# Patient Record
Sex: FEMALE | Race: BLACK OR AFRICAN AMERICAN | NOT HISPANIC OR LATINO | Employment: OTHER | URBAN - METROPOLITAN AREA
[De-identification: names, ages, dates, MRNs, and addresses within clinical notes are randomized per-mention and may not be internally consistent; named-entity substitution may affect disease eponyms.]

---

## 2017-01-03 RX ORDER — HYDROCODONE BITARTRATE AND ACETAMINOPHEN 5; 325 MG/1; MG/1
1 TABLET ORAL EVERY 6 HOURS PRN
Qty: 60 TABLET | Refills: 0 | Status: SHIPPED | OUTPATIENT
Start: 2017-01-03 | End: 2017-02-01 | Stop reason: SDUPTHER

## 2017-01-10 ENCOUNTER — OUTPATIENT CASE MANAGEMENT (OUTPATIENT)
Dept: ADMINISTRATIVE | Facility: OTHER | Age: 82
End: 2017-01-10

## 2017-01-10 NOTE — PROGRESS NOTES
Called patient for OPCM update and to review future appts.    Patient states that she is doing ok at this time, confirms she contunies with Trulicity and happy with her blood sugar control. Confirmed appt with  tomorrow @ 1100 to establish care. Reconfirmed urology appt with Dr. Madera as requested per patient scheduled  @ 1340.    Also advised patient will close current case , but informed patient to call back if develop future needs that require assistance from . Patient verbalized her understanding of all discussed and verbalized her appreciation for assistance.     0Nampal Martínez  1935 Medical Record  # [id number]   Visit Location _x_ Home __ Clinic __ Telephonic __Other  Encounter Date:  16   Contact Type __ New Case   _x_ Follow Up  __ Monitoring  __ Case Closure Next Follow-up Date In 1 month       Summary:  Have made several phone attempts to contact patient without success. Per last contact patient had respiratory infection with lots of coughing not up to phone conversation. Discussed diabetes management with her this morning and patient expressed more interest and agrees to attending education to help her to make better meal choices. She also more open about her current lack of knowledge agrees that she must better able to manage her care and also learn to self check blood sugar and administer insulin, as she relies on various family to provide this care. She is dependent on whomever is available at time, this morning is granddaughter Brooklyn. At this hour patient had already eaten her breakfast, not yet taken her insulin or checked her blood sugar, still unclear of times for check and insulin. She confirms that she usually receives insulin and blood sugar checks after insulin administered. I spoke with Brooklyn and advised that she give insulin now and confirmed dose. I advised both she and Ms Martínez to check blood sugar before breakfast and before her dinner. Advised give  insulin just before eat. I asked that they log blood glucose checks 2 times each day and asked that they bring log to empowerment appt on Monday. Patient previously informed me that she was unable to perform blood glucose checks because of poor vision. She is scheduled for annual exam later this month;howevever I suggested to them both that they obtain inexpensive pair of readers so that Ms Martínez could participate in the empowerment class.She advised of her concerns with vertigo and wearing incorrect glasses. I advised that she remove glasses immediately if this occur and remove them before standing up, only for reading and is important to be able to read during class. She previously reported problems paying for glasses and have provided her with therese information for assistance with glasses. Reminded her to ask eye doctor for prescription to take with her to shop for inexpensive glasses. Advised will follow up with her after diabetes class to reinforce her knowledge. She agreed to that plan.            Plan:    Problem 1. Coordination of care  Continue to educate about Community resources and importance of follow -up . Reeducated about therese for dental and vision assistance.-done  Review understanding of info provided and use . - ongoing-done  Encourage patient use of resource information.  Ongoing-done  Home health request- to MD  Arrange Optometry appt with next encounter- done        2. Problem Diabetes  Continue to educate about Diabetes . Reviewed times for blood sugar monitoring / insulin dose/ time of administration-done  Discussed importance of blood glucose log. Educated on keeping blood glucose log-  Advised to bring in to appt.-again reinforced - Diabetes empowerment student- home health for reinforcement  Review signs and symptoms of Hyper/hypoglycemia . Ongoing-done  Encourage patient to follow medication and treatment sade doctors. Empowerment class scheduled 06/13/16-  attended 1 class so far, next scheduled 07/29-done  Discussed healthy food choices- ongoing- discussed replacing starches with vegtables-done    3. Constipation  Review of meds .done- reinforced  Diet-done- reinforced  Bowel program/when to seek additional assistance.-done- reinforced           Problem 1 Coordination  of Care     Short Term Goal Pt/Family verbalize resource information received with next call.    __ Partially Met  __x_ Met  ___ Not Met  ___  Deferred    Patient to make contact with at least one referral source.  __Partially met  __x_ Met  ___ Not met  ___ Deferred  Patient will follow through with diabetes education, daughters of   kiarra.    Pt/Caregiver ill have action plan on how to contact their provider in a 24 hour cycle (PCP, Specialties, [facility])  ___1___weeks   (__Partially met _x__Met __Not met ___ Deferred)    Patient will follow up with .  _  Partially met  _x__ Met  ___ Not met  ___ Deferred  Urology follow up scheduled.  _Partially met  __x_ Met  ___ Not met  ___ Deferred  To establish care with new PCP    _Partially met  __x_ Met  ___ Not met  ___ Deferred   Long Term Goals      Goal Met Pt /family will work with referral/assistance source that are available for patient needs before discharge from [facility].      ___ Partially Met  _x_ Met  ___ Not Met  ___ Defferred           Interventions Date Addressed    [x]Provide [facility] Contact information and contact information for Outpatient Care Management staff 5/6/16 5/19/16   []Validate/facilitate Home Health Services as ordered by physician    []Validate/facilitate DME delivery as ordered by physician    [x]Collaborate with PCP as appropriate to meet needs 5/6/16   []Facilitate routine  medical appointments as appropriate    [x]Referral to LCSW, Nursing, Clinical Pharmacist, Dietician, Certified Diabetic Educator, Health  other 5/6/16   [x] Referral to community resources: 5/16   [] Facilitate referral to  "appropriate Outpatient Therapies (ie Hospice,  Pulmonary/ Cardiac Rehab)    []     []     []    []     []    []            Problem 2 Diabetes   Short Term Goal Pt/Family verbalize 3 signs of Hypoglycemia with next call.    __ Partially met  _x__ Met  ___ Not met  ___ Deferred    Patient will take insulin dose as prescribed 2 times per day.  __ Partially met _x__ Met  ___ Not met  ___ Deferred   Patient will check blood sugar before breakfast and dinner daily.  ___ Partially met  _x__ Met  ___ Not Met  ___ Deferred  Pt will log glucose values twice daily.  _x_ Partially met  __ Met  ___ Not Met  ___ Deferred  Patient will attend next diabetes empowerment class.  __ Partially met  _x_ met  __ Not met  __ Deferred   Long Term Goals    Goal Met Pt will get Hgb A1c tested every 6 months and if results 8 or greater will make appt with PCP within 2 weeks.    __ Partially Met  __x_ Met  ___ Not met  ___ Deferred   Goal Met        Interventions Date Addressed    __ Review with and provide education materials to pt. Types of Diabetes: "Pre Diabetes" ([employer])    __ Review with and provide education materials to pt. Types of Diabetes: "What is Type 1 Diabetes?" ([employer])    __ Review with and provide education materials to pt. Types of Diabetes: "What is Type 2 Diabetes?" ([employer])    __ Review with and provide education materials to pt. Resources for People with Diabetes (ANN[employer]MARIE)    Glucose Monitoring    _x_ Discuss glucometer monitoring  5/6/16  6/10/16  7/13/16   _x_ Educate regarding physicians recommended BS range 7/13/16   __ Review with and provide education materials to pt. Review with and provide education materials to pt. Blood Sugar Management: Hypoglycemia (Low Blood Sugar) ([employer])    __ Review with and provide education materials to pt. Review with and provide education materials to pt. Blood Sugar Management: Hyperglycemia (High Blood Sugar) (ANN[employer]IE)    _x_ Review with and provide education materials to pt. Blood " "Sugar Management: Using a Blood Sugar Log  ([facility]) 6/10/16  7/13/16   __  Review with and provide education materials to pt. Blood Sugar Management: Managing Diabetes: The A1C Test ([facility])    Diet and Meal Planning     __ Review with and provide education materials to pt. Diet and Meal Planning - "Healthy Meals for Diabetes" ([facility])    __ Review with and provide education materials to pt. Diet and Meal Planning - "Diabetes: Understanding Carbohydrates"  and Understanding Carbohydrates, Fats and Protein, ([facility])    __ Review with and provide education materials to pt. Diet and Meal Planning - "Eating Out When You Have Diabetes" ([facility])    _x_ Review with and provide education materials to pt. Diet and Meal Planning - "Diabetes: Shopping for and Preparing Meals"  ([employer]) 5/6 7/13/16   __Review with and provide education materials to pt. Review with and provide education materials to pt. Diet and Meal Planning - "Eating a High Fiber Diet"  ([facility])    __ Review with and provide education materials to pt. Diet and Meal Planning - "Diabetes: Meal Planning"  ([facility])    __ Review with and provide education materials to pt. Complications & Co morbidities - "Diabetes and Alcohol Consumption" ([facility])    Exercise and Weight    __ Review with and provide education materials to pt. Exercise: "Exercise to Manage Your Blood Sugar" ([facility])    __ Review with and provide education materials to pt. Exercise: "Diabetes: The Benefits of Exercise" ([facility])    __ Review with and provide education materials to pt. Exercise: "Before You Start With Diabetes Exercise Plan" ([facility])    __ eview with and provide education materials to pt. Exercise: "Diabetes: Getting Started with Exercise" ([facility])    __ Review with and provide education materials to pt. Exercise: "Diabetes: Activity Tips" ([facility])    __ Review with and provide education materials to pt. Exercise: "Diabetes: Tracking Your Fitness Progress" ([employer])    __ Review with " "and provide education materials to pt. Exercise: "Type 1 Diabetes: Getting Active"  ([employer])     __ Review with and provide education materials to pt. Weight Management: "Finding Your Ideal Weight" ([employer])    Medications    __ Review with and provide education materials to pt. Medications: "Using Injected Insulin" ([employer])    __ Review with and provide education materials to pt. Medications: "Oral Therapy for Type 2 Diabetes" ([employer])    __ Review with and provide education materials to pt. Medications: "Types of Insulin" ([employer])    __ Review with and provide education materials to pt. Medications: "Diabetes: Ways to Take Medication"  ([employer])    __ Review with and provide education materials to pt. Medications: "Taking Medication for Diabetes"  ([employer])    Complications and Co morbidities    __ Review with and provide education materials to pt. Complications & Co morbidities - "After Leg Amputation: Keeping Your Other Leg Healthy" ([employer])    __ Review with and provide education materials to pt. Complications & Co morbidities - "Diabetic Retinopathy: Controlling Your Risk Factors" ([employer])    __ Review with and provide education materials to pt. Complications & Co morbidities - "Using the Amsler Grid" ([employer])    __ Review with and provide education materials to pt. Complications & Co morbidities - "Diabetic Retinopathy: Evaluating Your Eyes"([employer])    __ Review with and provide education materials to pt. Types of Diabetes: "Diabetic Retinopathy: Controlling Your Risk Factors" ([employer])    __ Review with and provide education materials to pt. Complications & Co morbidities - "Diabetic Retinopathy: Having Laser Treatment" ([employer])    __ Review with and provide education materials to pt. Complications & Co morbidities - Diabetic Retinopathy: Having Vitrectomy ([facility])     Self  Care    __  Review with and provide education materials to pt. Self Care - "Diabetes: Keeping Your Feet Healthy" ([employer])    __ Review with " "and provide education materials to pt. Self Care - "Your Diabetes Foot Care Program"  ([employer])    __ Review with and provide education materials to pt. Self Care - "Diabetes: Caring For Your Body" ([employer])    __ Review with and provide education materials to pt. Self Care - "Diabetes: Sick-Day Plan" ([employer])    __ Review with and provide education materials to pt. Self Care - "Diabetes: Driving Issues" ([employer])    __ Review with and provide education materials to pt. Self Care - "Diabetes: Living Your Life" ([employer])    __ Review with and provide education materials to pt. Self Care - "Getting Support When You Have Diabetes" ([employer])    __ Review with and provide education materials to pt. Self Care - "Managing Stress When You Have Diabetes" ([employer])    __ Review with and provide education materials to pt. Self Care - "Planning for Travel When You Have Diabetes" ([employer])    __ Review with and provide education materials to pt. Types of Diabetes: "Your Diabetes Toolkit" ([employer])    Gestational    __ Review with and provide education materials to pt. What is Gestational Diabetes? ([employer])    __ Review with and provide education materials to pt. Exercise: "Gestational Diabetes: Getting Exercise"  ([employer])    __ Review with and provide education materials to pt. Gestational Diabetes:  After Pregnancy"  ([employer]IE)    Pediatrics (Parents)    __  Review with and provide education materials to parent:   Diabetes and Your Child: Understanding Prediabetes  ([employer])    __ Review with and provide education materials to parent:   Diabetes and Your Child: Understanding Type I Diabetes  ([employer])    __ Review with and provide education materials to parent:   Diabetes and Your Child: Understanding Type 2 Diabetes  ([employer])    __ Review with and provide education materials to parent:   Diabetes and Your Child: Testing and Vaccinations  ([employer])    __ Review with and provide education materials to parent:   Diabetes and Your " "Child: Sick -Day Plan  ([employer])    __ Review with and provide education materials to parent:   Diabetes and Your Child: Checking Blood Sugar  ([employer])    __ Review with and provide education materials to parent:   Diabetes and Your Child: Preventing Diabetic Ketoacidosis (DKA)  ([employer])    __ Review with and provide education materials to parent:   Diabetes and Your Child: Testing and Vaccinations  ([employer])    __ Review with and provide education materials to parent:  Diabetes and Your Child: Considering an Insulin Pump.  ([employer])    Pediatrics (Kids)    __ Review with and provide education materials to child.  For Kids Ages 9 to 11: Dealing with Diabetes.  ([employer])    __ Review with and provide education materials to child.  For Kids Ages 12 to 17: Dealing with Diabetes.  ([employer])    __ Review with and provide education materials to child.  For Kids:  Food Facts When You Have Type I Diabetes.  (ANN-MARIE)      Referrals/Management    _x_ Facilitate referral to diabetic class 6/10/16  7/13/16  8/12/16   __ Facilitate obtaining DM supplies    __ Facilitate referral to podiatrist    _x_ Monitor DM management (HgA1c, dental exam 2xs/yr, annual flu shot, annual eye exam, annual comprehensive foot exam) 6/10/16  7/13/16   _x_ Encourage regular medical appointments 6/10/16  7/13/16  8/12/16              Problem 3 Gastroenterology - Constipation      Short Term Goal Patient will verbalize her understanding of current meds to treat constipation.  ___Partially met  ___ Not met  __x_ Met  ___ deferred     Long Term Goals Regulation of Bowel Movement     Goal Met   Reason:      Interventions Date Addressed    _x_ Bowel regime established   11/2 11/17   __ Pt/family educated in bowel regime   11/17   _x_ Review with and provide education materials to pt "Treating Constipation" ([employer])   11/2 11/17   _x_ Determine that pt. has proper foods and liquids available.    11/2   __               "

## 2017-01-11 ENCOUNTER — IMMUNIZATION (OUTPATIENT)
Dept: INTERNAL MEDICINE | Facility: CLINIC | Age: 82
End: 2017-01-11
Payer: MEDICARE

## 2017-01-11 ENCOUNTER — OFFICE VISIT (OUTPATIENT)
Dept: INTERNAL MEDICINE | Facility: CLINIC | Age: 82
End: 2017-01-11
Payer: MEDICARE

## 2017-01-11 VITALS
TEMPERATURE: 98 F | OXYGEN SATURATION: 96 % | WEIGHT: 159.38 LBS | HEIGHT: 61 IN | BODY MASS INDEX: 30.09 KG/M2 | HEART RATE: 67 BPM | SYSTOLIC BLOOD PRESSURE: 118 MMHG | DIASTOLIC BLOOD PRESSURE: 62 MMHG

## 2017-01-11 DIAGNOSIS — M79.89 LEG SWELLING: ICD-10-CM

## 2017-01-11 DIAGNOSIS — E78.2 MIXED HYPERLIPIDEMIA: ICD-10-CM

## 2017-01-11 DIAGNOSIS — N18.30 TYPE 2 DIABETES MELLITUS WITH STAGE 3 CHRONIC KIDNEY DISEASE, WITHOUT LONG-TERM CURRENT USE OF INSULIN: Chronic | ICD-10-CM

## 2017-01-11 DIAGNOSIS — E11.3293 MILD NONPROLIFERATIVE DIABETIC RETINOPATHY OF BOTH EYES WITHOUT MACULAR EDEMA ASSOCIATED WITH TYPE 2 DIABETES MELLITUS: Chronic | ICD-10-CM

## 2017-01-11 DIAGNOSIS — E11.22 TYPE 2 DIABETES MELLITUS WITH STAGE 3 CHRONIC KIDNEY DISEASE, WITHOUT LONG-TERM CURRENT USE OF INSULIN: Chronic | ICD-10-CM

## 2017-01-11 DIAGNOSIS — J32.9 CHRONIC SINUSITIS, UNSPECIFIED LOCATION: Primary | ICD-10-CM

## 2017-01-11 DIAGNOSIS — M15.9 PRIMARY OSTEOARTHRITIS INVOLVING MULTIPLE JOINTS: Chronic | ICD-10-CM

## 2017-01-11 DIAGNOSIS — Z78.0 POSTMENOPAUSAL: ICD-10-CM

## 2017-01-11 DIAGNOSIS — I70.0 ATHEROSCLEROSIS OF AORTA: Chronic | ICD-10-CM

## 2017-01-11 DIAGNOSIS — I10 ESSENTIAL HYPERTENSION: Chronic | ICD-10-CM

## 2017-01-11 PROCEDURE — 99499 UNLISTED E&M SERVICE: CPT | Mod: S$GLB,,, | Performed by: INTERNAL MEDICINE

## 2017-01-11 PROCEDURE — 90662 IIV NO PRSV INCREASED AG IM: CPT | Mod: S$GLB,,, | Performed by: INTERNAL MEDICINE

## 2017-01-11 PROCEDURE — 99999 PR PBB SHADOW E&M-EST. PATIENT-LVL III: CPT | Mod: PBBFAC,,, | Performed by: INTERNAL MEDICINE

## 2017-01-11 PROCEDURE — 1157F ADVNC CARE PLAN IN RCRD: CPT | Mod: S$GLB,,, | Performed by: INTERNAL MEDICINE

## 2017-01-11 PROCEDURE — 99214 OFFICE O/P EST MOD 30 MIN: CPT | Mod: S$GLB,,, | Performed by: INTERNAL MEDICINE

## 2017-01-11 PROCEDURE — 3074F SYST BP LT 130 MM HG: CPT | Mod: S$GLB,,, | Performed by: INTERNAL MEDICINE

## 2017-01-11 PROCEDURE — G0008 ADMIN INFLUENZA VIRUS VAC: HCPCS | Mod: S$GLB,,, | Performed by: INTERNAL MEDICINE

## 2017-01-11 PROCEDURE — 1159F MED LIST DOCD IN RCRD: CPT | Mod: S$GLB,,, | Performed by: INTERNAL MEDICINE

## 2017-01-11 PROCEDURE — 3078F DIAST BP <80 MM HG: CPT | Mod: S$GLB,,, | Performed by: INTERNAL MEDICINE

## 2017-01-11 PROCEDURE — 1160F RVW MEDS BY RX/DR IN RCRD: CPT | Mod: S$GLB,,, | Performed by: INTERNAL MEDICINE

## 2017-01-11 PROCEDURE — 1126F AMNT PAIN NOTED NONE PRSNT: CPT | Mod: S$GLB,,, | Performed by: INTERNAL MEDICINE

## 2017-01-11 RX ORDER — LISINOPRIL 40 MG/1
40 TABLET ORAL DAILY
Qty: 90 TABLET | Refills: 3 | Status: SHIPPED | OUTPATIENT
Start: 2017-01-11 | End: 2017-06-15

## 2017-01-11 RX ORDER — LORATADINE 10 MG/1
10 TABLET ORAL DAILY PRN
Qty: 90 TABLET | Refills: 1 | Status: SHIPPED | OUTPATIENT
Start: 2017-01-11 | End: 2017-06-15

## 2017-01-11 RX ORDER — AMLODIPINE BESYLATE 10 MG/1
10 TABLET ORAL DAILY
Qty: 90 TABLET | Refills: 3 | Status: SHIPPED | OUTPATIENT
Start: 2017-01-11 | End: 2017-06-15

## 2017-01-11 RX ORDER — FUROSEMIDE 20 MG/1
20 TABLET ORAL DAILY PRN
Qty: 14 TABLET | Refills: 0 | Status: SHIPPED | OUTPATIENT
Start: 2017-01-11 | End: 2017-10-27 | Stop reason: SDUPTHER

## 2017-01-11 RX ORDER — CHLORTHALIDONE 25 MG/1
25 TABLET ORAL DAILY
Qty: 90 TABLET | Refills: 3 | Status: SHIPPED | OUTPATIENT
Start: 2017-01-11 | End: 2017-11-21

## 2017-01-11 RX ORDER — ATENOLOL 100 MG/1
100 TABLET ORAL DAILY
Qty: 90 TABLET | Refills: 3 | Status: ON HOLD | OUTPATIENT
Start: 2017-01-11 | End: 2017-06-06

## 2017-01-11 NOTE — MR AVS SNAPSHOT
[facility]  [address]  [place] [address]  Phone: [phone]  Fax: [phone]                  Dacia Martínez   2017 11:00 AM   Office Visit    Description:  Female : 1935   Provider:  Gómez Jimenez MD   Department:  [facility]           Reason for Visit     Establish Care     Diabetes     Hyperlipidemia     Sinus Problem           Diagnoses this Visit        Comments    Chronic sinusitis, unspecified location    -  Primary     Mild nonproliferative diabetic retinopathy of both eyes without macular edema associated with type 2 diabetes mellitus         Type 2 diabetes mellitus with stage 3 chronic kidney disease, without long-term current use of insulin         Essential hypertension         Atherosclerosis of aorta         Mixed hyperlipidemia         Leg swelling         Postmenopausal         Idiopathic chronic gout of multiple sites without tophus         Primary osteoarthritis involving multiple joints                To Do List           Future Appointments        Provider Department Dept Phone    2017 1:40 PM Osman Madera MD [facility] 4th Floor [phone]      Goals (5 Years of Data)     None      Follow-Up and Disposition     Return in about 2 months (around 3/11/2017) for diabetes.       These Medications        Disp Refills Start End    amlodipine (NORVASC) 10 MG tablet 90 tablet 3 2017     Take 1 tablet (10 mg total) by mouth once daily. - Oral    Pharmacy: [address] Ph #: [phone]       lisinopril (PRINIVIL,ZESTRIL) 40 MG tablet 90 tablet 3 2017     Take 1 tablet (40 mg total) by mouth once daily. - Oral    Pharmacy: [address] Ph #: [phone]       loratadine (CLARITIN) 10 mg tablet 90 tablet 1 2017    Take 1 tablet (10 mg total) by mouth daily as  needed for Allergies. For sinus congestion - Oral    Pharmacy: [address] Ph #: [phone]       furosemide (LASIX) 20 MG tablet 14 tablet 0 1/11/2017 1/25/2017    Take 1 tablet (20 mg total) by mouth daily as needed. - Oral    Pharmacy: [address] [place]. Ph #: [phone]       atenolol (TENORMIN) 100 MG tablet 90 tablet 3 1/11/2017 1/11/2018    Take 1 tablet (100 mg total) by mouth once daily. - Oral    Pharmacy: [address] [place]. Ph #: [phone]       chlorthalidone (HYGROTEN) 25 MG Tab 90 tablet 3 1/11/2017 1/11/2018    Take 1 tablet (25 mg total) by mouth once daily. - Oral    Pharmacy: [address] Ph #: [phone]         [facility] On Call     [facility] On Call Nurse [facility] Line - 24/7 Assistance  Registered nurses in the [facility] provide clinical advisement, health education, appointment booking, and other advisory services.  Call for this free service at [phone].             Medications           Message regarding Medications     Verify the changes and/or additions to your medication regime listed below are the same as discussed with your clinician today.  If any of these changes or additions are incorrect, please notify your healthcare provider.        START taking these NEW medications        Refills    atenolol (TENORMIN) 100 MG tablet 3    Sig: Take 1 tablet (100 mg total) by mouth once daily.    Class: Normal    Route: Oral    chlorthalidone (HYGROTEN) 25 MG Tab 3    Sig: Take 1 tablet (25 mg total) by mouth once daily.    Class: Normal    Route: Oral      CHANGE how you are taking these medications     Start Taking Instead of    amlodipine (NORVASC) 10 MG tablet amlodipine (NORVASC) 10 MG tablet    Dosage:  Take 1 tablet (10 mg total) by mouth  once daily. Dosage:  take 1 tablet by mouth once daily    Reason for Change:  Reorder     lisinopril (PRINIVIL,ZESTRIL) 40 MG tablet lisinopril (PRINIVIL,ZESTRIL) 40 MG tablet    Dosage:  Take 1 tablet (40 mg total) by mouth once daily. Dosage:  take 1 tablet by mouth once daily    Reason for Change:  Reorder     loratadine (CLARITIN) 10 mg tablet loratadine (CLARITIN) 10 mg tablet    Dosage:  Take 1 tablet (10 mg total) by mouth daily as needed for Allergies. For sinus congestion Dosage:  Take 1 tablet (10 mg total) by mouth once daily.    Reason for Change:  Reorder       STOP taking these medications     atenolol-chlorthalidone (TENORETIC) 100-25 mg per tablet take 1 tablet by mouth once daily           Verify that the below list of medications is an accurate representation of the medications you are currently taking.  If none reported, the list may be blank. If incorrect, please contact your healthcare provider. Carry this list with you in case of emergency.           Current Medications     ACCU-CHEK FASTCLIX Misc 1 lancet by Misc.(Non-Drug; Combo Route) route 3 (three) times daily. Pt to test blood glucose up to 3 times daily.    amlodipine (NORVASC) 10 MG tablet Take 1 tablet (10 mg total) by mouth once daily.    atorvastatin (LIPITOR) 80 MG tablet Take 1 tablet (80 mg total) by mouth once daily.    azelastine (ASTELIN) 137 mcg (0.1 %) nasal spray 1 spray (137 mcg total) by Nasal route 2 (two) times daily.    blood sugar diagnostic (ACCU-CHEK TOMASA) Strp 1 strip by Misc.(Non-Drug; Combo Route) route once daily.    docusate sodium (COLACE) 50 MG capsule Take 1 capsule (50 mg total) by mouth 2 (two) times daily.    dulaglutide (TRULICITY) 0.75 mg/0.5 mL PnIj Inject 0.75 mg into the skin every 7 days.    fluticasone (FLONASE) 50 mcg/actuation nasal spray 2 sprays by Each Nare route 2 (two) times daily.    furosemide (LASIX) 20 MG tablet Take 1 tablet (20 mg total) by mouth daily as needed.     "hydrocodone-acetaminophen 5-325mg (NORCO) 5-325 mg per tablet Take 1 tablet by mouth every 6 (six) hours as needed for Pain.    lisinopril (PRINIVIL,ZESTRIL) 40 MG tablet Take 1 tablet (40 mg total) by mouth once daily.    meclizine (ANTIVERT) 25 mg tablet Take 1 tablet (25 mg total) by mouth 3 (three) times daily as needed.    olopatadine (PATANOL) 0.1 % ophthalmic solution Place 1 drop into both eyes 2 (two) times daily.    polyethylene glycol (GLYCOLAX) 17 gram PwPk Take 17 g by mouth daily as needed. Dissolve 1 heaping tablespoon and 4-8 ounces of water.  Take once a day as needed for constipation.    predniSONE (DELTASONE) 10 MG tablet Take 1 tablet (10 mg total) by mouth daily as needed (gout).    tamsulosin (FLOMAX) 0.4 mg Cp24 take 1 capsule by mouth once daily    triamcinolone acetonide 0.1% (KENALOG) 0.1 % ointment APPLY TO AFFECTED AREA DAILY    allopurinol (ZYLOPRIM) 300 MG tablet Take 1.5 tablets (450 mg total) by mouth once daily.    atenolol (TENORMIN) 100 MG tablet Take 1 tablet (100 mg total) by mouth once daily.    chlorthalidone (HYGROTEN) 25 MG Tab Take 1 tablet (25 mg total) by mouth once daily.    loratadine (CLARITIN) 10 mg tablet Take 1 tablet (10 mg total) by mouth daily as needed for Allergies. For sinus congestion    omeprazole (PRILOSEC) 20 MG capsule Take 1 capsule (20 mg total) by mouth once daily.           Clinical Reference Information           Vital Signs - Last Recorded  Most recent update: 1/11/2017 10:58 AM by Jane Alexis MA    BP Pulse Temp Ht Wt SpO2    118/62 (BP Location: Right arm, Patient Position: Sitting) 67 98 °F (36.7 °C) (Oral) 5' 1" (1.549 m) 72.3 kg (159 lb 6.3 oz) 96%    BMI                30.12 kg/m2          Blood Pressure          Most Recent Value    BP  118/62      Allergies as of 1/11/2017     No Known Allergies      Immunizations Administered on Date of Encounter - 1/11/2017     Name Date Dose VIS Date Route    Influenza - High Dose 1/11/2017 0.5 mL " 8/7/2015 Intramuscular      Orders Placed During Today's Visit     Future Labs/Procedures Expected by Expires    DXA Bone Density Spine And Hip_Axial Skeleton  1/11/2017 4/11/2017    Comprehensive metabolic panel  2/22/2017 (Approximate) 4/11/2017    Hemoglobin A1c  2/22/2017 (Approximate) 4/11/2017    Lipid panel  2/22/2017 (Approximate) 4/11/2017      [facility] Sign-Up     Activating your [facility] account is as easy as 1-2-3!     1) Visit [email or web address], select Sign Up Now, enter this activation code and your date of birth, then select Next.  [email or web address]  Expires: 2/25/2017 12:02 PM      2) Create a username and password to use when you visit [facility] in the future and select a security question in case you lose your password and select Next.    3) Enter your e-mail address and click Sign Up!    Additional Information  If you have questions, please e-mail [email or web address] or call [phone] to talk to our [facility] staff. Remember, [facility] is NOT to be used for urgent needs. For medical emergencies, dial 911.

## 2017-01-11 NOTE — PROGRESS NOTES
"Subjective:       Patient ID: Dacia Martínez is a 81 y.o. female.    Chief Complaint: Establish Care; Diabetes; Hyperlipidemia; and Sinus Problem    HPI  80 y/o woman with h/o DM2, HTN, HLD, atherosclerosis, gout, chronic rhinitis/sinusitis, h/o breast cancer (s/p treatment ~2000, no recurrence) here to establish care with new provider. Previously followed with Dr Vickers.  Primary concern today is sinus problems. Here with her granddaughter.    [facility]; see that note for summary.    Sinus problems - nose running, congested, headaches, mild sinus pain, eyes itchy/watery. No ear pain. No fevers. Today says this has been a problem for "many years" with no recent change. Has seen ENT (Dr Ribeiro) for this in the past, last in 10/2016. Using flonase twice daily per granddaughter. Requests refill on allergy eyedrop. Takes tylenol for headaches, says "that's the strongest medication I can take because of the dizziness" but also says she has tried to take other pain meds for her headache which don't work.   Not taking antihistamine. Not using saline nasal spray.   Says that she usually feels better after being on antibiotics and requests this, but also says her symptoms haven't had any recent change other than some rhinorrhea.    DM2 - diagnosed in 2000. Has been on insulin in the past, switched from this to trulicity last year.  Taking trulicity regularly once/week, tolerating well.   Today says now checking sugars regularly - sometimes in morning and sometimes in PM, not consistently checking fasting. Reports  at bedtime yesterday, but otherwise usually in 100s. Granddaughter says today is the first time her BG has been >200 recently. She had gumbo with rice yesterday, and says "I was eating what I wasn't supposed to over the holidays."    HTN - taking BP medications regularly; requests refills on all of these.  Norvasc 10mg, atenolol 100mg, chlorthalidone 25mg, " lisinopril 40mg.   Not checking BP at home.     Atherosclerosis - taking statin, not taking ASA currently.     Review of Systems   Constitutional: Negative for activity change, fatigue and fever.   HENT: Positive for congestion, rhinorrhea and sinus pressure. Negative for ear pain.    Eyes: Negative for visual disturbance.   Respiratory: Negative for shortness of breath.    Cardiovascular: Negative for chest pain, palpitations and leg swelling.   Gastrointestinal: Negative for abdominal distention, constipation and diarrhea.   Genitourinary: Negative.  Negative for dysuria.   Musculoskeletal: Negative for back pain, gait problem and joint swelling.   Skin: Negative for rash.   Neurological: Positive for headaches. Negative for weakness.   Psychiatric/Behavioral: Negative for dysphoric mood. The patient is not nervous/anxious.          Past Medical History   Diagnosis Date    Arthritis     Bilateral nonexudative age-related macular degeneration 6/3/2014    Breast cancer     Cataract     CKD stage 3 due to type 2 diabetes mellitus     Hypertension     Migraine headache     Mild nonproliferative diabetic retinopathy of both eyes 6/3/2014    Pneumonia     Type 2 diabetes mellitus with hyperglycemia     Vertigo      Past Surgical History   Procedure Laterality Date    Carpal tunnel release       left    Cataract extraction       vance     section      Eye surgery       cataracts bilaterally    Breast surgery       left lumpectomy    Hysterectomy       partial     Family History   Problem Relation Age of Onset    Cancer Mother      stomach    Heart disease Mother     Diabetes Father     Hypertension Father     Blindness Brother     Diabetes Brother     Hypertension Brother     Cataracts Sister     Diabetes Sister     Diabetes Brother     Diabetes Brother     Diabetes Brother     No Known Problems Daughter     No Known Problems Son     No Known Problems Daughter     Amblyopia Neg Hx   "   Glaucoma Neg Hx     Macular degeneration Neg Hx     Strabismus Neg Hx     Retinal detachment Neg Hx        Social History   Substance Use Topics    Smoking status: Never Smoker    Smokeless tobacco: None    Alcohol use 1.2 oz/week     2 Cans of beer per week      Comment: socially       Medications and allergies reviewed.     Objective:          Vitals:    01/11/17 1055   BP: 118/62   BP Location: Right arm   Patient Position: Sitting   Pulse: 67   Temp: 98 °F (36.7 °C)   TempSrc: Oral   SpO2: 96%   Weight: 72.3 kg (159 lb 6.3 oz)   Height: 5' 1" (1.549 m)     Body mass index is 30.12 kg/(m^2).  Physical Exam   Constitutional: She is oriented to person, place, and time. She appears well-developed and well-nourished. No distress.   HENT:   Head: Normocephalic and atraumatic.   Nose: Mucosal edema (and erythema of nasal turbinates) present.   Mouth/Throat: Posterior oropharyngeal erythema (mild erythema) present. No oropharyngeal exudate.   Eyes: Conjunctivae are normal. No scleral icterus.   Neck: Normal range of motion.   Cardiovascular: Normal rate, regular rhythm and intact distal pulses.    Murmur heard.  Pulmonary/Chest: Effort normal and breath sounds normal. No respiratory distress.   Abdominal: Soft. Bowel sounds are normal. She exhibits no distension.   Musculoskeletal: Normal range of motion. She exhibits no edema or tenderness.   Lymphadenopathy:     She has no cervical adenopathy.   Neurological: She is alert and oriented to person, place, and time.   Skin: Skin is warm and dry. She is not diaphoretic.   Psychiatric: She has a normal mood and affect.   Nursing note and vitals reviewed.      Lab Results   Component Value Date    WBC 7.15 09/28/2016    HGB 10.4 (L) 09/28/2016    HCT 33.7 (L) 09/28/2016     09/28/2016    CHOL 211 (H) 11/02/2016    TRIG 80 11/02/2016    HDL 55 11/02/2016    ALT 10 11/02/2016    AST 16 11/02/2016     11/02/2016    K 4.8 11/02/2016     (H) " 11/02/2016    CREATININE 1.2 11/02/2016    BUN 22 11/02/2016    CO2 25 11/02/2016    TSH 3.507 04/08/2016    INR 0.9 08/10/2009    HGBA1C 7.1 (H) 11/02/2016       Assessment:       1. Chronic sinusitis, unspecified location    2. Mild nonproliferative diabetic retinopathy of both eyes without macular edema associated with type 2 diabetes mellitus    3. Type 2 diabetes mellitus with stage 3 chronic kidney disease, without long-term current use of insulin    4. Essential hypertension    5. Atherosclerosis of aorta    6. Mixed hyperlipidemia    7. Leg swelling    8. Postmenopausal    9. Primary osteoarthritis involving multiple joints        Plan:   Dacia was seen today for establish care, diabetes, hyperlipidemia and sinus problem.    Diagnoses and all orders for this visit:    Chronic sinusitis, unspecified location - needs new appt with ENT  -     loratadine (CLARITIN) 10 mg tablet; Take 1 tablet (10 mg total) by mouth daily as needed for Allergies. For sinus congestion    Mild nonproliferative diabetic retinopathy of both eyes without macular edema associated with type 2 diabetes mellitus  Type 2 diabetes mellitus with stage 3 chronic kidney disease, without long-term current use of insulin  - patient completed DM Empowerment visit, due for recheck of A1c before next visit. Last A1c at goal for age. Given difficulty with medication/management adherence in past, will need close attention  -     Hemoglobin A1c; Future    Essential hypertension - new rx given for meds, continue these. At goal today.  -     amlodipine (NORVASC) 10 MG tablet; Take 1 tablet (10 mg total) by mouth once daily.  -     lisinopril (PRINIVIL,ZESTRIL) 40 MG tablet; Take 1 tablet (40 mg total) by mouth once daily.  -     atenolol (TENORMIN) 100 MG tablet; Take 1 tablet (100 mg total) by mouth once daily.  -     chlorthalidone (HYGROTEN) 25 MG Tab; Take 1 tablet (25 mg total) by mouth once daily.  -     Comprehensive metabolic panel;  Future    Atherosclerosis of aorta - continue statin  -     Comprehensive metabolic panel; Future  -     Lipid panel; Future    Mixed hyperlipidemia - continue statin  -     Comprehensive metabolic panel; Future  -     Lipid panel; Future    Leg swelling  -     furosemide (LASIX) 20 MG tablet; Take 1 tablet (20 mg total) by mouth daily as needed.    Postmenopausal  -     DXA Bone Density Spine And Hip_Axial Skeleton; Future    Primary osteoarthritis involving multiple joints - ok to take tylenol    Health maintenance reviewed with patient. Flu shot today, DEXA ordered.     Return in about 2 months (around 3/11/2017) for diabetes.    Gómez Jimenez MD  Internal Medicine  [facility]  1/11/2017

## 2017-01-12 RX ORDER — TRIAMCINOLONE ACETONIDE 1 MG/G
OINTMENT TOPICAL
Qty: 80 G | Refills: 0 | Status: SHIPPED | OUTPATIENT
Start: 2017-01-12 | End: 2017-03-04 | Stop reason: SDUPTHER

## 2017-01-12 RX ORDER — PREDNISONE 10 MG/1
TABLET ORAL
Qty: 30 TABLET | Refills: 0 | Status: SHIPPED | OUTPATIENT
Start: 2017-01-12 | End: 2017-03-02 | Stop reason: SDUPTHER

## 2017-01-23 NOTE — TELEPHONE ENCOUNTER
----- Message from Angeles Mulligan sent at 1/23/2017 12:50 PM CST -----  Contact: self  Refill on hydrocodone-acetaminophen 5-325mg (NORCO) 5-325 mg per tablet, please confirm when done. Pt would like to also take to you.

## 2017-01-24 ENCOUNTER — OFFICE VISIT (OUTPATIENT)
Dept: UROLOGY | Facility: CLINIC | Age: 82
End: 2017-01-24
Payer: MEDICARE

## 2017-01-24 DIAGNOSIS — B37.31 YEAST VAGINITIS: ICD-10-CM

## 2017-01-24 DIAGNOSIS — R35.1 NOCTURIA: ICD-10-CM

## 2017-01-24 DIAGNOSIS — N39.0 RECURRENT UTI: Primary | ICD-10-CM

## 2017-01-24 LAB
BILIRUB SERPL-MCNC: NORMAL MG/DL
BLOOD URINE, POC: NORMAL
COLOR, POC UA: YELLOW
GLUCOSE UR QL STRIP: NORMAL
KETONES UR QL STRIP: NORMAL
LEUKOCYTE ESTERASE URINE, POC: NORMAL
NITRITE, POC UA: NORMAL
PH, POC UA: 6
PROTEIN, POC: NORMAL
SPECIFIC GRAVITY, POC UA: 1.01
UROBILINOGEN, POC UA: NORMAL

## 2017-01-24 PROCEDURE — 1159F MED LIST DOCD IN RCRD: CPT | Mod: S$GLB,,, | Performed by: UROLOGY

## 2017-01-24 PROCEDURE — 99203 OFFICE O/P NEW LOW 30 MIN: CPT | Mod: 25,S$GLB,, | Performed by: UROLOGY

## 2017-01-24 PROCEDURE — 1160F RVW MEDS BY RX/DR IN RCRD: CPT | Mod: S$GLB,,, | Performed by: UROLOGY

## 2017-01-24 PROCEDURE — 87086 URINE CULTURE/COLONY COUNT: CPT

## 2017-01-24 PROCEDURE — 81002 URINALYSIS NONAUTO W/O SCOPE: CPT | Mod: S$GLB,,, | Performed by: UROLOGY

## 2017-01-24 PROCEDURE — 99999 PR PBB SHADOW E&M-EST. PATIENT-LVL II: CPT | Mod: PBBFAC,,, | Performed by: UROLOGY

## 2017-01-24 PROCEDURE — 1126F AMNT PAIN NOTED NONE PRSNT: CPT | Mod: S$GLB,,, | Performed by: UROLOGY

## 2017-01-24 PROCEDURE — 1157F ADVNC CARE PLAN IN RCRD: CPT | Mod: S$GLB,,, | Performed by: UROLOGY

## 2017-01-24 RX ORDER — HYDROCODONE BITARTRATE AND ACETAMINOPHEN 5; 325 MG/1; MG/1
1 TABLET ORAL EVERY 6 HOURS PRN
Refills: 0 | OUTPATIENT
Start: 2017-01-24

## 2017-01-24 RX ORDER — FLUCONAZOLE 100 MG/1
100 TABLET ORAL DAILY
Qty: 7 TABLET | Refills: 0 | Status: SHIPPED | OUTPATIENT
Start: 2017-01-24 | End: 2017-01-31

## 2017-01-24 NOTE — PROGRESS NOTES
CC: itching in the vaginal area    Dacia Martínez is a 81 y.o. woman who is here for the evaluation of recurrent uti's (states she is diabetic and gets frequent infections)  c/o vaginal itching.  Hx of recurrent UTI in the past.  Denies dysuria or increased frequency or urgency.  Nocturia 2 to 4 x.    Saw her back in 2013 for similar episode.  Had breast cancer 16 years ago and has been free of recurrence.  Urination symptoms: Negative for frequency, urgency and incontinence.  Denies flank pain, dysuira, hematuria     Past Medical History   Diagnosis Date    Arthritis     Bilateral nonexudative age-related macular degeneration 6/3/2014    Breast cancer     Cataract     CKD stage 3 due to type 2 diabetes mellitus     Hypertension     Migraine headache     Mild nonproliferative diabetic retinopathy of both eyes 6/3/2014    Pneumonia     Type 2 diabetes mellitus with hyperglycemia     Vertigo      Past Surgical History   Procedure Laterality Date    Carpal tunnel release       left    Cataract extraction       vance     section      Eye surgery       cataracts bilaterally    Breast surgery       left lumpectomy    Hysterectomy       partial     Social History   Substance Use Topics    Smoking status: Never Smoker    Smokeless tobacco: None    Alcohol use 1.2 oz/week     2 Cans of beer per week      Comment: socially     Family History   Problem Relation Age of Onset    Cancer Mother      stomach    Heart disease Mother     Diabetes Father     Hypertension Father     Blindness Brother     Diabetes Brother     Hypertension Brother     Cataracts Sister     Diabetes Sister     Diabetes Brother     Diabetes Brother     Diabetes Brother     No Known Problems Daughter     No Known Problems Son     No Known Problems Daughter     Amblyopia Neg Hx     Glaucoma Neg Hx     Macular degeneration Neg Hx     Strabismus Neg Hx     Retinal detachment Neg Hx      Allergy:  Review of patient's  allergies indicates:  No Known Allergies  Outpatient Encounter Prescriptions as of 1/24/2017   Medication Sig Dispense Refill    ACCU-CHEK FASTCLIX Misc 1 lancet by Misc.(Non-Drug; Combo Route) route 3 (three) times daily. Pt to test blood glucose up to 3 times daily. 100 each 11    amlodipine (NORVASC) 10 MG tablet Take 1 tablet (10 mg total) by mouth once daily. 90 tablet 3    atenolol (TENORMIN) 100 MG tablet Take 1 tablet (100 mg total) by mouth once daily. 90 tablet 3    atorvastatin (LIPITOR) 80 MG tablet Take 1 tablet (80 mg total) by mouth once daily. 90 tablet 3    azelastine (ASTELIN) 137 mcg (0.1 %) nasal spray 1 spray (137 mcg total) by Nasal route 2 (two) times daily. 30 mL 11    chlorthalidone (HYGROTEN) 25 MG Tab Take 1 tablet (25 mg total) by mouth once daily. 90 tablet 3    docusate sodium (COLACE) 50 MG capsule Take 1 capsule (50 mg total) by mouth 2 (two) times daily. 60 capsule 6    fluticasone (FLONASE) 50 mcg/actuation nasal spray 2 sprays by Each Nare route 2 (two) times daily. 16 g 6    furosemide (LASIX) 20 MG tablet Take 1 tablet (20 mg total) by mouth daily as needed. 14 tablet 0    hydrocodone-acetaminophen 5-325mg (NORCO) 5-325 mg per tablet Take 1 tablet by mouth every 6 (six) hours as needed for Pain. 60 tablet 0    lisinopril (PRINIVIL,ZESTRIL) 40 MG tablet Take 1 tablet (40 mg total) by mouth once daily. 90 tablet 3    loratadine (CLARITIN) 10 mg tablet Take 1 tablet (10 mg total) by mouth daily as needed for Allergies. For sinus congestion 90 tablet 1    olopatadine (PATANOL) 0.1 % ophthalmic solution Place 1 drop into both eyes 2 (two) times daily. 5 mL 1    polyethylene glycol (GLYCOLAX) 17 gram PwPk Take 17 g by mouth daily as needed. Dissolve 1 heaping tablespoon and 4-8 ounces of water.  Take once a day as needed for constipation. 100 packet 6    predniSONE (DELTASONE) 10 MG tablet take 1 tablet by mouth daily if needed for gout 30 tablet 0    triamcinolone  acetonide 0.1% (KENALOG) 0.1 % ointment apply to affected area once daily 80 g 0    allopurinol (ZYLOPRIM) 300 MG tablet Take 1.5 tablets (450 mg total) by mouth once daily. 45 tablet 6    blood sugar diagnostic (ACCU-CHEK TOMASA) Strp 1 strip by Misc.(Non-Drug; Combo Route) route once daily. 100 strip 11    dulaglutide (TRULICITY) 0.75 mg/0.5 mL PnIj Inject 0.75 mg into the skin every 7 days. 4 Syringe 11    fluconazole (DIFLUCAN) 100 MG tablet Take 1 tablet (100 mg total) by mouth once daily. 7 tablet 0    meclizine (ANTIVERT) 25 mg tablet Take 1 tablet (25 mg total) by mouth 3 (three) times daily as needed. 20 tablet 0    omeprazole (PRILOSEC) 20 MG capsule Take 1 capsule (20 mg total) by mouth once daily. 30 capsule 11    [DISCONTINUED] tamsulosin (FLOMAX) 0.4 mg Cp24 take 1 capsule by mouth once daily 30 capsule 11     No facility-administered encounter medications on file as of 1/24/2017.      Review of Systems   General ROS: GENERAL:  No weight gain or loss  SKIN:  No rashes or lacerations  HEAD:  No headaches  EYES:  No exophthalmos, jaundice or blindness  EARS:  No dizziness, tinnitus or hearing loss  NOSE:  No changes in smell  MOUTH & THROAT:  No dyskinetic movements or obvious goiter  CHEST:  No shortness of breath, hyperventilation or cough  CARDIOVASCULAR:  No tachycardia or chest pain  ABDOMEN:  No nausea, vomiting, pain, constipation or diarrhea  URINARY:  No frequency, dysuria or sexual dysfunction  ENDOCRINE:  No polydipsia, polyuria  MUSCULOSKELETAL:  No pain or stiffness of the joints  NEUROLOGIC:  No weakness, sensory changes, seizures, confusion, memory loss, tremor or other abnormal movements  Physical Exam   There were no vitals filed for this visit.  Physical Examination:   General appearance - alert, well appearing, and in no distress  Mental status - alert, oriented to person, place, and time  Eyes - pupils equal and reactive, extraocular eye movements intact  Ears - bilateral TM's  and external ear canals normal  Nose - normal and patent, no erythema, discharge or polyps  Mouth - mucous membranes moist, pharynx normal without lesions  Neck - supple, no significant adenopathy  Chest - clear to auscultation, no wheezes, rales or rhonchi, symmetric air entry  Breast- no mass or lumps or tenderness  Heart - normal rate, regular rhythm, normal S1, S2, no murmurs, rubs, clicks or gallops  Abdomen - soft, nontender, nondistended, no masses or organomegaly of liver and spleen, no hernia noted.  Pelvic - normal external genitalia   Urethra normal meatus with no lesion or prolapse. No tenderness or discharge  Bladder- no tenderness  atrophic mucosa  Rectal - normal rectal, no masses  Back exam - full range of motion, no tenderness, palpable spasm or pain on motion  LE - No edema noted.  Neurological - alert, oriented, normal speech, no focal findings or movement disorder noted  Musculoskeletal - no joint tenderness, deformity or swelling    LABS:  Lab Results   Component Value Date    CREATININE 1.2 11/02/2016    CREATININE 1.3 09/28/2016    CREATININE 1.1 09/27/2016     Urine Culture, Routine   Date Value Ref Range Status   05/18/2015 No growth  Final     UA today clear    Assessment and Plan:  Dacia was seen today for recurrent uti's.    Diagnoses and all orders for this visit:    Recurrent UTI  -     POCT urine dipstick without microscope  -     Urine culture    Yeast vaginitis  -     fluconazole (DIFLUCAN) 100 MG tablet; Take 1 tablet (100 mg total) by mouth once daily.    Nocturia    use diflucan 1 dose as needed for itching.  Unable to take dairy products, thus I recommended to use Probiotics whenever she gets abx treatment for possible UTI.  Drink plenty of water.  Explained the nature of nocturia.  No intervention is needed at this time.    Follow-up:  Return if symptoms worsen or fail to improve.

## 2017-01-24 NOTE — MR AVS SNAPSHOT
[facility] - Urology 4th Floor  [address] [facility] [address]  Phone: [phone]                  Dacia Martínez   2017 1:40 PM   Office Visit    Description:  Female : 1935   Provider:  Osman Madera MD   Department:  [facility] - Urology 4th Floor           Reason for Visit     recurrent uti's           Diagnoses this Visit        Comments    Yeast vaginitis                To Do List           Future Appointments        Provider Department Dept Phone    2017 9:00 AM LAB, SAME DAY [facility] [phone]    3/14/2017 1:00 PM Gómez Jimenez MD [facility] [phone]      Goals (5 Years of Data)     None       These Medications        Disp Refills Start End    fluconazole (DIFLUCAN) 100 MG tablet 7 tablet 0 2017    Take 1 tablet (100 mg total) by mouth once daily. - Oral    [facility]acy: [facility] [address]. Ph #: [phone]         [facility]     [facility] Care Line -  Assistance  Registered nurses in the [facility] provide clinical advisement, health education, appointment booking, and other advisory services.  Call for this free service at [phone].             Medications           Message regarding Medications     Verify the changes and/or additions to your medication regime listed below are the same as discussed with your clinician today.  If any of these changes or additions are incorrect, please notify your healthcare provider.        START taking these NEW medications        Refills    fluconazole (DIFLUCAN) 100 MG tablet 0    Sig: Take 1 tablet (100 mg total) by mouth once daily.    Class: Normal    Route: Oral      STOP taking these medications     tamsulosin (FLOMAX) 0.4 mg Cp24 take 1 capsule by mouth once daily           Verify that the below list of medications is an accurate representation of the  medications you are currently taking.  If none reported, the list may be blank. If incorrect, please contact your healthcare provider. Carry this list with you in case of emergency.           Current Medications     ACCU-CHEK FASTCLIX Misc 1 lancet by Misc.(Non-Drug; Combo Route) route 3 (three) times daily. Pt to test blood glucose up to 3 times daily.    amlodipine (NORVASC) 10 MG tablet Take 1 tablet (10 mg total) by mouth once daily.    atenolol (TENORMIN) 100 MG tablet Take 1 tablet (100 mg total) by mouth once daily.    atorvastatin (LIPITOR) 80 MG tablet Take 1 tablet (80 mg total) by mouth once daily.    azelastine (ASTELIN) 137 mcg (0.1 %) nasal spray 1 spray (137 mcg total) by Nasal route 2 (two) times daily.    chlorthalidone (HYGROTEN) 25 MG Tab Take 1 tablet (25 mg total) by mouth once daily.    docusate sodium (COLACE) 50 MG capsule Take 1 capsule (50 mg total) by mouth 2 (two) times daily.    fluticasone (FLONASE) 50 mcg/actuation nasal spray 2 sprays by Each Nare route 2 (two) times daily.    furosemide (LASIX) 20 MG tablet Take 1 tablet (20 mg total) by mouth daily as needed.    hydrocodone-acetaminophen 5-325mg (NORCO) 5-325 mg per tablet Take 1 tablet by mouth every 6 (six) hours as needed for Pain.    lisinopril (PRINIVIL,ZESTRIL) 40 MG tablet Take 1 tablet (40 mg total) by mouth once daily.    loratadine (CLARITIN) 10 mg tablet Take 1 tablet (10 mg total) by mouth daily as needed for Allergies. For sinus congestion    olopatadine (PATANOL) 0.1 % ophthalmic solution Place 1 drop into both eyes 2 (two) times daily.    polyethylene glycol (GLYCOLAX) 17 gram PwPk Take 17 g by mouth daily as needed. Dissolve 1 heaping tablespoon and 4-8 ounces of water.  Take once a day as needed for constipation.    predniSONE (DELTASONE) 10 MG tablet take 1 tablet by mouth daily if needed for gout    triamcinolone acetonide 0.1% (KENALOG) 0.1 % ointment apply to affected area once daily    allopurinol (ZYLOPRIM)  300 MG tablet Take 1.5 tablets (450 mg total) by mouth once daily.    blood sugar diagnostic (ACCU-CHEK TOMASA) Strp 1 strip by Misc.(Non-Drug; Combo Route) route once daily.    dulaglutide (TRULICITY) 0.75 mg/0.5 mL PnIj Inject 0.75 mg into the skin every 7 days.    fluconazole (DIFLUCAN) 100 MG tablet Take 1 tablet (100 mg total) by mouth once daily.    meclizine (ANTIVERT) 25 mg tablet Take 1 tablet (25 mg total) by mouth 3 (three) times daily as needed.    omeprazole (PRILOSEC) 20 MG capsule Take 1 capsule (20 mg total) by mouth once daily.           Clinical Reference Information           Allergies as of 1/24/2017     No Known Allergies      Immunizations Administered on Date of Encounter - 1/24/2017     None      [facility] Sign-Up     Activating your [facility] account is as easy as 1-2-3!     1) Visit [email or web address], select Sign Up Now, enter this activation code and your date of birth, then select Next.  [email or web address]  Expires: 2/25/2017 12:02 PM      2) Create a username and password to use when you visit [facility] in the future and select a security question in case you lose your password and select Next.    3) Enter your e-mail address and click Sign Up!    Additional Information  If you have questions, please e-mail [email or web address] or call [phone] to talk to our [facility] staff. Remember, [facility] is NOT to be used for urgent needs. For medical emergencies, dial 911.         Instructions    Probiotics

## 2017-01-25 LAB — BACTERIA UR CULT: NO GROWTH

## 2017-01-29 PROBLEM — Z78.0 POSTMENOPAUSAL: Status: ACTIVE | Noted: 2017-01-29

## 2017-01-29 PROBLEM — J32.9 CHRONIC SINUSITIS: Status: ACTIVE | Noted: 2017-01-29

## 2017-02-01 RX ORDER — ALLOPURINOL 300 MG/1
450 TABLET ORAL DAILY
Qty: 45 TABLET | Refills: 6 | Status: SHIPPED | OUTPATIENT
Start: 2017-02-01 | End: 2017-05-01 | Stop reason: SDUPTHER

## 2017-02-01 RX ORDER — HYDROCODONE BITARTRATE AND ACETAMINOPHEN 5; 325 MG/1; MG/1
1 TABLET ORAL EVERY 6 HOURS PRN
Qty: 60 TABLET | Refills: 0 | Status: SHIPPED | OUTPATIENT
Start: 2017-02-01 | End: 2017-03-02 | Stop reason: SDUPTHER

## 2017-02-22 DIAGNOSIS — H10.10 ALLERGIC CONJUNCTIVITIS, UNSPECIFIED LATERALITY: Primary | ICD-10-CM

## 2017-02-22 DIAGNOSIS — H57.13 EYE DISCOMFORT, BILATERAL: ICD-10-CM

## 2017-02-22 DIAGNOSIS — H10.10 ALLERGIC CONJUNCTIVITIS, UNSPECIFIED LATERALITY: ICD-10-CM

## 2017-02-22 RX ORDER — OLOPATADINE HYDROCHLORIDE 1 MG/ML
1 SOLUTION/ DROPS OPHTHALMIC 2 TIMES DAILY
Qty: 5 ML | Refills: 1 | Status: ON HOLD | OUTPATIENT
Start: 2017-02-22 | End: 2017-06-06

## 2017-02-22 RX ORDER — CROMOLYN SODIUM 40 MG/ML
SOLUTION/ DROPS OPHTHALMIC
Qty: 10 ML | Refills: 1 | Status: SHIPPED | OUTPATIENT
Start: 2017-02-22 | End: 2017-05-10 | Stop reason: SDUPTHER

## 2017-03-02 RX ORDER — PREDNISONE 10 MG/1
TABLET ORAL
Qty: 30 TABLET | Refills: 0 | Status: SHIPPED | OUTPATIENT
Start: 2017-03-02 | End: 2017-05-01 | Stop reason: SDUPTHER

## 2017-03-02 RX ORDER — HYDROCODONE BITARTRATE AND ACETAMINOPHEN 5; 325 MG/1; MG/1
1 TABLET ORAL EVERY 6 HOURS PRN
Qty: 60 TABLET | Refills: 0 | Status: SHIPPED | OUTPATIENT
Start: 2017-03-02 | End: 2017-03-29 | Stop reason: SDUPTHER

## 2017-03-06 RX ORDER — TRIAMCINOLONE ACETONIDE 1 MG/G
OINTMENT TOPICAL
Qty: 80 G | Refills: 0 | Status: SHIPPED | OUTPATIENT
Start: 2017-03-06 | End: 2017-05-10 | Stop reason: SDUPTHER

## 2017-03-10 ENCOUNTER — TELEPHONE (OUTPATIENT)
Dept: INTERNAL MEDICINE | Facility: CLINIC | Age: 82
End: 2017-03-10

## 2017-03-10 NOTE — TELEPHONE ENCOUNTER
----- Message from Micheline Allen sent at 3/10/2017  9:10 AM CST -----  Contact: Irene with [facility]  The physicians order was received for the diabetic shoes, but Irene still needs the most recent chart note.  Please fax to [phone].    Thanks!

## 2017-03-14 ENCOUNTER — OFFICE VISIT (OUTPATIENT)
Dept: INTERNAL MEDICINE | Facility: CLINIC | Age: 82
End: 2017-03-14
Payer: MEDICARE

## 2017-03-14 ENCOUNTER — LAB VISIT (OUTPATIENT)
Dept: LAB | Facility: HOSPITAL | Age: 82
End: 2017-03-14
Attending: INTERNAL MEDICINE
Payer: MEDICARE

## 2017-03-14 VITALS
SYSTOLIC BLOOD PRESSURE: 140 MMHG | DIASTOLIC BLOOD PRESSURE: 70 MMHG | TEMPERATURE: 98 F | HEART RATE: 63 BPM | HEIGHT: 61 IN | BODY MASS INDEX: 30.76 KG/M2 | WEIGHT: 162.94 LBS | OXYGEN SATURATION: 99 %

## 2017-03-14 DIAGNOSIS — J32.9 CHRONIC SINUSITIS, UNSPECIFIED LOCATION: ICD-10-CM

## 2017-03-14 DIAGNOSIS — N18.30 TYPE 2 DIABETES MELLITUS WITH STAGE 3 CHRONIC KIDNEY DISEASE, WITHOUT LONG-TERM CURRENT USE OF INSULIN: Chronic | ICD-10-CM

## 2017-03-14 DIAGNOSIS — E78.2 MIXED HYPERLIPIDEMIA: ICD-10-CM

## 2017-03-14 DIAGNOSIS — I10 ESSENTIAL HYPERTENSION: Chronic | ICD-10-CM

## 2017-03-14 DIAGNOSIS — I10 ESSENTIAL HYPERTENSION: Primary | Chronic | ICD-10-CM

## 2017-03-14 DIAGNOSIS — E11.22 TYPE 2 DIABETES MELLITUS WITH STAGE 3 CHRONIC KIDNEY DISEASE, WITHOUT LONG-TERM CURRENT USE OF INSULIN: Chronic | ICD-10-CM

## 2017-03-14 DIAGNOSIS — M1A.09X0 IDIOPATHIC CHRONIC GOUT OF MULTIPLE SITES WITHOUT TOPHUS: Chronic | ICD-10-CM

## 2017-03-14 DIAGNOSIS — I70.0 ATHEROSCLEROSIS OF AORTA: Chronic | ICD-10-CM

## 2017-03-14 DIAGNOSIS — H91.90 HEARING LOSS, UNSPECIFIED HEARING LOSS TYPE, UNSPECIFIED LATERALITY: ICD-10-CM

## 2017-03-14 DIAGNOSIS — Z85.3 HISTORY OF BREAST CANCER: ICD-10-CM

## 2017-03-14 PROBLEM — B37.31 YEAST VAGINITIS: Status: RESOLVED | Noted: 2017-01-24 | Resolved: 2017-03-14

## 2017-03-14 LAB
ALBUMIN SERPL BCP-MCNC: 3.4 G/DL
ALP SERPL-CCNC: 60 U/L
ALT SERPL W/O P-5'-P-CCNC: 11 U/L
ANION GAP SERPL CALC-SCNC: 8 MMOL/L
AST SERPL-CCNC: 14 U/L
BILIRUB SERPL-MCNC: 0.4 MG/DL
BUN SERPL-MCNC: 26 MG/DL
CALCIUM SERPL-MCNC: 9.1 MG/DL
CHLORIDE SERPL-SCNC: 109 MMOL/L
CHOLEST/HDLC SERPL: 3.1 {RATIO}
CO2 SERPL-SCNC: 26 MMOL/L
CREAT SERPL-MCNC: 1.2 MG/DL
EST. GFR  (AFRICAN AMERICAN): 49.3 ML/MIN/1.73 M^2
EST. GFR  (NON AFRICAN AMERICAN): 42.8 ML/MIN/1.73 M^2
GLUCOSE SERPL-MCNC: 145 MG/DL
HDL/CHOLESTEROL RATIO: 32.6 %
HDLC SERPL-MCNC: 190 MG/DL
HDLC SERPL-MCNC: 62 MG/DL
LDLC SERPL CALC-MCNC: 106.2 MG/DL
NONHDLC SERPL-MCNC: 128 MG/DL
POTASSIUM SERPL-SCNC: 3.8 MMOL/L
PROT SERPL-MCNC: 7.6 G/DL
SODIUM SERPL-SCNC: 143 MMOL/L
TRIGL SERPL-MCNC: 109 MG/DL

## 2017-03-14 PROCEDURE — 3078F DIAST BP <80 MM HG: CPT | Mod: S$GLB,,, | Performed by: INTERNAL MEDICINE

## 2017-03-14 PROCEDURE — 1125F AMNT PAIN NOTED PAIN PRSNT: CPT | Mod: S$GLB,,, | Performed by: INTERNAL MEDICINE

## 2017-03-14 PROCEDURE — 99499 UNLISTED E&M SERVICE: CPT | Mod: S$GLB,,, | Performed by: INTERNAL MEDICINE

## 2017-03-14 PROCEDURE — 1160F RVW MEDS BY RX/DR IN RCRD: CPT | Mod: S$GLB,,, | Performed by: INTERNAL MEDICINE

## 2017-03-14 PROCEDURE — 1159F MED LIST DOCD IN RCRD: CPT | Mod: S$GLB,,, | Performed by: INTERNAL MEDICINE

## 2017-03-14 PROCEDURE — 99214 OFFICE O/P EST MOD 30 MIN: CPT | Mod: S$GLB,,, | Performed by: INTERNAL MEDICINE

## 2017-03-14 PROCEDURE — 1157F ADVNC CARE PLAN IN RCRD: CPT | Mod: S$GLB,,, | Performed by: INTERNAL MEDICINE

## 2017-03-14 PROCEDURE — 99999 PR PBB SHADOW E&M-EST. PATIENT-LVL III: CPT | Mod: PBBFAC,,, | Performed by: INTERNAL MEDICINE

## 2017-03-14 PROCEDURE — 3077F SYST BP >= 140 MM HG: CPT | Mod: S$GLB,,, | Performed by: INTERNAL MEDICINE

## 2017-03-14 RX ORDER — MUPIROCIN 20 MG/G
OINTMENT TOPICAL 2 TIMES DAILY
Qty: 30 G | Refills: 0 | Status: SHIPPED | OUTPATIENT
Start: 2017-03-14 | End: 2017-11-21

## 2017-03-14 RX ORDER — CHLORHEXIDINE GLUCONATE ORAL RINSE 1.2 MG/ML
SOLUTION DENTAL
Refills: 0 | COMMUNITY
Start: 2017-02-14 | End: 2017-11-21

## 2017-03-14 NOTE — PROGRESS NOTES
"Subjective:       Patient ID: Dacia Martínez is a 81 y.o. female.    Chief Complaint: Follow-up and Medication Problem    HPI  82 y/o woman with h/o DM2, HTN, HLD, atherosclerosis, gout, chronic rhinitis/sinusitis, h/o breast cancer (s/p treatment ~2000, no recurrence) here for follow-up.   Last seen 1/11 for visit to establish care + sinus problem.  Her granddaughter helps with her medications and medical care -- some issues with health literacy; does not know all of her medications.    Medication problem / question - today says that she was having some trouble previously with the trulicity but that "my body is getting used to it". Reports losing weight previously with this but this has now stabilized.     DM2 - diagnosed in 2000. Has been on insulin in the past, switched from this to trulicity last year. A1c in 8-9s before starting on trulicity. Now taking trulicity regularly once/week, tolerating well.   Checking BG "sometimes" - granddaughter helps her. "109 the other night." Not keeping log.  Last A1c 7.1 in 11/2016. Had labs done earlier today; no results available at time of visit.    HTN - didn't take her medications today. She is not sure what medications she is taking, but her granddaughter usually sets these out for her.   Medications: norvasc 10mg, atenolol 100mg, chlorthalidone 25mg, lisinopril 40mg.   Not checking BP at home.     Atherosclerosis - taking statin, not taking ASA currently.     Chronic sinusitis - recommended at last visit to follow up with ENT, has appt later this month. Says congestion with occasional rhinorrhea / clear drainage. Today also says she has a "sore" in her nose, +some bleeding "if you fool with it." Says this hasn't changed much since her last visit here, then says that she "picked up a cold" about 8 days ago. She requests antibiotics. No fever, cough, facial pain, ear pain, or dyspnea.  She is not currently using any nasal spray, c/o pain with using this.   On discussion, " "she eventually recalls being told to use bacitracin inside her nose. She hasn't been using this.   Using allergy eye drop.   On med review, there is record of an amoxicillin rx from 2/14/17 - she says this was given for a dental infection. She has not yet had this tooth pulled, but is planning to do this soon.     She does say she has had some decrease in hearing "since I got this cold" and that otherwise her hearing is fine. She also says she has been told at some point in the past that she needed a hearing aid, but she tried it and "I heard too much" so she didn't want these.    Gout - follows with Dr Macdonald. Says she takes allopurinol most days, not every day. "It flares up sometimes," not more specific than this. Not currently having a flare.    H/o breast cancer in 2000 s/p lumpectomy, chemotherapy, and radiation. Has not had mammogram in some time, does not want to get this. Has not noted any new lumps or masses.     Denies any memory problems but says "my brain's not as sharp as it was when I was a young woman."     Review of Systems   Constitutional: Negative for activity change, fatigue, fever and unexpected weight change.   HENT: Positive for congestion, rhinorrhea and sinus pressure. Negative for ear pain.    Eyes: Negative for visual disturbance.   Respiratory: Negative for shortness of breath.    Cardiovascular: Negative for chest pain, palpitations and leg swelling.   Gastrointestinal: Negative for abdominal distention, constipation and diarrhea.   Genitourinary: Negative.  Negative for dysuria.   Musculoskeletal: Positive for arthralgias and gait problem (with arthritis). Negative for back pain and joint swelling.   Skin: Negative for rash.   Allergic/Immunologic: Positive for environmental allergies.   Neurological: Positive for headaches. Negative for weakness.   Psychiatric/Behavioral: Negative for dysphoric mood. The patient is not nervous/anxious.          Past medical history, surgical history, " "and family medical history reviewed and updated as appropriate.    Medications and allergies reviewed.     Objective:          Vitals:    03/14/17 1310 03/14/17 1350   BP: (!) 150/77 (!) 140/70   BP Location: Right arm    Patient Position: Sitting    BP Method:  Manual   Pulse: 63    Temp: 97.5 °F (36.4 °C)    TempSrc: Oral    SpO2: 99%    Weight: 73.9 kg (162 lb 14.7 oz)    Height: 5' 1" (1.549 m)      Body mass index is 30.78 kg/(m^2).  Physical Exam   Constitutional: She is oriented to person, place, and time. She appears well-developed and well-nourished. No distress.   HENT:   Head: Normocephalic and atraumatic.   Right Ear: Tympanic membrane is not injected and not erythematous.   Left Ear: Tympanic membrane is not injected and not erythematous.   Nose: Mucosal edema (and erythema of nasal turbinates) present.   Mouth/Throat: Posterior oropharyngeal erythema (mild erythema) present. No oropharyngeal exudate.   Cerumen L ear, TM not well visualized. TM hazy on right.   Eyes: Conjunctivae and EOM are normal. Pupils are equal, round, and reactive to light. No scleral icterus.   Neck: Normal range of motion. Neck supple.   Cardiovascular: Normal rate, regular rhythm and intact distal pulses.    Murmur heard.   Systolic murmur is present with a grade of 2/6   Pulmonary/Chest: Effort normal and breath sounds normal. No respiratory distress.   Abdominal: Soft. Bowel sounds are normal. She exhibits no distension.   Musculoskeletal: Normal range of motion. She exhibits no edema or tenderness.   Lymphadenopathy:     She has no cervical adenopathy.   Neurological: She is alert and oriented to person, place, and time.   Skin: Skin is warm and dry. She is not diaphoretic.   Psychiatric: She has a normal mood and affect.   Nursing note and vitals reviewed.      Lab Results   Component Value Date    WBC 7.15 09/28/2016    HGB 10.4 (L) 09/28/2016    HCT 33.7 (L) 09/28/2016     09/28/2016    CHOL 211 (H) 11/02/2016    " TRIG 80 11/02/2016    HDL 55 11/02/2016    ALT 10 11/02/2016    AST 16 11/02/2016     11/02/2016    K 4.8 11/02/2016     (H) 11/02/2016    CREATININE 1.2 11/02/2016    BUN 22 11/02/2016    CO2 25 11/02/2016    TSH 3.507 04/08/2016    INR 0.9 08/10/2009    HGBA1C 7.1 (H) 11/02/2016       Assessment:       1. Essential hypertension    2. Type 2 diabetes mellitus with stage 3 chronic kidney disease, without long-term current use of insulin    3. Microalbuminuria    4. Idiopathic chronic gout of multiple sites without tophus    5. Chronic sinusitis, unspecified location    6. Hearing loss, unspecified hearing loss type, unspecified laterality    7. History of breast cancer        Plan:   Dacia was seen today for follow-up and medication problem.    Diagnoses and all orders for this visit:    Essential hypertension - above goal but didn't take meds today. Again reminded her to take meds every day, including on day of appointment.   On ASA, statin for atherosclerosis.    Type 2 diabetes mellitus with stage 3 chronic kidney disease, without long-term current use of insulin - requested keep log of BG, bring to all visits, but overall appears to be doing well with this. Continue trulicity.    Idiopathic chronic gout of multiple sites without tophus - stable, continue allopurinol, continue to follow with Dr Macdonald.    Chronic sinusitis, unspecified location - previously referred to ENT. Recommended topical mupirocin, saline nasal spray, and trying an antihistamine to help with her allergies.   -     mupirocin (BACTROBAN) 2 % ointment; by Nasal route 2 (two) times daily. Apply with swab to inside of nose    Hearing loss, unspecified hearing loss type, unspecified laterality - patient denies having this; exam difficult today due to patient cooperation, but as she required frequent loud repetition during visit and has been told in the past that she could use hearing aids, recommended that she check in with Dr Ribeiro  about getting her hearing checked again.    History of breast cancer - reviewed; patient firmly declined mammogram or further exam, stating that she monitors her own self-exam.    Health maintenance reviewed with patient. DEXA ordered previously, will help to schedule this.    Recommend family member come with her to visits if at all possible -- will try to schedule her for AM appointments so this is easier. Also requested she bring all medications to next visit for review.    Briefly discussed possible problems with memory; will plan to do MOCA testing next visit.    Return in about 3 months (around 6/14/2017) for hypertension, MOCA test / memory check.    Gómez Jimenez MD  Internal Medicine  [facility]  3/14/2017

## 2017-03-14 NOTE — Clinical Note
Dr Ribeiro - I'm seeing Ms Martínez today for follow up. She mentions a decrease in hearing, though it's unclear whether this is new or old. I am not able to find any audiology testing in [facility] -- can you tell me if she has had this here? If none recently, could she get this done the same day as her upcoming visit with you on 3/16?  Thank you! Gómez Jimenez MD

## 2017-03-14 NOTE — PATIENT INSTRUCTIONS
Please keep a log of your blood sugar checks on the sheets we have given you. Bring these in to all of your primary care visits.    Make sure to take your medications every day, including on days that you come in to the doctor!    Bring in all of your medications to your next visit so that we can review these.

## 2017-03-14 NOTE — MR AVS SNAPSHOT
[facility]  [address]  [facility] [address]  Phone: [phone]  Fax: [phone]                  Dacia Martínez   3/14/2017 1:00 PM   Office Visit    Description:  Female : 1935   Provider:  Gómez Jimenez MD   Department:  [facility]           Reason for Visit     Follow-up     Medication Problem           Diagnoses this Visit        Comments    Postmenopausal    -  Primary     Essential hypertension         Type 2 diabetes mellitus with stage 3 chronic kidney disease, without long-term current use of insulin         Microalbuminuria         Idiopathic chronic gout of multiple sites without tophus         History of breast cancer         Chronic sinusitis, unspecified location                To Do List           Future Appointments        Provider Department Dept Phone    3/16/2017 8:00 AM [facility], DEXA1 [facility] [phone]    3/16/2017 9:00 AM Kian Ribeiro III, MD [facility] Otorhinolaryngology [phone]    2017 9:20 AM Richardson Macdonald MD [facility] Rheumatology [phone]    2017 1:40 PM Gómez Jimenez MD [facility] [phone]      Goals (5 Years of Data)     None      Follow-Up and Disposition     Return in about 3 months (around 2017) for hypertension, MOCA test / memory check.       These Medications        Disp Refills Start End    mupirocin (BACTROBAN) 2 % ointment 30 g 0 3/14/2017     by Nasal route 2 (two) times daily. Apply with swab to inside of nose - Nasal    Pharmacy: [facility] [place], [place] - [address] [facility]. Ph #: [phone]         [facility]     [facility] Nurse Care Line -  Assistance  Registered nurses in the [facility] provide clinical advisement, health education, appointment booking, and other advisory services.  Call for this free service at [phone].             Medications            Message regarding Medications     Verify the changes and/or additions to your medication regime listed below are the same as discussed with your clinician today.  If any of these changes or additions are incorrect, please notify your healthcare provider.        START taking these NEW medications        Refills    mupirocin (BACTROBAN) 2 % ointment 0    Sig: by Nasal route 2 (two) times daily. Apply with swab to inside of nose    Class: Normal    Route: Nasal           Verify that the below list of medications is an accurate representation of the medications you are currently taking.  If none reported, the list may be blank. If incorrect, please contact your healthcare provider. Carry this list with you in case of emergency.           Current Medications     ACCU-CHEK FASTCLIX Misc 1 lancet by Misc.(Non-Drug; Combo Route) route 3 (three) times daily. Pt to test blood glucose up to 3 times daily.    allopurinol (ZYLOPRIM) 300 MG tablet Take 1.5 tablets (450 mg total) by mouth once daily.    amlodipine (NORVASC) 10 MG tablet Take 1 tablet (10 mg total) by mouth once daily.    atorvastatin (LIPITOR) 80 MG tablet Take 1 tablet (80 mg total) by mouth once daily.    azelastine (ASTELIN) 137 mcg (0.1 %) nasal spray 1 spray (137 mcg total) by Nasal route 2 (two) times daily.    blood sugar diagnostic (ACCU-CHEK TOMASA) Strp 1 strip by Misc.(Non-Drug; Combo Route) route once daily.    chlorthalidone (HYGROTEN) 25 MG Tab Take 1 tablet (25 mg total) by mouth once daily.    cromolyn (OPTICROM) 4 % ophthalmic solution instill 1 drop into both eyes four times a day    docusate sodium (COLACE) 50 MG capsule Take 1 capsule (50 mg total) by mouth 2 (two) times daily.    dulaglutide (TRULICITY) 0.75 mg/0.5 mL PnIj Inject 0.75 mg into the skin every 7 days.    fluticasone (FLONASE) 50 mcg/actuation nasal spray 2 sprays by Each Nare route 2 (two) times daily.    furosemide (LASIX) 20 MG tablet Take 1 tablet (20 mg total) by mouth daily  "as needed.    hydrocodone-acetaminophen 5-325mg (NORCO) 5-325 mg per tablet Take 1 tablet by mouth every 6 (six) hours as needed for Pain.    lisinopril (PRINIVIL,ZESTRIL) 40 MG tablet Take 1 tablet (40 mg total) by mouth once daily.    loratadine (CLARITIN) 10 mg tablet Take 1 tablet (10 mg total) by mouth daily as needed for Allergies. For sinus congestion    meclizine (ANTIVERT) 25 mg tablet Take 1 tablet (25 mg total) by mouth 3 (three) times daily as needed.    olopatadine (PATANOL) 0.1 % ophthalmic solution Place 1 drop into both eyes 2 (two) times daily.    omeprazole (PRILOSEC) 20 MG capsule Take 1 capsule (20 mg total) by mouth once daily.    polyethylene glycol (GLYCOLAX) 17 gram PwPk Take 17 g by mouth daily as needed. Dissolve 1 heaping tablespoon and 4-8 ounces of water.  Take once a day as needed for constipation.    predniSONE (DELTASONE) 10 MG tablet take 1 tablet by mouth daily if needed for gout    triamcinolone acetonide 0.1% (KENALOG) 0.1 % ointment apply to affected area once daily    atenolol (TENORMIN) 100 MG tablet Take 1 tablet (100 mg total) by mouth once daily.    chlorhexidine (PERIDEX) 0.12 % solution FILL CAP TO THE FILL LINE, SWISH IN MOUTH UNDILUTED for 30 second...  (REFER TO PRESCRIPTION NOTES).    mupirocin (BACTROBAN) 2 % ointment by Nasal route 2 (two) times daily. Apply with swab to inside of nose           Clinical Reference Information           Your Vitals Were     BP Pulse Temp Height Weight SpO2    140/70 (BP Method: Manual) 63 97.5 °F (36.4 °C) (Oral) 5' 1" (1.549 m) 73.9 kg (162 lb 14.7 oz) 99%    BMI                30.78 kg/m2          Blood Pressure          Most Recent Value    BP  (!)  140/70      Allergies as of 3/14/2017     No Known Allergies      Immunizations Administered on Date of Encounter - 3/14/2017     None      [facility] Sign-Up     Activating your [facility] account is as easy as 1-2-3!     1) Visit [email or web address], select Sign Up Now, enter this activation " code and your date of birth, then select Next.  [email or web address]  Expires: 4/28/2017  2:00 PM      2) Create a username and password to use when you visit [facility] in the future and select a security question in case you lose your password and select Next.    3) Enter your e-mail address and click Sign Up!    Additional Information  If you have questions, please e-mail [email or web address] or call [phone] to talk to our [facility] staff. Remember, [facility] is NOT to be used for urgent needs. For medical emergencies, dial 911.         Instructions    Please keep a log of your blood sugar checks on the sheets we have given you. Bring these in to all of your primary care visits.    Make sure to take your medications every day, including on days that you come in to the doctor!    Bring in all of your medications to your next visit so that we can review these.          Language Assistance Services     ATTENTION: Language assistance services are available, free of charge. Please call [phone].      ATENCIÓN: Si geraldine lindquist, tiene a conrad disposición servicios gratuitos de asistencia lingüística. Llame al [phone].     OUMOU Ý: N?u b?n nói Ti?ng Vi?t, có các d?ch v? h? tr? ngôn ng? mi?n phí dành cho b?n. G?i s? [phone].         [facility] complies with applicable Federal civil rights laws and does not discriminate on the basis of race, color, national origin, age, disability, or sex.

## 2017-03-15 LAB
ESTIMATED AVG GLUCOSE: 146 MG/DL
HBA1C MFR BLD HPLC: 6.7 %

## 2017-03-16 RX ORDER — FLUTICASONE PROPIONATE 50 MCG
SPRAY, SUSPENSION (ML) NASAL
Qty: 1 BOTTLE | Refills: 6 | Status: SHIPPED | OUTPATIENT
Start: 2017-03-16 | End: 2017-11-21

## 2017-03-22 ENCOUNTER — TELEPHONE (OUTPATIENT)
Dept: INTERNAL MEDICINE | Facility: CLINIC | Age: 82
End: 2017-03-22

## 2017-03-22 NOTE — TELEPHONE ENCOUNTER
----- Message from Denise Guzman MA sent at 3/17/2017 12:47 PM CDT -----  Contact: Kiersten sue/[facility]-[phone]  Kiersten stated that they received the Physician's Statement but the Clinical Notes for the Past 6 months was not received. It can be Faxed to her @ [phone]. Please advise and call. Thanks!

## 2017-03-29 RX ORDER — HYDROCODONE BITARTRATE AND ACETAMINOPHEN 5; 325 MG/1; MG/1
1 TABLET ORAL EVERY 6 HOURS PRN
Qty: 60 TABLET | Refills: 0 | Status: SHIPPED | OUTPATIENT
Start: 2017-03-29 | End: 2017-04-28 | Stop reason: SDUPTHER

## 2017-03-29 NOTE — TELEPHONE ENCOUNTER
----- Message from Kierra Orozco sent at 3/29/2017  9:53 AM CDT -----  Contact: self@home  Patient needs a script for hydrocodone.

## 2017-03-30 ENCOUNTER — OFFICE VISIT (OUTPATIENT)
Dept: OTOLARYNGOLOGY | Facility: CLINIC | Age: 82
End: 2017-03-30
Payer: MEDICARE

## 2017-03-30 ENCOUNTER — HOSPITAL ENCOUNTER (OUTPATIENT)
Dept: RADIOLOGY | Facility: CLINIC | Age: 82
Discharge: HOME | End: 2017-03-30
Attending: INTERNAL MEDICINE
Payer: MEDICARE

## 2017-03-30 ENCOUNTER — HOSPITAL ENCOUNTER (OUTPATIENT)
Dept: RADIOLOGY | Facility: HOSPITAL | Age: 82
Discharge: HOME | End: 2017-03-30
Attending: OTOLARYNGOLOGY
Payer: MEDICARE

## 2017-03-30 VITALS — SYSTOLIC BLOOD PRESSURE: 128 MMHG | DIASTOLIC BLOOD PRESSURE: 75 MMHG | HEART RATE: 68 BPM | TEMPERATURE: 97 F

## 2017-03-30 DIAGNOSIS — R06.7 SNEEZING: ICD-10-CM

## 2017-03-30 DIAGNOSIS — Z78.0 POSTMENOPAUSAL: ICD-10-CM

## 2017-03-30 DIAGNOSIS — R51.9 FRONTAL HEADACHE: Primary | ICD-10-CM

## 2017-03-30 DIAGNOSIS — R51.9 FRONTAL HEADACHE: ICD-10-CM

## 2017-03-30 DIAGNOSIS — J34.89 RHINORRHEA: ICD-10-CM

## 2017-03-30 DIAGNOSIS — K02.9 DENTAL CARIES: ICD-10-CM

## 2017-03-30 PROCEDURE — 3078F DIAST BP <80 MM HG: CPT | Mod: S$GLB,,, | Performed by: OTOLARYNGOLOGY

## 2017-03-30 PROCEDURE — 1157F ADVNC CARE PLAN IN RCRD: CPT | Mod: S$GLB,,, | Performed by: OTOLARYNGOLOGY

## 2017-03-30 PROCEDURE — 1159F MED LIST DOCD IN RCRD: CPT | Mod: S$GLB,,, | Performed by: OTOLARYNGOLOGY

## 2017-03-30 PROCEDURE — 70220 X-RAY EXAM OF SINUSES: CPT | Mod: TC

## 2017-03-30 PROCEDURE — 1160F RVW MEDS BY RX/DR IN RCRD: CPT | Mod: S$GLB,,, | Performed by: OTOLARYNGOLOGY

## 2017-03-30 PROCEDURE — 77080 DXA BONE DENSITY AXIAL: CPT | Mod: TC

## 2017-03-30 PROCEDURE — 70220 X-RAY EXAM OF SINUSES: CPT | Mod: 26,,, | Performed by: RADIOLOGY

## 2017-03-30 PROCEDURE — 99999 PR PBB SHADOW E&M-EST. PATIENT-LVL III: CPT | Mod: PBBFAC,,, | Performed by: OTOLARYNGOLOGY

## 2017-03-30 PROCEDURE — 99213 OFFICE O/P EST LOW 20 MIN: CPT | Mod: S$GLB,,, | Performed by: OTOLARYNGOLOGY

## 2017-03-30 PROCEDURE — 1126F AMNT PAIN NOTED NONE PRSNT: CPT | Mod: S$GLB,,, | Performed by: OTOLARYNGOLOGY

## 2017-03-30 PROCEDURE — 77080 DXA BONE DENSITY AXIAL: CPT | Mod: 26,,, | Performed by: INTERNAL MEDICINE

## 2017-03-30 PROCEDURE — 3074F SYST BP LT 130 MM HG: CPT | Mod: S$GLB,,, | Performed by: OTOLARYNGOLOGY

## 2017-03-30 RX ORDER — IBUPROFEN 800 MG/1
800 TABLET ORAL EVERY 8 HOURS
Refills: 0 | Status: ON HOLD | COMMUNITY
Start: 2017-02-14 | End: 2017-07-08 | Stop reason: HOSPADM

## 2017-03-30 RX ORDER — FLUCONAZOLE 150 MG/1
TABLET ORAL
COMMUNITY
Start: 2017-03-27 | End: 2017-06-05

## 2017-03-30 RX ORDER — ATORVASTATIN CALCIUM 40 MG/1
TABLET, FILM COATED ORAL
COMMUNITY
Start: 2017-03-29 | End: 2017-11-21

## 2017-03-30 RX ORDER — AMOXICILLIN 500 MG/1
CAPSULE ORAL
Refills: 0 | COMMUNITY
Start: 2017-02-14 | End: 2017-06-05

## 2017-03-30 RX ORDER — DOCUSATE SODIUM 100 MG/1
CAPSULE, LIQUID FILLED ORAL
Refills: 0 | COMMUNITY
Start: 2017-02-16 | End: 2017-11-21

## 2017-03-30 RX ORDER — AMOXICILLIN 875 MG/1
875 TABLET, FILM COATED ORAL 2 TIMES DAILY
Qty: 20 TABLET | Refills: 0 | Status: SHIPPED | OUTPATIENT
Start: 2017-03-30 | End: 2017-04-09

## 2017-03-30 NOTE — PATIENT INSTRUCTIONS
I encourage use of Nasacort or Flonase NS; 1-2 sprays @ nostril once a day x 6 weeks  Rx for Amoxil 875 # 20; take BID x 10 days  Sinus x rays ordered; call for results  May lubricate nostrils nightly with previously prescribed 2% mupirocin ointment x 2 weeks   for nasal soreness

## 2017-03-30 NOTE — PROGRESS NOTES
Subjective:       Patient ID: Dacia Martínez is a 81 y.o. female.    Chief Complaint: No chief complaint on file.    HPI: Ms. Martínez is an 81-year-old -American female who indicates that she just does not feel well in general.  Her daughter waits outside in the lobby for her.  She indicates a grade 4 out of 10 frontal headache which radiates to the back of her head.  She equates this to a possible sinus condition or infection.  She indicates runny nose symptoms and sneezing recently.  She indicates clear nasal discharge which is occasionally white.  She is concerned about her ear condition in the wake of a head cold.  I recently called into the pharmacy  antihistamine eyedrops for ALLERGIC conjunctivitis symptoms for her.   She was examined by Dr. Jimenez 3/14/17 for establishment of care and sinus problems and symptoms.  She has a history of type 2 diabetes, hypertension, hyperlipidemia, atherosclerosis, gout and breast cancer.  She indicated a sore in her nose and some bleeding when manipulating it.  She indicates picking up a head cold 8 days prior to the visit.  She indicates decrease hearing perception since she contracted the head cold.  She was diagnosed with cerumen in her left ear by physical exam.  She was prescribed 2% mupirocin ointment for application in her nose for  nasal soreness and referral here for specialty evaluation.    Sinus x-rays completed in March 2015 indicated no significant sinus pathology then.  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 Frontal headache    2. Rhinorrhea    3. Sneezing    4. Dental caries        Plan:     I encourage use of Nasacort or Flonase NS; 1-2 sprays @ nostril once a day x 6 weeks  Rx for Amoxil 875 # 20; take BID x 10 days  Sinus x rays ordered; call for results  May lubricate nostrils nightly with previously prescribed 2% mupirocin ointment x 2 weeks   for nasal soreness

## 2017-04-13 DIAGNOSIS — E11.9 DIABETES MELLITUS WITHOUT COMPLICATION: ICD-10-CM

## 2017-04-28 RX ORDER — HYDROCODONE BITARTRATE AND ACETAMINOPHEN 5; 325 MG/1; MG/1
1 TABLET ORAL EVERY 6 HOURS PRN
Qty: 60 TABLET | Refills: 0 | Status: SHIPPED | OUTPATIENT
Start: 2017-04-28 | End: 2017-05-29 | Stop reason: SDUPTHER

## 2017-05-01 ENCOUNTER — OFFICE VISIT (OUTPATIENT)
Dept: RHEUMATOLOGY | Facility: CLINIC | Age: 82
End: 2017-05-01
Payer: MEDICARE

## 2017-05-01 VITALS
WEIGHT: 163.5 LBS | HEIGHT: 61 IN | DIASTOLIC BLOOD PRESSURE: 66 MMHG | HEART RATE: 69 BPM | SYSTOLIC BLOOD PRESSURE: 133 MMHG | BODY MASS INDEX: 30.87 KG/M2

## 2017-05-01 DIAGNOSIS — M1A.09X0 IDIOPATHIC CHRONIC GOUT OF MULTIPLE SITES WITHOUT TOPHUS: Primary | Chronic | ICD-10-CM

## 2017-05-01 DIAGNOSIS — Z79.891 LONG TERM CURRENT USE OF OPIATE ANALGESIC: ICD-10-CM

## 2017-05-01 DIAGNOSIS — M15.9 PRIMARY OSTEOARTHRITIS INVOLVING MULTIPLE JOINTS: Chronic | ICD-10-CM

## 2017-05-01 PROCEDURE — 1160F RVW MEDS BY RX/DR IN RCRD: CPT | Mod: S$GLB,,, | Performed by: INTERNAL MEDICINE

## 2017-05-01 PROCEDURE — 99999 PR PBB SHADOW E&M-EST. PATIENT-LVL III: CPT | Mod: PBBFAC,,, | Performed by: INTERNAL MEDICINE

## 2017-05-01 PROCEDURE — 99213 OFFICE O/P EST LOW 20 MIN: CPT | Mod: S$GLB,,, | Performed by: INTERNAL MEDICINE

## 2017-05-01 PROCEDURE — 1159F MED LIST DOCD IN RCRD: CPT | Mod: S$GLB,,, | Performed by: INTERNAL MEDICINE

## 2017-05-01 PROCEDURE — 3075F SYST BP GE 130 - 139MM HG: CPT | Mod: S$GLB,,, | Performed by: INTERNAL MEDICINE

## 2017-05-01 PROCEDURE — 1126F AMNT PAIN NOTED NONE PRSNT: CPT | Mod: S$GLB,,, | Performed by: INTERNAL MEDICINE

## 2017-05-01 PROCEDURE — 3078F DIAST BP <80 MM HG: CPT | Mod: S$GLB,,, | Performed by: INTERNAL MEDICINE

## 2017-05-01 RX ORDER — PREDNISONE 10 MG/1
TABLET ORAL
Qty: 30 TABLET | Refills: 0 | Status: SHIPPED | OUTPATIENT
Start: 2017-05-01 | End: 2017-08-07 | Stop reason: SDUPTHER

## 2017-05-01 RX ORDER — ALLOPURINOL 300 MG/1
450 TABLET ORAL DAILY
Qty: 45 TABLET | Refills: 6 | Status: SHIPPED | OUTPATIENT
Start: 2017-05-01 | End: 2017-08-24 | Stop reason: SDUPTHER

## 2017-05-04 NOTE — PROGRESS NOTES
History of present illness: 81-year-old female who has chronic pain primarily due to osteoarthritis.  She remains on hydrocodone twice daily.  She also has a history of gout.  She is on allopurinol 450 mg daily.  She states she is taking it on a regular basis.  She has had no recent joint swelling.  She continues to take prednisone intermittently when she feels bad.  She takes it no more than one or 2 days at a time.  She has had no recent joint swelling.  She has had no numbness or tingling.  She remains under treatment for hypertension.  She has had some sinus problems recently.    Physical examination:  Musculoskeletal: She has bony hypertrophy of the PIPs and DIPs.  She has no tophi.  Hands have good range of motion.  Wrists, elbows, shoulders are unremarkable.  She has bony hypertrophy of the left knee.  She has no effusion.  Ankles and feet are unremarkable.    Assessment: Osteoarthritis and intercritical gout    Plans: Continue medications as before.  Return to see me in 6 months.

## 2017-05-10 DIAGNOSIS — H10.10 ALLERGIC CONJUNCTIVITIS, UNSPECIFIED LATERALITY: ICD-10-CM

## 2017-05-10 RX ORDER — TRIAMCINOLONE ACETONIDE 1 MG/G
OINTMENT TOPICAL
Qty: 80 G | Refills: 0 | Status: SHIPPED | OUTPATIENT
Start: 2017-05-10 | End: 2017-07-05 | Stop reason: SDUPTHER

## 2017-05-10 RX ORDER — CROMOLYN SODIUM 40 MG/ML
SOLUTION/ DROPS OPHTHALMIC
Qty: 10 ML | Refills: 1 | Status: ON HOLD | OUTPATIENT
Start: 2017-05-10 | End: 2017-06-06

## 2017-05-23 ENCOUNTER — TELEPHONE (OUTPATIENT)
Dept: INTERNAL MEDICINE | Facility: CLINIC | Age: 82
End: 2017-05-23

## 2017-05-23 NOTE — TELEPHONE ENCOUNTER
----- Message from Shalini Javed sent at 5/19/2017 12:14 PM CDT -----  Contact: Self/ [phone]   Type: Sooner appointment than  is able to schedule    When is the first available appointment? 06/13/2017     What is the nature of the appointment? 3 month follow up     What appointment type: ep     Comments: pt want to have a appt on 05/29. Please call and advise       Thank you

## 2017-05-29 ENCOUNTER — OFFICE VISIT (OUTPATIENT)
Dept: OTOLARYNGOLOGY | Facility: CLINIC | Age: 82
End: 2017-05-29
Payer: MEDICARE

## 2017-05-29 VITALS — SYSTOLIC BLOOD PRESSURE: 120 MMHG | TEMPERATURE: 98 F | DIASTOLIC BLOOD PRESSURE: 79 MMHG | HEART RATE: 124 BPM

## 2017-05-29 DIAGNOSIS — J34.89 NASAL PAIN: ICD-10-CM

## 2017-05-29 DIAGNOSIS — R51.9 FRONTAL HEADACHE: ICD-10-CM

## 2017-05-29 DIAGNOSIS — R09.81 NASAL CONGESTION WITH RHINORRHEA: ICD-10-CM

## 2017-05-29 DIAGNOSIS — J06.9 URI, ACUTE: ICD-10-CM

## 2017-05-29 DIAGNOSIS — R09.81 NASAL CONGESTION: Primary | ICD-10-CM

## 2017-05-29 DIAGNOSIS — J34.89 NASAL CONGESTION WITH RHINORRHEA: ICD-10-CM

## 2017-05-29 PROCEDURE — 1159F MED LIST DOCD IN RCRD: CPT | Mod: S$GLB,,, | Performed by: OTOLARYNGOLOGY

## 2017-05-29 PROCEDURE — 1125F AMNT PAIN NOTED PAIN PRSNT: CPT | Mod: S$GLB,,, | Performed by: OTOLARYNGOLOGY

## 2017-05-29 PROCEDURE — 99213 OFFICE O/P EST LOW 20 MIN: CPT | Mod: S$GLB,,, | Performed by: OTOLARYNGOLOGY

## 2017-05-29 PROCEDURE — 99999 PR PBB SHADOW E&M-EST. PATIENT-LVL III: CPT | Mod: PBBFAC,,, | Performed by: OTOLARYNGOLOGY

## 2017-05-29 RX ORDER — AMOXICILLIN 875 MG/1
875 TABLET, FILM COATED ORAL 2 TIMES DAILY
Qty: 20 TABLET | Refills: 0 | Status: ON HOLD | OUTPATIENT
Start: 2017-05-29 | End: 2017-06-06 | Stop reason: HOSPADM

## 2017-05-29 RX ORDER — HYDROCODONE BITARTRATE AND ACETAMINOPHEN 5; 325 MG/1; MG/1
1 TABLET ORAL EVERY 6 HOURS PRN
Qty: 60 TABLET | Refills: 0 | Status: SHIPPED | OUTPATIENT
Start: 2017-05-29 | End: 2017-06-27 | Stop reason: SDUPTHER

## 2017-05-29 NOTE — PROGRESS NOTES
"CC:Frontal headaches +  HPI:Ms. Martínez has been " feeling bad" for several days recently. She feeld slightly better today.    Her chief complaint is frontal and medial periorbital pain pressure and low-grade headache.  She equates these symptoms  to a sinus condition.  She was  last examined by me in late March this year with similar complaints ( frontal headaches) . She responded to a course of Amoxil then.  She babysits her 4 year old grandchild on a daily basis. The child is often ill and occasionally coughs in hr mouth.  Ms. Martínez indicates her history of nasal congestion at night and nasal pain.  She ndicates nasal stuffiness    She completed a sinus xray series in March this year and a sinus CT several years ago. Neither revealed any significant frontal sinus disease.  She in not taking headache medication presently ( I had suggested Excedrine Migraine over the phone last week).    No known ALLERGIES  PMH: Hypertension, chronic gout, primary osteoarthritis, obesity, diabetic retinopathy, type 2 diabetes was stage III kidney disease, atherosclerosis of aorta, hyperlipidemia, history of breast cancer, seasonal ALLERGIC rhinitis, anemia, nocturia, hearing loss  PE:/79 P 124 T 97.7  Both ears were examined by otoscopy.  Right eardrum is intact and clear as visualized in the right middle ear space is aerated.    Left eardrum appears retracted but do believe the left middle ear space is aerated.  Nasal exam reveals evidence of pale swollen edematous inferior turbinates.  Alex-Synephrine sprayed in each nasal passage.  There is no benny evidence of purulent discharge there.  There is no evidence of polypoid disease per anterior rhinoscopy.  I make note of the wet appearance of the nasal vestibules bilaterally( evidence of clear rhinorrhea)  Oropharyngeal exam reveals evidence for 3+ tonsils without exudate.  The posterior pharynx is noninflamed and the uvula is not sore edematous.  Neck examination is within normal " limits.    DIAGNOSIS:     ICD-10-CM ICD-9-CM    1. Nasal congestion R09.81 478.19 amoxicillin (AMOXIL) 875 MG tablet   2. Frontal headache R51 784.0 amoxicillin (AMOXIL) 875 MG tablet   3. URI, acute J06.9 465.9 amoxicillin (AMOXIL) 875 MG tablet   4. Nasal pain J34.89 478.19    5. Nasal congestion with rhinorrhea J34.89 478.19     occasionally light green   6.      Pt. Cares for  4 year old grandchild daily  PLAN: Continue use of NSS ( fluticasone) 2 sprays @ lateral nostril once a day x 6 weeks  Rx for Amoxil 875 # 20; take BID  Headache med of choice ( otc)   Claritin ( loratadine) 10 mg daily may help with allergies  Hand washing encouraged prn  Consider sinus CT pending course  RTC prn

## 2017-05-29 NOTE — PATIENT INSTRUCTIONS
Continue use of NSS ( fluticasone) 2 sprays @ lateral nostril once a day x 6 weeks  Rx for Amoxil 875 # 20; take BID  Headache med of choice ( otc)   Claritin ( loratadine) 10 mg daily may help with allergies  Hand washing encouraged prn  Consider sinus CT pending course  RTC prn

## 2017-06-01 ENCOUNTER — HOSPITAL ENCOUNTER (EMERGENCY)
Facility: HOSPITAL | Age: 82
Discharge: HOME | End: 2017-06-01
Attending: EMERGENCY MEDICINE
Payer: MEDICARE

## 2017-06-01 VITALS
SYSTOLIC BLOOD PRESSURE: 124 MMHG | WEIGHT: 160 LBS | DIASTOLIC BLOOD PRESSURE: 77 MMHG | BODY MASS INDEX: 30.21 KG/M2 | HEART RATE: 78 BPM | HEIGHT: 61 IN | RESPIRATION RATE: 16 BRPM | TEMPERATURE: 99 F | OXYGEN SATURATION: 98 %

## 2017-06-01 DIAGNOSIS — J34.89 SINUS PRESSURE: Primary | ICD-10-CM

## 2017-06-01 PROCEDURE — 99284 EMERGENCY DEPT VISIT MOD MDM: CPT

## 2017-06-01 PROCEDURE — 99285 EMERGENCY DEPT VISIT HI MDM: CPT | Mod: ,,, | Performed by: PHYSICIAN ASSISTANT

## 2017-06-01 PROCEDURE — 25000003 PHARM REV CODE 250: Performed by: PHYSICIAN ASSISTANT

## 2017-06-01 RX ORDER — ACETAMINOPHEN 325 MG/1
650 TABLET ORAL
Status: COMPLETED | OUTPATIENT
Start: 2017-06-01 | End: 2017-06-01

## 2017-06-01 RX ADMIN — ACETAMINOPHEN 650 MG: 325 TABLET ORAL at 01:06

## 2017-06-01 RX ADMIN — ACETAMINOPHEN 650 MG: 325 TABLET ORAL at 04:06

## 2017-06-01 NOTE — DISCHARGE INSTRUCTIONS
Continue oral antibiotics that as prescribed by your ENT doctor as directed.  Take OTC medication for pain relief.  Use Flonase as needed.  Follow-up with your ENT as scheduled or earlier if symptoms do not improve or return to the emergency department.    Future Appointments  Date Time Provider Department Center   6/13/2017 1:40 PM Gómez Jimenez MD [facility]

## 2017-06-01 NOTE — ED PROVIDER NOTES
"Encounter Date: 6/1/2017    SCRIBE #1 NOTE: I, Robbin Clark, am scribing for, and in the presence of,  Dr. Fisher. I have scribed the following portions of the note - the APC attestation.       History     Chief Complaint   Patient presents with    Headache     "there was a pain traveling through my body and it stopped in my head yesterday" complains of headache x 2 days     Review of patient's allergies indicates:  No Known Allergies  Patient is a 81-year-old female with a past medical history of HTN, vertigo, cataracts, migraine headaches, DM 2, CKD3 who presents the ED with sinus pressure, headaches, and dizziness.  Patient states that she has been having these symptoms for a few weeks.  She recently saw ENT who prescribed her amoxicillin for sinusitis.  She had 2 days worth of antibiotics, but did not take her dose today.  She states that she came in to the ED because of worsening frontal headache.  She states that she "never had sharp pain here in my head like this before".  Her dizziness is chronic and intermittent and she will occasionally feel like "I am spinning".  Currently her headache is 6-7/10 and is worse when she coughs.  She endorses rhinorrhea, sore nose, and ear pain.  She denies any fever, chills, diaphoresis.  She also endorses myalgias and arthralgias that are migratory and chronic as well.  She takes Tylenol with relief but without resolution.  Patient is concerned because her headaches are worse and different.          Past Medical History:   Diagnosis Date    Arthritis     Bilateral nonexudative age-related macular degeneration 6/3/2014    Breast cancer     Cataract     CKD stage 3 due to type 2 diabetes mellitus     Hypertension     Migraine headache     Mild nonproliferative diabetic retinopathy of both eyes 6/3/2014    Pneumonia     Type 2 diabetes mellitus with hyperglycemia     Vertigo      Past Surgical History:   Procedure Laterality Date    BREAST SURGERY      left " lumpectomy    CARPAL TUNNEL RELEASE      left    CATARACT EXTRACTION      vance     SECTION      EYE SURGERY      cataracts bilaterally    HYSTERECTOMY      partial     Family History   Problem Relation Age of Onset    Cancer Mother      stomach    Heart disease Mother     Diabetes Father     Hypertension Father     Blindness Brother     Diabetes Brother     Hypertension Brother     Cataracts Sister     Diabetes Sister     Diabetes Brother     Diabetes Brother     Diabetes Brother     No Known Problems Daughter     No Known Problems Son     No Known Problems Daughter     Amblyopia Neg Hx     Glaucoma Neg Hx     Macular degeneration Neg Hx     Strabismus Neg Hx     Retinal detachment Neg Hx      Social History   Substance Use Topics    Smoking status: Never Smoker    Smokeless tobacco: Not on file    Alcohol use 1.2 oz/week     2 Cans of beer per week      Comment: socially     Review of Systems   Constitutional: Negative for chills, diaphoresis and fever.   HENT: Positive for ear pain and rhinorrhea.    Eyes: Positive for visual disturbance (chronic). Negative for photophobia.   Respiratory: Positive for cough. Negative for shortness of breath.    Cardiovascular: Negative for chest pain.   Gastrointestinal: Negative for abdominal pain and nausea.   Endocrine: Negative for polyuria.   Genitourinary: Negative for dysuria and urgency.   Musculoskeletal: Positive for arthralgias.   Skin: Negative for pallor and wound.   Neurological: Positive for dizziness and headaches. Negative for syncope, facial asymmetry and weakness.       Physical Exam     Initial Vitals [17 1310]   BP Pulse Resp Temp SpO2   124/77 78 16 98.9 °F (37.2 °C) 98 %     Physical Exam    Vitals reviewed.  Constitutional: She appears well-developed and well-nourished. She is not diaphoretic. No distress.   HENT:   Head: Normocephalic and atraumatic.   Right Ear: Tympanic membrane, external ear and ear canal normal.    Left Ear: Tympanic membrane, external ear and ear canal normal.   Nose: No mucosal edema or rhinorrhea. Right sinus exhibits frontal sinus tenderness. Right sinus exhibits no maxillary sinus tenderness. Left sinus exhibits frontal sinus tenderness. Left sinus exhibits no maxillary sinus tenderness.   Mouth/Throat: No oropharyngeal exudate, posterior oropharyngeal edema, posterior oropharyngeal erythema or tonsillar abscesses.   Eyes: Conjunctivae and EOM are normal.   Neck: Normal range of motion.   Cardiovascular: Normal rate, regular rhythm and normal heart sounds. Exam reveals no friction rub.    No murmur heard.  Pulmonary/Chest: Breath sounds normal. No respiratory distress. She has no wheezes. She has no rales.   Abdominal: Soft. Bowel sounds are normal. She exhibits no distension. There is no tenderness. There is no rebound.   Musculoskeletal: Normal range of motion.   Neurological: She is alert and oriented to person, place, and time. She has normal strength. No cranial nerve deficit or sensory deficit. Coordination and gait normal.   Normal finger to nose and rapid alternating hand movements. Answering questions appropriately.  FROM and 5/5 strength of all extremities.   Skin: Skin is warm and dry. No erythema. No pallor.   Psychiatric: She has a normal mood and affect. Her behavior is normal. Judgment and thought content normal.         ED Course   Procedures  Labs Reviewed - No data to display          Medical Decision Making:   History:   Old Medical Records: I decided to obtain old medical records.  Clinical Tests:   Radiological Study: Reviewed and Ordered    Imaging Results          CT Head Without Contrast (Final result)  Result time 06/01/17 15:53:32    Final result by Je Molina MD (06/01/17 15:53:32)                 Impression:        No acute abnormality.    Generalized cerebral volume loss and sequela of chronic microvascular ischemic changes.      ______________________________________      Electronically signed by resident: SHANNON COTTRELL MD  Date:     06/01/17  Time:    15:13            As the supervising and teaching physician, I personally reviewed the images and resident's interpretation and I agree with the findings.          Electronically signed by: BEKAH MOLINA  Date:     06/01/17  Time:    15:53              Narrative:    CLINICAL INDICATION: 81 year old F with headache.    TECHNIQUE: CT head without contrast and CT sinuses without contrast. Axial CT images obtained throughout the head without intravenous contrast. Thin slice axial CT images obtained throughout the paranasal sinuses without intravenous contrast. Sagittal and coronal reconstructions were performed.    COMPARISON: CT head 05/06/14. CT sinuses 11/19/13.    FINDINGS:    Intracranial compartment:    Ventricles and sulci are normal in size for age without evidence of hydrocephalus. No extra-axial blood or fluid collections.    There is generalized cerebral volume loss and compensatory enlargement of the ventricular system and sulci.  Patchy hypodensities in the periventricular white matter likely represent sequela of chronic microvascular changes.  There is a well demarcated hypodense area in the left basal ganglia, likely representing prominent prominent Virchow-Gómez space.      No parenchymal mass, hemorrhage, edema or acute major vascular distribution infarct.      Paranasal sinuses: There is mild mucosal membrane thickening of the bilateral ethmoid air cells. No chronic osteitis.    Nasal cavity: Normal.      Skull/extracranial contents (limited evaluation): Remote fracture of the inferior left orbital wall.  Otherwise, No acute fracture.                             CT Sinuses without Contrast (Final result)  Result time 06/01/17 15:53:55    Final result by Bekah Molina MD (06/01/17 15:53:55)                 Impression:        No acute abnormality.    Generalized cerebral volume loss and sequela of chronic microvascular  ischemic changes.      ______________________________________     Electronically signed by resident: SHANNON COTTRELL MD  Date:     06/01/17  Time:    15:13            As the supervising and teaching physician, I personally reviewed the images and resident's interpretation and I agree with the findings.          Electronically signed by: BEKAH SNOWDEN  Date:     06/01/17  Time:    15:53              Narrative:    CLINICAL INDICATION: 81 year old F with headache.    TECHNIQUE: CT head without contrast and CT sinuses without contrast. Axial CT images obtained throughout the head without intravenous contrast. Thin slice axial CT images obtained throughout the paranasal sinuses without intravenous contrast. Sagittal and coronal reconstructions were performed.    COMPARISON: CT head 05/06/14. CT sinuses 11/19/13.    FINDINGS:    Intracranial compartment:    Ventricles and sulci are normal in size for age without evidence of hydrocephalus. No extra-axial blood or fluid collections.    There is generalized cerebral volume loss and compensatory enlargement of the ventricular system and sulci.  Patchy hypodensities in the periventricular white matter likely represent sequela of chronic microvascular changes.  There is a well demarcated hypodense area in the left basal ganglia, likely representing prominent prominent Virchow-Gómez space.      No parenchymal mass, hemorrhage, edema or acute major vascular distribution infarct.      Paranasal sinuses: There is mild mucosal membrane thickening of the bilateral ethmoid air cells. No chronic osteitis.    Nasal cavity: Normal.      Skull/extracranial contents (limited evaluation): Remote fracture of the inferior left orbital wall.  Otherwise, No acute fracture.                                 APC / Resident Notes:   DDX includes but is not limited to intracerebral hemorrhage or lesion, sinus disease, viral URI syndrome. ENT during last office visit considered CT sinus imaging if  symptoms did not improve; will include with CT head. Will give acetaminophen.    Patient reports improvement with acetaminophen.  CT pending.    CT head and sinus without contrast show no acute abnormality.    Patient updated with results.  Her symptoms today most likely due to viral/bacteria URI syndrome.  She is to closely follow-up with in ENT.  Continue abx, Flonase, Claritin, and acetaminophen for symptomatic relief.  Add on Mucinex for decongestion.  Patient and daughter voices understanding.  All questions answered.  They are comfortable with plan and patient is stable for discharge.  I reviewed patient's chart and imaging and discussed this case with my supervising YEE French Attestation:   Scribe #1: I performed the above scribed service and the documentation accurately describes the services I performed. I attest to the accuracy of the note.    Attending Attestation:     Physician Attestation Statement for NP/PA:   I discussed this assessment and plan of this patient with the NP/PA, but I did not personally examine the patient. The face to face encounter was performed by the NP/PA.        Physician Attestation for Scribe:  Physician Attestation Statement for Scribe #1: I, Dr. Fisher, reviewed documentation, as scribed by Robbin Clark in my presence, and it is both accurate and complete.                 ED Course     Clinical Impression:   The encounter diagnosis was Sinus pressure.    Disposition:   Disposition: Discharged  Condition: Stable       Cora Rosario PA-C  06/03/17 0833

## 2017-06-01 NOTE — ED TRIAGE NOTES
"Pt c/o headache, earache, nasal pain that began 2 days ago.  Pt took Tylenol which relieves pain for a little while.  Pt also states "I feel jittery & nervous.  Pt saw her doctor 2 days ago & was given abx for sinus infection.  Pt states she is not feeling any better.  "

## 2017-06-05 ENCOUNTER — OFFICE VISIT (OUTPATIENT)
Dept: INTERNAL MEDICINE | Facility: CLINIC | Age: 82
End: 2017-06-05
Payer: MEDICARE

## 2017-06-05 ENCOUNTER — HOSPITAL ENCOUNTER (OUTPATIENT)
Facility: HOSPITAL | Age: 82
Discharge: HOME | End: 2017-06-06
Attending: EMERGENCY MEDICINE | Admitting: HOSPITALIST
Payer: MEDICARE

## 2017-06-05 ENCOUNTER — HOSPITAL ENCOUNTER (OUTPATIENT)
Dept: CARDIOLOGY | Facility: CLINIC | Age: 82
Discharge: HOME | End: 2017-06-05
Payer: MEDICARE

## 2017-06-05 ENCOUNTER — TELEPHONE (OUTPATIENT)
Dept: INTERNAL MEDICINE | Facility: CLINIC | Age: 82
End: 2017-06-05

## 2017-06-05 VITALS
WEIGHT: 158.06 LBS | BODY MASS INDEX: 29.84 KG/M2 | DIASTOLIC BLOOD PRESSURE: 64 MMHG | SYSTOLIC BLOOD PRESSURE: 132 MMHG | HEIGHT: 61 IN | HEART RATE: 70 BPM

## 2017-06-05 DIAGNOSIS — I48.91 ATRIAL FIBRILLATION, UNSPECIFIED TYPE: ICD-10-CM

## 2017-06-05 DIAGNOSIS — R94.31 EKG, ABNORMAL: ICD-10-CM

## 2017-06-05 DIAGNOSIS — J02.9 SORE THROAT: ICD-10-CM

## 2017-06-05 DIAGNOSIS — I48.0 PAROXYSMAL ATRIAL FIBRILLATION: ICD-10-CM

## 2017-06-05 DIAGNOSIS — H57.13 EYE DISCOMFORT, BILATERAL: ICD-10-CM

## 2017-06-05 DIAGNOSIS — Z85.3 HISTORY OF BREAST CANCER: ICD-10-CM

## 2017-06-05 DIAGNOSIS — I48.91 ATRIAL FIBRILLATION STATUS POST CARDIOVERSION: ICD-10-CM

## 2017-06-05 DIAGNOSIS — I10 ESSENTIAL HYPERTENSION: ICD-10-CM

## 2017-06-05 DIAGNOSIS — N18.30 TYPE 2 DIABETES MELLITUS WITH STAGE 3 CHRONIC KIDNEY DISEASE, WITHOUT LONG-TERM CURRENT USE OF INSULIN: ICD-10-CM

## 2017-06-05 DIAGNOSIS — I48.91 ATRIAL FIBRILLATION: Primary | ICD-10-CM

## 2017-06-05 DIAGNOSIS — H10.10 ALLERGIC CONJUNCTIVITIS, UNSPECIFIED LATERALITY: ICD-10-CM

## 2017-06-05 DIAGNOSIS — R53.1 WEAKNESS: Primary | ICD-10-CM

## 2017-06-05 DIAGNOSIS — R53.1 WEAKNESS: ICD-10-CM

## 2017-06-05 DIAGNOSIS — E11.22 TYPE 2 DIABETES MELLITUS WITH STAGE 3 CHRONIC KIDNEY DISEASE, WITHOUT LONG-TERM CURRENT USE OF INSULIN: ICD-10-CM

## 2017-06-05 PROBLEM — G89.4 CHRONIC PAIN SYNDROME: Status: ACTIVE | Noted: 2017-06-05

## 2017-06-05 PROBLEM — J01.01 ACUTE RECURRENT MAXILLARY SINUSITIS: Status: ACTIVE | Noted: 2017-06-05

## 2017-06-05 LAB
ALBUMIN SERPL BCP-MCNC: 3.1 G/DL
ALP SERPL-CCNC: 56 U/L
ALT SERPL W/O P-5'-P-CCNC: 11 U/L
ANION GAP SERPL CALC-SCNC: 11 MMOL/L
AST SERPL-CCNC: 13 U/L
BASOPHILS # BLD AUTO: 0.04 K/UL
BASOPHILS NFR BLD: 0.6 %
BILIRUB SERPL-MCNC: 0.6 MG/DL
BNP SERPL-MCNC: 669 PG/ML
BUN SERPL-MCNC: 20 MG/DL
CALCIUM SERPL-MCNC: 9.1 MG/DL
CHLORIDE SERPL-SCNC: 109 MMOL/L
CO2 SERPL-SCNC: 22 MMOL/L
CREAT SERPL-MCNC: 1.3 MG/DL
D DIMER PPP IA.FEU-MCNC: 1.51 MG/L FEU
DEPRECATED S PYO AG THROAT QL EIA: NEGATIVE
DIFFERENTIAL METHOD: ABNORMAL
EOSINOPHIL # BLD AUTO: 0.1 K/UL
EOSINOPHIL NFR BLD: 1.4 %
ERYTHROCYTE [DISTWIDTH] IN BLOOD BY AUTOMATED COUNT: 15.5 %
EST. GFR  (AFRICAN AMERICAN): 44.8 ML/MIN/1.73 M^2
EST. GFR  (NON AFRICAN AMERICAN): 38.8 ML/MIN/1.73 M^2
GLUCOSE SERPL-MCNC: 76 MG/DL
HCT VFR BLD AUTO: 35 %
HGB BLD-MCNC: 11 G/DL
LYMPHOCYTES # BLD AUTO: 1.9 K/UL
LYMPHOCYTES NFR BLD: 28.3 %
MAGNESIUM SERPL-MCNC: 1.6 MG/DL
MCH RBC QN AUTO: 27 PG
MCHC RBC AUTO-ENTMCNC: 31.4 %
MCV RBC AUTO: 86 FL
MONOCYTES # BLD AUTO: 0.6 K/UL
MONOCYTES NFR BLD: 8.3 %
NEUTROPHILS # BLD AUTO: 4.1 K/UL
NEUTROPHILS NFR BLD: 61.2 %
PLATELET # BLD AUTO: 225 K/UL
PMV BLD AUTO: 11.1 FL
POCT GLUCOSE: 105 MG/DL (ref 70–110)
POCT GLUCOSE: 140 MG/DL (ref 70–110)
POTASSIUM SERPL-SCNC: 4.3 MMOL/L
PROT SERPL-MCNC: 7.3 G/DL
RBC # BLD AUTO: 4.08 M/UL
SODIUM SERPL-SCNC: 142 MMOL/L
T4 SERPL-MCNC: 5.9 UG/DL
TROPONIN I SERPL DL<=0.01 NG/ML-MCNC: 0.02 NG/ML
TSH SERPL DL<=0.005 MIU/L-ACNC: 3.53 UIU/ML
WBC # BLD AUTO: 6.64 K/UL

## 2017-06-05 PROCEDURE — 99220 PR INITIAL OBSERVATION CARE,LEVL III: CPT | Mod: ,,, | Performed by: HOSPITALIST

## 2017-06-05 PROCEDURE — 93010 ELECTROCARDIOGRAM REPORT: CPT | Mod: S$GLB,,, | Performed by: INTERNAL MEDICINE

## 2017-06-05 PROCEDURE — 25000003 PHARM REV CODE 250: Performed by: EMERGENCY MEDICINE

## 2017-06-05 PROCEDURE — 1125F AMNT PAIN NOTED PAIN PRSNT: CPT | Mod: S$GLB,,, | Performed by: INTERNAL MEDICINE

## 2017-06-05 PROCEDURE — 93000 ELECTROCARDIOGRAM COMPLETE: CPT | Mod: S$GLB,,, | Performed by: INTERNAL MEDICINE

## 2017-06-05 PROCEDURE — 85025 COMPLETE CBC W/AUTO DIFF WBC: CPT

## 2017-06-05 PROCEDURE — 83036 HEMOGLOBIN GLYCOSYLATED A1C: CPT

## 2017-06-05 PROCEDURE — 93005 ELECTROCARDIOGRAM TRACING: CPT

## 2017-06-05 PROCEDURE — 96376 TX/PRO/DX INJ SAME DRUG ADON: CPT

## 2017-06-05 PROCEDURE — 99285 EMERGENCY DEPT VISIT HI MDM: CPT | Mod: 25

## 2017-06-05 PROCEDURE — 99999 PR PBB SHADOW E&M-EST. PATIENT-LVL III: CPT | Mod: PBBFAC,,, | Performed by: INTERNAL MEDICINE

## 2017-06-05 PROCEDURE — G0378 HOSPITAL OBSERVATION PER HR: HCPCS

## 2017-06-05 PROCEDURE — 80053 COMPREHEN METABOLIC PANEL: CPT

## 2017-06-05 PROCEDURE — 82962 GLUCOSE BLOOD TEST: CPT

## 2017-06-05 PROCEDURE — 99214 OFFICE O/P EST MOD 30 MIN: CPT | Mod: S$GLB,,, | Performed by: INTERNAL MEDICINE

## 2017-06-05 PROCEDURE — 84484 ASSAY OF TROPONIN QUANT: CPT

## 2017-06-05 PROCEDURE — 84436 ASSAY OF TOTAL THYROXINE: CPT

## 2017-06-05 PROCEDURE — 99291 CRITICAL CARE FIRST HOUR: CPT | Mod: ,,, | Performed by: EMERGENCY MEDICINE

## 2017-06-05 PROCEDURE — 84443 ASSAY THYROID STIM HORMONE: CPT

## 2017-06-05 PROCEDURE — 96374 THER/PROPH/DIAG INJ IV PUSH: CPT

## 2017-06-05 PROCEDURE — 99499 UNLISTED E&M SERVICE: CPT | Mod: S$GLB,,, | Performed by: INTERNAL MEDICINE

## 2017-06-05 PROCEDURE — 83880 ASSAY OF NATRIURETIC PEPTIDE: CPT

## 2017-06-05 PROCEDURE — 83735 ASSAY OF MAGNESIUM: CPT

## 2017-06-05 PROCEDURE — 1159F MED LIST DOCD IN RCRD: CPT | Mod: S$GLB,,, | Performed by: INTERNAL MEDICINE

## 2017-06-05 PROCEDURE — 25000003 PHARM REV CODE 250: Performed by: INTERNAL MEDICINE

## 2017-06-05 PROCEDURE — 25000003 PHARM REV CODE 250: Performed by: HOSPITALIST

## 2017-06-05 PROCEDURE — 85379 FIBRIN DEGRADATION QUANT: CPT

## 2017-06-05 RX ORDER — LISINOPRIL 20 MG/1
40 TABLET ORAL DAILY
Status: DISCONTINUED | OUTPATIENT
Start: 2017-06-06 | End: 2017-06-06 | Stop reason: HOSPADM

## 2017-06-05 RX ORDER — INSULIN ASPART 100 [IU]/ML
0-5 INJECTION, SOLUTION INTRAVENOUS; SUBCUTANEOUS
Status: DISCONTINUED | OUTPATIENT
Start: 2017-06-05 | End: 2017-06-06 | Stop reason: HOSPADM

## 2017-06-05 RX ORDER — SODIUM CHLORIDE 0.9 % (FLUSH) 0.9 %
3 SYRINGE (ML) INJECTION EVERY 8 HOURS
Status: DISCONTINUED | OUTPATIENT
Start: 2017-06-05 | End: 2017-06-06 | Stop reason: HOSPADM

## 2017-06-05 RX ORDER — CARVEDILOL 12.5 MG/1
12.5 TABLET ORAL 2 TIMES DAILY WITH MEALS
Status: DISCONTINUED | OUTPATIENT
Start: 2017-06-05 | End: 2017-06-06

## 2017-06-05 RX ORDER — HYDROCODONE BITARTRATE AND ACETAMINOPHEN 5; 325 MG/1; MG/1
1 TABLET ORAL
Status: COMPLETED | OUTPATIENT
Start: 2017-06-05 | End: 2017-06-05

## 2017-06-05 RX ORDER — METOPROLOL TARTRATE 1 MG/ML
5 INJECTION, SOLUTION INTRAVENOUS
Status: COMPLETED | OUTPATIENT
Start: 2017-06-05 | End: 2017-06-05

## 2017-06-05 RX ORDER — AZELASTINE 1 MG/ML
1 SPRAY, METERED NASAL 2 TIMES DAILY
Status: DISCONTINUED | OUTPATIENT
Start: 2017-06-05 | End: 2017-06-06 | Stop reason: HOSPADM

## 2017-06-05 RX ORDER — AMOXICILLIN 875 MG/1
875 TABLET, FILM COATED ORAL 2 TIMES DAILY
Status: DISCONTINUED | OUTPATIENT
Start: 2017-06-05 | End: 2017-06-05

## 2017-06-05 RX ORDER — FLUTICASONE PROPIONATE 50 MCG
2 SPRAY, SUSPENSION (ML) NASAL DAILY
Status: DISCONTINUED | OUTPATIENT
Start: 2017-06-06 | End: 2017-06-06 | Stop reason: HOSPADM

## 2017-06-05 RX ORDER — METOPROLOL TARTRATE 1 MG/ML
5 INJECTION, SOLUTION INTRAVENOUS EVERY 6 HOURS PRN
Status: DISCONTINUED | OUTPATIENT
Start: 2017-06-05 | End: 2017-06-06 | Stop reason: HOSPADM

## 2017-06-05 RX ORDER — IBUPROFEN 200 MG
24 TABLET ORAL
Status: DISCONTINUED | OUTPATIENT
Start: 2017-06-05 | End: 2017-06-06 | Stop reason: HOSPADM

## 2017-06-05 RX ORDER — METOPROLOL TARTRATE 50 MG/1
50 TABLET ORAL
Status: COMPLETED | OUTPATIENT
Start: 2017-06-05 | End: 2017-06-05

## 2017-06-05 RX ORDER — IBUPROFEN 200 MG
16 TABLET ORAL
Status: DISCONTINUED | OUTPATIENT
Start: 2017-06-05 | End: 2017-06-06 | Stop reason: HOSPADM

## 2017-06-05 RX ORDER — HYDROCODONE BITARTRATE AND ACETAMINOPHEN 5; 325 MG/1; MG/1
1 TABLET ORAL EVERY 6 HOURS PRN
Status: DISCONTINUED | OUTPATIENT
Start: 2017-06-05 | End: 2017-06-06 | Stop reason: HOSPADM

## 2017-06-05 RX ORDER — CARVEDILOL 12.5 MG/1
12.5 TABLET ORAL 2 TIMES DAILY WITH MEALS
Status: DISCONTINUED | OUTPATIENT
Start: 2017-06-05 | End: 2017-06-05

## 2017-06-05 RX ORDER — GLUCAGON 1 MG
1 KIT INJECTION
Status: DISCONTINUED | OUTPATIENT
Start: 2017-06-05 | End: 2017-06-06 | Stop reason: HOSPADM

## 2017-06-05 RX ORDER — ATORVASTATIN CALCIUM 20 MG/1
40 TABLET, FILM COATED ORAL DAILY
Status: DISCONTINUED | OUTPATIENT
Start: 2017-06-06 | End: 2017-06-06 | Stop reason: HOSPADM

## 2017-06-05 RX ADMIN — SODIUM CHLORIDE, PRESERVATIVE FREE 3 ML: 5 INJECTION INTRAVENOUS at 08:06

## 2017-06-05 RX ADMIN — RIVAROXABAN 15 MG: 15 TABLET, FILM COATED ORAL at 08:06

## 2017-06-05 RX ADMIN — HYDROCODONE BITARTRATE AND ACETAMINOPHEN 1 TABLET: 5; 325 TABLET ORAL at 01:06

## 2017-06-05 RX ADMIN — METOPROLOL TARTRATE 5 MG: 5 INJECTION INTRAVENOUS at 03:06

## 2017-06-05 RX ADMIN — HYDROCODONE BITARTRATE AND ACETAMINOPHEN 1 TABLET: 5; 325 TABLET ORAL at 09:06

## 2017-06-05 RX ADMIN — CARVEDILOL 12.5 MG: 12.5 TABLET, FILM COATED ORAL at 08:06

## 2017-06-05 RX ADMIN — AZELASTINE HYDROCHLORIDE 137 MCG: 137 SPRAY, METERED NASAL at 10:06

## 2017-06-05 RX ADMIN — METOPROLOL TARTRATE 50 MG: 50 TABLET, FILM COATED ORAL at 03:06

## 2017-06-05 RX ADMIN — METOPROLOL TARTRATE 5 MG: 5 INJECTION INTRAVENOUS at 01:06

## 2017-06-05 RX ADMIN — DOCUSATE SODIUM 50 MG: 50 CAPSULE, LIQUID FILLED ORAL at 08:06

## 2017-06-05 NOTE — ED PROVIDER NOTES
"Encounter Date: 2017    SCRIBE #1 NOTE: I, Francesca Zhu, am scribing for, and in the presence of,  Dr. Mon. I have scribed the entire note.       History     Chief Complaint   Patient presents with    Abnormal ECG     sent for abnormal EKG; denies chest pain or shortness of breath     Review of patient's allergies indicates:  No Known Allergies  Time patient was seen by the provider: 12:38 PM      The patient is a 81 y.o. female with hx of: DM, breast cancer, HTN, and CKD that presents to the ED from [facility] clinic by Dr. Bush for EKG showing A-fib with a complaint of persistent headache for the last 5 days. Patient does not have a prior history of A-fib. Patient's headache began in the posterior head and occasionally spreads to the ears or face. Currently she complains of right sided facial pain and frontal headache as well as photophobia, vision changes described as seeing "flashes of light and colors," and some sinus congestion. She denies any focal weakness or neck pain. Patient has been taking Tylenol and Vicodin at home without relief. She was seen here 4 days ago for headache at which time she had a negative head CT.           Past Medical History:   Diagnosis Date    Arthritis     Bilateral nonexudative age-related macular degeneration 6/3/2014    Breast cancer     Cataract     CKD stage 3 due to type 2 diabetes mellitus     Hypertension     Migraine headache     Mild nonproliferative diabetic retinopathy of both eyes 6/3/2014    Pneumonia     Type 2 diabetes mellitus with hyperglycemia     Vertigo      Past Surgical History:   Procedure Laterality Date    BREAST SURGERY      left lumpectomy    CARPAL TUNNEL RELEASE      left    CATARACT EXTRACTION      vance     SECTION      EYE SURGERY      cataracts bilaterally    HYSTERECTOMY      partial     Family History   Problem Relation Age of Onset    Cancer Mother      stomach    Heart disease Mother     Diabetes " Father     Hypertension Father     Blindness Brother     Diabetes Brother     Hypertension Brother     Cataracts Sister     Diabetes Sister     Diabetes Brother     Diabetes Brother     Diabetes Brother     No Known Problems Daughter     No Known Problems Son     No Known Problems Daughter     Amblyopia Neg Hx     Glaucoma Neg Hx     Macular degeneration Neg Hx     Strabismus Neg Hx     Retinal detachment Neg Hx      Social History   Substance Use Topics    Smoking status: Never Smoker    Smokeless tobacco: Not on file    Alcohol use 1.2 oz/week     2 Cans of beer per week      Comment: socially     Review of Systems   Constitutional: Negative for chills.   HENT: Positive for congestion.    Eyes: Positive for photophobia and visual disturbance.   Respiratory: Negative for cough and shortness of breath.    Cardiovascular: Negative for chest pain.   Gastrointestinal: Negative for abdominal pain and vomiting.   Genitourinary: Negative for difficulty urinating and dysuria.   Musculoskeletal: Negative for back pain and neck pain.   Neurological: Positive for headaches. Negative for weakness.   Psychiatric/Behavioral: Negative for confusion.       Physical Exam     Initial Vitals [06/05/17 1140]   BP Pulse Resp Temp SpO2   120/75 106 16 98.4 °F (36.9 °C) 100 %     Physical Exam    Nursing note and vitals reviewed.  Constitutional: She appears well-developed and well-nourished. She is not diaphoretic. No distress.   HENT:   Head: Normocephalic and atraumatic.   Mouth/Throat: Oropharynx is clear and moist.   Eyes: Conjunctivae and EOM are normal. Pupils are equal, round, and reactive to light.   Neck: Normal range of motion. Neck supple. No JVD present.   Cardiovascular: Normal rate and normal heart sounds. An irregularly irregular rhythm present.   No murmur heard.  Pulmonary/Chest: Breath sounds normal. No respiratory distress. She has no wheezes. She has no rhonchi. She has no rales.   Abdominal:  Soft. Bowel sounds are normal. She exhibits no distension and no mass. There is no tenderness. There is no rebound and no guarding.   Musculoskeletal: Normal range of motion. She exhibits no edema or tenderness.   Neurological: She is alert and oriented to person, place, and time. She has normal strength. No cranial nerve deficit or sensory deficit.   Skin: Skin is warm and dry. No rash noted.   Psychiatric: She has a normal mood and affect.         ED Course   Procedures  Labs Reviewed   CBC W/ AUTO DIFFERENTIAL - Abnormal; Notable for the following:        Result Value    Hemoglobin 11.0 (*)     Hematocrit 35.0 (*)     MCHC 31.4 (*)     RDW 15.5 (*)     All other components within normal limits   B-TYPE NATRIURETIC PEPTIDE - Abnormal; Notable for the following:      (*)     All other components within normal limits   COMPREHENSIVE METABOLIC PANEL - Abnormal; Notable for the following:     CO2 22 (*)     Albumin 3.1 (*)     eGFR if  44.8 (*)     eGFR if non  38.8 (*)     All other components within normal limits   POCT GLUCOSE - Abnormal; Notable for the following:     POCT Glucose 140 (*)     All other components within normal limits   TROPONIN I   MAGNESIUM   TSH     EKG Readings: (Independently Interpreted)   Rhythm: Atrial Fibrillation. Heart Rate: 122.   No ischemic changes        X-Rays:   Independently Interpreted Readings:   Chest X-Ray: No acute findings     Medical Decision Making:   History:   Old Medical Records: I decided to obtain old medical records.  Initial Assessment:   New onset A-fib. Will do evaluation.     Persistent headaches with normal neurologic exam and supple neck. Recent CT was normal. I doubt acute stroke or subarachnoid hemorrhage. She has been taking Vicodin at home. Will give 1 dose.   Independently Interpreted Test(s):   I have ordered and independently interpreted X-rays - see prior notes.  I have ordered and independently interpreted EKG  Reading(s) - see prior notes  Clinical Tests:   Lab Tests: Ordered and Reviewed  Radiological Study: Ordered and Reviewed  Medical Tests: Ordered and Reviewed            Scribe Attestation:   Scribe #1: I performed the above scribed service and the documentation accurately describes the services I performed. I attest to the accuracy of the note.    Attending Attestation:         Attending Critical Care:   Critical Care Times:   Direct Patient Care (initial evaluation, reassessments, and time considering the case)................................................................24 minutes.   Additional History from reviewing old medical records or taking additional history from the family, EMS, PCP, etc.......................4 minutes.   Ordering, Reviewing, and Interpreting Diagnostic Studies...............................................................................................................4 minutes.   Documentation..................................................................................................................................................................................4 minutes.   Consultation with other Physicians. .................................................................................................................................................4 minutes.   ==============================================================  · Total Critical Care Time - exclusive of procedural time: 40 minutes.  ==============================================================    Physician Attestation for Scribe:  Physician Attestation Statement for Scribe #1: I, Dr. Mon, reviewed documentation, as scribed by Francesca Zhu in my presence, and it is both accurate and complete.         Attending ED Notes:   Reviewed labs. Normal white count. Elevated BNP.           ED Course     Clinical Impression:   The primary encounter diagnosis was Atrial fibrillation. Diagnoses of EKG, abnormal,  Atrial fibrillation, unspecified type, Essential hypertension, Atrial fibrillation status post cardioversion, Paroxysmal atrial fibrillation, History of breast cancer, Type 2 diabetes mellitus with stage 3 chronic kidney disease, without long-term current use of insulin, Allergic conjunctivitis, unspecified laterality, and Eye discomfort, bilateral were also pertinent to this visit.          David Mon MD  06/10/17 [id number]

## 2017-06-05 NOTE — H&P
"[facility] Hospital Medicine  History & Physical    Patient Name: Dacia Martínez  MRN: [id number]  Admission Date: 6/5/2017  Attending Physician: April Peraza MD  Primary Care Provider: Gómez Jimenez MD    [facility] Medicine Team: [facility] B April Peraza MD     Patient information was obtained from patient, relative(s) and ER records.     Subjective:     Principal Problem:Atrial fibrillation    Chief Complaint:   Chief Complaint   Patient presents with    Abnormal ECG     sent for abnormal EKG; denies chest pain or shortness of breath        HPI: The patient is a 81 y.o. female with hx of: DM, breast cancer, HTN, and CKD that presents to the ED from Primary Care clinic by Dr. Bush for EKG showing A-fib with a complaint of persistent headache for the last 5 days. Patient does not have a prior history of A-fib. Patient's headache began in the posterior head and occasionally spreads to the ears or face. Currently she complains of right sided facial pain and frontal headache as well as photophobia, vision changes described as seeing "flashes of light and colors," and some sinus congestion. She denies any focal weakness or neck pain. Patient has been taking Tylenol and Vicodin at home without relief. She was seen here 4 days ago for headache at which time she had a negative head CT.        She reports palpitations started about 5 days ago. Denies chest pain, SOB, cough, back pain.  HA located in right temproal area , is sharp and resolved w metoprolol in the ED. She has had reent sinus congestion but denies taking simus meds.  PCP reently changed diabetic eds.     Past Medical History:   Diagnosis Date    Arthritis     Bilateral nonexudative age-related macular degeneration 6/3/2014    Breast cancer     Cataract     CKD stage 3 due to type 2 diabetes mellitus     Hypertension     Migraine headache     Mild nonproliferative diabetic retinopathy of both eyes 6/3/2014    Pneumonia  "    Type 2 diabetes mellitus with hyperglycemia     Vertigo        Past Surgical History:   Procedure Laterality Date    BREAST SURGERY      left lumpectomy    CARPAL TUNNEL RELEASE      left    CATARACT EXTRACTION      vance     SECTION      EYE SURGERY      cataracts bilaterally    HYSTERECTOMY      partial       Review of patient's allergies indicates:  No Known Allergies    No current facility-administered medications on file prior to encounter.      Current Outpatient Prescriptions on File Prior to Encounter   Medication Sig    ACCU-CHEK FASTCLIX Misc 1 lancet by Misc.(Non-Drug; Combo Route) route 3 (three) times daily. Pt to test blood glucose up to 3 times daily.    allopurinol (ZYLOPRIM) 300 MG tablet Take 1.5 tablets (450 mg total) by mouth once daily.    amlodipine (NORVASC) 10 MG tablet Take 1 tablet (10 mg total) by mouth once daily.    amoxicillin (AMOXIL) 500 MG capsule take 1 capsule by mouth three times a day until finished    amoxicillin (AMOXIL) 875 MG tablet Take 1 tablet (875 mg total) by mouth 2 (two) times daily.    atenolol (TENORMIN) 100 MG tablet Take 1 tablet (100 mg total) by mouth once daily.    atorvastatin (LIPITOR) 40 MG tablet     atorvastatin (LIPITOR) 80 MG tablet Take 1 tablet (80 mg total) by mouth once daily.    azelastine (ASTELIN) 137 mcg (0.1 %) nasal spray 1 spray (137 mcg total) by Nasal route 2 (two) times daily.    blood sugar diagnostic (ACCU-CHEK TOMASA) Strp 1 strip by Misc.(Non-Drug; Combo Route) route once daily.    chlorhexidine (PERIDEX) 0.12 % solution FILL CAP TO THE FILL LINE, SWISH IN MOUTH UNDILUTED for 30 second...  (REFER TO PRESCRIPTION NOTES).    chlorthalidone (HYGROTEN) 25 MG Tab Take 1 tablet (25 mg total) by mouth once daily.    COL-RITE 100 mg capsule     cromolyn (OPTICROM) 4 % ophthalmic solution instill 1 drop into both eyes four times a day    docusate sodium (COLACE) 50 MG capsule Take 1 capsule (50 mg total) by mouth 2  (two) times daily.    dulaglutide (TRULICITY) 0.75 mg/0.5 mL PnIj Inject 0.75 mg into the skin every 7 days.    fluconazole (DIFLUCAN) 150 MG Tab     fluticasone (FLONASE) 50 mcg/actuation nasal spray instill 2 sprays into each nostril once daily    furosemide (LASIX) 20 MG tablet Take 1 tablet (20 mg total) by mouth daily as needed.    hydrocodone-acetaminophen 5-325mg (NORCO) 5-325 mg per tablet Take 1 tablet by mouth every 6 (six) hours as needed for Pain.    ibuprofen (ADVIL,MOTRIN) 800 MG tablet Take 800 mg by mouth every 8 (eight) hours.    lisinopril (PRINIVIL,ZESTRIL) 40 MG tablet Take 1 tablet (40 mg total) by mouth once daily.    loratadine (CLARITIN) 10 mg tablet Take 1 tablet (10 mg total) by mouth daily as needed for Allergies. For sinus congestion    meclizine (ANTIVERT) 25 mg tablet Take 1 tablet (25 mg total) by mouth 3 (three) times daily as needed.    mupirocin (BACTROBAN) 2 % ointment by Nasal route 2 (two) times daily. Apply with swab to inside of nose    olopatadine (PATANOL) 0.1 % ophthalmic solution Place 1 drop into both eyes 2 (two) times daily.    omeprazole (PRILOSEC) 20 MG capsule Take 1 capsule (20 mg total) by mouth once daily.    polyethylene glycol (GLYCOLAX) 17 gram PwPk Take 17 g by mouth daily as needed. Dissolve 1 heaping tablespoon and 4-8 ounces of water.  Take once a day as needed for constipation.    predniSONE (DELTASONE) 10 MG tablet take 1 tablet by mouth daily if needed for gout    triamcinolone acetonide 0.1% (KENALOG) 0.1 % ointment APPLY TO AFFECTED AREA DAILY     Family History     Problem Relation (Age of Onset)    Blindness Brother    Cancer Mother    Cataracts Sister    Diabetes Father, Brother, Sister, Brother, Brother, Brother    Heart disease Mother    Hypertension Father, Brother    No Known Problems Daughter, Son, Daughter        Social History Main Topics    Smoking status: Never Smoker    Smokeless tobacco: Not on file    Alcohol use 1.2  oz/week     2 Cans of beer per week      Comment: socially    Drug use: No    Sexual activity: Not Currently     Review of Systems   Constitutional: Negative for activity change, appetite change, chills, diaphoresis, fatigue and fever.   HENT: Positive for congestion. Negative for nosebleeds, rhinorrhea, sneezing and trouble swallowing.    Eyes: Negative for visual disturbance.        Has floaters and sees lightening bolts   Respiratory: Negative for cough, chest tightness, shortness of breath and wheezing.    Cardiovascular: Negative for chest pain, palpitations and leg swelling.   Gastrointestinal: Negative for abdominal distention, abdominal pain, blood in stool, constipation, diarrhea, nausea and vomiting.   Genitourinary: Negative for difficulty urinating, dysuria, flank pain, frequency, hematuria and pelvic pain.   Musculoskeletal: Positive for arthralgias, myalgias and neck pain. Negative for back pain, gait problem, joint swelling and neck stiffness.   Skin: Negative for pallor, rash and wound.   Allergic/Immunologic: Positive for environmental allergies.   Neurological: Positive for headaches. Negative for dizziness, syncope, facial asymmetry, speech difficulty, weakness, light-headedness and numbness.        No recent falls   Psychiatric/Behavioral: Negative for agitation, behavioral problems, decreased concentration and sleep disturbance. The patient is not nervous/anxious.      Objective:     Vital Signs (Most Recent):  Temp: 98.4 °F (36.9 °C) (06/05/17 1140)  Pulse: (!) 112 (06/05/17 1549)  Resp: 16 (06/05/17 1549)  BP: 128/63 (06/05/17 1549)  SpO2: 100 % (06/05/17 1140) Vital Signs (24h Range):  Temp:  [98.4 °F (36.9 °C)] 98.4 °F (36.9 °C)  Pulse:  [] 112  Resp:  [16] 16  SpO2:  [100 %] 100 %  BP: (120-132)/(63-75) 128/63     Weight: 69.4 kg (153 lb)  Body mass index is 28.91 kg/m².    Physical Exam   Constitutional: She is oriented to person, place, and time. She appears well-developed and  well-nourished.   Younger than expeted age   HENT:   Head: Normocephalic and atraumatic.   Eyes: Conjunctivae and EOM are normal. Pupils are equal, round, and reactive to light. No scleral icterus.   Neck: Normal range of motion. Neck supple. No JVD present. No tracheal deviation present. No thyromegaly present.   Cardiovascular: Normal heart sounds and intact distal pulses.  Exam reveals no gallop and no friction rub.    No murmur heard.  Irregular rate, irregulat rhythm   Pulmonary/Chest: Effort normal and breath sounds normal. No respiratory distress. She has no wheezes. She has no rales. She exhibits no tenderness.   Abdominal: Soft. Bowel sounds are normal. She exhibits no distension and no mass. There is no tenderness. There is no rebound and no guarding.   Musculoskeletal: Normal range of motion. She exhibits no edema or tenderness.   Lymphadenopathy:     She has no cervical adenopathy.   Neurological: She is alert and oriented to person, place, and time. Coordination normal.   Skin: Skin is warm and dry. No rash noted. No erythema. No pallor.   Psychiatric: She has a normal mood and affect. Her behavior is normal. Judgment and thought content normal.        Significant Labs:   CBC:   Recent Labs  Lab 06/05/17  1318   WBC 6.64   HGB 11.0*   HCT 35.0*        CMP:   Recent Labs  Lab 06/05/17  1526      K 4.3      CO2 22*   GLU 76   BUN 20   CREATININE 1.3   CALCIUM 9.1   PROT 7.3   ALBUMIN 3.1*   BILITOT 0.6   ALKPHOS 56   AST 13   ALT 11   ANIONGAP 11   EGFRNONAA 38.8*     Cardiac Markers:   Recent Labs  Lab 06/05/17  1318   *       Significant Imaging: CT head - nl, cxr - nl,  EKG - afib rate 120s    Assessment/Plan:     * Atrial fibrillation    New onset a fib  Was given IV and po metoprolol in the ED    Coreg bid for rate control  Discussed risk benefits of anticoagulation: consider REJI and caardioversion. Start rivaroxaban renal dose.  Consult cardiology  Echo  tsh - nl  D-dimer-  pending  Pt oxygenating on %          Essential hypertension    will dc norvasc, chlorthalidone for now  Continue lisinipril,.  She is not on lasix.  Metoprolol given in ED, can use prn if HR >140.   Titrate coreg to HR            Type 2 diabetes mellitus with stage 3 chronic kidney disease, without long-term current use of insulin    Hold once weekly trulicity while in hospital. , not on insulin or oral hypoglycemics at home.  Insulin SS while in hospital  No results for input(s): POCTGLUCOSE in the last 72 hours.    BS 76 upon admit        Chronic pain syndrome    follwed by Dr. Torres izaguirre and gout   on ibuprofen, hydrocodone 5 bid, and prednisone prn - may resume it if needed          Acute recurrent maxillary sinusitis    pcp started antibiotics 3 days prior to day of admission. amoxillcillin  CT scan in ED 6/3 neg for sinus disease  Pain / HA improved w iv metoprolol          EKG, abnormal              Constipation due to outlet dysfunction    chronic          History of breast cancer    resolved            VTE Risk Mitigation         Ordered     rivaroxaban tablet 15 mg  With dinner     Route:  Oral        06/05/17 1743     Place BRITANY hose  Until discontinued      06/05/17 1743     Place sequential compression device  Until discontinued      06/05/17 1743     Medium Risk of VTE  Once      06/05/17 1743        April Peraza MD  Department of Hospital Medicine   [facility]

## 2017-06-05 NOTE — ASSESSMENT & PLAN NOTE
New onset a fib  Was given IV and po metoprolol in the ED    Coreg bid for rate control  Discussed risk benefits of anticoagulation: consider REJI and caardioversion. Start rivaroxaban renal dose.  Consult cardiology  Echo  tsh - nl  D-dimer- pending  Pt oxygenating on %

## 2017-06-05 NOTE — ED NOTES
"Sent to ED from the 3rd floor for abnormal EKg---c/o headache and palpitations--state's it wasn't that bad" . Heart rate 122 beats /min on arrival to ED. AAO. Denies sob. Atrial fib noted on cardiac monitor  "

## 2017-06-05 NOTE — HPI
"The patient is a 81 y.o. female with hx of: DM, breast cancer, HTN, and CKD that presents to the ED from Primary Care clinic by Dr. Bush for EKG showing A-fib with a complaint of persistent headache for the last 5 days. Patient does not have a prior history of A-fib. Patient's headache began in the posterior head and occasionally spreads to the ears or face. Currently she complains of right sided facial pain and frontal headache as well as photophobia, vision changes described as seeing "flashes of light and colors," and some sinus congestion. She denies any focal weakness or neck pain. Patient has been taking Tylenol and Vicodin at home without relief. She was seen here 4 days ago for headache at which time she had a negative head CT.      She reports palpitations started about 5 days ago. Denies chest pain, SOB, cough, back pain.  HA located in right temproal area , is sharp and resolved w metoprolol in the ED. She has had recent sinus congestion but denies taking sinus meds.  PCP recently changed diabetic meds.   "

## 2017-06-05 NOTE — PROGRESS NOTES
"Subjective:       Patient ID: Dacia Martínez is a 81 y.o. female.    Chief Complaint: Otalgia; Sore Throat; and Fatigue   this is an 81-year-old who presents today with nasal congestion sore throat.  Patient is reporting that she has been having a lot of nasal congestion.  She's had intermittent mucus clear and nature that comes and goes she has had occasional cough.  She has had episodes of dizziness and feels weak at times.  Patient is been to ENT for her nasal congestion and taking antibiotics she has had problems with her right ear.  She has no fevers.  She's had some episodes of weakness and felt worse this week. She went to the ER for evaluation when she was having more congestion and headache and a CAT scan was done at the time.  She complains of headache right ear pain mostly but has had intermittant episodes of weakness.     HPI  Review of Systems   Constitutional: Positive for fatigue.        Weakness   Clear mucus    HENT: Positive for congestion, ear pain, postnasal drip and sore throat.         Right ear and neck pain    Cardiovascular: Negative for chest pain.   Neurological: Positive for dizziness.       Objective:     Blood pressure 132/64, pulse 70, height 5' 1" (1.549 m), weight 71.7 kg (158 lb 1.1 oz).    Physical Exam   Constitutional: No distress.   HENT:   Head: Normocephalic.   Mouth/Throat: Oropharynx is clear and moist.   Cerumen righ ear removed  Oropharynx mild erythema   No exudate    Eyes: No scleral icterus.   Neck: Neck supple.   Cardiovascular: Exam reveals no gallop and no friction rub.    Murmur heard.  Irregular   Murmur    Pulmonary/Chest: Effort normal and breath sounds normal. No respiratory distress.   Abdominal: Soft. Bowel sounds are normal. She exhibits no mass. There is no tenderness.   Musculoskeletal: She exhibits no edema.   Neurological: She is alert.   Skin: No erythema.   Vitals reviewed.      Assessment:       1. Weakness    2. Sore throat    3. Atrial fibrillation, " unspecified type        Plan:       Dacia was seen today for otalgia, sore throat and fatigue.    Diagnoses and all orders for this visit:    Weakness  -     EKG 12-lead; Future  -     IN OFFICE EKG 12-LEAD (to Muse)    Sore throat  rhiniits congestion she has recently seen ent and completed course of medication   For her sinus congestion   -     Throat Screen, Rapid    Pt completed ekg and likely contributing to her symptoms atrial fibrillation with intermittant rapid rate   New onset   Was referred to er for evaluation   Atrial fibrillation, unspecified type    She will schedule pcp follow up at time of discharge

## 2017-06-05 NOTE — ASSESSMENT & PLAN NOTE
follwed by Dr. Macdonald  arthrtis and gout   on ibuprofen, hydrocodone 5 bid, and prednisone prn - may resume it if needed

## 2017-06-05 NOTE — ASSESSMENT & PLAN NOTE
Hold once weekly trulicity while in hospital. , not on insulin or oral hypoglycemics at home.  Insulin SS while in hospital  No results for input(s): POCTGLUCOSE in the last 72 hours.    BS 76 upon admit

## 2017-06-05 NOTE — ASSESSMENT & PLAN NOTE
will dc norvasc, chlorthalidone for now  Continue lisinipril,.  She is not on lasix.  Metoprolol given in ED, can use prn if HR >140.   Titrate coreg to HR

## 2017-06-05 NOTE — ASSESSMENT & PLAN NOTE
pcp started antibiotics 3 days prior to day of admission. amoxillcillin  CT scan in ED 6/3 neg for sinus disease  Pain / HA improved w iv metoprolol

## 2017-06-05 NOTE — SUBJECTIVE & OBJECTIVE
Past Medical History:   Diagnosis Date    Arthritis     Bilateral nonexudative age-related macular degeneration 6/3/2014    Breast cancer     Cataract     CKD stage 3 due to type 2 diabetes mellitus     Hypertension     Migraine headache     Mild nonproliferative diabetic retinopathy of both eyes 6/3/2014    Pneumonia     Type 2 diabetes mellitus with hyperglycemia     Vertigo        Past Surgical History:   Procedure Laterality Date    BREAST SURGERY      left lumpectomy    CARPAL TUNNEL RELEASE      left    CATARACT EXTRACTION      vance     SECTION      EYE SURGERY      cataracts bilaterally    HYSTERECTOMY      partial       Review of patient's allergies indicates:  No Known Allergies    No current facility-administered medications on file prior to encounter.      Current Outpatient Prescriptions on File Prior to Encounter   Medication Sig    ACCU-CHEK FASTCLIX Misc 1 lancet by Misc.(Non-Drug; Combo Route) route 3 (three) times daily. Pt to test blood glucose up to 3 times daily.    allopurinol (ZYLOPRIM) 300 MG tablet Take 1.5 tablets (450 mg total) by mouth once daily.    amlodipine (NORVASC) 10 MG tablet Take 1 tablet (10 mg total) by mouth once daily.    amoxicillin (AMOXIL) 500 MG capsule take 1 capsule by mouth three times a day until finished    amoxicillin (AMOXIL) 875 MG tablet Take 1 tablet (875 mg total) by mouth 2 (two) times daily.    atenolol (TENORMIN) 100 MG tablet Take 1 tablet (100 mg total) by mouth once daily.    atorvastatin (LIPITOR) 40 MG tablet     atorvastatin (LIPITOR) 80 MG tablet Take 1 tablet (80 mg total) by mouth once daily.    azelastine (ASTELIN) 137 mcg (0.1 %) nasal spray 1 spray (137 mcg total) by Nasal route 2 (two) times daily.    blood sugar diagnostic (ACCU-CHEK TOMASA) Strp 1 strip by Misc.(Non-Drug; Combo Route) route once daily.    chlorhexidine (PERIDEX) 0.12 % solution FILL CAP TO THE FILL LINE, SWISH IN MOUTH UNDILUTED for 30  second...  (REFER TO PRESCRIPTION NOTES).    chlorthalidone (HYGROTEN) 25 MG Tab Take 1 tablet (25 mg total) by mouth once daily.    COL-RITE 100 mg capsule     cromolyn (OPTICROM) 4 % ophthalmic solution instill 1 drop into both eyes four times a day    docusate sodium (COLACE) 50 MG capsule Take 1 capsule (50 mg total) by mouth 2 (two) times daily.    dulaglutide (TRULICITY) 0.75 mg/0.5 mL PnIj Inject 0.75 mg into the skin every 7 days.    fluconazole (DIFLUCAN) 150 MG Tab     fluticasone (FLONASE) 50 mcg/actuation nasal spray instill 2 sprays into each nostril once daily    furosemide (LASIX) 20 MG tablet Take 1 tablet (20 mg total) by mouth daily as needed.    hydrocodone-acetaminophen 5-325mg (NORCO) 5-325 mg per tablet Take 1 tablet by mouth every 6 (six) hours as needed for Pain.    ibuprofen (ADVIL,MOTRIN) 800 MG tablet Take 800 mg by mouth every 8 (eight) hours.    lisinopril (PRINIVIL,ZESTRIL) 40 MG tablet Take 1 tablet (40 mg total) by mouth once daily.    loratadine (CLARITIN) 10 mg tablet Take 1 tablet (10 mg total) by mouth daily as needed for Allergies. For sinus congestion    meclizine (ANTIVERT) 25 mg tablet Take 1 tablet (25 mg total) by mouth 3 (three) times daily as needed.    mupirocin (BACTROBAN) 2 % ointment by Nasal route 2 (two) times daily. Apply with swab to inside of nose    olopatadine (PATANOL) 0.1 % ophthalmic solution Place 1 drop into both eyes 2 (two) times daily.    omeprazole (PRILOSEC) 20 MG capsule Take 1 capsule (20 mg total) by mouth once daily.    polyethylene glycol (GLYCOLAX) 17 gram PwPk Take 17 g by mouth daily as needed. Dissolve 1 heaping tablespoon and 4-8 ounces of water.  Take once a day as needed for constipation.    predniSONE (DELTASONE) 10 MG tablet take 1 tablet by mouth daily if needed for gout    triamcinolone acetonide 0.1% (KENALOG) 0.1 % ointment APPLY TO AFFECTED AREA DAILY     Family History     Problem Relation (Age of Onset)     Blindness Brother    Cancer Mother    Cataracts Sister    Diabetes Father, Brother, Sister, Brother, Brother, Brother    Heart disease Mother    Hypertension Father, Brother    No Known Problems Daughter, Son, Daughter        Social History Main Topics    Smoking status: Never Smoker    Smokeless tobacco: Not on file    Alcohol use 1.2 oz/week     2 Cans of beer per week      Comment: socially    Drug use: No    Sexual activity: Not Currently     Review of Systems   Constitutional: Negative for activity change, appetite change, chills, diaphoresis, fatigue and fever.   HENT: Positive for congestion. Negative for nosebleeds, rhinorrhea, sneezing and trouble swallowing.    Eyes: Negative for visual disturbance.        Has floaters and sees lightening bolts   Respiratory: Negative for cough, chest tightness, shortness of breath and wheezing.    Cardiovascular: Negative for chest pain, palpitations and leg swelling.   Gastrointestinal: Negative for abdominal distention, abdominal pain, blood in stool, constipation, diarrhea, nausea and vomiting.   Genitourinary: Negative for difficulty urinating, dysuria, flank pain, frequency, hematuria and pelvic pain.   Musculoskeletal: Positive for arthralgias, myalgias and neck pain. Negative for back pain, gait problem, joint swelling and neck stiffness.   Skin: Negative for pallor, rash and wound.   Allergic/Immunologic: Positive for environmental allergies.   Neurological: Positive for headaches. Negative for dizziness, syncope, facial asymmetry, speech difficulty, weakness, light-headedness and numbness.        No recent falls   Psychiatric/Behavioral: Negative for agitation, behavioral problems, decreased concentration and sleep disturbance. The patient is not nervous/anxious.      Objective:     Vital Signs (Most Recent):  Temp: 98.4 °F (36.9 °C) (06/05/17 1140)  Pulse: (!) 112 (06/05/17 1549)  Resp: 16 (06/05/17 1549)  BP: 128/63 (06/05/17 1549)  SpO2: 100 % (06/05/17  1140) Vital Signs (24h Range):  Temp:  [98.4 °F (36.9 °C)] 98.4 °F (36.9 °C)  Pulse:  [] 112  Resp:  [16] 16  SpO2:  [100 %] 100 %  BP: (120-132)/(63-75) 128/63     Weight: 69.4 kg (153 lb)  Body mass index is 28.91 kg/m².    Physical Exam   Constitutional: She is oriented to person, place, and time. She appears well-developed and well-nourished.   Younger than expeted age   HENT:   Head: Normocephalic and atraumatic.   Eyes: Conjunctivae and EOM are normal. Pupils are equal, round, and reactive to light. No scleral icterus.   Neck: Normal range of motion. Neck supple. No JVD present. No tracheal deviation present. No thyromegaly present.   Cardiovascular: Normal heart sounds and intact distal pulses.  Exam reveals no gallop and no friction rub.    No murmur heard.  Irregular rate, irregulat rhythm   Pulmonary/Chest: Effort normal and breath sounds normal. No respiratory distress. She has no wheezes. She has no rales. She exhibits no tenderness.   Abdominal: Soft. Bowel sounds are normal. She exhibits no distension and no mass. There is no tenderness. There is no rebound and no guarding.   Musculoskeletal: Normal range of motion. She exhibits no edema or tenderness.   Lymphadenopathy:     She has no cervical adenopathy.   Neurological: She is alert and oriented to person, place, and time. Coordination normal.   Skin: Skin is warm and dry. No rash noted. No erythema. No pallor.   Psychiatric: She has a normal mood and affect. Her behavior is normal. Judgment and thought content normal.        Significant Labs:   CBC:   Recent Labs  Lab 06/05/17  1318   WBC 6.64   HGB 11.0*   HCT 35.0*        CMP:   Recent Labs  Lab 06/05/17  1526      K 4.3      CO2 22*   GLU 76   BUN 20   CREATININE 1.3   CALCIUM 9.1   PROT 7.3   ALBUMIN 3.1*   BILITOT 0.6   ALKPHOS 56   AST 13   ALT 11   ANIONGAP 11   EGFRNONAA 38.8*     Cardiac Markers:   Recent Labs  Lab 06/05/17  1318   *       Significant  Imaging: CT head - nl, cxr - nl,  EKG - afib rate 120s

## 2017-06-06 ENCOUNTER — ANESTHESIA (OUTPATIENT)
Dept: MEDSURG UNIT | Facility: HOSPITAL | Age: 82
End: 2017-06-06
Payer: MEDICARE

## 2017-06-06 ENCOUNTER — ANESTHESIA EVENT (OUTPATIENT)
Dept: MEDSURG UNIT | Facility: HOSPITAL | Age: 82
End: 2017-06-06
Payer: MEDICARE

## 2017-06-06 VITALS
HEART RATE: 76 BPM | DIASTOLIC BLOOD PRESSURE: 96 MMHG | WEIGHT: 153 LBS | TEMPERATURE: 98 F | RESPIRATION RATE: 20 BRPM | BODY MASS INDEX: 28.89 KG/M2 | HEIGHT: 61 IN | SYSTOLIC BLOOD PRESSURE: 121 MMHG | OXYGEN SATURATION: 100 %

## 2017-06-06 PROBLEM — I48.91 ATRIAL FIBRILLATION: Status: RESOLVED | Noted: 2017-06-05 | Resolved: 2017-06-06

## 2017-06-06 PROBLEM — J01.01 ACUTE RECURRENT MAXILLARY SINUSITIS: Status: RESOLVED | Noted: 2017-06-05 | Resolved: 2017-06-06

## 2017-06-06 LAB
AORTIC VALVE STENOSIS: ABNORMAL
AORTIC VALVE STENOSIS: ABNORMAL
ESTIMATED AVG GLUCOSE: 148 MG/DL
ESTIMATED PA SYSTOLIC PRESSURE: 43.7
HBA1C MFR BLD HPLC: 6.8 %
MITRAL VALVE MOBILITY: ABNORMAL
MITRAL VALVE MOBILITY: ABNORMAL
MITRAL VALVE REGURGITATION: ABNORMAL
POCT GLUCOSE: 110 MG/DL (ref 70–110)
POCT GLUCOSE: 110 MG/DL (ref 70–110)
RETIRED EF AND QEF - SEE NOTES: 60 (ref 55–65)
TRICUSPID VALVE REGURGITATION: ABNORMAL

## 2017-06-06 PROCEDURE — D9220A PRA ANESTHESIA: Mod: ANES,,, | Performed by: ANESTHESIOLOGY

## 2017-06-06 PROCEDURE — 82962 GLUCOSE BLOOD TEST: CPT | Performed by: INTERNAL MEDICINE

## 2017-06-06 PROCEDURE — 93306 TTE W/DOPPLER COMPLETE: CPT

## 2017-06-06 PROCEDURE — 93005 ELECTROCARDIOGRAM TRACING: CPT | Mod: 59

## 2017-06-06 PROCEDURE — 93320 DOPPLER ECHO COMPLETE: CPT | Mod: 26,,, | Performed by: INTERNAL MEDICINE

## 2017-06-06 PROCEDURE — 25000003 PHARM REV CODE 250

## 2017-06-06 PROCEDURE — 25000003 PHARM REV CODE 250: Performed by: HOSPITALIST

## 2017-06-06 PROCEDURE — 99217 PR OBSERVATION CARE DISCHARGE: CPT | Mod: ,,, | Performed by: PHYSICIAN ASSISTANT

## 2017-06-06 PROCEDURE — 93320 DOPPLER ECHO COMPLETE: CPT

## 2017-06-06 PROCEDURE — 25000003 PHARM REV CODE 250: Performed by: INTERNAL MEDICINE

## 2017-06-06 PROCEDURE — 63600175 PHARM REV CODE 636 W HCPCS: Performed by: NURSE ANESTHETIST, CERTIFIED REGISTERED

## 2017-06-06 PROCEDURE — 93325 DOPPLER ECHO COLOR FLOW MAPG: CPT

## 2017-06-06 PROCEDURE — 93005 ELECTROCARDIOGRAM TRACING: CPT

## 2017-06-06 PROCEDURE — 27100006 CARDIOVERSION (DCCV)

## 2017-06-06 PROCEDURE — 99219 PR INITIAL OBSERVATION CARE,LEVL II: CPT | Mod: GC,,, | Performed by: INTERNAL MEDICINE

## 2017-06-06 PROCEDURE — D9220A PRA ANESTHESIA: Mod: CRNA,,, | Performed by: NURSE ANESTHETIST, CERTIFIED REGISTERED

## 2017-06-06 PROCEDURE — 93312 ECHO TRANSESOPHAGEAL: CPT | Mod: 26,,, | Performed by: INTERNAL MEDICINE

## 2017-06-06 PROCEDURE — 93306 TTE W/DOPPLER COMPLETE: CPT | Mod: 26,,, | Performed by: INTERNAL MEDICINE

## 2017-06-06 PROCEDURE — 92960 CARDIOVERSION ELECTRIC EXT: CPT | Mod: ,,, | Performed by: INTERNAL MEDICINE

## 2017-06-06 PROCEDURE — 93010 ELECTROCARDIOGRAM REPORT: CPT | Mod: 76,S$GLB,, | Performed by: INTERNAL MEDICINE

## 2017-06-06 PROCEDURE — 25000003 PHARM REV CODE 250: Performed by: NURSE ANESTHETIST, CERTIFIED REGISTERED

## 2017-06-06 PROCEDURE — 37000008 HC ANESTHESIA 1ST 15 MINUTES: Performed by: INTERNAL MEDICINE

## 2017-06-06 PROCEDURE — 37000009 HC ANESTHESIA EA ADD 15 MINS: Performed by: INTERNAL MEDICINE

## 2017-06-06 PROCEDURE — G0378 HOSPITAL OBSERVATION PER HR: HCPCS

## 2017-06-06 PROCEDURE — 25000003 PHARM REV CODE 250: Performed by: PHYSICIAN ASSISTANT

## 2017-06-06 PROCEDURE — 93010 ELECTROCARDIOGRAM REPORT: CPT | Mod: S$GLB,,, | Performed by: INTERNAL MEDICINE

## 2017-06-06 RX ORDER — ATENOLOL 50 MG/1
100 TABLET ORAL
Qty: 90 TABLET | Refills: 3 | Status: ON HOLD | OUTPATIENT
Start: 2017-06-06 | End: 2017-07-08 | Stop reason: HOSPADM

## 2017-06-06 RX ORDER — PHENYLEPHRINE HYDROCHLORIDE 10 MG/ML
INJECTION INTRAVENOUS
Status: DISCONTINUED | OUTPATIENT
Start: 2017-06-06 | End: 2017-06-06

## 2017-06-06 RX ORDER — PROPOFOL 10 MG/ML
VIAL (ML) INTRAVENOUS
Status: DISCONTINUED | OUTPATIENT
Start: 2017-06-06 | End: 2017-06-06

## 2017-06-06 RX ORDER — FENTANYL CITRATE 50 UG/ML
INJECTION, SOLUTION INTRAMUSCULAR; INTRAVENOUS
Status: DISCONTINUED | OUTPATIENT
Start: 2017-06-06 | End: 2017-06-06

## 2017-06-06 RX ORDER — METOPROLOL TARTRATE 25 MG/1
25 TABLET, FILM COATED ORAL 2 TIMES DAILY
Status: DISCONTINUED | OUTPATIENT
Start: 2017-06-06 | End: 2017-06-06 | Stop reason: HOSPADM

## 2017-06-06 RX ORDER — ATENOLOL 50 MG/1
50 TABLET ORAL NIGHTLY
Qty: 90 TABLET | Refills: 3 | Status: ON HOLD | OUTPATIENT
Start: 2017-06-06 | End: 2017-07-08 | Stop reason: HOSPADM

## 2017-06-06 RX ORDER — OLOPATADINE HYDROCHLORIDE 1 MG/ML
1 SOLUTION/ DROPS OPHTHALMIC 2 TIMES DAILY
Qty: 5 ML | Refills: 1 | Status: SHIPPED | OUTPATIENT
Start: 2017-06-06 | End: 2017-06-12

## 2017-06-06 RX ORDER — LIDOCAINE HCL/PF 100 MG/5ML
SYRINGE (ML) INTRAVENOUS
Status: DISCONTINUED | OUTPATIENT
Start: 2017-06-06 | End: 2017-06-06

## 2017-06-06 RX ORDER — CROMOLYN SODIUM 40 MG/ML
SOLUTION/ DROPS OPHTHALMIC
Qty: 10 ML | Refills: 1 | Status: SHIPPED | OUTPATIENT
Start: 2017-06-06 | End: 2017-06-12

## 2017-06-06 RX ORDER — PROPOFOL 10 MG/ML
VIAL (ML) INTRAVENOUS CONTINUOUS PRN
Status: DISCONTINUED | OUTPATIENT
Start: 2017-06-06 | End: 2017-06-06

## 2017-06-06 RX ADMIN — LIDOCAINE HYDROCHLORIDE 100 MG: 20 INJECTION, SOLUTION INTRAVENOUS at 10:06

## 2017-06-06 RX ADMIN — PROPOFOL 100 MCG/KG/MIN: 10 INJECTION, EMULSION INTRAVENOUS at 10:06

## 2017-06-06 RX ADMIN — HYDROCODONE BITARTRATE AND ACETAMINOPHEN 1 TABLET: 5; 325 TABLET ORAL at 05:06

## 2017-06-06 RX ADMIN — PHENYLEPHRINE HYDROCHLORIDE 200 MCG: 10 INJECTION INTRAVENOUS at 10:06

## 2017-06-06 RX ADMIN — ATORVASTATIN CALCIUM 40 MG: 20 TABLET, FILM COATED ORAL at 09:06

## 2017-06-06 RX ADMIN — SODIUM CHLORIDE, SODIUM GLUCONATE, SODIUM ACETATE, POTASSIUM CHLORIDE, MAGNESIUM CHLORIDE, SODIUM PHOSPHATE, DIBASIC, AND POTASSIUM PHOSPHATE: .53; .5; .37; .037; .03; .012; .00082 INJECTION, SOLUTION INTRAVENOUS at 09:06

## 2017-06-06 RX ADMIN — AZELASTINE HYDROCHLORIDE 137 MCG: 137 SPRAY, METERED NASAL at 09:06

## 2017-06-06 RX ADMIN — SODIUM CHLORIDE, PRESERVATIVE FREE 3 ML: 5 INJECTION INTRAVENOUS at 05:06

## 2017-06-06 RX ADMIN — PROPOFOL 20 MG: 10 INJECTION, EMULSION INTRAVENOUS at 10:06

## 2017-06-06 RX ADMIN — METOPROLOL TARTRATE 25 MG: 25 TABLET ORAL at 09:06

## 2017-06-06 RX ADMIN — PROPOFOL 50 MG: 10 INJECTION, EMULSION INTRAVENOUS at 10:06

## 2017-06-06 RX ADMIN — DOCUSATE SODIUM 50 MG: 50 CAPSULE, LIQUID FILLED ORAL at 09:06

## 2017-06-06 RX ADMIN — FENTANYL CITRATE 25 MCG: 50 INJECTION, SOLUTION INTRAMUSCULAR; INTRAVENOUS at 09:06

## 2017-06-06 NOTE — PROGRESS NOTES
"Progress Note  Cardiology Consult    Admit Date: 6/5/2017    LOS: 0    Subjective     Follow-up For:  Atrial fibrillation    Brief HPI:   Ms. Martínez is a 81 y.o. female w/ pmh significant for DM2, HTN, CKD, breast cancer presenting for A fib.  Received metoprolol in ED and placed on Coreg, rivaroxaban overnight.  DCCV and REJI this AM.    At baseline, pt reports she has been on atenolol 125 mg Qdaily.  States she has been taking this since 1970s for a high heart rate.    Overnight: NAEO.  Continued to be in atrial fibrillation overnight and this AM.  Denies any chest pain, SOB.      Review of Systems:  Constitutional: no fever or chills  Respiratory: no cough or shortness of breath  Cardiovascular: no chest pain or palpitations, reports a "quivering" feeling in chest  Gastrointestinal: no nausea or vomiting, no abdominal pain or change in bowel habits    Objective     Vitals  Temp:  [96.3 °F (35.7 °C)-98.4 °F (36.9 °C)]   Pulse:  []   Resp:  [16-20]   BP: (105-132)/(63-75)   SpO2:  [98 %-100 %]      I & O (Last 24H):No intake or output data in the 24 hours ending 06/06/17 0743      Physical Exam:  General: well developed, well nourished, no distress  Neck: no JVD  Lungs:  clear to auscultation bilaterally and normal respiratory effort  Cardiovascular: Heart: irregularly irregular rhythm.  2/6 systolic murmur best heard at R upper sternal border.  Extremities: no cyanosis or edema, or clubbing. Pulses: 2+ radial b/l.  Abdomen: soft, non-tender non-distented; bowel sounds normal; no masses,  no organomegaly.    Diagnostic Results:  CBC  Lab Results   Component Value Date    WBC 6.64 06/05/2017    HGB 11.0 (L) 06/05/2017    HCT 35.0 (L) 06/05/2017    MCV 86 06/05/2017     06/05/2017       BMP  Lab Results   Component Value Date     06/05/2017    K 4.3 06/05/2017     06/05/2017    CO2 22 (L) 06/05/2017    BUN 20 06/05/2017    CREATININE 1.3 06/05/2017    CALCIUM 9.1 06/05/2017    ANIONGAP 11 " "06/05/2017    ESTGFRAFRICA 44.8 (A) 06/05/2017    EGFRNONAA 38.8 (A) 06/05/2017     D-dimer 1.51    TSH 3.531  Trop 0.021    Micro  Microbiology Results (last 7 days)     ** No results found for the last 168 hours. **          Imaging  6/5 CXR: Per intern interpretation,  L lower lobe opacification    6/6/17, 6/5/17 EKG: Per intern interpretation,  A fib w/ RVR, -122.  PVCs on 1/3 EKG.    Assessment     Ms. Martínez is a 81 y.o. female w/ pmh significant for DM2, HTN, CKD, breast cancer presenting for A fib.  S/p successful cardioversion this AM, 6/6/17.  CHADSVASC 5.      Plan     #Atrial Fibrillation   - S/p successful cardioversion 6/6 AM.  - INCREASE/restart home atenolol to 100 mg w/ breakfast, and 50 mg with dinner.  - Consider anticoagulation with eliquis given pt's age, but xarelto ok if there is a financial constraint in obtaining eliquis.  - F/u in clinic in 4 weeks with Dr. Jacques Burt.  - OK to discharge from cardiology standpoint    Attending addendum to follow.  Please call for any questions.    Mitul Ogden MD   I have personally taken the history and examined the patient and agree with the resident's note as stated above.  Apixiban would be better than Xarelto, especially from GI bleeding risk in her age group; also no need to change her home BB ( especially since atenolol is :"stronger" than BB doses switched to) but can probably increase from 125 to 150/d  ( 100 in am and 50 in pm)        "

## 2017-06-06 NOTE — TRANSFER OF CARE
"Anesthesia Transfer of Care Note    Patient: Dacia Martínez    Procedure(s) Performed: Procedure(s) (LRB):  TRANSESOPHAGEAL ECHOCARDIOGRAM (REJI) (N/A)    Patient location: Cath Lab    Anesthesia Type: general    Transport from OR: Transported from OR on room air with adequate spontaneous ventilation    Post pain: adequate analgesia    Post assessment: no apparent anesthetic complications    Post vital signs: stable    Level of consciousness: awake    Nausea/Vomiting: no nausea/vomiting    Complications: none    Transfer of care protocol was followed      Last vitals:   Visit Vitals  BP (!) 108/54 (BP Location: Left leg, Patient Position: Lying, BP Method: Automatic)   Pulse 80   Temp 36.9 °C (98.4 °F) (Oral)   Resp 16   Ht 5' 1" (1.549 m)   Wt 69.4 kg (153 lb)   SpO2 99%   Breastfeeding? No   BMI 28.91 kg/m²     "

## 2017-06-06 NOTE — PROGRESS NOTES
Discharge instructions given to patient. No distress noted at this time. Peripheral IV removed catheter intact. Cardiac monitor removed. Pt and family verbalizes understanding of medications and follow up appts. Family picking up medications from pharmacy. Family wheeled pt put via wheelchair.

## 2017-06-06 NOTE — CONSULTS
TRANSESOPHAGEAL ECHOCARDIOGRAPHY   PRE-PROCEDURE NOTE    06/06/2017    HPI:     Dacia Martínez is a 81 y.o. woman with PMHx of HTN, DM, CKD, breast Cancer s/p AC and Taxol in 2001 sent from clinic for newly diagnosed Afib with RVR. No prio echo or hx of Afib. Pt notes MCCOLLUM and occasional palpitations. Started on xarelto    Dysphagia or odynophagia:  No  Liver Disease, esophageal disease, or known varices:  No  Upper GI Bleeding: No  Snoring:  No  Sleep Apnea:  No  Prior neck surgery or radiation:  No  History of anesthetic difficulties:  No  Family history of anesthetic difficulties:  No  Last oral intake:  12 hours ago  Able to move neck in all directions:  Yes      Meds:     Scheduled Meds:   atorvastatin  40 mg Oral Daily    azelastine  1 spray Nasal BID    carvedilol  12.5 mg Oral BID WM    docusate sodium  50 mg Oral BID    fluticasone  2 spray Each Nare Daily    lisinopril  40 mg Oral Daily    rivaroxaban  15 mg Oral Daily with dinner    sodium chloride 0.9%  3 mL Intravenous Q8H     PRN Meds:dextrose 50%, dextrose 50%, glucagon (human recombinant), glucose, glucose, hydrocodone-acetaminophen 5-325mg, insulin aspart, metoprolol  Continuous Infusions:     Physical Exam:     Vitals:  Temp:  [96.3 °F (35.7 °C)-98.4 °F (36.9 °C)]   Pulse:  []   Resp:  [16-20]   BP: (105-132)/(63-75)   SpO2:  [98 %-100 %]        Constitutional: NAD, conversant  HEENT:   Sclera anicteric, Uvula midline, EOMI, OP clear  Neck:               No JVD, moves to all direction without any limitations  CV:               irregular, no murmurs / rubs / gallops, normal S1/S2  Pulm:               CTAB, no wheezes, rales, or ronchi  GI:               Abdomen soft, NTND, +BS  Extremities:              No LE edema, warm with palpable pulses  Neuro:   AAOX3, no focal motor deficits      Labs:     Recent Results (from the past 336 hour(s))   CBC auto differential    Collection Time: 06/05/17  1:18 PM   Result Value Ref Range    WBC 6.64  3.90 - 12.70 K/uL    Hemoglobin 11.0 (L) 12.0 - 16.0 g/dL    Hematocrit 35.0 (L) 37.0 - 48.5 %    Platelets 225 150 - 350 K/uL       No results found for this or any previous visit (from the past 336 hour(s)).    Estimated Creatinine Clearance: 30.7 mL/min (based on Cr of 1.3).    INR: NA    Imaging:      [id number]  Afib          Assessment & Plan:     PLAN:  1. REJI for evaluation of OTTONIEL prior to DCCV.    -The risks, benefits & alternatives of the procedure were explained to the patient.    -The risks of transesophageal echo include but are not limited to:  Dental trauma, esophageal trauma/perforation, bleeding, laryngospasm/brochospasm, aspiration, sore throat/hoarseness, & dislodgement of the endotracheal tube/nasogastric tube (where applicable).    -The risks of moderate sedation include hypotension, respiratory depression, arrhythmias, bronchospasm, & death.    -Informed consent was obtained & the patient is agreeable to proceed with the procedure.    I will discuss with the attending physician. Attending addendum is to follow.

## 2017-06-06 NOTE — PROGRESS NOTES
Pt arrived back to OBS 3 via stretcher. No distress noted at this time. Pt on cardiac monitor in normal sinus rhythm 60-70's. Will continue to monitor closely.

## 2017-06-06 NOTE — PLAN OF CARE
06/06/17 1324   Discharge Assessment   Assessment Type Discharge Planning Assessment   Confirmed/corrected address and phone number on facesheet? Yes   Assessment information obtained from? Caregiver   Expected Length of Stay (days) 2   Communicated expected length of stay with patient/caregiver yes   Prior to hospitilization cognitive status: Alert/Oriented   Prior to hospitalization functional status: Independent   Current cognitive status: Alert/Oriented   Current Functional Status: Independent   Arrived From home or self-care   Lives With grandchild(jaswant)   Able to Return to Prior Arrangements yes   Is patient able to care for self after discharge? Yes   Patient's perception of discharge disposition home or selfcare   Readmission Within The Last 30 Days no previous admission in last 30 days   Patient currently being followed by outpatient case management? No   Patient currently receives home health services? No   Does the patient currently use HME? No   Equipment Currently Used at Home none   Do you have any problems affording any of your prescribed medications? No   Is the patient taking medications as prescribed? yes   Discharge Plan A Home with family   Discharge Plan B Home with family   Patient/Family In Agreement With Plan yes

## 2017-06-06 NOTE — ANESTHESIA RELEASE NOTE
"Anesthesia Release from PACU Note    Patient: Dacia Martínez    Procedure(s) Performed: Procedure(s) (LRB):  TRANSESOPHAGEAL ECHOCARDIOGRAM (REJI) (N/A)    Anesthesia type: general    Post pain: Adequate analgesia    Post assessment: no apparent anesthetic complications and tolerated procedure well    Last Vitals:   Visit Vitals  BP (!) 108/54 (BP Location: Left leg, Patient Position: Lying, BP Method: Automatic)   Pulse 80   Temp 36.9 °C (98.4 °F) (Oral)   Resp 16   Ht 5' 1" (1.549 m)   Wt 69.4 kg (153 lb)   SpO2 99%   Breastfeeding? No   BMI 28.91 kg/m²       Post vital signs: stable    Level of consciousness: awake and alert     Nausea/Vomiting: no nausea/no vomiting    Complications: none and recall present    Airway Patency: patent    Respiratory: unassisted, spontaneous ventilation    Cardiovascular: stable and blood pressure at baseline    Hydration: euvolemic  "

## 2017-06-06 NOTE — PLAN OF CARE
Problem: Patient Care Overview  Goal: Plan of Care Review  Outcome: Ongoing (interventions implemented as appropriate)  Rounding every 2 hours and call light within reach to reduce risk of falls. Accuchecks and administer insulin per MD order to control blood sugar levels.

## 2017-06-06 NOTE — CONSULTS
Cardiology Consult Note   Physician Requesting Consultation: April Peraza MD  Reason for Consultation: New-onset atrial fibrillation    HPI:   81 y.o female with a PMHx of T2DM, HTN, CKD and breast ca presented to the ED today from the primary care clinic due to A-fib on EKG.    Patient has been having persistent band like HAs for the last 5 days with photophobia, sinus congestion and rhinorrhea. She was seen by ENT last week who prescribed some PO abx with minimal improvement in her sx. She presented today to her PCP due to persistent of sx along with generalized weakness and was noted to have an irregular rhythm so EKG was obtained which showed A-fib.    Cardiology consulted for management of new onset atrial fibrillation.    She denies chest pain, palpitations, lightheadedness, syncope, MCCOLLUM, SOB or LE edema.   ROS:    Constitution: Negative for fever or chills. Negative for weight loss or gain.   HENT: Negative for sore throat. Reports congestion, rhinorrhea and headaches.  Eyes: Negative for blurred or double vision.   Cardiovascular: See above  Pulmonary: Negative for cough.   Gastrointestinal: Negative for abdominal pain. Negative for nausea/ vomiting. Negative for diarrhea.   : Negative for dysuria.   Neurological: Negative for focal weakness or sensory changes.  PMH:     Past Medical History:   Diagnosis Date    Arthritis     Bilateral nonexudative age-related macular degeneration 6/3/2014    Breast cancer     Cataract     CKD stage 3 due to type 2 diabetes mellitus     Hypertension     Migraine headache     Mild nonproliferative diabetic retinopathy of both eyes 6/3/2014    Pneumonia     Type 2 diabetes mellitus with hyperglycemia     Vertigo      Past Surgical History:   Procedure Laterality Date    BREAST SURGERY      left lumpectomy    CARPAL TUNNEL RELEASE      left    CATARACT EXTRACTION      vance     SECTION      EYE SURGERY      cataracts bilaterally     HYSTERECTOMY      partial     Allergies:   Review of patient's allergies indicates:  No Known Allergies    Medications:     PTA Medications   Medication Sig    ACCU-CHEK FASTCLIX Misc 1 lancet by Misc.(Non-Drug; Combo Route) route 3 (three) times daily. Pt to test blood glucose up to 3 times daily.    allopurinol (ZYLOPRIM) 300 MG tablet Take 1.5 tablets (450 mg total) by mouth once daily.    amlodipine (NORVASC) 10 MG tablet Take 1 tablet (10 mg total) by mouth once daily.    amoxicillin (AMOXIL) 875 MG tablet Take 1 tablet (875 mg total) by mouth 2 (two) times daily.    atenolol (TENORMIN) 100 MG tablet Take 1 tablet (100 mg total) by mouth once daily.    atorvastatin (LIPITOR) 40 MG tablet     atorvastatin (LIPITOR) 80 MG tablet Take 1 tablet (80 mg total) by mouth once daily.    azelastine (ASTELIN) 137 mcg (0.1 %) nasal spray 1 spray (137 mcg total) by Nasal route 2 (two) times daily.    blood sugar diagnostic (ACCU-CHEK TOMASA) Strp 1 strip by Misc.(Non-Drug; Combo Route) route once daily.    chlorhexidine (PERIDEX) 0.12 % solution FILL CAP TO THE FILL LINE, SWISH IN MOUTH UNDILUTED for 30 second...  (REFER TO PRESCRIPTION NOTES).    chlorthalidone (HYGROTEN) 25 MG Tab Take 1 tablet (25 mg total) by mouth once daily.    COL-RITE 100 mg capsule     cromolyn (OPTICROM) 4 % ophthalmic solution instill 1 drop into both eyes four times a day    docusate sodium (COLACE) 50 MG capsule Take 1 capsule (50 mg total) by mouth 2 (two) times daily.    dulaglutide (TRULICITY) 0.75 mg/0.5 mL PnIj Inject 0.75 mg into the skin every 7 days.    fluticasone (FLONASE) 50 mcg/actuation nasal spray instill 2 sprays into each nostril once daily    furosemide (LASIX) 20 MG tablet Take 1 tablet (20 mg total) by mouth daily as needed.    hydrocodone-acetaminophen 5-325mg (NORCO) 5-325 mg per tablet Take 1 tablet by mouth every 6 (six) hours as needed for Pain.    ibuprofen (ADVIL,MOTRIN) 800 MG tablet Take 800 mg by  mouth every 8 (eight) hours.    lisinopril (PRINIVIL,ZESTRIL) 40 MG tablet Take 1 tablet (40 mg total) by mouth once daily.    loratadine (CLARITIN) 10 mg tablet Take 1 tablet (10 mg total) by mouth daily as needed for Allergies. For sinus congestion    meclizine (ANTIVERT) 25 mg tablet Take 1 tablet (25 mg total) by mouth 3 (three) times daily as needed.    mupirocin (BACTROBAN) 2 % ointment by Nasal route 2 (two) times daily. Apply with swab to inside of nose    olopatadine (PATANOL) 0.1 % ophthalmic solution Place 1 drop into both eyes 2 (two) times daily.    omeprazole (PRILOSEC) 20 MG capsule Take 1 capsule (20 mg total) by mouth once daily.    polyethylene glycol (GLYCOLAX) 17 gram PwPk Take 17 g by mouth daily as needed. Dissolve 1 heaping tablespoon and 4-8 ounces of water.  Take once a day as needed for constipation.    predniSONE (DELTASONE) 10 MG tablet take 1 tablet by mouth daily if needed for gout    triamcinolone acetonide 0.1% (KENALOG) 0.1 % ointment APPLY TO AFFECTED AREA DAILY         Inpatient Medications   Continuous Infusions:   Scheduled Meds:   [START ON 6/6/2017] atorvastatin  40 mg Oral Daily    azelastine  1 spray Nasal BID    carvedilol  12.5 mg Oral BID WM    docusate sodium  50 mg Oral BID    [START ON 6/6/2017] fluticasone  2 spray Each Nare Daily    [START ON 6/6/2017] lisinopril  40 mg Oral Daily    rivaroxaban  15 mg Oral Daily with dinner    sodium chloride 0.9%  3 mL Intravenous Q8H     PRN Meds:dextrose 50%, dextrose 50%, glucagon (human recombinant), glucose, glucose, insulin aspart, metoprolol     Social History:     Social History   Substance Use Topics    Smoking status: Never Smoker    Smokeless tobacco: Not on file    Alcohol use 1.2 oz/week     2 Cans of beer per week      Comment: socially     Family History:     Family History   Problem Relation Age of Onset    Cancer Mother      stomach    Heart disease Mother     Diabetes Father     Hypertension  Father     Blindness Brother     Diabetes Brother     Hypertension Brother     Cataracts Sister     Diabetes Sister     Diabetes Brother     Diabetes Brother     Diabetes Brother     No Known Problems Daughter     No Known Problems Son     No Known Problems Daughter     Amblyopia Neg Hx     Glaucoma Neg Hx     Macular degeneration Neg Hx     Strabismus Neg Hx     Retinal detachment Neg Hx      Physical Exam:     Vitals:  Temp:  [98.4 °F (36.9 °C)]   Pulse:  []   Resp:  [16-20]   BP: (113-132)/(63-75)   SpO2:  [100 %]  I/O's:  No intake or output data in the 24 hours ending 06/05/17 2021     Constitutional: NAD  HEENT: Sclera anicteric, PERRLA, EOMI  Neck: No JVD, no carotid bruits  CV: Irregularly irregular, 2/6 DOUGLAS in RUSB, normal S1/S2  Pulm: CTAB, no wheezes, rales, or ronchi  GI: Abdomen soft, NTND, +BS  Extremities: No LE edema  Skin: No ecchymosis, erythema, or ulcers  Psych: AOx3, appropriate affect  Neuro: CNII-XII intact, no focal deficits    Labs:       Recent Labs  Lab 06/05/17  1526      K 4.3      CO2 22*   BUN 20   CREATININE 1.3   ANIONGAP 11       Recent Labs  Lab 06/05/17  1526   AST 13   ALT 11   ALKPHOS 56   BILITOT 0.6   ALBUMIN 3.1*       Recent Labs  Lab 06/05/17  1526   CALCIUM 9.1   MG 1.6       Recent Labs  Lab 06/05/17  1318 06/05/17  1526   TROPONINI  --  0.021   *  --      No results for input(s): NITRITE, LEUKOCYTESUR, WBCUA, BACTERIA in the last 168 hours.    Invalid input(s): EPITHELIALCE, COGRCA    Estimated Creatinine Clearance: 30.7 mL/min (based on Cr of 1.3).   Recent Labs  Lab 06/05/17  1318   WBC 6.64   HGB 11.0*   HCT 35.0*      GRAN 61.2  4.1     No results for input(s): PTT, INR in the last 168 hours.  No results for input(s): LACTATE in the last 168 hours.  Lab Results   Component Value Date    CHOL 190 03/14/2017    HDL 62 03/14/2017    LDLCALC 106.2 03/14/2017    TRIG 109 03/14/2017     Lab Results   Component Value Date     HGBA1C 6.7 (H) 03/14/2017     Lab Results   Component Value Date    TSH 3.531 06/05/2017    [id number] 5.9 06/05/2017          Micro: No culture data  Blood Cultures  Lab Results   Component Value Date    LABBLOO No growth after 5 days. 05/18/2015    LABBLOO No growth after 5 days. 05/18/2015     Urine Cultures  Lab Results   Component Value Date    LABURIN No growth 01/24/2017    LABURIN No growth 05/18/2015    LABURIN  12/11/2012     MULTIPLE ORGANISMS ISOLATED. NONE IN PREDOMINANCE. REPEAT IF CLINICALLY NECESSARY.    LABURIN  06/04/2012     MULTIPLE ORGANISMS ISOLATED. NONE IN PREDOMINANCE. REPEAT IF CLINICALLY NECESSARY.    LABURIN  11/14/2011     MULTIPLE ORGANISMS ISOLATED. NONE IN PREDOMINANCE.  REPEAT IF CLINICALLY NECESSARY.     Sputum Cultures  No components found for: LOWERRESPIRA  Imaging:   CXR: Cardiac silhouette is mildly enlarged, unchanged.  There is unchanged hazy airspace opacification in left lung base.  No definite pleural effusion.  No pneumothorax.    No results found for: EF    EKG: Atrial fibrillation with RVR and PVCs    Telemetry: Patient is not on telemetry at time of examination    Assessment and Plan:   81 y.o female with a PMHx of T2DM, HTN, CKD and breast ca presented to the ED today from the primary care clinic due to A-fib on EKG. Patient with CHADS-VASc of 5. Denies any sx from atrial fibrillation. Unknown when her atrial fibrillation started.     Plan  - Agree with starting anti-coagulation for stroke PPx  - Place patient on cardiac telemetry  - Rate control strategy for now, recommend using metoprolol rather than coreg for rate control, goal HR < 110. Start at 25 mg BID of PO Metoprolol and titrate up in frequency and dosing to achieve target HR  - 2D echo w/ CFD and repeat EKG in AM  - REJI / DCCV in AM to restore NSR    Discussed with Dr. Gould. Consult team to follow in AM.    Signed:  Angel Trejo MD, MPH  Cardiovascular Disease Fellow, PGY-4  Pager: [phone]  6/5/2017 8:21  PM

## 2017-06-06 NOTE — ANESTHESIA PREPROCEDURE EVALUATION
06/06/2017  Dacia Martínez is a 81 y.o., female with afib here for REJI to evaluate.    Past Medical History:   Diagnosis Date    Arthritis     Bilateral nonexudative age-related macular degeneration 6/3/2014    Breast cancer     Cataract     CKD stage 3 due to type 2 diabetes mellitus     Hypertension     Migraine headache     Mild nonproliferative diabetic retinopathy of both eyes 6/3/2014    Pneumonia     Type 2 diabetes mellitus with hyperglycemia     Vertigo      Lab Results   Component Value Date    WBC 6.64 06/05/2017    HGB 11.0 (L) 06/05/2017    HCT 35.0 (L) 06/05/2017    MCV 86 06/05/2017     06/05/2017       Chemistry        Component Value Date/Time     06/05/2017 1526    K 4.3 06/05/2017 1526     06/05/2017 1526    CO2 22 (L) 06/05/2017 1526    BUN 20 06/05/2017 1526    CREATININE 1.3 06/05/2017 1526    GLU 76 06/05/2017 1526        Component Value Date/Time    CALCIUM 9.1 06/05/2017 1526    ALKPHOS 56 06/05/2017 1526    AST 13 06/05/2017 1526    ALT 11 06/05/2017 1526    BILITOT 0.6 06/05/2017 1526            Anesthesia Evaluation    I have reviewed the Patient Summary Reports.     I have reviewed the Medications.     Review of Systems  Anesthesia Hx:  No problems with previous Anesthesia    Cardiovascular:   Exercise tolerance: good Hypertension    Pulmonary:   Shortness of breath Recent URI Occasional SOB   Musculoskeletal:   Arthritis     Neurological:   Headaches    Endocrine:   Diabetes        Physical Exam  General:  Well nourished    Airway/Jaw/Neck:  Airway Findings: Mouth Opening: Normal Tongue: Normal  General Airway Assessment: Adult  Mallampati: II  Jaw/Neck Findings:  Neck ROM: Normal ROM      Dental:  Dental Findings: In tact   Chest/Lungs:  Chest/Lungs Findings: Clear to auscultation, Normal Respiratory Rate     Heart/Vascular:  Heart Findings: Rate:  Normal  Rhythm: Regular Rhythm  Sounds: Normal        Mental Status:  Mental Status Findings:  Cooperative, Alert and Oriented         Anesthesia Plan  Type of Anesthesia, risks & benefits discussed:  Anesthesia Type:  general  Patient's Preference:   Intra-op Monitoring Plan:   Intra-op Monitoring Plan Comments:   Post Op Pain Control Plan:   Post Op Pain Control Plan Comments:   Induction:   IV  Beta Blocker:  Patient is on a Beta-Blocker and has received one dose within the past 24 hours (No further documentation required).       Informed Consent: Patient understands risks and agrees with Anesthesia plan.  Questions answered. Anesthesia consent signed with patient.  ASA Score: 2     Day of Surgery Review of History & Physical:    H&P update referred to the provider.         Ready For Surgery From Anesthesia Perspective.

## 2017-06-06 NOTE — PROGRESS NOTES
Patient admitted to recovery see [facility] for complete assessment pacu bcg's maintained safety measures verified patient instructed on pain scale called out to waiting room for family no one there at this time and called ekg tech for ekg will monitor.

## 2017-06-06 NOTE — NURSING TRANSFER
Nursing Transfer Note      6/6/2017     Transfer To: observation 3    Transfer via stretcher    Transfer with cardiac monitoring    Transported by [facility]res    Medicines sent: none    Chart send with patient: Yes    Notified: nurse    Patient reassessed at: see epic (date, time)

## 2017-06-06 NOTE — PROGRESS NOTES
Pt arrived to unit via WC with transporter. NAD. Family at bedside. VSS. C/O HA pain 6/10 across forehead and into right ear. Pt states she did not take any of her meds this morning prior to coming to doctor appointment. Instructed pt her first priority every morning was to take her medications whether she was staying home or going somewhere out of the home. Pt made excuses why she did not take meds. Pt states she did not know there was anything wrong with her heart until she went to ED today. Explained her blood pressure medications were for her heart and how her blood pressure affected her heart function greatly. Pt needs lots of teaching regarding her medications and health status. Will continue to follow.

## 2017-06-06 NOTE — ANESTHESIA POSTPROCEDURE EVALUATION
"Anesthesia Post Evaluation    Patient: Dacia Martínez    Procedure(s) Performed: Procedure(s) (LRB):  TRANSESOPHAGEAL ECHOCARDIOGRAM (REJI) (N/A)    Final Anesthesia Type: general  Patient location during evaluation: PACU  Patient participation: Yes- Able to Participate  Level of consciousness: awake and alert and oriented  Post-procedure vital signs: reviewed and stable  Pain management: adequate  Airway patency: patent  PONV status at discharge: No PONV  Anesthetic complications: no      Cardiovascular status: blood pressure returned to baseline and hemodynamically stable  Respiratory status: unassisted and spontaneous ventilation  Hydration status: euvolemic  Follow-up not needed.        Visit Vitals  BP (!) 108/54 (BP Location: Left leg, Patient Position: Lying, BP Method: Automatic)   Pulse 80   Temp 36.9 °C (98.4 °F) (Oral)   Resp 16   Ht 5' 1" (1.549 m)   Wt 69.4 kg (153 lb)   SpO2 99%   Breastfeeding? No   BMI 28.91 kg/m²       Pain/Ree Score: Pain Assessment Performed: Yes (6/6/2017  6:00 AM)  Presence of Pain: complains of pain/discomfort (6/6/2017  6:00 AM)  Pain Rating Prior to Med Admin: 7 (6/6/2017  5:50 AM)  Pain Rating Post Med Admin: 4 (6/5/2017 10:00 PM)      "

## 2017-06-07 LAB — BACTERIA THROAT CULT: NORMAL

## 2017-06-09 NOTE — HOSPITAL COURSE
Patient admitted for new onset afib. S/P successful cardioversion on 06/06. Anticoagulation with eliquis, risks vs benefits discussed with family. Patient to follow up in clinic in 4 weeks with Dr. JOSUÉ Burt. Patient discharged in stable condition and was cleared by cardiology.

## 2017-06-09 NOTE — ASSESSMENT & PLAN NOTE
pcp started antibiotics 3 days prior to day of admission. Amoxillcillin. Stop abx  CT scan in ED 6/3 neg for sinus disease  Pain / HA improved w iv metoprolol

## 2017-06-09 NOTE — DISCHARGE SUMMARY
"[facility] Hospital Medicine  Discharge Summary      Patient Name: Dacia Martínez  MRN: [id number]  Admission Date: 6/5/2017  Hospital Length of Stay: 0 days  Discharge Date and Time:  06/09/2017 3:58 PM  Attending Physician: No att. providers found   Discharging Provider: Derrick Ashraf PA-C  Primary Care Provider: Gómez Jimenez MD  [facility] Medicine Team: [facility] MED F Derrick Ashraf PA-C    HPI:   The patient is a 81 y.o. female with hx of: DM, breast cancer, HTN, and CKD that presents to the ED from Primary Care clinic by Dr. Bush for EKG showing A-fib with a complaint of persistent headache for the last 5 days. Patient does not have a prior history of A-fib. Patient's headache began in the posterior head and occasionally spreads to the ears or face. Currently she complains of right sided facial pain and frontal headache as well as photophobia, vision changes described as seeing "flashes of light and colors," and some sinus congestion. She denies any focal weakness or neck pain. Patient has been taking Tylenol and Vicodin at home without relief. She was seen here 4 days ago for headache at which time she had a negative head CT.      She reports palpitations started about 5 days ago. Denies chest pain, SOB, cough, back pain.  HA located in right temproal area , is sharp and resolved w metoprolol in the ED. She has had recent sinus congestion but denies taking sinus meds.  PCP recently changed diabetic meds.     Procedure(s) (LRB):  TRANSESOPHAGEAL ECHOCARDIOGRAM (REJI) (N/A)      Indwelling Lines/Drains at time of discharge:   Lines/Drains/Airways          No matching active lines, drains, or airways        Hospital Course:   Patient admitted for new onset afib. S/P successful cardioversion on 06/06. Anticoagulation with eliquis, risks vs benefits discussed with family. Patient to follow up in clinic in 4 weeks with Dr. JOSUÉ Burt. Patient discharged in stable condition and was cleared by " cardiology.     Consults:   Consults         Status Ordering Provider     Inpatient consult to Cardiology  Once     Provider:  (Not yet assigned)    Completed HARITHA DILL     Inpatient consult to Cardiology  Once     Provider:  (Not yet assigned)    Completed SHAYAN JEROME          Significant Diagnostic Studies: Labs:   CMP No results for input(s): NA, K, CL, CO2, GLU, BUN, CREATININE, CALCIUM, PROT, ALBUMIN, BILITOT, ALKPHOS, AST, ALT, ANIONGAP, ESTGFRAFRICA, EGFRNONAA in the last 48 hours., CBC No results for input(s): WBC, HGB, HCT, PLT in the last 48 hours., Troponin   Recent Labs  Lab 06/05/17  1526   TROPONINI 0.021       Cardiac Graphics: Echocardiogram:   2D echo with color flow doppler:   Results for orders placed or performed during the hospital encounter of 06/05/17   2D echo with color flow doppler   Result Value Ref Range    EF 60 55 - 65    Aortic Valve Stenosis MILD TO MODERATE (A)     Est. PA Systolic Pressure 43.7 (A)     Mitral Valve Mobility MODERATELY RESTRICTED     Tricuspid Valve Regurgitation MILD        Pending Diagnostic Studies:     None        Final Active Diagnoses:    Diagnosis Date Noted POA    Chronic pain syndrome [G89.4] 06/05/2017 Yes    Type 2 diabetes mellitus with stage 3 chronic kidney disease, without long-term current use of insulin [E11.22, N18.3] 11/02/2016 Yes    Constipation due to outlet dysfunction [K59.02] 09/15/2016 Yes    History of breast cancer [Z85.3] 05/22/2013 Not Applicable    Essential hypertension [I10] 01/10/2013 Yes      Problems Resolved During this Admission:    Diagnosis Date Noted Date Resolved POA    PRINCIPAL PROBLEM:  Atrial fibrillation [I48.91] 06/05/2017 06/06/2017 Yes    Acute recurrent maxillary sinusitis [J01.01] 06/05/2017 06/06/2017 Yes      * Atrial fibrillation-resolved as of 6/6/2017    New onset a fib  Was given IV and po metoprolol in the ED  Cardiology consulted: s/p REJI / DCCV, patient in NSR on 06/06  INCREASE/restart  home atenolol to 100 mg w/ breakfast, and 50 mg with dinner  Discussed risk benefits of anticoagulation: dc'd on eliquis  Echo: aortic stenosis, EF 60%, PAP elevated at 43.   tsh - nl  Pt oxygenating on %        Type 2 diabetes mellitus with stage 3 chronic kidney disease, without long-term current use of insulin    Hold once weekly trulicity while in hospital. , not on insulin or oral hypoglycemics at home.  Insulin SS while in hospital  No results for input(s): POCTGLUCOSE in the last 72 hours.    BS 76 upon admit        Constipation due to outlet dysfunction    Chronic, bowel regimen        History of breast cancer    resolved        Essential hypertension    will dc norvasc, chlorthalidone for now, monitor BP at home and add back on as necessary  Continue lisinipril,.  She is not on lasix.  Metoprolol given in ED, can use prn if HR >140.   INCREASE/restart home atenolol to 100 mg w/ breakfast, and 50 mg with dinner        Acute recurrent maxillary sinusitis-resolved as of 6/6/2017    pcp started antibiotics 3 days prior to day of admission. Amoxillcillin. Stop abx  CT scan in ED 6/3 neg for sinus disease  Pain / HA improved w iv metoprolol            Discharged Condition: fair    Disposition: Home or Self Care    Follow Up:  Follow-up Information     Jacques Arriola MD In 4 weeks.    Specialties:  Electrophysiology, Cardiovascular Disease  Why:  please call to schedule follow up  Contact information:  [address] [place] [facility] [address]  [phone]             Gómez Jimenez MD On 6/12/2017.    Specialty:  Internal Medicine  Why:  appointment 1:40pm  Contact information:  [address]  [place] [address]  [phone]                 Patient Instructions:     Ambulatory consult to Electrophysiology   Referral Priority: Routine Referral Type: Consultation   Referral Reason: Specialty Services Required    Referred to Provider: JACQUES ARRIOLA Requested Specialty: Electrophysiology    Number of Visits Requested: 1      Diet Diabetic 1800 Calories     Diet Cardiac     Activity as tolerated     Call MD for:  temperature >100.4     Call MD for:  severe uncontrolled pain     Call MD for:  difficulty breathing or increased cough     Call MD for:  severe persistent headache     Call MD for:  persistent dizziness, light-headedness, or visual disturbances     Call MD for:  increased confusion or weakness       Medications:  Reconciled Home Medications:   Discharge Medication List as of 6/6/2017  6:06 PM      START taking these medications    Details   apixaban 5 mg Tab Take 1 tablet (5 mg total) by mouth 2 (two) times daily., Starting Tue 6/6/2017, Normal         CONTINUE these medications which have CHANGED    Details   !! atenolol (TENORMIN) 50 MG tablet Take 2 tablets (100 mg total) by mouth daily with breakfast., Starting Tue 6/6/2017, Until Wed 6/6/2018, Normal      !! atenolol (TENORMIN) 50 MG tablet Take 1 tablet (50 mg total) by mouth every evening., Starting Tue 6/6/2017, Until Wed 6/6/2018, Normal      cromolyn (OPTICROM) 4 % ophthalmic solution instill 1 drop into both eyes four times a day, Normal      olopatadine (PATANOL) 0.1 % ophthalmic solution Place 1 drop into both eyes 2 (two) times daily., Starting Tue 6/6/2017, Until Wed 6/6/2018, Normal       !! - Potential duplicate medications found. Please discuss with provider.      CONTINUE these medications which have NOT CHANGED    Details   ACCU-CHEK FASTCLIX Misc 1 lancet by Misc.(Non-Drug; Combo Route) route 3 (three) times daily. Pt to test blood glucose up to 3 times daily., Starting 6/13/2016, Until Discontinued, Normal      allopurinol (ZYLOPRIM) 300 MG tablet Take 1.5 tablets (450 mg total) by mouth once daily., Starting 5/1/2017, Until Wed 5/31/17, Print      amlodipine (NORVASC) 10 MG tablet Take 1 tablet (10 mg total) by mouth once daily., Starting 1/11/2017, Until Discontinued, Normal      atorvastatin (LIPITOR) 40 MG tablet  Starting 3/29/2017, Until Discontinued, Historical Med      azelastine (ASTELIN) 137 mcg (0.1 %) nasal spray 1 spray (137 mcg total) by Nasal route 2 (two) times daily., Starting 4/20/2016, Until Discontinued, Normal      blood sugar diagnostic (ACCU-CHEK TOMASA) Strp 1 strip by Misc.(Non-Drug; Combo Route) route once daily., Starting 11/4/2016, Until Discontinued, Normal      chlorhexidine (PERIDEX) 0.12 % solution FILL CAP TO THE FILL LINE, SWISH IN MOUTH UNDILUTED for 30 second...  (REFER TO PRESCRIPTION NOTES)., Historical Med      chlorthalidone (HYGROTEN) 25 MG Tab Take 1 tablet (25 mg total) by mouth once daily., Starting 1/11/2017, Until Thu 1/11/18, Normal      !! COL-RITE 100 mg capsule Starting 2/16/2017, Until Discontinued, Historical Med      !! docusate sodium (COLACE) 50 MG capsule Take 1 capsule (50 mg total) by mouth 2 (two) times daily., Starting 10/18/2016, Until Discontinued, Normal      dulaglutide (TRULICITY) 0.75 mg/0.5 mL PnIj Inject 0.75 mg into the skin every 7 days., Starting 11/4/2016, Until Discontinued, Normal      fluticasone (FLONASE) 50 mcg/actuation nasal spray instill 2 sprays into each nostril once daily, Normal      furosemide (LASIX) 20 MG tablet Take 1 tablet (20 mg total) by mouth daily as needed., Starting 1/11/2017, Until Tue 3/14/17, Normal      hydrocodone-acetaminophen 5-325mg (NORCO) 5-325 mg per tablet Take 1 tablet by mouth every 6 (six) hours as needed for Pain., Starting Mon 5/29/2017, Until Wed 6/28/2017, Print      ibuprofen (ADVIL,MOTRIN) 800 MG tablet Take 800 mg by mouth every 8 (eight) hours., Starting 2/14/2017, Until Discontinued, Historical Med      lisinopril (PRINIVIL,ZESTRIL) 40 MG tablet Take 1 tablet (40 mg total) by mouth once daily., Starting 1/11/2017, Until Discontinued, Normal      loratadine (CLARITIN) 10 mg tablet Take 1 tablet (10 mg total) by mouth daily as needed for Allergies. For sinus congestion, Starting 1/11/2017, Until Thu 1/11/18, Normal       meclizine (ANTIVERT) 25 mg tablet Take 1 tablet (25 mg total) by mouth 3 (three) times daily as needed., Starting 2/21/2016, Until Discontinued, Print      mupirocin (BACTROBAN) 2 % ointment by Nasal route 2 (two) times daily. Apply with swab to inside of nose, Starting 3/14/2017, Until Discontinued, Normal      omeprazole (PRILOSEC) 20 MG capsule Take 1 capsule (20 mg total) by mouth once daily., Starting 6/26/2015, Until Tue 3/14/17, Normal      polyethylene glycol (GLYCOLAX) 17 gram PwPk Take 17 g by mouth daily as needed. Dissolve 1 heaping tablespoon and 4-8 ounces of water.  Take once a day as needed for constipation., Starting 10/18/2016, Until Discontinued, Normal      predniSONE (DELTASONE) 10 MG tablet take 1 tablet by mouth daily if needed for gout, Print      triamcinolone acetonide 0.1% (KENALOG) 0.1 % ointment APPLY TO AFFECTED AREA DAILY, Normal       !! - Potential duplicate medications found. Please discuss with provider.      STOP taking these medications       amoxicillin (AMOXIL) 500 MG capsule Comments:   Reason for Stopping:         amoxicillin (AMOXIL) 875 MG tablet Comments:   Reason for Stopping:         fluconazole (DIFLUCAN) 150 MG Tab Comments:   Reason for Stopping:             Time spent on the discharge of patient: 35 minutes    Derrick Ashraf PA-C  Department of Hospital Medicine  [facility]

## 2017-06-09 NOTE — ASSESSMENT & PLAN NOTE
will dc norvasc, chlorthalidone for now, monitor BP at home and add back on as necessary  Continue lisinipril,.  She is not on lasix.  Metoprolol given in ED, can use prn if HR >140.   INCREASE/restart home atenolol to 100 mg w/ breakfast, and 50 mg with dinner

## 2017-06-09 NOTE — ASSESSMENT & PLAN NOTE
New onset a fib  Was given IV and po metoprolol in the ED  Cardiology consulted: s/p REJI / DCCV, patient in NSR on 06/06  INCREASE/restart home atenolol to 100 mg w/ breakfast, and 50 mg with dinner  Discussed risk benefits of anticoagulation: dc'd on eliquis  Echo: aortic stenosis, EF 60%, PAP elevated at 43.   tsh - nl  Pt oxygenating on %

## 2017-06-12 ENCOUNTER — INITIAL CONSULT (OUTPATIENT)
Dept: OPTOMETRY | Facility: CLINIC | Age: 82
End: 2017-06-12
Payer: MEDICARE

## 2017-06-12 DIAGNOSIS — H57.89 EYE IRRITATION: Primary | ICD-10-CM

## 2017-06-12 PROCEDURE — 99499 UNLISTED E&M SERVICE: CPT | Mod: S$GLB,,, | Performed by: OPTOMETRIST

## 2017-06-12 PROCEDURE — 99999 PR PBB SHADOW E&M-EST. PATIENT-LVL II: CPT | Mod: PBBFAC,,, | Performed by: OPTOMETRIST

## 2017-06-12 PROCEDURE — 92012 INTRM OPH EXAM EST PATIENT: CPT | Mod: S$GLB,,, | Performed by: OPTOMETRIST

## 2017-06-12 NOTE — LETTER
June 12, 2017      Mariela Saunders, NP  [address]  [facility] [address]           [facility]  [address]  [facility] [address]  Phone: [phone]  Fax: [phone]          Patient: Dacia Martínez   MR Number: [id number]   YOB: 1935   Date of Visit: 6/12/2017       Dear Mariela Saunders:    Thank you for referring Dacia Martínez to me for evaluation. Attached you will find relevant portions of my assessment and plan of care.    If you have questions, please do not hesitate to call me. I look forward to following Dacia Martínez along with you.    Sincerely,    Micheline Hermosillo, OD    Enclosure  CC:  No Recipients    If you would like to receive this communication electronically, please contact [email or web address] or [phone] to request more information on [employer] Link access.    For providers and/or their staff who would like to refer a patient to [facility], please contact us through our one-stop-shop provider referral line, [facility] , at [phone].    If you feel you have received this communication in error or would no longer like to receive these types of communications, please e-mail [email or web address]

## 2017-06-12 NOTE — PROGRESS NOTES
HPI     Diabetic Eye Exam    Additional comments: concerned about dry eyes           Comments   Last eye exam was 8/5/16 with Dr. Sommers.  Patient states OU feel gritty all the time. Dr. Bower gave patient's drops   but it didn't seem to be helpful. Not using any eye drops. Was given drops   when went to the ED on 6/6/17 (Patanol) but they irritated her eyes so she   didn't use them.  Patient denies diplopia, headaches, flashes/floaters, and pain.    Hemoglobin A1C       Date                     Value               Ref Range             Status                06/05/2017               6.8 (H)             4.5 - 6.2 %           Final                     Last edited by Leann Wilkinson on 6/12/2017  8:52 AM. (History)        ROS     Positive for: Eyes    Negative for: Constitutional, Gastrointestinal, Neurological, Skin,   Genitourinary, Musculoskeletal, HENT, Endocrine, Cardiovascular,   Respiratory, Psychiatric, Allergic/Imm, Heme/Lymph    Last edited by Micheline Hermosillo, OD on 6/12/2017  9:12 AM. (History)        Assessment /Plan     For exam results, see Encounter Report.    Eye irritation          1. Reeducated pt allergy/dry eye are chronic problems.  Recommend lid scrubs daily, Pazeo QD OU, and Systance Balance at least 2x/day OU.    Annual exam scheduled with Dr. Sommers.

## 2017-06-13 ENCOUNTER — OFFICE VISIT (OUTPATIENT)
Dept: INTERNAL MEDICINE | Facility: CLINIC | Age: 82
End: 2017-06-13
Payer: MEDICARE

## 2017-06-13 VITALS
SYSTOLIC BLOOD PRESSURE: 112 MMHG | WEIGHT: 154.75 LBS | TEMPERATURE: 98 F | HEART RATE: 70 BPM | HEIGHT: 61 IN | DIASTOLIC BLOOD PRESSURE: 60 MMHG | OXYGEN SATURATION: 99 % | BODY MASS INDEX: 29.22 KG/M2

## 2017-06-13 DIAGNOSIS — E11.22 TYPE 2 DIABETES MELLITUS WITH STAGE 3 CHRONIC KIDNEY DISEASE, WITHOUT LONG-TERM CURRENT USE OF INSULIN: ICD-10-CM

## 2017-06-13 DIAGNOSIS — R09.81 SINUS CONGESTION: ICD-10-CM

## 2017-06-13 DIAGNOSIS — N18.30 TYPE 2 DIABETES MELLITUS WITH STAGE 3 CHRONIC KIDNEY DISEASE, WITHOUT LONG-TERM CURRENT USE OF INSULIN: ICD-10-CM

## 2017-06-13 DIAGNOSIS — I48.91 ATRIAL FIBRILLATION, NEW ONSET: Primary | ICD-10-CM

## 2017-06-13 DIAGNOSIS — M19.90 OSTEOARTHRITIS, UNSPECIFIED OSTEOARTHRITIS TYPE, UNSPECIFIED SITE: ICD-10-CM

## 2017-06-13 DIAGNOSIS — M10.9 INTERVAL GOUT: ICD-10-CM

## 2017-06-13 PROCEDURE — 99999 PR PBB SHADOW E&M-EST. PATIENT-LVL IV: CPT | Mod: PBBFAC,,, | Performed by: INTERNAL MEDICINE

## 2017-06-13 PROCEDURE — 99499 UNLISTED E&M SERVICE: CPT | Mod: S$GLB,,, | Performed by: INTERNAL MEDICINE

## 2017-06-13 PROCEDURE — 1125F AMNT PAIN NOTED PAIN PRSNT: CPT | Mod: S$GLB,,, | Performed by: INTERNAL MEDICINE

## 2017-06-13 PROCEDURE — 99214 OFFICE O/P EST MOD 30 MIN: CPT | Mod: S$GLB,,, | Performed by: INTERNAL MEDICINE

## 2017-06-13 PROCEDURE — 1159F MED LIST DOCD IN RCRD: CPT | Mod: S$GLB,,, | Performed by: INTERNAL MEDICINE

## 2017-06-13 NOTE — PATIENT INSTRUCTIONS
Continue taking atenolol 100mg in the morning and 50mg in the evening.    DO NOT TAKE the norvasc (amlodipine) or the chlorthalidone. These blood pressure medications were stopped in the hospital and do not need to be restarted right now. Check in at your cardiology appointment about these.     For your symptoms (sinus congestion):  1. Saline nasal spray at least 2-3 times/day  2. Flonase nasal spray - twice a day for 1 week, then once a day thereafter  3. Claritin (loratadine) or Zyrtec (cetirizine) once a day

## 2017-06-13 NOTE — PROGRESS NOTES
"Subjective:       Patient ID: Dacia Martínez is a 81 y.o. female.    Chief Complaint: Hypertension; Memory Loss; and Irregular Heart Beat    HPI  80 y/o woman with h/o DM2, HTN, HLD, atherosclerosis, gout, chronic rhinitis/sinusitis, h/o breast cancer (s/p treatment ~2000, no recurrence) here for hospital follow-up. Here with granddaughter.    Recently admitted for new-onset symptomatic atrial fibrillation after this was noted at  visit 6/5/17; admitted 6/5-6/9. Underwent successful cardioversion 6/6, started on anticoagulation with eliquis and instructed to follow up in cardiology clinic in 4 weeks.  Medication changes:  Started on eliquis - she is taking this as prescribed.  Treated with metoprolol as inpatient; recommended to use this prn for HR >140. Atenolol increased to 100mg qAM, 50mg qPM. Norvasc and chlorthalidone were discontinued.  Denies chest pain, dyspnea, or palpitations. Not feeling lightheaded.    Today she says she is still having headaches "like a little sticking on my scalp". Points to crown and posterior area of head, but says "it moves around" and does say she was having some sinus congestion, frontal and sinus pressure, as well as coughing up clear/whitish sputum in the mornings.     DM2 - She is taking the trulicity weekly. Last A1c 6.8 on 6/5/17.  Requests new rx for lancet / fingerstick device.    Was planned for full follow-up and review of memory problems this week; visit focused on new issues and hospital follow up today and she declines further discussion or testing for memory today.    Review of Systems   Constitutional: Negative for activity change, fatigue, fever and unexpected weight change.   HENT: Positive for congestion, postnasal drip, rhinorrhea and sinus pressure. Negative for ear pain, sore throat and trouble swallowing.    Eyes: Negative for visual disturbance.   Respiratory: Negative for cough and shortness of breath.    Cardiovascular: Negative for chest pain, palpitations " "and leg swelling.   Gastrointestinal: Negative for abdominal distention, abdominal pain, constipation, diarrhea and nausea.   Genitourinary: Negative.  Negative for dysuria.   Musculoskeletal: Positive for arthralgias and gait problem (with arthritis).   Skin: Negative for rash.   Allergic/Immunologic: Positive for environmental allergies.   Neurological: Positive for headaches. Negative for weakness.   Psychiatric/Behavioral: Negative for dysphoric mood. The patient is not nervous/anxious.          Past medical history, surgical history, and family medical history reviewed and updated as appropriate.    Medications and allergies reviewed.     Objective:          Vitals:    06/13/17 1347 06/13/17 1500   BP: 138/80 112/60   BP Location: Right arm Right arm   Patient Position: Sitting    BP Method:  Manual   Pulse: 70    Temp: 98.1 °F (36.7 °C)    TempSrc: Oral    SpO2: 99%    Weight: 70.2 kg (154 lb 12.2 oz)    Height: 5' 1" (1.549 m)      Body mass index is 29.24 kg/m².  Physical Exam   Constitutional: She appears well-developed and well-nourished. No distress.   HENT:   Head: Normocephalic and atraumatic.   Nose: Mucosal edema (and erythema of nasal turbinates) present.   Mouth/Throat: Posterior oropharyngeal erythema (mild erythema) present. No oropharyngeal exudate.   Eyes: Conjunctivae and EOM are normal. Pupils are equal, round, and reactive to light.   Neck: Neck supple.   Cardiovascular: Normal rate, regular rhythm, normal heart sounds and intact distal pulses.    No murmur heard.  Pulmonary/Chest: Effort normal and breath sounds normal. No respiratory distress.   Abdominal: Soft. Bowel sounds are normal. There is no tenderness.   Musculoskeletal: She exhibits no edema or tenderness.   Lymphadenopathy:     She has no cervical adenopathy.   Neurological: She is alert. She has normal strength. No cranial nerve deficit.   Skin: Skin is warm and dry.   Psychiatric: She has a normal mood and affect.   Vitals " reviewed.      Lab Results   Component Value Date    WBC 6.64 06/05/2017    HGB 11.0 (L) 06/05/2017    HCT 35.0 (L) 06/05/2017     06/05/2017    CHOL 190 03/14/2017    TRIG 109 03/14/2017    HDL 62 03/14/2017    ALT 11 06/05/2017    AST 13 06/05/2017     06/05/2017    K 4.3 06/05/2017     06/05/2017    CREATININE 1.3 06/05/2017    BUN 20 06/05/2017    CO2 22 (L) 06/05/2017    TSH 3.531 06/05/2017    INR 0.9 08/10/2009    HGBA1C 6.8 (H) 06/05/2017       Assessment:       1. Atrial fibrillation, new onset    2. Type 2 diabetes mellitus with stage 3 chronic kidney disease, without long-term current use of insulin    3. Osteoarthritis, unspecified osteoarthritis type, unspecified site    4. Interval gout    5. Sinus congestion        Plan:   Dacia was seen today for hypertension, memory loss and irregular heart beat.    Diagnoses and all orders for this visit:    Atrial fibrillation, new onset  Comments:  s/p inpatient stay with cardioversion 6/6. On atenolol; BP controlled on this so do not restart norvasc/chlorthalidone now. Follow up as sched with cardiology    Type 2 diabetes mellitus with stage 3 chronic kidney disease, without long-term current use of insulin  Comments:  at goal, continue trulicity  Orders:  -     ACCU-CHEK FASTCLIX Misc; 1 lancet by Misc.(Non-Drug; Combo Route) route 3 (three) times daily. Pt to test blood glucose up to 3 times daily.    Osteoarthritis, unspecified osteoarthritis type, unspecified site  Comments:  chart reviewed - follows with Dr Macdonald for this, continue    Interval gout  Comments:  no recent flare    Sinus congestion  Comments:  chronic sinus congestion. Recommended steroid nasal spray BID x 1 week then daily, antihistamine. Can also use saline nasal spray.     Memory problems - declines MOCA test or further discussion today, defers to next visit.    Health maintenance reviewed with patient.     Return in about 2 months (around 8/13/2017) for atrial fib,  overall follow up.    Gómez Jimenez MD  Internal Medicine  [facility]  6/13/2017

## 2017-06-15 ENCOUNTER — HOSPITAL ENCOUNTER (EMERGENCY)
Facility: HOSPITAL | Age: 82
Discharge: HOME | End: 2017-06-15
Attending: EMERGENCY MEDICINE
Payer: MEDICARE

## 2017-06-15 VITALS
SYSTOLIC BLOOD PRESSURE: 184 MMHG | BODY MASS INDEX: 30.23 KG/M2 | HEIGHT: 60 IN | DIASTOLIC BLOOD PRESSURE: 79 MMHG | RESPIRATION RATE: 18 BRPM | WEIGHT: 154 LBS | TEMPERATURE: 98 F | HEART RATE: 77 BPM | OXYGEN SATURATION: 99 %

## 2017-06-15 DIAGNOSIS — R42 DIZZINESS: ICD-10-CM

## 2017-06-15 DIAGNOSIS — H81.10 BPPV (BENIGN PAROXYSMAL POSITIONAL VERTIGO), UNSPECIFIED LATERALITY: Primary | ICD-10-CM

## 2017-06-15 LAB
ALBUMIN SERPL BCP-MCNC: 3.7 G/DL
ALP SERPL-CCNC: 63 U/L
ALT SERPL W/O P-5'-P-CCNC: 12 U/L
ANION GAP SERPL CALC-SCNC: 9 MMOL/L
AST SERPL-CCNC: 18 U/L
BASOPHILS # BLD AUTO: 0.07 K/UL
BASOPHILS NFR BLD: 1.1 %
BILIRUB SERPL-MCNC: 0.4 MG/DL
BUN SERPL-MCNC: 23 MG/DL
CALCIUM SERPL-MCNC: 9.9 MG/DL
CHLORIDE SERPL-SCNC: 106 MMOL/L
CO2 SERPL-SCNC: 23 MMOL/L
CREAT SERPL-MCNC: 1.4 MG/DL
DIFFERENTIAL METHOD: ABNORMAL
EOSINOPHIL # BLD AUTO: 0.3 K/UL
EOSINOPHIL NFR BLD: 4.4 %
ERYTHROCYTE [DISTWIDTH] IN BLOOD BY AUTOMATED COUNT: 14.8 %
EST. GFR  (AFRICAN AMERICAN): 40.9 ML/MIN/1.73 M^2
EST. GFR  (NON AFRICAN AMERICAN): 35.5 ML/MIN/1.73 M^2
GLUCOSE SERPL-MCNC: 92 MG/DL
HCT VFR BLD AUTO: 40.3 %
HGB BLD-MCNC: 12.3 G/DL
LYMPHOCYTES # BLD AUTO: 3.1 K/UL
LYMPHOCYTES NFR BLD: 46.8 %
MCH RBC QN AUTO: 26.2 PG
MCHC RBC AUTO-ENTMCNC: 30.5 %
MCV RBC AUTO: 86 FL
MONOCYTES # BLD AUTO: 0.5 K/UL
MONOCYTES NFR BLD: 7.2 %
NEUTROPHILS # BLD AUTO: 2.6 K/UL
NEUTROPHILS NFR BLD: 40.3 %
PLATELET # BLD AUTO: 319 K/UL
PMV BLD AUTO: 10.4 FL
POTASSIUM SERPL-SCNC: 4.2 MMOL/L
PROT SERPL-MCNC: 8.7 G/DL
RBC # BLD AUTO: 4.7 M/UL
SODIUM SERPL-SCNC: 138 MMOL/L
WBC # BLD AUTO: 6.52 K/UL

## 2017-06-15 PROCEDURE — 93010 ELECTROCARDIOGRAM REPORT: CPT | Mod: S$GLB,,, | Performed by: INTERNAL MEDICINE

## 2017-06-15 PROCEDURE — 96360 HYDRATION IV INFUSION INIT: CPT

## 2017-06-15 PROCEDURE — 93005 ELECTROCARDIOGRAM TRACING: CPT

## 2017-06-15 PROCEDURE — 99284 EMERGENCY DEPT VISIT MOD MDM: CPT | Mod: 25

## 2017-06-15 PROCEDURE — 25000003 PHARM REV CODE 250: Performed by: PHYSICIAN ASSISTANT

## 2017-06-15 PROCEDURE — 80053 COMPREHEN METABOLIC PANEL: CPT

## 2017-06-15 PROCEDURE — 85025 COMPLETE CBC W/AUTO DIFF WBC: CPT

## 2017-06-15 PROCEDURE — 99284 EMERGENCY DEPT VISIT MOD MDM: CPT | Mod: ,,, | Performed by: EMERGENCY MEDICINE

## 2017-06-15 RX ORDER — MECLIZINE HCL 12.5 MG 12.5 MG/1
12.5 TABLET ORAL 3 TIMES DAILY PRN
Qty: 20 TABLET | Refills: 0 | Status: SHIPPED | OUTPATIENT
Start: 2017-06-15 | End: 2017-06-19 | Stop reason: SDUPTHER

## 2017-06-15 RX ORDER — MECLIZINE HCL 12.5 MG 12.5 MG/1
12.5 TABLET ORAL
Status: COMPLETED | OUTPATIENT
Start: 2017-06-15 | End: 2017-06-15

## 2017-06-15 RX ADMIN — SODIUM CHLORIDE 1000 ML: 0.9 INJECTION, SOLUTION INTRAVENOUS at 09:06

## 2017-06-15 RX ADMIN — MECLIZINE 12.5 MG: 12.5 TABLET ORAL at 10:06

## 2017-06-15 NOTE — PROVIDER PROGRESS NOTES - EMERGENCY DEPT.
Encounter Date: 6/15/2017    ED Physician Progress Notes       SCRIBE NOTE: I, Miguelangel Maier, am scribing for, and in the presence of,  David Mon MD.  Physician Statement: I, David Mon MD, personally performed the services described in this documentation as scribed by Miguelangel Maier in my presence, and it is both accurate and complete.      EKG - STEMI Decision  Initial Reading: No STEMI present.

## 2017-06-16 NOTE — ED PROVIDER NOTES
Encounter Date: 6/15/2017    SCRIBE #1 NOTE: I, Robbin Clark, am scribing for, and in the presence of,  Dr. Fisher. I have scribed the following portions of the note - the APC attestation.       History     Chief Complaint   Patient presents with    Dizziness     states onset of dizziness when started taking Zyrtec     Review of patient's allergies indicates:  No Known Allergies  81-year-old female presents to the ER for evaluation of dizziness.  Patient was seen yesterday by her primary care provider for sinus congestion.  She was started on intranasal steroids and antihistamine, Zyrtec.  She reports since taking these medications this morning she has experienced dizziness worsening throughout the day.  Dizziness described as feeling unsteady as if she would fall and feeling a heaviness in her head while resting.  She has a history of vertigo and states this is similar but slightly different and that she feels dizziness while sitting.  She denies any nausea or vomiting.  She denies any changes in vision.  She has had a mild headache since prior to the dizziness when she saw her primary care provider.    Past medical history as per chart.  Of note patient recently admitted for new onset A. fib underwent successful cardioversion and was started on anticoagulation medication.          Past Medical History:   Diagnosis Date    A-fib     Arthritis     Asteroid hyalosis of left eye     Bilateral nonexudative age-related macular degeneration 6/3/2014    Breast cancer     Cataract     CKD stage 3 due to type 2 diabetes mellitus     Coronary artery disease     Hypertension     Migraine headache     Mild nonproliferative diabetic retinopathy of both eyes 6/3/2014    Pneumonia     Type 2 diabetes mellitus with hyperglycemia     Vertigo      Past Surgical History:   Procedure Laterality Date    BREAST SURGERY      left lumpectomy    CARPAL TUNNEL RELEASE      left    CATARACT EXTRACTION      vance      SECTION      EYE SURGERY      cataracts bilaterally    HYSTERECTOMY      partial     Family History   Problem Relation Age of Onset    Cancer Mother      stomach    Heart disease Mother     Diabetes Father     Hypertension Father     Blindness Brother     Diabetes Brother     Hypertension Brother     Cataracts Sister     Diabetes Sister     Diabetes Brother     Diabetes Brother     Diabetes Brother     No Known Problems Daughter     No Known Problems Son     No Known Problems Daughter     Amblyopia Neg Hx     Glaucoma Neg Hx     Macular degeneration Neg Hx     Strabismus Neg Hx     Retinal detachment Neg Hx      Social History   Substance Use Topics    Smoking status: Never Smoker    Smokeless tobacco: Not on file    Alcohol use No      Comment: socially     Review of Systems   Constitutional: Negative for fever.   HENT: Positive for sinus pressure. Negative for sore throat.    Respiratory: Negative for shortness of breath.    Cardiovascular: Negative for chest pain.   Gastrointestinal: Negative for nausea.   Genitourinary: Negative for dysuria.   Musculoskeletal: Negative for back pain.   Skin: Negative for rash.   Neurological: Positive for dizziness and headaches. Negative for weakness.   Hematological: Does not bruise/bleed easily.       Physical Exam     Initial Vitals [06/15/17 1721]   BP Pulse Resp Temp SpO2   125/63 75 16 98 °F (36.7 °C) 99 %     Physical Exam    Constitutional: Vital signs are normal. She appears well-developed and well-nourished. She is not diaphoretic. No distress.   HENT:   Head: Normocephalic and atraumatic.   Right Ear: External ear normal.   Left Ear: External ear normal.   Dizziness not reproduced on physical exam   Eyes: Conjunctivae are normal.   Cardiovascular: Normal rate, regular rhythm and normal heart sounds. Exam reveals no gallop and no friction rub.    No murmur heard.  Pulmonary/Chest: No respiratory distress. She has no wheezes. She has no  rhonchi. She has no rales. She exhibits no tenderness.   Abdominal: Soft. Normal appearance, normal aorta and bowel sounds are normal. She exhibits no distension and no mass. There is no tenderness. There is no rebound and no guarding.   Musculoskeletal: Normal range of motion.   Neurological: She is alert and oriented to person, place, and time.   Non focal neurological  EOMI  PERRLA  Negative pronator  Gait is a little unsteady     Skin: Skin is warm and intact.   Psychiatric: She has a normal mood and affect. Her speech is normal and behavior is normal. Cognition and memory are normal.         ED Course   Procedures  Labs Reviewed   CBC W/ AUTO DIFFERENTIAL - Abnormal; Notable for the following:        Result Value    MCH 26.2 (*)     MCHC 30.5 (*)     RDW 14.8 (*)     All other components within normal limits   COMPREHENSIVE METABOLIC PANEL - Abnormal; Notable for the following:     Total Protein 8.7 (*)     eGFR if  40.9 (*)     eGFR if non  35.5 (*)     All other components within normal limits             Medical Decision Making:   History:   Old Medical Records: I decided to obtain old medical records.  Clinical Tests:   Lab Tests: Reviewed and Ordered  Medical Tests: Reviewed and Ordered  ED Management:  81-year-old female to the ER with dizziness.  History and physical consistent with BPPV, which is also included in the patient's history.  She has not been taking her prescribed meclizine.  Basic labs in the ER did not reveal any acute findings.  Her symptoms improved with fluids and meclizine.  Patient will be discharged home with meclizine 12.5 3 times a day when necessary recommend follow-up with PCP and ENT.              Scribe Attestation:   Scribe #1: I performed the above scribed service and the documentation accurately describes the services I performed. I attest to the accuracy of the note.    Attending Attestation:     Physician Attestation Statement for NP/PA:   I  have conducted a face to face encounter with this patient in addition to the NP/PA, due to Medical Complexity    Other NP/PA Attestation Additions:    History of Present Illness: 81 y.o. female presents with dizziness that comes on when she turns her head. She has a benign neuro exam. She states she has a history of vertigo. We will do labs and observe her int he ED. anticipate discharge.         Physician Attestation for Scribe:  Physician Attestation Statement for Scribe #1: I, Dr. Fisher, reviewed documentation, as scribed by Robbin Clark in my presence, and it is both accurate and complete.                 ED Course     Clinical Impression:   The primary encounter diagnosis was BPPV (benign paroxysmal positional vertigo), unspecified laterality. A diagnosis of Dizziness was also pertinent to this visit.    Disposition:   Disposition: Discharged  Condition: Stable     -     Gigi Jordan PA-C  06/15/17 0266

## 2017-06-16 NOTE — ED NOTES
Patient identifiers verified and correct for Ms Martínez  C/C: Dizziness  APPEARANCE: awake and alert in NAD.  SKIN: warm, dry and intact. No breakdown or bruising.  MUSCULOSKELETAL: Patient moving all extremities spontaneously, no obvious swelling or deformities noted. Ambulates independently.  RESPIRATORY: no shortness of breath.Respirations unlabored.Denies SOB  CARDIAC: 2+ distal pulses; no peripheral edema Denies CP or weakness  ABDOMEN: S/ND/NT, Denies nausea, normoactive bowel sounds present in all four quadrants. Freq constipation  : voids spontaneously without difficulty.  Neurologic: AAO x 4; follows commands equal strength in all extremities; denies numbness/tingling.

## 2017-06-16 NOTE — ED TRIAGE NOTES
Patient states she had a headache, went to primary physician gave her Zyrtec that caused her to be dizzy

## 2017-06-19 ENCOUNTER — HOSPITAL ENCOUNTER (EMERGENCY)
Facility: HOSPITAL | Age: 82
Discharge: HOME | End: 2017-06-19
Attending: EMERGENCY MEDICINE | Admitting: EMERGENCY MEDICINE
Payer: MEDICARE

## 2017-06-19 VITALS
SYSTOLIC BLOOD PRESSURE: 155 MMHG | BODY MASS INDEX: 29.07 KG/M2 | TEMPERATURE: 98 F | HEART RATE: 76 BPM | RESPIRATION RATE: 18 BRPM | WEIGHT: 154 LBS | DIASTOLIC BLOOD PRESSURE: 78 MMHG | HEIGHT: 61 IN | OXYGEN SATURATION: 99 %

## 2017-06-19 DIAGNOSIS — R26.81 GAIT INSTABILITY: ICD-10-CM

## 2017-06-19 DIAGNOSIS — R42 VERTIGO: Primary | ICD-10-CM

## 2017-06-19 DIAGNOSIS — E83.42 HYPOMAGNESEMIA: ICD-10-CM

## 2017-06-19 DIAGNOSIS — R42 DIZZINESS: ICD-10-CM

## 2017-06-19 DIAGNOSIS — E78.5 HYPERLIPIDEMIA: ICD-10-CM

## 2017-06-19 DIAGNOSIS — K59.02 CONSTIPATION DUE TO OUTLET DYSFUNCTION: ICD-10-CM

## 2017-06-19 LAB
ALBUMIN SERPL BCP-MCNC: 3.1 G/DL
ALP SERPL-CCNC: 55 U/L
ALT SERPL W/O P-5'-P-CCNC: 12 U/L
ANION GAP SERPL CALC-SCNC: 10 MMOL/L
AST SERPL-CCNC: 18 U/L
BACTERIA #/AREA URNS AUTO: NORMAL /HPF
BASOPHILS # BLD AUTO: 0.07 K/UL
BASOPHILS NFR BLD: 1 %
BILIRUB SERPL-MCNC: 0.4 MG/DL
BILIRUB UR QL STRIP: NEGATIVE
BNP SERPL-MCNC: 137 PG/ML
BUN SERPL-MCNC: 15 MG/DL
CALCIUM SERPL-MCNC: 9.2 MG/DL
CHLORIDE SERPL-SCNC: 109 MMOL/L
CLARITY UR REFRACT.AUTO: CLEAR
CO2 SERPL-SCNC: 23 MMOL/L
COLOR UR AUTO: ABNORMAL
CREAT SERPL-MCNC: 0.9 MG/DL
DIFFERENTIAL METHOD: ABNORMAL
EOSINOPHIL # BLD AUTO: 0.2 K/UL
EOSINOPHIL NFR BLD: 3.6 %
ERYTHROCYTE [DISTWIDTH] IN BLOOD BY AUTOMATED COUNT: 14.8 %
EST. GFR  (AFRICAN AMERICAN): >60 ML/MIN/1.73 M^2
EST. GFR  (NON AFRICAN AMERICAN): >60 ML/MIN/1.73 M^2
GLUCOSE SERPL-MCNC: 70 MG/DL
GLUCOSE UR QL STRIP: NEGATIVE
HCT VFR BLD AUTO: 36.6 %
HGB BLD-MCNC: 11.5 G/DL
HGB UR QL STRIP: ABNORMAL
KETONES UR QL STRIP: NEGATIVE
LEUKOCYTE ESTERASE UR QL STRIP: NEGATIVE
LYMPHOCYTES # BLD AUTO: 2.5 K/UL
LYMPHOCYTES NFR BLD: 36.6 %
MAGNESIUM SERPL-MCNC: 1.2 MG/DL
MCH RBC QN AUTO: 26.7 PG
MCHC RBC AUTO-ENTMCNC: 31.4 %
MCV RBC AUTO: 85 FL
MICROSCOPIC COMMENT: NORMAL
MONOCYTES # BLD AUTO: 0.6 K/UL
MONOCYTES NFR BLD: 9 %
NEUTROPHILS # BLD AUTO: 3.3 K/UL
NEUTROPHILS NFR BLD: 49.7 %
NITRITE UR QL STRIP: NEGATIVE
PH UR STRIP: 5 [PH] (ref 5–8)
PHOSPHATE SERPL-MCNC: 3.6 MG/DL
PLATELET # BLD AUTO: 336 K/UL
PMV BLD AUTO: 11.4 FL
POTASSIUM SERPL-SCNC: 4 MMOL/L
PROT SERPL-MCNC: 7.1 G/DL
PROT UR QL STRIP: NEGATIVE
RBC # BLD AUTO: 4.31 M/UL
RBC #/AREA URNS AUTO: 2 /HPF (ref 0–4)
SODIUM SERPL-SCNC: 142 MMOL/L
SP GR UR STRIP: 1 (ref 1–1.03)
SQUAMOUS #/AREA URNS AUTO: 6 /HPF
TROPONIN I SERPL DL<=0.01 NG/ML-MCNC: 0.03 NG/ML
URN SPEC COLLECT METH UR: ABNORMAL
UROBILINOGEN UR STRIP-ACNC: NEGATIVE EU/DL
WBC # BLD AUTO: 6.69 K/UL
WBC #/AREA URNS AUTO: 2 /HPF (ref 0–5)

## 2017-06-19 PROCEDURE — 99285 EMERGENCY DEPT VISIT HI MDM: CPT | Mod: ,,, | Performed by: EMERGENCY MEDICINE

## 2017-06-19 PROCEDURE — 81003 URINALYSIS AUTO W/O SCOPE: CPT

## 2017-06-19 PROCEDURE — 85025 COMPLETE CBC W/AUTO DIFF WBC: CPT

## 2017-06-19 PROCEDURE — 83735 ASSAY OF MAGNESIUM: CPT

## 2017-06-19 PROCEDURE — 96361 HYDRATE IV INFUSION ADD-ON: CPT

## 2017-06-19 PROCEDURE — 81001 URINALYSIS AUTO W/SCOPE: CPT

## 2017-06-19 PROCEDURE — 93005 ELECTROCARDIOGRAM TRACING: CPT

## 2017-06-19 PROCEDURE — 96360 HYDRATION IV INFUSION INIT: CPT

## 2017-06-19 PROCEDURE — 93010 ELECTROCARDIOGRAM REPORT: CPT | Mod: S$GLB,,, | Performed by: INTERNAL MEDICINE

## 2017-06-19 PROCEDURE — 84484 ASSAY OF TROPONIN QUANT: CPT

## 2017-06-19 PROCEDURE — 80053 COMPREHEN METABOLIC PANEL: CPT

## 2017-06-19 PROCEDURE — 25000003 PHARM REV CODE 250: Performed by: EMERGENCY MEDICINE

## 2017-06-19 PROCEDURE — 99284 EMERGENCY DEPT VISIT MOD MDM: CPT | Mod: 25

## 2017-06-19 PROCEDURE — 83880 ASSAY OF NATRIURETIC PEPTIDE: CPT

## 2017-06-19 PROCEDURE — 84100 ASSAY OF PHOSPHORUS: CPT

## 2017-06-19 RX ORDER — LANOLIN ALCOHOL/MO/W.PET/CERES
400 CREAM (GRAM) TOPICAL 2 TIMES DAILY
Qty: 14 TABLET | Refills: 0 | Status: SHIPPED | OUTPATIENT
Start: 2017-06-19 | End: 2017-06-26

## 2017-06-19 RX ORDER — MECLIZINE HCL 12.5 MG 12.5 MG/1
25 TABLET ORAL
Status: COMPLETED | OUTPATIENT
Start: 2017-06-19 | End: 2017-06-19

## 2017-06-19 RX ORDER — LANOLIN ALCOHOL/MO/W.PET/CERES
400 CREAM (GRAM) TOPICAL 2 TIMES DAILY
Status: DISCONTINUED | OUTPATIENT
Start: 2017-06-19 | End: 2017-06-19 | Stop reason: HOSPADM

## 2017-06-19 RX ORDER — MECLIZINE HYDROCHLORIDE 25 MG/1
25 TABLET ORAL 3 TIMES DAILY PRN
Qty: 21 TABLET | Refills: 0 | Status: SHIPPED | OUTPATIENT
Start: 2017-06-19 | End: 2017-11-21

## 2017-06-19 RX ADMIN — Medication 400 MG: at 08:06

## 2017-06-19 RX ADMIN — SODIUM CHLORIDE, SODIUM LACTATE, POTASSIUM CHLORIDE, AND CALCIUM CHLORIDE 1000 ML: .6; .31; .03; .02 INJECTION, SOLUTION INTRAVENOUS at 02:06

## 2017-06-19 RX ADMIN — MECLIZINE 25 MG: 12.5 TABLET ORAL at 05:06

## 2017-06-19 NOTE — ED PROVIDER NOTES
Encounter Date: 2017    SCRIBE #1 NOTE: I, Jacques Stubbs, am scribing for, and in the presence of,  Dr. Walsh. I have scribed the following portions of the note - the EKG reading.       History     Chief Complaint   Patient presents with    Dizziness     room is still and pt is spinning      Review of patient's allergies indicates:  No Known Allergies  82-year-old woman with history of non-insulin dependent diabetes, hypertension, chronic kidney disease, atrial fibrillation status post ablation, as well as vertigo presents to the emergency department for evaluation of worsening dizziness exacerbated by movement which she has noted for the past 2-3 weeks.  Notably, the patient was evaluated for similar symptoms in this emergency department approximately 4 days ago and was discharged with instructions to follow-up with primary M.D.  She denies any associated vomiting, chest pain, headache, fever, shortness of breath; but does endorse vague nausea when her symptoms are severe.  At the time of my exam, the patient describes her symptoms as moderate.  She denies any recent head trauma or loss of consciousness.          Past Medical History:   Diagnosis Date    A-fib     Arthritis     Asteroid hyalosis of left eye     Bilateral nonexudative age-related macular degeneration 6/3/2014    Breast cancer     Cataract     CKD stage 3 due to type 2 diabetes mellitus     Coronary artery disease     Hypertension     Migraine headache     Mild nonproliferative diabetic retinopathy of both eyes 6/3/2014    Pneumonia     Type 2 diabetes mellitus with hyperglycemia     Vertigo      Past Surgical History:   Procedure Laterality Date    BREAST SURGERY      left lumpectomy    CARPAL TUNNEL RELEASE      left    CATARACT EXTRACTION      vance     SECTION      EYE SURGERY      cataracts bilaterally    HYSTERECTOMY      partial     Family History   Problem Relation Age of Onset    Cancer Mother      stomach     Heart disease Mother     Diabetes Father     Hypertension Father     Blindness Brother     Diabetes Brother     Hypertension Brother     Cataracts Sister     Diabetes Sister     Diabetes Brother     Diabetes Brother     Diabetes Brother     No Known Problems Daughter     No Known Problems Son     No Known Problems Daughter     Amblyopia Neg Hx     Glaucoma Neg Hx     Macular degeneration Neg Hx     Strabismus Neg Hx     Retinal detachment Neg Hx      Social History   Substance Use Topics    Smoking status: Never Smoker    Smokeless tobacco: Not on file    Alcohol use No      Comment: socially     Review of Systems   Constitutional: Negative for fatigue and fever.   HENT: Negative for drooling, nosebleeds and sore throat.    Respiratory: Negative for chest tightness and shortness of breath.    Cardiovascular: Negative for chest pain.   Gastrointestinal: Positive for nausea. Negative for abdominal pain and vomiting.   Genitourinary: Negative for dysuria and hematuria.   Musculoskeletal: Positive for gait problem. Negative for back pain.   Skin: Negative for rash and wound.   Neurological: Positive for dizziness and light-headedness. Negative for seizures, syncope, facial asymmetry and headaches.   Hematological: Bruises/bleeds easily.   Psychiatric/Behavioral: Negative for confusion and hallucinations.       Physical Exam     Initial Vitals [06/19/17 1242]   BP Pulse Resp Temp SpO2   (!) 149/74 71 16 98.5 °F (36.9 °C) 100 %     Physical Exam    Vitals reviewed.  Constitutional:   82-year-old Afro-American woman in no acute distress noted   HENT:   Head: Normocephalic and atraumatic.   Mouth/Throat: Oropharynx is clear and moist.   Eyes: EOM are normal. Pupils are equal, round, and reactive to light.   Neck: No tracheal deviation present.   Cardiovascular: Intact distal pulses.   Intermittently irregular rhythm with a regular rate   Pulmonary/Chest: Breath sounds normal. No stridor. No respiratory  distress.   Abdominal: Soft. She exhibits no distension.   Musculoskeletal: Normal range of motion. She exhibits no edema.   Neurological: She is alert and oriented to person, place, and time.   Flexion/extension strength intact bilaterally with mild to moderate gait disturbance   Skin: Skin is warm and dry.   Psychiatric: She has a normal mood and affect. Thought content normal.   Poor historian, unable to recall several medications         ED Course   Procedures  Labs Reviewed   CBC W/ AUTO DIFFERENTIAL - Abnormal; Notable for the following:        Result Value    Hemoglobin 11.5 (*)     Hematocrit 36.6 (*)     MCH 26.7 (*)     MCHC 31.4 (*)     RDW 14.8 (*)     All other components within normal limits   URINALYSIS, REFLEX TO URINE CULTURE - Abnormal; Notable for the following:     Occult Blood UA 1+ (*)     All other components within normal limits    Narrative:     Preferred Collection Type->Urine, Clean Catch   TROPONIN I - Abnormal; Notable for the following:     Troponin I 0.028 (*)     All other components within normal limits   B-TYPE NATRIURETIC PEPTIDE - Abnormal; Notable for the following:      (*)     All other components within normal limits   COMPREHENSIVE METABOLIC PANEL - Abnormal; Notable for the following:     Albumin 3.1 (*)     All other components within normal limits   MAGNESIUM - Abnormal; Notable for the following:     Magnesium 1.2 (*)     All other components within normal limits   URINALYSIS MICROSCOPIC    Narrative:     Preferred Collection Type->Urine, Clean Catch   PHOSPHORUS     EKG Readings: (Independently Interpreted)   Sinus rhythm with normal axis and incomplete RBBB. Frequent PACs. 73 bpm.      Imaging Results          MRI Brain Without Contrast (Final result)  Result time 06/19/17 17:13:01    Final result by Rory Cantu MD (06/19/17 17:13:01)                 Impression:         1. Contrast enhanced MRI demonstrates no significant intracranial abnormality.    2.  Chronic microvascular ischemic changes.  ______________________________________     Electronically signed by resident: KAVON OTERO MD  Date:     06/19/17  Time:    16:55            As the supervising and teaching physician, I personally reviewed the images and resident's interpretation and I agree with the findings.          Electronically signed by: ANTONY WASSERMAN MD  Date:     06/19/17  Time:    17:13              Narrative:    ACCESSION #: [id number]    CLINICAL INDICATION: Dizziness and giddiness    TECHNIQUE: Multiplanar, multisequence MR images of the brain were obtained without the administration of intravenous contrast.    COMPARISON: CT head without contrast 6/1/2017    FINDINGS:     The brain parenchyma demonstrates normal contour and morphology. There is a mild degree of T2 and flair hyperintensity throughout the periventricular white matter consistent with chronic microvascular ischemic change. There is no hemorrhage, recent or remote large territory infarction, mass lesion, or edema.     The ventricles are normal in size for age, without evidence of hydrocephalus.    No extra-axial hemorrhage or fluid collections are identified.     The major T2 skull base flow voids are present. The visualized bone marrow signal intensity is unremarkable.                                 Medical Decision Making:   History:   Old Medical Records: I decided to obtain old medical records.  Differential Diagnosis:   Electrolyte derangement, acute kidney injury, lethal arrhythmia, brain mass, cerebellar infarction  Independently Interpreted Test(s):   I have ordered and independently interpreted EKG Reading(s) - see prior notes  Clinical Tests:   Lab Tests: Ordered and Reviewed  Radiological Study: Ordered and Reviewed  Medical Tests: Ordered and Reviewed            Scribe Attestation:   Scribe #1: I performed the above scribed service and the documentation accurately describes the services I performed. I attest to the  accuracy of the note.    Attending Attestation:           Physician Attestation for Scribe:  Physician Attestation Statement for Scribe #1: I, Dr. Walsh, reviewed documentation, as scribed by Jacques Stubbs in my presence, and it is both accurate and complete.         Attending ED Notes:   ED evaluation does not reveal evidence of acute injury from baseline or persistent evidence of cardiac arrhythmia. Notably, MRI of brain in this patient with recurrent vertigo does not reveal evidence of acute injury/ stroke. The serum magnesium is moderately diminished without hypokalemia. Magnesium has been repleted orally and will be continued as outpatient BID x's 7.  Patient describes improvement of her symptoms with ED therapy, and I will increase her 3 times a day dosage to meclizine 25 mg.  Additionally, I have provided a walker for home use due to gait instability, and have referred the patient to physical medicine and rehabilitation for further management of her intermittent dizziness and associated gait instability.          ED Course     Clinical Impression:   The primary encounter diagnosis was Vertigo. Diagnoses of Dizziness, Hypomagnesemia, and Gait instability were also pertinent to this visit.    Disposition:   Disposition: Discharged  Condition: Stable       Collin Walsh MD  06/19/17 0439

## 2017-06-19 NOTE — ED NOTES
Patient on stretcher. Bed placed in low/locked position, side rails up x 2, call light is within reach of patient and family. Patient in NAD and offers no complaints at this time. Waiting for transport to MRI. Will continue to monitor.

## 2017-06-19 NOTE — ED TRIAGE NOTES
Pt reports dizziness when she moves. Pt reports recently being prescribed Antivert. Pt reports taking it 3 times a day and it works for a short period of time.

## 2017-06-20 ENCOUNTER — TELEPHONE (OUTPATIENT)
Dept: OPHTHALMOLOGY | Facility: CLINIC | Age: 82
End: 2017-06-20

## 2017-06-20 NOTE — ED NOTES
Called lab to check status of blood. Per lab, blood was not received in lab until 724pm. Phlebotomy devin blood at 1809.

## 2017-06-20 NOTE — TELEPHONE ENCOUNTER
----- Message from Sundeep Ledesma sent at 6/20/2017  9:34 AM CDT -----  Contact: Dacia  Ms. Martínez states that her eyes are hurting and she would like a sooner appointment with Dr. Sommers. She can be reached at [phone]

## 2017-06-27 RX ORDER — HYDROCODONE BITARTRATE AND ACETAMINOPHEN 5; 325 MG/1; MG/1
1 TABLET ORAL EVERY 6 HOURS PRN
Qty: 60 TABLET | Refills: 0 | Status: SHIPPED | OUTPATIENT
Start: 2017-06-27 | End: 2017-07-25 | Stop reason: SDUPTHER

## 2017-06-27 NOTE — TELEPHONE ENCOUNTER
----- Message from Tristan Rodriguez sent at 6/27/2017  1:00 PM CDT -----  Contact: self@home, [phone]  Pt called to request a refill on hydrocodone-acetaminophen 5-325mg (NORCO) 5-325 mg per tablet. Please call when Rx is ready    Thank you

## 2017-07-03 ENCOUNTER — TELEPHONE (OUTPATIENT)
Dept: ELECTROPHYSIOLOGY | Facility: CLINIC | Age: 82
End: 2017-07-03

## 2017-07-03 ENCOUNTER — OFFICE VISIT (OUTPATIENT)
Dept: OPTOMETRY | Facility: CLINIC | Age: 82
End: 2017-07-03
Payer: MEDICARE

## 2017-07-03 DIAGNOSIS — H04.123 BILATERAL DRY EYES: Primary | ICD-10-CM

## 2017-07-03 DIAGNOSIS — H10.13 ALLERGIC CONJUNCTIVITIS, BILATERAL: ICD-10-CM

## 2017-07-03 PROCEDURE — 99999 PR PBB SHADOW E&M-EST. PATIENT-LVL II: CPT | Mod: PBBFAC,,, | Performed by: OPTOMETRIST

## 2017-07-03 PROCEDURE — 99499 UNLISTED E&M SERVICE: CPT | Mod: S$GLB,,, | Performed by: OPTOMETRIST

## 2017-07-03 PROCEDURE — 92012 INTRM OPH EXAM EST PATIENT: CPT | Mod: S$GLB,,, | Performed by: OPTOMETRIST

## 2017-07-03 NOTE — PROGRESS NOTES
HPI     Ms. Dacia Martínez is here for an urgent visit (eye pain OD)    Patient complains of scratchy feeling, foreign body sensation, tearing,   and itchiness of eyelids OU. She also reports occasional pain OD.    She saw Dr. Hermosillo on 06/12/17 for eye irritation and it was recommended she   use lid scrubs daily, Pazeo QD OU, and Systance Balance at least 2x/day   OU. Pt has not been using lid scrubs or Systane Balance.     (+)drops: Pazeo qDay OU  (no)flashes  +)floaters OS, longstanding and stable x 30 years.   (no)diplopia    Diabetic yes  Hemoglobin A1C       Date                     Value               Ref Range             Status                06/05/2017               6.8 (H)             4.5 - 6.2 %           Final                  03/14/2017               6.7 (H)             4.5 - 6.2 %           Final                  11/02/2016               7.1 (H)             4.5 - 6.2 %           Final             ----------    OCULAR HISTORY  Last Eye Exam 6/12/2017  Cataract surgery OU  Dry eyes  Allergic conjunctivitis  Dry AMD OU  Asteroid hyalosis OS  Mild non-proliferative diabetic retinopathy OU    FAMILY HISTORY  (no)Glaucoma         Last edited by Aleshia Bridges, OD on 7/3/2017  8:51 AM. (History)            Assessment /Plan     For exam results, see Encounter Report.    Bilateral dry eyes  Allergic conjunctivitis, bilateral   Minimal improvement vs 3 weeks ago with Pazeo qDay OU. Pt is not using lid scrubs or Systane Balance as directed.   Re-educated pt on chronic nature of allergies and dry eyes; she will have to use multiple drops per day indefinitely.   Continue Pazeo OU. Also start Systane Balance QID OU. In the future, it pt is compliant with artificial tears and is still symptomatic, then consider adding lid scrubs.       RTC 08/08/17 for annual exam with Dr. Sommers, or sooner prn

## 2017-07-03 NOTE — PATIENT INSTRUCTIONS
"Please continue using the Pazeo eyedrops: 1 drop in both eyes, 2 times per day: 6:00am and 9:00pm.    Also start using the Systane Balance drops: 1 drop in both eyes, 4 times per day: 8:00am, 12:00pm, 4:00pm, 8:00pm.    ==============================================    DRY EYES     Dry eyes is a common condition. It can be caused by an insufficient volume of tears, or by tears that do not spread evenly over the cornea. An uneven tear film can also cause vision to blur intermittently.   Dry eyes are often associated with burning, stinging, and/or red eyes.As we age, the eyes usually produce fewer tears and result in decreased normal eye lubrication. Paradoxically, dry eye can make eyes water. When they water, that watery production actually makes the dry eye situation worse because the watery tears do not lubricate the eye well at all. Watery tears really serve to flush foreign material out of the eye, not to lubricate. Unfortunately, when the eyes get really dry they become irritated and stimulate the formation of watery tears.   Lubricants, in the form of drops or ointments, are the principal treatment for dry eyes. The following are recommendations for managing your dry eye condition:    1) Use over-the-counter artificial tears or lubricants (such as Systane Balance, Soothe XP, Refresh Optive, Blink, or Genteal), 1 drop in each eye 3 to 4 times a day. Please avoid drops that advertise redness relief since these can be unhealthy for your eyes and can cause permanent redness if used long-term.     It is best to take artificial tears on a schedule, like you would for a medication. Taking them routinely at breakfast, lunch, and dinner for example will provide better relief and better use of the tear drop than taking them whenever your eyes "feel" dry.    2) Optional use of over-the-counter lubricating gels (such as Genteal Gel, Systane Gel, or Refresh PM) applied to the inside of the lower lid of both eyes at bedtime. " This gel is very thick and may cause blurred vision, so we recommend you use it before bedtime. In the morning you can gently wipe your eyes with a damp washcloth to remove any residual gel.    3) Warm compresses applied to the closed eyelids twice per day, followed with lid massage.    4) Always drink an adequate amount of water daily (4-6 cups a day).    5) 1000 mg of good quality fish oil (labeled DHA and/or EPA). Flaxseed oil can also be beneficial. Do not take fish oil if you are currently taking a medication called warfarin or coumadin.    6) Use a humidifier in your bedroom.    7) If the dryness continues there may be additional options of punctal plugs, or prescription dry eye drops such as Restasis or Xiidra. Punctal plugs are medical devices inserted into the puncta or the drain of the eye to block some of your natural tears from draining off the eye. Restasis and Xiidra are prescription eye drops that, over time, may help your body make more tears naturally.    It is important to maintain this treatment plan until your symptoms have improved. Once improved, you can reduce the frequency of lubricants and warm compresses. If the symptoms recur, then apply as needed for comfort.

## 2017-07-05 RX ORDER — TRIAMCINOLONE ACETONIDE 1 MG/G
OINTMENT TOPICAL 2 TIMES DAILY
Qty: 80 G | Refills: 1 | Status: SHIPPED | OUTPATIENT
Start: 2017-07-05 | End: 2017-09-27 | Stop reason: SDUPTHER

## 2017-07-05 NOTE — TELEPHONE ENCOUNTER
----- Message from Geri Dang sent at 7/5/2017  8:30 AM CDT -----  Contact: Self/[phone] home   RX request - refill or new RX.  Is this a refill or new RX:  Refill   RX name and strength: triamcinolone acetonide 0.1% (KENALOG) 0.1 % ointment  Directions: APPLY TO AFFECTED AREA DAILY  Is this a 30 day or 90 day RX:    Pharmacy name and phone #: [facility]                      [phone]   Comments:  Please call and advise        Thanks!!!

## 2017-07-07 ENCOUNTER — HOSPITAL ENCOUNTER (OUTPATIENT)
Facility: HOSPITAL | Age: 82
Discharge: HOME | End: 2017-07-08
Attending: EMERGENCY MEDICINE | Admitting: HOSPITALIST
Payer: MEDICARE

## 2017-07-07 DIAGNOSIS — I10 ESSENTIAL HYPERTENSION: ICD-10-CM

## 2017-07-07 DIAGNOSIS — I48.91 ATRIAL FIBRILLATION WITH RAPID VENTRICULAR RESPONSE: Primary | ICD-10-CM

## 2017-07-07 DIAGNOSIS — R00.2 PALPITATIONS: ICD-10-CM

## 2017-07-07 DIAGNOSIS — N18.30 TYPE 2 DIABETES MELLITUS WITH STAGE 3 CHRONIC KIDNEY DISEASE, WITHOUT LONG-TERM CURRENT USE OF INSULIN: ICD-10-CM

## 2017-07-07 DIAGNOSIS — E11.22 TYPE 2 DIABETES MELLITUS WITH STAGE 3 CHRONIC KIDNEY DISEASE, WITHOUT LONG-TERM CURRENT USE OF INSULIN: ICD-10-CM

## 2017-07-07 LAB
ALBUMIN SERPL BCP-MCNC: 3.5 G/DL
ALP SERPL-CCNC: 62 U/L
ALT SERPL W/O P-5'-P-CCNC: 16 U/L
ANION GAP SERPL CALC-SCNC: 13 MMOL/L
AST SERPL-CCNC: 30 U/L
BASOPHILS # BLD AUTO: 0.04 K/UL
BASOPHILS NFR BLD: 0.6 %
BILIRUB SERPL-MCNC: 0.3 MG/DL
BNP SERPL-MCNC: 496 PG/ML
BUN SERPL-MCNC: 19 MG/DL
CALCIUM SERPL-MCNC: 8.9 MG/DL
CHLORIDE SERPL-SCNC: 110 MMOL/L
CO2 SERPL-SCNC: 20 MMOL/L
CREAT SERPL-MCNC: 1.1 MG/DL
DIFFERENTIAL METHOD: ABNORMAL
EOSINOPHIL # BLD AUTO: 0 K/UL
EOSINOPHIL NFR BLD: 0.4 %
ERYTHROCYTE [DISTWIDTH] IN BLOOD BY AUTOMATED COUNT: 14.9 %
EST. GFR  (AFRICAN AMERICAN): 54.4 ML/MIN/1.73 M^2
EST. GFR  (NON AFRICAN AMERICAN): 47.2 ML/MIN/1.73 M^2
GLUCOSE SERPL-MCNC: 231 MG/DL
HCT VFR BLD AUTO: 35.2 %
HGB BLD-MCNC: 11.2 G/DL
INR PPP: 1.3
LYMPHOCYTES # BLD AUTO: 1.9 K/UL
LYMPHOCYTES NFR BLD: 27.9 %
MCH RBC QN AUTO: 27.2 PG
MCHC RBC AUTO-ENTMCNC: 31.8 %
MCV RBC AUTO: 85 FL
MONOCYTES # BLD AUTO: 0.4 K/UL
MONOCYTES NFR BLD: 5.5 %
NEUTROPHILS # BLD AUTO: 4.5 K/UL
NEUTROPHILS NFR BLD: 65.5 %
PLATELET # BLD AUTO: 250 K/UL
PMV BLD AUTO: 11.8 FL
POTASSIUM SERPL-SCNC: 4 MMOL/L
PROT SERPL-MCNC: 8.1 G/DL
PROTHROMBIN TIME: 13.5 SEC
RBC # BLD AUTO: 4.12 M/UL
SODIUM SERPL-SCNC: 143 MMOL/L
TROPONIN I SERPL DL<=0.01 NG/ML-MCNC: 0.03 NG/ML
WBC # BLD AUTO: 6.87 K/UL

## 2017-07-07 PROCEDURE — 96374 THER/PROPH/DIAG INJ IV PUSH: CPT

## 2017-07-07 PROCEDURE — 96376 TX/PRO/DX INJ SAME DRUG ADON: CPT

## 2017-07-07 PROCEDURE — 93005 ELECTROCARDIOGRAM TRACING: CPT

## 2017-07-07 PROCEDURE — 85610 PROTHROMBIN TIME: CPT

## 2017-07-07 PROCEDURE — 99285 EMERGENCY DEPT VISIT HI MDM: CPT

## 2017-07-07 PROCEDURE — 85025 COMPLETE CBC W/AUTO DIFF WBC: CPT

## 2017-07-07 PROCEDURE — 25000003 PHARM REV CODE 250: Performed by: EMERGENCY MEDICINE

## 2017-07-07 PROCEDURE — 93010 ELECTROCARDIOGRAM REPORT: CPT | Mod: ,,, | Performed by: INTERNAL MEDICINE

## 2017-07-07 PROCEDURE — 80053 COMPREHEN METABOLIC PANEL: CPT

## 2017-07-07 PROCEDURE — 83880 ASSAY OF NATRIURETIC PEPTIDE: CPT

## 2017-07-07 PROCEDURE — 96375 TX/PRO/DX INJ NEW DRUG ADDON: CPT

## 2017-07-07 PROCEDURE — 99285 EMERGENCY DEPT VISIT HI MDM: CPT | Mod: ,,, | Performed by: EMERGENCY MEDICINE

## 2017-07-07 PROCEDURE — 84484 ASSAY OF TROPONIN QUANT: CPT

## 2017-07-07 RX ORDER — METOPROLOL TARTRATE 1 MG/ML
5 INJECTION, SOLUTION INTRAVENOUS
Status: COMPLETED | OUTPATIENT
Start: 2017-07-07 | End: 2017-07-07

## 2017-07-07 RX ORDER — DILTIAZEM HCL 1 MG/ML
5 INJECTION, SOLUTION INTRAVENOUS CONTINUOUS
Status: DISCONTINUED | OUTPATIENT
Start: 2017-07-07 | End: 2017-07-08

## 2017-07-07 RX ORDER — DILTIAZEM HYDROCHLORIDE 5 MG/ML
10 INJECTION INTRAVENOUS
Status: COMPLETED | OUTPATIENT
Start: 2017-07-07 | End: 2017-07-07

## 2017-07-07 RX ADMIN — METOPROLOL TARTRATE 5 MG: 5 INJECTION INTRAVENOUS at 10:07

## 2017-07-07 RX ADMIN — METOPROLOL TARTRATE 5 MG: 5 INJECTION INTRAVENOUS at 09:07

## 2017-07-07 RX ADMIN — DILTIAZEM HYDROCHLORIDE 10 MG: 5 INJECTION INTRAVENOUS at 11:07

## 2017-07-08 VITALS
DIASTOLIC BLOOD PRESSURE: 57 MMHG | BODY MASS INDEX: 28.97 KG/M2 | TEMPERATURE: 97 F | HEIGHT: 61 IN | RESPIRATION RATE: 18 BRPM | HEART RATE: 83 BPM | WEIGHT: 153.44 LBS | SYSTOLIC BLOOD PRESSURE: 115 MMHG | OXYGEN SATURATION: 100 %

## 2017-07-08 PROBLEM — I48.91 ATRIAL FIBRILLATION WITH RAPID VENTRICULAR RESPONSE: Status: ACTIVE | Noted: 2017-07-08

## 2017-07-08 PROBLEM — I48.91 ATRIAL FIBRILLATION WITH RAPID VENTRICULAR RESPONSE: Status: RESOLVED | Noted: 2017-07-08 | Resolved: 2017-07-08

## 2017-07-08 LAB — POCT GLUCOSE: 85 MG/DL (ref 70–110)

## 2017-07-08 PROCEDURE — 99220 PR INITIAL OBSERVATION CARE,LEVL III: CPT | Mod: ,,, | Performed by: PHYSICIAN ASSISTANT

## 2017-07-08 PROCEDURE — 25000003 PHARM REV CODE 250: Performed by: PHYSICIAN ASSISTANT

## 2017-07-08 PROCEDURE — 25000003 PHARM REV CODE 250: Performed by: EMERGENCY MEDICINE

## 2017-07-08 PROCEDURE — 99217 PR OBSERVATION CARE DISCHARGE: CPT | Mod: ,,, | Performed by: PHYSICIAN ASSISTANT

## 2017-07-08 PROCEDURE — 93005 ELECTROCARDIOGRAM TRACING: CPT

## 2017-07-08 PROCEDURE — 99219 PR INITIAL OBSERVATION CARE,LEVL II: CPT | Mod: ,,, | Performed by: INTERNAL MEDICINE

## 2017-07-08 PROCEDURE — G0378 HOSPITAL OBSERVATION PER HR: HCPCS

## 2017-07-08 RX ORDER — METOPROLOL TARTRATE 50 MG/1
50 TABLET ORAL 2 TIMES DAILY
Status: DISCONTINUED | OUTPATIENT
Start: 2017-07-08 | End: 2017-07-08 | Stop reason: HOSPADM

## 2017-07-08 RX ORDER — IBUPROFEN 200 MG
24 TABLET ORAL
Status: DISCONTINUED | OUTPATIENT
Start: 2017-07-08 | End: 2017-07-08 | Stop reason: HOSPADM

## 2017-07-08 RX ORDER — PANTOPRAZOLE SODIUM 40 MG/1
40 TABLET, DELAYED RELEASE ORAL DAILY
Status: DISCONTINUED | OUTPATIENT
Start: 2017-07-08 | End: 2017-07-08 | Stop reason: HOSPADM

## 2017-07-08 RX ORDER — IBUPROFEN 200 MG
16 TABLET ORAL
Status: DISCONTINUED | OUTPATIENT
Start: 2017-07-08 | End: 2017-07-08 | Stop reason: HOSPADM

## 2017-07-08 RX ORDER — KETOROLAC TROMETHAMINE 30 MG/ML
15 INJECTION, SOLUTION INTRAMUSCULAR; INTRAVENOUS EVERY 6 HOURS PRN
Status: DISCONTINUED | OUTPATIENT
Start: 2017-07-08 | End: 2017-07-08 | Stop reason: HOSPADM

## 2017-07-08 RX ORDER — METOPROLOL TARTRATE 25 MG/1
25 TABLET, FILM COATED ORAL
Status: COMPLETED | OUTPATIENT
Start: 2017-07-08 | End: 2017-07-08

## 2017-07-08 RX ORDER — METOPROLOL TARTRATE 50 MG/1
50 TABLET ORAL 2 TIMES DAILY
Qty: 60 TABLET | Refills: 0 | Status: SHIPPED | OUTPATIENT
Start: 2017-07-08 | End: 2017-07-08

## 2017-07-08 RX ORDER — HYDRALAZINE HYDROCHLORIDE 25 MG/1
25 TABLET, FILM COATED ORAL EVERY 8 HOURS PRN
Status: DISCONTINUED | OUTPATIENT
Start: 2017-07-08 | End: 2017-07-08 | Stop reason: HOSPADM

## 2017-07-08 RX ORDER — CHLORTHALIDONE 25 MG/1
25 TABLET ORAL DAILY
Status: DISCONTINUED | OUTPATIENT
Start: 2017-07-08 | End: 2017-07-08 | Stop reason: HOSPADM

## 2017-07-08 RX ORDER — METOPROLOL TARTRATE 25 MG/1
25 TABLET, FILM COATED ORAL 2 TIMES DAILY
Status: DISCONTINUED | OUTPATIENT
Start: 2017-07-08 | End: 2017-07-08

## 2017-07-08 RX ORDER — METOPROLOL TARTRATE 50 MG/1
50 TABLET ORAL 2 TIMES DAILY
Qty: 60 TABLET | Refills: 0 | Status: SHIPPED | OUTPATIENT
Start: 2017-07-08 | End: 2017-07-18 | Stop reason: SDUPTHER

## 2017-07-08 RX ORDER — FLUTICASONE PROPIONATE 50 MCG
2 SPRAY, SUSPENSION (ML) NASAL DAILY
Status: DISCONTINUED | OUTPATIENT
Start: 2017-07-08 | End: 2017-07-08 | Stop reason: HOSPADM

## 2017-07-08 RX ORDER — ONDANSETRON 8 MG/1
8 TABLET, ORALLY DISINTEGRATING ORAL EVERY 8 HOURS PRN
Status: DISCONTINUED | OUTPATIENT
Start: 2017-07-08 | End: 2017-07-08 | Stop reason: HOSPADM

## 2017-07-08 RX ORDER — ACETAMINOPHEN 325 MG/1
650 TABLET ORAL EVERY 4 HOURS PRN
Status: DISCONTINUED | OUTPATIENT
Start: 2017-07-08 | End: 2017-07-08 | Stop reason: HOSPADM

## 2017-07-08 RX ORDER — ATORVASTATIN CALCIUM 20 MG/1
40 TABLET, FILM COATED ORAL DAILY
Status: DISCONTINUED | OUTPATIENT
Start: 2017-07-08 | End: 2017-07-08 | Stop reason: HOSPADM

## 2017-07-08 RX ORDER — GLUCAGON 1 MG
1 KIT INJECTION
Status: DISCONTINUED | OUTPATIENT
Start: 2017-07-08 | End: 2017-07-08 | Stop reason: HOSPADM

## 2017-07-08 RX ORDER — INSULIN ASPART 100 [IU]/ML
0-5 INJECTION, SOLUTION INTRAVENOUS; SUBCUTANEOUS
Status: DISCONTINUED | OUTPATIENT
Start: 2017-07-08 | End: 2017-07-08 | Stop reason: HOSPADM

## 2017-07-08 RX ADMIN — APIXABAN 5 MG: 5 TABLET, FILM COATED ORAL at 09:07

## 2017-07-08 RX ADMIN — ACETAMINOPHEN 650 MG: 325 TABLET ORAL at 03:07

## 2017-07-08 RX ADMIN — APIXABAN 2.5 MG: 2.5 TABLET, FILM COATED ORAL at 02:07

## 2017-07-08 RX ADMIN — METOPROLOL TARTRATE 25 MG: 25 TABLET ORAL at 01:07

## 2017-07-08 RX ADMIN — METOPROLOL TARTRATE 25 MG: 25 TABLET ORAL at 09:07

## 2017-07-08 RX ADMIN — CHLORTHALIDONE 25 MG: 25 TABLET ORAL at 09:07

## 2017-07-08 RX ADMIN — PANTOPRAZOLE SODIUM 40 MG: 40 TABLET, DELAYED RELEASE ORAL at 09:07

## 2017-07-08 RX ADMIN — ATORVASTATIN CALCIUM 40 MG: 20 TABLET, FILM COATED ORAL at 09:07

## 2017-07-08 RX ADMIN — FLUTICASONE PROPIONATE 2 SPRAY: 50 SPRAY, METERED NASAL at 09:07

## 2017-07-08 NOTE — ASSESSMENT & PLAN NOTE
HLD  - continue chlorthalidone daily, she uses lasix PRN edema  - continue statin  - BP elevated in ED, will consider titration of medications while in house  - was on norvasc and lisinopril, d/c'd at last admit as atenolol was increased for afib  - hydralazine PRN

## 2017-07-08 NOTE — HPI
82F with PMHx of atrial fib s/p DCCV, HTN, CAD, breast cancer, migraines, IDDM, CKD 3 presents for evaluation of sudden onset episode of palpitations/tachycardia. She denies any CP, SOB, fever/chills, abdominal complaints, urinary symptoms, or neurologic changes or confusion. She endorses a sensation of dizziness that comes and goes and she endorses chronic headaches that move around her head, she also states she has changes to her color perception that is also chronic. She denies taking any decongestants for her headache, she has taken tylenol without success.     Intake labs grossly unremarkable/stable. BNP near baseline, troponin at baseline. EKG in afib with RVR, given lopressor 5 mg x 2 and diltiazem and patient's rate controlled, still in afib.

## 2017-07-08 NOTE — PROGRESS NOTES
Pt arrived to unit, escorted by transport via stretcher. Cardiac monitoring intact, VSS, no signs of distress noted. Family at bedside.

## 2017-07-08 NOTE — CONSULTS
[facility]  Cardiology  Consult Note    Patient Name: Dacia Martínez  MRN: [id number]  Admission Date: 2017  Hospital Length of Stay: 0 days  Code Status: Full Code   Attending Provider: Tom Fowler MD   Consulting Provider: Kenneth Franco MD  Primary Care Physician: Cali Vickers MD  Principal Problem:Atrial fibrillation with rapid ventricular response    Patient information was obtained from patient and past medical records.     Inpatient consult to Cardiology  Consult performed by: KENNETH SMITH  Consult ordered by: ALEXIS SIMS       Afib  Subjective:     Chief Complaint:  palpitations    HPI: 81 y.o female with a PMHx of Afib, T2DM, HTN, CKD and breast ca presented to the ED today with complaints of palpitations.  She denies chest pain, palpitations, lightheadedness, syncope, MCCOLLUM, SOB or LE edema. She was recently admitted to the hospital a month ago in  for similar complaints and was newly diagnosed with Afib. She had a REJI and DCCV was done subsequently and put on apixaban due to elevated [id number] score. Post DCCV she remained in sinus rhythm and discharged home on beta blockers.    Past Medical History:   Diagnosis Date    A-fib     Arthritis     Asteroid hyalosis of left eye     Bilateral nonexudative age-related macular degeneration 6/3/2014    Breast cancer     Cataract     CKD stage 3 due to type 2 diabetes mellitus     Coronary artery disease     Hypertension     Migraine headache     Mild nonproliferative diabetic retinopathy of both eyes 6/3/2014    Pneumonia     Type 2 diabetes mellitus with hyperglycemia     Vertigo        Past Surgical History:   Procedure Laterality Date    BREAST SURGERY      left lumpectomy    CARPAL TUNNEL RELEASE      left    CATARACT EXTRACTION      vance     SECTION      EYE SURGERY      cataracts bilaterally    HYSTERECTOMY      partial       Review of patient's allergies indicates:  No  Known Allergies    No current facility-administered medications on file prior to encounter.      Current Outpatient Prescriptions on File Prior to Encounter   Medication Sig    ACCU-CHEK FASTCLIX Misc 1 lancet by Misc.(Non-Drug; Combo Route) route 3 (three) times daily. Pt to test blood glucose up to 3 times daily.    allopurinol (ZYLOPRIM) 300 MG tablet Take 1.5 tablets (450 mg total) by mouth once daily.    apixaban 5 mg Tab Take 1 tablet (5 mg total) by mouth 2 (two) times daily.    atenolol (TENORMIN) 50 MG tablet Take 2 tablets (100 mg total) by mouth daily with breakfast.    atenolol (TENORMIN) 50 MG tablet Take 1 tablet (50 mg total) by mouth every evening.    atorvastatin (LIPITOR) 40 MG tablet     azelastine (ASTELIN) 137 mcg (0.1 %) nasal spray 1 spray (137 mcg total) by Nasal route 2 (two) times daily.    blood sugar diagnostic (ACCU-CHEK TOMASA) Strp 1 strip by Misc.(Non-Drug; Combo Route) route once daily.    chlorhexidine (PERIDEX) 0.12 % solution FILL CAP TO THE FILL LINE, SWISH IN MOUTH UNDILUTED for 30 second...  (REFER TO PRESCRIPTION NOTES).    chlorthalidone (HYGROTEN) 25 MG Tab Take 1 tablet (25 mg total) by mouth once daily.    COL-RITE 100 mg capsule     docusate sodium (COLACE) 50 MG capsule Take 1 capsule (50 mg total) by mouth 2 (two) times daily.    dulaglutide (TRULICITY) 0.75 mg/0.5 mL PnIj Inject 0.75 mg into the skin every 7 days.    fluticasone (FLONASE) 50 mcg/actuation nasal spray instill 2 sprays into each nostril once daily    furosemide (LASIX) 20 MG tablet Take 1 tablet (20 mg total) by mouth daily as needed.    hydrocodone-acetaminophen 5-325mg (NORCO) 5-325 mg per tablet Take 1 tablet by mouth every 6 (six) hours as needed for Pain.    ibuprofen (ADVIL,MOTRIN) 800 MG tablet Take 800 mg by mouth every 8 (eight) hours.    meclizine (ANTIVERT) 25 mg tablet Take 1 tablet (25 mg total) by mouth 3 (three) times daily as needed.    meclizine (ANTIVERT) 25 mg tablet  Take 1 tablet (25 mg total) by mouth 3 (three) times daily as needed for Dizziness.    mupirocin (BACTROBAN) 2 % ointment by Nasal route 2 (two) times daily. Apply with swab to inside of nose    omeprazole (PRILOSEC) 20 MG capsule Take 1 capsule (20 mg total) by mouth once daily.    polyethylene glycol (GLYCOLAX) 17 gram PwPk Take 17 g by mouth daily as needed. Dissolve 1 heaping tablespoon and 4-8 ounces of water.  Take once a day as needed for constipation.    predniSONE (DELTASONE) 10 MG tablet take 1 tablet by mouth daily if needed for gout    RIVAROXABAN (XARELTO ORAL) Take by mouth.    triamcinolone acetonide 0.1% (KENALOG) 0.1 % ointment Apply topically 2 (two) times daily.     Family History     Problem Relation (Age of Onset)    Blindness Brother    Cancer Mother    Cataracts Sister    Diabetes Father, Brother, Sister, Brother, Brother, Brother    Heart disease Mother    Hypertension Father, Brother    No Known Problems Daughter, Son, Daughter        Social History Main Topics    Smoking status: Never Smoker    Smokeless tobacco: Never Used    Alcohol use No      Comment: socially    Drug use: No    Sexual activity: Not Currently     ROS   Constitution: Negative for fever or chills. Negative for weight loss or gain.   HENT: Negative for sore throat. Reports congestion, rhinorrhea and headaches.  Eyes: Negative for blurred or double vision.   Cardiovascular: See above  Pulmonary: Negative for cough.   Gastrointestinal: Negative for abdominal pain. Negative for nausea/ vomiting. Negative for diarrhea.   : Negative for dysuria.   Neurological: Negative for focal weakness or sensory changes  Objective:     Vital Signs (Most Recent):  Temp: 97.8 °F (36.6 °C) (07/08/17 0200)  Pulse: (!) 113 (07/08/17 0300)  Resp: 18 (07/08/17 0200)  BP: (!) 149/88 (07/08/17 0200)  SpO2: 99 % (07/08/17 0200) Vital Signs (24h Range):  Temp:  [97.8 °F (36.6 °C)-99.9 °F (37.7 °C)] 97.8 °F (36.6 °C)  Pulse:  []  113  Resp:  [18-25] 18  SpO2:  [99 %-100 %] 99 %  BP: (137-199)/(11-89) 149/88     Weight: 69.6 kg (153 lb 7 oz)  Body mass index is 28.99 kg/m².    SpO2: 99 %  O2 Device (Oxygen Therapy): room air      Intake/Output Summary (Last 24 hours) at 07/08/17 0449  Last data filed at 07/08/17 0349   Gross per 24 hour   Intake                0 ml   Output              300 ml   Net             -300 ml       Lines/Drains/Airways     Peripheral Intravenous Line                 Peripheral IV - Single Lumen 07/08/17 0250 Right Forearm less than 1 day                Physical Exam  Constitutional: NAD  HEENT: Sclera anicteric, PERRLA, EOMI  Neck: No JVD, no carotid bruits  CV: Irregularly irregular, 2/6 DOUGLAS in RUSB, normal S1/S2  Pulm: CTAB, no wheezes, rales, or ronchi  GI: Abdomen soft, NTND, +BS  Extremities: No LE edema  Skin: No ecchymosis, erythema, or ulcers  Psych: AOx3, appropriate affect  Neuro: CNII-XII intact, no focal deficits       Significant Labs:   CMP   Recent Labs  Lab 07/07/17  2101      K 4.0      CO2 20*   *   BUN 19   CREATININE 1.1   CALCIUM 8.9   PROT 8.1   ALBUMIN 3.5   BILITOT 0.3   ALKPHOS 62   AST 30   ALT 16   ANIONGAP 13   ESTGFRAFRICA 54.4*   EGFRNONAA 47.2*    and CBC   Recent Labs  Lab 07/07/17  2101   WBC 6.87   HGB 11.2*   HCT 35.2*          Significant Imaging: Echocardiogram:    TTE 6/6/17    CONCLUSIONS     1 - Normal left ventricular systolic function (EF 60-65%).     2 - No wall motion abnormalities.     3 - Concentric remodeling.     4 - Biatrial enlargement.     5 - The mitral valve is moderately sclerotic with moderately restricted leaflet mobility.     6 - Mild to moderate aortic stenosis, ROBBIE = 1.38 cm2, peak velocity = 2.26 m/s, mean gradient = 11 mmHg.     7 - Right ventricular enlargement with normal systolic function.     8 - Pulmonary hypertension. The estimated PA systolic pressure is 44 mmHg.     REJI 6/6/17  CONCLUSIONS   Study was aborted as the  "patient became hypoxic.     1 - Left atrium is enlarged with no visualized thrombus with spontaneous echo contrast with dense spontaneous echo contrast (SEC) with no visualized thrombus. There is no thrombus in the OTTONIEL.     2 - The mitral valve is moderately sclerotic with moderately restricted leaflet mobility.  Trivial mitral regurgitation.     3 - Biatrial enlargement.     4 -The aortic valve is mildly sclerotic with moderately restricted leaflet mobility. Visually the valve appears moderately stenotic.     Results for orders placed or performed during the hospital encounter of 06/05/17   2D echo with color flow doppler   Result Value Ref Range    EF 60 55 - 65    Aortic Valve Stenosis MILD TO MODERATE (A)     Est. PA Systolic Pressure 43.7 (A)     Mitral Valve Mobility MODERATELY RESTRICTED     Tricuspid Valve Regurgitation MILD       Assessment and Plan:     Active Diagnoses:    Diagnosis Date Noted POA    PRINCIPAL PROBLEM:  Atrial fibrillation with rapid ventricular response [I48.91] 07/08/2017 Yes    Type 2 diabetes mellitus with stage 3 chronic kidney disease, without long-term current use of insulin [E11.22, N18.3] 11/02/2016 Yes    Essential hypertension [I10] 01/10/2013 Yes      Problems Resolved During this Admission:    Diagnosis Date Noted Date Resolved POA     81 y.o female with a PMHx of T2DM, HTN, CKD and breast ca with recently diagnosed Afib on 6/5/17 returns back to Ed with palpitations. She is back in Afib. On anticoagulation. She has forgotten to take her Elliquis "couple of times" in the past month. She does not remember the onset of palpitations. She was unaware of being on it the previous time when referred from PCP office.   Her HR in ED was fluctuating from high 90s to 130s. BP has been stable.    Recommend continued rate control with beta blockers.   - Place patient on cardiac telemetry  - Rate control strategy for now, recommend using metoprolol rather than coreg for rate control, " goal HR < 100. Good blood pressure room to go up on beta blockers.  Start at 100 mg BID of PO Metoprolol and titrate up in frequency and dosing to achieve target HR  She will need a repeat REJI with DCCV and consider anti arrythmic Rx post repeat DCCV. This can be arranged as outpatient with follow up with EP next week and also a REJI prior. Advised strict Apixaban compliance.    Thank you for your consult.  Consult Team will follow up in AM.    Kenneth Franco MD  Cardiology   [facility]

## 2017-07-08 NOTE — NURSING
Patient discharged to home as ordered.  Left via wheelchair, accompanied by family/caregivers, with all personal belongings and discharge handouts.  Prior to discharge, reviewed discharge instructions and medication changes.  Patient to  metoprolol prescription at outpatient [facility] pharmacy

## 2017-07-08 NOTE — SUBJECTIVE & OBJECTIVE
Past Medical History:   Diagnosis Date    A-fib     Arthritis     Asteroid hyalosis of left eye     Bilateral nonexudative age-related macular degeneration 6/3/2014    Breast cancer     Cataract     CKD stage 3 due to type 2 diabetes mellitus     Coronary artery disease     Hypertension     Migraine headache     Mild nonproliferative diabetic retinopathy of both eyes 6/3/2014    Pneumonia     Type 2 diabetes mellitus with hyperglycemia     Vertigo        Past Surgical History:   Procedure Laterality Date    BREAST SURGERY      left lumpectomy    CARPAL TUNNEL RELEASE      left    CATARACT EXTRACTION      vance     SECTION      EYE SURGERY      cataracts bilaterally    HYSTERECTOMY      partial       Review of patient's allergies indicates:  No Known Allergies    No current facility-administered medications on file prior to encounter.      Current Outpatient Prescriptions on File Prior to Encounter   Medication Sig    ACCU-CHEK FASTCLIX Misc 1 lancet by Misc.(Non-Drug; Combo Route) route 3 (three) times daily. Pt to test blood glucose up to 3 times daily.    allopurinol (ZYLOPRIM) 300 MG tablet Take 1.5 tablets (450 mg total) by mouth once daily.    apixaban 5 mg Tab Take 1 tablet (5 mg total) by mouth 2 (two) times daily.    atenolol (TENORMIN) 50 MG tablet Take 2 tablets (100 mg total) by mouth daily with breakfast.    atenolol (TENORMIN) 50 MG tablet Take 1 tablet (50 mg total) by mouth every evening.    atorvastatin (LIPITOR) 40 MG tablet     azelastine (ASTELIN) 137 mcg (0.1 %) nasal spray 1 spray (137 mcg total) by Nasal route 2 (two) times daily.    blood sugar diagnostic (ACCU-CHEK TOMASA) Strp 1 strip by Misc.(Non-Drug; Combo Route) route once daily.    chlorhexidine (PERIDEX) 0.12 % solution FILL CAP TO THE FILL LINE, SWISH IN MOUTH UNDILUTED for 30 second...  (REFER TO PRESCRIPTION NOTES).    chlorthalidone (HYGROTEN) 25 MG Tab Take 1 tablet (25 mg total) by mouth once  daily.    COL-RITE 100 mg capsule     docusate sodium (COLACE) 50 MG capsule Take 1 capsule (50 mg total) by mouth 2 (two) times daily.    dulaglutide (TRULICITY) 0.75 mg/0.5 mL PnIj Inject 0.75 mg into the skin every 7 days.    fluticasone (FLONASE) 50 mcg/actuation nasal spray instill 2 sprays into each nostril once daily    furosemide (LASIX) 20 MG tablet Take 1 tablet (20 mg total) by mouth daily as needed.    hydrocodone-acetaminophen 5-325mg (NORCO) 5-325 mg per tablet Take 1 tablet by mouth every 6 (six) hours as needed for Pain.    ibuprofen (ADVIL,MOTRIN) 800 MG tablet Take 800 mg by mouth every 8 (eight) hours.    meclizine (ANTIVERT) 25 mg tablet Take 1 tablet (25 mg total) by mouth 3 (three) times daily as needed.    meclizine (ANTIVERT) 25 mg tablet Take 1 tablet (25 mg total) by mouth 3 (three) times daily as needed for Dizziness.    mupirocin (BACTROBAN) 2 % ointment by Nasal route 2 (two) times daily. Apply with swab to inside of nose    omeprazole (PRILOSEC) 20 MG capsule Take 1 capsule (20 mg total) by mouth once daily.    polyethylene glycol (GLYCOLAX) 17 gram PwPk Take 17 g by mouth daily as needed. Dissolve 1 heaping tablespoon and 4-8 ounces of water.  Take once a day as needed for constipation.    predniSONE (DELTASONE) 10 MG tablet take 1 tablet by mouth daily if needed for gout    RIVAROXABAN (XARELTO ORAL) Take by mouth.    triamcinolone acetonide 0.1% (KENALOG) 0.1 % ointment Apply topically 2 (two) times daily.     Family History     Problem Relation (Age of Onset)    Blindness Brother    Cancer Mother    Cataracts Sister    Diabetes Father, Brother, Sister, Brother, Brother, Brother    Heart disease Mother    Hypertension Father, Brother    No Known Problems Daughter, Son, Daughter        Social History Main Topics    Smoking status: Never Smoker    Smokeless tobacco: Never Used    Alcohol use No      Comment: socially    Drug use: No    Sexual activity: Not Currently      Review of Systems   Constitutional: Negative for chills, fatigue and fever.   HENT: Positive for trouble swallowing (time to time).    Eyes: Positive for visual disturbance (see spots). Negative for photophobia.   Respiratory: Negative for cough, shortness of breath and wheezing.    Cardiovascular: Positive for palpitations. Negative for chest pain and leg swelling.        Tachycardia   Gastrointestinal: Positive for constipation. Negative for abdominal pain, diarrhea and nausea.   Genitourinary: Negative for difficulty urinating and dysuria.   Musculoskeletal: Positive for arthralgias and back pain. Negative for gait problem.   Skin: Negative for rash and wound.   Neurological: Positive for light-headedness and headaches. Negative for facial asymmetry, speech difficulty and weakness.   Psychiatric/Behavioral: Negative for confusion. The patient is not nervous/anxious.      Objective:     Vital Signs (Most Recent):  Temp: 99.9 °F (37.7 °C) (07/07/17 2018)  Pulse: 82 (07/08/17 0102)  Resp: (!) 21 (07/08/17 0102)  BP: (!) 160/76 (07/08/17 0102)  SpO2: 100 % (07/08/17 0102) Vital Signs (24h Range):  Temp:  [99.9 °F (37.7 °C)] 99.9 °F (37.7 °C)  Pulse:  [] 82  Resp:  [18-25] 21  SpO2:  [99 %-100 %] 100 %  BP: (137-199)/(11-89) 160/76     Weight: 63.5 kg (140 lb)  Body mass index is 26.45 kg/m².    Physical Exam   Constitutional: She is oriented to person, place, and time. She appears well-developed and well-nourished. She is cooperative. No distress.   pleasant   HENT:   Head: Normocephalic and atraumatic.   Eyes: EOM are normal. Pupils are equal, round, and reactive to light.   Cardiovascular: An irregularly irregular rhythm present.   Murmur heard.  Pulmonary/Chest: Effort normal and breath sounds normal. No respiratory distress.   Abdominal: Soft. Bowel sounds are normal. She exhibits no distension. There is no tenderness.   Musculoskeletal: Normal range of motion. She exhibits edema (trace).    Neurological: She is alert and oriented to person, place, and time. No cranial nerve deficit.   No focal deficits, patient able to sit up with minimal assistance.    Skin: She is not diaphoretic.   Psychiatric: She has a normal mood and affect. Her behavior is normal. Judgment and thought content normal.   Nursing note and vitals reviewed.       Significant Labs:   CBC:   Recent Labs  Lab 07/07/17 2101   WBC 6.87   HGB 11.2*   HCT 35.2*        CMP:   Recent Labs  Lab 07/07/17 2101      K 4.0      CO2 20*   *   BUN 19   CREATININE 1.1   CALCIUM 8.9   PROT 8.1   ALBUMIN 3.5   BILITOT 0.3   ALKPHOS 62   AST 30   ALT 16   ANIONGAP 13   EGFRNONAA 47.2*     Cardiac Markers:   Recent Labs  Lab 07/07/17 2101   *       Significant Imaging: CXR: I have reviewed all pertinent results/findings within the past 24 hours and my personal findings are:  no acute findings  EKG: I have reviewed all pertinent results/findings within the past 24 hours and my personal findings are: afib with RVR > afib rate controlled

## 2017-07-08 NOTE — H&P
[facility] Medicine  History & Physical    Patient Name: Dacia Martínez  MRN: [id number]  Admission Date: 7/7/2017  Attending Physician: Tom Fowler MD   Primary Care Provider: Cali Vickers MD    [facility] Team: Networked reference to record PCT  Derrick Ashraf PA-C     Patient information was obtained from patient, past medical records and ER records.     Subjective:     Principal Problem:Atrial fibrillation with rapid ventricular response    Chief Complaint:   Chief Complaint   Patient presents with    Palpitations     Pt reports feeling like her heart is beating fast. Heart rate in 120's in triage.        HPI: 82F with PMHx of atrial fib s/p DCCV, HTN, CAD, breast cancer, migraines, IDDM, CKD 3 presents for evaluation of sudden onset episode of palpitations/tachycardia. She denies any CP, SOB, fever/chills, abdominal complaints, urinary symptoms, or neurologic changes or confusion. She endorses a sensation of dizziness that comes and goes and she endorses chronic headaches that move around her head, she also states she has changes to her color perception that is also chronic. She denies taking any decongestants for her headache, she has taken tylenol without success.     Intake labs grossly unremarkable/stable. BNP near baseline, troponin at baseline. EKG in afib with RVR, given lopressor 5 mg x 2 and diltiazem and patient's rate controlled, still in afib.     Past Medical History:   Diagnosis Date    A-fib     Arthritis     Asteroid hyalosis of left eye     Bilateral nonexudative age-related macular degeneration 6/3/2014    Breast cancer     Cataract     CKD stage 3 due to type 2 diabetes mellitus     Coronary artery disease     Hypertension     Migraine headache     Mild nonproliferative diabetic retinopathy of both eyes 6/3/2014    Pneumonia     Type 2 diabetes mellitus with hyperglycemia     Vertigo        Past Surgical History:   Procedure Laterality Date     BREAST SURGERY      left lumpectomy    CARPAL TUNNEL RELEASE      left    CATARACT EXTRACTION      vance     SECTION      EYE SURGERY      cataracts bilaterally    HYSTERECTOMY      partial       Review of patient's allergies indicates:  No Known Allergies    No current facility-administered medications on file prior to encounter.      Current Outpatient Prescriptions on File Prior to Encounter   Medication Sig    ACCU-CHEK FASTCLIX Misc 1 lancet by Misc.(Non-Drug; Combo Route) route 3 (three) times daily. Pt to test blood glucose up to 3 times daily.    allopurinol (ZYLOPRIM) 300 MG tablet Take 1.5 tablets (450 mg total) by mouth once daily.    apixaban 5 mg Tab Take 1 tablet (5 mg total) by mouth 2 (two) times daily.    atenolol (TENORMIN) 50 MG tablet Take 2 tablets (100 mg total) by mouth daily with breakfast.    atenolol (TENORMIN) 50 MG tablet Take 1 tablet (50 mg total) by mouth every evening.    atorvastatin (LIPITOR) 40 MG tablet     azelastine (ASTELIN) 137 mcg (0.1 %) nasal spray 1 spray (137 mcg total) by Nasal route 2 (two) times daily.    blood sugar diagnostic (ACCU-CHEK TOMASA) Strp 1 strip by Misc.(Non-Drug; Combo Route) route once daily.    chlorhexidine (PERIDEX) 0.12 % solution FILL CAP TO THE FILL LINE, SWISH IN MOUTH UNDILUTED for 30 second...  (REFER TO PRESCRIPTION NOTES).    chlorthalidone (HYGROTEN) 25 MG Tab Take 1 tablet (25 mg total) by mouth once daily.    COL-RITE 100 mg capsule     docusate sodium (COLACE) 50 MG capsule Take 1 capsule (50 mg total) by mouth 2 (two) times daily.    dulaglutide (TRULICITY) 0.75 mg/0.5 mL PnIj Inject 0.75 mg into the skin every 7 days.    fluticasone (FLONASE) 50 mcg/actuation nasal spray instill 2 sprays into each nostril once daily    furosemide (LASIX) 20 MG tablet Take 1 tablet (20 mg total) by mouth daily as needed.    hydrocodone-acetaminophen 5-325mg (NORCO) 5-325 mg per tablet Take 1 tablet by mouth every 6 (six)  hours as needed for Pain.    ibuprofen (ADVIL,MOTRIN) 800 MG tablet Take 800 mg by mouth every 8 (eight) hours.    meclizine (ANTIVERT) 25 mg tablet Take 1 tablet (25 mg total) by mouth 3 (three) times daily as needed.    meclizine (ANTIVERT) 25 mg tablet Take 1 tablet (25 mg total) by mouth 3 (three) times daily as needed for Dizziness.    mupirocin (BACTROBAN) 2 % ointment by Nasal route 2 (two) times daily. Apply with swab to inside of nose    omeprazole (PRILOSEC) 20 MG capsule Take 1 capsule (20 mg total) by mouth once daily.    polyethylene glycol (GLYCOLAX) 17 gram PwPk Take 17 g by mouth daily as needed. Dissolve 1 heaping tablespoon and 4-8 ounces of water.  Take once a day as needed for constipation.    predniSONE (DELTASONE) 10 MG tablet take 1 tablet by mouth daily if needed for gout    RIVAROXABAN (XARELTO ORAL) Take by mouth.    triamcinolone acetonide 0.1% (KENALOG) 0.1 % ointment Apply topically 2 (two) times daily.     Family History     Problem Relation (Age of Onset)    Blindness Brother    Cancer Mother    Cataracts Sister    Diabetes Father, Brother, Sister, Brother, Brother, Brother    Heart disease Mother    Hypertension Father, Brother    No Known Problems Daughter, Son, Daughter        Social History Main Topics    Smoking status: Never Smoker    Smokeless tobacco: Never Used    Alcohol use No      Comment: socially    Drug use: No    Sexual activity: Not Currently     Review of Systems   Constitutional: Negative for chills, fatigue and fever.   HENT: Positive for trouble swallowing (time to time).    Eyes: Positive for visual disturbance (see spots). Negative for photophobia.   Respiratory: Negative for cough, shortness of breath and wheezing.    Cardiovascular: Positive for palpitations. Negative for chest pain and leg swelling.        Tachycardia   Gastrointestinal: Positive for constipation. Negative for abdominal pain, diarrhea and nausea.   Genitourinary: Negative for  difficulty urinating and dysuria.   Musculoskeletal: Positive for arthralgias and back pain. Negative for gait problem.   Skin: Negative for rash and wound.   Neurological: Positive for light-headedness and headaches. Negative for facial asymmetry, speech difficulty and weakness.   Psychiatric/Behavioral: Negative for confusion. The patient is not nervous/anxious.      Objective:     Vital Signs (Most Recent):  Temp: 99.9 °F (37.7 °C) (07/07/17 2018)  Pulse: 82 (07/08/17 0102)  Resp: (!) 21 (07/08/17 0102)  BP: (!) 160/76 (07/08/17 0102)  SpO2: 100 % (07/08/17 0102) Vital Signs (24h Range):  Temp:  [99.9 °F (37.7 °C)] 99.9 °F (37.7 °C)  Pulse:  [] 82  Resp:  [18-25] 21  SpO2:  [99 %-100 %] 100 %  BP: (137-199)/(11-89) 160/76     Weight: 63.5 kg (140 lb)  Body mass index is 26.45 kg/m².    Physical Exam   Constitutional: She is oriented to person, place, and time. She appears well-developed and well-nourished. She is cooperative. No distress.   pleasant   HENT:   Head: Normocephalic and atraumatic.   Eyes: EOM are normal. Pupils are equal, round, and reactive to light.   Cardiovascular: An irregularly irregular rhythm present.   Murmur heard.  Pulmonary/Chest: Effort normal and breath sounds normal. No respiratory distress.   Abdominal: Soft. Bowel sounds are normal. She exhibits no distension. There is no tenderness.   Musculoskeletal: Normal range of motion. She exhibits edema (trace).   Neurological: She is alert and oriented to person, place, and time. No cranial nerve deficit.   No focal deficits, patient able to sit up with minimal assistance.    Skin: She is not diaphoretic.   Psychiatric: She has a normal mood and affect. Her behavior is normal. Judgment and thought content normal.   Nursing note and vitals reviewed.       Significant Labs:   CBC:   Recent Labs  Lab 07/07/17 2101   WBC 6.87   HGB 11.2*   HCT 35.2*        CMP:   Recent Labs  Lab 07/07/17  2101      K 4.0      CO2 20*    *   BUN 19   CREATININE 1.1   CALCIUM 8.9   PROT 8.1   ALBUMIN 3.5   BILITOT 0.3   ALKPHOS 62   AST 30   ALT 16   ANIONGAP 13   EGFRNONAA 47.2*     Cardiac Markers:   Recent Labs  Lab 07/07/17  2101   *       Significant Imaging: CXR: I have reviewed all pertinent results/findings within the past 24 hours and my personal findings are:  no acute findings  EKG: I have reviewed all pertinent results/findings within the past 24 hours and my personal findings are: afib with RVR > afib rate controlled    Assessment/Plan:     * Atrial fibrillation with rapid ventricular response    - successful DCCV 06/2017, atenolol was increased at that time and patient started on eliquis. Echo at that time EF 60%, PAP elevated, TSH wnl, no electrolyte derangements   - rate controlled after IV lopressor 5 mg x2, and diltiazem 10 mg x1  - given lopressor PO 25 mg, will continue BID dosing for now  - cardiology consulted in ED, per ED considering DCCV        Essential hypertension    HLD  - continue chlorthalidone daily, she uses lasix PRN edema  - continue statin  - BP elevated in ED, will consider titration of medications while in house  - was on norvasc and lisinopril, d/c'd at last admit as atenolol was increased for afib  - hydralazine PRN        Type 2 diabetes mellitus with stage 3 chronic kidney disease, without long-term current use of insulin    - hold trulicity while in hospital, low dose SSI          VTE Risk Mitigation         Ordered     apixaban tablet 5 mg  2 times daily     Route:  Oral        07/08/17 0218     Reason for No Pharmacological VTE Prophylaxis  Once      07/08/17 0218     High Risk of VTE  Once      07/08/17 0218        Derrick Ashraf PA-C  Department of Hospital Medicine   [facility]

## 2017-07-08 NOTE — ED NOTES
Pt converted to afib. Repeat ekg performed. Dr. Zamora notified and ekg shown. Orders given to hold cardizem drip at present

## 2017-07-08 NOTE — PLAN OF CARE
Problem: Patient Care Overview  Goal: Plan of Care Review  Pt AAOx4, bed low locked, call light within reach, wearing nonskid socks. Pt instructed to use call light for assistance, pt verbalizes understanding.   Pt c/o headache, Tylenol given, no further complaints. Tele Afib, 70's - 110's. Pt remains free from injury/harm at this time. Afebrile. See flowsheet for full assessment/VS.

## 2017-07-08 NOTE — ASSESSMENT & PLAN NOTE
- successful DCCV 06/2017, atenolol was increased at that time and patient started on eliquis. Echo at that time EF 60%, PAP elevated, TSH wnl, no electrolyte derangements   - rate controlled after IV lopressor 5 mg x2, and diltiazem 10 mg x1  - given lopressor PO 25 mg, will continue BID dosing for now  - cardiology consulted in ED, per ED considering DCCV

## 2017-07-08 NOTE — PROVIDER PROGRESS NOTES - EMERGENCY DEPT.
Encounter Date: 7/7/2017    ED Physician Progress Notes       SCRIBE NOTE: I, Mike Tony, am scribing for, and in the presence of,  Dr. Quesada.  Physician Statement: I, Dr. Quesada, personally performed the services described in this documentation as scribed by Mike Tony in my presence, and it is both accurate and complete.     Physician Note:   EKG shows rate of 120 with atrial fibrillation and RVR.     EKG - STEMI Decision  Initial Reading: No STEMI present.

## 2017-07-08 NOTE — ED PROVIDER NOTES
Encounter Date: 7/7/2017    SCRIBE #1 NOTE: I, Mike Tony, am scribing for, and in the presence of, Dr. Quesada.       History     Chief Complaint   Patient presents with    Palpitations     Pt reports feeling like her heart is beating fast. Heart rate in 120's in triage.     Time seen by provider: 9:08 PM    This is a 82 y.o. female with pertinent PMHx of HTN, breast cancer, migraines, DM2, CKD stage 3, atrial fibrillation, and CAD, who presents to the ED with a chief complaint of a sudden onset episode of moderate palpitations with associated tachycardia and elevated blood pressure today around 4 pm. Pt states she does not take any medications for her heart but does endorse taking atenolol and reports compliance with all prescribed medications. Pt was at rest when the episode onset. Pt denies any chest pain, shortness of breath, fever, eye discharge or redness, nosebleed, gum bleed, urinary symptoms, nausea, vomiting, pedal edema, or joint pain. Pt does endorse a secondary complaint of a moving headache with chills and visual disturbance that has been ongoing and pt has seen doctors for this is the past but it has persisted. There are no other associated symptoms or mitigating/aggravating factors reported at this time.           Review of patient's allergies indicates:  No Known Allergies  Past Medical History:   Diagnosis Date    A-fib     Arthritis     Asteroid hyalosis of left eye     Bilateral nonexudative age-related macular degeneration 6/3/2014    Breast cancer     Cataract     CKD stage 3 due to type 2 diabetes mellitus     Coronary artery disease     Hypertension     Migraine headache     Mild nonproliferative diabetic retinopathy of both eyes 6/3/2014    Pneumonia     Type 2 diabetes mellitus with hyperglycemia     Vertigo      Past Surgical History:   Procedure Laterality Date    BREAST SURGERY      left lumpectomy    CARPAL TUNNEL RELEASE      left    CATARACT EXTRACTION       vance     SECTION      EYE SURGERY      cataracts bilaterally    HYSTERECTOMY      partial     Family History   Problem Relation Age of Onset    Cancer Mother      stomach    Heart disease Mother     Diabetes Father     Hypertension Father     Blindness Brother     Diabetes Brother     Hypertension Brother     Cataracts Sister     Diabetes Sister     Diabetes Brother     Diabetes Brother     Diabetes Brother     No Known Problems Daughter     No Known Problems Son     No Known Problems Daughter     Amblyopia Neg Hx     Glaucoma Neg Hx     Macular degeneration Neg Hx     Strabismus Neg Hx     Retinal detachment Neg Hx      Social History   Substance Use Topics    Smoking status: Never Smoker    Smokeless tobacco: Never Used    Alcohol use No      Comment: socially     Review of Systems   Constitutional: Positive for chills. Negative for fever.   HENT: Negative for congestion, dental problem, nosebleeds, sore throat and trouble swallowing.    Eyes: Positive for visual disturbance. Negative for discharge and redness.   Respiratory: Negative for cough and shortness of breath.    Cardiovascular: Positive for palpitations. Negative for chest pain and leg swelling.   Gastrointestinal: Negative for abdominal pain, nausea and vomiting.   Genitourinary: Negative for difficulty urinating and dysuria.   Musculoskeletal: Negative for back pain, joint swelling and neck pain.   Skin: Negative for rash.   Neurological: Positive for headaches. Negative for weakness.       Physical Exam     Initial Vitals [17]   BP Pulse Resp Temp SpO2   (!) 199/11 (!) 133 20 99.9 °F (37.7 °C) 100 %      MAP       73.67         Physical Exam    Nursing note and vitals reviewed.  Constitutional: She appears well-developed and well-nourished. No distress.   HENT:   Head: Normocephalic and atraumatic.   Nose: Nose normal.   Mouth/Throat: Oropharynx is clear and moist.   Eyes: Conjunctivae and EOM are normal.  Right eye exhibits no discharge. Left eye exhibits no discharge.   Neck: Normal range of motion. Neck supple. No JVD present.   Cardiovascular: Normal rate and normal heart sounds. An irregularly irregular rhythm present.   No murmur heard.  Pulmonary/Chest: Breath sounds normal. No tachypnea. No respiratory distress. She has no wheezes. She has no rhonchi. She has no rales.   Abdominal: She exhibits no distension and no mass. There is no tenderness. There is no rebound and no guarding.   Musculoskeletal: Normal range of motion. She exhibits no edema or tenderness (no calf or leg tenderness).   Neurological: She is alert and oriented to person, place, and time. She has normal strength. No cranial nerve deficit or sensory deficit.   Skin: Skin is warm and dry. No rash noted. No erythema. No pallor.   Psychiatric: She has a normal mood and affect. Her behavior is normal.         ED Course   Procedures  Labs Reviewed   CBC W/ AUTO DIFFERENTIAL - Abnormal; Notable for the following:        Result Value    Hemoglobin 11.2 (*)     Hematocrit 35.2 (*)     MCHC 31.8 (*)     RDW 14.9 (*)     All other components within normal limits   COMPREHENSIVE METABOLIC PANEL - Abnormal; Notable for the following:     CO2 20 (*)     Glucose 231 (*)     eGFR if  54.4 (*)     eGFR if non  47.2 (*)     All other components within normal limits   PROTIME-INR - Abnormal; Notable for the following:     Prothrombin Time 13.5 (*)     INR 1.3 (*)     All other components within normal limits   TROPONIN I - Abnormal; Notable for the following:     Troponin I 0.029 (*)     All other components within normal limits   B-TYPE NATRIURETIC PEPTIDE - Abnormal; Notable for the following:      (*)     All other components within normal limits    Narrative:     bnp add on per Hugh Soler MD [id number]   07/07/2017  22:41    B-TYPE NATRIURETIC PEPTIDE     EKG Readings: (Independently Interpreted)   EKG shows  rate of 120 with atrial fibrillation and RVR and no STEMI       X-Rays:   Independently Interpreted Readings:   Chest X-Ray: No acute abnormalities.     Medical Decision Making:   History:   Old Medical Records: I decided to obtain old medical records.  Initial Assessment:   This is a 82 y.o. female with a history of atrial fibrillation in the past who presents with rapid an irregular heart beat and lightheadedness. Pt also comes to the ED with concern for headache.   Differential Diagnosis:   My initial differential diagnoses include but are not limited to atrial fibrillation with RVR.   Independently Interpreted Test(s):   I have ordered and independently interpreted EKG Reading(s) - see prior notes  Clinical Tests:   Lab Tests: Ordered and Reviewed  Medical Tests: Ordered and Reviewed  ED Management:  Will check labs including cardiac markers and EKG. Will give pt Lopressor IV keeping in mind that she is on Atenolol to slow her heart rate. Pt may require a cardiac consult.             Scribe Attestation:   Scribe #1: I performed the above scribed service and the documentation accurately describes the services I performed. I attest to the accuracy of the note.    Attending Attestation:           Physician Attestation for Scribe:  Physician Attestation Statement for Scribe #1: I, Dr. Quesada, reviewed documentation, as scribed by Mike Tony in my presence, and it is both accurate and complete.         Attending ED Notes:   Patient presenting with the complaint of headache and also found to be in atrial fibrillation and given lopressor with improvement. The patient was seen by Cardiology and will be placed in the hospital for for further care and evaluation          ED Course     Clinical Impression:   The primary encounter diagnosis was Atrial fibrillation with rapid ventricular response. Diagnoses of Palpitations, Essential hypertension, and Type 2 diabetes mellitus with stage 3 chronic kidney disease,  without long-term current use of insulin were also pertinent to this visit.    Disposition:   Disposition: Placed in Observation  Condition: Serious                        Hugh Quesada MD  07/27/17 0705

## 2017-07-08 NOTE — ED NOTES
"Pt presents to ed c/o " skipping heart beats" . She reports she was at home sitting at a table and felt her heart starting racing and skipping beats.  She denies cp and sob, n/v/d.  She reports similar incidences in the past.   "

## 2017-07-10 ENCOUNTER — OFFICE VISIT (OUTPATIENT)
Dept: INTERNAL MEDICINE | Facility: CLINIC | Age: 82
End: 2017-07-10
Payer: MEDICARE

## 2017-07-10 VITALS
HEIGHT: 61 IN | DIASTOLIC BLOOD PRESSURE: 75 MMHG | HEART RATE: 60 BPM | WEIGHT: 156.75 LBS | SYSTOLIC BLOOD PRESSURE: 120 MMHG | BODY MASS INDEX: 29.59 KG/M2

## 2017-07-10 DIAGNOSIS — M15.9 PRIMARY OSTEOARTHRITIS INVOLVING MULTIPLE JOINTS: Chronic | ICD-10-CM

## 2017-07-10 DIAGNOSIS — K63.5 POLYP OF COLON, UNSPECIFIED PART OF COLON, UNSPECIFIED TYPE: ICD-10-CM

## 2017-07-10 DIAGNOSIS — D64.9 ANEMIA, UNSPECIFIED TYPE: ICD-10-CM

## 2017-07-10 DIAGNOSIS — M1A.09X0 IDIOPATHIC CHRONIC GOUT OF MULTIPLE SITES WITHOUT TOPHUS: Chronic | ICD-10-CM

## 2017-07-10 DIAGNOSIS — I10 ESSENTIAL HYPERTENSION: Primary | ICD-10-CM

## 2017-07-10 DIAGNOSIS — N18.30 TYPE 2 DIABETES MELLITUS WITH STAGE 3 CHRONIC KIDNEY DISEASE, WITHOUT LONG-TERM CURRENT USE OF INSULIN: ICD-10-CM

## 2017-07-10 DIAGNOSIS — E78.5 HYPERLIPIDEMIA, UNSPECIFIED HYPERLIPIDEMIA TYPE: ICD-10-CM

## 2017-07-10 DIAGNOSIS — I48.0 PAROXYSMAL ATRIAL FIBRILLATION: ICD-10-CM

## 2017-07-10 DIAGNOSIS — H43.22 ASTEROID HYALOSIS OF LEFT EYE: ICD-10-CM

## 2017-07-10 DIAGNOSIS — J32.9 CHRONIC SINUSITIS, UNSPECIFIED LOCATION: ICD-10-CM

## 2017-07-10 DIAGNOSIS — I70.0 ATHEROSCLEROSIS OF AORTA: Chronic | ICD-10-CM

## 2017-07-10 DIAGNOSIS — E11.22 TYPE 2 DIABETES MELLITUS WITH STAGE 3 CHRONIC KIDNEY DISEASE, WITHOUT LONG-TERM CURRENT USE OF INSULIN: ICD-10-CM

## 2017-07-10 LAB
CREAT UR-MCNC: 167 MG/DL
MICROALBUMIN UR DL<=1MG/L-MCNC: 184 UG/ML
MICROALBUMIN/CREATININE RATIO: 110.2 UG/MG

## 2017-07-10 PROCEDURE — 99214 OFFICE O/P EST MOD 30 MIN: CPT | Mod: S$GLB,,, | Performed by: FAMILY MEDICINE

## 2017-07-10 PROCEDURE — 99999 PR PBB SHADOW E&M-EST. PATIENT-LVL V: CPT | Mod: PBBFAC,,, | Performed by: FAMILY MEDICINE

## 2017-07-10 PROCEDURE — 99499 UNLISTED E&M SERVICE: CPT | Mod: S$GLB,,, | Performed by: FAMILY MEDICINE

## 2017-07-10 PROCEDURE — 82570 ASSAY OF URINE CREATININE: CPT

## 2017-07-10 NOTE — DISCHARGE SUMMARY
[facility] Hospital Medicine  Discharge Summary      Patient Name: Dacia Martínez  MRN: [id number]  Admission Date: 7/7/2017  Hospital Length of Stay: 1 day  Discharge Date: 7/8/2017  Attending Physician: Dr. Fowler  Discharging Provider: Padmini Alfaro PA-C  Primary Care Provider: Cali Vickers MD  [facility] Medicine Team: [facility] CARDIOLOGY TEAM C - FELLOWS/CONSULTS Padmini Alfaro PA-C    HPI:   82F with PMHx of atrial fib s/p DCCV, HTN, CAD, breast cancer, migraines, IDDM, CKD 3 presents for evaluation of sudden onset episode of palpitations/tachycardia. She denies any CP, SOB, fever/chills, abdominal complaints, urinary symptoms, or neurologic changes or confusion. She endorses a sensation of dizziness that comes and goes and she endorses chronic headaches that move around her head, she also states she has changes to her color perception that is also chronic. She denies taking any decongestants for her headache, she has taken tylenol without success.     Intake labs grossly unremarkable/stable. BNP near baseline, troponin at baseline. EKG in afib with RVR, given lopressor 5 mg x 2 and diltiazem and patient's rate controlled, still in afib.     * No surgery found *      Indwelling Lines/Drains at time of discharge:   Lines/Drains/Airways          No matching active lines, drains, or airways        Hospital Course:   Placed in observation overnight for AFib with RVR (HR 90-130s). Rate improved with beta blockers and cardiology consulted. Recommend to transition to metoprolol (from atenolol) and titrate PRN for HR < 100. Patient to follow up with EP next week to discuss repeat REJI and DCCV. Counseled on importance of compliance with eliquis to both patient and family. Discharged home in stable condition with close outpatient follow up.      Consults:   Consults         Status Ordering Provider     Inpatient consult to Cardiology  Once     Provider:  (Not yet assigned)    Completed BELINDA  MANDIE          Significant Diagnostic Studies: Labs: All labs within the past 24 hours have been reviewed    Pending Diagnostic Studies:     None        Final Active Diagnoses:    Diagnosis Date Noted POA    Atrial fibrillation [I48.91] 06/05/2017 Yes    Type 2 diabetes mellitus with stage 3 chronic kidney disease, without long-term current use of insulin [E11.22, N18.3] 11/02/2016 Yes    Essential hypertension [I10] 01/10/2013 Yes      Problems Resolved During this Admission:    Diagnosis Date Noted Date Resolved POA    PRINCIPAL PROBLEM:  Atrial fibrillation with rapid ventricular response [I48.91] 07/08/2017 07/08/2017 Yes      Atrial fibrillation    - see above          Type 2 diabetes mellitus with stage 3 chronic kidney disease, without long-term current use of insulin    - hold trulicity while in hospital, low dose SSI        Essential hypertension    HLD  - continue chlorthalidone daily, she uses lasix PRN edema  - continue statin  - was on norvasc and lisinopril, d/c'd at last admit as atenolol was increased for afib  - hydralazine PRN  - Follow up PCP and Cardiology        * Atrial fibrillation with rapid ventricular response-resolved as of 7/8/2017    - successful DCCV 06/2017, atenolol was increased at that time and patient started on eliquis. Echo at that time EF 60%, PAP elevated, TSH wnl, no electrolyte derangements   - rate controlled after IV lopressor 5 mg x2, and diltiazem 10 mg x1 in ED  - Change to lopressor 50 mg BID to goal HR < 100 (and d/c atenolol per cardiology recs)  - Rate controlled at time of discharge   - Cardiology recs appreciated and plan for follow up with EP next week to arrange REJI and DCCV as outpatient  - strict compliance with eliquis reiterated to patient and family             Discharged Condition: good    Disposition: Home or Self Care    Follow Up:  Follow-up Information     [facility] as [facility]     Specialty:  Cardiology  Why:  APPOINTMENT IN 1 WEEK  Contact information:  [address]  [facility] [address] [phone]           Cali Vickers MD.    Specialty:  Family Medicine  Why:  Follow up as scheduled  Contact information:  [address]  [facility] [address]  [phone]                 Patient Instructions:     Ambulatory consult to Electrophysiology   Referral Priority: Routine Referral Type: Consultation   Referral Reason: Specialty Services Required    Requested Specialty: Electrophysiology    Number of Visits Requested: 1      Diet general   Order Specific Question Answer Comments   Additional restrictions: Diabetic 1800    Additional restrictions: Low Sodium,2gm      Activity as tolerated     Call MD for:  temperature >100.4     Call MD for:  persistent nausea and vomiting or diarrhea     Call MD for:  severe uncontrolled pain     Call MD for:  severe persistent headache     Call MD for:  increased confusion or weakness     Call MD for:  persistent dizziness, light-headedness, or visual disturbances       Medications:  Reconciled Home Medications:   Discharge Medication List as of 7/8/2017 12:06 PM      START taking these medications    Details   metoprolol tartrate (LOPRESSOR) 50 MG tablet Take 1 tablet (50 mg total) by mouth 2 (two) times daily., Starting Sat 7/8/2017, Until Sun 7/8/2018, Normal         CONTINUE these medications which have NOT CHANGED    Details   ACCU-CHEK FASTCLIX Misc 1 lancet by Misc.(Non-Drug; Combo Route) route 3 (three) times daily. Pt to test blood glucose up to 3 times daily., Starting Tue 6/13/2017, Normal      allopurinol (ZYLOPRIM) 300 MG tablet Take 1.5 tablets (450 mg total) by mouth once daily., Starting Mon 5/1/2017, Until Thu 6/15/2017, Print      apixaban 5 mg Tab Take 1 tablet (5 mg total) by mouth 2 (two) times daily., Starting Tue 6/6/2017, Normal      atorvastatin (LIPITOR) 40 MG tablet Starting 3/29/2017, Until Discontinued,  Historical Med      azelastine (ASTELIN) 137 mcg (0.1 %) nasal spray 1 spray (137 mcg total) by Nasal route 2 (two) times daily., Starting 4/20/2016, Until Discontinued, Normal      blood sugar diagnostic (ACCU-CHEK TOMASA) Strp 1 strip by Misc.(Non-Drug; Combo Route) route once daily., Starting 11/4/2016, Until Discontinued, Normal      chlorhexidine (PERIDEX) 0.12 % solution FILL CAP TO THE FILL LINE, SWISH IN MOUTH UNDILUTED for 30 second...  (REFER TO PRESCRIPTION NOTES)., Historical Med      chlorthalidone (HYGROTEN) 25 MG Tab Take 1 tablet (25 mg total) by mouth once daily., Starting 1/11/2017, Until Thu 1/11/18, Normal      !! COL-RITE 100 mg capsule Starting 2/16/2017, Until Discontinued, Historical Med      !! docusate sodium (COLACE) 50 MG capsule Take 1 capsule (50 mg total) by mouth 2 (two) times daily., Starting 10/18/2016, Until Discontinued, Normal      dulaglutide (TRULICITY) 0.75 mg/0.5 mL PnIj Inject 0.75 mg into the skin every 7 days., Starting 11/4/2016, Until Discontinued, Normal      fluticasone (FLONASE) 50 mcg/actuation nasal spray instill 2 sprays into each nostril once daily, Normal      furosemide (LASIX) 20 MG tablet Take 1 tablet (20 mg total) by mouth daily as needed., Starting 1/11/2017, Until Tue 3/14/17, Normal      hydrocodone-acetaminophen 5-325mg (NORCO) 5-325 mg per tablet Take 1 tablet by mouth every 6 (six) hours as needed for Pain., Starting Tue 6/27/2017, Until Thu 7/27/2017, Print      !! meclizine (ANTIVERT) 25 mg tablet Take 1 tablet (25 mg total) by mouth 3 (three) times daily as needed., Starting 2/21/2016, Until Discontinued, Print      !! meclizine (ANTIVERT) 25 mg tablet Take 1 tablet (25 mg total) by mouth 3 (three) times daily as needed for Dizziness., Starting Mon 6/19/2017, Print      mupirocin (BACTROBAN) 2 % ointment by Nasal route 2 (two) times daily. Apply with swab to inside of nose, Starting 3/14/2017, Until Discontinued, Normal      omeprazole (PRILOSEC) 20  MG capsule Take 1 capsule (20 mg total) by mouth once daily., Starting 6/26/2015, Until Tue 3/14/17, Normal      polyethylene glycol (GLYCOLAX) 17 gram PwPk Take 17 g by mouth daily as needed. Dissolve 1 heaping tablespoon and 4-8 ounces of water.  Take once a day as needed for constipation., Starting 10/18/2016, Until Discontinued, Normal      predniSONE (DELTASONE) 10 MG tablet take 1 tablet by mouth daily if needed for gout, Print      triamcinolone acetonide 0.1% (KENALOG) 0.1 % ointment Apply topically 2 (two) times daily., Starting Wed 7/5/2017, Normal       !! - Potential duplicate medications found. Please discuss with provider.      STOP taking these medications       atenolol (TENORMIN) 50 MG tablet Comments:   Reason for Stopping:         atenolol (TENORMIN) 50 MG tablet Comments:   Reason for Stopping:         ibuprofen (ADVIL,MOTRIN) 800 MG tablet Comments:   Reason for Stopping:         RIVAROXABAN (XARELTO ORAL) Comments:   Reason for Stopping:             Time spent on the discharge of patient: 38 minutes    Padmini Alfaro PA-C  Department of Hospital Medicine  [facility]

## 2017-07-10 NOTE — HOSPITAL COURSE
Placed in observation overnight for AFib with RVR (HR 90-130s). Rate improved with beta blockers and cardiology consulted. Recommend to transition to metoprolol (from atenolol) and titrate PRN for HR < 100. Patient to follow up with EP next week to discuss repeat REJI and DCCV. Counseled on importance of compliance with eliquis to both patient and family. Discharged home in stable condition with close outpatient follow up.

## 2017-07-10 NOTE — PROGRESS NOTES
"Subjective:       Patient ID: Dacia Martínez is a 82 y.o. female.    Chief Complaint: No chief complaint on file.  Dacia Martínez 82 y.o. female is here for office visit to review care and physical exam, here for f/u appt s/p D/C when admitted for SOB and palpitations.  Note had previous admission same issue, had a REJI and DCCV, subsequently and put on apixaban due to elevated [id number] score. Post DCCV she remained in sinus rhythm and discharged home on beta blockers doing well until next admission.  Admit consutt noted "forgotten to take her Elliquis "couple of times" in the past month. She did not remember the onset of palpitations. She was unaware of being on it the previous time when referred from PCP office. "   Cards recommended using metoprolol rather than coreg.     Transitional Care Note    Family and/or Caretaker present at visit?  yes  Diagnostic tests reviewed/disposition: yes.  Disease/illness education: yes  Home health/community services discussion/referrals: . no  Establishment or re-establishment of referral orders for community resources: no.   Discussion with other health care providers: no.         .  HPI  Review of Systems   Constitutional: Negative for activity change, fatigue, fever and unexpected weight change.   HENT: Negative for congestion, hearing loss, postnasal drip and rhinorrhea.    Eyes: Negative for redness and visual disturbance.   Respiratory: Negative for chest tightness, shortness of breath and wheezing.    Cardiovascular: Negative for chest pain, palpitations and leg swelling.   Gastrointestinal: Negative for abdominal distention.   Genitourinary: Negative for decreased urine volume, dysuria, flank pain, hematuria, pelvic pain and urgency.   Musculoskeletal: Negative for back pain, gait problem, joint swelling and neck stiffness.   Skin: Negative for color change, rash and wound.   Neurological: Negative for dizziness, syncope, weakness and headaches.   Psychiatric/Behavioral: " Negative for behavioral problems, confusion and sleep disturbance. The patient is not nervous/anxious.        Objective:      Physical Exam   Constitutional: She is oriented to person, place, and time. She appears well-developed and well-nourished. No distress.   HENT:   Head: Normocephalic.   Mouth/Throat: No oropharyngeal exudate.   Eyes: EOM are normal. Pupils are equal, round, and reactive to light. No scleral icterus.   Neck: Neck supple. No JVD present. No thyromegaly present.   Cardiovascular: Normal rate, regular rhythm and normal heart sounds.  Exam reveals no gallop and no friction rub.    No murmur heard.  Irr/Irr   Pulmonary/Chest: Effort normal and breath sounds normal. She has no wheezes. She has no rales.   Abdominal: Soft. Bowel sounds are normal. She exhibits no distension and no mass. There is no tenderness. There is no guarding.   Musculoskeletal: Normal range of motion. She exhibits no edema.   Lymphadenopathy:     She has no cervical adenopathy.   Neurological: She is alert and oriented to person, place, and time. She has normal reflexes. She displays normal reflexes. No cranial nerve deficit. She exhibits normal muscle tone.   Skin: Skin is warm. No rash noted. No erythema.   Psychiatric: She has a normal mood and affect. Thought content normal.       Assessment:       No diagnosis found.    Plan:       Dacia was seen today for hospital follow up.    Diagnoses and all orders for this visit:    Essential hypertension  - controled  Type 2 diabetes mellitus with stage 3 chronic kidney disease, without long-term current use of insulin  -     MICROALBUMIN / CREATININE RATIO URINE    Paroxysmal atrial fibrillation  - Has f/u  Hyperlipidemia, unspecified hyperlipidemia type  - Follow  Idiopathic chronic gout of multiple sites without tophus  - No new concern  Primary osteoarthritis involving multiple joints  - No new concern  Polyp of colon, unspecified part of colon, unspecified type  -      Ambulatory Referral to Gastroenterology    Chronic sinusitis, unspecified location  - Discussed, Flonase used  Anemia, unspecified type  -     Ambulatory Referral to Gastroenterology    Asteroid hyalosis of left eye  - Sees Maddi, has f/u  Atherosclerosis of aorta    - Discussed, no NSAISDs used

## 2017-07-10 NOTE — ASSESSMENT & PLAN NOTE
HLD  - continue chlorthalidone daily, she uses lasix PRN edema  - continue statin  - was on norvasc and lisinopril, d/c'd at last admit as atenolol was increased for afib  - hydralazine PRN  - Follow up PCP and Cardiology

## 2017-07-10 NOTE — ASSESSMENT & PLAN NOTE
- successful DCCV 06/2017, atenolol was increased at that time and patient started on eliquis. Echo at that time EF 60%, PAP elevated, TSH wnl, no electrolyte derangements   - rate controlled after IV lopressor 5 mg x2, and diltiazem 10 mg x1 in ED  - Change to lopressor 50 mg BID to goal HR < 100 (and d/c atenolol per cardiology recs)  - Rate controlled at time of discharge   - Cardiology recs appreciated and plan for follow up with EP next week to arrange REJI and DCCV as outpatient  - strict compliance with eliquis reiterated to patient and family

## 2017-07-18 ENCOUNTER — OFFICE VISIT (OUTPATIENT)
Dept: ELECTROPHYSIOLOGY | Facility: CLINIC | Age: 82
End: 2017-07-18
Payer: MEDICARE

## 2017-07-18 ENCOUNTER — HOSPITAL ENCOUNTER (OUTPATIENT)
Dept: CARDIOLOGY | Facility: CLINIC | Age: 82
Discharge: HOME | End: 2017-07-18
Payer: MEDICARE

## 2017-07-18 VITALS
BODY MASS INDEX: 29.89 KG/M2 | HEART RATE: 114 BPM | SYSTOLIC BLOOD PRESSURE: 102 MMHG | WEIGHT: 158.31 LBS | HEIGHT: 61 IN | DIASTOLIC BLOOD PRESSURE: 78 MMHG

## 2017-07-18 DIAGNOSIS — I48.0 PAROXYSMAL ATRIAL FIBRILLATION: Primary | ICD-10-CM

## 2017-07-18 DIAGNOSIS — I48.91 ATRIAL FIBRILLATION, UNSPECIFIED TYPE: ICD-10-CM

## 2017-07-18 DIAGNOSIS — Z92.89 HISTORY OF CARDIOVERSION: ICD-10-CM

## 2017-07-18 PROCEDURE — 99999 PR PBB SHADOW E&M-EST. PATIENT-LVL III: CPT | Mod: PBBFAC,,, | Performed by: NURSE PRACTITIONER

## 2017-07-18 PROCEDURE — 1126F AMNT PAIN NOTED NONE PRSNT: CPT | Mod: S$GLB,,, | Performed by: NURSE PRACTITIONER

## 2017-07-18 PROCEDURE — 99499 UNLISTED E&M SERVICE: CPT | Mod: S$GLB,,, | Performed by: NURSE PRACTITIONER

## 2017-07-18 PROCEDURE — 99214 OFFICE O/P EST MOD 30 MIN: CPT | Mod: S$GLB,,, | Performed by: NURSE PRACTITIONER

## 2017-07-18 PROCEDURE — 93000 ELECTROCARDIOGRAM COMPLETE: CPT | Mod: S$GLB,,, | Performed by: INTERNAL MEDICINE

## 2017-07-18 PROCEDURE — 1159F MED LIST DOCD IN RCRD: CPT | Mod: S$GLB,,, | Performed by: NURSE PRACTITIONER

## 2017-07-18 RX ORDER — METOPROLOL TARTRATE 100 MG/1
100 TABLET ORAL 2 TIMES DAILY
Qty: 60 TABLET | Refills: 0 | Status: SHIPPED | OUTPATIENT
Start: 2017-07-18 | End: 2017-08-28 | Stop reason: SDUPTHER

## 2017-07-18 NOTE — PROGRESS NOTES
"Subjective:    Patient ID:  Dacia Martínez is a 82 y.o. female who presents for follow-up of post DCCV    Dacia Martínez is a patient of Dr. Burt.     HPI     Ms. Martínez is an 82 year old female with pAF s/p DCCV (06/06/17) HTN, CAD, breast cancer, migraines, IDDM, and CKD III, who presented to [facility] ED (07/07/17) for evaluation of a sudden-onset episode of palpitations/tachycardia. She was found to be in AF w/RVR at 120 bpm; an Echo at the time revealed an EF of 60%. Ms. Martínez was rate-controlled and discharged home.     Since discharge, Ms. Martínez notes only occasional fast rates; "not anything like before." She reports experiencing occasional dizziness; baseline. She denies chest pain, SOB/MCCOLLUM, palpitations, or syncope.     I reviewed today's ECG which demonstrated AF w/RVR at 114 bpm; QRS 82, and QTc 476.    Review of Systems   Constitution: Negative for diaphoresis and malaise/fatigue.   HENT: Negative for headaches and nosebleeds.    Eyes: Negative for double vision.   Cardiovascular: Positive for irregular heartbeat (occasional elevated HR). Negative for chest pain, dyspnea on exertion, near-syncope, palpitations and syncope.   Respiratory: Negative for shortness of breath.    Skin: Negative.    Musculoskeletal: Negative.    Gastrointestinal: Negative for abdominal pain, hematemesis and hematochezia.   Genitourinary: Negative for hematuria.   Neurological: Positive for dizziness and light-headedness.   Psychiatric/Behavioral: Negative for altered mental status.        Objective:    Physical Exam   Constitutional: She is oriented to person, place, and time. She appears well-developed and well-nourished.   HENT:   Head: Normocephalic and atraumatic.   Eyes: Pupils are equal, round, and reactive to light.   Cardiovascular: Intact distal pulses.  An irregular rhythm present. Tachycardia present.    Pulmonary/Chest: Effort normal and breath sounds normal.   Neurological: She is alert and oriented to person, place, and " time.   Skin: She is not diaphoretic.   Vitals reviewed.        Assessment:       1. Paroxysmal atrial fibrillation    2. History of cardioversion         Plan:       Ms. Martínez presents to EP clinic today in asymptomatic AF w/RVR. She has been rate-controlled on Lopressor 50 mg PO BID and anticoagulated on Eliquis.     Increase Lopressor to 100 mg PO BID for better rate-control.   Follow up in EP clinic in 3 months, sooner as needed.     Tracie Dexter, MN, APRN, FNP-C

## 2017-07-19 ENCOUNTER — TELEPHONE (OUTPATIENT)
Dept: ELECTROPHYSIOLOGY | Facility: CLINIC | Age: 82
End: 2017-07-19

## 2017-07-19 DIAGNOSIS — I48.91 ATRIAL FIBRILLATION, UNSPECIFIED TYPE: Primary | ICD-10-CM

## 2017-07-25 RX ORDER — HYDROCODONE BITARTRATE AND ACETAMINOPHEN 5; 325 MG/1; MG/1
1 TABLET ORAL EVERY 6 HOURS PRN
Qty: 60 TABLET | Refills: 0 | Status: SHIPPED | OUTPATIENT
Start: 2017-07-25 | End: 2017-08-21 | Stop reason: SDUPTHER

## 2017-07-31 NOTE — LETTER
July 31, 2017    Dacia Martínez  [address] [address]             [facility] - Otorhinolaryngology  [address]  [place] [address]  Phone: [phone]  Fax: [phone] Dear Mrs. Dacia Martínez:    We are sorry that you missed your appointment with GUILLERMO Morgan MD on 7/31/2017. Your health and follow-up medical care are important to us. Please call our office as soon as possible so that we may reschedule your appointment. If you have already rescheduled your appointment, please disregard this letter.    Sincerely,        Debbie Garcia MA

## 2017-07-31 NOTE — LETTER
August 4, 2017    Dacia Martínez  [address] [address]             [facility] - Otorhinolaryngology  [address]  [place] [address]  Phone: [phone]  Fax: [phone] Dear Dacia Martínez:    We are sorry that you missed your appointment with Dr. Ribeiro on 7/31/2017. Your health and follow-up medical care are important to us. Please call our office as soon as possible so that we may reschedule your appointment. If you have already rescheduled your appointment, please disregard this letter.    Sincerely,        Arti Singh MA

## 2017-08-04 NOTE — ADDENDUM NOTE
Encounter addended by: Arti Singh MA on: 8/4/2017  2:35 PM<BR>    Actions taken: Letter status changed

## 2017-08-07 RX ORDER — PREDNISONE 10 MG/1
TABLET ORAL
Qty: 30 TABLET | Refills: 0 | Status: SHIPPED | OUTPATIENT
Start: 2017-08-07 | End: 2017-08-24 | Stop reason: SDUPTHER

## 2017-08-08 ENCOUNTER — OFFICE VISIT (OUTPATIENT)
Dept: OPHTHALMOLOGY | Facility: CLINIC | Age: 82
End: 2017-08-08
Payer: MEDICARE

## 2017-08-08 DIAGNOSIS — E11.3291 NONPROLIFERATIVE DIABETIC RETINOPATHY OF RIGHT EYE: ICD-10-CM

## 2017-08-08 DIAGNOSIS — H43.22 ASTEROID HYALOSIS OF LEFT EYE: ICD-10-CM

## 2017-08-08 DIAGNOSIS — H35.3110 NONEXUDATIVE AGE-RELATED MACULAR DEGENERATION OF RIGHT EYE: ICD-10-CM

## 2017-08-08 DIAGNOSIS — E11.3293 CONTROLLED TYPE 2 DIABETES MELLITUS WITH BOTH EYES AFFECTED BY MILD NONPROLIFERATIVE RETINOPATHY WITHOUT MACULAR EDEMA, WITHOUT LONG-TERM CURRENT USE OF INSULIN: ICD-10-CM

## 2017-08-08 DIAGNOSIS — H35.3132 NONEXUDATIVE AGE-RELATED MACULAR DEGENERATION, BILATERAL, INTERMEDIATE DRY STAGE: Primary | ICD-10-CM

## 2017-08-08 PROCEDURE — 92014 COMPRE OPH EXAM EST PT 1/>: CPT | Mod: S$GLB,,, | Performed by: OPHTHALMOLOGY

## 2017-08-08 PROCEDURE — 99499 UNLISTED E&M SERVICE: CPT | Mod: S$GLB,,, | Performed by: OPHTHALMOLOGY

## 2017-08-08 PROCEDURE — 99999 PR PBB SHADOW E&M-EST. PATIENT-LVL III: CPT | Mod: PBBFAC,,, | Performed by: OPHTHALMOLOGY

## 2017-08-08 PROCEDURE — 92134 CPTRZ OPH DX IMG PST SGM RTA: CPT | Mod: S$GLB,,, | Performed by: OPHTHALMOLOGY

## 2017-08-08 PROCEDURE — 92226 PR SPECIAL EYE EXAM, SUBSEQUENT: CPT | Mod: 50,S$GLB,, | Performed by: OPHTHALMOLOGY

## 2017-08-08 NOTE — PROGRESS NOTES
HPI     Eye Problem    Additional comments: yearly check           Comments   DLS 8/5/17 Vision seems to be getting worse. She also c/o seeing different colors in her vision at times. She thinks her BS is doing ok but she was recently changed to a different med that she uses once a week.      Previously done OCT  OD: Fibrotic PED, Vit-mac adhesion  OS: lamellar hole , ERM    A/P    1. Dry AMD OD  -Pt asymptomatic for central vision changes  -AREDS/Amsler monitoring  -Monitor annually    2. Mild NPDR OD  No DME, T2  -Continue BS/BP/chol control    3. Asteroid hyalosis OS  -Dense - but ASx    4. PCIOL OU    5. Pt. Complained of a brown table surface appearing black for a few minutes - denies amaurosis sx's  Unclear etiology - two episodes in 3 years  monitor          1 yr.

## 2017-08-14 ENCOUNTER — OFFICE VISIT (OUTPATIENT)
Dept: INTERNAL MEDICINE | Facility: CLINIC | Age: 82
End: 2017-08-14
Payer: MEDICARE

## 2017-08-14 ENCOUNTER — TELEPHONE (OUTPATIENT)
Dept: OTOLARYNGOLOGY | Facility: CLINIC | Age: 82
End: 2017-08-14

## 2017-08-14 VITALS
SYSTOLIC BLOOD PRESSURE: 133 MMHG | DIASTOLIC BLOOD PRESSURE: 88 MMHG | BODY MASS INDEX: 27.38 KG/M2 | WEIGHT: 145 LBS | HEIGHT: 61 IN | HEART RATE: 84 BPM

## 2017-08-14 DIAGNOSIS — I48.0 PAROXYSMAL ATRIAL FIBRILLATION: ICD-10-CM

## 2017-08-14 DIAGNOSIS — N18.30 TYPE 2 DIABETES MELLITUS WITH STAGE 3 CHRONIC KIDNEY DISEASE, WITHOUT LONG-TERM CURRENT USE OF INSULIN: Primary | ICD-10-CM

## 2017-08-14 DIAGNOSIS — E66.9 OBESITY (BMI 30-39.9): Chronic | ICD-10-CM

## 2017-08-14 DIAGNOSIS — E11.22 TYPE 2 DIABETES MELLITUS WITH STAGE 3 CHRONIC KIDNEY DISEASE, WITHOUT LONG-TERM CURRENT USE OF INSULIN: Primary | ICD-10-CM

## 2017-08-14 DIAGNOSIS — D50.0 IRON DEFICIENCY ANEMIA DUE TO CHRONIC BLOOD LOSS: ICD-10-CM

## 2017-08-14 DIAGNOSIS — J32.0 CHRONIC MAXILLARY SINUSITIS: ICD-10-CM

## 2017-08-14 PROCEDURE — 99499 UNLISTED E&M SERVICE: CPT | Mod: S$GLB,,, | Performed by: FAMILY MEDICINE

## 2017-08-14 PROCEDURE — 99215 OFFICE O/P EST HI 40 MIN: CPT | Mod: S$GLB,,, | Performed by: FAMILY MEDICINE

## 2017-08-14 PROCEDURE — 1126F AMNT PAIN NOTED NONE PRSNT: CPT | Mod: S$GLB,,, | Performed by: FAMILY MEDICINE

## 2017-08-14 PROCEDURE — 3075F SYST BP GE 130 - 139MM HG: CPT | Mod: S$GLB,,, | Performed by: FAMILY MEDICINE

## 2017-08-14 PROCEDURE — 3079F DIAST BP 80-89 MM HG: CPT | Mod: S$GLB,,, | Performed by: FAMILY MEDICINE

## 2017-08-14 PROCEDURE — 99999 PR PBB SHADOW E&M-EST. PATIENT-LVL III: CPT | Mod: PBBFAC,,, | Performed by: FAMILY MEDICINE

## 2017-08-14 PROCEDURE — 1159F MED LIST DOCD IN RCRD: CPT | Mod: S$GLB,,, | Performed by: FAMILY MEDICINE

## 2017-08-14 PROCEDURE — 3008F BODY MASS INDEX DOCD: CPT | Mod: S$GLB,,, | Performed by: FAMILY MEDICINE

## 2017-08-14 RX ORDER — FLUTICASONE PROPIONATE 50 MCG
2 SPRAY, SUSPENSION (ML) NASAL DAILY
Qty: 16 G | Refills: 12 | Status: SHIPPED | OUTPATIENT
Start: 2017-08-14 | End: 2017-09-13

## 2017-08-14 NOTE — PROGRESS NOTES
Subjective:       Patient ID: Dacia Martínez is a 82 y.o. female.    Chief Complaint: Sinus Problem  Dacia Martínez 82 y.o. female is here for office visit to review care and physical exam,  HPI  Review of Systems   Constitutional: Negative for activity change, fatigue, fever and unexpected weight change.   HENT: Positive for rhinorrhea and sinus pressure. Negative for congestion, hearing loss and postnasal drip.    Eyes: Negative for redness and visual disturbance.   Respiratory: Negative for chest tightness, shortness of breath and wheezing.    Cardiovascular: Negative for chest pain, palpitations and leg swelling.   Gastrointestinal: Negative for abdominal distention.   Genitourinary: Negative for decreased urine volume, dysuria, flank pain, hematuria, pelvic pain and urgency.   Musculoskeletal: Negative for back pain, gait problem, joint swelling and neck stiffness.   Skin: Negative for color change, rash and wound.   Neurological: Positive for headaches. Negative for dizziness, syncope and weakness.   Psychiatric/Behavioral: Negative for behavioral problems, confusion and sleep disturbance. The patient is not nervous/anxious.        Objective:      Physical Exam   Constitutional: She is oriented to person, place, and time. She appears well-developed and well-nourished. No distress.   HENT:   Head: Normocephalic.   Mouth/Throat: No oropharyngeal exudate.   Blue and boggy turbinates   Eyes: EOM are normal. Pupils are equal, round, and reactive to light. No scleral icterus.   Neck: Neck supple. No JVD present. No thyromegaly present.   Cardiovascular: Normal rate, regular rhythm and normal heart sounds.  Exam reveals no gallop and no friction rub.    No murmur heard.  Pulmonary/Chest: Effort normal and breath sounds normal. She has no wheezes. She has no rales.   Abdominal: Soft. Bowel sounds are normal. She exhibits no distension and no mass. There is no tenderness. There is no guarding.   Musculoskeletal: Normal  range of motion. She exhibits no edema.   Lymphadenopathy:     She has no cervical adenopathy.   Neurological: She is alert and oriented to person, place, and time. She has normal reflexes. She displays normal reflexes. No cranial nerve deficit. She exhibits normal muscle tone.   Skin: Skin is warm. No rash noted. No erythema.   Psychiatric: She has a normal mood and affect. Thought content normal.       Assessment:       No diagnosis found.    Plan:       Dacia was seen today for sinus problem.    Diagnoses and all orders for this visit:    Type 2 diabetes mellitus with stage 3 chronic kidney disease, without long-term current use of insulin  - Controled  Paroxysmal atrial fibrillation  - Advised againsdt using otc decongestants  Chronic maxillary sinusitis  -     fluticasone (FLONASE) 50 mcg/actuation nasal spray; 2 sprays by Each Nare route once daily.   - steam with Vics advised as well  Iron deficiency anemia due to chronic blood loss  - Follow  Obesity (BMI 30-39.9)  - Loasing weight, DM controled

## 2017-08-17 ENCOUNTER — TELEPHONE (OUTPATIENT)
Dept: INTERNAL MEDICINE | Facility: CLINIC | Age: 82
End: 2017-08-17

## 2017-08-17 RX ORDER — FLUCONAZOLE 150 MG/1
150 TABLET ORAL EVERY OTHER DAY
Qty: 3 TABLET | Refills: 0 | Status: SHIPPED | OUTPATIENT
Start: 2017-08-17 | End: 2017-08-18

## 2017-08-17 NOTE — TELEPHONE ENCOUNTER
----- Message from Paulie Dixon sent at 8/16/2017  8:29 AM CDT -----  Contact: Self [phone]  The above pt is requesting a Rx for yeast Infection, requesting that you send it to [facility], please call when done, advice    Thanks

## 2017-08-21 RX ORDER — HYDROCODONE BITARTRATE AND ACETAMINOPHEN 5; 325 MG/1; MG/1
1 TABLET ORAL EVERY 6 HOURS PRN
Qty: 60 TABLET | Refills: 0 | Status: SHIPPED | OUTPATIENT
Start: 2017-08-21 | End: 2017-08-22 | Stop reason: SDUPTHER

## 2017-08-22 ENCOUNTER — OFFICE VISIT (OUTPATIENT)
Dept: OTOLARYNGOLOGY | Facility: CLINIC | Age: 82
End: 2017-08-22
Payer: MEDICARE

## 2017-08-22 ENCOUNTER — TELEPHONE (OUTPATIENT)
Dept: RHEUMATOLOGY | Facility: CLINIC | Age: 82
End: 2017-08-22

## 2017-08-22 VITALS — SYSTOLIC BLOOD PRESSURE: 144 MMHG | TEMPERATURE: 98 F | DIASTOLIC BLOOD PRESSURE: 108 MMHG | HEART RATE: 93 BPM

## 2017-08-22 DIAGNOSIS — J01.20 ACUTE ETHMOIDAL SINUSITIS, RECURRENCE NOT SPECIFIED: Primary | ICD-10-CM

## 2017-08-22 DIAGNOSIS — K11.8 SUBMANDIBULAR GLAND TENDERNESS: ICD-10-CM

## 2017-08-22 DIAGNOSIS — R51.9 PRESSURE IN HEAD: ICD-10-CM

## 2017-08-22 DIAGNOSIS — J35.1 TONSILLAR HYPERTROPHY: ICD-10-CM

## 2017-08-22 DIAGNOSIS — R51.9 FRONTAL HEADACHE: ICD-10-CM

## 2017-08-22 PROCEDURE — 3077F SYST BP >= 140 MM HG: CPT | Mod: S$GLB,,, | Performed by: OTOLARYNGOLOGY

## 2017-08-22 PROCEDURE — 3008F BODY MASS INDEX DOCD: CPT | Mod: S$GLB,,, | Performed by: OTOLARYNGOLOGY

## 2017-08-22 PROCEDURE — 99999 PR PBB SHADOW E&M-EST. PATIENT-LVL III: CPT | Mod: PBBFAC,,, | Performed by: OTOLARYNGOLOGY

## 2017-08-22 PROCEDURE — 1126F AMNT PAIN NOTED NONE PRSNT: CPT | Mod: S$GLB,,, | Performed by: OTOLARYNGOLOGY

## 2017-08-22 PROCEDURE — 1159F MED LIST DOCD IN RCRD: CPT | Mod: S$GLB,,, | Performed by: OTOLARYNGOLOGY

## 2017-08-22 PROCEDURE — 99213 OFFICE O/P EST LOW 20 MIN: CPT | Mod: S$GLB,,, | Performed by: OTOLARYNGOLOGY

## 2017-08-22 PROCEDURE — 3080F DIAST BP >= 90 MM HG: CPT | Mod: S$GLB,,, | Performed by: OTOLARYNGOLOGY

## 2017-08-22 RX ORDER — AMOXICILLIN 500 MG/1
CAPSULE ORAL
Qty: 20 CAPSULE | Refills: 0 | Status: SHIPPED | OUTPATIENT
Start: 2017-08-22 | End: 2017-10-11

## 2017-08-22 RX ORDER — HYDROCODONE BITARTRATE AND ACETAMINOPHEN 5; 325 MG/1; MG/1
1 TABLET ORAL EVERY 6 HOURS PRN
Qty: 60 TABLET | Refills: 0 | Status: SHIPPED | OUTPATIENT
Start: 2017-08-22 | End: 2017-09-15 | Stop reason: SDUPTHER

## 2017-08-22 NOTE — PATIENT INSTRUCTIONS
Previous sinus CT reviewed with patient  ( Previously prescribed) Rx for Esgic re-prescribed    pt. may  at [facility] pharmacy downstairs ( errantly sent to [facility]) ; 1 pill q 6 hours prn headache # 15; no refill  Rx for Amoxil 500 mg #20; take BID x 10 days  Monitor temperature  Continue use of Flonase as previously prescribed; 1-2 sprays @ lateral nostril once a day x 3-6 weeks;      may use with Astelin ( one spray @ nosttril BID)   Call for any significant change in status

## 2017-08-22 NOTE — PROGRESS NOTES
"Subjective:       Patient ID: Dacia Martínez is a 82 y.o. female.    Chief Complaint: No chief complaint on file.    HPI: Ms. Martínez is an 82 year old AAf who is accompanied by her significant other today.  She did not get the prescription of Esgic I electronically prescribed for her headaches last week, sent to a [facility] which no longer exists.  She feels "miserable" today.  She describes frontal headaches and perinasal pain and pressure symptoms as well as eyes symptoms and pain beneath her mandible's.  She indicates clear nasal discharge presently.  Her sinus condition usually responds to ampicillin antibiotics.  She equates her symptoms to a sinus infection.    She is status post a sinus CT scan performed 6/1/17 which indicated mucosal thickening of the bilateral ethmoid air cells left greater than right.  There is remote fracture the inferior left orbital wall noted.    This study is reviewed with the patient at the computer screen today.    She completed an MRI scan of the brain 6/19/17 which indicated chronic microvascular ischemic changes but no significant intracranial abnormality.    She was recently examined by Dr. Cali Vickers her PCP 8/14/17 for chief complaint of "sinus problem.  She was diagnosed with type 2 diabetes mellitus was stage III chronic kidney disease, paroxysmal atrial fibrillation, chronic sinusitis treated with Flonase, iron deficiency anemia and obesity.    Review of Systems     Other:  Negative for rash.         Her medical problem list includes essential hypertension, aortic atherosclerosis, obesity, idiopathic chronic gout, hyperlipidemia, history of breast cancer, seasonal ALLERGIC rhinitis, cardiac murmur, anemia, type 2 diabetes was stage III CKD, chronic sinusitis, long-term use of opiate analgesics, atrial fibrillation, chronic pain syndrome, history of cardioversion, macular degeneration  Objective:     Blood pressure 144/108 pulse 93 temperature 98.0  Gen.: Alert and " oriented lady in some distress with her nasal  and headache symptoms.  Physical Exam   Constitutional: She is oriented to person, place, and time. She appears well-developed and well-nourished.   HENT:   Head: Normocephalic.   Right Ear: Tympanic membrane and external ear normal. No drainage. No foreign bodies. No mastoid tenderness. Tympanic membrane is not perforated. No decreased hearing is noted.   Left Ear: Tympanic membrane and external ear normal. No drainage. No foreign bodies. No mastoid tenderness. Tympanic membrane is not perforated. No decreased hearing is noted.   Ears:    Nose: Nose normal. No nasal deformity, septal deviation or nasal septal hematoma. No epistaxis. Right sinus exhibits no maxillary sinus tenderness and no frontal sinus tenderness. Left sinus exhibits no maxillary sinus tenderness and no frontal sinus tenderness.       Mouth/Throat: Uvula is midline, oropharynx is clear and moist and mucous membranes are normal. No oral lesions. No trismus in the jaw. No uvula swelling. No oropharyngeal exudate or tonsillar abscesses.       Neck: Neck supple. No tracheal deviation present. No thyromegaly present.   Pulmonary/Chest: Effort normal. No stridor.   Lymphadenopathy:     She has no cervical adenopathy.   Neurological: She is alert and oriented to person, place, and time.   Skin: No rash noted.       Assessment:       1. Acute ethmoidal sinusitis, recurrence not specified    2. Frontal headache    3. Pressure in head    4. Submandibular gland tenderness    5. Tonsillar hypertrophy        Plan:     Previous sinus CT reviewed with patient  ( Previously prescribed) Rx for Esgic straightened out;     pt. may  at pharmacy downstairs ( errantly sent to [facility] ; 1 pill q 6 hours prn headache # 15; no refill  Rx for Amoxil 500 mg #20; take BID x 10 days  Monitor temperature  Elevated blood pressure noted  Continue use of Flonase as previously prescribed; 1-2 sprays @ lateral nostril once a  day x 3-6 weeks;      may use with Astelin ( one spray @ nosttril BID)   Call for any significant change in status

## 2017-08-24 RX ORDER — PREDNISONE 10 MG/1
TABLET ORAL
Qty: 30 TABLET | Refills: 0 | Status: SHIPPED | OUTPATIENT
Start: 2017-08-24 | End: 2017-10-03 | Stop reason: SDUPTHER

## 2017-08-24 RX ORDER — ALLOPURINOL 300 MG/1
450 TABLET ORAL DAILY
Qty: 45 TABLET | Refills: 0 | Status: SHIPPED | OUTPATIENT
Start: 2017-08-24 | End: 2017-09-15 | Stop reason: SDUPTHER

## 2017-08-27 ENCOUNTER — HOSPITAL ENCOUNTER (EMERGENCY)
Facility: HOSPITAL | Age: 82
Discharge: HOME | End: 2017-08-27
Attending: EMERGENCY MEDICINE
Payer: MEDICARE

## 2017-08-27 VITALS
TEMPERATURE: 99 F | DIASTOLIC BLOOD PRESSURE: 92 MMHG | SYSTOLIC BLOOD PRESSURE: 164 MMHG | HEART RATE: 110 BPM | OXYGEN SATURATION: 100 % | HEIGHT: 61 IN | BODY MASS INDEX: 27.56 KG/M2 | WEIGHT: 146 LBS | RESPIRATION RATE: 16 BRPM

## 2017-08-27 DIAGNOSIS — M10.011 ACUTE IDIOPATHIC GOUT OF RIGHT SHOULDER: Primary | ICD-10-CM

## 2017-08-27 PROCEDURE — 99283 EMERGENCY DEPT VISIT LOW MDM: CPT | Mod: ,,, | Performed by: EMERGENCY MEDICINE

## 2017-08-27 PROCEDURE — 63600175 PHARM REV CODE 636 W HCPCS: Performed by: PAIN MEDICINE

## 2017-08-27 PROCEDURE — 99283 EMERGENCY DEPT VISIT LOW MDM: CPT

## 2017-08-27 PROCEDURE — 25000003 PHARM REV CODE 250: Performed by: PAIN MEDICINE

## 2017-08-27 RX ORDER — PREDNISONE 10 MG/1
10 TABLET ORAL DAILY
Qty: 20 TABLET | Refills: 0 | Status: SHIPPED | OUTPATIENT
Start: 2017-08-27 | End: 2017-09-16

## 2017-08-27 RX ORDER — HYDROCODONE BITARTRATE AND ACETAMINOPHEN 5; 325 MG/1; MG/1
1 TABLET ORAL
Status: COMPLETED | OUTPATIENT
Start: 2017-08-27 | End: 2017-08-27

## 2017-08-27 RX ORDER — PREDNISONE 20 MG/1
40 TABLET ORAL
Status: COMPLETED | OUTPATIENT
Start: 2017-08-27 | End: 2017-08-27

## 2017-08-27 RX ADMIN — HYDROCODONE BITARTRATE AND ACETAMINOPHEN 1 TABLET: 5; 325 TABLET ORAL at 07:08

## 2017-08-27 RX ADMIN — PREDNISONE 40 MG: 20 TABLET ORAL at 07:08

## 2017-08-27 NOTE — ED TRIAGE NOTES
Patient reports gout type arthritis pain to her right shoulder for 3 days that is keeping her up at night.    GENERAL: The patient is a frail elderly female in no apparent distress. Alert and oriented x4.                                                HEENT: Head is normocephalic and atraumatic. Extraocular muscles are intact. Pupils are equal, round, and reactive to light and accommodation. Nares appeared normal. Mouth is well hydrated and without lesions. Mucous membranes are moist. Posterior pharynx clear of any exudate or lesions.    NECK: Supple. No carotid bruits. No lymphadenopathy or thyromegaly.    LUNGS: Clear to auscultation.    HEART: Regular rate and rhythm without murmur.     ABDOMEN: Soft, nontender, and nondistended. Positive bowel sounds. No hepatosplenomegaly was noted.     EXTREMITIES: Without any cyanosis, clubbing, rash, lesions or edema.     NEUROLOGIC: Cranial nerves II through XII are grossly intact.     PSYCHIATRIC: Flat affect, but denies suicidal or homicidal ideations.    SKIN: No ulceration or induration present.

## 2017-08-27 NOTE — ED PROVIDER NOTES
Encounter Date: 2017    SCRIBE #1 NOTE: I, Kathryn Frias, am scribing for, and in the presence of,  Dr. Abrams. I have scribed the following portions of the note - the Resident attestation.       History     Chief Complaint   Patient presents with    Shoulder Pain     states she has gout in her rt shoulder     HPI   The pt is a 81 yo F who presents with complaints of R shoulder pain. PMhx significant for HTN, arthritis, afib, DM II, CKD III, migraine headaches, gout, CAD. Pt presents with 3 days of right shoulder pain with no identifiable stimulus. She describes the pain being similar to previous gouty arthritic attacks. She describes her pain as a dull ache, 9/10, worse with movement. No radiating nature. She has tried acetaminophen 3 days ago and started taking her home vicoden yesterday with minimal relief. She is normally on prednisone 10 mg daily but has not taken in 3 days. With previous arthritic episodes her symptoms have improved prednisone. ROS otherwise negative.       Review of patient's allergies indicates:  No Known Allergies  Past Medical History:   Diagnosis Date    A-fib     Arthritis     Asteroid hyalosis of left eye     Bilateral nonexudative age-related macular degeneration 6/3/2014    Breast cancer     Cataract     CKD stage 3 due to type 2 diabetes mellitus     Coronary artery disease     Hypertension     Migraine headache     Mild nonproliferative diabetic retinopathy of both eyes 6/3/2014    Pneumonia     Type 2 diabetes mellitus with hyperglycemia     Vertigo      Past Surgical History:   Procedure Laterality Date    BREAST SURGERY      left lumpectomy    CARPAL TUNNEL RELEASE      left    CATARACT EXTRACTION      vance     SECTION      EYE SURGERY      cataracts bilaterally    HYSTERECTOMY      partial     Family History   Problem Relation Age of Onset    Cancer Mother      stomach    Heart disease Mother     Diabetes Father     Hypertension Father      Blindness Brother     Diabetes Brother     Hypertension Brother     Cataracts Sister     Diabetes Sister     Diabetes Brother     Diabetes Brother     Diabetes Brother     No Known Problems Daughter     No Known Problems Son     No Known Problems Daughter     Amblyopia Neg Hx     Glaucoma Neg Hx     Macular degeneration Neg Hx     Strabismus Neg Hx     Retinal detachment Neg Hx      Social History   Substance Use Topics    Smoking status: Never Smoker    Smokeless tobacco: Never Used    Alcohol use No      Comment: socially     Review of Systems   Constitutional: Negative.    HENT: Negative.    Eyes: Negative.    Respiratory: Negative.    Cardiovascular: Negative.    Gastrointestinal: Negative.    Endocrine: Negative.    Genitourinary: Negative.    Musculoskeletal: Positive for arthralgias and joint swelling.   Skin: Negative.    Allergic/Immunologic: Negative.    Neurological: Negative.    Hematological: Negative.    Psychiatric/Behavioral: Negative.        Physical Exam     Initial Vitals [08/27/17 0659]   BP Pulse Resp Temp SpO2   (!) 164/92 110 16 98.7 °F (37.1 °C) 100 %      MAP       116         Physical Exam    Constitutional: She appears well-developed and well-nourished. She is not diaphoretic. No distress.   HENT:   Head: Normocephalic and atraumatic.   Mouth/Throat: Oropharynx is clear and moist. No oropharyngeal exudate.   Eyes: EOM are normal. Pupils are equal, round, and reactive to light. No scleral icterus.   Neck: Normal range of motion. No thyromegaly present. No tracheal deviation present. No JVD present.   Cardiovascular: Normal heart sounds and intact distal pulses. Exam reveals no gallop and no friction rub.    No murmur heard.  Rate irregularly irregular    Pulmonary/Chest: Breath sounds normal. No stridor. No respiratory distress. She has no wheezes. She has no rhonchi. She has no rales. She exhibits no tenderness.   Abdominal: Soft. Bowel sounds are normal. She exhibits no  distension. There is no tenderness. There is no rebound and no guarding.   Musculoskeletal: She exhibits tenderness.   R shoulder swelling, TTP, decreased range of motion.    Neurological: She is alert and oriented to person, place, and time. She has normal strength. She displays normal reflexes. No cranial nerve deficit or sensory deficit.   Skin: Skin is warm.   Psychiatric: She has a normal mood and affect.         ED Course   Procedures  Labs Reviewed - No data to display          Medical Decision Making:   History:   Old Medical Records: I decided to obtain old medical records.  Initial Assessment:   The pt is a 81 yo F with known PMHx of gout who presents with R shoulder pain similar in presentation to previous gouty arthritic episodes.   Differential Diagnosis:   Gouty arthritis, arthritis, tendonitis, muscle spasm        APC / Resident Notes:   7:39 AM  Case discussed with Dr. Abrams. Plan to discharge to home. Will administer 40 mg prednisone here in ED along with additional 5 mg hydrocodone. Will discharge with rx for prednisone 20 mg daily for the next 2 days and following will recommend continuing 10 mg daily as previously prescribed. Recommend f/u with PCP within 1 week. Return instructions given and all pt questions answered.   Chris Nava MD        Scribe Attestation:   Scribe #1: I performed the above scribed service and the documentation accurately describes the services I performed. I attest to the accuracy of the note.    Attending Attestation:   Physician Attestation Statement for Resident:  As the supervising MD   Physician Attestation Statement: I have personally seen and examined this patient.   I agree with the above history. -: Pt was seen by me. Agree with history. Pt stopped taking her prednisone 3 days ago and pain got worse yesterday and then increased last night. Pt did take a Vicodin tablet at 3:30 am without relief.    As the supervising MD I agree with the above PE.   -: Right  shoulder has mild to moderate swelling. Moderate tenderness. Minimal warmth. Decreased ROM secondary to pain. Neurovascular intact. Agree with rest of exam.    As the supervising MD I agree with the above treatment, course, plan, and disposition.   -: Will treat with 40 mg of prednisone today and 20 mg a day for 2 days and then put back on normal maintenance 10 mg dose per day.           Physician Attestation for Scribe:  Physician Attestation Statement for Scribe #1: I, Dr. Abrams, reviewed documentation, as scribed by Kathryn Frias in my presence, and it is both accurate and complete.                 ED Course     Clinical Impression:   The encounter diagnosis was Acute idiopathic gout of right shoulder.    Disposition:   Disposition: Discharged  Condition: Stable    Addendum note 8/28 - attempted to contact the patient by phone to see how she was doing.  Left a voicemail message.                      Ezekiel Abrams MD  08/30/17 [id number]

## 2017-08-28 ENCOUNTER — TELEPHONE (OUTPATIENT)
Dept: ENDOSCOPY | Facility: HOSPITAL | Age: 82
End: 2017-08-28

## 2017-08-28 ENCOUNTER — OFFICE VISIT (OUTPATIENT)
Dept: GASTROENTEROLOGY | Facility: CLINIC | Age: 82
End: 2017-08-28
Payer: MEDICARE

## 2017-08-28 VITALS
SYSTOLIC BLOOD PRESSURE: 164 MMHG | HEART RATE: 100 BPM | WEIGHT: 151.44 LBS | DIASTOLIC BLOOD PRESSURE: 111 MMHG | HEIGHT: 61 IN | BODY MASS INDEX: 28.59 KG/M2

## 2017-08-28 DIAGNOSIS — Z86.010 HISTORY OF COLON POLYPS: Primary | ICD-10-CM

## 2017-08-28 DIAGNOSIS — Z79.01 ANTICOAGULANT LONG-TERM USE: ICD-10-CM

## 2017-08-28 DIAGNOSIS — I48.0 PAROXYSMAL ATRIAL FIBRILLATION: ICD-10-CM

## 2017-08-28 DIAGNOSIS — I10 ESSENTIAL HYPERTENSION: ICD-10-CM

## 2017-08-28 DIAGNOSIS — D63.8 ANEMIA IN OTHER CHRONIC DISEASES CLASSIFIED ELSEWHERE: ICD-10-CM

## 2017-08-28 DIAGNOSIS — K44.9 HIATAL HERNIA: ICD-10-CM

## 2017-08-28 PROCEDURE — 3077F SYST BP >= 140 MM HG: CPT | Mod: S$GLB,,, | Performed by: INTERNAL MEDICINE

## 2017-08-28 PROCEDURE — 3008F BODY MASS INDEX DOCD: CPT | Mod: S$GLB,,, | Performed by: INTERNAL MEDICINE

## 2017-08-28 PROCEDURE — 99214 OFFICE O/P EST MOD 30 MIN: CPT | Mod: S$GLB,,, | Performed by: INTERNAL MEDICINE

## 2017-08-28 PROCEDURE — 3080F DIAST BP >= 90 MM HG: CPT | Mod: S$GLB,,, | Performed by: INTERNAL MEDICINE

## 2017-08-28 PROCEDURE — 1126F AMNT PAIN NOTED NONE PRSNT: CPT | Mod: S$GLB,,, | Performed by: INTERNAL MEDICINE

## 2017-08-28 PROCEDURE — 99499 UNLISTED E&M SERVICE: CPT | Mod: S$GLB,,, | Performed by: INTERNAL MEDICINE

## 2017-08-28 PROCEDURE — 1159F MED LIST DOCD IN RCRD: CPT | Mod: S$GLB,,, | Performed by: INTERNAL MEDICINE

## 2017-08-28 PROCEDURE — 99999 PR PBB SHADOW E&M-EST. PATIENT-LVL III: CPT | Mod: PBBFAC,,, | Performed by: INTERNAL MEDICINE

## 2017-08-28 RX ORDER — METOPROLOL TARTRATE 100 MG/1
100 TABLET ORAL 2 TIMES DAILY
Qty: 60 TABLET | Refills: 2 | Status: SHIPPED | OUTPATIENT
Start: 2017-08-28 | End: 2017-11-21

## 2017-08-28 NOTE — LETTER
August 28, 2017      Cali Vickers MD  [address]  [facility] [address]           [facility] Gastroenterology  [address]  [facility] [address]  Phone: [phone]  Fax: [phone]          Patient: Dacia Martínez   MR Number: [id number]   YOB: 1935   Date of Visit: 8/28/2017       Dear Dr. Cali Vickers:    Thank you for referring Dacia Martínez to me for evaluation. Attached you will find relevant portions of my assessment and plan of care.    If you have questions, please do not hesitate to call me. I look forward to following Dacia Martínez along with you.    Sincerely,    Feroz Magana MD    Enclosure  CC:  No Recipients    If you would like to receive this communication electronically, please contact [email or web address] or [phone] to request more information on [facility] [employer] access.    For providers and/or their staff who would like to refer a patient to [facility], please contact us through our one-stop-shop provider referral line, [facility], at [phone].    If you feel you have received this communication in error or would no longer like to receive these types of communications, please e-mail [email or web address]

## 2017-08-28 NOTE — TELEPHONE ENCOUNTER
Patient is wanting to schedule a colonoscopy. Is it okay to hold apixaban 2 days prior to procedure? Please advise.

## 2017-08-28 NOTE — PROGRESS NOTES
[facility] Established Patient Visit    Reason for Visit:    Chief Complaint   Patient presents with    GI Problem     his colon polyps    Anemia       PCP: Cali Vickers    HPI:  Dacia Martínez is a 82 y.o. female here for evaluation of the above problems.  She was seen in the hospital by my partner, Dr. Rory Ogden, in September of last year during an admit for lower abdominal pain.  At the time, an irregular area was noted in the duodenum with possible pneumatosis.  EGD was done and was normal except for a small hiatal hernia.  The abdominal pain was thought secondary to constipation.  She reports feeling well now.  Denies abdominal pain or problems with bowel movements.  She has occasional constipation which is treated with stool softener.  She denies blood in stool or melena.  She is not on iron supplements.  She takes Eliquis every day for A fib.  She denies heartburn, nausea, or problems eating.  Omeprazole is listed in her medication list, but she reports not taking it.      Her other reason for visit today is for history of colon polyps.  She had a colonoscopy done in 2015 by Dr. Hobson with removal of 9 tubular adenomas - no high-grade dysplasia or malignancy seen.  The exam was completed to the cecum, but the prep was fair and he recommended a 2-yr repeat exam.        ROS:  Constitutional: No fevers, chills, No weight loss, normal appetite  GI: see HPI        PMHX:  has a past medical history of A-fib; Arthritis; Asteroid hyalosis of left eye; Bilateral nonexudative age-related macular degeneration (6/3/2014); Breast cancer; Cataract; CKD stage 3 due to type 2 diabetes mellitus; Coronary artery disease; Hypertension; Migraine headache; Mild nonproliferative diabetic retinopathy of both eyes (6/3/2014); Pneumonia; Type 2 diabetes mellitus with hyperglycemia; and Vertigo.    PSHX:  has a past surgical history that includes Carpal tunnel release; Cataract extraction;   section; Eye surgery; Breast surgery; and Hysterectomy.    The patient's social and family histories were reviewed by me and updated in the appropriate section of the electronic medical record.    Review of patient's allergies indicates:  No Known Allergies    Prior to Admission medications    Medication Sig Start Date End Date Taking? Authorizing Provider   ACCU-CHEK FASTCLIX Misc 1 lancet by Misc.(Non-Drug; Combo Route) route 3 (three) times daily. Pt to test blood glucose up to 3 times daily. 6/13/17  Yes Gómez Jimenez MD   allopurinol (ZYLOPRIM) 300 MG tablet Take 1.5 tablets (450 mg total) by mouth once daily. 8/24/17 9/23/17 Yes Danette Ricks MD   apixaban 5 mg Tab Take 1 tablet (5 mg total) by mouth 2 (two) times daily. 6/6/17  Yes Derrick Ashraf PA-C   atorvastatin (LIPITOR) 40 MG tablet  3/29/17  Yes Historical Provider, MD   azelastine (ASTELIN) 137 mcg (0.1 %) nasal spray 1 spray (137 mcg total) by Nasal route 2 (two) times daily. 4/20/16  Yes Cali Vickers MD   blood sugar diagnostic (ACCU-CHEK TOMASA) Strp 1 strip by Misc.(Non-Drug; Combo Route) route once daily. 11/4/16  Yes Mariela Saunders NP   docusate sodium (COLACE) 50 MG capsule Take 1 capsule (50 mg total) by mouth 2 (two) times daily. 10/18/16  Yes Cali Vickers MD   dulaglutide (TRULICITY) 0.75 mg/0.5 mL PnIj Inject 0.75 mg into the skin every 7 days. 11/4/16  Yes Mariela Saunders NP   fluticasone (FLONASE) 50 mcg/actuation nasal spray instill 2 sprays into each nostril once daily 3/16/17  Yes Cali Vickers MD   furosemide (LASIX) 20 MG tablet Take 1 tablet (20 mg total) by mouth daily as needed. 1/11/17 8/28/17 Yes Gómez Jimenez MD   hydrocodone-acetaminophen 5-325mg (NORCO) 5-325 mg per tablet Take 1 tablet by mouth every 6 (six) hours as needed for Pain. 8/22/17 9/21/17 Yes Danette Ricks MD   meclizine (ANTIVERT) 25 mg tablet Take 1 tablet (25 mg total) by mouth 3 (three) times daily as needed for  "Dizziness. 6/19/17  Yes Collin Walsh MD   metoprolol tartrate (LOPRESSOR) 100 MG tablet Take 1 tablet (100 mg total) by mouth 2 (two) times daily. 7/18/17 7/18/18 Yes ROSSY Head   mupirocin (BACTROBAN) 2 % ointment by Nasal route 2 (two) times daily. Apply with swab to inside of nose 3/14/17  Yes Gómez Jimenez MD   omeprazole (PRILOSEC) 20 MG capsule Take 1 capsule (20 mg total) by mouth once daily.  Patient taking differently: Take 20 mg by mouth as needed.  6/26/15 8/28/17 Yes Cali Vickers MD   polyethylene glycol (GLYCOLAX) 17 gram PwPk Take 17 g by mouth daily as needed. Dissolve 1 heaping tablespoon and 4-8 ounces of water.  Take once a day as needed for constipation. 10/18/16  Yes Cali Vickers MD   predniSONE (DELTASONE) 10 MG tablet take 1 tablet by mouth daily if needed for gout 8/24/17  Yes Danette Ricks MD   triamcinolone acetonide 0.1% (KENALOG) 0.1 % ointment Apply topically 2 (two) times daily. 7/5/17  Yes Cali Vickers MD   amoxicillin (AMOXIL) 500 MG capsule 1 pill BID x 10 days 8/22/17   Kian Ribeiro III, MD   chlorhexidine (PERIDEX) 0.12 % solution FILL CAP TO THE FILL LINE, SWISH IN MOUTH UNDILUTED for 30 second...  (REFER TO PRESCRIPTION NOTES). 2/14/17   Historical Provider, MD   chlorthalidone (HYGROTEN) 25 MG Tab Take 1 tablet (25 mg total) by mouth once daily. 1/11/17 1/11/18  Gómez Jimenez MD   COL-RITE 100 mg capsule  2/16/17   Historical Provider, MD   fluticasone (FLONASE) 50 mcg/actuation nasal spray 2 sprays by Each Nare route once daily. 8/14/17 9/13/17  Cali Vickers MD   predniSONE (DELTASONE) 10 MG tablet Take 1 tablet (10 mg total) by mouth once daily. Take 20 mg (2 tablets) for two days and continue 10 mg daily 8/27/17 9/16/17  Chris Nava MD         Objective Findings:  Vital Signs:  BP (!) 164/111 Comment: pt did not take bp med. today  Pulse 100   Ht 5' 1" (1.549 m)   Wt 68.7 kg (151 lb 7.3 oz)   BMI " 28.62 kg/m²  Body mass index is 28.62 kg/m².    Physical Exam:  General Appearance:  Well appearing in no acute distress, appears stated age  Head:  Normocephalic, atraumatic  Eyes:  No scleral icterus or pallor, EOMI  ENT:  Neck supple, moist mucosa; OP clear  Lungs:  CTA bilaterally in anterior and posterior fields, no wheezes, no crackles; no dullness  Heart:  Irregularly irregular rate and rhythm, S1, S2 normal, no murmurs heard  Abdomen:  Soft, non tender, non distended with positive bowel sounds in all four quadrants. No hepatosplenomegaly, ascites, or mass  Extremities:  No clubbing, cyanosis, or edema        Labs:  Lab Results   Component Value Date    WBC 6.87 07/07/2017    HGB 11.2 (L) 07/07/2017    HCT 35.2 (L) 07/07/2017    MCV 85 07/07/2017    RDW 14.9 (H) 07/07/2017     07/07/2017    GRAN 4.5 07/07/2017    GRAN 65.5 07/07/2017    LYMPH 1.9 07/07/2017    LYMPH 27.9 07/07/2017    MONO 0.4 07/07/2017    MONO 5.5 07/07/2017    EOS 0.0 07/07/2017    BASO 0.04 07/07/2017     Lab Results   Component Value Date     07/07/2017    K 4.0 07/07/2017     07/07/2017    CO2 20 (L) 07/07/2017     (H) 07/07/2017    BUN 19 07/07/2017    CREATININE 1.1 07/07/2017    CALCIUM 8.9 07/07/2017    PROT 8.1 07/07/2017    ALBUMIN 3.5 07/07/2017    BILITOT 0.3 07/07/2017    ALKPHOS 62 07/07/2017    AST 30 07/07/2017    ALT 16 07/07/2017      Ref. Range 9/6/2011 08:22 7/27/2015 09:35   Iron Latest Ref Range: 30 - 160 ug/dL 85 60   TIBC Latest Ref Range: 250 - 450 ug/dL 317 329   Saturated Iron Latest Ref Range: 20 - 50 % 27 18 (L)   UIBC Latest Ref Range: 110 - 370 ug/dl 232    Transferrin Latest Ref Range: 200 - 375 mg/dL  222   Ferritin Latest Ref Range: 20.0 - 300.0 ng/mL 206 192             Imaging:  CT scan done 9/25/16 reviewed.            Assessment:  Dacia Martínez is a 82 y.o. female here with:  1. History of multiple colon polyps - last colonoscopy as 3/2015 with recommended follow-up of 2 years.   The prep was also only fair at the time.    2. Anemia which is normocytic and has been present for more than 5 years without much difference now.  Her ferritin and TIBC were normal in the past.  There is a mild degree of renal insufficiency.  I think her anemia is likely due to chronic disease, age and possibly a component of her renal disease.  She does not have signs of iron deficiency.    3. Small hiatal hernia - not clinically significant.   4. Long-term use of anticoagulant medication for A fib  5. Hypertension - she reports running out of one of her medications.      Recommendations:  1. I will arrange for colonoscopy to assess history of colon polyps.  2. I see no need for omeprazole if she is truly not taking it and having no upper GI problems.   3. Follow-up with cardiologist or PCP regarding blood pressure and medications    Follow-up as needed in clinic.            Feroz Magana MD

## 2017-08-28 NOTE — TELEPHONE ENCOUNTER
Per protocol, pt without history of CVA/TIA. Therefore okay to hold Eliquis 2 days before colonoscopy.

## 2017-08-30 ENCOUNTER — TELEPHONE (OUTPATIENT)
Dept: ENDOSCOPY | Facility: HOSPITAL | Age: 82
End: 2017-08-30

## 2017-08-30 NOTE — TELEPHONE ENCOUNTER
Called patient to schedule colonoscopy. Patient stated that she was not ready to schedule at this time. Patient stated that she would call back when she was ready to schedule. Number given to patient to call back.

## 2017-09-14 ENCOUNTER — TELEPHONE (OUTPATIENT)
Dept: RHEUMATOLOGY | Facility: CLINIC | Age: 82
End: 2017-09-14

## 2017-09-14 NOTE — TELEPHONE ENCOUNTER
Pt. Reports she thinks she is having a gout attack in her right arm. Denies swelling to arm but says its very painful, and would like to come in to see Dr. Macdonald tomorrow

## 2017-09-15 ENCOUNTER — OFFICE VISIT (OUTPATIENT)
Dept: RHEUMATOLOGY | Facility: CLINIC | Age: 82
End: 2017-09-15
Payer: MEDICARE

## 2017-09-15 VITALS
DIASTOLIC BLOOD PRESSURE: 103 MMHG | SYSTOLIC BLOOD PRESSURE: 150 MMHG | HEIGHT: 61 IN | BODY MASS INDEX: 28.35 KG/M2 | HEART RATE: 65 BPM | WEIGHT: 150.19 LBS

## 2017-09-15 DIAGNOSIS — M15.9 PRIMARY OSTEOARTHRITIS INVOLVING MULTIPLE JOINTS: Chronic | ICD-10-CM

## 2017-09-15 DIAGNOSIS — M1A.09X0 IDIOPATHIC CHRONIC GOUT OF MULTIPLE SITES WITHOUT TOPHUS: Chronic | ICD-10-CM

## 2017-09-15 DIAGNOSIS — M75.51 BURSITIS OF RIGHT SHOULDER: Primary | ICD-10-CM

## 2017-09-15 PROCEDURE — 99213 OFFICE O/P EST LOW 20 MIN: CPT | Mod: 25,S$GLB,, | Performed by: INTERNAL MEDICINE

## 2017-09-15 PROCEDURE — 99999 PR PBB SHADOW E&M-EST. PATIENT-LVL III: CPT | Mod: PBBFAC,,, | Performed by: INTERNAL MEDICINE

## 2017-09-15 PROCEDURE — 1125F AMNT PAIN NOTED PAIN PRSNT: CPT | Mod: S$GLB,,, | Performed by: INTERNAL MEDICINE

## 2017-09-15 PROCEDURE — 20610 DRAIN/INJ JOINT/BURSA W/O US: CPT | Mod: RT,S$GLB,, | Performed by: INTERNAL MEDICINE

## 2017-09-15 PROCEDURE — 1159F MED LIST DOCD IN RCRD: CPT | Mod: S$GLB,,, | Performed by: INTERNAL MEDICINE

## 2017-09-15 PROCEDURE — 3077F SYST BP >= 140 MM HG: CPT | Mod: S$GLB,,, | Performed by: INTERNAL MEDICINE

## 2017-09-15 PROCEDURE — 99499 UNLISTED E&M SERVICE: CPT | Mod: S$GLB,,, | Performed by: INTERNAL MEDICINE

## 2017-09-15 PROCEDURE — 3080F DIAST BP >= 90 MM HG: CPT | Mod: S$GLB,,, | Performed by: INTERNAL MEDICINE

## 2017-09-15 PROCEDURE — 3008F BODY MASS INDEX DOCD: CPT | Mod: S$GLB,,, | Performed by: INTERNAL MEDICINE

## 2017-09-15 RX ORDER — ALLOPURINOL 300 MG/1
450 TABLET ORAL DAILY
Qty: 45 TABLET | Refills: 6 | Status: ON HOLD | OUTPATIENT
Start: 2017-09-15 | End: 2017-12-23 | Stop reason: HOSPADM

## 2017-09-15 RX ORDER — HYDROCODONE BITARTRATE AND ACETAMINOPHEN 5; 325 MG/1; MG/1
1 TABLET ORAL EVERY 6 HOURS PRN
Qty: 60 TABLET | Refills: 0 | Status: SHIPPED | OUTPATIENT
Start: 2017-09-15 | End: 2017-10-10 | Stop reason: SDUPTHER

## 2017-09-15 RX ORDER — TRIAMCINOLONE ACETONIDE 40 MG/ML
40 INJECTION, SUSPENSION INTRA-ARTICULAR; INTRAMUSCULAR
Status: DISCONTINUED | OUTPATIENT
Start: 2017-09-15 | End: 2017-09-15 | Stop reason: HOSPADM

## 2017-09-15 RX ADMIN — TRIAMCINOLONE ACETONIDE 40 MG: 40 INJECTION, SUSPENSION INTRA-ARTICULAR; INTRAMUSCULAR at 08:09

## 2017-09-15 ASSESSMENT — ROUTINE ASSESSMENT OF PATIENT INDEX DATA (RAPID3)
TOTAL RAPID3 SCORE: 4.67
AM STIFFNESS SCORE: 0, NO
FATIGUE SCORE: 6.5
PSYCHOLOGICAL DISTRESS SCORE: 2.2
MDHAQ FUNCTION SCORE: .3
PATIENT GLOBAL ASSESSMENT SCORE: 6.5
PAIN SCORE: 6.5

## 2017-09-15 NOTE — PROGRESS NOTES
History of present illness: 82-year-old female who has chronic pain primarily due to osteoarthritis.  She remains on hydrocodone twice daily.  She also has a history of gout.  She was on allopurinol 450 mg daily.  Recently she has not been taking it on a regular basis.   She has had no recent joint swelling.  She continues to take prednisone intermittently when she feels bad.  She takes it no more than one or 2 days at a time.  Since her last visit she was found to have atrial fibrillation.  She is now on Eliquis.    She comes in at this time because of pain in her right shoulder.  The pain began 3 weeks ago.  The pain was of sudden onset.  She had no history of antecedent trauma or increased activity.  The pain does not radiate into the neck.  It occasionally radiates down the arm.  She has had slight pain on the left side but not as bad as on the right.  The pain is constant.  It is worse with activity.  It does wake her up at night.  She has some morning pain.  She denies any swelling in the shoulder or arm.    She was seen in the emergency room and they placed her back on prednisone on a regular basis.  She is also been taking hydrocodone.  She has not tried heat, ice, or topical medications.  She denies any similar problem in the shoulder in the past.    Physical examination:  Musculoskeletal: Cervical spine has decreased range of motion but no pain on range of motion.  She has decreased range of motion the shoulders bilaterally, actually worse on the left than on the right.  She has pain on range of motion of the right shoulder but not the left.  She is tender over the subacromial bursa.  She has no swelling of the shoulder.  Elbows, wrists, small joints of the hands are unremarkable.    Assessment:  1.  Underlying osteoarthritis  2.  Subacromial bursitis    Plans:  1.  I will see how she responds to the injection  2.  I refilled her prescription for hydrocodone  3.  She is to resume allopurinol 450 mg  daily  4.  Use heat or ice to the shoulder  5.  Return to see me in 6 months

## 2017-09-15 NOTE — PROCEDURES
Large Joint Aspiration/Injection  Date/Time: 9/15/2017 8:07 AM  Performed by: ADALGISA TURNER  Authorized by: ADALGISA TURNER     Consent Done?:  Yes (Verbal)  Indications:  Pain  Procedure site marked: Yes      Location:  Shoulder  Site:  R subacromial bursa  Prep: Patient was prepped and draped in usual sterile fashion    Needle size:  25 G  Approach:  Lateral  Medications:  40 mg triamcinolone acetonide 40 mg/mL  Patient tolerance:  Patient tolerated the procedure well with no immediate complications

## 2017-09-18 ENCOUNTER — OFFICE VISIT (OUTPATIENT)
Dept: INTERNAL MEDICINE | Facility: CLINIC | Age: 82
End: 2017-09-18
Payer: MEDICARE

## 2017-09-18 VITALS
BODY MASS INDEX: 28.78 KG/M2 | WEIGHT: 152.31 LBS | SYSTOLIC BLOOD PRESSURE: 128 MMHG | HEART RATE: 80 BPM | DIASTOLIC BLOOD PRESSURE: 78 MMHG

## 2017-09-18 DIAGNOSIS — R35.1 NOCTURIA: ICD-10-CM

## 2017-09-18 DIAGNOSIS — Z00.00 PREVENTATIVE HEALTH CARE: ICD-10-CM

## 2017-09-18 DIAGNOSIS — I48.0 PAROXYSMAL ATRIAL FIBRILLATION: ICD-10-CM

## 2017-09-18 DIAGNOSIS — J30.2 CHRONIC SEASONAL ALLERGIC RHINITIS, UNSPECIFIED TRIGGER: ICD-10-CM

## 2017-09-18 DIAGNOSIS — E11.22 TYPE 2 DIABETES MELLITUS WITH STAGE 3 CHRONIC KIDNEY DISEASE, WITHOUT LONG-TERM CURRENT USE OF INSULIN: ICD-10-CM

## 2017-09-18 DIAGNOSIS — I10 ESSENTIAL HYPERTENSION: Primary | ICD-10-CM

## 2017-09-18 DIAGNOSIS — M1A.09X0 IDIOPATHIC CHRONIC GOUT OF MULTIPLE SITES WITHOUT TOPHUS: Chronic | ICD-10-CM

## 2017-09-18 DIAGNOSIS — N18.30 TYPE 2 DIABETES MELLITUS WITH STAGE 3 CHRONIC KIDNEY DISEASE, WITHOUT LONG-TERM CURRENT USE OF INSULIN: ICD-10-CM

## 2017-09-18 DIAGNOSIS — K21.9 CHRONIC GERD: ICD-10-CM

## 2017-09-18 PROCEDURE — 1159F MED LIST DOCD IN RCRD: CPT | Mod: S$GLB,,, | Performed by: FAMILY MEDICINE

## 2017-09-18 PROCEDURE — 99999 PR PBB SHADOW E&M-EST. PATIENT-LVL III: CPT | Mod: PBBFAC,,, | Performed by: FAMILY MEDICINE

## 2017-09-18 PROCEDURE — 3078F DIAST BP <80 MM HG: CPT | Mod: S$GLB,,, | Performed by: FAMILY MEDICINE

## 2017-09-18 PROCEDURE — 99499 UNLISTED E&M SERVICE: CPT | Mod: S$GLB,,, | Performed by: FAMILY MEDICINE

## 2017-09-18 PROCEDURE — 1126F AMNT PAIN NOTED NONE PRSNT: CPT | Mod: S$GLB,,, | Performed by: FAMILY MEDICINE

## 2017-09-18 PROCEDURE — 99214 OFFICE O/P EST MOD 30 MIN: CPT | Mod: S$GLB,,, | Performed by: FAMILY MEDICINE

## 2017-09-18 PROCEDURE — 3008F BODY MASS INDEX DOCD: CPT | Mod: S$GLB,,, | Performed by: FAMILY MEDICINE

## 2017-09-18 PROCEDURE — 3074F SYST BP LT 130 MM HG: CPT | Mod: S$GLB,,, | Performed by: FAMILY MEDICINE

## 2017-09-18 RX ORDER — FLUCONAZOLE 150 MG/1
150 TABLET ORAL DAILY
Qty: 2 TABLET | Refills: 0 | Status: SHIPPED | OUTPATIENT
Start: 2017-09-18 | End: 2017-09-19

## 2017-09-18 RX ORDER — FLUCONAZOLE 150 MG/1
150 TABLET ORAL DAILY
Qty: 2 TABLET | Refills: 0 | Status: SHIPPED | OUTPATIENT
Start: 2017-09-18 | End: 2017-09-18 | Stop reason: SDUPTHER

## 2017-09-18 NOTE — PROGRESS NOTES
"Subjective:       Patient ID: Dacia Martínez is a 82 y.o. female.    Chief Complaint: No chief complaint on file.  Dacia Martínez 82 y.o. female is here for office visit to review care and physical exam, has again the c/o "cold."  She seems to have this complaint many, many times in the past.  Seems not likely 2nd to the symptoms being what they re and the chronicity.  Dr Ribeiro's recent plan was:  Previous sinus CT reviewed with patient  ( Previously prescribed) Rx for Esgic straightened out;     pt. may  at pharmacy downstairs ( errantly sent to [facility]) ; 1 pill q 6 hours prn headache # 15; no refill  Rx for Amoxil 500 mg #20; take BID x 10 days  Monitor temperature  Elevated blood pressure noted  Continue use of Flonase as previously prescribed; 1-2 sprays @ lateral nostril once a day x 3-6 weeks;      may use with Astelin       HPI  Review of Systems   Constitutional: Negative for activity change, fatigue, fever and unexpected weight change.   HENT: Negative for congestion, hearing loss, postnasal drip and rhinorrhea.    Eyes: Negative for redness and visual disturbance.   Respiratory: Negative for chest tightness, shortness of breath and wheezing.    Cardiovascular: Negative for chest pain, palpitations and leg swelling.   Gastrointestinal: Negative for abdominal distention.   Genitourinary: Negative for decreased urine volume, dysuria, flank pain, hematuria, pelvic pain and urgency.   Musculoskeletal: Negative for back pain, gait problem, joint swelling and neck stiffness.   Skin: Negative for color change, rash and wound.   Neurological: Negative for dizziness, syncope, weakness and headaches.   Psychiatric/Behavioral: Negative for behavioral problems, confusion and sleep disturbance. The patient is not nervous/anxious.        Objective:      Physical Exam   Constitutional: She is oriented to person, place, and time. She appears well-developed and well-nourished. No distress.   HENT:   Head: " Normocephalic.   Mouth/Throat: No oropharyngeal exudate.   Eyes: EOM are normal. Pupils are equal, round, and reactive to light. No scleral icterus.   Neck: Neck supple. No JVD present. No thyromegaly present.   Cardiovascular: Normal rate, regular rhythm and normal heart sounds.  Exam reveals no gallop and no friction rub.    No murmur heard.  Pulmonary/Chest: Effort normal and breath sounds normal. She has no wheezes. She has no rales.   Abdominal: Soft. Bowel sounds are normal. She exhibits no distension and no mass. There is no tenderness. There is no guarding.   Musculoskeletal: Normal range of motion. She exhibits no edema.   Lymphadenopathy:     She has no cervical adenopathy.   Neurological: She is alert and oriented to person, place, and time. She has normal reflexes. She displays normal reflexes. No cranial nerve deficit. She exhibits normal muscle tone.   Skin: Skin is warm. No rash noted. No erythema.   Psychiatric: She has a normal mood and affect. Thought content normal.       Assessment:       No diagnosis found.    Plan:       Dacia was seen today for uri.    Diagnoses and all orders for this visit:    Essential hypertension  - controled  Idiopathic chronic gout of multiple sites without tophus  - no new concerns, avoid NSAIDs advised  Type 2 diabetes mellitus with stage 3 chronic kidney disease, without long-term current use of insulin  - Controled  Paroxysmal atrial fibrillation  - Has CArd appt  Chronic seasonal allergic rhinitis, unspecified trigger  - Discussed, controled  Chronic GERD  - Sees GI, has PPI per GI  Nocturia  -     fluconazole (DIFLUCAN) 150 MG Tab; Take 1 tablet (150 mg total) by mouth once daily.  -     Ambulatory Referral to Urology    Preventative health care  -     Mammo Digital Screening Bilateral With CAD; Future

## 2017-09-27 RX ORDER — TRIAMCINOLONE ACETONIDE 1 MG/G
OINTMENT TOPICAL
Qty: 80 G | Refills: 1 | Status: SHIPPED | OUTPATIENT
Start: 2017-09-27 | End: 2017-11-21

## 2017-10-03 RX ORDER — PREDNISONE 10 MG/1
TABLET ORAL
Qty: 30 TABLET | Refills: 0 | Status: SHIPPED | OUTPATIENT
Start: 2017-10-03 | End: 2018-01-26 | Stop reason: SDUPTHER

## 2017-10-03 RX ORDER — HYDROCODONE BITARTRATE AND ACETAMINOPHEN 5; 325 MG/1; MG/1
1 TABLET ORAL EVERY 6 HOURS PRN
Qty: 60 TABLET | Refills: 0 | OUTPATIENT
Start: 2017-10-03 | End: 2017-11-02

## 2017-10-10 RX ORDER — HYDROCODONE BITARTRATE AND ACETAMINOPHEN 5; 325 MG/1; MG/1
1 TABLET ORAL EVERY 6 HOURS PRN
Qty: 60 TABLET | Refills: 0 | Status: SHIPPED | OUTPATIENT
Start: 2017-10-10 | End: 2017-11-06 | Stop reason: SDUPTHER

## 2017-10-10 NOTE — TELEPHONE ENCOUNTER
----- Message from Tristan Rodriguez sent at 10/10/2017  9:57 AM CDT -----  Contact: [email or web address] number   Pt called in stating that she will be here tomorrow for an appt and asked if she can  the Rx for her hydrocodone-acetaminophen 5-325mg (NORCO) 5-325 mg per tablet. Please call and advise    Thank you

## 2017-10-11 ENCOUNTER — OFFICE VISIT (OUTPATIENT)
Dept: UROLOGY | Facility: CLINIC | Age: 82
End: 2017-10-11
Payer: MEDICARE

## 2017-10-11 VITALS
SYSTOLIC BLOOD PRESSURE: 156 MMHG | HEART RATE: 92 BPM | WEIGHT: 152.13 LBS | DIASTOLIC BLOOD PRESSURE: 115 MMHG | BODY MASS INDEX: 28.74 KG/M2

## 2017-10-11 DIAGNOSIS — R30.0 DYSURIA: ICD-10-CM

## 2017-10-11 DIAGNOSIS — N36.2 URETHRAL CARUNCLE: ICD-10-CM

## 2017-10-11 DIAGNOSIS — R35.1 NOCTURIA: Primary | ICD-10-CM

## 2017-10-11 PROCEDURE — 51701 INSERT BLADDER CATHETER: CPT | Mod: S$GLB,,, | Performed by: PHYSICIAN ASSISTANT

## 2017-10-11 PROCEDURE — 87086 URINE CULTURE/COLONY COUNT: CPT

## 2017-10-11 PROCEDURE — 99214 OFFICE O/P EST MOD 30 MIN: CPT | Mod: 25,S$GLB,, | Performed by: PHYSICIAN ASSISTANT

## 2017-10-11 PROCEDURE — 99999 PR PBB SHADOW E&M-EST. PATIENT-LVL V: CPT | Mod: PBBFAC,,, | Performed by: PHYSICIAN ASSISTANT

## 2017-10-11 RX ORDER — TAMSULOSIN HYDROCHLORIDE 0.4 MG/1
0.4 CAPSULE ORAL NIGHTLY
Qty: 30 CAPSULE | Refills: 11 | Status: SHIPPED | OUTPATIENT
Start: 2017-10-11 | End: 2017-11-21

## 2017-10-11 RX ORDER — ESTRADIOL 0.1 MG/G
1 CREAM VAGINAL
Qty: 30 G | Refills: 11 | Status: SHIPPED | OUTPATIENT
Start: 2017-10-11 | End: 2017-11-21

## 2017-10-11 NOTE — LETTER
October 12, 2017      Cali Vickers MD  [address]  [facility] [address]           [facility]  [address]  [facility] [address]  Phone: [phone]          Patient: Dacia Martínez   MR Number: [id number]   YOB: 1935   Date of Visit: 10/11/2017       Dear Dr. Cali Vickers:    Thank you for referring Dacia Martínez to me for evaluation. Attached you will find relevant portions of my assessment and plan of care.    If you have questions, please do not hesitate to call me. I look forward to following Dacia Martínez along with you.    Sincerely,    RISHI Randhawaosure  CC:  No Recipients    If you would like to receive this communication electronically, please contact [email or web address] or [phone] to request more information on [employer] Link access.    For providers and/or their staff who would like to refer a patient to [facility], please contact us through our one-stop-shop provider referral line, [facility], at [phone].    If you feel you have received this communication in error or would no longer like to receive these types of communications, please e-mail [email or web address]

## 2017-10-11 NOTE — PROGRESS NOTES
"CHIEF COMPLAINT:    Mrs. Martínez is a 82 y.o. female presenting for nocturia.  PRESENTING ILLNESS:    Dacia Martínez is a 82 y.o. female who presents for nocturia.  She presents with her daughter.   It is intermittent.  Sometimes it last for a week and then goes away.  She denies daytime frequency, urgency, incontinence.  Sometimes she has dysuria.  "It burns where the pee comes out."  She only drinks water.  She doesn't drink anything with caffeine.    She stops drinking around 8:30 and goes to bed at 9:30p.  She takes her nightime medication at 7:30.  She is new to me.  She was last seen in clinic by Dr. Madera in January.  She was experiencing nocturia at that time as well.      Had breast cancer 16 years ago and has been free of recurrence.  In 2013 she was using estrace cream and taking flomax.  She is no longer using either one.  She reports that she did not experience burning with using the cream and that the flomax helped her bladder.      REVIEW OF SYSTEMS:  Constitutional: Negative for fever and chills.   HENT: Negative for hearing loss.   Eyes: Negative for visual disturbance.   Respiratory: Negative for shortness of breath.   Cardiovascular: Negative for chest pain.   Gastrointestinal: Positive for constipation (takes ducolax) Negative for vomiting.   Genitourinary: See HPI  Neurological: Positive for dizziness. (takes antivert)   Hematological: Does not bruise/bleed easily.   Psychiatric/Behavioral: Negative for confusion.     PATIENT HISTORY:    Past Medical History:   Diagnosis Date    A-fib     Arthritis     Asteroid hyalosis of left eye     Bilateral nonexudative age-related macular degeneration 6/3/2014    Breast cancer     Cataract     Chronic GERD 9/18/2017    CKD stage 3 due to type 2 diabetes mellitus     Coronary artery disease     Hypertension     Migraine headache     Mild nonproliferative diabetic retinopathy of both eyes 6/3/2014    Pneumonia     Type 2 diabetes mellitus with " hyperglycemia     Vertigo        Past Surgical History:   Procedure Laterality Date    BREAST SURGERY      left lumpectomy    CARPAL TUNNEL RELEASE      left    CATARACT EXTRACTION      vance     SECTION      EYE SURGERY      cataracts bilaterally    HYSTERECTOMY      partial       Family History   Problem Relation Age of Onset    Cancer Mother      stomach    Heart disease Mother     Diabetes Father     Hypertension Father     Blindness Brother     Diabetes Brother     Hypertension Brother     Cataracts Sister     Diabetes Sister     Diabetes Brother     Diabetes Brother     Diabetes Brother     No Known Problems Daughter     No Known Problems Son     No Known Problems Daughter     Amblyopia Neg Hx     Glaucoma Neg Hx     Macular degeneration Neg Hx     Strabismus Neg Hx     Retinal detachment Neg Hx        Social History     Social History    Marital status: Single     Spouse name: N/A    Number of children: 4    Years of education: N/A     Occupational History    Not on file.     Social History Main Topics    Smoking status: Never Smoker    Smokeless tobacco: Never Used    Alcohol use No      Comment: socially    Drug use: No    Sexual activity: Not Currently     Other Topics Concern    Not on file     Social History Narrative    Family supportive.     Granddaughter helps set out her medications in pillbox, helps check her blood sugar.           Allergies:  Review of patient's allergies indicates no known allergies.    Medications:    Current Outpatient Prescriptions:     ACCU-CHEK FASTCLIX Misc, 1 lancet by Misc.(Non-Drug; Combo Route) route 3 (three) times daily. Pt to test blood glucose up to 3 times daily., Disp: 100 each, Rfl: 11    allopurinol (ZYLOPRIM) 300 MG tablet, Take 1.5 tablets (450 mg total) by mouth once daily., Disp: 45 tablet, Rfl: 6    apixaban 5 mg Tab, Take 1 tablet (5 mg total) by mouth 2 (two) times daily., Disp: 180 tablet, Rfl: 1     atorvastatin (LIPITOR) 40 MG tablet, , Disp: , Rfl:     azelastine (ASTELIN) 137 mcg (0.1 %) nasal spray, 1 spray (137 mcg total) by Nasal route 2 (two) times daily., Disp: 30 mL, Rfl: 11    blood sugar diagnostic (ACCU-CHEK TOMASA) Strp, 1 strip by Misc.(Non-Drug; Combo Route) route once daily., Disp: 100 strip, Rfl: 11    chlorhexidine (PERIDEX) 0.12 % solution, FILL CAP TO THE FILL LINE, SWISH IN MOUTH UNDILUTED for 30 second...  (REFER TO PRESCRIPTION NOTES)., Disp: , Rfl: 0    chlorthalidone (HYGROTEN) 25 MG Tab, Take 1 tablet (25 mg total) by mouth once daily., Disp: 90 tablet, Rfl: 3    COL-RITE 100 mg capsule, , Disp: , Rfl: 0    docusate sodium (COLACE) 50 MG capsule, Take 1 capsule (50 mg total) by mouth 2 (two) times daily., Disp: 60 capsule, Rfl: 6    dulaglutide (TRULICITY) 0.75 mg/0.5 mL PnIj, Inject 0.75 mg into the skin every 7 days., Disp: 4 Syringe, Rfl: 11    estradiol (ESTRACE) 0.01 % (0.1 mg/gram) vaginal cream, Place 1 g vaginally as needed. Apply pea size to finger.  Apply to urethra three time a week., Disp: 30 g, Rfl: 11    fluticasone (FLONASE) 50 mcg/actuation nasal spray, instill 2 sprays into each nostril once daily, Disp: 1 Bottle, Rfl: 6    furosemide (LASIX) 20 MG tablet, Take 1 tablet (20 mg total) by mouth daily as needed., Disp: 14 tablet, Rfl: 0    hydrocodone-acetaminophen 5-325mg (NORCO) 5-325 mg per tablet, Take 1 tablet by mouth every 6 (six) hours as needed for Pain., Disp: 60 tablet, Rfl: 0    meclizine (ANTIVERT) 25 mg tablet, Take 1 tablet (25 mg total) by mouth 3 (three) times daily as needed for Dizziness., Disp: 21 tablet, Rfl: 0    metoprolol tartrate (LOPRESSOR) 100 MG tablet, Take 1 tablet (100 mg total) by mouth 2 (two) times daily., Disp: 60 tablet, Rfl: 2    mupirocin (BACTROBAN) 2 % ointment, by Nasal route 2 (two) times daily. Apply with swab to inside of nose, Disp: 30 g, Rfl: 0    omeprazole (PRILOSEC) 20 MG capsule, Take 1 capsule (20 mg total)  by mouth once daily. (Patient taking differently: Take 20 mg by mouth as needed. ), Disp: 30 capsule, Rfl: 11    polyethylene glycol (GLYCOLAX) 17 gram PwPk, Take 17 g by mouth daily as needed. Dissolve 1 heaping tablespoon and 4-8 ounces of water.  Take once a day as needed for constipation., Disp: 100 packet, Rfl: 6    predniSONE (DELTASONE) 10 MG tablet, take 1 tablet by mouth daily if needed for gout, Disp: 30 tablet, Rfl: 0    tamsulosin (FLOMAX) 0.4 mg Cp24, Take 1 capsule (0.4 mg total) by mouth every evening., Disp: 30 capsule, Rfl: 11    triamcinolone acetonide 0.1% (KENALOG) 0.1 % ointment, APPLY TOPICALLY TWO TIMES A DAY, Disp: 80 g, Rfl: 1    PHYSICAL EXAMINATION:    Constitutional: She appears well-developed and well-nourished.  She is in no apparent distress.    Eyes: No scleral icterus noted bilaterally.  No discharge noted bilaterally.      Cardiovascular: Normal rate.  No pitting edema noted in lower extremities bilaterally    Pulmonary/Chest: Effort normal. No respiratory distress.     Abdominal:  She exhibits no distension.  There is no CVA tenderness.     Lymphadenopathy:          Right: No supraclavicular adenopathy present.        Left: No supraclavicular adenopathy present.     Neurological: She is alert and oriented to person, place, and time.     Skin: Skin is warm and dry.     Psych: Cooperative with normal affect.    Genitourinary:    Urethral caruncle noted  Urethra and bladder are nontender to bimanual exam  Well supported anteriorly and posteriorly   No adnexal masses  Consent verbally obtained.  Betadine prep was applied to the urethral meatus. An in and out cath was performed after voiding.  The PVR was<60ml ml.    Physical Exam      LABS:    U/a:1.010, pH 7, + protein otherwise negative.      IMPRESSION:    Encounter Diagnoses   Name Primary?    Nocturia Yes    Urethral caruncle     Dysuria        PLAN:  Urine culture  Recommended behavior modifications for nocturia.        -Limit fluid intake 3 hours before going to bed.  May also benefit from taking evening medications a hour earlier.      -Empty bladder before going to bed.    Patient requests to restart the medications that she was on in the past instead.  She states that she did not have her urinary symptoms when she was on them.    Refilled flomax and estrace.  Apply a pea size amount cream to finger and insert into vagina sweeping finger up to urethra.  Apply cream on Monday, Wednesday, and Friday nights.      Follow up in 8-12 weeks.      Nelly Alicia PA-C

## 2017-10-12 LAB — BACTERIA UR CULT: NO GROWTH

## 2017-10-19 ENCOUNTER — TELEPHONE (OUTPATIENT)
Dept: ELECTROPHYSIOLOGY | Facility: CLINIC | Age: 82
End: 2017-10-19

## 2017-10-19 NOTE — TELEPHONE ENCOUNTER
----- Message from Stanley Kate MA sent at 10/19/2017  9:42 AM CDT -----  Contact: pt       ----- Message -----  From: Quiana Gordon  Sent: 10/19/2017   9:38 AM  To: Javed Hanna Staff    Pt would like to be called back regarding blood pressure being high due to new medication.       Pt can be reached at [phone].

## 2017-10-19 NOTE — TELEPHONE ENCOUNTER
Left message on phone number in message ([phone]) for return call. Other number in pt profile disconnects when dialed.

## 2017-10-27 ENCOUNTER — OFFICE VISIT (OUTPATIENT)
Dept: INTERNAL MEDICINE | Facility: CLINIC | Age: 82
End: 2017-10-27
Payer: MEDICARE

## 2017-10-27 VITALS
HEIGHT: 64 IN | OXYGEN SATURATION: 99 % | WEIGHT: 155.44 LBS | DIASTOLIC BLOOD PRESSURE: 84 MMHG | TEMPERATURE: 98 F | SYSTOLIC BLOOD PRESSURE: 122 MMHG | BODY MASS INDEX: 26.54 KG/M2 | HEART RATE: 78 BPM

## 2017-10-27 DIAGNOSIS — E11.22 TYPE 2 DIABETES MELLITUS WITH STAGE 3 CHRONIC KIDNEY DISEASE, WITHOUT LONG-TERM CURRENT USE OF INSULIN: ICD-10-CM

## 2017-10-27 DIAGNOSIS — K21.9 CHRONIC GERD: ICD-10-CM

## 2017-10-27 DIAGNOSIS — M79.89 LEG SWELLING: ICD-10-CM

## 2017-10-27 DIAGNOSIS — K21.00 GASTROESOPHAGEAL REFLUX DISEASE WITH ESOPHAGITIS: ICD-10-CM

## 2017-10-27 DIAGNOSIS — N18.30 TYPE 2 DIABETES MELLITUS WITH STAGE 3 CHRONIC KIDNEY DISEASE, WITHOUT LONG-TERM CURRENT USE OF INSULIN: ICD-10-CM

## 2017-10-27 DIAGNOSIS — D63.8 ANEMIA IN OTHER CHRONIC DISEASES CLASSIFIED ELSEWHERE: ICD-10-CM

## 2017-10-27 DIAGNOSIS — I48.0 PAROXYSMAL ATRIAL FIBRILLATION: ICD-10-CM

## 2017-10-27 DIAGNOSIS — I10 ESSENTIAL HYPERTENSION: Primary | ICD-10-CM

## 2017-10-27 PROCEDURE — 99999 PR PBB SHADOW E&M-EST. PATIENT-LVL V: CPT | Mod: PBBFAC,,, | Performed by: FAMILY MEDICINE

## 2017-10-27 PROCEDURE — 99215 OFFICE O/P EST HI 40 MIN: CPT | Mod: S$GLB,,, | Performed by: FAMILY MEDICINE

## 2017-10-27 PROCEDURE — 99499 UNLISTED E&M SERVICE: CPT | Mod: S$GLB,,, | Performed by: FAMILY MEDICINE

## 2017-10-27 RX ORDER — FUROSEMIDE 20 MG/1
20 TABLET ORAL DAILY PRN
Qty: 14 TABLET | Refills: 0 | Status: SHIPPED | OUTPATIENT
Start: 2017-10-27 | End: 2017-11-21 | Stop reason: SDUPTHER

## 2017-10-27 RX ORDER — OMEPRAZOLE 20 MG/1
20 CAPSULE, DELAYED RELEASE ORAL DAILY
Qty: 30 CAPSULE | Refills: 11 | Status: SHIPPED | OUTPATIENT
Start: 2017-10-27 | End: 2017-11-21

## 2017-10-27 NOTE — PROGRESS NOTES
Subjective:       Patient ID: Dacia Martínez is a 82 y.o. female.    Chief Complaint: Follow-up  Dacia Martínez 82 y.o. female is here for office visit to review care and physical exam, here for clearance have tooth pulled.  Has form, told Coumadin needs discounting?  ROS unremarkable, feels well ot at least at baseline.      HPI  Review of Systems   Constitutional: Negative for activity change, fatigue, fever and unexpected weight change.   HENT: Negative for congestion, hearing loss, postnasal drip and rhinorrhea.    Eyes: Negative for redness and visual disturbance.   Respiratory: Negative for chest tightness, shortness of breath and wheezing.    Cardiovascular: Negative for chest pain, palpitations and leg swelling.   Gastrointestinal: Negative for abdominal distention.   Genitourinary: Negative for decreased urine volume, dysuria, flank pain, hematuria, pelvic pain and urgency.   Musculoskeletal: Negative for back pain, gait problem, joint swelling and neck stiffness.   Skin: Negative for color change, rash and wound.   Neurological: Negative for dizziness, syncope, weakness and headaches.   Psychiatric/Behavioral: Negative for behavioral problems, confusion and sleep disturbance. The patient is not nervous/anxious.        Objective:      Physical Exam   Constitutional: She is oriented to person, place, and time. She appears well-developed and well-nourished. No distress.   HENT:   Head: Normocephalic.   Mouth/Throat: No oropharyngeal exudate.   Eyes: EOM are normal. Pupils are equal, round, and reactive to light. No scleral icterus.   Neck: Neck supple. No JVD present. No thyromegaly present.   Cardiovascular: Normal rate, regular rhythm and normal heart sounds.  Exam reveals no gallop and no friction rub.    No murmur heard.  Pulmonary/Chest: Effort normal and breath sounds normal. She has no wheezes. She has no rales.   Abdominal: Soft. Bowel sounds are normal. She exhibits no distension and no mass. There is no  tenderness. There is no guarding.   Musculoskeletal: Normal range of motion. She exhibits no edema.   Lymphadenopathy:     She has no cervical adenopathy.   Neurological: She is alert and oriented to person, place, and time. She has normal reflexes. She displays normal reflexes. No cranial nerve deficit. She exhibits normal muscle tone.   Skin: Skin is warm. No rash noted. No erythema.   Psychiatric: She has a normal mood and affect. Thought content normal.       Assessment:       No diagnosis found.    Plan:       Dacia was seen today for follow-up.    Diagnoses and all orders for this visit:    Essential hypertension  - discussed control  Type 2 diabetes mellitus with stage 3 chronic kidney disease, without long-term current use of insulin  - Reviewed  Anemia in other chronic diseases classified elsewhere  - Labs reviewed, no new concern  Paroxysmal atrial fibrillation  - Aske dto call Cardiology, phone says that dept left message to call  Chronic GERD  - chart reviewed

## 2017-10-31 ENCOUNTER — TELEPHONE (OUTPATIENT)
Dept: ELECTROPHYSIOLOGY | Facility: CLINIC | Age: 82
End: 2017-10-31

## 2017-10-31 NOTE — TELEPHONE ENCOUNTER
Spoke with pt about medication concerns, and blood pressures. Pt reports she has noted swelling in her feet since lasix was stopped some time ago with a dosage change of her Metoprolol. Pt voicing concern her blood pressure might be being affected by not taking lasix any more. Advised pt that recently documented B/P's look good, except for only on one date. Pt saw Dr. Vickers last week and was given prn lasix. Pt reports she has not taken med because she wants to be sure it is safe with her other blood pressure medications. Advised pt that Lasix is Rx'd as PRN, and is a low enough dose that it shouldn't cause too bad of an effect on her other medications. Instructed her to only take it when she is noticing swelling in her feet and ankles. Understanding verbalized.  Pt also reports she has occasional feeling of heart racing, but it dose not last long, and she does not have any chest pain or SOB with it. Instructed pt to call back if heart racing does not stop, she develops chest pain, or SOB. Understanding verbalized.

## 2017-10-31 NOTE — TELEPHONE ENCOUNTER
----- Message from Trudi Rodriguez sent at 10/27/2017 12:02 PM CDT -----  Contact: pt called  Pt called, states she is returning your call and will like to be reached at Ph [phone] and [phone]. Thank you

## 2017-11-06 RX ORDER — HYDROCODONE BITARTRATE AND ACETAMINOPHEN 5; 325 MG/1; MG/1
1 TABLET ORAL EVERY 6 HOURS PRN
Qty: 60 TABLET | Refills: 0 | Status: SHIPPED | OUTPATIENT
Start: 2017-11-06 | End: 2017-11-21

## 2017-11-21 ENCOUNTER — HOSPITAL ENCOUNTER (OUTPATIENT)
Dept: CARDIOLOGY | Facility: CLINIC | Age: 82
Discharge: HOME | End: 2017-11-21
Payer: MEDICARE

## 2017-11-21 ENCOUNTER — OFFICE VISIT (OUTPATIENT)
Dept: ELECTROPHYSIOLOGY | Facility: CLINIC | Age: 82
End: 2017-11-21
Payer: MEDICARE

## 2017-11-21 VITALS
HEIGHT: 61 IN | HEART RATE: 98 BPM | DIASTOLIC BLOOD PRESSURE: 104 MMHG | WEIGHT: 150.81 LBS | BODY MASS INDEX: 28.47 KG/M2 | SYSTOLIC BLOOD PRESSURE: 148 MMHG

## 2017-11-21 DIAGNOSIS — E11.22 TYPE 2 DIABETES MELLITUS WITH STAGE 3 CHRONIC KIDNEY DISEASE, WITHOUT LONG-TERM CURRENT USE OF INSULIN: ICD-10-CM

## 2017-11-21 DIAGNOSIS — I10 ESSENTIAL HYPERTENSION: Primary | ICD-10-CM

## 2017-11-21 DIAGNOSIS — M79.89 LEG SWELLING: ICD-10-CM

## 2017-11-21 DIAGNOSIS — I48.19 PERSISTENT ATRIAL FIBRILLATION: ICD-10-CM

## 2017-11-21 DIAGNOSIS — N18.30 TYPE 2 DIABETES MELLITUS WITH STAGE 3 CHRONIC KIDNEY DISEASE, WITHOUT LONG-TERM CURRENT USE OF INSULIN: ICD-10-CM

## 2017-11-21 DIAGNOSIS — I48.91 ATRIAL FIBRILLATION, UNSPECIFIED TYPE: ICD-10-CM

## 2017-11-21 DIAGNOSIS — I70.0 ATHEROSCLEROSIS OF AORTA: Chronic | ICD-10-CM

## 2017-11-21 PROCEDURE — 93000 ELECTROCARDIOGRAM COMPLETE: CPT | Mod: S$GLB,,, | Performed by: INTERNAL MEDICINE

## 2017-11-21 PROCEDURE — 99499 UNLISTED E&M SERVICE: CPT | Mod: S$GLB,,, | Performed by: INTERNAL MEDICINE

## 2017-11-21 PROCEDURE — 99214 OFFICE O/P EST MOD 30 MIN: CPT | Mod: S$GLB,,, | Performed by: INTERNAL MEDICINE

## 2017-11-21 PROCEDURE — 99999 PR PBB SHADOW E&M-EST. PATIENT-LVL III: CPT | Mod: PBBFAC,,, | Performed by: INTERNAL MEDICINE

## 2017-11-21 RX ORDER — FUROSEMIDE 20 MG/1
20 TABLET ORAL DAILY PRN
Qty: 30 TABLET | Refills: 3 | Status: ON HOLD | OUTPATIENT
Start: 2017-11-21 | End: 2017-12-23 | Stop reason: HOSPADM

## 2017-11-21 RX ORDER — METOPROLOL SUCCINATE 100 MG/1
100 TABLET, EXTENDED RELEASE ORAL DAILY
Qty: 90 TABLET | Refills: 3 | Status: SHIPPED | OUTPATIENT
Start: 2017-11-21 | End: 2017-12-03 | Stop reason: DRUGHIGH

## 2017-11-21 NOTE — LETTER
November 21, 2017      Cali Vickers MD  [address]  [facility] [address]           [facility]  [address]  [facility] [address]  Phone: [phone]  Fax: [phone]          Patient: Dacia Martínez   MR Number: [id number]   YOB: 1935   Date of Visit: 11/21/2017       Dear Dr. Cali Vickers:    Thank you for referring Dacia Martínez to me for evaluation. Attached you will find relevant portions of my assessment and plan of care.    If you have questions, please do not hesitate to call me. I look forward to following Dacia Martínez along with you.    Sincerely,    Jacques Burt MD    Enclosure  CC:  No Recipients    If you would like to receive this communication electronically, please contact [email or web address] or [phone] to request more information on [employer] access.    For providers and/or their staff who would like to refer a patient to [facility], please contact us through our one-stop-shop provider referral line, [facility], at [phone].    If you feel you have received this communication in error or would no longer like to receive these types of communications, please e-mail [email or web address]

## 2017-11-21 NOTE — PROGRESS NOTES
"Subjective:    Patient ID:  Dacia Martínez is a 82 y.o. female who presents for follow-up of Atrial Fibrillation      82 year old female with pAF s/p DCCV (17) HTN, CAD, breast cancer, migraines, IDDM, and CKD III, who presented to [facility] ED (17) for evaluation of a sudden-onset episode of palpitations/tachycardia. She was found to be in AF w/RVR at 120 bpm; an Echo at the time revealed an EF of 60%. Ms. Martínez was rate-controlled and discharged home.     Since discharge, Ms. Martínez notes only occasional fast rates; "not anything like before." She reports experiencing occasional dizziness; baseline. She denies chest pain, SOB/MCCOLLUM, palpitations, or syncope.     Interval history: Minimal symptoms due to AF. Remains on metoprolol 100 mg bid as well as apixaban.     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  6/3/2014: Bilateral nonexudative age-related macular deg*  No date: Breast cancer  No date: Cataract  2017: Chronic GERD  No date: CKD stage 3 due to type 2 diabetes mellitus  No date: Coronary artery disease  No date: Hypertension  No date: Migraine headache  6/3/2014: Mild nonproliferative diabetic retinopathy of *  No date: Pneumonia  No date: Type 2 diabetes mellitus with hyperglycemia  No date: Vertigo    Past Surgical History:  No date: BREAST SURGERY      Comment: left lumpectomy  No date: CARPAL TUNNEL RELEASE      Comment: left  No date: CATARACT EXTRACTION      Comment: vance  No date:  SECTION  No date: EYE SURGERY      Comment: cataracts bilaterally  No date: HYSTERECTOMY      Comment: partial    Social History    Marital status: Single              Spouse name:                       Years of education:                 Number of children: 4             Occupational History    None on file    Social History Main Topics    Smoking status: Never Smoker                                                                Smokeless tobacco: Never " Used                        Alcohol use: No                 Comment: socially    Drug use: No              Sexual activity: Not Currently        Other Topics            Concern    None on file    Social History Narrative    Family supportive.     Granddaughter helps set out her medications in pillbox, helps check her blood sugar.        Review of patient's family history indicates:    Cancer                         Mother                      Comment: stomach    Heart disease                  Mother                    Diabetes                       Father                    Hypertension                   Father                    Blindness                      Brother                   Diabetes                       Brother                   Hypertension                   Brother                   Cataracts                      Sister                    Diabetes                       Sister                    Diabetes                       Brother                   Diabetes                       Brother                   Diabetes                       Brother                   No Known Problems              Daughter                  No Known Problems              Son                       No Known Problems              Daughter                  Amblyopia                      Neg Hx                    Glaucoma                       Neg Hx                    Macular degeneration           Neg Hx                    Strabismus                     Neg Hx                    Retinal detachment             Neg Hx                          Review of Systems   Constitution: Negative for diaphoresis and malaise/fatigue.   HENT: Negative for nosebleeds.    Eyes: Negative for double vision.   Cardiovascular: Positive for irregular heartbeat (occasional elevated HR). Negative for chest pain, dyspnea on exertion, near-syncope, palpitations and syncope.   Respiratory: Negative for shortness of breath.    Skin: Negative.     Musculoskeletal: Negative.    Gastrointestinal: Negative for abdominal pain, hematemesis and hematochezia.   Genitourinary: Negative for hematuria.   Neurological: Positive for dizziness and light-headedness. Negative for headaches.   Psychiatric/Behavioral: Negative for altered mental status.        Objective:    Physical Exam   Constitutional: She is oriented to person, place, and time. She appears well-developed and well-nourished. No distress.   HENT:   Head: Normocephalic and atraumatic.   Eyes: EOM are normal. Pupils are equal, round, and reactive to light.   Neck: Normal range of motion. No JVD present. No thyromegaly present.   Cardiovascular: Normal rate, S1 normal, S2 normal, normal heart sounds and intact distal pulses.  An irregular rhythm present. PMI is not displaced.  Exam reveals no gallop and no friction rub.    No murmur heard.  Pulmonary/Chest: Effort normal and breath sounds normal. No respiratory distress. She has no wheezes. She has no rales.   Abdominal: Soft. Bowel sounds are normal. She exhibits no distension. There is no tenderness. There is no rebound and no guarding.   Musculoskeletal: Normal range of motion. She exhibits no edema or tenderness.   Neurological: She is alert and oriented to person, place, and time. No cranial nerve deficit.   Skin: Skin is warm and dry. No rash noted. She is not diaphoretic. No erythema.   Psychiatric: She has a normal mood and affect. Her behavior is normal. Judgment and thought content normal.   Vitals reviewed.    ECG: AF with controlled V rate, nl QRS        Assessment:       1. Essential hypertension    2. Persistent atrial fibrillation    3. Type 2 diabetes mellitus with stage 3 chronic kidney disease, without long-term current use of insulin    4. Leg swelling         Plan:         Persistent atrial fibrillation   [facility] (06/06/17)   Started on apixaban and metoprolol  Recurrence of AF 7/7/17, asymptomatic    Type 2 diabetes mellitus with stage 3  chronic kidney disease, without long-term current use of insulin  Managed by referring providers      Essential hypertension  Managed by referring providers      Atherosclerosis of aorta  On Abdominal CT 9/16  Managed by referring providers      82 yoF persistent AF, DM, HTN here for routine AF management. She has stable, asymptomatic, rate-controlled AF. Will continue current therapy. RTC 1y or as needed

## 2017-11-30 RX ORDER — HYDROCODONE BITARTRATE AND ACETAMINOPHEN 5; 325 MG/1; MG/1
1 TABLET ORAL 3 TIMES DAILY PRN
Qty: 90 TABLET | Refills: 0 | Status: SHIPPED | OUTPATIENT
Start: 2017-12-31 | End: 2017-12-04 | Stop reason: SDUPTHER

## 2017-12-03 ENCOUNTER — HOSPITAL ENCOUNTER (EMERGENCY)
Facility: HOSPITAL | Age: 82
Discharge: HOME | End: 2017-12-03
Attending: EMERGENCY MEDICINE
Payer: MEDICARE

## 2017-12-03 VITALS
RESPIRATION RATE: 16 BRPM | DIASTOLIC BLOOD PRESSURE: 88 MMHG | BODY MASS INDEX: 27.38 KG/M2 | HEIGHT: 61 IN | TEMPERATURE: 98 F | WEIGHT: 145 LBS | SYSTOLIC BLOOD PRESSURE: 160 MMHG | OXYGEN SATURATION: 98 % | HEART RATE: 100 BPM

## 2017-12-03 DIAGNOSIS — R00.2 PALPITATIONS: Primary | ICD-10-CM

## 2017-12-03 DIAGNOSIS — R00.0 TACHYCARDIA: ICD-10-CM

## 2017-12-03 DIAGNOSIS — I48.19 PERSISTENT ATRIAL FIBRILLATION: ICD-10-CM

## 2017-12-03 PROCEDURE — 99285 EMERGENCY DEPT VISIT HI MDM: CPT | Mod: ,,, | Performed by: EMERGENCY MEDICINE

## 2017-12-03 PROCEDURE — 93005 ELECTROCARDIOGRAM TRACING: CPT

## 2017-12-03 PROCEDURE — 93010 ELECTROCARDIOGRAM REPORT: CPT | Mod: 76,,, | Performed by: INTERNAL MEDICINE

## 2017-12-03 PROCEDURE — 25000003 PHARM REV CODE 250: Performed by: EMERGENCY MEDICINE

## 2017-12-03 PROCEDURE — 99284 EMERGENCY DEPT VISIT MOD MDM: CPT | Mod: 25

## 2017-12-03 PROCEDURE — 93010 ELECTROCARDIOGRAM REPORT: CPT | Mod: ,,, | Performed by: INTERNAL MEDICINE

## 2017-12-03 RX ORDER — METOPROLOL TARTRATE 100 MG/1
100 TABLET ORAL 2 TIMES DAILY
Qty: 60 TABLET | Refills: 0 | Status: ON HOLD | OUTPATIENT
Start: 2017-12-03 | End: 2017-12-23

## 2017-12-03 RX ORDER — METOPROLOL TARTRATE 50 MG/1
100 TABLET ORAL ONCE
Status: COMPLETED | OUTPATIENT
Start: 2017-12-03 | End: 2017-12-03

## 2017-12-03 RX ADMIN — METOPROLOL TARTRATE 100 MG: 50 TABLET ORAL at 08:12

## 2017-12-03 NOTE — ED PROVIDER NOTES
"Encounter Date: 12/3/2017    SCRIBE #1 NOTE: I, Jazmin Mari, am scribing for, and in the presence of,  Dr. Acuña. I have scribed the following portions of the note - the EKG reading and the Resident attestation.       History     Chief Complaint   Patient presents with    Feels Like Heart is Racing     "My heart was beating fast last night. I started a new medicine. I think it's because of the medicine" denies chest pain. Medicine  is Metoprolol     82F with history of Afib presenting with palpitations over past 10 days.  Patient first diagnosed with Afib in 6/2017, was successfully cardioverted and started on eliquis.  She was re-admitted in 7/2017 for recurrent Afib with RVR, responded well to rate control and since that time has been taking metoprolol tartrate 100 BID with good symptom control.  She saw cardiology in routine f/u visit on 11/21 and was prescribed metoprolol succinate 100 daily.  Since that time, she has had frequent breakthrough palpitations associated with anxiety.  Denies chest pain, shortness of breath, lightheadedness or dizziness.  No recent illness or change in po intake.  Last night, felt palpitations throughout the night with anxiety and therefore presented to care this morning.            Review of patient's allergies indicates:  No Known Allergies  Past Medical History:   Diagnosis Date    A-fib     Arthritis     Asteroid hyalosis of left eye     Bilateral nonexudative age-related macular degeneration 6/3/2014    Breast cancer     Cataract     Chronic GERD 9/18/2017    CKD stage 3 due to type 2 diabetes mellitus     Coronary artery disease     Hypertension     Migraine headache     Mild nonproliferative diabetic retinopathy of both eyes 6/3/2014    Pneumonia     Type 2 diabetes mellitus with hyperglycemia     Vertigo      Past Surgical History:   Procedure Laterality Date    BREAST SURGERY      left lumpectomy    CARPAL TUNNEL RELEASE      left    CATARACT " EXTRACTION      vance     SECTION      EYE SURGERY      cataracts bilaterally    HYSTERECTOMY      partial     Family History   Problem Relation Age of Onset    Cancer Mother      stomach    Heart disease Mother     Diabetes Father     Hypertension Father     Blindness Brother     Diabetes Brother     Hypertension Brother     Cataracts Sister     Diabetes Sister     Diabetes Brother     Diabetes Brother     Diabetes Brother     No Known Problems Daughter     No Known Problems Son     No Known Problems Daughter     Amblyopia Neg Hx     Glaucoma Neg Hx     Macular degeneration Neg Hx     Strabismus Neg Hx     Retinal detachment Neg Hx      Social History   Substance Use Topics    Smoking status: Never Smoker    Smokeless tobacco: Never Used    Alcohol use No      Comment: socially     Review of Systems   Constitutional: Negative for appetite change, chills, fatigue and fever.   HENT: Negative for trouble swallowing.    Eyes: Negative for visual disturbance.   Respiratory: Negative for cough and shortness of breath.    Cardiovascular: Positive for palpitations. Negative for chest pain.   Gastrointestinal: Negative for abdominal pain, diarrhea, nausea and vomiting.   Genitourinary: Negative for dysuria.   Musculoskeletal: Negative for joint swelling.   Skin: Negative for wound.   Allergic/Immunologic: Negative for immunocompromised state.   Neurological: Negative for seizures, syncope, weakness, light-headedness and headaches.       Physical Exam     Initial Vitals [17 0656]   BP Pulse Resp Temp SpO2   (!) 135/96 108 18 97.8 °F (36.6 °C) 98 %      MAP       109         Physical Exam    Nursing note and vitals reviewed.  Constitutional: She appears well-developed and well-nourished. She is not diaphoretic. No distress.   Comfortable appearing   HENT:   Head: Normocephalic and atraumatic.   Eyes: Conjunctivae are normal. No scleral icterus.   Neck: Normal range of motion. Neck supple.    Cardiovascular: Normal rate, normal heart sounds and intact distal pulses.   No murmur heard.  Irregular pulse/heart sounds   Pulmonary/Chest: Breath sounds normal. No respiratory distress. She has no wheezes. She has no rales.   Abdominal: Soft. She exhibits no distension. There is no tenderness.   Musculoskeletal: She exhibits no edema.   Neurological: She is alert and oriented to person, place, and time.   Skin: Skin is warm and dry.         ED Course   Procedures  Labs Reviewed - No data to display  EKG Readings: (Independently Interpreted)   Rhythm: Atrial Fibrillation. Heart Rate: 107.   No ST elevation. Poor R wave progression.   Poor R wave progression is changed when compared to November of this year.           Medical Decision Making:   History:   Old Medical Records: I decided to obtain old medical records.  Independently Interpreted Test(s):   I have ordered and independently interpreted EKG Reading(s) - see prior notes  Clinical Tests:   Medical Tests: Ordered and Reviewed       APC / Resident Notes:   HO3 MDM:  Patient placed on monitor and serial EKGs obtained.  Initial EKG with Afib with rate 107, repeat 98. On monitor, patient remaining in 90s-100s.  No ST segment or T wave changes to suggest ischemia, though EKGs notable for poor R wave progression which was not noted previously.  Patient did not have any symptoms to suggest ischemia or other acute event.  On review of cardiology notes, I do not see specific rational for change from Lopressor to Toprol.  Discussed with cardiology, who felt was ok to return to Lopressor at previous dose.  Patient additionally had been on atenolol for BP control prior to Afib several months ago, and states BP has been running high since then.  Will not start anti-hypertensive today given changing other medication, but urged her to see her PCP as soon as possible, within 1-2 weeks to consider adding BP med.  Provided with 1 month script for Lopressor 100 BID and  given first dose here.  Patient given return precautions and f/u with cardiology and PCP.     E Teddy Joy MD  PGY-3, [employer] EM  12/3/2017 8:41 AM           Scribe Attestation:   Scribe #1: I performed the above scribed service and the documentation accurately describes the services I performed. I attest to the accuracy of the note.    Attending Attestation:   Physician Attestation Statement for Resident:  As the supervising MD   Physician Attestation Statement: I have personally seen and examined this patient.   I agree with the above history. -: 82 y.o. woman with hx of AFIB c/o intermittent palpitations for the past week, that have been worse over the past 2 days. Denies chest discomfort. Reports that last night she had some shortness of breath with the palpitations. Currently, her symptoms are improved. States that her symptoms began after she was changed from metoprolol 100mg BID to the sustained released 100mg once daily. Also reports that her blood pressure has been poorly controlled since she was taken off atenolol and put on metoprolol.    As the supervising MD I agree with the above PE.   -: On physical exam, patient is slightly anxious. Her heart rate runs from the mid 90s to the 100-teens. Heart is irregularly irregular, tachycardic. Lungs normal. No JVD.    As the supervising MD I agree with the above treatment, course, plan, and disposition.   -: Although her EKG shows poor R wave progression, this could be due to lead placement. I do not think this is an ischemic event, since the patient has no complaints of chest discomfort. Patient feels that all her symptoms are due to medication change. We will change her back to the metoprolol 100mg BID. She will also need to f/u with Internal Medicine for better control of her blood pressure. She may need a second medication added for her blood pressure control. I advised her to return to the ED for persistent symptoms of palpitations but currently she does  not require aggressive rate control. Patient expressed understanding.                     ED Course      Clinical Impression:   The primary encounter diagnosis was Palpitations. Diagnoses of Tachycardia and Persistent atrial fibrillation were also pertinent to this visit.    Disposition:   Disposition: Discharged  Condition: Stable                        Francesca Joy MD  Resident  12/03/17 2611

## 2017-12-03 NOTE — ED TRIAGE NOTES
Pt presents for concern that her heart is racing since changing medications. Pt states she became increasingly anxious over night and became concerned.

## 2017-12-04 ENCOUNTER — TELEPHONE (OUTPATIENT)
Dept: RHEUMATOLOGY | Facility: CLINIC | Age: 82
End: 2017-12-04

## 2017-12-04 RX ORDER — HYDROCODONE BITARTRATE AND ACETAMINOPHEN 5; 325 MG/1; MG/1
1 TABLET ORAL EVERY 6 HOURS PRN
Qty: 60 TABLET | Refills: 0 | Status: SHIPPED | OUTPATIENT
Start: 2017-12-04 | End: 2018-01-02 | Stop reason: SDUPTHER

## 2017-12-04 NOTE — TELEPHONE ENCOUNTER
----- Message from Toshia Flores sent at 12/4/2017  8:10 AM CST -----  Contact: self   Pt called stating that her pharmacy will not fill her prescription and would like to know if she should bring the prescription in to the so the dr can see what is wrong with the prescription. She stated she has been out of her medication all weekend and needs this rx filled. please call patient today to discuss this. [phone]

## 2017-12-14 ENCOUNTER — HOSPITAL ENCOUNTER (INPATIENT)
Facility: HOSPITAL | Age: 82
LOS: 8 days | Discharge: HOME | DRG: 291 | End: 2017-12-23
Attending: EMERGENCY MEDICINE | Admitting: INTERNAL MEDICINE
Payer: MEDICARE

## 2017-12-14 ENCOUNTER — TELEPHONE (OUTPATIENT)
Dept: INTERNAL MEDICINE | Facility: CLINIC | Age: 82
End: 2017-12-14

## 2017-12-14 DIAGNOSIS — I48.91 ATRIAL FIBRILLATION: ICD-10-CM

## 2017-12-14 DIAGNOSIS — E11.22 TYPE 2 DIABETES MELLITUS WITH STAGE 3 CHRONIC KIDNEY DISEASE, WITHOUT LONG-TERM CURRENT USE OF INSULIN: ICD-10-CM

## 2017-12-14 DIAGNOSIS — R06.02 SOB (SHORTNESS OF BREATH): ICD-10-CM

## 2017-12-14 DIAGNOSIS — I50.21 ACUTE SYSTOLIC HEART FAILURE: ICD-10-CM

## 2017-12-14 DIAGNOSIS — M1A.09X0 IDIOPATHIC CHRONIC GOUT OF MULTIPLE SITES WITHOUT TOPHUS: Chronic | ICD-10-CM

## 2017-12-14 DIAGNOSIS — R06.02 SHORTNESS OF BREATH: ICD-10-CM

## 2017-12-14 DIAGNOSIS — I50.23 ACUTE ON CHRONIC SYSTOLIC HEART FAILURE: Primary | ICD-10-CM

## 2017-12-14 DIAGNOSIS — N18.30 TYPE 2 DIABETES MELLITUS WITH STAGE 3 CHRONIC KIDNEY DISEASE, WITHOUT LONG-TERM CURRENT USE OF INSULIN: ICD-10-CM

## 2017-12-14 DIAGNOSIS — I50.9 CHF (CONGESTIVE HEART FAILURE): ICD-10-CM

## 2017-12-14 DIAGNOSIS — I48.91 ATRIAL FIBRILLATION WITH RVR: ICD-10-CM

## 2017-12-14 DIAGNOSIS — Z01.89 ENCOUNTER FOR CARDIOVERSION PROCEDURE: ICD-10-CM

## 2017-12-14 DIAGNOSIS — M15.9 PRIMARY OSTEOARTHRITIS INVOLVING MULTIPLE JOINTS: Chronic | ICD-10-CM

## 2017-12-14 DIAGNOSIS — I10 ESSENTIAL HYPERTENSION: ICD-10-CM

## 2017-12-14 DIAGNOSIS — R00.0 TACHYCARDIA: ICD-10-CM

## 2017-12-14 LAB
ALBUMIN SERPL BCP-MCNC: 2.9 G/DL
ALP SERPL-CCNC: 83 U/L
ALT SERPL W/O P-5'-P-CCNC: 19 U/L
ANION GAP SERPL CALC-SCNC: 12 MMOL/L
AST SERPL-CCNC: 35 U/L
BASOPHILS # BLD AUTO: 0.08 K/UL
BASOPHILS NFR BLD: 1.1 %
BILIRUB SERPL-MCNC: 1.4 MG/DL
BNP SERPL-MCNC: 2336 PG/ML
BUN SERPL-MCNC: 20 MG/DL
CALCIUM SERPL-MCNC: 8.4 MG/DL
CHLORIDE SERPL-SCNC: 106 MMOL/L
CO2 SERPL-SCNC: 21 MMOL/L
CREAT SERPL-MCNC: 1.2 MG/DL
DIFFERENTIAL METHOD: ABNORMAL
EOSINOPHIL # BLD AUTO: 0 K/UL
EOSINOPHIL NFR BLD: 0.5 %
ERYTHROCYTE [DISTWIDTH] IN BLOOD BY AUTOMATED COUNT: 18.3 %
EST. GFR  (AFRICAN AMERICAN): 49 ML/MIN/1.73 M^2
EST. GFR  (NON AFRICAN AMERICAN): 42.5 ML/MIN/1.73 M^2
GLUCOSE SERPL-MCNC: 109 MG/DL
HCT VFR BLD AUTO: 42.9 %
HGB BLD-MCNC: 13.5 G/DL
IMM GRANULOCYTES # BLD AUTO: 0.03 K/UL
IMM GRANULOCYTES NFR BLD AUTO: 0.4 %
INR PPP: 2
LACTATE SERPL-SCNC: 3.2 MMOL/L
LYMPHOCYTES # BLD AUTO: 2.4 K/UL
LYMPHOCYTES NFR BLD: 32.3 %
MCH RBC QN AUTO: 26.1 PG
MCHC RBC AUTO-ENTMCNC: 31.5 G/DL
MCV RBC AUTO: 83 FL
MONOCYTES # BLD AUTO: 0.7 K/UL
MONOCYTES NFR BLD: 8.9 %
NEUTROPHILS # BLD AUTO: 4.2 K/UL
NEUTROPHILS NFR BLD: 56.8 %
NRBC BLD-RTO: 1 /100 WBC
PLATELET # BLD AUTO: 277 K/UL
PMV BLD AUTO: 11.5 FL
POTASSIUM SERPL-SCNC: 4.2 MMOL/L
PROT SERPL-MCNC: 7.4 G/DL
PROTHROMBIN TIME: 20.1 SEC
RBC # BLD AUTO: 5.18 M/UL
SODIUM SERPL-SCNC: 139 MMOL/L
TROPONIN I SERPL DL<=0.01 NG/ML-MCNC: 0.04 NG/ML
WBC # BLD AUTO: 7.39 K/UL

## 2017-12-14 PROCEDURE — 25000003 PHARM REV CODE 250: Performed by: STUDENT IN AN ORGANIZED HEALTH CARE EDUCATION/TRAINING PROGRAM

## 2017-12-14 PROCEDURE — 96374 THER/PROPH/DIAG INJ IV PUSH: CPT

## 2017-12-14 PROCEDURE — 99284 EMERGENCY DEPT VISIT MOD MDM: CPT | Mod: ,,, | Performed by: EMERGENCY MEDICINE

## 2017-12-14 PROCEDURE — 85610 PROTHROMBIN TIME: CPT

## 2017-12-14 PROCEDURE — 87040 BLOOD CULTURE FOR BACTERIA: CPT

## 2017-12-14 PROCEDURE — 96376 TX/PRO/DX INJ SAME DRUG ADON: CPT

## 2017-12-14 PROCEDURE — 81001 URINALYSIS AUTO W/SCOPE: CPT

## 2017-12-14 PROCEDURE — 80053 COMPREHEN METABOLIC PANEL: CPT

## 2017-12-14 PROCEDURE — 93005 ELECTROCARDIOGRAM TRACING: CPT

## 2017-12-14 PROCEDURE — 63600175 PHARM REV CODE 636 W HCPCS: Performed by: STUDENT IN AN ORGANIZED HEALTH CARE EDUCATION/TRAINING PROGRAM

## 2017-12-14 PROCEDURE — 83880 ASSAY OF NATRIURETIC PEPTIDE: CPT

## 2017-12-14 PROCEDURE — 93010 ELECTROCARDIOGRAM REPORT: CPT | Mod: ,,, | Performed by: INTERNAL MEDICINE

## 2017-12-14 PROCEDURE — 96375 TX/PRO/DX INJ NEW DRUG ADDON: CPT

## 2017-12-14 PROCEDURE — 99285 EMERGENCY DEPT VISIT HI MDM: CPT | Mod: 25

## 2017-12-14 PROCEDURE — 85025 COMPLETE CBC W/AUTO DIFF WBC: CPT

## 2017-12-14 PROCEDURE — 83605 ASSAY OF LACTIC ACID: CPT

## 2017-12-14 PROCEDURE — 84484 ASSAY OF TROPONIN QUANT: CPT

## 2017-12-14 PROCEDURE — 20600001 HC STEP DOWN PRIVATE ROOM

## 2017-12-14 RX ORDER — DILTIAZEM HYDROCHLORIDE 5 MG/ML
10 INJECTION INTRAVENOUS
Status: COMPLETED | OUTPATIENT
Start: 2017-12-14 | End: 2017-12-14

## 2017-12-14 RX ORDER — FUROSEMIDE 10 MG/ML
40 INJECTION INTRAMUSCULAR; INTRAVENOUS
Status: COMPLETED | OUTPATIENT
Start: 2017-12-14 | End: 2017-12-14

## 2017-12-14 RX ADMIN — DILTIAZEM HYDROCHLORIDE 10 MG: 5 INJECTION INTRAVENOUS at 10:12

## 2017-12-14 RX ADMIN — FUROSEMIDE 40 MG: 10 INJECTION, SOLUTION INTRAMUSCULAR; INTRAVENOUS at 11:12

## 2017-12-14 NOTE — TELEPHONE ENCOUNTER
----- Message from Mable Wiley sent at 12/14/2017 10:02 AM CST -----  Contact: patient [phone]  Pt c/o having a dry cough and wants to know if you can order cough meds. Please call to let you know whether you will order or not.    [facility] [phone]

## 2017-12-15 PROBLEM — R06.02 SHORTNESS OF BREATH: Status: ACTIVE | Noted: 2017-12-15

## 2017-12-15 PROBLEM — J06.9 UPPER RESPIRATORY INFECTION: Status: ACTIVE | Noted: 2017-12-15

## 2017-12-15 PROBLEM — R00.0 TACHYCARDIA: Status: ACTIVE | Noted: 2017-12-15

## 2017-12-15 PROBLEM — I50.21 ACUTE SYSTOLIC HEART FAILURE: Status: ACTIVE | Noted: 2017-12-15

## 2017-12-15 LAB
ANION GAP SERPL CALC-SCNC: 13 MMOL/L
AORTIC VALVE STENOSIS: ABNORMAL
BACTERIA #/AREA URNS AUTO: ABNORMAL /HPF
BASOPHILS # BLD AUTO: 0.08 K/UL
BASOPHILS NFR BLD: 1.2 %
BILIRUB UR QL STRIP: NEGATIVE
BUN SERPL-MCNC: 19 MG/DL
CALCIUM SERPL-MCNC: 7.9 MG/DL
CHLORIDE SERPL-SCNC: 106 MMOL/L
CLARITY UR REFRACT.AUTO: CLEAR
CO2 SERPL-SCNC: 22 MMOL/L
COLOR UR AUTO: YELLOW
CREAT SERPL-MCNC: 1.3 MG/DL
DIFFERENTIAL METHOD: ABNORMAL
EOSINOPHIL # BLD AUTO: 0 K/UL
EOSINOPHIL NFR BLD: 0.6 %
ERYTHROCYTE [DISTWIDTH] IN BLOOD BY AUTOMATED COUNT: 18.3 %
EST. GFR  (AFRICAN AMERICAN): 44.5 ML/MIN/1.73 M^2
EST. GFR  (NON AFRICAN AMERICAN): 38.6 ML/MIN/1.73 M^2
ESTIMATED AVG GLUCOSE: 137 MG/DL
ESTIMATED PA SYSTOLIC PRESSURE: 68.88
GLUCOSE SERPL-MCNC: 115 MG/DL
GLUCOSE UR QL STRIP: NEGATIVE
HBA1C MFR BLD HPLC: 6.4 %
HCT VFR BLD AUTO: 41.8 %
HGB BLD-MCNC: 13 G/DL
HGB UR QL STRIP: ABNORMAL
HYALINE CASTS UR QL AUTO: 0 /LPF
IMM GRANULOCYTES # BLD AUTO: 0.02 K/UL
IMM GRANULOCYTES NFR BLD AUTO: 0.3 %
KETONES UR QL STRIP: NEGATIVE
LEUKOCYTE ESTERASE UR QL STRIP: ABNORMAL
LYMPHOCYTES # BLD AUTO: 2.1 K/UL
LYMPHOCYTES NFR BLD: 31.6 %
MCH RBC QN AUTO: 26.2 PG
MCHC RBC AUTO-ENTMCNC: 31.1 G/DL
MCV RBC AUTO: 84 FL
MICROSCOPIC COMMENT: ABNORMAL
MITRAL VALVE MOBILITY: NORMAL
MONOCYTES # BLD AUTO: 0.6 K/UL
MONOCYTES NFR BLD: 9.1 %
NEUTROPHILS # BLD AUTO: 3.8 K/UL
NEUTROPHILS NFR BLD: 57.2 %
NITRITE UR QL STRIP: NEGATIVE
NRBC BLD-RTO: 1 /100 WBC
PH UR STRIP: 5 [PH] (ref 5–8)
PLATELET # BLD AUTO: 276 K/UL
PMV BLD AUTO: 11.1 FL
POCT GLUCOSE: 79 MG/DL (ref 70–110)
POCT GLUCOSE: 91 MG/DL (ref 70–110)
POTASSIUM SERPL-SCNC: 3 MMOL/L
PROT UR QL STRIP: ABNORMAL
RBC # BLD AUTO: 4.97 M/UL
RBC #/AREA URNS AUTO: 3 /HPF (ref 0–4)
RETIRED EF AND QEF - SEE NOTES: 10 (ref 55–65)
SODIUM SERPL-SCNC: 141 MMOL/L
SP GR UR STRIP: 1 (ref 1–1.03)
TRICUSPID VALVE REGURGITATION: ABNORMAL
URN SPEC COLLECT METH UR: ABNORMAL
UROBILINOGEN UR STRIP-ACNC: NEGATIVE EU/DL
WBC # BLD AUTO: 6.68 K/UL
WBC #/AREA URNS AUTO: 7 /HPF (ref 0–5)

## 2017-12-15 PROCEDURE — 82962 GLUCOSE BLOOD TEST: CPT

## 2017-12-15 PROCEDURE — 93306 TTE W/DOPPLER COMPLETE: CPT

## 2017-12-15 PROCEDURE — 20600001 HC STEP DOWN PRIVATE ROOM

## 2017-12-15 PROCEDURE — 25000003 PHARM REV CODE 250: Performed by: INTERNAL MEDICINE

## 2017-12-15 PROCEDURE — 99223 1ST HOSP IP/OBS HIGH 75: CPT | Mod: AI,GC,, | Performed by: INTERNAL MEDICINE

## 2017-12-15 PROCEDURE — 85025 COMPLETE CBC W/AUTO DIFF WBC: CPT

## 2017-12-15 PROCEDURE — 99233 SBSQ HOSP IP/OBS HIGH 50: CPT | Mod: ,,, | Performed by: INTERNAL MEDICINE

## 2017-12-15 PROCEDURE — 63600175 PHARM REV CODE 636 W HCPCS: Performed by: INTERNAL MEDICINE

## 2017-12-15 PROCEDURE — 80048 BASIC METABOLIC PNL TOTAL CA: CPT

## 2017-12-15 PROCEDURE — 93306 TTE W/DOPPLER COMPLETE: CPT | Mod: 26,,, | Performed by: INTERNAL MEDICINE

## 2017-12-15 PROCEDURE — 83036 HEMOGLOBIN GLYCOSYLATED A1C: CPT

## 2017-12-15 RX ORDER — GUAIFENESIN 100 MG/5ML
200 SOLUTION ORAL EVERY 4 HOURS PRN
Status: DISCONTINUED | OUTPATIENT
Start: 2017-12-15 | End: 2017-12-23 | Stop reason: HOSPADM

## 2017-12-15 RX ORDER — IBUPROFEN 200 MG
24 TABLET ORAL
Status: DISCONTINUED | OUTPATIENT
Start: 2017-12-15 | End: 2017-12-23 | Stop reason: HOSPADM

## 2017-12-15 RX ORDER — FUROSEMIDE 10 MG/ML
40 INJECTION INTRAMUSCULAR; INTRAVENOUS 2 TIMES DAILY
Status: DISCONTINUED | OUTPATIENT
Start: 2017-12-15 | End: 2017-12-16

## 2017-12-15 RX ORDER — POTASSIUM CHLORIDE 750 MG/1
30 CAPSULE, EXTENDED RELEASE ORAL
Status: DISCONTINUED | OUTPATIENT
Start: 2017-12-15 | End: 2017-12-17

## 2017-12-15 RX ORDER — METOPROLOL TARTRATE 100 MG/1
100 TABLET ORAL 2 TIMES DAILY
Status: DISCONTINUED | OUTPATIENT
Start: 2017-12-15 | End: 2017-12-16

## 2017-12-15 RX ORDER — GLUCAGON 1 MG
1 KIT INJECTION
Status: DISCONTINUED | OUTPATIENT
Start: 2017-12-15 | End: 2017-12-23 | Stop reason: HOSPADM

## 2017-12-15 RX ORDER — IBUPROFEN 200 MG
16 TABLET ORAL
Status: DISCONTINUED | OUTPATIENT
Start: 2017-12-15 | End: 2017-12-23 | Stop reason: HOSPADM

## 2017-12-15 RX ORDER — BENZONATATE 100 MG/1
100 CAPSULE ORAL EVERY 6 HOURS PRN
Status: DISCONTINUED | OUTPATIENT
Start: 2017-12-15 | End: 2017-12-23 | Stop reason: HOSPADM

## 2017-12-15 RX ORDER — INSULIN ASPART 100 [IU]/ML
0-5 INJECTION, SOLUTION INTRAVENOUS; SUBCUTANEOUS
Status: DISCONTINUED | OUTPATIENT
Start: 2017-12-15 | End: 2017-12-23 | Stop reason: HOSPADM

## 2017-12-15 RX ORDER — METOPROLOL TARTRATE 50 MG/1
100 TABLET ORAL 2 TIMES DAILY
Status: DISCONTINUED | OUTPATIENT
Start: 2017-12-15 | End: 2017-12-15

## 2017-12-15 RX ORDER — HYDROCODONE BITARTRATE AND ACETAMINOPHEN 5; 325 MG/1; MG/1
1 TABLET ORAL EVERY 6 HOURS PRN
Status: DISCONTINUED | OUTPATIENT
Start: 2017-12-15 | End: 2017-12-23 | Stop reason: HOSPADM

## 2017-12-15 RX ORDER — SODIUM CHLORIDE 0.9 % (FLUSH) 0.9 %
5 SYRINGE (ML) INJECTION
Status: DISCONTINUED | OUTPATIENT
Start: 2017-12-15 | End: 2017-12-23 | Stop reason: HOSPADM

## 2017-12-15 RX ADMIN — BENZONATATE 100 MG: 100 CAPSULE ORAL at 10:12

## 2017-12-15 RX ADMIN — FUROSEMIDE 40 MG: 10 INJECTION, SOLUTION INTRAMUSCULAR; INTRAVENOUS at 09:12

## 2017-12-15 RX ADMIN — METOPROLOL TARTRATE 100 MG: 100 TABLET ORAL at 12:12

## 2017-12-15 RX ADMIN — HYDROCODONE BITARTRATE AND ACETAMINOPHEN 1 TABLET: 5; 325 TABLET ORAL at 01:12

## 2017-12-15 RX ADMIN — BENZONATATE 100 MG: 100 CAPSULE ORAL at 03:12

## 2017-12-15 RX ADMIN — METOPROLOL TARTRATE 100 MG: 100 TABLET ORAL at 08:12

## 2017-12-15 RX ADMIN — METOPROLOL TARTRATE 100 MG: 100 TABLET ORAL at 09:12

## 2017-12-15 RX ADMIN — HYDROCODONE BITARTRATE AND ACETAMINOPHEN 1 TABLET: 5; 325 TABLET ORAL at 03:12

## 2017-12-15 RX ADMIN — HYDROCODONE BITARTRATE AND ACETAMINOPHEN 1 TABLET: 5; 325 TABLET ORAL at 10:12

## 2017-12-15 RX ADMIN — APIXABAN 5 MG: 5 TABLET, FILM COATED ORAL at 09:12

## 2017-12-15 RX ADMIN — APIXABAN 5 MG: 5 TABLET, FILM COATED ORAL at 08:12

## 2017-12-15 RX ADMIN — BENZONATATE 100 MG: 100 CAPSULE ORAL at 07:12

## 2017-12-15 RX ADMIN — POTASSIUM CHLORIDE 30 MEQ: 750 CAPSULE, EXTENDED RELEASE ORAL at 06:12

## 2017-12-15 RX ADMIN — FUROSEMIDE 40 MG: 10 INJECTION, SOLUTION INTRAMUSCULAR; INTRAVENOUS at 06:12

## 2017-12-15 NOTE — ED TRIAGE NOTES
"Dacia Martínez, a 82 y.o. female presents to the ED c/o bilateral leg swelling and generalized weakness x 2 days. Pt also c/o "having a cold" - reporting "cold in my throat" Pt states she took 20 mg Lasix today for swelling but is unsure the last time she has taken it before today. Pt denies CP but reports palpitations. Pt states she does have SOB with exertion.      Chief Complaint   Patient presents with    Fatigue     Pts family reports genearlized weakness. Denies chest pain.    Leg Swelling     Pt reports leg swelling x 2 days. Hx of CKD.     Review of patient's allergies indicates:  No Known Allergies  Past Medical History:   Diagnosis Date    A-fib     Arthritis     Asteroid hyalosis of left eye     Bilateral nonexudative age-related macular degeneration 6/3/2014    Breast cancer     Cataract     Chronic GERD 9/18/2017    CKD stage 3 due to type 2 diabetes mellitus     Coronary artery disease     Hypertension     Migraine headache     Mild nonproliferative diabetic retinopathy of both eyes 6/3/2014    Pneumonia     Type 2 diabetes mellitus with hyperglycemia     Vertigo       Adult Physical Assessment  LOC: Dacia Martínez, 82 y.o. female verified via two identifiers.  The patient is awake, alert, oriented and speaking appropriately at this time.  APPEARANCE: Patient resting comfortably and appears to be in no acute distress at this time. Patient is clean and well groomed, patient's clothing is properly fastened.  SKIN:The skin is warm and dry, color consistent with ethnicity, patient has normal skin turgor and moist mucus membranes, skin intact, no breakdown or brusing noted.  MUSCULOSKELETAL: Patient moving all extremities well, no obvious swelling or deformities noted.  RESPIRATORY: Airway is open and patent, respirations are spontaneous, patient has a normal effort and rate, no accessory muscle use noted.  CARDIAC: Patient has a normal rate and rhythm, 3+ periphreal edema noted in bilateral " lower extremities, capillary refill < 3 seconds in all extremities  ABDOMEN: Soft and non tender to palpation, no abdominal distention noted. Bowel sounds present in all four quadrants.  NEUROLOGIC: Eyes open spontaneously, behavior appropriate to situation, follows commands, facial expression symmetrical, bilateral hand grasp equal and even, purposeful motor response noted, normal sensation in all extremities when touched with a finger.

## 2017-12-15 NOTE — PROVIDER PROGRESS NOTES - EMERGENCY DEPT.
Encounter Date: 12/14/2017    ED Physician Progress Notes         EKG - STEMI Decision  Initial Reading: No STEMI present.

## 2017-12-15 NOTE — ED PROVIDER NOTES
"Encounter Date: 2017       History     Chief Complaint   Patient presents with    Fatigue     Pts family reports genearlized weakness. Denies chest pain.    Leg Swelling     Pt reports leg swelling x 2 days. Hx of CKD.     Ms Martínez is an 81 yo female w PMH significant for AFib treated with Lopressor and Eliquis, breast cancer s/p lumpectomy x2, GERD, CKD III, CAD, HTN and DMT2 who presents to [facility] [place] for evaluation of flu-like symptoms of four days duration. Pt states that she has been coughing for the past four days with some associated fever.     Review of systems was positive for new-onset bilateral leg swelling, dyspnea on exertion, multiple pillow orthopnea and exercise intolerance. Pt states that her cough is productive only of clear sputum and that she has been bothered more so by the "cold" than by her leg swelling and exercise intolerance. Pt has been prescribed PRN lasix, but is unsure of when exactly to utilize this medication and has thus not taken any of this medication today.           Review of patient's allergies indicates:  No Known Allergies  Past Medical History:   Diagnosis Date    A-fib     Arthritis     Asteroid hyalosis of left eye     Bilateral nonexudative age-related macular degeneration 6/3/2014    Breast cancer     Cataract     Chronic GERD 2017    CKD stage 3 due to type 2 diabetes mellitus     Coronary artery disease     Hypertension     Migraine headache     Mild nonproliferative diabetic retinopathy of both eyes 6/3/2014    Pneumonia     Type 2 diabetes mellitus with hyperglycemia     Vertigo      Past Surgical History:   Procedure Laterality Date    BREAST SURGERY      left lumpectomy    CARPAL TUNNEL RELEASE      left    CATARACT EXTRACTION      vance     SECTION      EYE SURGERY      cataracts bilaterally    HYSTERECTOMY      partial     Family History   Problem Relation Age of Onset    Cancer Mother      stomach    Heart disease Mother     " Diabetes Father     Hypertension Father     Blindness Brother     Diabetes Brother     Hypertension Brother     Cataracts Sister     Diabetes Sister     Diabetes Brother     Diabetes Brother     Diabetes Brother     No Known Problems Daughter     No Known Problems Son     No Known Problems Daughter     Amblyopia Neg Hx     Glaucoma Neg Hx     Macular degeneration Neg Hx     Strabismus Neg Hx     Retinal detachment Neg Hx      Social History   Substance Use Topics    Smoking status: Never Smoker    Smokeless tobacco: Never Used    Alcohol use No      Comment: socially     Review of Systems   Constitutional: Positive for fever. Negative for unexpected weight change.   HENT: Negative for ear pain, sinus pressure, sneezing and sore throat.    Eyes: Negative for pain and visual disturbance.   Respiratory: Positive for cough and shortness of breath. Negative for chest tightness and wheezing.    Cardiovascular: Positive for leg swelling. Negative for chest pain and palpitations.   Gastrointestinal: Negative for abdominal pain, constipation, diarrhea, nausea and vomiting.   Endocrine: Negative for polydipsia and polyuria.   Genitourinary: Negative for decreased urine volume, difficulty urinating, dysuria and urgency.   Musculoskeletal: Negative for arthralgias and myalgias.   Skin: Negative for color change and rash.   Allergic/Immunologic: Negative for environmental allergies and food allergies.   Neurological: Negative for dizziness, syncope, weakness, light-headedness and headaches.   Psychiatric/Behavioral: Negative for confusion and dysphoric mood. The patient is not nervous/anxious.        Physical Exam     Initial Vitals [12/14/17 2030]   BP Pulse Resp Temp SpO2   (!) 126/91 (!) 126 17 97.5 °F (36.4 °C) 99 %      MAP       102.67         Physical Exam    Constitutional: She appears well-developed and well-nourished.   HENT:   Head: Normocephalic and atraumatic.   Nose: Nose normal.   Eyes: EOM are  normal. Pupils are equal, round, and reactive to light.   Neck: No tracheal deviation present.   Cardiovascular: Normal rate, regular rhythm and intact distal pulses. Exam reveals gallop and S3.    No murmur heard.  Pulmonary/Chest: No stridor. No respiratory distress. She has no rhonchi. She has rales in the right lower field and the left lower field. She exhibits no tenderness.   Abdominal: Soft. Bowel sounds are normal. There is no tenderness.   Musculoskeletal: Normal range of motion. She exhibits no tenderness.        Right lower leg: She exhibits edema (Pitting).        Left lower leg: She exhibits edema (Pitting).   Lymphadenopathy:     She has no cervical adenopathy.   Neurological: She is alert and oriented to person, place, and time.   Skin: Skin is warm and dry.   Psychiatric: She has a normal mood and affect.         ED Course   Procedures  Labs Reviewed - No data to display  EKG Readings: (Independently Interpreted)   Rhythm: Atrial Fibrillation. Heart Rate: 125. Clinical Impression: Atrial Fibrillation with RVR       X-Rays:   Independently Interpreted Readings:   Chest X-Ray: Normal heart size.  No infiltrates.  No acute abnormalities.     Medical Decision Making:   History:   Old Medical Records: I decided to obtain old medical records.  Initial Assessment:   Pt is an 81 yo F with multiple cardiac co-morbidities who presents to [facility] [place] for evaluation of cough, shortness of breath, orthopnea and leg swelling of two days duration with exercise intolerance.   Differential Diagnosis:   Acute CHF exacerbation  PNA  Bronchitis  Deep vein thrombosis  Independently Interpreted Test(s):   I have ordered and independently interpreted X-rays - see prior notes.  I have ordered and independently interpreted EKG Reading(s) - see prior notes  Clinical Tests:   Lab Tests: Ordered  Radiological Study: Ordered  Medical Tests: Ordered  ED Management:  Pt with multiple cardiac co-morbidities and symptoms consistent with  CHF as well as fever and cough was immediately evaluated for both CHF and PNA. Pt's CBC, CMP and CXR revealed no acute pathological processes, however pt's BNP was very elevated, especially when compared to previous BNP assays. Presumptive diagnosis of congestive heart failure was made and pt was initiated on IV lasix. Pt was informed that she would likely have to remain at [facility] for inpatient evaluation of her new onset CHF with TTE and management with diuresis. While in the ED, code status conversation was initiated. Pt and her granddaughter agreed to consider pt's wishes in the event of code-worthy event.               Attending Attestation:   Physician Attestation Statement for Resident:  As the supervising MD   Physician Attestation Statement: I have personally seen and examined this patient.   I agree with the above history. -:   As the supervising MD I agree with the above PE.    As the supervising MD I agree with the above treatment, course, plan, and disposition.   -: Indpednently reviewed her CXR which showed inc'd vasc markings throughout c/w chf.  Pt presented in afib with RVR.  Rate controlled with one dose of IV diltizaem.Will be admitted to IM.                    ED Course      Clinical Impression:   Diagnoses of Tachycardia, Shortness of breath, and SOB (shortness of breath) were pertinent to this visit.    Disposition:   Disposition: Admitted  Condition: Ioana Valladares MD  12/18/17 1211

## 2017-12-15 NOTE — SUBJECTIVE & OBJECTIVE
Past Medical History:   Diagnosis Date    A-fib     Arthritis     Asteroid hyalosis of left eye     Bilateral nonexudative age-related macular degeneration 6/3/2014    Breast cancer     Cataract     Chronic GERD 2017    CKD stage 3 due to type 2 diabetes mellitus     Coronary artery disease     Hypertension     Migraine headache     Mild nonproliferative diabetic retinopathy of both eyes 6/3/2014    Pneumonia     Type 2 diabetes mellitus with hyperglycemia     Vertigo        Past Surgical History:   Procedure Laterality Date    BREAST SURGERY      left lumpectomy    CARPAL TUNNEL RELEASE      left    CATARACT EXTRACTION      vance     SECTION      EYE SURGERY      cataracts bilaterally    HYSTERECTOMY      partial       Review of patient's allergies indicates:  No Known Allergies    No current facility-administered medications on file prior to encounter.      Current Outpatient Prescriptions on File Prior to Encounter   Medication Sig    apixaban 5 mg Tab Take 1 tablet (5 mg total) by mouth 2 (two) times daily.    furosemide (LASIX) 20 MG tablet Take 1 tablet (20 mg total) by mouth daily as needed.    metoprolol tartrate (LOPRESSOR) 100 MG tablet Take 1 tablet (100 mg total) by mouth 2 (two) times daily.    predniSONE (DELTASONE) 10 MG tablet take 1 tablet by mouth daily if needed for gout    ACCU-CHEK FASTCLIX Misc 1 lancet by Misc.(Non-Drug; Combo Route) route 3 (three) times daily. Pt to test blood glucose up to 3 times daily.    allopurinol (ZYLOPRIM) 300 MG tablet Take 1.5 tablets (450 mg total) by mouth once daily.    blood sugar diagnostic (ACCU-CHEK TOMASA) Strp 1 strip by Misc.(Non-Drug; Combo Route) route once daily.    hydrocodone-acetaminophen 5-325mg (NORCO) 5-325 mg per tablet Take 1 tablet by mouth every 6 (six) hours as needed for Pain.     Family History     Problem Relation (Age of Onset)    Blindness Brother    Cancer Mother    Cataracts Sister    Diabetes  Father, Brother, Sister, Brother, Brother, Brother    Heart disease Mother    Hypertension Father, Brother    No Known Problems Daughter, Son, Daughter        Social History Main Topics    Smoking status: Never Smoker    Smokeless tobacco: Never Used    Alcohol use No      Comment: socially    Drug use: No    Sexual activity: Not Currently     Review of Systems   Constitutional: Negative for appetite change, chills and fever.   HENT: Positive for congestion and rhinorrhea. Negative for hearing loss and trouble swallowing.    Respiratory: Positive for shortness of breath. Negative for chest tightness.    Cardiovascular: Positive for palpitations and leg swelling. Negative for chest pain.   Gastrointestinal: Negative for abdominal distention, abdominal pain, constipation and diarrhea.   Genitourinary: Negative for difficulty urinating and dysuria.   Musculoskeletal: Negative for arthralgias and myalgias.   Skin: Negative for rash.   Neurological: Negative for dizziness and headaches.   Psychiatric/Behavioral: Negative for agitation and behavioral problems.     Objective:     Vital Signs (Most Recent):  Temp: 97.5 °F (36.4 °C) (12/14/17 2030)  Pulse: 85 (12/14/17 2307)  Resp: 17 (12/14/17 2030)  BP: 125/75 (12/14/17 2307)  SpO2: 99 % (12/14/17 2307) Vital Signs (24h Range):  Temp:  [97.5 °F (36.4 °C)] 97.5 °F (36.4 °C)  Pulse:  [] 85  Resp:  [17] 17  SpO2:  [97 %-100 %] 99 %  BP: (122-134)/(75-91) 125/75     Weight: 68.9 kg (152 lb)  Body mass index is 28.72 kg/m².    Physical Exam        Significant Labs:   BMP:   Recent Labs  Lab 12/14/17 2221         K 4.2      CO2 21*   BUN 20   CREATININE 1.2   CALCIUM 8.4*     CMP:   Recent Labs  Lab 12/14/17 2221      K 4.2      CO2 21*      BUN 20   CREATININE 1.2   CALCIUM 8.4*   PROT 7.4   ALBUMIN 2.9*   BILITOT 1.4*   ALKPHOS 83   AST 35   ALT 19   ANIONGAP 12   EGFRNONAA 42.5*     Cardiac Markers:   Recent Labs  Lab  12/14/17  2221   BNP 2,336*     Coagulation:   Recent Labs  Lab 12/14/17 2221   INR 2.0*       Significant Imaging: I have reviewed all pertinent imaging results/findings within the past 24 hours.

## 2017-12-15 NOTE — ED NOTES
Report received care assumed pt and daughter informed of fluid restriction due to chf as the have 2 pitchers of water at bedside

## 2017-12-15 NOTE — HPI
""82F h/o a fib, CKD, HTN, CAD presenting initially with URI symptoms and dyspnea. Pt reports has had recent worsening of LE swelling, mild dyspnea, 1-2 pillow orthopnea, no PND over last few weeks however developed URI symptoms in last few days which is what brought her to ED with congestion, mild cough. While in ED pulse noted to be >150 and noted patient in Afib with RVR, patient reports some palpitations with this episode. Pt denies F/C/N/V."  "

## 2017-12-15 NOTE — ED NOTES
Pt resting in bed. Family member sitting on bed with patient. No acute distress noted. Respirations even and unlabored. Remains on cardiac monitor with continuous pulse ox and BP rounding. No new complaints voiced. Family at bedside. Side rails up x2. Call light within reach. Will continue to monitor.

## 2017-12-15 NOTE — ED NOTES
Pt resting in bed. No acute distress noted. Respirations even and unlabored. Remains on cardiac monitor with continuous pulse ox and BP rounding. No new complaints voiced. Updated on plan of care. Family at bedside. Side rails up x2. Call light within reach. Will continue to monitor.

## 2017-12-15 NOTE — ASSESSMENT & PLAN NOTE
Patient presenting with mild dyspnea, orthopnea, LE edema, no PND, concern for new dx heart failure, s/p episode A fib with RVR in ED. Patient with reported CAD history. EKG without acute changes.   -ECHO in AM  -Diuresis with IV lasix 40 BID  -Strict I/O

## 2017-12-15 NOTE — H&P
[facility] Hospital Medicine  History & Physical    Patient Name: Dacia Martínez  MRN: [id number]  Admission Date: 2017  Attending Physician: Shanice Valladares MD   Primary Care Provider: Cali Vickers MD    [facility] Medicine Team: [facility]OSP MED D Reagan Hernandez MD     Patient information was obtained from patient, relative(s) and ER records.     Subjective:     Principal Problem:<principal problem not specified>    Chief Complaint:   Chief Complaint   Patient presents with    Fatigue     Pts family reports genearlized weakness. Denies chest pain.    Leg Swelling     Pt reports leg swelling x 2 days. Hx of CKD.        HPI: Ms. Martínez is a 82F h/o a fib, CKD, HTN, CAD presenting initially with URI symptoms and dyspnea. Pt reports has had recent worsening of LE swelling, mild dyspnea, 1-2 pillow orthopnea, no PND over last few weeks however developed URI symptoms in last few days which is what brought her to Ed with congestion, mild cough. While in ED pulse noted to be >150 and noted patient in a fib with RVR, patient reports some palpitations with this episode. Pt denies F/C/N/V.     Past Medical History:   Diagnosis Date    A-fib     Arthritis     Asteroid hyalosis of left eye     Bilateral nonexudative age-related macular degeneration 6/3/2014    Breast cancer     Cataract     Chronic GERD 2017    CKD stage 3 due to type 2 diabetes mellitus     Coronary artery disease     Hypertension     Migraine headache     Mild nonproliferative diabetic retinopathy of both eyes 6/3/2014    Pneumonia     Type 2 diabetes mellitus with hyperglycemia     Vertigo        Past Surgical History:   Procedure Laterality Date    BREAST SURGERY      left lumpectomy    CARPAL TUNNEL RELEASE      left    CATARACT EXTRACTION      vance     SECTION      EYE SURGERY      cataracts bilaterally    HYSTERECTOMY      partial       Review of patient's allergies indicates:  No Known  Allergies    No current facility-administered medications on file prior to encounter.      Current Outpatient Prescriptions on File Prior to Encounter   Medication Sig    apixaban 5 mg Tab Take 1 tablet (5 mg total) by mouth 2 (two) times daily.    furosemide (LASIX) 20 MG tablet Take 1 tablet (20 mg total) by mouth daily as needed.    metoprolol tartrate (LOPRESSOR) 100 MG tablet Take 1 tablet (100 mg total) by mouth 2 (two) times daily.    predniSONE (DELTASONE) 10 MG tablet take 1 tablet by mouth daily if needed for gout    ACCU-CHEK FASTCLIX Misc 1 lancet by Misc.(Non-Drug; Combo Route) route 3 (three) times daily. Pt to test blood glucose up to 3 times daily.    allopurinol (ZYLOPRIM) 300 MG tablet Take 1.5 tablets (450 mg total) by mouth once daily.    blood sugar diagnostic (ACCU-CHEK TOMASA) Strp 1 strip by Misc.(Non-Drug; Combo Route) route once daily.    hydrocodone-acetaminophen 5-325mg (NORCO) 5-325 mg per tablet Take 1 tablet by mouth every 6 (six) hours as needed for Pain.     Family History     Problem Relation (Age of Onset)    Blindness Brother    Cancer Mother    Cataracts Sister    Diabetes Father, Brother, Sister, Brother, Brother, Brother    Heart disease Mother    Hypertension Father, Brother    No Known Problems Daughter, Son, Daughter        Social History Main Topics    Smoking status: Never Smoker    Smokeless tobacco: Never Used    Alcohol use No      Comment: socially    Drug use: No    Sexual activity: Not Currently     Review of Systems   Constitutional: Negative for appetite change, chills and fever.   HENT: Positive for congestion and rhinorrhea. Negative for hearing loss and trouble swallowing.    Respiratory: Positive for shortness of breath. Negative for chest tightness.    Cardiovascular: Positive for palpitations and leg swelling. Negative for chest pain.   Gastrointestinal: Negative for abdominal distention, abdominal pain, constipation and diarrhea.   Genitourinary:  Negative for difficulty urinating and dysuria.   Musculoskeletal: Negative for arthralgias and myalgias.   Skin: Negative for rash.   Neurological: Negative for dizziness and headaches.   Psychiatric/Behavioral: Negative for agitation and behavioral problems.     Objective:     Vital Signs (Most Recent):  Temp: 97.5 °F (36.4 °C) (12/14/17 2030)  Pulse: 85 (12/14/17 2307)  Resp: 17 (12/14/17 2030)  BP: 125/75 (12/14/17 2307)  SpO2: 99 % (12/14/17 2307) Vital Signs (24h Range):  Temp:  [97.5 °F (36.4 °C)] 97.5 °F (36.4 °C)  Pulse:  [] 85  Resp:  [17] 17  SpO2:  [97 %-100 %] 99 %  BP: (122-134)/(75-91) 125/75     Weight: 68.9 kg (152 lb)  Body mass index is 28.72 kg/m².    Physical Exam        Significant Labs:   BMP:   Recent Labs  Lab 12/14/17 2221         K 4.2      CO2 21*   BUN 20   CREATININE 1.2   CALCIUM 8.4*     CMP:   Recent Labs  Lab 12/14/17 2221      K 4.2      CO2 21*      BUN 20   CREATININE 1.2   CALCIUM 8.4*   PROT 7.4   ALBUMIN 2.9*   BILITOT 1.4*   ALKPHOS 83   AST 35   ALT 19   ANIONGAP 12   EGFRNONAA 42.5*     Cardiac Markers:   Recent Labs  Lab 12/14/17 2221   BNP 2,336*     Coagulation:   Recent Labs  Lab 12/14/17 2221   INR 2.0*       Significant Imaging: I have reviewed all pertinent imaging results/findings within the past 24 hours.    Assessment/Plan:     Shortness of breath    Patient presenting with mild dyspnea, orthopnea, LE edema, no PND, concern for new dx heart failure, s/p episode A fib with RVR in ED. Patient with reported CAD history. EKG without acute changes.   -ECHO in AM  -Diuresis with IV lasix 40 BID  -Strict I/O            Atrial fibrillation with RVR    Patient with perm a fib, on eliquis, presented in A fib with RVR, s/p dilt injx with improvement in HR  -Telemetry  -Cont eliquis  -Metoprolol 100 BID            Type 2 diabetes mellitus with stage 3 chronic kidney disease, without long-term current use of insulin    Check A1C,  accuchecks AC/HS, SSI        Essential hypertension    Stable, continue to monitor            VTE Risk Mitigation         Ordered     apixaban tablet 5 mg  2 times daily     Route:  Oral        12/15/17 0030     Medium Risk of VTE  Once      12/15/17 0030             Reagan Hernandez MD  Department of Hospital Medicine   [facility]

## 2017-12-15 NOTE — ASSESSMENT & PLAN NOTE
Patient with perm a fib, on eliquis, presented in A fib with RVR, s/p dilt injx with improvement in HR  -Telemetry  -Cont eliquis  -Metoprolol 100 BID

## 2017-12-15 NOTE — ED NOTES
The patient is awake, alert and cooperative with a calm affect, patient is aware of environment. Airway is open and patent, respirations are spontaneous, normal respiratory effort and rate noted, skin warm and dry, moves all extremities well, appearance: no apparent distress noted. Patient/family member aware that they are being admitted to hospital. Awaiting bed assignment at this time. Informed patient/family that nurse will keep updating patient status. Patient requesting medication for her cough, IM D paged.Will continue to monitor at this time. Side rails x 2. Call bell within reach.

## 2017-12-16 LAB
ALBUMIN SERPL BCP-MCNC: 3.1 G/DL
ANION GAP SERPL CALC-SCNC: 18 MMOL/L
BASOPHILS # BLD AUTO: 0.04 K/UL
BASOPHILS NFR BLD: 0.4 %
BUN SERPL-MCNC: 25 MG/DL
CALCIUM SERPL-MCNC: 8.4 MG/DL
CHLORIDE SERPL-SCNC: 104 MMOL/L
CO2 SERPL-SCNC: 18 MMOL/L
CREAT SERPL-MCNC: 1.5 MG/DL
DIFFERENTIAL METHOD: ABNORMAL
EOSINOPHIL # BLD AUTO: 0 K/UL
EOSINOPHIL NFR BLD: 0.1 %
ERYTHROCYTE [DISTWIDTH] IN BLOOD BY AUTOMATED COUNT: 18.9 %
EST. GFR  (AFRICAN AMERICAN): 37.4 ML/MIN/1.73 M^2
EST. GFR  (NON AFRICAN AMERICAN): 32.4 ML/MIN/1.73 M^2
GLUCOSE SERPL-MCNC: 81 MG/DL
HCT VFR BLD AUTO: 44.3 %
HGB BLD-MCNC: 13.8 G/DL
IMM GRANULOCYTES # BLD AUTO: 0.07 K/UL
IMM GRANULOCYTES NFR BLD AUTO: 0.8 %
LYMPHOCYTES # BLD AUTO: 2.1 K/UL
LYMPHOCYTES NFR BLD: 23 %
MAGNESIUM SERPL-MCNC: 1.1 MG/DL
MCH RBC QN AUTO: 25.8 PG
MCHC RBC AUTO-ENTMCNC: 31.2 G/DL
MCV RBC AUTO: 83 FL
MONOCYTES # BLD AUTO: 0.8 K/UL
MONOCYTES NFR BLD: 8.3 %
NEUTROPHILS # BLD AUTO: 6.3 K/UL
NEUTROPHILS NFR BLD: 67.4 %
NRBC BLD-RTO: 1 /100 WBC
PHOSPHATE SERPL-MCNC: 4 MG/DL
PLATELET # BLD AUTO: 278 K/UL
PMV BLD AUTO: 11 FL
POCT GLUCOSE: 119 MG/DL (ref 70–110)
POCT GLUCOSE: 135 MG/DL (ref 70–110)
POCT GLUCOSE: 179 MG/DL (ref 70–110)
POTASSIUM SERPL-SCNC: 4.2 MMOL/L
RBC # BLD AUTO: 5.35 M/UL
SODIUM SERPL-SCNC: 140 MMOL/L
TROPONIN I SERPL DL<=0.01 NG/ML-MCNC: 0.06 NG/ML
WBC # BLD AUTO: 9.31 K/UL

## 2017-12-16 PROCEDURE — 25000003 PHARM REV CODE 250: Performed by: INTERNAL MEDICINE

## 2017-12-16 PROCEDURE — 63600175 PHARM REV CODE 636 W HCPCS: Performed by: INTERNAL MEDICINE

## 2017-12-16 PROCEDURE — 80069 RENAL FUNCTION PANEL: CPT

## 2017-12-16 PROCEDURE — 85025 COMPLETE CBC W/AUTO DIFF WBC: CPT

## 2017-12-16 PROCEDURE — 97530 THERAPEUTIC ACTIVITIES: CPT

## 2017-12-16 PROCEDURE — 84484 ASSAY OF TROPONIN QUANT: CPT

## 2017-12-16 PROCEDURE — 97165 OT EVAL LOW COMPLEX 30 MIN: CPT

## 2017-12-16 PROCEDURE — 99223 1ST HOSP IP/OBS HIGH 75: CPT | Mod: GC,,, | Performed by: INTERNAL MEDICINE

## 2017-12-16 PROCEDURE — 83735 ASSAY OF MAGNESIUM: CPT

## 2017-12-16 PROCEDURE — 99232 SBSQ HOSP IP/OBS MODERATE 35: CPT | Mod: ,,, | Performed by: INTERNAL MEDICINE

## 2017-12-16 PROCEDURE — 20600001 HC STEP DOWN PRIVATE ROOM

## 2017-12-16 PROCEDURE — 36415 COLL VENOUS BLD VENIPUNCTURE: CPT

## 2017-12-16 RX ORDER — DIGOXIN 125 MCG
0.12 TABLET ORAL DAILY
Status: DISCONTINUED | OUTPATIENT
Start: 2017-12-16 | End: 2017-12-16

## 2017-12-16 RX ORDER — DIGOXIN 0.25 MG/ML
500 INJECTION INTRAMUSCULAR; INTRAVENOUS ONCE
Status: COMPLETED | OUTPATIENT
Start: 2017-12-16 | End: 2017-12-16

## 2017-12-16 RX ORDER — MAGNESIUM SULFATE HEPTAHYDRATE 40 MG/ML
2 INJECTION, SOLUTION INTRAVENOUS
Status: COMPLETED | OUTPATIENT
Start: 2017-12-16 | End: 2017-12-16

## 2017-12-16 RX ORDER — DIGOXIN 125 MCG
0.12 TABLET ORAL DAILY
Status: DISCONTINUED | OUTPATIENT
Start: 2017-12-17 | End: 2017-12-17

## 2017-12-16 RX ORDER — DIGOXIN 0.25 MG/ML
250 INJECTION INTRAMUSCULAR; INTRAVENOUS
Status: COMPLETED | OUTPATIENT
Start: 2017-12-16 | End: 2017-12-17

## 2017-12-16 RX ORDER — METOPROLOL TARTRATE 25 MG/1
50 TABLET, FILM COATED ORAL 2 TIMES DAILY
Status: DISCONTINUED | OUTPATIENT
Start: 2017-12-16 | End: 2017-12-23 | Stop reason: HOSPADM

## 2017-12-16 RX ORDER — FUROSEMIDE 10 MG/ML
60 INJECTION INTRAMUSCULAR; INTRAVENOUS ONCE
Status: COMPLETED | OUTPATIENT
Start: 2017-12-16 | End: 2017-12-16

## 2017-12-16 RX ADMIN — APIXABAN 5 MG: 5 TABLET, FILM COATED ORAL at 08:12

## 2017-12-16 RX ADMIN — FUROSEMIDE 60 MG: 10 INJECTION, SOLUTION INTRAVENOUS at 01:12

## 2017-12-16 RX ADMIN — DIGOXIN 250 MCG: 250 INJECTION, SOLUTION INTRAMUSCULAR; INTRAVENOUS at 08:12

## 2017-12-16 RX ADMIN — FUROSEMIDE 10 MG/HR: 10 INJECTION, SOLUTION INTRAMUSCULAR; INTRAVENOUS at 02:12

## 2017-12-16 RX ADMIN — APIXABAN 5 MG: 5 TABLET, FILM COATED ORAL at 09:12

## 2017-12-16 RX ADMIN — MAGNESIUM SULFATE IN WATER 2 G: 40 INJECTION, SOLUTION INTRAVENOUS at 04:12

## 2017-12-16 RX ADMIN — BENZONATATE 100 MG: 100 CAPSULE ORAL at 03:12

## 2017-12-16 RX ADMIN — DIGOXIN 500 MCG: 0.25 INJECTION INTRAMUSCULAR; INTRAVENOUS at 01:12

## 2017-12-16 RX ADMIN — POTASSIUM CHLORIDE 30 MEQ: 750 CAPSULE, EXTENDED RELEASE ORAL at 11:12

## 2017-12-16 RX ADMIN — MAGNESIUM SULFATE IN WATER 2 G: 40 INJECTION, SOLUTION INTRAVENOUS at 11:12

## 2017-12-16 RX ADMIN — POTASSIUM CHLORIDE 30 MEQ: 750 CAPSULE, EXTENDED RELEASE ORAL at 09:12

## 2017-12-16 RX ADMIN — POTASSIUM CHLORIDE 30 MEQ: 750 CAPSULE, EXTENDED RELEASE ORAL at 04:12

## 2017-12-16 RX ADMIN — METOPROLOL TARTRATE 50 MG: 25 TABLET ORAL at 08:12

## 2017-12-16 RX ADMIN — MAGNESIUM SULFATE IN WATER 2 G: 40 INJECTION, SOLUTION INTRAVENOUS at 01:12

## 2017-12-16 RX ADMIN — HYDROCODONE BITARTRATE AND ACETAMINOPHEN 1 TABLET: 5; 325 TABLET ORAL at 05:12

## 2017-12-16 RX ADMIN — FUROSEMIDE 40 MG: 10 INJECTION, SOLUTION INTRAMUSCULAR; INTRAVENOUS at 09:12

## 2017-12-16 NOTE — PROGRESS NOTES
Progress Note  Hospital Medicine      Admit Date: 12/14/2017    SUBJECTIVE:     Follow-up For:  Acute systolic heart failure    HPI/Interval history: No new complaints. Daughter concerned about her heart failure/atrail fibrillation was brought about the changing of her metoprolol succinate from metoprolol tartrate. They went to ER 12/3 for palpitations where it was switched back to tartrate.     Review of Systems: Gen: no fever, no chills, Heart: no chest pain, palpitations; Resp: no SOB, no cough    OBJECTIVE:     Vital Signs Range (Last 24H):  Temp:  [96.3 °F (35.7 °C)-97.4 °F (36.3 °C)]   Pulse:  []   Resp:  [18-19]   BP: (115-148)/(71-90)   SpO2:  [94 %-98 %]     Physical Exam:  General appearance: NAD, conversant  Neck: FROM, supple   Lungs: Clear to auscultation, no accessory muscle use  CV: RRR, no heave  Abdomen: Soft, non-tender; no masses or HSM  Extremities: No peripheral edema or digital cyanosis  Skin: no rash, lesions or ulcers  Psych: Alert and oriented to person, place and time      Recent Labs  Lab 12/14/17  2221 12/15/17  0409 12/16/17  0537    141 140   K 4.2 3.0* 4.2    106 104   CO2 21* 22* 18*   BUN 20 19 25*   CREATININE 1.2 1.3 1.5*    115* 81   CALCIUM 8.4* 7.9* 8.4*   MG  --   --  1.1*   PHOS  --   --  4.0       Recent Labs  Lab 12/14/17 2221 12/16/17  0537   ALKPHOS 83  --    ALT 19  --    AST 35  --    ALBUMIN 2.9* 3.1*   PROT 7.4  --    BILITOT 1.4*  --    INR 2.0*  --          Recent Labs  Lab 12/14/17  2221 12/15/17  0409 12/16/17  0537   WBC 7.39 6.68 9.31   HGB 13.5 13.0 13.8   HCT 42.9 41.8 44.3    276 278   GRAN 56.8  4.2 57.2  3.8 67.4  6.3   LYMPH 32.3  2.4 31.6  2.1 23.0  2.1   MONO 8.9  0.7 9.1  0.6 8.3  0.8         Recent Labs  Lab 12/15/17  0726 12/15/17  1140 12/15/17  1852   POCTGLUCOSE 79 91 135*       ASSESSMENT/PLAN:     Acute on chronic systolic heart failure  Furosemide 40 mg IV BID --> furosemide 10 gtt/hr  Daily  weights  stricts Is and Os  EF 60%-->10% - cardiology consulted.       Atrial fibrillation with RVR  Continue eliquis  Change metoprolol 50 mg BID  Load digoxin as per cardiology, then start digoxin 0.125 mg daily    DM II HTN and CKD III  HgbA1c 6.4%  Not on active treatment including diet or medication    Essential HTN - metoprolol

## 2017-12-16 NOTE — CONSULTS
[facility]  Cardiology  Consult Note    Patient Name: Dacia Martínez  MRN: [id number]  Admission Date: 12/14/2017  Hospital Length of Stay: 1 days  Code Status: Full Code   Attending Provider: Eveline Barger MD   Consulting Provider: Macario Moon MD  Primary Care Physician: Cali Vickers MD  Principal Problem:Acute systolic heart failure    Patient information was obtained from patient, past medical records and ER records.     Consults  Subjective:     Chief Complaint:  SOB     HPI:   82 year old female with h/o Atrial fibrillation s/p DCCV 06/2017 with return back to atrial fibrillation and being rate controlled since, HTN, no h/o MII or stents who presented with URI symptoms, SOB,LE swelling, orthopnea.No PND.She denies any chest pain.She does state that she has not been limiting the salt or following fluid restricted diet.On admission she was found to have HR of 150 in atrial fibrillation with RVR and rate was controlled with IV push cardizem.She was admitted with diagnosis of congestive heart failure and placed on lasix 40 mg IV BID. She has been continued on metoprolol 100 mg po BID. She states her symptoms are better. Denies having a stress test or angiogram in the past.repeat echo showed drop in EF to 10 % from 60 % on previous echo.    Past Medical History:   Diagnosis Date    A-fib     Arthritis     Asteroid hyalosis of left eye     Bilateral nonexudative age-related macular degeneration 6/3/2014    Breast cancer     Cataract     Chronic GERD 9/18/2017    CKD stage 3 due to type 2 diabetes mellitus     Coronary artery disease     Hypertension     Migraine headache     Mild nonproliferative diabetic retinopathy of both eyes 6/3/2014    Pneumonia     Type 2 diabetes mellitus with hyperglycemia     Vertigo        Past Surgical History:   Procedure Laterality Date    BREAST SURGERY      left lumpectomy    CARPAL TUNNEL RELEASE      left    CATARACT EXTRACTION      vance      SECTION      EYE SURGERY      cataracts bilaterally    HYSTERECTOMY      partial       Review of patient's allergies indicates:  No Known Allergies    No current facility-administered medications on file prior to encounter.      Current Outpatient Prescriptions on File Prior to Encounter   Medication Sig    apixaban 5 mg Tab Take 1 tablet (5 mg total) by mouth 2 (two) times daily.    furosemide (LASIX) 20 MG tablet Take 1 tablet (20 mg total) by mouth daily as needed.    hydrocodone-acetaminophen 5-325mg (NORCO) 5-325 mg per tablet Take 1 tablet by mouth every 6 (six) hours as needed for Pain.    metoprolol tartrate (LOPRESSOR) 100 MG tablet Take 1 tablet (100 mg total) by mouth 2 (two) times daily.    predniSONE (DELTASONE) 10 MG tablet take 1 tablet by mouth daily if needed for gout    ACCU-CHEK FASTCLIX Misc 1 lancet by Misc.(Non-Drug; Combo Route) route 3 (three) times daily. Pt to test blood glucose up to 3 times daily.    allopurinol (ZYLOPRIM) 300 MG tablet Take 1.5 tablets (450 mg total) by mouth once daily.    blood sugar diagnostic (ACCU-CHEK TOMASA) Strp 1 strip by Misc.(Non-Drug; Combo Route) route once daily.     Family History     Problem Relation (Age of Onset)    Blindness Brother    Cancer Mother    Cataracts Sister    Diabetes Father, Brother, Sister, Brother, Brother, Brother    Heart disease Mother    Hypertension Father, Brother    No Known Problems Daughter, Son, Daughter        Social History Main Topics    Smoking status: Never Smoker    Smokeless tobacco: Never Used    Alcohol use No      Comment: socially    Drug use: No    Sexual activity: Not Currently     Review of Systems   All other systems reviewed and are negative.    Objective:     Vital Signs (Most Recent):  Temp: 97.4 °F (36.3 °C) (17)  Pulse: (!) 115 (17 0658)  Resp: 18 (17)  BP: 115/86 (17)  SpO2: 96 % (17) Vital Signs (24h Range):  Temp:  [96.7 °F (35.9  °C)-97.4 °F (36.3 °C)] 97.4 °F (36.3 °C)  Pulse:  [] 115  Resp:  [16-25] 18  SpO2:  [94 %-100 %] 96 %  BP: (115-155)/(71-94) 115/86     Weight: 68.6 kg (151 lb 4.8 oz)  Body mass index is 28.59 kg/m².    SpO2: 96 %  O2 Device (Oxygen Therapy): room air      Intake/Output Summary (Last 24 hours) at 12/16/17 0825  Last data filed at 12/16/17 0500   Gross per 24 hour   Intake              570 ml   Output              775 ml   Net             -205 ml       Lines/Drains/Airways     Peripheral Intravenous Line                 Peripheral IV - Single Lumen 12/14/17 2220 Right Antecubital 1 day                Physical Exam  General: alert, awake and oriented x 3  Eyes:PERRL.   Neck:+ JVD   Lungs:  clear to auscultation bilaterally and normal respiratory effort  Cardiovascular: Heart: regular rate and rhythm, S1, S2 normal, no murmur, click, rub or gallop.   Chest Wall: no tenderness.   Extremities: no cyanosis 2+ edema.   Pulses: 2+ and symmetric.  Abdomen/Rectal: Abdomen: soft, non-tender non-distented; bowel sounds normal  Skin: No rashes or lesions  Neurologic: Normal strength and tone. No focal numbness or weakness  Psych/Behavioral:  Normal.    Significant Labs:   CMP   Recent Labs  Lab 12/14/17  2221 12/15/17  0409 12/16/17  0537    141 140   K 4.2 3.0* 4.2    106 104   CO2 21* 22* 18*    115* 81   BUN 20 19 25*   CREATININE 1.2 1.3 1.5*   CALCIUM 8.4* 7.9* 8.4*   PROT 7.4  --   --    ALBUMIN 2.9*  --  3.1*   BILITOT 1.4*  --   --    ALKPHOS 83  --   --    AST 35  --   --    ALT 19  --   --    ANIONGAP 12 13 18*   ESTGFRAFRICA 49.0* 44.5* 37.4*   EGFRNONAA 42.5* 38.6* 32.4*    and CBC   Recent Labs  Lab 12/14/17  2221 12/15/17  0409 12/16/17  0537   WBC 7.39 6.68 9.31   HGB 13.5 13.0 13.8   HCT 42.9 41.8 44.3    276 278       Significant Imaging: Echocardiogram:   2D echo with color flow doppler:   Results for orders placed or performed during the hospital encounter of 12/14/17   2D echo  with color flow doppler   Result Value Ref Range    EF 10 (A) 55 - 65    Aortic Valve Stenosis MODERATE (A)     Est. PA Systolic Pressure 68.88 (A)     Mitral Valve Mobility NORMAL     Tricuspid Valve Regurgitation MODERATE (A)      Assessment and Plan:     * Acute systolic heart failure      Acute  combined    -NYHA class IV  -lasix  60 Mg IV followed by lasix 10 mg gtt  -echocardiogram with color flow doppler reviewed-once euvolemic will reassess on need for ischemic workup  -decrease BB  -hold ace-inhibitor-acute renal insufficiency  -strict ins and outs  -daily weights  -fluid restriction  -CHF education  -Reason for decompensation-non adherence to diet/fluid intake/medications        Atrial fibrillation with RVR    Atrial Fibrillation  Persistent    -[id number]  -Anticoagulation with eliquis  -rate control with digoxin 500mcg followed in 6 hrs by 250 mcg followed in 6 hrs by 250 mcg, 125 mcg po QD from tomorrow  -will decrease lopressor to 50 mg PO BID              VTE Risk Mitigation         Ordered     apixaban tablet 5 mg  2 times daily     Route:  Oral        12/15/17 0030     Medium Risk of VTE  Once      12/15/17 0030          Thank you for your consult. I will follow-up with patient. Please contact us if you have any additional questions.    Macario Moon MD  Cardiology   [facility]

## 2017-12-16 NOTE — PROGRESS NOTES
Pt educated on the importance of accurate I&O after pt disposed of urine inside hat into the toilet. Pt states she understands and will continue to monitor.

## 2017-12-16 NOTE — NURSING
Received to unit via stretcher accompanied by bridgette, tele a-fib, hr 110. Vss. Oriented to environment, verbalize understanding. Bel low and locked, call bell in reach. Will continue to monitor,

## 2017-12-16 NOTE — PLAN OF CARE
Problem: Occupational Therapy Goal  Goal: Occupational Therapy Goal  Outcome: Outcome(s) achieved Date Met: 12/16/17  OT eval and Dc. No need for skilled OT services at this time. Will defer to PT to address mobility and stairs

## 2017-12-16 NOTE — PROGRESS NOTES
Pt reeducated on the importance of accurate I&Os due to the pt being on Lasix. Pt continues to take hat out of toilet when voidiing. Will continue to reinforce teaching.

## 2017-12-16 NOTE — SUBJECTIVE & OBJECTIVE
Past Medical History:   Diagnosis Date    A-fib     Arthritis     Asteroid hyalosis of left eye     Bilateral nonexudative age-related macular degeneration 6/3/2014    Breast cancer     Cataract     Chronic GERD 2017    CKD stage 3 due to type 2 diabetes mellitus     Coronary artery disease     Hypertension     Migraine headache     Mild nonproliferative diabetic retinopathy of both eyes 6/3/2014    Pneumonia     Type 2 diabetes mellitus with hyperglycemia     Vertigo        Past Surgical History:   Procedure Laterality Date    BREAST SURGERY      left lumpectomy    CARPAL TUNNEL RELEASE      left    CATARACT EXTRACTION      vance     SECTION      EYE SURGERY      cataracts bilaterally    HYSTERECTOMY      partial       Review of patient's allergies indicates:  No Known Allergies    No current facility-administered medications on file prior to encounter.      Current Outpatient Prescriptions on File Prior to Encounter   Medication Sig    apixaban 5 mg Tab Take 1 tablet (5 mg total) by mouth 2 (two) times daily.    furosemide (LASIX) 20 MG tablet Take 1 tablet (20 mg total) by mouth daily as needed.    hydrocodone-acetaminophen 5-325mg (NORCO) 5-325 mg per tablet Take 1 tablet by mouth every 6 (six) hours as needed for Pain.    metoprolol tartrate (LOPRESSOR) 100 MG tablet Take 1 tablet (100 mg total) by mouth 2 (two) times daily.    predniSONE (DELTASONE) 10 MG tablet take 1 tablet by mouth daily if needed for gout    ACCU-CHEK FASTCLIX Misc 1 lancet by Misc.(Non-Drug; Combo Route) route 3 (three) times daily. Pt to test blood glucose up to 3 times daily.    allopurinol (ZYLOPRIM) 300 MG tablet Take 1.5 tablets (450 mg total) by mouth once daily.    blood sugar diagnostic (ACCU-CHEK TOMASA) Strp 1 strip by Misc.(Non-Drug; Combo Route) route once daily.     Family History     Problem Relation (Age of Onset)    Blindness Brother    Cancer Mother    Cataracts Sister    Diabetes  Father, Brother, Sister, Brother, Brother, Brother    Heart disease Mother    Hypertension Father, Brother    No Known Problems Daughter, Son, Daughter        Social History Main Topics    Smoking status: Never Smoker    Smokeless tobacco: Never Used    Alcohol use No      Comment: socially    Drug use: No    Sexual activity: Not Currently     Review of Systems   All other systems reviewed and are negative.    Objective:     Vital Signs (Most Recent):  Temp: 97.4 °F (36.3 °C) (12/16/17 0331)  Pulse: (!) 115 (12/16/17 0658)  Resp: 18 (12/16/17 0331)  BP: 115/86 (12/16/17 0334)  SpO2: 96 % (12/16/17 0331) Vital Signs (24h Range):  Temp:  [96.7 °F (35.9 °C)-97.4 °F (36.3 °C)] 97.4 °F (36.3 °C)  Pulse:  [] 115  Resp:  [16-25] 18  SpO2:  [94 %-100 %] 96 %  BP: (115-155)/(71-94) 115/86     Weight: 68.6 kg (151 lb 4.8 oz)  Body mass index is 28.59 kg/m².    SpO2: 96 %  O2 Device (Oxygen Therapy): room air      Intake/Output Summary (Last 24 hours) at 12/16/17 0825  Last data filed at 12/16/17 0500   Gross per 24 hour   Intake              570 ml   Output              775 ml   Net             -205 ml       Lines/Drains/Airways     Peripheral Intravenous Line                 Peripheral IV - Single Lumen 12/14/17 2220 Right Antecubital 1 day                Physical Exam  General: alert, awake and oriented x 3  Eyes:PERRL.   Neck:+ JVD   Lungs:  clear to auscultation bilaterally and normal respiratory effort  Cardiovascular: Heart: regular rate and rhythm, S1, S2 normal, no murmur, click, rub or gallop.   Chest Wall: no tenderness.   Extremities: no cyanosis 2+ edema.   Pulses: 2+ and symmetric.  Abdomen/Rectal: Abdomen: soft, non-tender non-distented; bowel sounds normal  Skin: No rashes or lesions  Neurologic: Normal strength and tone. No focal numbness or weakness  Psych/Behavioral:  Normal.    Significant Labs:   CMP   Recent Labs  Lab 12/14/17  2221 12/15/17  0409 12/16/17  0537    141 140   K 4.2 3.0*  4.2    106 104   CO2 21* 22* 18*    115* 81   BUN 20 19 25*   CREATININE 1.2 1.3 1.5*   CALCIUM 8.4* 7.9* 8.4*   PROT 7.4  --   --    ALBUMIN 2.9*  --  3.1*   BILITOT 1.4*  --   --    ALKPHOS 83  --   --    AST 35  --   --    ALT 19  --   --    ANIONGAP 12 13 18*   ESTGFRAFRICA 49.0* 44.5* 37.4*   EGFRNONAA 42.5* 38.6* 32.4*    and CBC   Recent Labs  Lab 12/14/17  2221 12/15/17  0409 12/16/17  0537   WBC 7.39 6.68 9.31   HGB 13.5 13.0 13.8   HCT 42.9 41.8 44.3    276 278       Significant Imaging: Echocardiogram:   2D echo with color flow doppler:   Results for orders placed or performed during the hospital encounter of 12/14/17   2D echo with color flow doppler   Result Value Ref Range    EF 10 (A) 55 - 65    Aortic Valve Stenosis MODERATE (A)     Est. PA Systolic Pressure 68.88 (A)     Mitral Valve Mobility NORMAL     Tricuspid Valve Regurgitation MODERATE (A)

## 2017-12-16 NOTE — PLAN OF CARE
Problem: Patient Care Overview  Goal: Plan of Care Review  Outcome: Ongoing (interventions implemented as appropriate)  Pt. Remains free from falls/ injury/trauma. No complaints of CP, SOB, or discomfort. PRN pain medication given. Pt. Remain in Afib during shift. Reeducated the patient on the importance of accurate I&Os. Plan of Care reviewed with patient. VSS and NADN. Will continue to monitor.

## 2017-12-16 NOTE — PT/OT/SLP EVAL
Occupational Therapy   Evaluation and Discharge Note    Name: Dacia Martínez  MRN: [id number]  Admitting Diagnosis:  Acute systolic heart failure      Recommendations:     Discharge Recommendations: home with home health, home health PT  Discharge Equipment Recommendations:  shower chair  Barriers to discharge:  Inaccessible home environment (7 DIA)    History:     Occupational Profile:  Living Environment: Pt lives with her family in a SSH with 7 steps to enter with R ascending handrails . Tub/ shower combo at home. Pt always has family assist at home   Previous level of function: Pt indep with ADL/Self care, indep with functional mobility   Equipment Owned:  none  Assistance upon Discharge: Recommend home with HH and family assist as needed     Past Medical History:   Diagnosis Date    A-fib     Arthritis     Asteroid hyalosis of left eye     Bilateral nonexudative age-related macular degeneration 6/3/2014    Breast cancer     Cataract     Chronic GERD 2017    CKD stage 3 due to type 2 diabetes mellitus     Coronary artery disease     Hypertension     Migraine headache     Mild nonproliferative diabetic retinopathy of both eyes 6/3/2014    Pneumonia     Type 2 diabetes mellitus with hyperglycemia     Vertigo        Past Surgical History:   Procedure Laterality Date    BREAST SURGERY      left lumpectomy    CARPAL TUNNEL RELEASE      left    CATARACT EXTRACTION      vance     SECTION      EYE SURGERY      cataracts bilaterally    HYSTERECTOMY      partial       Subjective     Chief Complaint: none verbalized at this time   Patient/Family stated goals: To go home   Communicated with: RN prior to session.  Pain/Comfort:  · Pain Rating 1: 0/10    Objective:     Patient found reclined in bed with: peripheral IV, telemetry. Daughter and spouse at bedside     General Precautions: Standard, fall   Occupational Performance:    Bed Mobility:    · Patient completed Supine to Sit with  "independence    Functional Mobility/Transfers:  · Patient completed Sit <> Stand Transfer with independence  with  no assistive device   · Patient completed Bed <> Chair Transfer using Stand Pivot technique with independence with no assistive device    Activities of Daily Living:  · Grooming: independence washing hands at the sink   · LB Dressing: independence donning socks sitting EOB     Cognitive/Visual Perceptual:  Cognitive/Psychosocial Skills:     -       Oriented to: Person, Place, Time and Situation   -       Follows Commands/attention:Follows two-step commands  -       Communication: clear/fluent  -       Memory: No Deficits noted  -       Safety awareness/insight to disability: intact   -       Mood/Affect/Coping skills/emotional control: Appropriate to situation    Physical Exam:  Balance:    -       static/ dynamic standing balance: Supervision   Dominant hand:    -       right   Upper Extremity Range of Motion:     -       Right Upper Extremity: WFL  -       Left Upper Extremity: WFL  Upper Extremity Strength:    -       Right Upper Extremity: WFL  -       Left Upper Extremity: WFL    Patient left reclined in bed with HOB elevated  with all lines intact, call button in reach, RN notified and family  present    [facility] 6 Click:  [facility] Total Score: 24    Treatment & Education:  Pt walked 50 feet in hallway with supervision.   Pt educated on role of OT, plan of care, safety awareness, DME, importance of out of bed activity, energy conservation techniques     Education:    Assessment:     Dacia Martínez is a 82 y.o. female with a medical diagnosis of Acute systolic heart failure. At this time, patient is functioning at their prior level of function and does not require further acute OT services. Pt has 7 steps to enter. Will defer to PT to address mobility and stairs.    Clinical Decision Makin.  OT Mod:  "Pt evaluation falls under moderate complexity for evaluation coding due to identification of 3-5 " "performance deficits noted as stated above. Eval required Min/Mod assistance to complete on this date and detailed assessment(s) were utilized. Moreover, an expanded review of history and occupational profile obtained with additional review of cognitive, physical and psychosocial hx."     Plan:     During this hospitalization, patient does not require further acute OT services.  Please re-consult if situation changes.    · Plan of Care Reviewed with: patient, daughter    This Plan of care has been discussed with the patient who was involved in its development and understands and is in agreement with the identified goals and treatment plan    GOALS:    Occupational Therapy Goals     Not on file          Multidisciplinary Problems (Resolved)        Problem: Occupational Therapy Goal    Goal Priority Disciplines Outcome Interventions   Occupational Therapy Goal   (Resolved)     OT, PT/OT Outcome(s) achieved                    Time Tracking:     OT Date of Treatment: 12/16/17  OT Start Time: 0914  OT Stop Time: 0934  OT Total Time (min): 20 min    Billable Minutes:Evaluation 10  Therapeutic Activity 10    Bg Peoples OT  12/16/2017    "

## 2017-12-16 NOTE — ASSESSMENT & PLAN NOTE
Atrial Fibrillation  Persistent    -[id number]  -Anticoagulation with eliquis  -rate control with digoxin 500mcg followed in 6 hrs by 250 mcg followed in 6 hrs by 250 mcg, 125 mcg po QD from tomorrow  -will decrease lopressor to 50 mg PO BID

## 2017-12-16 NOTE — ASSESSMENT & PLAN NOTE
Acute  combined    -NYHA class IV  -lasix  60 Mg IV followed by lasix 10 mg gtt  -echocardiogram with color flow doppler reviewed-once euvolemic will reassess on need for ischemic workup  -decrease BB  -hold ace-inhibitor-acute renal insufficiency  -strict ins and outs  -daily weights  -fluid restriction  -CHF education  -Reason for decompensation-non adherence to diet/fluid intake/medications

## 2017-12-16 NOTE — HPI
82 year old female with h/o Atrial fibrillation s/p DCCV 06/2017 with return back to atrial fibrillation and being rate controlled since, HTN, no h/o MII or stents who presented with URI symptoms, SOB,LE swelling, orthopnea.No PND.She denies any chest pain.She does state that she has not been limiting the salt or following fluid restricted diet.On admission she was found to have HR of 150 in atrial fibrillation with RVR and rate was controlled with IV push cardizem.She was admitted with diagnosis of congestive heart failure and placed on lasix 40 mg IV BID. She has been continued on metoprolol 100 mg po BID. She states her symptoms are better. Denies having a stress test or angiogram in the past.repeat echo showed drop in EF to 10 % from 60 % on previous echo.

## 2017-12-16 NOTE — PROGRESS NOTES
Progress Note  Hospital Medicine      Admit Date: 12/14/2017    SUBJECTIVE:     Follow-up For:  Acute systolic heart failure    HPI/Interval history: No new complaints.    Review of Systems: Gen: no fever, no chills, Heart: no chest pain, palpitations; Resp: no SOB, no cough    OBJECTIVE:     Vital Signs Range (Last 24H):  Temp:  [96.7 °F (35.9 °C)-97.1 °F (36.2 °C)]   Pulse:  []   Resp:  [16-25]   BP: (122-155)/(79-97)   SpO2:  [96 %-100 %]     Physical Exam:  General appearance: NAD, conversant  Neck: FROM, supple   Lungs: Clear to auscultation, no accessory muscle use  CV: RRR, no heave  Abdomen: Soft, non-tender; no masses or HSM  Extremities: No peripheral edema or digital cyanosis  Skin: no rash, lesions or ulcers  Psych: Alert and oriented to person, place and time      Recent Labs  Lab 12/14/17  2221 12/15/17  0409    141   K 4.2 3.0*    106   CO2 21* 22*   BUN 20 19   CREATININE 1.2 1.3    115*   CALCIUM 8.4* 7.9*       Recent Labs  Lab 12/14/17  2221   ALKPHOS 83   ALT 19   AST 35   ALBUMIN 2.9*   PROT 7.4   BILITOT 1.4*   INR 2.0*         Recent Labs  Lab 12/14/17  2221 12/15/17  0409   WBC 7.39 6.68   HGB 13.5 13.0   HCT 42.9 41.8    276   GRAN 56.8  4.2 57.2  3.8   LYMPH 32.3  2.4 31.6  2.1   MONO 8.9  0.7 9.1  0.6         Recent Labs  Lab 12/15/17  0726 12/15/17  1140   POCTGLUCOSE 79 91       ASSESSMENT/PLAN:     Acute on chronic systolic heart failure  Furosemide 40 mg IV BID  Daily weights  stricts Is and Os  EF 60%-->10%    Atrial fibrillation with RVR  Continue eliquis and metoprolol 100 mg BID    DM II HTN and CKD III  HgbA1c 6.4%  Not on active treatment including diet or medication    Essential HTN - metoprolol

## 2017-12-17 LAB
ALBUMIN SERPL BCP-MCNC: 2.8 G/DL
ANION GAP SERPL CALC-SCNC: 12 MMOL/L
BASOPHILS # BLD AUTO: 0.05 K/UL
BASOPHILS NFR BLD: 0.7 %
BUN SERPL-MCNC: 23 MG/DL
CALCIUM SERPL-MCNC: 8.6 MG/DL
CHLORIDE SERPL-SCNC: 103 MMOL/L
CO2 SERPL-SCNC: 26 MMOL/L
CREAT SERPL-MCNC: 1.4 MG/DL
DIFFERENTIAL METHOD: ABNORMAL
DIGOXIN SERPL-MCNC: 2.8 NG/ML
EOSINOPHIL # BLD AUTO: 0.1 K/UL
EOSINOPHIL NFR BLD: 0.8 %
ERYTHROCYTE [DISTWIDTH] IN BLOOD BY AUTOMATED COUNT: 18.8 %
EST. GFR  (AFRICAN AMERICAN): 40.6 ML/MIN/1.73 M^2
EST. GFR  (NON AFRICAN AMERICAN): 35.3 ML/MIN/1.73 M^2
GLUCOSE SERPL-MCNC: 82 MG/DL
HCT VFR BLD AUTO: 44.1 %
HGB BLD-MCNC: 14 G/DL
IMM GRANULOCYTES # BLD AUTO: 0.04 K/UL
IMM GRANULOCYTES NFR BLD AUTO: 0.5 %
LYMPHOCYTES # BLD AUTO: 1.5 K/UL
LYMPHOCYTES NFR BLD: 20.5 %
MAGNESIUM SERPL-MCNC: 2.1 MG/DL
MCH RBC QN AUTO: 25.5 PG
MCHC RBC AUTO-ENTMCNC: 31.7 G/DL
MCV RBC AUTO: 80 FL
MONOCYTES # BLD AUTO: 0.8 K/UL
MONOCYTES NFR BLD: 10.7 %
NEUTROPHILS # BLD AUTO: 4.9 K/UL
NEUTROPHILS NFR BLD: 66.8 %
NRBC BLD-RTO: 1 /100 WBC
PHOSPHATE SERPL-MCNC: 2.4 MG/DL
PLATELET # BLD AUTO: 274 K/UL
PMV BLD AUTO: 10.7 FL
POCT GLUCOSE: 114 MG/DL (ref 70–110)
POCT GLUCOSE: 169 MG/DL (ref 70–110)
POTASSIUM SERPL-SCNC: 4 MMOL/L
RBC # BLD AUTO: 5.49 M/UL
SODIUM SERPL-SCNC: 141 MMOL/L
WBC # BLD AUTO: 7.31 K/UL

## 2017-12-17 PROCEDURE — 20600001 HC STEP DOWN PRIVATE ROOM

## 2017-12-17 PROCEDURE — 63600175 PHARM REV CODE 636 W HCPCS: Performed by: INTERNAL MEDICINE

## 2017-12-17 PROCEDURE — 85025 COMPLETE CBC W/AUTO DIFF WBC: CPT

## 2017-12-17 PROCEDURE — 80162 ASSAY OF DIGOXIN TOTAL: CPT

## 2017-12-17 PROCEDURE — 99232 SBSQ HOSP IP/OBS MODERATE 35: CPT | Mod: GC,,, | Performed by: INTERNAL MEDICINE

## 2017-12-17 PROCEDURE — 25000003 PHARM REV CODE 250: Performed by: INTERNAL MEDICINE

## 2017-12-17 PROCEDURE — 36415 COLL VENOUS BLD VENIPUNCTURE: CPT

## 2017-12-17 PROCEDURE — 83735 ASSAY OF MAGNESIUM: CPT

## 2017-12-17 PROCEDURE — 80069 RENAL FUNCTION PANEL: CPT

## 2017-12-17 PROCEDURE — 99232 SBSQ HOSP IP/OBS MODERATE 35: CPT | Mod: ,,, | Performed by: INTERNAL MEDICINE

## 2017-12-17 RX ORDER — HYDROXYZINE HYDROCHLORIDE 25 MG/1
25 TABLET, FILM COATED ORAL 4 TIMES DAILY PRN
Status: DISCONTINUED | OUTPATIENT
Start: 2017-12-17 | End: 2017-12-23 | Stop reason: HOSPADM

## 2017-12-17 RX ORDER — ISOSORBIDE DINITRATE AND HYDRALAZINE HYDROCHLORIDE 37.5; 2 MG/1; MG/1
1 TABLET ORAL 3 TIMES DAILY
Status: DISCONTINUED | OUTPATIENT
Start: 2017-12-17 | End: 2017-12-23 | Stop reason: HOSPADM

## 2017-12-17 RX ORDER — SODIUM,POTASSIUM PHOSPHATES 280-250MG
2 POWDER IN PACKET (EA) ORAL
Status: DISCONTINUED | OUTPATIENT
Start: 2017-12-17 | End: 2017-12-17

## 2017-12-17 RX ADMIN — HYDROXYZINE HYDROCHLORIDE 25 MG: 25 TABLET, FILM COATED ORAL at 09:12

## 2017-12-17 RX ADMIN — DIGOXIN 250 MCG: 250 INJECTION, SOLUTION INTRAMUSCULAR; INTRAVENOUS at 02:12

## 2017-12-17 RX ADMIN — METOPROLOL TARTRATE 50 MG: 25 TABLET ORAL at 09:12

## 2017-12-17 RX ADMIN — HYDROCODONE BITARTRATE AND ACETAMINOPHEN 1 TABLET: 5; 325 TABLET ORAL at 01:12

## 2017-12-17 RX ADMIN — APIXABAN 5 MG: 5 TABLET, FILM COATED ORAL at 09:12

## 2017-12-17 RX ADMIN — FUROSEMIDE 10 MG/HR: 10 INJECTION, SOLUTION INTRAMUSCULAR; INTRAVENOUS at 11:12

## 2017-12-17 RX ADMIN — POTASSIUM CHLORIDE 30 MEQ: 750 CAPSULE, EXTENDED RELEASE ORAL at 09:12

## 2017-12-17 RX ADMIN — HYDROCODONE BITARTRATE AND ACETAMINOPHEN 1 TABLET: 5; 325 TABLET ORAL at 08:12

## 2017-12-17 RX ADMIN — METOPROLOL TARTRATE 50 MG: 25 TABLET ORAL at 08:12

## 2017-12-17 RX ADMIN — HYDROXYZINE HYDROCHLORIDE 25 MG: 25 TABLET, FILM COATED ORAL at 05:12

## 2017-12-17 RX ADMIN — HYDRALAZINE HYDROCHLORIDE AND ISOSORBIDE DINITRATE 1 TABLET: 37.5; 2 TABLET, FILM COATED ORAL at 10:12

## 2017-12-17 RX ADMIN — APIXABAN 5 MG: 5 TABLET, FILM COATED ORAL at 08:12

## 2017-12-17 RX ADMIN — BENZONATATE 100 MG: 100 CAPSULE ORAL at 11:12

## 2017-12-17 RX ADMIN — POTASSIUM & SODIUM PHOSPHATES POWDER PACK 280-160-250 MG 2 PACKET: 280-160-250 PACK at 12:12

## 2017-12-17 RX ADMIN — HYDROXYZINE HYDROCHLORIDE 25 MG: 25 TABLET, FILM COATED ORAL at 11:12

## 2017-12-17 RX ADMIN — DIBASIC SODIUM PHOSPHATE, MONOBASIC POTASSIUM PHOSPHATE AND MONOBASIC SODIUM PHOSPHATE 2 TABLET: 852; 155; 130 TABLET ORAL at 05:12

## 2017-12-17 NOTE — PROGRESS NOTES
Progress Note  Hospital Medicine      Admit Date: 12/14/2017    SUBJECTIVE:     Follow-up For:  Acute systolic heart failure    HPI/Interval history: Itchy rash on back  Review of Systems: Gen: no fever, no chills, Heart: no chest pain, palpitations; Resp: no SOB, no cough    OBJECTIVE:     Vital Signs Range (Last 24H):  Temp:  [96.7 °F (35.9 °C)-98.6 °F (37 °C)]   Pulse:  []   Resp:  [18]   BP: (122-161)/(64-90)   SpO2:  [96 %-99 %]     Physical Exam:  General appearance: NAD, conversant  Neck: FROM, supple   Lungs: Clear to auscultation, no accessory muscle use  CV: RRR, no heave  Abdomen: Soft, non-tender; no masses or HSM  Extremities: No peripheral edema or digital cyanosis  Skin: urticarial rash on left upper back, lesions or ulcers  Psych: Alert and oriented to person, place and time      Recent Labs  Lab 12/15/17  0409 12/16/17  0537 12/17/17 0418    140 141   K 3.0* 4.2 4.0    104 103   CO2 22* 18* 26   BUN 19 25* 23   CREATININE 1.3 1.5* 1.4   * 81 82   CALCIUM 7.9* 8.4* 8.6*   MG  --  1.1* 2.1   PHOS  --  4.0 2.4*       Recent Labs  Lab 12/14/17  2221 12/16/17  0537 12/17/17 0418   ALKPHOS 83  --   --    ALT 19  --   --    AST 35  --   --    ALBUMIN 2.9* 3.1* 2.8*   PROT 7.4  --   --    BILITOT 1.4*  --   --    INR 2.0*  --   --          Recent Labs  Lab 12/15/17  0409 12/16/17  0537 12/17/17 0418   WBC 6.68 9.31 7.31   HGB 13.0 13.8 14.0   HCT 41.8 44.3 44.1    278 274   GRAN 57.2  3.8 67.4  6.3 66.8  4.9   LYMPH 31.6  2.1 23.0  2.1 20.5  1.5   MONO 9.1  0.6 8.3  0.8 10.7  0.8         Recent Labs  Lab 12/15/17  0726 12/15/17  1140 12/15/17  1852 12/16/17  1239 12/16/17  1720 12/17/17  1242   POCTGLUCOSE 79 91 135* 119* 179* 114*       ASSESSMENT/PLAN:     Acute on chronic systolic heart failure  Furosemide 40 mg IV BID --> furosemide 10 gtt/hr  Daily weights -ordered today  stricts Is and Os  EF 60%-->10% - cardiology consult appreciated    Atrial fibrillation  with RVR  Continue eliquis  Change metoprolol 50 mg BID  Load digoxin as per cardiology, then start digoxin 0.125 mg daily. Held due to elevated level.    DM II HTN and CKD III  HgbA1c 6.4%  Not on active treatment including diet or medication    Essential HTN - metoprolol

## 2017-12-17 NOTE — ASSESSMENT & PLAN NOTE
- Persistent  - [id number]  - anticoagulation with Eliquis  - hold digoxin with supratherapeutic level this morning  - recheck digoxin level in the AM  - c/w BB

## 2017-12-17 NOTE — ASSESSMENT & PLAN NOTE
Acute  combined  - NYHA class IV in the setting of medical nonadherence and dietary indiscretion  - c/w Lasix 10 mg gtt  - once euvolemic, will reassess on need for ischemic workup  - c/w BB  - initiate Bidil  - strict ins and outs  - daily weights  - fluid restriction  - CHF education

## 2017-12-17 NOTE — PLAN OF CARE
Problem: Patient Care Overview  Goal: Plan of Care Review  Outcome: Ongoing (interventions implemented as appropriate)  Pt. Remains free from falls/ injury/trauma. No complaints of CP, SOB, or discomfort. PRN pain medication given. Lasix gtt infusing. Plan of Care reviewed with patient. VSS and NADN. Will continue to monitor.

## 2017-12-17 NOTE — SUBJECTIVE & OBJECTIVE
Interval History: TELE with AF with HR in the 110s. MCCOLLUM walking to the restroom.     Review of Systems   Constitution: Negative for chills and fever.   HENT: Negative for ear discharge.    Eyes: Negative for pain and visual disturbance.   Cardiovascular: Negative for chest pain, dyspnea on exertion, irregular heartbeat, leg swelling, orthopnea, palpitations, paroxysmal nocturnal dyspnea and syncope.   Respiratory: Negative for hemoptysis, shortness of breath and wheezing.    Skin: Negative for rash and suspicious lesions.   Musculoskeletal: Negative for joint pain and muscle weakness.   Gastrointestinal: Negative for abdominal pain, diarrhea, hematemesis, hematochezia, melena, nausea and vomiting.   Genitourinary: Negative for dysuria and frequency.   Neurological: Negative for focal weakness, headaches and tremors.   Psychiatric/Behavioral: Negative for altered mental status, suicidal ideas and thoughts of violence.     Objective:     Vital Signs (Most Recent):  Temp: 97.2 °F (36.2 °C) (12/17/17 0454)  Pulse: 86 (12/17/17 0700)  Resp: 18 (12/17/17 0454)  BP: (!) 152/86 (12/17/17 0454)  SpO2: 98 % (12/17/17 0454) Vital Signs (24h Range):  Temp:  [96.3 °F (35.7 °C)-98.6 °F (37 °C)] 97.2 °F (36.2 °C)  Pulse:  [] 86  Resp:  [18] 18  SpO2:  [96 %-99 %] 98 %  BP: (125-161)/(79-90) 152/86     Weight: 68.6 kg (151 lb 4.8 oz)  Body mass index is 28.59 kg/m².     SpO2: 98 %  O2 Device (Oxygen Therapy): room air      Intake/Output Summary (Last 24 hours) at 12/17/17 0854  Last data filed at 12/17/17 0852   Gross per 24 hour   Intake              660 ml   Output             7200 ml   Net            -6540 ml       Lines/Drains/Airways     Peripheral Intravenous Line                 Peripheral IV - Single Lumen 12/14/17 2220 Right Antecubital 2 days         Peripheral IV - Single Lumen 12/16/17 1425 Right Forearm less than 1 day                Physical Exam   Constitutional: She is oriented to person, place, and time. She  appears well-developed and well-nourished.   HENT:   Head: Normocephalic and atraumatic.   Eyes: Right eye exhibits no discharge. Left eye exhibits no discharge.   Cardiovascular: An irregularly irregular rhythm present. Tachycardia present.  Exam reveals no gallop and no friction rub.    Pulmonary/Chest: Effort normal and breath sounds normal.   Abdominal: Soft. Bowel sounds are normal.   Musculoskeletal: She exhibits edema.   Neurological: She is alert and oriented to person, place, and time.   Skin: Skin is warm and dry.   Psychiatric: Her behavior is normal.        Significant Labs:   CMP   Recent Labs  Lab 17  0537 178    141   K 4.2 4.0    103   CO2 18* 26   GLU 81 82   BUN 25* 23   CREATININE 1.5* 1.4   CALCIUM 8.4* 8.6*   ALBUMIN 3.1* 2.8*   ANIONGAP 18* 12   ESTGFRAFRICA 37.4* 40.6*   EGFRNONAA 32.4* 35.3*   , CBC   Recent Labs  Lab 17  0537 178   WBC 9.31 7.31   HGB 13.8 14.0   HCT 44.3 44.1    274   , INR No results for input(s): INR, PROTIME in the last 48 hours., Lipid Panel No results for input(s): CHOL, HDL, LDLCALC, TRIG, CHOLHDL in the last 48 hours. and Troponin   Recent Labs  Lab 17  0151   TROPONINI 0.056*       Significant Imaging: EK/14: AF with RVR

## 2017-12-18 LAB
ALBUMIN SERPL BCP-MCNC: 2.7 G/DL
ANION GAP SERPL CALC-SCNC: 10 MMOL/L
BASOPHILS # BLD AUTO: 0.04 K/UL
BASOPHILS NFR BLD: 0.5 %
BUN SERPL-MCNC: 22 MG/DL
CALCIUM SERPL-MCNC: 8.4 MG/DL
CHLORIDE SERPL-SCNC: 99 MMOL/L
CO2 SERPL-SCNC: 29 MMOL/L
CREAT SERPL-MCNC: 1.4 MG/DL
DIFFERENTIAL METHOD: ABNORMAL
DIGOXIN SERPL-MCNC: 1.1 NG/ML
DIGOXIN SERPL-MCNC: 1.1 NG/ML
EOSINOPHIL # BLD AUTO: 0.1 K/UL
EOSINOPHIL NFR BLD: 0.7 %
ERYTHROCYTE [DISTWIDTH] IN BLOOD BY AUTOMATED COUNT: 19.1 %
EST. GFR  (AFRICAN AMERICAN): 40.6 ML/MIN/1.73 M^2
EST. GFR  (NON AFRICAN AMERICAN): 35.3 ML/MIN/1.73 M^2
GLUCOSE SERPL-MCNC: 96 MG/DL
HCT VFR BLD AUTO: 44.9 %
HGB BLD-MCNC: 14.1 G/DL
IMM GRANULOCYTES # BLD AUTO: 0.03 K/UL
IMM GRANULOCYTES NFR BLD AUTO: 0.4 %
LYMPHOCYTES # BLD AUTO: 1.8 K/UL
LYMPHOCYTES NFR BLD: 21.4 %
MAGNESIUM SERPL-MCNC: 1.3 MG/DL
MCH RBC QN AUTO: 26.1 PG
MCHC RBC AUTO-ENTMCNC: 31.4 G/DL
MCV RBC AUTO: 83 FL
MONOCYTES # BLD AUTO: 0.9 K/UL
MONOCYTES NFR BLD: 10.5 %
NEUTROPHILS # BLD AUTO: 5.7 K/UL
NEUTROPHILS NFR BLD: 66.5 %
NRBC BLD-RTO: 0 /100 WBC
PHOSPHATE SERPL-MCNC: 3.1 MG/DL
PLATELET # BLD AUTO: 272 K/UL
PMV BLD AUTO: 10.8 FL
POCT GLUCOSE: 134 MG/DL (ref 70–110)
POCT GLUCOSE: 197 MG/DL (ref 70–110)
POCT GLUCOSE: 316 MG/DL (ref 70–110)
POTASSIUM SERPL-SCNC: 3.8 MMOL/L
RBC # BLD AUTO: 5.41 M/UL
SODIUM SERPL-SCNC: 138 MMOL/L
WBC # BLD AUTO: 8.54 K/UL

## 2017-12-18 PROCEDURE — 97161 PT EVAL LOW COMPLEX 20 MIN: CPT

## 2017-12-18 PROCEDURE — 36415 COLL VENOUS BLD VENIPUNCTURE: CPT

## 2017-12-18 PROCEDURE — 63600175 PHARM REV CODE 636 W HCPCS: Performed by: INTERNAL MEDICINE

## 2017-12-18 PROCEDURE — 80069 RENAL FUNCTION PANEL: CPT

## 2017-12-18 PROCEDURE — 99232 SBSQ HOSP IP/OBS MODERATE 35: CPT | Mod: ,,, | Performed by: INTERNAL MEDICINE

## 2017-12-18 PROCEDURE — 25000003 PHARM REV CODE 250: Performed by: INTERNAL MEDICINE

## 2017-12-18 PROCEDURE — 99232 SBSQ HOSP IP/OBS MODERATE 35: CPT | Mod: GC,,, | Performed by: INTERNAL MEDICINE

## 2017-12-18 PROCEDURE — 85025 COMPLETE CBC W/AUTO DIFF WBC: CPT

## 2017-12-18 PROCEDURE — 80162 ASSAY OF DIGOXIN TOTAL: CPT

## 2017-12-18 PROCEDURE — 20600001 HC STEP DOWN PRIVATE ROOM

## 2017-12-18 PROCEDURE — 83735 ASSAY OF MAGNESIUM: CPT

## 2017-12-18 RX ORDER — DIGOXIN 125 MCG
0.12 TABLET ORAL DAILY
Status: DISCONTINUED | OUTPATIENT
Start: 2017-12-18 | End: 2017-12-20

## 2017-12-18 RX ORDER — MAGNESIUM SULFATE HEPTAHYDRATE 40 MG/ML
2 INJECTION, SOLUTION INTRAVENOUS
Status: COMPLETED | OUTPATIENT
Start: 2017-12-18 | End: 2017-12-18

## 2017-12-18 RX ADMIN — MAGNESIUM SULFATE IN WATER 2 G: 40 INJECTION, SOLUTION INTRAVENOUS at 02:12

## 2017-12-18 RX ADMIN — APIXABAN 5 MG: 5 TABLET, FILM COATED ORAL at 09:12

## 2017-12-18 RX ADMIN — HYDROCODONE BITARTRATE AND ACETAMINOPHEN 1 TABLET: 5; 325 TABLET ORAL at 10:12

## 2017-12-18 RX ADMIN — DIBASIC SODIUM PHOSPHATE, MONOBASIC POTASSIUM PHOSPHATE AND MONOBASIC SODIUM PHOSPHATE 1 TABLET: 852; 155; 130 TABLET ORAL at 05:12

## 2017-12-18 RX ADMIN — HYDRALAZINE HYDROCHLORIDE AND ISOSORBIDE DINITRATE 1 TABLET: 37.5; 2 TABLET, FILM COATED ORAL at 02:12

## 2017-12-18 RX ADMIN — DIBASIC SODIUM PHOSPHATE, MONOBASIC POTASSIUM PHOSPHATE AND MONOBASIC SODIUM PHOSPHATE 2 TABLET: 852; 155; 130 TABLET ORAL at 08:12

## 2017-12-18 RX ADMIN — INSULIN ASPART 4 UNITS: 100 INJECTION, SOLUTION INTRAVENOUS; SUBCUTANEOUS at 12:12

## 2017-12-18 RX ADMIN — BENZONATATE 100 MG: 100 CAPSULE ORAL at 08:12

## 2017-12-18 RX ADMIN — BENZONATATE 100 MG: 100 CAPSULE ORAL at 10:12

## 2017-12-18 RX ADMIN — MAGNESIUM SULFATE IN WATER 2 G: 40 INJECTION, SOLUTION INTRAVENOUS at 05:12

## 2017-12-18 RX ADMIN — METOPROLOL TARTRATE 50 MG: 25 TABLET ORAL at 08:12

## 2017-12-18 RX ADMIN — HYDRALAZINE HYDROCHLORIDE AND ISOSORBIDE DINITRATE 1 TABLET: 37.5; 2 TABLET, FILM COATED ORAL at 09:12

## 2017-12-18 RX ADMIN — FUROSEMIDE 10 MG/HR: 10 INJECTION, SOLUTION INTRAMUSCULAR; INTRAVENOUS at 01:12

## 2017-12-18 RX ADMIN — DIGOXIN 0.12 MG: 0.12 TABLET ORAL at 12:12

## 2017-12-18 RX ADMIN — MAGNESIUM SULFATE IN WATER 2 G: 40 INJECTION, SOLUTION INTRAVENOUS at 04:12

## 2017-12-18 RX ADMIN — GUAIFENESIN 200 MG: 200 SOLUTION ORAL at 01:12

## 2017-12-18 RX ADMIN — APIXABAN 5 MG: 5 TABLET, FILM COATED ORAL at 08:12

## 2017-12-18 RX ADMIN — HYDRALAZINE HYDROCHLORIDE AND ISOSORBIDE DINITRATE 1 TABLET: 37.5; 2 TABLET, FILM COATED ORAL at 05:12

## 2017-12-18 RX ADMIN — DIBASIC SODIUM PHOSPHATE, MONOBASIC POTASSIUM PHOSPHATE AND MONOBASIC SODIUM PHOSPHATE 2 TABLET: 852; 155; 130 TABLET ORAL at 12:12

## 2017-12-18 RX ADMIN — METOPROLOL TARTRATE 50 MG: 25 TABLET ORAL at 10:12

## 2017-12-18 NOTE — PLAN OF CARE
Problem: Physical Therapy Goal  Goal: Physical Therapy Goal  Goals to be met by: 17    Patient will increase functional independence with mobility by performin. Supine to sit with Gooding  2. Sit to supine with Gooding  3. Sit to stand transfer with Supervision  4. Gait  x 200 feet with Supervision with or without least restrictive AD.   5. Ascend/descend 7 stairs with right Handrails Stand-by Assistance.   6. Lower extremity exercise program x20 reps per handout, with supervision    Outcome: Ongoing (interventions implemented as appropriate)  Pt chart reviewed and PT evaluation completed- see note for details. POC initiated.     Sofia Macedo, PT, DPT   2017  Pager: [phone]

## 2017-12-18 NOTE — CONSULTS
[facility]  Cardiac Electrophysiology  Consult Note    Admission Date: 12/14/2017  Code Status: Full Code   Attending Provider: Eveline Barger MD  Consulting Provider: Milka Gordon MD  Principal Problem:Acute systolic heart failure    Inpatient consult to Electrophysiology  Consult performed by: MILKA GORDON  Consult ordered by: YI ZEPEDA  Reason for consult: Afib with RVR. DCCV and REJI        Subjective:     Chief Complaint:  Af with RVR    HPI:   81 yo female with PAF s/p DCCV (06/06/17), HTN, CAD, IDDM, CKD stage III [place] [facility] [place] AF with RVR and ADHF. Her chief complaints before she presented were exertional dyspnea, 1-2 pillow orthopnea, and leg swelling in addition to NYHA class III/IV sx in the setting of medical nonadherence and dietary indiscretion. General cardiology consults was consulted who recommended diuresis, rate control with digoxin (load followed by maintainence) and decreasing her lopressor to 50 mg BID. On telemetry she is in afib with rates in the 's. Patient has ST segment depressions on telemetry. EKG from the 14th shows Afib. No recent EKG's.     She reports that she has not missed any doses of eliquis. She has been on eliquis here 5 mg BID.     Of note, her latest echo done here shows biventricular failure with an EF of 10-15%. She had a normal EF in 06/17. No past hx of MI or stents. General cardiology had recommended an ischemic evaluation once patient was euvolemic. She is net negative 6.7 L.     EP was consulted in light of the newly depressed EF and anti-rhythmic therapy and potential REJI and DCCV.     Past Medical History:   Diagnosis Date    A-fib     Arthritis     Asteroid hyalosis of left eye     Bilateral nonexudative age-related macular degeneration 6/3/2014    Breast cancer     Cataract     Chronic GERD 9/18/2017    CKD stage 3 due to type 2 diabetes mellitus     Coronary artery disease     Hypertension      Migraine headache     Mild nonproliferative diabetic retinopathy of both eyes 6/3/2014    Pneumonia     Type 2 diabetes mellitus with hyperglycemia     Vertigo        Past Surgical History:   Procedure Laterality Date    BREAST SURGERY      left lumpectomy    CARPAL TUNNEL RELEASE      left    CATARACT EXTRACTION      vance     SECTION      EYE SURGERY      cataracts bilaterally    HYSTERECTOMY      partial       Review of patient's allergies indicates:  No Known Allergies    No current facility-administered medications on file prior to encounter.      Current Outpatient Prescriptions on File Prior to Encounter   Medication Sig    apixaban 5 mg Tab Take 1 tablet (5 mg total) by mouth 2 (two) times daily.    furosemide (LASIX) 20 MG tablet Take 1 tablet (20 mg total) by mouth daily as needed.    hydrocodone-acetaminophen 5-325mg (NORCO) 5-325 mg per tablet Take 1 tablet by mouth every 6 (six) hours as needed for Pain.    metoprolol tartrate (LOPRESSOR) 100 MG tablet Take 1 tablet (100 mg total) by mouth 2 (two) times daily.    predniSONE (DELTASONE) 10 MG tablet take 1 tablet by mouth daily if needed for gout    ACCU-CHEK FASTCLIX Misc 1 lancet by Misc.(Non-Drug; Combo Route) route 3 (three) times daily. Pt to test blood glucose up to 3 times daily.    allopurinol (ZYLOPRIM) 300 MG tablet Take 1.5 tablets (450 mg total) by mouth once daily.    blood sugar diagnostic (ACCU-CHEK TOMASA) Strp 1 strip by Misc.(Non-Drug; Combo Route) route once daily.     Family History     Problem Relation (Age of Onset)    Blindness Brother    Cancer Mother    Cataracts Sister    Diabetes Father, Brother, Sister, Brother, Brother, Brother    Heart disease Mother    Hypertension Father, Brother    No Known Problems Daughter, Son, Daughter        Social History Main Topics    Smoking status: Never Smoker    Smokeless tobacco: Never Used    Alcohol use No      Comment: socially    Drug use: No    Sexual  activity: Not Currently     Review of Systems   Constitution: Positive for malaise/fatigue. Negative for chills and fever.   HENT: Negative for ear discharge.    Eyes: Negative for pain and visual disturbance.   Cardiovascular: Positive for dyspnea on exertion, irregular heartbeat and palpitations. Negative for chest pain, leg swelling, orthopnea, paroxysmal nocturnal dyspnea and syncope.   Respiratory: Negative for hemoptysis, shortness of breath and wheezing.    Skin: Negative for rash and suspicious lesions.   Musculoskeletal: Negative for joint pain and muscle weakness.   Gastrointestinal: Negative for abdominal pain, diarrhea, hematemesis, hematochezia, melena, nausea and vomiting.   Genitourinary: Negative for dysuria and frequency.   Neurological: Negative for focal weakness, headaches and tremors.   Psychiatric/Behavioral: Negative for altered mental status, suicidal ideas and thoughts of violence.     Objective:     Vital Signs (Most Recent):  Temp: 97.9 °F (36.6 °C) (12/18/17 1217)  Pulse: 105 (12/18/17 1217)  Resp: 18 (12/18/17 1217)  BP: (!) 137/98 (12/18/17 1217)  SpO2: 98 % (12/18/17 1217) Vital Signs (24h Range):  Temp:  [96.8 °F (36 °C)-98.7 °F (37.1 °C)] 97.9 °F (36.6 °C)  Pulse:  [] 105  Resp:  [18-19] 18  SpO2:  [94 %-98 %] 98 %  BP: (111-166)/(66-98) 137/98     Weight: 61.8 kg (136 lb 3.9 oz)  Body mass index is 25.74 kg/m².    SpO2: 98 %  O2 Device (Oxygen Therapy): room air    Physical Exam   Constitutional: She is oriented to person, place, and time. She appears well-developed and well-nourished.   HENT:   Head: Normocephalic and atraumatic.   Eyes: Right eye exhibits no discharge. Left eye exhibits no discharge.   Neck: Normal range of motion. Neck supple. JVD present.   Cardiovascular: An irregularly irregular rhythm present. Tachycardia present.  Exam reveals no gallop and no friction rub.    Tachycardic and irregular   Pulmonary/Chest: Effort normal and breath sounds normal.    Abdominal: Soft. Bowel sounds are normal.   Musculoskeletal: She exhibits edema.   Neurological: She is alert and oriented to person, place, and time.   Skin: Skin is warm and dry.   Psychiatric: Her behavior is normal.       Significant Labs:   EP:   Recent Labs  Lab 12/17/17  0418 12/18/17  0508    138   K 4.0 3.8    99   CO2 26 29   GLU 82 96   BUN 23 22   CREATININE 1.4 1.4   CALCIUM 8.6* 8.4*   ALBUMIN 2.8* 2.7*   ANIONGAP 12 10   ESTGFRAFRICA 40.6* 40.6*   EGFRNONAA 35.3* 35.3*   WBC 7.31 8.54   HGB 14.0 14.1   HCT 44.1 44.9    272       Significant Imaging: Echocardiogram:   2D echo with color flow doppler:   Results for orders placed or performed during the hospital encounter of 12/14/17   2D echo with color flow doppler   Result Value Ref Range    EF 10 (A) 55 - 65    Aortic Valve Stenosis MODERATE (A)     Est. PA Systolic Pressure 68.88 (A)     Mitral Valve Mobility NORMAL     Tricuspid Valve Regurgitation MODERATE (A)      Assessment and Plan:     Atrial fibrillation with RVR    81 yo female with PAF s/p DCCV (06/06/17), HTN, CAD, IDDM, CKD stage III presented to the [facility] [place] on 12/15/17 with AF with RVR and ADHF. Currently in Af with rates in the . Still hypervolemic.      Plan:  - Recommend diuresis and attaining euvolemia  - Would consider REJI/DCCV and initiation of Amiodarone aftewards  - Would continue Dig and lopressor at current doses to keep hr<120.   - Would continue anticoagulation with Eliquis   - Would agree with ischemic evaluation once euvolemic but there are also could be a component of tachycardia induced CM here.    Will discuss with Dr. Burt,            Thank you for your consult. I will sign off,    Milka Gordon MD  Cardiac Electrophysiology  [facility]

## 2017-12-18 NOTE — PROGRESS NOTES
[facility]  Cardiology  Progress Note    Patient Name: Dacia Martínez  MRN: [id number]  Admission Date: 12/14/2017  Hospital Length of Stay: 3 days  Code Status: Full Code   Attending Physician: Eveline Barger MD   Primary Care Physician: Cali Vickers MD  Expected Discharge Date: 12/20/2017  Principal Problem:Acute systolic heart failure    Subjective:     Hospital Course:   No notes on file    Interval History: Continues to have MCCOLLUM, Cough.Denies any pain.Occasionally palpitations.Rate has been variable in 70s last night o 140s this am on tele.    ROS  Objective:     Vital Signs (Most Recent):  Temp: 97.9 °F (36.6 °C) (12/18/17 1217)  Pulse: 105 (12/18/17 1217)  Resp: 18 (12/18/17 1217)  BP: (!) 137/98 (12/18/17 1217)  SpO2: 98 % (12/18/17 1217) Vital Signs (24h Range):  Temp:  [96.8 °F (36 °C)-98.7 °F (37.1 °C)] 97.9 °F (36.6 °C)  Pulse:  [] 105  Resp:  [18-19] 18  SpO2:  [94 %-98 %] 98 %  BP: (111-166)/(66-98) 137/98     Weight: 61.8 kg (136 lb 3.9 oz)  Body mass index is 25.74 kg/m².     SpO2: 98 %  O2 Device (Oxygen Therapy): room air      Intake/Output Summary (Last 24 hours) at 12/18/17 1426  Last data filed at 12/18/17 0819   Gross per 24 hour   Intake              510 ml   Output             1000 ml   Net             -490 ml       Lines/Drains/Airways     Peripheral Intravenous Line                 Peripheral IV - Single Lumen 12/14/17 2220 Right Antecubital 3 days         Peripheral IV - Single Lumen 12/16/17 1425 Right Forearm 2 days                Physical Exam   General: alert, awake and oriented x 3  Eyes:PERRL.   Neck:+ JVD   Lungs:  B/l Crackles at bases  Cardiovascular: Heart: Irregular rate and rhythm, S1, S2 normal, no murmur, click, rub or gallop.   Chest Wall: no tenderness.   Extremities: no cyanosis 1+ edema.   Pulses: 2+ and symmetric.        Significant Labs:   CMP   Recent Labs  Lab 12/17/17  0418 12/18/17  0508    138   K 4.0 3.8    99   CO2 26 29   GLU 82  96   BUN 23 22   CREATININE 1.4 1.4   CALCIUM 8.6* 8.4*   ALBUMIN 2.8* 2.7*   ANIONGAP 12 10   ESTGFRAFRICA 40.6* 40.6*   EGFRNONAA 35.3* 35.3*   , CBC   Recent Labs  Lab 12/17/17  0418 12/18/17  0508   WBC 7.31 8.54   HGB 14.0 14.1   HCT 44.1 44.9    272   , INR No results for input(s): INR, PROTIME in the last 48 hours. and Troponin No results for input(s): TROPONINI in the last 48 hours.    Significant Imaging: Echocardiogram:   2D echo with color flow doppler:   Results for orders placed or performed during the hospital encounter of 12/14/17   2D echo with color flow doppler   Result Value Ref Range    EF 10 (A) 55 - 65    Aortic Valve Stenosis MODERATE (A)     Est. PA Systolic Pressure 68.88 (A)     Mitral Valve Mobility NORMAL     Tricuspid Valve Regurgitation MODERATE (A)      Assessment and Plan:     Brief HPI:     * Acute systolic heart failure      Acute  combined  - NYHA class IV   -New depressed EF could tachycardia related cardiomopathy  - continue Lasix 10 mg gtt  - Continue BB  - Continue Bidil  -Start low dose ACE-I prior to discharge  - strict ins and outs  - daily weights Admit 152lbs-> 136 lbs  - fluid restriction  - CHF education          Atrial fibrillation with RVR    - Persistent  - [id number]  - anticoagulation with Eliquis  -Continue digoxin and BB  -EP consult for Decision on antiarrhythmics and REJI/DCCV              VTE Risk Mitigation         Ordered     apixaban tablet 5 mg  2 times daily     Route:  Oral        12/15/17 0030     Medium Risk of VTE  Once      12/15/17 0030          Macario Moon MD  Cardiology  [facility]

## 2017-12-18 NOTE — NURSING
Lasix gtt infusing. Patient complained of itching and cough. PRN medication administered. Plan of care discussed with patient, no questions at this time. Fall pre-cautions in place. No complaints of pain. Will continue to monitor.

## 2017-12-18 NOTE — SUBJECTIVE & OBJECTIVE
Past Medical History:   Diagnosis Date    A-fib     Arthritis     Asteroid hyalosis of left eye     Bilateral nonexudative age-related macular degeneration 6/3/2014    Breast cancer     Cataract     Chronic GERD 2017    CKD stage 3 due to type 2 diabetes mellitus     Coronary artery disease     Hypertension     Migraine headache     Mild nonproliferative diabetic retinopathy of both eyes 6/3/2014    Pneumonia     Type 2 diabetes mellitus with hyperglycemia     Vertigo        Past Surgical History:   Procedure Laterality Date    BREAST SURGERY      left lumpectomy    CARPAL TUNNEL RELEASE      left    CATARACT EXTRACTION      vance     SECTION      EYE SURGERY      cataracts bilaterally    HYSTERECTOMY      partial       Review of patient's allergies indicates:  No Known Allergies    No current facility-administered medications on file prior to encounter.      Current Outpatient Prescriptions on File Prior to Encounter   Medication Sig    apixaban 5 mg Tab Take 1 tablet (5 mg total) by mouth 2 (two) times daily.    furosemide (LASIX) 20 MG tablet Take 1 tablet (20 mg total) by mouth daily as needed.    hydrocodone-acetaminophen 5-325mg (NORCO) 5-325 mg per tablet Take 1 tablet by mouth every 6 (six) hours as needed for Pain.    metoprolol tartrate (LOPRESSOR) 100 MG tablet Take 1 tablet (100 mg total) by mouth 2 (two) times daily.    predniSONE (DELTASONE) 10 MG tablet take 1 tablet by mouth daily if needed for gout    ACCU-CHEK FASTCLIX Misc 1 lancet by Misc.(Non-Drug; Combo Route) route 3 (three) times daily. Pt to test blood glucose up to 3 times daily.    allopurinol (ZYLOPRIM) 300 MG tablet Take 1.5 tablets (450 mg total) by mouth once daily.    blood sugar diagnostic (ACCU-CHEK TOMASA) Strp 1 strip by Misc.(Non-Drug; Combo Route) route once daily.     Family History     Problem Relation (Age of Onset)    Blindness Brother    Cancer Mother    Cataracts Sister    Diabetes  Father, Brother, Sister, Brother, Brother, Brother    Heart disease Mother    Hypertension Father, Brother    No Known Problems Daughter, Son, Daughter        Social History Main Topics    Smoking status: Never Smoker    Smokeless tobacco: Never Used    Alcohol use No      Comment: socially    Drug use: No    Sexual activity: Not Currently     Review of Systems   Constitution: Positive for malaise/fatigue. Negative for chills and fever.   HENT: Negative for ear discharge.    Eyes: Negative for pain and visual disturbance.   Cardiovascular: Positive for dyspnea on exertion, irregular heartbeat and palpitations. Negative for chest pain, leg swelling, orthopnea, paroxysmal nocturnal dyspnea and syncope.   Respiratory: Negative for hemoptysis, shortness of breath and wheezing.    Skin: Negative for rash and suspicious lesions.   Musculoskeletal: Negative for joint pain and muscle weakness.   Gastrointestinal: Negative for abdominal pain, diarrhea, hematemesis, hematochezia, melena, nausea and vomiting.   Genitourinary: Negative for dysuria and frequency.   Neurological: Negative for focal weakness, headaches and tremors.   Psychiatric/Behavioral: Negative for altered mental status, suicidal ideas and thoughts of violence.     Objective:     Vital Signs (Most Recent):  Temp: 97.9 °F (36.6 °C) (12/18/17 1217)  Pulse: 105 (12/18/17 1217)  Resp: 18 (12/18/17 1217)  BP: (!) 137/98 (12/18/17 1217)  SpO2: 98 % (12/18/17 1217) Vital Signs (24h Range):  Temp:  [96.8 °F (36 °C)-98.7 °F (37.1 °C)] 97.9 °F (36.6 °C)  Pulse:  [] 105  Resp:  [18-19] 18  SpO2:  [94 %-98 %] 98 %  BP: (111-166)/(66-98) 137/98     Weight: 61.8 kg (136 lb 3.9 oz)  Body mass index is 25.74 kg/m².    SpO2: 98 %  O2 Device (Oxygen Therapy): room air    Physical Exam   Constitutional: She is oriented to person, place, and time. She appears well-developed and well-nourished.   HENT:   Head: Normocephalic and atraumatic.   Eyes: Right eye exhibits no  discharge. Left eye exhibits no discharge.   Neck: Normal range of motion. Neck supple. JVD present.   Cardiovascular: An irregularly irregular rhythm present. Tachycardia present.  Exam reveals no gallop and no friction rub.    Tachycardic and irregular   Pulmonary/Chest: Effort normal and breath sounds normal.   Abdominal: Soft. Bowel sounds are normal.   Musculoskeletal: She exhibits edema.   Neurological: She is alert and oriented to person, place, and time.   Skin: Skin is warm and dry.   Psychiatric: Her behavior is normal.       Significant Labs:   EP:   Recent Labs  Lab 12/17/17  0418 12/18/17  0508    138   K 4.0 3.8    99   CO2 26 29   GLU 82 96   BUN 23 22   CREATININE 1.4 1.4   CALCIUM 8.6* 8.4*   ALBUMIN 2.8* 2.7*   ANIONGAP 12 10   ESTGFRAFRICA 40.6* 40.6*   EGFRNONAA 35.3* 35.3*   WBC 7.31 8.54   HGB 14.0 14.1   HCT 44.1 44.9    272       Significant Imaging: Echocardiogram:   2D echo with color flow doppler:   Results for orders placed or performed during the hospital encounter of 12/14/17   2D echo with color flow doppler   Result Value Ref Range    EF 10 (A) 55 - 65    Aortic Valve Stenosis MODERATE (A)     Est. PA Systolic Pressure 68.88 (A)     Mitral Valve Mobility NORMAL     Tricuspid Valve Regurgitation MODERATE (A)

## 2017-12-18 NOTE — ASSESSMENT & PLAN NOTE
83 yo female with PAF s/p DCCV (06/06/17), HTN, CAD, IDDM, CKD stage III presented to the [facility] [place] on 12/15/17 with AF with RVR and ADHF. Currently in Af with rates in the . Still hypervolemic.      Plan:  - Recommend diuresis and attaining euvolemia  - Would consider REJI/DCCV and initiation of Amiodarone aftewards  - Would continue Dig and lopressor at current doses to keep hr<120.   - Would continue anticoagulation with Eliquis   - Would agree with ischemic evaluation once euvolemic but there are also could be a component of tachycardia induced CM here.    Will discuss with Dr. Burt,

## 2017-12-18 NOTE — PT/OT/SLP EVAL
"Physical Therapy Evaluation    Patient Name:  Dacia Martínez   MRN:  [id number]    Recommendations:     Discharge Recommendations:  home health PT   Discharge Equipment Recommendations: shower chair, cane, straight   Barriers to discharge: Inaccessible home (7 DIA home)    Assessment:     Dacia Martínez is a 82 y.o. female admitted with a medical diagnosis of Acute systolic heart failure.  She presents with the following impairments/functional limitations:  weakness, impaired endurance, gait instability, impaired functional mobilty, impaired balance, decreased safety awareness, impaired cardiopulmonary response to activity.     Rehab Prognosis: good; patient would benefit from acute skilled PT services to address these deficits and reach maximum level of function.      Recent Surgery: * No surgery found *      Plan:     During this hospitalization, patient to be seen 3 x/week to address the above listed problems via gait training, therapeutic activities, therapeutic exercises, neuromuscular re-education  · Plan of Care Expires:  01/17/18   Plan of Care Reviewed with: patient, family    Subjective     Communicated with RN prior to session.  Patient found supine upon PT entry to room, agreeable to evaluation.  Pt's family at bedside.     Chief Complaint: decreased endurance, weakness   Patient comments/goals: "I have been feeling weaker, but I'm doing alright."   Pain/Comfort:  · Pain Rating 1: 0/10  · Pain Rating Post-Intervention 1: 0/10    Patients cultural, spiritual, Adventism conflicts given the current situation: no conflicts    Living Environment:  Pt lives with family in [facility] with 7 DIA, R HR.   Prior to admission, patients level of function was independent with mobility and ADLs. Patient has the following equipment: none.  Upon discharge, patient will have assistance from family.     Objective:     Patient found with: peripheral IV, telemetry     General Precautions: Standard, fall   Orthopedic Precautions:N/A "   Braces: N/A     Exams:  · Postural Exam:  Patient presented with the following abnormalities:    · -       Rounded shoulders  · -       Forward head  · Skin Integrity/Edema:      · -       Skin integrity: Visible skin intact  · -       Edema: None noted BLEs  · RLE ROM: WFL  · RLE Strength: WFL except hip flexion 3+/5   · LLE ROM: WFL  · LLE Strength: WFL except hip flexion 3+/5     Functional Mobility:       Bed Mobility    · Supine to sit: SBA with HOB elevated        Transfers   · Sit <> Stand: CGA from EOB        Gait  · Distance: 80 ft.   · Assistance level: CGA with no AD; pt reaching to hallway railing and wall for UE support during gait    · Gait Deviations: increased postural sway, decreased step length, decreased terrance, narrow base of support.            AM-PAC 6 CLICK MOBILITY  Total Score:17       Therapeutic Activities and Exercises:  Pt and family educated on role of PT and POC/goals for therapy as well as safety with mobility. Pt verbalized understanding. Pt expressed no further concerns/questions.       Patient left sitting EOB with all lines intact, call button in reach, RN notified and family present.    GOALS:    Physical Therapy Goals        Problem: Physical Therapy Goal    Goal Priority Disciplines Outcome Goal Variances Interventions   Physical Therapy Goal     PT/OT, PT Ongoing (interventions implemented as appropriate)     Description:  Goals to be met by: 17    Patient will increase functional independence with mobility by performin. Supine to sit with Willacy  2. Sit to supine with Willacy  3. Sit to stand transfer with Supervision  4. Gait  x 200 feet with Supervision with or without least restrictive AD.   5. Ascend/descend 7 stairs with right Handrails Stand-by Assistance.   6. Lower extremity exercise program x20 reps per handout, with supervision                      History:     Past Medical History:   Diagnosis Date    A-fib     Arthritis     Asteroid  hyalosis of left eye     Bilateral nonexudative age-related macular degeneration 6/3/2014    Breast cancer     Cataract     Chronic GERD 2017    CKD stage 3 due to type 2 diabetes mellitus     Coronary artery disease     Hypertension     Migraine headache     Mild nonproliferative diabetic retinopathy of both eyes 6/3/2014    Pneumonia     Type 2 diabetes mellitus with hyperglycemia     Vertigo        Past Surgical History:   Procedure Laterality Date    BREAST SURGERY      left lumpectomy    CARPAL TUNNEL RELEASE      left    CATARACT EXTRACTION      vance     SECTION      EYE SURGERY      cataracts bilaterally    HYSTERECTOMY      partial       Clinical Decision Making:     History  Co-morbidities and personal factors that may impact the plan of care Examination  Body Structures and Functions, activity limitations and participation restrictions that may impact the plan of care Clinical Presentation   Decision Making/ Complexity Score   Co-morbidities:   [] Time since onset of injury / illness / exacerbation  [] Status of current condition  []Patient's cognitive status and safety concerns    [] Multiple Medical Problems (see med hx)  Personal Factors:   [x] Patient's age  [] Prior Level of function   [] Patient's home situation (environment and family support)  [] Patient's level of motivation  [] Expected progression of patient      HISTORY:(criteria)    [] 89723 - no personal factors/history    [x] 56947 - has 1-2 personal factor/comorbidity     [] 24727 - has >3 personal factor/comorbidity     Body Regions:  [x] Objective examination findings  [] Head     []  Neck  [] Trunk   [] Upper Extremity  [x] Lower Extremity    Body Systems:  [] For communication ability, affect, cognition, language, and learning style: the assessment of the ability to make needs known, consciousness, orientation (person, place, and time), expected emotional /behavioral responses, and learning preferences (eg,  learning barriers, education  needs)  [x] For the neuromuscular system: a general assessment of gross coordinated movement (eg, balance, gait, locomotion, transfers, and transitions) and motor function  (motor control and motor learning)  [x] For the musculoskeletal system: the assessment of gross symmetry, gross range of motion, gross strength, height, and weight  [] For the integumentary system: the assessment of pliability(texture), presence of scar formation, skin color, and skin integrity  [x] For cardiovascular/pulmonary system: the assessment of heart rate, respiratory rate, blood pressure, and edema     Activity limitations:    [x] Patient's cognitive status and saf ety concerns          [] Status of current condition      [] Weight bearing restriction  [x] Cardiopulmunary Restriction    Participation Restrictions:   [x] Goals and goal agreement with the patient     [] Rehab potential (prognosis) and probable outcome      Examination of Body System: (criteria)    [x] 89936 - addressing 1-2 elements    [] 13258 - addressing a total of 3 or more elements     [] 49695 -  Addressing a total of 4 or more elements         Clinical Presentation: (criteria)  Stable - 37099     On examination of body system using standardized tests and measures patient presents with 1-2 elements from any of the following: body structures and functions, activity limitations, and/or participation restrictions.  Leading to a clinical presentation that is considered stable and/or uncomplicated                              Clinical Decision Making  (Eval Complexity):  Low- 45778     Time Tracking:     PT Received On: 12/18/17  PT Start Time: 1358     PT Stop Time: 1407  PT Total Time (min): 9 min     Billable Minutes: Evaluation 9 min    Sofia Macedo PT, DPT   12/18/2017  Pager: [phone]

## 2017-12-18 NOTE — ASSESSMENT & PLAN NOTE
- Persistent  - [id number]  - anticoagulation with Eliquis  -Continue digoxin and BB  -EP consult for Decision on antiarrhythmics and REJI/DCCV

## 2017-12-18 NOTE — PROGRESS NOTES
Progress Note  Hospital Medicine      Admit Date: 12/14/2017    SUBJECTIVE:     Follow-up For:  Acute systolic heart failure    HPI/Interval history: Complaining of intermittent cough    Review of Systems: Gen: no fever, no chills, Heart: no chest pain, palpitations; Resp: no SOB, no cough    OBJECTIVE:     Vital Signs Range (Last 24H):  Temp:  [96.8 °F (36 °C)-98.7 °F (37.1 °C)]   Pulse:  []   Resp:  [18-19]   BP: (111-166)/(66-98)   SpO2:  [94 %-98 %]     Physical Exam:  General appearance: NAD, conversant  Neck: FROM, supple   Lungs: Clear to auscultation, no accessory muscle use  CV: irregularly irregular, no heave  Abdomen: Soft, non-tender; no masses or HSM  Extremities: 2+ pitting edema on legs, no edema on arms, no digital cyanosis  Skin: urticarial rash on left upper back, lesions or ulcers  Psych: Alert and oriented to person, place and time      Recent Labs  Lab 12/16/17 0537 12/17/17 0418 12/18/17  0508    141 138   K 4.2 4.0 3.8    103 99   CO2 18* 26 29   BUN 25* 23 22   CREATININE 1.5* 1.4 1.4   GLU 81 82 96   CALCIUM 8.4* 8.6* 8.4*   MG 1.1* 2.1 1.3*   PHOS 4.0 2.4* 3.1       Recent Labs  Lab 12/14/17  2221 12/16/17  0537 12/17/17 0418 12/18/17  0508   ALKPHOS 83  --   --   --    ALT 19  --   --   --    AST 35  --   --   --    ALBUMIN 2.9* 3.1* 2.8* 2.7*   PROT 7.4  --   --   --    BILITOT 1.4*  --   --   --    INR 2.0*  --   --   --          Recent Labs  Lab 12/16/17  0537 12/17/17 0418 12/18/17  0508   WBC 9.31 7.31 8.54   HGB 13.8 14.0 14.1   HCT 44.3 44.1 44.9    274 272   GRAN 67.4  6.3 66.8  4.9 66.5  5.7   LYMPH 23.0  2.1 20.5  1.5 21.4  1.8   MONO 8.3  0.8 10.7  0.8 10.5  0.9         Recent Labs  Lab 12/16/17  1239 12/16/17  1720 12/17/17  1242 12/17/17  1744 12/18/17  0817 12/18/17  1214   POCTGLUCOSE 119* 179* 114* 169* 197* 316*       ASSESSMENT/PLAN:     Acute on chronic systolic heart failure  Furosemide 40 mg IV BID --> furosemide 10 gtt/hr  Daily  weights - less 6.8 kg since admission  stricts Is and Os  EF 60%-->10% - cardiology consult appreciated    Atrial fibrillation with RVR  Continue eliquis  Change metoprolol 50 mg BID  Load digoxin as per cardiology, then start digoxin 0.125 mg daily. Held due to elevated level. Will start digoxin 0.125 mg daily today with recheck level in three days  HR still spiking up to 140. Cardiology with discuss with EP about benefit of cardioverting    DM II HTN and CKD III  HgbA1c 6.4%  Not on active treatment including diet or medication at home  Will continue diabetic diet here and accuchecks    Essential HTN - metoprolol    Cough - heart failure related likely. No leukocytosis or fever. Tessalon perles prn and guaifenesin/dextromethorphan prn    Hypokalemia - replaced orally    hypophosphorous - replaced orally    hypomagnesium -replaced by IV

## 2017-12-18 NOTE — ASSESSMENT & PLAN NOTE
Acute  combined  - NYHA class IV   -New depressed EF could tachycardia related cardiomopathy  - continue Lasix 10 mg gtt  - Continue BB  - Continue Bidil  -Start low dose ACE-I prior to discharge  - strict ins and outs  - daily weights  - fluid restriction  - CHF education

## 2017-12-18 NOTE — SUBJECTIVE & OBJECTIVE
Interval History: Continues to have MCCOLLUM, Cough.Denies any pain.Occasionally palpitations.Rate has been variable in 70s last night o 140s this am on tele.    ROS  Objective:     Vital Signs (Most Recent):  Temp: 97.9 °F (36.6 °C) (12/18/17 1217)  Pulse: 105 (12/18/17 1217)  Resp: 18 (12/18/17 1217)  BP: (!) 137/98 (12/18/17 1217)  SpO2: 98 % (12/18/17 1217) Vital Signs (24h Range):  Temp:  [96.8 °F (36 °C)-98.7 °F (37.1 °C)] 97.9 °F (36.6 °C)  Pulse:  [] 105  Resp:  [18-19] 18  SpO2:  [94 %-98 %] 98 %  BP: (111-166)/(66-98) 137/98     Weight: 61.8 kg (136 lb 3.9 oz)  Body mass index is 25.74 kg/m².     SpO2: 98 %  O2 Device (Oxygen Therapy): room air      Intake/Output Summary (Last 24 hours) at 12/18/17 1426  Last data filed at 12/18/17 0819   Gross per 24 hour   Intake              510 ml   Output             1000 ml   Net             -490 ml       Lines/Drains/Airways     Peripheral Intravenous Line                 Peripheral IV - Single Lumen 12/14/17 2220 Right Antecubital 3 days         Peripheral IV - Single Lumen 12/16/17 1425 Right Forearm 2 days                Physical Exam   General: alert, awake and oriented x 3  Eyes:PERRL.   Neck:+ JVD   Lungs:  B/l Crackles at bases  Cardiovascular: Heart: Irregular rate and rhythm, S1, S2 normal, no murmur, click, rub or gallop.   Chest Wall: no tenderness.   Extremities: no cyanosis 1+ edema.   Pulses: 2+ and symmetric.        Significant Labs:   CMP   Recent Labs  Lab 12/17/17  0418 12/18/17  0508    138   K 4.0 3.8    99   CO2 26 29   GLU 82 96   BUN 23 22   CREATININE 1.4 1.4   CALCIUM 8.6* 8.4*   ALBUMIN 2.8* 2.7*   ANIONGAP 12 10   ESTGFRAFRICA 40.6* 40.6*   EGFRNONAA 35.3* 35.3*   , CBC   Recent Labs  Lab 12/17/17  0418 12/18/17  0508   WBC 7.31 8.54   HGB 14.0 14.1   HCT 44.1 44.9    272   , INR No results for input(s): INR, PROTIME in the last 48 hours. and Troponin No results for input(s): TROPONINI in the last 48  hours.    Significant Imaging: Echocardiogram:   2D echo with color flow doppler:   Results for orders placed or performed during the hospital encounter of 12/14/17   2D echo with color flow doppler   Result Value Ref Range    EF 10 (A) 55 - 65    Aortic Valve Stenosis MODERATE (A)     Est. PA Systolic Pressure 68.88 (A)     Mitral Valve Mobility NORMAL     Tricuspid Valve Regurgitation MODERATE (A)

## 2017-12-18 NOTE — PROGRESS NOTES
Digital Medicine Heart Failure Program consult complete. Explained program details including home monitoring expectations, scale + router setup, weekly PharmD calls, and follow up appointment with labs. Pt denied questions or concerns at this time. Reviewed blue educational folder (Welcome letter, PharmD contact number, Heart Failure Zones, and program booklet). Pt declined enrollment in 30-day monitoring program.     Pt declined program enrollment due to Pt does not have home scale for daily weights, equipment not available at this time.  Is amenable to receiving education and will review dietary and HF zones prior to pt d/c.     Removed from Heart Failure list.

## 2017-12-18 NOTE — HPI
83 yo female with PAF s/p DCCV (06/06/17), HTN, CAD, IDDM, CKD stage III presented to the [facility] [place] [facility] on 12/15/17 with AF with RVR and ADHF. Her chief complaints before she presented were exertional dyspnea, 1-2 pillow orthopnea, and leg swelling in addition to NYHA class III/IV sx in the setting of medical nonadherence and dietary indiscretion. General cardiology consults was consulted who recommended diuresis, rate control with digoxin (load followed by maintainence) and decreasing her lopressor to 50 mg BID. On telemetry she is in afib with rates in the 's. Patient has ST segment depressions on telemetry. EKG from the 14th shows Afib. No recent EKG's.     She reports that she has not missed any doses of eliquis. She has been on eliquis here 5 mg BID.     Of note, her latest echo done here shows biventricular failure with an EF of 10-15%. She had a normal EF in 06/17. No past hx of MI or stents. General cardiology had recommended an ischemic evaluation once patient was euvolemic. She is net negative 6.7 L.     EP was consulted in light of the newly depressed EF and anti-rhythmic therapy and potential REJI and DCCV.

## 2017-12-18 NOTE — PLAN OF CARE
Problem: Patient Care Overview  Goal: Plan of Care Review  Outcome: Ongoing (interventions implemented as appropriate)  NO FAL RELATED INJURY, LASI X MAINTAINED AT 10MG/H VIA PUMP. DIURESING WELL ATEL AFIB, -1150 AFTER RECEIVING AM METOPROLOL. EP CONSULTED. REJI/DCCV + AMIODARONE WHEN EUVOLEMIC. DIG AND BB ADMIN AS ORDERED. TOLERATING WELL. POC DISCUSSED WITH PT AND DAUGHTER JAY THOMPSON VERBALIZE UNDERSTANDING.

## 2017-12-18 NOTE — PROGRESS NOTES
[facility]  Cardiology  Progress Note    Patient Name: Dacia Martínez  MRN: [id number]  Admission Date: 12/14/2017  Hospital Length of Stay: 2 days  Code Status: Full Code   Attending Physician: Eveline Barger MD   Primary Care Physician: Cali Vickers MD  Expected Discharge Date: 12/18/2017  Principal Problem:Acute systolic heart failure    Subjective:       Interval History: TELE with AF with HR in the 110s. MCCOLLUM walking to the restroom.     Review of Systems   Constitution: Negative for chills and fever.   HENT: Negative for ear discharge.    Eyes: Negative for pain and visual disturbance.   Cardiovascular: Negative for chest pain, dyspnea on exertion, irregular heartbeat, leg swelling, orthopnea, palpitations, paroxysmal nocturnal dyspnea and syncope.   Respiratory: Negative for hemoptysis, shortness of breath and wheezing.    Skin: Negative for rash and suspicious lesions.   Musculoskeletal: Negative for joint pain and muscle weakness.   Gastrointestinal: Negative for abdominal pain, diarrhea, hematemesis, hematochezia, melena, nausea and vomiting.   Genitourinary: Negative for dysuria and frequency.   Neurological: Negative for focal weakness, headaches and tremors.   Psychiatric/Behavioral: Negative for altered mental status, suicidal ideas and thoughts of violence.     Objective:     Vital Signs (Most Recent):  Temp: 97.2 °F (36.2 °C) (12/17/17 0454)  Pulse: 86 (12/17/17 0700)  Resp: 18 (12/17/17 0454)  BP: (!) 152/86 (12/17/17 0454)  SpO2: 98 % (12/17/17 0454) Vital Signs (24h Range):  Temp:  [96.3 °F (35.7 °C)-98.6 °F (37 °C)] 97.2 °F (36.2 °C)  Pulse:  [] 86  Resp:  [18] 18  SpO2:  [96 %-99 %] 98 %  BP: (125-161)/(79-90) 152/86     Weight: 68.6 kg (151 lb 4.8 oz)  Body mass index is 28.59 kg/m².     SpO2: 98 %  O2 Device (Oxygen Therapy): room air      Intake/Output Summary (Last 24 hours) at 12/17/17 0802  Last data filed at 12/17/17 0852   Gross per 24 hour   Intake              660  ml   Output             7200 ml   Net            -6540 ml       Lines/Drains/Airways     Peripheral Intravenous Line                 Peripheral IV - Single Lumen 17 2220 Right Antecubital 2 days         Peripheral IV - Single Lumen 17 1425 Right Forearm less than 1 day                Physical Exam   Constitutional: She is oriented to person, place, and time. She appears well-developed and well-nourished.   HENT:   Head: Normocephalic and atraumatic.   Eyes: Right eye exhibits no discharge. Left eye exhibits no discharge.   Cardiovascular: An irregularly irregular rhythm present. Tachycardia present.  Exam reveals no gallop and no friction rub.    Pulmonary/Chest: Effort normal and breath sounds normal.   Abdominal: Soft. Bowel sounds are normal.   Musculoskeletal: She exhibits edema.   Neurological: She is alert and oriented to person, place, and time.   Skin: Skin is warm and dry.   Psychiatric: Her behavior is normal.        Significant Labs:   CMP   Recent Labs  Lab 17  0537 17  0418    141   K 4.2 4.0    103   CO2 18* 26   GLU 81 82   BUN 25* 23   CREATININE 1.5* 1.4   CALCIUM 8.4* 8.6*   ALBUMIN 3.1* 2.8*   ANIONGAP 18* 12   ESTGFRAFRICA 37.4* 40.6*   EGFRNONAA 32.4* 35.3*   , CBC   Recent Labs  Lab 17  0537 17  0418   WBC 9.31 7.31   HGB 13.8 14.0   HCT 44.3 44.1    274   , INR No results for input(s): INR, PROTIME in the last 48 hours., Lipid Panel No results for input(s): CHOL, HDL, LDLCALC, TRIG, CHOLHDL in the last 48 hours. and Troponin   Recent Labs  Lab 17  0151   TROPONINI 0.056*       Significant Imaging: EK/14: AF with RVR    Assessment and Plan:     * Acute systolic heart failure      Acute  combined  - NYHA class IV in the setting of medical nonadherence and dietary indiscretion  - c/w Lasix 10 mg gtt  - once euvolemic, will reassess on need for ischemic workup  - c/w BB  - initiate Bidil  - strict ins and outs  - daily weights  -  fluid restriction  - CHF education          Atrial fibrillation with RVR    - Persistent  - [id number]  - anticoagulation with Eliquis  - hold digoxin with supratherapeutic level this morning  - recheck digoxin level in the AM  - c/w BB              VTE Risk Mitigation         Ordered     apixaban tablet 5 mg  2 times daily     Route:  Oral        12/15/17 0030     Medium Risk of VTE  Once      12/15/17 0030          Dar Rincon MD  Cardiology  [facility]

## 2017-12-19 PROBLEM — I50.23 ACUTE ON CHRONIC SYSTOLIC HEART FAILURE: Status: ACTIVE | Noted: 2017-12-15

## 2017-12-19 LAB
ALBUMIN SERPL BCP-MCNC: 3 G/DL
ANION GAP SERPL CALC-SCNC: 14 MMOL/L
ANISOCYTOSIS BLD QL SMEAR: SLIGHT
BASOPHILS # BLD AUTO: 0.06 K/UL
BASOPHILS NFR BLD: 0.8 %
BUN SERPL-MCNC: 24 MG/DL
CALCIUM SERPL-MCNC: 8.6 MG/DL
CHLORIDE SERPL-SCNC: 95 MMOL/L
CO2 SERPL-SCNC: 29 MMOL/L
CREAT SERPL-MCNC: 1.4 MG/DL
DIFFERENTIAL METHOD: ABNORMAL
EOSINOPHIL # BLD AUTO: 0.1 K/UL
EOSINOPHIL NFR BLD: 1.3 %
ERYTHROCYTE [DISTWIDTH] IN BLOOD BY AUTOMATED COUNT: 20 %
EST. GFR  (AFRICAN AMERICAN): 40.6 ML/MIN/1.73 M^2
EST. GFR  (NON AFRICAN AMERICAN): 35.3 ML/MIN/1.73 M^2
GLUCOSE SERPL-MCNC: 94 MG/DL
HCT VFR BLD AUTO: 48.7 %
HGB BLD-MCNC: 15.3 G/DL
HYPOCHROMIA BLD QL SMEAR: ABNORMAL
IMM GRANULOCYTES # BLD AUTO: 0.03 K/UL
IMM GRANULOCYTES NFR BLD AUTO: 0.4 %
LYMPHOCYTES # BLD AUTO: 2.4 K/UL
LYMPHOCYTES NFR BLD: 30.8 %
MAGNESIUM SERPL-MCNC: 2.7 MG/DL
MCH RBC QN AUTO: 26.3 PG
MCHC RBC AUTO-ENTMCNC: 31.4 G/DL
MCV RBC AUTO: 84 FL
MONOCYTES # BLD AUTO: 0.9 K/UL
MONOCYTES NFR BLD: 11.5 %
NEUTROPHILS # BLD AUTO: 4.3 K/UL
NEUTROPHILS NFR BLD: 55.2 %
NRBC BLD-RTO: 0 /100 WBC
OVALOCYTES BLD QL SMEAR: ABNORMAL
PHOSPHATE SERPL-MCNC: 5.8 MG/DL
PLATELET # BLD AUTO: 150 K/UL
PLATELET BLD QL SMEAR: ABNORMAL
PMV BLD AUTO: ABNORMAL FL
POCT GLUCOSE: 118 MG/DL (ref 70–110)
POCT GLUCOSE: 121 MG/DL (ref 70–110)
POCT GLUCOSE: 140 MG/DL (ref 70–110)
POIKILOCYTOSIS BLD QL SMEAR: SLIGHT
POLYCHROMASIA BLD QL SMEAR: ABNORMAL
POTASSIUM SERPL-SCNC: 4.9 MMOL/L
RBC # BLD AUTO: 5.81 M/UL
SODIUM SERPL-SCNC: 138 MMOL/L
WBC # BLD AUTO: 7.83 K/UL

## 2017-12-19 PROCEDURE — 25000003 PHARM REV CODE 250: Performed by: INTERNAL MEDICINE

## 2017-12-19 PROCEDURE — 85025 COMPLETE CBC W/AUTO DIFF WBC: CPT

## 2017-12-19 PROCEDURE — 80069 RENAL FUNCTION PANEL: CPT

## 2017-12-19 PROCEDURE — 99232 SBSQ HOSP IP/OBS MODERATE 35: CPT | Mod: ,,, | Performed by: INTERNAL MEDICINE

## 2017-12-19 PROCEDURE — 63600175 PHARM REV CODE 636 W HCPCS: Performed by: INTERNAL MEDICINE

## 2017-12-19 PROCEDURE — 83735 ASSAY OF MAGNESIUM: CPT

## 2017-12-19 PROCEDURE — 20600001 HC STEP DOWN PRIVATE ROOM

## 2017-12-19 PROCEDURE — 36415 COLL VENOUS BLD VENIPUNCTURE: CPT

## 2017-12-19 PROCEDURE — 99232 SBSQ HOSP IP/OBS MODERATE 35: CPT | Mod: GC,,, | Performed by: INTERNAL MEDICINE

## 2017-12-19 RX ORDER — ACETAMINOPHEN 500 MG
500 TABLET ORAL EVERY 6 HOURS PRN
Status: DISCONTINUED | OUTPATIENT
Start: 2017-12-19 | End: 2017-12-23 | Stop reason: HOSPADM

## 2017-12-19 RX ORDER — FUROSEMIDE 10 MG/ML
80 INJECTION INTRAMUSCULAR; INTRAVENOUS 2 TIMES DAILY
Status: DISCONTINUED | OUTPATIENT
Start: 2017-12-19 | End: 2017-12-21

## 2017-12-19 RX ORDER — TRIAMCINOLONE ACETONIDE 1 MG/G
OINTMENT TOPICAL
Qty: 80 G | Refills: 0 | Status: SHIPPED | OUTPATIENT
Start: 2017-12-19 | End: 2017-12-27 | Stop reason: SDUPTHER

## 2017-12-19 RX ADMIN — FUROSEMIDE 80 MG: 10 INJECTION, SOLUTION INTRAMUSCULAR; INTRAVENOUS at 05:12

## 2017-12-19 RX ADMIN — HYDRALAZINE HYDROCHLORIDE AND ISOSORBIDE DINITRATE 1 TABLET: 37.5; 2 TABLET, FILM COATED ORAL at 05:12

## 2017-12-19 RX ADMIN — METOPROLOL TARTRATE 50 MG: 25 TABLET ORAL at 09:12

## 2017-12-19 RX ADMIN — HYDRALAZINE HYDROCHLORIDE AND ISOSORBIDE DINITRATE 1 TABLET: 37.5; 2 TABLET, FILM COATED ORAL at 03:12

## 2017-12-19 RX ADMIN — BENZONATATE 100 MG: 100 CAPSULE ORAL at 09:12

## 2017-12-19 RX ADMIN — HYDRALAZINE HYDROCHLORIDE AND ISOSORBIDE DINITRATE 1 TABLET: 37.5; 2 TABLET, FILM COATED ORAL at 09:12

## 2017-12-19 RX ADMIN — HYDROCODONE BITARTRATE AND ACETAMINOPHEN 1 TABLET: 5; 325 TABLET ORAL at 09:12

## 2017-12-19 RX ADMIN — APIXABAN 5 MG: 5 TABLET, FILM COATED ORAL at 09:12

## 2017-12-19 RX ADMIN — DIGOXIN 0.12 MG: 0.12 TABLET ORAL at 09:12

## 2017-12-19 RX ADMIN — ACETAMINOPHEN 500 MG: 500 TABLET ORAL at 12:12

## 2017-12-19 NOTE — PLAN OF CARE
Problem: Patient Care Overview  Goal: Plan of Care Review  Outcome: Ongoing (interventions implemented as appropriate)  Pt. Remains free from falls/ injury/trauma. No complaints of CP, SOB, or discomfort. PRN pain medication given. Lasix gtt infusing. Pt diurese well throughout the night. Plan of Care reviewed with patient. VSS and NADN. Will continue to monitor.

## 2017-12-19 NOTE — NURSING
Dr Dotson paged. bp 93/53, asymtomatic, will dold am Lopressor and decrease Lasix drip to 5mg/h.      Phos level 5.8. Hold phos supplement this am.

## 2017-12-19 NOTE — SUBJECTIVE & OBJECTIVE
Interval History: Denies any SOB, Still has cough, No chest pain.    Review of Systems   All other systems reviewed and are negative.    Objective:     Vital Signs (Most Recent):  Temp: 97.4 °F (36.3 °C) (12/19/17 0742)  Pulse: (!) 114 (12/19/17 1100)  Resp: 18 (12/19/17 0742)  BP: (!) 93/53 (12/19/17 0742)  SpO2: 96 % (12/19/17 0742) Vital Signs (24h Range):  Temp:  [97.4 °F (36.3 °C)-98.7 °F (37.1 °C)] 97.4 °F (36.3 °C)  Pulse:  [] 114  Resp:  [16-20] 18  SpO2:  [94 %-99 %] 96 %  BP: ()/(53-79) 93/53     Weight: 60.4 kg (133 lb 2.5 oz)  Body mass index is 25.16 kg/m².     SpO2: 96 %  O2 Device (Oxygen Therapy): room air      Intake/Output Summary (Last 24 hours) at 12/19/17 1223  Last data filed at 12/19/17 0442   Gross per 24 hour   Intake              540 ml   Output             1075 ml   Net             -535 ml       Lines/Drains/Airways     Peripheral Intravenous Line                 Peripheral IV - Single Lumen 12/16/17 1425 Right Forearm 2 days         Peripheral IV - Single Lumen 12/18/17 1542 Right Antecubital less than 1 day                Physical Exam  General: alert, awake and oriented x 3  Eyes:PERRL.   Neck:+HJR  Lungs:  clear to auscultation bilaterally and normal respiratory effort  Cardiovascular: Heart: Irregular rate and rhythm, S1, S2 normal, no murmur, click, rub or gallop.   Chest Wall: no tenderness.   Extremities: no cyanosis 1+ edema.   Pulses: 2+ and symmetric.      Significant Labs:   CMP   Recent Labs  Lab 12/18/17  0508 12/19/17  0430    138   K 3.8 4.9   CL 99 95   CO2 29 29   GLU 96 94   BUN 22 24*   CREATININE 1.4 1.4   CALCIUM 8.4* 8.6*   ALBUMIN 2.7* 3.0*   ANIONGAP 10 14   ESTGFRAFRICA 40.6* 40.6*   EGFRNONAA 35.3* 35.3*   , CBC   Recent Labs  Lab 12/18/17  0508 12/19/17  0430   WBC 8.54 7.83   HGB 14.1 15.3   HCT 44.9 48.7*    150    and INR No results for input(s): INR, PROTIME in the last 48 hours.    Significant Imaging: Echocardiogram:   2D echo with  color flow doppler:   Results for orders placed or performed during the hospital encounter of 12/14/17   2D echo with color flow doppler   Result Value Ref Range    EF 10 (A) 55 - 65    Aortic Valve Stenosis MODERATE (A)     Est. PA Systolic Pressure 68.88 (A)     Mitral Valve Mobility NORMAL     Tricuspid Valve Regurgitation MODERATE (A)

## 2017-12-19 NOTE — PLAN OF CARE
Pt remains on Lasix gtt, plans for pissible cardioversion when stable.  No needs at time, therapy recommends home health at time of d/c.

## 2017-12-19 NOTE — PLAN OF CARE
Problem: Patient Care Overview  Goal: Plan of Care Review  Outcome: Ongoing (interventions implemented as appropriate)  Lasix infusion d/c'ed. Will receive ivp bid. Fall precautions maintained. NPO after midnight for oksana.dccv. POC discussed with pt and saleem. Both verbalize understanding

## 2017-12-19 NOTE — PROGRESS NOTES
[facility]  Cardiology  Progress Note    Patient Name: Dacia Martínez  MRN: [id number]  Admission Date: 12/14/2017  Hospital Length of Stay: 4 days  Code Status: Full Code   Attending Physician: Rubi Dotson MD   Primary Care Physician: Cali Vickers MD  Expected Discharge Date: 12/21/2017  Principal Problem:Acute systolic heart failure    Subjective:     Hospital Course:   No notes on file    Interval History: Denies any SOB, Still has cough, No chest pain.    Review of Systems   All other systems reviewed and are negative.    Objective:     Vital Signs (Most Recent):  Temp: 97.4 °F (36.3 °C) (12/19/17 0742)  Pulse: (!) 114 (12/19/17 1100)  Resp: 18 (12/19/17 0742)  BP: (!) 93/53 (12/19/17 0742)  SpO2: 96 % (12/19/17 0742) Vital Signs (24h Range):  Temp:  [97.4 °F (36.3 °C)-98.7 °F (37.1 °C)] 97.4 °F (36.3 °C)  Pulse:  [] 114  Resp:  [16-20] 18  SpO2:  [94 %-99 %] 96 %  BP: ()/(53-79) 93/53     Weight: 60.4 kg (133 lb 2.5 oz)  Body mass index is 25.16 kg/m².     SpO2: 96 %  O2 Device (Oxygen Therapy): room air      Intake/Output Summary (Last 24 hours) at 12/19/17 1223  Last data filed at 12/19/17 0442   Gross per 24 hour   Intake              540 ml   Output             1075 ml   Net             -535 ml       Lines/Drains/Airways     Peripheral Intravenous Line                 Peripheral IV - Single Lumen 12/16/17 1425 Right Forearm 2 days         Peripheral IV - Single Lumen 12/18/17 1542 Right Antecubital less than 1 day                Physical Exam  General: alert, awake and oriented x 3  Eyes:PERRL.   Neck:+HJR  Lungs:  clear to auscultation bilaterally and normal respiratory effort  Cardiovascular: Heart: Irregular rate and rhythm, S1, S2 normal, no murmur, click, rub or gallop.   Chest Wall: no tenderness.   Extremities: no cyanosis 1+ edema.   Pulses: 2+ and symmetric.      Significant Labs:   CMP   Recent Labs  Lab 12/18/17  0508 12/19/17  0430    138   K 3.8 4.9   CL  99 95   CO2 29 29   GLU 96 94   BUN 22 24*   CREATININE 1.4 1.4   CALCIUM 8.4* 8.6*   ALBUMIN 2.7* 3.0*   ANIONGAP 10 14   ESTGFRAFRICA 40.6* 40.6*   EGFRNONAA 35.3* 35.3*   , CBC   Recent Labs  Lab 12/18/17  0508 12/19/17  0430   WBC 8.54 7.83   HGB 14.1 15.3   HCT 44.9 48.7*    150    and INR No results for input(s): INR, PROTIME in the last 48 hours.    Significant Imaging: Echocardiogram:   2D echo with color flow doppler:   Results for orders placed or performed during the hospital encounter of 12/14/17   2D echo with color flow doppler   Result Value Ref Range    EF 10 (A) 55 - 65    Aortic Valve Stenosis MODERATE (A)     Est. PA Systolic Pressure 68.88 (A)     Mitral Valve Mobility NORMAL     Tricuspid Valve Regurgitation MODERATE (A)      Assessment and Plan:         * Acute systolic heart failure      Acute  combined  - NYHA class IV   -New depressed EF could tachycardia related cardiomopathy  -Change lasix to 80 mg IV BID  - Continue BB  - Continue Bidil  -Start low dose ACE-I prior to discharge  - strict ins and outs  - daily weights  - fluid restriction  - CHF education          Atrial fibrillation with RVR    - Persistent  - [id number]  - anticoagulation with Eliquis  -Continue digoxin and BB  -REJI/CV in am tomorrow-Amiodarone afterwards            VTE Risk Mitigation         Ordered     apixaban tablet 5 mg  2 times daily     Route:  Oral        12/15/17 0030     Medium Risk of VTE  Once      12/15/17 0030          Macario Moon MD  Cardiology  [facility]

## 2017-12-19 NOTE — ASSESSMENT & PLAN NOTE
- Persistent  - [id number]  - anticoagulation with Eliquis  -Continue digoxin and BB  -REJI/CV in am tomorrow-Amiodarone afterwards

## 2017-12-20 ENCOUNTER — SURGERY (OUTPATIENT)
Age: 82
End: 2017-12-20

## 2017-12-20 ENCOUNTER — ANESTHESIA (OUTPATIENT)
Dept: MEDSURG UNIT | Facility: HOSPITAL | Age: 82
DRG: 291 | End: 2017-12-20
Payer: MEDICARE

## 2017-12-20 ENCOUNTER — ANESTHESIA EVENT (OUTPATIENT)
Dept: MEDSURG UNIT | Facility: HOSPITAL | Age: 82
DRG: 291 | End: 2017-12-20
Payer: MEDICARE

## 2017-12-20 LAB
ALBUMIN SERPL BCP-MCNC: 2.9 G/DL
ANION GAP SERPL CALC-SCNC: 13 MMOL/L
AORTIC ATHEROMA: YES
AORTIC VALVE REGURGITATION: ABNORMAL
BACTERIA BLD CULT: NORMAL
BACTERIA BLD CULT: NORMAL
BASOPHILS # BLD AUTO: 0.07 K/UL
BASOPHILS NFR BLD: 0.8 %
BUN SERPL-MCNC: 24 MG/DL
CALCIUM SERPL-MCNC: 8.9 MG/DL
CHLORIDE SERPL-SCNC: 94 MMOL/L
CO2 SERPL-SCNC: 29 MMOL/L
CREAT SERPL-MCNC: 1.4 MG/DL
DIFFERENTIAL METHOD: ABNORMAL
DIGOXIN SERPL-MCNC: 1.6 NG/ML
EOSINOPHIL # BLD AUTO: 0.2 K/UL
EOSINOPHIL NFR BLD: 2 %
ERYTHROCYTE [DISTWIDTH] IN BLOOD BY AUTOMATED COUNT: 19.4 %
EST. GFR  (AFRICAN AMERICAN): 40.6 ML/MIN/1.73 M^2
EST. GFR  (NON AFRICAN AMERICAN): 35.3 ML/MIN/1.73 M^2
GLUCOSE SERPL-MCNC: 102 MG/DL
HCT VFR BLD AUTO: 45.8 %
HGB BLD-MCNC: 14.9 G/DL
IMM GRANULOCYTES # BLD AUTO: 0.1 K/UL
IMM GRANULOCYTES NFR BLD AUTO: 1.1 %
LYMPHOCYTES # BLD AUTO: 2 K/UL
LYMPHOCYTES NFR BLD: 22.2 %
MAGNESIUM SERPL-MCNC: 2.1 MG/DL
MCH RBC QN AUTO: 26.1 PG
MCHC RBC AUTO-ENTMCNC: 32.5 G/DL
MCV RBC AUTO: 80 FL
MITRAL VALVE MOBILITY: NORMAL
MITRAL VALVE REGURGITATION: ABNORMAL
MONOCYTES # BLD AUTO: 1.2 K/UL
MONOCYTES NFR BLD: 13.1 %
NEUTROPHILS # BLD AUTO: 5.5 K/UL
NEUTROPHILS NFR BLD: 60.8 %
NRBC BLD-RTO: 0 /100 WBC
PHOSPHATE SERPL-MCNC: 4.1 MG/DL
PLATELET # BLD AUTO: 231 K/UL
PMV BLD AUTO: 11.8 FL
POCT GLUCOSE: 121 MG/DL (ref 70–110)
POCT GLUCOSE: 129 MG/DL (ref 70–110)
POTASSIUM SERPL-SCNC: 4.2 MMOL/L
RBC # BLD AUTO: 5.71 M/UL
SODIUM SERPL-SCNC: 136 MMOL/L
WBC # BLD AUTO: 9 K/UL

## 2017-12-20 PROCEDURE — 27100006 CARDIOVERSION (DCCV)

## 2017-12-20 PROCEDURE — 85025 COMPLETE CBC W/AUTO DIFF WBC: CPT

## 2017-12-20 PROCEDURE — 37000009 HC ANESTHESIA EA ADD 15 MINS: Performed by: INTERNAL MEDICINE

## 2017-12-20 PROCEDURE — 93325 DOPPLER ECHO COLOR FLOW MAPG: CPT

## 2017-12-20 PROCEDURE — 82962 GLUCOSE BLOOD TEST: CPT | Performed by: INTERNAL MEDICINE

## 2017-12-20 PROCEDURE — 93320 DOPPLER ECHO COMPLETE: CPT | Mod: 26,,, | Performed by: INTERNAL MEDICINE

## 2017-12-20 PROCEDURE — 93312 ECHO TRANSESOPHAGEAL: CPT | Mod: 26,,, | Performed by: INTERNAL MEDICINE

## 2017-12-20 PROCEDURE — D9220A PRA ANESTHESIA: Mod: CRNA,,, | Performed by: NURSE ANESTHETIST, CERTIFIED REGISTERED

## 2017-12-20 PROCEDURE — 93325 DOPPLER ECHO COLOR FLOW MAPG: CPT | Mod: 26,,, | Performed by: INTERNAL MEDICINE

## 2017-12-20 PROCEDURE — 25000003 PHARM REV CODE 250

## 2017-12-20 PROCEDURE — 93005 ELECTROCARDIOGRAM TRACING: CPT

## 2017-12-20 PROCEDURE — 63600175 PHARM REV CODE 636 W HCPCS: Performed by: NURSE ANESTHETIST, CERTIFIED REGISTERED

## 2017-12-20 PROCEDURE — A4216 STERILE WATER/SALINE, 10 ML: HCPCS | Performed by: NURSE ANESTHETIST, CERTIFIED REGISTERED

## 2017-12-20 PROCEDURE — 25000003 PHARM REV CODE 250: Performed by: INTERNAL MEDICINE

## 2017-12-20 PROCEDURE — 37000008 HC ANESTHESIA 1ST 15 MINUTES: Performed by: INTERNAL MEDICINE

## 2017-12-20 PROCEDURE — 92960 CARDIOVERSION ELECTRIC EXT: CPT | Mod: ,,, | Performed by: INTERNAL MEDICINE

## 2017-12-20 PROCEDURE — 99232 SBSQ HOSP IP/OBS MODERATE 35: CPT | Mod: ,,, | Performed by: INTERNAL MEDICINE

## 2017-12-20 PROCEDURE — 25000003 PHARM REV CODE 250: Performed by: NURSE ANESTHETIST, CERTIFIED REGISTERED

## 2017-12-20 PROCEDURE — 36415 COLL VENOUS BLD VENIPUNCTURE: CPT

## 2017-12-20 PROCEDURE — 93010 ELECTROCARDIOGRAM REPORT: CPT | Mod: ,,, | Performed by: INTERNAL MEDICINE

## 2017-12-20 PROCEDURE — C8925 2D TEE W OR W/O FOL W/CON,IN: HCPCS

## 2017-12-20 PROCEDURE — D9220A PRA ANESTHESIA: Mod: ANES,,, | Performed by: ANESTHESIOLOGY

## 2017-12-20 PROCEDURE — 20600001 HC STEP DOWN PRIVATE ROOM

## 2017-12-20 PROCEDURE — 99232 SBSQ HOSP IP/OBS MODERATE 35: CPT | Mod: GC,,, | Performed by: INTERNAL MEDICINE

## 2017-12-20 PROCEDURE — 83735 ASSAY OF MAGNESIUM: CPT

## 2017-12-20 PROCEDURE — 80069 RENAL FUNCTION PANEL: CPT

## 2017-12-20 PROCEDURE — 80162 ASSAY OF DIGOXIN TOTAL: CPT

## 2017-12-20 PROCEDURE — 63600175 PHARM REV CODE 636 W HCPCS: Performed by: INTERNAL MEDICINE

## 2017-12-20 PROCEDURE — 5A2204Z RESTORATION OF CARDIAC RHYTHM, SINGLE: ICD-10-PCS | Performed by: INTERNAL MEDICINE

## 2017-12-20 RX ORDER — DIPHENHYDRAMINE HYDROCHLORIDE 50 MG/ML
25 INJECTION INTRAMUSCULAR; INTRAVENOUS EVERY 6 HOURS PRN
Status: CANCELLED | OUTPATIENT
Start: 2017-12-20

## 2017-12-20 RX ORDER — AMIODARONE HYDROCHLORIDE 100 MG/1
100 TABLET ORAL DAILY
Status: DISCONTINUED | OUTPATIENT
Start: 2017-12-20 | End: 2017-12-21

## 2017-12-20 RX ORDER — ETOMIDATE 2 MG/ML
INJECTION INTRAVENOUS
Status: DISCONTINUED | OUTPATIENT
Start: 2017-12-20 | End: 2017-12-20

## 2017-12-20 RX ORDER — FENTANYL CITRATE 50 UG/ML
25 INJECTION, SOLUTION INTRAMUSCULAR; INTRAVENOUS EVERY 5 MIN PRN
Status: CANCELLED | OUTPATIENT
Start: 2017-12-20

## 2017-12-20 RX ORDER — FENTANYL CITRATE 50 UG/ML
INJECTION, SOLUTION INTRAMUSCULAR; INTRAVENOUS
Status: DISCONTINUED | OUTPATIENT
Start: 2017-12-20 | End: 2017-12-20

## 2017-12-20 RX ORDER — LIDOCAINE HCL/PF 100 MG/5ML
SYRINGE (ML) INTRAVENOUS
Status: DISCONTINUED | OUTPATIENT
Start: 2017-12-20 | End: 2017-12-20

## 2017-12-20 RX ADMIN — BENZONATATE 100 MG: 100 CAPSULE ORAL at 08:12

## 2017-12-20 RX ADMIN — HYDROCODONE BITARTRATE AND ACETAMINOPHEN 1 TABLET: 5; 325 TABLET ORAL at 08:12

## 2017-12-20 RX ADMIN — ETOMIDATE 2 MG: 2 INJECTION, SOLUTION INTRAVENOUS at 11:12

## 2017-12-20 RX ADMIN — FUROSEMIDE 80 MG: 10 INJECTION, SOLUTION INTRAMUSCULAR; INTRAVENOUS at 06:12

## 2017-12-20 RX ADMIN — AMIODARONE HYDROCHLORIDE 100 MG: 100 TABLET ORAL at 06:12

## 2017-12-20 RX ADMIN — ACETAMINOPHEN 500 MG: 500 TABLET ORAL at 01:12

## 2017-12-20 RX ADMIN — HYDROCODONE BITARTRATE AND ACETAMINOPHEN 1 TABLET: 5; 325 TABLET ORAL at 05:12

## 2017-12-20 RX ADMIN — FUROSEMIDE 80 MG: 10 INJECTION, SOLUTION INTRAMUSCULAR; INTRAVENOUS at 08:12

## 2017-12-20 RX ADMIN — APIXABAN 5 MG: 5 TABLET, FILM COATED ORAL at 08:12

## 2017-12-20 RX ADMIN — GUAIFENESIN 200 MG: 200 SOLUTION ORAL at 08:12

## 2017-12-20 RX ADMIN — DEXMEDETOMIDINE HYDROCHLORIDE 0.5 MCG/KG/HR: 100 INJECTION, SOLUTION, CONCENTRATE INTRAVENOUS at 11:12

## 2017-12-20 RX ADMIN — FENTANYL CITRATE 25 MCG: 50 INJECTION, SOLUTION INTRAMUSCULAR; INTRAVENOUS at 11:12

## 2017-12-20 RX ADMIN — METOPROLOL TARTRATE 50 MG: 25 TABLET ORAL at 08:12

## 2017-12-20 RX ADMIN — HYDRALAZINE HYDROCHLORIDE AND ISOSORBIDE DINITRATE 1 TABLET: 37.5; 2 TABLET, FILM COATED ORAL at 01:12

## 2017-12-20 RX ADMIN — DIGOXIN 0.12 MG: 0.12 TABLET ORAL at 08:12

## 2017-12-20 RX ADMIN — HYDRALAZINE HYDROCHLORIDE AND ISOSORBIDE DINITRATE 1 TABLET: 37.5; 2 TABLET, FILM COATED ORAL at 08:12

## 2017-12-20 RX ADMIN — ETOMIDATE 4 MG: 2 INJECTION, SOLUTION INTRAVENOUS at 11:12

## 2017-12-20 RX ADMIN — HYDRALAZINE HYDROCHLORIDE AND ISOSORBIDE DINITRATE 1 TABLET: 37.5; 2 TABLET, FILM COATED ORAL at 05:12

## 2017-12-20 RX ADMIN — SODIUM CHLORIDE, SODIUM GLUCONATE, SODIUM ACETATE, POTASSIUM CHLORIDE, MAGNESIUM CHLORIDE, SODIUM PHOSPHATE, DIBASIC, AND POTASSIUM PHOSPHATE: .53; .5; .37; .037; .03; .012; .00082 INJECTION, SOLUTION INTRAVENOUS at 11:12

## 2017-12-20 RX ADMIN — LIDOCAINE HYDROCHLORIDE 80 MG: 20 INJECTION, SOLUTION INTRAVENOUS at 11:12

## 2017-12-20 NOTE — PROGRESS NOTES
Attempted to see patient for wound care consult. Patient off floor for procedure. Will attempt to see patient at another time.

## 2017-12-20 NOTE — PROGRESS NOTES
Notified MD Dotson that pt was back from REJI/DCCV. MD ordered it was ok to resume previous diet. Will continue to monitor pt.

## 2017-12-20 NOTE — SUBJECTIVE & OBJECTIVE
Interval History: Patient with no events overnight, no complaints with exception of persistent chronic pruritis.  Data Review: Results recorded below were reviewed 12/19/2017.    Review of Systems   Constitutional: Negative for fever.   Respiratory: Negative for shortness of breath.    Cardiovascular: Negative for chest pain.     Objective:     Vital Signs (Most Recent):  Temp: 98 °F (36.7 °C) (12/19/17 1605)  Pulse: 78 (12/19/17 1702)  Resp: 18 (12/19/17 1605)  BP: 139/73 (12/19/17 1702)  SpO2: 98 % (12/19/17 1605) Vital Signs (24h Range):  Temp:  [97.4 °F (36.3 °C)-98.1 °F (36.7 °C)] 98 °F (36.7 °C)  Pulse:  [] 78  Resp:  [16-20] 18  SpO2:  [94 %-99 %] 98 %  BP: ()/(53-83) 139/73     Weight: 60.4 kg (133 lb 2.5 oz)  Body mass index is 25.16 kg/m².    Intake/Output Summary (Last 24 hours) at 12/19/17 1907  Last data filed at 12/19/17 1300   Gross per 24 hour   Intake              600 ml   Output             1025 ml   Net             -425 ml      Physical Exam   Constitutional: She appears well-developed.   HENT:   Head: Normocephalic.   Mouth/Throat: Mucous membranes are not cyanotic.   Eyes: Conjunctivae and lids are normal.   Neck: Neck supple.   Cardiovascular: Normal rate, S1 normal and S2 normal.  An irregularly irregular rhythm present.   Pulmonary/Chest: Effort normal and breath sounds normal.   Abdominal: Soft. Bowel sounds are normal. There is no tenderness.   Musculoskeletal: She exhibits edema.   Neurological: She is alert. She is not disoriented.   Skin: She is not diaphoretic. There is erythema (posterior left shoulder). No cyanosis. No pallor. Nails show no clubbing.   Psychiatric: She has a normal mood and affect.       Significant Labs:   A1C:   Recent Labs  Lab 12/15/17  0409   HGBA1C 6.4*     CBC:   Recent Labs  Lab 12/18/17  0508 12/19/17  0430   WBC 8.54 7.83   HGB 14.1 15.3   HCT 44.9 48.7*    150     CMP:   Recent Labs  Lab 12/18/17  0508 12/19/17  0430    138   K 3.8  4.9   CL 99 95   CO2 29 29   GLU 96 94   BUN 22 24*   CREATININE 1.4 1.4   CALCIUM 8.4* 8.6*   ALBUMIN 2.7* 3.0*   ANIONGAP 10 14   EGFRNONAA 35.3* 35.3*     Magnesium:   Recent Labs  Lab 12/18/17  0508 12/19/17  0430   MG 1.3* 2.7*     POCT Glucose:   Recent Labs  Lab 12/19/17  0852 12/19/17  1257 12/19/17  1823   POCTGLUCOSE 121* 140* 118*

## 2017-12-20 NOTE — ASSESSMENT & PLAN NOTE
Patient with persistent A-fib, on Eliquis, presented in A-fib with RVR, s/p diltiazem with improvement in HR  -Cont Eliquis  -Metoprolol 100 BID  -Plan for DCCV 12/20/17.

## 2017-12-20 NOTE — PLAN OF CARE
Problem: Patient Care Overview  Goal: Individualization & Mutuality  Plan of care discussed with patient. Patient is free of fall/trauma/injury. Denies CP, SOB, or pain/discomfort. VSS. Pt NPO since midnight forTEE and DCCV. All questions addressed. Will continue to monitor.

## 2017-12-20 NOTE — ASSESSMENT & PLAN NOTE
- Persistent  - [id number]  - anticoagulation with Eliquis  - Eval heart rate post cardioversion - consider stopping digoxin in the setting of amiodarone load.  - Consider ischemic evaluation after sinus rhythm maintained, can be done as an outpatient.

## 2017-12-20 NOTE — ANESTHESIA POSTPROCEDURE EVALUATION
"Anesthesia Post Evaluation    Patient: Dacia Martínez    Procedure(s) Performed: Procedure(s) (LRB):  TRANSESOPHAGEAL ECHOCARDIOGRAM (REJI) (N/A)    Final Anesthesia Type: general  Patient location during evaluation: PACU  Patient participation: Yes- Able to Participate  Level of consciousness: awake and alert  Post-procedure vital signs: reviewed and stable  Pain management: adequate  Airway patency: patent  PONV status at discharge: No PONV  Anesthetic complications: no      Cardiovascular status: hemodynamically stable  Respiratory status: unassisted  Hydration status: euvolemic  Follow-up not needed.        Visit Vitals  BP (!) 142/80   Pulse 99   Temp 36.4 °C (97.5 °F) (Oral)   Resp 20   Ht 5' 1" (1.549 m)   Wt 59.7 kg (131 lb 9.8 oz)   LMP  (LMP Unknown)   SpO2 100%   Breastfeeding? No   BMI 24.87 kg/m²       Pain/Ree Score: Pain Assessment Performed: Yes (12/20/2017  1:00 PM)  Presence of Pain: denies (12/20/2017  1:00 PM)  Pain Rating Prior to Med Admin: 0 (12/20/2017  1:00 PM)  Pain Rating Post Med Admin: 6 (12/19/2017 10:06 PM)  Ree Score: 10 (12/20/2017  1:00 PM)      "

## 2017-12-20 NOTE — SUBJECTIVE & OBJECTIVE
Interval History: Transitioned to IV push diuretics yesterday, I/Os matched over the past 24 hours, negative 7 L since admission, no weight check this morning.  Feels well - planning on REJI/DCCV today with amio load after.    Review of Systems   All other systems reviewed and are negative.    Objective:     Vital Signs (Most Recent):  Temp: 97.9 °F (36.6 °C) (12/20/17 1350)  Pulse: 92 (12/20/17 1400)  Resp: 16 (12/20/17 1350)  BP: 113/64 (12/20/17 1350)  SpO2: 98 % (12/20/17 1350) Vital Signs (24h Range):  Temp:  [97.5 °F (36.4 °C)-98.4 °F (36.9 °C)] 97.9 °F (36.6 °C)  Pulse:  [] 92  Resp:  [16-25] 16  SpO2:  [95 %-100 %] 98 %  BP: ()/(48-95) 113/64     Weight: 59.7 kg (131 lb 9.8 oz)  Body mass index is 24.87 kg/m².     SpO2: 98 %  O2 Device (Oxygen Therapy): room air      Intake/Output Summary (Last 24 hours) at 12/20/17 1446  Last data filed at 12/20/17 1212   Gross per 24 hour   Intake              290 ml   Output                0 ml   Net              290 ml     Lines/Drains/Airways     Peripheral Intravenous Line                 Peripheral IV - Single Lumen 12/16/17 1425 Right Forearm 4 days         Peripheral IV - Single Lumen 12/18/17 1542 Right Antecubital 1 day                Physical Exam    General: alert, awake and oriented x 3  Eyes:PERRL.   Neck:No appreciable JVD  Lungs:  clear to auscultation bilaterally and normal respiratory effort  Cardiovascular: Heart: Irregular rate and rhythm, S1, S2 normal, no murmur, click, rub or gallop.   Chest Wall: no tenderness.   Extremities: no cyanosis 1+ edema.   Pulses: 2+ and symmetric.      Significant Labs:   CMP     Recent Labs  Lab 12/19/17  0430 12/20/17  0512    136   K 4.9 4.2   CL 95 94*   CO2 29 29   GLU 94 102   BUN 24* 24*   CREATININE 1.4 1.4   CALCIUM 8.6* 8.9   ALBUMIN 3.0* 2.9*   ANIONGAP 14 13   ESTGFRAFRICA 40.6* 40.6*   EGFRNONAA 35.3* 35.3*   , CBC     Recent Labs  Lab 12/19/17  0430 12/20/17  0512   WBC 7.83 9.00   HGB 15.3  14.9   HCT 48.7* 45.8    231    and INR No results for input(s): INR, PROTIME in the last 48 hours.    Significant Imaging: Echocardiogram:   2D echo with color flow doppler:   Results for orders placed or performed during the hospital encounter of 12/14/17   2D echo with color flow doppler   Result Value Ref Range    EF 10 (A) 55 - 65    Aortic Valve Stenosis MODERATE (A)     Est. PA Systolic Pressure 68.88 (A)     Mitral Valve Mobility NORMAL     Tricuspid Valve Regurgitation MODERATE (A)

## 2017-12-20 NOTE — ANESTHESIA PREPROCEDURE EVALUATION
12/20/2017  Dacia Martínez is a 82 y.o., female.  Patient Active Problem List   Diagnosis    Essential hypertension    Hyperlipidemia    History of breast cancer    Incomplete bladder emptying    Seasonal allergic rhinitis    Idiopathic chronic gout of multiple sites without tophus    Primary osteoarthritis involving multiple joints    Murmur, cardiac    Obesity (BMI 30-39.9)    Microalbuminuria    Colon polyps    Constipation due to outlet dysfunction    Anemia    Type 2 diabetes mellitus with stage 3 chronic kidney disease, without long-term current use of insulin    Atherosclerosis of aorta    Nocturia    Postmenopausal    Chronic sinusitis    Hearing loss    Long term current use of opiate analgesic    EKG, abnormal    Persistent atrial fibrillation    Chronic pain syndrome    Atrial fibrillation with RVR    History of cardioversion    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, without long-term current use of insulin    Nonexudative age-related macular degeneration, bilateral, intermediate dry stage    Chronic GERD    Tachycardia    Shortness of breath    Upper respiratory infection    Acute on chronic systolic heart failure         Anesthesia Evaluation         Review of Systems      Physical Exam  General:  Well nourished    Airway/Jaw/Neck:  Airway Findings: Mouth Opening: Normal Tongue: Normal  General Airway Assessment: Adult  Mallampati: II  Improves to II with phonation.  TM Distance: Normal, at least 6 cm      Dental:  Dental Findings: In tact   Chest/Lungs:  Chest/Lungs Findings: Clear to auscultation     Heart/Vascular:  Heart Findings: Rate: Normal  Rhythm: Regular Rhythm  Sounds: Normal        Mental Status:  Mental Status Findings:  Cooperative, Alert and Oriented         Anesthesia  Plan  Type of Anesthesia, risks & benefits discussed:  Anesthesia Type:  general  Patient's Preference: General  Intra-op Monitoring Plan: standard ASA monitors  Intra-op Monitoring Plan Comments: Standard ASA monitors.   Post Op Pain Control Plan: per primary service following discharge from PACU  Post Op Pain Control Plan Comments: Per primary service.     Induction:   IV  Beta Blocker:  Patient is not currently on a Beta-Blocker (No further documentation required).       Informed Consent: Patient understands risks and agrees with Anesthesia plan.  Questions answered. Anesthesia consent signed with patient.  ASA Score: 4     Day of Surgery Review of History & Physical:    H&P update referred to the surgeon.     Anesthesia Plan Notes: Chart reviewed, patient interviewed and examined.  The plan for general anesthesia was explained.  Questions were answered and the consent was signed.  Dane FRAIRE         Ready For Surgery From Anesthesia Perspective.

## 2017-12-20 NOTE — NURSING TRANSFER
Nursing Transfer Note      12/20/2017     Transfer 328    Transfer via stretcher    Transfer with cardiac monitoring - verified pt on centralized telemetry with Brittany, tele tech    Transported by RN    Medicines sent: silver sulfadiazine    Chart send with patient: Yes    Notified: daughter    Patient reassessed at: 12/20/17 at 1300    Report given to EROS Astudillo. Pt awake and alert. VSS. No distress noted. Pt denies pain. Post procedure EKG completed and placed in chart.

## 2017-12-20 NOTE — PROGRESS NOTES
Pt. Remains free from falls/ injury/trauma. No complaints of CP, SOB, or discomfort. NPO since midnight for REJI/ Cardioversion. PRN pain medication given.  Plan of Care reviewed with patient. VSS and NADN. Will continue to monitor.

## 2017-12-20 NOTE — PROGRESS NOTES
[facility] Hospital Medicine  Progress Note    Patient Name: Dacia Martínez  MRN: [id number]  Patient Class: IP- Inpatient   Admission Date: 12/14/2017  Length of Stay: 4 days  Attending Physician: Rubi Dotson MD  Primary Care Provider: Cali Vickers MD    [facility] Medicine Team: [facility] HOSP MED D Rubi Dotson MD    Subjective:     Principal Problem:Acute systolic heart failure    HPI:  Ms. Martínez is a 82F h/o a fib, CKD, HTN, CAD presenting initially with URI symptoms and dyspnea. Pt reports has had recent worsening of LE swelling, mild dyspnea, 1-2 pillow orthopnea, no PND over last few weeks however developed URI symptoms in last few days which is what brought her to Ed with congestion, mild cough. While in ED pulse noted to be >150 and noted patient in a fib with RVR, patient reports some palpitations with this episode. Pt denies F/C/N/V.     Hospital Course:  No notes on file    Interval History: Patient with no events overnight, no complaints with exception of persistent chronic pruritis.  Data Review: Results recorded below were reviewed 12/19/2017.    Review of Systems   Constitutional: Negative for fever.   Respiratory: Negative for shortness of breath.    Cardiovascular: Negative for chest pain.     Objective:     Vital Signs (Most Recent):  Temp: 98 °F (36.7 °C) (12/19/17 1605)  Pulse: 78 (12/19/17 1702)  Resp: 18 (12/19/17 1605)  BP: 139/73 (12/19/17 1702)  SpO2: 98 % (12/19/17 1605) Vital Signs (24h Range):  Temp:  [97.4 °F (36.3 °C)-98.1 °F (36.7 °C)] 98 °F (36.7 °C)  Pulse:  [] 78  Resp:  [16-20] 18  SpO2:  [94 %-99 %] 98 %  BP: ()/(53-83) 139/73     Weight: 60.4 kg (133 lb 2.5 oz)  Body mass index is 25.16 kg/m².    Intake/Output Summary (Last 24 hours) at 12/19/17 1907  Last data filed at 12/19/17 1300   Gross per 24 hour   Intake              600 ml   Output             1025 ml   Net             -425 ml      Physical Exam   Constitutional: She appears  well-developed.   HENT:   Head: Normocephalic.   Mouth/Throat: Mucous membranes are not cyanotic.   Eyes: Conjunctivae and lids are normal.   Neck: Neck supple.   Cardiovascular: Normal rate, S1 normal and S2 normal.  An irregularly irregular rhythm present.   Pulmonary/Chest: Effort normal and breath sounds normal.   Abdominal: Soft. Bowel sounds are normal. There is no tenderness.   Musculoskeletal: She exhibits edema.   Neurological: She is alert. She is not disoriented.   Skin: She is not diaphoretic. There is erythema (posterior left shoulder). No cyanosis. No pallor. Nails show no clubbing.   Psychiatric: She has a normal mood and affect.       Significant Labs:   A1C:   Recent Labs  Lab 12/15/17  0409   HGBA1C 6.4*     CBC:   Recent Labs  Lab 12/18/17  0508 12/19/17  0430   WBC 8.54 7.83   HGB 14.1 15.3   HCT 44.9 48.7*    150     CMP:   Recent Labs  Lab 12/18/17  0508 12/19/17  0430    138   K 3.8 4.9   CL 99 95   CO2 29 29   GLU 96 94   BUN 22 24*   CREATININE 1.4 1.4   CALCIUM 8.4* 8.6*   ALBUMIN 2.7* 3.0*   ANIONGAP 10 14   EGFRNONAA 35.3* 35.3*     Magnesium:   Recent Labs  Lab 12/18/17  0508 12/19/17  0430   MG 1.3* 2.7*     POCT Glucose:   Recent Labs  Lab 12/19/17  0852 12/19/17  1257 12/19/17  1823   POCTGLUCOSE 121* 140* 118*     Assessment/Plan:      Current Hospital Problem List:    Active Hospital Problems    Diagnosis  POA    *Acute systolic heart failure [I50.21]  Yes     Priority: 1 - High    Atrial fibrillation with RVR [I48.91]  Yes     Priority: 2     Essential hypertension [I10]  Yes     Priority: 3     Type 2 diabetes mellitus with stage 3 chronic kidney disease, without long-term current use of insulin [E11.22, N18.3]  Yes     Priority: 4     Shortness of breath [R06.02]  Yes    Upper respiratory infection [J06.9]  Yes     Patient with sx of URI, congestion, mild cough, no fever or leukocytosis on CBC  -PRN guifenasin        Resolved Hospital Problems    Diagnosis Date  Resolved POA   No resolved problems to display.       Ordered Medications for management of current problems:    apixaban  5 mg Oral BID    digoxin  0.125 mg Oral Daily    furosemide  80 mg Intravenous BID    isosorbide-hydrALAZINE 20-37.5 mg  1 tablet Oral TID    metoprolol tartrate  50 mg Oral BID       Risk  Patient is currently on drug therapy requiring intensive monitoring for toxicity: Digoxin    Anticipated Disposition: Home-Health Care Svc    Assessment and Plan by Problem:    * Acute systolic heart failure    Cardiology consulted.  Furosemide 40 mg IV BID changed to furosemide 10 gtt/hr until 12/19/2017  EF 60%-->10% likely tachycardia related.  May need ischemic work up.        Atrial fibrillation with RVR    Patient with persistent A-fib, on Eliquis, presented in A-fib with RVR, s/p diltiazem with initial improvement in HR.  -Metoprolol 100 BID  Changed metoprolol 50 mg BID  Loaded digoxin as per Cardiology recommendations, then started digoxin 0.125 mg daily. Held due to elevated level then restarted digoxin 0.125 mg daily with recheck level in three days.  -Cont Eliquis  -Plan for DCCV 12/20/17.        Essential hypertension    Stable, continue to monitor        Type 2 diabetes mellitus with stage 3 chronic kidney disease, without long-term current use of insulin    HgbA1c 6.4%  Not on active treatment including diet or medication at home  Will continue diabetic diet for now.        Shortness of breath    Patient presenting with mild dyspnea, orthopnea, LE edema, no PND, concern for heart failure, s/p episode A-fib with RVR in ED. Patient with reported CAD history. EKG without acute changes.   -Diuresis with IV Lasix   -Strict I/O  -SOB likely heart failure related. No leukocytosis or fever.   - Trial of Tessalon perles prn and guaifenesin/dextromethorphan prn             VTE Risk Mitigation         Ordered     apixaban tablet 5 mg  2 times daily     Route:  Oral        12/15/17 0030     Medium  Risk of VTE  Once      12/15/17 0030              Rubi Dotson MD  Department of Hospital Medicine   [facility]

## 2017-12-20 NOTE — ASSESSMENT & PLAN NOTE
Acute  combined  - NYHA class IV   - New depressed EF could tachycardia related cardiomopathy  - Transition to PO diuretics  - Continue BB  - Continue Bidil  - Start low dose ACE-I prior to discharge  - strict ins and outs  - daily weights  - fluid restriction  - CHF education

## 2017-12-20 NOTE — PT/OT/SLP PROGRESS
Physical Therapy      Patient Name:  Dacia Martínez   MRN:  [id number]    Patient not seen today secondary to pt off the floor for REJI on 1st attempt, and drowsy this PM following procedure on 2nd attempt. Pt and pt's family requesting that pt rest this PM. Will follow up at next scheduled visit.     Sofia Macedo, PT, DPT   12/20/2017  Pager: [phone]

## 2017-12-20 NOTE — TRANSFER OF CARE
"Anesthesia Transfer of Care Note    Patient: Dacia Martínez    Procedure(s) Performed: Procedure(s) (LRB):  TRANSESOPHAGEAL ECHOCARDIOGRAM (REJI) (N/A)    Patient location: PACU    Anesthesia Type: general    Transport from OR: Transported from OR on 2-3 L/min O2 by NC with adequate spontaneous ventilation    Post pain: adequate analgesia    Post assessment: no apparent anesthetic complications and tolerated procedure well    Post vital signs: stable    Level of consciousness: awake, oriented and alert    Nausea/Vomiting: no nausea/vomiting    Complications: none    Transfer of care protocol was followed      Last vitals:   Visit Vitals  /69 (BP Location: Right arm, Patient Position: Lying)   Pulse 75   Temp 36.9 °C (98.4 °F) (Oral)   Resp 16   Ht 5' 1" (1.549 m)   Wt 59.7 kg (131 lb 9.8 oz)   LMP  (LMP Unknown)   SpO2 99%   Breastfeeding? No   BMI 24.87 kg/m²     "

## 2017-12-20 NOTE — CONSULTS
TRANSESOPHAGEAL ECHOCARDIOGRAPHY   PRE-PROCEDURE NOTE    12/20/2017    HPI:     Dacia Martínez is a 82 y.o. who is referred for REJI/DCCV. She has a history of acute systolic heart failure and was admitted 12/14, found to be in afib RVR. 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 She is on eliquis for AC.    Dysphagia or odynophagia:  NO  Liver Disease, esophageal disease, or known varices:  NO  Upper GI Bleeding: NO  Snoring:  YES  +1  Sleep Apnea:  NO  Prior neck surgery or radiation:  NO  Able to move neck in all directions:  YES  +1  History of anesthetic difficulties:  NO  Family history of anesthetic difficulties:  NO  Last oral intake:  >12hr    Mallampati Class:  3  ASA Score:  3      Meds:     Scheduled Meds:   apixaban  5 mg Oral BID    digoxin  0.125 mg Oral Daily    furosemide  80 mg Intravenous BID    isosorbide-hydrALAZINE 20-37.5 mg  1 tablet Oral TID    metoprolol tartrate  50 mg Oral BID     PRN Meds:acetaminophen, benzonatate, dextromethorphan-guaifenesin  mg, dextrose 50%, dextrose 50%, diphenhydrAMINE-zinc acetate 1-0.1%, glucagon (human recombinant), glucose, glucose, guaifenesin 100 mg/5 ml, hydrocodone-acetaminophen 5-325mg, hydrOXYzine HCl, insulin aspart, sodium chloride 0.9%  Continuous Infusions:    Physical Exam:     Vitals:  Temp:  [97.6 °F (36.4 °C)-98.4 °F (36.9 °C)]   Pulse:  []   Resp:  [18]   BP: ()/(59-95)   SpO2:  [95 %-98 %]        Constitutional:  NAD, conversant  HEENT:   Sclera anicteric, Uvula midline, EOMI, OP clear  Neck:   No JVD, moves to all direction without any limitations  CV:   tachy, no murmurs / rubs / gallops, normal S1/S2  Pulm:   CTAB, no wheezes, rales, or ronchi  GI:   Abdomen soft, NTND, +BS  Extremities:  No LE edema, warm with palpable pulses  Neuro:   AAOX3, no focal motor deficits      Labs:     Recent Results (from the past 336 hour(s))   CBC with Automated Differential    Collection Time:  12/19/17  4:30 AM   Result Value Ref Range    WBC 7.83 3.90 - 12.70 K/uL    Hemoglobin 15.3 12.0 - 16.0 g/dL    Hematocrit 48.7 (H) 37.0 - 48.5 %    Platelets 150 150 - 350 K/uL   CBC with Automated Differential    Collection Time: 12/18/17  5:08 AM   Result Value Ref Range    WBC 8.54 3.90 - 12.70 K/uL    Hemoglobin 14.1 12.0 - 16.0 g/dL    Hematocrit 44.9 37.0 - 48.5 %    Platelets 272 150 - 350 K/uL   CBC with Automated Differential    Collection Time: 12/17/17  4:18 AM   Result Value Ref Range    WBC 7.31 3.90 - 12.70 K/uL    Hemoglobin 14.0 12.0 - 16.0 g/dL    Hematocrit 44.1 37.0 - 48.5 %    Platelets 274 150 - 350 K/uL       Recent Results (from the past 336 hour(s))   Basic Metabolic Panel (BMP)    Collection Time: 12/15/17  4:09 AM   Result Value Ref Range    Sodium 141 136 - 145 mmol/L    Potassium 3.0 (L) 3.5 - 5.1 mmol/L    Chloride 106 95 - 110 mmol/L    CO2 22 (L) 23 - 29 mmol/L    BUN, Bld 19 8 - 23 mg/dL    Creatinine 1.3 0.5 - 1.4 mg/dL    Calcium 7.9 (L) 8.7 - 10.5 mg/dL    Anion Gap 13 8 - 16 mmol/L       Estimated Creatinine Clearance: 26.3 mL/min (based on SCr of 1.4 mg/dL).    Imaging:  TTE 12/15/17  CONCLUSIONS     1 - Concentric hypertrophy.     2 - Severely depressed left ventricular systolic function (EF 10-15%).     3 - Right ventricular enlargement with moderately depressed systolic function.     4 - Biatrial enlargement.     5 - Moderate tricuspid regurgitation.     6 - Moderate aortic stenosis, ROBBIE = 0.92 cm2, peak velocity = 1.5 m/s, mean gradient = 5 mmHg (this may be pseudostenosis with low cardiac output).     EKG:   Date: 12/14/17 Afib RVR    Assessment & Plan:     PLAN:  1. REJI for evaluation of thrombus prior to DCCV    -The risks, benefits & alternatives of the procedure were explained to the patient.    -The risks of transesophageal echo include but are not limited to:  Dental trauma, esophageal trauma/perforation, bleeding, laryngospasm/brochospasm, aspiration, sore  throat/hoarseness, & dislodgement of the endotracheal tube/nasogastric tube (where applicable).    -The risks of moderate sedation include hypotension, respiratory depression, arrhythmias, bronchospasm, & death.    -Informed consent was obtained & the patient is agreeable to proceed with the procedure.    I will discuss with the attending physician. Attending addendum is to follow.    Signed:  Robert Lopez DO  Cardiology Fellow    12/20/2017 7:43 AM    Attending Addendum:

## 2017-12-20 NOTE — PROGRESS NOTES
[facility]  Cardiology  Progress Note    Patient Name: Dacia Martínez  MRN: [id number]  Admission Date: 12/14/2017  Hospital Length of Stay: 5 days  Code Status: Full Code   Attending Physician: Rubi Dotson MD   Primary Care Physician: Cali Vickers MD  Expected Discharge Date: 12/22/2017  Principal Problem:Acute on chronic systolic heart failure    Subjective:     Hospital Course:   No notes on file    Interval History: Transitioned to IV push diuretics yesterday, I/Os matched over the past 24 hours, negative 7 L since admission, no weight check this morning.  Feels well - planning on REJI/DCCV today with amio load after.    Review of Systems   All other systems reviewed and are negative.    Objective:     Vital Signs (Most Recent):  Temp: 97.9 °F (36.6 °C) (12/20/17 1350)  Pulse: 92 (12/20/17 1400)  Resp: 16 (12/20/17 1350)  BP: 113/64 (12/20/17 1350)  SpO2: 98 % (12/20/17 1350) Vital Signs (24h Range):  Temp:  [97.5 °F (36.4 °C)-98.4 °F (36.9 °C)] 97.9 °F (36.6 °C)  Pulse:  [] 92  Resp:  [16-25] 16  SpO2:  [95 %-100 %] 98 %  BP: ()/(48-95) 113/64     Weight: 59.7 kg (131 lb 9.8 oz)  Body mass index is 24.87 kg/m².     SpO2: 98 %  O2 Device (Oxygen Therapy): room air      Intake/Output Summary (Last 24 hours) at 12/20/17 1446  Last data filed at 12/20/17 1212   Gross per 24 hour   Intake              290 ml   Output                0 ml   Net              290 ml     Lines/Drains/Airways     Peripheral Intravenous Line                 Peripheral IV - Single Lumen 12/16/17 1425 Right Forearm 4 days         Peripheral IV - Single Lumen 12/18/17 1542 Right Antecubital 1 day                Physical Exam    General: alert, awake and oriented x 3  Eyes:PERRL.   Neck:No appreciable JVD  Lungs:  clear to auscultation bilaterally and normal respiratory effort  Cardiovascular: Heart: Irregular rate and rhythm, S1, S2 normal, no murmur, click, rub or gallop.   Chest Wall: no tenderness.    Extremities: no cyanosis 1+ edema.   Pulses: 2+ and symmetric.      Significant Labs:   CMP     Recent Labs  Lab 12/19/17  0430 12/20/17  0512    136   K 4.9 4.2   CL 95 94*   CO2 29 29   GLU 94 102   BUN 24* 24*   CREATININE 1.4 1.4   CALCIUM 8.6* 8.9   ALBUMIN 3.0* 2.9*   ANIONGAP 14 13   ESTGFRAFRICA 40.6* 40.6*   EGFRNONAA 35.3* 35.3*   , CBC     Recent Labs  Lab 12/19/17  0430 12/20/17  0512   WBC 7.83 9.00   HGB 15.3 14.9   HCT 48.7* 45.8    231    and INR No results for input(s): INR, PROTIME in the last 48 hours.    Significant Imaging: Echocardiogram:   2D echo with color flow doppler:   Results for orders placed or performed during the hospital encounter of 12/14/17   2D echo with color flow doppler   Result Value Ref Range    EF 10 (A) 55 - 65    Aortic Valve Stenosis MODERATE (A)     Est. PA Systolic Pressure 68.88 (A)     Mitral Valve Mobility NORMAL     Tricuspid Valve Regurgitation MODERATE (A)      Assessment and Plan:     Brief HPI: 82 y.o. Lady with AF presents with new onset cardiomyopathy and AF.    * Acute on chronic systolic heart failure      Acute  combined  - NYHA class IV   - New depressed EF could tachycardia related cardiomopathy  - Transition to PO diuretics  - Continue BB  - Continue Bidil  - Start low dose ACE-I prior to discharge  - strict ins and outs  - daily weights  - fluid restriction  - CHF education          Atrial fibrillation with RVR    - Persistent  - [id number]  - anticoagulation with Eliquis  - Eval heart rate post cardioversion - consider stopping digoxin in the setting of amiodarone load.  - Consider ischemic evaluation after sinus rhythm maintained, can be done as an outpatient.            VTE Risk Mitigation         Ordered     apixaban tablet 5 mg  2 times daily     Route:  Oral        12/15/17 0030     Medium Risk of VTE  Once      12/15/17 0030          Migue Arenas MD  Cardiology  [facility]

## 2017-12-21 LAB
ALBUMIN SERPL BCP-MCNC: 2.7 G/DL
ANION GAP SERPL CALC-SCNC: 11 MMOL/L
ANION GAP SERPL CALC-SCNC: 15 MMOL/L
BASOPHILS # BLD AUTO: 0.06 K/UL
BASOPHILS NFR BLD: 0.7 %
BNP SERPL-MCNC: 820 PG/ML
BUN SERPL-MCNC: 24 MG/DL
BUN SERPL-MCNC: 28 MG/DL
CALCIUM SERPL-MCNC: 9.1 MG/DL
CALCIUM SERPL-MCNC: 9.3 MG/DL
CHLORIDE SERPL-SCNC: 91 MMOL/L
CHLORIDE SERPL-SCNC: 92 MMOL/L
CO2 SERPL-SCNC: 25 MMOL/L
CO2 SERPL-SCNC: 29 MMOL/L
CREAT SERPL-MCNC: 1.5 MG/DL
CREAT SERPL-MCNC: 1.8 MG/DL
DIFFERENTIAL METHOD: ABNORMAL
EOSINOPHIL # BLD AUTO: 0.1 K/UL
EOSINOPHIL NFR BLD: 1 %
ERYTHROCYTE [DISTWIDTH] IN BLOOD BY AUTOMATED COUNT: 19.3 %
EST. GFR  (AFRICAN AMERICAN): 30 ML/MIN/1.73 M^2
EST. GFR  (AFRICAN AMERICAN): 37.4 ML/MIN/1.73 M^2
EST. GFR  (NON AFRICAN AMERICAN): 26 ML/MIN/1.73 M^2
EST. GFR  (NON AFRICAN AMERICAN): 32.4 ML/MIN/1.73 M^2
GLUCOSE SERPL-MCNC: 107 MG/DL
GLUCOSE SERPL-MCNC: 136 MG/DL
HCT VFR BLD AUTO: 47.8 %
HGB BLD-MCNC: 15 G/DL
IMM GRANULOCYTES # BLD AUTO: 0.03 K/UL
IMM GRANULOCYTES NFR BLD AUTO: 0.3 %
LYMPHOCYTES # BLD AUTO: 1.7 K/UL
LYMPHOCYTES NFR BLD: 18.7 %
MAGNESIUM SERPL-MCNC: 1.7 MG/DL
MAGNESIUM SERPL-MCNC: 1.7 MG/DL
MCH RBC QN AUTO: 25.9 PG
MCHC RBC AUTO-ENTMCNC: 31.4 G/DL
MCV RBC AUTO: 82 FL
MONOCYTES # BLD AUTO: 1 K/UL
MONOCYTES NFR BLD: 11.2 %
NEUTROPHILS # BLD AUTO: 6.2 K/UL
NEUTROPHILS NFR BLD: 68.1 %
NRBC BLD-RTO: 0 /100 WBC
PHOSPHATE SERPL-MCNC: 3.7 MG/DL
PLATELET # BLD AUTO: 228 K/UL
PMV BLD AUTO: 11.5 FL
POCT GLUCOSE: 110 MG/DL (ref 70–110)
POCT GLUCOSE: 128 MG/DL (ref 70–110)
POTASSIUM SERPL-SCNC: 4 MMOL/L
POTASSIUM SERPL-SCNC: 4.3 MMOL/L
POTASSIUM SERPL-SCNC: 4.4 MMOL/L
RBC # BLD AUTO: 5.8 M/UL
SODIUM SERPL-SCNC: 131 MMOL/L
SODIUM SERPL-SCNC: 132 MMOL/L
WBC # BLD AUTO: 9.05 K/UL

## 2017-12-21 PROCEDURE — 25000003 PHARM REV CODE 250: Performed by: INTERNAL MEDICINE

## 2017-12-21 PROCEDURE — 83735 ASSAY OF MAGNESIUM: CPT

## 2017-12-21 PROCEDURE — 99231 SBSQ HOSP IP/OBS SF/LOW 25: CPT | Mod: ,,, | Performed by: INTERNAL MEDICINE

## 2017-12-21 PROCEDURE — 97530 THERAPEUTIC ACTIVITIES: CPT

## 2017-12-21 PROCEDURE — 80069 RENAL FUNCTION PANEL: CPT

## 2017-12-21 PROCEDURE — 25000003 PHARM REV CODE 250: Performed by: PHYSICIAN ASSISTANT

## 2017-12-21 PROCEDURE — 63600175 PHARM REV CODE 636 W HCPCS: Performed by: INTERNAL MEDICINE

## 2017-12-21 PROCEDURE — 20600001 HC STEP DOWN PRIVATE ROOM

## 2017-12-21 PROCEDURE — 99232 SBSQ HOSP IP/OBS MODERATE 35: CPT | Mod: GC,,, | Performed by: INTERNAL MEDICINE

## 2017-12-21 PROCEDURE — 83735 ASSAY OF MAGNESIUM: CPT | Mod: 91

## 2017-12-21 PROCEDURE — 83880 ASSAY OF NATRIURETIC PEPTIDE: CPT

## 2017-12-21 PROCEDURE — 80048 BASIC METABOLIC PNL TOTAL CA: CPT

## 2017-12-21 PROCEDURE — 36415 COLL VENOUS BLD VENIPUNCTURE: CPT

## 2017-12-21 PROCEDURE — 85025 COMPLETE CBC W/AUTO DIFF WBC: CPT

## 2017-12-21 PROCEDURE — 84132 ASSAY OF SERUM POTASSIUM: CPT

## 2017-12-21 RX ORDER — FUROSEMIDE 10 MG/ML
80 INJECTION INTRAMUSCULAR; INTRAVENOUS 3 TIMES DAILY
Status: DISCONTINUED | OUTPATIENT
Start: 2017-12-21 | End: 2017-12-21

## 2017-12-21 RX ORDER — LANOLIN ALCOHOL/MO/W.PET/CERES
400 CREAM (GRAM) TOPICAL ONCE
Status: COMPLETED | OUTPATIENT
Start: 2017-12-21 | End: 2017-12-21

## 2017-12-21 RX ORDER — AMIODARONE HYDROCHLORIDE 200 MG/1
200 TABLET ORAL 2 TIMES DAILY
Status: DISCONTINUED | OUTPATIENT
Start: 2017-12-21 | End: 2017-12-23 | Stop reason: HOSPADM

## 2017-12-21 RX ORDER — MAGNESIUM SULFATE HEPTAHYDRATE 40 MG/ML
2 INJECTION, SOLUTION INTRAVENOUS ONCE
Status: COMPLETED | OUTPATIENT
Start: 2017-12-21 | End: 2017-12-21

## 2017-12-21 RX ADMIN — MAGNESIUM SULFATE IN WATER 2 G: 40 INJECTION, SOLUTION INTRAVENOUS at 09:12

## 2017-12-21 RX ADMIN — FUROSEMIDE 80 MG: 10 INJECTION, SOLUTION INTRAMUSCULAR; INTRAVENOUS at 06:12

## 2017-12-21 RX ADMIN — MAGNESIUM OXIDE TAB 400 MG (241.3 MG ELEMENTAL MG) 400 MG: 400 (241.3 MG) TAB at 02:12

## 2017-12-21 RX ADMIN — METOPROLOL TARTRATE 50 MG: 25 TABLET ORAL at 08:12

## 2017-12-21 RX ADMIN — FUROSEMIDE 80 MG: 10 INJECTION, SOLUTION INTRAMUSCULAR; INTRAVENOUS at 09:12

## 2017-12-21 RX ADMIN — METOPROLOL TARTRATE 50 MG: 25 TABLET ORAL at 09:12

## 2017-12-21 RX ADMIN — HYDRALAZINE HYDROCHLORIDE AND ISOSORBIDE DINITRATE 1 TABLET: 37.5; 2 TABLET, FILM COATED ORAL at 03:12

## 2017-12-21 RX ADMIN — HYDROCODONE BITARTRATE AND ACETAMINOPHEN 1 TABLET: 5; 325 TABLET ORAL at 08:12

## 2017-12-21 RX ADMIN — AMIODARONE HYDROCHLORIDE 200 MG: 200 TABLET ORAL at 03:12

## 2017-12-21 RX ADMIN — APIXABAN 5 MG: 5 TABLET, FILM COATED ORAL at 09:12

## 2017-12-21 RX ADMIN — BENZONATATE 100 MG: 100 CAPSULE ORAL at 03:12

## 2017-12-21 RX ADMIN — AMIODARONE HYDROCHLORIDE 100 MG: 100 TABLET ORAL at 09:12

## 2017-12-21 RX ADMIN — HYDRALAZINE HYDROCHLORIDE AND ISOSORBIDE DINITRATE 1 TABLET: 37.5; 2 TABLET, FILM COATED ORAL at 08:12

## 2017-12-21 RX ADMIN — APIXABAN 5 MG: 5 TABLET, FILM COATED ORAL at 08:12

## 2017-12-21 RX ADMIN — HYDRALAZINE HYDROCHLORIDE AND ISOSORBIDE DINITRATE 1 TABLET: 37.5; 2 TABLET, FILM COATED ORAL at 05:12

## 2017-12-21 RX ADMIN — AMIODARONE HYDROCHLORIDE 200 MG: 200 TABLET ORAL at 08:12

## 2017-12-21 RX ADMIN — BENZONATATE 100 MG: 100 CAPSULE ORAL at 08:12

## 2017-12-21 RX ADMIN — ACETAMINOPHEN 500 MG: 500 TABLET ORAL at 09:12

## 2017-12-21 NOTE — CONSULTS
Wound care consult received for right groin irritation.  Pt presents with no skin breakdown and no rash to right or left groin at this time.  Pt daughter reports there was skin breakdown yesterday which was relieved with [employer] applications. Recommend continuing with [employer] for a few more days to prevent skin breakdown.  Nurse Wilda notified.

## 2017-12-21 NOTE — SUBJECTIVE & OBJECTIVE
Interval History: Patient with no events overnight, no complaints. Had REJI/CV.  Data Review: Results recorded below were reviewed 12/20/2017.    Review of Systems   Constitutional: Negative for fever.   Respiratory: Negative for shortness of breath.    Cardiovascular: Negative for chest pain.     Objective:     Vital Signs (Most Recent):  Temp: 98.4 °F (36.9 °C) (12/20/17 1630)  Pulse: 109 (12/20/17 1800)  Resp: 20 (12/20/17 1630)  BP: 121/62 (12/20/17 1630)  SpO2: 96 % (12/20/17 1630) Vital Signs (24h Range):  Temp:  [97.5 °F (36.4 °C)-98.4 °F (36.9 °C)] 98.4 °F (36.9 °C)  Pulse:  [] 109  Resp:  [16-25] 20  SpO2:  [95 %-100 %] 96 %  BP: ()/(48-95) 121/62     Weight: 59.7 kg (131 lb 9.8 oz)  Body mass index is 24.87 kg/m².    Intake/Output Summary (Last 24 hours) at 12/20/17 1902  Last data filed at 12/20/17 1400   Gross per 24 hour   Intake              290 ml   Output              350 ml   Net              -60 ml      Physical Exam   Constitutional: She appears well-developed.   HENT:   Head: Normocephalic.   Mouth/Throat: Mucous membranes are not cyanotic.   Eyes: Conjunctivae and lids are normal.   Neck: Neck supple.   Cardiovascular: Normal rate, S1 normal and S2 normal.  An irregularly irregular rhythm present.   Pulmonary/Chest: Effort normal and breath sounds normal.   Abdominal: Soft. Bowel sounds are normal. There is no tenderness.   Musculoskeletal: She exhibits no edema.   Neurological: She is alert. She is not disoriented.   Skin: She is not diaphoretic. No cyanosis. No pallor. Nails show no clubbing.   Psychiatric: She has a normal mood and affect.       Significant Labs:   A1C:     Recent Labs  Lab 12/15/17  0409   HGBA1C 6.4*     CBC:     Recent Labs  Lab 12/19/17  0430 12/20/17  0512   WBC 7.83 9.00   HGB 15.3 14.9   HCT 48.7* 45.8    231     CMP:     Recent Labs  Lab 12/19/17  0430 12/20/17  0512    136   K 4.9 4.2   CL 95 94*   CO2 29 29   GLU 94 102   BUN 24* 24*    CREATININE 1.4 1.4   CALCIUM 8.6* 8.9   ALBUMIN 3.0* 2.9*   ANIONGAP 14 13   EGFRNONAA 35.3* 35.3*     Magnesium:     Recent Labs  Lab 12/19/17  0430 12/20/17  0512   MG 2.7* 2.1     POCT Glucose:     Recent Labs  Lab 12/19/17  1257 12/19/17  1823 12/20/17  1210   POCTGLUCOSE 140* 118* 129*

## 2017-12-21 NOTE — PROGRESS NOTES
[facility]  Cardiology  Progress Note    Patient Name: Dacia Martínez  MRN: [id number]  Admission Date: 12/14/2017  Hospital Length of Stay: 6 days  Code Status: Full Code   Attending Physician: Rubi Dotson MD   Primary Care Physician: Cali Vickers MD  Expected Discharge Date: 12/23/2017  Principal Problem:Acute on chronic systolic heart failure    Subjective:     Hospital Course:   No notes on file    Interval History: complains of feeling bad, Denies any SOB, cough has improved, denies any palpitations.    ROS  Objective:     Vital Signs (Most Recent):  Temp: 98.5 °F (36.9 °C) (12/21/17 0909)  Pulse: 80 (12/21/17 0909)  Resp: 16 (12/21/17 0909)  BP: 131/63 (12/21/17 0909)  SpO2: 98 % (12/21/17 0909) Vital Signs (24h Range):  Temp:  [97.4 °F (36.3 °C)-98.7 °F (37.1 °C)] 98.5 °F (36.9 °C)  Pulse:  [] 80  Resp:  [16-25] 16  SpO2:  [91 %-100 %] 98 %  BP: ()/(48-86) 131/63     Weight: 58.6 kg (129 lb 3 oz)  Body mass index is 24.41 kg/m².     SpO2: 98 %  O2 Device (Oxygen Therapy): room air      Intake/Output Summary (Last 24 hours) at 12/21/17 0913  Last data filed at 12/21/17 0400   Gross per 24 hour   Intake              380 ml   Output              725 ml   Net             -345 ml       Lines/Drains/Airways     Peripheral Intravenous Line                 Peripheral IV - Single Lumen 12/16/17 1425 Right Forearm 4 days                Physical Exam   General: alert, awake and oriented x 3  Eyes:PERRL.   Neck:+HJR  Lungs:  clear to auscultation bilaterally and normal respiratory effort  Cardiovascular: Heart: Irregular rate and rhythm, S1, S2 normal, no murmur, click, rub or gallop.   Chest Wall: no tenderness.   Extremities: no cyanosis 1+ edema.   Pulses: 2+ and symmetric.       Significant Labs:   CMP   Recent Labs  Lab 12/20/17  0512 12/21/17  0043 12/21/17  0432     --  132*   K 4.2 4.0 4.3   CL 94*  --  92*   CO2 29  --  25     --  107   BUN 24*  --  24*   CREATININE  1.4  --  1.5*   CALCIUM 8.9  --  9.3   ALBUMIN 2.9*  --  2.7*   ANIONGAP 13  --  15   ESTGFRAFRICA 40.6*  --  37.4*   EGFRNONAA 35.3*  --  32.4*    and CBC   Recent Labs  Lab 12/20/17  0512 12/21/17  0432   WBC 9.00 9.05   HGB 14.9 15.0   HCT 45.8 47.8    228       Significant Imaging: Echocardiogram:   2D echo with color flow doppler:   Results for orders placed or performed during the hospital encounter of 12/14/17   2D echo with color flow doppler   Result Value Ref Range    EF 10 (A) 55 - 65    Aortic Valve Stenosis MODERATE (A)     Est. PA Systolic Pressure 68.88 (A)     Mitral Valve Mobility NORMAL     Tricuspid Valve Regurgitation MODERATE (A)      Assessment and Plan:         * Acute on chronic systolic heart failure      Acute  combined  - NYHA class IV   - New depressed EF could tachycardia related cardiomopathy  - Check BNP to assess fluid status better  - Continue BB  - Continue Bidil  - Start low dose ACE-I prior to discharge-recommend lisinopril 2.5 mg po QD  - strict ins and outs  - daily weights  - fluid restriction  - CHF education          Atrial fibrillation with RVR    - Persistent  - [id number]  - anticoagulation with Eliquis  - S/p REJI and CV -went back into atrial fibrillation last night  -Will load with amiodarone and follow up with EP as outpatient   - Consider ischemic evaluation after sinus rhythm maintained, can be done as an outpatient.            VTE Risk Mitigation         Ordered     apixaban tablet 5 mg  2 times daily     Route:  Oral        12/15/17 0030     Medium Risk of VTE  Once      12/15/17 0030          Macario Moon MD  Cardiology  [facility]

## 2017-12-21 NOTE — ASSESSMENT & PLAN NOTE
- Persistent  - [id number]  - anticoagulation with Eliquis  - S/p REJI and CV -went back into atrial fibrillation last night  -Will load with amiodarone and follow up with EP as outpatient   - Consider ischemic evaluation after sinus rhythm maintained, can be done as an outpatient.

## 2017-12-21 NOTE — SUBJECTIVE & OBJECTIVE
Interval History: Patient with no events overnight, no complaints.  Data Review: Results recorded below were reviewed 12/21/2017.    Review of Systems   Constitutional: Negative for fever.   Respiratory: Negative for shortness of breath.    Cardiovascular: Negative for chest pain.     Objective:     Vital Signs (Most Recent):  Temp: 98 °F (36.7 °C) (12/21/17 1500)  Pulse: 93 (12/21/17 1533)  Resp: 16 (12/21/17 1500)  BP: 134/61 (12/21/17 1500)  SpO2: 95 % (12/21/17 1500) Vital Signs (24h Range):  Temp:  [97.2 °F (36.2 °C)-98.7 °F (37.1 °C)] 98 °F (36.7 °C)  Pulse:  [] 93  Resp:  [16-18] 16  SpO2:  [91 %-98 %] 95 %  BP: ()/(51-78) 134/61     Weight: 58.6 kg (129 lb 3 oz)  Body mass index is 24.41 kg/m².    Intake/Output Summary (Last 24 hours) at 12/21/17 1749  Last data filed at 12/21/17 1400   Gross per 24 hour   Intake              570 ml   Output              375 ml   Net              195 ml      Physical Exam   Constitutional: She appears well-developed.   HENT:   Head: Normocephalic.   Mouth/Throat: Mucous membranes are not cyanotic.   Eyes: Conjunctivae and lids are normal.   Neck: Neck supple.   Cardiovascular: Normal rate, S1 normal and S2 normal.  An irregularly irregular rhythm present.   Pulmonary/Chest: Effort normal and breath sounds normal.   Abdominal: Soft. Bowel sounds are normal. There is no tenderness.   Musculoskeletal: She exhibits edema.   Neurological: She is alert. She is not disoriented.   Skin: She is not diaphoretic. No cyanosis. No pallor. Nails show no clubbing.   Psychiatric: She has a normal mood and affect.       Significant Labs:   A1C:     Recent Labs  Lab 12/15/17  0409   HGBA1C 6.4*     CBC:     Recent Labs  Lab 12/20/17  0512 12/21/17  0432   WBC 9.00 9.05   HGB 14.9 15.0   HCT 45.8 47.8    228     CMP:     Recent Labs  Lab 12/20/17  0512 12/21/17  0043 12/21/17  0432 12/21/17  1536     --  132* 131*   K 4.2 4.0 4.3 4.4   CL 94*  --  92* 91*   CO2 29  --   25 29     --  107 136*   BUN 24*  --  24* 28*   CREATININE 1.4  --  1.5* 1.8*   CALCIUM 8.9  --  9.3 9.1   ALBUMIN 2.9*  --  2.7*  --    ANIONGAP 13  --  15 11   EGFRNONAA 35.3*  --  32.4* 26.0*     Magnesium:     Recent Labs  Lab 12/20/17  0512 12/21/17  0043 12/21/17  0432   MG 2.1 1.7 1.7     POCT Glucose:     Recent Labs  Lab 12/20/17  1210 12/20/17  2341 12/21/17  1248   POCTGLUCOSE 129* 121* 110

## 2017-12-21 NOTE — ASSESSMENT & PLAN NOTE
Acute  combined  - NYHA class IV   - New depressed EF could tachycardia related cardiomopathy  - Check BNP to assess fluid status better  - Continue BB  - Continue Bidil  - Start low dose ACE-I prior to discharge-recommend lisinopril 2.5 mg po QD  - strict ins and outs  - daily weights  - fluid restriction  - CHF education

## 2017-12-21 NOTE — SUBJECTIVE & OBJECTIVE
Interval History: complains of feeling bad, Denies any SOB, cough has improved, denies any palpitations.    ROS  Objective:     Vital Signs (Most Recent):  Temp: 98.5 °F (36.9 °C) (12/21/17 0909)  Pulse: 80 (12/21/17 0909)  Resp: 16 (12/21/17 0909)  BP: 131/63 (12/21/17 0909)  SpO2: 98 % (12/21/17 0909) Vital Signs (24h Range):  Temp:  [97.4 °F (36.3 °C)-98.7 °F (37.1 °C)] 98.5 °F (36.9 °C)  Pulse:  [] 80  Resp:  [16-25] 16  SpO2:  [91 %-100 %] 98 %  BP: ()/(48-86) 131/63     Weight: 58.6 kg (129 lb 3 oz)  Body mass index is 24.41 kg/m².     SpO2: 98 %  O2 Device (Oxygen Therapy): room air      Intake/Output Summary (Last 24 hours) at 12/21/17 0913  Last data filed at 12/21/17 0400   Gross per 24 hour   Intake              380 ml   Output              725 ml   Net             -345 ml       Lines/Drains/Airways     Peripheral Intravenous Line                 Peripheral IV - Single Lumen 12/16/17 1425 Right Forearm 4 days                Physical Exam   General: alert, awake and oriented x 3  Eyes:PERRL.   Neck:+HJR  Lungs:  clear to auscultation bilaterally and normal respiratory effort  Cardiovascular: Heart: Irregular rate and rhythm, S1, S2 normal, no murmur, click, rub or gallop.   Chest Wall: no tenderness.   Extremities: no cyanosis 1+ edema.   Pulses: 2+ and symmetric.       Significant Labs:   CMP   Recent Labs  Lab 12/20/17  0512 12/21/17  0043 12/21/17  0432     --  132*   K 4.2 4.0 4.3   CL 94*  --  92*   CO2 29  --  25     --  107   BUN 24*  --  24*   CREATININE 1.4  --  1.5*   CALCIUM 8.9  --  9.3   ALBUMIN 2.9*  --  2.7*   ANIONGAP 13  --  15   ESTGFRAFRICA 40.6*  --  37.4*   EGFRNONAA 35.3*  --  32.4*    and CBC   Recent Labs  Lab 12/20/17  0512 12/21/17  0432   WBC 9.00 9.05   HGB 14.9 15.0   HCT 45.8 47.8    228       Significant Imaging: Echocardiogram:   2D echo with color flow doppler:   Results for orders placed or performed during the hospital encounter of 12/14/17    2D echo with color flow doppler   Result Value Ref Range    EF 10 (A) 55 - 65    Aortic Valve Stenosis MODERATE (A)     Est. PA Systolic Pressure 68.88 (A)     Mitral Valve Mobility NORMAL     Tricuspid Valve Regurgitation MODERATE (A)

## 2017-12-21 NOTE — PLAN OF CARE
Problem: Patient Care Overview  Goal: Plan of Care Review  Outcome: Revised  Plan of care discussed with patient. Pt in Afib, HR 80s- 100s. Patient receiving IVP lasix BID. Patient is free of fall/trauma/injury. Denies CP, SOB. PRN oxycodone given for hand pain. PRN tessalon pearls given for cough. All questions addressed. Will continue to monitor.

## 2017-12-21 NOTE — PROGRESS NOTES
Notified Dr Dotson that EKG that was ordered showed pt is back in A fib. HR 90's-low 100's. No new orders at this time. Will continue to monitor pt.

## 2017-12-21 NOTE — PROGRESS NOTES
[facility] Hospital Medicine  Progress Note    Patient Name: Dacia Martínez  MRN: [id number]  Patient Class: IP- Inpatient   Admission Date: 12/14/2017  Length of Stay: 5 days  Attending Physician: Rubi Dotson MD  Primary Care Provider: Cali Vickers MD    [facility] Medicine Team: [facility] [place] [facility]OSP MED D Rubi Dotson MD    Subjective:     Principal Problem:Acute on chronic systolic heart failure    HPI:  Ms. Martínez is a 82F h/o a fib, CKD, HTN, CAD presenting initially with URI symptoms and dyspnea. Pt reports has had recent worsening of LE swelling, mild dyspnea, 1-2 pillow orthopnea, no PND over last few weeks however developed URI symptoms in last few days which is what brought her to Ed with congestion, mild cough. While in ED pulse noted to be >150 and noted patient in a fib with RVR, patient reports some palpitations with this episode. Pt denies F/C/N/V.     Hospital Course:  No notes on file    Interval History: Patient with no events overnight, no complaints. Had REJI/CV.  Data Review: Results recorded below were reviewed 12/20/2017.    Review of Systems   Constitutional: Negative for fever.   Respiratory: Negative for shortness of breath.    Cardiovascular: Negative for chest pain.     Objective:     Vital Signs (Most Recent):  Temp: 98.4 °F (36.9 °C) (12/20/17 1630)  Pulse: 109 (12/20/17 1800)  Resp: 20 (12/20/17 1630)  BP: 121/62 (12/20/17 1630)  SpO2: 96 % (12/20/17 1630) Vital Signs (24h Range):  Temp:  [97.5 °F (36.4 °C)-98.4 °F (36.9 °C)] 98.4 °F (36.9 °C)  Pulse:  [] 109  Resp:  [16-25] 20  SpO2:  [95 %-100 %] 96 %  BP: ()/(48-95) 121/62     Weight: 59.7 kg (131 lb 9.8 oz)  Body mass index is 24.87 kg/m².    Intake/Output Summary (Last 24 hours) at 12/20/17 1902  Last data filed at 12/20/17 1400   Gross per 24 hour   Intake              290 ml   Output              350 ml   Net              -60 ml      Physical Exam   Constitutional: She appears well-developed.   HENT:    Head: Normocephalic.   Mouth/Throat: Mucous membranes are not cyanotic.   Eyes: Conjunctivae and lids are normal.   Neck: Neck supple.   Cardiovascular: Normal rate, S1 normal and S2 normal.  An irregularly irregular rhythm present.   Pulmonary/Chest: Effort normal and breath sounds normal.   Abdominal: Soft. Bowel sounds are normal. There is no tenderness.   Musculoskeletal: She exhibits no edema.   Neurological: She is alert. She is not disoriented.   Skin: She is not diaphoretic. No cyanosis. No pallor. Nails show no clubbing.   Psychiatric: She has a normal mood and affect.       Significant Labs:   A1C:     Recent Labs  Lab 12/15/17  0409   HGBA1C 6.4*     CBC:     Recent Labs  Lab 12/19/17  0430 12/20/17  0512   WBC 7.83 9.00   HGB 15.3 14.9   HCT 48.7* 45.8    231     CMP:     Recent Labs  Lab 12/19/17  0430 12/20/17  0512    136   K 4.9 4.2   CL 95 94*   CO2 29 29   GLU 94 102   BUN 24* 24*   CREATININE 1.4 1.4   CALCIUM 8.6* 8.9   ALBUMIN 3.0* 2.9*   ANIONGAP 14 13   EGFRNONAA 35.3* 35.3*     Magnesium:     Recent Labs  Lab 12/19/17  0430 12/20/17  0512   MG 2.7* 2.1     POCT Glucose:     Recent Labs  Lab 12/19/17  1257 12/19/17  1823 12/20/17  1210   POCTGLUCOSE 140* 118* 129*     Assessment/Plan:      Current Hospital Problem List:    Active Hospital Problems    Diagnosis  POA    *Acute on chronic systolic heart failure [I50.23]  Yes     Priority: 1 - High    Atrial fibrillation with RVR [I48.91]  Yes     Priority: 2     Essential hypertension [I10]  Yes     Priority: 3     Type 2 diabetes mellitus with stage 3 chronic kidney disease, without long-term current use of insulin [E11.22, N18.3]  Yes     Priority: 4     Shortness of breath [R06.02]  Yes    Upper respiratory infection [J06.9]  Yes     Patient with sx of URI, congestion, mild cough, no fever or leukocytosis on CBC  -PRN guifenasin      Tachycardia [R00.0]  Yes      Resolved Hospital Problems    Diagnosis Date Resolved POA    No resolved problems to display.       Ordered Medications for management of current problems:    amiodarone  100 mg Oral Daily    apixaban  5 mg Oral BID    furosemide  80 mg Intravenous BID    isosorbide-hydrALAZINE 20-37.5 mg  1 tablet Oral TID    metoprolol tartrate  50 mg Oral BID       Risk  Patient is currently on drug therapy requiring intensive monitoring for toxicity: Digoxin    Anticipated Disposition: Home-Health Care [facility]    Assessment and Plan by Problem:    * Acute systolic heart failure     Cardiology consulted.  Furosemide 40 mg IV BID changed to furosemide 10 gtt/hr until 12/19/2017  EF of 60% decreased to 10%, likely tachycardia related.  May need ischemic work up.       Atrial fibrillation with RVR     Patient with persistent A-fib, on Eliquis, presented in A-fib with RVR, s/p diltiazem with initial improvement in HR.  -Metoprolol 100 BID  Changed metoprolol 50 mg BID  Loaded digoxin as per Cardiology recommendations, then started digoxin 0.125 mg daily. Held due to elevated level then restarted digoxin 0.125 mg daily with recheck level in three days pending.  -Continue Eliquis  -REJI/DCCV 12/20/17 but patient did not remain in NSR. Digoxin discontinued and maintenance amiodarone started. Additional plans per Cardiology.       Essential hypertension     Stable, continue to monitor       Type 2 diabetes mellitus with stage 3 chronic kidney disease, without long-term current use of insulin     HgbA1c 6.4%  Not on active treatment including diet or medication at home  Will continue diabetic diet for now.       Shortness of breath     Patient presenting with mild dyspnea, orthopnea, LE edema, no PND, concern for heart failure, s/p episode A-fib with RVR in ED. Patient with reported CAD history. EKG without acute changes.   -Diuresis with IV Lasix   -Strict I/O  -SOB likely heart failure related. No leukocytosis or fever.   - Trial of Tessalon perles prn and guaifenesin/dextromethorphan prn            VTE Risk Mitigation         Ordered     apixaban tablet 5 mg  2 times daily     Route:  Oral        12/15/17 0030     Medium Risk of VTE  Once      12/15/17 0030              Rubi Dotson MD  Department of Hospital Medicine   [facility]

## 2017-12-21 NOTE — PROGRESS NOTES
"[facility] Hospital Medicine  Progress Note    Patient Name: Dacia Martínez  MRN: [id number]  Patient Class: IP- Inpatient   Admission Date: 12/14/2017  Length of Stay: 6 days  Attending Physician: Rubi Dotson MD  Primary Care Provider: Cali Vickers MD    [facility] Medicine Team: [facility] [facility]OSP MED D Rubi Dotson MD    Subjective:     Principal Problem:Acute on chronic systolic heart failure    HPI:  "82F h/o a fib, CKD, HTN, CAD presenting initially with URI symptoms and dyspnea. Pt reports has had recent worsening of LE swelling, mild dyspnea, 1-2 pillow orthopnea, no PND over last few weeks however developed URI symptoms in last few days which is what brought her to ED with congestion, mild cough. While in ED pulse noted to be >150 and noted patient in Afib with RVR, patient reports some palpitations with this episode. Pt denies F/C/N/V."    Hospital Course:  No notes on file    Interval History: Patient with no events overnight, no complaints.  Data Review: Results recorded below were reviewed 12/21/2017.    Review of Systems   Constitutional: Negative for fever.   Respiratory: Negative for shortness of breath.    Cardiovascular: Negative for chest pain.     Objective:     Vital Signs (Most Recent):  Temp: 98 °F (36.7 °C) (12/21/17 1500)  Pulse: 93 (12/21/17 1533)  Resp: 16 (12/21/17 1500)  BP: 134/61 (12/21/17 1500)  SpO2: 95 % (12/21/17 1500) Vital Signs (24h Range):  Temp:  [97.2 °F (36.2 °C)-98.7 °F (37.1 °C)] 98 °F (36.7 °C)  Pulse:  [] 93  Resp:  [16-18] 16  SpO2:  [91 %-98 %] 95 %  BP: ()/(51-78) 134/61     Weight: 58.6 kg (129 lb 3 oz)  Body mass index is 24.41 kg/m².    Intake/Output Summary (Last 24 hours) at 12/21/17 2669  Last data filed at 12/21/17 1400   Gross per 24 hour   Intake              570 ml   Output              375 ml   Net              195 ml      Physical Exam   Constitutional: She appears well-developed.   HENT:   Head: Normocephalic.   Mouth/Throat: " Mucous membranes are not cyanotic.   Eyes: Conjunctivae and lids are normal.   Neck: Neck supple.   Cardiovascular: Normal rate, S1 normal and S2 normal.  An irregularly irregular rhythm present.   Pulmonary/Chest: Effort normal and breath sounds normal.   Abdominal: Soft. Bowel sounds are normal. There is no tenderness.   Musculoskeletal: She exhibits edema.   Neurological: She is alert. She is not disoriented.   Skin: She is not diaphoretic. No cyanosis. No pallor. Nails show no clubbing.   Psychiatric: She has a normal mood and affect.       Significant Labs:   A1C:     Recent Labs  Lab 12/15/17  0409   HGBA1C 6.4*     CBC:     Recent Labs  Lab 12/20/17  0512 12/21/17  0432   WBC 9.00 9.05   HGB 14.9 15.0   HCT 45.8 47.8    228     CMP:     Recent Labs  Lab 12/20/17  0512 12/21/17  0043 12/21/17  0432 12/21/17  1536     --  132* 131*   K 4.2 4.0 4.3 4.4   CL 94*  --  92* 91*   CO2 29  --  25 29     --  107 136*   BUN 24*  --  24* 28*   CREATININE 1.4  --  1.5* 1.8*   CALCIUM 8.9  --  9.3 9.1   ALBUMIN 2.9*  --  2.7*  --    ANIONGAP 13  --  15 11   EGFRNONAA 35.3*  --  32.4* 26.0*     Magnesium:     Recent Labs  Lab 12/20/17  0512 12/21/17  0043 12/21/17  0432   MG 2.1 1.7 1.7     POCT Glucose:     Recent Labs  Lab 12/20/17  1210 12/20/17  2341 12/21/17  1248   POCTGLUCOSE 129* 121* 110     Assessment/Plan:      Current Hospital Problem List:    Active Hospital Problems    Diagnosis  POA    *Acute on chronic systolic heart failure [I50.23]  Yes     Priority: 1 - High    Atrial fibrillation with RVR [I48.91]  Yes     Priority: 2     Essential hypertension [I10]  Yes     Priority: 3     Type 2 diabetes mellitus with stage 3 chronic kidney disease, without long-term current use of insulin [E11.22, N18.3]  Yes     Priority: 4     Shortness of breath [R06.02]  Yes    Upper respiratory infection [J06.9]  Yes     Patient with sx of URI, congestion, mild cough, no fever or leukocytosis on  CBC  -PRN guifenasin      Tachycardia [R00.0]  Yes      Resolved Hospital Problems    Diagnosis Date Resolved POA   No resolved problems to display.       Ordered Medications for management of current problems:    amiodarone  200 mg Oral BID    apixaban  5 mg Oral BID    furosemide  80 mg Intravenous TID    isosorbide-hydrALAZINE 20-37.5 mg  1 tablet Oral TID    metoprolol tartrate  50 mg Oral BID       Anticipated Disposition: Home-Health Care [facility]    Assessment and Plan by Problem:    Acute systolic heart failure     Cardiology consulted.  Furosemide 40 mg IV BID changed to furosemide 10 gtt/hr until 12/19/2017  EF of 60% decreased to 10%, likely tachycardia related.  Increasing diuresis per Cardiology's recommendations due to worsening renal function and hyponatremia.       Atrial fibrillation with RVR     Patient with persistent A-fib, on Eliquis, presented in A-fib with RVR, s/p diltiazem with initial improvement in HR.  -Metoprolol 100 BID  Changed metoprolol 50 mg BID  Loaded digoxin as per Cardiology recommendations, then started digoxin 0.125 mg daily. Held due to elevated level then restarted digoxin 0.125 mg daily with recheck level in three days pending.  -Continue Eliquis  -REJI/DCCV 12/20/17 but patient did not remain in NSR. Digoxin discontinued and maintenance amiodarone started.   Amiodarone load per Cardiology.  Follow up with EP as outpatient. Consider ischemic evaluation after sinus rhythm maintained.       Essential hypertension     Stable, continue to monitor       Type 2 diabetes mellitus with stage 3 chronic kidney disease, without long-term current use of insulin     HgbA1c 6.4%  Not on active treatment including diet or medication at home  Will continue diabetic diet for now.       Shortness of breath     Patient presenting with mild dyspnea, orthopnea, LE edema, no PND, concern for heart failure, s/p episode A-fib with RVR in ED. Patient with reported CAD history. EKG without acute  changes.   -Diuresis with IV Lasix   -Strict I/O  -SOB likely heart failure related. No leukocytosis or fever.   - Trial of Tessalon perles prn and guaifenesin/dextromethorphan prn             VTE Risk Mitigation         Ordered     apixaban tablet 5 mg  2 times daily     Route:  Oral        12/15/17 0030     Medium Risk of VTE  Once      12/15/17 0030              Rubi Dotson MD  Department of Hospital Medicine   [facility]

## 2017-12-21 NOTE — PROGRESS NOTES
Pt had 5 bt and 8 bt run of VT. Mikael VENCES notified. Labs drawn. Mag 1.7. K 4. Mag replaced per order. Will continue to monitor.

## 2017-12-21 NOTE — PROGRESS NOTES
Notified MD Nila of pt's BP 82/56 and that pt is sleeping currently. MD ordered to hold Bidil. MD also ordered to hold Lasix scheduled for 1800, unless ok'd otherwise by MD. Will continue to monitor pt.

## 2017-12-21 NOTE — ASSESSMENT & PLAN NOTE
Patient with persistent A-fib, on Eliquis, presented in A-fib with RVR, s/p diltiazem with improvement in HR  -Cont Eliquis  -Metoprolol 100 BID  -REJI/DCCV 12/20/17 but patient did not remain in NSR.

## 2017-12-21 NOTE — SUBJECTIVE & OBJECTIVE
Interval History: Patient with no events overnight, no complaints.  Data Review: Results recorded below were reviewed 12/22/2017.    Review of Systems   Constitutional: Negative for fever.   Respiratory: Negative for shortness of breath.    Cardiovascular: Negative for chest pain.     Objective:     Vital Signs (Most Recent):  Temp: 98.6 °F (37 °C) (12/22/17 2014)  Pulse: (!) 112 (12/22/17 2014)  Resp: 16 (12/22/17 2014)  BP: 108/67 (12/22/17 2014)  SpO2: (!) 94 % (12/22/17 2014) Vital Signs (24h Range):  Temp:  [97.4 °F (36.3 °C)-98.6 °F (37 °C)] 98.6 °F (37 °C)  Pulse:  [] 112  Resp:  [16-18] 16  SpO2:  [94 %-97 %] 94 %  BP: ()/(51-74) 108/67     Weight: 59.4 kg (130 lb 15.3 oz)  Body mass index is 24.74 kg/m².    Intake/Output Summary (Last 24 hours) at 12/22/17 2232  Last data filed at 12/22/17 1400   Gross per 24 hour   Intake              425 ml   Output              300 ml   Net              125 ml      Physical Exam   Constitutional: She appears well-developed.   HENT:   Head: Normocephalic.   Mouth/Throat: Mucous membranes are not cyanotic.   Eyes: Conjunctivae and lids are normal.   Neck: Neck supple.   Cardiovascular: Normal rate, S1 normal and S2 normal.  An irregularly irregular rhythm present.   Pulmonary/Chest: Effort normal and breath sounds normal.   Abdominal: Soft. Bowel sounds are normal. There is no tenderness.   Musculoskeletal: She exhibits edema.   Neurological: She is alert. She is not disoriented.   Skin: She is not diaphoretic. No cyanosis. No pallor. Nails show no clubbing.   Psychiatric: She has a normal mood and affect.       Significant Labs:   A1C:     Recent Labs  Lab 12/15/17  0409   HGBA1C 6.4*     CBC:     Recent Labs  Lab 12/21/17  0432 12/22/17  0601   WBC 9.05 9.01   HGB 15.0 14.9   HCT 47.8 46.2    242     CMP:     Recent Labs  Lab 12/21/17  0432 12/21/17  1536 12/22/17  0601 12/22/17  1310   * 131* 130* 129*   K 4.3 4.4 4.0 3.8   CL 92* 91* 90* 90*    CO2 25 29 27 26    136* 84 101   BUN 24* 28* 31* 32*   CREATININE 1.5* 1.8* 1.7* 1.7*   CALCIUM 9.3 9.1 8.9 8.9   PROT  --   --   --  7.4   ALBUMIN 2.7*  --  2.6* 2.5*   BILITOT  --   --   --  2.1*   ALKPHOS  --   --   --  66   AST  --   --   --  23   ALT  --   --   --  13   ANIONGAP 15 11 13 13   EGFRNONAA 32.4* 26.0* 27.9* 27.9*     Magnesium:     Recent Labs  Lab 12/21/17  0043 12/21/17  0432 12/22/17  0601   MG 1.7 1.7 2.0     POCT Glucose:     Recent Labs  Lab 12/22/17  1151 12/22/17  1644 12/22/17  2122   POCTGLUCOSE 102 110 114*   Interval History: Patient with no events overnight, no complaints.  Data Review: Results recorded below were reviewed 12/21/2017.    Review of Systems   Constitutional: Negative for fever.   Respiratory: Negative for shortness of breath.    Cardiovascular: Negative for chest pain.     Objective:     Vital Signs (Most Recent):  Temp: 98 °F (36.7 °C) (12/21/17 1500)  Pulse: 93 (12/21/17 1533)  Resp: 16 (12/21/17 1500)  BP: 134/61 (12/21/17 1500)  SpO2: 95 % (12/21/17 1500) Vital Signs (24h Range):  Temp:  [97.2 °F (36.2 °C)-98.7 °F (37.1 °C)] 98 °F (36.7 °C)  Pulse:  [] 93  Resp:  [16-18] 16  SpO2:  [91 %-98 %] 95 %  BP: ()/(51-78) 134/61     Weight: 58.6 kg (129 lb 3 oz)  Body mass index is 24.41 kg/m².    Intake/Output Summary (Last 24 hours) at 12/21/17 1759  Last data filed at 12/21/17 1400   Gross per 24 hour   Intake              570 ml   Output              375 ml   Net              195 ml      Physical Exam   Constitutional: She appears well-developed.   HENT:   Head: Normocephalic.   Mouth/Throat: Mucous membranes are not cyanotic.   Eyes: Conjunctivae and lids are normal.   Neck: Neck supple.   Cardiovascular: Normal rate, S1 normal and S2 normal.  An irregularly irregular rhythm present.   Murmur heard.  Pulmonary/Chest: Effort normal and breath sounds normal.   Abdominal: Soft. Bowel sounds are normal. There is no tenderness.   Musculoskeletal: She  exhibits edema.   Neurological: She is alert. She is not disoriented.   Skin: She is not diaphoretic. No cyanosis. No pallor. Nails show no clubbing.   Psychiatric: She has a normal mood and affect.       Significant Labs:   A1C:     Recent Labs  Lab 12/15/17  0409   HGBA1C 6.4*     CBC:     Recent Labs  Lab 12/20/17  0512 12/21/17  0432   WBC 9.00 9.05   HGB 14.9 15.0   HCT 45.8 47.8    228     CMP:     Recent Labs  Lab 12/20/17  0512 12/21/17  0043 12/21/17  0432 12/21/17  1536     --  132* 131*   K 4.2 4.0 4.3 4.4   CL 94*  --  92* 91*   CO2 29  --  25 29     --  107 136*   BUN 24*  --  24* 28*   CREATININE 1.4  --  1.5* 1.8*   CALCIUM 8.9  --  9.3 9.1   ALBUMIN 2.9*  --  2.7*  --    ANIONGAP 13  --  15 11   EGFRNONAA 35.3*  --  32.4* 26.0*     Magnesium:     Recent Labs  Lab 12/20/17  0512 12/21/17  0043 12/21/17  0432   MG 2.1 1.7 1.7     POCT Glucose:     Recent Labs  Lab 12/20/17  1210 12/20/17  2341 12/21/17  1248   POCTGLUCOSE 129* 121* 110

## 2017-12-22 ENCOUNTER — TELEPHONE (OUTPATIENT)
Dept: ELECTROPHYSIOLOGY | Facility: CLINIC | Age: 82
End: 2017-12-22

## 2017-12-22 LAB
ALBUMIN SERPL BCP-MCNC: 2.5 G/DL
ALBUMIN SERPL BCP-MCNC: 2.6 G/DL
ALP SERPL-CCNC: 66 U/L
ALT SERPL W/O P-5'-P-CCNC: 13 U/L
ANION GAP SERPL CALC-SCNC: 13 MMOL/L
ANION GAP SERPL CALC-SCNC: 13 MMOL/L
AST SERPL-CCNC: 23 U/L
BASOPHILS # BLD AUTO: 0.06 K/UL
BASOPHILS NFR BLD: 0.7 %
BILIRUB SERPL-MCNC: 2.1 MG/DL
BUN SERPL-MCNC: 31 MG/DL
BUN SERPL-MCNC: 32 MG/DL
CALCIUM SERPL-MCNC: 8.9 MG/DL
CALCIUM SERPL-MCNC: 8.9 MG/DL
CHLORIDE SERPL-SCNC: 90 MMOL/L
CHLORIDE SERPL-SCNC: 90 MMOL/L
CO2 SERPL-SCNC: 26 MMOL/L
CO2 SERPL-SCNC: 27 MMOL/L
CREAT SERPL-MCNC: 1.7 MG/DL
CREAT SERPL-MCNC: 1.7 MG/DL
DIFFERENTIAL METHOD: ABNORMAL
EOSINOPHIL # BLD AUTO: 0.2 K/UL
EOSINOPHIL NFR BLD: 2.4 %
ERYTHROCYTE [DISTWIDTH] IN BLOOD BY AUTOMATED COUNT: 18.6 %
EST. GFR  (AFRICAN AMERICAN): 32.1 ML/MIN/1.73 M^2
EST. GFR  (AFRICAN AMERICAN): 32.1 ML/MIN/1.73 M^2
EST. GFR  (NON AFRICAN AMERICAN): 27.9 ML/MIN/1.73 M^2
EST. GFR  (NON AFRICAN AMERICAN): 27.9 ML/MIN/1.73 M^2
ESTIMATED PA SYSTOLIC PRESSURE: 26.81
GLUCOSE SERPL-MCNC: 101 MG/DL
GLUCOSE SERPL-MCNC: 84 MG/DL
HCT VFR BLD AUTO: 46.2 %
HGB BLD-MCNC: 14.9 G/DL
IMM GRANULOCYTES # BLD AUTO: 0.04 K/UL
IMM GRANULOCYTES NFR BLD AUTO: 0.4 %
LYMPHOCYTES # BLD AUTO: 2.1 K/UL
LYMPHOCYTES NFR BLD: 23.2 %
MAGNESIUM SERPL-MCNC: 2 MG/DL
MCH RBC QN AUTO: 26.4 PG
MCHC RBC AUTO-ENTMCNC: 32.3 G/DL
MCV RBC AUTO: 82 FL
MITRAL VALVE MOBILITY: NORMAL
MONOCYTES # BLD AUTO: 1.1 K/UL
MONOCYTES NFR BLD: 12.1 %
NEUTROPHILS # BLD AUTO: 5.5 K/UL
NEUTROPHILS NFR BLD: 61.2 %
NRBC BLD-RTO: 0 /100 WBC
PHOSPHATE SERPL-MCNC: 3.4 MG/DL
PLATELET # BLD AUTO: 242 K/UL
PMV BLD AUTO: 11 FL
POCT GLUCOSE: 102 MG/DL (ref 70–110)
POCT GLUCOSE: 110 MG/DL (ref 70–110)
POCT GLUCOSE: 114 MG/DL (ref 70–110)
POCT GLUCOSE: 140 MG/DL (ref 70–110)
POCT GLUCOSE: 91 MG/DL (ref 70–110)
POTASSIUM SERPL-SCNC: 3.8 MMOL/L
POTASSIUM SERPL-SCNC: 4 MMOL/L
PROT SERPL-MCNC: 7.4 G/DL
RBC # BLD AUTO: 5.64 M/UL
RETIRED EF AND QEF - SEE NOTES: 60 (ref 55–65)
SODIUM SERPL-SCNC: 129 MMOL/L
SODIUM SERPL-SCNC: 130 MMOL/L
WBC # BLD AUTO: 9.01 K/UL

## 2017-12-22 PROCEDURE — 99232 SBSQ HOSP IP/OBS MODERATE 35: CPT | Mod: GC,,, | Performed by: INTERNAL MEDICINE

## 2017-12-22 PROCEDURE — 25000003 PHARM REV CODE 250: Performed by: INTERNAL MEDICINE

## 2017-12-22 PROCEDURE — 80069 RENAL FUNCTION PANEL: CPT

## 2017-12-22 PROCEDURE — 99231 SBSQ HOSP IP/OBS SF/LOW 25: CPT | Mod: ,,, | Performed by: INTERNAL MEDICINE

## 2017-12-22 PROCEDURE — 20600001 HC STEP DOWN PRIVATE ROOM

## 2017-12-22 PROCEDURE — 83735 ASSAY OF MAGNESIUM: CPT

## 2017-12-22 PROCEDURE — 80053 COMPREHEN METABOLIC PANEL: CPT

## 2017-12-22 PROCEDURE — 85025 COMPLETE CBC W/AUTO DIFF WBC: CPT

## 2017-12-22 PROCEDURE — 93306 TTE W/DOPPLER COMPLETE: CPT | Mod: 26,,, | Performed by: INTERNAL MEDICINE

## 2017-12-22 PROCEDURE — 36415 COLL VENOUS BLD VENIPUNCTURE: CPT

## 2017-12-22 PROCEDURE — 93306 TTE W/DOPPLER COMPLETE: CPT

## 2017-12-22 RX ORDER — FUROSEMIDE 40 MG/1
40 TABLET ORAL DAILY
Status: DISCONTINUED | OUTPATIENT
Start: 2017-12-23 | End: 2017-12-23

## 2017-12-22 RX ADMIN — AMIODARONE HYDROCHLORIDE 200 MG: 200 TABLET ORAL at 09:12

## 2017-12-22 RX ADMIN — HYDRALAZINE HYDROCHLORIDE AND ISOSORBIDE DINITRATE 1 TABLET: 37.5; 2 TABLET, FILM COATED ORAL at 05:12

## 2017-12-22 RX ADMIN — HYDRALAZINE HYDROCHLORIDE AND ISOSORBIDE DINITRATE 1 TABLET: 37.5; 2 TABLET, FILM COATED ORAL at 09:12

## 2017-12-22 RX ADMIN — HYDRALAZINE HYDROCHLORIDE AND ISOSORBIDE DINITRATE 1 TABLET: 37.5; 2 TABLET, FILM COATED ORAL at 01:12

## 2017-12-22 RX ADMIN — AMIODARONE HYDROCHLORIDE 200 MG: 200 TABLET ORAL at 08:12

## 2017-12-22 RX ADMIN — APIXABAN 5 MG: 5 TABLET, FILM COATED ORAL at 08:12

## 2017-12-22 RX ADMIN — Medication: at 05:12

## 2017-12-22 RX ADMIN — APIXABAN 5 MG: 5 TABLET, FILM COATED ORAL at 09:12

## 2017-12-22 RX ADMIN — METOPROLOL TARTRATE 50 MG: 25 TABLET ORAL at 09:12

## 2017-12-22 RX ADMIN — ACETAMINOPHEN 500 MG: 500 TABLET ORAL at 10:12

## 2017-12-22 RX ADMIN — HYDROCODONE BITARTRATE AND ACETAMINOPHEN 1 TABLET: 5; 325 TABLET ORAL at 09:12

## 2017-12-22 NOTE — TELEPHONE ENCOUNTER
Pt is currently admitted in the hospital. I scheduled her 4wks post procedure appt and will mail out reminder letter in mail.

## 2017-12-22 NOTE — PLAN OF CARE
CM attempted Cardiology f/u with EP Dr Burt, no appt available until 2/18.  Clinic will contact pt with appt, all contact info updated.      1300  Cinic appt scheduled per Cardiology clinic.    Future Appointments  Date Time Provider Department Center   12/27/2017 9:15 AM EKG, APPT [facility] EKG [facility]   1/23/2018 11:00 AM EKG, APPT [facility] EKG [facility]   1/23/2018 11:40 AM Jacques Burt MD [facility] ARRHYTH [facility]   1/29/2018 7:40 AM Cali Vickers MD [facility] IM [facility] PCW

## 2017-12-22 NOTE — PLAN OF CARE
Problem: Patient Care Overview  Goal: Plan of Care Review  Plan of care discussed with patient. Patient is free of fall/trauma/injury. Denies CP, SOB, or pain/discomfort. All questions addressed. Will continue to monitor.

## 2017-12-22 NOTE — PLAN OF CARE
Problem: Physical Therapy Goal  Goal: Physical Therapy Goal  Goals to be met by: 17    Patient will increase functional independence with mobility by performin. Supine to sit with Hardy  2. Sit to supine with Hardy  3. Sit to stand transfer with Supervision  4. Gait  x 200 feet with Supervision with or without least restrictive AD.   5. Ascend/descend 7 stairs with right Handrails Stand-by Assistance.   6. Lower extremity exercise program x20 reps per handout, with supervision     Outcome: Ongoing (interventions implemented as appropriate)  Goals remain appropriate

## 2017-12-22 NOTE — SUBJECTIVE & OBJECTIVE
Interval History: feels well today, denies any SOB-ambulating, had some dizziness on getting up.Denies any chest pain or palpitations.    ROS  Objective:     Vital Signs (Most Recent):  Temp: 97.9 °F (36.6 °C) (12/22/17 1114)  Pulse: 86 (12/22/17 1400)  Resp: 17 (12/22/17 1114)  BP: 115/74 (12/22/17 1355)  SpO2: 97 % (12/22/17 1114) Vital Signs (24h Range):  Temp:  [97.4 °F (36.3 °C)-98.3 °F (36.8 °C)] 97.9 °F (36.6 °C)  Pulse:  [] 86  Resp:  [16-18] 17  SpO2:  [94 %-98 %] 97 %  BP: ()/(51-74) 115/74     Weight: 59.4 kg (130 lb 15.3 oz)  Body mass index is 24.74 kg/m².     SpO2: 97 %  O2 Device (Oxygen Therapy): room air      Intake/Output Summary (Last 24 hours) at 12/22/17 1438  Last data filed at 12/22/17 1400   Gross per 24 hour   Intake              425 ml   Output              850 ml   Net             -425 ml       Lines/Drains/Airways     Peripheral Intravenous Line                 Peripheral IV - Single Lumen 12/16/17 1425 Right Forearm 6 days                Physical Exam   General: alert, awake and oriented x 3  Eyes:PERRL.   Neck:-JVD  Lungs:  clear to auscultation bilaterally and normal respiratory effort  Cardiovascular: Heart: Irregular rate and rhythm, S1, S2 normal, no murmur, click, rub or gallop.   Chest Wall: no tenderness.   Extremities: no cyanosis No  edema. Warm extremities  Pulses: 2+ and symmetric.    Significant Labs:   CMP   Recent Labs  Lab 12/21/17  0432 12/21/17  1536 12/22/17  0601 12/22/17  1310   * 131* 130* 129*   K 4.3 4.4 4.0 3.8   CL 92* 91* 90* 90*   CO2 25 29 27 26    136* 84 101   BUN 24* 28* 31* 32*   CREATININE 1.5* 1.8* 1.7* 1.7*   CALCIUM 9.3 9.1 8.9 8.9   PROT  --   --   --  7.4   ALBUMIN 2.7*  --  2.6* 2.5*   BILITOT  --   --   --  2.1*   ALKPHOS  --   --   --  66   AST  --   --   --  23   ALT  --   --   --  13   ANIONGAP 15 11 13 13   ESTGFRAFRICA 37.4* 30.0* 32.1* 32.1*   EGFRNONAA 32.4* 26.0* 27.9* 27.9*    and CBC   Recent Labs  Lab  12/21/17  0432 12/22/17  0601   WBC 9.05 9.01   HGB 15.0 14.9   HCT 47.8 46.2    242       Significant Imaging: Echocardiogram:   2D echo with color flow doppler:   Results for orders placed or performed during the hospital encounter of 12/14/17   2D echo with color flow doppler   Result Value Ref Range    EF 10 (A) 55 - 65    Aortic Valve Stenosis MODERATE (A)     Est. PA Systolic Pressure 68.88 (A)     Mitral Valve Mobility NORMAL     Tricuspid Valve Regurgitation MODERATE (A)

## 2017-12-22 NOTE — PLAN OF CARE
Problem: Patient Care Overview  Goal: Individualization & Mutuality  Plan of care discussed with patient. Patient is free of fall/trauma/injury. Denies CP, SOB, or pain/discomfort. Pt stable. Mg replaced. AAOx4. Lasix increased to 80mg TID. All questions addressed. Will continue to monitor.

## 2017-12-22 NOTE — PROGRESS NOTES
Spoke with Mikael VENCES regarding Bidil administration as patients BPs have been fluctuating. BP now 122/61. PA stated ok to give this morning.

## 2017-12-22 NOTE — PROGRESS NOTES
To echo via stretcher with telemetry monitoring. 1635-Returned from echo. Family remains at bedside. Assessment unchanged. No skin breakdown noted

## 2017-12-22 NOTE — PROGRESS NOTES
12/21/17 2339 12/22/17 0030   Vital Signs   BP (!) 80/51 119/63   MAP (mmHg) 60 83   BP Location Right arm Right arm   BP Method --  Automatic   Patient Position Lying Lying   BP low and then increased when rechecked. This also occurred earlier in the day. Pt asymptomatic. Mikael VENCES notified of fluctuating BPs. No new orders. Will continue to monitor.

## 2017-12-22 NOTE — PROGRESS NOTES
[facility]  Cardiology  Progress Note    Patient Name: Dacia Martínez  MRN: [id number]  Admission Date: 12/14/2017  Hospital Length of Stay: 7 days  Code Status: Full Code   Attending Physician: Rubi Dotson MD   Primary Care Physician: Cali Vickers MD  Expected Discharge Date: 12/23/2017  Principal Problem:Acute on chronic systolic heart failure    Subjective:     Hospital Course:   No notes on file    Interval History: feels well today, denies any SOB-ambulating, had some dizziness on getting up.Denies any chest pain or palpitations.    ROS  Objective:     Vital Signs (Most Recent):  Temp: 97.9 °F (36.6 °C) (12/22/17 1114)  Pulse: 86 (12/22/17 1400)  Resp: 17 (12/22/17 1114)  BP: 115/74 (12/22/17 1355)  SpO2: 97 % (12/22/17 1114) Vital Signs (24h Range):  Temp:  [97.4 °F (36.3 °C)-98.3 °F (36.8 °C)] 97.9 °F (36.6 °C)  Pulse:  [] 86  Resp:  [16-18] 17  SpO2:  [94 %-98 %] 97 %  BP: ()/(51-74) 115/74     Weight: 59.4 kg (130 lb 15.3 oz)  Body mass index is 24.74 kg/m².     SpO2: 97 %  O2 Device (Oxygen Therapy): room air      Intake/Output Summary (Last 24 hours) at 12/22/17 1438  Last data filed at 12/22/17 1400   Gross per 24 hour   Intake              425 ml   Output              850 ml   Net             -425 ml       Lines/Drains/Airways     Peripheral Intravenous Line                 Peripheral IV - Single Lumen 12/16/17 1425 Right Forearm 6 days                Physical Exam   General: alert, awake and oriented x 3  Eyes:PERRL.   Neck:-JVD  Lungs:  clear to auscultation bilaterally and normal respiratory effort  Cardiovascular: Heart: Irregular rate and rhythm, S1, S2 normal  Extremities: no cyanosis No  edema. Warm extremities  Pulses: 2+ and symmetric.    Significant Labs:   CMP   Recent Labs  Lab 12/21/17  0432 12/21/17  1536 12/22/17  0601 12/22/17  1310   * 131* 130* 129*   K 4.3 4.4 4.0 3.8   CL 92* 91* 90* 90*   CO2 25 29 27 26    136* 84 101   BUN 24* 28* 31*  32*   CREATININE 1.5* 1.8* 1.7* 1.7*   CALCIUM 9.3 9.1 8.9 8.9   PROT  --   --   --  7.4   ALBUMIN 2.7*  --  2.6* 2.5*   BILITOT  --   --   --  2.1*   ALKPHOS  --   --   --  66   AST  --   --   --  23   ALT  --   --   --  13   ANIONGAP 15 11 13 13   ESTGFRAFRICA 37.4* 30.0* 32.1* 32.1*   EGFRNONAA 32.4* 26.0* 27.9* 27.9*    and CBC   Recent Labs  Lab 12/21/17  0432 12/22/17  0601   WBC 9.05 9.01   HGB 15.0 14.9   HCT 47.8 46.2    242       Significant Imaging: Echocardiogram:   2D echo with color flow doppler:   Results for orders placed or performed during the hospital encounter of 12/14/17   2D echo with color flow doppler   Result Value Ref Range    EF 10 (A) 55 - 65    Aortic Valve Stenosis MODERATE (A)     Est. PA Systolic Pressure 68.88 (A)     Mitral Valve Mobility NORMAL     Tricuspid Valve Regurgitation MODERATE (A)      Assessment and Plan:         * Acute on chronic systolic heart failure      Acute  combined  - NYHA class IV   - New depressed EF could tachycardia related cardiomopathy  -on exam patient appears euvolemix, Renal function has worsened but has hyponatremia also-will get CMP and also repeat echo to assess Aortic valve(REJI suggestive of more severe AS) as well as IVC.  - Change BB to toprol  mg po QD  - Continue Bidil  - Start low dose ACE-I prior to discharge-recommend lisinopril 2.5 mg po QD  - strict ins and outs  - daily weights  - fluid restriction  - CHF education          Atrial fibrillation with RVR    - Persistent  - [id number]  - anticoagulation with Eliquis  - S/p REJI and CV (2nd attempt-first 6/2017)-went back into atrial fibrillation   -continue amiodarone and follow up with EP as outpatient   - Consider ischemic evaluation after sinus rhythm maintained, can be done as an outpatient.            VTE Risk Mitigation         Ordered     apixaban tablet 5 mg  2 times daily     Route:  Oral        12/15/17 0030     Medium Risk of VTE  Once      12/15/17 0030           Macario Moon MD  Cardiology  [facility]

## 2017-12-22 NOTE — ASSESSMENT & PLAN NOTE
Acute  combined  - NYHA class IV   - New depressed EF could tachycardia related cardiomopathy  -on exam patient appears euvolemix, Renal function has worsened but has hyponatremia also-will get CMP and also repeat echo to assess Aortic valve(REJI suggestive of more severe AS) as well as IVC.  - Change BB to toprol  mg po QD  - Continue Bidil  - Start low dose ACE-I prior to discharge-recommend lisinopril 2.5 mg po QD  - strict ins and outs  - daily weights  - fluid restriction  - CHF education

## 2017-12-22 NOTE — ASSESSMENT & PLAN NOTE
- Persistent  - [id number]  - anticoagulation with Eliquis  - S/p REJI and CV (2nd attempt-first 6/2017)-went back into atrial fibrillation   -continue amiodarone and follow up with EP as outpatient   - Consider ischemic evaluation after sinus rhythm maintained, can be done as an outpatient.

## 2017-12-22 NOTE — PT/OT/SLP PROGRESS
"Physical Therapy Treatment    Patient Name:  Dacia Martínez   MRN:  [id number]    Recommendations:     Discharge Recommendations:  home health PT   Discharge Equipment Recommendations: cane, straight, shower chair   Barriers to discharge: Inaccessible home (7 DIA)    Assessment:     Dacia Martínez is a 82 y.o. female admitted with a medical diagnosis of Acute on chronic systolic heart failure.  She presents with the following impairments/functional limitations:  weakness, impaired endurance, impaired functional mobilty, gait instability, impaired balance, decreased coordination, decreased safety awareness, impaired cardiopulmonary response to activity.    Rehab Prognosis:  good; patient would benefit from acute skilled PT services to address these deficits and reach maximum level of function.      Recent Surgery: Procedure(s) (LRB):  TRANSESOPHAGEAL ECHOCARDIOGRAM (REJI) (N/A) 1 Day Post-Op    Plan:     During this hospitalization, patient to be seen 3 x/week to address the above listed problems via gait training, therapeutic activities, therapeutic exercises, neuromuscular re-education  · Plan of Care Expires:  01/17/18   Plan of Care Reviewed with: patient    Subjective     Communicated with RN prior to session.  Patient found supine in bed upon PT entry to room, agreeable to treatment.      Chief Complaint: dizziness  Patient comments/goals: "I have been moving"  Pain/Comfort:  · Pain Rating 1: 0/10    Patients cultural, spiritual, Hindu conflicts given the current situation: none noted    Objective:     Patient found with: peripheral IV, telemetry     General Precautions: Standard, fall   Orthopedic Precautions:N/A   Braces: N/A     Functional Mobility:  · Bed Mobility:     · Supine to Sit: minimum assistance  · Sit to Supine: contact guard assistance  · Transfers:     · Sit to Stand:  contact guard assistance with no AD  · Gait: ~40 feet with HHA with CGA-min A with short step length and increased postural " sway      AM-PAC 6 CLICK MOBILITY  Turning over in bed (including adjusting bedclothes, sheets and blankets)?: 3  Sitting down on and standing up from a chair with arms (e.g., wheelchair, bedside commode, etc.): 3  Moving from lying on back to sitting on the side of the bed?: 3  Moving to and from a bed to a chair (including a wheelchair)?: 3  Need to walk in hospital room?: 3  Climbing 3-5 steps with a railing?: 2  Total Score: 17       Therapeutic Activities and Exercises:   Pt is educated on performing seated LAQ, marching, and ankle pumps able to demo 1x10, verbalized understanding to complete throughout the day. Pt is able to complete ambulation as described above however reports dizziness with ambulation and feeling weak. Pt limiting ambulation d/t dizziness.    Patient left supine with all lines intact, call button in reach, RN notified and family present..    GOALS:    Physical Therapy Goals        Problem: Physical Therapy Goal    Goal Priority Disciplines Outcome Goal Variances Interventions   Physical Therapy Goal     PT/OT, PT Ongoing (interventions implemented as appropriate)     Description:  Goals to be met by: 17    Patient will increase functional independence with mobility by performin. Supine to sit with [place]  2. Sit to supine with [place]  3. Sit to stand transfer with Supervision  4. Gait  x 200 feet with Supervision with or without least restrictive AD.   5. Ascend/descend 7 stairs with right Handrails Stand-by Assistance.   6. Lower extremity exercise program x20 reps per handout, with supervision                      Time Tracking:     PT Received On: 17  PT Start Time:      PT Stop Time: 1706  PT Total Time (min): 12 min     Billable Minutes: Therapeutic Activity 12 mins    Treatment Type: Treatment  PT/PTA: PT     PTA Visit Number: 0     Jazmin Fournier, PT  2017

## 2017-12-22 NOTE — PLAN OF CARE
Problem: Patient Care Overview  Goal: Plan of Care Review  Outcome: Ongoing (interventions implemented as appropriate)  Plan of care discussed with patient. Patient is free of fall/trauma/injury. Denies CP, SOB, or pain/discomfort. Tylenol po given for H/A. Blood glucose level WNL. Metoprolol po held this am for low b/p. Family updated and at bedside at all times. All questions addressed. Will continue to monitor b/p closely.

## 2017-12-23 VITALS
RESPIRATION RATE: 18 BRPM | HEIGHT: 61 IN | BODY MASS INDEX: 25.23 KG/M2 | OXYGEN SATURATION: 99 % | SYSTOLIC BLOOD PRESSURE: 108 MMHG | HEART RATE: 71 BPM | WEIGHT: 133.63 LBS | DIASTOLIC BLOOD PRESSURE: 59 MMHG | TEMPERATURE: 98 F

## 2017-12-23 LAB
ALBUMIN SERPL BCP-MCNC: 2.5 G/DL
ANION GAP SERPL CALC-SCNC: 13 MMOL/L
BASOPHILS # BLD AUTO: 0.06 K/UL
BASOPHILS NFR BLD: 0.8 %
BNP SERPL-MCNC: 539 PG/ML
BUN SERPL-MCNC: 30 MG/DL
CALCIUM SERPL-MCNC: 8.8 MG/DL
CHLORIDE SERPL-SCNC: 89 MMOL/L
CO2 SERPL-SCNC: 24 MMOL/L
CREAT SERPL-MCNC: 1.6 MG/DL
DIFFERENTIAL METHOD: ABNORMAL
EOSINOPHIL # BLD AUTO: 0.2 K/UL
EOSINOPHIL NFR BLD: 3.2 %
ERYTHROCYTE [DISTWIDTH] IN BLOOD BY AUTOMATED COUNT: 18.4 %
EST. GFR  (AFRICAN AMERICAN): 34.6 ML/MIN/1.73 M^2
EST. GFR  (NON AFRICAN AMERICAN): 30 ML/MIN/1.73 M^2
GLUCOSE SERPL-MCNC: 71 MG/DL
HCT VFR BLD AUTO: 44.3 %
HGB BLD-MCNC: 14.1 G/DL
IMM GRANULOCYTES # BLD AUTO: 0.03 K/UL
IMM GRANULOCYTES NFR BLD AUTO: 0.4 %
LYMPHOCYTES # BLD AUTO: 2 K/UL
LYMPHOCYTES NFR BLD: 26.6 %
MAGNESIUM SERPL-MCNC: 2.4 MG/DL
MCH RBC QN AUTO: 26.4 PG
MCHC RBC AUTO-ENTMCNC: 31.8 G/DL
MCV RBC AUTO: 83 FL
MONOCYTES # BLD AUTO: 0.9 K/UL
MONOCYTES NFR BLD: 11.6 %
NEUTROPHILS # BLD AUTO: 4.2 K/UL
NEUTROPHILS NFR BLD: 57.4 %
NRBC BLD-RTO: 0 /100 WBC
PHOSPHATE SERPL-MCNC: 3.4 MG/DL
PLATELET # BLD AUTO: 223 K/UL
PMV BLD AUTO: 10.8 FL
POCT GLUCOSE: 109 MG/DL (ref 70–110)
POTASSIUM SERPL-SCNC: 5.3 MMOL/L
RBC # BLD AUTO: 5.35 M/UL
SODIUM SERPL-SCNC: 126 MMOL/L
WBC # BLD AUTO: 7.4 K/UL

## 2017-12-23 PROCEDURE — 99239 HOSP IP/OBS DSCHRG MGMT >30: CPT | Mod: ,,, | Performed by: INTERNAL MEDICINE

## 2017-12-23 PROCEDURE — 25000003 PHARM REV CODE 250: Performed by: INTERNAL MEDICINE

## 2017-12-23 PROCEDURE — 83880 ASSAY OF NATRIURETIC PEPTIDE: CPT

## 2017-12-23 PROCEDURE — 80069 RENAL FUNCTION PANEL: CPT

## 2017-12-23 PROCEDURE — 83735 ASSAY OF MAGNESIUM: CPT

## 2017-12-23 PROCEDURE — 36415 COLL VENOUS BLD VENIPUNCTURE: CPT

## 2017-12-23 PROCEDURE — 85025 COMPLETE CBC W/AUTO DIFF WBC: CPT

## 2017-12-23 RX ORDER — SODIUM CHLORIDE 9 MG/ML
INJECTION, SOLUTION INTRAVENOUS CONTINUOUS
Status: ACTIVE | OUTPATIENT
Start: 2017-12-23 | End: 2017-12-23

## 2017-12-23 RX ORDER — ISOSORBIDE DINITRATE AND HYDRALAZINE HYDROCHLORIDE 37.5; 2 MG/1; MG/1
1 TABLET ORAL 3 TIMES DAILY
Qty: 90 TABLET | Refills: 11 | Status: ON HOLD | OUTPATIENT
Start: 2017-12-23 | End: 2018-08-02 | Stop reason: HOSPADM

## 2017-12-23 RX ORDER — FUROSEMIDE 20 MG/1
40 TABLET ORAL DAILY
Qty: 60 TABLET | Refills: 11 | Status: ON HOLD | OUTPATIENT
Start: 2017-12-23 | End: 2018-11-07 | Stop reason: SDUPTHER

## 2017-12-23 RX ORDER — METOPROLOL TARTRATE 50 MG/1
50 TABLET ORAL 2 TIMES DAILY
Qty: 60 TABLET | Refills: 11 | Status: SHIPPED | OUTPATIENT
Start: 2017-12-23 | End: 2018-07-19 | Stop reason: SDUPTHER

## 2017-12-23 RX ORDER — AMIODARONE HYDROCHLORIDE 200 MG/1
200 TABLET ORAL 2 TIMES DAILY
Qty: 42 TABLET | Refills: 11 | Status: ON HOLD | OUTPATIENT
Start: 2017-12-23 | End: 2018-06-25 | Stop reason: HOSPADM

## 2017-12-23 RX ORDER — METOPROLOL TARTRATE 50 MG/1
50 TABLET ORAL 2 TIMES DAILY
Qty: 60 TABLET | Refills: 0 | Status: SHIPPED | OUTPATIENT
Start: 2017-12-23 | End: 2017-12-23

## 2017-12-23 RX ADMIN — APIXABAN 5 MG: 5 TABLET, FILM COATED ORAL at 08:12

## 2017-12-23 RX ADMIN — METOPROLOL TARTRATE 50 MG: 25 TABLET ORAL at 08:12

## 2017-12-23 RX ADMIN — SODIUM CHLORIDE: 0.9 INJECTION, SOLUTION INTRAVENOUS at 08:12

## 2017-12-23 RX ADMIN — HYDRALAZINE HYDROCHLORIDE AND ISOSORBIDE DINITRATE 1 TABLET: 37.5; 2 TABLET, FILM COATED ORAL at 01:12

## 2017-12-23 RX ADMIN — HYDRALAZINE HYDROCHLORIDE AND ISOSORBIDE DINITRATE 1 TABLET: 37.5; 2 TABLET, FILM COATED ORAL at 05:12

## 2017-12-23 RX ADMIN — BENZONATATE 100 MG: 100 CAPSULE ORAL at 12:12

## 2017-12-23 RX ADMIN — AMIODARONE HYDROCHLORIDE 200 MG: 200 TABLET ORAL at 08:12

## 2017-12-23 NOTE — PROGRESS NOTES
"[facility] Hospital Medicine  Progress Note    Patient Name: Dacia Martínez  MRN: [id number]  Patient Class: IP- Inpatient   Admission Date: 12/14/2017  Length of Stay: 7 days  Attending Physician: Rubi Dotson MD  Primary Care Provider: Cali Vickers MD    [facility] Medicine Team: [facility]OSP MED D Rubi Dotson MD    Subjective:     Principal Problem:Acute on chronic systolic heart failure    HPI:  "82F h/o a fib, CKD, HTN, CAD presenting initially with URI symptoms and dyspnea. Pt reports has had recent worsening of LE swelling, mild dyspnea, 1-2 pillow orthopnea, no PND over last few weeks however developed URI symptoms in last few days which is what brought her to ED with congestion, mild cough. While in ED pulse noted to be >150 and noted patient in Afib with RVR, patient reports some palpitations with this episode. Pt denies F/C/N/V."    Hospital Course:  No notes on file    Interval History: Patient with no events overnight, no complaints.  Data Review: Results recorded below were reviewed 12/22/2017.    Review of Systems   Constitutional: Negative for fever.   Respiratory: Negative for shortness of breath.    Cardiovascular: Negative for chest pain.     Objective:     Vital Signs (Most Recent):  Temp: 98.6 °F (37 °C) (12/22/17 2014)  Pulse: (!) 112 (12/22/17 2014)  Resp: 16 (12/22/17 2014)  BP: 108/67 (12/22/17 2014)  SpO2: (!) 94 % (12/22/17 2014) Vital Signs (24h Range):  Temp:  [97.4 °F (36.3 °C)-98.6 °F (37 °C)] 98.6 °F (37 °C)  Pulse:  [] 112  Resp:  [16-18] 16  SpO2:  [94 %-97 %] 94 %  BP: ()/(51-74) 108/67     Weight: 59.4 kg (130 lb 15.3 oz)  Body mass index is 24.74 kg/m².    Intake/Output Summary (Last 24 hours) at 12/22/17 7652  Last data filed at 12/22/17 1400   Gross per 24 hour   Intake              425 ml   Output              300 ml   Net              125 ml      Physical Exam   Constitutional: She appears well-developed.   HENT:   Head: Normocephalic. "   Mouth/Throat: Mucous membranes are not cyanotic.   Eyes: Conjunctivae and lids are normal.   Neck: Neck supple.   Cardiovascular: Normal rate, S1 normal and S2 normal.  An irregularly irregular rhythm present.   Pulmonary/Chest: Effort normal and breath sounds normal.   Abdominal: Soft. Bowel sounds are normal. There is no tenderness.   Musculoskeletal: She exhibits edema.   Neurological: She is alert. She is not disoriented.   Skin: She is not diaphoretic. No cyanosis. No pallor. Nails show no clubbing.   Psychiatric: She has a normal mood and affect.       Significant Labs:   A1C:     Recent Labs  Lab 12/15/17  0409   HGBA1C 6.4*     CBC:     Recent Labs  Lab 12/21/17  0432 12/22/17  0601   WBC 9.05 9.01   HGB 15.0 14.9   HCT 47.8 46.2    242     CMP:     Recent Labs  Lab 12/21/17  0432 12/21/17  1536 12/22/17  0601 12/22/17  1310   * 131* 130* 129*   K 4.3 4.4 4.0 3.8   CL 92* 91* 90* 90*   CO2 25 29 27 26    136* 84 101   BUN 24* 28* 31* 32*   CREATININE 1.5* 1.8* 1.7* 1.7*   CALCIUM 9.3 9.1 8.9 8.9   PROT  --   --   --  7.4   ALBUMIN 2.7*  --  2.6* 2.5*   BILITOT  --   --   --  2.1*   ALKPHOS  --   --   --  66   AST  --   --   --  23   ALT  --   --   --  13   ANIONGAP 15 11 13 13   EGFRNONAA 32.4* 26.0* 27.9* 27.9*     Magnesium:     Recent Labs  Lab 12/21/17  0043 12/21/17  0432 12/22/17  0601   MG 1.7 1.7 2.0     POCT Glucose:     Recent Labs  Lab 12/22/17  1151 12/22/17  1644 12/22/17  2122   POCTGLUCOSE 102 110 114*     Assessment/Plan:      Acute systolic heart failure     Cardiology consulted.  Furosemide 40 mg IV BID changed to furosemide 10 gtt/hr until 12/19/2017  EF of 60% decreased to 10%, likely tachycardia related.  Increased diuresis per Cardiology's recommendations due to worsening renal function and hyponatremia.  Now appears euvolemic to hypovolemic by Echo; decreasing Lasix to 40 mg po daily.       Atrial fibrillation with RVR     Patient with persistent A-fib, on  Eliquis, presented in A-fib with RVR, s/p diltiazem with initial improvement in HR.  -Metoprolol 100 BID  Changed metoprolol 50 mg BID  Loaded digoxin as per Cardiology recommendations, then started digoxin 0.125 mg daily. Held due to elevated level then restarted digoxin 0.125 mg daily with recheck level in three days pending.  -Continue Eliquis  -REJI/DCCV 12/20/17 but patient did not remain in NSR.   Digoxin discontinued and maintenance amiodarone started.   Amiodarone load per Cardiology.  Follow up with EP as outpatient. Consider ischemic evaluation after sinus rhythm maintained.       Essential hypertension     Stable, continue to monitor       Type 2 diabetes mellitus with stage 3 chronic kidney disease, without long-term current use of insulin     HgbA1c 6.4%  Not on active treatment including diet or medication at home  Will continue diabetic diet for now.       Shortness of breath     Patient presenting with mild dyspnea, orthopnea, LE edema, no PND, concern for heart failure, s/p episode A-fib with RVR in ED. Patient with reported CAD history. EKG without acute changes.   -Diuresis with IV Lasix   -Strict I/O  -SOB likely heart failure related. No leukocytosis or fever.   - Trial of Tessalon perles prn and guaifenesin/dextromethorphan prn       VTE Risk Mitigation         Ordered     apixaban tablet 5 mg  2 times daily     Route:  Oral        12/15/17 0030     Medium Risk of VTE  Once      12/15/17 0030              Rubi Dotson MD  Department of Hospital Medicine   [facility]

## 2017-12-23 NOTE — DISCHARGE SUMMARY
"[facility] Hospital Medicine  Discharge Summary      Patient Name: Dacia Martínez  MRN: [id number]  Admission Date: 12/14/2017  Hospital Length of Stay: 8 days  Discharge Date and Time: 12/23/2017  3:01 PM  Attending Physician: Rubi Dotson MD   Discharging Provider: Rubi Dotson MD  Primary Care Provider: Cali Vickers MD  [facility] Medicine Team: [facility] HOSP MED D Rubi Dotson MD    HPI:   "82F h/o a fib, CKD, HTN, CAD presenting initially with URI symptoms and dyspnea. Pt reports has had recent worsening of LE swelling, mild dyspnea, 1-2 pillow orthopnea, no PND over last few weeks however developed URI symptoms in last few days which is what brought her to ED with congestion, mild cough. While in ED pulse noted to be >150 and noted patient in Afib with RVR, patient reports some palpitations with this episode. Pt denies F/C/N/V."    Procedure(s) (LRB):  TRANSESOPHAGEAL ECHOCARDIOGRAM (REJI) (N/A)      Hospital Course:      Acute systolic heart failure     Cardiology consulted.  Furosemide 40 mg IV BID changed to furosemide 10 gtt/hr until 12/19/2017  EF of 60% decreased to 10% on admission, likely tachycardia related.  Increased diuresis per Cardiology's recommendations due to worsening renal function and hyponatremia.  Now appears euvolemic to hypovolemic by Echo; decreasing Lasix to 40 mg po daily.       Atrial fibrillation with RVR     Patient with persistent A-fib, on Eliquis, presented in A-fib with RVR, s/p diltiazem with initial improvement in HR.  -Metoprolol 100 BID  Changed metoprolol 50 mg BID  Loaded digoxin as per Cardiology recommendations, then started digoxin 0.125 mg daily. Held due to elevated level then restarted digoxin 0.125 mg daily with plan to recheck level.  -Continued Eliquis  -REJI/CV 12/20/17 but patient did not remain in NSR.   Digoxin discontinued and maintenance amiodarone started.   Amiodarone load per Cardiology: 200 mg po BID x total of 14 days followed " by 200 mg po QD   Follow up with EP as outpatient. Consider ischemic evaluation after sinus rhythm maintained.       Essential hypertension     Stable, continue to monitor       Type 2 diabetes mellitus with stage 3 chronic kidney disease, without long-term current use of insulin     HgbA1c 6.4%  Not on active treatment including diet or medication at home  Will continue diabetic diet for now.       Shortness of breath     Patient presenting with mild dyspnea, orthopnea, LE edema, no PND, concern for heart failure, s/p episode A-fib with RVR in ED. Patient with reported CAD history. EKG without acute changes.   -Diuresis with IV Lasix   -Strict I/O  -SOB likely heart failure related. No leukocytosis or fever.   - Trial of Tessalon perles prn and guaifenesin/dextromethorphan prn        Consults:   Consults         Status Ordering Provider     Inpatient consult to Cardiology  Once     Provider:  (Not yet assigned)    Completed PAUL SHIRLEY     Inpatient consult to Electrophysiology  Once     Provider:  (Not yet assigned)    Completed YI ZEPEDA          Final Active Diagnoses:    Diagnosis Date Noted POA    PRINCIPAL PROBLEM:  Acute on chronic systolic heart failure [I50.23] 12/15/2017 Yes    Atrial fibrillation with RVR [I48.91] 07/08/2017 Yes    Essential hypertension [I10] 01/10/2013 Yes    Type 2 diabetes mellitus with stage 3 chronic kidney disease, without long-term current use of insulin [E11.22, N18.3] 11/02/2016 Yes    Shortness of breath [R06.02] 12/15/2017 Yes    Upper respiratory infection [J06.9] 12/15/2017 Yes    Tachycardia [R00.0] 12/15/2017 Yes      Problems Resolved During this Admission:    Diagnosis Date Noted Date Resolved POA       Discharged Condition: stable    Disposition: Home or Self Care    Follow Up:  Follow-up Information     Jacques Burt MD On 1/23/2018.    Specialties:  Electrophysiology, Cardiovascular Disease  Why:  Dr Burt's office will be contacting you to  "schedule a follow up appoint within 2 weeks. Please call the clinic if you have not received a call or appointment by 12/27  Contact information:  [address]  [facility] [address]  [phone]             Cali Vickers MD. Schedule an appointment as soon as possible for a visit in 3 weeks.    Specialty:  Family Medicine  Why:  For discharge from hospital follow up  Contact information:  [address]  [place] [address]  [phone]                 Patient Instructions:     BATH/SHOWER CHAIR FOR HOME USE   Order Specific Question Answer Comments   Height: 5' 1" (1.549 m)    Weight: 59.4 kg (130 lb 15.3 oz)    Does patient have medical equipment at home? none    Length of need (1-99 months): 99    Type: With back      CANE FOR HOME USE   Order Specific Question Answer Comments   Type of Cane: Straight    Height: 5' 1" (1.549 m)    Weight: 59.4 kg (130 lb 15.3 oz)    Does patient have medical equipment at home? none    Length of need (1-99 months): 99    Please check all that apply: Patient's condition impairs ambulation.    Vendor: Other (use comments) Pt received walker 06/19/2017   Expected Date of Delivery: 12/22/2017      Diet Adult Regular   Order Specific Question Answer Comments   Fluid restriction: Fluid - 1500mL    Additional restrictions: Cardiac (Low Na/Chol)    Additional restrictions: Diabetic 2000      Activity as tolerated     Notify your health care provider if you experience any of the following:  temperature >100.4     Notify your health care provider if you experience any of the following:  persistent nausea and vomiting or diarrhea     Notify your health care provider if you experience any of the following:  difficulty breathing or increased cough     Notify your health care provider if you experience any of the following:  persistent dizziness, light-headedness, or visual disturbances     Notify your health care provider if you experience any of the following:  increased " confusion or weakness       Significant Diagnostic Studies:  EF   Date Value Ref Range Status   12/22/2017 60 55 - 65    12/15/2017 10 (A) 55 - 65      Echocardiogram: 2D echo with color flow doppler:   Results for orders placed or performed during the hospital encounter of 12/14/17   2D echo with color flow doppler   Result Value Ref Range    EF 60 55 - 65    Est. PA Systolic Pressure 26.81     Mitral Valve Mobility NORMAL      Hemoglobin A1C   Date Value Ref Range Status   12/15/2017 6.4 (H) 4.0 - 5.6 % Final   06/05/2017 6.8 (H) 4.5 - 6.2 % Final     CBC:   Recent Labs  Lab 12/23/17  0512   WBC 7.40   RBC 5.35   HGB 14.1   HCT 44.3      MCV 83   MCH 26.4*   MCHC 31.8*     CMP:   Recent Labs  Lab 12/22/17  1310 12/23/17  0512    71   CALCIUM 8.9 8.8   ALBUMIN 2.5* 2.5*   PROT 7.4  --    * 126*   K 3.8 5.3*   CO2 26 24   CL 90* 89*   BUN 32* 30*   CREATININE 1.7* 1.6*   ALKPHOS 66  --    ALT 13  --    AST 23  --    BILITOT 2.1*  --      Microbiology Results (last 7 days)     Procedure Component Value Units Date/Time    Blood culture #2 [[id number]] Collected:  12/14/17 2305    Order Status:  Completed Specimen:  Blood from Peripheral, Forearm, Right Updated:  12/20/17 0612     Blood Culture, Routine No growth after 5 days.    Blood culture #1 [[id number]] Collected:  12/14/17 2255    Order Status:  Completed Specimen:  Blood from Peripheral, Antecubital, Right Updated:  12/20/17 0612     Blood Culture, Routine No growth after 5 days.        Imaging Results          X-Ray Chest PA And Lateral (Final result)  Result time 12/14/17 22:40:30    Final result by Bob Madrigal MD (12/14/17 22:40:30)                 Impression:     Stable cardiomegaly and central vascular congestion. No detrimental change when compared with 07/07/2017.      Electronically signed by: Bob Madrigal  Date:     12/14/17  Time:    22:40              Narrative:    EXAM:  2 VIEW CHEST RADIOGRAPH.     CLINICAL INDICATION:   Congestive heart failure.    COMPARISON: 07/07/2017.    TECHNIQUE:  Two views of the chest were obtained.     FINDINGS: Cardiac silhouette is enlarged but appears stable. There is central vascular congestion.  No focal consolidation.  Suspect small bilateral pleural effusions.                            Pending Diagnostic Studies:     None         Medications:  Reconciled Home Medications:   Current Discharge Medication List      START taking these medications    Details   amiodarone (PACERONE) 200 MG Tab Take 1 tablet (200 mg total) by mouth 2 (two) times daily. Start taking Daily on 1/4/18.  Qty: 42 tablet, Refills: 11    Comments: Refills should be for 1 month (#30)      diphenhydrAMINE-zinc acetate 1-0.1% (BENADRYL) cream Apply topically 2 (two) times daily as needed for Itching.  Refills: 0      isosorbide-hydrALAZINE 20-37.5 mg (BIDIL) 20-37.5 mg Tab Take 1 tablet by mouth 3 (three) times daily.  Qty: 90 tablet, Refills: 11         CONTINUE these medications which have CHANGED    Details   furosemide (LASIX) 20 MG tablet Take 2 tablets (40 mg total) by mouth once daily.  Qty: 60 tablet, Refills: 11      metoprolol tartrate (LOPRESSOR) 50 MG tablet Take 1 tablet (50 mg total) by mouth 2 (two) times daily.  Qty: 60 tablet, Refills: 11         CONTINUE these medications which have NOT CHANGED    Details   apixaban 5 mg Tab Take 1 tablet (5 mg total) by mouth 2 (two) times daily.  Qty: 180 tablet, Refills: 1    Associated Diagnoses: Persistent atrial fibrillation      hydrocodone-acetaminophen 5-325mg (NORCO) 5-325 mg per tablet Take 1 tablet by mouth every 6 (six) hours as needed for Pain.  Qty: 60 tablet, Refills: 0      predniSONE (DELTASONE) 10 MG tablet take 1 tablet by mouth daily if needed for gout  Qty: 30 tablet, Refills: 0      ACCU-CHEK FASTCLIX Misc 1 lancet by Misc.(Non-Drug; Combo Route) route 3 (three) times daily. Pt to test blood glucose up to 3 times daily.  Qty: 100 each, Refills: 11     "Comments: OK to change to lancets + fingerstick device that is covered by patient's insurance if Accuchek device is not covered  Associated Diagnoses: Type 2 diabetes mellitus with stage 3 chronic kidney disease, without long-term current use of insulin      blood sugar diagnostic (ACCU-CHEK TOMASA) Strp 1 strip by Misc.(Non-Drug; Combo Route) route once daily.  Qty: 100 strip, Refills: 11    Associated Diagnoses: Mild nonproliferative diabetic retinopathy of both eyes without macular edema associated with type 2 diabetes mellitus      triamcinolone acetonide 0.1% (KENALOG) 0.1 % ointment APPLY TOPICALLY TWO TIMES A DAY  Qty: 80 g, Refills: 0         STOP taking these medications       allopurinol (ZYLOPRIM) 300 MG tablet Comments:   Reason for Stopping:               Indwelling Lines/Drains at time of discharge:   Lines/Drains/Airways          No matching active lines, drains, or airways          Time spent on the discharge of patient: 50 minutes.  Included reviewing hospital course with patient/family, reviewing discharge medications, and arranging follow-up care.  Patient was seen and examined on the date of discharge and determined to be suitable for discharge.  Face to face services were provided on  12/23/2017   Physical Exam on 12/23/2017:  height is 5' 1" (1.549 m) and weight is 60.6 kg (133 lb 9.6 oz). Her oral temperature is 98.4 °F (36.9 °C). Her blood pressure is 108/59 (abnormal) and her pulse is 71. Her respiration is 18 and oxygen saturation is 99%.   Cardiovascular: Normal rate, S1 normal and S2 normal.  An irregularly irregular rhythm present.   Pulmonary/Chest: Effort normal and breath sounds normal.   Abdominal: Soft. Bowel sounds are normal. There is no tenderness.        Rubi Dotson MD  Department of Hospital Medicine  [facility]  "

## 2017-12-23 NOTE — ASSESSMENT & PLAN NOTE
Cardiology consulted.  Increased diuresis per Cardiology recommendations.  Now appears euvolemic to hypovolemic by Echo; decreasing Lasix to 40 mg po daily.

## 2017-12-23 NOTE — PLAN OF CARE
Problem: Patient Care Overview  Goal: Plan of Care Review  Outcome: Ongoing (interventions implemented as appropriate)  Plan of care discussed with patient, no new questions at this time. VSS, denies SOB, pain well managed. Pt still being monitored for A-fib. HR controlled and anticoagulation therapy continued. Possible d/c and follow up as an outpatient w/ EP. I's and O's being measured. Pt being diuresed w/ lasix. Safety precautions taken, no falls/trauma during the shift. Will continue to monitor.

## 2017-12-23 NOTE — DISCHARGE INSTRUCTIONS
Continue amiodarone 200 mg orally 2 times daily for total of 14 days (started on 12/21/2017) followed by 200 mg orally daily. Follow up with Dr Burt.

## 2017-12-23 NOTE — PROGRESS NOTES
Patient had echo with EF 60-65 %, IVC collapsing, RAP 3 mmHG-patient is adequately diuresed -Ok to discharge home-Will continue amiodarone 200 mg po BID x total of 14 days followed by 200 mg po QD and follow with Dr Burt.Will discharge on lasix 40 mg po Qd.Cardiology will sign off.

## 2017-12-26 ENCOUNTER — TELEPHONE (OUTPATIENT)
Dept: INTERNAL MEDICINE | Facility: CLINIC | Age: 82
End: 2017-12-26

## 2017-12-26 ENCOUNTER — PATIENT OUTREACH (OUTPATIENT)
Dept: ADMINISTRATIVE | Facility: CLINIC | Age: 82
End: 2017-12-26

## 2017-12-26 RX ORDER — BENZONATATE 100 MG/1
100 CAPSULE ORAL 3 TIMES DAILY PRN
Qty: 30 CAPSULE | Refills: 1 | Status: SHIPPED | OUTPATIENT
Start: 2017-12-26 | End: 2018-01-05

## 2017-12-26 NOTE — PATIENT INSTRUCTIONS

## 2017-12-26 NOTE — TELEPHONE ENCOUNTER
----- Message from Malcolm Nuñez sent at 12/26/2017  8:28 AM CST -----  Contact: Theodoria/Daughter/[phone] cell  Type: Rx    Name of medication(s): Tablets for cough    Is this a refill? New rx? New     Who prescribed medication?Unknown    Pharmacy Name, Phone, & Location[facility]     Comments:Pt's daughter states that while admitted to the hospital pt was given medication for a cough,she does not remember the name of the medication. She would like to speak with someone in the office if possible. Please call and advise.    Thank You

## 2017-12-27 ENCOUNTER — HOSPITAL ENCOUNTER (OUTPATIENT)
Dept: CARDIOLOGY | Facility: CLINIC | Age: 82
Discharge: HOME | End: 2017-12-27
Payer: MEDICARE

## 2017-12-27 ENCOUNTER — TELEPHONE (OUTPATIENT)
Dept: INTERNAL MEDICINE | Facility: CLINIC | Age: 82
End: 2017-12-27

## 2017-12-27 DIAGNOSIS — I48.91 ATRIAL FIBRILLATION, UNSPECIFIED TYPE: ICD-10-CM

## 2017-12-27 PROCEDURE — 93000 ELECTROCARDIOGRAM COMPLETE: CPT | Mod: S$GLB,,, | Performed by: INTERNAL MEDICINE

## 2017-12-27 RX ORDER — TRIAMCINOLONE ACETONIDE 1 MG/G
OINTMENT TOPICAL
Qty: 80 G | Refills: 0 | Status: SHIPPED | OUTPATIENT
Start: 2017-12-27 | End: 2018-03-19 | Stop reason: SDUPTHER

## 2017-12-28 ENCOUNTER — HOSPITAL ENCOUNTER (EMERGENCY)
Facility: HOSPITAL | Age: 82
Discharge: HOME | End: 2017-12-28
Attending: EMERGENCY MEDICINE
Payer: MEDICARE

## 2017-12-28 VITALS
RESPIRATION RATE: 18 BRPM | HEART RATE: 107 BPM | SYSTOLIC BLOOD PRESSURE: 137 MMHG | HEIGHT: 61 IN | WEIGHT: 145 LBS | DIASTOLIC BLOOD PRESSURE: 81 MMHG | BODY MASS INDEX: 27.38 KG/M2 | TEMPERATURE: 98 F | OXYGEN SATURATION: 100 %
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
## 2017-12-28 DIAGNOSIS — R21 RASH: ICD-10-CM

## 2017-12-28 DIAGNOSIS — B34.9 VIRAL SYNDROME: ICD-10-CM

## 2017-12-28 DIAGNOSIS — R05.9 COUGH: ICD-10-CM

## 2017-12-28 DIAGNOSIS — L29.9 ITCHING: Primary | ICD-10-CM

## 2017-12-28 PROCEDURE — 93010 ELECTROCARDIOGRAM REPORT: CPT | Mod: ,,, | Performed by: INTERNAL MEDICINE

## 2017-12-28 PROCEDURE — 93005 ELECTROCARDIOGRAM TRACING: CPT

## 2017-12-28 PROCEDURE — 99283 EMERGENCY DEPT VISIT LOW MDM: CPT | Mod: 25

## 2017-12-28 PROCEDURE — 99283 EMERGENCY DEPT VISIT LOW MDM: CPT | Mod: 27

## 2017-12-28 PROCEDURE — 99283 EMERGENCY DEPT VISIT LOW MDM: CPT | Mod: ,,, | Performed by: EMERGENCY MEDICINE

## 2017-12-28 PROCEDURE — 99284 EMERGENCY DEPT VISIT MOD MDM: CPT | Mod: ,,, | Performed by: EMERGENCY MEDICINE

## 2017-12-28 PROCEDURE — 25000003 PHARM REV CODE 250: Performed by: EMERGENCY MEDICINE

## 2017-12-28 RX ORDER — DIPHENHYDRAMINE HCL 25 MG
25 CAPSULE ORAL EVERY 6 HOURS PRN
Qty: 20 CAPSULE | Refills: 0 | Status: SHIPPED | OUTPATIENT
Start: 2017-12-28 | End: 2017-12-28

## 2017-12-28 RX ORDER — DIPHENHYDRAMINE HCL 25 MG
25 CAPSULE ORAL
Status: COMPLETED | OUTPATIENT
Start: 2017-12-28 | End: 2017-12-28

## 2017-12-28 RX ORDER — DIPHENHYDRAMINE HCL 25 MG
25 CAPSULE ORAL EVERY 6 HOURS PRN
Qty: 20 CAPSULE | Refills: 0 | Status: SHIPPED | OUTPATIENT
Start: 2017-12-28 | End: 2017-12-29 | Stop reason: ALTCHOICE

## 2017-12-28 RX ADMIN — DIPHENHYDRAMINE HYDROCHLORIDE 25 MG: 25 CAPSULE ORAL at 02:12

## 2017-12-28 NOTE — ED PROVIDER NOTES
Encounter Date: 2017       History     Chief Complaint   Patient presents with    Rash     rash present x5 days, pt states that rash is present everywhere, small red bumps noted to chest, pt reports itching      HPI     82-year-old -American female presents to the emergency department due to itching.  She states that for the last 2 weeks she's been having issues with itching.  She was being treated in the hospital while she was inpatient for a heart failure exacerbation.  She was given Benadryl cream and that seemed to be helping however she was recently prescribed steroid cream and that has not helped her itching.  She has not had any other new medications, or other new deodorants, sents, clothing.  She denies having any chest pain, nausea, vomiting, shortness of breath or abdominal pain.  She has not been taking anything but the cream for her itching.  And it got too bad for the hand today so she decided to come to the emergency department.    Review of patient's allergies indicates:  No Known Allergies  Past Medical History:   Diagnosis Date    A-fib     Arthritis     Asteroid hyalosis of left eye     Bilateral nonexudative age-related macular degeneration 6/3/2014    Breast cancer     Cataract     Chronic GERD 2017    CKD stage 3 due to type 2 diabetes mellitus     Coronary artery disease     Hypertension     Migraine headache     Mild nonproliferative diabetic retinopathy of both eyes 6/3/2014    Pneumonia     Type 2 diabetes mellitus with hyperglycemia     Vertigo      Past Surgical History:   Procedure Laterality Date    BREAST SURGERY      left lumpectomy    CARPAL TUNNEL RELEASE      left    CATARACT EXTRACTION      vance     SECTION      EYE SURGERY      cataracts bilaterally    HYSTERECTOMY      partial     Family History   Problem Relation Age of Onset    Cancer Mother      stomach    Heart disease Mother     Diabetes Father     Hypertension Father      Blindness Brother     Diabetes Brother     Hypertension Brother     Cataracts Sister     Diabetes Sister     Diabetes Brother     Diabetes Brother     Diabetes Brother     No Known Problems Daughter     No Known Problems Son     No Known Problems Daughter     Amblyopia Neg Hx     Glaucoma Neg Hx     Macular degeneration Neg Hx     Strabismus Neg Hx     Retinal detachment Neg Hx      Social History   Substance Use Topics    Smoking status: Never Smoker    Smokeless tobacco: Never Used    Alcohol use No      Comment: socially     Review of Systems   Constitutional: Negative for activity change and appetite change.   HENT: Negative for congestion and ear pain.    Eyes: Negative for pain and redness.   Respiratory: Negative for cough and shortness of breath.    Cardiovascular: Negative for chest pain and leg swelling.   Gastrointestinal: Negative for abdominal distention and abdominal pain.   Genitourinary: Negative for difficulty urinating and dysuria.   Musculoskeletal: Negative for arthralgias and back pain.   Skin: Positive for rash. Negative for color change and pallor.   Neurological: Negative for light-headedness and headaches.   All other systems reviewed and are negative.      Physical Exam     Initial Vitals [12/28/17 0107]   BP Pulse Resp Temp SpO2   137/81 107 18 97.9 °F (36.6 °C) 100 %      MAP       99.67         Physical Exam    Nursing note and vitals reviewed.  Constitutional: She appears well-developed and well-nourished. She is not diaphoretic. No distress.   HENT:   Head: Normocephalic and atraumatic.   Right Ear: External ear normal.   Left Ear: External ear normal.   Eyes: EOM are normal. Pupils are equal, round, and reactive to light.   Neck: Normal range of motion. Neck supple. No thyromegaly present.   Cardiovascular: Normal rate, regular rhythm, normal heart sounds and intact distal pulses. Exam reveals no friction rub.    No murmur heard.  Pulmonary/Chest: Breath sounds  normal. No respiratory distress. She has no wheezes.   Abdominal: Soft. Bowel sounds are normal. She exhibits no distension. There is no tenderness.   Musculoskeletal: Normal range of motion. She exhibits no edema or tenderness.   Neurological: She is alert and oriented to person, place, and time. She has normal strength. No cranial nerve deficit.   Skin: Skin is warm and dry. No erythema. No pallor.   Excoriations noted to the neck and upper back area.  No rash noted.  No erythema noted.  No swelling or macule/papule noted.         ED Course   Procedures  Labs Reviewed - No data to display       HOIII MDM  A/P: 82-year-old with itching.  Differential includes but is not limited to drug side effect, allergic reaction, dermatitis.  We will give a trial of Benadryl.  Patient has been having this for some time- at least 2 weeks-, low suspicion for ALLERGIC reaction.   Johann Mason PGY-3 U EM  01/02/2018 3:07 AM    Update:   Patient feels much better after Benadryl.  Patient is no longer itching.  We'll discharge with oral Benadryl and have the patient follow-up with her primary care doctor.  Patient agrees with plan.  Johann Mason PGY-3 U EM  01/02/2018 3:25 AM                    Attending Attestation:   Physician Attestation Statement for Resident:  As the supervising MD   Physician Attestation Statement: I have personally seen and examined this patient.   I agree with the above history. -:   As the supervising MD I agree with the above PE.    As the supervising MD I agree with the above treatment, course, plan, and disposition.   -: 82-year-old female presenting with itching to the anterior chest and upper back.  There is no evidence of shingles.  Excoriations noted to pruritic area  Patient has tried topical steroids with no improvement.  Patient treated with Benadryl.    Reports improvement of symptoms after Benadryl.  Patient stable for discharge to follow up with PCP                    ED Course       Clinical Impression:   The encounter diagnosis was Itching.    Disposition:   Disposition: Discharged  Condition: Stable                        Johann Mason MD  Resident  12/28/17 [id number]       Britany Miramontes MD  01/02/18 [id number]

## 2017-12-29 ENCOUNTER — HOSPITAL ENCOUNTER (EMERGENCY)
Facility: HOSPITAL | Age: 82
Discharge: HOME | End: 2017-12-29
Attending: EMERGENCY MEDICINE
Payer: MEDICARE

## 2017-12-29 VITALS
HEIGHT: 61 IN | BODY MASS INDEX: 27.38 KG/M2 | TEMPERATURE: 97 F | HEART RATE: 86 BPM | OXYGEN SATURATION: 100 % | SYSTOLIC BLOOD PRESSURE: 112 MMHG | WEIGHT: 145 LBS | DIASTOLIC BLOOD PRESSURE: 78 MMHG | RESPIRATION RATE: 18 BRPM

## 2017-12-29 DIAGNOSIS — R21 RASH: ICD-10-CM

## 2017-12-29 DIAGNOSIS — I48.19 PERSISTENT ATRIAL FIBRILLATION: ICD-10-CM

## 2017-12-29 DIAGNOSIS — N18.30 CKD (CHRONIC KIDNEY DISEASE) STAGE 3, GFR 30-59 ML/MIN: ICD-10-CM

## 2017-12-29 DIAGNOSIS — M1A.09X0 IDIOPATHIC CHRONIC GOUT OF MULTIPLE SITES WITHOUT TOPHUS: Primary | Chronic | ICD-10-CM

## 2017-12-29 DIAGNOSIS — B34.9 VIRAL SYNDROME: ICD-10-CM

## 2017-12-29 DIAGNOSIS — E11.3293 CONTROLLED TYPE 2 DIABETES MELLITUS WITH BOTH EYES AFFECTED BY MILD NONPROLIFERATIVE RETINOPATHY WITHOUT MACULAR EDEMA, WITHOUT LONG-TERM CURRENT USE OF INSULIN: ICD-10-CM
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
## 2017-12-29 DIAGNOSIS — R05.9 COUGH: ICD-10-CM

## 2017-12-29 DIAGNOSIS — L29.9 ITCHING: ICD-10-CM

## 2017-12-29 PROCEDURE — 25000003 PHARM REV CODE 250: Performed by: EMERGENCY MEDICINE

## 2017-12-29 RX ORDER — HYDROXYZINE HYDROCHLORIDE 10 MG/1
10 TABLET, FILM COATED ORAL
Status: COMPLETED | OUTPATIENT
Start: 2017-12-29 | End: 2017-12-29

## 2017-12-29 RX ORDER — HYDROXYZINE HYDROCHLORIDE 10 MG/1
10 TABLET, FILM COATED ORAL 3 TIMES DAILY PRN
Qty: 30 TABLET | Refills: 0 | Status: ON HOLD | OUTPATIENT
Start: 2017-12-29 | End: 2018-06-22

## 2017-12-29 RX ADMIN — HYDROXYZINE HYDROCHLORIDE 10 MG: 10 TABLET, FILM COATED ORAL at 01:12

## 2017-12-29 NOTE — PROVIDER PROGRESS NOTES - EMERGENCY DEPT.
Encounter Date: 12/28/2017    ED Physician Progress Notes         EKG - STEMI Decision  Initial Reading: No STEMI present.  Response: No Action Needed.

## 2017-12-29 NOTE — ED TRIAGE NOTES
Pt c/o of cough and sinus problem for 2 weeks.  Headache for a week. On and off dizziness and rashes on her anterior chest.      LOC: The patient is awake, alert, aware of environment with an appropriate affect. Oriented x4, speaking appropriately  APPEARANCE: Pt resting comfortably, in no acute distress, pt is clean and well groomed, clothing properly fastened  SKIN:The skin is warm and dry, color consistent with ethnicity, patient has normal skin turgor and moist mucus membranes, rashes on her anterior chest  RESPIRATORY:Airway is open and patent, respirations are spontaneous, patient has a normal effort and rate, no accessory muscle use noted.  NEUROLOGIC: PERRLA, facial expression is symmetrical, patient moving all extremities spontaneously, normal sensation in all extremities when touched with a finger.  Follows all commands appropriately  MUSCULOSKELETAL: Patient moving all extremities spontaneously, no obvious swelling or deformities noted.

## 2017-12-29 NOTE — ED PROVIDER NOTES
Encounter Date: 12/28/2017    SCRIBE #1 NOTE: I, Jazmin Mari, cuate scribing for, and in the presence of, Dr. Miramontes.       History     Chief Complaint   Patient presents with    Dizziness     presents to  ed c/o dizziness after taking benadryl rx - she was seen at [facility] earlier for itching - denies cp, sob, h/a, n/v/d, fever - fell at home onto knees , - loc - hitting head     Time patient was seen by the provider: 1:02 AM      This is a 82 y.o. female with a PMHx of vertigo, arthritis, HTN, migraine headache, DM type 2, CKD, pneumonia, CAD, AFIB and chronic GERD who presents to the ED with a chief complaint of dizziness x 1 day. Patient was in the ED early in the morning on 12/28 for itching, discharged with oral Benadryl. She reports that she took the medicine as prescribed, became dizzy and fell onto her knees. Per relative at bedside, the patient last took her medication at 6 PM and fell at approximately 8 PM . Denies striking her head, loss of consciousness. States she is in no pain at the current time, except for a headache. Additionally endorses nasal congestion, productive white cough. She states that her dizziness has resolved at the current time, as she feels the medicine is wearing off, but that her itchiness is returning.       The history is provided by the patient, medical records and a relative.     Review of patient's allergies indicates:  No Known Allergies  Past Medical History:   Diagnosis Date    A-fib     Arthritis     Asteroid hyalosis of left eye     Bilateral nonexudative age-related macular degeneration 6/3/2014    Breast cancer     Cataract     Chronic GERD 9/18/2017    CKD stage 3 due to type 2 diabetes mellitus     Coronary artery disease     Hypertension     Migraine headache     Mild nonproliferative diabetic retinopathy of both eyes 6/3/2014    Pneumonia     Type 2 diabetes mellitus with hyperglycemia     Vertigo      Past Surgical History:   Procedure Laterality Date     BREAST SURGERY      left lumpectomy    CARPAL TUNNEL RELEASE      left    CATARACT EXTRACTION      vance     SECTION      EYE SURGERY      cataracts bilaterally    HYSTERECTOMY      partial     Family History   Problem Relation Age of Onset    Cancer Mother      stomach    Heart disease Mother     Diabetes Father     Hypertension Father     Blindness Brother     Diabetes Brother     Hypertension Brother     Cataracts Sister     Diabetes Sister     Diabetes Brother     Diabetes Brother     Diabetes Brother     No Known Problems Daughter     No Known Problems Son     No Known Problems Daughter     Amblyopia Neg Hx     Glaucoma Neg Hx     Macular degeneration Neg Hx     Strabismus Neg Hx     Retinal detachment Neg Hx      Social History   Substance Use Topics    Smoking status: Never Smoker    Smokeless tobacco: Never Used    Alcohol use No      Comment: socially     Review of Systems   Constitutional: Negative for chills, diaphoresis and fever.   HENT: Positive for congestion.    Eyes: Negative for visual disturbance.   Respiratory: Positive for cough.    Cardiovascular: Negative for chest pain.   Gastrointestinal: Negative for abdominal pain, diarrhea, nausea and vomiting.   Musculoskeletal: Negative for arthralgias, back pain, gait problem, joint swelling and neck pain.   Neurological: Positive for dizziness (resolved) and headaches.   Hematological: Negative for adenopathy.   Psychiatric/Behavioral: Negative for behavioral problems.       Physical Exam     Initial Vitals [17]   BP Pulse Resp Temp SpO2   (!) 152/91 84 18 97.3 °F (36.3 °C) 100 %      MAP       111.33         Physical Exam    Nursing note and vitals reviewed.  Constitutional: She appears well-developed and well-nourished. She is not diaphoretic. No distress.   HENT:   Head: Normocephalic and atraumatic.   Mouth/Throat: Oropharynx is clear and moist.   Eyes: Pupils are equal, round, and reactive to  light.   Neck: Normal range of motion. Neck supple. No JVD present.   Cardiovascular: Normal rate, regular rhythm, normal heart sounds and intact distal pulses.   Pulmonary/Chest: Breath sounds normal. No respiratory distress. She has no wheezes. She has no rhonchi. She has no rales.   Abdominal: Soft. She exhibits no distension. There is no tenderness.   Musculoskeletal: Normal range of motion. She exhibits no edema.   Knees: No hematoma, no joint tenderness, full range of motion.   Lymphadenopathy:     She has no cervical adenopathy.   Neurological: She is alert and oriented to person, place, and time. She has normal strength. No cranial nerve deficit or sensory deficit.   Skin: Skin is warm and dry.   Small papules on anterior chest and back at the nape of the neck. No evidence of infection.         ED Course   Procedures  Labs Reviewed - No data to display  EKG Readings: (Independently Interpreted)   Rhythm: Atrial Fibrillation. Heart Rate: 98.   No ischemic changes.           Medical Decision Making:   History:   Old Medical Records: I decided to obtain old medical records.  Initial Assessment:   Urgent evaluation of 82 y.o. female with dizziness and fall. My initial differential diagnosis includes but is not limited to medication adverse effect, orthostatic hypotension, near-syncopal event, arhythmia. Patient's EKG is AFIB at a rate of 98, with no ischemic changes. Her orthostatic vitals are negative. She is well-appearing, in no acute distress.  She was ambulatory after the event with no ataxia or significant knee pain  There's no evidence of significant trauma. Will discontinue Benadryl and provide prescription for Atarax. Patient recommended to follow up with PCP.   Independently Interpreted Test(s):   I have ordered and independently interpreted EKG Reading(s) - see prior notes  Clinical Tests:   Medical Tests: Ordered and Reviewed            Scribe Attestation:   Scribe #1: I performed the above scribed  service and the documentation accurately describes the services I performed. I attest to the accuracy of the note.    Attending Attestation:           Physician Attestation for Scribe:      Comments: I, Dr. Britany Miramontes, personally performed the services described in this documentation. All medical record entries made by the scribe were at my direction and in my presence.  I have reviewed the chart and agree that the record reflects my personal performance and is accurate and complete. Britany Miramontes MD.  12:53 PM 01/02/2018              ED Course      Clinical Impression:   The primary encounter diagnosis was Fall, initial encounter. Diagnoses of Rash, Cough, and Viral syndrome were also pertinent to this visit.    Disposition:   Disposition: Discharged  Condition: Stable                        Britany Miramontes MD  01/02/18 0040

## 2017-12-31 ENCOUNTER — HOSPITAL ENCOUNTER (OUTPATIENT)
Facility: HOSPITAL | Age: 82
Discharge: HOME | End: 2018-01-01
Attending: EMERGENCY MEDICINE | Admitting: EMERGENCY MEDICINE
Payer: MEDICARE

## 2017-12-31 DIAGNOSIS — E11.22 TYPE 2 DIABETES MELLITUS WITH STAGE 3 CHRONIC KIDNEY DISEASE, WITHOUT LONG-TERM CURRENT USE OF INSULIN: ICD-10-CM

## 2017-12-31 DIAGNOSIS — M1A.09X0 IDIOPATHIC CHRONIC GOUT OF MULTIPLE SITES WITHOUT TOPHUS: Chronic | ICD-10-CM

## 2017-12-31 DIAGNOSIS — E87.5 HYPERKALEMIA: Primary | ICD-10-CM

## 2017-12-31 DIAGNOSIS — I10 ESSENTIAL HYPERTENSION: ICD-10-CM

## 2017-12-31 DIAGNOSIS — N18.30 TYPE 2 DIABETES MELLITUS WITH STAGE 3 CHRONIC KIDNEY DISEASE, WITHOUT LONG-TERM CURRENT USE OF INSULIN: ICD-10-CM

## 2017-12-31 DIAGNOSIS — E87.1 HYPONATREMIA: ICD-10-CM

## 2017-12-31 LAB
ALBUMIN SERPL BCP-MCNC: 3 G/DL
ALP SERPL-CCNC: 83 U/L
ALT SERPL W/O P-5'-P-CCNC: 26 U/L
ANION GAP SERPL CALC-SCNC: 8 MMOL/L
AST SERPL-CCNC: 32 U/L
BILIRUB SERPL-MCNC: 0.7 MG/DL
BUN SERPL-MCNC: 22 MG/DL
CALCIUM SERPL-MCNC: 9.1 MG/DL
CHLORIDE SERPL-SCNC: 95 MMOL/L
CO2 SERPL-SCNC: 24 MMOL/L
CREAT SERPL-MCNC: 1.7 MG/DL
EST. GFR  (AFRICAN AMERICAN): 32.1 ML/MIN/1.73 M^2
EST. GFR  (NON AFRICAN AMERICAN): 27.9 ML/MIN/1.73 M^2
GLUCOSE SERPL-MCNC: 134 MG/DL
POTASSIUM SERPL-SCNC: 5.8 MMOL/L
PROT SERPL-MCNC: 7.8 G/DL
SODIUM SERPL-SCNC: 127 MMOL/L

## 2017-12-31 PROCEDURE — 82962 GLUCOSE BLOOD TEST: CPT

## 2017-12-31 PROCEDURE — 93010 ELECTROCARDIOGRAM REPORT: CPT | Mod: ,,, | Performed by: INTERNAL MEDICINE

## 2017-12-31 PROCEDURE — 99285 EMERGENCY DEPT VISIT HI MDM: CPT | Mod: ,,, | Performed by: EMERGENCY MEDICINE

## 2017-12-31 PROCEDURE — 80053 COMPREHEN METABOLIC PANEL: CPT

## 2017-12-31 PROCEDURE — 99284 EMERGENCY DEPT VISIT MOD MDM: CPT | Mod: 25

## 2017-12-31 PROCEDURE — 96374 THER/PROPH/DIAG INJ IV PUSH: CPT

## 2017-12-31 PROCEDURE — 93005 ELECTROCARDIOGRAM TRACING: CPT

## 2017-12-31 RX ORDER — FUROSEMIDE 10 MG/ML
60 INJECTION INTRAMUSCULAR; INTRAVENOUS
Status: COMPLETED | OUTPATIENT
Start: 2017-12-31 | End: 2018-01-01

## 2018-01-01 VITALS
TEMPERATURE: 97 F | OXYGEN SATURATION: 100 % | HEIGHT: 61 IN | DIASTOLIC BLOOD PRESSURE: 58 MMHG | BODY MASS INDEX: 27.38 KG/M2 | HEART RATE: 69 BPM | SYSTOLIC BLOOD PRESSURE: 102 MMHG | RESPIRATION RATE: 16 BRPM | WEIGHT: 145 LBS

## 2018-01-01 PROBLEM — E87.1 HYPONATREMIA: Status: ACTIVE | Noted: 2018-01-01

## 2018-01-01 PROBLEM — N18.30 CKD (CHRONIC KIDNEY DISEASE) STAGE 3, GFR 30-59 ML/MIN: Status: ACTIVE | Noted: 2018-01-01

## 2018-01-01 PROBLEM — L29.9 ITCHING: Status: ACTIVE | Noted: 2018-01-01

## 2018-01-01 PROBLEM — E87.5 HYPERKALEMIA: Status: ACTIVE | Noted: 2018-01-01

## 2018-01-01 LAB
ALBUMIN SERPL BCP-MCNC: 2.7 G/DL
ALP SERPL-CCNC: 75 U/L
ALT SERPL W/O P-5'-P-CCNC: 24 U/L
ANION GAP SERPL CALC-SCNC: 9 MMOL/L
AST SERPL-CCNC: 33 U/L
BASOPHILS # BLD AUTO: 0.09 K/UL
BASOPHILS NFR BLD: 1 %
BILIRUB SERPL-MCNC: 0.7 MG/DL
BILIRUB UR QL STRIP: NEGATIVE
BNP SERPL-MCNC: 674 PG/ML
BUN SERPL-MCNC: 20 MG/DL
CALCIUM SERPL-MCNC: 8.8 MG/DL
CHLORIDE SERPL-SCNC: 98 MMOL/L
CLARITY UR REFRACT.AUTO: CLEAR
CO2 SERPL-SCNC: 22 MMOL/L
COLOR UR AUTO: ABNORMAL
CREAT SERPL-MCNC: 1.5 MG/DL
DIFFERENTIAL METHOD: ABNORMAL
EOSINOPHIL # BLD AUTO: 0.1 K/UL
EOSINOPHIL NFR BLD: 1.5 %
ERYTHROCYTE [DISTWIDTH] IN BLOOD BY AUTOMATED COUNT: 16.8 %
EST. GFR  (AFRICAN AMERICAN): 37.4 ML/MIN/1.73 M^2
EST. GFR  (NON AFRICAN AMERICAN): 32.4 ML/MIN/1.73 M^2
GLUCOSE SERPL-MCNC: 120 MG/DL
GLUCOSE UR QL STRIP: NEGATIVE
HCT VFR BLD AUTO: 43.7 %
HGB BLD-MCNC: 14 G/DL
HGB UR QL STRIP: NEGATIVE
IMM GRANULOCYTES # BLD AUTO: 0.04 K/UL
IMM GRANULOCYTES NFR BLD AUTO: 0.4 %
KETONES UR QL STRIP: NEGATIVE
LEUKOCYTE ESTERASE UR QL STRIP: ABNORMAL
LYMPHOCYTES # BLD AUTO: 1.9 K/UL
LYMPHOCYTES NFR BLD: 20.4 %
MAGNESIUM SERPL-MCNC: 1.8 MG/DL
MCH RBC QN AUTO: 26.3 PG
MCHC RBC AUTO-ENTMCNC: 32 G/DL
MCV RBC AUTO: 82 FL
MICROSCOPIC COMMENT: NORMAL
MONOCYTES # BLD AUTO: 0.9 K/UL
MONOCYTES NFR BLD: 9.4 %
NEUTROPHILS # BLD AUTO: 6.3 K/UL
NEUTROPHILS NFR BLD: 67.3 %
NITRITE UR QL STRIP: NEGATIVE
NRBC BLD-RTO: 0 /100 WBC
OSMOLALITY UR: 279 MOSM/KG
PH UR STRIP: 7 [PH] (ref 5–8)
PHOSPHATE SERPL-MCNC: 4.2 MG/DL
PLATELET # BLD AUTO: 377 K/UL
PMV BLD AUTO: 10.5 FL
POCT GLUCOSE: 141 MG/DL (ref 70–110)
POCT GLUCOSE: 86 MG/DL (ref 70–110)
POTASSIUM SERPL-SCNC: 4.9 MMOL/L
POTASSIUM SERPL-SCNC: 6.5 MMOL/L
PROT SERPL-MCNC: 7.6 G/DL
PROT UR QL STRIP: NEGATIVE
RBC # BLD AUTO: 5.33 M/UL
RBC #/AREA URNS AUTO: 1 /HPF (ref 0–4)
SODIUM SERPL-SCNC: 129 MMOL/L
SODIUM UR-SCNC: 73 MMOL/L
SP GR UR STRIP: 1 (ref 1–1.03)
SQUAMOUS #/AREA URNS AUTO: 2 /HPF
URATE SERPL-MCNC: 9 MG/DL
URN SPEC COLLECT METH UR: ABNORMAL
UROBILINOGEN UR STRIP-ACNC: NEGATIVE EU/DL
WBC # BLD AUTO: 9.38 K/UL
WBC #/AREA URNS AUTO: 5 /HPF (ref 0–5)

## 2018-01-01 PROCEDURE — 63600175 PHARM REV CODE 636 W HCPCS: Performed by: EMERGENCY MEDICINE

## 2018-01-01 PROCEDURE — 63600175 PHARM REV CODE 636 W HCPCS: Performed by: PHYSICIAN ASSISTANT

## 2018-01-01 PROCEDURE — 83735 ASSAY OF MAGNESIUM: CPT

## 2018-01-01 PROCEDURE — G0378 HOSPITAL OBSERVATION PER HR: HCPCS

## 2018-01-01 PROCEDURE — 84132 ASSAY OF SERUM POTASSIUM: CPT | Mod: 91

## 2018-01-01 PROCEDURE — 84100 ASSAY OF PHOSPHORUS: CPT

## 2018-01-01 PROCEDURE — 25000003 PHARM REV CODE 250: Performed by: NURSE PRACTITIONER

## 2018-01-01 PROCEDURE — 99220 PR INITIAL OBSERVATION CARE,LEVL III: CPT | Mod: ,,, | Performed by: NURSE PRACTITIONER

## 2018-01-01 PROCEDURE — 83935 ASSAY OF URINE OSMOLALITY: CPT

## 2018-01-01 PROCEDURE — 84300 ASSAY OF URINE SODIUM: CPT

## 2018-01-01 PROCEDURE — 84550 ASSAY OF BLOOD/URIC ACID: CPT

## 2018-01-01 PROCEDURE — 99217 PR OBSERVATION CARE DISCHARGE: CPT | Mod: ,,, | Performed by: PHYSICIAN ASSISTANT

## 2018-01-01 PROCEDURE — 81001 URINALYSIS AUTO W/SCOPE: CPT

## 2018-01-01 PROCEDURE — 80053 COMPREHEN METABOLIC PANEL: CPT

## 2018-01-01 PROCEDURE — 85025 COMPLETE CBC W/AUTO DIFF WBC: CPT

## 2018-01-01 PROCEDURE — 36415 COLL VENOUS BLD VENIPUNCTURE: CPT

## 2018-01-01 PROCEDURE — 25000003 PHARM REV CODE 250: Performed by: EMERGENCY MEDICINE

## 2018-01-01 PROCEDURE — 83880 ASSAY OF NATRIURETIC PEPTIDE: CPT

## 2018-01-01 RX ORDER — AMOXICILLIN 250 MG
1 CAPSULE ORAL 2 TIMES DAILY PRN
Status: DISCONTINUED | OUTPATIENT
Start: 2018-01-01 | End: 2018-01-01 | Stop reason: HOSPADM

## 2018-01-01 RX ORDER — ACETAMINOPHEN 325 MG/1
650 TABLET ORAL EVERY 6 HOURS PRN
Status: DISCONTINUED | OUTPATIENT
Start: 2018-01-01 | End: 2018-01-01 | Stop reason: HOSPADM

## 2018-01-01 RX ORDER — AMIODARONE HYDROCHLORIDE 200 MG/1
200 TABLET ORAL 2 TIMES DAILY
Status: DISCONTINUED | OUTPATIENT
Start: 2018-01-01 | End: 2018-01-01 | Stop reason: HOSPADM

## 2018-01-01 RX ORDER — ONDANSETRON 8 MG/1
8 TABLET, ORALLY DISINTEGRATING ORAL EVERY 8 HOURS PRN
Status: DISCONTINUED | OUTPATIENT
Start: 2018-01-01 | End: 2018-01-01 | Stop reason: HOSPADM

## 2018-01-01 RX ORDER — ONDANSETRON 2 MG/ML
4 INJECTION INTRAMUSCULAR; INTRAVENOUS EVERY 8 HOURS PRN
Status: DISCONTINUED | OUTPATIENT
Start: 2018-01-01 | End: 2018-01-01 | Stop reason: HOSPADM

## 2018-01-01 RX ORDER — IPRATROPIUM BROMIDE AND ALBUTEROL SULFATE 2.5; .5 MG/3ML; MG/3ML
3 SOLUTION RESPIRATORY (INHALATION) EVERY 4 HOURS PRN
Status: DISCONTINUED | OUTPATIENT
Start: 2018-01-01 | End: 2018-01-01 | Stop reason: HOSPADM

## 2018-01-01 RX ORDER — INSULIN ASPART 100 [IU]/ML
0-5 INJECTION, SOLUTION INTRAVENOUS; SUBCUTANEOUS
Status: DISCONTINUED | OUTPATIENT
Start: 2018-01-01 | End: 2018-01-01 | Stop reason: HOSPADM

## 2018-01-01 RX ORDER — FUROSEMIDE 40 MG/1
40 TABLET ORAL DAILY
Status: DISCONTINUED | OUTPATIENT
Start: 2018-01-01 | End: 2018-01-01 | Stop reason: HOSPADM

## 2018-01-01 RX ORDER — IBUPROFEN 200 MG
24 TABLET ORAL
Status: DISCONTINUED | OUTPATIENT
Start: 2018-01-01 | End: 2018-01-01 | Stop reason: HOSPADM

## 2018-01-01 RX ORDER — GLUCAGON 1 MG
1 KIT INJECTION
Status: DISCONTINUED | OUTPATIENT
Start: 2018-01-01 | End: 2018-01-01 | Stop reason: HOSPADM

## 2018-01-01 RX ORDER — PREDNISONE 10 MG/1
10 TABLET ORAL ONCE
Status: COMPLETED | OUTPATIENT
Start: 2018-01-01 | End: 2018-01-01

## 2018-01-01 RX ORDER — DOXEPIN HYDROCHLORIDE 10 MG/1
10 CAPSULE ORAL 3 TIMES DAILY PRN
Status: DISCONTINUED | OUTPATIENT
Start: 2018-01-01 | End: 2018-01-01 | Stop reason: HOSPADM

## 2018-01-01 RX ORDER — ISOSORBIDE DINITRATE AND HYDRALAZINE HYDROCHLORIDE 37.5; 2 MG/1; MG/1
1 TABLET ORAL 3 TIMES DAILY
Status: DISCONTINUED | OUTPATIENT
Start: 2018-01-01 | End: 2018-01-01 | Stop reason: HOSPADM

## 2018-01-01 RX ORDER — SODIUM CHLORIDE 0.9 % (FLUSH) 0.9 %
5 SYRINGE (ML) INJECTION
Status: DISCONTINUED | OUTPATIENT
Start: 2018-01-01 | End: 2018-01-01 | Stop reason: HOSPADM

## 2018-01-01 RX ORDER — METOPROLOL TARTRATE 50 MG/1
50 TABLET ORAL 2 TIMES DAILY
Status: DISCONTINUED | OUTPATIENT
Start: 2018-01-01 | End: 2018-01-01 | Stop reason: HOSPADM

## 2018-01-01 RX ORDER — IBUPROFEN 200 MG
16 TABLET ORAL
Status: DISCONTINUED | OUTPATIENT
Start: 2018-01-01 | End: 2018-01-01 | Stop reason: HOSPADM

## 2018-01-01 RX ADMIN — HYDRALAZINE HYDROCHLORIDE AND ISOSORBIDE DINITRATE 1 TABLET: 37.5; 2 TABLET, FILM COATED ORAL at 05:01

## 2018-01-01 RX ADMIN — FUROSEMIDE 40 MG: 40 TABLET ORAL at 09:01

## 2018-01-01 RX ADMIN — ACETAMINOPHEN 650 MG: 325 TABLET ORAL at 05:01

## 2018-01-01 RX ADMIN — APIXABAN 5 MG: 5 TABLET, FILM COATED ORAL at 08:01

## 2018-01-01 RX ADMIN — METOPROLOL TARTRATE 50 MG: 50 TABLET ORAL at 08:01

## 2018-01-01 RX ADMIN — SODIUM POLYSTYRENE SULFONATE 15 G: 15 SUSPENSION ORAL; RECTAL at 12:01

## 2018-01-01 RX ADMIN — AMIODARONE HYDROCHLORIDE 200 MG: 200 TABLET ORAL at 08:01

## 2018-01-01 RX ADMIN — PREDNISONE 10 MG: 10 TABLET ORAL at 11:01

## 2018-01-01 RX ADMIN — FUROSEMIDE 60 MG: 10 INJECTION, SOLUTION INTRAMUSCULAR; INTRAVENOUS at 12:01

## 2018-01-01 RX ADMIN — DOXEPIN HYDROCHLORIDE 10 MG: 10 CAPSULE ORAL at 06:01

## 2018-01-01 NOTE — PROGRESS NOTES
Discharge instructions reviewed and signed with pt and granddaughter.  Appointments reviewed with both, who both verbalized understanding.  All questions answered.  PIV d'c'd with cath tip intact and discarded.  Pt currently awaiting transport.

## 2018-01-01 NOTE — ED NOTES
Pt reports itching beginning before faustino. Pt reports itching and burning on both feet, itching to upper back and buttucks. Pt reports benadryl oral medication and cream non-effective on irritation. Pt reports recent medication change of allopurinol being discontinued.

## 2018-01-01 NOTE — ASSESSMENT & PLAN NOTE
- unclear etiology   - unresponsive to hydrocortisone, hydroxyzine, and diphenhydramine   - will add doxepin 10 mg TID PRN

## 2018-01-01 NOTE — ED NOTES
Pt identifiers checked and accurate with wristband.   LOC: The patient is awake, alert and aware of environment with an appropriate affect, the patient is oriented x 3 and speaking appropriately.  APPEARANCE: Patient resting comfortably and in no acute distress, patient is clean and well groomed  SKIN: The skin is warm and dry, color consistent with ethnicity, patient has normal skin turgor and moist mucus membranes. Pt presents with healing sores from pt scratching herself to relieve irritation to upper back and feet.   MUSCULOSKELETAL: Patient moving all extremities well, no obvious swelling or deformities noted.   RESPIRATORY: Airway is open and patent, breath sounds clear throughout all lung fields; respirations are spontaneous, patient has a normal effort and rate, no accessory muscle use noted. Pt denies SOB, throat swelling   CARDIAC: Patient has no peripheral edema noted, capillary refill < 3 seconds. No complaints of chest pain   ABDOMEN: Soft and non tender to palpation, no distention noted. Bowel sounds present x 4  NEUROLOGIC: PERRL, 3 mm bilaterally, eyes open spontaneously, behavior appropriate to situation, follows commands, facial expression symmetrical, bilateral hand grasp equal and even, purposeful motor response noted, normal sensation in all extremities when touched with a finger.

## 2018-01-01 NOTE — ED PROVIDER NOTES
"Encounter Date: 2017       History     Chief Complaint   Patient presents with    Itching     Pt has "acid in her blood and it's making her itch" according to family member. Hx of gout. No other symptoms reported.      This is an 82-year-old female with history of A. fib, diabetes, GERD who presents with recurrent episode of itchiness.  The itchiness started on her back and has spread to her anterior abdomen as well as her hands.  Patient was seen in the ER recently for this issue and was prescribed Benadryl without relief.  Patient states that she feels her symptoms are getting worse.  She denies any new medication, and in no soap, or detergent or food.  She denies chest pain, shortness of breath, abdominal pain, fever chills, urinary or bowel complaints.          Review of patient's allergies indicates:  No Known Allergies  Past Medical History:   Diagnosis Date    A-fib     Arthritis     Asteroid hyalosis of left eye     Bilateral nonexudative age-related macular degeneration 6/3/2014    Breast cancer     Cataract     Chronic GERD 2017    CKD stage 3 due to type 2 diabetes mellitus     Coronary artery disease     Hypertension     Migraine headache     Mild nonproliferative diabetic retinopathy of both eyes 6/3/2014    Pneumonia     Type 2 diabetes mellitus with hyperglycemia     Vertigo      Past Surgical History:   Procedure Laterality Date    BREAST SURGERY      left lumpectomy    CARPAL TUNNEL RELEASE      left    CATARACT EXTRACTION      vance     SECTION      EYE SURGERY      cataracts bilaterally    HYSTERECTOMY      partial     Family History   Problem Relation Age of Onset    Cancer Mother      stomach    Heart disease Mother     Diabetes Father     Hypertension Father     Blindness Brother     Diabetes Brother     Hypertension Brother     Cataracts Sister     Diabetes Sister     Diabetes Brother     Diabetes Brother     Diabetes Brother     No Known " Problems Daughter     No Known Problems Son     No Known Problems Daughter     Amblyopia Neg Hx     Glaucoma Neg Hx     Macular degeneration Neg Hx     Strabismus Neg Hx     Retinal detachment Neg Hx      Social History   Substance Use Topics    Smoking status: Never Smoker    Smokeless tobacco: Never Used    Alcohol use No      Comment: socially     Review of Systems   Constitutional: Negative for fever.   HENT: Negative for sore throat.    Respiratory: Negative for chest tightness, shortness of breath, wheezing and stridor.    Cardiovascular: Negative for chest pain.   Gastrointestinal: Negative for nausea.   Genitourinary: Negative for dysuria.   Musculoskeletal: Negative for back pain.   Skin: Negative for rash.   Neurological: Negative for weakness.   Hematological: Does not bruise/bleed easily.       Physical Exam     Initial Vitals [12/31/17 2008]   BP Pulse Resp Temp SpO2   (!) 139/92 101 18 97.8 °F (36.6 °C) 100 %      MAP       107.67         Physical Exam    Constitutional: She is not diaphoretic. No distress.   HENT:   Head: Normocephalic and atraumatic.   Eyes: EOM are normal. Pupils are equal, round, and reactive to light.   Neck: Normal range of motion.   Cardiovascular: Normal rate, regular rhythm and normal heart sounds. Exam reveals no gallop and no friction rub.    No murmur heard.  Pulmonary/Chest: Breath sounds normal. No respiratory distress. She has no wheezes. She has no rhonchi. She has no rales.   Abdominal: Soft. Bowel sounds are normal. She exhibits no distension. There is no tenderness. There is no rebound and no guarding.   Musculoskeletal: She exhibits no edema or tenderness.   Neurological: She is alert and oriented to person, place, and time.   Skin: Skin is warm.         ED Course   Procedures  Labs Reviewed   COMPREHENSIVE METABOLIC PANEL - Abnormal; Notable for the following:        Result Value    Sodium 127 (*)     Potassium 5.8 (*)     Glucose 134 (*)     Creatinine  1.7 (*)     Albumin 3.0 (*)     eGFR if  32.1 (*)     eGFR if non  27.9 (*)     All other components within normal limits   URINALYSIS, REFLEX TO URINE CULTURE - Abnormal; Notable for the following:     Leukocytes, UA Trace (*)     All other components within normal limits    Narrative:     Preferred Collection Type->Urine, Clean Catch   CBC W/ AUTO DIFFERENTIAL - Abnormal; Notable for the following:     MCH 26.3 (*)     RDW 16.8 (*)     Platelets 377 (*)     All other components within normal limits   B-TYPE NATRIURETIC PEPTIDE - Abnormal; Notable for the following:      (*)     All other components within normal limits   COMPREHENSIVE METABOLIC PANEL - Abnormal; Notable for the following:     Sodium 129 (*)     Potassium 6.5 (*)     CO2 22 (*)     Glucose 120 (*)     Creatinine 1.5 (*)     Albumin 2.7 (*)     eGFR if  37.4 (*)     eGFR if non  32.4 (*)     All other components within normal limits   POCT GLUCOSE - Abnormal; Notable for the following:     POCT Glucose 141 (*)     All other components within normal limits   CBC W/ AUTO DIFFERENTIAL   SODIUM, URINE, RANDOM    Narrative:     Preferred Collection Type->Urine, Clean Catch   OSMOLALITY, URINE RANDOM    Narrative:     Preferred Collection Type->Urine, Clean Catch   B-TYPE NATRIURETIC PEPTIDE   URINALYSIS MICROSCOPIC    Narrative:     Preferred Collection Type->Urine, Clean Catch   MAGNESIUM   PHOSPHORUS   POTASSIUM     EKG Readings: (Independently Interpreted)   EKG: Afib at 97bpm, no DIA's or STD's, non-specific twave pattern, no STEMI                  APC / Resident Notes:   This is an 82-year-old female who presents with a chief complaint of itchiness.     Patient has failed Benadryl.  We decided to get a CMP determine if there is any any liver dysfunction that could explain her symptoms.  Patient is found to be hyperkalemic.  EKG is ordered and demonstrated no ischemia or peak T  waves.  Patient is given Kayexalate and we determine it would be appropriate to bring the patient in for observation considering the hyperkalemia is new with no known cause.    Manoj Jones MD   Emergency Medicine PGY 3  5:46 AM 1/1/2018             Attending Attestation:   Physician Attestation Statement for Resident:  As the supervising MD   Physician Attestation Statement: I have personally seen and examined this patient.   I agree with the above history. -:   As the supervising MD I agree with the above PE.    As the supervising MD I agree with the above treatment, course, plan, and disposition.   -: Pt presents with itching refractory to benadry, benadryl cream and steroid cream.  Unsure of etiology of itching but did not feel was life threatening.  Will try doxepin 10mg PO for itching.  Patient also hnoted to have hyponatremia and worsening hyperkalemia on her labs.  No EKG changes of hyperkalemia, but it did increase from prior.  Given lasix and kayexalate in the ED for tx of hyperkalemia.  Felt she warranted observation on medicine for further workup of etiology of hyponatremia and hyperkalemia and trending of potassium to make sure going down.  Medicine accepted.  I have reviewed and agree with the residents interpretation of the following: lab data and EKG.  I have reviewed the following: old records at this facility.                    ED Course      Clinical Impression:   The primary encounter diagnosis was Hyperkalemia. Diagnoses of Hyponatremia, Essential hypertension, Idiopathic chronic gout of multiple sites without tophus, and Type 2 diabetes mellitus with stage 3 chronic kidney disease, without long-term current use of insulin were also pertinent to this visit.                           Manoj Jones MD  Resident  01/01/18 0546       Jonatan Aguilar MD  01/01/18 2020

## 2018-01-01 NOTE — HPI
81 y/o female, who returns to the ED for ongoing evaluation of itching.   She was seen in the ED on 12/28/17 for similar complaints and was discharged home with Benadryl.  She reports that itching started 2 weeks ago while she was hospitalized for a heart failure exacerbation.  She has tried hydrocortisone cream and Benadryl cream / PO with no improvement.  She states that the itching / burning feels like acid is building up under the skin.  She has marking of excoriation r/t itching but no definable rash.   She denies changes in detergent or soap and reports the only change in her medications is discontinuation of allopurinol.  She denies fever, chills, CP, SOB, N/V/D, or  symptoms.  Initial workup reveals potassium of 5.8 (previously 5.3) and sodium of 127 (baseline 126-130).  She was given IV furosemide and PO kayexalate for her hyperkalemia.  She has a PMH of CHF, a fib, CKD, HTN, gout, and CAD.

## 2018-01-01 NOTE — ASSESSMENT & PLAN NOTE
- sodium 127 with initial labs (baseline 126-130)  - repeat chemistry pending   - f/u on results with recent furosemide administration

## 2018-01-01 NOTE — H&P
"[facility] Hospital Medicine  History & Physical    Patient Name: Dacia Martínez  MRN: [id number]  Admission Date: 12/31/2017  Attending Physician: Jonatan Aguilar MD   Primary Care Provider: Cali Vickers MD    [facility] Medicine Team: [facility] E Bharath Zhou NP     Patient information was obtained from patient, relative(s) and ER records.     Subjective:     Principal Problem:Hyperkalemia    Chief Complaint:   Chief Complaint   Patient presents with    Itching     Pt has "acid in her blood and it's making her itch" according to family member. Hx of gout. No other symptoms reported.         HPI: 83 y/o female, who returns to the ED for ongoing evaluation of itching.   She was seen in the ED on 12/28/17 for similar complaints and was discharged home with Benadryl.  She reports that itching started 2 weeks ago while she was hospitalized for a heart failure exacerbation.  She has tried hydrocortisone cream and Benadryl cream / PO with no improvement.  She states that the itching / burning feels like acid is building up under the skin.  She has marking of excoriation r/t itching but no definable rash.   She denies changes in detergent or soap and reports the only change in her medications is discontinuation of allopurinol.  She denies fever, chills, CP, SOB, N/V/D, or  symptoms.  Initial workup reveals potassium of 5.8 (previously 5.3) and sodium of 127 (baseline 126-130).  She was given IV furosemide and PO kayexalate for her hyperkalemia.  She has a PMH of CHF, a fib, CKD, HTN, gout, and CAD.     Past Medical History:   Diagnosis Date    A-fib     Arthritis     Asteroid hyalosis of left eye     Bilateral nonexudative age-related macular degeneration 6/3/2014    Breast cancer     Cataract     Chronic GERD 9/18/2017    CKD stage 3 due to type 2 diabetes mellitus     Coronary artery disease     Hypertension     Migraine headache     Mild nonproliferative diabetic retinopathy of both " eyes 6/3/2014    Pneumonia     Type 2 diabetes mellitus with hyperglycemia     Vertigo        Past Surgical History:   Procedure Laterality Date    BREAST SURGERY      left lumpectomy    CARPAL TUNNEL RELEASE      left    CATARACT EXTRACTION      vance     SECTION      EYE SURGERY      cataracts bilaterally    HYSTERECTOMY      partial       Review of patient's allergies indicates:  No Known Allergies    No current facility-administered medications on file prior to encounter.      Current Outpatient Prescriptions on File Prior to Encounter   Medication Sig    ACCU-CHEK FASTCLIX Misc 1 lancet by Misc.(Non-Drug; Combo Route) route 3 (three) times daily. Pt to test blood glucose up to 3 times daily.    amiodarone (PACERONE) 200 MG Tab Take 1 tablet (200 mg total) by mouth 2 (two) times daily. Start taking Daily on 18.    apixaban 5 mg Tab Take 1 tablet (5 mg total) by mouth 2 (two) times daily.    benzonatate (TESSALON) 100 MG capsule Take 1 capsule (100 mg total) by mouth 3 (three) times daily as needed for Cough.    blood sugar diagnostic (ACCU-CHEK TOMASA) Strp 1 strip by Misc.(Non-Drug; Combo Route) route once daily.    diphenhydrAMINE-zinc acetate 1-0.1% (BENADRYL) cream Apply topically 2 (two) times daily as needed for Itching.    furosemide (LASIX) 20 MG tablet Take 2 tablets (40 mg total) by mouth once daily.    hydrocodone-acetaminophen 5-325mg (NORCO) 5-325 mg per tablet Take 1 tablet by mouth every 6 (six) hours as needed for Pain.    hydrOXYzine HCl (ATARAX) 10 MG Tab Take 1 tablet (10 mg total) by mouth 3 (three) times daily as needed for Itching.    isosorbide-hydrALAZINE 20-37.5 mg (BIDIL) 20-37.5 mg Tab Take 1 tablet by mouth 3 (three) times daily.    metoprolol tartrate (LOPRESSOR) 50 MG tablet Take 1 tablet (50 mg total) by mouth 2 (two) times daily.    predniSONE (DELTASONE) 10 MG tablet take 1 tablet by mouth daily if needed for gout    triamcinolone acetonide 0.1%  (KENALOG) 0.1 % ointment APPLY TOPICALLY TWO TIMES A DAY     Family History     Problem Relation (Age of Onset)    Blindness Brother    Cancer Mother    Cataracts Sister    Diabetes Father, Brother, Sister, Brother, Brother, Brother    Heart disease Mother    Hypertension Father, Brother    No Known Problems Daughter, Son, Daughter        Social History Main Topics    Smoking status: Never Smoker    Smokeless tobacco: Never Used    Alcohol use No      Comment: socially    Drug use: No    Sexual activity: Not Currently     Review of Systems   Constitutional: Negative for chills and fever.   HENT: Negative for congestion.    Eyes: Negative for visual disturbance.   Respiratory: Negative for cough, chest tightness and shortness of breath.    Cardiovascular: Negative for chest pain, palpitations and leg swelling.   Gastrointestinal: Negative for abdominal distention, abdominal pain, diarrhea, nausea and vomiting.   Genitourinary: Negative for dysuria and frequency.   Musculoskeletal: Negative for arthralgias and myalgias.   Skin: Positive for rash.        Reports continues itching / burning like there is acid under her skin   Neurological: Negative for seizures and syncope.     Objective:     Vital Signs (Most Recent):  Temp: 98.3 °F (36.8 °C) (01/01/18 0220)  Pulse: 103 (01/01/18 0220)  Resp: 20 (01/01/18 0220)  BP: 115/68 (01/01/18 0220)  SpO2: 95 % (01/01/18 0220) Vital Signs (24h Range):  Temp:  [97.8 °F (36.6 °C)-98.5 °F (36.9 °C)] 98.3 °F (36.8 °C)  Pulse:  [101-104] 103  Resp:  [18-20] 20  SpO2:  [95 %-100 %] 95 %  BP: (115-181)/(68-97) 115/68     Weight: 65.8 kg (145 lb)  Body mass index is 27.4 kg/m².    Physical Exam   Constitutional: She is oriented to person, place, and time. She appears well-developed and well-nourished. No distress.   HENT:   Head: Normocephalic and atraumatic.   Eyes: EOM are normal. Pupils are equal, round, and reactive to light.   Neck: Normal range of motion. Neck supple.    Cardiovascular: Normal rate, normal heart sounds and intact distal pulses.  An irregularly irregular rhythm present.   No murmur heard.  Pulmonary/Chest: Effort normal and breath sounds normal. No respiratory distress. She has no wheezes. She has no rales.   Abdominal: Soft. Bowel sounds are normal. She exhibits no distension. There is no tenderness.   Musculoskeletal: Normal range of motion. She exhibits no edema.   Neurological: She is alert and oriented to person, place, and time.   Skin: Skin is warm and dry. No rash noted. She is not diaphoretic.   Psychiatric: She has a normal mood and affect. Her behavior is normal.   Nursing note and vitals reviewed.        CRANIAL NERVES     CN III, IV, VI   Pupils are equal, round, and reactive to light.  Extraocular motions are normal.        Significant Labs:   CBC:   Recent Labs  Lab 01/01/18  0007   WBC 9.38   HGB 14.0   HCT 43.7   *     CMP:   Recent Labs  Lab 12/31/17  2205   *   K 5.8*   CL 95   CO2 24   *   BUN 22   CREATININE 1.7*   CALCIUM 9.1   PROT 7.8   ALBUMIN 3.0*   BILITOT 0.7   ALKPHOS 83   AST 32   ALT 26   ANIONGAP 8   EGFRNONAA 27.9*     Cardiac Markers:   Recent Labs  Lab 01/01/18  0008   *     Urine Studies:   Recent Labs  Lab 01/01/18  0023   COLORU Straw   APPEARANCEUA Clear   PHUR 7.0   SPECGRAV 1.005   PROTEINUA Negative   GLUCUA Negative   KETONESU Negative   BILIRUBINUA Negative   OCCULTUA Negative   NITRITE Negative   UROBILINOGEN Negative   LEUKOCYTESUR Trace*   RBCUA 1   WBCUA 5   SQUAMEPITHEL 2       Significant Imaging: None    Assessment/Plan:     * Hyperkalemia    - K 5.8 on arrival - previously 5.3  - denies changes in dietary habits   - EKG with no obvious tenting of T waves  - received 60 mg furosemide and 15 Gm Kayexalate while in ED  - repeat chemistry pending        CKD (chronic kidney disease) stage 3, GFR 30-59 ml/min    - Crt 1.7 (baseline 1.6-1.8)  - strict I/O's  - daily Wt  - avoid nephrotoxins if  possible         Persistent atrial fibrillation    - rate ranging in the low 100's  - resume home amiodarone and metoprolol as directed  - continue apixaban as prescribed   - maintain on telemetry         Type 2 diabetes mellitus with stage 3 chronic kidney disease, without long-term current use of insulin    - ADA diet  - ISS AC / HS        Itching    - unclear etiology   - unresponsive to hydrocortisone, hydroxyzine, and diphenhydramine   - will add doxepin 10 mg TID PRN        Hyponatremia    - sodium 127 with initial labs (baseline 126-130)  - repeat chemistry pending   - f/u on results with recent furosemide administration         Idiopathic chronic gout of multiple sites without tophus    - continue prednisone PRN as needed  - allopurinol recently discontinue with prior admission         Essential hypertension    - resume home antihypertensive regimen           VTE Risk Mitigation         Ordered     apixaban tablet 5 mg  2 times daily     Route:  Oral        01/01/18 0444     Medium Risk of VTE  Once      01/01/18 0444     Place BRITANY hose  Until discontinued      01/01/18 0444     Place sequential compression device  Until discontinued      01/01/18 0444     Reason for No Pharmacological VTE Prophylaxis  Once      01/01/18 0444             Bharath Zhou NP  Department of Hospital Medicine   [facility]

## 2018-01-01 NOTE — SUBJECTIVE & OBJECTIVE
Past Medical History:   Diagnosis Date    A-fib     Arthritis     Asteroid hyalosis of left eye     Bilateral nonexudative age-related macular degeneration 6/3/2014    Breast cancer     Cataract     Chronic GERD 2017    CKD stage 3 due to type 2 diabetes mellitus     Coronary artery disease     Hypertension     Migraine headache     Mild nonproliferative diabetic retinopathy of both eyes 6/3/2014    Pneumonia     Type 2 diabetes mellitus with hyperglycemia     Vertigo        Past Surgical History:   Procedure Laterality Date    BREAST SURGERY      left lumpectomy    CARPAL TUNNEL RELEASE      left    CATARACT EXTRACTION      vance     SECTION      EYE SURGERY      cataracts bilaterally    HYSTERECTOMY      partial       Review of patient's allergies indicates:  No Known Allergies    No current facility-administered medications on file prior to encounter.      Current Outpatient Prescriptions on File Prior to Encounter   Medication Sig    ACCU-CHEK FASTCLIX Misc 1 lancet by Misc.(Non-Drug; Combo Route) route 3 (three) times daily. Pt to test blood glucose up to 3 times daily.    amiodarone (PACERONE) 200 MG Tab Take 1 tablet (200 mg total) by mouth 2 (two) times daily. Start taking Daily on 18.    apixaban 5 mg Tab Take 1 tablet (5 mg total) by mouth 2 (two) times daily.    benzonatate (TESSALON) 100 MG capsule Take 1 capsule (100 mg total) by mouth 3 (three) times daily as needed for Cough.    blood sugar diagnostic (ACCU-CHEK TOMASA) Strp 1 strip by Misc.(Non-Drug; Combo Route) route once daily.    diphenhydrAMINE-zinc acetate 1-0.1% (BENADRYL) cream Apply topically 2 (two) times daily as needed for Itching.    furosemide (LASIX) 20 MG tablet Take 2 tablets (40 mg total) by mouth once daily.    hydrocodone-acetaminophen 5-325mg (NORCO) 5-325 mg per tablet Take 1 tablet by mouth every 6 (six) hours as needed for Pain.    hydrOXYzine HCl (ATARAX) 10 MG Tab Take 1 tablet (10  mg total) by mouth 3 (three) times daily as needed for Itching.    isosorbide-hydrALAZINE 20-37.5 mg (BIDIL) 20-37.5 mg Tab Take 1 tablet by mouth 3 (three) times daily.    metoprolol tartrate (LOPRESSOR) 50 MG tablet Take 1 tablet (50 mg total) by mouth 2 (two) times daily.    predniSONE (DELTASONE) 10 MG tablet take 1 tablet by mouth daily if needed for gout    triamcinolone acetonide 0.1% (KENALOG) 0.1 % ointment APPLY TOPICALLY TWO TIMES A DAY     Family History     Problem Relation (Age of Onset)    Blindness Brother    Cancer Mother    Cataracts Sister    Diabetes Father, Brother, Sister, Brother, Brother, Brother    Heart disease Mother    Hypertension Father, Brother    No Known Problems Daughter, Son, Daughter        Social History Main Topics    Smoking status: Never Smoker    Smokeless tobacco: Never Used    Alcohol use No      Comment: socially    Drug use: No    Sexual activity: Not Currently     Review of Systems   Constitutional: Negative for chills and fever.   HENT: Negative for congestion.    Eyes: Negative for visual disturbance.   Respiratory: Negative for cough, chest tightness and shortness of breath.    Cardiovascular: Negative for chest pain, palpitations and leg swelling.   Gastrointestinal: Negative for abdominal distention, abdominal pain, diarrhea, nausea and vomiting.   Genitourinary: Negative for dysuria and frequency.   Musculoskeletal: Negative for arthralgias and myalgias.   Skin: Positive for rash.        Reports continues itching / burning like there is acid under her skin   Neurological: Negative for seizures and syncope.     Objective:     Vital Signs (Most Recent):  Temp: 98.3 °F (36.8 °C) (01/01/18 0220)  Pulse: 103 (01/01/18 0220)  Resp: 20 (01/01/18 0220)  BP: 115/68 (01/01/18 0220)  SpO2: 95 % (01/01/18 0220) Vital Signs (24h Range):  Temp:  [97.8 °F (36.6 °C)-98.5 °F (36.9 °C)] 98.3 °F (36.8 °C)  Pulse:  [101-104] 103  Resp:  [18-20] 20  SpO2:  [95 %-100 %] 95  %  BP: (115-181)/(68-97) 115/68     Weight: 65.8 kg (145 lb)  Body mass index is 27.4 kg/m².    Physical Exam   Constitutional: She is oriented to person, place, and time. She appears well-developed and well-nourished. No distress.   HENT:   Head: Normocephalic and atraumatic.   Eyes: EOM are normal. Pupils are equal, round, and reactive to light.   Neck: Normal range of motion. Neck supple.   Cardiovascular: Normal rate, normal heart sounds and intact distal pulses.  An irregularly irregular rhythm present.   No murmur heard.  Pulmonary/Chest: Effort normal and breath sounds normal. No respiratory distress. She has no wheezes. She has no rales.   Abdominal: Soft. Bowel sounds are normal. She exhibits no distension. There is no tenderness.   Musculoskeletal: Normal range of motion. She exhibits no edema.   Neurological: She is alert and oriented to person, place, and time.   Skin: Skin is warm and dry. No rash noted. She is not diaphoretic.   Psychiatric: She has a normal mood and affect. Her behavior is normal.   Nursing note and vitals reviewed.        CRANIAL NERVES     CN III, IV, VI   Pupils are equal, round, and reactive to light.  Extraocular motions are normal.        Significant Labs:   CBC:   Recent Labs  Lab 01/01/18  0007   WBC 9.38   HGB 14.0   HCT 43.7   *     CMP:   Recent Labs  Lab 12/31/17  2205   *   K 5.8*   CL 95   CO2 24   *   BUN 22   CREATININE 1.7*   CALCIUM 9.1   PROT 7.8   ALBUMIN 3.0*   BILITOT 0.7   ALKPHOS 83   AST 32   ALT 26   ANIONGAP 8   EGFRNONAA 27.9*     Cardiac Markers:   Recent Labs  Lab 01/01/18  0008   *     Urine Studies:   Recent Labs  Lab 01/01/18  0023   COLORU Straw   APPEARANCEUA Clear   PHUR 7.0   SPECGRAV 1.005   PROTEINUA Negative   GLUCUA Negative   KETONESU Negative   BILIRUBINUA Negative   OCCULTUA Negative   NITRITE Negative   UROBILINOGEN Negative   LEUKOCYTESUR Trace*   RBCUA 1   WBCUA 5   SQUAMEPITHEL 2       Significant Imaging:  None

## 2018-01-01 NOTE — ASSESSMENT & PLAN NOTE
- K 5.8 on arrival - previously 5.3  - denies changes in dietary habits   - EKG with no obvious tenting of T waves  - received 60 mg furosemide and 15 Gm Kayexalate while in ED  - repeat chemistry pending

## 2018-01-01 NOTE — ED NOTES
Pt transported to room 653 A via wheelchair with nurse Family notified of room number  Pt condition stable on transport , Pt belonigings are with pt Pt has tele box [id number] WAR room notified of pt leaving the ED

## 2018-01-01 NOTE — ASSESSMENT & PLAN NOTE
- rate ranging in the low 100's  - resume home amiodarone and metoprolol as directed  - continue apixaban as prescribed   - maintain on telemetry

## 2018-01-02 ENCOUNTER — HOSPITAL ENCOUNTER (EMERGENCY)
Facility: HOSPITAL | Age: 83
Discharge: HOME | End: 2018-01-03
Attending: EMERGENCY MEDICINE
Payer: MEDICARE

## 2018-01-02 ENCOUNTER — OFFICE VISIT (OUTPATIENT)
Dept: RHEUMATOLOGY | Facility: CLINIC | Age: 83
End: 2018-01-02
Payer: MEDICARE

## 2018-01-02 VITALS
SYSTOLIC BLOOD PRESSURE: 129 MMHG | DIASTOLIC BLOOD PRESSURE: 73 MMHG | WEIGHT: 145 LBS | HEART RATE: 91 BPM | BODY MASS INDEX: 27.4 KG/M2

## 2018-01-02 DIAGNOSIS — Z79.891 LONG TERM CURRENT USE OF OPIATE ANALGESIC: ICD-10-CM

## 2018-01-02 DIAGNOSIS — R00.2 PALPITATIONS: ICD-10-CM

## 2018-01-02 DIAGNOSIS — E87.5 HYPERKALEMIA: ICD-10-CM

## 2018-01-02 DIAGNOSIS — M15.9 PRIMARY OSTEOARTHRITIS INVOLVING MULTIPLE JOINTS: Chronic | ICD-10-CM

## 2018-01-02 DIAGNOSIS — E11.3293 CONTROLLED TYPE 2 DIABETES MELLITUS WITH BOTH EYES AFFECTED BY MILD NONPROLIFERATIVE RETINOPATHY WITHOUT MACULAR EDEMA, WITHOUT LONG-TERM CURRENT USE OF INSULIN: ICD-10-CM

## 2018-01-02 DIAGNOSIS — L29.9 PRURITUS: Primary | ICD-10-CM

## 2018-01-02 DIAGNOSIS — E11.22 TYPE 2 DIABETES MELLITUS WITH STAGE 3 CHRONIC KIDNEY DISEASE, WITHOUT LONG-TERM CURRENT USE OF INSULIN: ICD-10-CM

## 2018-01-02 DIAGNOSIS — N18.30 TYPE 2 DIABETES MELLITUS WITH STAGE 3 CHRONIC KIDNEY DISEASE, WITHOUT LONG-TERM CURRENT USE OF INSULIN: ICD-10-CM

## 2018-01-02 DIAGNOSIS — M1A.09X0 IDIOPATHIC CHRONIC GOUT OF MULTIPLE SITES WITHOUT TOPHUS: Chronic | ICD-10-CM

## 2018-01-02 DIAGNOSIS — H57.13 EYE PAIN, BILATERAL: ICD-10-CM

## 2018-01-02 DIAGNOSIS — R06.02 SOB (SHORTNESS OF BREATH): Primary | ICD-10-CM

## 2018-01-02 LAB
ALBUMIN SERPL BCP-MCNC: 3.1 G/DL
ALP SERPL-CCNC: 83 U/L
ALT SERPL W/O P-5'-P-CCNC: 27 U/L
ANION GAP SERPL CALC-SCNC: 11 MMOL/L
AST SERPL-CCNC: 37 U/L
BASOPHILS # BLD AUTO: 0.09 K/UL
BASOPHILS NFR BLD: 1.1 %
BILIRUB SERPL-MCNC: 0.6 MG/DL
BILIRUB UR QL STRIP: NEGATIVE
BUN SERPL-MCNC: 23 MG/DL
CALCIUM SERPL-MCNC: 9.4 MG/DL
CHLORIDE SERPL-SCNC: 98 MMOL/L
CLARITY UR REFRACT.AUTO: CLEAR
CO2 SERPL-SCNC: 22 MMOL/L
COLOR UR AUTO: YELLOW
CREAT SERPL-MCNC: 1.7 MG/DL
DIFFERENTIAL METHOD: ABNORMAL
EOSINOPHIL # BLD AUTO: 0.1 K/UL
EOSINOPHIL NFR BLD: 1.6 %
ERYTHROCYTE [DISTWIDTH] IN BLOOD BY AUTOMATED COUNT: 18.6 %
EST. GFR  (AFRICAN AMERICAN): 32.1 ML/MIN/1.73 M^2
EST. GFR  (NON AFRICAN AMERICAN): 27.9 ML/MIN/1.73 M^2
GLUCOSE SERPL-MCNC: 141 MG/DL
GLUCOSE UR QL STRIP: NEGATIVE
HCT VFR BLD AUTO: 44.1 %
HGB BLD-MCNC: 14.1 G/DL
HGB UR QL STRIP: NEGATIVE
IMM GRANULOCYTES # BLD AUTO: 0.02 K/UL
IMM GRANULOCYTES NFR BLD AUTO: 0.2 %
INR PPP: 1.1
KETONES UR QL STRIP: NEGATIVE
LEUKOCYTE ESTERASE UR QL STRIP: NEGATIVE
LYMPHOCYTES # BLD AUTO: 2.5 K/UL
LYMPHOCYTES NFR BLD: 30.8 %
MCH RBC QN AUTO: 26.4 PG
MCHC RBC AUTO-ENTMCNC: 32 G/DL
MCV RBC AUTO: 82 FL
MONOCYTES # BLD AUTO: 0.6 K/UL
MONOCYTES NFR BLD: 7.4 %
NEUTROPHILS # BLD AUTO: 4.8 K/UL
NEUTROPHILS NFR BLD: 58.9 %
NITRITE UR QL STRIP: NEGATIVE
NRBC BLD-RTO: 0 /100 WBC
PH UR STRIP: 7 [PH] (ref 5–8)
PLATELET # BLD AUTO: 379 K/UL
PMV BLD AUTO: 11.1 FL
POTASSIUM SERPL-SCNC: 5.5 MMOL/L
PROT SERPL-MCNC: 8.5 G/DL
PROT UR QL STRIP: NEGATIVE
PROTHROMBIN TIME: 11.7 SEC
RBC # BLD AUTO: 5.35 M/UL
SODIUM SERPL-SCNC: 131 MMOL/L
SP GR UR STRIP: 1 (ref 1–1.03)
TROPONIN I SERPL DL<=0.01 NG/ML-MCNC: 0.03 NG/ML
URN SPEC COLLECT METH UR: NORMAL
UROBILINOGEN UR STRIP-ACNC: NEGATIVE EU/DL
WBC # BLD AUTO: 8.12 K/UL

## 2018-01-02 PROCEDURE — 25000003 PHARM REV CODE 250: Performed by: PHYSICIAN ASSISTANT

## 2018-01-02 PROCEDURE — 99284 EMERGENCY DEPT VISIT MOD MDM: CPT

## 2018-01-02 PROCEDURE — 99213 OFFICE O/P EST LOW 20 MIN: CPT | Mod: S$GLB,,, | Performed by: INTERNAL MEDICINE

## 2018-01-02 PROCEDURE — 93010 ELECTROCARDIOGRAM REPORT: CPT | Mod: ,,, | Performed by: INTERNAL MEDICINE

## 2018-01-02 PROCEDURE — 99285 EMERGENCY DEPT VISIT HI MDM: CPT | Mod: ,,, | Performed by: PHYSICIAN ASSISTANT

## 2018-01-02 PROCEDURE — 81003 URINALYSIS AUTO W/O SCOPE: CPT

## 2018-01-02 PROCEDURE — 83880 ASSAY OF NATRIURETIC PEPTIDE: CPT

## 2018-01-02 PROCEDURE — 85025 COMPLETE CBC W/AUTO DIFF WBC: CPT

## 2018-01-02 PROCEDURE — 85610 PROTHROMBIN TIME: CPT

## 2018-01-02 PROCEDURE — 84484 ASSAY OF TROPONIN QUANT: CPT

## 2018-01-02 PROCEDURE — 99999 PR PBB SHADOW E&M-EST. PATIENT-LVL III: CPT | Mod: PBBFAC,,, | Performed by: INTERNAL MEDICINE

## 2018-01-02 PROCEDURE — 80053 COMPREHEN METABOLIC PANEL: CPT

## 2018-01-02 RX ORDER — HYDROCODONE BITARTRATE AND ACETAMINOPHEN 5; 325 MG/1; MG/1
1 TABLET ORAL EVERY 6 HOURS PRN
Qty: 60 TABLET | Refills: 0 | Status: SHIPPED | OUTPATIENT
Start: 2018-01-02 | End: 2018-01-26 | Stop reason: SDUPTHER

## 2018-01-02 RX ORDER — PROPARACAINE HYDROCHLORIDE 5 MG/ML
1 SOLUTION/ DROPS OPHTHALMIC ONCE
Status: COMPLETED | OUTPATIENT
Start: 2018-01-03 | End: 2018-01-03

## 2018-01-02 RX ORDER — ALLOPURINOL 100 MG/1
TABLET ORAL
Qty: 60 TABLET | Refills: 3 | Status: SHIPPED | OUTPATIENT
Start: 2018-01-02 | End: 2018-10-18

## 2018-01-02 RX ORDER — DOXEPIN HYDROCHLORIDE 10 MG/1
10 CAPSULE ORAL 3 TIMES DAILY
Qty: 90 CAPSULE | Refills: 3 | Status: SHIPPED | OUTPATIENT
Start: 2018-01-02 | End: 2018-02-01

## 2018-01-02 RX ADMIN — FLUORESCEIN SODIUM 1 EACH: 1 STRIP OPHTHALMIC at 11:01

## 2018-01-02 NOTE — ASSESSMENT & PLAN NOTE
- sodium 127 with initial labs (baseline 126-130)  - repeat chemistry with 129 sodium  - f/u on results with recent furosemide administration   - likely due to mild fluid overload and poor compliance with home furosemide  - repeat BMP ordered for 1 week post discharge

## 2018-01-02 NOTE — ASSESSMENT & PLAN NOTE
- continue prednisone PRN as needed  - allopurinol recently discontinue with prior admission   - see itching

## 2018-01-02 NOTE — ASSESSMENT & PLAN NOTE
- etiology likely gout related  - uric acid 9  - unresponsive to hydrocortisone, hydroxyzine, and diphenhydramine   - will add doxepin 10 mg TID PRN  - follow up with Rheumatology for medication that is compatible with home lasix

## 2018-01-02 NOTE — ASSESSMENT & PLAN NOTE
- K 5.8 on arrival - previously 5.3  - denies changes in dietary habits   - EKG with no obvious tenting of T waves  - received 60 mg furosemide and 15 Gm Kayexalate while in ED  - repeat chemistry with 4.9 K.  - follow up BMP scheduled for 1 week  - Patient stable for discharge.

## 2018-01-02 NOTE — DISCHARGE SUMMARY
[facility] Hospital Medicine  Discharge Summary      Patient Name: Dacia Martínez  MRN: [id number]  Admission Date: 12/31/2017  Hospital Length of Stay: 0 days  Discharge Date and Time: 1/1/2018  2:29 PM  Attending Physician: No att. providers found   Discharging Provider: Chente Cope PA-C  Primary Care Provider: Cali Vickers MD  [facility] Medicine Team: [facility] HOSP MED E Chente Cope PA-C    HPI:   83 y/o female, who returns to the ED for ongoing evaluation of itching.   She was seen in the ED on 12/28/17 for similar complaints and was discharged home with Benadryl.  She reports that itching started 2 weeks ago while she was hospitalized for a heart failure exacerbation.  She has tried hydrocortisone cream and Benadryl cream / PO with no improvement.  She states that the itching / burning feels like acid is building up under the skin.  She has marking of excoriation r/t itching but no definable rash.   She denies changes in detergent or soap and reports the only change in her medications is discontinuation of allopurinol.  She denies fever, chills, CP, SOB, N/V/D, or  symptoms.  Initial workup reveals potassium of 5.8 (previously 5.3) and sodium of 127 (baseline 126-130).  She was given IV furosemide and PO kayexalate for her hyperkalemia.  She has a PMH of CHF, a fib, CKD, HTN, gout, and CAD.     * No surgery found *      Hospital Course:   Patient admitted to observation for hyperkalemia. Patient given kayexelate and home dosing furosemide, K improved from 6.5 to 4.9. Patient reports poor compliance with home lasix. Patient complaining of persistent itching, uric acid 9. Patient instructed to follow up with Rheumatology for new gout treatment that is compatible with Furosemide. Patient sent with follow up BMP in 1 week. Patient stable for discharge.     Consults:     * Hyperkalemia    - K 5.8 on arrival - previously 5.3  - denies changes in dietary habits   - EKG with no obvious  tenting of T waves  - received 60 mg furosemide and 15 Gm Kayexalate while in ED  - repeat chemistry with 4.9 K.  - follow up BMP scheduled for 1 week  - Patient stable for discharge.        CKD (chronic kidney disease) stage 3, GFR 30-59 ml/min    - Crt 1.7 (baseline 1.6-1.8)  - strict I/O's  - daily Wt  - avoid nephrotoxins if possible         Itching    - etiology likely gout related  - uric acid 9  - unresponsive to hydrocortisone, hydroxyzine, and diphenhydramine   - will add doxepin 10 mg TID PRN  - follow up with Rheumatology for medication that is compatible with home lasix        Hyponatremia    - sodium 127 with initial labs (baseline 126-130)  - repeat chemistry with 129 sodium  - f/u on results with recent furosemide administration   - likely due to mild fluid overload and poor compliance with home furosemide  - repeat BMP ordered for 1 week post discharge        Persistent atrial fibrillation    - rate ranging in the low 100's  - resume home amiodarone and metoprolol as directed  - continue apixaban as prescribed   - maintain on telemetry         Type 2 diabetes mellitus with stage 3 chronic kidney disease, without long-term current use of insulin    - ADA diet  - ISS AC / HS        Idiopathic chronic gout of multiple sites without tophus    - continue prednisone PRN as needed  - allopurinol recently discontinue with prior admission   - see itching        Essential hypertension    - resume home antihypertensive regimen           Final Active Diagnoses:    Diagnosis Date Noted POA    PRINCIPAL PROBLEM:  Hyperkalemia [E87.5] 01/01/2018 Yes    Hyponatremia [E87.1] 01/01/2018 Yes    Itching [L29.9] 01/01/2018 Yes    CKD (chronic kidney disease) stage 3, GFR 30-59 ml/min [N18.3] 01/01/2018 Yes    Persistent atrial fibrillation [I48.1] 06/05/2017 Yes    Type 2 diabetes mellitus with stage 3 chronic kidney disease, without long-term current use of insulin [E11.22, N18.3] 11/02/2016 Yes    Idiopathic  chronic gout of multiple sites without tophus [M1A.09X0] 09/21/2015 Yes     Chronic    Essential hypertension [I10] 01/10/2013 Yes      Problems Resolved During this Admission:    Diagnosis Date Noted Date Resolved POA       Discharged Condition: good    Disposition: Home or Self Care    Follow Up:  Follow-up Information     Schedule an appointment as soon as possible for a visit with Cali Vickers MD.    Specialty:  Family Medicine  Contact information:  [address]  [facility] [address]  [phone]             Richardson Macdonald MD.    Specialty:  Rheumatology  Why:  Call and make an appointment to discuss gout treatment options while on Lasix.  Contact information:  [address]  [facility] [address]  [phone]                 Patient Instructions:     Basic metabolic panel   Standing Status: Future  Standing Exp. Date: 03/02/19     Diet Diabetic 2000 Calories     Diet Low Potassium     Activity as tolerated     Notify your health care provider if you experience any of the following:  severe uncontrolled pain     Notify your health care provider if you experience any of the following:  difficulty breathing or increased cough     Notify your health care provider if you experience any of the following:  severe persistent headache     Notify your health care provider if you experience any of the following:  increased confusion or weakness         Significant Diagnostic Studies: Labs:   BMP:   Recent Labs  Lab 12/31/17 2205 01/01/18  0452 01/01/18  0547   * 120*  --    * 129*  --    K 5.8* 6.5* 4.9   CL 95 98  --    CO2 24 22*  --    BUN 22 20  --    CREATININE 1.7* 1.5*  --    CALCIUM 9.1 8.8  --    MG  --  1.8  --    , CMP   Recent Labs  Lab 12/31/17 2205 01/01/18  0452 01/01/18  0547   * 129*  --    K 5.8* 6.5* 4.9   CL 95 98  --    CO2 24 22*  --    * 120*  --    BUN 22 20  --    CREATININE 1.7* 1.5*  --    CALCIUM 9.1 8.8  --    PROT 7.8 7.6  --    ALBUMIN 3.0* 2.7*   --    BILITOT 0.7 0.7  --    ALKPHOS 83 75  --    AST 32 33  --    ALT 26 24  --    ANIONGAP 8 9  --    ESTGFRAFRICA 32.1* 37.4*  --    EGFRNONAA 27.9* 32.4*  --    , CBC   Recent Labs  Lab 01/01/18  0007   WBC 9.38   HGB 14.0   HCT 43.7   *    and All labs within the past 24 hours have been reviewed    Pending Diagnostic Studies:     None         Medications:  Reconciled Home Medications:   Discharge Medication List as of 1/1/2018  1:13 PM      CONTINUE these medications which have NOT CHANGED    Details   ACCU-CHEK FASTCLIX Misc 1 lancet by Misc.(Non-Drug; Combo Route) route 3 (three) times daily. Pt to test blood glucose up to 3 times daily., Starting Tue 6/13/2017, Normal      amiodarone (PACERONE) 200 MG Tab Take 1 tablet (200 mg total) by mouth 2 (two) times daily. Start taking Daily on 1/4/18., Starting Sat 12/23/2017, Normal      apixaban 5 mg Tab Take 1 tablet (5 mg total) by mouth 2 (two) times daily., Starting Fri 12/29/2017, Normal      benzonatate (TESSALON) 100 MG capsule Take 1 capsule (100 mg total) by mouth 3 (three) times daily as needed for Cough., Starting Tue 12/26/2017, Until Fri 1/5/2018, Normal      blood sugar diagnostic (ACCU-CHEK TOMASA) Strp 1 strip by Misc.(Non-Drug; Combo Route) route once daily., Starting 11/4/2016, Until Discontinued, Normal      diphenhydrAMINE-zinc acetate 1-0.1% (BENADRYL) cream Apply topically 2 (two) times daily as needed for Itching., Starting Sat 12/23/2017, OTC      furosemide (LASIX) 20 MG tablet Take 2 tablets (40 mg total) by mouth once daily., Starting Sat 12/23/2017, Until Sun 12/23/2018, Normal      hydrocodone-acetaminophen 5-325mg (NORCO) 5-325 mg per tablet Take 1 tablet by mouth every 6 (six) hours as needed for Pain., Starting Mon 12/4/2017, Until Wed 1/3/2018, Print      hydrOXYzine HCl (ATARAX) 10 MG Tab Take 1 tablet (10 mg total) by mouth 3 (three) times daily as needed for Itching., Starting Fri 12/29/2017, Print       isosorbide-hydrALAZINE 20-37.5 mg (BIDIL) 20-37.5 mg Tab Take 1 tablet by mouth 3 (three) times daily., Starting Sat 12/23/2017, Until Sun 12/23/2018, Normal      metoprolol tartrate (LOPRESSOR) 50 MG tablet Take 1 tablet (50 mg total) by mouth 2 (two) times daily., Starting Sat 12/23/2017, Normal      predniSONE (DELTASONE) 10 MG tablet take 1 tablet by mouth daily if needed for gout, Print      triamcinolone acetonide 0.1% (KENALOG) 0.1 % ointment APPLY TOPICALLY TWO TIMES A DAY, Normal             Indwelling Lines/Drains at time of discharge:   Lines/Drains/Airways          No matching active lines, drains, or airways          Time spent on the discharge of patient: 37 minutes  Patient was seen and examined on the date of discharge and determined to be suitable for discharge.         Chente Cope PA-C  Department of Hospital Medicine  [facility]

## 2018-01-02 NOTE — HOSPITAL COURSE
Patient admitted to observation for hyperkalemia. Patient given kayexelate and home dosing furosemide, K improved from 6.5 to 4.9. Patient reports poor compliance with home lasix. Patient complaining of persistent itching, uric acid 9. Patient instructed to follow up with Rheumatology for new gout treatment that is compatible with Furosemide. Patient sent with follow up BMP in 1 week. Patient stable for discharge.

## 2018-01-02 NOTE — PROVIDER PROGRESS NOTES - EMERGENCY DEPT.
Encounter Date: 1/2/2018    ED Physician Progress Notes       SCRIBE NOTE: I, Andreina Phelps, am scribing for, and in the presence of, .  Physician Statement: I, personally performed the services described in this documentation as scribed by Andreina Phelps in my presence, and it is both accurate and complete.      EKG - STEMI Decision  Initial Reading: No STEMI present.      I, Andreina Phelps, am scribing for, and in the presence of, Dr. Quesada. I performed the above scribed service and the documentation accurately describes the services I performed. I attest to the accuracy of the note.

## 2018-01-02 NOTE — PATIENT INSTRUCTIONS
Stop hydroxyzine    Resume allopurinol 1/2 pill until Friday, 1 pill daily for 5 days, then 11/2 daily    Start doxepin for itching

## 2018-01-03 VITALS
HEART RATE: 83 BPM | SYSTOLIC BLOOD PRESSURE: 137 MMHG | RESPIRATION RATE: 17 BRPM | WEIGHT: 145 LBS | HEIGHT: 61 IN | TEMPERATURE: 98 F | OXYGEN SATURATION: 100 % | DIASTOLIC BLOOD PRESSURE: 76 MMHG | BODY MASS INDEX: 27.38 KG/M2

## 2018-01-03 LAB — BNP SERPL-MCNC: 316 PG/ML

## 2018-01-03 PROCEDURE — 25000003 PHARM REV CODE 250: Performed by: PHYSICIAN ASSISTANT

## 2018-01-03 RX ORDER — ACETAMINOPHEN 325 MG/1
650 TABLET ORAL
Status: COMPLETED | OUTPATIENT
Start: 2018-01-03 | End: 2018-01-03

## 2018-01-03 RX ADMIN — PROPARACAINE HYDROCHLORIDE 1 DROP: 5 SOLUTION/ DROPS OPHTHALMIC at 12:01

## 2018-01-03 RX ADMIN — ACETAMINOPHEN 650 MG: 325 TABLET ORAL at 12:01

## 2018-01-03 NOTE — ED TRIAGE NOTES
Dacia Martínez, marshall 82 y.o. female presents to the ED palpitations x 1 week and trouble seeing earlier today      Chief Complaint   Patient presents with    Palpitations     felt like heart was beating fast, seen here for same thing in past few days    Eye Problem     eyes blinking and had 'some white in them this morning     Review of patient's allergies indicates:  No Known Allergies  Past Medical History:   Diagnosis Date    A-fib     Arthritis     Asteroid hyalosis of left eye     Bilateral nonexudative age-related macular degeneration 6/3/2014    Breast cancer     Cataract     Chronic GERD 9/18/2017    CKD stage 3 due to type 2 diabetes mellitus     Coronary artery disease     Hypertension     Migraine headache     Mild nonproliferative diabetic retinopathy of both eyes 6/3/2014    Pneumonia     Type 2 diabetes mellitus with hyperglycemia     Vertigo      Adult Physical Assessment  LOC: Dacia Martínez, 82 y.o. female verified via two identifiers.  The patient is awake, alert, oriented and speaking appropriately at this time.  APPEARANCE: Patient resting comfortably and appears to be in no acute distress at this time. Patient is clean and well groomed, patient's clothing is properly fastened.  SKIN:The skin is warm and dry, color consistent with ethnicity, patient has normal skin turgor and moist mucus membranes, skin intact, no breakdown or brusing noted.  MUSCULOSKELETAL: Patient moving all extremities well, no obvious swelling or deformities noted.  RESPIRATORY: Airway is open and patent, respirations are spontaneous, patient has a normal effort and rate, no accessory muscle use noted.  CARDIAC: Patient has a normal rate and rhythm, no periphreal edema noted in any extremity, capillary refill < 3 seconds in all extremities  ABDOMEN: Soft and non tender to palpation, no abdominal distention noted. Bowel sounds present in all four quadrants.  NEUROLOGIC: Eyes open spontaneously, behavior appropriate  to situation, follows commands, facial expression symmetrical, bilateral hand grasp equal and even, purposeful motor response noted, normal sensation in all extremities when touched with a finger.

## 2018-01-03 NOTE — ED PROVIDER NOTES
Encounter Date: 2018       History     Chief Complaint   Patient presents with    Palpitations     felt like heart was beating fast, seen here for same thing in past few days    Eye Problem     eyes blinking and had 'some white in them this morning     Patient is a 82 year old female with PMHx of CAD on eliquis 5 mg, afib, HTN, DM2 with retinopathy, GERD, CKD stage 3, cataract, macular degeneration, and hx of breast cancer. She presents to the ED for eye pain. Reports having b/l eye pain with palpation associated with white flashes of light and sensation of irritation. Rates pain 6/10. Also, reports productive cough with white blood tinged sputum associated with SOB. Also, reports having dysuria. Denies blurred vision, diplopia, or photophobia. She denies fever,chills, nausea, vomiting, chest pain, abd pain, diarrhea, or constipation. She is a non smoker and denies alcohol use.             Review of patient's allergies indicates:  No Known Allergies  Past Medical History:   Diagnosis Date    A-fib     Arthritis     Asteroid hyalosis of left eye     Bilateral nonexudative age-related macular degeneration 6/3/2014    Breast cancer     Cataract     Chronic GERD 2017    CKD stage 3 due to type 2 diabetes mellitus     Coronary artery disease     Hypertension     Migraine headache     Mild nonproliferative diabetic retinopathy of both eyes 6/3/2014    Pneumonia     Type 2 diabetes mellitus with hyperglycemia     Vertigo      Past Surgical History:   Procedure Laterality Date    BREAST SURGERY      left lumpectomy    CARPAL TUNNEL RELEASE      left    CATARACT EXTRACTION      vance     SECTION      EYE SURGERY      cataracts bilaterally    HYSTERECTOMY      partial     Family History   Problem Relation Age of Onset    Cancer Mother      stomach    Heart disease Mother     Diabetes Father     Hypertension Father     Blindness Brother     Diabetes Brother     Hypertension Brother      Cataracts Sister     Diabetes Sister     Diabetes Brother     Diabetes Brother     Diabetes Brother     No Known Problems Daughter     No Known Problems Son     No Known Problems Daughter     Amblyopia Neg Hx     Glaucoma Neg Hx     Macular degeneration Neg Hx     Strabismus Neg Hx     Retinal detachment Neg Hx      Social History   Substance Use Topics    Smoking status: Never Smoker    Smokeless tobacco: Never Used    Alcohol use No      Comment: socially     Review of Systems   Constitutional: Negative for fever.   HENT: Negative for sore throat.    Eyes: Positive for pain, redness, itching and visual disturbance (white flashes of light ). Negative for photophobia and discharge.   Respiratory: Positive for cough (productive cough with white blood tinged sputum) and shortness of breath.    Cardiovascular: Negative for chest pain.   Gastrointestinal: Negative for abdominal pain, constipation, nausea and vomiting.   Genitourinary: Positive for dysuria.   Musculoskeletal: Negative for back pain.   Skin: Negative for rash.   Neurological: Negative for weakness.   Hematological: Does not bruise/bleed easily.       Physical Exam     Initial Vitals [01/02/18 1736]   BP Pulse Resp Temp SpO2   (!) 167/79 90 18 97.6 °F (36.4 °C) 99 %      MAP       108.33         Physical Exam    Vitals reviewed.  Constitutional: She appears well-developed and well-nourished. She is not diaphoretic. No distress.   Patient resting comfortably in NAD on RA.    HENT:   Head: Normocephalic and atraumatic.   Nose: Nose normal.   Eyes: EOM are normal. Pupils are equal, round, and reactive to light. Right conjunctiva is injected. Left conjunctiva is injected.   Visual acuity of right eye 20/20, left eye 20/30, and both eyes 20/25.   Ocular pressure of left eye 29 and right eye 28.   On wood's lamp examination, no uptake or abrasions noted.    Neck: Normal range of motion.   Cardiovascular: Normal rate. An irregularly irregular  rhythm present. Exam reveals no friction rub.    No murmur heard.  Pulmonary/Chest: Breath sounds normal. No respiratory distress. She has no wheezes. She has no rales.   Abdominal: Soft. Bowel sounds are normal. She exhibits no distension. There is no tenderness. There is no rebound.   Musculoskeletal: Normal range of motion.   Neurological: She is alert and oriented to person, place, and time. She has normal strength. No sensory deficit.   Skin: Skin is warm and dry. No erythema.   Psychiatric: She has a normal mood and affect. Thought content normal.         ED Course   Procedures  Labs Reviewed   CBC W/ AUTO DIFFERENTIAL - Abnormal; Notable for the following:        Result Value    MCH 26.4 (*)     RDW 18.6 (*)     Platelets 379 (*)     All other components within normal limits   COMPREHENSIVE METABOLIC PANEL - Abnormal; Notable for the following:     Sodium 131 (*)     Potassium 5.5 (*)     CO2 22 (*)     Glucose 141 (*)     Creatinine 1.7 (*)     Total Protein 8.5 (*)     Albumin 3.1 (*)     eGFR if  32.1 (*)     eGFR if non  27.9 (*)     All other components within normal limits   B-TYPE NATRIURETIC PEPTIDE - Abnormal; Notable for the following:      (*)     All other components within normal limits   URINALYSIS, REFLEX TO URINE CULTURE    Narrative:     Preferred Collection Type->Urine, Clean Catch  Received a cup of urine.   TROPONIN I   PROTIME-INR        Imaging Results          X-Ray Chest PA And Lateral (Final result)  Result time 01/02/18 23:16:23    Final result by Mulugeta Collins MD (01/02/18 23:16:23)                 Impression:         Cardiomegaly with central vascular congestion and small left effusion posteriorly.          Electronically signed by: MULUGETA COLLINS MD  Date:     01/02/18  Time:    23:16              Narrative:    Chest PA and Lateral    Indication:.    Comparison:December 14, 2017.    Findings:     Cardiomegaly with central vascular  congestion and small left effusion posteriorly.  No focal consolidation.    Heart and lungs unchanged when allowing for differences in technique and positioning.                                       APC / Resident Notes:   Patient is a 82 year old female with PMHx of CAD on eliquis 5 mg, afib, HTN, DM2 with retinopathy, GERD, CKD stage 3, cataract, macular degeneration, and hx of breast cancer. She presents to the ED for eye pain. Patient is in no acute distress. Vitals stable. AAOx3. RRR. Lungs CTA. Abdomen is soft, non tender, non distended. Skin is normal appearance without rashes.     Will order labs, imaging, and EKG. Will continue to monitor.     Differential diagnoses include, but are not limited to: corneal abrasion, acute glaucoma, pulmonary edema, pneumonia, electrolyte imbalance, or UTI.     No leukocytosis. Thrombocytosis. Hyponatremia 131. Hyperkalemia 5.5, decreased from patients previous. Elevated glucose 141. Elevated Cr 1.7. Troponin WNL. BNP elevated but decreased compared to patient's previous labs. We believe this elevation is secondary to CXR findings of central vascular congestion and small left effusion posteriorly. UA unremarkable for infectious process. CXR found to have Cardiomegaly with central vascular congestion and small left effusion posteriorly. Patient reassessed, reports symptoms improved with ED management. Will discharge home with F/U with PCP. Return precautions given.     I have discussed and reviewed with my supervising physician.                  ED Course      Clinical Impression:   The primary encounter diagnosis was SOB (shortness of breath). Diagnoses of Palpitations, Hyperkalemia, and Eye pain, bilateral were also pertinent to this visit.    Disposition:   Disposition: Discharged  Condition: Stable                        Jasmine Ronquillo PA-C  01/03/18 0833

## 2018-01-04 NOTE — PROGRESS NOTES
History of present illness: 82-year-old female with chronic pain due to osteoarthritis.  She is on chronic hydrocodone.  She has a history of gout.  She had been on allopurinol 450 mg daily.  She takes prednisone intermittently when she feels she is having a gout flare.  I saw her last in September because of right shoulder bursitis.  I gave her an injection, which helped.  She was hospitalized December 14 because of congestive heart failure.  She had changes in her cardiac medications.  She was told to discontinue the allopurinol.  She did have a recent gout attack.  She has developed her rhinitis.  She has had several emergency room visits.  She was hospitalized for hyperkalemia.    Physical examination was not performed, the entire time was counseling.    Assessment: Intercritical gout    Plans:  1.  She is to resume her allopurinol.  There is no contraindication with using allopurinol along with diuretics.  Because she has been off allopurinol for several weeks I told her to take 150 mg for 5 days, 300 mg for 5 days, then resume 450 mg  2.  I placed her on doxepin for her itching  3.  She is to keep her previous appointment

## 2018-01-09 ENCOUNTER — TELEPHONE (OUTPATIENT)
Dept: INTERNAL MEDICINE | Facility: CLINIC | Age: 83
End: 2018-01-09

## 2018-01-09 ENCOUNTER — OFFICE VISIT (OUTPATIENT)
Dept: ENDOCRINOLOGY | Facility: CLINIC | Age: 83
End: 2018-01-09
Payer: MEDICARE

## 2018-01-09 VITALS
WEIGHT: 141.31 LBS | BODY MASS INDEX: 26.68 KG/M2 | HEART RATE: 80 BPM | HEIGHT: 61 IN | SYSTOLIC BLOOD PRESSURE: 136 MMHG | DIASTOLIC BLOOD PRESSURE: 75 MMHG

## 2018-01-09 DIAGNOSIS — N18.30 CKD (CHRONIC KIDNEY DISEASE) STAGE 3, GFR 30-59 ML/MIN: ICD-10-CM

## 2018-01-09 DIAGNOSIS — J32.0 CHRONIC MAXILLARY SINUSITIS: ICD-10-CM

## 2018-01-09 DIAGNOSIS — M1A.09X0 IDIOPATHIC CHRONIC GOUT OF MULTIPLE SITES WITHOUT TOPHUS: Chronic | ICD-10-CM

## 2018-01-09 DIAGNOSIS — N18.30 TYPE 2 DIABETES MELLITUS WITH STAGE 3 CHRONIC KIDNEY DISEASE, WITHOUT LONG-TERM CURRENT USE OF INSULIN: Primary | ICD-10-CM

## 2018-01-09 DIAGNOSIS — R09.81 SINUS CONGESTION: ICD-10-CM

## 2018-01-09 DIAGNOSIS — E11.22 TYPE 2 DIABETES MELLITUS WITH STAGE 3 CHRONIC KIDNEY DISEASE, WITHOUT LONG-TERM CURRENT USE OF INSULIN: Primary | ICD-10-CM

## 2018-01-09 DIAGNOSIS — E11.3293 MILD NONPROLIFERATIVE DIABETIC RETINOPATHY OF BOTH EYES WITHOUT MACULAR EDEMA ASSOCIATED WITH TYPE 2 DIABETES MELLITUS: Chronic | ICD-10-CM

## 2018-01-09 PROCEDURE — 1126F AMNT PAIN NOTED NONE PRSNT: CPT | Mod: GC,S$GLB,, | Performed by: INTERNAL MEDICINE

## 2018-01-09 PROCEDURE — 99214 OFFICE O/P EST MOD 30 MIN: CPT | Mod: GC,S$GLB,, | Performed by: INTERNAL MEDICINE

## 2018-01-09 PROCEDURE — 3008F BODY MASS INDEX DOCD: CPT | Mod: GC,S$GLB,, | Performed by: INTERNAL MEDICINE

## 2018-01-09 PROCEDURE — 99999 PR PBB SHADOW E&M-EST. PATIENT-LVL IV: CPT | Mod: PBBFAC,GC,, | Performed by: INTERNAL MEDICINE

## 2018-01-09 PROCEDURE — 1159F MED LIST DOCD IN RCRD: CPT | Mod: GC,S$GLB,, | Performed by: INTERNAL MEDICINE

## 2018-01-09 RX ORDER — AMOXICILLIN AND CLAVULANATE POTASSIUM 250; 125 MG/1; MG/1
1 TABLET, FILM COATED ORAL EVERY 12 HOURS
Qty: 10 TABLET | Refills: 0 | Status: SHIPPED | OUTPATIENT
Start: 2018-01-09 | End: 2018-01-14

## 2018-01-09 NOTE — TELEPHONE ENCOUNTER
----- Message from Zoey Lr sent at 1/9/2018 10:35 AM CST -----  Contact: Self [phone]  RX request - refill or new RX.  Is this a refill or new RX:  Refill   RX name and strength: Nasal [place] pt did not have the name of it   Directions:   Is this a 30 day or 90 day RX:    Pharmacy name and phone # [facility] [phone]   Comments:

## 2018-01-09 NOTE — ASSESSMENT & PLAN NOTE
Notes >2 weeks of sinus infection symptoms.  Has tried decongestants.    Will prescribe short course of antibiotics (Augementin 250 Q12 - renally dosed).    Recommend OTC cough suppressant

## 2018-01-09 NOTE — PROGRESS NOTES
Subjective:       Patient ID: Dacia Martínez is a 82 y.o. female.    Chief Complaint: Diabetes    HPI   With regards to weight loss:  Per chart review patient has lost ~5-10lbs over the span of one year.    During the [place] holidays, patient weight at lowest (133lbs), and post [place] weight increased to 145lbs.    With regards to the diabetes:  No previous endocrinology visit.  Managed by PCP.    Diagnosed:  ~2000  Last A1C: 6.4% 12/2017    Current regimen: trulicity once weekly  For three years    Diet/exercise - walks around the house, still able to complete ADLs    Missed doses? n/a    # times a day testing:  three times weekly     Log reviewed: yes, none   BG range: 60 - 190    Last eye exam: 8/8/17  Last podiatry exam: 9/15/16     Typical meals: trying to keep to a healthy diet    Education - last visit: na  Exercise - minimal activity    Hypoglycemia: yes  Associated symptoms: na    Current complaints: sinus problems.   Denies any thirst, polyuria, polydipsia, weight loss, nausea or blurred vision.   Denies numbness or tingling of feet   lower extremity swelling      Does not have a Medic alert tag     Diabetes complications:   Numbness/tingling    No ARB/ACEi, or statin on file.    In regards to dyslipidemia:  No statin on file      Review of Systems   Constitutional: Negative for unexpected weight change.   HENT: Positive for congestion, postnasal drip, rhinorrhea, sinus pain and sore throat. Negative for sinus pressure and sneezing.    Eyes: Negative for visual disturbance.   Respiratory: Negative for shortness of breath.    Cardiovascular: Negative for chest pain.   Gastrointestinal: Negative for abdominal pain.   Genitourinary: Positive for difficulty urinating. Negative for dysuria and urgency.   Musculoskeletal: Negative for arthralgias.   Skin: Negative for wound.   Neurological: Negative for headaches.   Hematological: Does not bruise/bleed easily.   Psychiatric/Behavioral: Negative for sleep  disturbance.         Objective:      Physical Exam   Constitutional: She appears well-developed.   HENT:   Right Ear: External ear normal.   Left Ear: External ear normal.   Nose: Nose normal.   Hearing normal  Dentition good   Neck: No tracheal deviation present. No thyromegaly present.   Cardiovascular: Normal rate.    No murmur heard.  Pulmonary/Chest: Effort normal and breath sounds normal.   Abdominal: Soft. There is no tenderness. No hernia.   Musculoskeletal: She exhibits no edema.   Feet no cuts or  scratches  Shoes appropriate   Neurological: She has normal reflexes. No cranial nerve deficit.   Decreased sensation to monofilament on R  intact to vibration bilaterally   Skin: No rash noted.   No nodules   Psychiatric: She has a normal mood and affect. Judgment normal.   Vitals reviewed.      Lab Results   Component Value Date    HGBA1C 6.4 (H) 12/15/2017       Chemistry        Component Value Date/Time     (L) 01/02/2018 2226    K 5.5 (H) 01/02/2018 2226    CL 98 01/02/2018 2226    CO2 22 (L) 01/02/2018 2226    BUN 23 01/02/2018 2226    CREATININE 1.7 (H) 01/02/2018 2226     (H) 01/02/2018 2226        Component Value Date/Time    CALCIUM 9.4 01/02/2018 2226    ALKPHOS 83 01/02/2018 2226    AST 37 01/02/2018 2226    ALT 27 01/02/2018 2226    BILITOT 0.6 01/02/2018 2226    ESTGFRAFRICA 32.1 (A) 01/02/2018 2226    EGFRNONAA 27.9 (A) 01/02/2018 2226        Lab Results   Component Value Date    TSH 3.531 06/05/2017     Lab Results   Component Value Date    WBC 8.12 01/02/2018    HGB 14.1 01/02/2018    HCT 44.1 01/02/2018    MCV 82 01/02/2018     (H) 01/02/2018         Assessment:       1. Type 2 diabetes mellitus with stage 3 chronic kidney disease, without long-term current use of insulin    2. BMI 26.0-26.9,adult    3. CKD (chronic kidney disease) stage 3, GFR 30-59 ml/min    4. Idiopathic chronic gout of multiple sites without tophus    5. Sinus congestion    6. Mild nonproliferative  diabetic retinopathy of both eyes without macular edema associated with type 2 diabetes mellitus    7. Chronic maxillary sinusitis          Plan:       Type 2 diabetes mellitus with stage 3 chronic kidney disease, without long-term current use of insulin  Last a1c - 6.4%  Can be low secondary to hypoglycemia and/or AoCD.  Today's POCT 70  (fasting)    Reviewed goals of therapy are to get the best control we can without hypoglycemia.      Recommended discontinuing trulicity and monitoring BG prior to initiating a new therapy.  Patient and daughter requested to transition to a new therapy.     Patient not a candidate for (GFR 32):  SGLT2i  Metformin  Sulfonyurea    Medication changes:   Stop: Trulicity 75mg weekly (Saturday)  Start: Januvia 50mg daily (begin on Thursday)    Goal A1C <7.5%    Advised frequent self blood glucose monitoring.  Patient encouraged to document glucose results and bring them to every clinic visit.  BG logs provided to patient. Plans to mail each log upon completion (envelope provided).    Orders for new glucometer and appropriate supplies placed today.    Hypoglycemia precautions discussed. Instructed on precautions before driving.      Periodic follow ups for eye evaluations, foot care and dental care suggested.    Foot exam completed today.  Eye exam UTD.     Recommended flu vaccine, patient declined.    BMI 26.0-26.9,adult  Overall weight loss per patient.    Idiopathic chronic gout of multiple sites without tophus  On PDN, may increase BG.  Patient notes that she does not take this as prescribed.    CKD (chronic kidney disease) stage 3, GFR 30-59 ml/min  GFR 32.    Minimizes available options for anti-hyperglycemic medications.    Chronic sinusitis  Notes >2 weeks of sinus infection symptoms.  Has tried decongestants.    Will prescribe short course of antibiotics (Augementin 250 Q12 - renally dosed).    Recommend OTC cough suppressant          RTC in 3 months.  Patient to mail BG logs in  upon completion of each sheet.    Will make necessary adjustments at that time.    Shelby Hornre MD  Endocrinology Fellow     This case has been reviewed with staff, Dr. Martínez.

## 2018-01-10 RX ORDER — FLUTICASONE PROPIONATE 50 MCG
2 SPRAY, SUSPENSION (ML) NASAL DAILY
Qty: 16 G | Refills: 12 | Status: SHIPPED | OUTPATIENT
Start: 2018-01-10 | End: 2018-02-09

## 2018-01-10 NOTE — TELEPHONE ENCOUNTER
----- Message from Sonja Reynolds sent at 1/10/2018 11:27 AM CST -----  Contact: self   Patient would like to know status request regarding nasal spray.. 2nd request     Please advise

## 2018-01-10 NOTE — TELEPHONE ENCOUNTER
----- Message from Mable Kruse sent at 1/9/2018  4:19 PM CST -----  Contact: self/[phone]  Pt called in regards to checking the status of her nasal spray.    [facility].  Please advise

## 2018-01-18 ENCOUNTER — OFFICE VISIT (OUTPATIENT)
Dept: OTOLARYNGOLOGY | Facility: CLINIC | Age: 83
End: 2018-01-18
Payer: MEDICARE

## 2018-01-18 VITALS — SYSTOLIC BLOOD PRESSURE: 98 MMHG | HEART RATE: 83 BPM | TEMPERATURE: 98 F | DIASTOLIC BLOOD PRESSURE: 68 MMHG

## 2018-01-18 DIAGNOSIS — J01.21 ACUTE RECURRENT ETHMOIDAL SINUSITIS: ICD-10-CM

## 2018-01-18 DIAGNOSIS — H93.A3 PULSATILE TINNITUS OF BOTH EARS: Primary | ICD-10-CM

## 2018-01-18 DIAGNOSIS — H73.891 RETRACTED TYMPANIC MEMBRANE, RIGHT: ICD-10-CM

## 2018-01-18 DIAGNOSIS — R51.9 FACIAL PAIN: ICD-10-CM

## 2018-01-18 DIAGNOSIS — H61.23 BILATERAL IMPACTED CERUMEN: ICD-10-CM

## 2018-01-18 DIAGNOSIS — J34.89 SORE NOSE: ICD-10-CM

## 2018-01-18 PROCEDURE — 99499 UNLISTED E&M SERVICE: CPT | Mod: S$GLB,,, | Performed by: OTOLARYNGOLOGY

## 2018-01-18 PROCEDURE — 69210 REMOVE IMPACTED EAR WAX UNI: CPT | Mod: S$GLB,,, | Performed by: OTOLARYNGOLOGY

## 2018-01-18 PROCEDURE — 99999 PR PBB SHADOW E&M-EST. PATIENT-LVL IV: CPT | Mod: PBBFAC,,, | Performed by: OTOLARYNGOLOGY

## 2018-01-18 PROCEDURE — 99213 OFFICE O/P EST LOW 20 MIN: CPT | Mod: 25,S$GLB,, | Performed by: OTOLARYNGOLOGY

## 2018-01-18 RX ORDER — AMOXICILLIN AND CLAVULANATE POTASSIUM 250; 125 MG/1; MG/1
1 TABLET, FILM COATED ORAL EVERY 8 HOURS
Qty: 14 TABLET | Refills: 0 | Status: SHIPPED | OUTPATIENT
Start: 2018-01-18 | End: 2018-05-08 | Stop reason: ALTCHOICE

## 2018-01-18 RX ORDER — GLUCOSAM/CHON-MSM1/C/MANG/BOSW 500-416.6
TABLET ORAL
Status: ON HOLD | COMMUNITY
Start: 2018-01-09 | End: 2018-06-22

## 2018-01-18 NOTE — PATIENT INSTRUCTIONS
Cerumen removed from both eacs  Rx for one more week of Augmentin 250 BID( finished 5 day course) ; take with food  Politzerization performed  May use AYR nasal mist prn;   Humidifier use may help ( in dry winter months)

## 2018-01-21 NOTE — PROGRESS NOTES
"CC: Facial pain rule out sinus infection  HPI:Ms. Martínez is an 82-year-old -American female with type 2 diabetes, stage III chronic kidney disease, chronic gout and seasonal ALLERGIC rhinitis symptoms who is accompanied by her daughter today.She ndicates taking a 5 day course of antibiotics recently for presumed sinus infection.    The EMR indicates her evaluation by Dr. Horner working with Dr.Sam Martínez of the endocrinology Department 1/9/18.  She was diagnosed with type 2 diabetes with stage III chronic kidney disease, idiopathic chronic gout of multiple sites, sinus congestion, chronic maxillary sinusitis and mild diabetic retinopathy.  She was treated with Augmentin to her 50 mg every 12 hours due to her renal status.  She finished a 5 day course 5 days ago.  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  She indicates a recent cough which was helped with Tessalon perles.  She parenthetically ndicates a pulsing in her years perhaps and rhythm with her heart.    She indicates that her "whole" head bothers are, a "sick" feeling.  She indicates facial pain symptoms.  She denies purulent nasal discharge however.  I treated her several times over the past several years use with courses of amoxicillin for recurrent sinus infections.    \  Past Medical History:   Diagnosis Date    A-fib     Arthritis     Asteroid hyalosis of left eye     Bilateral nonexudative age-related macular degeneration 6/3/2014    Breast cancer     Cataract     Chronic GERD 9/18/2017    CKD stage 3 due to type 2 diabetes mellitus     Coronary artery disease     Hypertension     Migraine headache     Mild nonproliferative diabetic retinopathy of both eyes 6/3/2014    Pneumonia     Type 2 diabetes mellitus with hyperglycemia     Vertigo     ALLERGIES: None known  Current Outpatient Prescriptions on File Prior to Visit   Medication Sig Dispense Refill    ACCU-CHEK FASTCLIX Misc 1 lancet by Misc.(Non-Drug; Combo Route) route 3 (three) times " daily. Pt to test blood glucose up to 3 times daily. 100 each 11    allopurinol (ZYLOPRIM) 100 MG tablet Resume allopurinol 1/2 pill until 1/5/18, 1 pill daily for 5 days, then 1.5 pills daily 60 tablet 3    amiodarone (PACERONE) 200 MG Tab Take 1 tablet (200 mg total) by mouth 2 (two) times daily. Start taking Daily on 1/4/18. 42 tablet 11    apixaban 5 mg Tab Take 1 tablet (5 mg total) by mouth 2 (two) times daily. 60 tablet 0    blood sugar diagnostic Strp 1 strip by Misc.(Non-Drug; Combo Route) route once daily. One touch verio-please dispense strips and lancets. 100 each 3    diphenhydrAMINE-zinc acetate 1-0.1% (BENADRYL) cream Apply topically 2 (two) times daily as needed for Itching.  0    doxepin (SINEQUAN) 10 MG capsule Take 1 capsule (10 mg total) by mouth 3 (three) times daily. 90 capsule 3    fluticasone (FLONASE) 50 mcg/actuation nasal spray 2 sprays (100 mcg total) by Each Nare route once daily. 16 g 12    furosemide (LASIX) 20 MG tablet Take 2 tablets (40 mg total) by mouth once daily. 60 tablet 11    hydrocodone-acetaminophen 5-325mg (NORCO) 5-325 mg per tablet Take 1 tablet by mouth every 6 (six) hours as needed for Pain. 60 tablet 0    hydrOXYzine HCl (ATARAX) 10 MG Tab Take 1 tablet (10 mg total) by mouth 3 (three) times daily as needed for Itching. 30 tablet 0    isosorbide-hydrALAZINE 20-37.5 mg (BIDIL) 20-37.5 mg Tab Take 1 tablet by mouth 3 (three) times daily. 90 tablet 11    metoprolol tartrate (LOPRESSOR) 50 MG tablet Take 1 tablet (50 mg total) by mouth 2 (two) times daily. 60 tablet 11    predniSONE (DELTASONE) 10 MG tablet take 1 tablet by mouth daily if needed for gout 30 tablet 0    SITagliptin (JANUVIA) 50 MG Tab Take 1 tablet (50 mg total) by mouth once daily. 30 tablet 5    triamcinolone acetonide 0.1% (KENALOG) 0.1 % ointment APPLY TOPICALLY TWO TIMES A DAY 80 g 0     No current facility-administered medications on file prior to visit.      Past Surgical History:    Procedure Laterality Date    BREAST SURGERY      left lumpectomy    CARPAL TUNNEL RELEASE      left    CATARACT EXTRACTION      vance     SECTION      EYE SURGERY      cataracts bilaterally    HYSTERECTOMY      partial       PE: Blood pressure 98/68 pulse 83 temperature 97.5  Gen.: Alert and oriented lady in no acute distress  Both ears were examined under the microscope and the micro-procedure room.  Cerumen impactions are extracted from each ear canal.  Right eardrum is retracted but the right middle ear space appears well aerated.  Left middle ear space is aerated.  Nasal exam reveals no specific evidence of purulent discharge in either passage.  Oropharyngeal exam reveals evidence for 2+ tonsils without evidence for exudate.  The posterior pharynx is noninflamed.  The uvula is not swollen or edematous.  Neck examination is within normal limits.      DIAGNOSIS:     ICD-10-CM ICD-9-CM    1. Pulsatile tinnitus of both ears H93.A3 388.30    2. Facial pain R51 784.0 amoxicillin-clavulanate 250-125mg (AUGMENTIN) 250-125 mg Tab   3. Sore nose J34.89 478.19 amoxicillin-clavulanate 250-125mg (AUGMENTIN) 250-125 mg Tab   4. Acute recurrent ethmoidal sinusitis J01.21 461.2 amoxicillin-clavulanate 250-125mg (AUGMENTIN) 250-125 mg Tab   5. Bilateral impacted cerumen H61.23 380.4    6. Retracted tympanic membrane, right H73.891 384.82      PLAN: Cerumen removed from both eacs  Rx for one more week of Augmentin 250 BID( finished 5 day course) ; take with food  Politzerization performed  May use AYR nasal mist prn;   Humidifier use may help ( in dry winter months)

## 2018-01-23 ENCOUNTER — OFFICE VISIT (OUTPATIENT)
Dept: ELECTROPHYSIOLOGY | Facility: CLINIC | Age: 83
End: 2018-01-23
Payer: MEDICARE

## 2018-01-23 ENCOUNTER — HOSPITAL ENCOUNTER (OUTPATIENT)
Dept: CARDIOLOGY | Facility: CLINIC | Age: 83
Discharge: HOME | End: 2018-01-23
Payer: MEDICARE

## 2018-01-23 VITALS
SYSTOLIC BLOOD PRESSURE: 144 MMHG | HEART RATE: 84 BPM | WEIGHT: 145.94 LBS | DIASTOLIC BLOOD PRESSURE: 71 MMHG | HEIGHT: 61 IN | BODY MASS INDEX: 27.55 KG/M2

## 2018-01-23 DIAGNOSIS — E11.22 TYPE 2 DIABETES MELLITUS WITH STAGE 3 CHRONIC KIDNEY DISEASE, WITHOUT LONG-TERM CURRENT USE OF INSULIN: ICD-10-CM

## 2018-01-23 DIAGNOSIS — I48.19 PERSISTENT ATRIAL FIBRILLATION: Primary | ICD-10-CM

## 2018-01-23 DIAGNOSIS — I10 ESSENTIAL HYPERTENSION: ICD-10-CM

## 2018-01-23 DIAGNOSIS — N18.30 TYPE 2 DIABETES MELLITUS WITH STAGE 3 CHRONIC KIDNEY DISEASE, WITHOUT LONG-TERM CURRENT USE OF INSULIN: ICD-10-CM

## 2018-01-23 DIAGNOSIS — I48.91 ATRIAL FIBRILLATION, UNSPECIFIED TYPE: ICD-10-CM

## 2018-01-23 PROCEDURE — 99999 PR PBB SHADOW E&M-EST. PATIENT-LVL III: CPT | Mod: PBBFAC,,, | Performed by: INTERNAL MEDICINE

## 2018-01-23 PROCEDURE — 99499 UNLISTED E&M SERVICE: CPT | Mod: S$GLB,,, | Performed by: INTERNAL MEDICINE

## 2018-01-23 PROCEDURE — 93000 ELECTROCARDIOGRAM COMPLETE: CPT | Mod: S$GLB,,, | Performed by: INTERNAL MEDICINE

## 2018-01-23 PROCEDURE — 99214 OFFICE O/P EST MOD 30 MIN: CPT | Mod: S$GLB,,, | Performed by: INTERNAL MEDICINE

## 2018-01-23 RX ORDER — BENZONATATE 100 MG/1
100 CAPSULE ORAL 3 TIMES DAILY PRN
COMMUNITY
End: 2018-11-04 | Stop reason: SDUPTHER

## 2018-01-23 NOTE — PROGRESS NOTES
"Subjective:    Patient ID:  Dacia Martínez is a 82 y.o. female who presents for follow-up of Atrial Fibrillation      82 year old female with pAF s/p DCCV (17) HTN, CAD, breast cancer, migraines, IDDM, and CKD III, who [place] [facility] ([place] for evaluation of a sudden-onset episode of palpitations/tachycardia. She was found to be in AF w/RVR at 120 bpm; an Echo at the time revealed an EF of 60%. Ms. Martínez was rate-controlled and discharged home.     Since discharge, Ms. Martínez notes only occasional fast rates; "not anything like before." She reports experiencing occasional dizziness; baseline. She denies chest pain, SOB/MCCOLLUM, palpitations, or syncope.     : Minimal symptoms due to AF. Remains on metoprolol 100 mg bid as well as apixaban.     Interval history: She has almost immediate recurrence of AF post CV 17. Feels better on amiodarone but sleeping more.     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  6/3/2014: Bilateral nonexudative age-related macular deg*  No date: Breast cancer  No date: Cataract  2017: Chronic GERD  No date: CKD stage 3 due to type 2 diabetes mellitus  No date: Coronary artery disease  No date: Hypertension  No date: Migraine headache  6/3/2014: Mild nonproliferative diabetic retinopathy of *  No date: Pneumonia  No date: Type 2 diabetes mellitus with hyperglycemia  No date: Vertigo    Past Surgical History:  No date: BREAST SURGERY      Comment: left lumpectomy  No date: CARPAL TUNNEL RELEASE      Comment: left  No date: CATARACT EXTRACTION      Comment: vance  No date:  SECTION  No date: EYE SURGERY      Comment: cataracts bilaterally  No date: HYSTERECTOMY      Comment: partial    Social History    Marital status: Single              Spouse name:                       Years of education:                 Number of children: 4             Occupational History    None on file    Social History Main Topics    " Smoking status: Never Smoker                                                                Smokeless tobacco: Never Used                        Alcohol use: No                 Comment: socially    Drug use: No              Sexual activity: Not Currently        Other Topics            Concern    None on file    Social History Narrative    Family supportive.     Granddaughter helps set out her medications in pillbox, helps check her blood sugar.    Review of patient's family history indicates:    Cancer                         Mother                      Comment: stomach    Heart disease                  Mother                    Diabetes                       Father                    Hypertension                   Father                    Blindness                      Brother                   Diabetes                       Brother                   Hypertension                   Brother                   Cataracts                      Sister                    Diabetes                       Sister                    Diabetes                       Brother                   Diabetes                       Brother                   Diabetes                       Brother                   No Known Problems              Daughter                  No Known Problems              Son                       No Known Problems              Daughter                  Amblyopia                      Neg Hx                    Glaucoma                       Neg Hx                    Macular degeneration           Neg Hx                    Strabismus                     Neg Hx                    Retinal detachment             Neg Hx                          Review of Systems   Constitution: Positive for malaise/fatigue. Negative for diaphoresis.   HENT: Negative for nosebleeds.    Eyes: Negative for double vision.   Cardiovascular: Positive for irregular heartbeat. Negative for chest pain, dyspnea on exertion, near-syncope,  palpitations and syncope.   Respiratory: Negative for shortness of breath.    Skin: Negative.    Musculoskeletal: Negative.    Gastrointestinal: Negative for abdominal pain, hematemesis and hematochezia.   Genitourinary: Negative for hematuria.   Neurological: Positive for dizziness and light-headedness. Negative for headaches.   Psychiatric/Behavioral: Negative for altered mental status.        Objective:    Physical Exam   Constitutional: She is oriented to person, place, and time. She appears well-developed and well-nourished. No distress.   HENT:   Head: Normocephalic and atraumatic.   Eyes: EOM are normal. Pupils are equal, round, and reactive to light.   Neck: Normal range of motion. No JVD present. No thyromegaly present.   Cardiovascular: Normal rate, S1 normal, S2 normal, normal heart sounds and intact distal pulses.  An irregular rhythm present. PMI is not displaced.  Exam reveals no gallop and no friction rub.    No murmur heard.  Pulmonary/Chest: Effort normal and breath sounds normal. No respiratory distress. She has no wheezes. She has no rales.   Abdominal: Soft. Bowel sounds are normal. She exhibits no distension. There is no tenderness. There is no rebound and no guarding.   Musculoskeletal: Normal range of motion. She exhibits no edema or tenderness.   Neurological: She is alert and oriented to person, place, and time. No cranial nerve deficit.   Skin: Skin is warm and dry. No rash noted. She is not diaphoretic. No erythema.   Psychiatric: She has a normal mood and affect. Her behavior is normal. Judgment and thought content normal.   Vitals reviewed.    ECG: AF with controlled V rate, nl QRS        Assessment:       1. Persistent atrial fibrillation    2. Type 2 diabetes mellitus with stage 3 chronic kidney disease, without long-term current use of insulin    3. Essential hypertension         Plan:       82 yoF persistent AF here for post CV visit. She had recurrent AF post CV within one day. Will  assess heart rates with holter. If continued RVR, will offer repeat CV. rtc 3m

## 2018-01-26 RX ORDER — PREDNISONE 10 MG/1
TABLET ORAL
Qty: 30 TABLET | Refills: 0 | Status: ON HOLD | OUTPATIENT
Start: 2018-01-26 | End: 2018-06-25 | Stop reason: HOSPADM

## 2018-01-26 RX ORDER — HYDROCODONE BITARTRATE AND ACETAMINOPHEN 5; 325 MG/1; MG/1
1 TABLET ORAL EVERY 6 HOURS PRN
Qty: 60 TABLET | Refills: 0 | Status: SHIPPED | OUTPATIENT
Start: 2018-01-26 | End: 2018-02-23 | Stop reason: SDUPTHER

## 2018-01-29 ENCOUNTER — OFFICE VISIT (OUTPATIENT)
Dept: INTERNAL MEDICINE | Facility: CLINIC | Age: 83
End: 2018-01-29
Payer: MEDICARE

## 2018-01-29 VITALS
SYSTOLIC BLOOD PRESSURE: 118 MMHG | BODY MASS INDEX: 27.52 KG/M2 | WEIGHT: 145.75 LBS | DIASTOLIC BLOOD PRESSURE: 72 MMHG | HEIGHT: 61 IN

## 2018-01-29 DIAGNOSIS — E11.22 TYPE 2 DIABETES MELLITUS WITH STAGE 3 CHRONIC KIDNEY DISEASE, WITHOUT LONG-TERM CURRENT USE OF INSULIN: ICD-10-CM

## 2018-01-29 DIAGNOSIS — I48.91 ATRIAL FIBRILLATION WITH RVR: ICD-10-CM

## 2018-01-29 DIAGNOSIS — D63.8 ANEMIA IN OTHER CHRONIC DISEASES CLASSIFIED ELSEWHERE: ICD-10-CM

## 2018-01-29 DIAGNOSIS — E78.5 HYPERLIPIDEMIA, UNSPECIFIED HYPERLIPIDEMIA TYPE: ICD-10-CM

## 2018-01-29 DIAGNOSIS — I50.23 ACUTE ON CHRONIC SYSTOLIC HEART FAILURE: ICD-10-CM

## 2018-01-29 DIAGNOSIS — I10 ESSENTIAL HYPERTENSION: Primary | ICD-10-CM

## 2018-01-29 DIAGNOSIS — N18.30 CKD (CHRONIC KIDNEY DISEASE) STAGE 3, GFR 30-59 ML/MIN: ICD-10-CM

## 2018-01-29 DIAGNOSIS — I70.0 ATHEROSCLEROSIS OF AORTA: Chronic | ICD-10-CM

## 2018-01-29 DIAGNOSIS — N18.30 TYPE 2 DIABETES MELLITUS WITH STAGE 3 CHRONIC KIDNEY DISEASE, WITHOUT LONG-TERM CURRENT USE OF INSULIN: ICD-10-CM

## 2018-01-29 PROCEDURE — 99215 OFFICE O/P EST HI 40 MIN: CPT | Mod: S$GLB,,, | Performed by: FAMILY MEDICINE

## 2018-01-29 PROCEDURE — 99999 PR PBB SHADOW E&M-EST. PATIENT-LVL IV: CPT | Mod: PBBFAC,,, | Performed by: FAMILY MEDICINE

## 2018-01-29 PROCEDURE — 99499 UNLISTED E&M SERVICE: CPT | Mod: S$GLB,,, | Performed by: FAMILY MEDICINE

## 2018-01-29 NOTE — PROGRESS NOTES
Subjective:       Patient ID: Dacia Martínez is a 82 y.o. female.    Chief Complaint: No chief complaint on file.  Dacia Martínez 82 y.o. female is here for office visit to review care and physical exam, reports feeling well but for left hip pain worse with walking, otherwise ROS unremarkable.    HPI  Review of Systems   Constitutional: Negative for activity change, fatigue, fever and unexpected weight change.   HENT: Negative for congestion, hearing loss, postnasal drip and rhinorrhea.    Eyes: Negative for redness and visual disturbance.   Respiratory: Negative for chest tightness, shortness of breath and wheezing.    Cardiovascular: Negative for chest pain, palpitations and leg swelling.   Gastrointestinal: Negative for abdominal distention.   Genitourinary: Negative for decreased urine volume, dysuria, flank pain, hematuria, pelvic pain and urgency.   Musculoskeletal: Negative for back pain, gait problem, joint swelling and neck stiffness.   Skin: Negative for color change, rash and wound.   Neurological: Negative for dizziness, syncope, weakness and headaches.   Psychiatric/Behavioral: Negative for behavioral problems, confusion and sleep disturbance. The patient is not nervous/anxious.        Objective:      Physical Exam   Constitutional: She is oriented to person, place, and time. She appears well-developed and well-nourished. No distress.   HENT:   Head: Normocephalic.   Mouth/Throat: No oropharyngeal exudate.   Eyes: EOM are normal. Pupils are equal, round, and reactive to light. No scleral icterus.   Neck: Neck supple. No JVD present. No thyromegaly present.   Cardiovascular: Normal rate, regular rhythm and normal heart sounds.  Exam reveals no gallop and no friction rub.    No murmur heard.  Pulmonary/Chest: Effort normal and breath sounds normal. She has no wheezes. She has no rales.   Abdominal: Soft. Bowel sounds are normal. She exhibits no distension and no mass. There is no tenderness. There is no  guarding.   Musculoskeletal: Normal range of motion. She exhibits no edema.   Lymphadenopathy:     She has no cervical adenopathy.   Neurological: She is alert and oriented to person, place, and time. She has normal reflexes. She displays normal reflexes. No cranial nerve deficit. She exhibits normal muscle tone.   Skin: Skin is warm. No rash noted. No erythema.   Psychiatric: She has a normal mood and affect. Thought content normal.       Assessment:       No diagnosis found.    Plan:       Dacia was seen today for hypertension.    Diagnoses and all orders for this visit: Lidocaine with kenalog injection given steri ly, callif no mp    Essential hypertension   Controled with present tx, no changes, chart reviewqed  Hyperlipidemia, unspecified hyperlipidemia type  - Vickey review Lipids next visit, diet discussed  Type 2 diabetes mellitus with stage 3 chronic kidney disease, without long-term current use of insulin  - COntroled present tx, chart reviewed, has had both Optho exam and micro/Cr ration reviewed  Anemia in other chronic diseases classified elsewhere  - Follow, chart reviewed stable  Acute on chronic systolic heart failure  - CHJhart reviewed, stable with current tx  CKD (chronic kidney disease) stage 3, GFR 30-59 ml/min  - COntrol risk, chart reviewwwed  Atrial fibrillation with RVR  - No present issues  Atherosclerosis of aorta  - CHart reviewed

## 2018-02-02 ENCOUNTER — PES CALL (OUTPATIENT)
Dept: ADMINISTRATIVE | Facility: CLINIC | Age: 83
End: 2018-02-02

## 2018-02-06 ENCOUNTER — OFFICE VISIT (OUTPATIENT)
Dept: OPHTHALMOLOGY | Facility: CLINIC | Age: 83
End: 2018-02-06
Payer: MEDICARE

## 2018-02-06 DIAGNOSIS — H04.123 INSUFFICIENCY OF TEAR FILM OF BOTH EYES: ICD-10-CM

## 2018-02-06 DIAGNOSIS — H53.2 MONOCULAR DIPLOPIA: ICD-10-CM

## 2018-02-06 PROCEDURE — 99999 PR PBB SHADOW E&M-EST. PATIENT-LVL III: CPT | Mod: PBBFAC,,, | Performed by: OPHTHALMOLOGY

## 2018-02-06 PROCEDURE — 92014 COMPRE OPH EXAM EST PT 1/>: CPT | Mod: S$GLB,,, | Performed by: OPHTHALMOLOGY

## 2018-02-06 NOTE — PROGRESS NOTES
HPI     Triage/Matthieu pt  Patient states episode of double vision x few days ago that last about an   hour.  Pt states OU vision has gradually getting worse.  No pain. Occasional f/f    Eye Drops:Visine prn OU    I have personally interviewed the patient, reviewed the history and   examined the patient and agree with the technician's exam.    She had a single episode of triple vision (seeing three sets of eyes for   one person while watching television). She estimated that the images   lasted for about 20 minutes. She lay down on her bed with her eyes closed.   When she got up again, the extra images were gone. She did not have pain.   She did not try alternately covering or closing an eye.     Last edited by Davy Brown MD on 2/6/2018  9:35 AM. (History)            Assessment /Plan     For exam results, see Encounter Report.    Monocular diplopia    Insufficiency of tear film of both eyes      Ms. Martínez cannot have a neurologic issue because she reported triplopia.   Artificial tears four times daily as desired.  Lubricating ointment or gel at bedtime.  Return as needed.

## 2018-02-06 NOTE — PATIENT INSTRUCTIONS
Artificial tears four times daily as desired.  Lubricating ointment or gel at bedtime.  Return as needed.

## 2018-02-08 ENCOUNTER — HOSPITAL ENCOUNTER (OUTPATIENT)
Dept: RADIOLOGY | Facility: HOSPITAL | Age: 83
Discharge: HOME | End: 2018-02-08
Attending: FAMILY MEDICINE
Payer: MEDICARE

## 2018-02-08 ENCOUNTER — OFFICE VISIT (OUTPATIENT)
Dept: INTERNAL MEDICINE | Facility: CLINIC | Age: 83
End: 2018-02-08
Payer: MEDICARE

## 2018-02-08 ENCOUNTER — CLINICAL SUPPORT (OUTPATIENT)
Dept: ELECTROPHYSIOLOGY | Facility: CLINIC | Age: 83
End: 2018-02-08
Attending: INTERNAL MEDICINE
Payer: MEDICARE

## 2018-02-08 VITALS
HEIGHT: 61 IN | SYSTOLIC BLOOD PRESSURE: 106 MMHG | HEART RATE: 102 BPM | DIASTOLIC BLOOD PRESSURE: 78 MMHG | WEIGHT: 148.56 LBS | OXYGEN SATURATION: 96 % | BODY MASS INDEX: 28.05 KG/M2

## 2018-02-08 DIAGNOSIS — N18.30 TYPE 2 DIABETES MELLITUS WITH STAGE 3 CHRONIC KIDNEY DISEASE, WITHOUT LONG-TERM CURRENT USE OF INSULIN: ICD-10-CM

## 2018-02-08 DIAGNOSIS — I48.91 ATRIAL FIBRILLATION WITH RVR: ICD-10-CM

## 2018-02-08 DIAGNOSIS — I10 ESSENTIAL HYPERTENSION: ICD-10-CM

## 2018-02-08 DIAGNOSIS — E11.22 TYPE 2 DIABETES MELLITUS WITH STAGE 3 CHRONIC KIDNEY DISEASE, WITHOUT LONG-TERM CURRENT USE OF INSULIN: ICD-10-CM

## 2018-02-08 DIAGNOSIS — R05.9 COUGH: Primary | ICD-10-CM

## 2018-02-08 DIAGNOSIS — I48.19 PERSISTENT ATRIAL FIBRILLATION: ICD-10-CM

## 2018-02-08 DIAGNOSIS — R05.9 COUGH: ICD-10-CM

## 2018-02-08 DIAGNOSIS — J01.01 ACUTE RECURRENT MAXILLARY SINUSITIS: ICD-10-CM

## 2018-02-08 PROCEDURE — 71046 X-RAY EXAM CHEST 2 VIEWS: CPT | Mod: TC

## 2018-02-08 PROCEDURE — 99999 PR PBB SHADOW E&M-EST. PATIENT-LVL III: CPT | Mod: PBBFAC,,, | Performed by: FAMILY MEDICINE

## 2018-02-08 PROCEDURE — 1126F AMNT PAIN NOTED NONE PRSNT: CPT | Mod: S$GLB,,, | Performed by: FAMILY MEDICINE

## 2018-02-08 PROCEDURE — 99499 UNLISTED E&M SERVICE: CPT | Mod: S$GLB,,, | Performed by: FAMILY MEDICINE

## 2018-02-08 PROCEDURE — 71046 X-RAY EXAM CHEST 2 VIEWS: CPT | Mod: 26,,, | Performed by: RADIOLOGY

## 2018-02-08 PROCEDURE — 1159F MED LIST DOCD IN RCRD: CPT | Mod: S$GLB,,, | Performed by: FAMILY MEDICINE

## 2018-02-08 PROCEDURE — 99214 OFFICE O/P EST MOD 30 MIN: CPT | Mod: S$GLB,,, | Performed by: FAMILY MEDICINE

## 2018-02-08 PROCEDURE — 93224 XTRNL ECG REC UP TO 48 HRS: CPT | Mod: S$GLB,,, | Performed by: INTERNAL MEDICINE

## 2018-02-08 PROCEDURE — 3008F BODY MASS INDEX DOCD: CPT | Mod: S$GLB,,, | Performed by: FAMILY MEDICINE

## 2018-02-08 RX ORDER — METHYLPREDNISOLONE 4 MG/1
TABLET ORAL
Qty: 21 TABLET | Refills: 0 | Status: ON HOLD | OUTPATIENT
Start: 2018-02-08 | End: 2018-06-22

## 2018-02-08 NOTE — PROGRESS NOTES
Subjective:       Patient ID: Dacia Martínez is a 82 y.o. female.    Chief Complaint: Cough and Sinus Problem  Dacia Martínez 82 y.o. female is here for office visit to review care and physical exam, has the c/o cough and sinus congestion, presently not using Flonase.  Otherwise ROS unremarkable.      HPI  Review of Systems   Constitutional: Negative for activity change, fatigue, fever and unexpected weight change.   HENT: Positive for postnasal drip and rhinorrhea. Negative for congestion and hearing loss.    Eyes: Negative for redness and visual disturbance.   Respiratory: Positive for cough. Negative for chest tightness, shortness of breath and wheezing.    Cardiovascular: Negative for chest pain, palpitations and leg swelling.   Gastrointestinal: Negative for abdominal distention.   Genitourinary: Negative for decreased urine volume, dysuria, flank pain, hematuria, pelvic pain and urgency.   Musculoskeletal: Negative for back pain, gait problem, joint swelling and neck stiffness.   Skin: Negative for color change, rash and wound.   Neurological: Negative for dizziness, syncope, weakness and headaches.   Psychiatric/Behavioral: Negative for behavioral problems, confusion and sleep disturbance. The patient is not nervous/anxious.        Objective:      Physical Exam   Constitutional: She is oriented to person, place, and time. She appears well-developed and well-nourished. No distress.   HENT:   Head: Normocephalic.   Mouth/Throat: No oropharyngeal exudate.   Eyes: EOM are normal. Pupils are equal, round, and reactive to light. No scleral icterus.   Neck: Neck supple. No JVD present. No thyromegaly present.   Cardiovascular: Normal rate.  Exam reveals no gallop and no friction rub.    No murmur heard.  Pulmonary/Chest: Effort normal and breath sounds normal. She has no wheezes. She has no rales.   Abdominal: Soft. Bowel sounds are normal. She exhibits no distension and no mass. There is no tenderness. There is no  guarding.   Musculoskeletal: Normal range of motion. She exhibits no edema.   Lymphadenopathy:     She has no cervical adenopathy.   Neurological: She is alert and oriented to person, place, and time. She has normal reflexes. She displays normal reflexes. No cranial nerve deficit. She exhibits normal muscle tone.   Skin: Skin is warm. No rash noted. No erythema.   Psychiatric: She has a normal mood and affect. Thought content normal.       Assessment:       1. Cough    2. Essential hypertension    3. Type 2 diabetes mellitus with stage 3 chronic kidney disease, without long-term current use of insulin    4. Atrial fibrillation with RVR    5. Acute recurrent maxillary sinusitis        Plan:       Dacia was seen today for cough and sinus problem.    Diagnoses and all orders for this visit:    Cough  -     X-Ray Chest PA And Lateral; reviewed    Essential hypertension  - controled, stable on present treatment  Type 2 diabetes mellitus with stage 3 chronic kidney disease, without long-term current use of insulin  - Controled, chart reviewed, stable on present treatment  Atrial fibrillation with RVR  - Controkled  Acute recurrent maxillary sinusitis  -     methylPREDNISolone (MEDROL DOSEPACK) 4 mg tablet; Take as directed

## 2018-02-15 NOTE — PROGRESS NOTES
I have personally taken the history and examined this patient and agree with the resident's or fellow's note as stated above     Tejas Martínez MD, FACE

## 2018-02-23 ENCOUNTER — TELEPHONE (OUTPATIENT)
Dept: RHEUMATOLOGY | Facility: CLINIC | Age: 83
End: 2018-02-23

## 2018-02-23 RX ORDER — HYDROCODONE BITARTRATE AND ACETAMINOPHEN 5; 325 MG/1; MG/1
1 TABLET ORAL EVERY 6 HOURS PRN
Qty: 60 TABLET | Refills: 0 | Status: SHIPPED | OUTPATIENT
Start: 2018-02-23 | End: 2018-03-25

## 2018-02-26 ENCOUNTER — TELEPHONE (OUTPATIENT)
Dept: RHEUMATOLOGY | Facility: CLINIC | Age: 83
End: 2018-02-26

## 2018-02-26 NOTE — TELEPHONE ENCOUNTER
----- Message from Ad Choi sent at 2/26/2018  3:24 PM CST -----  Contact: self/home   Pt would like to speak with you regarding that she accidentally had two rx of pain medications filled.

## 2018-02-26 NOTE — TELEPHONE ENCOUNTER
"Pt states she has been given too many pills for her Rx. Pt wanted Dr. Macdonald to know about the pharmacy giving her too many. Per Dr. Macdonald "pt will get another Rx at the end of April 2018 for pain medication" pt verbalized understanding.  "

## 2018-03-09 ENCOUNTER — TELEPHONE (OUTPATIENT)
Dept: INTERNAL MEDICINE | Facility: CLINIC | Age: 83
End: 2018-03-09

## 2018-03-09 RX ORDER — FLUCONAZOLE 150 MG/1
150 TABLET ORAL DAILY
Qty: 3 TABLET | Refills: 0 | Status: SHIPPED | OUTPATIENT
Start: 2018-03-09 | End: 2018-03-10

## 2018-03-09 NOTE — PT/OT/SLP DISCHARGE
Physical Therapy Discharge Summary    Name: Dacia Martínez  MRN: [id number]   Principal Problem: Acute on chronic systolic heart failure     Patient Discharged from acute Physical Therapy on 17.  Please refer to prior PT noted date on 17 for functional status.     Assessment:     Patient appropriate for care in another setting.    Objective:     GOALS:    Physical Therapy Goals     Not on file          Multidisciplinary Problems (Resolved)        Problem: Physical Therapy Goal    Goal Priority Disciplines Outcome Goal Variances Interventions   Physical Therapy Goal   (Resolved)     PT/OT, PT Outcome(s) achieved     Description:  Goals to be met by: 17    Patient will increase functional independence with mobility by performin. Supine to sit with Wise  2. Sit to supine with Wise  3. Sit to stand transfer with Supervision  4. Gait  x 200 feet with Supervision with or without least restrictive AD.   5. Ascend/descend 7 stairs with right Handrails Stand-by Assistance.   6. Lower extremity exercise program x20 reps per handout, with supervision                      Reasons for Discontinuation of Therapy Services  Transfer to alternate level of care.      Plan:     Patient Discharged to: home.    Jazmin Fournier, PT  3/8/2018

## 2018-03-09 NOTE — TELEPHONE ENCOUNTER
Called patient and let her know her medication is at the pharmacy and she will be in on Monday and has no questions.

## 2018-03-09 NOTE — TELEPHONE ENCOUNTER
----- Message from Elisha Qureshi sent at 3/9/2018 12:17 PM CST -----  Contact: self [phone]  Type: Rx    Name of medication(s):  fluconazole (DIFLUCAN) 150 MG Tab    Is this a refill? New rx? Refill     Who prescribed medication?    Pharmacy Name, Phone, & Location: [facility] ext [phone]        Comments:  Patient  also requesting a call back . Please advise, Thanks

## 2018-03-19 RX ORDER — TRIAMCINOLONE ACETONIDE 1 MG/G
CREAM TOPICAL
Qty: 80 G | Refills: 0 | Status: ON HOLD | OUTPATIENT
Start: 2018-03-19 | End: 2018-06-22

## 2018-03-27 ENCOUNTER — OFFICE VISIT (OUTPATIENT)
Dept: OTOLARYNGOLOGY | Facility: CLINIC | Age: 83
End: 2018-03-27
Payer: MEDICARE

## 2018-03-27 VITALS — TEMPERATURE: 98 F | HEART RATE: 79 BPM | SYSTOLIC BLOOD PRESSURE: 114 MMHG | DIASTOLIC BLOOD PRESSURE: 78 MMHG

## 2018-03-27 DIAGNOSIS — K02.9 DENTAL CARIES: ICD-10-CM

## 2018-03-27 DIAGNOSIS — J01.20 SUBACUTE ETHMOIDAL SINUSITIS: ICD-10-CM

## 2018-03-27 DIAGNOSIS — K08.109 TEETH MISSING: ICD-10-CM

## 2018-03-27 DIAGNOSIS — R51.9 UNCONTROLLED MORNING HEADACHE: Primary | ICD-10-CM

## 2018-03-27 PROCEDURE — 3078F DIAST BP <80 MM HG: CPT | Mod: CPTII,S$GLB,, | Performed by: OTOLARYNGOLOGY

## 2018-03-27 PROCEDURE — 99999 PR PBB SHADOW E&M-EST. PATIENT-LVL III: CPT | Mod: PBBFAC,,, | Performed by: OTOLARYNGOLOGY

## 2018-03-27 PROCEDURE — 99499 UNLISTED E&M SERVICE: CPT | Mod: S$GLB,,, | Performed by: OTOLARYNGOLOGY

## 2018-03-27 PROCEDURE — 99213 OFFICE O/P EST LOW 20 MIN: CPT | Mod: S$GLB,,, | Performed by: OTOLARYNGOLOGY

## 2018-03-27 PROCEDURE — 3074F SYST BP LT 130 MM HG: CPT | Mod: CPTII,S$GLB,, | Performed by: OTOLARYNGOLOGY

## 2018-03-27 RX ORDER — AMOXICILLIN AND CLAVULANATE POTASSIUM 250; 125 MG/1; MG/1
TABLET, FILM COATED ORAL
Qty: 30 TABLET | Refills: 0 | Status: SHIPPED | OUTPATIENT
Start: 2018-03-27 | End: 2018-05-08 | Stop reason: ALTCHOICE

## 2018-03-27 NOTE — PATIENT INSTRUCTIONS
Rx for Augmentin 250/125; take q 8 hours x 10 days with food  AYR nasal mist odette help  Continue use of Flonase NSS; 1-2 sprays @ lateral nostril once a day   Call for any significant change in status  Pt. awaits medical clearance for dental procedures

## 2018-03-28 NOTE — PROGRESS NOTES
"CC: Sinus infection   HPI:Ms. Martínez is an 82-year-old bespectacled  female who arrives in a wheelchair accompanied by her daughter.  She once again believe she has another sinus infection.  She indicates lower facial pressure and frontal headaches which radiate to the back of her head.    Her headaches occur usually in the morning and grade 3-4.  She takes no medication for them.    She indicates "bad teeth" but she has not been cleared to have dental extraction procedures due to her heart condition/anticoagulation.  She spectrum rates gray mucus but denies any discolored nasal discharge.  She indicates "thin snot".  She denies any recent fever.  She denies sneezing.  I last treated her for sinus infection in mid January this year.  She did well with a short course of low-dose Augmentin.  She was examined by Dr. Cali Vickers 2/8/18 for cough and a sinus problem.  She was diagnosed with cough, essential hypertension, type 2 diabetes with stage III chronic kidney disease, atrial fibrillation with RVR an acute maxillary sinusitis treated with a Medrol Dosepak then.  He later called in Diflucan for her use.  She completed a sinus CT in June 2017 which indicated no acute abnormality.  There was mild mucosal thickening of the bilateral ethmoid air cells without chronic osteitis.  There was generalized cerebral volume loss and compensatory enlargement of the ventricular system and sulci.  There was evidence for chronic microvascular changes in the periventricular white matter.  There was a well demarcated hypodense area in the left basal ganglia i.e. prominent Virchow- Gómez space.  She also completed MRI scan of the brain with similar findings then.    Past Medical History:   Diagnosis Date    A-fib     Arthritis     Asteroid hyalosis of left eye     Bilateral nonexudative age-related macular degeneration 6/3/2014    Breast cancer     Cataract     Chronic GERD 9/18/2017    CKD stage 3 due to type 2 diabetes " mellitus     Coronary artery disease     Hypertension     Migraine headache     Mild nonproliferative diabetic retinopathy of both eyes 6/3/2014    Pneumonia     Type 2 diabetes mellitus with hyperglycemia     Vertigo      Current Outpatient Prescriptions on File Prior to Visit   Medication Sig Dispense Refill    ACCU-CHEK FASTCLIX Misc 1 lancet by Misc.(Non-Drug; Combo Route) route 3 (three) times daily. Pt to test blood glucose up to 3 times daily. 100 each 11    allopurinol (ZYLOPRIM) 100 MG tablet Resume allopurinol 1/2 pill until 1/5/18, 1 pill daily for 5 days, then 1.5 pills daily 60 tablet 3    amiodarone (PACERONE) 200 MG Tab Take 1 tablet (200 mg total) by mouth 2 (two) times daily. Start taking Daily on 1/4/18. 42 tablet 11    amoxicillin-clavulanate 250-125mg (AUGMENTIN) 250-125 mg Tab Take 1 tablet by mouth every 8 (eight) hours. 14 tablet 0    apixaban 5 mg Tab Take 1 tablet (5 mg total) by mouth 2 (two) times daily. 60 tablet 0    benzonatate (TESSALON) 100 MG capsule Take 100 mg by mouth 3 (three) times daily as needed for Cough.      blood sugar diagnostic Strp 1 strip by Misc.(Non-Drug; Combo Route) route once daily. One touch verio-please dispense strips and lancets. 100 each 3    diphenhydrAMINE-zinc acetate 1-0.1% (BENADRYL) cream Apply topically 2 (two) times daily as needed for Itching.  0    furosemide (LASIX) 20 MG tablet Take 2 tablets (40 mg total) by mouth once daily. 60 tablet 11    hydrOXYzine HCl (ATARAX) 10 MG Tab Take 1 tablet (10 mg total) by mouth 3 (three) times daily as needed for Itching. 30 tablet 0    isosorbide-hydrALAZINE 20-37.5 mg (BIDIL) 20-37.5 mg Tab Take 1 tablet by mouth 3 (three) times daily. 90 tablet 11    methylPREDNISolone (MEDROL DOSEPACK) 4 mg tablet Take as directed 21 tablet 0    metoprolol tartrate (LOPRESSOR) 50 MG tablet Take 1 tablet (50 mg total) by mouth 2 (two) times daily. 60 tablet 11    predniSONE (DELTASONE) 10 MG tablet take 1  tablet by mouth daily if needed for gout 30 tablet 0    SITagliptin (JANUVIA) 50 MG Tab Take 1 tablet (50 mg total) by mouth once daily. 30 tablet 5    triamcinolone acetonide 0.1% (KENALOG) 0.1 % cream APPLY TOPICALLY TWO TIMES A DAY 80 g 0    TRUEPLUS LANCETS 30 gauge Misc       doxepin (SINEQUAN) 10 MG capsule Take 1 capsule (10 mg total) by mouth 3 (three) times daily. 90 capsule 3     No current facility-administered medications on file prior to visit.          PE: Blood pressure 114/78 pulse 79 temperature 97.5  Gen.: Alert and oriented lady in no acute distress;  Otologic exam is within normal limits.  There is no evidence of fluid behind either eardrum.  Nasal exam reveals no specific evidence of purulent discharge in either nasal passage after Alex-Synephrine decongestion of each.  The mucosa of the turbinates is slightly pale.  Oropharyngeal exam is unremarkable for inflammation infection or ulceration.  There is no evidence of obvious tonsil tissue.  The posterior pharynx is noninflamed.  The uvula is not swollen or edematous.  The patient's missing most of her teeth with obvious dental caries noted of the upper lower dentition.  There are 2 remaining lower teeth only.  The patient is not wearing dentures.  The floor of the mouth is supple.  Neck examination is within normal limits.    DIAGNOSIS:     ICD-10-CM ICD-9-CM    1. Uncontrolled morning headache R51 784.0 amoxicillin-clavulanate 250-125mg (AUGMENTIN) 250-125 mg Tab   2. Subacute ethmoidal sinusitis J01.20 461.2 amoxicillin-clavulanate 250-125mg (AUGMENTIN) 250-125 mg Tab   3. Teeth missing K08.109 525.10    4. Dental caries K02.9 521.00 amoxicillin-clavulanate 250-125mg (AUGMENTIN) 250-125 mg Tab     PLAN: Rx for Augmentin 250/125; take q 8 hours x 10 days with food  AYR nasal mist odette help  Continue use of Flonase NSS; 1-2 sprays @ lateral nostril once a day   Call for any significant change in status  Pt. awaits medical clearance for dental  procedures

## 2018-04-11 ENCOUNTER — TELEPHONE (OUTPATIENT)
Dept: ENDOCRINOLOGY | Facility: CLINIC | Age: 83
End: 2018-04-11

## 2018-04-11 NOTE — TELEPHONE ENCOUNTER
----- Message from J Carlos Ogden sent at 4/11/2018  1:32 PM CDT -----  Contact: Pt  Pt would like to be called back asap regarding rescheduling her appt. from 4/17/18 to 4/24/18.    Pt can be reached at [phone].    Thanks

## 2018-04-16 ENCOUNTER — TELEPHONE (OUTPATIENT)
Dept: ENDOCRINOLOGY | Facility: CLINIC | Age: 83
End: 2018-04-16

## 2018-04-16 NOTE — TELEPHONE ENCOUNTER
----- Message from Danette Sheehan sent at 2018  9:08 AM CDT -----  Contact: pt   Pt had to cancel appt     Sister  suddenly    is on day of appt    Can you work her in some place nothing available    I put on wait list    Pt will be here  for cardiology     Please wait to call pt till after  [phone]    Thanks

## 2018-04-25 RX ORDER — HYDROCODONE BITARTRATE AND ACETAMINOPHEN 5; 325 MG/1; MG/1
1 TABLET ORAL EVERY 6 HOURS PRN
Qty: 60 TABLET | Refills: 0 | Status: SHIPPED | OUTPATIENT
Start: 2018-04-25 | End: 2018-04-28

## 2018-04-25 NOTE — TELEPHONE ENCOUNTER
----- Message from Jenelle Alexis MA sent at 4/25/2018  9:06 AM CDT -----  Contact: Pt      ----- Message -----  From: Earl Cochran  Sent: 4/25/2018   8:54 AM  To: [facility]    Would like a call regarding her pain medication refill. She was unsure of the medication name.     She states she would like to  the Rx today if possible. She is coming [facility] today she states.     Call her @ [phone]

## 2018-04-26 ENCOUNTER — HOSPITAL ENCOUNTER (OUTPATIENT)
Dept: CARDIOLOGY | Facility: CLINIC | Age: 83
Discharge: HOME | End: 2018-04-26
Payer: MEDICARE

## 2018-04-26 ENCOUNTER — OFFICE VISIT (OUTPATIENT)
Dept: ELECTROPHYSIOLOGY | Facility: CLINIC | Age: 83
End: 2018-04-26
Payer: MEDICARE

## 2018-04-26 VITALS
BODY MASS INDEX: 28.18 KG/M2 | SYSTOLIC BLOOD PRESSURE: 110 MMHG | HEART RATE: 66 BPM | WEIGHT: 149.25 LBS | HEIGHT: 61 IN | DIASTOLIC BLOOD PRESSURE: 82 MMHG

## 2018-04-26 DIAGNOSIS — I48.19 PERSISTENT ATRIAL FIBRILLATION: Primary | ICD-10-CM

## 2018-04-26 DIAGNOSIS — I10 ESSENTIAL HYPERTENSION: ICD-10-CM

## 2018-04-26 DIAGNOSIS — N18.30 CKD (CHRONIC KIDNEY DISEASE) STAGE 3, GFR 30-59 ML/MIN: ICD-10-CM

## 2018-04-26 DIAGNOSIS — N18.30 TYPE 2 DIABETES MELLITUS WITH STAGE 3 CHRONIC KIDNEY DISEASE, WITHOUT LONG-TERM CURRENT USE OF INSULIN: ICD-10-CM

## 2018-04-26 DIAGNOSIS — E11.22 TYPE 2 DIABETES MELLITUS WITH STAGE 3 CHRONIC KIDNEY DISEASE, WITHOUT LONG-TERM CURRENT USE OF INSULIN: ICD-10-CM

## 2018-04-26 PROCEDURE — 93000 ELECTROCARDIOGRAM COMPLETE: CPT | Mod: S$GLB,,, | Performed by: INTERNAL MEDICINE

## 2018-04-26 PROCEDURE — 99214 OFFICE O/P EST MOD 30 MIN: CPT | Mod: S$GLB,,, | Performed by: INTERNAL MEDICINE

## 2018-04-26 PROCEDURE — 99499 UNLISTED E&M SERVICE: CPT | Mod: S$GLB,,, | Performed by: INTERNAL MEDICINE

## 2018-04-26 PROCEDURE — 3074F SYST BP LT 130 MM HG: CPT | Mod: CPTII,S$GLB,, | Performed by: INTERNAL MEDICINE

## 2018-04-26 PROCEDURE — 99999 PR PBB SHADOW E&M-EST. PATIENT-LVL III: CPT | Mod: PBBFAC,,, | Performed by: INTERNAL MEDICINE

## 2018-04-26 PROCEDURE — 3079F DIAST BP 80-89 MM HG: CPT | Mod: CPTII,S$GLB,, | Performed by: INTERNAL MEDICINE

## 2018-04-26 NOTE — Clinical Note
Francine,  Could you send this note to her Dentist:  [facility] [address] [place] [address] [phone] f [phone] MANDO Ashby

## 2018-04-26 NOTE — PROGRESS NOTES
"Subjective:    Patient ID:  Dacia Martínez is a 82 y.o. female who presents for follow-up of Atrial Fibrillation      82 year old female with pAF s/p DCCV (17) HTN, CAD, breast cancer, migraines, IDDM, and CKD III, who presented to [facility] ED (17) for evaluation of a sudden-onset episode of palpitations/tachycardia. She was found to be in AF w/RVR at 120 bpm; an Echo at the time revealed an EF of 60%. Ms. Martínez was rate-controlled and discharged home.     Since discharge, Ms. Martínez notes only occasional fast rates; "not anything like before." She reports experiencing occasional dizziness; baseline. She denies chest pain, SOB/MCCOLLUM, palpitations, or syncope.     : Minimal symptoms due to AF. Remains on metoprolol 100 mg bid as well as apixaban.     : She has almost immediate recurrence of AF post CV 17. Feels better on amiodarone but sleeping more.     Interval history: AF rate controlled on low dose amiodarone 200 mg qd. She is due for dental extraction. Her PCP advised no cessation of OAC.     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  6/3/2014: Bilateral nonexudative age-related macular deg*  No date: Breast cancer  No date: Cataract  2017: Chronic GERD  No date: CKD stage 3 due to type 2 diabetes mellitus  No date: Coronary artery disease  No date: Hypertension  No date: Migraine headache  6/3/2014: Mild nonproliferative diabetic retinopathy of *  No date: Pneumonia  No date: Type 2 diabetes mellitus with hyperglycemia  No date: Vertigo    Past Surgical History:  No date: BREAST SURGERY      Comment: left lumpectomy  No date: CARPAL TUNNEL RELEASE      Comment: left  No date: CATARACT EXTRACTION      Comment: vance  No date:  SECTION  No date: EYE SURGERY      Comment: cataracts bilaterally  No date: HYSTERECTOMY      Comment: partial    Social History    Marital status: Single              Spouse name:                       Years " of education:                 Number of children: 4             Occupational History    None on file    Social History Main Topics    Smoking status: Never Smoker                                                                Smokeless tobacco: Never Used                        Alcohol use: No                 Comment: socially    Drug use: No              Sexual activity: Not Currently        Other Topics            Concern    None on file    Social History Narrative    Family supportive.     Granddaughter helps set out her medications in pillbox, helps check her blood sugar.    Review of patient's family history indicates:    Cancer                         Mother                      Comment: stomach    Heart disease                  Mother                    Diabetes                       Father                    Hypertension                   Father                    Blindness                      Brother                   Diabetes                       Brother                   Hypertension                   Brother                   Cataracts                      Sister                    Diabetes                       Sister                    Diabetes                       Brother                   Diabetes                       Brother                   Diabetes                       Brother                   No Known Problems              Daughter                  No Known Problems              Son                       No Known Problems              Daughter                  Amblyopia                      Neg Hx                    Glaucoma                       Neg Hx                    Macular degeneration           Neg Hx                    Strabismus                     Neg Hx                    Retinal detachment             Neg Hx                            Review of Systems   Constitution: Positive for malaise/fatigue. Negative for diaphoresis.   HENT: Negative for nosebleeds.    Eyes: Negative for  double vision.   Cardiovascular: Positive for irregular heartbeat. Negative for chest pain, dyspnea on exertion, near-syncope, palpitations and syncope.   Respiratory: Negative for shortness of breath.    Skin: Negative.    Musculoskeletal: Negative.    Gastrointestinal: Negative for abdominal pain, hematemesis and hematochezia.   Genitourinary: Negative for hematuria.   Neurological: Positive for dizziness and light-headedness. Negative for headaches.   Psychiatric/Behavioral: Negative for altered mental status.        Objective:    Physical Exam   Constitutional: She is oriented to person, place, and time. She appears well-developed and well-nourished. No distress.   HENT:   Head: Normocephalic and atraumatic.   Eyes: EOM are normal. Pupils are equal, round, and reactive to light. Right eye exhibits no discharge. Left eye exhibits no discharge.   Neck: Normal range of motion. No JVD present. No thyromegaly present.   Cardiovascular: Normal rate, S1 normal, S2 normal, normal heart sounds and intact distal pulses.  An irregular rhythm present. PMI is not displaced.  Exam reveals no gallop and no friction rub.    No murmur heard.  Pulmonary/Chest: Effort normal and breath sounds normal. No respiratory distress. She has no wheezes. She has no rales.   Abdominal: Soft. Bowel sounds are normal. She exhibits no distension. There is no tenderness. There is no rebound and no guarding.   Musculoskeletal: Normal range of motion. She exhibits no edema or tenderness.   Neurological: She is alert and oriented to person, place, and time. No cranial nerve deficit.   Skin: Skin is warm and dry. No rash noted. She is not diaphoretic. No erythema.   Psychiatric: She has a normal mood and affect. Her behavior is normal. Judgment and thought content normal.   Vitals reviewed.    ECG: AF with controlled V rate        Assessment:       1. Persistent atrial fibrillation    2. CKD (chronic kidney disease) stage 3, GFR 30-59 ml/min    3.  Essential hypertension    4. Type 2 diabetes mellitus with stage 3 chronic kidney disease, without long-term current use of insulin         Plan:       82 yoF persistent AF, HTN, DM here for pre-operative assessment prior to dental extraction. She is due for a low risk procedure with low risk of bleeding. I would advise no antibiotics, no anesthesia restriction and epinephrine is ok. With regard to her anticoagulant, I would hold her eliquis 48h prior to the procedure and restart once safe from the surgical perspective, ideally no more then 48h after.     RTC 6m

## 2018-05-02 DIAGNOSIS — I48.19 PERSISTENT ATRIAL FIBRILLATION: Primary | ICD-10-CM

## 2018-05-08 ENCOUNTER — OFFICE VISIT (OUTPATIENT)
Dept: ENDOCRINOLOGY | Facility: CLINIC | Age: 83
End: 2018-05-08
Payer: MEDICARE

## 2018-05-08 VITALS
WEIGHT: 154.13 LBS | HEIGHT: 61 IN | DIASTOLIC BLOOD PRESSURE: 86 MMHG | SYSTOLIC BLOOD PRESSURE: 122 MMHG | BODY MASS INDEX: 29.1 KG/M2

## 2018-05-08 DIAGNOSIS — N18.30 TYPE 2 DIABETES MELLITUS WITH STAGE 3 CHRONIC KIDNEY DISEASE, WITHOUT LONG-TERM CURRENT USE OF INSULIN: Primary | ICD-10-CM

## 2018-05-08 DIAGNOSIS — M85.89 OSTEOPENIA OF MULTIPLE SITES: ICD-10-CM

## 2018-05-08 DIAGNOSIS — E11.22 TYPE 2 DIABETES MELLITUS WITH STAGE 3 CHRONIC KIDNEY DISEASE, WITHOUT LONG-TERM CURRENT USE OF INSULIN: Primary | ICD-10-CM

## 2018-05-08 DIAGNOSIS — N18.30 CKD (CHRONIC KIDNEY DISEASE) STAGE 3, GFR 30-59 ML/MIN: ICD-10-CM

## 2018-05-08 DIAGNOSIS — N18.4 CHRONIC KIDNEY DISEASE, STAGE IV (SEVERE): ICD-10-CM

## 2018-05-08 DIAGNOSIS — J30.2 SEASONAL ALLERGIC RHINITIS, UNSPECIFIED TRIGGER: ICD-10-CM

## 2018-05-08 DIAGNOSIS — M1A.09X0 IDIOPATHIC CHRONIC GOUT OF MULTIPLE SITES WITHOUT TOPHUS: Chronic | ICD-10-CM

## 2018-05-08 DIAGNOSIS — E87.5 HYPERKALEMIA: ICD-10-CM

## 2018-05-08 LAB — GLUCOSE SERPL-MCNC: 80 MG/DL (ref 70–110)

## 2018-05-08 PROCEDURE — 3074F SYST BP LT 130 MM HG: CPT | Mod: CPTII,GC,S$GLB, | Performed by: INTERNAL MEDICINE

## 2018-05-08 PROCEDURE — 82948 REAGENT STRIP/BLOOD GLUCOSE: CPT | Mod: GC,S$GLB,, | Performed by: INTERNAL MEDICINE

## 2018-05-08 PROCEDURE — 3079F DIAST BP 80-89 MM HG: CPT | Mod: CPTII,GC,S$GLB, | Performed by: INTERNAL MEDICINE

## 2018-05-08 PROCEDURE — 99214 OFFICE O/P EST MOD 30 MIN: CPT | Mod: GC,S$GLB,, | Performed by: INTERNAL MEDICINE

## 2018-05-08 PROCEDURE — 99999 PR PBB SHADOW E&M-EST. PATIENT-LVL IV: CPT | Mod: PBBFAC,GC,, | Performed by: INTERNAL MEDICINE

## 2018-05-08 RX ORDER — FLUTICASONE PROPIONATE 50 MCG
2 SPRAY, SUSPENSION (ML) NASAL DAILY
Qty: 16 G | Refills: 5 | Status: SHIPPED | OUTPATIENT
Start: 2018-05-08 | End: 2019-01-01 | Stop reason: SDUPTHER

## 2018-05-08 NOTE — PROGRESS NOTES
"Subjective:       Patient ID: Dacia Martínez is a 82 y.o. female.    Chief Complaint: Diabetes Mellitus  FU of DM.    HPI   With regards to weight loss/gain:  Per chart review patient has gained ~13lbs since last clinic visit.    She notes that she if "full of fluid".  Her clothes are fitting the same.     With regards to the diabetes:  Last seen in clinic 1/9/18.  Presents today with daughter.    Diagnosed:  ~2000  Last A1C: 6.4% 12/2017    Current regimen:   Januvia 50mg daily (adjusted for renal function)    Previous regimen:  trulicity - For three years    Diet/exercise - walks around the house, still able to complete ADLs  In clinic by wheelchair    Missed doses? no    # times a day testing:  Twice weekly (checks around 7:30pm)     Log reviewed: no  BG range recall: 70s - 190s  POC in clinic today - 80 (fasting).     Last eye exam: 8/8/17  Last podiatry exam: 1/9/18     Typical meals: trying to keep to a healthy diet, but she also eats off of her diet on occasion.     Education - last visit: na  Exercise - minimal activity    Hypoglycemia: yes  Associated symptoms: na    Current complaints: sinus problems/headaches.  Notes rhinorrhea and post nasal drip.   Denies any thirst, polyuria, polydipsia, weight loss, nausea or blurred vision.   Denies numbness or tingling of feet   lower extremity swelling      Does not have a Medic alert tag     Diabetes complications:   Numbness/tingling    No ARB/ACEi, or statin on file.    In regards to dyslipidemia:  No statin on file      Review of Systems   Constitutional: Negative for unexpected weight change.   HENT: Positive for congestion, postnasal drip, rhinorrhea, sinus pain and sore throat. Negative for sinus pressure and sneezing.    Eyes: Negative for visual disturbance.   Respiratory: Negative for shortness of breath.    Cardiovascular: Negative for chest pain.   Gastrointestinal: Negative for abdominal pain.   Genitourinary: Positive for difficulty urinating. Negative " "for dysuria and urgency.   Musculoskeletal: Negative for arthralgias.   Skin: Negative for wound.   Neurological: Negative for headaches.   Hematological: Does not bruise/bleed easily.   Psychiatric/Behavioral: Negative for sleep disturbance.         Objective:       Vitals:    05/08/18 0958   BP: 122/86   Weight: 69.9 kg (154 lb 1.6 oz)   Height: 5' 1" (1.549 m)   Body mass index is 29.12 kg/m².      Physical Exam   Constitutional: She is oriented to person, place, and time. She appears well-developed. No distress.   Pleasant elderly female, non icteric, NAD   Neck: No thyromegaly present.   Cardiovascular: Normal rate.    Pulmonary/Chest: Effort normal.   Abdominal: Soft.   Musculoskeletal: She exhibits edema (bilateral pitting).   Feet no cuts or scratches  Shoes appropriate    Neurological: She is alert and oriented to person, place, and time.   Skin: No erythema.   No nodules   Vitals reviewed.      Lab Results   Component Value Date    HGBA1C 6.4 (H) 12/15/2017       Chemistry        Component Value Date/Time     (L) 01/02/2018 2226    K 5.5 (H) 01/02/2018 2226    CL 98 01/02/2018 2226    CO2 22 (L) 01/02/2018 2226    BUN 23 01/02/2018 2226    CREATININE 1.7 (H) 01/02/2018 2226     (H) 01/02/2018 2226        Component Value Date/Time    CALCIUM 9.4 01/02/2018 2226    ALKPHOS 83 01/02/2018 2226    AST 37 01/02/2018 2226    ALT 27 01/02/2018 2226    BILITOT 0.6 01/02/2018 2226    ESTGFRAFRICA 32.1 (A) 01/02/2018 2226    EGFRNONAA 27.9 (A) 01/02/2018 2226        Lab Results   Component Value Date    TSH 3.531 06/05/2017     Lab Results   Component Value Date    WBC 8.12 01/02/2018    HGB 14.1 01/02/2018    HCT 44.1 01/02/2018    MCV 82 01/02/2018     (H) 01/02/2018         Assessment:       1. Type 2 diabetes mellitus with stage 3 chronic kidney disease, without long-term current use of insulin    2. BMI 29.0-29.9,adult    3. Idiopathic chronic gout of multiple sites without tophus    4. " Seasonal allergic rhinitis, unspecified trigger    5. CKD (chronic kidney disease) stage 3, GFR 30-59 ml/min    6. Hyperkalemia          Plan:       Type 2 diabetes mellitus with stage 3 chronic kidney disease, without long-term current use of insulin  Last a1c - 6.4%.  Will update today.  Can be low secondary to hypoglycemia and/or AoCD.  Today's POCT - 80     Reviewed goals of therapy are to get the best control we can without hypoglycemia.       Recommended discontinuing trulicity and monitoring BG prior to initiating a new therapy.    Patient and daughter requested to transition to a new therapy.      Patient not a candidate for (GFR 32):  SGLT2i  Metformin  Sulfonyurea     Medication changes:   Start: Januvia 50mg daily (begin on Thursday)     Goal A1C <7.5%     Advised frequent self blood glucose monitoring.  Patient encouraged to document glucose results and bring them to every clinic visit.  BG logs provided to patient. Plans to mail each log upon completion (envelope provided).     Hypoglycemia precautions discussed. Instructed on precautions before driving.       Periodic follow ups for eye evaluations, foot care and dental care suggested.     Foot exam UTD.  Eye exam UTD.      Recommended flu vaccine, patient declined.     BMI 29.0 - 29.9,adult  Overall weight gain, unclear if fluid related versus actual weight.  Discussed adjusting fluid pill with cardiology.     Idiopathic chronic gout of multiple sites without tophus  On PDN, may increase BG.  Patient notes that she does not take this as prescribed.     CKD (chronic kidney disease) stage 3, GFR 30-59 ml/min  GFR 32.  Minimizes available options for anti-hyperglycemic medications.  Will update labs    Chronic sinusitis  Notes >2 weeks of sinus-related symptoms.  Has tried decongestants and antibiotics.  Will refill nasal spray.      Osteopenia  Will check Vitamin D level.  Recommend Vitamin D3 1000 iU daily.           RTC in 4 months.  Patient to mail  BG logs in upon completion of each sheet.  Will make necessary adjustments at that time.         Shelby Horner MD  Endocrinology Fellow      This case has been reviewed with staff, Dr. Martínez.

## 2018-05-09 ENCOUNTER — TELEPHONE (OUTPATIENT)
Dept: ENDOCRINOLOGY | Facility: CLINIC | Age: 83
End: 2018-05-09

## 2018-05-09 DIAGNOSIS — E55.9 HYPOVITAMINOSIS D: ICD-10-CM

## 2018-05-09 RX ORDER — VIT C/E/ZN/COPPR/LUTEIN/ZEAXAN 250MG-90MG
2000 CAPSULE ORAL DAILY
Qty: 180 CAPSULE | Refills: 3 | Status: ON HOLD | COMMUNITY
Start: 2018-05-09 | End: 2018-11-07 | Stop reason: SDUPTHER

## 2018-05-09 NOTE — TELEPHONE ENCOUNTER
Called patient to update her on labs.    Vitamin D level is low.  Prescribed supplementation.    a1c is 6.0%.  No change to DM regimen.  Understand that anemia can falsely lower results.    Shelby Horner MD  Endocrinology Fellow

## 2018-05-24 RX ORDER — HYDROCODONE BITARTRATE AND ACETAMINOPHEN 5; 325 MG/1; MG/1
1 TABLET ORAL EVERY 6 HOURS PRN
Qty: 60 TABLET | Refills: 0 | Status: ON HOLD | OUTPATIENT
Start: 2018-05-24 | End: 2018-06-22

## 2018-05-30 NOTE — PROGRESS NOTES
I have personally taken the history and examined this patient and agree with the resident's or fellow's note as stated above If documented hypoglycemia will need to stop the sulfonylurea..    Tejas Martínez MD, FACE

## 2018-06-08 ENCOUNTER — HOSPITAL ENCOUNTER (EMERGENCY)
Facility: HOSPITAL | Age: 83
Discharge: HOME | End: 2018-06-08
Attending: EMERGENCY MEDICINE
Payer: MEDICARE

## 2018-06-08 VITALS
DIASTOLIC BLOOD PRESSURE: 90 MMHG | TEMPERATURE: 98 F | HEIGHT: 61 IN | SYSTOLIC BLOOD PRESSURE: 144 MMHG | HEART RATE: 89 BPM | BODY MASS INDEX: 27.38 KG/M2 | RESPIRATION RATE: 16 BRPM | OXYGEN SATURATION: 95 % | WEIGHT: 145 LBS

## 2018-06-08 DIAGNOSIS — M54.2 NECK PAIN: Primary | ICD-10-CM

## 2018-06-08 LAB
ANION GAP SERPL CALC-SCNC: 14 MMOL/L
BASOPHILS # BLD AUTO: 0.09 K/UL
BASOPHILS NFR BLD: 1.5 %
BUN SERPL-MCNC: 24 MG/DL
CALCIUM SERPL-MCNC: 8.8 MG/DL
CHLORIDE SERPL-SCNC: 105 MMOL/L
CO2 SERPL-SCNC: 22 MMOL/L
CREAT SERPL-MCNC: 1.4 MG/DL
DIFFERENTIAL METHOD: ABNORMAL
EOSINOPHIL # BLD AUTO: 0.1 K/UL
EOSINOPHIL NFR BLD: 1.9 %
ERYTHROCYTE [DISTWIDTH] IN BLOOD BY AUTOMATED COUNT: 15.6 %
EST. GFR  (AFRICAN AMERICAN): 40.6 ML/MIN/1.73 M^2
EST. GFR  (NON AFRICAN AMERICAN): 35.3 ML/MIN/1.73 M^2
GLUCOSE SERPL-MCNC: 81 MG/DL
HCT VFR BLD AUTO: 35 %
HGB BLD-MCNC: 10.7 G/DL
IMM GRANULOCYTES # BLD AUTO: 0.03 K/UL
IMM GRANULOCYTES NFR BLD AUTO: 0.5 %
LYMPHOCYTES # BLD AUTO: 1.4 K/UL
LYMPHOCYTES NFR BLD: 22.3 %
MCH RBC QN AUTO: 27.4 PG
MCHC RBC AUTO-ENTMCNC: 30.6 G/DL
MCV RBC AUTO: 90 FL
MONOCYTES # BLD AUTO: 0.6 K/UL
MONOCYTES NFR BLD: 10 %
NEUTROPHILS # BLD AUTO: 3.9 K/UL
NEUTROPHILS NFR BLD: 63.8 %
NRBC BLD-RTO: 0 /100 WBC
PLATELET # BLD AUTO: 237 K/UL
PMV BLD AUTO: 11.9 FL
POTASSIUM SERPL-SCNC: 4.1 MMOL/L
RBC # BLD AUTO: 3.91 M/UL
SODIUM SERPL-SCNC: 141 MMOL/L
WBC # BLD AUTO: 6.18 K/UL

## 2018-06-08 PROCEDURE — 80048 BASIC METABOLIC PNL TOTAL CA: CPT

## 2018-06-08 PROCEDURE — 99284 EMERGENCY DEPT VISIT MOD MDM: CPT | Mod: ,,, | Performed by: PHYSICIAN ASSISTANT

## 2018-06-08 PROCEDURE — 99284 EMERGENCY DEPT VISIT MOD MDM: CPT | Mod: 25

## 2018-06-08 PROCEDURE — 85025 COMPLETE CBC W/AUTO DIFF WBC: CPT

## 2018-06-08 PROCEDURE — 25000003 PHARM REV CODE 250: Performed by: PHYSICIAN ASSISTANT

## 2018-06-08 RX ORDER — ACETAMINOPHEN 325 MG/1
650 TABLET ORAL
Status: COMPLETED | OUTPATIENT
Start: 2018-06-08 | End: 2018-06-08

## 2018-06-08 RX ORDER — METHOCARBAMOL 500 MG/1
500 TABLET, FILM COATED ORAL 3 TIMES DAILY
Qty: 10 TABLET | Refills: 0 | Status: SHIPPED | OUTPATIENT
Start: 2018-06-08 | End: 2018-06-13

## 2018-06-08 RX ADMIN — ACETAMINOPHEN 650 MG: 325 TABLET ORAL at 08:06

## 2018-06-08 NOTE — ED PROVIDER NOTES
"Encounter Date: 2018       History     Chief Complaint   Patient presents with    Neck Pain     Pt c/o posterior neck pain that radiates to back of head.  Onset 2 days ago.      81 y/o AAF with history of HTN, breast cancer, DM2, CKD, CAD, Afib presents to the ED c/o R sided neck pain x 2 days. She reports the pain is constant "sharp" 7/10 that is worse with movement of the neck. She denies any trauma or PSHx of the neck. She also reports pain and tenderness to the R mastoid and R ear pain. She denies fevers. She has taken norco at home with no improvement of her pain. She denies chest pain, abdominal pain, numbness, weakness, bowel incontinence, urinary retention, changes in vision.       The history is provided by the patient.     Review of patient's allergies indicates:  No Known Allergies  Past Medical History:   Diagnosis Date    A-fib     Arthritis     Asteroid hyalosis of left eye     Bilateral nonexudative age-related macular degeneration 6/3/2014    Breast cancer     Cataract     Chronic GERD 2017    CKD stage 3 due to type 2 diabetes mellitus     Coronary artery disease     Hypertension     Migraine headache     Mild nonproliferative diabetic retinopathy of both eyes 6/3/2014    Pneumonia     Type 2 diabetes mellitus with hyperglycemia     Vertigo      Past Surgical History:   Procedure Laterality Date    BREAST SURGERY      left lumpectomy    CARPAL TUNNEL RELEASE      left    CATARACT EXTRACTION      vance     SECTION      EYE SURGERY      cataracts bilaterally    HYSTERECTOMY      partial     Family History   Problem Relation Age of Onset    Cancer Mother         stomach    Heart disease Mother     Diabetes Father     Hypertension Father     Blindness Brother     Diabetes Brother     Hypertension Brother     Cataracts Sister     Diabetes Sister     Diabetes Brother     Diabetes Brother     Diabetes Brother     No Known Problems Daughter     No Known " Problems Son     No Known Problems Daughter     Amblyopia Neg Hx     Glaucoma Neg Hx     Macular degeneration Neg Hx     Strabismus Neg Hx     Retinal detachment Neg Hx      Social History   Substance Use Topics    Smoking status: Never Smoker    Smokeless tobacco: Never Used    Alcohol use No      Comment: socially     Review of Systems   Constitutional: Negative for chills and fever.   HENT: Positive for ear pain. Negative for congestion, rhinorrhea and sore throat.    Eyes: Negative for photophobia and visual disturbance.   Respiratory: Positive for shortness of breath (at baseline). Negative for cough.    Cardiovascular: Negative for chest pain.   Gastrointestinal: Negative for abdominal pain, constipation, diarrhea, nausea and vomiting.        No bowel incontinence   Genitourinary: Negative for difficulty urinating, dysuria and hematuria.        No urinary incontinence   Musculoskeletal: Positive for neck pain. Negative for back pain.   Skin: Negative for rash and wound.   Neurological: Positive for headaches. Negative for dizziness, syncope, weakness, light-headedness and numbness.   Psychiatric/Behavioral: Negative for confusion.       Physical Exam     Initial Vitals [06/08/18 0744]   BP Pulse Resp Temp SpO2   (!) 157/86 103 16 97.9 °F (36.6 °C) 97 %      MAP       109.67         Physical Exam    Nursing note and vitals reviewed.  Constitutional: She appears well-developed and well-nourished. She is not diaphoretic. No distress.   HENT:   Head: Normocephalic and atraumatic.   Right Ear: Tympanic membrane, external ear and ear canal normal.   Left Ear: Tympanic membrane, external ear and ear canal normal.   Mouth/Throat: Uvula is midline, oropharynx is clear and moist and mucous membranes are normal.   Tenderness over the R mastoid. No erythema.   Neck: Normal range of motion. Neck supple. Muscular tenderness present. No spinous process tenderness present. Normal range of motion present. No neck  rigidity.       R sided muscular tenderness of the neck. No midline C spine tenderness.   Cardiovascular: Normal rate, regular rhythm and normal heart sounds. Exam reveals no gallop and no friction rub.    No murmur heard.  Bilateral radial pulses 2+   Pulmonary/Chest: Breath sounds normal. She has no wheezes. She has no rhonchi. She has no rales.   Abdominal: Soft. Bowel sounds are normal. There is no tenderness. There is no rebound and no guarding.   Musculoskeletal: Normal range of motion.   Normal strength, ROM, sensation of bilateral upper and lower extremities.   Neurological: She is alert and oriented to person, place, and time. She has normal strength.   Skin: Skin is warm and dry. No rash noted. No erythema.   Psychiatric: She has a normal mood and affect.         ED Course   Procedures  Labs Reviewed   CBC W/ AUTO DIFFERENTIAL - Abnormal; Notable for the following:        Result Value    RBC 3.91 (*)     Hemoglobin 10.7 (*)     Hematocrit 35.0 (*)     MCHC 30.6 (*)     RDW 15.6 (*)     All other components within normal limits   BASIC METABOLIC PANEL - Abnormal; Notable for the following:     CO2 22 (*)     BUN, Bld 24 (*)     eGFR if  40.6 (*)     eGFR if non  35.3 (*)     All other components within normal limits          CT Temporal Bone without contrast   Final Result      There are few punctate foci of a gas density within the visualized soft tissues which are nonspecific and in the absence of traumatic injury likely intravascular concerning for a sequela of IV infusion with venous embolism.  Clinical correlation advised.      Otherwise unremarkable noncontrast CT of the temporal bones specifically without evidence for mastoiditis.      Remote lamina papyracea and left inferior orbital fractures identified.         Electronically signed by: Danny Yang DO   Date:    06/08/2018   Time:    09:33           Medical Decision Making:   History:   Old Medical Records: I  decided to obtain old medical records.  Clinical Tests:   Lab Tests: Ordered and Reviewed  Radiological Study: Ordered and Reviewed       APC / Resident Notes:   81 y/o AAF with history of HTN, breast cancer, DM2, CKD, CAD, Afib presents to the ED c/o R sided neck pain x 2 days. VSS. Tenderness to the R neck without any midline C spine tenderness.  No signs of acute otitis media. Tenderness of the R mastoid without swelling or erythema. Normal ROM of the neck. Normal ROM, strength, sensation of bilateral UE. Bilateral radial pulses 2+. DDx includes but is not limited to muscle strain, mastoiditis, bony met. I do not suspect acute fracture. Will get labs, CT temporal bone.      No leukocytosis. CMP with no acute abnormalities.    CT temporal bone with no signs of mastoiditis.     I do not feel that she needs any further labs or imaging at this time. Stable for discharge. Will treat for muscle strain of the neck.    She was discharged with a prescription for robaxin.  She will take OTC tylenol as needed for pain.  She will follow up with her PCP.  All of the patient's questions were answered.  I reviewed the patient's chart, labs, and imaging and discussed the case with my supervising physician.                    Clinical Impression:   The encounter diagnosis was Neck pain.      Disposition:   Disposition: Discharged  Condition: Stable                        Franny Hewitt PA-C  06/08/18 1003

## 2018-06-08 NOTE — ED TRIAGE NOTES
Patient's name and date of birth checked and is correct.  LOC: The patient is awake, alert and aware of environment with an appropriate affect, the patient is oriented x 3 and speaking appropriately.  APPEARANCE: Patient resting comfortably and in no acute distress, patient is clean and well groomed, patient's clothing is properly fastened.  CARDIOVASCULAR:  Heart rate regular and even with no peripheral edema noted.  SKIN: The skin is warm and dry, patient has normal skin turgor and moist mucus membranes, skin intact, no breakdown or brusing noted.   MUSKULOSKELETAL: Patient moving all extremities well, no obvious swelling or deformities noted.  RESPIRATORY: Airway is open and patent, respirations are spontaneous, patient has a normal effort and rate.

## 2018-06-18 ENCOUNTER — TELEPHONE (OUTPATIENT)
Dept: ELECTROPHYSIOLOGY | Facility: CLINIC | Age: 83
End: 2018-06-18

## 2018-06-18 NOTE — TELEPHONE ENCOUNTER
Pt c/o's feet swelling, some wt gain, but unable to tell me when wt gain occurred. Denies SOB at this time. Pt insistent on seeing Dr. Burt when he returns next week, despite recommendation she see a general cardiologist.   ----- Message from Maryana Foy sent at 6/18/2018  1:47 PM CDT -----  Contact: Melanie Miller  Pls call pt's daughter Melanie Miller at [phone].  She left a msg this morning and is still waiting for a call back.  Pt's feet are swelling and she is sleeping a lot more than normal.     Thank you

## 2018-06-19 ENCOUNTER — TELEPHONE (OUTPATIENT)
Dept: ELECTROPHYSIOLOGY | Facility: CLINIC | Age: 83
End: 2018-06-19

## 2018-06-21 ENCOUNTER — HOSPITAL ENCOUNTER (OUTPATIENT)
Facility: HOSPITAL | Age: 83
Discharge: HOME | End: 2018-06-25
Attending: EMERGENCY MEDICINE | Admitting: HOSPITALIST
Payer: MEDICARE

## 2018-06-21 DIAGNOSIS — R06.02 SHORTNESS OF BREATH: ICD-10-CM

## 2018-06-21 DIAGNOSIS — N18.9 CHRONIC KIDNEY DISEASE, UNSPECIFIED CKD STAGE: ICD-10-CM

## 2018-06-21 DIAGNOSIS — I50.9 CHF (CONGESTIVE HEART FAILURE): ICD-10-CM

## 2018-06-21 DIAGNOSIS — I50.9 ACUTE ON CHRONIC CONGESTIVE HEART FAILURE, UNSPECIFIED HEART FAILURE TYPE: Primary | ICD-10-CM

## 2018-06-21 LAB
ALBUMIN SERPL BCP-MCNC: 3.2 G/DL
ALP SERPL-CCNC: 79 U/L
ALT SERPL W/O P-5'-P-CCNC: 7 U/L
ANION GAP SERPL CALC-SCNC: 11 MMOL/L
AORTIC VALVE STENOSIS: ABNORMAL
AST SERPL-CCNC: 21 U/L
BASOPHILS # BLD AUTO: 0.09 K/UL
BASOPHILS NFR BLD: 1.6 %
BILIRUB SERPL-MCNC: 1.1 MG/DL
BNP SERPL-MCNC: 1828 PG/ML
BUN SERPL-MCNC: 21 MG/DL
CALCIUM SERPL-MCNC: 9.2 MG/DL
CHLORIDE SERPL-SCNC: 107 MMOL/L
CO2 SERPL-SCNC: 23 MMOL/L
CREAT SERPL-MCNC: 1.5 MG/DL
DIFFERENTIAL METHOD: ABNORMAL
EOSINOPHIL # BLD AUTO: 0.1 K/UL
EOSINOPHIL NFR BLD: 2.4 %
ERYTHROCYTE [DISTWIDTH] IN BLOOD BY AUTOMATED COUNT: 16 %
EST. GFR  (AFRICAN AMERICAN): 37.1 ML/MIN/1.73 M^2
EST. GFR  (NON AFRICAN AMERICAN): 32.2 ML/MIN/1.73 M^2
ESTIMATED PA SYSTOLIC PRESSURE: 58.03
FERRITIN SERPL-MCNC: 272 NG/ML
GLUCOSE SERPL-MCNC: 108 MG/DL
HCT VFR BLD AUTO: 35.7 %
HGB BLD-MCNC: 11 G/DL
IMM GRANULOCYTES # BLD AUTO: 0.03 K/UL
IMM GRANULOCYTES NFR BLD AUTO: 0.5 %
INR PPP: 1.4
IRON SERPL-MCNC: 82 UG/DL
LYMPHOCYTES # BLD AUTO: 1.6 K/UL
LYMPHOCYTES NFR BLD: 28.6 %
MAGNESIUM SERPL-MCNC: 2.1 MG/DL
MCH RBC QN AUTO: 27.6 PG
MCHC RBC AUTO-ENTMCNC: 30.8 G/DL
MCV RBC AUTO: 90 FL
MITRAL VALVE MOBILITY: NORMAL
MITRAL VALVE REGURGITATION: ABNORMAL
MITRAL VALVE STENOSIS: ABNORMAL
MONOCYTES # BLD AUTO: 0.6 K/UL
MONOCYTES NFR BLD: 9.9 %
NEUTROPHILS # BLD AUTO: 3.3 K/UL
NEUTROPHILS NFR BLD: 57 %
NRBC BLD-RTO: 0 /100 WBC
PHOSPHATE SERPL-MCNC: 4.5 MG/DL
PLATELET # BLD AUTO: 244 K/UL
PMV BLD AUTO: 11.7 FL
POCT GLUCOSE: 118 MG/DL (ref 70–110)
POCT GLUCOSE: 118 MG/DL (ref 70–110)
POTASSIUM SERPL-SCNC: 5 MMOL/L
PROT SERPL-MCNC: 7.8 G/DL
PROTHROMBIN TIME: 13.7 SEC
RBC # BLD AUTO: 3.99 M/UL
RETIRED EF AND QEF - SEE NOTES: 33 (ref 55–65)
SATURATED IRON: 26 %
SODIUM SERPL-SCNC: 141 MMOL/L
TOTAL IRON BINDING CAPACITY: 318 UG/DL
TRANSFERRIN SERPL-MCNC: 215 MG/DL
TRICUSPID VALVE REGURGITATION: ABNORMAL
TROPONIN I SERPL DL<=0.01 NG/ML-MCNC: 0.02 NG/ML
WBC # BLD AUTO: 5.73 K/UL

## 2018-06-21 PROCEDURE — G0378 HOSPITAL OBSERVATION PER HR: HCPCS

## 2018-06-21 PROCEDURE — 85025 COMPLETE CBC W/AUTO DIFF WBC: CPT

## 2018-06-21 PROCEDURE — 85610 PROTHROMBIN TIME: CPT

## 2018-06-21 PROCEDURE — 93306 TTE W/DOPPLER COMPLETE: CPT

## 2018-06-21 PROCEDURE — 84484 ASSAY OF TROPONIN QUANT: CPT

## 2018-06-21 PROCEDURE — 25000003 PHARM REV CODE 250: Performed by: NURSE PRACTITIONER

## 2018-06-21 PROCEDURE — 83735 ASSAY OF MAGNESIUM: CPT

## 2018-06-21 PROCEDURE — 82728 ASSAY OF FERRITIN: CPT

## 2018-06-21 PROCEDURE — 83880 ASSAY OF NATRIURETIC PEPTIDE: CPT

## 2018-06-21 PROCEDURE — 25000003 PHARM REV CODE 250: Performed by: STUDENT IN AN ORGANIZED HEALTH CARE EDUCATION/TRAINING PROGRAM

## 2018-06-21 PROCEDURE — 83540 ASSAY OF IRON: CPT

## 2018-06-21 PROCEDURE — 63600175 PHARM REV CODE 636 W HCPCS: Performed by: NURSE PRACTITIONER

## 2018-06-21 PROCEDURE — 99220 PR INITIAL OBSERVATION CARE,LEVL III: CPT | Mod: AI,GC,, | Performed by: HOSPITALIST

## 2018-06-21 PROCEDURE — 99285 EMERGENCY DEPT VISIT HI MDM: CPT | Mod: ,,, | Performed by: EMERGENCY MEDICINE

## 2018-06-21 PROCEDURE — 99285 EMERGENCY DEPT VISIT HI MDM: CPT | Mod: 25

## 2018-06-21 PROCEDURE — 96374 THER/PROPH/DIAG INJ IV PUSH: CPT | Mod: 59

## 2018-06-21 PROCEDURE — 63600175 PHARM REV CODE 636 W HCPCS: Performed by: STUDENT IN AN ORGANIZED HEALTH CARE EDUCATION/TRAINING PROGRAM

## 2018-06-21 PROCEDURE — 80053 COMPREHEN METABOLIC PANEL: CPT

## 2018-06-21 PROCEDURE — 84100 ASSAY OF PHOSPHORUS: CPT

## 2018-06-21 PROCEDURE — 93306 TTE W/DOPPLER COMPLETE: CPT | Mod: 26,,, | Performed by: INTERNAL MEDICINE

## 2018-06-21 PROCEDURE — 93010 ELECTROCARDIOGRAM REPORT: CPT | Mod: ,,, | Performed by: INTERNAL MEDICINE

## 2018-06-21 RX ORDER — GLUCAGON 1 MG
1 KIT INJECTION
Status: DISCONTINUED | OUTPATIENT
Start: 2018-06-21 | End: 2018-06-25 | Stop reason: HOSPADM

## 2018-06-21 RX ORDER — ISOSORBIDE DINITRATE AND HYDRALAZINE HYDROCHLORIDE 37.5; 2 MG/1; MG/1
1 TABLET ORAL 3 TIMES DAILY
Status: DISCONTINUED | OUTPATIENT
Start: 2018-06-21 | End: 2018-06-25 | Stop reason: HOSPADM

## 2018-06-21 RX ORDER — AMIODARONE HYDROCHLORIDE 200 MG/1
200 TABLET ORAL DAILY
Status: DISCONTINUED | OUTPATIENT
Start: 2018-06-21 | End: 2018-06-21

## 2018-06-21 RX ORDER — IBUPROFEN 200 MG
24 TABLET ORAL
Status: DISCONTINUED | OUTPATIENT
Start: 2018-06-21 | End: 2018-06-25 | Stop reason: HOSPADM

## 2018-06-21 RX ORDER — FUROSEMIDE 10 MG/ML
40 INJECTION INTRAMUSCULAR; INTRAVENOUS 2 TIMES DAILY
Status: DISCONTINUED | OUTPATIENT
Start: 2018-06-21 | End: 2018-06-22

## 2018-06-21 RX ORDER — POLYETHYLENE GLYCOL 3350 17 G/17G
17 POWDER, FOR SOLUTION ORAL EVERY 12 HOURS PRN
Status: DISCONTINUED | OUTPATIENT
Start: 2018-06-21 | End: 2018-06-25 | Stop reason: HOSPADM

## 2018-06-21 RX ORDER — ACETAMINOPHEN 325 MG/1
650 TABLET ORAL EVERY 4 HOURS PRN
Status: DISCONTINUED | OUTPATIENT
Start: 2018-06-21 | End: 2018-06-25 | Stop reason: HOSPADM

## 2018-06-21 RX ORDER — SODIUM CHLORIDE 0.9 % (FLUSH) 0.9 %
5 SYRINGE (ML) INJECTION
Status: DISCONTINUED | OUTPATIENT
Start: 2018-06-21 | End: 2018-06-25 | Stop reason: HOSPADM

## 2018-06-21 RX ORDER — INSULIN ASPART 100 [IU]/ML
0-5 INJECTION, SOLUTION INTRAVENOUS; SUBCUTANEOUS
Status: DISCONTINUED | OUTPATIENT
Start: 2018-06-21 | End: 2018-06-25 | Stop reason: HOSPADM

## 2018-06-21 RX ORDER — ONDANSETRON 8 MG/1
8 TABLET, ORALLY DISINTEGRATING ORAL EVERY 8 HOURS PRN
Status: DISCONTINUED | OUTPATIENT
Start: 2018-06-21 | End: 2018-06-25 | Stop reason: HOSPADM

## 2018-06-21 RX ORDER — METHOCARBAMOL 750 MG/1
750 TABLET, FILM COATED ORAL
Status: COMPLETED | OUTPATIENT
Start: 2018-06-21 | End: 2018-06-21

## 2018-06-21 RX ORDER — METOPROLOL TARTRATE 50 MG/1
50 TABLET ORAL ONCE
Status: COMPLETED | OUTPATIENT
Start: 2018-06-21 | End: 2018-06-21

## 2018-06-21 RX ORDER — FUROSEMIDE 10 MG/ML
40 INJECTION INTRAMUSCULAR; INTRAVENOUS
Status: COMPLETED | OUTPATIENT
Start: 2018-06-21 | End: 2018-06-21

## 2018-06-21 RX ORDER — METOPROLOL TARTRATE 50 MG/1
50 TABLET ORAL 2 TIMES DAILY
Status: DISCONTINUED | OUTPATIENT
Start: 2018-06-22 | End: 2018-06-25 | Stop reason: HOSPADM

## 2018-06-21 RX ORDER — FLUTICASONE PROPIONATE 50 MCG
2 SPRAY, SUSPENSION (ML) NASAL DAILY
Status: DISCONTINUED | OUTPATIENT
Start: 2018-06-21 | End: 2018-06-25 | Stop reason: HOSPADM

## 2018-06-21 RX ORDER — IBUPROFEN 200 MG
16 TABLET ORAL
Status: DISCONTINUED | OUTPATIENT
Start: 2018-06-21 | End: 2018-06-25 | Stop reason: HOSPADM

## 2018-06-21 RX ORDER — RAMELTEON 8 MG/1
8 TABLET ORAL NIGHTLY PRN
Status: DISCONTINUED | OUTPATIENT
Start: 2018-06-21 | End: 2018-06-25 | Stop reason: HOSPADM

## 2018-06-21 RX ADMIN — APIXABAN 2.5 MG: 2.5 TABLET, FILM COATED ORAL at 09:06

## 2018-06-21 RX ADMIN — APIXABAN 2.5 MG: 2.5 TABLET, FILM COATED ORAL at 01:06

## 2018-06-21 RX ADMIN — METHOCARBAMOL 750 MG: 750 TABLET ORAL at 10:06

## 2018-06-21 RX ADMIN — FUROSEMIDE 40 MG: 10 INJECTION, SOLUTION INTRAMUSCULAR; INTRAVENOUS at 09:06

## 2018-06-21 RX ADMIN — FUROSEMIDE 40 MG: 10 INJECTION, SOLUTION INTRAMUSCULAR; INTRAVENOUS at 05:06

## 2018-06-21 RX ADMIN — HYDRALAZINE HYDROCHLORIDE AND ISOSORBIDE DINITRATE 1 TABLET: 37.5; 2 TABLET, FILM COATED ORAL at 05:06

## 2018-06-21 RX ADMIN — METOPROLOL TARTRATE 50 MG: 50 TABLET ORAL at 09:06

## 2018-06-21 RX ADMIN — AMIODARONE HYDROCHLORIDE 200 MG: 200 TABLET ORAL at 01:06

## 2018-06-21 NOTE — ASSESSMENT & PLAN NOTE
CHADDSVASc= 6.0  - on BID eliquis 5 mg, will change to 2.5 (Dose adjustment 2.5 mg twice daily is recommended for patients with at least 2 of the following (age ?81 years, body weight ?60 kg, serum creatinine ?1.5 mg/dL and she meets two of these)   - continue metoprolol   - will hold off on amiodarone 200 mg daily (patient was taking this medication wrong she is supposed to take 200 mg daily instead she continued the loading dose of BID). She is rate controlled with metoprolol and not rhythm control unclear if she was taking these medications correctly will monitor   - Patient of Dr. Burt

## 2018-06-21 NOTE — ED NOTES
Patient identifiers verified and correct for .   LOC: The patient is awake, alert and aware of environment with an appropriate affect, the patient is oriented x 3 and speaking appropriately.   APPEARANCE: Patient appears comfortable and in no acute distress, patient is clean and well groomed.  SKIN: The skin is warm and dry, color consistent with ethnicity, patient has normal skin turgor and moist mucus membranes, skin intact, no breakdown or bruising noted.   MUSCULOSKELETAL: Patient moving all extremities spontaneously, no swelling noted.  RESPIRATORY: Airway is open and patent, respirations are spontaneous, patient has a normal effort and rate, no accessory muscle use noted, pt placed on continuous pulse ox with O2 sats noted at 97% on room air.--denies SOB--speaking clearly  CARDIAC: Pt placed on cardiac monitor. Patient has a normal rate and regular rhythm, +3 edema noted to BLE, capillary refill < 3 seconds.--denies CP  GASTRO: Soft and non tender to palpation, no distention noted, normoactive bowel sounds present in all four quadrants. Pt states bowel movements have been regular.  : Pt denies any pain or frequency with urination.  NEURO: Pt opens eyes spontaneously, behavior appropriate to situation, follows commands, facial expression symmetrical, bilateral hand grasp equal and even, purposeful motor response noted, normal sensation in all extremities when touched with a finger.

## 2018-06-21 NOTE — ASSESSMENT & PLAN NOTE
Dacia Martínez is here for increased bilateral swelling likely secondary to diet non adherence,  No signs of infection but given she has been having problems urinating will rule out infection   - Per  Patient with symptoms of bilateral swelling , and abdominal swelling   - Patient on 20 mg PO lasix BID  at home   - BNP elevated at 1828 , troponin normal , LFTs normal  - PE notable for rales bilaterally with decreased breath sounds left lower lobe  , JVD present  and bilateral pitting edema, abdominal distension  Present. Patient states her dry weight is around 135.   - Xray  :diffuse increase in the pulmonary vascular markings.  Increased ground-glass opacification overlying the left hemithorax may represent some pleural fluid with associated atelectasis or consolidation  - Creatinine 1.5 her baseline looks to be 1.3-1.4    PLAN   - Strict I/O   - Daily weights  - Keep mag >2 and phos> 3  - Cardiac diet with decreased salt intake , fluid restriction to 1500  - Will started with 40 mg IV lasix BID  - Echo pending     Echocardiogram:     2D echo with color flow doppler:   Results for orders placed or performed during the hospital encounter of 12/14/17   2D echo with color flow doppler   Result Value Ref Range    EF 60 55 - 65    Est. PA Systolic Pressure 26.81     Mitral Valve Mobility NORMAL

## 2018-06-21 NOTE — ED TRIAGE NOTES
Pt presents with c/o bilateral lower extremity edema. Pt is unable to tell me when the symptoms began to worsen. Pt denies chest pain or SOB.

## 2018-06-21 NOTE — HOSPITAL COURSE
Admitted to [facility] for CHF exacerbation likely secondary to diet nonadhering to cardiac diet. Started with 40 mg IV lasix BID, transitioned to 40 PO BID on 6/22/18 with great UOP of >3L and improvement in edema. Due to worsening KHUSHBOO, transitioned to 40 PO daily lasix on 6/23 wbut creatinine kept worsening will give her lasix holiday and give a bolus of 500cc 6/24 creatinine went down to 1.8 from 2.0, then stabilized. Patient euvolemic and insistent to go home. Instructed her to not take her lasix today and to go to her cardiology appointment tomorrow. Made her an appointment with PCP as well for follow up .     She needs a recheck of her creatinine this week    At discharge patient was stable alert and oriented. .Discussed discharge plan. Reviewed medications and side effects, appointments and answered questions with patient and family. Medications sent to [facility]     Vitals:    06/25/18 0810   BP: 118/88   Pulse: (!) 56   Resp: (!) 25   Temp:         Physical Exam   Constitutional: She is oriented to person, place, and time. She appears well-developed and well-nourished. No distress.   HENT:   Head: Normocephalic and atraumatic.   Eyes: EOM are normal. Pupils are equal, round, and reactive to light.   Neck: Normal range of motion. Neck supple.   Cardiovascular: Normal rate, normal heart sounds and intact distal pulses.  An irregularly irregular rhythm present.   No murmur heard.  Pulmonary/Chest: Effort normal and breath sounds normal. No respiratory distress. She has no wheezes. She has no rales.   Abdominal: Soft. Bowel sounds are normal. She exhibits no distension. There is no tenderness.   Musculoskeletal: Normal range of motion. She has no edema   Neurological: She is alert and oriented to person, place, and time.   Skin: Skin is warm and dry. No rash noted. She is not diaphoretic.   Psychiatric: She has a normal mood and affect. Her behavior is normal.   Nursing note and vitals reviewed.

## 2018-06-21 NOTE — ASSESSMENT & PLAN NOTE
- currently at baseline since February but unclear why she dropped from normal Hgb to 10 in February   - will order iron studies , no signs of bleed

## 2018-06-21 NOTE — ED NOTES
Pt's first and last name and birthday confirmed.   LOC: The patient is awake, alert and aware of environment with an appropriate affect, the patient is oriented x 3 and speaking appropriately.  APPEARANCE: Patient resting comfortably and in no acute distress, patient is clean and well groomed.  SKIN: The skin is warm and dry, patient has normal skin turgor and moist mucus membranes, skin intact, no breakdown or brusing noted.  MUSKULOSKELETAL: Patient moving all extremities well, no obvious swelling or deformities noted.  RESPIRATORY: Airway is open and patent, respirations are spontaneous, patient has a normal effort and rate. Breath sounds are clear and equal bilaterally.  CARDIAC: Normal heart sounds. Pitting, peripheral edema noted to bilateral lower extremities. Radial pulses are strong, palpable and irregular.   ABDOMEN: Soft and non tender to palpation, no distention noted. Bowel sounds present.   NEURO: No neuro deficits, hand grasp equal, no drift noted, no facial droop noted. Speech is clear.

## 2018-06-21 NOTE — ED PROVIDER NOTES
"Encounter Date: 2018    SCRIBE #1 NOTE: I, Lili Sweet, am scribing for, and in the presence of,  Dr. Mon. I have scribed the following portions of the note - the APC attestation.       History     Chief Complaint   Patient presents with    Leg Swelling     and tingling to bilateral lower extremities. Also reports swelling to left arm. Hx: mastectomy to left side.      81 y/o AAF with history of HTN, breast cancer, DM2, CKD, CAD, Afib presents to the ED for bilateral lower extremity swelling.  Patient states that swelling has progressed over the last few weeks.  Patient states she is taking her Lasix as prescribed.  Patient denies any increased pain in the lower extremities but does endorse pins and needles" sensation.  Patient also states left upper extremity swelling. Patient has a history of mastectomy with lymph nodes removal.  Patient states has a history of lymphedema but states swelling is worse over the last few weeks.  The patient denies any chest pain or shortness of breath. Patient denies any fever, chills, nausea, vomiting, diarrhea.          Review of patient's allergies indicates:  No Known Allergies  Past Medical History:   Diagnosis Date    A-fib     Arthritis     Asteroid hyalosis of left eye     Bilateral nonexudative age-related macular degeneration 6/3/2014    Breast cancer     Cataract     Chronic GERD 2017    CKD stage 3 due to type 2 diabetes mellitus     Coronary artery disease     Hypertension     Migraine headache     Mild nonproliferative diabetic retinopathy of both eyes 6/3/2014    Pneumonia     Type 2 diabetes mellitus with hyperglycemia     Vertigo      Past Surgical History:   Procedure Laterality Date    BREAST SURGERY      left lumpectomy    CARPAL TUNNEL RELEASE      left    CATARACT EXTRACTION      vance     SECTION      EYE SURGERY      cataracts bilaterally    HYSTERECTOMY      partial     Family History   Problem Relation Age of " Onset    Cancer Mother         stomach    Heart disease Mother     Diabetes Father     Hypertension Father     Blindness Brother     Diabetes Brother     Hypertension Brother     Cataracts Sister     Diabetes Sister     Diabetes Brother     Diabetes Brother     Diabetes Brother     No Known Problems Daughter     No Known Problems Son     No Known Problems Daughter     Amblyopia Neg Hx     Glaucoma Neg Hx     Macular degeneration Neg Hx     Strabismus Neg Hx     Retinal detachment Neg Hx      Social History   Substance Use Topics    Smoking status: Never Smoker    Smokeless tobacco: Never Used    Alcohol use No      Comment: socially     Review of Systems   Constitutional: Negative for activity change, appetite change, chills, fatigue and fever.   HENT: Negative for sore throat.    Respiratory: Negative for cough, chest tightness, shortness of breath and wheezing.    Cardiovascular: Positive for leg swelling ( bilateral). Negative for chest pain.   Gastrointestinal: Negative for abdominal pain and nausea.   Genitourinary: Negative for decreased urine volume, difficulty urinating, dysuria and urgency.   Musculoskeletal: Positive for neck pain. Negative for back pain and gait problem.   Skin: Negative for rash.   Neurological: Negative for dizziness, syncope, weakness, light-headedness, numbness and headaches.   Hematological: Does not bruise/bleed easily.       Physical Exam     Initial Vitals [06/21/18 0809]   BP Pulse Resp Temp SpO2   127/81 102 18 97.7 °F (36.5 °C) 98 %      MAP       --         Physical Exam    Nursing note and vitals reviewed.  Constitutional: Vital signs are normal. She appears well-developed and well-nourished. She is cooperative. She is easily aroused. She does not have a sickly appearance. She does not appear ill. No distress.   HENT:   Head: Normocephalic and atraumatic.   Mouth/Throat: Uvula is midline and oropharynx is clear and moist. Mucous membranes are dry.    Eyes: Conjunctivae, EOM and lids are normal. Pupils are equal, round, and reactive to light.   Neck: Trachea normal, normal range of motion, full passive range of motion without pain and phonation normal. Neck supple. Muscular tenderness present. No spinous process tenderness present. No JVD present.   Cardiovascular: Normal rate, regular rhythm, normal heart sounds and intact distal pulses.   Pulses:       Radial pulses are 2+ on the right side, and 2+ on the left side.        Popliteal pulses are 2+ on the right side, and 2+ on the left side.   Pulmonary/Chest: Effort normal and breath sounds normal. She has no decreased breath sounds.   Abdominal: Soft. Bowel sounds are normal.   Musculoskeletal: Normal range of motion.   Neurological: She is alert, oriented to person, place, and time and easily aroused. She has normal strength. No cranial nerve deficit or sensory deficit. GCS eye subscore is 4. GCS verbal subscore is 5. GCS motor subscore is 6.   Skin: Skin is warm, dry and intact. Capillary refill takes less than 2 seconds. No abrasion and no rash noted. No cyanosis. Nails show no clubbing.   Psychiatric: She has a normal mood and affect. Her speech is normal and behavior is normal. Judgment and thought content normal. Cognition and memory are normal.         ED Course   Procedures  Labs Reviewed   CBC W/ AUTO DIFFERENTIAL - Abnormal; Notable for the following:        Result Value    RBC 3.99 (*)     Hemoglobin 11.0 (*)     Hematocrit 35.7 (*)     MCHC 30.8 (*)     RDW 16.0 (*)     All other components within normal limits   COMPREHENSIVE METABOLIC PANEL - Abnormal; Notable for the following:     Creatinine 1.5 (*)     Albumin 3.2 (*)     Total Bilirubin 1.1 (*)     ALT 7 (*)     eGFR if  37.1 (*)     eGFR if non  32.2 (*)     All other components within normal limits   B-TYPE NATRIURETIC PEPTIDE - Abnormal; Notable for the following:     BNP 1,828 (*)     All other components  within normal limits   PROTIME-INR - Abnormal; Notable for the following:     Prothrombin Time 13.7 (*)     INR 1.4 (*)     All other components within normal limits   TROPONIN I   MAGNESIUM   PHOSPHORUS   FERRITIN   IRON AND TIBC   PHOSPHORUS    Narrative:     Add on order [id number] Phos, [id number] Mg. Per Dr. Kumar  06/21/2018    12:54    MAGNESIUM    Narrative:     Add on order [id number] Phos, [id number] Mg. Per Dr. Kumar  06/21/2018    12:54    FERRITIN    Narrative:     Add on order [id number] Phos, [id number] Mg. Per Dr. Kumar  06/21/2018    12:54   ferritin(order# [id number]) and iron profile (order# [id number]) add on   per Lali Kumar MD @ 13:41 on 06/21/2018   IRON AND TIBC    Narrative:     Add on order [id number] Phos, [id number] Mg. Per Dr. Kumar  06/21/2018    12:54   ferritin(order# [id number]) and iron profile (order# [id number]) add on   per Lali Kumar MD @ 13:41 on 06/21/2018   URINALYSIS, REFLEX TO URINE CULTURE   POCT GLUCOSE MONITORING CONTINUOUS          Imaging Results          X-Ray Chest AP Portable (Final result)  Result time 06/21/18 09:26:46    Final result by Pablito Bob Jr., MD (06/21/18 09:26:46)                 Impression:      Abnormal study with increased opacification left hemithorax as above.      Electronically signed by: Pablito Bob MD  Date:    06/21/2018  Time:    09:26             Narrative:    EXAMINATION:  XR CHEST AP PORTABLE    CLINICAL HISTORY:  CHF;    TECHNIQUE:  Single frontal view of the chest was performed.    COMPARISON:  February 2018.    FINDINGS:  Radiograph is lordotic in projection.  Heart is enlarged with diffuse increase in the pulmonary vascular markings.  Increased ground-glass opacification overlying the left hemithorax may represent some pleural fluid with associated atelectasis or consolidation.  No pneumothorax seen.  Advanced degenerative changes at the shoulders.                                 Medical Decision Making:   History:   Old  Medical Records: I decided to obtain old medical records.  Clinical Tests:   Lab Tests: Ordered and Reviewed  Radiological Study: Reviewed and Ordered  Medical Tests: Reviewed and Ordered       APC / Resident Notes:   Emergent evaluation of a 82 yo female patient presenting to the ER with chief complaint of BLE edema.  Pt states she tasted with Lasix for her edema but swelling has gotten worse over the last few weeks.  Patient's daughter states she is just her Lasix when she feels it is necessary. On exam, pt +1 edema noted to bilateral lower extremities. No redness noted. Patient bilateral lower extremity strength 5/5. I will get labs,  Chest X-ray and reassess.  Pt EKG shows A-fib with RVR.  Pt states she did not take her morning meds.  Will give home dose of Metoprolol 50mg.  Differential diagnoses include but are not limited to HELLP syndrome, CHF, DVT, pulmonary embolism, venous insufficiency, cirrhosis, low albumin, cellulitis, musculoskeletal, others. I discussed the care of this patient with my Supervising Physician.    Patient's CBC unremarkable. CMP shows creatinine of 1.5 with a BUN of 21.  This is patient's baseline.  Patient's BNP is elevated at 1828.  Troponin negative. Chest x-ray shows increased ground-glass opacification overlying the left hemithorax may represent some pleural fluid with associated atelectasis or consolidation.  Patient denies any cough or shortness of breath.  Will give one dose of IV Lasix in ED.      Pt to be admitted to Observation for CHF exacerbation.  Pt updated on plan.  All questions and concerns addressed.             Scribe Attestation:   Scribe #1: I performed the above scribed service and the documentation accurately describes the services I performed. I attest to the accuracy of the note.    Attending Attestation:     Physician Attestation Statement for NP/PA:   I have conducted a face to face encounter with this patient in addition to the NP/PA, due to Medical  Complexity    Other NP/PA Attestation Additions:    History of Present Illness: 83 y.o. patient with worsening lower extremity edema with hx of CHF.     Medical Decision Making: Her CXR has significant pulmonary edema. Will treat in the hospital for CHF.                     Clinical Impression:   The primary encounter diagnosis was Acute on chronic congestive heart failure, unspecified heart failure type. Diagnoses of Shortness of breath and CHF (congestive heart failure) were also pertinent to this visit.      Disposition:   Disposition: Placed in Observation  Condition: Stable                        Cat Singleton NP  06/21/18 9713

## 2018-06-21 NOTE — ASSESSMENT & PLAN NOTE
CHADDSVASc= 6.0  - on BID eliquis 5 mg, will change to 2.5 (Dose adjustment 2.5 mg twice daily is recommended for patients with at least 2 of the following (age ?81 years, body weight ?60 kg, serum creatinine ?1.5 mg/dL and she meets two of these)   - continue metoprolol   - continue amiodarone 200 mg daily (patient was taking this medication wrong she is supposed to take 200 mg daily instead she continued the loading dose of BID)  - Patient of Dr. Burt

## 2018-06-21 NOTE — SUBJECTIVE & OBJECTIVE
Past Medical History:   Diagnosis Date    A-fib     Arthritis     Asteroid hyalosis of left eye     Bilateral nonexudative age-related macular degeneration 6/3/2014    Breast cancer     Cataract     Chronic GERD 2017    CKD stage 3 due to type 2 diabetes mellitus     Coronary artery disease     Hypertension     Migraine headache     Mild nonproliferative diabetic retinopathy of both eyes 6/3/2014    Pneumonia     Type 2 diabetes mellitus with hyperglycemia     Vertigo        Past Surgical History:   Procedure Laterality Date    BREAST SURGERY      left lumpectomy    CARPAL TUNNEL RELEASE      left    CATARACT EXTRACTION      vance     SECTION      EYE SURGERY      cataracts bilaterally    HYSTERECTOMY      partial       Review of patient's allergies indicates:  No Known Allergies    No current facility-administered medications on file prior to encounter.      Current Outpatient Prescriptions on File Prior to Encounter   Medication Sig    ACCU-CHEK FASTCLIX Misc 1 lancet by Misc.(Non-Drug; Combo Route) route 3 (three) times daily. Pt to test blood glucose up to 3 times daily.    allopurinol (ZYLOPRIM) 100 MG tablet Resume allopurinol 1/2 pill until 18, 1 pill daily for 5 days, then 1.5 pills daily    amiodarone (PACERONE) 200 MG Tab Take 1 tablet (200 mg total) by mouth 2 (two) times daily. Start taking Daily on 18.    apixaban 5 mg Tab Take 1 tablet (5 mg total) by mouth 2 (two) times daily.    benzonatate (TESSALON) 100 MG capsule Take 100 mg by mouth 3 (three) times daily as needed for Cough.    blood sugar diagnostic Strp 1 strip by Misc.(Non-Drug; Combo Route) route once daily. One touch verio-please dispense strips and lancets.    cholecalciferol, vitamin D3, 1,000 unit capsule Take 2 capsules (2,000 Units total) by mouth once daily.    diphenhydrAMINE-zinc acetate 1-0.1% (BENADRYL) cream Apply topically 2 (two) times daily as needed for Itching.    doxepin  (SINEQUAN) 10 MG capsule TAKE 1 CAPSULE BY MOUTH THREE TIMES A DAY    fluconazole (DIFLUCAN) 150 MG Tab TAKE 1 TABLET BY MOUTH ONCE DAILY    fluticasone (FLONASE) 50 mcg/actuation nasal spray SPRAY TWICE IN EACH NOSTRIL DAILY    FLUZONE HIGH-DOSE 2017-18, PF, 180 mcg/0.5 mL vaccine     furosemide (LASIX) 20 MG tablet Take 2 tablets (40 mg total) by mouth once daily.    hydrOXYzine HCl (ATARAX) 10 MG Tab Take 1 tablet (10 mg total) by mouth 3 (three) times daily as needed for Itching.    isosorbide-hydrALAZINE 20-37.5 mg (BIDIL) 20-37.5 mg Tab Take 1 tablet by mouth 3 (three) times daily.    metoprolol tartrate (LOPRESSOR) 50 MG tablet Take 1 tablet (50 mg total) by mouth 2 (two) times daily.    SITagliptin (JANUVIA) 50 MG Tab Take 1 tablet (50 mg total) by mouth once daily.    SITagliptin (JANUVIA) 50 MG Tab TAKE 1 TABLET BY MOUTH ONCE DAILY    triamcinolone acetonide 0.1% (KENALOG) 0.1 % cream APPLY TOPICALLY TWO TIMES A DAY    TRUEPLUS LANCETS 30 gauge Misc     HYDROcodone-acetaminophen (NORCO) 5-325 mg per tablet Take 1 tablet by mouth every 6 (six) hours as needed for Pain.    methylPREDNISolone (MEDROL DOSEPACK) 4 mg tablet Take as directed    predniSONE (DELTASONE) 10 MG tablet take 1 tablet by mouth daily if needed for gout     Family History     Problem Relation (Age of Onset)    Blindness Brother    Cancer Mother    Cataracts Sister    Diabetes Father, Brother, Sister, Brother, Brother, Brother    Heart disease Mother    Hypertension Father, Brother    No Known Problems Daughter, Son, Daughter        Social History Main Topics    Smoking status: Never Smoker    Smokeless tobacco: Never Used    Alcohol use No      Comment: socially    Drug use: No    Sexual activity: Not Currently     Review of Systems   Constitutional: Negative for activity change, appetite change, chills, fever and unexpected weight change.   HENT: Negative for congestion and sore throat.    Eyes: Negative for visual  disturbance.   Respiratory: Negative for cough, choking, chest tightness and shortness of breath.    Cardiovascular: Positive for leg swelling. Negative for chest pain and palpitations.   Gastrointestinal: Positive for abdominal distention. Negative for abdominal pain, blood in stool, constipation, diarrhea, nausea and vomiting.   Genitourinary: Positive for difficulty urinating. Negative for dysuria, hematuria and urgency.   Musculoskeletal: Negative for arthralgias and back pain.   Psychiatric/Behavioral: Negative for agitation and behavioral problems.     Objective:     Vital Signs (Most Recent):  Temp: 97.7 °F (36.5 °C) (06/21/18 0809)  Pulse: 102 (06/21/18 0809)  Resp: 18 (06/21/18 0809)  BP: 127/81 (06/21/18 0809)  SpO2: 98 % (06/21/18 0809) Vital Signs (24h Range):  Temp:  [97.7 °F (36.5 °C)] 97.7 °F (36.5 °C)  Pulse:  [102] 102  Resp:  [18] 18  SpO2:  [98 %] 98 %  BP: (127)/(81) 127/81     Weight: 68.9 kg (152 lb)  Body mass index is 28.72 kg/m².    Physical Exam   Constitutional: She is oriented to person, place, and time. She appears well-developed and well-nourished. No distress.   HENT:   Head: Normocephalic and atraumatic.   Eyes: EOM are normal. Pupils are equal, round, and reactive to light.   Neck: Normal range of motion. Neck supple. JVD (up to her ear) present.   Cardiovascular: Normal rate, normal heart sounds and intact distal pulses.  An irregularly irregular rhythm present.   No murmur heard.  Pulmonary/Chest: Effort normal and breath sounds normal. No respiratory distress. She has no wheezes. She has no rales.   Abdominal: Soft. Bowel sounds are normal. She exhibits distension. There is no tenderness.   Musculoskeletal: Normal range of motion. She exhibits edema (Bilateral 2+ pitting edema ).   Neurological: She is alert and oriented to person, place, and time.   Skin: Skin is warm and dry. No rash noted. She is not diaphoretic.   Psychiatric: She has a normal mood and affect. Her behavior is  normal.   Nursing note and vitals reviewed.        CRANIAL NERVES     CN III, IV, VI   Pupils are equal, round, and reactive to light.  Extraocular motions are normal.        Significant Labs:   CBC:   Recent Labs  Lab 06/21/18  0837   WBC 5.73   HGB 11.0*   HCT 35.7*        CMP:   Recent Labs  Lab 06/21/18  0837      K 5.0      CO2 23      BUN 21   CREATININE 1.5*   CALCIUM 9.2   PROT 7.8   ALBUMIN 3.2*   BILITOT 1.1*   ALKPHOS 79   AST 21   ALT 7*   ANIONGAP 11   EGFRNONAA 32.2*     Cardiac Markers:   Recent Labs  Lab 06/21/18  0837   BNP 1,828*     Troponin:   Recent Labs  Lab 06/21/18  0837   TROPONINI 0.018     All pertinent labs within the past 24 hours have been reviewed.    Significant Imaging: I have reviewed and interpreted all pertinent imaging results/findings within the past 24 hours.     Imaging Results          X-Ray Chest AP Portable (Final result)  Result time 06/21/18 09:26:46    Final result by Pablito Bob Jr., MD (06/21/18 09:26:46)                 Impression:      Abnormal study with increased opacification left hemithorax as above.      Electronically signed by: Pablito Bob MD  Date:    06/21/2018  Time:    09:26             Narrative:    EXAMINATION:  XR CHEST AP PORTABLE    CLINICAL HISTORY:  CHF;    TECHNIQUE:  Single frontal view of the chest was performed.    COMPARISON:  February 2018.    FINDINGS:  Radiograph is lordotic in projection.  Heart is enlarged with diffuse increase in the pulmonary vascular markings.  Increased ground-glass opacification overlying the left hemithorax may represent some pleural fluid with associated atelectasis or consolidation.  No pneumothorax seen.  Advanced degenerative changes at the shoulders.

## 2018-06-21 NOTE — ASSESSMENT & PLAN NOTE
Dacia Martínez is here for increased bilateral swelling likely secondary to diet non adherence,  No signs of infection but given she has been having problems urinating will rule out infection   - Per  Patient with symptoms of bilateral swelling , and abdominal swelling   - Patient on 20 mg PO lasix BID  at home   - BNP elevated at 1828 , troponin normal , LFTs normal  - PE notable for rales bilaterally with decreased breath sounds left lower lobe  , JVD present  and bilateral pitting edema, abdominal distension  Present. Patient states her dry weight is around 135.   - Xray  :diffuse increase in the pulmonary vascular markings.  Increased ground-glass opacification overlying the left hemithorax may represent some pleural fluid with associated atelectasis or consolidation  - Creatinine 1.5 her baseline looks to be 1.3-1.4    PLAN   - Strict I/O   - Daily weights  - Keep mag >2 and phos> 3  - Cardiac diet with decreased salt intake , fluid restriction to 1500  - Will switch  with 40 mg PO lasix BID  - Will  Place a purewick   - Nutrition consulted to help with CHF diet education       Echocardiogram: As follows but there is confusion because EF was 10.     2D echo with color flow doppler:   Results for orders placed or performed during the hospital encounter of 06/21/18   2D echo with color flow doppler   Result Value Ref Range    EF 33 (A) 55 - 65    Mitral Valve Regurgitation MILD     Aortic Valve Stenosis SEVERE (A)     Est. PA Systolic Pressure 58.03 (A)     Mitral Valve Mobility NORMAL     Mitral Valve Stenosis TRIVIAL     Tricuspid Valve Regurgitation MODERATE TO SEVERE (A)

## 2018-06-21 NOTE — H&P
[facility] Hospital Medicine  History & Physical    Patient Name: Dacia Martínez  MRN: [id number]  Admission Date: 6/21/2018  Attending Physician: David Mon MD   Primary Care Provider: Cali Vickers MD    [facility] Medicine Team: [facility]OSP MED 2 Lali Kumar MD     Patient information was obtained from patient, relative(s) and ER records.     Subjective:     Principal Problem:Acute on chronic congestive heart failure    Chief Complaint:   Chief Complaint   Patient presents with    Leg Swelling     and tingling to bilateral lower extremities. Also reports swelling to left arm. Hx: mastectomy to left side.         HPI: Ms. Dacia Martínez is a 81 y/o AAF with history of HTN, breast cancer, DM2, CKD, CAD, Afib presents to the ED for bilateral lower/ upper extremity swelling and abdominal distension that has been accumulating for the last week. Patient states that nothing has changed in her diet, medication, and she has not been feeling sick. However she mentioned that she does eat canned foods and has been eating pickles. As for her medication she has been taking them daily . She noticed that her urine output was decreasing and she has been having the urge but not the same response to her diuresis.       The patient denies any chest pain or shortness of breath. Patient denies any PND, orthopnea, dyspnea on exertion , fever, chills, nausea, vomiting, diarrhea. She has not been travelling or next to sick contacts.    In the ED patient found with elevated BNP, chest xray with left effusion and ground glass infiltrates. Last BNP recorded was 316. EKG in afib         Past Medical History:   Diagnosis Date    A-fib     Arthritis     Asteroid hyalosis of left eye     Bilateral nonexudative age-related macular degeneration 6/3/2014    Breast cancer     Cataract     Chronic GERD 9/18/2017    CKD stage 3 due to type 2 diabetes mellitus     Coronary artery disease     Hypertension     Migraine  headache     Mild nonproliferative diabetic retinopathy of both eyes 6/3/2014    Pneumonia     Type 2 diabetes mellitus with hyperglycemia     Vertigo        Past Surgical History:   Procedure Laterality Date    BREAST SURGERY      left lumpectomy    CARPAL TUNNEL RELEASE      left    CATARACT EXTRACTION      vance     SECTION      EYE SURGERY      cataracts bilaterally    HYSTERECTOMY      partial       Review of patient's allergies indicates:  No Known Allergies    No current facility-administered medications on file prior to encounter.      Current Outpatient Prescriptions on File Prior to Encounter   Medication Sig    ACCU-CHEK FASTCLIX Misc 1 lancet by Misc.(Non-Drug; Combo Route) route 3 (three) times daily. Pt to test blood glucose up to 3 times daily.    allopurinol (ZYLOPRIM) 100 MG tablet Resume allopurinol 1/2 pill until 18, 1 pill daily for 5 days, then 1.5 pills daily    amiodarone (PACERONE) 200 MG Tab Take 1 tablet (200 mg total) by mouth 2 (two) times daily. Start taking Daily on 18.    apixaban 5 mg Tab Take 1 tablet (5 mg total) by mouth 2 (two) times daily.    benzonatate (TESSALON) 100 MG capsule Take 100 mg by mouth 3 (three) times daily as needed for Cough.    blood sugar diagnostic Strp 1 strip by Misc.(Non-Drug; Combo Route) route once daily. One touch verio-please dispense strips and lancets.    cholecalciferol, vitamin D3, 1,000 unit capsule Take 2 capsules (2,000 Units total) by mouth once daily.    diphenhydrAMINE-zinc acetate 1-0.1% (BENADRYL) cream Apply topically 2 (two) times daily as needed for Itching.    doxepin (SINEQUAN) 10 MG capsule TAKE 1 CAPSULE BY MOUTH THREE TIMES A DAY    fluconazole (DIFLUCAN) 150 MG Tab TAKE 1 TABLET BY MOUTH ONCE DAILY    fluticasone (FLONASE) 50 mcg/actuation nasal spray SPRAY TWICE IN EACH NOSTRIL DAILY    FLUZONE HIGH-DOSE , PF, 180 mcg/0.5 mL vaccine     furosemide (LASIX) 20 MG tablet Take 2 tablets (40  mg total) by mouth once daily.    hydrOXYzine HCl (ATARAX) 10 MG Tab Take 1 tablet (10 mg total) by mouth 3 (three) times daily as needed for Itching.    isosorbide-hydrALAZINE 20-37.5 mg (BIDIL) 20-37.5 mg Tab Take 1 tablet by mouth 3 (three) times daily.    metoprolol tartrate (LOPRESSOR) 50 MG tablet Take 1 tablet (50 mg total) by mouth 2 (two) times daily.    SITagliptin (JANUVIA) 50 MG Tab Take 1 tablet (50 mg total) by mouth once daily.    SITagliptin (JANUVIA) 50 MG Tab TAKE 1 TABLET BY MOUTH ONCE DAILY    triamcinolone acetonide 0.1% (KENALOG) 0.1 % cream APPLY TOPICALLY TWO TIMES A DAY    TRUEPLUS LANCETS 30 gauge Misc     HYDROcodone-acetaminophen (NORCO) 5-325 mg per tablet Take 1 tablet by mouth every 6 (six) hours as needed for Pain.    methylPREDNISolone (MEDROL DOSEPACK) 4 mg tablet Take as directed    predniSONE (DELTASONE) 10 MG tablet take 1 tablet by mouth daily if needed for gout     Family History     Problem Relation (Age of Onset)    Blindness Brother    Cancer Mother    Cataracts Sister    Diabetes Father, Brother, Sister, Brother, Brother, Brother    Heart disease Mother    Hypertension Father, Brother    No Known Problems Daughter, Son, Daughter        Social History Main Topics    Smoking status: Never Smoker    Smokeless tobacco: Never Used    Alcohol use No      Comment: socially    Drug use: No    Sexual activity: Not Currently     Review of Systems   Constitutional: Negative for activity change, appetite change, chills, fever and unexpected weight change.   HENT: Negative for congestion and sore throat.    Eyes: Negative for visual disturbance.   Respiratory: Negative for cough, choking, chest tightness and shortness of breath.    Cardiovascular: Positive for leg swelling. Negative for chest pain and palpitations.   Gastrointestinal: Positive for abdominal distention. Negative for abdominal pain, blood in stool, constipation, diarrhea, nausea and vomiting.    Genitourinary: Positive for difficulty urinating. Negative for dysuria, hematuria and urgency.   Musculoskeletal: Negative for arthralgias and back pain.   Psychiatric/Behavioral: Negative for agitation and behavioral problems.     Objective:     Vital Signs (Most Recent):  Temp: 97.7 °F (36.5 °C) (06/21/18 0809)  Pulse: 102 (06/21/18 0809)  Resp: 18 (06/21/18 0809)  BP: 127/81 (06/21/18 0809)  SpO2: 98 % (06/21/18 0809) Vital Signs (24h Range):  Temp:  [97.7 °F (36.5 °C)] 97.7 °F (36.5 °C)  Pulse:  [102] 102  Resp:  [18] 18  SpO2:  [98 %] 98 %  BP: (127)/(81) 127/81     Weight: 68.9 kg (152 lb)  Body mass index is 28.72 kg/m².    Physical Exam   Constitutional: She is oriented to person, place, and time. She appears well-developed and well-nourished. No distress.   HENT:   Head: Normocephalic and atraumatic.   Eyes: EOM are normal. Pupils are equal, round, and reactive to light.   Neck: Normal range of motion. Neck supple. JVD (up to her ear) present.   Cardiovascular: Normal rate, normal heart sounds and intact distal pulses.  An irregularly irregular rhythm present.   No murmur heard.  Pulmonary/Chest: Effort normal and breath sounds normal. No respiratory distress. She has no wheezes. She has no rales.   Abdominal: Soft. Bowel sounds are normal. She exhibits distension. There is no tenderness.   Musculoskeletal: Normal range of motion. She exhibits edema (Bilateral 2+ pitting edema ).   Neurological: She is alert and oriented to person, place, and time.   Skin: Skin is warm and dry. No rash noted. She is not diaphoretic.   Psychiatric: She has a normal mood and affect. Her behavior is normal.   Nursing note and vitals reviewed.        CRANIAL NERVES     CN III, IV, VI   Pupils are equal, round, and reactive to light.  Extraocular motions are normal.        Significant Labs:   CBC:   Recent Labs  Lab 06/21/18  0837   WBC 5.73   HGB 11.0*   HCT 35.7*        CMP:   Recent Labs  Lab 06/21/18  0837       K 5.0      CO2 23      BUN 21   CREATININE 1.5*   CALCIUM 9.2   PROT 7.8   ALBUMIN 3.2*   BILITOT 1.1*   ALKPHOS 79   AST 21   ALT 7*   ANIONGAP 11   EGFRNONAA 32.2*     Cardiac Markers:   Recent Labs  Lab 06/21/18  0837   BNP 1,828*     Troponin:   Recent Labs  Lab 06/21/18  0837   TROPONINI 0.018     All pertinent labs within the past 24 hours have been reviewed.    Significant Imaging: I have reviewed and interpreted all pertinent imaging results/findings within the past 24 hours.     Imaging Results          X-Ray Chest AP Portable (Final result)  Result time 06/21/18 09:26:46    Final result by Pablito Bob Jr., MD (06/21/18 09:26:46)                 Impression:      Abnormal study with increased opacification left hemithorax as above.      Electronically signed by: Pablito Bob MD  Date:    06/21/2018  Time:    09:26             Narrative:    EXAMINATION:  XR CHEST AP PORTABLE    CLINICAL HISTORY:  CHF;    TECHNIQUE:  Single frontal view of the chest was performed.    COMPARISON:  February 2018.    FINDINGS:  Radiograph is lordotic in projection.  Heart is enlarged with diffuse increase in the pulmonary vascular markings.  Increased ground-glass opacification overlying the left hemithorax may represent some pleural fluid with associated atelectasis or consolidation.  No pneumothorax seen.  Advanced degenerative changes at the shoulders.                                  Assessment/Plan:     * Acute on chronic congestive heart failure    Dacia Martínez is here for increased bilateral swelling likely secondary to diet non adherence,  No signs of infection but given she has been having problems urinating will rule out infection   - Per  Patient with symptoms of bilateral swelling , and abdominal swelling   - Patient on 20 mg PO lasix BID  at home   - BNP elevated at 1828 , troponin normal , LFTs normal  - PE notable for rales bilaterally with decreased breath sounds left lower lobe  , JVD present   and bilateral pitting edema, abdominal distension  Present. Patient states her dry weight is around 135.   - Xray  :diffuse increase in the pulmonary vascular markings.  Increased ground-glass opacification overlying the left hemithorax may represent some pleural fluid with associated atelectasis or consolidation  - Creatinine 1.5 her baseline looks to be 1.3-1.4    PLAN   - Strict I/O   - Daily weights  - Keep mag >2 and phos> 3  - Cardiac diet with decreased salt intake , fluid restriction to 1500  - Will started with 40 mg IV lasix BID  - Echo pending     Echocardiogram: As follows but there is confusion because EF was 10.     2D echo with color flow doppler:   Results for orders placed or performed during the hospital encounter of 12/14/17   2D echo with color flow doppler   Result Value Ref Range    EF 60 55 - 65    Est. PA Systolic Pressure 26.81     Mitral Valve Mobility NORMAL                   Persistent atrial fibrillation    CHADDSVASc= 6.0  - on BID eliquis 5 mg, will change to 2.5 (Dose adjustment 2.5 mg twice daily is recommended for patients with at least 2 of the following (age ?80 years, body weight ?60 kg, serum creatinine ?1.5 mg/dL and she meets two of these)   - continue metoprolol   - will hold off on amiodarone 200 mg daily (patient was taking this medication wrong she is supposed to take 200 mg daily instead she continued the loading dose of BID). She is rate controlled with metoprolol and not rhythm control unclear if she was taking these medications correctly will monitor   - Patient of Dr. 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, without long-term current use of insulin    - on home januvia   - last A1c 6.0  - SSI               Essential hypertension    - Continue with Bidil          Anemia    - currently at baseline since February but unclear why she dropped from normal Hgb to 10 in February    - will order iron studies , no signs of bleed        Idiopathic chronic gout of multiple sites without tophus    - takes allopurinol as needed has not needed it for months now   - will monitor           VTE Risk Mitigation         Ordered     apixaban tablet 2.5 mg  2 times daily      06/21/18 1129           Patient seen and examined this morning.  Staff attestation to follow.          Lali Kumar MD  Department of Hospital Medicine   [facility]

## 2018-06-22 LAB
ALBUMIN SERPL BCP-MCNC: 2.8 G/DL
ALP SERPL-CCNC: 68 U/L
ALT SERPL W/O P-5'-P-CCNC: 6 U/L
ANION GAP SERPL CALC-SCNC: 10 MMOL/L
AST SERPL-CCNC: 13 U/L
BASOPHILS # BLD AUTO: 0.06 K/UL
BASOPHILS NFR BLD: 1.1 %
BILIRUB SERPL-MCNC: 0.7 MG/DL
BUN SERPL-MCNC: 24 MG/DL
CALCIUM SERPL-MCNC: 8.6 MG/DL
CHLORIDE SERPL-SCNC: 104 MMOL/L
CO2 SERPL-SCNC: 25 MMOL/L
CREAT SERPL-MCNC: 1.6 MG/DL
DIFFERENTIAL METHOD: ABNORMAL
EOSINOPHIL # BLD AUTO: 0.1 K/UL
EOSINOPHIL NFR BLD: 1.8 %
ERYTHROCYTE [DISTWIDTH] IN BLOOD BY AUTOMATED COUNT: 16.2 %
EST. GFR  (AFRICAN AMERICAN): 34.3 ML/MIN/1.73 M^2
EST. GFR  (NON AFRICAN AMERICAN): 29.8 ML/MIN/1.73 M^2
GLUCOSE SERPL-MCNC: 132 MG/DL (ref 70–110)
GLUCOSE SERPL-MCNC: 94 MG/DL
HCT VFR BLD AUTO: 31.3 %
HGB BLD-MCNC: 10 G/DL
IMM GRANULOCYTES # BLD AUTO: 0.02 K/UL
IMM GRANULOCYTES NFR BLD AUTO: 0.4 %
LYMPHOCYTES # BLD AUTO: 1.3 K/UL
LYMPHOCYTES NFR BLD: 24.4 %
MAGNESIUM SERPL-MCNC: 1.7 MG/DL
MCH RBC QN AUTO: 28.4 PG
MCHC RBC AUTO-ENTMCNC: 31.9 G/DL
MCV RBC AUTO: 89 FL
MONOCYTES # BLD AUTO: 0.6 K/UL
MONOCYTES NFR BLD: 10.8 %
NEUTROPHILS # BLD AUTO: 3.4 K/UL
NEUTROPHILS NFR BLD: 61.5 %
NRBC BLD-RTO: 0 /100 WBC
PHOSPHATE SERPL-MCNC: 4.1 MG/DL
PLATELET # BLD AUTO: 217 K/UL
PMV BLD AUTO: 12.1 FL
POCT GLUCOSE: 79 MG/DL (ref 70–110)
POCT GLUCOSE: 98 MG/DL (ref 70–110)
POTASSIUM SERPL-SCNC: 3 MMOL/L
PROT SERPL-MCNC: 6.5 G/DL
RBC # BLD AUTO: 3.52 M/UL
SODIUM SERPL-SCNC: 139 MMOL/L
WBC # BLD AUTO: 5.45 K/UL

## 2018-06-22 PROCEDURE — 25000003 PHARM REV CODE 250: Performed by: STUDENT IN AN ORGANIZED HEALTH CARE EDUCATION/TRAINING PROGRAM

## 2018-06-22 PROCEDURE — 63600175 PHARM REV CODE 636 W HCPCS: Performed by: STUDENT IN AN ORGANIZED HEALTH CARE EDUCATION/TRAINING PROGRAM

## 2018-06-22 PROCEDURE — 85025 COMPLETE CBC W/AUTO DIFF WBC: CPT

## 2018-06-22 PROCEDURE — 80053 COMPREHEN METABOLIC PANEL: CPT

## 2018-06-22 PROCEDURE — G0378 HOSPITAL OBSERVATION PER HR: HCPCS

## 2018-06-22 PROCEDURE — 84100 ASSAY OF PHOSPHORUS: CPT

## 2018-06-22 PROCEDURE — 83735 ASSAY OF MAGNESIUM: CPT

## 2018-06-22 PROCEDURE — 36415 COLL VENOUS BLD VENIPUNCTURE: CPT

## 2018-06-22 RX ORDER — FUROSEMIDE 20 MG/1
20 TABLET ORAL 2 TIMES DAILY
Status: DISCONTINUED | OUTPATIENT
Start: 2018-06-22 | End: 2018-06-22

## 2018-06-22 RX ORDER — FUROSEMIDE 40 MG/1
40 TABLET ORAL 2 TIMES DAILY
Status: DISCONTINUED | OUTPATIENT
Start: 2018-06-22 | End: 2018-06-23

## 2018-06-22 RX ORDER — FUROSEMIDE 40 MG/1
40 TABLET ORAL 2 TIMES DAILY
Status: DISCONTINUED | OUTPATIENT
Start: 2018-06-22 | End: 2018-06-22

## 2018-06-22 RX ORDER — DOCUSATE SODIUM 100 MG/1
100 CAPSULE, LIQUID FILLED ORAL DAILY PRN
COMMUNITY
End: 2018-11-04 | Stop reason: SDUPTHER

## 2018-06-22 RX ORDER — POLYETHYLENE GLYCOL 3350 17 G/17G
17 POWDER, FOR SOLUTION ORAL 2 TIMES DAILY
Status: DISCONTINUED | OUTPATIENT
Start: 2018-06-22 | End: 2018-06-25 | Stop reason: HOSPADM

## 2018-06-22 RX ADMIN — HYDRALAZINE HYDROCHLORIDE AND ISOSORBIDE DINITRATE 1 TABLET: 37.5; 2 TABLET, FILM COATED ORAL at 08:06

## 2018-06-22 RX ADMIN — FUROSEMIDE 40 MG: 10 INJECTION, SOLUTION INTRAMUSCULAR; INTRAVENOUS at 08:06

## 2018-06-22 RX ADMIN — METOPROLOL TARTRATE 50 MG: 50 TABLET ORAL at 08:06

## 2018-06-22 RX ADMIN — ACETAMINOPHEN 650 MG: 325 TABLET, FILM COATED ORAL at 08:06

## 2018-06-22 RX ADMIN — POTASSIUM BICARBONATE 50 MEQ: 25 TABLET, EFFERVESCENT ORAL at 08:06

## 2018-06-22 RX ADMIN — ACETAMINOPHEN 650 MG: 325 TABLET, FILM COATED ORAL at 12:06

## 2018-06-22 RX ADMIN — POLYETHYLENE GLYCOL 3350 17 G: 17 POWDER, FOR SOLUTION ORAL at 09:06

## 2018-06-22 RX ADMIN — HYDRALAZINE HYDROCHLORIDE AND ISOSORBIDE DINITRATE 1 TABLET: 37.5; 2 TABLET, FILM COATED ORAL at 02:06

## 2018-06-22 RX ADMIN — HYDRALAZINE HYDROCHLORIDE AND ISOSORBIDE DINITRATE 1 TABLET: 37.5; 2 TABLET, FILM COATED ORAL at 12:06

## 2018-06-22 RX ADMIN — FUROSEMIDE 40 MG: 40 TABLET ORAL at 05:06

## 2018-06-22 RX ADMIN — APIXABAN 2.5 MG: 2.5 TABLET, FILM COATED ORAL at 08:06

## 2018-06-22 NOTE — PROGRESS NOTES
Instructed pt. On the use of external urinating device. Pt. States she absolutely can not urinate in the bed. Daughter at bedside and states her mother has tried using the purewick and it was unsuccessful. Requests bedside commode. States problem with catching urine is her mother always misses the hat inside the actual toilet. Will order bedside commode for accurate I&O.  Pt. Also has not been weighed on a standing scale, but in the bed with multiple sheets/heavyblankets. Will reweigh pt. On standing scale.

## 2018-06-22 NOTE — PHARMACY MED REC
"Admission Medication Reconciliation - Pharmacy Consult Note    The home medication history was taken by Danni Victoria Pharmacy Tech.      Based on information gathered and subsequent review by the clinical pharmacist, the items below may need attention.     You may go to "Admission" then "Reconcile Home Medications" tabs to review and/or act upon these items.     Potentially problematic discrepancies with current MAR  o Patient IS taking the following which was not ordered upon admit  o Doxepin 10 mg TID    Potential issues to be addressed PRIOR TO DISCHARGE  o Have renally adjusted Eliquis to 2.5 mg PO BID for Age > 80 and Scr >1.5  o If pursuing a rate control strategy for afib, and if patient is rate controlled on metoprolol, consider d/c amiodarone    Please address this information as you see fit.  Feel free to contact us if you have any questions or require assistance.    Carlos Lindsey, PharmD  [id number]                  .    .          "

## 2018-06-22 NOTE — PROGRESS NOTES
[facility] Hospital Medicine  Progress Note    Patient Name: Dacia Martínez  MRN: [id number]  Patient Class: OP- Observation   Admission Date: 6/21/2018  Length of Stay: 0 days  Attending Physician: Zi Rodríguez MD  Primary Care Provider: Cali Vickers MD    [facility] Medicine Team: [facility]P MED 2 Lali Kumar MD    Subjective:     Principal Problem:Acute on chronic congestive heart failure    HPI:  Ms. Dacia Martínez is a 81 y/o AAF with history of HTN, breast cancer, DM2, CKD, CAD, Afib presents to the ED for bilateral lower/ upper extremity swelling and abdominal distension that has been accumulating for the last week. Patient states that nothing has changed in her diet, medication, and she has not been feeling sick. However she mentioned that she does eat canned foods and has been eating pickles. As for her medication she has been taking them daily . She noticed that her urine output was decreasing and she has been having the urge but not the same response to her diuresis.       The patient denies any chest pain or shortness of breath. Patient denies any PND, orthopnea, dyspnea on exertion , fever, chills, nausea, vomiting, diarrhea. She has not been travelling or next to sick contacts.    In the ED patient found with elevated BNP, chest xray with left effusion and ground glass infiltrates. Last BNP recorded was 316. EKG in [facility]         Hospital Course:  Admitted to [facility] for CHF exacerbation likely secondary to diet nonadhering to cardiac diet  . Started with 40 mg IV lasix BID,  Will transition her to PO as she responded well to IV diuresis. Output not     Interval History: Patient with no acute events overnight. Diuresed well per exam but no accurate I/O will place a purewick and will transition to oral today     Review of Systems   Constitutional: Negative for activity change, appetite change, chills, fever and unexpected weight change.   HENT: Negative for congestion and sore throat.     Eyes: Negative for visual disturbance.   Respiratory: Negative for cough, choking, chest tightness and shortness of breath.    Cardiovascular: Positive for leg swelling (much improved). Negative for chest pain and palpitations.   Gastrointestinal: Positive for abdominal distention. Negative for abdominal pain, blood in stool, constipation, diarrhea, nausea and vomiting.   Genitourinary: Negative for difficulty urinating (incontinent ), dysuria, hematuria and urgency.   Musculoskeletal: Negative for arthralgias and back pain.   Psychiatric/Behavioral: Negative for agitation and behavioral problems.     Objective:     Vital Signs (Most Recent):  Temp: 97.6 °F (36.4 °C) (06/22/18 0943)  Pulse: 66 (06/22/18 0724)  Resp: 16 (06/22/18 0724)  BP: 137/67 (06/22/18 0724)  SpO2: 95 % (06/22/18 0724) Vital Signs (24h Range):  Temp:  [97.3 °F (36.3 °C)-98.1 °F (36.7 °C)] 97.6 °F (36.4 °C)  Pulse:  [] 66  Resp:  [15-24] 16  SpO2:  [93 %-98 %] 95 %  BP: (108-137)/(65-88) 137/67     Weight: 69.9 kg (154 lb 1.6 oz)  Body mass index is 29.12 kg/m².    Intake/Output Summary (Last 24 hours) at 06/22/18 1153  Last data filed at 06/21/18 2145   Gross per 24 hour   Intake              480 ml   Output              600 ml   Net             -120 ml      Physical Exam   Constitutional: She is oriented to person, place, and time. She appears well-developed and well-nourished. No distress.   HENT:   Head: Normocephalic and atraumatic.   Eyes: EOM are normal. Pupils are equal, round, and reactive to light.   Neck: Normal range of motion. Neck supple. JVD (improving ) present.   Cardiovascular: Normal rate, normal heart sounds and intact distal pulses.  An irregularly irregular rhythm present.   No murmur heard.  Pulmonary/Chest: Effort normal and breath sounds normal. No respiratory distress. She has no wheezes. She has no rales.   Abdominal: Soft. Bowel sounds are normal. She exhibits distension. There is no tenderness.    Musculoskeletal: Normal range of motion. She exhibits edema (improving).   Neurological: She is alert and oriented to person, place, and time.   Skin: Skin is warm and dry. No rash noted. She is not diaphoretic.   Psychiatric: She has a normal mood and affect. Her behavior is normal.   Nursing note and vitals reviewed.      Significant Labs:   CBC:   Recent Labs  Lab 06/21/18  0837 06/22/18  0626   WBC 5.73 5.45   HGB 11.0* 10.0*   HCT 35.7* 31.3*    217     CMP:   Recent Labs  Lab 06/21/18  0837 06/22/18  0626    139   K 5.0 3.0*    104   CO2 23 25    94   BUN 21 24*   CREATININE 1.5* 1.6*   CALCIUM 9.2 8.6*   PROT 7.8 6.5   ALBUMIN 3.2* 2.8*   BILITOT 1.1* 0.7   ALKPHOS 79 68   AST 21 13   ALT 7* 6*   ANIONGAP 11 10   EGFRNONAA 32.2* 29.8*       Significant Imaging: I have reviewed and interpreted all pertinent imaging results/findings within the past 24 hours.    Assessment/Plan:      * Acute on chronic congestive heart failure    Dacia Martínez is here for increased bilateral swelling likely secondary to diet non adherence,  No signs of infection but given she has been having problems urinating will rule out infection   - Per  Patient with symptoms of bilateral swelling , and abdominal swelling   - Patient on 20 mg PO lasix BID  at home   - BNP elevated at 1828 , troponin normal , LFTs normal  - PE notable for rales bilaterally with decreased breath sounds left lower lobe  , JVD present  and bilateral pitting edema, abdominal distension  Present. Patient states her dry weight is around 135.   - Xray  :diffuse increase in the pulmonary vascular markings.  Increased ground-glass opacification overlying the left hemithorax may represent some pleural fluid with associated atelectasis or consolidation  - Creatinine 1.5 her baseline looks to be 1.3-1.4    PLAN   - Strict I/O   - Daily weights  - Keep mag >2 and phos> 3  - Cardiac diet with decreased salt intake , fluid restriction to 1500  -  Will switch  with 40 mg PO lasix BID  - Will  Place a purewick   - Nutrition consulted to help with CHF diet education       Echocardiogram: As follows but there is confusion because EF was 10.     2D echo with color flow doppler:   Results for orders placed or performed during the hospital encounter of 06/21/18   2D echo with color flow doppler   Result Value Ref Range    EF 33 (A) 55 - 65    Mitral Valve Regurgitation MILD     Aortic Valve Stenosis SEVERE (A)     Est. PA Systolic Pressure 58.03 (A)     Mitral Valve Mobility NORMAL     Mitral Valve Stenosis TRIVIAL     Tricuspid Valve Regurgitation MODERATE TO SEVERE (A)                   Persistent atrial fibrillation    CHADDSVASc= 6.0  - on BID eliquis 5 mg, will change to 2.5 (Dose adjustment 2.5 mg twice daily is recommended for patients with at least 2 of the following (age ?80 years, body weight ?60 kg, serum creatinine ?1.5 mg/dL and she meets two of these)   - continue metoprolol   - will hold off on amiodarone 200 mg daily (patient was taking this medication wrong she is supposed to take 200 mg daily instead she continued the loading dose of BID). She is rate controlled with metoprolol and not rhythm control unclear if she was taking these medications correctly will monitor   - Patient of Dr. 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, without long-term current use of insulin    - on home januvia   - last A1c 6.0  - SSI               Essential hypertension    - Continue with Bidil          Anemia    - currently at baseline since February but unclear why she dropped from normal Hgb to 10 in February   - will order iron studies , no signs of bleed        Idiopathic chronic gout of multiple sites without tophus    - takes allopurinol as needed has not needed it for months now   - will monitor           VTE Risk Mitigation         Ordered     apixaban tablet 2.5 mg   2 times daily      06/21/18 1129        Patient seen and examined this morning. Discussed with Dr. Rodríguez  - staff attestation to follow.      Lali Kumar MD  Department of Hospital Medicine   [facility]

## 2018-06-22 NOTE — PLAN OF CARE
Problem: Fall Risk (Adult)  Goal: Identify Related Risk Factors and Signs and Symptoms  Related risk factors and signs and symptoms are identified upon initiation of Human Response Clinical Practice Guideline (CPG)   Absence of falls

## 2018-06-22 NOTE — PLAN OF CARE
06/22/18 1041   Discharge Assessment   Assessment Type Discharge Planning Assessment   Confirmed/corrected address and phone number on facesheet? Yes   Assessment information obtained from? Patient;Caregiver;Medical Record  (daughter at bedside)   Expected Length of Stay (days) 3   Communicated expected length of stay with patient/caregiver yes   Prior to hospitilization cognitive status: Alert/Oriented   Prior to hospitalization functional status: Independent   Current cognitive status: Alert/Oriented   Current Functional Status: Independent   Lives With grandchild(jaswant)   Able to Return to Prior Arrangements yes   Is patient able to care for self after discharge? Yes   Who are your caregiver(s) and their phone number(s)? Octavio barakat [phone]   Patient's perception of discharge disposition home or selfcare   Readmission Within The Last 30 Days no previous admission in last 30 days   Patient currently being followed by outpatient case management? No   Patient currently receives any other outside agency services? No   Equipment Currently Used at Home none   Do you have any problems affording any of your prescribed medications? No   Is the patient taking medications as prescribed? yes   Does the patient have transportation home? Yes   Transportation Available family or friend will provide   Does the patient receive services at the [facility]mad[facility] Clinic? No   Discharge Plan A Home with family   Discharge Plan B Home Health   Patient/Family In Agreement With Plan yes

## 2018-06-22 NOTE — PROGRESS NOTES
06/22/18 1700   Measurements (Adult/Pediatric)   Weight 65.5 kg (144 lb 6.4 oz)   Notified MD José , pt. Placed on standing scale and there is a 4.4kg weight difference. Also pt. Cr. 1.6. Lasix 40 mg PO ordered. Ok to administer per MD José.

## 2018-06-22 NOTE — SUBJECTIVE & OBJECTIVE
Interval History: Patient with no acute events overnight. Diuresed well per exam but no accurate I/O will place a purewick and will transition to oral today     Review of Systems   Constitutional: Negative for activity change, appetite change, chills, fever and unexpected weight change.   HENT: Negative for congestion and sore throat.    Eyes: Negative for visual disturbance.   Respiratory: Negative for cough, choking, chest tightness and shortness of breath.    Cardiovascular: Positive for leg swelling (much improved). Negative for chest pain and palpitations.   Gastrointestinal: Positive for abdominal distention. Negative for abdominal pain, blood in stool, constipation, diarrhea, nausea and vomiting.   Genitourinary: Negative for difficulty urinating (incontinent ), dysuria, hematuria and urgency.   Musculoskeletal: Negative for arthralgias and back pain.   Psychiatric/Behavioral: Negative for agitation and behavioral problems.     Objective:     Vital Signs (Most Recent):  Temp: 97.6 °F (36.4 °C) (06/22/18 0943)  Pulse: 66 (06/22/18 0724)  Resp: 16 (06/22/18 0724)  BP: 137/67 (06/22/18 0724)  SpO2: 95 % (06/22/18 0724) Vital Signs (24h Range):  Temp:  [97.3 °F (36.3 °C)-98.1 °F (36.7 °C)] 97.6 °F (36.4 °C)  Pulse:  [] 66  Resp:  [15-24] 16  SpO2:  [93 %-98 %] 95 %  BP: (108-137)/(65-88) 137/67     Weight: 69.9 kg (154 lb 1.6 oz)  Body mass index is 29.12 kg/m².    Intake/Output Summary (Last 24 hours) at 06/22/18 1153  Last data filed at 06/21/18 2145   Gross per 24 hour   Intake              480 ml   Output              600 ml   Net             -120 ml      Physical Exam   Constitutional: She is oriented to person, place, and time. She appears well-developed and well-nourished. No distress.   HENT:   Head: Normocephalic and atraumatic.   Eyes: EOM are normal. Pupils are equal, round, and reactive to light.   Neck: Normal range of motion. Neck supple. JVD (improving ) present.   Cardiovascular: Normal  rate, normal heart sounds and intact distal pulses.  An irregularly irregular rhythm present.   No murmur heard.  Pulmonary/Chest: Effort normal and breath sounds normal. No respiratory distress. She has no wheezes. She has no rales.   Abdominal: Soft. Bowel sounds are normal. She exhibits distension. There is no tenderness.   Musculoskeletal: Normal range of motion. She exhibits edema (improving).   Neurological: She is alert and oriented to person, place, and time.   Skin: Skin is warm and dry. No rash noted. She is not diaphoretic.   Psychiatric: She has a normal mood and affect. Her behavior is normal.   Nursing note and vitals reviewed.      Significant Labs:   CBC:   Recent Labs  Lab 06/21/18  0837 06/22/18  0626   WBC 5.73 5.45   HGB 11.0* 10.0*   HCT 35.7* 31.3*    217     CMP:   Recent Labs  Lab 06/21/18  0837 06/22/18  0626    139   K 5.0 3.0*    104   CO2 23 25    94   BUN 21 24*   CREATININE 1.5* 1.6*   CALCIUM 9.2 8.6*   PROT 7.8 6.5   ALBUMIN 3.2* 2.8*   BILITOT 1.1* 0.7   ALKPHOS 79 68   AST 21 13   ALT 7* 6*   ANIONGAP 11 10   EGFRNONAA 32.2* 29.8*       Significant Imaging: I have reviewed and interpreted all pertinent imaging results/findings within the past 24 hours.

## 2018-06-22 NOTE — CONSULTS
[facility]  Adult Nutrition  Education Note    Diet Education: Low Sodium  Time Spent: 15 min  Learners: Pt, family member      Nutrition Education provided with handouts: Low Sodium Nutrition Therapy      Comments: Provided and explained handout detailing common high sodium foods and strategies for reducing sodium intake including label reading, choosing reduced sodium foods, and cooking tips. Pt voiced understanding. All questions and concerns answered.      Follow-Up: 1x weekly    Please re-consult as needed.

## 2018-06-22 NOTE — PLAN OF CARE
Problem: Patient Care Overview  Goal: Plan of Care Review  Pt. Free from falls/trauma/injury this shift. Accuchecks ACHS, pt. And daughter updated with POC. Pain, position, and potty address this shift.

## 2018-06-23 PROBLEM — N17.9 AKI (ACUTE KIDNEY INJURY): Status: ACTIVE | Noted: 2018-06-23

## 2018-06-23 PROBLEM — I35.0 SEVERE AORTIC STENOSIS: Status: ACTIVE | Noted: 2018-06-23

## 2018-06-23 LAB
ALBUMIN SERPL BCP-MCNC: 3.3 G/DL
ALP SERPL-CCNC: 86 U/L
ALT SERPL W/O P-5'-P-CCNC: 7 U/L
ANION GAP SERPL CALC-SCNC: 11 MMOL/L
AST SERPL-CCNC: 17 U/L
BASOPHILS # BLD AUTO: 0.05 K/UL
BASOPHILS NFR BLD: 0.9 %
BILIRUB SERPL-MCNC: 0.7 MG/DL
BUN SERPL-MCNC: 28 MG/DL
CALCIUM SERPL-MCNC: 9.6 MG/DL
CHLORIDE SERPL-SCNC: 104 MMOL/L
CO2 SERPL-SCNC: 23 MMOL/L
CREAT SERPL-MCNC: 1.8 MG/DL
DIFFERENTIAL METHOD: ABNORMAL
EOSINOPHIL # BLD AUTO: 0.2 K/UL
EOSINOPHIL NFR BLD: 2.6 %
ERYTHROCYTE [DISTWIDTH] IN BLOOD BY AUTOMATED COUNT: 16.4 %
EST. GFR  (AFRICAN AMERICAN): 29.8 ML/MIN/1.73 M^2
EST. GFR  (NON AFRICAN AMERICAN): 25.8 ML/MIN/1.73 M^2
GLUCOSE SERPL-MCNC: 119 MG/DL
HCT VFR BLD AUTO: 38.8 %
HGB BLD-MCNC: 11.8 G/DL
IMM GRANULOCYTES # BLD AUTO: 0.02 K/UL
IMM GRANULOCYTES NFR BLD AUTO: 0.3 %
LYMPHOCYTES # BLD AUTO: 1.4 K/UL
LYMPHOCYTES NFR BLD: 24.2 %
MCH RBC QN AUTO: 27.6 PG
MCHC RBC AUTO-ENTMCNC: 30.4 G/DL
MCV RBC AUTO: 91 FL
MONOCYTES # BLD AUTO: 0.5 K/UL
MONOCYTES NFR BLD: 8.7 %
NEUTROPHILS # BLD AUTO: 3.7 K/UL
NEUTROPHILS NFR BLD: 63.3 %
NRBC BLD-RTO: 0 /100 WBC
PHOSPHATE SERPL-MCNC: 4.2 MG/DL
PLATELET # BLD AUTO: 214 K/UL
PMV BLD AUTO: 11.9 FL
POCT GLUCOSE: 164 MG/DL (ref 70–110)
POCT GLUCOSE: 172 MG/DL (ref 70–110)
POCT GLUCOSE: 231 MG/DL (ref 70–110)
POCT GLUCOSE: 97 MG/DL (ref 70–110)
POTASSIUM SERPL-SCNC: 4.2 MMOL/L
PROT SERPL-MCNC: 8.1 G/DL
RBC # BLD AUTO: 4.28 M/UL
SODIUM SERPL-SCNC: 138 MMOL/L
WBC # BLD AUTO: 5.78 K/UL

## 2018-06-23 PROCEDURE — 99225 PR SUBSEQUENT OBSERVATION CARE,LEVEL II: CPT | Mod: GC,,, | Performed by: HOSPITALIST

## 2018-06-23 PROCEDURE — 25000242 PHARM REV CODE 250 ALT 637 W/ HCPCS: Performed by: STUDENT IN AN ORGANIZED HEALTH CARE EDUCATION/TRAINING PROGRAM

## 2018-06-23 PROCEDURE — G0378 HOSPITAL OBSERVATION PER HR: HCPCS

## 2018-06-23 PROCEDURE — 80053 COMPREHEN METABOLIC PANEL: CPT

## 2018-06-23 PROCEDURE — 25000003 PHARM REV CODE 250: Performed by: STUDENT IN AN ORGANIZED HEALTH CARE EDUCATION/TRAINING PROGRAM

## 2018-06-23 PROCEDURE — 63600175 PHARM REV CODE 636 W HCPCS: Performed by: STUDENT IN AN ORGANIZED HEALTH CARE EDUCATION/TRAINING PROGRAM

## 2018-06-23 PROCEDURE — 36415 COLL VENOUS BLD VENIPUNCTURE: CPT

## 2018-06-23 PROCEDURE — 84100 ASSAY OF PHOSPHORUS: CPT

## 2018-06-23 PROCEDURE — 85025 COMPLETE CBC W/AUTO DIFF WBC: CPT

## 2018-06-23 RX ORDER — FUROSEMIDE 40 MG/1
40 TABLET ORAL DAILY
Status: DISCONTINUED | OUTPATIENT
Start: 2018-06-24 | End: 2018-06-24

## 2018-06-23 RX ADMIN — POLYETHYLENE GLYCOL 3350 17 G: 17 POWDER, FOR SOLUTION ORAL at 08:06

## 2018-06-23 RX ADMIN — INSULIN ASPART 2 UNITS: 100 INJECTION, SOLUTION INTRAVENOUS; SUBCUTANEOUS at 12:06

## 2018-06-23 RX ADMIN — HYDRALAZINE HYDROCHLORIDE AND ISOSORBIDE DINITRATE 1 TABLET: 37.5; 2 TABLET, FILM COATED ORAL at 09:06

## 2018-06-23 RX ADMIN — APIXABAN 2.5 MG: 2.5 TABLET, FILM COATED ORAL at 08:06

## 2018-06-23 RX ADMIN — FLUTICASONE PROPIONATE 100 MCG: 50 SPRAY, METERED NASAL at 12:06

## 2018-06-23 RX ADMIN — METOPROLOL TARTRATE 50 MG: 50 TABLET ORAL at 08:06

## 2018-06-23 RX ADMIN — METOPROLOL TARTRATE 50 MG: 50 TABLET ORAL at 09:06

## 2018-06-23 RX ADMIN — HYDRALAZINE HYDROCHLORIDE AND ISOSORBIDE DINITRATE 1 TABLET: 37.5; 2 TABLET, FILM COATED ORAL at 08:06

## 2018-06-23 RX ADMIN — ACETAMINOPHEN 650 MG: 325 TABLET, FILM COATED ORAL at 12:06

## 2018-06-23 RX ADMIN — FUROSEMIDE 40 MG: 40 TABLET ORAL at 08:06

## 2018-06-23 RX ADMIN — APIXABAN 2.5 MG: 2.5 TABLET, FILM COATED ORAL at 09:06

## 2018-06-23 RX ADMIN — POLYETHYLENE GLYCOL 3350 17 G: 17 POWDER, FOR SOLUTION ORAL at 09:06

## 2018-06-23 RX ADMIN — HYDRALAZINE HYDROCHLORIDE AND ISOSORBIDE DINITRATE 1 TABLET: 37.5; 2 TABLET, FILM COATED ORAL at 03:06

## 2018-06-23 RX ADMIN — ACETAMINOPHEN 650 MG: 325 TABLET, FILM COATED ORAL at 07:06

## 2018-06-23 NOTE — ASSESSMENT & PLAN NOTE
-as evidenced on most recent echo, has progressed from mild-moderate to severe  -no complaints of SOB, angina, dyspnea at the moment  -will need outpatient f/u

## 2018-06-23 NOTE — SUBJECTIVE & OBJECTIVE
Interval History: improvement in edema, patient denies any chest pain or SOB.     Review of Systems   Constitutional: Negative for activity change, appetite change, chills, fever and unexpected weight change.   HENT: Negative for congestion and sore throat.    Eyes: Negative for visual disturbance.   Respiratory: Negative for cough, choking, chest tightness and shortness of breath.    Cardiovascular: Positive for leg swelling (much improved). Negative for chest pain and palpitations.   Gastrointestinal: Negative for abdominal distention, abdominal pain, blood in stool, constipation, diarrhea, nausea and vomiting.   Genitourinary: Negative for dysuria, hematuria and urgency.   Musculoskeletal: Negative for arthralgias and back pain.   Psychiatric/Behavioral: Negative for agitation and behavioral problems.     Objective:     Vital Signs (Most Recent):  Temp: 97.5 °F (36.4 °C) (06/23/18 0534)  Pulse: 62 (06/23/18 0837)  Resp: (!) 21 (06/23/18 0837)  BP: 116/77 (06/23/18 0837)  SpO2: 96 % (06/23/18 0837) Vital Signs (24h Range):  Temp:  [97.2 °F (36.2 °C)-97.9 °F (36.6 °C)] 97.5 °F (36.4 °C)  Pulse:  [62-99] 62  Resp:  [11-25] 21  SpO2:  [91 %-97 %] 96 %  BP: (100-145)/(67-86) 116/77     Weight: 65.4 kg (144 lb 2.9 oz)  Body mass index is 27.24 kg/m².    Intake/Output Summary (Last 24 hours) at 06/23/18 1231  Last data filed at 06/23/18 0600   Gross per 24 hour   Intake              840 ml   Output             2600 ml   Net            -1760 ml      Physical Exam   Constitutional: She is oriented to person, place, and time. She appears well-developed and well-nourished. No distress.   HENT:   Head: Normocephalic and atraumatic.   Eyes: EOM are normal. Pupils are equal, round, and reactive to light.   Neck: Normal range of motion. Neck supple. JVD (improving ) present.   Cardiovascular: Normal rate, normal heart sounds and intact distal pulses.  An irregularly irregular rhythm present.   No murmur heard.  Pulmonary/Chest:  Effort normal and breath sounds normal. No respiratory distress. She has no wheezes. She has no rales.   Abdominal: Soft. Bowel sounds are normal. She exhibits distension. There is no tenderness.   Musculoskeletal: Normal range of motion. She exhibits edema (improving).   Neurological: She is alert and oriented to person, place, and time.   Skin: Skin is warm and dry. No rash noted. She is not diaphoretic.   Psychiatric: She has a normal mood and affect. Her behavior is normal.   Nursing note and vitals reviewed.      Significant Labs:   BMP:   Recent Labs  Lab 06/22/18  0626 06/23/18  0908   GLU 94 119*    138   K 3.0* 4.2    104   CO2 25 23   BUN 24* 28*   CREATININE 1.6* 1.8*   CALCIUM 8.6* 9.6   MG 1.7  --      CBC:   Recent Labs  Lab 06/22/18  0626 06/23/18  0908   WBC 5.45 5.78   HGB 10.0* 11.8*   HCT 31.3* 38.8    214       Significant Imaging: I have reviewed all pertinent imaging results/findings within the past 24 hours.

## 2018-06-23 NOTE — ASSESSMENT & PLAN NOTE
Dacia Martínez is here for increased bilateral swelling likely secondary to diet non adherence,  No signs of infection but given she has been having problems urinating will rule out infection   - Per  Patient with symptoms of bilateral swelling , and abdominal swelling   - Patient on 20 mg PO lasix BID  at home   - BNP elevated at 1828 , troponin normal , LFTs normal  - PE notable for rales bilaterally with decreased breath sounds left lower lobe  , JVD present  and bilateral pitting edema, abdominal distension  Present. Patient states her dry weight is around 135.   - Xray  :diffuse increase in the pulmonary vascular markings.  Increased ground-glass opacification overlying the left hemithorax may represent some pleural fluid with associated atelectasis or consolidation  - Creatinine 1.5 her baseline looks to be 1.3-1.4    PLAN   - Strict I/O   - Daily weights  - Keep mag >2 and phos> 3  - Cardiac diet with decreased salt intake , fluid restriction to 1500  - Will decrease to lasix 40 PO daily given KHUSHBOO  - Nutrition consulted to help with CHF diet education       Echocardiogram: As follows but there is confusion because EF was 10.     2D echo with color flow doppler:   Results for orders placed or performed during the hospital encounter of 06/21/18   2D echo with color flow doppler   Result Value Ref Range    EF 33 (A) 55 - 65    Mitral Valve Regurgitation MILD     Aortic Valve Stenosis SEVERE (A)     Est. PA Systolic Pressure 58.03 (A)     Mitral Valve Mobility NORMAL     Mitral Valve Stenosis TRIVIAL     Tricuspid Valve Regurgitation MODERATE TO SEVERE (A)

## 2018-06-23 NOTE — PROGRESS NOTES
[facility] Hospital Medicine  Progress Note    Patient Name: Dacia Martínez  MRN: [id number]  Patient Class: OP- Observation   Admission Date: 6/21/2018  Length of Stay: 0 days  Attending Physician: Zi Rodríguez MD  Primary Care Provider: Cali Vickers MD    [facility] Medicine Team: [facility] [place] [facility] Janina Najera MD    Subjective:     Principal Problem:Acute on chronic congestive heart failure    HPI:  Ms. Dacia Martínez is a 81 y/o AAF with history of HTN, breast cancer, DM2, CKD, CAD, Afib presents to the ED for bilateral lower/ upper extremity swelling and abdominal distension that has been accumulating for the last week. Patient states that nothing has changed in her diet, medication, and she has not been feeling sick. However she mentioned that she does eat canned foods and has been eating pickles. As for her medication she has been taking them daily . She noticed that her urine output was decreasing and she has been having the urge but not the same response to her diuresis.       The patient denies any chest pain or shortness of breath. Patient denies any PND, orthopnea, dyspnea on exertion , fever, chills, nausea, vomiting, diarrhea. She has not been travelling or next to sick contacts.    In the ED patient found with elevated BNP, chest xray with left effusion and ground glass infiltrates. Last BNP recorded was 316. EKG in afib         Hospital Course:  [facility] for [facility]F exacerbation likely secondary to diet nonadhering to cardiac diet. Started with 40 mg IV lasix BID, transitioned to 40 PO BID on 6/22/18 with great UOP of >3L and improvement in edema. Due to worsening KHUSHBOO, transitioned to 40 PO daily lasix on 6/23 with likely discharge tomorrow.     Interval History: improvement in edema, patient denies any chest pain or SOB.     Review of Systems   Constitutional: Negative for activity change, appetite change, chills, fever and unexpected weight change.   HENT: Negative for  congestion and sore throat.    Eyes: Negative for visual disturbance.   Respiratory: Negative for cough, choking, chest tightness and shortness of breath.    Cardiovascular: Positive for leg swelling (much improved). Negative for chest pain and palpitations.   Gastrointestinal: Negative for abdominal distention, abdominal pain, blood in stool, constipation, diarrhea, nausea and vomiting.   Genitourinary: Negative for dysuria, hematuria and urgency.   Musculoskeletal: Negative for arthralgias and back pain.   Psychiatric/Behavioral: Negative for agitation and behavioral problems.     Objective:     Vital Signs (Most Recent):  Temp: 97.5 °F (36.4 °C) (06/23/18 0534)  Pulse: 62 (06/23/18 0837)  Resp: (!) 21 (06/23/18 0837)  BP: 116/77 (06/23/18 0837)  SpO2: 96 % (06/23/18 0837) Vital Signs (24h Range):  Temp:  [97.2 °F (36.2 °C)-97.9 °F (36.6 °C)] 97.5 °F (36.4 °C)  Pulse:  [62-99] 62  Resp:  [11-25] 21  SpO2:  [91 %-97 %] 96 %  BP: (100-145)/(67-86) 116/77     Weight: 65.4 kg (144 lb 2.9 oz)  Body mass index is 27.24 kg/m².    Intake/Output Summary (Last 24 hours) at 06/23/18 1231  Last data filed at 06/23/18 0600   Gross per 24 hour   Intake              840 ml   Output             2600 ml   Net            -1760 ml      Physical Exam   Constitutional: She is oriented to person, place, and time. She appears well-developed and well-nourished. No distress.   HENT:   Head: Normocephalic and atraumatic.   Eyes: EOM are normal. Pupils are equal, round, and reactive to light.   Neck: Normal range of motion. Neck supple. JVD (improving ) present.   Cardiovascular: Normal rate, normal heart sounds and intact distal pulses.  An irregularly irregular rhythm present.   No murmur heard.  Pulmonary/Chest: Effort normal and breath sounds normal. No respiratory distress. She has no wheezes. She has no rales.   Abdominal: Soft. Bowel sounds are normal. She exhibits distension. There is no tenderness.   Musculoskeletal: Normal range  of motion. She exhibits edema (improving).   Neurological: She is alert and oriented to person, place, and time.   Skin: Skin is warm and dry. No rash noted. She is not diaphoretic.   Psychiatric: She has a normal mood and affect. Her behavior is normal.   Nursing note and vitals reviewed.      Significant Labs:   BMP:   Recent Labs  Lab 06/22/18  0626 06/23/18  0908   GLU 94 119*    138   K 3.0* 4.2    104   CO2 25 23   BUN 24* 28*   CREATININE 1.6* 1.8*   CALCIUM 8.6* 9.6   MG 1.7  --      CBC:   Recent Labs  Lab 06/22/18  0626 06/23/18  0908   WBC 5.45 5.78   HGB 10.0* 11.8*   HCT 31.3* 38.8    214       Significant Imaging: I have reviewed all pertinent imaging results/findings within the past 24 hours.    Assessment/Plan:      * Acute on chronic congestive heart failure    Dacia Martínez is here for increased bilateral swelling likely secondary to diet non adherence,  No signs of infection but given she has been having problems urinating will rule out infection   - Per  Patient with symptoms of bilateral swelling , and abdominal swelling   - Patient on 20 mg PO lasix BID  at home   - BNP elevated at 1828 , troponin normal , LFTs normal  - PE notable for rales bilaterally with decreased breath sounds left lower lobe  , JVD present  and bilateral pitting edema, abdominal distension  Present. Patient states her dry weight is around 135.   - Xray  :diffuse increase in the pulmonary vascular markings.  Increased ground-glass opacification overlying the left hemithorax may represent some pleural fluid with associated atelectasis or consolidation  - Creatinine 1.5 her baseline looks to be 1.3-1.4    PLAN   - Strict I/O   - Daily weights  - Keep mag >2 and phos> 3  - Cardiac diet with decreased salt intake , fluid restriction to 1500  - Will decrease to lasix 40 PO daily given KHUSHBOO  - Nutrition consulted to help with CHF diet education       Echocardiogram: As follows but there is confusion because EF  was 10.     2D echo with color flow doppler:   Results for orders placed or performed during the hospital encounter of 06/21/18   2D echo with color flow doppler   Result Value Ref Range    EF 33 (A) 55 - 65    Mitral Valve Regurgitation MILD     Aortic Valve Stenosis SEVERE (A)     Est. 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 She is rate controlled with metoprolol and not rhythm control unclear if she was taking these medications correctly will monitor   - Patient of Dr. Burt             Anemia    - currently at baseline since February but unclear why she dropped from normal Hgb to 10 in February   - will order iron studies , no signs of bleed        Idiopathic chronic gout of multiple sites without  tophus    - takes allopurinol as needed has not needed it for months now   - will monitor         Essential hypertension    - Continue with Bidil            VTE Risk Mitigation         Ordered     apixaban tablet 2.5 mg  2 times daily      06/21/18 1129              Janina Najera MD  Department of Hospital Medicine   [facility]

## 2018-06-23 NOTE — PLAN OF CARE
Problem: Fall Risk (Adult)  Goal: Absence of Falls  Patient will demonstrate the desired outcomes by discharge/transition of care.   No falls

## 2018-06-23 NOTE — ASSESSMENT & PLAN NOTE
-BUN/Cr trending up with diuresis  -will decrease lasix from 40 PO BID to daily and monitor  -likely discharge tomorrow if improvement with f/u within 1 week and repeat BMP

## 2018-06-24 LAB
ALBUMIN SERPL BCP-MCNC: 3.1 G/DL
ALP SERPL-CCNC: 76 U/L
ALT SERPL W/O P-5'-P-CCNC: 7 U/L
ANION GAP SERPL CALC-SCNC: 10 MMOL/L
ANION GAP SERPL CALC-SCNC: 9 MMOL/L
AST SERPL-CCNC: 15 U/L
BILIRUB SERPL-MCNC: 0.6 MG/DL
BUN SERPL-MCNC: 30 MG/DL
BUN SERPL-MCNC: 33 MG/DL
CALCIUM SERPL-MCNC: 8.8 MG/DL
CALCIUM SERPL-MCNC: 9.1 MG/DL
CHLORIDE SERPL-SCNC: 101 MMOL/L
CHLORIDE SERPL-SCNC: 102 MMOL/L
CO2 SERPL-SCNC: 25 MMOL/L
CO2 SERPL-SCNC: 27 MMOL/L
CREAT SERPL-MCNC: 1.8 MG/DL
CREAT SERPL-MCNC: 2 MG/DL
EST. GFR  (AFRICAN AMERICAN): 26.2 ML/MIN/1.73 M^2
EST. GFR  (AFRICAN AMERICAN): 29.8 ML/MIN/1.73 M^2
EST. GFR  (NON AFRICAN AMERICAN): 22.7 ML/MIN/1.73 M^2
EST. GFR  (NON AFRICAN AMERICAN): 25.8 ML/MIN/1.73 M^2
GLUCOSE SERPL-MCNC: 117 MG/DL
GLUCOSE SERPL-MCNC: 121 MG/DL
PHOSPHATE SERPL-MCNC: 4.3 MG/DL
POCT GLUCOSE: 111 MG/DL (ref 70–110)
POCT GLUCOSE: 132 MG/DL (ref 70–110)
POCT GLUCOSE: 134 MG/DL (ref 70–110)
POCT GLUCOSE: 170 MG/DL (ref 70–110)
POTASSIUM SERPL-SCNC: 4.3 MMOL/L
POTASSIUM SERPL-SCNC: 4.7 MMOL/L
PROT SERPL-MCNC: 7.4 G/DL
SODIUM SERPL-SCNC: 137 MMOL/L
SODIUM SERPL-SCNC: 137 MMOL/L

## 2018-06-24 PROCEDURE — G0378 HOSPITAL OBSERVATION PER HR: HCPCS

## 2018-06-24 PROCEDURE — 25000003 PHARM REV CODE 250: Performed by: STUDENT IN AN ORGANIZED HEALTH CARE EDUCATION/TRAINING PROGRAM

## 2018-06-24 PROCEDURE — 36415 COLL VENOUS BLD VENIPUNCTURE: CPT

## 2018-06-24 PROCEDURE — 84100 ASSAY OF PHOSPHORUS: CPT

## 2018-06-24 PROCEDURE — 25000242 PHARM REV CODE 250 ALT 637 W/ HCPCS: Performed by: STUDENT IN AN ORGANIZED HEALTH CARE EDUCATION/TRAINING PROGRAM

## 2018-06-24 PROCEDURE — 80048 BASIC METABOLIC PNL TOTAL CA: CPT

## 2018-06-24 PROCEDURE — 99225 PR SUBSEQUENT OBSERVATION CARE,LEVEL II: CPT | Mod: GC,,, | Performed by: HOSPITALIST

## 2018-06-24 PROCEDURE — 80053 COMPREHEN METABOLIC PANEL: CPT

## 2018-06-24 RX ADMIN — FUROSEMIDE 40 MG: 40 TABLET ORAL at 08:06

## 2018-06-24 RX ADMIN — METOPROLOL TARTRATE 50 MG: 50 TABLET ORAL at 08:06

## 2018-06-24 RX ADMIN — HYDRALAZINE HYDROCHLORIDE AND ISOSORBIDE DINITRATE 1 TABLET: 37.5; 2 TABLET, FILM COATED ORAL at 08:06

## 2018-06-24 RX ADMIN — ACETAMINOPHEN 650 MG: 325 TABLET, FILM COATED ORAL at 08:06

## 2018-06-24 RX ADMIN — HYDRALAZINE HYDROCHLORIDE AND ISOSORBIDE DINITRATE 1 TABLET: 37.5; 2 TABLET, FILM COATED ORAL at 03:06

## 2018-06-24 RX ADMIN — APIXABAN 2.5 MG: 2.5 TABLET, FILM COATED ORAL at 08:06

## 2018-06-24 RX ADMIN — POLYETHYLENE GLYCOL 3350 17 G: 17 POWDER, FOR SOLUTION ORAL at 08:06

## 2018-06-24 RX ADMIN — FLUTICASONE PROPIONATE 100 MCG: 50 SPRAY, METERED NASAL at 08:06

## 2018-06-24 RX ADMIN — ACETAMINOPHEN 650 MG: 325 TABLET, FILM COATED ORAL at 06:06

## 2018-06-24 RX ADMIN — SODIUM CHLORIDE 500 ML: 0.9 INJECTION, SOLUTION INTRAVENOUS at 11:06

## 2018-06-24 NOTE — ASSESSMENT & PLAN NOTE
-BUN/Cr trending up with diuresis  - stop lasix for tomorrow and give fluids today   -likely discharge tomorrow if improvement with f/u within 1 week and repeat BMP

## 2018-06-24 NOTE — ASSESSMENT & PLAN NOTE
-as evidenced on most recent echo, has progressed from mild-moderate to severe  -no complaints of SOB, angina, dyspnea at the moment  -will need outpatient f/u with cardiology

## 2018-06-24 NOTE — ASSESSMENT & PLAN NOTE
Dacia Martínez is here for increased bilateral swelling likely secondary to diet non adherence,  No signs of infection but given she has been having problems urinating will rule out infection   - Per  Patient with symptoms of bilateral swelling , and abdominal swelling   - Patient on 20 mg PO lasix BID  at home   - BNP elevated at 1828 , troponin normal , LFTs normal  - PE notable for rales bilaterally with decreased breath sounds left lower lobe  , JVD present  and bilateral pitting edema, abdominal distension  Present. Patient states her dry weight is around 135.   - Xray  :diffuse increase in the pulmonary vascular markings.  Increased ground-glass opacification overlying the left hemithorax may represent some pleural fluid with associated atelectasis or consolidation  - Creatinine 1.5 her baseline looks to be 1.3-1.4    PLAN   - Strict I/O   - Daily weights  - Keep mag >2 and phos> 3  - Cardiac diet with decreased salt intake , fluid restriction to 1500  - PO lasix stopped for today will give a 500 cc bolus and get another BMP   - Nutrition consulted to help with CHF diet education       Echocardiogram: As follows but there is confusion because EF was 10.     2D echo with color flow doppler:   Results for orders placed or performed during the hospital encounter of 06/21/18   2D echo with color flow doppler   Result Value Ref Range    EF 33 (A) 55 - 65    Mitral Valve Regurgitation MILD     Aortic Valve Stenosis SEVERE (A)     Est. PA Systolic Pressure 58.03 (A)     Mitral Valve Mobility NORMAL     Mitral Valve Stenosis TRIVIAL     Tricuspid Valve Regurgitation MODERATE TO SEVERE (A)

## 2018-06-24 NOTE — SUBJECTIVE & OBJECTIVE
Interval History: Patient with no acute events still urinating and looks dry. Will give a bolus of fluid as we wanted to hold lasix but she already got her morning     Review of Systems   Constitutional: Negative for activity change, appetite change, chills, fever and unexpected weight change.   HENT: Negative for congestion and sore throat.    Eyes: Negative for visual disturbance.   Respiratory: Negative for cough, choking, chest tightness and shortness of breath.    Cardiovascular: Positive for leg swelling (much improved). Negative for chest pain and palpitations.   Gastrointestinal: Negative for abdominal distention, abdominal pain, blood in stool, constipation, diarrhea, nausea and vomiting.   Genitourinary: Negative for dysuria, hematuria and urgency.   Musculoskeletal: Negative for arthralgias and back pain.   Psychiatric/Behavioral: Negative for agitation and behavioral problems.     Objective:     Vital Signs (Most Recent):  Temp: 97.8 °F (36.6 °C) (06/24/18 0524)  Pulse: (!) 54 (06/24/18 0750)  Resp: 20 (06/24/18 0750)  BP: 114/73 (06/24/18 0750)  SpO2: 96 % (06/24/18 0750) Vital Signs (24h Range):  Temp:  [97.8 °F (36.6 °C)-98.3 °F (36.8 °C)] 97.8 °F (36.6 °C)  Pulse:  [54-81] 54  Resp:  [19-20] 20  SpO2:  [94 %-96 %] 96 %  BP: (114-122)/(73-80) 114/73     Weight: 65.4 kg (144 lb 2.9 oz)  Body mass index is 27.24 kg/m².  No intake or output data in the 24 hours ending 06/24/18 1246   Physical Exam   Constitutional: She is oriented to person, place, and time. She appears well-developed and well-nourished. No distress.   HENT:   Head: Normocephalic and atraumatic.   Eyes: EOM are normal. Pupils are equal, round, and reactive to light.   Neck: Normal range of motion. Neck supple. No JVD present.   Cardiovascular: Normal rate, normal heart sounds and intact distal pulses.  An irregularly irregular rhythm present.   No murmur heard.  Pulmonary/Chest: Effort normal and breath sounds normal. No respiratory  distress. She has no wheezes. She has no rales.   Abdominal: Soft. Bowel sounds are normal. She exhibits no distension. There is no tenderness.   Musculoskeletal: Normal range of motion. She exhibits no edema.   Neurological: She is alert and oriented to person, place, and time.   Skin: Skin is warm and dry. No rash noted. She is not diaphoretic.   Psychiatric: She has a normal mood and affect. Her behavior is normal.   Nursing note and vitals reviewed.      Significant Labs:   CBC:   Recent Labs  Lab 06/23/18  0908   WBC 5.78   HGB 11.8*   HCT 38.8        CMP:   Recent Labs  Lab 06/23/18  0908 06/24/18  0653    137   K 4.2 4.7    102   CO2 23 25   * 121*   BUN 28* 33*   CREATININE 1.8* 2.0*   CALCIUM 9.6 8.8   PROT 8.1 7.4   ALBUMIN 3.3* 3.1*   BILITOT 0.7 0.6   ALKPHOS 86 76   AST 17 15   ALT 7* 7*   ANIONGAP 11 10   EGFRNONAA 25.8* 22.7*       Significant Imaging: I have reviewed and interpreted all pertinent imaging results/findings within the past 24 hours.

## 2018-06-24 NOTE — PROGRESS NOTES
[facility] Hospital Medicine  Progress Note    Patient Name: Dacia Martínez  MRN: [id number]  Patient Class: OP- Observation   Admission Date: 6/21/2018  Length of Stay: 0 days  Attending Physician: Zi Rodríguez MD  Primary Care Provider: Cali Vickers MD    [facility] Medicine Team: [facility] MED 2 Lali Kumar MD    Subjective:     Principal Problem:Acute on chronic congestive heart failure    HPI:  Ms. Dacia Martínez is a 83 y/o AAF with history of HTN, breast cancer, DM2, CKD, CAD, Afib presents to the ED for bilateral lower/ upper extremity swelling and abdominal distension that has been accumulating for the last week. Patient states that nothing has changed in her diet, medication, and she has not been feeling sick. However she mentioned that she does eat canned foods and has been eating pickles. As for her medication she has been taking them daily . She noticed that her urine output was decreasing and she has been having the urge but not the same response to her diuresis.       The patient denies any chest pain or shortness of breath. Patient denies any PND, orthopnea, dyspnea on exertion , fever, chills, nausea, vomiting, diarrhea. She has not been travelling or next to sick contacts.    In the ED patient found with elevated BNP, chest xray with left effusion and ground glass infiltrates. Last BNP recorded was 316. EKG in afib         Hospital Course:  Admitted to [facility] for CHF exacerbation likely secondary to diet nonadhering to cardiac diet. Started with 40 mg IV lasix BID, transitioned to 40 PO BID on 6/22/18 with great UOP of >3L and improvement in edema. Due to worsening KHUSHBOO, transitioned to 40 PO daily lasix on 6/23 wbut creatinine kept worsening will give her lasix holiday and give a bolus of 500cc today     Interval History: Patient with no acute events still urinating and looks dry. Will give a bolus of fluid as we wanted to hold lasix but she already got her morning     Review of  Systems   Constitutional: Negative for activity change, appetite change, chills, fever and unexpected weight change.   HENT: Negative for congestion and sore throat.    Eyes: Negative for visual disturbance.   Respiratory: Negative for cough, choking, chest tightness and shortness of breath.    Cardiovascular: Positive for leg swelling (much improved). Negative for chest pain and palpitations.   Gastrointestinal: Negative for abdominal distention, abdominal pain, blood in stool, constipation, diarrhea, nausea and vomiting.   Genitourinary: Negative for dysuria, hematuria and urgency.   Musculoskeletal: Negative for arthralgias and back pain.   Psychiatric/Behavioral: Negative for agitation and behavioral problems.     Objective:     Vital Signs (Most Recent):  Temp: 97.8 °F (36.6 °C) (06/24/18 0524)  Pulse: (!) 54 (06/24/18 0750)  Resp: 20 (06/24/18 0750)  BP: 114/73 (06/24/18 0750)  SpO2: 96 % (06/24/18 0750) Vital Signs (24h Range):  Temp:  [97.8 °F (36.6 °C)-98.3 °F (36.8 °C)] 97.8 °F (36.6 °C)  Pulse:  [54-81] 54  Resp:  [19-20] 20  SpO2:  [94 %-96 %] 96 %  BP: (114-122)/(73-80) 114/73     Weight: 65.4 kg (144 lb 2.9 oz)  Body mass index is 27.24 kg/m².  No intake or output data in the 24 hours ending 06/24/18 1246   Physical Exam   Constitutional: She is oriented to person, place, and time. She appears well-developed and well-nourished. No distress.   HENT:   Head: Normocephalic and atraumatic.   Eyes: EOM are normal. Pupils are equal, round, and reactive to light.   Neck: Normal range of motion. Neck supple. No JVD present.   Cardiovascular: Normal rate, normal heart sounds and intact distal pulses.  An irregularly irregular rhythm present.   No murmur heard.  Pulmonary/Chest: Effort normal and breath sounds normal. No respiratory distress. She has no wheezes. She has no rales.   Abdominal: Soft. Bowel sounds are normal. She exhibits no distension. There is no tenderness.   Musculoskeletal: Normal range of  motion. She exhibits no edema.   Neurological: She is alert and oriented to person, place, and time.   Skin: Skin is warm and dry. No rash noted. She is not diaphoretic.   Psychiatric: She has a normal mood and affect. Her behavior is normal.   Nursing note and vitals reviewed.      Significant Labs:   CBC:   Recent Labs  Lab 06/23/18  0908   WBC 5.78   HGB 11.8*   HCT 38.8        CMP:   Recent Labs  Lab 06/23/18  0908 06/24/18  0653    137   K 4.2 4.7    102   CO2 23 25   * 121*   BUN 28* 33*   CREATININE 1.8* 2.0*   CALCIUM 9.6 8.8   PROT 8.1 7.4   ALBUMIN 3.3* 3.1*   BILITOT 0.7 0.6   ALKPHOS 86 76   AST 17 15   ALT 7* 7*   ANIONGAP 11 10   EGFRNONAA 25.8* 22.7*       Significant Imaging: I have reviewed and interpreted all pertinent imaging results/findings within the past 24 hours.    Assessment/Plan:      * Acute on chronic congestive heart failure    Dacia Martínez is here for increased bilateral swelling likely secondary to diet non adherence,  No signs of infection but given she has been having problems urinating will rule out infection   - Per  Patient with symptoms of bilateral swelling , and abdominal swelling   - Patient on 20 mg PO lasix BID  at home   - BNP elevated at 1828 , troponin normal , LFTs normal  - PE notable for rales bilaterally with decreased breath sounds left lower lobe  , JVD present  and bilateral pitting edema, abdominal distension  Present. Patient states her dry weight is around 135.   - Xray  :diffuse increase in the pulmonary vascular markings.  Increased ground-glass opacification overlying the left hemithorax may represent some pleural fluid with associated atelectasis or consolidation  - Creatinine 1.5 her baseline looks to be 1.3-1.4    PLAN   - Strict I/O   - Daily weights  - Keep mag >2 and phos> 3  - Cardiac diet with decreased salt intake , fluid restriction to 1500  - PO lasix stopped for today will give a 500 cc bolus and get another BMP   -  Nutrition consulted to help with CHF diet education       Echocardiogram: As follows but there is confusion because EF was 10.     2D echo with color flow doppler:   Results for orders placed or performed during the hospital encounter of 06/21/18   2D echo with color flow doppler   Result Value Ref Range    EF 33 (A) 55 - 65    Mitral Valve Regurgitation MILD     Aortic Valve Stenosis SEVERE (A)     Est. PA Systolic Pressure 58.03 (A)     Mitral Valve Mobility NORMAL     Mitral Valve Stenosis TRIVIAL     Tricuspid Valve Regurgitation MODERATE TO SEVERE (A)                   Persistent atrial fibrillation    CHADDSVASc= 6.0  - on BID eliquis 5 mg, will change to 2.5 (Dose adjustment 2.5 mg twice daily is recommended for patients with at least 2 of the following (age ?80 years, body weight ?60 kg, serum creatinine ?1.5 mg/dL and she meets two of these)   - continue metoprolol   - will hold off on amiodarone 200 mg daily (patient was taking this medication wrong she is supposed to take 200 mg daily instead she continued the loading dose of BID). She is rate controlled with metoprolol and not rhythm control unclear if she was taking these medications correctly will monitor   - Patient of Dr. 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, without long-term current use of insulin    - on home januvia   - last A1c 6.0  - SSI               Essential hypertension    - Continue with Bidil          Anemia    - currently at baseline since February   - iron studies are normal         Idiopathic chronic gout of multiple sites without tophus    - takes allopurinol as needed has not needed it for months now   - will monitor         KHUSHBOO (acute kidney injury)    -BUN/Cr trending up with diuresis  - stop lasix for tomorrow and give fluids today   -likely discharge tomorrow if improvement with f/u within 1 week and repeat BMP          Severe  aortic stenosis    -as evidenced on most recent echo, has progressed from mild-moderate to severe  -no complaints of SOB, angina, dyspnea at the moment  -will need outpatient f/u with cardiology             VTE Risk Mitigation         Ordered     apixaban tablet 2.5 mg  2 times daily      06/21/18 1129        Patient seen and examined this morning. Discussed with Dr. Rodríguez   - staff attestation to follow.        Lali Kumar MD  Department of Hospital Medicine   [facility]

## 2018-06-25 ENCOUNTER — TELEPHONE (OUTPATIENT)
Dept: ELECTROPHYSIOLOGY | Facility: CLINIC | Age: 83
End: 2018-06-25

## 2018-06-25 VITALS
RESPIRATION RATE: 25 BRPM | HEIGHT: 61 IN | SYSTOLIC BLOOD PRESSURE: 118 MMHG | BODY MASS INDEX: 27.22 KG/M2 | WEIGHT: 144.19 LBS | DIASTOLIC BLOOD PRESSURE: 88 MMHG | HEART RATE: 56 BPM | TEMPERATURE: 97 F | OXYGEN SATURATION: 97 %

## 2018-06-25 LAB
ANION GAP SERPL CALC-SCNC: 8 MMOL/L
BUN SERPL-MCNC: 31 MG/DL
CALCIUM SERPL-MCNC: 9.2 MG/DL
CHLORIDE SERPL-SCNC: 101 MMOL/L
CO2 SERPL-SCNC: 26 MMOL/L
CREAT SERPL-MCNC: 1.8 MG/DL
EST. GFR  (AFRICAN AMERICAN): 29.8 ML/MIN/1.73 M^2
EST. GFR  (NON AFRICAN AMERICAN): 25.8 ML/MIN/1.73 M^2
GLUCOSE SERPL-MCNC: 95 MG/DL
MAGNESIUM SERPL-MCNC: 2.1 MG/DL
PHOSPHATE SERPL-MCNC: 4 MG/DL
POCT GLUCOSE: 123 MG/DL (ref 70–110)
POTASSIUM SERPL-SCNC: 3.9 MMOL/L
SODIUM SERPL-SCNC: 135 MMOL/L

## 2018-06-25 PROCEDURE — 99217 PR OBSERVATION CARE DISCHARGE: CPT | Mod: GC,,, | Performed by: HOSPITALIST

## 2018-06-25 PROCEDURE — G0378 HOSPITAL OBSERVATION PER HR: HCPCS

## 2018-06-25 PROCEDURE — 25000003 PHARM REV CODE 250: Performed by: STUDENT IN AN ORGANIZED HEALTH CARE EDUCATION/TRAINING PROGRAM

## 2018-06-25 PROCEDURE — 80048 BASIC METABOLIC PNL TOTAL CA: CPT

## 2018-06-25 PROCEDURE — 36415 COLL VENOUS BLD VENIPUNCTURE: CPT

## 2018-06-25 PROCEDURE — 84100 ASSAY OF PHOSPHORUS: CPT

## 2018-06-25 PROCEDURE — 83735 ASSAY OF MAGNESIUM: CPT

## 2018-06-25 RX ADMIN — FLUTICASONE PROPIONATE 100 MCG: 50 SPRAY, METERED NASAL at 09:06

## 2018-06-25 RX ADMIN — APIXABAN 2.5 MG: 2.5 TABLET, FILM COATED ORAL at 09:06

## 2018-06-25 RX ADMIN — METOPROLOL TARTRATE 50 MG: 50 TABLET ORAL at 09:06

## 2018-06-25 RX ADMIN — HYDRALAZINE HYDROCHLORIDE AND ISOSORBIDE DINITRATE 1 TABLET: 37.5; 2 TABLET, FILM COATED ORAL at 09:06

## 2018-06-25 NOTE — ASSESSMENT & PLAN NOTE
CHADDSVASc= 6.0  - on BID eliquis 5 mg, will change to 2.5 (Dose adjustment 2.5 mg twice daily is recommended for patients with at least 2 of the following (age ?80 years, body weight ?60 kg, serum creatinine ?1.5 mg/dL and she meets two of these)   - continue metoprolol   - will hold off on amiodarone 200 mg daily (patient was taking this medication wrong she is supposed to take 200 mg daily instead she continued the loading dose of BID). She is rate controlled with metoprolol and not rhythm control unclear if she was taking these medications correctly will monitor   - Patient of Dr. Burt with appointment on 6/26 instructed patient to make sure she sees her cardiologist and discuss whether she needs to be placed back on amiodarone

## 2018-06-25 NOTE — ASSESSMENT & PLAN NOTE
-as evidenced on most recent echo, has progressed from mild-moderate to severe  -no complaints of SOB, angina, dyspnea at the moment  -will need outpatient f/u with cardiology : she has an appointment with Dr. Burt

## 2018-06-25 NOTE — PLAN OF CARE
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       Future Appointments  Date Time Provider Department Center   6/26/2018 10:30 AM EKG, APPT [facility] EKG [facility]   6/26/2018 11:00 AM Jacques Burt MD [facility] ARRHYTH [facility]   7/2/2018 8:45 AM Kian Ribeiro III, MD [facility] ENT [facility]   7/2/2018 11:00 AM Cali Vickers MD [facility] IM [facility] PCW   9/11/2018 9:00 AM Shelby Horner MD [facility] ENDOCRN [facility]           I have seen and examined this patient face to face today. My clinical findings that support the need for the home health skilled services and home bound status are the following:  Patient with medication mismanagement issues requiring home bound status as evidenced by  Poor understanding of medication regimen/dosage and Poor adherence to medication regimen/dosage.    Allergies:Review of patient's allergies indicates:  No Known Allergies    Diet:  diabetic diet: 1800 calorie and 2 gram sodium diet    Activities: activity as tolerated    Nursing:   SN to complete comprehensive assessment including routine vital signs. Instruct on disease process and s/s of complications to report to MD. Review/verify medication list sent home with the patient at time of discharge  and instruct patient/caregiver as needed. Frequency may be adjusted depending on start of care date.    Notify MD if SBP > 160 or < 90; DBP > 90 or < 50; HR > 120 or < 50; Temp > 101;       CONSULTS:    Aide to provide assistance with personal care, ADLs, and vital signs.    MISCELLANEOUS CARE:  Heart Failure:      SN to instruct on the following:    Instruct on the definition of CHF.   Instruct on the signs/sympoms of CHF to be reported.   Instruct on and monitor daily weights.   Instruct on factors that cause exacerbation.   Instruct on action, dose, schedule, and side effects of medications.   Instruct on diet as prescribed.   Instruct on activity allowed.   Instruct on life-style modifications for life long management of CHF   SN to assess compliance with daily weights, diet, medications, fluid retention,    safety precautions, activities permitted and life-style modifications.   Additional 1-2 SN visits per week as needed for signs and symptoms     of CHF exacerbation.      For Weight Gain > 2-3 lbs in 1 day or 4-6 lbs over 1 week notify PCP:  Increase 40 (oral diuretic) dose to 60 for 5 days temporarily    WOUND CARE ORDERS  n/a      Medications: Review discharge medications with patient and family and provide education.      Current Discharge Medication List      CONTINUE these medications which have CHANGED    Details   apixaban 2.5 mg Tab Take 1 tablet (2.5 mg total) by mouth 2 (two) times daily.  Qty: 60 tablet, Refills: 11         CONTINUE these medications which have NOT CHANGED    Details   ACCU-CHEK FASTCLIX Misc 1 lancet by Misc.(Non-Drug; Combo Route) route 3 (three) times daily. Pt  to test blood glucose up to 3 times daily.  Qty: 100 each, Refills: 11    Comments: OK to change to lancets + fingerstick device that is covered by patient's insurance if Accuchek device is not covered  Associated Diagnoses: Type 2 diabetes mellitus with stage 3 chronic kidney disease, without long-term current use of insulin      allopurinol (ZYLOPRIM) 100 MG tablet Resume allopurinol 1/2 pill until 1/5/18, 1 pill daily for 5 days, then 1.5 pills daily  Qty: 60 tablet, Refills: 3    Associated Diagnoses: Idiopathic chronic gout of multiple sites without tophus      benzonatate (TESSALON) 100 MG capsule Take 100 mg by mouth 3 (three) times daily as needed for Cough.      cholecalciferol, vitamin D3, 1,000 unit capsule Take 2 capsules (2,000 Units total) by mouth once daily.  Qty: 180 capsule, Refills: 3      diphenhydrAMINE-zinc acetate 1-0.1% (BENADRYL) cream Apply topically 2 (two) times daily as needed for Itching.  Refills: 0      docusate sodium (COLACE) 100 MG capsule Take 100 mg by mouth daily as needed for Constipation.      doxepin (SINEQUAN) 10 MG capsule TAKE 1 CAPSULE BY MOUTH THREE TIMES A DAY  Qty: 90 capsule, Refills: 3      fluticasone (FLONASE) 50 mcg/actuation nasal spray SPRAY TWICE IN EACH NOSTRIL DAILY  Qty: 16 g, Refills: 5      furosemide (LASIX) 20 MG tablet Take 2 tablets (40 mg total) by mouth once daily.  Qty: 60 tablet, Refills: 11      isosorbide-hydrALAZINE 20-37.5 mg (BIDIL) 20-37.5 mg Tab Take 1 tablet by mouth 3 (three) times daily.  Qty: 90 tablet, Refills: 11      metoprolol tartrate (LOPRESSOR) 50 MG tablet Take 1 tablet (50 mg total) by mouth 2 (two) times daily.  Qty: 60 tablet, Refills: 11      !! SITagliptin (JANUVIA) 50 MG Tab TAKE 1 TABLET BY MOUTH ONCE DAILY  Qty: 30 tablet, Refills: 5      !! SITagliptin (JANUVIA) 50 MG Tab Take 1 tablet (50 mg total) by mouth once daily.  Qty: 30 tablet, Refills: 5    Associated Diagnoses: Type 2 diabetes mellitus with stage 3 chronic  kidney disease, without long-term current use of insulin       !! - Potential duplicate medications found. Please discuss with provider.      STOP taking these medications       amiodarone (PACERONE) 200 MG Tab Comments:   Reason for Stopping:         predniSONE (DELTASONE) 10 MG tablet Comments:   Reason for Stopping:               I certify that this patient is confined to her home and needs intermittent skilled nursing care.

## 2018-06-25 NOTE — PLAN OF CARE
Problem: Patient Care Overview  Goal: Plan of Care Review  Outcome: Ongoing (interventions implemented as appropriate)  Pt free of falls/injuries throughout the shift. Bed locked, in lowest position, call bell within reach. Pt afebrile, pain assessed & denied. VSS, pt in no distress, grandson at the bs, will continue to monitor.

## 2018-06-25 NOTE — DISCHARGE SUMMARY
[facility] Hospital Medicine  Discharge Summary      Patient Name: Dacia Martínez  MRN: [id number]  Admission Date: 6/21/2018  Hospital Length of Stay: 0 days  Discharge Date and Time:  06/25/2018 12:14 PM  Attending Physician: Liliana att. providers found   Discharging Provider: Lali Kumar MD  Primary Care Provider: Cali Vickers MD  [facility] Medicine Team: [facility] 2 Lali Kumar MD    HPI:   Ms. Dacia Martínez is a 83 y/o AAF with history of HTN, breast cancer, DM2, CKD, CAD, Afib presents to the ED for bilateral lower/ upper extremity swelling and abdominal distension that has been accumulating for the last week. Patient states that nothing has changed in her diet, medication, and she has not been feeling sick. However she mentioned that she does eat canned foods and has been eating pickles. As for her medication she has been taking them daily . She noticed that her urine output was decreasing and she has been having the urge but not the same response to her diuresis.       The patient denies any chest pain or shortness of breath. Patient denies any PND, orthopnea, dyspnea on exertion , fever, chills, nausea, vomiting, diarrhea. She has not been travelling or next to sick contacts.    In the ED patient found with elevated BNP, chest xray with left effusion and ground glass infiltrates. Last BNP recorded was 316. EKG in afib         * No surgery found *      Hospital Course:   Admitted to 2 for CHF exacerbation likely secondary to diet nonadhering to cardiac diet. Started with 40 mg IV lasix BID, transitioned to 40 PO BID on 6/22/18 with great UOP of >3L and improvement in edema. Due to worsening KHUSHBOO, transitioned to 40 PO daily lasix on 6/23 wbut creatinine kept worsening will give her lasix holiday and give a bolus of 500cc 6/24 creatinine went down to 1.8 from 2.0, then stabilized. Patient euvolemic and insistent to go home. Instructed her to not take her lasix today and to go to her  cardiology appointment tomorrow. Made her an appointment with PCP as well for follow up .     She needs a recheck of her creatinine this week    At discharge patient was stable alert and oriented. .Discussed discharge plan. Reviewed medications and side effects, appointments and answered questions with patient and family. Medications sent to [facility]     Vitals:    06/25/18 0810   BP: 118/88   Pulse: (!) 56   Resp: (!) 25   Temp:         Physical Exam   Constitutional: She is oriented to person, place, and time. She appears well-developed and well-nourished. No distress.   HENT:   Head: Normocephalic and atraumatic.   Eyes: EOM are normal. Pupils are equal, round, and reactive to light.   Neck: Normal range of motion. Neck supple.   Cardiovascular: Normal rate, normal heart sounds and intact distal pulses.  An irregularly irregular rhythm present.   No murmur heard.  Pulmonary/Chest: Effort normal and breath sounds normal. No respiratory distress. She has no wheezes. She has no rales.   Abdominal: Soft. Bowel sounds are normal. She exhibits no distension. There is no tenderness.   Musculoskeletal: Normal range of motion. She has no edema   Neurological: She is alert and oriented to person, place, and time.   Skin: Skin is warm and dry. No rash noted. She is not diaphoretic.   Psychiatric: She has a normal mood and affect. Her behavior is normal.   Nursing note and vitals reviewed.          Consults:   Consults         Status Ordering Provider     Inpatient consult to Registered Dietitian/Nutritionist  Once     Provider:  (Not yet assigned)    Completed DEMARIO SANTANA O          * Acute on chronic congestive heart failure    Dacia Martínez is here for increased bilateral swelling likely secondary to diet non adherence,  No signs of infection but given she has been having problems urinating will rule out infection   - Per  Patient with symptoms of bilateral swelling , and abdominal swelling   - Patient on 20 mg  PO lasix BID  at home   - BNP elevated at 1828 , troponin normal , LFTs normal  - PE notable for rales bilaterally with decreased breath sounds left lower lobe  , JVD present  and bilateral pitting edema, abdominal distension  Present. Patient states her dry weight is around 135.   - Xray  :diffuse increase in the pulmonary vascular markings.  Increased ground-glass opacification overlying the left hemithorax may represent some pleural fluid with associated atelectasis or consolidation  - Creatinine 1.5 her baseline looks to be 1.3-1.4    PLAN   - Strict I/O   - Daily weights  - Keep mag >2 and phos> 3  - Cardiac diet with decreased salt intake , fluid restriction to 1500  - PO lasix stopped for today , can resume outpatient after she sees cardiology and PCP   - Nutrition consulted to help with CHF diet education       Echocardiogram: As follows but there is confusion because EF was 10.     2D echo with color flow doppler:   Results for orders placed or performed during the hospital encounter of 06/21/18   2D echo with color flow doppler   Result Value Ref Range    EF 33 (A) 55 - 65    Mitral Valve Regurgitation MILD     Aortic Valve Stenosis SEVERE (A)     Est. PA Systolic Pressure 58.03 (A)     Mitral Valve Mobility NORMAL     Mitral Valve Stenosis TRIVIAL     Tricuspid Valve Regurgitation MODERATE TO SEVERE (A)                   Persistent atrial fibrillation    CHADDSVASc= 6.0  - on BID eliquis 5 mg, will change to 2.5 (Dose adjustment 2.5 mg twice daily is recommended for patients with at least 2 of the following (age ?80 years, body weight ?60 kg, serum creatinine ?1.5 mg/dL and she meets two of these)   - continue metoprolol   - will hold off on amiodarone 200 mg daily (patient was taking this medication wrong she is supposed to take 200 mg daily instead she continued the loading dose of BID). She is rate controlled with metoprolol and not rhythm control unclear if she was taking these medications correctly  will monitor   - Patient of Dr. 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, without long-term current use of insulin    - continue on home januvia   - last A1c 6.0  - SSI               Essential hypertension    - Continue with Bidil          Anemia    - currently at baseline since February   - iron studies are normal         KHUSHBOO (acute kidney injury)    -BUN/Cr trending up with diuresis  - stop lasix and gave fluid and her creatinine came down and is trending down   -likely needs to f/u within 1 week and repeat BMP, appointments made           Severe aortic stenosis    -as evidenced on most recent echo, has progressed from mild-moderate to severe  -no complaints of SOB, angina, dyspnea at the moment  -will need outpatient f/u with cardiology : she has an appointment with Dr. 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, without long-term current use of insulin [E11.3293] 08/08/2017 Yes    Essential hypertension [I10] 01/10/2013 Yes    Anemia [D64.9] 10/05/2016 Yes    Idiopathic chronic gout of multiple sites without tophus [M1A.09X0] 09/21/2015 Yes     Chronic    Severe aortic stenosis [I35.0] 06/23/2018 Yes    KHUSHBOO (acute kidney injury) [N17.9] 06/23/2018 Yes      Problems Resolved During this Admission:    Diagnosis Date Noted Date Resolved POA       Discharged Condition: stable    Disposition: Home or Self Care    Follow Up:    Patient Instructions:     Diet Cardiac     Activity as  tolerated     Notify your health care provider if you experience any of the following:  temperature >100.4     Notify your health care provider if you experience any of the following:  persistent nausea and vomiting or diarrhea     Notify your health care provider if you experience any of the following:  severe uncontrolled pain     Notify your health care provider if you experience any of the following:  redness, tenderness, or signs of infection (pain, swelling, redness, odor or green/yellow discharge around incision site)         Significant Diagnostic Studies: Labs:   CMP   Recent Labs  Lab 06/24/18  0653 06/24/18  1550 06/25/18  0615    137 135*   K 4.7 4.3 3.9    101 101   CO2 25 27 26   * 117* 95   BUN 33* 30* 31*   CREATININE 2.0* 1.8* 1.8*   CALCIUM 8.8 9.1 9.2   PROT 7.4  --   --    ALBUMIN 3.1*  --   --    BILITOT 0.6  --   --    ALKPHOS 76  --   --    AST 15  --   --    ALT 7*  --   --    ANIONGAP 10 9 8   ESTGFRAFRICA 26.2* 29.8* 29.8*   EGFRNONAA 22.7* 25.8* 25.8*   , CBC No results for input(s): WBC, HGB, HCT, PLT in the last 48 hours. and All labs within the past 24 hours have been reviewed    Pending Diagnostic Studies:     None       Echocardiogram:   2D echo with color flow doppler:   Results for orders placed or performed during the hospital encounter of 06/21/18   2D echo with color flow doppler   Result Value Ref Range    EF 33 (A) 55 - 65    Mitral Valve Regurgitation MILD     Aortic Valve Stenosis SEVERE (A)     Est. PA Systolic Pressure 58.03 (A)     Mitral Valve Mobility NORMAL     Mitral Valve Stenosis TRIVIAL     Tricuspid Valve Regurgitation MODERATE TO SEVERE (A)      Appointments:  6/25/2018 : Cardiology Dr. Burt   7/02/2018: PCP Dr. Vickers       Medications:   Dacia Martínez   Home Medication Instructions PATY:[id number]    Printed on:06/25/18 1215   Medication Information                      ACCU-CHEK FASTCLIX Misc  1 lancet by Misc.(Non-Drug; Combo Route) route  3 (three) times daily. Pt to test blood glucose up to 3 times daily.             allopurinol (ZYLOPRIM) 100 MG tablet  Resume allopurinol 1/2 pill until 1/5/18, 1 pill daily for 5 days, then 1.5 pills daily             apixaban 2.5 mg Tab  Take 1 tablet (2.5 mg total) by mouth 2 (two) times daily.             benzonatate (TESSALON) 100 MG capsule  Take 100 mg by mouth 3 (three) times daily as needed for Cough.             cholecalciferol, vitamin D3, 1,000 unit capsule  Take 2 capsules (2,000 Units total) by mouth once daily.             diphenhydrAMINE-zinc acetate 1-0.1% (BENADRYL) cream  Apply topically 2 (two) times daily as needed for Itching.             docusate sodium (COLACE) 100 MG capsule  Take 100 mg by mouth daily as needed for Constipation.             doxepin (SINEQUAN) 10 MG capsule  TAKE 1 CAPSULE BY MOUTH THREE TIMES A DAY             fluticasone (FLONASE) 50 mcg/actuation nasal spray  SPRAY TWICE IN EACH NOSTRIL DAILY             furosemide (LASIX) 20 MG tablet  Take 2 tablets (40 mg total) by mouth once daily.             isosorbide-hydrALAZINE 20-37.5 mg (BIDIL) 20-37.5 mg Tab  Take 1 tablet by mouth 3 (three) times daily.             metoprolol tartrate (LOPRESSOR) 50 MG tablet  Take 1 tablet (50 mg total) by mouth 2 (two) times daily.             SITagliptin (JANUVIA) 50 MG Tab  Take 1 tablet (50 mg total) by mouth once daily.             SITagliptin (JANUVIA) 50 MG Tab  TAKE 1 TABLET BY MOUTH ONCE DAILY                   Indwelling Lines/Drains at time of discharge:   Lines/Drains/Airways          No matching active lines, drains, or airways          Time spent on the discharge of patient: 50 minutes  Patient was seen and examined on the date of discharge and determined to be suitable for discharge.         Lali Kumar MD  Department of Hospital Medicine  [facility]

## 2018-06-25 NOTE — ASSESSMENT & PLAN NOTE
-BUN/Cr trending up with diuresis  - stop lasix and gave fluid and her creatinine came down and is trending down   -likely needs to f/u within 1 week and repeat BMP, appointments made

## 2018-06-25 NOTE — ASSESSMENT & PLAN NOTE
Dacia Martínez is here for increased bilateral swelling likely secondary to diet non adherence,  No signs of infection but given she has been having problems urinating will rule out infection   - Per  Patient with symptoms of bilateral swelling , and abdominal swelling   - Patient on 20 mg PO lasix BID  at home   - BNP elevated at 1828 , troponin normal , LFTs normal  - PE notable for rales bilaterally with decreased breath sounds left lower lobe  , JVD present  and bilateral pitting edema, abdominal distension  Present. Patient states her dry weight is around 135.   - Xray  :diffuse increase in the pulmonary vascular markings.  Increased ground-glass opacification overlying the left hemithorax may represent some pleural fluid with associated atelectasis or consolidation  - Creatinine 1.5 her baseline looks to be 1.3-1.4    PLAN   - Strict I/O   - Daily weights  - Keep mag >2 and phos> 3  - Cardiac diet with decreased salt intake , fluid restriction to 1500  - PO lasix stopped for today , can resume outpatient after she sees cardiology and PCP   - Nutrition consulted to help with CHF diet education       Echocardiogram: As follows but there is confusion because EF was 10.     2D echo with color flow doppler:   Results for orders placed or performed during the hospital encounter of 06/21/18   2D echo with color flow doppler   Result Value Ref Range    EF 33 (A) 55 - 65    Mitral Valve Regurgitation MILD     Aortic Valve Stenosis SEVERE (A)     Est. PA Systolic Pressure 58.03 (A)     Mitral Valve Mobility NORMAL     Mitral Valve Stenosis TRIVIAL     Tricuspid Valve Regurgitation MODERATE TO SEVERE (A)

## 2018-06-25 NOTE — TELEPHONE ENCOUNTER
Called pt to see if she still needs appt for tomm.  Pt's daughter stressed that mom does need the appt because they want to discuss the medications.

## 2018-06-25 NOTE — PLAN OF CARE
Home health referral given to [facility] as her PCP is at [facility] and has been with [facility] in the past. Pt accepted, ok to [email or web address]

## 2018-06-26 ENCOUNTER — TELEPHONE (OUTPATIENT)
Dept: RHEUMATOLOGY | Facility: CLINIC | Age: 83
End: 2018-06-26

## 2018-06-26 RX ORDER — HYDROCODONE BITARTRATE AND ACETAMINOPHEN 5; 325 MG/1; MG/1
1 TABLET ORAL EVERY 6 HOURS PRN
Qty: 60 TABLET | Refills: 0 | Status: SHIPPED | OUTPATIENT
Start: 2018-06-26 | End: 2018-07-23 | Stop reason: SDUPTHER

## 2018-07-06 RX ORDER — TRIAMCINOLONE ACETONIDE 1 MG/G
CREAM TOPICAL 2 TIMES DAILY
Qty: 80 G | Refills: 0 | Status: SHIPPED | OUTPATIENT
Start: 2018-07-06 | End: 2018-07-18

## 2018-07-07 ENCOUNTER — NURSE TRIAGE (OUTPATIENT)
Dept: ADMINISTRATIVE | Facility: CLINIC | Age: 83
End: 2018-07-07

## 2018-07-07 NOTE — TELEPHONE ENCOUNTER
Admit 6/21  Pt called re medicine. Pt states it is too late today to get ointment. triager offered to send message to MD- pt declined stating that she will call back in am. Call back with questions.     Reason for Disposition   [1] Caller requesting NON-URGENT health information AND [2] PCP's office is the best resource    Protocols used: ST INFORMATION ONLY CALL-A-AH

## 2018-07-18 RX ORDER — TRIAMCINOLONE ACETONIDE 1 MG/G
CREAM TOPICAL 2 TIMES DAILY
Qty: 80 G | Refills: 0 | Status: SHIPPED | OUTPATIENT
Start: 2018-07-18 | End: 2018-10-11 | Stop reason: SDUPTHER

## 2018-07-19 ENCOUNTER — LAB VISIT (OUTPATIENT)
Dept: LAB | Facility: HOSPITAL | Age: 83
End: 2018-07-19
Payer: MEDICARE

## 2018-07-19 ENCOUNTER — OFFICE VISIT (OUTPATIENT)
Dept: INTERNAL MEDICINE | Facility: CLINIC | Age: 83
End: 2018-07-19
Payer: MEDICARE

## 2018-07-19 ENCOUNTER — OFFICE VISIT (OUTPATIENT)
Dept: OTOLARYNGOLOGY | Facility: CLINIC | Age: 83
End: 2018-07-19
Payer: MEDICARE

## 2018-07-19 ENCOUNTER — OFFICE VISIT (OUTPATIENT)
Dept: NEPHROLOGY | Facility: CLINIC | Age: 83
End: 2018-07-19
Payer: MEDICARE

## 2018-07-19 VITALS
OXYGEN SATURATION: 97 % | DIASTOLIC BLOOD PRESSURE: 80 MMHG | SYSTOLIC BLOOD PRESSURE: 120 MMHG | BODY MASS INDEX: 28.31 KG/M2 | HEIGHT: 61 IN | WEIGHT: 149.94 LBS | HEART RATE: 114 BPM

## 2018-07-19 VITALS — DIASTOLIC BLOOD PRESSURE: 74 MMHG | TEMPERATURE: 98 F | SYSTOLIC BLOOD PRESSURE: 107 MMHG | HEART RATE: 126 BPM

## 2018-07-19 VITALS — HEIGHT: 61 IN | WEIGHT: 151 LBS | BODY MASS INDEX: 28.51 KG/M2 | HEART RATE: 131 BPM

## 2018-07-19 DIAGNOSIS — I50.23 ACUTE ON CHRONIC SYSTOLIC CONGESTIVE HEART FAILURE: ICD-10-CM

## 2018-07-19 DIAGNOSIS — N18.30 CKD (CHRONIC KIDNEY DISEASE) STAGE 3, GFR 30-59 ML/MIN: ICD-10-CM

## 2018-07-19 DIAGNOSIS — J01.21 ACUTE RECURRENT ETHMOIDAL SINUSITIS: ICD-10-CM

## 2018-07-19 DIAGNOSIS — E11.3293 CONTROLLED TYPE 2 DIABETES MELLITUS WITH BOTH EYES AFFECTED BY MILD NONPROLIFERATIVE RETINOPATHY WITHOUT MACULAR EDEMA, WITHOUT LONG-TERM CURRENT USE OF INSULIN: ICD-10-CM

## 2018-07-19 DIAGNOSIS — I48.91 ATRIAL FIBRILLATION WITH RVR: ICD-10-CM

## 2018-07-19 DIAGNOSIS — I10 ESSENTIAL HYPERTENSION: Primary | ICD-10-CM

## 2018-07-19 DIAGNOSIS — D50.9 IRON DEFICIENCY ANEMIA, UNSPECIFIED IRON DEFICIENCY ANEMIA TYPE: ICD-10-CM

## 2018-07-19 DIAGNOSIS — N18.4 STAGE 4 CHRONIC KIDNEY DISEASE: ICD-10-CM

## 2018-07-19 DIAGNOSIS — L29.9 ITCHING OF EAR: ICD-10-CM

## 2018-07-19 DIAGNOSIS — N18.9 CHRONIC KIDNEY DISEASE, UNSPECIFIED CKD STAGE: ICD-10-CM

## 2018-07-19 DIAGNOSIS — K02.9 DENTAL CARIES: ICD-10-CM

## 2018-07-19 DIAGNOSIS — J34.89 NASAL DISCHARGE: Primary | ICD-10-CM

## 2018-07-19 LAB
BILIRUB UR QL STRIP: NEGATIVE
CLARITY UR REFRACT.AUTO: CLEAR
COLOR UR AUTO: NORMAL
CREAT UR-MCNC: 12 MG/DL
GLUCOSE UR QL STRIP: NEGATIVE
HGB UR QL STRIP: NEGATIVE
KETONES UR QL STRIP: NEGATIVE
LEUKOCYTE ESTERASE UR QL STRIP: NEGATIVE
NITRITE UR QL STRIP: NEGATIVE
PH UR STRIP: 6 [PH] (ref 5–8)
PROT UR QL STRIP: NEGATIVE
PROT UR-MCNC: <7 MG/DL
PROT/CREAT RATIO, UR: ABNORMAL
SP GR UR STRIP: 1 (ref 1–1.03)
URN SPEC COLLECT METH UR: NORMAL
UROBILINOGEN UR STRIP-ACNC: NEGATIVE EU/DL

## 2018-07-19 PROCEDURE — 99499 UNLISTED E&M SERVICE: CPT | Mod: S$GLB,,, | Performed by: FAMILY MEDICINE

## 2018-07-19 PROCEDURE — 99204 OFFICE O/P NEW MOD 45 MIN: CPT | Mod: GC,S$GLB,, | Performed by: INTERNAL MEDICINE

## 2018-07-19 PROCEDURE — 81003 URINALYSIS AUTO W/O SCOPE: CPT

## 2018-07-19 PROCEDURE — 99999 PR PBB SHADOW E&M-EST. PATIENT-LVL III: CPT | Mod: PBBFAC,,, | Performed by: OTOLARYNGOLOGY

## 2018-07-19 PROCEDURE — 3074F SYST BP LT 130 MM HG: CPT | Mod: CPTII,S$GLB,, | Performed by: OTOLARYNGOLOGY

## 2018-07-19 PROCEDURE — 3078F DIAST BP <80 MM HG: CPT | Mod: CPTII,S$GLB,, | Performed by: OTOLARYNGOLOGY

## 2018-07-19 PROCEDURE — 3079F DIAST BP 80-89 MM HG: CPT | Mod: CPTII,GC,S$GLB, | Performed by: INTERNAL MEDICINE

## 2018-07-19 PROCEDURE — 99213 OFFICE O/P EST LOW 20 MIN: CPT | Mod: S$GLB,,, | Performed by: OTOLARYNGOLOGY

## 2018-07-19 PROCEDURE — 99215 OFFICE O/P EST HI 40 MIN: CPT | Mod: S$GLB,,, | Performed by: FAMILY MEDICINE

## 2018-07-19 PROCEDURE — 99999 PR PBB SHADOW E&M-EST. PATIENT-LVL V: CPT | Mod: PBBFAC,GC,, | Performed by: INTERNAL MEDICINE

## 2018-07-19 PROCEDURE — 3074F SYST BP LT 130 MM HG: CPT | Mod: CPTII,GC,S$GLB, | Performed by: INTERNAL MEDICINE

## 2018-07-19 PROCEDURE — 99999 PR PBB SHADOW E&M-EST. PATIENT-LVL IV: CPT | Mod: PBBFAC,,, | Performed by: FAMILY MEDICINE

## 2018-07-19 PROCEDURE — 84156 ASSAY OF PROTEIN URINE: CPT

## 2018-07-19 RX ORDER — METOPROLOL TARTRATE 50 MG/1
50 TABLET ORAL 2 TIMES DAILY
Qty: 60 TABLET | Refills: 11 | Status: SHIPPED | OUTPATIENT
Start: 2018-07-19 | End: 2018-11-27 | Stop reason: ALTCHOICE

## 2018-07-19 NOTE — ASSESSMENT & PLAN NOTE
1. CKD: etiology possibly from diabetes and HTN, although absence of significant proteinuria may make the former less likely, uric acid level of 9.0 raises concern for uric acid nephropathy, but overall probably less likely, recent and gradual rise in sCr over the past year may be from / exacerbated by diuretic use     Lab Results   Component Value Date    CREATININE 1.8 (H) 06/25/2018     Protein Creatinine Ratios: repeating today    Prot/Creat Ratio, Ur   Date Value Ref Range Status   04/08/2016 0.36 (H) 0.00 - 0.20 Final     ·   ·   Acid-Base: stable/not an issue  Lab Results   Component Value Date     (L) 06/25/2018    K 3.9 06/25/2018    CO2 26 06/25/2018     2. HTN: Blood pressures well controlled on the office, cont current management    3. Renal osteodystrophy: we're checking PTH and vitamin D levels  Lab Results   Component Value Date    CALCIUM 9.2 06/25/2018    PHOS 4.0 06/25/2018       4. Anemia: checking iron studies, PCP for colon CA screening  Lab Results   Component Value Date    HGB 11.8 (L) 06/23/2018        5. DM:  Last HbA1C, ok, PCP managing  Lab Results   Component Value Date    HGBA1C 6.0 (H) 05/08/2018       6. Lipid management: PCP managing  Lab Results   Component Value Date    LDLCALC 130.0 05/08/2018       7. ESRD planing: not needed at the current time    Follow up in 1-2 months    Brief outline of plan as outlined above:  1) CBC, iron studies  2) RFP  3) UA and UPC ratio  4) PTH and vitamin D level  5) uric acid level  6) renal ultrasound    7) importance of cardiology follow up to review apparent and what seems to be possibly very significant changed on echocardiogram was emphasized to the patient and she verbalized understanding (she has cardiology appointment tomorrow)

## 2018-07-19 NOTE — PATIENT INSTRUCTIONS
Rx for Doxycycline 100 mg # 17; take one pill twice a day x 3 days and one pill a day therefter with water   avoid sunlight  Headache  med of choice  Hydration; plain Mucinex may help thin secretions

## 2018-07-19 NOTE — PATIENT INSTRUCTIONS
1) no NSAIDs  2) lab and urine studies as ordered  3) renal ultrasound  4) follow up in 1-2 months    5) make sure to keep cardiology appointment for tomorrow

## 2018-07-19 NOTE — PROGRESS NOTES
Subjective:       Patient ID: Dacia Martínez is a 83 y.o. female.    Chief Complaint: URI  Dacia Martínez 83 y.o. female is here for office visit to review care and physical exam,  HPI  Review of Systems   Constitutional: Negative for activity change, fatigue, fever and unexpected weight change.   HENT: Negative for congestion, hearing loss, postnasal drip and rhinorrhea.    Eyes: Negative for redness and visual disturbance.   Respiratory: Negative for chest tightness, shortness of breath and wheezing.    Cardiovascular: Negative for chest pain, palpitations and leg swelling.   Gastrointestinal: Negative for abdominal distention.   Genitourinary: Negative for decreased urine volume, dysuria, flank pain, hematuria, pelvic pain and urgency.   Musculoskeletal: Negative for back pain, gait problem, joint swelling and neck stiffness.   Skin: Negative for color change, rash and wound.   Neurological: Negative for dizziness, syncope, weakness and headaches.   Psychiatric/Behavioral: Negative for behavioral problems, confusion and sleep disturbance. The patient is not nervous/anxious.        Objective:      Physical Exam   Constitutional: She is oriented to person, place, and time. She appears well-developed and well-nourished. No distress.   HENT:   Head: Normocephalic.   Mouth/Throat: No oropharyngeal exudate.   Eyes: EOM are normal. Pupils are equal, round, and reactive to light. No scleral icterus.   Neck: Neck supple. No JVD present. No thyromegaly present.   Cardiovascular: Normal rate, regular rhythm and normal heart sounds.  Exam reveals no gallop and no friction rub.    No murmur heard.  Pulmonary/Chest: Effort normal and breath sounds normal. She has no wheezes. She has no rales.   Abdominal: Soft. Bowel sounds are normal. She exhibits no distension and no mass. There is no tenderness. There is no guarding.   Musculoskeletal: Normal range of motion. She exhibits no edema.   Lymphadenopathy:     She has no cervical  adenopathy.   Neurological: She is alert and oriented to person, place, and time. She has normal reflexes. She displays normal reflexes. No cranial nerve deficit. She exhibits normal muscle tone.   Skin: Skin is warm. No rash noted. No erythema.   Psychiatric: She has a normal mood and affect. Thought content normal.       Assessment:       1. Essential hypertension    2. Acute on chronic systolic congestive heart failure    3. Atrial fibrillation with RVR    4. 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

## 2018-07-20 ENCOUNTER — OFFICE VISIT (OUTPATIENT)
Dept: CARDIOLOGY | Facility: CLINIC | Age: 83
End: 2018-07-20
Payer: MEDICARE

## 2018-07-20 VITALS
HEIGHT: 61 IN | WEIGHT: 148.56 LBS | SYSTOLIC BLOOD PRESSURE: 88 MMHG | BODY MASS INDEX: 28.05 KG/M2 | HEART RATE: 120 BPM | DIASTOLIC BLOOD PRESSURE: 64 MMHG

## 2018-07-20 DIAGNOSIS — I50.22 CHRONIC SYSTOLIC CONGESTIVE HEART FAILURE: ICD-10-CM

## 2018-07-20 DIAGNOSIS — I48.19 PERSISTENT ATRIAL FIBRILLATION: ICD-10-CM

## 2018-07-20 DIAGNOSIS — I48.91 ATRIAL FIBRILLATION WITH RVR: ICD-10-CM

## 2018-07-20 DIAGNOSIS — I48.91 ATRIAL FIBRILLATION, UNSPECIFIED TYPE: Primary | ICD-10-CM

## 2018-07-20 PROCEDURE — 99999 PR PBB SHADOW E&M-EST. PATIENT-LVL IV: CPT | Mod: PBBFAC,GC,, | Performed by: STUDENT IN AN ORGANIZED HEALTH CARE EDUCATION/TRAINING PROGRAM

## 2018-07-20 PROCEDURE — 3074F SYST BP LT 130 MM HG: CPT | Mod: CPTII,GC,S$GLB, | Performed by: STUDENT IN AN ORGANIZED HEALTH CARE EDUCATION/TRAINING PROGRAM

## 2018-07-20 PROCEDURE — 99213 OFFICE O/P EST LOW 20 MIN: CPT | Mod: GC,S$GLB,, | Performed by: STUDENT IN AN ORGANIZED HEALTH CARE EDUCATION/TRAINING PROGRAM

## 2018-07-20 PROCEDURE — 3078F DIAST BP <80 MM HG: CPT | Mod: CPTII,GC,S$GLB, | Performed by: STUDENT IN AN ORGANIZED HEALTH CARE EDUCATION/TRAINING PROGRAM

## 2018-07-20 RX ORDER — AMIODARONE HYDROCHLORIDE 200 MG/1
200 TABLET ORAL DAILY
Qty: 30 TABLET | Refills: 2 | Status: SHIPPED | OUTPATIENT
Start: 2018-07-20 | End: 2018-12-01 | Stop reason: SDUPTHER

## 2018-07-20 NOTE — PROGRESS NOTES
ATTENDING PHYSICIAN ATTESTATION  I have personally interviewed and examined the patient. I thoroughly reviewed the demographic, clinical, laboratorial and imaging information available in medical records. I agree with the assessment and recommendations provided by the subspecialty resident. Dr. Kelley was under my supervision.

## 2018-07-20 NOTE — PROGRESS NOTES
CC: Mucus discharge in the morning/headaches  HPI:Ms Martínez is an 83-year-old -American female is accompanied by her daughter and her granddaughter today.  Her chief complaint is frontal and occipital headaches as well as white mucus discharge from her throat most mornings.  She denies fever.  She denies any periorbital pain.  She equates her symptoms with a sinus infection.  She was last treated by me for a sinus infection 3/27/18.  She has responded well to  courses of Amoxil or Augmentin in the past.    Dr. Cali Vickers's note of 7/19/18 indicates the patient's URI symptoms.  She was diagnosed with essential hypertension, acute on chronic systolic congestive heart failure, atrial fibrillation and controlled type 2 diabetes with retinopathy.  She visited the ED in mid June this year for congestive heart failure and dyspnea.  She completed a CT scan of the temporal bone without contrast 6/8/18.  There was a fracture deformity of the left inferior orbital wall in bilateral lamina papyracea.  There was a small component aerated secretions in the left ethmoid air cells with scattered patchy ethmoid mucosal thickening noted.  There was mild mucosal thickening in the maxillary antra bilaterally.  The study was otherwise unremarkable without evidence of mastoiditis.  She has a history of multiple dental caries; she is scheduled to see a dentist for possible tooth extraction procedures in the near future.  She stresses concern about dental procedures fall taking anticoagulant medication.    Past Medical History:   Diagnosis Date    A-fib     Arthritis     Asteroid hyalosis of left eye     Bilateral nonexudative age-related macular degeneration 6/3/2014    Breast cancer     Cataract     Chronic GERD 9/18/2017    CKD stage 3 due to type 2 diabetes mellitus     Coronary artery disease     Hypertension     Migraine headache     Mild nonproliferative diabetic retinopathy of both eyes 6/3/2014    Pneumonia      Type 2 diabetes mellitus with hyperglycemia     Vertigo      Current Outpatient Prescriptions on File Prior to Visit   Medication Sig Dispense Refill    ACCU-CHEK FASTCLIX Misc 1 lancet by Misc.(Non-Drug; Combo Route) route 3 (three) times daily. Pt to test blood glucose up to 3 times daily. 100 each 11    allopurinol (ZYLOPRIM) 100 MG tablet Resume allopurinol 1/2 pill until 1/5/18, 1 pill daily for 5 days, then 1.5 pills daily (Patient taking differently: Take 150 mg by mouth once daily. ) 60 tablet 3    apixaban 2.5 mg Tab Take 1 tablet (2.5 mg total) by mouth 2 (two) times daily. 60 tablet 11    benzonatate (TESSALON) 100 MG capsule Take 100 mg by mouth 3 (three) times daily as needed for Cough.      cholecalciferol, vitamin D3, 1,000 unit capsule Take 2 capsules (2,000 Units total) by mouth once daily. (Patient taking differently: Take 1,000 Units by mouth once daily. ) 180 capsule 3    diphenhydrAMINE-zinc acetate 1-0.1% (BENADRYL) cream Apply topically 2 (two) times daily as needed for Itching.  0    docusate sodium (COLACE) 100 MG capsule Take 100 mg by mouth daily as needed for Constipation.      doxepin (SINEQUAN) 10 MG capsule TAKE 1 CAPSULE BY MOUTH THREE TIMES A DAY 90 capsule 3    fluticasone (FLONASE) 50 mcg/actuation nasal spray SPRAY TWICE IN EACH NOSTRIL DAILY 16 g 5    furosemide (LASIX) 20 MG tablet Take 2 tablets (40 mg total) by mouth once daily. 60 tablet 11    HYDROcodone-acetaminophen (NORCO) 5-325 mg per tablet Take 1 tablet by mouth every 6 (six) hours as needed for Pain. 60 tablet 0    isosorbide-hydrALAZINE 20-37.5 mg (BIDIL) 20-37.5 mg Tab Take 1 tablet by mouth 3 (three) times daily. 90 tablet 11    SITagliptin (JANUVIA) 50 MG Tab Take 1 tablet (50 mg total) by mouth once daily. 30 tablet 5    SITagliptin (JANUVIA) 50 MG Tab TAKE 1 TABLET BY MOUTH ONCE DAILY 30 tablet 5    triamcinolone acetonide 0.1% (KENALOG) 0.1 % cream APPLY TOPICALLY TWO TIMES A DAY 80 g 0     No  current facility-administered medications on file prior to visit.        PE: Blood pressure 100/74 pulse 124 temperature 98.0 height 5 feet 1 weight 149 pounds  Gen.: Alert and oriented lady in no acute distress; she presents in a wheelchair; she is helped from the wheelchair into the reclining chair in the exam room.  Otoscopy indicated a retracted right eardrum without evidence of otitis media or serous otitis media.  Left eardrum is intact and clear.  Nasal exam reveals pale gray inferior turbinate mucosa with moderate inferior turbinate hypertrophy left side greater than right.  There is no apparent mucopurulent secretion in either passage after Alex-Synephrine decongestion of each.  Oropharyngeal exam is unremarkable for uvula swelling, pharyngitis exudative tonsillitis.  The patient's missing multiple teeth and there is evidence for multiple caries.  Neck examination is within normal limits.      DIAGNOSIS:     ICD-10-CM ICD-9-CM    1. Nasal discharge J34.89 478.19    2. Acute recurrent ethmoidal sinusitis J01.21 461.2    3. Itching of ear L29.9 698.9    4. Dental caries K02.9 521.00        PLAN: Rx for Doxycycline 100 mg # 17; take one pill twice a day x 3 days and one pill a day therefter with water   avoid sunlight  Headache  med of choice  Hydration; plain Mucinex may help thin secretions

## 2018-07-20 NOTE — ASSESSMENT & PLAN NOTE
-currently poorly controlled, pt symptomatic with weakness/fatigue, no syncopal episodes/chest pain/shortness of breath  -pt carries history of prior cardioversion in 2017, had Holter monitor in Jan 2018 that revealed persistent afib  -will control rate control with Metoprolol 50 mg BID   -prescribed on today's visit Amiodarone 200 mg QD  -pt has a CHADVASC score of 6, continue renally dosed adjusted Eliquis 2.5 mg BID  -pt has upcoming apt with EP, Dr. Burt on 8/21 at 9 am for discussion of repeat DCCV

## 2018-07-20 NOTE — PATIENT INSTRUCTIONS
You were seen in the clinic for your atrial fibrillation (fast heart beat). You are started on a new medication called amiodarone. Please take one tablet (200 mg) once a day. This is in addition to your current medications. Please follow up with Dr. Burt (electrophysiologist) on 8/21 at 9 am to discuss cardioversion (shock therapy to convert heart rhythm).     Please weigh yourself every day. Limit your salt intake to 2 grams per a day. Avoid canned soups and beans. You can drink up to one liter of fluid daily. This is to better control your congestive heart failure.     If you develop shortness of breath, leg swelling, chest pain please report to the emergency room and call the clinic.     You will return to clinic in 3 months to be seen again.   Understanding Atrial Fibrillation    An arrhythmia is any problem with the speed or pattern of the heartbeat. Atrial fibrillation (AFib) is a common type of arrhythmia. It causes fast, chaotic electrical signals in the atria. This leads to poor functioning of the heart. It also affects how much blood your heart can pump out to the body.  Afib may occur once in a while and go away on its own. Or it may continue for longer periods and need treatment.  AFib can lead to serious problems, such as stroke. Your healthcare provider will need to monitor and manage it.  What happens during atrial fibrillation?   The heart has an electrical system that sends signals to control the heartbeat. As signals move through the heart, they tell the hearts upper chambers (atria) and lower chambers (ventricles) when to squeeze (contract) and relax. This lets blood move through the heart and out to the body and lungs.  With AFib, the atria receive abnormal signals. This causes them to contract in a fast and irregular way, and out of sync with the ventricles. When this happens, the atria also have a harder time moving blood into the ventricles. Blood may then pool in the atria, which  increases the risk for blood clots and stroke. The ventricles also may contract too quickly and irregularly. As a result, they may not pump blood to the body and lungs as well as they should. This can weaken the heart muscle over time and cause heart failure.  What causes atrial fibrillation?  AFib is more common in older adults. It has many possible causes including:  · Coronary artery disease  · Heart valve disease  · Heart attack  · Heart surgery  · High blood pressure  · Thyroid disease  · Diabetes  · Lung disease  · Sleep apnea  · Heavy alcohol use  In some cases of AFib, doctors do not know the cause.  What are the symptoms of atrial fibrillation?  AFib may or may not cause symptoms. If symptoms do occur, they may include:  · A fast, pounding, irregular heartbeat  · Shortness of breath  · Tiredness  · Dizziness or fainting  · Chest pain  How is atrial fibrillation treated?  Treatments for AFib can include any of the options below.  · Medicines. You may be prescribed:  ¨ Heart rate medicines to help slow down the heartbeat  ¨ Heart rhythm medicines to help the heart beat more regularly  ¨ Anti-clotting medicines to help reduce the risk for blood clots and stroke  · Electrical cardioversion. Your healthcare provider uses special pads or paddles to send one or more brief electrical shocks to the heart. This can help reset the heartbeat to normal.  · Ablation. Long, thin tubes called catheters are threaded through a blood vessel to the heart. There, the catheters send out hot or cold energy to the areas causing the abnormal signals. This energy destroys the problem tissue or cells. This improves the chances that your heart will stay in normal rhythm without using medicines. If your heart rate and rhythm cant be controlled, you may need ablation and a pacemaker. These will help control the heart rate and regularity of the heartbeat.  · Surgery. During surgery, your healthcare provider may use different methods to  create scar tissue in the areas of the heart causing the abnormal signals. The scar tissue disrupts the abnormal signals and may stop AFib from occurring.  What are the complications of atrial fibrillation?  These can include:  · Blood clots  · Stroke  · Heart failure. This problem occurs when the heart muscle weakens so much that it can no longer pump blood well.  When should I call my healthcare provider?  Call your healthcare provider right away if you have any of these:  · Symptoms that dont get better with treatment, or get worse  · New symptoms   Date Last Reviewed: 5/1/2016 © 2000-2017 [employer]. [address] [place] [address]. All rights reserved. This information is not intended as a substitute for professional medical care. Always follow your healthcare professional's instructions.        Left-Sided Congestive Heart Failure (CHF)    The heart is a large muscle that pumps blood throughout the body. Blood carries oxygen to all the organs (including the brain), muscles, and skin of your body. After the body takes the oxygen out of the blood, the blood returns to the heart. The right side of the heart collects that blood and pumps it to the lungs to receive fresh oxygen. This oxygen-rich blood from the lungs then returns to the left side of the heart, where it is pumped back out to the brain and rest of the body, starting the process all over.   Heart failure occurs when the heart muscle is weakened. This can occur after a heart attack or with significant coronary artery disease. This affects the pumping action of the heart.   When the left side of the heart is weakened, it cant handle the blood it is getting from the lungs. Pressure then builds up in the veins of the lungs, causing fluid to leak into the lung tissues. This may be referred to as congestive heart failure. This causes you to feel short of breath, weak, or dizzy. These symptoms are often worse with exertion, such as  climbing stairs or walking up hills. Lying flat is uncomfortable and can make your breathing worse. This may make sleeping difficult and force you to use extra pillows to elevate your upper body to help you sleep well.  Causes of heart failure include:  · Coronary artery disease  · Heart attack in the past (also known as acute myocardial infarction, or AMI)  · High blood pressure  · Damaged heart valve  · Diabetes  · Obesity  · Cigarette smoking  · Alcohol abuse  Treatment  Heart failure is a chronic condition. There is no cure. The purpose of medical treatment is to improve the pumping action of the heart, and remove excess water and fluids from the body. A number of medicines can help reach this goal, improve symptoms and keep the heart from becoming weaker. In some cases of severe heart failure, a mechanical device will be placed in the heart to help the heart pump. Another major goal is to better treat the causes of heart failure, such as diabetes, high blood pressure, and your lifestyle.  Home care  · Check your weight every day. A sudden increase in weight gain could mean worsening heart failure.  ¨ Use the same scale every day.  ¨ Weigh yourself at the same time every day.  ¨ Make sure the scale is on the floor, not on a rug.  ¨ Keep a record of your weight every day, so your healthcare provider can see it. If you are not given a log sheet for this, keep a separate journal for this purpose.   · Cut back on how much salt (sodium) you eat:  ¨ Your provider will tell you how much salt to have daily, usually 2,000 mg or less.  ¨ Avoid high-salt foods. These include olives, pickles, smoked meats, processed foods, and salted potato chips.  ¨ Don't add salt to your food at the table. Use only small amounts of salt when cooking.  ¨ Avoid binging on salt-heavy meals.  · Follow your healthcare provider's recommendations about how much fluid you should have.  · Stop smoking.  · Cut back on the amount of alcohol you  drink.  · Lose weight if you are overweight. The excess weight adds a lot of stress on the workload of the heart.  · Stay active. Talk to your provider about an exercise program that is safe for your heart.  · Keep your feet elevated to reduce swelling. Ask your provider about support hose as a preventive treatment for daytime leg swelling.  · Follow your healthcare provider's instructions closely.  Besides taking your medicine as instructed, an important part of treatment includes lifestyle changes. These include diet, physical activity, stopping smoking, and weight control.  Improve your diet. Often in the hospital, people are given a heart healthy diet. This includes more fresh foods, lower saturated fat, less processed foods, and lower salt.  Follow-up care  Follow up with your healthcare provider, or as advised. Make sure to keep any appointments that were made for you. This can help better control heart failure.  If an X-ray was done, you will be told of any new findings that may affect your care.  Call 911  Call 911 if you:  · Become severely short of breath  · Feel lightheaded, or feel like you might pass out or faint  · Have chest pain or discomfort that is different than usual, the medicines your provider told you to use for this don't help, or the pain lasts longer than 10 to 15 minutes  · Develop a rapid heart rate suddenly  When to seek medical advice  Call your healthcare provider right away if you have any of these signs of worsening heart failure:  · Sudden weight gain --  more than 2 pounds in 1 day, or 5 pounds in 1 week, or whatever weight gain you were told to report by your provider  · Trouble breathing not related to being active  · New or increased swelling of your legs or ankles  · Swelling or pain in your abdomen  · Breathing trouble at night, waking up short of breath or needing more pillows to elevate your upper body to help you breathe  · Frequent coughing that doesnt go away  · Feeling  much more tired than usual  Date Last Reviewed: 1/4/2016  © 8330-9476 [employer]. [address] [place] [address], [place] [address]. All rights reserved. This information is not intended as a substitute for professional medical care. Always follow your healthcare professional's instructions.

## 2018-07-20 NOTE — ASSESSMENT & PLAN NOTE
-pt recently hospitalized June 2018 for decompensated CHF due to dietary indiscretion  -TTE (6/2018): EF 30-35%, bi-atrial enlargement, low flow AV stenosis and pulmonary hypertension   -in clinic, pt appears euvolemic  -pt's Heart Failure Classification (NYHA II, Stage 3)   -will continue with current goal directed medical therapy (Metoprolol 50 mg BID, isosorbide (20 mg)- hydralazine (37.5 mg) TID), avoiding adding potassium sparing diuretic given that pt has advanced CKD stage 3  -will continue with current diuretic regimen of lasix 40 mg QD  -pt had recent labs (July 2018): BMP- potassium 4.3, Cr 1.8  -counseled pt on adhering to low sodium diet (2g/daily), 1 Liter fluid restriction daily, compliance with taking home medications  -pt educated to weigh herself daily  -will repeat TTE after EP follow up and DCCV

## 2018-07-20 NOTE — PROGRESS NOTES
I have personally taken the history and examined this patient and agree with the Fellow's note as stated above.    Reviewed June hosp d/c - was taking amio 200 bid instead of once/d so it was completely stopped.  Now in AFib with hr 120 - will resume at 200/d prior to EP visit in August.  Other questions/issues reviewed with pt and family.

## 2018-07-20 NOTE — PROGRESS NOTES
Cardiology Clinic Note  Reason for Visit: Follow up for atrial fibrillation and chronic systolic congestive heart failure     HPI:   82 yo F with hx of non-insulin dependent diabetes, essential HTN, CKD stage 3, breast cancer s/p radiation and chemotherapy along with atrial fibrillation and HFrEF who presents to the Cardiology clinic for follow up. Since last visit, patient has been hospitalized (June 2018) for decompensated CHF due to dietary indiscretion from increased salt intake. She was diuresed effectively and had follow up post hospital discharge with her PCP. Labs were performed in July 2018 ([facility]). During her last hospitalization, she was taken off of Amiodarone as she was taking the medication dosage incorrectly. During today's visit, patient is accompanied by her daughter. Patient reports feeling fatigued/weak. She denies having pre-syncope/syncopal episodes, angina, shortness of breath, PND/orthopnea or peripheral edema. She is compliant with taking her home meds, pt daughter ensures she takes them. Patient reports not being properly instructed on what foods to eat or avoid and how much salt she should limit in her diet.     ROS:    Constitution: Negative for fever, chills, weight loss or gain.   Eyes: Negative for blurred or double vision.   Cardiovascular: Negative for chest pain  Pulmonary: Negative for orthopnea, PND, snoring or apneic episodes   Gastrointestinal: Negative for abdominal pain, nausea, vomiting, or diarrhea.   : Negative for dysuria.   Neurological: Negative for syncopal episodes, numbness/tingling or weakness of extremities   PMH:     Past Medical History:   Diagnosis Date    A-fib     Arthritis     Asteroid hyalosis of left eye     Bilateral nonexudative age-related macular degeneration 6/3/2014    Breast cancer     Cataract     Chronic GERD 9/18/2017    CKD stage 3 due to type 2 diabetes mellitus     Coronary artery disease     Hypertension     Migraine headache      Mild nonproliferative diabetic retinopathy of both eyes 6/3/2014    Pneumonia     Type 2 diabetes mellitus with hyperglycemia     Vertigo      Past Surgical History:   Procedure Laterality Date    BREAST SURGERY      left lumpectomy    CARPAL TUNNEL RELEASE      left    CATARACT EXTRACTION      vance     SECTION      EYE SURGERY      cataracts bilaterally    HYSTERECTOMY      partial     Allergies:   Review of patient's allergies indicates:  No Known Allergies  Medications:     Current Outpatient Prescriptions on File Prior to Visit   Medication Sig Dispense Refill    ACCU-CHEK FASTCLIX Misc 1 lancet by Misc.(Non-Drug; Combo Route) route 3 (three) times daily. Pt to test blood glucose up to 3 times daily. 100 each 11    allopurinol (ZYLOPRIM) 100 MG tablet Resume allopurinol 1/2 pill until 18, 1 pill daily for 5 days, then 1.5 pills daily (Patient taking differently: Take 150 mg by mouth once daily. ) 60 tablet 3    apixaban 2.5 mg Tab Take 1 tablet (2.5 mg total) by mouth 2 (two) times daily. 60 tablet 11    benzonatate (TESSALON) 100 MG capsule Take 100 mg by mouth 3 (three) times daily as needed for Cough.      cholecalciferol, vitamin D3, 1,000 unit capsule Take 2 capsules (2,000 Units total) by mouth once daily. (Patient taking differently: Take 1,000 Units by mouth once daily. ) 180 capsule 3    diphenhydrAMINE-zinc acetate 1-0.1% (BENADRYL) cream Apply topically 2 (two) times daily as needed for Itching.  0    docusate sodium (COLACE) 100 MG capsule Take 100 mg by mouth daily as needed for Constipation.      doxepin (SINEQUAN) 10 MG capsule TAKE 1 CAPSULE BY MOUTH THREE TIMES A DAY 90 capsule 3    doxycycline (VIBRA-TABS) 100 MG tablet Take 2 tablets by mouth daily for 3 days, then take 1 tablet daily for 10 days 17 tablet 0    fluticasone (FLONASE) 50 mcg/actuation nasal spray SPRAY TWICE IN EACH NOSTRIL DAILY 16 g 5    furosemide (LASIX) 20 MG tablet Take 2 tablets (40 mg  "total) by mouth once daily. 60 tablet 11    HYDROcodone-acetaminophen (NORCO) 5-325 mg per tablet Take 1 tablet by mouth every 6 (six) hours as needed for Pain. 60 tablet 0    isosorbide-hydrALAZINE 20-37.5 mg (BIDIL) 20-37.5 mg Tab Take 1 tablet by mouth 3 (three) times daily. 90 tablet 11    metoprolol tartrate (LOPRESSOR) 50 MG tablet Take 1 tablet (50 mg total) by mouth 2 (two) times daily. 60 tablet 11    SITagliptin (JANUVIA) 50 MG Tab Take 1 tablet (50 mg total) by mouth once daily. 30 tablet 5    triamcinolone acetonide 0.1% (KENALOG) 0.1 % cream APPLY TOPICALLY TWO TIMES A DAY 80 g 0    [DISCONTINUED] SITagliptin (JANUVIA) 50 MG Tab TAKE 1 TABLET BY MOUTH ONCE DAILY 30 tablet 5     No current facility-administered medications on file prior to visit.      Social History:     Social History   Substance Use Topics    Smoking status: Never Smoker    Smokeless tobacco: Never Used    Alcohol use No      Comment: socially     Family History:     Family History   Problem Relation Age of Onset    Cancer Mother         stomach    Heart disease Mother     Diabetes Father     Hypertension Father     Blindness Brother     Diabetes Brother     Hypertension Brother     Cataracts Sister     Diabetes Sister     Diabetes Brother     Diabetes Brother     Diabetes Brother     No Known Problems Daughter     No Known Problems Son     No Known Problems Daughter     Amblyopia Neg Hx     Glaucoma Neg Hx     Macular degeneration Neg Hx     Strabismus Neg Hx     Retinal detachment Neg Hx      Physical Exam:   BP (!) 88/64 (BP Location: Right arm, Patient Position: Sitting, BP Method: Large (Automatic))   Pulse (!) 120   Ht 5' 1" (1.549 m)   Wt 67.4 kg (148 lb 9.4 oz)   LMP  (LMP Unknown)   BMI 28.08 kg/m²      General: NAD, appears stated age, well groomed   HEENT: PERRL, moist oral muca   Neck: no JVD  CV: irregularly irregular, tachycardic, no R/G/M   Pulm: CTAB  GI: Abdomen soft, NTND, " +BS  Extremities: warm to touch, no pitting edema, intact peripheral pulses       Labs:     Lab Results   Component Value Date     07/19/2018    K 4.3 07/19/2018     07/19/2018    CO2 23 07/19/2018    BUN 44 (H) 07/19/2018    CREATININE 1.8 (H) 07/19/2018    ANIONGAP 12 07/19/2018     Lab Results   Component Value Date    HGBA1C 6.0 (H) 05/08/2018     Lab Results   Component Value Date    BNP 1,828 (H) 06/21/2018     (H) 01/02/2018     (H) 01/01/2018    Lab Results   Component Value Date    WBC 5.78 06/23/2018    HGB 11.8 (L) 06/23/2018    HCT 38.8 06/23/2018     06/23/2018    GRAN 3.7 06/23/2018    GRAN 63.3 06/23/2018     Lab Results   Component Value Date    CHOL 195 05/08/2018    HDL 53 05/08/2018    LDLCALC 130.0 05/08/2018    TRIG 60 05/08/2018          Imaging:   TTE (6/2018):    1 - Moderately depressed left ventricular systolic function (EF 30-35%).     2 - Biatrial enlargement.     3 - Mildly to moderately depressed right ventricular systolic function .     4 - Low flow, low gradient aortic valve stenosis with reduced EF ((ROBBIE 0.74 cm2, AVAi 0.44 cm2/m2, peak aortic jet velocity 1.5 m/s,MG 5 mmHg)    5 - Trivial mitral stenosis, MVA = 3.9 cm2.     6 - Mild mitral regurgitation.     7 - Moderate to severe tricuspid regurgitation.     8 - Trivial pulmonic regurgitation.     9 - Increased central venous pressure.     10 - Pulmonary hypertension. The estimated PA systolic pressure is 58 mmHg.     Assessment:   82 yo F with PMHx significant for essential HTN, CKD stage 3, atrial fibrillation (s/p cardioversion 2017, on OAC with Eliquis), non-insulin dependent diabetes and breast cancer (s/p lumpectomies, radiation and chemotherapy) who presents to the Cardiology clinic for follow up.     Plan:   Persistent atrial fibrillation  -currently poorly controlled, pt symptomatic with weakness/fatigue, no syncopal episodes/chest pain/shortness of breath  -pt carries history of prior  cardioversion in 2017, had Holter monitor in Jan 2018 that revealed persistent afib  -will control rate control with Metoprolol 50 mg BID   -prescribed on today's visit Amiodarone 200 mg QD  -pt has a CHADVASC score of 6, continue renally dosed adjusted Eliquis 2.5 mg BID  -pt has upcoming apt with EP, Dr. Burt on 8/21 at 9 am for discussion of repeat DCCV      Chronic systolic congestive heart failure  -pt recently hospitalized June 2018 for decompensated CHF due to dietary indiscretion  -TTE (6/2018): EF 30-35%, bi-atrial enlargement, low flow AV stenosis and pulmonary hypertension   -in clinic, pt appears euvolemic  -pt's Heart Failure Classification (NYHA II, Stage 3)   -will continue with current goal directed medical therapy (Metoprolol 50 mg BID, isosorbide (20 mg)- hydralazine (37.5 mg) TID), avoiding adding potassium sparing diuretic given that pt has advanced CKD stage 3  -will continue with current diuretic regimen of lasix 40 mg QD  -pt had recent labs (July 2018): BMP- potassium 4.3, Cr 1.8  -counseled pt on adhering to low sodium diet (2g/daily), 1 Liter fluid restriction daily, compliance with taking home medications  -pt educated to weigh herself daily  -will repeat TTE after EP follow up and DCCV     Plan was discussed with Dr. Frausto    Signed:  Didi Anderson DO  Cardiology Fellow, PGY-4    7/20/2018 3:03 PM

## 2018-07-23 RX ORDER — HYDROCODONE BITARTRATE AND ACETAMINOPHEN 5; 325 MG/1; MG/1
1 TABLET ORAL EVERY 6 HOURS PRN
Qty: 60 TABLET | Refills: 0 | Status: SHIPPED | OUTPATIENT
Start: 2018-07-23 | End: 2018-08-08 | Stop reason: SDUPTHER

## 2018-07-28 ENCOUNTER — HOSPITAL ENCOUNTER (EMERGENCY)
Facility: HOSPITAL | Age: 83
Discharge: HOME | End: 2018-07-28
Attending: EMERGENCY MEDICINE
Payer: MEDICARE

## 2018-07-28 VITALS
DIASTOLIC BLOOD PRESSURE: 83 MMHG | BODY MASS INDEX: 27 KG/M2 | SYSTOLIC BLOOD PRESSURE: 135 MMHG | WEIGHT: 143 LBS | HEIGHT: 61 IN | OXYGEN SATURATION: 100 % | RESPIRATION RATE: 19 BRPM | TEMPERATURE: 98 F | HEART RATE: 100 BPM

## 2018-07-28 DIAGNOSIS — R42 DIZZINESS: Primary | ICD-10-CM

## 2018-07-28 LAB
ALBUMIN SERPL BCP-MCNC: 3.4 G/DL
ALP SERPL-CCNC: 91 U/L
ALT SERPL W/O P-5'-P-CCNC: 7 U/L
ANION GAP SERPL CALC-SCNC: 10 MMOL/L
AST SERPL-CCNC: 17 U/L
BACTERIA #/AREA URNS AUTO: ABNORMAL /HPF
BASOPHILS # BLD AUTO: 0.07 K/UL
BASOPHILS NFR BLD: 1.1 %
BILIRUB SERPL-MCNC: 0.9 MG/DL
BILIRUB UR QL STRIP: NEGATIVE
BUN SERPL-MCNC: 52 MG/DL
CALCIUM SERPL-MCNC: 9.9 MG/DL
CHLORIDE SERPL-SCNC: 106 MMOL/L
CLARITY UR REFRACT.AUTO: CLEAR
CO2 SERPL-SCNC: 25 MMOL/L
COLOR UR AUTO: YELLOW
CREAT SERPL-MCNC: 2.1 MG/DL
DIFFERENTIAL METHOD: ABNORMAL
EOSINOPHIL # BLD AUTO: 0.2 K/UL
EOSINOPHIL NFR BLD: 3.2 %
ERYTHROCYTE [DISTWIDTH] IN BLOOD BY AUTOMATED COUNT: 17.7 %
EST. GFR  (AFRICAN AMERICAN): 24.7 ML/MIN/1.73 M^2
EST. GFR  (NON AFRICAN AMERICAN): 21.4 ML/MIN/1.73 M^2
GLUCOSE SERPL-MCNC: 136 MG/DL
GLUCOSE UR QL STRIP: NEGATIVE
HCT VFR BLD AUTO: 37.8 %
HGB BLD-MCNC: 11.4 G/DL
HGB UR QL STRIP: NEGATIVE
HYALINE CASTS UR QL AUTO: 14 /LPF
IMM GRANULOCYTES # BLD AUTO: 0.02 K/UL
IMM GRANULOCYTES NFR BLD AUTO: 0.3 %
KETONES UR QL STRIP: NEGATIVE
LEUKOCYTE ESTERASE UR QL STRIP: ABNORMAL
LIPASE SERPL-CCNC: 52 U/L
LYMPHOCYTES # BLD AUTO: 1.6 K/UL
LYMPHOCYTES NFR BLD: 26.2 %
MCH RBC QN AUTO: 27 PG
MCHC RBC AUTO-ENTMCNC: 30.2 G/DL
MCV RBC AUTO: 90 FL
MICROSCOPIC COMMENT: ABNORMAL
MONOCYTES # BLD AUTO: 0.6 K/UL
MONOCYTES NFR BLD: 9.4 %
NEUTROPHILS # BLD AUTO: 3.7 K/UL
NEUTROPHILS NFR BLD: 59.8 %
NITRITE UR QL STRIP: NEGATIVE
NRBC BLD-RTO: 0 /100 WBC
PH UR STRIP: 5 [PH] (ref 5–8)
PLATELET # BLD AUTO: 199 K/UL
PMV BLD AUTO: 12.1 FL
POTASSIUM SERPL-SCNC: 4 MMOL/L
PROT SERPL-MCNC: 8.2 G/DL
PROT UR QL STRIP: NEGATIVE
RBC # BLD AUTO: 4.22 M/UL
RBC #/AREA URNS AUTO: 4 /HPF (ref 0–4)
SODIUM SERPL-SCNC: 141 MMOL/L
SP GR UR STRIP: 1.01 (ref 1–1.03)
SQUAMOUS #/AREA URNS AUTO: 1 /HPF
T4 FREE SERPL-MCNC: 1.03 NG/DL
TROPONIN I SERPL DL<=0.01 NG/ML-MCNC: 0.02 NG/ML
TSH SERPL DL<=0.005 MIU/L-ACNC: 8.72 UIU/ML
URN SPEC COLLECT METH UR: ABNORMAL
UROBILINOGEN UR STRIP-ACNC: NEGATIVE EU/DL
WBC # BLD AUTO: 6.18 K/UL
WBC #/AREA URNS AUTO: 5 /HPF (ref 0–5)

## 2018-07-28 PROCEDURE — 93005 ELECTROCARDIOGRAM TRACING: CPT

## 2018-07-28 PROCEDURE — 99284 EMERGENCY DEPT VISIT MOD MDM: CPT | Mod: ,,, | Performed by: PHYSICIAN ASSISTANT

## 2018-07-28 PROCEDURE — 93010 ELECTROCARDIOGRAM REPORT: CPT | Mod: ,,, | Performed by: INTERNAL MEDICINE

## 2018-07-28 PROCEDURE — 99284 EMERGENCY DEPT VISIT MOD MDM: CPT | Mod: 25

## 2018-07-28 PROCEDURE — 83690 ASSAY OF LIPASE: CPT

## 2018-07-28 PROCEDURE — 84439 ASSAY OF FREE THYROXINE: CPT

## 2018-07-28 PROCEDURE — 84484 ASSAY OF TROPONIN QUANT: CPT

## 2018-07-28 PROCEDURE — 25000003 PHARM REV CODE 250: Performed by: PHYSICIAN ASSISTANT

## 2018-07-28 PROCEDURE — 80053 COMPREHEN METABOLIC PANEL: CPT

## 2018-07-28 PROCEDURE — 85025 COMPLETE CBC W/AUTO DIFF WBC: CPT

## 2018-07-28 PROCEDURE — 81001 URINALYSIS AUTO W/SCOPE: CPT

## 2018-07-28 PROCEDURE — 84443 ASSAY THYROID STIM HORMONE: CPT

## 2018-07-28 RX ORDER — MECLIZINE HCL 12.5 MG 12.5 MG/1
25 TABLET ORAL
Status: COMPLETED | OUTPATIENT
Start: 2018-07-28 | End: 2018-07-28

## 2018-07-28 RX ORDER — MECLIZINE HYDROCHLORIDE 25 MG/1
25 TABLET ORAL 3 TIMES DAILY PRN
Qty: 20 TABLET | Refills: 0 | Status: SHIPPED | OUTPATIENT
Start: 2018-07-28 | End: 2018-11-04 | Stop reason: SDUPTHER

## 2018-07-28 RX ADMIN — ALUMINUM HYDROXIDE, MAGNESIUM HYDROXIDE, AND SIMETHICONE 50 ML: 200; 200; 20 SUSPENSION ORAL at 05:07

## 2018-07-28 RX ADMIN — MECLIZINE 25 MG: 12.5 TABLET ORAL at 05:07

## 2018-07-28 RX ADMIN — SODIUM CHLORIDE 500 ML: 0.9 INJECTION, SOLUTION INTRAVENOUS at 06:07

## 2018-07-28 NOTE — ED NOTES
Pt identifiers [place] R Martínez were checked and are correct   LOC: The patient is awake, alert, aware of environment with an appropriate affect. Oriented x3, speaking appropriately  APPEARANCE: Pt resting comfortably, in no acute distress, pt is clean and well groomed, clothing properly fastened  SKIN: Skin warm, dry and intact, normal skin turgor, moist mucus membranes  RESPIRATORY: Airway is open and patent, respirations are spontaneous, even and unlabored, normal effort and rate Breath sounds clear vance to all lung fields on auscultation  CARDIAC: Radial pulses strong and irregular , 1 +  peripheral edema noted, capillary refill < 3 seconds, bilateral radial pulses 2+  ABDOMEN: Soft, nontender, nondistended. Bowel sounds auscultated to all four quad of abd   NEUROLOGIC: PERRL, facial expression is symmetrical, patient moving all extremities spontaneously, normal sensation in all extremities when touched with a finger.  Follows all commands appropriately  MUSCULOSKELETAL: No obvious deformities.

## 2018-07-28 NOTE — ED NOTES
Orthostatic vitals  Lying BP:127/79 HR:109 Reports some dizziness laying down.  Sitting BP: 118/77 HR:115 Reports improvement in dizziness from laying down.   Standing BP:129/85 HR:115  Reports improvement in dizziness from laying down

## 2018-07-28 NOTE — ED PROVIDER NOTES
"Encounter Date: 7/28/2018       History     Chief Complaint   Patient presents with    Dizziness     dizziness with waking up around 0700 getting worse. denies chest pain, SOB, denies unilateral weakness, denies vision changes. pt states she ate something earlier and feels like its stuck as well     82 YO F with complex PMH significant for AFib on Eliquis, CHF with EF of 33% from echo 06/2018, CKD, CAD, chronic GERD presents to the ED with a cc of dizziness. Pt reports dizziness that is described as both lightheadedness and room-spinning. Symptoms began this morning upon waking.  Symptoms are exacerbated with position changes, but her present while at rest.  Patient reports a chronic mild headache.  She also complains of indigestion after eating smoked sausage since this morning.  She reports associated nausea.  She otherwise denies chest pain.  Patient denies shortness of breath, heart palpitations, fever, chills, vomiting or diarrhea.  Patient denies dark or bloody stool, extremity weakness, numbness or tingling.  Per granddaughter, patient has complained of the symptoms for years".  Patient reports that she takes medication for her dizziness, but cannot recall the name of the medication.  No anti-vertigo medications noted on the patient's medication list.          Review of patient's allergies indicates:  No Known Allergies  Past Medical History:   Diagnosis Date    A-fib     Arthritis     Asteroid hyalosis of left eye     Bilateral nonexudative age-related macular degeneration 6/3/2014    Breast cancer     Cataract     Chronic GERD 9/18/2017    CKD stage 3 due to type 2 diabetes mellitus     Coronary artery disease     Hypertension     Migraine headache     Mild nonproliferative diabetic retinopathy of both eyes 6/3/2014    Pneumonia     Type 2 diabetes mellitus with hyperglycemia     Vertigo      Past Surgical History:   Procedure Laterality Date    BREAST SURGERY      left lumpectomy    " CARPAL TUNNEL RELEASE      left    CATARACT EXTRACTION      vance     SECTION      EYE SURGERY      cataracts bilaterally    HYSTERECTOMY      partial     Family History   Problem Relation Age of Onset    Cancer Mother         stomach    Heart disease Mother     Diabetes Father     Hypertension Father     Blindness Brother     Diabetes Brother     Hypertension Brother     Cataracts Sister     Diabetes Sister     Diabetes Brother     Diabetes Brother     Diabetes Brother     No Known Problems Daughter     No Known Problems Son     No Known Problems Daughter     Amblyopia Neg Hx     Glaucoma Neg Hx     Macular degeneration Neg Hx     Strabismus Neg Hx     Retinal detachment Neg Hx      Social History   Substance Use Topics    Smoking status: Never Smoker    Smokeless tobacco: Never Used    Alcohol use No      Comment: socially     Review of Systems   Constitutional: Negative for chills and fever.   HENT: Negative for sore throat.    Respiratory: Negative for shortness of breath.    Cardiovascular: Negative for chest pain, palpitations and leg swelling.   Gastrointestinal: Positive for nausea. Negative for diarrhea and vomiting.        +indigestion   Genitourinary: Negative for dysuria.   Musculoskeletal: Negative for back pain.   Skin: Negative for rash.   Neurological: Positive for dizziness and light-headedness. Negative for syncope and weakness.   Hematological: Does not bruise/bleed easily.       Physical Exam     Initial Vitals [18 1608]   BP Pulse Resp Temp SpO2   114/69 (!) 115 18 98.1 °F (36.7 °C) 100 %      MAP       --         Physical Exam    Nursing note and vitals reviewed.  Constitutional: She appears well-developed and well-nourished. She is not diaphoretic.  Non-toxic appearance. She does not appear ill. No distress.   HENT:   Head: Normocephalic and atraumatic.   Neck: Neck supple.   Cardiovascular: Normal rate and regular rhythm. Exam reveals no gallop and no  friction rub.    No murmur heard.  Trace peripheral edema    Pulmonary/Chest: Effort normal. No accessory muscle usage. No tachypnea. No respiratory distress. She has no decreased breath sounds. She has no wheezes. She has no rhonchi. She has rales in the right lower field and the left lower field.   Mild, fine rales bilateral bases   Abdominal: Soft. Normal appearance. She exhibits no distension. There is no tenderness.   Musculoskeletal: Normal range of motion.   Neurological: She is alert.   Skin: Skin is warm and dry. No rash noted. No pallor.   Psychiatric: She has a normal mood and affect. Her behavior is normal.         ED Course   Procedures  Labs Reviewed   CBC W/ AUTO DIFFERENTIAL - Abnormal; Notable for the following:        Result Value    Hemoglobin 11.4 (*)     MCHC 30.2 (*)     RDW 17.7 (*)     All other components within normal limits   COMPREHENSIVE METABOLIC PANEL - Abnormal; Notable for the following:     Glucose 136 (*)     BUN, Bld 52 (*)     Creatinine 2.1 (*)     Albumin 3.4 (*)     ALT 7 (*)     eGFR if  24.7 (*)     eGFR if non  21.4 (*)     All other components within normal limits   TSH - Abnormal; Notable for the following:     TSH 8.717 (*)     All other components within normal limits   URINALYSIS, REFLEX TO URINE CULTURE - Abnormal; Notable for the following:     Leukocytes, UA 1+ (*)     All other components within normal limits    Narrative:     Preferred Collection Type->Urine, Clean Catch   URINALYSIS MICROSCOPIC - Abnormal; Notable for the following:     Hyaline Casts, UA 14 (*)     All other components within normal limits    Narrative:     Preferred Collection Type->Urine, Clean Catch   LIPASE   TROPONIN I   T4, FREE          Imaging Results          X-Ray Chest AP Portable (Final result)  Result time 07/28/18 18:15:08    Final result by Gigi Luz MD (07/28/18 18:15:08)                 Impression:      1. Findings suggesting congestive  change/edema superimposed upon multifocal nodules as described above.  Direct comparison to previous exams is limited given shallow inspiratory effort on current exam.  Correlation with patient history is advised.      Electronically signed by: Gigi Luz MD  Date:    07/28/2018  Time:    18:15             Narrative:    EXAMINATION:  XR CHEST AP PORTABLE    CLINICAL HISTORY:  Dizziness and giddiness    TECHNIQUE:  Single frontal view of the chest was performed.    COMPARISON:  06/21/2018    FINDINGS:  The cardiomediastinal silhouette is prominent, similar to the previous exam noting calcification of the aorta..  Calcified hilar lymph nodes are noted.  There is obscuration of the left costophrenic angle, may reflect effusion..  The trachea is midline.  The lungs are symmetrically expanded bilaterally with patchy increased interstitial and parenchymal attenuation bilaterally suggesting edema.  There are multifocal nodular foci overlying the parenchyma bilaterally, some of which appears similar to the previous exam, others are better demonstrated/new however in comparison to examination 02/08/2018, appear grossly similar..  Please note, on previous examination, these are felt to reflect nodules within the overlying breast tissue rather than within the parenchyma however correlation is advised.  There is no pneumothorax.  The osseous structures are remarkable for degenerative change..                               CT Head Without Contrast (Final result)  Result time 07/28/18 18:06:01    Final result by Gigi Luz MD (07/28/18 18:06:01)                 Impression:      1. No acute intracranial abnormalities, stable sequela of chronic microvascular ischemic change and senescent change.  Stable left paramedian occipital encephalomalacia.      Electronically signed by: Gigi Luz MD  Date:    07/28/2018  Time:    18:06             Narrative:    EXAMINATION:  CT HEAD WITHOUT CONTRAST    CLINICAL  HISTORY:  dizziness, lightheadedness;    TECHNIQUE:  Low dose axial images were obtained through the head.  Coronal and sagittal reformations were also performed. Contrast was not administered.    COMPARISON:  MRI 6/19/20 17    FINDINGS:  There is generalized cerebral volume loss.  There is hypoattenuation in a periventricular fashion, likely sequela of chronic microvascular ischemic change.There is encephalomalacia along the left paramedian occipital lobe, similar previous examination.  There is no evidence of acute major vascular territory infarct, hemorrhage, or mass.  There is no hydrocephalus.  There are no abnormal extra-axial fluid collections.  The paranasal sinuses and mastoid air cells are clear, and there is no evidence of calvarial fracture.  The visualized soft tissues are unremarkable.                                 Medical Decision Making:   History:   Old Medical Records: I decided to obtain old medical records.  Differential Diagnosis:   My differential diagnosis includes but is not limited to:  Anemia, vertigo, electrolyte disturbance, cardiac dysrhythmia, ACS, UTI  Clinical Tests:   Lab Tests: Ordered and Reviewed  Radiological Study: Ordered and Reviewed  Medical Tests: Ordered and Reviewed       APC / Resident Notes:   [age] female presents for evaluation of dizziness since this morning.  She is tachycardic on initial evaluation at 115.  She is afebrile and appears in no acute distress. Nonfocal neuro exam.  Orthostatic vital signs are normal. Abdomen soft and nontender.    Blood work including troponin reveals no acute abnormalities.  UA without infection or hematuria.  CT head negative for acute abnormality.  Patient received 500 cc fluid bolus and meclizine in the ED.  Upon re-evaluation, she reports improvement in her symptoms. Heart rate improved .  Pt is due for her home amiodarone.  I do not feel that further workup or admission is indicated at this time.  I have advised  patient to measure her blood pressure twice daily.  PCP follow-up next week if no improvement in her symptoms. I will discharge with meclizine.  She is stable for discharge. Return precautions given. I have reviewed the patient's records and discussed this case with my supervising physician.           Attending Attestation:     Physician Attestation Statement for NP/PA:   I discussed this assessment and plan of this patient with the NP/PA, but I did not personally examine the patient. The face to face encounter was performed by the NP/PA.                     Clinical Impression:   The encounter diagnosis was Dizziness.      Disposition:   Disposition: Discharged  Condition: Stable                        Wilda Low PA-C  07/28/18 1956       Hugh Quesada MD  08/01/18 0122

## 2018-07-29 NOTE — ED NOTES
Assumed care of patient after receiving report from EROS Freed. I acknowledge care for this patient. The patient is awake, alert, and cooperating with a calm affect. RR spontaneous, even, and unlabored, with regular effort and rate. Patient is in NAD, and resting calmly on stretcher with blood pressure cuff, pulse ox, and cardiac monitor attached. Bed locked in low position with side rails up x2 for safety, and call bell within reach. Patient states she is still slightly dizzy after receiving fluids and meclizine, but she feels better than before.

## 2018-07-31 ENCOUNTER — HOSPITAL ENCOUNTER (OUTPATIENT)
Facility: HOSPITAL | Age: 83
Discharge: HOME | End: 2018-08-02
Attending: EMERGENCY MEDICINE | Admitting: EMERGENCY MEDICINE
Payer: MEDICARE

## 2018-07-31 DIAGNOSIS — E78.49 OTHER HYPERLIPIDEMIA: ICD-10-CM

## 2018-07-31 DIAGNOSIS — R42 VERTIGO: Primary | ICD-10-CM

## 2018-07-31 DIAGNOSIS — I48.20 CHRONIC ATRIAL FIBRILLATION: ICD-10-CM

## 2018-07-31 DIAGNOSIS — R26.81 UNSTEADY GAIT: ICD-10-CM

## 2018-07-31 DIAGNOSIS — N18.30 TYPE 2 DIABETES MELLITUS WITH STAGE 3 CHRONIC KIDNEY DISEASE, WITHOUT LONG-TERM CURRENT USE OF INSULIN: ICD-10-CM

## 2018-07-31 DIAGNOSIS — I50.22 CHRONIC SYSTOLIC CONGESTIVE HEART FAILURE: ICD-10-CM

## 2018-07-31 DIAGNOSIS — I10 ESSENTIAL HYPERTENSION: ICD-10-CM

## 2018-07-31 DIAGNOSIS — R42 DIZZINESS: ICD-10-CM

## 2018-07-31 DIAGNOSIS — E11.22 TYPE 2 DIABETES MELLITUS WITH STAGE 3 CHRONIC KIDNEY DISEASE, WITHOUT LONG-TERM CURRENT USE OF INSULIN: ICD-10-CM

## 2018-07-31 DIAGNOSIS — E11.3293 CONTROLLED TYPE 2 DIABETES MELLITUS WITH BOTH EYES AFFECTED BY MILD NONPROLIFERATIVE RETINOPATHY WITHOUT MACULAR EDEMA, WITHOUT LONG-TERM CURRENT USE OF INSULIN: ICD-10-CM

## 2018-07-31 DIAGNOSIS — R06.00 DYSPNEA: ICD-10-CM

## 2018-07-31 DIAGNOSIS — E78.5 HYPERLIPIDEMIA, UNSPECIFIED HYPERLIPIDEMIA TYPE: ICD-10-CM

## 2018-07-31 DIAGNOSIS — I67.2 INTRACRANIAL ATHEROSCLEROSIS: ICD-10-CM

## 2018-07-31 LAB
ALBUMIN SERPL BCP-MCNC: 3.3 G/DL
ALP SERPL-CCNC: 87 U/L
ALT SERPL W/O P-5'-P-CCNC: 11 U/L
ANION GAP SERPL CALC-SCNC: 9 MMOL/L
AST SERPL-CCNC: 20 U/L
BACTERIA #/AREA URNS AUTO: NORMAL /HPF
BASOPHILS # BLD AUTO: 0.05 K/UL
BASOPHILS NFR BLD: 0.9 %
BILIRUB SERPL-MCNC: 0.5 MG/DL
BILIRUB UR QL STRIP: NEGATIVE
BUN SERPL-MCNC: 41 MG/DL
CALCIUM SERPL-MCNC: 9.2 MG/DL
CHLORIDE SERPL-SCNC: 105 MMOL/L
CLARITY UR REFRACT.AUTO: CLEAR
CO2 SERPL-SCNC: 25 MMOL/L
COLOR UR AUTO: ABNORMAL
CREAT SERPL-MCNC: 1.6 MG/DL
DIFFERENTIAL METHOD: ABNORMAL
EOSINOPHIL # BLD AUTO: 0.2 K/UL
EOSINOPHIL NFR BLD: 2.8 %
ERYTHROCYTE [DISTWIDTH] IN BLOOD BY AUTOMATED COUNT: 17.5 %
EST. GFR  (AFRICAN AMERICAN): 34.3 ML/MIN/1.73 M^2
EST. GFR  (NON AFRICAN AMERICAN): 29.8 ML/MIN/1.73 M^2
GLUCOSE SERPL-MCNC: 125 MG/DL
GLUCOSE UR QL STRIP: NEGATIVE
HCT VFR BLD AUTO: 39 %
HGB BLD-MCNC: 11.6 G/DL
HGB UR QL STRIP: NEGATIVE
HYALINE CASTS UR QL AUTO: 1 /LPF
IMM GRANULOCYTES # BLD AUTO: 0.02 K/UL
IMM GRANULOCYTES NFR BLD AUTO: 0.4 %
KETONES UR QL STRIP: NEGATIVE
LEUKOCYTE ESTERASE UR QL STRIP: ABNORMAL
LYMPHOCYTES # BLD AUTO: 1.6 K/UL
LYMPHOCYTES NFR BLD: 28.2 %
MCH RBC QN AUTO: 27 PG
MCHC RBC AUTO-ENTMCNC: 29.7 G/DL
MCV RBC AUTO: 91 FL
MICROSCOPIC COMMENT: NORMAL
MONOCYTES # BLD AUTO: 0.5 K/UL
MONOCYTES NFR BLD: 8.8 %
NEUTROPHILS # BLD AUTO: 3.4 K/UL
NEUTROPHILS NFR BLD: 58.9 %
NITRITE UR QL STRIP: NEGATIVE
NRBC BLD-RTO: 0 /100 WBC
PH UR STRIP: 6 [PH] (ref 5–8)
PLATELET # BLD AUTO: 187 K/UL
PMV BLD AUTO: 12.2 FL
POCT GLUCOSE: 138 MG/DL (ref 70–110)
POTASSIUM SERPL-SCNC: 4.6 MMOL/L
PROT SERPL-MCNC: 8.1 G/DL
PROT UR QL STRIP: NEGATIVE
RBC # BLD AUTO: 4.3 M/UL
RBC #/AREA URNS AUTO: 0 /HPF (ref 0–4)
SODIUM SERPL-SCNC: 139 MMOL/L
SP GR UR STRIP: 1 (ref 1–1.03)
SQUAMOUS #/AREA URNS AUTO: 2 /HPF
TROPONIN I SERPL DL<=0.01 NG/ML-MCNC: 0.02 NG/ML
URN SPEC COLLECT METH UR: ABNORMAL
UROBILINOGEN UR STRIP-ACNC: NEGATIVE EU/DL
WBC # BLD AUTO: 5.7 K/UL
WBC #/AREA URNS AUTO: 2 /HPF (ref 0–5)

## 2018-07-31 PROCEDURE — 85025 COMPLETE CBC W/AUTO DIFF WBC: CPT

## 2018-07-31 PROCEDURE — 80053 COMPREHEN METABOLIC PANEL: CPT

## 2018-07-31 PROCEDURE — 81001 URINALYSIS AUTO W/SCOPE: CPT

## 2018-07-31 PROCEDURE — 96360 HYDRATION IV INFUSION INIT: CPT

## 2018-07-31 PROCEDURE — 93005 ELECTROCARDIOGRAM TRACING: CPT

## 2018-07-31 PROCEDURE — 99285 EMERGENCY DEPT VISIT HI MDM: CPT | Mod: ,,, | Performed by: EMERGENCY MEDICINE

## 2018-07-31 PROCEDURE — 99220 PR INITIAL OBSERVATION CARE,LEVL III: CPT | Mod: ,,, | Performed by: INTERNAL MEDICINE

## 2018-07-31 PROCEDURE — 25000003 PHARM REV CODE 250: Performed by: EMERGENCY MEDICINE

## 2018-07-31 PROCEDURE — 84484 ASSAY OF TROPONIN QUANT: CPT

## 2018-07-31 PROCEDURE — 25000003 PHARM REV CODE 250: Performed by: INTERNAL MEDICINE

## 2018-07-31 PROCEDURE — G0378 HOSPITAL OBSERVATION PER HR: HCPCS

## 2018-07-31 PROCEDURE — 93010 ELECTROCARDIOGRAM REPORT: CPT | Mod: ,,, | Performed by: INTERNAL MEDICINE

## 2018-07-31 PROCEDURE — 99284 EMERGENCY DEPT VISIT MOD MDM: CPT | Mod: 25

## 2018-07-31 RX ORDER — BENZONATATE 100 MG/1
100 CAPSULE ORAL 3 TIMES DAILY PRN
Status: DISCONTINUED | OUTPATIENT
Start: 2018-07-31 | End: 2018-08-02 | Stop reason: HOSPADM

## 2018-07-31 RX ORDER — MECLIZINE HCL 12.5 MG 12.5 MG/1
25 TABLET ORAL EVERY 6 HOURS
Status: DISCONTINUED | OUTPATIENT
Start: 2018-07-31 | End: 2018-08-02 | Stop reason: HOSPADM

## 2018-07-31 RX ORDER — GLUCAGON 1 MG
1 KIT INJECTION
Status: DISCONTINUED | OUTPATIENT
Start: 2018-07-31 | End: 2018-08-02 | Stop reason: HOSPADM

## 2018-07-31 RX ORDER — ONDANSETRON 8 MG/1
8 TABLET, ORALLY DISINTEGRATING ORAL EVERY 8 HOURS PRN
Status: DISCONTINUED | OUTPATIENT
Start: 2018-07-31 | End: 2018-08-02 | Stop reason: HOSPADM

## 2018-07-31 RX ORDER — RAMELTEON 8 MG/1
8 TABLET ORAL NIGHTLY PRN
Status: DISCONTINUED | OUTPATIENT
Start: 2018-07-31 | End: 2018-08-02 | Stop reason: HOSPADM

## 2018-07-31 RX ORDER — ACETAMINOPHEN 325 MG/1
650 TABLET ORAL EVERY 4 HOURS PRN
Status: DISCONTINUED | OUTPATIENT
Start: 2018-07-31 | End: 2018-08-02 | Stop reason: HOSPADM

## 2018-07-31 RX ORDER — IBUPROFEN 200 MG
24 TABLET ORAL
Status: DISCONTINUED | OUTPATIENT
Start: 2018-07-31 | End: 2018-08-02 | Stop reason: HOSPADM

## 2018-07-31 RX ORDER — MECLIZINE HCL 12.5 MG 12.5 MG/1
50 TABLET ORAL
Status: COMPLETED | OUTPATIENT
Start: 2018-07-31 | End: 2018-07-31

## 2018-07-31 RX ORDER — ISOSORBIDE DINITRATE AND HYDRALAZINE HYDROCHLORIDE 37.5; 2 MG/1; MG/1
1 TABLET ORAL 3 TIMES DAILY
Status: DISCONTINUED | OUTPATIENT
Start: 2018-07-31 | End: 2018-08-02

## 2018-07-31 RX ORDER — METOPROLOL TARTRATE 50 MG/1
50 TABLET ORAL 2 TIMES DAILY
Status: DISCONTINUED | OUTPATIENT
Start: 2018-07-31 | End: 2018-08-02 | Stop reason: HOSPADM

## 2018-07-31 RX ORDER — MECLIZINE HCL 12.5 MG 12.5 MG/1
25 TABLET ORAL 3 TIMES DAILY PRN
Status: DISCONTINUED | OUTPATIENT
Start: 2018-07-31 | End: 2018-07-31

## 2018-07-31 RX ORDER — FLUTICASONE PROPIONATE 50 MCG
2 SPRAY, SUSPENSION (ML) NASAL DAILY
Status: DISCONTINUED | OUTPATIENT
Start: 2018-08-01 | End: 2018-08-02 | Stop reason: HOSPADM

## 2018-07-31 RX ORDER — FUROSEMIDE 40 MG/1
40 TABLET ORAL DAILY
Status: DISCONTINUED | OUTPATIENT
Start: 2018-08-01 | End: 2018-08-02 | Stop reason: HOSPADM

## 2018-07-31 RX ORDER — IBUPROFEN 200 MG
16 TABLET ORAL
Status: DISCONTINUED | OUTPATIENT
Start: 2018-07-31 | End: 2018-08-02 | Stop reason: HOSPADM

## 2018-07-31 RX ORDER — INSULIN ASPART 100 [IU]/ML
0-5 INJECTION, SOLUTION INTRAVENOUS; SUBCUTANEOUS
Status: DISCONTINUED | OUTPATIENT
Start: 2018-07-31 | End: 2018-08-02 | Stop reason: HOSPADM

## 2018-07-31 RX ORDER — SODIUM CHLORIDE 0.9 % (FLUSH) 0.9 %
5 SYRINGE (ML) INJECTION
Status: DISCONTINUED | OUTPATIENT
Start: 2018-07-31 | End: 2018-08-02 | Stop reason: HOSPADM

## 2018-07-31 RX ORDER — DOXEPIN HYDROCHLORIDE 10 MG/1
10 CAPSULE ORAL 3 TIMES DAILY
Status: DISCONTINUED | OUTPATIENT
Start: 2018-07-31 | End: 2018-08-02 | Stop reason: HOSPADM

## 2018-07-31 RX ORDER — OXYCODONE HYDROCHLORIDE 5 MG/1
5 TABLET ORAL EVERY 6 HOURS PRN
Status: DISCONTINUED | OUTPATIENT
Start: 2018-07-31 | End: 2018-08-02 | Stop reason: HOSPADM

## 2018-07-31 RX ORDER — AMIODARONE HYDROCHLORIDE 200 MG/1
200 TABLET ORAL DAILY
Status: DISCONTINUED | OUTPATIENT
Start: 2018-08-01 | End: 2018-08-02 | Stop reason: HOSPADM

## 2018-07-31 RX ORDER — PROMETHAZINE HYDROCHLORIDE 25 MG/1
25 TABLET ORAL EVERY 6 HOURS PRN
Status: DISCONTINUED | OUTPATIENT
Start: 2018-07-31 | End: 2018-08-02 | Stop reason: HOSPADM

## 2018-07-31 RX ORDER — CHOLECALCIFEROL (VITAMIN D3) 25 MCG
1000 TABLET ORAL DAILY
Status: DISCONTINUED | OUTPATIENT
Start: 2018-08-01 | End: 2018-08-02 | Stop reason: HOSPADM

## 2018-07-31 RX ORDER — OXYCODONE HYDROCHLORIDE 5 MG/1
10 TABLET ORAL EVERY 6 HOURS PRN
Status: DISCONTINUED | OUTPATIENT
Start: 2018-07-31 | End: 2018-08-02 | Stop reason: HOSPADM

## 2018-07-31 RX ORDER — AMOXICILLIN 250 MG
1 CAPSULE ORAL 2 TIMES DAILY PRN
Status: DISCONTINUED | OUTPATIENT
Start: 2018-07-31 | End: 2018-08-02 | Stop reason: HOSPADM

## 2018-07-31 RX ADMIN — DOXEPIN HYDROCHLORIDE 10 MG: 10 CAPSULE ORAL at 09:07

## 2018-07-31 RX ADMIN — MECLIZINE 50 MG: 12.5 TABLET ORAL at 05:07

## 2018-07-31 RX ADMIN — APIXABAN 2.5 MG: 2.5 TABLET, FILM COATED ORAL at 09:07

## 2018-07-31 RX ADMIN — ACETAMINOPHEN 650 MG: 325 TABLET, FILM COATED ORAL at 09:07

## 2018-07-31 RX ADMIN — METOPROLOL TARTRATE 50 MG: 50 TABLET ORAL at 09:07

## 2018-07-31 RX ADMIN — SODIUM CHLORIDE 500 ML: 0.9 INJECTION, SOLUTION INTRAVENOUS at 05:07

## 2018-07-31 RX ADMIN — MECLIZINE 25 MG: 12.5 TABLET ORAL at 10:07

## 2018-07-31 RX ADMIN — HYDRALAZINE HYDROCHLORIDE AND ISOSORBIDE DINITRATE 1 TABLET: 37.5; 2 TABLET, FILM COATED ORAL at 09:07

## 2018-07-31 NOTE — ED PROVIDER NOTES
Encounter Date: 2018    SCRIBE #1 NOTE: I, Lynette Lance, am scribing for, and in the presence of,  Dr. Bright. I have scribed the following portions of the note - the EKG reading. Other sections scribed: HPI, ROS, PE.       History     Chief Complaint   Patient presents with    Nausea     Pt presented to the ED via pov. Pt c/o nausea and dizziness x 2 days.     Dizziness     Time patient was seen by the provider: 4:36 PM      The patient is a 83 y.o. female with co-morbidities including: HTN, vertigo, DM II, CKD stage 3, CAD and Afib who presents to the ED with a complaint of worsening dizziness with onset 3 days ago. Patient was seen in ED 3 days ago for similar complaint. Patient reports dizziness when she wakes up in the morning, which worsens throughout the day, particularly when ambulating. She endorses difficulty swallowing, nausea, staggering when walking and SOB on exertion, but not currently. She denies chest pain.      The history is provided by the patient and medical records.     Review of patient's allergies indicates:  No Known Allergies  Past Medical History:   Diagnosis Date    A-fib     Arthritis     Asteroid hyalosis of left eye     Bilateral nonexudative age-related macular degeneration 6/3/2014    Breast cancer     Cataract     Chronic GERD 2017    CKD stage 3 due to type 2 diabetes mellitus     Coronary artery disease     Hypertension     Migraine headache     Mild nonproliferative diabetic retinopathy of both eyes 6/3/2014    Pneumonia     Type 2 diabetes mellitus with hyperglycemia     Vertigo      Past Surgical History:   Procedure Laterality Date    BREAST SURGERY      left lumpectomy    CARPAL TUNNEL RELEASE      left    CATARACT EXTRACTION      vance     SECTION      EYE SURGERY      cataracts bilaterally    HYSTERECTOMY      partial     Family History   Problem Relation Age of Onset    Cancer Mother         stomach    Heart disease Mother      Diabetes Father     Hypertension Father     Blindness Brother     Diabetes Brother     Hypertension Brother     Cataracts Sister     Diabetes Sister     Diabetes Brother     Diabetes Brother     Diabetes Brother     No Known Problems Daughter     No Known Problems Son     No Known Problems Daughter     Amblyopia Neg Hx     Glaucoma Neg Hx     Macular degeneration Neg Hx     Strabismus Neg Hx     Retinal detachment Neg Hx      Social History   Substance Use Topics    Smoking status: Never Smoker    Smokeless tobacco: Never Used    Alcohol use No      Comment: socially     Review of Systems   Constitutional: Negative for fever.   HENT: Positive for trouble swallowing. Negative for sore throat.    Respiratory: Positive for shortness of breath (on exertion).    Cardiovascular: Negative for chest pain.   Gastrointestinal: Positive for nausea.   Genitourinary: Negative for dysuria.   Musculoskeletal: Negative for back pain.   Skin: Negative for rash.   Neurological: Positive for dizziness. Negative for weakness.   Hematological: Does not bruise/bleed easily.       Physical Exam     Initial Vitals [07/31/18 1526]   BP Pulse Resp Temp SpO2   (!) 117/56 105 17 97.8 °F (36.6 °C) 100 %      MAP       --         Physical Exam    Nursing note and vitals reviewed.  Constitutional: She appears well-developed and well-nourished. She is not diaphoretic. No distress.   HENT:   Head: Normocephalic and atraumatic.   Mouth/Throat: Oropharynx is clear and moist.   Eyes: Conjunctivae and EOM are normal. Pupils are equal, round, and reactive to light.   Neck: Normal range of motion. Neck supple. No JVD present.   Cardiovascular: Normal heart sounds. An irregularly irregular rhythm present. Tachycardia present.    No murmur heard.  No carotid bruit   Pulmonary/Chest: Breath sounds normal. No respiratory distress. She has no wheezes. She has no rhonchi. She has no rales.   Abdominal: Soft. Bowel sounds are normal. She  exhibits no distension and no mass. There is no tenderness. There is no rebound and no guarding.   Musculoskeletal: Normal range of motion. She exhibits no edema or tenderness.   Neurological: She is alert and oriented to person, place, and time. She has normal strength. No cranial nerve deficit or sensory deficit.   Unsteady gait, subjective dizziness with ambulation   Skin: Skin is warm and dry. No rash noted.   Psychiatric: She has a normal mood and affect.         ED Course   Procedures  Labs Reviewed   URINALYSIS, REFLEX TO URINE CULTURE - Abnormal; Notable for the following:        Result Value    Leukocytes, UA Trace (*)     All other components within normal limits    Narrative:     Preferred Collection Type->Urine, Clean Catch   CBC W/ AUTO DIFFERENTIAL - Abnormal; Notable for the following:     Hemoglobin 11.6 (*)     MCHC 29.7 (*)     RDW 17.5 (*)     All other components within normal limits   URINALYSIS MICROSCOPIC    Narrative:     Preferred Collection Type->Urine, Clean Catch   TROPONIN I   COMPREHENSIVE METABOLIC PANEL     EKG Readings: (Independently Interpreted)   Afib at a rate of 90 with premature ventricular or aberrantly conducted complexes. LBBB, abnormal ECG. No STEMI       Imaging Results    None          Medical Decision Making:   History:   Old Medical Records: I decided to obtain old medical records.  Independently Interpreted Test(s):   I have ordered and independently interpreted EKG Reading(s) - see prior notes  Clinical Tests:   Lab Tests: Ordered and Reviewed  Radiological Study: Ordered and Reviewed  Medical Tests: Ordered and Reviewed            Scribe Attestation:   Scribe #1: I performed the above scribed service and the documentation accurately describes the services I performed. I attest to the accuracy of the note.     Medical decision making this post void has persistent vertigo symptoms that are not positional she has an unsteady gait which is a new onset in the last 3 days  she has failed outpatient therapy with meclizine I have observed her unsteady gait negative Romberg and no other focal neurologic complaints she is to be admitted for further evaluation possibly neuro and MRI.           Clinical Impression:   The primary encounter diagnosis was Chronic atrial fibrillation. Diagnoses of Dizziness, Unsteady gait, and Dyspnea were also pertinent to this visit.            I, Dr. Xavier Bright, personally performed the services described in this documentation.   All medical record entries made by the scribe were at my direction and in my presence.   I have reviewed the chart and agree that the record is accurate and complete.   Xavier Bright MD.  5:59 PM 07/31/2018                  Xavier Bright MD  07/31/18 1800

## 2018-07-31 NOTE — ED NOTES
"Patient here for dizziness, which she reports has been ongoing for years, but for the last 3 days is causing her to "stagger" when she walks. Patient also reporting she feels like she isn't digesting her food since Sunday. Patient states she has trouble swallowing and it feels like her food "gets stuck in chest." Patient reports intermittent nausea. Denies fever.  Reports constipation, last BM this morning.   "

## 2018-07-31 NOTE — ED NOTES
Patient identifiers verified and correct for Dacia Martínez.    LOC: The patient is awake, alert and aware of environment with an appropriate affect, the patient is oriented x 3 and speaking appropriately.  APPEARANCE: Patient resting comfortably and in no acute distress, patient is clean and well groomed, patient's clothing is properly fastened.  SKIN: The skin is warm and dry, color consistent with ethnicity, patient has normal skin turgor and moist mucus membranes, skin intact, no breakdown or bruising noted.  MUSCULOSKELETAL: Patient moving all extremities spontaneously, no obvious swelling or deformities noted.  RESPIRATORY: Airway is open and patent, respirations are spontaneous, patient has a normal effort and rate, no accessory muscle use noted, bilateral breath sounds clear  CARDIAC: Patient has a normal rate and regular rhythm, no peripheral edema noted, capillary refill < 3 seconds.  ABDOMEN: Soft and non tender to palpation, no distention noted, active bowel sounds present in all four quadrants.  NEUROLOGIC: PERRL, 3mm bilaterally, eyes open spontaneously, behavior appropriate to situation, follows commands, facial expression symmetrical, bilateral hand grasp equal and even, purposeful motor response noted, normal sensation in all extremities when touched with a finger.

## 2018-08-01 PROBLEM — I67.2 INTRACRANIAL ATHEROSCLEROSIS: Status: ACTIVE | Noted: 2018-08-01

## 2018-08-01 PROBLEM — R42 DIZZINESS: Status: ACTIVE | Noted: 2018-08-01

## 2018-08-01 LAB
ANION GAP SERPL CALC-SCNC: 8 MMOL/L
BASOPHILS # BLD AUTO: 0.05 K/UL
BASOPHILS NFR BLD: 0.9 %
BUN SERPL-MCNC: 38 MG/DL
CALCIUM SERPL-MCNC: 9.1 MG/DL
CHLORIDE SERPL-SCNC: 107 MMOL/L
CO2 SERPL-SCNC: 24 MMOL/L
CREAT SERPL-MCNC: 1.5 MG/DL
DIFFERENTIAL METHOD: ABNORMAL
EOSINOPHIL # BLD AUTO: 0.2 K/UL
EOSINOPHIL NFR BLD: 3.1 %
ERYTHROCYTE [DISTWIDTH] IN BLOOD BY AUTOMATED COUNT: 17.5 %
ERYTHROCYTE [SEDIMENTATION RATE] IN BLOOD BY WESTERGREN METHOD: 70 MM/HR
EST. GFR  (AFRICAN AMERICAN): 37.1 ML/MIN/1.73 M^2
EST. GFR  (NON AFRICAN AMERICAN): 32.2 ML/MIN/1.73 M^2
ESTIMATED AVG GLUCOSE: 131 MG/DL
GLUCOSE SERPL-MCNC: 105 MG/DL
HBA1C MFR BLD HPLC: 6.2 %
HCT VFR BLD AUTO: 36.9 %
HGB BLD-MCNC: 11 G/DL
IMM GRANULOCYTES # BLD AUTO: 0.02 K/UL
IMM GRANULOCYTES NFR BLD AUTO: 0.3 %
LYMPHOCYTES # BLD AUTO: 1.5 K/UL
LYMPHOCYTES NFR BLD: 26.4 %
MAGNESIUM SERPL-MCNC: 2.3 MG/DL
MCH RBC QN AUTO: 26.8 PG
MCHC RBC AUTO-ENTMCNC: 29.8 G/DL
MCV RBC AUTO: 90 FL
MONOCYTES # BLD AUTO: 0.6 K/UL
MONOCYTES NFR BLD: 9.7 %
NEUTROPHILS # BLD AUTO: 3.5 K/UL
NEUTROPHILS NFR BLD: 59.6 %
NRBC BLD-RTO: 0 /100 WBC
PHOSPHATE SERPL-MCNC: 4.3 MG/DL
PLATELET # BLD AUTO: 165 K/UL
PMV BLD AUTO: 12.3 FL
POCT GLUCOSE: 134 MG/DL (ref 70–110)
POCT GLUCOSE: 134 MG/DL (ref 70–110)
POCT GLUCOSE: 141 MG/DL (ref 70–110)
POCT GLUCOSE: 156 MG/DL (ref 70–110)
POTASSIUM SERPL-SCNC: 4.1 MMOL/L
RBC # BLD AUTO: 4.1 M/UL
SODIUM SERPL-SCNC: 139 MMOL/L
WBC # BLD AUTO: 5.8 K/UL

## 2018-08-01 PROCEDURE — G8987 SELF CARE CURRENT STATUS: HCPCS | Mod: CI

## 2018-08-01 PROCEDURE — 85651 RBC SED RATE NONAUTOMATED: CPT

## 2018-08-01 PROCEDURE — 80048 BASIC METABOLIC PNL TOTAL CA: CPT

## 2018-08-01 PROCEDURE — 83735 ASSAY OF MAGNESIUM: CPT

## 2018-08-01 PROCEDURE — 36415 COLL VENOUS BLD VENIPUNCTURE: CPT

## 2018-08-01 PROCEDURE — G0378 HOSPITAL OBSERVATION PER HR: HCPCS

## 2018-08-01 PROCEDURE — 84100 ASSAY OF PHOSPHORUS: CPT

## 2018-08-01 PROCEDURE — 25000242 PHARM REV CODE 250 ALT 637 W/ HCPCS: Performed by: INTERNAL MEDICINE

## 2018-08-01 PROCEDURE — 25000003 PHARM REV CODE 250: Performed by: INTERNAL MEDICINE

## 2018-08-01 PROCEDURE — G8978 MOBILITY CURRENT STATUS: HCPCS | Mod: CJ

## 2018-08-01 PROCEDURE — 97161 PT EVAL LOW COMPLEX 20 MIN: CPT

## 2018-08-01 PROCEDURE — 99204 OFFICE O/P NEW MOD 45 MIN: CPT | Mod: GC,,, | Performed by: PSYCHIATRY & NEUROLOGY

## 2018-08-01 PROCEDURE — 85025 COMPLETE CBC W/AUTO DIFF WBC: CPT

## 2018-08-01 PROCEDURE — 99226 PR SUBSEQUENT OBSERVATION CARE,LEVEL III: CPT | Mod: ,,, | Performed by: PHYSICIAN ASSISTANT

## 2018-08-01 PROCEDURE — 97165 OT EVAL LOW COMPLEX 30 MIN: CPT

## 2018-08-01 PROCEDURE — G8988 SELF CARE GOAL STATUS: HCPCS | Mod: CJ

## 2018-08-01 PROCEDURE — G8979 MOBILITY GOAL STATUS: HCPCS | Mod: CI

## 2018-08-01 PROCEDURE — 25000003 PHARM REV CODE 250: Performed by: PHYSICIAN ASSISTANT

## 2018-08-01 PROCEDURE — 83036 HEMOGLOBIN GLYCOSYLATED A1C: CPT

## 2018-08-01 RX ORDER — METOPROLOL TARTRATE 25 MG/1
25 TABLET, FILM COATED ORAL ONCE
Status: COMPLETED | OUTPATIENT
Start: 2018-08-01 | End: 2018-08-01

## 2018-08-01 RX ADMIN — APIXABAN 2.5 MG: 2.5 TABLET, FILM COATED ORAL at 10:08

## 2018-08-01 RX ADMIN — ACETAMINOPHEN 650 MG: 325 TABLET, FILM COATED ORAL at 10:08

## 2018-08-01 RX ADMIN — METOPROLOL TARTRATE 25 MG: 50 TABLET ORAL at 10:08

## 2018-08-01 RX ADMIN — MECLIZINE 25 MG: 12.5 TABLET ORAL at 06:08

## 2018-08-01 RX ADMIN — DOXEPIN HYDROCHLORIDE 10 MG: 10 CAPSULE ORAL at 12:08

## 2018-08-01 RX ADMIN — ACETAMINOPHEN 650 MG: 325 TABLET, FILM COATED ORAL at 12:08

## 2018-08-01 RX ADMIN — ALLOPURINOL 150 MG: 300 TABLET ORAL at 12:08

## 2018-08-01 RX ADMIN — METOPROLOL TARTRATE 50 MG: 50 TABLET ORAL at 10:08

## 2018-08-01 RX ADMIN — VITAMIN D, TAB 1000IU (100/BT) 1000 UNITS: 25 TAB at 10:08

## 2018-08-01 RX ADMIN — DOCUSATE SODIUM AND SENNOSIDES 1 TABLET: 8.6; 5 TABLET, FILM COATED ORAL at 03:08

## 2018-08-01 RX ADMIN — MECLIZINE 25 MG: 12.5 TABLET ORAL at 05:08

## 2018-08-01 RX ADMIN — FLUTICASONE PROPIONATE 100 MCG: 50 SPRAY, METERED NASAL at 12:08

## 2018-08-01 RX ADMIN — DOXEPIN HYDROCHLORIDE 10 MG: 10 CAPSULE ORAL at 10:08

## 2018-08-01 RX ADMIN — AMIODARONE HYDROCHLORIDE 200 MG: 200 TABLET ORAL at 10:08

## 2018-08-01 RX ADMIN — MECLIZINE 25 MG: 12.5 TABLET ORAL at 12:08

## 2018-08-01 NOTE — PLAN OF CARE
Problem: Patient Care Overview  Goal: Plan of Care Review  Outcome: Ongoing (interventions implemented as appropriate)  RN discussed plan of care with patient and family, patient agrees. Patient stable at this time and in no acute distress.

## 2018-08-01 NOTE — PLAN OF CARE
Problem: Occupational Therapy Goal  Goal: Occupational Therapy Goal  Goals to be met by: 7 days (8/8/2018)     Patient will increase functional independence with ADLs by performing:    UE Dressing with Supervision.  LE Dressing with Supervision.  Grooming while standing at sink with Supervision.  Toileting from toilet with Supervision for hygiene and clothing management.   Toilet transfer to toilet with Supervision.    Outcome: Ongoing (interventions implemented as appropriate)  OT eval complete. Pt tolerated session well. Pt's functional outcomes established today based on her presenting functional level.     Comments: Cont OT POC

## 2018-08-01 NOTE — SUBJECTIVE & OBJECTIVE
Past Medical History:   Diagnosis Date    A-fib     Arthritis     Asteroid hyalosis of left eye     Bilateral nonexudative age-related macular degeneration 6/3/2014    Breast cancer     Cataract     Chronic GERD 2017    CKD stage 3 due to type 2 diabetes mellitus     Coronary artery disease     Hypertension     Migraine headache     Mild nonproliferative diabetic retinopathy of both eyes 6/3/2014    Pneumonia     Type 2 diabetes mellitus with hyperglycemia     Vertigo      Past Surgical History:   Procedure Laterality Date    BREAST SURGERY      left lumpectomy    CARPAL TUNNEL RELEASE      left    CATARACT EXTRACTION      vance     SECTION      EYE SURGERY      cataracts bilaterally    HYSTERECTOMY      partial     Family History   Problem Relation Age of Onset    Cancer Mother         stomach    Heart disease Mother     Diabetes Father     Hypertension Father     Blindness Brother     Diabetes Brother     Hypertension Brother     Cataracts Sister     Diabetes Sister     Diabetes Brother     Diabetes Brother     Diabetes Brother     No Known Problems Daughter     No Known Problems Son     No Known Problems Daughter     Amblyopia Neg Hx     Glaucoma Neg Hx     Macular degeneration Neg Hx     Strabismus Neg Hx     Retinal detachment Neg Hx      Social History   Substance Use Topics    Smoking status: Never Smoker    Smokeless tobacco: Never Used    Alcohol use No      Comment: socially     Review of patient's allergies indicates:  No Known Allergies    Medications: I have reviewed the current medication administration record.    Prescriptions Prior to Admission   Medication Sig Dispense Refill Last Dose    ACCU-CHEK FASTCLIX Misc 1 lancet by Misc.(Non-Drug; Combo Route) route 3 (three) times daily. Pt to test blood glucose up to 3 times daily. 100 each 11 2018    allopurinol (ZYLOPRIM) 100 MG tablet Resume allopurinol 1/2 pill until 18, 1 pill  daily for 5 days, then 1.5 pills daily (Patient taking differently: Take 150 mg by mouth once daily. ) 60 tablet 3 7/31/2018    amiodarone (PACERONE) 200 MG Tab Take 1 tablet (200 mg total) by mouth once daily. 30 tablet 2 7/31/2018    apixaban 2.5 mg Tab Take 1 tablet (2.5 mg total) by mouth 2 (two) times daily. 60 tablet 11 7/31/2018    cholecalciferol, vitamin D3, 1,000 unit capsule Take 2 capsules (2,000 Units total) by mouth once daily. (Patient taking differently: Take 1,000 Units by mouth once daily. ) 180 capsule 3 7/30/2018    diphenhydrAMINE-zinc acetate 1-0.1% (BENADRYL) cream Apply topically 2 (two) times daily as needed for Itching.  0 7/30/2018    doxepin (SINEQUAN) 10 MG capsule TAKE 1 CAPSULE BY MOUTH THREE TIMES A DAY 90 capsule 3 7/30/2018    fluticasone (FLONASE) 50 mcg/actuation nasal spray SPRAY TWICE IN EACH NOSTRIL DAILY 16 g 5 7/30/2018    furosemide (LASIX) 20 MG tablet Take 2 tablets (40 mg total) by mouth once daily. 60 tablet 11 7/31/2018    HYDROcodone-acetaminophen (NORCO) 5-325 mg per tablet Take 1 tablet by mouth every 6 (six) hours as needed for Pain. 60 tablet 0 Past Month    isosorbide-hydrALAZINE 20-37.5 mg (BIDIL) 20-37.5 mg Tab Take 1 tablet by mouth 3 (three) times daily. 90 tablet 11 7/31/2018    meclizine (ANTIVERT) 25 mg tablet Take 1 tablet (25 mg total) by mouth 3 (three) times daily as needed for Dizziness. 20 tablet 0 7/31/2018    metoprolol tartrate (LOPRESSOR) 50 MG tablet Take 1 tablet (50 mg total) by mouth 2 (two) times daily. 60 tablet 11 7/31/2018    SITagliptin (JANUVIA) 50 MG Tab Take 1 tablet (50 mg total) by mouth once daily. 30 tablet 5 7/30/2018    triamcinolone acetonide 0.1% (KENALOG) 0.1 % cream APPLY TOPICALLY TWO TIMES A DAY 80 g 0 7/30/2018    benzonatate (TESSALON) 100 MG capsule Take 100 mg by mouth 3 (three) times daily as needed for Cough.   Unknown    docusate sodium (COLACE) 100 MG capsule Take 100 mg by mouth daily as needed for  Constipation.   More than a month    doxycycline (VIBRA-TABS) 100 MG tablet Take 2 tablets by mouth daily for 3 days, then take 1 tablet daily for 10 days 17 tablet 0 Unknown       Review of Systems   Constitutional: Negative for diaphoresis and fever.   HENT: Positive for trouble swallowing (one episode).    Eyes: Negative for visual disturbance.   Respiratory: Negative for shortness of breath.    Cardiovascular: Negative for chest pain.   Gastrointestinal: Negative for nausea and vomiting.   Endocrine: Negative for polyuria.   Genitourinary: Negative for dysuria.   Musculoskeletal: Negative for back pain.   Skin: Negative for rash.   Allergic/Immunologic: Negative for immunocompromised state.   Neurological: Positive for dizziness and light-headedness. Negative for facial asymmetry, speech difficulty, weakness, numbness and headaches.   Hematological: Does not bruise/bleed easily.   Psychiatric/Behavioral: Negative for agitation, behavioral problems and confusion.     Objective:     Vital Signs (Most Recent):  Temp: 97.3 °F (36.3 °C) (08/01/18 0754)  Pulse: 100 (08/01/18 1022)  Resp: 16 (08/01/18 0754)  BP: 98/63 (08/01/18 1022)  SpO2: 97 % (08/01/18 0754)    Vital Signs Range (Last 24H):  Temp:  [97 °F (36.1 °C)-97.8 °F (36.6 °C)]   Pulse:  []   Resp:  [15-18]   BP: ()/(56-84)   SpO2:  [95 %-100 %]     Physical Exam   Constitutional: She is oriented to person, place, and time. She appears well-developed and well-nourished. No distress.   HENT:   Head: Normocephalic and atraumatic.   Eyes: Conjunctivae and EOM are normal. Pupils are equal, round, and reactive to light.   Neck: Normal range of motion. Neck supple.   Cardiovascular: Normal rate.    Pulmonary/Chest: Effort normal.   Abdominal: Soft.   Musculoskeletal: Normal range of motion. She exhibits no edema or deformity.   Neurological: She is alert and oriented to person, place, and time. No cranial nerve deficit. Coordination normal.   Skin: Skin  is warm and dry. She is not diaphoretic.   Psychiatric: She has a normal mood and affect. Her behavior is normal.       Neurological Exam:   LOC: alert  Attention Span: Good   Language: No aphasia  Articulation: No dysarthria  Orientation: Person, Place, Time   Visual Fields: Full  EOM (CN III, IV, VI): Full/intact  Pupils (CN II, III): PERRL  Facial Sensation (CN V): Normal  Facial Movement (CN VII): Symmetric facial expression    Gag Reflex: present  Reflexes: not tested  Motor: Arm left  Normal 5/5  Leg left  Normal 5/5  Arm right  Normal 5/5  Leg right Normal 5/5  Cebellar: No evidence of appendicular or axial ataxia  Sensation: Intact to light touch, temperature and vibration  Tone: Normal tone throughout      Laboratory:  CMP:   Recent Labs  Lab 07/31/18 1938 08/01/18  0525   CALCIUM 9.2 9.1   ALBUMIN 3.3*  --    PROT 8.1  --     139   K 4.6 4.1   CO2 25 24    107   BUN 41* 38*   CREATININE 1.6* 1.5*   ALKPHOS 87  --    ALT 11  --    AST 20  --    BILITOT 0.5  --      CBC:   Recent Labs  Lab 08/01/18  0525   WBC 5.80   RBC 4.10   HGB 11.0*   HCT 36.9*      MCV 90   MCH 26.8*   MCHC 29.8*     Lipid Panel: No results for input(s): CHOL, LDLCALC, HDL, TRIG in the last 168 hours.  Coagulation: No results for input(s): PT, INR, APTT in the last 168 hours.  Hgb A1C:   Recent Labs  Lab 08/01/18  0525   HGBA1C 6.2*     TSH:   Recent Labs  Lab 07/28/18  1716   TSH 8.717*       Diagnostic Results:      Brain imaging:  MRI brain:   Pending     Vessel Imaging:  MRA - report only, unable to view images  1. Nonvisualization of the V4 segment of the left vertebral artery concerning for dissection or long segment occlusion.  Consider correlation with CTA head if patient compatible.  2. 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  3. Suspected high-grade stenosis at the distal aspect of the cavernous segment of the left ICA.  This report was flagged in Epic as abnormal.    Cardiac  Evaluation:   ECHO 6/21/18:   CONCLUSIONS     1 - Moderately depressed left ventricular systolic function (EF 30-35%).     2 - Biatrial enlargement.     3 - Mildly to moderately depressed right ventricular systolic function .     4 - Low flow, low gradient aortic valve stenosis with reduced EF ((ROBBIE 0.74 cm2, AVAi 0.44 cm2/m2, peak aortic jet velocity 1.5 m/s,MG 5 mmHg, EF 33%,SVi 11 mL/m2).     5 - Trivial mitral stenosis, MVA = 3.9 cm2.     6 - Mild mitral regurgitation.     7 - Moderate to severe tricuspid regurgitation.     8 - Trivial pulmonic regurgitation.     9 - Increased central venous pressure.     10 - Pulmonary hypertension. The estimated PA systolic pressure is 58 mmHg

## 2018-08-01 NOTE — ASSESSMENT & PLAN NOTE
Stroke risk factor   Normotensive; recommend avoiding rapid fluctuations in blood pressure to to intracranial atherosclerotic disease; avoid hypotension

## 2018-08-01 NOTE — NURSING TRANSFER
Nursing Transfer Note      8/1/2018     Transfer from ED to room 542A  Transfer via stretcher  Transfer with familyTransported byED transport    Medicines sent: none  Chart send with patient: yes    Notified:family at bedside  Patient reassessed at: 7/31/18 at 2030    Upon arrival to floor:Pt awake, alert and oriented x4.

## 2018-08-01 NOTE — H&P
"[facility] Medicine  History & Physical    Patient Name: Dacia Martínez  MRN: [id number]  Admission Date: 7/31/2018  Attending Physician: Hortencia Acosta MD   Primary Care Provider: Cali Vickers MD    [facility] Medicine Team: [facility] E JHONNY Snell MD     Patient information was obtained from patient, past medical records and ER records.     Subjective:     Principal Problem: Vertigo    Chief Complaint:   Chief Complaint   Patient presents with    Nausea     Pt presented to the ED via pov. Pt c/o nausea and dizziness x 2 days.     Dizziness        HPI: Ms. Martínez is a very nice 82yo lady with a past medical history of Afib on Eliquis, DM2, HTN, CAD and chronic, intermittent episodes of vertigo.  She states that she was in her regular state of health until Sunday mid-day.  She awoke feeling, "not quite right," but by lunch time she suddenly was unable to walk due to severe imbalance.  She had some nausea with this as well, but never threw up.  She has no double vision symptoms.    She tried to tough it out and it actually got a little better by that night.  By the next morning she was vertiginous again to the point she could not walk.  Her granddaughter notes that she would fall to the left with any attempt at ambulation.  In addition to the vertigo, she notes several days of worsening frontal headache, "from the sinuses" and also to her right mastoid area.   She has been losing her hearing gradually over the past year, as well as developing tinnitis (worse in the mornings).  She denies numbness, tingling or focal weakness.    She follows with Dr. Ribeiro in ENT for recurrent ethmoidal sinusitis, though no mention was made of recurrent vertigo in his note.        Past Medical History:   Diagnosis Date    A-fib     Arthritis     Asteroid hyalosis of left eye     Bilateral nonexudative age-related macular degeneration 6/3/2014    Breast cancer     Cataract     Chronic GERD " 2017    CKD stage 3 due to type 2 diabetes mellitus     Coronary artery disease     Hypertension     Migraine headache     Mild nonproliferative diabetic retinopathy of both eyes 6/3/2014    Pneumonia     Type 2 diabetes mellitus with hyperglycemia     Vertigo        Past Surgical History:   Procedure Laterality Date    BREAST SURGERY      left lumpectomy    CARPAL TUNNEL RELEASE      left    CATARACT EXTRACTION      vance     SECTION      EYE SURGERY      cataracts bilaterally    HYSTERECTOMY      partial       Review of patient's allergies indicates:  No Known Allergies    No current facility-administered medications on file prior to encounter.      Current Outpatient Prescriptions on File Prior to Encounter   Medication Sig    ACCU-CHEK FASTCLIX Misc 1 lancet by Misc.(Non-Drug; Combo Route) route 3 (three) times daily. Pt to test blood glucose up to 3 times daily.    allopurinol (ZYLOPRIM) 100 MG tablet Resume allopurinol 1/2 pill until 18, 1 pill daily for 5 days, then 1.5 pills daily (Patient taking differently: Take 150 mg by mouth once daily. )    amiodarone (PACERONE) 200 MG Tab Take 1 tablet (200 mg total) by mouth once daily.    apixaban 2.5 mg Tab Take 1 tablet (2.5 mg total) by mouth 2 (two) times daily.    cholecalciferol, vitamin D3, 1,000 unit capsule Take 2 capsules (2,000 Units total) by mouth once daily. (Patient taking differently: Take 1,000 Units by mouth once daily. )    diphenhydrAMINE-zinc acetate 1-0.1% (BENADRYL) cream Apply topically 2 (two) times daily as needed for Itching.    doxepin (SINEQUAN) 10 MG capsule TAKE 1 CAPSULE BY MOUTH THREE TIMES A DAY    fluticasone (FLONASE) 50 mcg/actuation nasal spray SPRAY TWICE IN EACH NOSTRIL DAILY    furosemide (LASIX) 20 MG tablet Take 2 tablets (40 mg total) by mouth once daily.    HYDROcodone-acetaminophen (NORCO) 5-325 mg per tablet Take 1 tablet by mouth every 6 (six) hours as needed for Pain.     isosorbide-hydrALAZINE 20-37.5 mg (BIDIL) 20-37.5 mg Tab Take 1 tablet by mouth 3 (three) times daily.    meclizine (ANTIVERT) 25 mg tablet Take 1 tablet (25 mg total) by mouth 3 (three) times daily as needed for Dizziness.    metoprolol tartrate (LOPRESSOR) 50 MG tablet Take 1 tablet (50 mg total) by mouth 2 (two) times daily.    SITagliptin (JANUVIA) 50 MG Tab Take 1 tablet (50 mg total) by mouth once daily.    triamcinolone acetonide 0.1% (KENALOG) 0.1 % cream APPLY TOPICALLY TWO TIMES A DAY    benzonatate (TESSALON) 100 MG capsule Take 100 mg by mouth 3 (three) times daily as needed for Cough.    docusate sodium (COLACE) 100 MG capsule Take 100 mg by mouth daily as needed for Constipation.    doxycycline (VIBRA-TABS) 100 MG tablet Take 2 tablets by mouth daily for 3 days, then take 1 tablet daily for 10 days     Family History     Problem Relation (Age of Onset)    Blindness Brother    Cancer Mother    Cataracts Sister    Diabetes Father, Brother, Sister, Brother, Brother, Brother    Heart disease Mother    Hypertension Father, Brother    No Known Problems Daughter, Son, Daughter        Social History Main Topics    Smoking status: Never Smoker    Smokeless tobacco: Never Used    Alcohol use No      Comment: socially    Drug use: No    Sexual activity: Not Currently     Review of Systems   Constitutional: Negative for chills, fatigue and fever.   HENT: Positive for congestion, hearing loss and tinnitus.    Eyes: Negative for visual disturbance.   Respiratory: Negative for cough and shortness of breath.    Cardiovascular: Negative for chest pain.   Gastrointestinal: Positive for nausea. Negative for abdominal pain, constipation, diarrhea and vomiting.   Endocrine: Negative for cold intolerance.   Genitourinary: Negative for dysuria.   Musculoskeletal: Positive for arthralgias.   Skin: Negative for rash.   Allergic/Immunologic: Negative for immunocompromised state.   Neurological: Positive for  dizziness, weakness and headaches.   Hematological: Does not bruise/bleed easily.   Psychiatric/Behavioral: Negative for confusion. The patient is not nervous/anxious.      Objective:     Vital Signs (Most Recent):  Temp: 97.2 °F (36.2 °C) (07/31/18 2026)  Pulse: 99 (07/31/18 2026)  Resp: 18 (07/31/18 2026)  BP: 117/81 (07/31/18 2026)  SpO2: 100 % (07/31/18 2026) Vital Signs (24h Range):  Temp:  [97.2 °F (36.2 °C)-97.8 °F (36.6 °C)] 97.2 °F (36.2 °C)  Pulse:  [] 99  Resp:  [15-18] 18  SpO2:  [98 %-100 %] 100 %  BP: (110-119)/(56-81) 117/81     Weight: 63.5 kg (140 lb)  Body mass index is 27.34 kg/m².    Physical Exam   Constitutional: She is oriented to person, place, and time. She appears well-developed and well-nourished.  Non-toxic appearance. She does not have a sickly appearance. She does not appear ill. No distress.   HENT:   Head: Normocephalic and atraumatic.   Right Ear: Decreased hearing is noted.   Left Ear: Decreased hearing is noted.   Mouth/Throat: Abnormal dentition. No oropharyngeal exudate.   Tenderness over bilateral maxillary sinuses and right mastoid.   Eyes: Conjunctivae and EOM are normal. Pupils are equal, round, and reactive to light. Right eye exhibits no discharge. Left eye exhibits no discharge. No scleral icterus.   Arcus senilis   Neck: Normal range of motion. Neck supple. No JVD present. No tracheal deviation present. No thyromegaly present.   Cardiovascular: Normal rate and intact distal pulses.  An irregularly irregular rhythm present. Exam reveals no gallop and no friction rub.    Murmur heard.   Systolic murmur is present with a grade of 1/6   Pulmonary/Chest: Effort normal and breath sounds normal. No accessory muscle usage or stridor. No tachypnea and no bradypnea. No respiratory distress. She has no decreased breath sounds. She has no wheezes. She has no rhonchi. She has no rales. She exhibits no tenderness.   Abdominal: Soft. Bowel sounds are normal. She exhibits no  distension and no mass. There is no tenderness. There is no rebound and no guarding.   Genitourinary:   Genitourinary Comments: No hickman in place   Musculoskeletal: Normal range of motion. She exhibits no edema or tenderness.   Lymphadenopathy:     She has no cervical adenopathy.   No peripheral edema   Neurological: She is alert and oriented to person, place, and time. She displays no tremor and normal reflexes. No cranial nerve deficit or sensory deficit. She exhibits normal muscle tone. Coordination normal. GCS eye subscore is 4. GCS verbal subscore is 5. GCS motor subscore is 6.   Heel-shin and finger to nose testing were intact.  She had a negative Rhomberg and no pronator drift.  She was unable to walk due to severe imbalance on gait testing (she could not take even one step without almost falling).   Skin: Skin is warm and dry. No rash noted. She is not diaphoretic. No erythema. No pallor.   Senile changes   Psychiatric: She has a normal mood and affect. Her speech is normal and behavior is normal. Judgment and thought content normal. Cognition and memory are normal.   Nursing note and vitals reviewed.        CRANIAL NERVES     CN III, IV, VI   Pupils are equal, round, and reactive to light.  Extraocular motions are normal.       Significant Labs:   Recent Results (from the past 24 hour(s))   CBC auto differential    Collection Time: 07/31/18  4:56 PM   Result Value Ref Range    WBC 5.70 3.90 - 12.70 K/uL    RBC 4.30 4.00 - 5.40 M/uL    Hemoglobin 11.6 (L) 12.0 - 16.0 g/dL    Hematocrit 39.0 37.0 - 48.5 %    MCV 91 82 - 98 fL    MCH 27.0 27.0 - 31.0 pg    MCHC 29.7 (L) 32.0 - 36.0 g/dL    RDW 17.5 (H) 11.5 - 14.5 %    Platelets 187 150 - 350 K/uL    MPV 12.2 9.2 - 12.9 fL    Immature Granulocytes 0.4 0.0 - 0.5 %    Gran # (ANC) 3.4 1.8 - 7.7 K/uL    Immature Grans (Abs) 0.02 0.00 - 0.04 K/uL    Lymph # 1.6 1.0 - 4.8 K/uL    Mono # 0.5 0.3 - 1.0 K/uL    Eos # 0.2 0.0 - 0.5 K/uL    Baso # 0.05 0.00 - 0.20 K/uL     nRBC 0 0 /100 WBC    Gran% 58.9 38.0 - 73.0 %    Lymph% 28.2 18.0 - 48.0 %    Mono% 8.8 4.0 - 15.0 %    Eosinophil% 2.8 0.0 - 8.0 %    Basophil% 0.9 0.0 - 1.9 %    Differential Method Automated    Urinalysis, Reflex to Urine Culture Urine, Clean Catch    Collection Time: 07/31/18  4:58 PM   Result Value Ref Range    Specimen UA Urine, Clean Catch     Color, UA Straw Yellow, Straw, Yanet    Appearance, UA Clear Clear    pH, UA 6.0 5.0 - 8.0    Specific Gravity, UA 1.005 1.005 - 1.030    Protein, UA Negative Negative    Glucose, UA Negative Negative    Ketones, UA Negative Negative    Bilirubin (UA) Negative Negative    Occult Blood UA Negative Negative    Nitrite, UA Negative Negative    Urobilinogen, UA Negative <2.0 EU/dL    Leukocytes, UA Trace (A) Negative   Urinalysis Microscopic    Collection Time: 07/31/18  4:58 PM   Result Value Ref Range    RBC, UA 0 0 - 4 /hpf    WBC, UA 2 0 - 5 /hpf    Bacteria, UA Rare None-Occ /hpf    Squam Epithel, UA 2 /hpf    Hyaline Casts, UA 1 0-1/lpf /lpf    Microscopic Comment SEE COMMENT    Comprehensive metabolic panel    Collection Time: 07/31/18  7:38 PM   Result Value Ref Range    Sodium 139 136 - 145 mmol/L    Potassium 4.6 3.5 - 5.1 mmol/L    Chloride 105 95 - 110 mmol/L    CO2 25 23 - 29 mmol/L    Glucose 125 (H) 70 - 110 mg/dL    BUN, Bld 41 (H) 8 - 23 mg/dL    Creatinine 1.6 (H) 0.5 - 1.4 mg/dL    Calcium 9.2 8.7 - 10.5 mg/dL    Total Protein 8.1 6.0 - 8.4 g/dL    Albumin 3.3 (L) 3.5 - 5.2 g/dL    Total Bilirubin 0.5 0.1 - 1.0 mg/dL    Alkaline Phosphatase 87 55 - 135 U/L    AST 20 10 - 40 U/L    ALT 11 10 - 44 U/L    Anion Gap 9 8 - 16 mmol/L    eGFR if African American 34.3 (A) >60 mL/min/1.73 m^2    eGFR if non  29.8 (A) >60 mL/min/1.73 m^2   Troponin I    Collection Time: 07/31/18  7:38 PM   Result Value Ref Range    Troponin I 0.024 0.000 - 0.026 ng/mL   POCT glucose    Collection Time: 07/31/18  8:50 PM   Result Value Ref Range    POCT Glucose  138 (H) 70 - 110 mg/dL   Results for TITUS ISLAS (MRN [id number]) as of 7/31/2018 22:20   Ref. Range 7/19/2018 15:30 7/28/2018 17:16 7/31/2018 19:38   Creatinine Latest Ref Range: 0.5 - 1.4 mg/dL 1.8 (H) 2.1 (H) 1.6 (H)   eGFR if non African American Latest Ref Range: >60 mL/min/1.73 m^2 25.8 (A) 21.4 (A) 29.8 (A)   eGFR if African American Latest Ref Range: >60 mL/min/1.73 m^2 29.8 (A) 24.7 (A) 34.3 (A)       Significant Imaging:   CT HEAD WITHOUT CONTRAST    CLINICAL HISTORY:  dizziness, lightheadedness;    TECHNIQUE:  Low dose axial images were obtained through the head.  Coronal and sagittal reformations were also performed. Contrast was not administered.    COMPARISON:  MRI 6/19/20 17    FINDINGS:  There is generalized cerebral volume loss.  There is hypoattenuation in a periventricular fashion, likely sequela of chronic microvascular ischemic change.There is encephalomalacia along the left paramedian occipital lobe, similar previous examination.  There is no evidence of acute major vascular territory infarct, hemorrhage, or mass.  There is no hydrocephalus.  There are no abnormal extra-axial fluid collections.  The paranasal sinuses and mastoid air cells are clear, and there is no evidence of calvarial fracture.  The visualized soft tissues are unremarkable.      Impression       1. No acute intracranial abnormalities, stable sequela of chronic microvascular ischemic change and senescent change.  Stable left paramedian occipital encephalomalacia.      Electronically signed by: Gigi Luz MD  Date: 07/28/2018  Time: 18:06     XR CHEST 1 VIEW    CLINICAL HISTORY:  Dyspnea, unspecified    TECHNIQUE:  Single frontal view of the chest was performed.    COMPARISON:  07/20/2018    FINDINGS:  Interval improved aeration in the left lung base.  Nodular opacities noted in the left upper lobe similar to the prior exam.  Mild left perihilar opacities noted.  Left heart border is indistinct.  No gross pneumothorax or  pleural effusions.      Impression       Mild left perihilar lung opacities, improved from the 07/28/2018 exam, may represent residual atelectasis and/or infiltrate.  Consider short-term PA and lateral chest follow-up to ensure resolution.      Electronically signed by: Rommel Blas MD  Date: 07/31/2018  Time: 18:22     2D ECHO CFD 6/21/18    1 - Moderately depressed left ventricular systolic function (EF 30-35%).     2 - Biatrial enlargement.     3 - Mildly to moderately depressed right ventricular systolic function .     4 - Low flow, low gradient aortic valve stenosis with reduced EF ((ROBBIE 0.74 cm2, AVAi 0.44 cm2/m2, peak aortic jet velocity 1.5 m/s,MG 5 mmHg, EF 33%,SVi 11 mL/m2).     5 - Trivial mitral stenosis, MVA = 3.9 cm2.     6 - Mild mitral regurgitation.     7 - Moderate to severe tricuspid regurgitation.     8 - Trivial pulmonic regurgitation.     9 - Increased central venous pressure.     10 - Pulmonary hypertension. The estimated PA systolic pressure is 58 mmHg.   This document has been electronically    SIGNED BY: Boo Mcnamara MD On: 06/21/2018 15:28    Assessment/Plan:     Acute vertigo  Ataxia  -Unsure if central or peripheral  -?Post circulation CVA ?BPV ?Sinus issue ?Meniere's disease ?Labrinthitis   -MRI and MRA brain  -Neurology and ENT consults  -meclizine tablet 25 mg, 25 mg, Oral, TID   -PT and OT consults    Headache  Right mastoid pain and ethmoid sinus pain  -MRI brain  -ENT consult  -Check ESR (don't highly suspect giant cell arteritis however)  -fluticasone 50 mcg/actuation nasal spray 100 mcg, 2 spray, Each Nare, Daily    Chronic systolic heart failure  -furosemide tablet 40 mg, 40 mg, Oral, Daily  -isosorbide-hydrALAZINE 20-37.5 mg per tablet 1 tablet, 1 tablet, Oral, TID  -metoprolol tartrate (LOPRESSOR) tablet 50 mg, 50 mg, Oral, BID    Chronic atrial fibrillation  -amiodarone tablet 200 mg, 200 mg, Oral, Daily  -metoprolol tartrate (LOPRESSOR) tablet 50 mg, 50 mg, Oral, BID    Long  term use of anticoagulants  -apixaban tablet 2.5 mg, 2.5 mg, Oral, BID    Secondary pulmonary hypertension  Chronic cor pulmonale  -furosemide tablet 40 mg, 40 mg, Oral, Daily  -Follows up with Cardiology for this    Essential hypertension  -isosorbide-hydrALAZINE 20-37.5 mg per tablet 1 tablet, 1 tablet, Oral, TID  -metoprolol tartrate (LOPRESSOR) tablet 50 mg, 50 mg, Oral, BID    Chronic kidney disease, stage III  Diabetes mellitus II, controlled, nephropathy and neuropathy  -HgA1c in am  -doxepin capsule 10 mg, 10 mg, Oral, TID  -Low dose SSI protocol for AC and HS  -oxyCODONE immediate release tablet 10 mg, 10 mg, Oral, Q6H PRN severe pain  -oxyCODONE immediate release tablet 5 mg, 5 mg, Oral, Q6H PRN moderate pain     VTE Risk Mitigation         Ordered     apixaban tablet 2.5 mg  2 times daily      07/31/18 2040            W Romulo Snell MD  Department of Hospital Medicine   [facility]

## 2018-08-01 NOTE — PT/OT/SLP EVAL
Occupational Therapy   Evaluation    Name: Dacia Martínez  MRN: [id number]  Admitting Diagnosis:  <principal problem not specified>      Recommendations:     Discharge Recommendations: home with home health  Discharge Equipment Recommendations:  walker, rolling  Barriers to discharge:  Inaccessible home environment    History:     Occupational Profile:  Living Environment: Pt lives with her grandchildren in [facility] w/ 7 DIA, B simultaneously reachable hand rails.   Previous level of function: PTA, pt reports being independent w/ ADLs and functional mobility   Equipment Owned:  shower chair  Assistance upon Discharge: Pt reports her grandchildren will provide assistance upon d/c from hospital.    Past Medical History:   Diagnosis Date    A-fib     Arthritis     Asteroid hyalosis of left eye     Bilateral nonexudative age-related macular degeneration 6/3/2014    Breast cancer     Cataract     Chronic GERD 2017    CKD stage 3 due to type 2 diabetes mellitus     Coronary artery disease     Hypertension     Migraine headache     Mild nonproliferative diabetic retinopathy of both eyes 6/3/2014    Pneumonia     Type 2 diabetes mellitus with hyperglycemia     Vertigo        Past Surgical History:   Procedure Laterality Date    BREAST SURGERY      left lumpectomy    CARPAL TUNNEL RELEASE      left    CATARACT EXTRACTION      vance     SECTION      EYE SURGERY      cataracts bilaterally    HYSTERECTOMY      partial       Subjective     Chief Complaint: dizzy  Patient/Family stated goals: to return home safely  Communicated with: RN prior to session.  Pain/Comfort:  · Pain Rating 1: 0/10  · Pain Rating Post-Intervention 1: 0/10    Patients cultural, spiritual, Judaism conflicts given the current situation: none stated     Objective:     Patient found with: SCD, telemetry    General Precautions: Standard, fall   Orthopedic Precautions:N/A   Braces: N/A     Occupational Performance:    Bed Mobility:     · Pt sitting EOB upon OT arrival    Functional Mobility/Transfers:  · Patient completed Sit <> Stand Transfer with contact guard assistance  with  hand-held assist   · Functional Mobility: Pt ambulated household distance w/ HHA demonstrating slight gait instability but no occurrences of LOB. Pt participated in stair training with PT - refer to PT note for further details. Pt ambulated back to room w/ RW and SBA - gait instability greatly improved.     Activities of Daily Living:  · Upper Body Dressing: minimum assistance donning back gown sitting EOB    Cognitive/Visual Perceptual:  Cognitive/Psychosocial Skills:     -       Oriented to: Person, Place and Time   -       Follows Commands/attention:Follows multistep  commands  -       Communication: clear/fluent    Physical Exam:  Upper Extremity Range of Motion:     -       Right Upper Extremity: WFL  -       Left Upper Extremity: WFL  Upper Extremity Strength:    -       Right Upper Extremity: WFL  -       Left Upper Extremity: WFL   Strength:    -       Right Upper Extremity: WFL  -       Left Upper Extremity: WFL  Fine Motor Coordination:    -       Intact    Patient left sitting EOB with call button in reach and RN notified    [facility] 6 Click: Self-care  [facility] Total Score: 20    Treatment & Education:  - OT role/POC  - Importance of OOB activity w/ staff assist to maximize recovery   - Safety w/ functional mobility; hand placement   Education:    Assessment:     [place] ASHWIN Martínez is a 83 y.o. female with a medical diagnosis of <principal problem not specified>.  She presents with the following performance deficits affecting function:  weakness, impaired endurance, impaired self care skills, impaired functional mobilty, gait instability, impaired balance (vertigo).      OT eval complete. Pt tolerated session well. Pt presents w/ BUE ROM and strength WFL. She c/o dizziness w/ changes in head positioning causing balance deficits hindering her performance in self-care  "and mobility tasks. Pt will continue to benefit from skilled OT to address the above listed impairments.     Rehab Prognosis:  Good; patient would benefit from acute skilled OT services to address these deficits and reach maximum level of function.         Clinical Decision Makin.  OT Low:  "Pt evaluation falls under low complexity for evaluation coding due to performance deficits noted in 1-3 areas as stated above and 0 co-morbities affecting current functional status. Data obtained from problem focused assessments. No modifications or assistance was required for completion of evaluation. Only brief occupational profile and history review completed."     Plan:     Patient to be seen 3 x/week to address the above listed problems via self-care/home management, therapeutic activities, therapeutic exercises  · Plan of Care Expires: 18  · Plan of Care Reviewed with: patient    This Plan of care has been discussed with the patient who was involved in its development and understands and is in agreement with the identified goals and treatment plan    GOALS:    Occupational Therapy Goals        Problem: Occupational Therapy Goal    Goal Priority Disciplines Outcome Interventions   Occupational Therapy Goal     OT, PT/OT Ongoing (interventions implemented as appropriate)    Description:  Goals to be met by: 7 days (2018)     Patient will increase functional independence with ADLs by performing:    UE Dressing with Supervision.  LE Dressing with Supervision.  Grooming while standing at sink with Supervision.  Toileting from toilet with Supervision for hygiene and clothing management.   Toilet transfer to toilet with Supervision.                      Time Tracking:     OT Date of Treatment: 18  OT Start Time: 1336  OT Stop Time: 1351  OT Total Time (min): 15 min    Billable Minutes:Evaluation 15    Diya Elizondo OT  2018    "

## 2018-08-01 NOTE — PT/OT/SLP EVAL
Physical Therapy Evaluation    Patient Name:  Dacia Martínez   MRN:  [id number]    Recommendations:     Discharge Recommendations:  home with home health   Discharge Equipment Recommendations: walker, rolling   Barriers to discharge: Inaccessible home environment    Assessment:     Dacia Martínez is a 83 y.o. female admitted with a medical diagnosis of <principal problem not specified>.  She presents with the below impairments/functional limitations.  Pt tolerated evaluation well and is pleasant and motivated.  Pt did well with introduction of RW.  Pt is a good candidate for skilled PT services to address the below deficits and to increase functional independence.      Rehab Prognosis: good; patient would benefit from acute skilled PT services to address these deficits and reach maximum level of function.      Rehab Identified impairments/functional limitations:   weakness, impaired endurance, impaired functional mobilty, gait instability, impaired balance    Recent Surgery: * No surgery found *      Plan:     During this hospitalization, patient to be seen 3 x/week to address the above listed problems via gait training, therapeutic activities, therapeutic exercises, neuromuscular re-education  · Plan of Care Expires:  08/31/18   Plan of Care Reviewed with: patient    Subjective     Communicated with RN prior to session.  Patient found seated EOB upon PT entry to room, agreeable to evaluation.      Chief Complaint: dizziness  Patient comments/goals: to return home safely    Pain/Comfort:  · Pain Rating 1: 0/10  · Pain Rating Post-Intervention 1: 0/10    Patients cultural, spiritual, Rastafari conflicts given the current situation: none noted    Living Environment:  Pt lives with her grandchildren in [facility] w/ 7 DIA, B simultaneously reachable hand rails.   Previous level of function: PTA, pt reports being independent w/ ADLs and functional mobility   Equipment Owned:  shower chair  Assistance upon Discharge: Pt reports her  grandchildren will provide assistance upon d/c from hospital.    Objective:     Patient found with: SCD, telemetry     General Precautions: Standard, fall   Orthopedic Precautions:N/A   Braces: N/A     Exams:    Cognitive/Psychosocial Skills:     -       Oriented to: Person, Place, Time and Situation   -       Follows Commands/attention:Follows multistep  commands  -       Communication: clear/fluent  -       Safety awareness/insight to disability: intact     Physical Exams:  · Gross Motor Coordination:  WFL  · Postural Exam:  Patient presented with the following abnormalities:    · -       Rounded shoulders  · Sensation:    · -       Intact  · RLE ROM: WFL  · RLE Strength: WFL  · LLE ROM: WFL  · LLE Strength: WFL    Functional Mobility:    Transfers:  · Sit <> Stand: Contact Guard Assistance with HHA from EOB      Gait:  Pt ambulated ~50 feet with HHA and CGA and 50 feet with RW with SBA.  Pt's gait quality increased with RW.      Deficits noted:  · Slow gait speed    Stairs:  Pt ascended/descended 4 stair(s) with No Assistive Device with bilateral handrails with Contact Guard Assistance.      AM-PAC 6 CLICK MOBILITY  Total Score:20     Therapeutic Activities and Exercises:  · PT evaluation performed.  Pt educated on:  PT role and POC  Benefits and importance of OOB activity  To call for assistance to get out of bed   Importance of participation in therapy     Pt safe to transfer with RN staff    Patient left up in chair with all lines intact, call button in reach and RN notified.    GOALS:    Physical Therapy Goals        Problem: Physical Therapy Goal    Goal Priority Disciplines Outcome Goal Variances Interventions   Physical Therapy Goal     PT/OT, PT Ongoing (interventions implemented as appropriate)     Description:  Goals to be met by: 8/11/2018     Patient will increase functional independence with mobility by performing:    -Supine to sit with [place]  -Sit to supine with [place]  -Sit to stand  transfer with Modified [place]  -Bed to chair transfer with Modified [place] using LRAD  -Gait  x 300 feet with Supervision using RW  -Ascend/descend 4 stairs with bilateral handrails with Stand-by Assistance   -Lower extremity exercise program x 30 reps per handout, with independence                     History:     Past Medical History:   Diagnosis Date    A-fib     Arthritis     Asteroid hyalosis of left eye     Bilateral nonexudative age-related macular degeneration 6/3/2014    Breast cancer     Cataract     Chronic GERD 2017    CKD stage 3 due to type 2 diabetes mellitus     Coronary artery disease     Hypertension     Migraine headache     Mild nonproliferative diabetic retinopathy of both eyes 6/3/2014    Pneumonia     Type 2 diabetes mellitus with hyperglycemia     Vertigo        Past Surgical History:   Procedure Laterality Date    BREAST SURGERY      left lumpectomy    CARPAL TUNNEL RELEASE      left    CATARACT EXTRACTION      vance     SECTION      EYE SURGERY      cataracts bilaterally    HYSTERECTOMY      partial       Time Tracking:     PT Received On: 18  PT Start Time: 1336     PT Stop Time: 1351  PT Total Time (min): 15 min     Billable Minutes: Evaluation 15      Justo Randall, PT, DPT  8/1/2018   [phone]

## 2018-08-01 NOTE — HPI
Ms. Martínez is an 84 y/o W that presented to the ED because of dizziness and gait issues.  She has a PMHx of DM2, HTN, CKD III, CAD, and Afib.  She states that she has been having issues with dizziness for the past three years but more recently it has become more troubling.  She was in the ED 3 days ago for the same issues and was restarted on meclizine and discharged home.  Since then she has had progressive difficulty with walking.  Dizziness is not an issue when sitting or laying down, only with activity.  Her other complaints include double vision over the past three days.  Intermittently over the past three years has severe shooting pains on the back of the head but it is not a regular occurrence.

## 2018-08-01 NOTE — PLAN OF CARE
Problem: Physical Therapy Goal  Goal: Physical Therapy Goal  Goals to be met by: 8/11/2018     Patient will increase functional independence with mobility by performing:    -Supine to sit with [place]  -Sit to supine with [place]  -Sit to stand transfer with Modified [place]  -Bed to chair transfer with Modified [place] using LRAD  -Gait  x 300 feet with Supervision using RW  -Ascend/descend 4 stairs with bilateral handrails with Stand-by Assistance   -Lower extremity exercise program x 30 reps per handout, with independence   Outcome: Ongoing (interventions implemented as appropriate)  PT eval complete and POC initiated.

## 2018-08-01 NOTE — HPI
"83 year old female admitted for severe dizziness that started Sunday afternoon. She states that these "dizzy spells" occur every couple of years, she presents to the ED, receives Meclizine and her symptoms resolve. She has tinnitus in the morning. She states that the room spins during these spells She denies drop spells, otodynia, and otorrhea. Her vertigo is not initiated by position. She states that it is difficult for her to hear. She had a hearing test about a year and half ago, but could not recall the results.  "

## 2018-08-01 NOTE — ASSESSMENT & PLAN NOTE
Chief complaint for hospital admission   Patient reports long history of dizziness, worsened over last several days   Meclizine TID

## 2018-08-01 NOTE — ASSESSMENT & PLAN NOTE
83 year old female admitted for severe dizziness, now improving after receiving Meclizine.    -Follow-up outpatient with Dr. Birmingham  -Please call ENT with any questions.

## 2018-08-01 NOTE — NURSING
RN notified Dr. Mejia that patient's blood pressure is low this am, see flowsheet, and asked if she wanted RN to give pt her Bidil, metoprolol, amiodarone and lasix. RN received orders to administer metoprolol and amiodarone but to hold lasix and Bidil at this time. RN will follow orders.

## 2018-08-01 NOTE — SUBJECTIVE & OBJECTIVE
Medications:  Continuous Infusions:  Scheduled Meds:   allopurinol  150 mg Oral Daily    amiodarone  200 mg Oral Daily    apixaban  2.5 mg Oral BID    doxepin  10 mg Oral TID    fluticasone  2 spray Each Nare Daily    furosemide  40 mg Oral Daily    isosorbide-hydrALAZINE 20-37.5 mg  1 tablet Oral TID    meclizine  25 mg Oral Q6H    metoprolol tartrate  50 mg Oral BID    vitamin D  1,000 Units Oral Daily     PRN Meds:acetaminophen, benzonatate, dextrose 50%, dextrose 50%, glucagon (human recombinant), glucose, glucose, insulin aspart U-100, ondansetron, oxyCODONE, oxyCODONE, promethazine, ramelteon, senna-docusate 8.6-50 mg, sodium chloride 0.9%     No current facility-administered medications on file prior to encounter.      Current Outpatient Prescriptions on File Prior to Encounter   Medication Sig    ACCU-CHEK FASTCLIX Misc 1 lancet by Misc.(Non-Drug; Combo Route) route 3 (three) times daily. Pt to test blood glucose up to 3 times daily.    allopurinol (ZYLOPRIM) 100 MG tablet Resume allopurinol 1/2 pill until 1/5/18, 1 pill daily for 5 days, then 1.5 pills daily (Patient taking differently: Take 150 mg by mouth once daily. )    amiodarone (PACERONE) 200 MG Tab Take 1 tablet (200 mg total) by mouth once daily.    apixaban 2.5 mg Tab Take 1 tablet (2.5 mg total) by mouth 2 (two) times daily.    cholecalciferol, vitamin D3, 1,000 unit capsule Take 2 capsules (2,000 Units total) by mouth once daily. (Patient taking differently: Take 1,000 Units by mouth once daily. )    diphenhydrAMINE-zinc acetate 1-0.1% (BENADRYL) cream Apply topically 2 (two) times daily as needed for Itching.    doxepin (SINEQUAN) 10 MG capsule TAKE 1 CAPSULE BY MOUTH THREE TIMES A DAY    fluticasone (FLONASE) 50 mcg/actuation nasal spray SPRAY TWICE IN EACH NOSTRIL DAILY    furosemide (LASIX) 20 MG tablet Take 2 tablets (40 mg total) by mouth once daily.    HYDROcodone-acetaminophen (NORCO) 5-325 mg per tablet Take 1 tablet  by mouth every 6 (six) hours as needed for Pain.    isosorbide-hydrALAZINE 20-37.5 mg (BIDIL) 20-37.5 mg Tab Take 1 tablet by mouth 3 (three) times daily.    meclizine (ANTIVERT) 25 mg tablet Take 1 tablet (25 mg total) by mouth 3 (three) times daily as needed for Dizziness.    metoprolol tartrate (LOPRESSOR) 50 MG tablet Take 1 tablet (50 mg total) by mouth 2 (two) times daily.    SITagliptin (JANUVIA) 50 MG Tab Take 1 tablet (50 mg total) by mouth once daily.    triamcinolone acetonide 0.1% (KENALOG) 0.1 % cream APPLY TOPICALLY TWO TIMES A DAY    benzonatate (TESSALON) 100 MG capsule Take 100 mg by mouth 3 (three) times daily as needed for Cough.    docusate sodium (COLACE) 100 MG capsule Take 100 mg by mouth daily as needed for Constipation.    doxycycline (VIBRA-TABS) 100 MG tablet Take 2 tablets by mouth daily for 3 days, then take 1 tablet daily for 10 days       Review of patient's allergies indicates:  No Known Allergies    Past Medical History:   Diagnosis Date    A-fib     Arthritis     Asteroid hyalosis of left eye     Bilateral nonexudative age-related macular degeneration 6/3/2014    Breast cancer     Cataract     Chronic GERD 2017    CKD stage 3 due to type 2 diabetes mellitus     Coronary artery disease     Hypertension     Migraine headache     Mild nonproliferative diabetic retinopathy of both eyes 6/3/2014    Pneumonia     Type 2 diabetes mellitus with hyperglycemia     Vertigo      Past Surgical History:   Procedure Laterality Date    BREAST SURGERY      left lumpectomy    CARPAL TUNNEL RELEASE      left    CATARACT EXTRACTION      vance     SECTION      EYE SURGERY      cataracts bilaterally    HYSTERECTOMY      partial     Family History     Problem Relation (Age of Onset)    Blindness Brother    Cancer Mother    Cataracts Sister    Diabetes Father, Brother, Sister, Brother, Brother, Brother    Heart disease Mother    Hypertension Father, Brother    No  Known Problems Daughter, Son, Daughter        Social History Main Topics    Smoking status: Never Smoker    Smokeless tobacco: Never Used    Alcohol use No      Comment: socially    Drug use: No    Sexual activity: Not Currently     Review of Systems   Constitutional: Negative for activity change.   HENT: Positive for hearing loss, sinus pain and tinnitus. Negative for ear discharge and ear pain.      Objective:     Vital Signs (Most Recent):  Temp: 96.4 °F (35.8 °C) (08/01/18 1557)  Pulse: 95 (08/01/18 1557)  Resp: 20 (08/01/18 1557)  BP: 99/68 (08/01/18 1557)  SpO2: 96 % (08/01/18 1557) Vital Signs (24h Range):  Temp:  [96 °F (35.6 °C)-97.5 °F (36.4 °C)] 96.4 °F (35.8 °C)  Pulse:  [] 95  Resp:  [15-20] 20  SpO2:  [93 %-100 %] 96 %  BP: ()/(55-84) 99/68     Weight: 63.5 kg (140 lb)  Body mass index is 27.34 kg/m².      Date 08/01/18 0700 - 08/02/18 0659   Shift 7262-9098 5999-3075 0440-9026 24 Hour Total   I  N  T  A  K  E   P.O. 720   720    Shift Total  (mL/kg) 720  (11.3)   720  (11.3)   O  U  T  P  U  T   Shift Total  (mL/kg)       Weight (kg) 63.5 63.5 63.5 63.5       Physical Exam  NAD, resting comfortably  Right EAC appears normal with cerumen impaction, TM appears slightly retracted. Left EAC appears normal, TM is translucent and appears normal  No maxillary or frontal sinus tenderness to palpation  Mastoids are not tender to palpation     Significant Labs:  None    Significant Diagnostics:  I have reviewed and interpreted all pertinent imaging results/findings within the past 24 hours.

## 2018-08-01 NOTE — ASSESSMENT & PLAN NOTE
Ms. Martínez is an 82yo F with longstanding history of 'dizziness' - worsened symptoms over the past few days. Admitted to hospital medicine for evaluation and treatment. Noted on MRA head and neck intracranial atherosclerotic disease. L vertebral occlusion, R ICA occlusion (chronic) and L ICA stenosis evidenced on MRA. MRI brain negative for acute infarct.   Unclear if patient's symptoms are related to her atherosclerotic disease vs peripheral source. There is no acute infarct to explain her recent symptoms. She should be continued on optimal medical management to prevent further vascular disease progression and stroke.   Final recommendations are as below. Please call if any new questions or concerns. Thank you for the consult.     Antithrombotics/antiplatelets: Apixaban 2.5mg BID at home (atrial fibrillation)   Statins: Recommend repeat LDL; atorvastatin 80 mg daily   Diagnostics: no further imaging required at this time  Aggressive RF modification: HTN, HLD, DMII, CHF, afib   Therapy: PT/OT/ST evaluate and treat   VTE ppx: continue home apixaban

## 2018-08-01 NOTE — PLAN OF CARE
Problem: Patient Care Overview  Goal: Plan of Care Review  Outcome: Ongoing (interventions implemented as appropriate)  Pt. Awake, alert and oriented x4. Vitals stable. Up with assist only- daughter at bedside. White board updated. q2h rounding protocol followed. siderails up x2 and call light within reach. Fall and limb alert bracelets on.

## 2018-08-01 NOTE — CONSULTS
"[facility]  Otorhinolaryngology-Head & Neck Surgery  Consult Note    Patient Name: Dacia Martínez  MRN: [id number]  Code Status: Full Code  Admission Date: 7/31/2018  Hospital Length of Stay: 0 days  Attending Physician: Hortencia Acosta MD  Primary Care Provider: Cali Vickers MD    Patient information was obtained from patient.     Inpatient consult to ENT  Consult performed by: CHARISSA MARY  Consult ordered by: JOHN MCMAHON        Subjective:     Chief Complaint/Reason for Admission: Dizziness    History of Present Illness: 83 year old female admitted for severe dizziness that started Sunday afternoon. She states that these "dizzy spells" occur every couple of years, she presents to the ED, receives Meclizine and her symptoms resolve. She has tinnitus in the morning. She states that the room spins during these spells She denies drop spells, otodynia, and otorrhea. Her vertigo is not initiated by position. She states that it is difficult for her to hear. She had a hearing test about a year and half ago, but could not recall the results.    Medications:  Continuous Infusions:  Scheduled Meds:   allopurinol  150 mg Oral Daily    amiodarone  200 mg Oral Daily    apixaban  2.5 mg Oral BID    doxepin  10 mg Oral TID    fluticasone  2 spray Each Nare Daily    furosemide  40 mg Oral Daily    isosorbide-hydrALAZINE 20-37.5 mg  1 tablet Oral TID    meclizine  25 mg Oral Q6H    metoprolol tartrate  50 mg Oral BID    vitamin D  1,000 Units Oral Daily     PRN Meds:acetaminophen, benzonatate, dextrose 50%, dextrose 50%, glucagon (human recombinant), glucose, glucose, insulin aspart U-100, ondansetron, oxyCODONE, oxyCODONE, promethazine, ramelteon, senna-docusate 8.6-50 mg, sodium chloride 0.9%     No current facility-administered medications on file prior to encounter.      Current Outpatient Prescriptions on File Prior to Encounter   Medication Sig    ACCU-CHEK FASTCLIX Misc 1 lancet by " Misc.(Non-Drug; Combo Route) route 3 (three) times daily. Pt to test blood glucose up to 3 times daily.    allopurinol (ZYLOPRIM) 100 MG tablet Resume allopurinol 1/2 pill until 1/5/18, 1 pill daily for 5 days, then 1.5 pills daily (Patient taking differently: Take 150 mg by mouth once daily. )    amiodarone (PACERONE) 200 MG Tab Take 1 tablet (200 mg total) by mouth once daily.    apixaban 2.5 mg Tab Take 1 tablet (2.5 mg total) by mouth 2 (two) times daily.    cholecalciferol, vitamin D3, 1,000 unit capsule Take 2 capsules (2,000 Units total) by mouth once daily. (Patient taking differently: Take 1,000 Units by mouth once daily. )    diphenhydrAMINE-zinc acetate 1-0.1% (BENADRYL) cream Apply topically 2 (two) times daily as needed for Itching.    doxepin (SINEQUAN) 10 MG capsule TAKE 1 CAPSULE BY MOUTH THREE TIMES A DAY    fluticasone (FLONASE) 50 mcg/actuation nasal spray SPRAY TWICE IN EACH NOSTRIL DAILY    furosemide (LASIX) 20 MG tablet Take 2 tablets (40 mg total) by mouth once daily.    HYDROcodone-acetaminophen (NORCO) 5-325 mg per tablet Take 1 tablet by mouth every 6 (six) hours as needed for Pain.    isosorbide-hydrALAZINE 20-37.5 mg (BIDIL) 20-37.5 mg Tab Take 1 tablet by mouth 3 (three) times daily.    meclizine (ANTIVERT) 25 mg tablet Take 1 tablet (25 mg total) by mouth 3 (three) times daily as needed for Dizziness.    metoprolol tartrate (LOPRESSOR) 50 MG tablet Take 1 tablet (50 mg total) by mouth 2 (two) times daily.    SITagliptin (JANUVIA) 50 MG Tab Take 1 tablet (50 mg total) by mouth once daily.    triamcinolone acetonide 0.1% (KENALOG) 0.1 % cream APPLY TOPICALLY TWO TIMES A DAY    benzonatate (TESSALON) 100 MG capsule Take 100 mg by mouth 3 (three) times daily as needed for Cough.    docusate sodium (COLACE) 100 MG capsule Take 100 mg by mouth daily as needed for Constipation.    doxycycline (VIBRA-TABS) 100 MG tablet Take 2 tablets by mouth daily for 3 days, then take 1  tablet daily for 10 days       Review of patient's allergies indicates:  No Known Allergies    Past Medical History:   Diagnosis Date    A-fib     Arthritis     Asteroid hyalosis of left eye     Bilateral nonexudative age-related macular degeneration 6/3/2014    Breast cancer     Cataract     Chronic GERD 2017    CKD stage 3 due to type 2 diabetes mellitus     Coronary artery disease     Hypertension     Migraine headache     Mild nonproliferative diabetic retinopathy of both eyes 6/3/2014    Pneumonia     Type 2 diabetes mellitus with hyperglycemia     Vertigo      Past Surgical History:   Procedure Laterality Date    BREAST SURGERY      left lumpectomy    CARPAL TUNNEL RELEASE      left    CATARACT EXTRACTION      vance     SECTION      EYE SURGERY      cataracts bilaterally    HYSTERECTOMY      partial     Family History     Problem Relation (Age of Onset)    Blindness Brother    Cancer Mother    Cataracts Sister    Diabetes Father, Brother, Sister, Brother, Brother, Brother    Heart disease Mother    Hypertension Father, Brother    No Known Problems Daughter, Son, Daughter        Social History Main Topics    Smoking status: Never Smoker    Smokeless tobacco: Never Used    Alcohol use No      Comment: socially    Drug use: No    Sexual activity: Not Currently     Review of Systems   Constitutional: Negative for activity change.   HENT: Positive for hearing loss, sinus pain and tinnitus. Negative for ear discharge and ear pain.      Objective:     Vital Signs (Most Recent):  Temp: 96.4 °F (35.8 °C) (18 1557)  Pulse: 95 (18 1557)  Resp: 20 (18 1557)  BP: 99/68 (18 1557)  SpO2: 96 % (18 1557) Vital Signs (24h Range):  Temp:  [96 °F (35.6 °C)-97.5 °F (36.4 °C)] 96.4 °F (35.8 °C)  Pulse:  [] 95  Resp:  [15-20] 20  SpO2:  [93 %-100 %] 96 %  BP: ()/(55-84) 99/68     Weight: 63.5 kg (140 lb)  Body mass index is 27.34 kg/m².      Date 18  0700 - 08/02/18 0659   Shift 9292-4010 9491-2914 2062-3386 24 Hour Total   I  N  T  A  K  E   P.O. 720   720    Shift Total  (mL/kg) 720  (11.3)   720  (11.3)   O  U  T  P  U  T   Shift Total  (mL/kg)       Weight (kg) 63.5 63.5 63.5 63.5       Physical Exam  NAD, resting comfortably  Right EAC appears normal with cerumen impaction, TM appears slightly retracted. Left EAC appears normal, TM is translucent and appears normal  No maxillary or frontal sinus tenderness to palpation  Mastoids are not tender to palpation     Significant Labs:  None    Significant Diagnostics:  I have reviewed and interpreted all pertinent imaging results/findings within the past 24 hours.         Assessment/Plan:     * Dizziness    83 year old female admitted for severe dizziness, now improving after receiving Meclizine. Mastoids, EAC, and Middle Ear appear clear on MRI.    -Follow-up outpatient with Dr. Birmingham  -Please call ENT with any questions.          VTE Risk Mitigation         Ordered     apixaban tablet 2.5 mg  2 times daily      07/31/18 2040          Thank you for your consult.    Davy Foley MD  Otorhinolaryngology-Head & Neck Surgery  [facility]

## 2018-08-01 NOTE — PLAN OF CARE
08/01/18 0945   Discharge Assessment   Assessment Type Discharge Planning Assessment   Confirmed/corrected address and phone number on facesheet? Yes   Assessment information obtained from? Patient;Caregiver  (pt & daughter, Octavio Miller ([phone]))   Expected Length of Stay (days) 3   Communicated expected length of stay with patient/caregiver yes   Prior to hospitilization cognitive status: Alert/Oriented   Prior to hospitalization functional status: Independent   Current cognitive status: Alert/Oriented   Current Functional Status: Independent   Lives With grandchild(jaswant)  (2 adult & 1 dep grandchildren)   Able to Return to Prior Arrangements yes   Is patient able to care for self after discharge? Yes   Patient's perception of discharge disposition home or selfcare   Readmission Within The Last 30 Days no previous admission in last 30 days   Patient currently being followed by outpatient case management? No   Patient currently receives any other outside agency services? No   Equipment Currently Used at Home shower chair   Do you have any problems affording any of your prescribed medications? No   Is the patient taking medications as prescribed? yes   Does the patient have transportation home? Yes   Transportation Available family or friend will provide  (daughter)   Does the patient receive services at the [facility]? No   Discharge Plan A Home Health   Discharge Plan B Skilled Nursing Facility   Patient/Family In Agreement With Plan yes     Dx: chronic a-fib  Consult: ENT, neuro-vasc, & PT/OT  Pharm: [facility]  Hosp f/u appt: KIMO Jordan on 8/8/18 at 1200 & Dr. Ribeiro (ENT) on 8/20/18 at 1300    Patient scheduled to have a MRI (brain) done today.

## 2018-08-01 NOTE — HPI
"Ms. Martínez is an 84yo F with Afib on eliquis, hypertension, hyperlipidemia, DMII, CKD 3, CHF, aortic stenosis who presents for dizziness. She reports she has been dizzy "her whole life" but it has recently worsened over the past 3 days. She has had 2 episodes in the remote past of associated vertical diplopia that lasted <30 minutes. She denies other symptoms such as vision loss, N/V, weakness, sensory changes, dysarthria or aphasia. She describes her dizziness as a "fullness in her head" but denies spinning sensation. Patient denies history of prior stroke. Her dizziness is worse upon standing, but eventually stabilizes once she is up. She also reports taking advil makes her symptoms worse.   Patient is a nonsmoker and denies ETOH or other drug use. She reports medication compliance.   MRA was completed per hospital medicine, and per report shows occlusion vs dissection of L vert, occlusion of R ICA, and L ICA stenosis so vascular neurology has been consulted.   "

## 2018-08-01 NOTE — CONSULTS
[facility]  Vascular Neurology  Comprehensive Stroke Center  Consult Note    Inpatient consult to Vascular (Stroke) Neurology  Consult performed by: MARGARET GILL  Consult ordered by: RIRI MARTINEZ  Reason for consult: Vertebral and ICA occlusions (chronic)         Assessment/Plan:     Patient is a 83 y.o. year old female with:    * Dizziness    Chief complaint for hospital admission   Patient reports long history of dizziness, worsened over last several days   Meclizine TID         Intracranial atherosclerosis    Ms. Martínez is an 82yo F with longstanding history of 'dizziness' - worsened symptoms over the past few days. Admitted to hospital medicine for evaluation and treatment. Noted on MRA head and neck intracranial atherosclerotic disease. L vertebral occlusion, R ICA occlusion (chronic) and L ICA stenosis evidenced on MRA. MRI brain negative for acute infarct.   Unclear if patient's symptoms are related to her atherosclerotic disease vs peripheral source. There is no acute infarct to explain her recent symptoms. She should be continued on optimal medical management to prevent further vascular disease progression and stroke.   Final recommendations are as below. Please call if any new questions or concerns. Thank you for the consult.     Antithrombotics/antiplatelets: Apixaban 2.5mg BID at home (atrial fibrillation)   Statins: Recommend repeat LDL; atorvastatin 80 mg daily   Diagnostics: no further imaging required at this time  Aggressive RF modification: HTN, HLD, DMII, CHF, afib   Therapy: PT/OT/ST evaluate and treat   VTE ppx: continue home apixaban           Chronic systolic congestive heart failure    Last ECHO 6/2018   EF 30-35%; Biatrial enlargement, aortic stenosis          Type 2 diabetes mellitus with stage 3 chronic kidney disease, without long-term current use of insulin    Stroke risk factor   Optimal control; management per primary         Hyperlipidemia    Stroke risk  "factor   Recommend LDL/lipid panel - last  5/2018   Atorvastatin 80mg daily         Essential hypertension    Stroke risk factor   Normotensive; recommend avoiding rapid fluctuations in blood pressure to to intracranial atherosclerotic disease; avoid hypotension             STROKE DOCUMENTATION          NIH Scale:  1a. Level Of Consciousness: 0-->Alert: keenly responsive  1b. LOC Questions: 0-->Answers both questions correctly  1c. LOC Commands: 0-->Performs both tasks correctly  2. Best Gaze: 0-->Normal  3. Visual: 0-->No visual loss  4. Facial Palsy: 0-->Normal symmetrical movements  5a. Motor Arm, Left: 0-->No drift: limb holds 90 (or 45) degrees for full 10 secs  5b. Motor Arm, Right: 0-->No drift: limb holds 90 (or 45) degrees for full 10 secs  6a. Motor Leg, Left: 0-->No drift: leg holds 30 degree position for full 5 secs  6b. Motor Leg, Right: 0-->No drift: leg holds 30 degree position for full 5 secs  7. Limb Ataxia: 0-->Absent  8. Sensory: 0-->Normal: no sensory loss  9. Best Language: 0-->No aphasia: normal  10. Dysarthria: 0-->Normal  11. Extinction and Inattention (formerly Neglect): 0-->No abnormality  Total (NIH Stroke Scale): 0    Modified [place] Score: 1  [place] Coma Scale:15   ABCD2 Score:    [id number] Score:6  HAS -BLED Score:4  ICH Score:   Hunt & Prather Classification:       Thrombolysis Candidate? No, Out of window       Interventional Revascularization Candidate?   Is the patient eligible for mechanical endovascular reperfusion (YUDI)?  No; No significant neurological deficit      Hemorrhagic change of an Ischemic Stroke: Does this patient have an ischemic stroke with hemorrhagic changes? No     Subjective:     History of Present Illness:  Ms. Martínez is an 82yo F with Afib on eliquis, hypertension, hyperlipidemia, DMII, CKD 3, CHF, aortic stenosis who presents for dizziness. She reports she has been dizzy "her whole life" but it has recently worsened over the past 3 days. She has had 2 " "episodes in the remote past of associated vertical diplopia that lasted <30 minutes. She denies other symptoms such as vision loss, N/V, weakness, sensory changes, dysarthria or aphasia. She describes her dizziness as a "fullness in her head" but denies spinning sensation. Patient denies history of prior stroke. Her dizziness is worse upon standing, but eventually stabilizes once she is up. She also reports taking advil makes her symptoms worse.   Patient is a nonsmoker and denies ETOH or other drug use. She reports medication compliance.   MRA was completed per hospital medicine, and per report shows occlusion vs dissection of L vert, occlusion of R ICA, and L ICA stenosis so vascular neurology has been consulted.         Past Medical History:   Diagnosis Date    A-fib     Arthritis     Asteroid hyalosis of left eye     Bilateral nonexudative age-related macular degeneration 6/3/2014    Breast cancer     Cataract     Chronic GERD 2017    CKD stage 3 due to type 2 diabetes mellitus     Coronary artery disease     Hypertension     Migraine headache     Mild nonproliferative diabetic retinopathy of both eyes 6/3/2014    Pneumonia     Type 2 diabetes mellitus with hyperglycemia     Vertigo      Past Surgical History:   Procedure Laterality Date    BREAST SURGERY      left lumpectomy    CARPAL TUNNEL RELEASE      left    CATARACT EXTRACTION      vance     SECTION      EYE SURGERY      cataracts bilaterally    HYSTERECTOMY      partial     Family History   Problem Relation Age of Onset    Cancer Mother         stomach    Heart disease Mother     Diabetes Father     Hypertension Father     Blindness Brother     Diabetes Brother     Hypertension Brother     Cataracts Sister     Diabetes Sister     Diabetes Brother     Diabetes Brother     Diabetes Brother     No Known Problems Daughter     No Known Problems Son     No Known Problems Daughter     Amblyopia Neg Hx     Glaucoma " Neg Hx     Macular degeneration Neg Hx     Strabismus Neg Hx     Retinal detachment Neg Hx      Social History   Substance Use Topics    Smoking status: Never Smoker    Smokeless tobacco: Never Used    Alcohol use No      Comment: socially     Review of patient's allergies indicates:  No Known Allergies    Medications: I have reviewed the current medication administration record.    Prescriptions Prior to Admission   Medication Sig Dispense Refill Last Dose    ACCU-CHEK FASTCLIX Misc 1 lancet by Misc.(Non-Drug; Combo Route) route 3 (three) times daily. Pt to test blood glucose up to 3 times daily. 100 each 11 7/30/2018    allopurinol (ZYLOPRIM) 100 MG tablet Resume allopurinol 1/2 pill until 1/5/18, 1 pill daily for 5 days, then 1.5 pills daily (Patient taking differently: Take 150 mg by mouth once daily. ) 60 tablet 3 7/31/2018    amiodarone (PACERONE) 200 MG Tab Take 1 tablet (200 mg total) by mouth once daily. 30 tablet 2 7/31/2018    apixaban 2.5 mg Tab Take 1 tablet (2.5 mg total) by mouth 2 (two) times daily. 60 tablet 11 7/31/2018    cholecalciferol, vitamin D3, 1,000 unit capsule Take 2 capsules (2,000 Units total) by mouth once daily. (Patient taking differently: Take 1,000 Units by mouth once daily. ) 180 capsule 3 7/30/2018    diphenhydrAMINE-zinc acetate 1-0.1% (BENADRYL) cream Apply topically 2 (two) times daily as needed for Itching.  0 7/30/2018    doxepin (SINEQUAN) 10 MG capsule TAKE 1 CAPSULE BY MOUTH THREE TIMES A DAY 90 capsule 3 7/30/2018    fluticasone (FLONASE) 50 mcg/actuation nasal spray SPRAY TWICE IN EACH NOSTRIL DAILY 16 g 5 7/30/2018    furosemide (LASIX) 20 MG tablet Take 2 tablets (40 mg total) by mouth once daily. 60 tablet 11 7/31/2018    HYDROcodone-acetaminophen (NORCO) 5-325 mg per tablet Take 1 tablet by mouth every 6 (six) hours as needed for Pain. 60 tablet 0 Past Month    isosorbide-hydrALAZINE 20-37.5 mg (BIDIL) 20-37.5 mg Tab Take 1 tablet by mouth 3 (three)  times daily. 90 tablet 11 7/31/2018    meclizine (ANTIVERT) 25 mg tablet Take 1 tablet (25 mg total) by mouth 3 (three) times daily as needed for Dizziness. 20 tablet 0 7/31/2018    metoprolol tartrate (LOPRESSOR) 50 MG tablet Take 1 tablet (50 mg total) by mouth 2 (two) times daily. 60 tablet 11 7/31/2018    SITagliptin (JANUVIA) 50 MG Tab Take 1 tablet (50 mg total) by mouth once daily. 30 tablet 5 7/30/2018    triamcinolone acetonide 0.1% (KENALOG) 0.1 % cream APPLY TOPICALLY TWO TIMES A DAY 80 g 0 7/30/2018    benzonatate (TESSALON) 100 MG capsule Take 100 mg by mouth 3 (three) times daily as needed for Cough.   Unknown    docusate sodium (COLACE) 100 MG capsule Take 100 mg by mouth daily as needed for Constipation.   More than a month    doxycycline (VIBRA-TABS) 100 MG tablet Take 2 tablets by mouth daily for 3 days, then take 1 tablet daily for 10 days 17 tablet 0 Unknown       Review of Systems   Constitutional: Negative for diaphoresis and fever.   HENT: Positive for trouble swallowing (one episode).    Eyes: Negative for visual disturbance.   Respiratory: Negative for shortness of breath.    Cardiovascular: Negative for chest pain.   Gastrointestinal: Negative for nausea and vomiting.   Endocrine: Negative for polyuria.   Genitourinary: Negative for dysuria.   Musculoskeletal: Negative for back pain.   Skin: Negative for rash.   Allergic/Immunologic: Negative for immunocompromised state.   Neurological: Positive for dizziness and light-headedness. Negative for facial asymmetry, speech difficulty, weakness, numbness and headaches.   Hematological: Does not bruise/bleed easily.   Psychiatric/Behavioral: Negative for agitation, behavioral problems and confusion.     Objective:     Vital Signs (Most Recent):  Temp: 97.3 °F (36.3 °C) (08/01/18 0754)  Pulse: 100 (08/01/18 1022)  Resp: 16 (08/01/18 0754)  BP: 98/63 (08/01/18 1022)  SpO2: 97 % (08/01/18 0754)    Vital Signs Range (Last 24H):  Temp:  [97 °F  (36.1 °C)-97.8 °F (36.6 °C)]   Pulse:  []   Resp:  [15-18]   BP: ()/(56-84)   SpO2:  [95 %-100 %]     Physical Exam   Constitutional: She is oriented to person, place, and time. She appears well-developed and well-nourished. No distress.   HENT:   Head: Normocephalic and atraumatic.   Eyes: Conjunctivae and EOM are normal. Pupils are equal, round, and reactive to light.   Neck: Normal range of motion. Neck supple.   Cardiovascular: Normal rate.    Pulmonary/Chest: Effort normal.   Abdominal: Soft.   Musculoskeletal: Normal range of motion. She exhibits no edema or deformity.   Neurological: She is alert and oriented to person, place, and time. No cranial nerve deficit. Coordination normal.   Skin: Skin is warm and dry. She is not diaphoretic.   Psychiatric: She has a normal mood and affect. Her behavior is normal.       Neurological Exam:   LOC: alert  Attention Span: Good   Language: No aphasia  Articulation: No dysarthria  Orientation: Person, Place, Time   Visual Fields: Full  EOM (CN III, IV, VI): Full/intact  Pupils (CN II, III): PERRL  Facial Sensation (CN V): Normal  Facial Movement (CN VII): Symmetric facial expression    Gag Reflex: present  Reflexes: not tested  Motor: Arm left  Normal 5/5  Leg left  Normal 5/5  Arm right  Normal 5/5  Leg right Normal 5/5  Cebellar: No evidence of appendicular or axial ataxia  Sensation: Intact to light touch, temperature and vibration  Tone: Normal tone throughout      Laboratory:  CMP:   Recent Labs  Lab 07/31/18  1938 08/01/18  0525   CALCIUM 9.2 9.1   ALBUMIN 3.3*  --    PROT 8.1  --     139   K 4.6 4.1   CO2 25 24    107   BUN 41* 38*   CREATININE 1.6* 1.5*   ALKPHOS 87  --    ALT 11  --    AST 20  --    BILITOT 0.5  --      CBC:   Recent Labs  Lab 08/01/18  0525   WBC 5.80   RBC 4.10   HGB 11.0*   HCT 36.9*      MCV 90   MCH 26.8*   MCHC 29.8*     Lipid Panel: No results for input(s): CHOL, LDLCALC, HDL, TRIG in the last 168  hours.  Coagulation: No results for input(s): PT, INR, APTT in the last 168 hours.  Hgb A1C:   Recent Labs  Lab 08/01/18  0525   HGBA1C 6.2*     TSH:   Recent Labs  Lab 07/28/18  1716   TSH 8.717*       Diagnostic Results:      Brain imaging:  MRI brain:   Pending     Vessel Imaging:  MRA - report only, unable to view images  1. Nonvisualization of the V4 segment of the left vertebral artery concerning for dissection or long segment occlusion.  Consider correlation with CTA head if patient compatible.  2. 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  3. Suspected high-grade stenosis at the distal aspect of the cavernous segment of the left ICA.  This report was flagged in Epic as abnormal.    Cardiac Evaluation:   ECHO 6/21/18:   CONCLUSIONS     1 - Moderately depressed left ventricular systolic function (EF 30-35%).     2 - Biatrial enlargement.     3 - Mildly to moderately depressed right ventricular systolic function .     4 - Low flow, low gradient aortic valve stenosis with reduced EF ((ROBBIE 0.74 cm2, AVAi 0.44 cm2/m2, peak aortic jet velocity 1.5 m/s,MG 5 mmHg, EF 33%,SVi 11 mL/m2).     5 - Trivial mitral stenosis, MVA = 3.9 cm2.     6 - Mild mitral regurgitation.     7 - Moderate to severe tricuspid regurgitation.     8 - Trivial pulmonic regurgitation.     9 - Increased central venous pressure.     10 - Pulmonary hypertension. The estimated PA systolic pressure is 58 mmHg      Soheila Miller PA-C  Comprehensive Stroke Center  Department of Vascular Neurology   [facility]

## 2018-08-02 VITALS
SYSTOLIC BLOOD PRESSURE: 93 MMHG | BODY MASS INDEX: 27.48 KG/M2 | RESPIRATION RATE: 18 BRPM | HEART RATE: 108 BPM | OXYGEN SATURATION: 100 % | HEIGHT: 60 IN | WEIGHT: 140 LBS | TEMPERATURE: 98 F | DIASTOLIC BLOOD PRESSURE: 60 MMHG

## 2018-08-02 PROBLEM — R26.81 UNSTEADY GAIT: Status: ACTIVE | Noted: 2018-08-02

## 2018-08-02 LAB
ANION GAP SERPL CALC-SCNC: 8 MMOL/L
BASOPHILS # BLD AUTO: 0.05 K/UL
BASOPHILS NFR BLD: 0.9 %
BUN SERPL-MCNC: 36 MG/DL
CALCIUM SERPL-MCNC: 8.7 MG/DL
CHLORIDE SERPL-SCNC: 108 MMOL/L
CHOLEST SERPL-MCNC: 163 MG/DL
CHOLEST/HDLC SERPL: 4 {RATIO}
CO2 SERPL-SCNC: 22 MMOL/L
CREAT SERPL-MCNC: 1.5 MG/DL
DIFFERENTIAL METHOD: ABNORMAL
EOSINOPHIL # BLD AUTO: 0.2 K/UL
EOSINOPHIL NFR BLD: 4 %
ERYTHROCYTE [DISTWIDTH] IN BLOOD BY AUTOMATED COUNT: 17.6 %
EST. GFR  (AFRICAN AMERICAN): 37.1 ML/MIN/1.73 M^2
EST. GFR  (NON AFRICAN AMERICAN): 32.2 ML/MIN/1.73 M^2
GLUCOSE SERPL-MCNC: 99 MG/DL
HCT VFR BLD AUTO: 35.1 %
HDLC SERPL-MCNC: 41 MG/DL
HDLC SERPL: 25.2 %
HGB BLD-MCNC: 10.7 G/DL
IMM GRANULOCYTES # BLD AUTO: 0.02 K/UL
IMM GRANULOCYTES NFR BLD AUTO: 0.3 %
LDLC SERPL CALC-MCNC: 110.4 MG/DL
LYMPHOCYTES # BLD AUTO: 1.7 K/UL
LYMPHOCYTES NFR BLD: 28.5 %
MAGNESIUM SERPL-MCNC: 2.3 MG/DL
MCH RBC QN AUTO: 27.3 PG
MCHC RBC AUTO-ENTMCNC: 30.5 G/DL
MCV RBC AUTO: 90 FL
MONOCYTES # BLD AUTO: 0.7 K/UL
MONOCYTES NFR BLD: 11.4 %
NEUTROPHILS # BLD AUTO: 3.2 K/UL
NEUTROPHILS NFR BLD: 54.9 %
NONHDLC SERPL-MCNC: 122 MG/DL
NRBC BLD-RTO: 0 /100 WBC
PHOSPHATE SERPL-MCNC: 4.3 MG/DL
PLATELET # BLD AUTO: 171 K/UL
PMV BLD AUTO: 13 FL
POCT GLUCOSE: 113 MG/DL (ref 70–110)
POCT GLUCOSE: 99 MG/DL (ref 70–110)
POTASSIUM SERPL-SCNC: 4 MMOL/L
RBC # BLD AUTO: 3.92 M/UL
SODIUM SERPL-SCNC: 138 MMOL/L
TRIGL SERPL-MCNC: 58 MG/DL
WBC # BLD AUTO: 5.78 K/UL

## 2018-08-02 PROCEDURE — 99217 PR OBSERVATION CARE DISCHARGE: CPT | Mod: ,,, | Performed by: PHYSICIAN ASSISTANT

## 2018-08-02 PROCEDURE — 25000003 PHARM REV CODE 250: Performed by: HOSPITALIST

## 2018-08-02 PROCEDURE — 83735 ASSAY OF MAGNESIUM: CPT

## 2018-08-02 PROCEDURE — G0378 HOSPITAL OBSERVATION PER HR: HCPCS

## 2018-08-02 PROCEDURE — G8989 SELF CARE D/C STATUS: HCPCS | Mod: CJ

## 2018-08-02 PROCEDURE — 80061 LIPID PANEL: CPT

## 2018-08-02 PROCEDURE — 80048 BASIC METABOLIC PNL TOTAL CA: CPT

## 2018-08-02 PROCEDURE — 25000003 PHARM REV CODE 250: Performed by: INTERNAL MEDICINE

## 2018-08-02 PROCEDURE — 85025 COMPLETE CBC W/AUTO DIFF WBC: CPT

## 2018-08-02 PROCEDURE — G8987 SELF CARE CURRENT STATUS: HCPCS | Mod: CJ

## 2018-08-02 PROCEDURE — 84100 ASSAY OF PHOSPHORUS: CPT

## 2018-08-02 RX ORDER — MECLIZINE HYDROCHLORIDE 25 MG/1
25 TABLET ORAL EVERY 6 HOURS
Qty: 120 TABLET | Refills: 2 | Status: SHIPPED | OUTPATIENT
Start: 2018-08-02 | End: 2018-09-01

## 2018-08-02 RX ORDER — AMOXICILLIN 250 MG
1 CAPSULE ORAL DAILY
COMMUNITY
Start: 2018-08-02 | End: 2018-11-04

## 2018-08-02 RX ADMIN — SODIUM PHOSPHATE, DIBASIC AND SODIUM PHOSPHATE, MONOBASIC 1 ENEMA: 7; 19 ENEMA RECTAL at 10:08

## 2018-08-02 RX ADMIN — VITAMIN D, TAB 1000IU (100/BT) 1000 UNITS: 25 TAB at 08:08

## 2018-08-02 RX ADMIN — DOXEPIN HYDROCHLORIDE 10 MG: 10 CAPSULE ORAL at 08:08

## 2018-08-02 RX ADMIN — MECLIZINE 25 MG: 12.5 TABLET ORAL at 11:08

## 2018-08-02 RX ADMIN — ACETAMINOPHEN 650 MG: 325 TABLET, FILM COATED ORAL at 06:08

## 2018-08-02 RX ADMIN — MECLIZINE 25 MG: 12.5 TABLET ORAL at 12:08

## 2018-08-02 RX ADMIN — FUROSEMIDE 40 MG: 40 TABLET ORAL at 08:08

## 2018-08-02 RX ADMIN — APIXABAN 2.5 MG: 2.5 TABLET, FILM COATED ORAL at 08:08

## 2018-08-02 RX ADMIN — AMIODARONE HYDROCHLORIDE 200 MG: 200 TABLET ORAL at 08:08

## 2018-08-02 RX ADMIN — ALLOPURINOL 150 MG: 300 TABLET ORAL at 08:08

## 2018-08-02 RX ADMIN — METOPROLOL TARTRATE 50 MG: 50 TABLET ORAL at 08:08

## 2018-08-02 RX ADMIN — MECLIZINE 25 MG: 12.5 TABLET ORAL at 06:08

## 2018-08-02 NOTE — PLAN OF CARE
08/02/18 1403   Final Note   Assessment Type Final Discharge Note     Patient discharged home with [facility] on 8/2/18.

## 2018-08-02 NOTE — PROGRESS NOTES
Discharge Note    Pt alert and oriented X4  With no complaints of pain or discomfort. Pt and daughter verbalized understanding of discharge instructions with no questions or concerns.

## 2018-08-02 NOTE — PLAN OF CARE
Problem: Diabetes, Type 2 (Adult)  Intervention: Support/Optimize Psychosocial Response to Condition  Continue to frequently monitor  Blood  Glucose no coverage has been needed.

## 2018-08-02 NOTE — PLAN OF CARE
Problem: Patient Care Overview  Goal: Plan of Care Review  Outcome: Ongoing (interventions implemented as appropriate)  Pt awake, alert and oriented x4. Vitals stable. Daughter here at bedside. No fall or trauma this shift- up with assist. Prn tylenol as needed for complaints of headache. White board updated. q2h  Rounding protocol followed.

## 2018-08-02 NOTE — ASSESSMENT & PLAN NOTE
83 y.o. with previous epsiodes of vertigo (per daughter), Afib on Eliquis, DM2, HTN, CAD and chronic, intermittent episodes of vertigo. Daughter reports last episode occurred ~2015.    - meclizine scheduled with positive effect  - MRI brain without acute intracranial abnormality  - MRA with concern for left vertebral artery dissection vs occlusion   - [facility] neurology consulted, appreciate recs   - Apixaban 2.5mg BID at home (atrial fibrillation)   - Recommend repeat LDL in the AM; atorvastatin 80 mg daily    - no further imaging required at this time  - ENT consulted, appreciate recs   - Mastoids, EAC, and Middle Ear appear clear on MRI.   - Follow-up outpatient with Dr. [place]  - PT/OT recommending HH

## 2018-08-02 NOTE — PLAN OF CARE
Pt has no preference with hh.  SW sent the referral to [facility].  They accepted the pt and will see her tomorrow.    Ary Gautam, [facility] x [phone]

## 2018-08-02 NOTE — HOSPITAL COURSE
Dacia Martínez was placed in observation for symptoms consistent with vertigo. MRI was without acute process/stroke. MRA was concerning for possible left vertebral artery dissection/occlusion. Vascular neurology and ENT consulted on admit. Vascular neurology without further interventions, medically optimized per their review. ENT without further recommendations, patient to follow-up with ENT as an outpatient. Patient ambulating independently and without assistance prior to discharge. She was discharged home with home health. BP meds to be help until seen by PCP due to hypotension.

## 2018-08-02 NOTE — PLAN OF CARE
[facility]    HOME HEALTH ORDERS  FACE TO FACE ENCOUNTER    Patient Name: Dacia Martínez  YOB: 1935    PCP: Cali Vickers MD   PCP Address: [address] / [place] [address]  PCP Phone Number: [phone]  PCP Fax: [phone]    Encounter Date: 08/02/2018    Admit to Home Health    Diagnoses:  Active Hospital Problems    Diagnosis  POA    *Vertigo [R42]  Yes     Priority: 1 - High    Intracranial atherosclerosis [I67.2]  Yes    Chronic atrial fibrillation [I48.2]  Yes    Chronic systolic congestive heart failure [I50.22]  Yes    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, without long-term current use of insulin [E11.3293]  Yes    Type 2 diabetes mellitus with stage 3 chronic kidney disease, without long-term current use of insulin [E11.22, N18.3]  Yes    Essential hypertension [I10]  Yes    Hyperlipidemia [E78.5]  Yes      Resolved Hospital Problems    Diagnosis Date Resolved POA   No resolved problems to display.       Future Appointments  Date Time Provider Department Center   8/8/2018 10:20 AM Richardson Macdonald MD [facility] RHEUM [facility]   8/8/2018 12:00 PM Yoel Jordan NP [facility] IM [facility] PC[facility]   8/20/2018 1:00 PM AUDIOGRAM, AUDIO [facility] AUDIO [facility]   8/20/2018 1:30 PM Kian Ribeiro III, MD NOM ENT [facility]   8/21/2018 8:30 AM EKG, APPT [facility] EKG [facility]   8/21/2018 9:00 AM Jacques Burt MD [facility] ARRHYTH [facility]   9/11/2018 9:00 AM Shelby Horner MD NOM ENDOCRN [facility]   10/19/2018 10:20 AM Cali Vickers MD [facility] IM [facility] PCW     Follow-up Information     Yoel Jordan NP On 8/8/2018.    Specialty:  Internal Medicine  Why:  at 12:00 pm  Contact information:  [address]  [facility] [address]  [phone]             Kian Ribeiro III, MD On 8/20/2018.    Specialty:  Otolaryngology  Why:  at 1:00pm  Contact information:  [address]  [address]  [facility] [address]  [phone]                     I have seen and examined this patient face to face today. My clinical findings that support the need for the home health skilled services and home bound status are the following:  Requiring assistive device to leave home due to unsteady gait caused by  Weakness/Debility and Vertigo.    Allergies:Review of patient's allergies indicates:  No Known Allergies    Diet: cardiac diet and diabetic diet: 2000 calorie    Activities: activity as tolerated    Nursing:   SN to complete comprehensive assessment including routine vital signs. Instruct on disease process and s/s of complications to report to MD. Review/verify medication list sent home with the patient at time of discharge  and instruct patient/caregiver as needed. Frequency may be adjusted depending on start of care date.    Notify MD if SBP > 160 or < 90; DBP > 90 or < 50; HR > 120 or < 50; Temp > 101; Other:  Increased dizziness, falls, instability      CONSULTS:    Physical Therapy to evaluate and treat. Evaluate for home safety and equipment needs; Establish/upgrade home exercise program. Perform / instruct on therapeutic exercises, gait training, transfer training, and Range of Motion.  Occupational Therapy to evaluate and treat. Evaluate home environment for safety and equipment needs. Perform/Instruct on transfers, ADL training, ROM, and therapeutic exercises.  Aide to provide assistance with personal care, ADLs, and vital signs.    MISCELLANEOUS CARE:  N/A    WOUND CARE ORDERS  n/a      Medications: Review discharge medications with patient and family and provide education.      Current Discharge Medication List      CONTINUE these medications which have CHANGED    Details   !! meclizine (ANTIVERT) 25 mg tablet Take 1 tablet (25 mg total) by mouth every 6 (six) hours.  Qty: 120 tablet, Refills: 2       !! - Potential duplicate medications found. Please discuss with provider.      CONTINUE these  medications which have NOT CHANGED    Details   ACCU-CHEK FASTCLIX Misc 1 lancet by Misc.(Non-Drug; Combo Route) route 3 (three) times daily. Pt to test blood glucose up to 3 times daily.  Qty: 100 each, Refills: 11    Comments: OK to change to lancets + fingerstick device that is covered by patient's insurance if Accuchek device is not covered  Associated Diagnoses: Type 2 diabetes mellitus with stage 3 chronic kidney disease, without long-term current use of insulin      allopurinol (ZYLOPRIM) 100 MG tablet Resume allopurinol 1/2 pill until 1/5/18, 1 pill daily for 5 days, then 1.5 pills daily  Qty: 60 tablet, Refills: 3    Associated Diagnoses: Idiopathic chronic gout of multiple sites without tophus      amiodarone (PACERONE) 200 MG Tab Take 1 tablet (200 mg total) by mouth once daily.  Qty: 30 tablet, Refills: 2    Associated Diagnoses: Atrial fibrillation, unspecified type      apixaban 2.5 mg Tab Take 1 tablet (2.5 mg total) by mouth 2 (two) times daily.  Qty: 60 tablet, Refills: 11      cholecalciferol, vitamin D3, 1,000 unit capsule Take 2 capsules (2,000 Units total) by mouth once daily.  Qty: 180 capsule, Refills: 3      diphenhydrAMINE-zinc acetate 1-0.1% (BENADRYL) cream Apply topically 2 (two) times daily as needed for Itching.  Refills: 0      doxepin (SINEQUAN) 10 MG capsule TAKE 1 CAPSULE BY MOUTH THREE TIMES A DAY  Qty: 90 capsule, Refills: 3      fluticasone (FLONASE) 50 mcg/actuation nasal spray SPRAY TWICE IN EACH NOSTRIL DAILY  Qty: 16 g, Refills: 5      furosemide (LASIX) 20 MG tablet Take 2 tablets (40 mg total) by mouth once daily.  Qty: 60 tablet, Refills: 11      HYDROcodone-acetaminophen (NORCO) 5-325 mg per tablet Take 1 tablet by mouth every 6 (six) hours as needed for Pain.  Qty: 60 tablet, Refills: 0      isosorbide-hydrALAZINE 20-37.5 mg (BIDIL) 20-37.5 mg Tab Take 1 tablet by mouth 3 (three) times daily.  Qty: 90 tablet, Refills: 11      !! meclizine (ANTIVERT) 25 mg tablet Take 1  tablet (25 mg total) by mouth 3 (three) times daily as needed for Dizziness.  Qty: 20 tablet, Refills: 0      metoprolol tartrate (LOPRESSOR) 50 MG tablet Take 1 tablet (50 mg total) by mouth 2 (two) times daily.  Qty: 60 tablet, Refills: 11      SITagliptin (JANUVIA) 50 MG Tab Take 1 tablet (50 mg total) by mouth once daily.  Qty: 30 tablet, Refills: 5    Associated Diagnoses: Type 2 diabetes mellitus with stage 3 chronic kidney disease, without long-term current use of insulin      triamcinolone acetonide 0.1% (KENALOG) 0.1 % cream APPLY TOPICALLY TWO TIMES A DAY  Qty: 80 g, Refills: 0      benzonatate (TESSALON) 100 MG capsule Take 100 mg by mouth 3 (three) times daily as needed for Cough.      docusate sodium (COLACE) 100 MG capsule Take 100 mg by mouth daily as needed for Constipation.       !! - Potential duplicate medications found. Please discuss with provider.      STOP taking these medications       doxycycline (VIBRA-TABS) 100 MG tablet Comments:   Reason for Stopping:               I certify that this patient is confined to her home and needs intermittent skilled nursing care, physical therapy and occupational therapy.

## 2018-08-02 NOTE — PROGRESS NOTES
"[facility] Hospital Medicine  Progress Note    Patient Name: Dacia Martínez  MRN: [id number]  Patient Class: OP- Observation   Admission Date: 7/31/2018  Length of Stay: 0 days  Attending Physician: Hortencia Acosta MD  Primary Care Provider: Cali Vickers MD    [facility] Medicine Team: [facility] MED E Chinedu Youssef PA-C    Subjective:     Principal Problem:Vertigo    HPI:  Ms. Martínez is a very nice 84yo lady with a past medical history of Afib on Eliquis, DM2, HTN, CAD and chronic, intermittent episodes of vertigo.  She states that she was in her regular state of health until Sunday mid-day.  She awoke feeling, "not quite right," but by lunch time she suddenly was unable to walk due to severe imbalance.  She had some nausea with this as well, but never threw up.  She has no double vision symptoms.     She tried to tough it out and it actually got a little better by that night.  By the next morning she was vertiginous again to the point she could not walk.  Her granddaughter notes that she would fall to the left with any attempt at ambulation.  In addition to the vertigo, she notes several days of worsening frontal headache, "from the sinuses" and also to her right mastoid area.   She has been losing her hearing gradually over the past year, as well as developing tinnitis (worse in the mornings).  She denies numbness, tingling or focal weakness.     She follows with Dr. Ribeiro in ENT for recurrent ethmoidal sinusitis, though no mention was made of recurrent vertigo in his note.    Hospital Course:  Dacia Martínez was placed in observation for symptoms consistent with vertigo. MRI was without acute process/stroke. MRA was concerning for possible left vertebral artery dissection/occlusion. Vascular neurology and ENT consulted on admit.    Interval History: Improved symptoms with scheduled Meclizine.    Review of Systems   Constitutional: Negative for activity change.   HENT: Positive for hearing loss, " sinus pain and tinnitus. Negative for ear discharge and ear pain.    Eyes: Negative for photophobia and visual disturbance.   Respiratory: Negative for shortness of breath.    Cardiovascular: Negative for chest pain.   Gastrointestinal: Negative for abdominal distention.   Genitourinary: Negative for difficulty urinating.   Musculoskeletal: Negative for arthralgias.   Neurological: Positive for dizziness.   Psychiatric/Behavioral: Negative for confusion.     Objective:     Vital Signs (Most Recent):  Temp: 96.2 °F (35.7 °C) (08/01/18 2008)  Pulse: 92 (08/01/18 2008)  Resp: 18 (08/01/18 2008)  BP: 105/72 (08/01/18 2008)  SpO2: 100 % (08/01/18 2008) Vital Signs (24h Range):  Temp:  [96 °F (35.6 °C)-97.3 °F (36.3 °C)] 96.2 °F (35.7 °C)  Pulse:  [] 92  Resp:  [16-20] 18  SpO2:  [93 %-100 %] 100 %  BP: ()/(55-84) 105/72     Weight: 63.5 kg (140 lb)  Body mass index is 27.34 kg/m².    Intake/Output Summary (Last 24 hours) at 08/01/18 2025  Last data filed at 08/01/18 1300   Gross per 24 hour   Intake             1140 ml   Output                0 ml   Net             1140 ml      Physical Exam   Constitutional: She is oriented to person, place, and time. She appears well-developed and well-nourished. No distress.   HENT:   Head: Normocephalic and atraumatic.   Eyes: Conjunctivae and EOM are normal. Pupils are equal, round, and reactive to light.   Neck: Normal range of motion. Neck supple.   Cardiovascular: Normal rate.    Pulmonary/Chest: Effort normal.   Abdominal: Soft.   Musculoskeletal: Normal range of motion. She exhibits no edema or deformity.   Neurological: She is alert and oriented to person, place, and time. No cranial nerve deficit. Coordination normal.   Skin: Skin is warm and dry. She is not diaphoretic.   Psychiatric: She has a normal mood and affect. Her behavior is normal.   Nursing note and vitals reviewed.      Significant Labs: All pertinent labs within the past 24 hours have been  reviewed.    Significant Imaging: I have reviewed all pertinent imaging results/findings within the past 24 hours.    Assessment/Plan:      * Vertigo    83 y.o. with previous epsiodes of vertigo (per daughter), Afib on Eliquis, DM2, HTN, CAD and chronic, intermittent episodes of vertigo. Daughter reports last episode occurred ~2015.    - meclizine scheduled with positive effect  - MRI brain without acute intracranial abnormality  - MRA with concern for left vertebral artery dissection vs occlusion   - Vasc neurology consulted, appreciate recs   - Apixaban 2.5mg BID at home (atrial fibrillation)   - Recommend repeat LDL in the AM; atorvastatin 80 mg daily    - no further imaging required at this time  - ENT consulted, appreciate recs   - Mastoids, EAC, and Middle Ear appear clear on MRI.   - Follow-up outpatient with Dr. 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

## 2018-08-02 NOTE — PLAN OF CARE
Problem: Fall Risk (Adult)  Intervention: Monitor/Assist with Self Care  Keep needed items within reach and care area uncluttered. Pt ambulates independently instructed to call for assistance.

## 2018-08-02 NOTE — HPI
"Ms. Martínez is a very nice 84yo lady with a past medical history of Afib on Eliquis, DM2, HTN, CAD and chronic, intermittent episodes of vertigo.  She states that she was in her regular state of health until Sunday mid-day.  She awoke feeling, "not quite right," but by lunch time she suddenly was unable to walk due to severe imbalance.  She had some nausea with this as well, but never threw up.  She has no double vision symptoms.     She tried to tough it out and it actually got a little better by that night.  By the next morning she was vertiginous again to the point she could not walk.  Her granddaughter notes that she would fall to the left with any attempt at ambulation.  In addition to the vertigo, she notes several days of worsening frontal headache, "from the sinuses" and also to her right mastoid area.   She has been losing her hearing gradually over the past year, as well as developing tinnitis (worse in the mornings).  She denies numbness, tingling or focal weakness.     She follows with Dr. Ribeiro in ENT for recurrent ethmoidal sinusitis, though no mention was made of recurrent vertigo in his note.  "

## 2018-08-02 NOTE — ASSESSMENT & PLAN NOTE
-furosemide tablet 40 mg, 40 mg, Oral, Daily  -isosorbide-hydrALAZINE 20-37.5 mg per tablet 1 tablet, 1 tablet, Oral, TID  -metoprolol tartrate (LOPRESSOR) tablet 50 mg, 50 mg, Oral, BID

## 2018-08-02 NOTE — CLINICAL REVIEW
Ok to hold Bidil for tonight and give 25mg of Lopressor, instead of 50mg. Will continue to monitor.

## 2018-08-02 NOTE — SUBJECTIVE & OBJECTIVE
Interval History: Improved symptoms with scheduled Meclizine.    Review of Systems   Constitutional: Negative for activity change.   HENT: Positive for hearing loss, sinus pain and tinnitus. Negative for ear discharge and ear pain.    Eyes: Negative for photophobia and visual disturbance.   Respiratory: Negative for shortness of breath.    Cardiovascular: Negative for chest pain.   Gastrointestinal: Negative for abdominal distention.   Genitourinary: Negative for difficulty urinating.   Musculoskeletal: Negative for arthralgias.   Neurological: Positive for dizziness.   Psychiatric/Behavioral: Negative for confusion.     Objective:     Vital Signs (Most Recent):  Temp: 96.2 °F (35.7 °C) (08/01/18 2008)  Pulse: 92 (08/01/18 2008)  Resp: 18 (08/01/18 2008)  BP: 105/72 (08/01/18 2008)  SpO2: 100 % (08/01/18 2008) Vital Signs (24h Range):  Temp:  [96 °F (35.6 °C)-97.3 °F (36.3 °C)] 96.2 °F (35.7 °C)  Pulse:  [] 92  Resp:  [16-20] 18  SpO2:  [93 %-100 %] 100 %  BP: ()/(55-84) 105/72     Weight: 63.5 kg (140 lb)  Body mass index is 27.34 kg/m².    Intake/Output Summary (Last 24 hours) at 08/01/18 2025  Last data filed at 08/01/18 1300   Gross per 24 hour   Intake             1140 ml   Output                0 ml   Net             1140 ml      Physical Exam   Constitutional: She is oriented to person, place, and time. She appears well-developed and well-nourished. No distress.   HENT:   Head: Normocephalic and atraumatic.   Eyes: Conjunctivae and EOM are normal. Pupils are equal, round, and reactive to light.   Neck: Normal range of motion. Neck supple.   Cardiovascular: Normal rate.    Pulmonary/Chest: Effort normal.   Abdominal: Soft.   Musculoskeletal: Normal range of motion. She exhibits no edema or deformity.   Neurological: She is alert and oriented to person, place, and time. No cranial nerve deficit. Coordination normal.   Skin: Skin is warm and dry. She is not diaphoretic.   Psychiatric: She has a  normal mood and affect. Her behavior is normal.   Nursing note and vitals reviewed.      Significant Labs: All pertinent labs within the past 24 hours have been reviewed.    Significant Imaging: I have reviewed all pertinent imaging results/findings within the past 24 hours.

## 2018-08-02 NOTE — ASSESSMENT & PLAN NOTE
-amiodarone tablet 200 mg, 200 mg, Oral, Daily  -metoprolol tartrate (LOPRESSOR) tablet 50 mg, 50 mg, Oral, BID

## 2018-08-03 NOTE — DISCHARGE SUMMARY
"[facility] Hospital Medicine  Discharge Summary      Patient Name: Dacia Martínez  MRN: [id number]  Admission Date: 7/31/2018  Hospital Length of Stay: 0 days  Discharge Date and Time:  08/02/2018 8:04 PM  Attending Physician: No att. providers found   Discharging Provider: Chinedu Youssef PA-C  Primary Care Provider: Cali Vickers MD  [facility] Medicine Team: [facility] HOSP MED E Chinedu Youssef PA-C    HPI:   Ms. Martínez is a very nice 82yo lady with a past medical history of Afib on Eliquis, DM2, HTN, CAD and chronic, intermittent episodes of vertigo.  She states that she was in her regular state of health until Sunday mid-day.  She awoke feeling, "not quite right," but by lunch time she suddenly was unable to walk due to severe imbalance.  She had some nausea with this as well, but never threw up.  She has no double vision symptoms.     She tried to tough it out and it actually got a little better by that night.  By the next morning she was vertiginous again to the point she could not walk.  Her granddaughter notes that she would fall to the left with any attempt at ambulation.  In addition to the vertigo, she notes several days of worsening frontal headache, "from the sinuses" and also to her right mastoid area.   She has been losing her hearing gradually over the past year, as well as developing tinnitis (worse in the mornings).  She denies numbness, tingling or focal weakness.     She follows with Dr. Ribeiro in ENT for recurrent ethmoidal sinusitis, though no mention was made of recurrent vertigo in his note.    * No surgery found *      Hospital Course:   Dacia Martínez was placed in observation for symptoms consistent with vertigo. MRI was without acute process/stroke. MRA was concerning for possible left vertebral artery dissection/occlusion. Vascular neurology and ENT consulted on admit. Vascular neurology without further interventions, medically optimized per their review. ENT without further " recommendations, patient to follow-up with ENT as an outpatient. Patient ambulating independently and without assistance prior to discharge. She was discharged home with home health. BP meds to be help until seen by PCP due to hypotension.        Consults:   Consults         Status Ordering Provider     Inpatient consult to ENT  Once     Provider:  (Not yet assigned)    Completed JOHN MCMAHON     Inpatient consult to Vascular (Stroke) Neurology  Once     Provider:  (Not yet assigned)    RIRI Weeks          * Vertigo    83 y.o. with previous epsiodes of vertigo (per daughter), Afib on Eliquis, DM2, HTN, CAD and chronic, intermittent episodes of vertigo. Daughter reports last episode occurred ~2015.    - meclizine scheduled with positive effect, Q6H at [facility]  - MRI brain without acute intracranial abnormality  - MRA with concern for left vertebral artery dissection vs occlusion   - Vasc neurology consulted, appreciate recs   - Apixaban 2.5mg BID at home (atrial fibrillation)   -  atorvastatin 80 mg daily    - no further imaging required at this time  - ENT consulted, appreciate recs   - Mastoids, EAC, and Middle Ear appear clear on MRI.   - Follow-up outpatient with Dr. 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    Specialty:  Internal Medicine  Why:  at 12:00 pm  Contact information:  [address]  [place] [address]  [phone]             Kian Ribeiro III, MD On 8/20/2018.    Specialty:  Otolaryngology  Why:  at 1:00pm  Contact information:  [address]  [place] [address]  [phone]                 Patient Instructions:     Diet Cardiac     Notify your health care provider if you experience any of the following:  persistent dizziness, light-headedness, or visual disturbances     Activity as tolerated         Significant Diagnostic Studies: Labs: All labs within the past 24 hours have been reviewed    Pending Diagnostic Studies:     None         Medications:  Reconciled Home Medications:      Medication List      START taking these medications    senna-docusate 8.6-50 mg 8.6-50 mg per tablet  Commonly known as:  PERICOLACE  Take 1 tablet by mouth once daily.        CHANGE how you take these medications    allopurinol 100 MG tablet  Commonly known as:  ZYLOPRIM  Resume allopurinol 1/2 pill until 1/5/18, 1 pill daily for 5 days, then 1.5 pills daily  What changed:  · how much to take  · how to take this  · when to take this  · additional instructions     cholecalciferol (vitamin D3) 1,000 unit capsule  Take 2 capsules (2,000 Units total) by mouth once daily.  What changed:  how much to take     * meclizine 25 mg tablet  Commonly known as:  ANTIVERT  Take 1 tablet (25 mg total) by mouth 3 (three) times daily as needed for Dizziness.  What changed:  Another medication with the same name was added. Make sure you understand how and  when to take each.     * meclizine 25 mg tablet  Commonly known as:  ANTIVERT  Take 1 tablet (25 mg total) by mouth every 6 (six) hours.  What changed:  You were already taking a medication with the same name, and this prescription was added. Make sure you understand how and when to take each.        * This list has 2 medication(s) that are the same as other medications prescribed for you. Read the directions carefully, and ask your doctor or other care provider to review them with you.            CONTINUE taking these medications    ACCU-CHEK FASTCLIX LANCING DEV Misc  Generic drug:  lancets  1 lancet by Misc.(Non-Drug; Combo Route) route 3 (three) times daily. Pt to test blood glucose up to 3 times daily.     amiodarone 200 MG Tab  Commonly known as:  PACERONE  Take 1 tablet (200 mg total) by mouth once daily.     benzonatate 100 MG capsule  Commonly known as:  TESSALON  Take 100 mg by mouth 3 (three) times daily as needed for Cough.     diphenhydrAMINE-zinc acetate 1-0.1% cream  Commonly known as:  BENADRYL  Apply topically 2 (two) times daily as needed for Itching.     docusate sodium 100 MG capsule  Commonly known as:  COLACE  Take 100 mg by mouth daily as needed for Constipation.     doxepin 10 MG capsule  Commonly known as:  SINEQUAN  TAKE 1 CAPSULE BY MOUTH THREE TIMES A DAY     ELIQUIS 2.5 mg Tab  Generic drug:  apixaban  Take 1 tablet (2.5 mg total) by mouth 2 (two) times daily.     fluticasone 50 mcg/actuation nasal spray  Commonly known as:  FLONASE  SPRAY TWICE IN EACH NOSTRIL DAILY     furosemide 20 MG tablet  Commonly known as:  LASIX  Take 2 tablets (40 mg total) by mouth once daily.     HYDROcodone-acetaminophen 5-325 mg per tablet  Commonly known as:  NORCO  Take 1 tablet by mouth every 6 (six) hours as needed for Pain.     JANUVIA 50 MG Tab  Generic drug:  SITagliptin  Take 1 tablet (50 mg total) by mouth once daily.     metoprolol tartrate 50 MG tablet  Commonly known as:  LOPRESSOR  Take 1  tablet (50 mg total) by mouth 2 (two) times daily.     triamcinolone acetonide 0.1% 0.1 % cream  Commonly known as:  KENALOG  APPLY TOPICALLY TWO TIMES A DAY        STOP taking these medications    BIDIL 20-37.5 mg Tab  Generic drug:  isosorbide-hydrALAZINE 20-37.5 mg     doxycycline 100 MG tablet  Commonly known as:  VIBRA-TABS            Indwelling Lines/Drains at time of discharge:   Lines/Drains/Airways          No matching active lines, drains, or airways          Time spent on the discharge of patient: 30 minutes  Patient was seen and examined on the date of discharge and determined to be suitable for discharge.         Chinedu Youssef PA-C  Department of Hospital Medicine  [facility]

## 2018-08-03 NOTE — ASSESSMENT & PLAN NOTE
83 y.o. with previous epsiodes of vertigo (per daughter), Afib on Eliquis, DM2, HTN, CAD and chronic, intermittent episodes of vertigo. Daughter reports last episode occurred ~2015.    - meclizine scheduled with positive effect, Q6H at [facility]  - MRI brain without acute intracranial abnormality  - MRA with concern for left vertebral artery dissection vs occlusion   - [facility] neurology consulted, appreciate recs   - Apixaban 2.5mg BID at home (atrial fibrillation)   -  atorvastatin 80 mg daily    - no further imaging required at this time  - ENT consulted, appreciate recs   - Mastoids, EAC, and Middle Ear appear clear on MRI.   - Follow-up outpatient with Dr. Birmingham  - PT/OT recommending HH  - suspect hypotension contributory, hold BP meds until seen by PCP, advised to keep BP log

## 2018-08-03 NOTE — PT/OT/SLP DISCHARGE
Occupational Therapy Discharge Summary    Dacia Martínez  MRN: [id number]   Principal Problem: Vertigo      Patient Discharged from acute Occupational Therapy on 8/2/2018.  Please refer to prior OT note dated 8/1/2018 for functional status.    Assessment:      Patient appropriate for care in another setting.    Objective:     GOALS:    Occupational Therapy Goals        Problem: Occupational Therapy Goal    Goal Priority Disciplines Outcome Interventions   Occupational Therapy Goal     OT, PT/OT Ongoing (interventions implemented as appropriate)    Description:  Goals to be met by: 7 days (8/8/2018)     Patient will increase functional independence with ADLs by performing:    UE Dressing with Supervision.  LE Dressing with Supervision.  Grooming while standing at sink with Supervision.  Toileting from toilet with Supervision for hygiene and clothing management.   Toilet transfer to toilet with Supervision.                      Reasons for Discontinuation of Therapy Services  Transfer to alternate level of care.      Plan:     Patient Discharged to: Home with Home Health Service    Diya Elizondo, OT  8/3/2018

## 2018-08-08 ENCOUNTER — OFFICE VISIT (OUTPATIENT)
Dept: INTERNAL MEDICINE | Facility: CLINIC | Age: 83
End: 2018-08-08
Payer: MEDICARE

## 2018-08-08 ENCOUNTER — TELEPHONE (OUTPATIENT)
Dept: INTERNAL MEDICINE | Facility: CLINIC | Age: 83
End: 2018-08-08

## 2018-08-08 ENCOUNTER — OFFICE VISIT (OUTPATIENT)
Dept: RHEUMATOLOGY | Facility: CLINIC | Age: 83
End: 2018-08-08
Payer: MEDICARE

## 2018-08-08 VITALS
SYSTOLIC BLOOD PRESSURE: 113 MMHG | BODY MASS INDEX: 29.06 KG/M2 | HEIGHT: 60 IN | WEIGHT: 148 LBS | HEART RATE: 109 BPM | DIASTOLIC BLOOD PRESSURE: 78 MMHG

## 2018-08-08 VITALS
OXYGEN SATURATION: 99 % | BODY MASS INDEX: 28.8 KG/M2 | SYSTOLIC BLOOD PRESSURE: 126 MMHG | DIASTOLIC BLOOD PRESSURE: 64 MMHG | TEMPERATURE: 98 F | HEART RATE: 104 BPM | WEIGHT: 147.5 LBS

## 2018-08-08 DIAGNOSIS — Z09 HOSPITAL DISCHARGE FOLLOW-UP: Primary | ICD-10-CM

## 2018-08-08 DIAGNOSIS — M15.9 PRIMARY OSTEOARTHRITIS INVOLVING MULTIPLE JOINTS: Primary | Chronic | ICD-10-CM

## 2018-08-08 DIAGNOSIS — B37.31 YEAST VAGINITIS: Primary | ICD-10-CM

## 2018-08-08 DIAGNOSIS — Z79.891 LONG TERM CURRENT USE OF OPIATE ANALGESIC: ICD-10-CM

## 2018-08-08 DIAGNOSIS — M1A.09X0 IDIOPATHIC CHRONIC GOUT OF MULTIPLE SITES WITHOUT TOPHUS: Chronic | ICD-10-CM

## 2018-08-08 PROCEDURE — 99213 OFFICE O/P EST LOW 20 MIN: CPT | Mod: S$GLB,,, | Performed by: INTERNAL MEDICINE

## 2018-08-08 PROCEDURE — 99213 OFFICE O/P EST LOW 20 MIN: CPT | Mod: S$GLB,,, | Performed by: NURSE PRACTITIONER

## 2018-08-08 PROCEDURE — 3074F SYST BP LT 130 MM HG: CPT | Mod: CPTII,S$GLB,, | Performed by: INTERNAL MEDICINE

## 2018-08-08 PROCEDURE — 99999 PR PBB SHADOW E&M-EST. PATIENT-LVL IV: CPT | Mod: PBBFAC,,, | Performed by: NURSE PRACTITIONER

## 2018-08-08 PROCEDURE — 3078F DIAST BP <80 MM HG: CPT | Mod: CPTII,S$GLB,, | Performed by: NURSE PRACTITIONER

## 2018-08-08 PROCEDURE — 3074F SYST BP LT 130 MM HG: CPT | Mod: CPTII,S$GLB,, | Performed by: NURSE PRACTITIONER

## 2018-08-08 PROCEDURE — 99999 PR PBB SHADOW E&M-EST. PATIENT-LVL III: CPT | Mod: PBBFAC,,, | Performed by: INTERNAL MEDICINE

## 2018-08-08 PROCEDURE — 3078F DIAST BP <80 MM HG: CPT | Mod: CPTII,S$GLB,, | Performed by: INTERNAL MEDICINE

## 2018-08-08 RX ORDER — PREDNISONE 5 MG/1
TABLET ORAL
Qty: 30 TABLET | Refills: 1 | Status: SHIPPED | OUTPATIENT
Start: 2018-08-08 | End: 2018-10-30

## 2018-08-08 RX ORDER — FLUCONAZOLE 150 MG/1
150 TABLET ORAL EVERY OTHER DAY
Qty: 3 TABLET | Refills: 1 | Status: SHIPPED | OUTPATIENT
Start: 2018-08-08 | End: 2018-08-14

## 2018-08-08 RX ORDER — HYDROCODONE BITARTRATE AND ACETAMINOPHEN 5; 325 MG/1; MG/1
1 TABLET ORAL EVERY 6 HOURS PRN
Qty: 60 TABLET | Refills: 0 | Status: SHIPPED | OUTPATIENT
Start: 2018-08-20 | End: 2018-09-14 | Stop reason: SDUPTHER

## 2018-08-08 NOTE — PROGRESS NOTES
History of present illness:  83-year-old female I follow for chronic pain due to osteoarthritis.  She remains on hydrocodone.  She takes 1-2 daily.  She has not been getting the prescriptions filled early.  She also has a history of gout.  She is on allopurinol 450 mg daily.  She has been taking it regularly.  Her last uric acid level was 5.7.  She takes prednisone when she feels like a gout attack is coming on, she does not take it on a regular basis.  She was hospitalized since her last visit for congestive heart failure.  She was also hospitalized for vertigo.  Her pain has been stable.  She has had no joint swelling.  She has had no numbness or tingling.    Physical examination:  Musculoskeletal:  She has bony hypertrophy of the PIP is in DIP is.  She has no synovitis in the upper extremities.  She has no tender areas to palpation.  She has no tophi.    Assessment:  1.  Osteoarthritis  2.  Inter critical gout    Plans:  1.  Continue medications as before  2.  Return to see me in 6 months

## 2018-08-08 NOTE — PROGRESS NOTES
Subjective:       Patient ID: Dacia Martínez is a 83 y.o. female.    Chief Complaint: Hospital Follow Up    HPI:  82 yo female that presents to clinic today for hospital follow up.    Co-morbidities including: HTN, vertigo, DM II, CKD stage 3, CAD and Afib     Patient was admitted inpatient from the ED from 07/31/18-08/2/18 for dizziness.  Patient has MRI and MRA done of head.  MRI brain without acute intracranial abnormality.  MRA with concern for left vertebral artery dissection vs occlusion.  Blood work showed mild anemia and poor but stable kidney function.     Since hospital discharge, patient states that dizziness is much improved.  States that she only takes meclizine as needed.  States that her appetite and energy level are good.    Denies any fever, SOB, chest pain or n/v.      Review of Systems   Constitutional: Negative for activity change, appetite change, fatigue and fever.   HENT: Negative for congestion, ear pain, postnasal drip, rhinorrhea, sinus pain, sinus pressure and sore throat.    Eyes: Negative for photophobia and visual disturbance.   Respiratory: Negative for apnea, cough, shortness of breath and wheezing.    Cardiovascular: Negative for chest pain, palpitations and leg swelling.   Gastrointestinal: Negative for abdominal distention, abdominal pain, constipation, diarrhea, nausea and vomiting.   Genitourinary: Negative for difficulty urinating, dysuria, flank pain, frequency, hematuria and urgency.   Musculoskeletal: Negative for arthralgias, back pain, myalgias, neck pain and neck stiffness.   Skin: Negative for color change and rash.   Neurological: Positive for dizziness. Negative for seizures, weakness, light-headedness, numbness and headaches.   Psychiatric/Behavioral: Negative for behavioral problems.       Objective:      Physical Exam   Constitutional: She is oriented to person, place, and time. She appears well-developed and well-nourished. No distress.   HENT:   Head: Normocephalic  and atraumatic.   Right Ear: External ear normal.   Left Ear: External ear normal.   Mouth/Throat: Oropharynx is clear and moist.   Eyes: Conjunctivae and EOM are normal. Pupils are equal, round, and reactive to light.   Neck: Normal range of motion. Neck supple. No thyromegaly present.   Cardiovascular: Normal rate, regular rhythm and intact distal pulses.    Murmur heard.  Pulmonary/Chest: Effort normal and breath sounds normal. No respiratory distress. She has no wheezes. She has no rales.   Abdominal: Soft. Bowel sounds are normal. She exhibits no distension and no mass. There is no tenderness.   Musculoskeletal: Normal range of motion. She exhibits edema (trace edema ble). She exhibits no tenderness.   Lymphadenopathy:     She has no cervical adenopathy.   Neurological: She is alert and oriented to person, place, and time. No cranial nerve deficit or sensory deficit.   Skin: Skin is warm and dry. No erythema.   Psychiatric: Her behavior is normal.       Assessment:       1. Hospital discharge follow-up        Plan:       1. Hospital discharge follow-up    -Vitals are stable in clinic.  -Dizziness is much improved.  Can continue to take meclizine as needed.  Scheduled to follow up with ENT and cardiology.  Encouraged to keep follow up appointments.  -Continue current medications at current doses.  -Encouraged to continue monitor salt intake in the diet.

## 2018-08-08 NOTE — TELEPHONE ENCOUNTER
----- Message from Sandeep Machado sent at 8/7/2018  4:46 PM CDT -----  Contact: Patient [phone]  Patient calling requesting RX for yeast infection sent to pharmacy below please    Pharmacy:  [facility] [phone] (Phone)  [phone] (Fax    Please call an advise  Thank you

## 2018-08-20 ENCOUNTER — OFFICE VISIT (OUTPATIENT)
Dept: OTOLARYNGOLOGY | Facility: CLINIC | Age: 83
End: 2018-08-20
Payer: MEDICARE

## 2018-08-20 ENCOUNTER — CLINICAL SUPPORT (OUTPATIENT)
Dept: AUDIOLOGY | Facility: CLINIC | Age: 83
End: 2018-08-20
Payer: MEDICARE

## 2018-08-20 VITALS — WEIGHT: 148.13 LBS | HEIGHT: 61 IN | BODY MASS INDEX: 27.97 KG/M2

## 2018-08-20 DIAGNOSIS — H93.13 TINNITUS OF BOTH EARS: ICD-10-CM

## 2018-08-20 DIAGNOSIS — I77.1 ARTERIAL STENOSIS: ICD-10-CM

## 2018-08-20 DIAGNOSIS — H90.3 SENSORINEURAL HEARING LOSS, BILATERAL: Primary | ICD-10-CM

## 2018-08-20 DIAGNOSIS — R51.9 OCCIPITAL HEADACHE: ICD-10-CM

## 2018-08-20 DIAGNOSIS — R42 VERTIGO: ICD-10-CM

## 2018-08-20 PROCEDURE — 99999 PR PBB SHADOW E&M-EST. PATIENT-LVL IV: CPT | Mod: PBBFAC,,, | Performed by: OTOLARYNGOLOGY

## 2018-08-20 PROCEDURE — 92557 COMPREHENSIVE HEARING TEST: CPT | Mod: S$GLB,,, | Performed by: AUDIOLOGIST-HEARING AID FITTER

## 2018-08-20 PROCEDURE — 92567 TYMPANOMETRY: CPT | Mod: S$GLB,,, | Performed by: AUDIOLOGIST-HEARING AID FITTER

## 2018-08-20 PROCEDURE — 99213 OFFICE O/P EST LOW 20 MIN: CPT | Mod: S$GLB,,, | Performed by: OTOLARYNGOLOGY

## 2018-08-20 NOTE — PROGRESS NOTES
Dacia Martínez was seen in the clinic today for a hearing evaluation. Ms. Martínez reported hearing loss, tinnitus, and dizziness.     Audiological testing revealed a moderate to severe sensorineural hearing loss, bilaterally. A speech reception threshold was obtained at 50 dBHL in the left ear and 55 dBHL in the right ear. Speech recognition was 68% in the left ear and 52% in the right ear.    Tympanometry revealed hyper-compliant Type A tympanograms in both ears.    Recommendations:  1. Otologic evaluation  2. Annual hearing evaluation  3. Hearing aid consult

## 2018-08-20 NOTE — PATIENT INSTRUCTIONS
Audiometry reviewed: significant bilateral SNHL  Pt. is a candidate for amplification for one or both ears  Copy of audiogram/Abiola's card provided  Judicious use of meclizine discussed  Neurology consultation [phone])  ordered +/- interventional radiology consultation encouraged ;   Results of MR studies briefly discussed  To ED for any significant change in status  Pt. may schedule VNG testing re: vertigo episodes at convenience; vertigo work-up literature provided

## 2018-08-20 NOTE — PROGRESS NOTES
CC:  Dizziness  HPI:Ms. Martínez is an 83-year-old  female and old patient of mine atrial fibrillation requiring anticoagulation who presents to me every several months for treatment of an acute sinus infection.  She presents in a wheelchair today accompanied by a younger female.  She presented to the emergency room recently for significant dizziness symptoms.  She was subsequently evaluated 08/08/2018 by Dr. Yoel Jordan a nurse practitioner in the Internal Medicine Department after Ms. Martínez's hospitalization which occurred between 07/31/2018 and 08/02/2018 for dizziness.  Blood work indicated mild anemia and poor but stable kidney function.  There was difficulty discerning a central nervous system cause for her symptoms versus peripheral/sensory.  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  No intracranial aneurysms or vascular malformations were identified. Consideration was given to CTA for vertebral artery dissection ( study not performed) .  Ms. Martínez indicates significant mitigation  of her dizziness symptoms at this time. Dr. Jordan's  note indicates the patient's much improved dizziness and encouragement to take meclizine only as needed.   She was scheduled follow-up with our ENT service and the Cardiology service.  She was advised to monitor her salt intake.  She has indicated significant right-sided occipital head  pain several months ago as she remembers.  She does indicate a spinning feeling with her dizziness episodes.  She can usually  anticipate them.  They can be induced by moving her head a getting up early in the mornings.    Past Medical  History:   Diagnosis Date    A-fib     Arthritis     Asteroid hyalosis of left eye     Bilateral nonexudative age-related macular degeneration 6/3/2014    Breast cancer     Cataract     Chronic GERD 9/18/2017    CKD stage 3 due to type 2 diabetes mellitus     Coronary artery disease     Hypertension     Migraine headache     Mild nonproliferative diabetic retinopathy of both eyes 6/3/2014    Pneumonia     Type 2 diabetes mellitus with hyperglycemia     Vertigo      Current Outpatient Medications on File Prior to Visit   Medication Sig Dispense Refill    ACCU-CHEK FASTCLIX Misc 1 lancet by Misc.(Non-Drug; Combo Route) route 3 (three) times daily. Pt to test blood glucose up to 3 times daily. 100 each 11    allopurinol (ZYLOPRIM) 100 MG tablet Resume allopurinol 1/2 pill until 1/5/18, 1 pill daily for 5 days, then 1.5 pills daily (Patient taking differently: Take 150 mg by mouth once daily. ) 60 tablet 3    amiodarone (PACERONE) 200 MG Tab Take 1 tablet (200 mg total) by mouth once daily. 30 tablet 2    apixaban 2.5 mg Tab Take 1 tablet (2.5 mg total) by mouth 2 (two) times daily. 60 tablet 11    benzonatate (TESSALON) 100 MG capsule Take 100 mg by mouth 3 (three) times daily as needed for Cough.      cholecalciferol, vitamin D3, 1,000 unit capsule Take 2 capsules (2,000 Units total) by mouth once daily. (Patient taking differently: Take 1,000 Units by mouth once daily. ) 180 capsule 3    diphenhydrAMINE-zinc acetate 1-0.1% (BENADRYL) cream Apply topically 2 (two) times daily as needed for Itching.  0    docusate sodium (COLACE) 100 MG capsule Take 100 mg by mouth daily as needed for Constipation.      doxepin (SINEQUAN) 10 MG capsule TAKE 1 CAPSULE BY MOUTH THREE TIMES A DAY 90 capsule 3    fluticasone (FLONASE) 50 mcg/actuation nasal spray SPRAY TWICE IN EACH NOSTRIL DAILY 16 g 5    furosemide (LASIX) 20 MG tablet Take 2 tablets (40 mg total) by mouth once daily. 60 tablet 11     HYDROcodone-acetaminophen (NORCO) 5-325 mg per tablet Take 1 tablet by mouth every 6 (six) hours as needed for Pain. 60 tablet 0    meclizine (ANTIVERT) 25 mg tablet Take 1 tablet (25 mg total) by mouth 3 (three) times daily as needed for Dizziness. 20 tablet 0    meclizine (ANTIVERT) 25 mg tablet Take 1 tablet (25 mg total) by mouth every 6 (six) hours. 120 tablet 2    metoprolol tartrate (LOPRESSOR) 50 MG tablet Take 1 tablet (50 mg total) by mouth 2 (two) times daily. 60 tablet 11    predniSONE (DELTASONE) 5 MG tablet Take 1-2 tablets by mouth as needed for gout attack 30 tablet 1    senna-docusate 8.6-50 mg (PERICOLACE) 8.6-50 mg per tablet Take 1 tablet by mouth once daily.      SITagliptin (JANUVIA) 50 MG Tab Take 1 tablet (50 mg total) by mouth once daily. 30 tablet 5    triamcinolone acetonide 0.1% (KENALOG) 0.1 % cream APPLY TOPICALLY TWO TIMES A DAY 80 g 0     No current facility-administered medications on file prior to visit.      Past Surgical History:   Procedure Laterality Date    BREAST SURGERY      left lumpectomy    CARPAL TUNNEL RELEASE      left    CATARACT EXTRACTION      vance     SECTION      EYE SURGERY      cataracts bilaterally    HYSTERECTOMY      partial     Her medical problem list includes idiopathic chronic gout, primary osteoarthritis involving multiple joints, atherosclerosis of aorta, essential hypertension, hyperlipidemia, history of breast cancer, seasonal allergic rhinitis, cardiac murmur, colon polyps, chronic sinusitis, hearing loss, long-term use of opiate analgesics, chronic pain syndrome, persistent atrial fibrillation, type 2 diabetes, chronic GERD, URIs, chronic systolic congestive heart failure, CKD stage 3, vertigo, unsteady gait among others      PE:  General:  Alert and oriented lady in no acute distress  Both ears were examined under the microscope in the micro procedure room.  Both eardrums are clear and both middle ear spaces are well  aerated.  Nasal exam is unremarkable for purulent discharge of either nasal passage.  Oropharyngeal exam is unremarkable for information infection or ulceration.  Neck examination is within normal limits.    Ms. Martínez completed an audiometric study performed by the [facility] audiology service earlier this afternoon.  The study is duplicated below and the results are reviewed with her in detail      DIAGNOSIS:     ICD-10-CM ICD-9-CM    1. Sensorineural hearing loss, bilateral H90.3 389.18    2. Tinnitus of both ears H93.13 388.30    3. Vertigo R42 780.4 Ambulatory consult to Neurology   4. Arterial stenosis I77.1 447.1 Ambulatory consult to Neurology   5. Occipital headache R51 784.0 Ambulatory consult to Neurology     PLAN: Audiometry reviewed: significant bilateral SNHL  Pt. is a candidate for amplification for one or both ears  Copy of audiogram/MikeConroy's card provided  Judicious use of meclizine discussed  Neurology consultation [phone])  ordered +/- interventional radiology consultation encouraged ;   Results of MR studies briefly discussed  To ED for any significant change in status  Pt. may schedule VNG testing re: vertigo episodes at convenience; vertigo work-up literature provided

## 2018-08-21 ENCOUNTER — TELEPHONE (OUTPATIENT)
Dept: ELECTROPHYSIOLOGY | Facility: CLINIC | Age: 83
End: 2018-08-21

## 2018-08-21 ENCOUNTER — HOSPITAL ENCOUNTER (OUTPATIENT)
Dept: CARDIOLOGY | Facility: CLINIC | Age: 83
Discharge: HOME | End: 2018-08-21
Payer: MEDICARE

## 2018-08-21 ENCOUNTER — OFFICE VISIT (OUTPATIENT)
Dept: ELECTROPHYSIOLOGY | Facility: CLINIC | Age: 83
End: 2018-08-21
Payer: MEDICARE

## 2018-08-21 ENCOUNTER — TELEPHONE (OUTPATIENT)
Dept: ADMINISTRATIVE | Facility: CLINIC | Age: 83
End: 2018-08-21

## 2018-08-21 VITALS
DIASTOLIC BLOOD PRESSURE: 72 MMHG | HEIGHT: 61 IN | WEIGHT: 147 LBS | HEART RATE: 95 BPM | SYSTOLIC BLOOD PRESSURE: 120 MMHG | BODY MASS INDEX: 27.75 KG/M2

## 2018-08-21 DIAGNOSIS — I48.20 CHRONIC ATRIAL FIBRILLATION: ICD-10-CM

## 2018-08-21 DIAGNOSIS — I49.9 CARDIAC ARRHYTHMIA, UNSPECIFIED CARDIAC ARRHYTHMIA TYPE: ICD-10-CM

## 2018-08-21 DIAGNOSIS — I49.9 CARDIAC ARRHYTHMIA, UNSPECIFIED CARDIAC ARRHYTHMIA TYPE: Primary | ICD-10-CM

## 2018-08-21 DIAGNOSIS — I44.7 LBBB (LEFT BUNDLE BRANCH BLOCK): ICD-10-CM

## 2018-08-21 DIAGNOSIS — I48.19 PERSISTENT ATRIAL FIBRILLATION: Primary | ICD-10-CM

## 2018-08-21 DIAGNOSIS — I42.0 DILATED CARDIOMYOPATHY: ICD-10-CM

## 2018-08-21 DIAGNOSIS — I48.91 ATRIAL FIBRILLATION WITH RVR: Primary | ICD-10-CM

## 2018-08-21 PROCEDURE — 99215 OFFICE O/P EST HI 40 MIN: CPT | Mod: S$GLB,,, | Performed by: INTERNAL MEDICINE

## 2018-08-21 PROCEDURE — 99999 PR PBB SHADOW E&M-EST. PATIENT-LVL III: CPT | Mod: PBBFAC,,, | Performed by: INTERNAL MEDICINE

## 2018-08-21 PROCEDURE — 3078F DIAST BP <80 MM HG: CPT | Mod: CPTII,S$GLB,, | Performed by: INTERNAL MEDICINE

## 2018-08-21 PROCEDURE — 3074F SYST BP LT 130 MM HG: CPT | Mod: CPTII,S$GLB,, | Performed by: INTERNAL MEDICINE

## 2018-08-21 PROCEDURE — 99499 UNLISTED E&M SERVICE: CPT | Mod: S$GLB,,, | Performed by: INTERNAL MEDICINE

## 2018-08-21 PROCEDURE — 93000 ELECTROCARDIOGRAM COMPLETE: CPT | Mod: S$GLB,,, | Performed by: INTERNAL MEDICINE

## 2018-08-21 NOTE — PROGRESS NOTES
"Subjective:    Patient ID:  Dacia Martínez is a 83 y.o. female who presents for follow-up of Atrial Fibrillation      83 year old female with pAF s/p DCCV (06/06/17) HTN, CAD, breast cancer, migraines, IDDM, and CKD III, who presented to [facility] ED (07/07/17) for evaluation of a sudden-onset episode of palpitations/tachycardia. She was found to be in AF w/RVR at 120 bpm; an Echo at the time revealed an EF of 60%. Ms. Martínez was rate-controlled and discharged home.     Since discharge, Ms. Martínez notes only occasional fast rates; "not anything like before." She reports experiencing occasional dizziness; baseline. She denies chest pain, SOB/MCCOLLUM, palpitations, or syncope.     11/17: Minimal symptoms due to AF. Remains on metoprolol 100 mg bid as well as apixaban.     1/18: She has almost immediate recurrence of AF post CV 12/20/17. Feels better on amiodarone but sleeping more.     4/18: AF rate controlled on low dose amiodarone 200 mg qd. She is due for dental extraction. Her PCP advised no cessation of OAC.     Interval history: EF was 30-35% in AF with new LBBB. Prior ECG was AF with narrow complex 12/17, EF 60%. She denies new symptoms related to her depressed EF but has chronic MCCOLLUM and LE edema. She remains on mow dose eliquis as well as amiodarone. She is due to see interventional neurology for abnormalities seen on MRA of the brain.     6/18:  CONCLUSIONS     1 - Moderately depressed left ventricular systolic function (EF 30-35%).     2 - Biatrial enlargement.     3 - Mildly to moderately depressed right ventricular systolic function .     4 - Low flow, low gradient aortic valve stenosis with reduced EF ((ROBBIE 0.74 cm2, AVAi 0.44 cm2/m2, peak aortic jet velocity 1.5 m/s,MG 5 mmHg, EF 33%,SVi 11 mL/m2).     5 - Trivial mitral stenosis, MVA = 3.9 cm2.     6 - Mild mitral regurgitation.     7 - Moderate to severe tricuspid regurgitation.     8 - Trivial pulmonic regurgitation.     9 - Increased central venous pressure.     10 " - Pulmonary hypertension. The estimated PA systolic pressure is 58 mmHg.     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  6/3/2014: Bilateral nonexudative age-related macular degeneration  No date: Breast cancer  No date: Cataract  2017: Chronic GERD  No date: CKD stage 3 due to type 2 diabetes mellitus  No date: Coronary artery disease  No date: Hypertension  No date: Migraine headache  6/3/2014: Mild nonproliferative diabetic retinopathy of both eyes  No date: Pneumonia  No date: Type 2 diabetes mellitus with hyperglycemia  No date: Vertigo    Past Surgical History:  No date: BREAST SURGERY      Comment:  left lumpectomy  No date: CARPAL TUNNEL RELEASE      Comment:  left  No date: CATARACT EXTRACTION      Comment:  vance  No date:  SECTION  No date: EYE SURGERY      Comment:  cataracts bilaterally  No date: HYSTERECTOMY      Comment:  partial    Social History    Socioeconomic History      Marital status: Single      Spouse name: Not on file      Number of children: 4      Years of education: Not on file      Highest education level: Not on file    Social Needs      Financial resource strain: Not on file      Food insecurity - worry: Not on file      Food insecurity - inability: Not on file      Transportation needs - medical: Not on file      Transportation needs - non-medical: Not on file    Occupational History      Not on file    Tobacco Use      Smoking status: Never Smoker      Smokeless tobacco: Never Used    Substance and Sexual Activity      Alcohol use: Yes      Drug use: No      Sexual activity: Not Currently    Other Topics      Concerns:        Not on file    Social History Narrative      Family supportive.       Granddaughter helps set out her medications in pillbox, helps check her blood sugar.      Review of patient's family history indicates:  Problem: Cancer      Relation: Mother          Age of Onset: (Not Specified)           Comment: stomach  Problem: Heart disease      Relation: Mother          Age of Onset: (Not Specified)  Problem: Diabetes      Relation: Father          Age of Onset: (Not Specified)  Problem: Hypertension      Relation: Father          Age of Onset: (Not Specified)  Problem: Blindness      Relation: Brother          Age of Onset: (Not Specified)  Problem: Diabetes      Relation: Brother          Age of Onset: (Not Specified)  Problem: Hypertension      Relation: Brother          Age of Onset: (Not Specified)  Problem: Cataracts      Relation: Sister          Age of Onset: (Not Specified)  Problem: Diabetes      Relation: Sister          Age of Onset: (Not Specified)  Problem: Diabetes      Relation: Brother          Age of Onset: (Not Specified)  Problem: Diabetes      Relation: Brother          Age of Onset: (Not Specified)  Problem: Diabetes      Relation: Brother          Age of Onset: (Not Specified)  Problem: No Known Problems      Relation: Daughter          Age of Onset: (Not Specified)  Problem: No Known Problems      Relation: Son          Age of Onset: (Not Specified)  Problem: No Known Problems      Relation: Daughter          Age of Onset: (Not Specified)  Problem: Amblyopia      Relation: Neg Hx          Age of Onset: (Not Specified)  Problem: Glaucoma      Relation: Neg Hx          Age of Onset: (Not Specified)  Problem: Macular degeneration      Relation: Neg Hx          Age of Onset: (Not Specified)  Problem: Strabismus      Relation: Neg Hx          Age of Onset: (Not Specified)  Problem: Retinal detachment      Relation: Neg Hx          Age of Onset: (Not Specified)          Review of Systems   Constitution: Positive for malaise/fatigue. Negative for diaphoresis.   HENT: Negative for nosebleeds.    Eyes: Negative for double vision.   Cardiovascular: Positive for irregular heartbeat. Negative for chest pain, dyspnea on exertion, near-syncope, palpitations and syncope.   Respiratory: Negative for  shortness of breath.    Skin: Negative.    Musculoskeletal: Negative.    Gastrointestinal: Negative for abdominal pain, hematemesis and hematochezia.   Genitourinary: Negative for hematuria.   Neurological: Positive for dizziness and light-headedness. Negative for headaches.   Psychiatric/Behavioral: Negative for altered mental status.        Objective:    Physical Exam   Constitutional: She is oriented to person, place, and time. She appears well-developed and well-nourished. No distress.   HENT:   Head: Normocephalic and atraumatic.   Eyes: EOM are normal. Pupils are equal, round, and reactive to light. Right eye exhibits no discharge. Left eye exhibits no discharge.   Neck: Normal range of motion. No JVD present. No thyromegaly present.   Cardiovascular: Normal rate, S1 normal, S2 normal, normal heart sounds and intact distal pulses. An irregular rhythm present. PMI is not displaced. Exam reveals no gallop and no friction rub.   No murmur heard.  Pulmonary/Chest: Effort normal and breath sounds normal. No respiratory distress. She has no wheezes. She has no rales.   Abdominal: Soft. Bowel sounds are normal. She exhibits no distension. There is no tenderness. There is no rebound and no guarding.   Musculoskeletal: Normal range of motion. She exhibits no edema or tenderness.   Neurological: She is alert and oriented to person, place, and time. No cranial nerve deficit.   Skin: Skin is warm and dry. No rash noted. She is not diaphoretic. No erythema.   Psychiatric: She has a normal mood and affect. Her behavior is normal. Judgment and thought content normal.   Vitals reviewed.    AF with LBBB 168 ms        Assessment:       1. Persistent atrial fibrillation    2. Chronic atrial fibrillation    3. Dilated cardiomyopathy    4. LBBB (left bundle branch block)         Plan:         83 yoF persistent AF with newly depressed EF. This coincides with new, wide LBBB. She has been on metoprolol for over one year ACE/ARBs are  not ideal given CKD. I recommended CRT-P with plans for AVN ablation. Extensive discussion regarding risks, benefits, and alternative approaches to the procedure was had with the patient.  The patient voices understanding and all questions have been answered.  Risks included but were not limited to vascular injury, cardiac perforation, MI, stroke.   The patient would like to proceed as planned.  Should she require neurology intervention prior to PM implant, we would reschedule.   CRT-P  Hold apixaban 2 days prior

## 2018-08-21 NOTE — TELEPHONE ENCOUNTER
Home Health SOC 08/03/2018 - 10/01/2018 with [facility] ([place]) - Dr. Hortencia Acosta. Patient received SN, PT and OT services.

## 2018-09-11 ENCOUNTER — OFFICE VISIT (OUTPATIENT)
Dept: ENDOCRINOLOGY | Facility: CLINIC | Age: 83
End: 2018-09-11
Payer: MEDICARE

## 2018-09-11 VITALS
DIASTOLIC BLOOD PRESSURE: 80 MMHG | RESPIRATION RATE: 16 BRPM | HEIGHT: 61 IN | WEIGHT: 148.81 LBS | SYSTOLIC BLOOD PRESSURE: 100 MMHG | BODY MASS INDEX: 28.1 KG/M2

## 2018-09-11 DIAGNOSIS — M1A.09X0 IDIOPATHIC CHRONIC GOUT OF MULTIPLE SITES WITHOUT TOPHUS: Chronic | ICD-10-CM

## 2018-09-11 DIAGNOSIS — N18.30 CKD (CHRONIC KIDNEY DISEASE) STAGE 3, GFR 30-59 ML/MIN: ICD-10-CM

## 2018-09-11 DIAGNOSIS — E11.3293 CONTROLLED TYPE 2 DIABETES MELLITUS WITH BOTH EYES AFFECTED BY MILD NONPROLIFERATIVE RETINOPATHY WITHOUT MACULAR EDEMA, WITHOUT LONG-TERM CURRENT USE OF INSULIN: Primary | ICD-10-CM

## 2018-09-11 PROCEDURE — 3079F DIAST BP 80-89 MM HG: CPT | Mod: CPTII,GC,, | Performed by: INTERNAL MEDICINE

## 2018-09-11 PROCEDURE — 99999 PR PBB SHADOW E&M-EST. PATIENT-LVL IV: CPT | Mod: PBBFAC,GC,, | Performed by: INTERNAL MEDICINE

## 2018-09-11 PROCEDURE — 99214 OFFICE O/P EST MOD 30 MIN: CPT | Mod: PBBFAC | Performed by: INTERNAL MEDICINE

## 2018-09-11 PROCEDURE — 1101F PT FALLS ASSESS-DOCD LE1/YR: CPT | Mod: CPTII,GC,, | Performed by: INTERNAL MEDICINE

## 2018-09-11 PROCEDURE — 3074F SYST BP LT 130 MM HG: CPT | Mod: CPTII,GC,, | Performed by: INTERNAL MEDICINE

## 2018-09-11 PROCEDURE — 99214 OFFICE O/P EST MOD 30 MIN: CPT | Mod: S$PBB,GC,, | Performed by: INTERNAL MEDICINE

## 2018-09-11 NOTE — PROGRESS NOTES
"Subjective:       Patient ID: Dacia Martínez is a 83 y.o. female.    Chief Complaint: Follow-up  FU of DM.    HPI   With regards to weight loss/gain:  Weight currently 148, which is stable.  Notes that her weight loss has subsided since the discontinuation of trulicity.  She is taking Januvia as prescribed without any issues.    With regards to the diabetes:  Last seen in ykxdlo03/2018.  Presents today with daughter.    Diagnosed:  ~2000  Last A1C: 6.2% 08/2018    Current regimen:   Januvia 50mg daily (adjusted for renal function)    Previous regimen:  trulicity - For three years    Diet/exercise - walks around the house, still able to complete ADLs  In clinic by wheelchair    Missed doses? no    # times a day testing:  Twice weekly      Log reviewed: no  BG range recall: 70s - 160s    Last eye exam: 8/8/17  Last podiatry exam: 1/9/18     Typical meals: trying to keep to a healthy diet, but she also eats off of her diet on occasion.     Education - last visit: na  Exercise - minimal activity.    Recent plans to have pace maker placed to "control [her] fast heart rate"    Hypoglycemia: no  Associated symptoms: na    Current complaints: sinus problems/headaches.      Denies any thirst, polyuria, polydipsia, weight loss, nausea or blurred vision.   Denies numbness or tingling of feet   lower extremity swelling      Does not have a Medic alert tag     Diabetes complications:   Numbness/tingling    No ARB/ACEi, or statin on file.    In regards to dyslipidemia:  No statin on file      Review of Systems   Constitutional: Negative for unexpected weight change.   HENT: Positive for congestion, postnasal drip, rhinorrhea, sinus pain and sore throat. Negative for sinus pressure and sneezing.    Eyes: Negative for visual disturbance.   Respiratory: Negative for shortness of breath.    Cardiovascular: Negative for chest pain.   Gastrointestinal: Negative for abdominal pain.   Genitourinary: Positive for difficulty urinating. " "Negative for dysuria and urgency.   Musculoskeletal: Negative for arthralgias.   Skin: Negative for wound.   Neurological: Negative for headaches.   Hematological: Does not bruise/bleed easily.   Psychiatric/Behavioral: Negative for sleep disturbance.         Objective:       Vitals:    09/11/18 0853   BP: 100/80   Resp: 16   Weight: 67.5 kg (148 lb 13 oz)   Height: 5' 1" (1.549 m)   Body mass index is 28.12 kg/m².      Physical Exam   Constitutional: She is oriented to person, place, and time. She appears well-developed. No distress.   Pleasant elderly female, non icteric, NAD   Neck: No thyromegaly present.   Cardiovascular: Normal rate.   Pulmonary/Chest: Effort normal.   Abdominal: Soft.   Musculoskeletal: She exhibits no edema or deformity.   Feet no cuts or scratches  Shoes appropriate    Neurological: She is alert and oriented to person, place, and time.   Skin: No erythema.   No nodules   Vitals reviewed.      Lab Results   Component Value Date    HGBA1C 6.2 (H) 08/01/2018       Chemistry        Component Value Date/Time     08/02/2018 0432    K 4.0 08/02/2018 0432     08/02/2018 0432    CO2 22 (L) 08/02/2018 0432    BUN 36 (H) 08/02/2018 0432    CREATININE 1.5 (H) 08/02/2018 0432    GLU 99 08/02/2018 0432        Component Value Date/Time    CALCIUM 8.7 08/02/2018 0432    ALKPHOS 87 07/31/2018 1938    AST 20 07/31/2018 1938    ALT 11 07/31/2018 1938    BILITOT 0.5 07/31/2018 1938    ESTGFRAFRICA 37.1 (A) 08/02/2018 0432    EGFRNONAA 32.2 (A) 08/02/2018 0432        Lab Results   Component Value Date    TSH 8.717 (H) 07/28/2018     Lab Results   Component Value Date    WBC 5.78 08/02/2018    HGB 10.7 (L) 08/02/2018    HCT 35.1 (L) 08/02/2018    MCV 90 08/02/2018     08/02/2018         Assessment:       1. 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   Can be low secondary to AoCD.  Patient denies hypoglycemia.     Reviewed goals of therapy are to get the best control we can without hypoglycemia.       Patient not a candidate for (GFR 32):  SGLT2i  Metformin  Sulfonyurea    No interested in trulicity - previous negative effect (significant weight loss >60lbs).     Medication changes:   Continue Januvia 50mg daily     Goal A1C <7.5%     Advised frequent self blood glucose monitoring.  Patient encouraged to document glucose results and bring them to every clinic visit.  BG logs provided to patient. Plans to mail each log upon completion (envelope provided).     Hypoglycemia precautions discussed. Instructed on precautions before driving.       Periodic follow ups for eye evaluations, foot care and dental care suggested.     Foot exam UTD.  Eye exam UTD.      Recommended flu vaccine, patient declined.     BMI 28 - 28.9, adult  Body mass index is 28.12 kg/m².  Weight stable.  Patient not interested in losing any more weight.  Feels that she is too small.      Idiopathic chronic gout of multiple sites without tophus  On PDN, may increase BG.  Patient notes that she does not take this as prescribed.  Only as needed.  Requested refill on prescription, but deferred to prescribing provider.     CKD (chronic kidney disease) stage 3, GFR 30-59 ml/min  GFR 32.  Minimizes available options for anti-hyperglycemic medications.  Januvia is renally dosed.    Chronic sinusitis  Still with sinus-related symptoms.  Has tried decongestants and antibiotics.  Notes intermittent compliance with nasal spray.  Requesting abx.  Deferred to PCP.      Osteopenia  Vitamin D 32.    Recommend Vitamin D3 1000 iU daily.   Repeat DXA due 3/2019         RTC in 6 months.        Shelby Horner MD  Endocrinology Fellow     Patient left prior to seeing Dr. Lee.  Daughter had to  leave for work.  Case reviewed by Dr. Lee.

## 2018-09-14 RX ORDER — HYDROCODONE BITARTRATE AND ACETAMINOPHEN 5; 325 MG/1; MG/1
1 TABLET ORAL EVERY 6 HOURS PRN
Qty: 60 TABLET | Refills: 0 | Status: SHIPPED | OUTPATIENT
Start: 2018-09-14 | End: 2018-10-14

## 2018-09-17 ENCOUNTER — TELEPHONE (OUTPATIENT)
Dept: RHEUMATOLOGY | Facility: CLINIC | Age: 83
End: 2018-09-17

## 2018-09-17 NOTE — TELEPHONE ENCOUNTER
----- Message from Yumiko Norton sent at 9/17/2018  3:19 PM CDT -----  Contact: self   Pt is requesting a call back regarding rx pick-up for HYDROcodone-acetaminophen (NORCO) 5-325 mg per tablet. Pt can be reached at [phone].

## 2018-09-22 DIAGNOSIS — E11.22 TYPE 2 DIABETES MELLITUS WITH STAGE 3 CHRONIC KIDNEY DISEASE, WITHOUT LONG-TERM CURRENT USE OF INSULIN: ICD-10-CM

## 2018-09-22 DIAGNOSIS — N18.30 TYPE 2 DIABETES MELLITUS WITH STAGE 3 CHRONIC KIDNEY DISEASE, WITHOUT LONG-TERM CURRENT USE OF INSULIN: ICD-10-CM

## 2018-09-28 ENCOUNTER — HOSPITAL ENCOUNTER (EMERGENCY)
Facility: HOSPITAL | Age: 83
Discharge: HOME | End: 2018-09-28
Attending: EMERGENCY MEDICINE
Payer: MEDICARE

## 2018-09-28 VITALS
DIASTOLIC BLOOD PRESSURE: 84 MMHG | TEMPERATURE: 98 F | RESPIRATION RATE: 18 BRPM | WEIGHT: 145 LBS | OXYGEN SATURATION: 99 % | HEART RATE: 99 BPM | SYSTOLIC BLOOD PRESSURE: 118 MMHG | HEIGHT: 61 IN | BODY MASS INDEX: 27.38 KG/M2

## 2018-09-28 DIAGNOSIS — R10.9 ABDOMINAL PAIN, UNSPECIFIED ABDOMINAL LOCATION: Primary | ICD-10-CM

## 2018-09-28 DIAGNOSIS — N18.30 STAGE 3 CHRONIC KIDNEY DISEASE: ICD-10-CM

## 2018-09-28 LAB
ALBUMIN SERPL BCP-MCNC: 3.4 G/DL
ALP SERPL-CCNC: 87 U/L
ALT SERPL W/O P-5'-P-CCNC: 8 U/L
ANION GAP SERPL CALC-SCNC: 10 MMOL/L
AST SERPL-CCNC: 25 U/L
BACTERIA #/AREA URNS AUTO: ABNORMAL /HPF
BACTERIA #/AREA URNS AUTO: ABNORMAL /HPF
BASOPHILS # BLD AUTO: 0.05 K/UL
BASOPHILS NFR BLD: 0.7 %
BILIRUB SERPL-MCNC: 1.1 MG/DL
BILIRUB UR QL STRIP: NEGATIVE
BILIRUB UR QL STRIP: NEGATIVE
BUN SERPL-MCNC: 33 MG/DL
BUN SERPL-MCNC: 49 MG/DL (ref 6–30)
CALCIUM SERPL-MCNC: 9.4 MG/DL
CHLORIDE SERPL-SCNC: 105 MMOL/L (ref 95–110)
CHLORIDE SERPL-SCNC: 108 MMOL/L
CLARITY UR REFRACT.AUTO: ABNORMAL
CLARITY UR REFRACT.AUTO: ABNORMAL
CO2 SERPL-SCNC: 21 MMOL/L
COLOR UR AUTO: YELLOW
COLOR UR AUTO: YELLOW
CREAT SERPL-MCNC: 1.8 MG/DL
CREAT SERPL-MCNC: 1.8 MG/DL (ref 0.5–1.4)
DIFFERENTIAL METHOD: ABNORMAL
EOSINOPHIL # BLD AUTO: 0.1 K/UL
EOSINOPHIL NFR BLD: 1.5 %
ERYTHROCYTE [DISTWIDTH] IN BLOOD BY AUTOMATED COUNT: 18.7 %
EST. GFR  (AFRICAN AMERICAN): 29.8 ML/MIN/1.73 M^2
EST. GFR  (NON AFRICAN AMERICAN): 25.8 ML/MIN/1.73 M^2
GLUCOSE SERPL-MCNC: 95 MG/DL
GLUCOSE SERPL-MCNC: 95 MG/DL (ref 70–110)
GLUCOSE UR QL STRIP: NEGATIVE
GLUCOSE UR QL STRIP: NEGATIVE
HCT VFR BLD AUTO: 39.3 %
HCT VFR BLD CALC: 39 %PCV (ref 36–54)
HGB BLD-MCNC: 11.9 G/DL
HGB UR QL STRIP: NEGATIVE
HGB UR QL STRIP: NEGATIVE
HYALINE CASTS UR QL AUTO: 11 /LPF
HYALINE CASTS UR QL AUTO: 18 /LPF
IMM GRANULOCYTES # BLD AUTO: 0.03 K/UL
IMM GRANULOCYTES NFR BLD AUTO: 0.4 %
KETONES UR QL STRIP: NEGATIVE
KETONES UR QL STRIP: NEGATIVE
LEUKOCYTE ESTERASE UR QL STRIP: ABNORMAL
LEUKOCYTE ESTERASE UR QL STRIP: ABNORMAL
LIPASE SERPL-CCNC: 54 U/L
LYMPHOCYTES # BLD AUTO: 1.6 K/UL
LYMPHOCYTES NFR BLD: 23.4 %
MCH RBC QN AUTO: 27.6 PG
MCHC RBC AUTO-ENTMCNC: 30.3 G/DL
MCV RBC AUTO: 91 FL
MICROSCOPIC COMMENT: ABNORMAL
MICROSCOPIC COMMENT: ABNORMAL
MONOCYTES # BLD AUTO: 0.5 K/UL
MONOCYTES NFR BLD: 7.7 %
NEUTROPHILS # BLD AUTO: 4.5 K/UL
NEUTROPHILS NFR BLD: 66.3 %
NITRITE UR QL STRIP: NEGATIVE
NITRITE UR QL STRIP: NEGATIVE
NRBC BLD-RTO: 0 /100 WBC
PH UR STRIP: 6 [PH] (ref 5–8)
PH UR STRIP: 6 [PH] (ref 5–8)
PLATELET # BLD AUTO: 217 K/UL
PMV BLD AUTO: 11.6 FL
POC IONIZED CALCIUM: 0.96 MMOL/L (ref 1.06–1.42)
POC TCO2 (MEASURED): 29 MMOL/L (ref 23–29)
POTASSIUM BLD-SCNC: 6 MMOL/L (ref 3.5–5.1)
POTASSIUM SERPL-SCNC: 5.4 MMOL/L
PROT SERPL-MCNC: 8.3 G/DL
PROT UR QL STRIP: ABNORMAL
PROT UR QL STRIP: ABNORMAL
RBC # BLD AUTO: 4.31 M/UL
RBC #/AREA URNS AUTO: 2 /HPF (ref 0–4)
RBC #/AREA URNS AUTO: 2 /HPF (ref 0–4)
SAMPLE: ABNORMAL
SODIUM BLD-SCNC: 141 MMOL/L (ref 136–145)
SODIUM SERPL-SCNC: 139 MMOL/L
SP GR UR STRIP: 1.01 (ref 1–1.03)
SP GR UR STRIP: 1.01 (ref 1–1.03)
SQUAMOUS #/AREA URNS AUTO: 7 /HPF
SQUAMOUS #/AREA URNS AUTO: 7 /HPF
URN SPEC COLLECT METH UR: ABNORMAL
URN SPEC COLLECT METH UR: ABNORMAL
UROBILINOGEN UR STRIP-ACNC: 4 EU/DL
UROBILINOGEN UR STRIP-ACNC: 4 EU/DL
WBC # BLD AUTO: 6.84 K/UL
WBC #/AREA URNS AUTO: 3 /HPF (ref 0–5)
WBC #/AREA URNS AUTO: 3 /HPF (ref 0–5)

## 2018-09-28 PROCEDURE — 85025 COMPLETE CBC W/AUTO DIFF WBC: CPT

## 2018-09-28 PROCEDURE — 99284 EMERGENCY DEPT VISIT MOD MDM: CPT | Mod: ,,, | Performed by: PHYSICIAN ASSISTANT

## 2018-09-28 PROCEDURE — 96374 THER/PROPH/DIAG INJ IV PUSH: CPT

## 2018-09-28 PROCEDURE — 81001 URINALYSIS AUTO W/SCOPE: CPT | Mod: 91

## 2018-09-28 PROCEDURE — 83690 ASSAY OF LIPASE: CPT

## 2018-09-28 PROCEDURE — 99284 EMERGENCY DEPT VISIT MOD MDM: CPT | Mod: 25

## 2018-09-28 PROCEDURE — 80053 COMPREHEN METABOLIC PANEL: CPT

## 2018-09-28 PROCEDURE — 81001 URINALYSIS AUTO W/SCOPE: CPT

## 2018-09-28 PROCEDURE — 63600175 PHARM REV CODE 636 W HCPCS: Performed by: PHYSICIAN ASSISTANT

## 2018-09-28 RX ORDER — FUROSEMIDE 10 MG/ML
40 INJECTION INTRAMUSCULAR; INTRAVENOUS
Status: COMPLETED | OUTPATIENT
Start: 2018-09-28 | End: 2018-09-28

## 2018-09-28 RX ADMIN — FUROSEMIDE 40 MG: 10 INJECTION, SOLUTION INTRAMUSCULAR; INTRAVENOUS at 03:09

## 2018-09-28 NOTE — ED TRIAGE NOTES
"Patient reports abdominal pain X 6 weeks. Patient reports that pain feels like "gas" pain. Patient reports that the pain is intermittent. Patient denies that anything makes the pain better or worse. Patient denies N/V/D. Patient reports that she has a normal appetite. Patient denies dark, tarry stools or any benny red blood. Patient reports that she "strains" to have a BM. Patient also endorses back pain that just recently started within the past few days. Patient is A&Ox4 and following commands. Patient eating during triage.   "

## 2018-09-28 NOTE — ED PROVIDER NOTES
Encounter Date: 9/28/2018    SCRIBE #1 NOTE: I, Son Jeanette, am scribing for, and in the presence of,  Dr. Walsh. I have scribed the following portions of the note - the APC attestation.       History     Chief Complaint   Patient presents with    Abdominal Pain     abd pain x 6 weeks. denies nausea, vomiting, or diarrhea. pt also has fever blister to left lip     Patient is an 83-year-old female with history of atrial fibrillation on Eliquis, CKD, CAD, hypertension, type 2 diabetes presenting to the ER for evaluation of abdominal discomfort.  This has been ongoing for the last 6 weeks.  Patient has a it is diffuse abdominal aching.  She states that it is very mild.  She states she has a feeling bloated and has had increasing gas.  She has been burping and passing gas.  Last bowel movement was earlier this morning.  Unremarkable.  No blood in stool or black tarry stool.  Patient states that she has been feeling constipated and has had to strain lately.  Denies vomiting or nausea.  No prior abdominal surgeries.  Denies any UTI symptoms including dysuria hematuria.  No flank pain.  Denies any chest pain palpitations or shortness of breath. No fever chills at home.  Patient has been taking Tums and Mylanta at home.      The history is provided by the patient.     Review of patient's allergies indicates:  No Known Allergies  Past Medical History:   Diagnosis Date    A-fib     Arthritis     Asteroid hyalosis of left eye     Bilateral nonexudative age-related macular degeneration 6/3/2014    Breast cancer     Cataract     Chronic GERD 9/18/2017    CKD stage 3 due to type 2 diabetes mellitus     Coronary artery disease     Hypertension     Migraine headache     Mild nonproliferative diabetic retinopathy of both eyes 6/3/2014    Pneumonia     Type 2 diabetes mellitus with hyperglycemia     Vertigo      Past Surgical History:   Procedure Laterality Date    BREAST SURGERY      left lumpectomy    CARDIOVERSION  N/A 2017    [facility] CATH [facility]    CARPAL TUNNEL RELEASE      left    CATARACT EXTRACTION      vance     SECTION      COLONOSCOPY N/A 3/5/2015    [facility] Hobson [facility] ENDO (4TH FLR)    ESOPHAGOGASTRODUODENOSCOPY (EGD) N/A 2016    Performed by Rory Ogden [facility] at [facility] ENDO (2ND FLR)    EYE SURGERY      cataracts bilaterally    HYSTERECTOMY      partial    TRANSESOPHAGEAL ECHOCARDIOGRAM (REJI) N/A 2017    [facility] Ruma [facility] CATH LAB    TRANSESOPHAGEAL ECHOCARDIOGRAM (REJI) N/A 2017    Performed by Angelique Taylor [facility] at [facility] CATH LAB     Family History   Problem Relation Age of Onset    Cancer Mother         stomach    Heart disease Mother     Diabetes Father     Hypertension Father     Blindness Brother     Diabetes Brother     Hypertension Brother     Cataracts Sister     Diabetes Sister     Diabetes Brother     Diabetes Brother     Diabetes Brother     No Known Problems Daughter     No Known Problems Son     No Known Problems Daughter     Amblyopia Neg Hx     Glaucoma Neg Hx     Macular degeneration Neg Hx     Strabismus Neg Hx     Retinal detachment Neg Hx      Social History     Tobacco Use    Smoking status: Never Smoker    Smokeless tobacco: Never Used   Substance Use Topics    Alcohol use: Yes    Drug use: No     Review of Systems   Constitutional: Negative for chills and fever.   HENT: Negative for congestion.    Eyes: Negative for photophobia and visual disturbance.   Respiratory: Negative for cough and shortness of breath.    Cardiovascular: Negative for chest pain and palpitations.   Gastrointestinal: Positive for abdominal pain. Negative for blood in stool, constipation, diarrhea, nausea and vomiting.   Genitourinary: Negative for flank pain.   Musculoskeletal: Negative for myalgias.   Skin: Negative for rash and wound.   Allergic/Immunologic: Negative for  immunocompromised state.   Neurological: Negative for dizziness, syncope and weakness.   Hematological: Does not bruise/bleed easily.   Psychiatric/Behavioral: Negative for confusion.       Physical Exam     Initial Vitals [09/28/18 1215]   BP Pulse Resp Temp SpO2   113/73 98 18 97.8 °F (36.6 °C) 99 %      MAP       --         Physical Exam    Constitutional: She appears well-developed and well-nourished. She is not diaphoretic. No distress.   HENT:   Head: Normocephalic and atraumatic.   Eyes: Conjunctivae and EOM are normal. Pupils are equal, round, and reactive to light.   Neck: Neck supple.   Cardiovascular: Normal rate, normal heart sounds and intact distal pulses. An irregularly irregular rhythm present.    Pulmonary/Chest: Breath sounds normal. No respiratory distress.   Abdominal: Soft. Normal appearance. She exhibits no distension. There is generalized tenderness (mild ). There is no rigidity, no rebound, no guarding and no CVA tenderness.   Neurological: She is alert and oriented to person, place, and time.   Skin: Skin is warm and dry.         ED Course   Procedures  Labs Reviewed   URINALYSIS, REFLEX TO URINE CULTURE - Abnormal; Notable for the following components:       Result Value    Appearance, UA Hazy (*)     Protein, UA 1+ (*)     Leukocytes, UA 1+ (*)     All other components within normal limits    Narrative:     Preferred Collection Type->Urine, Clean CatchYELLOW & GRAY TOPS    CBC W/ AUTO DIFFERENTIAL - Abnormal; Notable for the following components:    Hemoglobin 11.9 (*)     MCHC 30.3 (*)     RDW 18.7 (*)     All other components within normal limits   COMPREHENSIVE METABOLIC PANEL - Abnormal; Notable for the following components:    Potassium 5.4 (*)     CO2 21 (*)     BUN, Bld 33 (*)     Creatinine 1.8 (*)     Albumin 3.4 (*)     Total Bilirubin 1.1 (*)     ALT 8 (*)     eGFR if  29.8 (*)     eGFR if non  25.8 (*)     All other components within normal  limits   URINALYSIS, REFLEX TO URINE CULTURE - Abnormal; Notable for the following components:    Appearance, UA Hazy (*)     Protein, UA 1+ (*)     Leukocytes, UA 1+ (*)     All other components within normal limits    Narrative:     Preferred Collection Type->Urine, Clean Catch  ORANGE CAP CUP    URINALYSIS MICROSCOPIC - Abnormal; Notable for the following components:    Hyaline Casts, UA 11 (*)     All other components within normal limits    Narrative:     Preferred Collection Type->Urine, Clean CatchYELLOW & GRAY TOPS    URINALYSIS MICROSCOPIC - Abnormal; Notable for the following components:    Hyaline Casts, UA 18 (*)     All other components within normal limits    Narrative:     Preferred Collection Type->Urine, Clean Catch  ORANGE CAP CUP    ISTAT PROCEDURE - Abnormal; Notable for the following components:    POC BUN 49 (*)     POC Creatinine 1.8 (*)     POC Potassium 6.0 (*)     POC Ionized Calcium 0.96 (*)     All other components within normal limits   LIPASE   ISTAT CHEM8          Imaging Results          CT Abdomen Pelvis  Without Contrast (Final result)  Result time 09/28/18 15:14:51    Final result by Ean Adkins MD (09/28/18 15:14:51)                 Impression:      1. Trace volume nonspecific ascites throughout the abdomen and pelvis.  Correlate clinically.  2. Otherwise, no acute process seen within the abdomen or pelvis on this noncontrast CT.  3. Findings suggesting mild CHF pattern including cardiomegaly with trace left pleural effusion and bibasilar patchy mosaic attenuation suggesting pulmonary edema.  4. Hepatomegaly.  5. Cholelithiasis.  6. Diverticulosis coli without definite focal diverticulitis.  7. Severe systemic atherosclerosis, coronary atherosclerosis, and aortic valvular calcification.  8. Hysterectomy.  9. Few additional findings as above.      Electronically signed by: Ean Adkins MD  Date:    09/28/2018  Time:    15:14             Narrative:    EXAMINATION:  CT ABDOMEN  PELVIS WITHOUT CONTRAST    CLINICAL HISTORY:  abdominal pain;    TECHNIQUE:  Low dose axial images, sagittal and coronal reformations were obtained from the lung bases to the pubic symphysis.  No contrast media was administered.    COMPARISON:  Chest radiograph 07/31/2018 and CT renal stone study 09/25/2016    FINDINGS:  Base of the heart is enlarged without significant pericardial fluid.  Coronary arterial, aortic and mitral annular and also aortic valvular calcifications are noted.  Included lung bases show mild dependent atelectasis, trace left pleural effusion and patchy nonspecific pulmonary mosaic attenuation which can be seen with small vessels or small airways disease.    Cholelithiasis.  No biliary ductal dilatation.  Liver is enlarged without focal process seen.  Pancreas is mildly atrophic similar to prior.  Noncontrast appearance of the spleen, stomach, duodenum and bilateral adrenal glands are within normal limits.    Bilateral kidneys are stable in size, shape and location.  No radiodense calculus seen within the collecting systems on either side or urinary bladder, noting mild scattered renal vascular calcifications bilaterally, left more so than the right.  No hydronephrosis.  Grossly stable bilateral nonspecific perinephric stranding.  Urinary bladder is within normal limits.  Hysterectomy.  No adnexal mass seen.  Pelvic phleboliths noted.    Trace volume nonspecific ascites tracking along both pericolic gutters to the mesenteric root and pelvis.    No free air or lymphadenopathy.  Moderate to advanced scattered atherosclerosis of the aorta and its branch vessels.  Abdominal aorta is atherosclerotic and minimally ectatic but not aneurysmal.    Appendix is not identified; however, no pericecal inflammatory change.  Terminal ileum is within normal limits.  Scattered colonic diverticula without focal diverticulitis.  No evidence of bowel obstruction or inflammation.    Included osseous structures  appear grossly stable without acute or destructive process seen.                                       APC / Resident Notes:   Patient was seen in the ER promptly upon arrival.  She is afebrile, no acute distress. Physical examination revealed mild diffuse tenderness on palpation over the abdomen.  Abdomen is was soft, nondistended. No CVA tenderness and exam.  IV access established, labs ordered.  Patient did not want any pain medication in ED.    Laboratory studies show normal white count of 6.84.  Hemoglobin stable.  The chemistries reveal mildly elevated potassium of 5.4.  Patient was given IV Lasix in ED.   creatinine 1.8 and BUN 22 similar to baseline.  The lipase is unremarkable. Urinalysis is unremarkable for infection or blood.    CT of the abdomen reveals trace volume nonspecific ascites throughout the abdomen pelvis.  No clinical evidence of this on physical examination. No acute process noted in the abdomen otherwise.  Patient does have some cholelithiasis as well as diverticulosis without evidence of diverticulitis.    On reassessment patient is resting comfortably.  Today's workup was fairly unremarkable. Will give a GI referral for close follow-up.  Patient was agreeable to this treatment plan.  She is stable for discharge at this time. The care of this patient was overseen by attending physician who agrees with treatment, plan, and disposition.         Scribe Attestation:   Scribe #1: I performed the above scribed service and the documentation accurately describes the services I performed. I attest to the accuracy of the note.    Attending Attestation:     Physician Attestation Statement for NP/PA:   I discussed this assessment and plan of this patient with the NP/PA, but I did not personally examine the patient. The face to face encounter was performed by the NP/PA.    Other NP/PA Attestation Additions:    History of Present Illness: 82 y/o woman presents for evaluation of abdominal pain for greater  than 2 weeks duration.                      Clinical Impression:   The primary encounter diagnosis was Abdominal pain, unspecified abdominal location. A diagnosis of Stage 3 chronic kidney disease was also pertinent to this visit.      Disposition:   Disposition: Discharged  Condition: Stable                        Vania De La Torre PA-C  09/28/18 2398

## 2018-09-30 DIAGNOSIS — I48.19 PERSISTENT ATRIAL FIBRILLATION: Primary | ICD-10-CM

## 2018-10-03 DIAGNOSIS — L29.9 ITCHING: Primary | ICD-10-CM

## 2018-10-03 NOTE — TELEPHONE ENCOUNTER
----- Message from Amee Qureshi sent at 10/3/2018  4:21 PM CDT -----  Contact: self  Patient ask for a call in regards to needing a refill on her doxepin (SINEQUAN) 10 MG capsule and diphenhydrAMINE-zinc acetate 1-0.1% (BENADRYL) cream sent to [facility] Patient ask for a call at

## 2018-10-04 RX ORDER — DOXEPIN HYDROCHLORIDE 10 MG/1
10 CAPSULE ORAL 3 TIMES DAILY
Qty: 90 CAPSULE | Refills: 3 | OUTPATIENT
Start: 2018-10-04 | End: 2019-01-01

## 2018-10-04 NOTE — TELEPHONE ENCOUNTER
Staff to inform the patient that Dr. Macdonald is out of the office.  These medications that she is requesting should be filled by her primary care doctor if they feel they are appropriate.  Will give one more refill but will need to get further refills from primary doctor

## 2018-10-05 ENCOUNTER — TELEPHONE (OUTPATIENT)
Dept: RHEUMATOLOGY | Facility: CLINIC | Age: 83
End: 2018-10-05

## 2018-10-05 RX ORDER — DOXEPIN HYDROCHLORIDE 10 MG/1
10 CAPSULE ORAL 3 TIMES DAILY
Qty: 90 CAPSULE | Refills: 3 | OUTPATIENT
Start: 2018-10-05 | End: 2019-01-01

## 2018-10-05 RX ORDER — DOXEPIN HYDROCHLORIDE 10 MG/1
10 CAPSULE ORAL 3 TIMES DAILY
Qty: 90 CAPSULE | Refills: 0 | Status: ON HOLD | OUTPATIENT
Start: 2018-10-05 | End: 2018-11-04

## 2018-10-05 NOTE — TELEPHONE ENCOUNTER
Will give one refill of doxepin and benadryl.  Patient to obtain further refills from primary doctor for non-rheumatology meds

## 2018-10-10 NOTE — PROGRESS NOTES
IMPLANTABLE DEVICE EDUCATION CHECKLIST    PRE PROCEDURE TESTING     October 22, 2018 at 10:00 am   Pre-procedure labs have been ordered for you at:  [facility]  · Be sure to arrive at your scheduled time. YOU DO NOT HAVE TO FAST FOR THIS LAB WORK!      MEDICATION INSTRUCTIONS:  · HOLD Eliquis TWO days prior to procedure.  Your last dose should be taken on 10/23/18.  · HOLD Januvia on the morning of your procedure (due to fasting).  Last dose should be taken on 10/25/18.  · You make take your other usual morning medications with a sip of water on the day of your procedure.     DAY OF PROCEDURE    October 26, 2018 at 6:00 am  Report to Cardiology Waiting Room on the 3rd floor of the Hospital  · Wash your chest with HIBICLENS OR AN ANTIBACTERIAL SOAP (such as Dial) on the night before and the morning of your procedure.  · Please do not wear makeup, especially mascara, when arriving for your procedure.  · Do not eat or drink anything after 12 mn on the night before your procedure      Directions to Cardiology Waiting Room  If you park in the Parking Garage:  Take elevators to the 2nd floor  Walk up ramp and turn right by Gold Elevators  Take elevator to the 3rd floor  Upon exiting the elevator, turn away from the clinic areas  Walk long brown around to front of hospital to area with windows overlooking [facility]  Check in at Reception Desk  OR  If family is dropping you off:  Have them drop you off at the front of the Hospital  (Near the ER, where all the flags are hung).  Take the E elevators to the 3rd floor.  Check in at the Reception Desk in the waiting room.    · YOU WILL BE SPENDING THE NIGHT AFTER YOUR PROCEDURE  · YOU WILL NEED SOMEONE TO DRIVE YOU HOME THE DAY AFTER YOUR PROCEDURE  · YOUR PAIN DURING YOUR PROCEDURE WILL BE MANAGED BY THE ANESTHESIA TEAM    Any need to reschedule or cancel procedures, or any questions regarding your procedures should be addressed directly with the  Arrhythmia Department Nurses at the following phone number: [phone]

## 2018-10-11 ENCOUNTER — OFFICE VISIT (OUTPATIENT)
Dept: INTERNAL MEDICINE | Facility: CLINIC | Age: 83
End: 2018-10-11
Payer: MEDICARE

## 2018-10-11 VITALS
HEIGHT: 61 IN | DIASTOLIC BLOOD PRESSURE: 62 MMHG | WEIGHT: 152.31 LBS | BODY MASS INDEX: 28.76 KG/M2 | SYSTOLIC BLOOD PRESSURE: 134 MMHG

## 2018-10-11 DIAGNOSIS — K21.9 CHRONIC GERD: ICD-10-CM

## 2018-10-11 DIAGNOSIS — E78.49 OTHER HYPERLIPIDEMIA: ICD-10-CM

## 2018-10-11 DIAGNOSIS — N18.30 TYPE 2 DIABETES MELLITUS WITH STAGE 3 CHRONIC KIDNEY DISEASE, WITHOUT LONG-TERM CURRENT USE OF INSULIN: ICD-10-CM

## 2018-10-11 DIAGNOSIS — M85.89 OSTEOPENIA OF MULTIPLE SITES: ICD-10-CM

## 2018-10-11 DIAGNOSIS — N18.30 CKD (CHRONIC KIDNEY DISEASE) STAGE 3, GFR 30-59 ML/MIN: ICD-10-CM

## 2018-10-11 DIAGNOSIS — I10 ESSENTIAL HYPERTENSION: ICD-10-CM

## 2018-10-11 DIAGNOSIS — L29.9 PRURITUS: ICD-10-CM

## 2018-10-11 DIAGNOSIS — E11.22 TYPE 2 DIABETES MELLITUS WITH STAGE 3 CHRONIC KIDNEY DISEASE, WITHOUT LONG-TERM CURRENT USE OF INSULIN: ICD-10-CM

## 2018-10-11 DIAGNOSIS — Z12.11 SCREENING FOR COLON CANCER: ICD-10-CM

## 2018-10-11 DIAGNOSIS — I50.22 CHRONIC SYSTOLIC CONGESTIVE HEART FAILURE: ICD-10-CM

## 2018-10-11 DIAGNOSIS — J32.0 CHRONIC MAXILLARY SINUSITIS: ICD-10-CM

## 2018-10-11 DIAGNOSIS — I35.0 SEVERE AORTIC STENOSIS: ICD-10-CM

## 2018-10-11 DIAGNOSIS — E11.3293 CONTROLLED TYPE 2 DIABETES MELLITUS WITH BOTH EYES AFFECTED BY MILD NONPROLIFERATIVE RETINOPATHY WITHOUT MACULAR EDEMA, WITHOUT LONG-TERM CURRENT USE OF INSULIN: ICD-10-CM

## 2018-10-11 DIAGNOSIS — I48.19 PERSISTENT ATRIAL FIBRILLATION: ICD-10-CM

## 2018-10-11 PROCEDURE — 1101F PT FALLS ASSESS-DOCD LE1/YR: CPT | Mod: CPTII,,, | Performed by: FAMILY MEDICINE

## 2018-10-11 PROCEDURE — 3078F DIAST BP <80 MM HG: CPT | Mod: CPTII,,, | Performed by: FAMILY MEDICINE

## 2018-10-11 PROCEDURE — 3075F SYST BP GE 130 - 139MM HG: CPT | Mod: CPTII,,, | Performed by: FAMILY MEDICINE

## 2018-10-11 PROCEDURE — 99213 OFFICE O/P EST LOW 20 MIN: CPT | Mod: S$PBB,,, | Performed by: FAMILY MEDICINE

## 2018-10-11 PROCEDURE — 99213 OFFICE O/P EST LOW 20 MIN: CPT | Mod: PBBFAC | Performed by: FAMILY MEDICINE

## 2018-10-11 PROCEDURE — 99999 PR PBB SHADOW E&M-EST. PATIENT-LVL III: CPT | Mod: PBBFAC,,, | Performed by: FAMILY MEDICINE

## 2018-10-11 RX ORDER — TRIAMCINOLONE ACETONIDE 1 MG/G
CREAM TOPICAL 2 TIMES DAILY
Qty: 80 G | Refills: 0 | Status: SHIPPED | OUTPATIENT
Start: 2018-10-11 | End: 2018-11-23 | Stop reason: SDUPTHER

## 2018-10-11 NOTE — PROGRESS NOTES
ABLATION EDUCATION CHECKLIST    PRE-PROCEDURE TESTING:    December 3, 2018 at 10:00 am    Pre-Procedure labs have been ordered for you at:  [facility]   · Be sure to arrive at your scheduled time. YOU DO NOT HAVE TO FAST FOR THIS LAB WORK!    DAY OF PROCEDURE:    December 7, 2018 at 6:00 am  Report to Cardiology Waiting Room on 3rd floor of the Hospital    · Do not eat or drink anything after: 12 mn on the night before your procedure  · Please do not wear makeup (especially mascara) when arriving for your procedure    Medications:   · HOLD Eliquis & Januvia on the morning of your procedure.   · You may take your other usual morning medications with a sip of water on the day of your procedure.       Directions to the Cardiology Waiting Room  If you park in the Parking Garage:  Take elevators to the 2nd floor  Walk up ramp and turn right by Gold Elevators  Take elevator to the 3rd floor  Upon exiting the elevator, turn away from the clinic areas  Walk long brown around to front of hospital to area with windows overlooking [facility]  Check in at Reception Desk  OR  If family is dropping you off:  Have them drop you off at the front of the Hospital  (Near the ER, where all the flags are hung).  Take the E elevators to the 3rd floor.  Check in at the Reception Desk in the waiting room.    · You will be spending the night after your procedure.  · You will need someone to drive you home the day after your procedure.  · Your pain during your procedure will be managed by the anesthesia team.     Any need to reschedule or cancel procedures, or any questions regarding your procedures should be addressed directly with the Arrhythmia Department Nurses at the following phone number: [phone]

## 2018-10-12 NOTE — PROGRESS NOTES
Subjective:      Patient ID: Dacia Martínez is a 83 y.o. female.    Chief Complaint: Establish Care      HPI:  Dacia Martínez is an 83 year old female with atherosclerosis of aorta, persistent atrial fibrillation, severe aortic stenosis, anemia, chronic pain syndrome, chronic sinusitis, chronic systolic congestive heart failure, chronic kidney disease stage 3, history of colon polyps, constipation, dilated cardiomyopathy, diabetes mellitus type 2, gastroesophageal reflux disease, gout, hyperlipidemia, hypertension, hearing loss, intracranial atherosclerosis, long term use of opiates, left bundle branch block, cardiac murmur, microalbuminuria, osteoarthritis, osteopenia, and vertigo who presents to establish care and requesting medication refill.    Aortic stenosis, severe:  States she is easily fatigued and does experience intermittent dizziness.  Last echo 6/21/18 showing low flow, low gradient aortic valve stenosis with reduced EF ((ROBBIE 0.74 cm2, AVAi 0.44 cm2/m2, peak aortic jet velocity 1.5 m/s,MG 5 mmHg, EF 33%,SVi 11 mL/m2).     Atrial fibrillation, persistent:  Taking amiodarone 200 mg by mouth daily and Eliquis 2.5 mg by mouth twice daily.  Sees Dr. Burt, cardiology; to have ablation done in the future.    Chronic systolic congestive heart failure:  Taking metoprolol tartrate 50 mg by mouth twice daily and furosemide 40 mg by mouth daily.  Weighed every other day.  Last echocardiogram June 2018 showing EF 30-35%.  Did present to the emergency department 2 weeks ago requiring IV administration of Lasix.  Tries to adhere to a low sodium diet.  Denies associated wheezing, edema, or shortness of breath currently.  Last echo 6/21/18 with moderately depressed left ventricular systolic function (EF 30-35%).     Chronic kidney disease stage 3:  CMP 9/28/18 with Cr 1.8 and GFR 29.8, mildly worse from previous.    Chronic pain syndrome:  Symptoms stable.  Takes Norco 5-325 mg by mouth every 6 hours as needed with  adequate control of her symptoms.    Chronic pruritus:  Endorses chronic itching to her back.  Using triamcinolone cream for this, requests refills.  Also takes doxepin 10 mg by mouth three times daily.  Symptoms stable.    Diabetes mellitus type 2:  Last A1C 6.2% (8/1/18).  Taking Januvia 50 mg by mouth daily.  Checks blood glucose levels every other day with values typically around the 100's.  Last eye exam 2/23/18.  Endorses some numbness to her hands but does also have carpal tunnel syndrome.  See's Dr. Horner, endocrinology.    Gout:  Taking allopurinol 150 mg by mouth daily.  Symptoms stable.    Hypertension:  Taking metoprolol tartrate 50 mg by mouth twice daily.  Systolic blood pressure mildly above goal today.    Osteopenia:  Taking vitamin D3 2000 units daily.  Last DEXA 3/30/17.    Also complaining of a cough productive of yellow/green mucous and rhinorrhea.  Denies associated sore throat or post-nasal drip.  States the mucous has an odor to it.  Using Flonase without relief.    Health Care Maintenance:  Influenza vaccination:  Refused today.  Last tetanus booster:  Thinks she has had this within the past 10 years.  Last foot exam:  1/9/18  Last eye exam:  2/23/18  Last colonoscopy:  3/5/15; to repeat 2 years from previous  Last DEXA:  3/30/17      Past Medical History:   Diagnosis Date    A-fib     Arthritis     Asteroid hyalosis of left eye     Bilateral nonexudative age-related macular degeneration 6/3/2014    Breast cancer     Cataract     Chronic GERD 9/18/2017    CKD stage 3 due to type 2 diabetes mellitus     Coronary artery disease     Hypertension     Migraine headache     Mild nonproliferative diabetic retinopathy of both eyes 6/3/2014    Pneumonia     Type 2 diabetes mellitus with hyperglycemia     Vertigo        Past Surgical History:   Procedure Laterality Date    BREAST SURGERY      left lumpectomy    CARDIOVERSION N/A 12/20/2017    Performed by Jacques [facility]urt [facility] at  [facility] CATH LAB    CARPAL TUNNEL RELEASE      left    CATARACT EXTRACTION      vance     SECTION      COLONOSCOPY N/A 3/5/2015    Performed by Andrés Hobson MD at [facility] ENDO (4TH FLR)    ESOPHAGOGASTRODUODENOSCOPY (EGD) N/A 2016    Performed by Rory Ogden MD at [facility] ENDO (2ND FLR)    EYE SURGERY      cataracts bilaterally    HYSTERECTOMY      partial    TRANSESOPHAGEAL ECHOCARDIOGRAM (REJI) N/A 2017    Performed by Ruma Long MD at [facility] CATH LAB    TRANSESOPHAGEAL ECHOCARDIOGRAM (REJI) N/A 2017    Performed by Angelique Taylor MD at [facility] CATH LAB       Family History   Problem Relation Age of Onset    Cancer Mother         stomach    Heart disease Mother     Diabetes Father     Hypertension Father     Blindness Brother     Diabetes Brother     Hypertension Brother     Cataracts Sister     Diabetes Sister     Diabetes Brother     Diabetes Brother     Diabetes Brother     No Known Problems Daughter     No Known Problems Son     No Known Problems Daughter     Amblyopia Neg Hx     Glaucoma Neg Hx     Macular degeneration Neg Hx     Strabismus Neg Hx     Retinal detachment Neg Hx        Social History     Socioeconomic History    Marital status: Single     Spouse name: None    Number of children: 4    Years of education: None    Highest education level: None   Social Needs    Financial resource strain: None    Food insecurity - worry: None    Food insecurity - inability: None    Transportation needs - medical: None    Transportation needs - non-medical: None   Occupational History    None   Tobacco Use    Smoking status: Never Smoker    Smokeless tobacco: Never Used   Substance and Sexual Activity    Alcohol use: Yes    Drug use: No    Sexual activity: Not Currently   Other Topics Concern    None   Social History Narrative    Family supportive.     Granddaughter helps set out her medications in pillbox, helps check her blood sugar.       Review  "of Systems   Constitutional: Negative for chills and fever.   HENT: Positive for rhinorrhea. Negative for congestion, hearing loss, nosebleeds, sore throat and trouble swallowing.    Eyes: Negative for pain and visual disturbance.   Respiratory: Positive for cough. Negative for shortness of breath and wheezing.    Cardiovascular: Negative for chest pain and palpitations.   Gastrointestinal: Negative for abdominal distention, abdominal pain, constipation, diarrhea, nausea and vomiting.   Genitourinary: Negative for decreased urine volume, difficulty urinating, dysuria, hematuria and urgency.   Musculoskeletal: Positive for arthralgias. Negative for myalgias.   Skin: Negative for color change and rash.   Neurological: Positive for numbness. Negative for tremors, weakness, light-headedness and headaches.   Psychiatric/Behavioral: Negative for agitation, behavioral problems and confusion. The patient is not nervous/anxious.      Objective:     Vitals:    10/11/18 1036   BP: 134/62   Weight: 69.1 kg (152 lb 5.4 oz)   Height: 5' 1" (1.549 m)       Physical Exam   Constitutional: She is oriented to person, place, and time. She appears well-developed and well-nourished.   HENT:   Head: Normocephalic and atraumatic.   Right Ear: External ear normal.   Left Ear: External ear normal.   Nose: Nose normal.   Mouth/Throat: Oropharynx is clear and moist.   Eyes: Conjunctivae and EOM are normal. Pupils are equal, round, and reactive to light.   Neck: Normal range of motion. Neck supple. No tracheal deviation present.   Cardiovascular: Normal rate and normal heart sounds. An irregularly irregular rhythm present. Exam reveals no gallop and no friction rub.   No murmur heard.  Pulmonary/Chest: Effort normal and breath sounds normal. No respiratory distress. She has no wheezes. She has no rales.   Abdominal: Soft. Bowel sounds are normal. She exhibits no distension. There is no tenderness. There is no rebound and no guarding. "   Musculoskeletal: Normal range of motion. She exhibits no edema or deformity.   Lymphadenopathy:     She has no cervical adenopathy.   Neurological: She is alert and oriented to person, place, and time. Coordination normal.   Skin: Skin is warm and dry.   Psychiatric: She has a normal mood and affect. Her behavior is normal.   Nursing note and vitals reviewed.     Assessment:      1. Persistent atrial fibrillation    2. Chronic systolic congestive heart failure    3. Severe aortic stenosis    4. CKD (chronic kidney disease) stage 3, GFR 30-59 ml/min    5. 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, without long-term current use of insulin    6. Type 2 diabetes mellitus with stage 3 chronic kidney disease, without long-term current use of insulin    7. Chronic GERD    8. Essential hypertension    9. Other hyperlipidemia    10. Osteopenia of multiple sites    11. Chronic maxillary sinusitis    12. Screening for colon cancer    13. Pruritus      Plan:   Dacia was seen today for establish care.    Diagnoses and all orders for this visit:    Persistent atrial fibrillation        -     Stable, continue current regimen and regular follow up with cardiology.    Chronic systolic congestive heart failure        -     Stable, continue current regimen and regular follow up with cardiology.  Recommended patient weigh herself daily.    Severe aortic stenosis        -     Stable, recommended patient follow up with her cardiologist regarding this issue.  Continue to monitor symptoms.    CKD (chronic kidney disease) stage 3, GFR 30-59 ml/min        -     Gradually worsening.  Control risk factors including HTN and DM.  Continue to monitor with routine blood work.    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, without long-term current use of insulin        -     Within goal per last  A1C.  Continue current regimen and regular follow up with endocrinology.    Type 2 diabetes mellitus with stage 3 chronic kidney disease, without long-term current use of insulin        -     Within goal per last A1C.  Continue current regimen and regular follow up with endocrinology.    Chronic GERD        -     Stable.    Essential hypertension        -     Mildly above goal today.  Counseled patient on the importance of a low sodium diet, increased daily aerobic exercise, and weight loss.  Continue current regimen.    Other hyperlipidemia        -     Will recheck lipid in 1 year.    Osteopenia of multiple sites        -     Continue supplemental vitamin D; recheck DEXA March 2019.    Chronic maxillary sinusitis        -     Continue Flonase.  Recommended Mucinex PRN.  Return to clinic if no improvement.    Screening for colon cancer  -     Case request GI: COLONOSCOPY    Pruritus  -     Stable, continue triamcinolone acetonide 0.1% (KENALOG) 0.1 % cream; APPLY TOPICALLY TWO TIMES A DAY, refilled.

## 2018-10-16 ENCOUNTER — TELEPHONE (OUTPATIENT)
Dept: RHEUMATOLOGY | Facility: CLINIC | Age: 83
End: 2018-10-16

## 2018-10-16 RX ORDER — HYDROCODONE BITARTRATE AND ACETAMINOPHEN 5; 325 MG/1; MG/1
1 TABLET ORAL EVERY 6 HOURS PRN
COMMUNITY
End: 2018-10-16 | Stop reason: SDUPTHER

## 2018-10-16 RX ORDER — HYDROCODONE BITARTRATE AND ACETAMINOPHEN 5; 325 MG/1; MG/1
1 TABLET ORAL EVERY 6 HOURS PRN
Qty: 90 TABLET | Refills: 0 | Status: SHIPPED | OUTPATIENT
Start: 2018-10-16 | End: 2018-11-01 | Stop reason: SDUPTHER

## 2018-10-16 NOTE — TELEPHONE ENCOUNTER
----- Message from Isabelle Power MA sent at 10/15/2018  4:35 PM CDT -----  Contact: patient  I called her and told her it would possibly be on Tuesday once you review and print the Rx.  Isabelle  ----- Message -----  From: Nicho Sr  Sent: 10/15/2018  11:58 AM  To: [facility] Staff    Please call pt at [phone] if any questions. Patient would like to  refill for Norco on 10/18/18    Thank you

## 2018-10-18 ENCOUNTER — OFFICE VISIT (OUTPATIENT)
Dept: OTOLARYNGOLOGY | Facility: CLINIC | Age: 83
End: 2018-10-18
Payer: MEDICARE

## 2018-10-18 VITALS — HEART RATE: 95 BPM | DIASTOLIC BLOOD PRESSURE: 89 MMHG | TEMPERATURE: 97 F | SYSTOLIC BLOOD PRESSURE: 127 MMHG

## 2018-10-18 DIAGNOSIS — K08.109 MISSING TEETH, ACQUIRED: ICD-10-CM

## 2018-10-18 DIAGNOSIS — R43.9 DYSOSMIA: ICD-10-CM

## 2018-10-18 DIAGNOSIS — J01.21 ACUTE RECURRENT ETHMOIDAL SINUSITIS: Primary | ICD-10-CM

## 2018-10-18 DIAGNOSIS — J34.89 RHINORRHEA: ICD-10-CM

## 2018-10-18 DIAGNOSIS — M1A.09X0 IDIOPATHIC CHRONIC GOUT OF MULTIPLE SITES WITHOUT TOPHUS: Chronic | ICD-10-CM

## 2018-10-18 DIAGNOSIS — R44.8 FACIAL PRESSURE: ICD-10-CM

## 2018-10-18 PROCEDURE — 99999 PR PBB SHADOW E&M-EST. PATIENT-LVL IV: CPT | Mod: PBBFAC,,, | Performed by: OTOLARYNGOLOGY

## 2018-10-18 PROCEDURE — 99213 OFFICE O/P EST LOW 20 MIN: CPT | Mod: S$PBB,,, | Performed by: OTOLARYNGOLOGY

## 2018-10-18 PROCEDURE — 3079F DIAST BP 80-89 MM HG: CPT | Mod: CPTII,,, | Performed by: OTOLARYNGOLOGY

## 2018-10-18 PROCEDURE — 3074F SYST BP LT 130 MM HG: CPT | Mod: CPTII,,, | Performed by: OTOLARYNGOLOGY

## 2018-10-18 PROCEDURE — 99214 OFFICE O/P EST MOD 30 MIN: CPT | Mod: PBBFAC | Performed by: OTOLARYNGOLOGY

## 2018-10-18 PROCEDURE — 1101F PT FALLS ASSESS-DOCD LE1/YR: CPT | Mod: CPTII,,, | Performed by: OTOLARYNGOLOGY

## 2018-10-18 RX ORDER — AMOXICILLIN 500 MG/1
500 CAPSULE ORAL 3 TIMES DAILY
Qty: 21 CAPSULE | Refills: 0 | Status: ON HOLD | OUTPATIENT
Start: 2018-10-18 | End: 2018-11-04

## 2018-10-18 NOTE — PATIENT INSTRUCTIONS
Trial of Flonase NSS; 1 spray @ lateral nostril twice a day  Rx for Amoxil 500 mg ; take TID x one week   Pt. may use AYR nasal mist prn

## 2018-10-18 NOTE — PROGRESS NOTES
CC:  HPI:Ms. Martínez is an 83-year-old  female with a history of atrial fibrillation in seasonal allergic rhinitis who presents in a wheelchair who  accompanied by her daughter today.   She is scheduled for a cardiac pacemaker placement procedure late next week.  She will try to complete pre-op blood work later today either the memory clinic or here.  She indicates facial pressure symptoms and nasal congestion symptoms consistent with a recurrent sinus infection.    Her major complaint is a malodor emanating from her nose or sinuses especially in the morning time.  These  symptoms usually respond to a course of antibiotics.  She has been utilizing Flonase in the morning time.   She indicates clear watery discharge from her nose. She is sucking on a mint as I enter the room today ( to mask the smell/malodor? ) .  I cannot appreciate any unusual smell emanating from the patient's nose or throat today.    She was evaluated by a family medicine practitioner 10/11/2018 establishment of care visit.  She was diagnosed with persistent atrial fibrillation, chronic systolic congestive heart failure, severe aortic stenosis, CKD stage 3, controlled type 2 diabetes with retinopathy, chronic GERD, essential hypertension, hyperlipidemia, osteopenia multiple sites and chronic sinusitis.  Her medication list includes Eliquis and amiodarone.    Past Medical History:   Diagnosis Date    A-fib     Arthritis     Asteroid hyalosis of left eye     Bilateral nonexudative age-related macular degeneration 6/3/2014    Breast cancer     Cataract     Chronic GERD 9/18/2017    CKD stage 3 due to type 2 diabetes mellitus     Coronary artery disease     Hypertension     Migraine headache     Mild nonproliferative diabetic retinopathy of both eyes 6/3/2014    Pneumonia     Type 2 diabetes mellitus with hyperglycemia     Vertigo      Current Outpatient Medications on File Prior to Visit   Medication Sig Dispense Refill     ACCU-CHEK FASTCLIX Misc 1 lancet by Misc.(Non-Drug; Combo Route) route 3 (three) times daily. Pt to test blood glucose up to 3 times daily. 100 each 11    allopurinol (ZYLOPRIM) 100 MG tablet Resume allopurinol 1/2 pill until 1/5/18, 1 pill daily for 5 days, then 1.5 pills daily (Patient taking differently: Take 150 mg by mouth once daily. ) 60 tablet 3    amiodarone (PACERONE) 200 MG Tab Take 1 tablet (200 mg total) by mouth once daily. 30 tablet 2    apixaban (ELIQUIS) 2.5 mg Tab Take 1 tablet (2.5 mg total) by mouth 2 (two) times daily. 60 tablet 11    benzonatate (TESSALON) 100 MG capsule Take 100 mg by mouth 3 (three) times daily as needed for Cough.      cholecalciferol, vitamin D3, 1,000 unit capsule Take 2 capsules (2,000 Units total) by mouth once daily. (Patient taking differently: Take 1,000 Units by mouth once daily. ) 180 capsule 3    diphenhydrAMINE-zinc acetate 1-0.1% (BENADRYL) cream Apply topically 2 (two) times daily as needed for Itching.  0    docusate sodium (COLACE) 100 MG capsule Take 100 mg by mouth daily as needed for Constipation.      doxepin (SINEQUAN) 10 MG capsule TAKE 1 CAPSULE BY MOUTH THREE TIMES A DAY 90 capsule 0    fluticasone (FLONASE) 50 mcg/actuation nasal spray SPRAY TWICE IN EACH NOSTRIL DAILY 16 g 5    furosemide (LASIX) 20 MG tablet Take 2 tablets (40 mg total) by mouth once daily. 60 tablet 11    HYDROcodone-acetaminophen (NORCO) 5-325 mg per tablet Take 1 tablet by mouth every 6 (six) hours as needed for Pain. 90 tablet 0    meclizine (ANTIVERT) 25 mg tablet Take 1 tablet (25 mg total) by mouth 3 (three) times daily as needed for Dizziness. 20 tablet 0    meclizine (ANTIVERT) 25 mg tablet Take 1 tablet (25 mg total) by mouth every 6 (six) hours. 120 tablet 2    metoprolol tartrate (LOPRESSOR) 50 MG tablet Take 1 tablet (50 mg total) by mouth 2 (two) times daily. 60 tablet 11    predniSONE (DELTASONE) 5 MG tablet Take 1-2 tablets by mouth as needed for  gout attack 30 tablet 1    senna-docusate 8.6-50 mg (PERICOLACE) 8.6-50 mg per tablet Take 1 tablet by mouth once daily.      SITagliptin (JANUVIA) 50 MG Tab Take 1 tablet (50 mg total) by mouth once daily. 30 tablet 5    triamcinolone acetonide 0.1% (KENALOG) 0.1 % cream APPLY TOPICALLY TWO TIMES A DAY 80 g 0     No current facility-administered medications on file prior to visit.          PE:  Blood pressure 127/89 pulse 95 temperature 97.4°  General:  Alert and oriented lady in no acute distress  Ear exam per otoscopy indicates clear eardrums and no evidence of serous otitis media bilaterally.  Nasal exam reveals evidence for pale mucosa of both inferior turbinates.  There is no evidence of benny purulent discharge in eithe passage for anterior rhinoscopy.  Oropharyngeal exam reveals evidence for tonsil hypertrophy left greater than right.  Posterior pharynx is noninflamed in the uvula is not swollen or edematous.  There is evidence for multiple missing teeth with very few remaining.  The floor of the mouth is supple.  Neck examination is within normal limits.    DIAGNOSIS:     ICD-10-CM ICD-9-CM    1. Acute recurrent ethmoidal sinusitis J01.21 461.2 amoxicillin (AMOXIL) 500 MG capsule   2. Dysosmia R43.9 781.1 amoxicillin (AMOXIL) 500 MG capsule   3. Rhinorrhea J34.89 478.19 amoxicillin (AMOXIL) 500 MG capsule   4. Facial pressure R68.89 780.99    5. Missing teeth, acquired K08.109 525.40      525.10      PLAN: Trial of Flonase NSS; 1 spray @ lateral nostril twice a day  Rx for Amoxil 500 mg ; take TID x one week   Pt. may use AYR nasal mist prn

## 2018-10-20 RX ORDER — ALLOPURINOL 100 MG/1
150 TABLET ORAL DAILY
Qty: 60 TABLET | Refills: 2 | Status: SHIPPED | OUTPATIENT
Start: 2018-10-20 | End: 2019-01-01

## 2018-10-22 ENCOUNTER — LAB VISIT (OUTPATIENT)
Dept: LAB | Facility: HOSPITAL | Age: 83
End: 2018-10-22
Attending: INTERNAL MEDICINE
Payer: MEDICARE

## 2018-10-22 DIAGNOSIS — I48.91 ATRIAL FIBRILLATION WITH RVR: ICD-10-CM

## 2018-10-22 LAB
ANION GAP SERPL CALC-SCNC: 11 MMOL/L
APTT BLDCRRT: 23.2 SEC
BASOPHILS # BLD AUTO: 0.04 K/UL
BASOPHILS NFR BLD: 0.5 %
BUN SERPL-MCNC: 34 MG/DL
CALCIUM SERPL-MCNC: 9.3 MG/DL
CHLORIDE SERPL-SCNC: 110 MMOL/L
CO2 SERPL-SCNC: 20 MMOL/L
CREAT SERPL-MCNC: 1.5 MG/DL
DIFFERENTIAL METHOD: ABNORMAL
EOSINOPHIL # BLD AUTO: 0.1 K/UL
EOSINOPHIL NFR BLD: 1 %
ERYTHROCYTE [DISTWIDTH] IN BLOOD BY AUTOMATED COUNT: 18.5 %
EST. GFR  (AFRICAN AMERICAN): 37 ML/MIN/1.73 M^2
EST. GFR  (NON AFRICAN AMERICAN): 32 ML/MIN/1.73 M^2
GLUCOSE SERPL-MCNC: 135 MG/DL
HCT VFR BLD AUTO: 41 %
HGB BLD-MCNC: 12.3 G/DL
INR PPP: 1.3
LYMPHOCYTES # BLD AUTO: 1.8 K/UL
LYMPHOCYTES NFR BLD: 21.6 %
MCH RBC QN AUTO: 27 PG
MCHC RBC AUTO-ENTMCNC: 30 G/DL
MCV RBC AUTO: 90 FL
MONOCYTES # BLD AUTO: 0.7 K/UL
MONOCYTES NFR BLD: 8.5 %
NEUTROPHILS # BLD AUTO: 5.5 K/UL
NEUTROPHILS NFR BLD: 68 %
PLATELET # BLD AUTO: 235 K/UL
PMV BLD AUTO: 11.5 FL
POTASSIUM SERPL-SCNC: 3.9 MMOL/L
PROTHROMBIN TIME: 13 SEC
RBC # BLD AUTO: 4.55 M/UL
SODIUM SERPL-SCNC: 141 MMOL/L
WBC # BLD AUTO: 8.14 K/UL

## 2018-10-22 PROCEDURE — 85610 PROTHROMBIN TIME: CPT

## 2018-10-22 PROCEDURE — 85730 THROMBOPLASTIN TIME PARTIAL: CPT

## 2018-10-22 PROCEDURE — 36415 COLL VENOUS BLD VENIPUNCTURE: CPT

## 2018-10-22 PROCEDURE — 85025 COMPLETE CBC W/AUTO DIFF WBC: CPT

## 2018-10-22 PROCEDURE — 80048 BASIC METABOLIC PNL TOTAL CA: CPT

## 2018-10-25 ENCOUNTER — TELEPHONE (OUTPATIENT)
Dept: ELECTROPHYSIOLOGY | Facility: CLINIC | Age: 83
End: 2018-10-25

## 2018-10-25 NOTE — TELEPHONE ENCOUNTER
Spoke with patient's daughter who verbalized understanding of all instructions. She also confirmed that patient has held Eliquis for 2 days, as instructed. She voices no other questions or concerns.

## 2018-10-25 NOTE — TELEPHONE ENCOUNTER
Left message for patient to call back to confirm pre-op instructions for PPM tomorrow. We need to confirm that she has held Eliquis for 2 days, as directed. She needs to fast after 12mn tonight and should be here for 6am (3rd floor [facility]SCU). She needs to hold her Januvia in the morning as well. Call back # left for confirmation.

## 2018-10-25 NOTE — TELEPHONE ENCOUNTER
----- Message from Francine Nieves MA sent at 10/25/2018 12:30 PM CDT -----  Contact: Patient's daughter      ----- Message -----  From: Shu Mejía  Sent: 10/25/2018  10:37 AM  To: [facility] Staff    The Pt's daughter is returning a call. Please call her back @ [phone]. Thanks, Shu

## 2018-10-26 ENCOUNTER — ANESTHESIA (OUTPATIENT)
Dept: MEDSURG UNIT | Facility: HOSPITAL | Age: 83
End: 2018-10-26
Payer: MEDICARE

## 2018-10-26 ENCOUNTER — ANESTHESIA EVENT (OUTPATIENT)
Dept: MEDSURG UNIT | Facility: HOSPITAL | Age: 83
End: 2018-10-26
Payer: MEDICARE

## 2018-10-26 ENCOUNTER — HOSPITAL ENCOUNTER (OUTPATIENT)
Facility: HOSPITAL | Age: 83
Discharge: HOME | End: 2018-10-27
Attending: INTERNAL MEDICINE | Admitting: INTERNAL MEDICINE
Payer: MEDICARE

## 2018-10-26 DIAGNOSIS — I48.91 ATRIAL FIBRILLATION WITH RVR: Primary | ICD-10-CM

## 2018-10-26 DIAGNOSIS — I48.91 ATRIAL FIBRILLATION: ICD-10-CM

## 2018-10-26 DIAGNOSIS — I44.7 LEFT BUNDLE-BRANCH BLOCK: ICD-10-CM

## 2018-10-26 DIAGNOSIS — I49.9 ARRHYTHMIA: ICD-10-CM

## 2018-10-26 LAB
POCT GLUCOSE: 116 MG/DL (ref 70–110)
POCT GLUCOSE: 119 MG/DL (ref 70–110)
POCT GLUCOSE: 153 MG/DL (ref 70–110)
POCT GLUCOSE: 213 MG/DL (ref 70–110)

## 2018-10-26 PROCEDURE — 37000009 HC ANESTHESIA EA ADD 15 MINS: Mod: HCNC | Performed by: INTERNAL MEDICINE

## 2018-10-26 PROCEDURE — C1769 GUIDE WIRE: HCPCS | Mod: HCNC

## 2018-10-26 PROCEDURE — 63600175 PHARM REV CODE 636 W HCPCS: Performed by: NURSE PRACTITIONER

## 2018-10-26 PROCEDURE — D9220A PRA ANESTHESIA: Mod: CRNA,,, | Performed by: NURSE ANESTHETIST, CERTIFIED REGISTERED

## 2018-10-26 PROCEDURE — 37000008 HC ANESTHESIA 1ST 15 MINUTES: Mod: HCNC | Performed by: INTERNAL MEDICINE

## 2018-10-26 PROCEDURE — 63600175 PHARM REV CODE 636 W HCPCS: Performed by: NURSE ANESTHETIST, CERTIFIED REGISTERED

## 2018-10-26 PROCEDURE — 33208 INSRT HEART PM ATRIAL & VENT: CPT | Mod: HCNC,,, | Performed by: INTERNAL MEDICINE

## 2018-10-26 PROCEDURE — C1898 LEAD, PMKR, OTHER THAN TRANS: HCPCS | Mod: HCNC

## 2018-10-26 PROCEDURE — D9220A PRA ANESTHESIA: Mod: ANES,,, | Performed by: ANESTHESIOLOGY

## 2018-10-26 PROCEDURE — 93010 ELECTROCARDIOGRAM REPORT: CPT | Mod: HCNC,,, | Performed by: INTERNAL MEDICINE

## 2018-10-26 PROCEDURE — 93005 ELECTROCARDIOGRAM TRACING: CPT

## 2018-10-26 PROCEDURE — 82962 GLUCOSE BLOOD TEST: CPT | Mod: 91

## 2018-10-26 PROCEDURE — 25500020 PHARM REV CODE 255

## 2018-10-26 PROCEDURE — 25000003 PHARM REV CODE 250: Performed by: NURSE ANESTHETIST, CERTIFIED REGISTERED

## 2018-10-26 PROCEDURE — 25000003 PHARM REV CODE 250: Performed by: NURSE PRACTITIONER

## 2018-10-26 PROCEDURE — 82962 GLUCOSE BLOOD TEST: CPT | Mod: HCNC | Performed by: INTERNAL MEDICINE

## 2018-10-26 PROCEDURE — 63600175 PHARM REV CODE 636 W HCPCS: Mod: HCNC | Performed by: NURSE PRACTITIONER

## 2018-10-26 PROCEDURE — 63600175 PHARM REV CODE 636 W HCPCS

## 2018-10-26 PROCEDURE — 25000003 PHARM REV CODE 250

## 2018-10-26 PROCEDURE — 33225 L VENTRIC PACING LEAD ADD-ON: CPT | Mod: HCNC,53,, | Performed by: INTERNAL MEDICINE

## 2018-10-26 PROCEDURE — 27201037 HC PRESSURE MONITORING SET UP

## 2018-10-26 RX ORDER — GLUCAGON 1 MG
1 KIT INJECTION
Status: DISCONTINUED | OUTPATIENT
Start: 2018-10-26 | End: 2018-10-27 | Stop reason: HOSPADM

## 2018-10-26 RX ORDER — SODIUM CHLORIDE 0.9 % (FLUSH) 0.9 %
3 SYRINGE (ML) INJECTION
Status: DISCONTINUED | OUTPATIENT
Start: 2018-10-26 | End: 2018-10-26

## 2018-10-26 RX ORDER — PHENYLEPHRINE HYDROCHLORIDE 10 MG/ML
INJECTION INTRAVENOUS
Status: DISCONTINUED | OUTPATIENT
Start: 2018-10-26 | End: 2018-10-26

## 2018-10-26 RX ORDER — MIDAZOLAM HYDROCHLORIDE 1 MG/ML
INJECTION, SOLUTION INTRAMUSCULAR; INTRAVENOUS
Status: DISCONTINUED | OUTPATIENT
Start: 2018-10-26 | End: 2018-10-26

## 2018-10-26 RX ORDER — METOPROLOL TARTRATE 50 MG/1
50 TABLET ORAL 2 TIMES DAILY
Status: DISCONTINUED | OUTPATIENT
Start: 2018-10-26 | End: 2018-10-27 | Stop reason: HOSPADM

## 2018-10-26 RX ORDER — EPHEDRINE SULFATE 50 MG/ML
INJECTION, SOLUTION INTRAVENOUS
Status: DISCONTINUED | OUTPATIENT
Start: 2018-10-26 | End: 2018-10-26

## 2018-10-26 RX ORDER — HYDROMORPHONE HYDROCHLORIDE 1 MG/ML
0.2 INJECTION, SOLUTION INTRAMUSCULAR; INTRAVENOUS; SUBCUTANEOUS EVERY 5 MIN PRN
Status: DISCONTINUED | OUTPATIENT
Start: 2018-10-26 | End: 2018-10-27 | Stop reason: HOSPADM

## 2018-10-26 RX ORDER — HYDROCODONE BITARTRATE AND ACETAMINOPHEN 5; 325 MG/1; MG/1
1 TABLET ORAL EVERY 6 HOURS PRN
Status: DISCONTINUED | OUTPATIENT
Start: 2018-10-26 | End: 2018-10-27 | Stop reason: HOSPADM

## 2018-10-26 RX ORDER — IBUPROFEN 200 MG
24 TABLET ORAL
Status: DISCONTINUED | OUTPATIENT
Start: 2018-10-26 | End: 2018-10-27 | Stop reason: HOSPADM

## 2018-10-26 RX ORDER — FUROSEMIDE 40 MG/1
40 TABLET ORAL DAILY
Status: DISCONTINUED | OUTPATIENT
Start: 2018-10-26 | End: 2018-10-26

## 2018-10-26 RX ORDER — FENTANYL CITRATE 50 UG/ML
INJECTION, SOLUTION INTRAMUSCULAR; INTRAVENOUS
Status: DISCONTINUED | OUTPATIENT
Start: 2018-10-26 | End: 2018-10-26

## 2018-10-26 RX ORDER — AMIODARONE HYDROCHLORIDE 200 MG/1
200 TABLET ORAL DAILY
Status: DISCONTINUED | OUTPATIENT
Start: 2018-10-26 | End: 2018-10-27 | Stop reason: HOSPADM

## 2018-10-26 RX ORDER — MEPERIDINE HYDROCHLORIDE 50 MG/ML
12.5 INJECTION INTRAMUSCULAR; INTRAVENOUS; SUBCUTANEOUS ONCE AS NEEDED
Status: ACTIVE | OUTPATIENT
Start: 2018-10-26 | End: 2018-10-26

## 2018-10-26 RX ORDER — IBUPROFEN 200 MG
16 TABLET ORAL
Status: DISCONTINUED | OUTPATIENT
Start: 2018-10-26 | End: 2018-10-27 | Stop reason: HOSPADM

## 2018-10-26 RX ORDER — CEFAZOLIN SODIUM 1 G/3ML
2 INJECTION, POWDER, FOR SOLUTION INTRAMUSCULAR; INTRAVENOUS
Status: COMPLETED | OUTPATIENT
Start: 2018-10-26 | End: 2018-10-26

## 2018-10-26 RX ORDER — SODIUM CHLORIDE 9 MG/ML
INJECTION, SOLUTION INTRAVENOUS CONTINUOUS
Status: DISCONTINUED | OUTPATIENT
Start: 2018-10-26 | End: 2018-10-26

## 2018-10-26 RX ORDER — CEFAZOLIN SODIUM 1 G/3ML
1 INJECTION, POWDER, FOR SOLUTION INTRAMUSCULAR; INTRAVENOUS
Status: COMPLETED | OUTPATIENT
Start: 2018-10-26 | End: 2018-10-26

## 2018-10-26 RX ORDER — KETAMINE HCL IN 0.9 % NACL 50 MG/5 ML
SYRINGE (ML) INTRAVENOUS
Status: DISCONTINUED | OUTPATIENT
Start: 2018-10-26 | End: 2018-10-26

## 2018-10-26 RX ORDER — LIDOCAINE HCL/PF 100 MG/5ML
SYRINGE (ML) INTRAVENOUS
Status: DISCONTINUED | OUTPATIENT
Start: 2018-10-26 | End: 2018-10-26

## 2018-10-26 RX ORDER — ACETAMINOPHEN 325 MG/1
650 TABLET ORAL EVERY 6 HOURS PRN
Status: DISCONTINUED | OUTPATIENT
Start: 2018-10-26 | End: 2018-10-27 | Stop reason: HOSPADM

## 2018-10-26 RX ORDER — CEPHALEXIN 500 MG/1
500 CAPSULE ORAL EVERY 12 HOURS
Status: DISCONTINUED | OUTPATIENT
Start: 2018-10-27 | End: 2018-10-26

## 2018-10-26 RX ORDER — FUROSEMIDE 40 MG/1
40 TABLET ORAL DAILY
Status: DISCONTINUED | OUTPATIENT
Start: 2018-10-27 | End: 2018-10-27 | Stop reason: HOSPADM

## 2018-10-26 RX ORDER — PROPOFOL 10 MG/ML
VIAL (ML) INTRAVENOUS CONTINUOUS PRN
Status: DISCONTINUED | OUTPATIENT
Start: 2018-10-26 | End: 2018-10-26

## 2018-10-26 RX ORDER — CEPHALEXIN 500 MG/1
500 CAPSULE ORAL EVERY 12 HOURS
Status: DISCONTINUED | OUTPATIENT
Start: 2018-10-27 | End: 2018-10-27 | Stop reason: HOSPADM

## 2018-10-26 RX ORDER — INSULIN ASPART 100 [IU]/ML
0-5 INJECTION, SOLUTION INTRAVENOUS; SUBCUTANEOUS
Status: DISCONTINUED | OUTPATIENT
Start: 2018-10-26 | End: 2018-10-27 | Stop reason: HOSPADM

## 2018-10-26 RX ADMIN — PROPOFOL 50 MCG/KG/MIN: 10 INJECTION, EMULSION INTRAVENOUS at 07:10

## 2018-10-26 RX ADMIN — PHENYLEPHRINE HYDROCHLORIDE 100 MCG: 10 INJECTION INTRAVENOUS at 09:10

## 2018-10-26 RX ADMIN — EPHEDRINE SULFATE 5 MG: 50 INJECTION, SOLUTION INTRAMUSCULAR; INTRAVENOUS; SUBCUTANEOUS at 09:10

## 2018-10-26 RX ADMIN — FENTANYL CITRATE 25 MCG: 50 INJECTION, SOLUTION INTRAMUSCULAR; INTRAVENOUS at 07:10

## 2018-10-26 RX ADMIN — ALLOPURINOL 150 MG: 300 TABLET ORAL at 08:10

## 2018-10-26 RX ADMIN — MIDAZOLAM 1 MG: 1 INJECTION INTRAMUSCULAR; INTRAVENOUS at 07:10

## 2018-10-26 RX ADMIN — HYDROCODONE BITARTRATE AND ACETAMINOPHEN 1 TABLET: 5; 325 TABLET ORAL at 02:10

## 2018-10-26 RX ADMIN — PHENYLEPHRINE HYDROCHLORIDE 100 MCG: 10 INJECTION INTRAVENOUS at 08:10

## 2018-10-26 RX ADMIN — HYDROCODONE BITARTRATE AND ACETAMINOPHEN 1 TABLET: 5; 325 TABLET ORAL at 11:10

## 2018-10-26 RX ADMIN — EPHEDRINE SULFATE 10 MG: 50 INJECTION, SOLUTION INTRAMUSCULAR; INTRAVENOUS; SUBCUTANEOUS at 08:10

## 2018-10-26 RX ADMIN — Medication 10 MG: at 07:10

## 2018-10-26 RX ADMIN — EPHEDRINE SULFATE 10 MG: 50 INJECTION, SOLUTION INTRAMUSCULAR; INTRAVENOUS; SUBCUTANEOUS at 07:10

## 2018-10-26 RX ADMIN — FUROSEMIDE 60 MG: 40 TABLET ORAL at 03:10

## 2018-10-26 RX ADMIN — EPHEDRINE SULFATE 5 MG: 50 INJECTION, SOLUTION INTRAMUSCULAR; INTRAVENOUS; SUBCUTANEOUS at 08:10

## 2018-10-26 RX ADMIN — CEFAZOLIN 1 G: 1 INJECTION, POWDER, FOR SOLUTION INTRAMUSCULAR; INTRAVENOUS; PARENTERAL at 03:10

## 2018-10-26 RX ADMIN — SODIUM CHLORIDE: 0.9 INJECTION, SOLUTION INTRAVENOUS at 07:10

## 2018-10-26 RX ADMIN — LIDOCAINE HYDROCHLORIDE 50 MG: 20 INJECTION, SOLUTION INTRAVENOUS at 07:10

## 2018-10-26 RX ADMIN — PHENYLEPHRINE HYDROCHLORIDE 200 MCG: 10 INJECTION INTRAVENOUS at 07:10

## 2018-10-26 RX ADMIN — CEFAZOLIN 2 G: 330 INJECTION, POWDER, FOR SOLUTION INTRAMUSCULAR; INTRAVENOUS at 07:10

## 2018-10-26 RX ADMIN — EPHEDRINE SULFATE 10 MG: 50 INJECTION, SOLUTION INTRAMUSCULAR; INTRAVENOUS; SUBCUTANEOUS at 09:10

## 2018-10-26 RX ADMIN — CEFAZOLIN 1 G: 1 INJECTION, POWDER, FOR SOLUTION INTRAMUSCULAR; INTRAVENOUS; PARENTERAL at 11:10

## 2018-10-26 RX ADMIN — AMIODARONE HYDROCHLORIDE 200 MG: 200 TABLET ORAL at 05:10

## 2018-10-26 RX ADMIN — METOPROLOL TARTRATE 50 MG: 50 TABLET ORAL at 08:10

## 2018-10-26 NOTE — ANESTHESIA PREPROCEDURE EVALUATION
10/26/2018  Dacia Martínez is a 83 y.o., female.  Patient Active Problem List   Diagnosis    Essential hypertension    Hyperlipidemia    History of breast cancer    Incomplete bladder emptying    Seasonal allergic rhinitis    Idiopathic chronic gout of multiple sites without tophus    Primary osteoarthritis involving multiple joints    Murmur, cardiac    BMI 28.0-28.9,adult    Microalbuminuria    Colon polyps    Constipation due to outlet dysfunction    Anemia    Type 2 diabetes mellitus with stage 3 chronic kidney disease, without long-term current use of insulin    Atherosclerosis of aorta    Nocturia    Postmenopausal    Chronic sinusitis    Hearing loss    Long term current use of opiate analgesic    EKG, abnormal    Persistent atrial fibrillation    Chronic pain syndrome    Atrial fibrillation with RVR    History of cardioversion    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, without long-term current use of insulin    Nonexudative age-related macular degeneration, bilateral, intermediate dry stage    Chronic GERD    Tachycardia    Shortness of breath    Upper respiratory infection    Chronic systolic congestive heart failure    Hyperkalemia    Hyponatremia    Itching    CKD (chronic kidney disease) stage 3, GFR 30-59 ml/min    Monocular diplopia    Insufficiency of tear film of both eyes    Osteopenia of multiple sites    Hypovitaminosis D    Acute on chronic congestive heart failure    Severe aortic stenosis    KHUSHBOO (acute kidney injury)    Chronic atrial fibrillation    Intracranial atherosclerosis    Vertigo    Unsteady gait    Dilated cardiomyopathy    LBBB (left bundle branch block)    Atrial fibrillation         Pre-op Assessment         Review of Systems  Cardiovascular:    CONCLUSIONS     1 - Moderately depressed left ventricular systolic function (EF 30-35%).     2 - Biatrial enlargement.     3 - Mildly to moderately depressed right ventricular systolic function .     4 - Low flow, low gradient aortic valve stenosis with reduced EF ((ROBBIE 0.74 cm2, AVAi 0.44 cm2/m2, peak aortic jet velocity 1.5 m/s,MG 5 mmHg, EF 33%,SVi 11 mL/m2).     5 - Trivial mitral stenosis, MVA = 3.9 cm2.     6 - Mild mitral regurgitation.     7 - Moderate to severe tricuspid regurgitation.     8 - Trivial pulmonic regurgitation.     9 - Increased central venous pressure.     10 - Pulmonary hypertension. The estimated PA systolic pressure is 58 mmHg.        Physical Exam  General:  Well nourished    Airway/Jaw/Neck:  Airway Findings: Mouth Opening: Normal Tongue: Normal  General Airway Assessment: Adult  Mallampati: II  Improves to II with phonation.  TM Distance: Normal, at least 6 cm      Dental:  Dental Findings: In tact   Chest/Lungs:  Chest/Lungs Findings: Clear to auscultation     Heart/Vascular:  Heart Findings: Rate: Normal  Rhythm: Regular Rhythm  Sounds: Normal        Mental Status:  Mental Status Findings:  Cooperative, Alert and Oriented         Anesthesia Plan  Type of Anesthesia, risks & benefits discussed:  Anesthesia Type:  general  Patient's Preference: General  Intra-op Monitoring Plan: standard ASA monitors  Intra-op Monitoring Plan Comments: Standard ASA monitors.   Post Op Pain Control Plan: per primary service following discharge from PACU  Post Op Pain Control Plan Comments: Per primary service.     Induction:   IV  Beta Blocker:  Patient is not currently on a Beta-Blocker (No further documentation required).       Informed Consent: Patient understands risks and agrees with Anesthesia plan.  Questions answered. Anesthesia consent signed with patient.  ASA Score: 4     Day of Surgery Review of History & Physical:    H&P update referred to the surgeon.         Ready For Surgery From  Anesthesia Perspective.

## 2018-10-26 NOTE — NURSING
On unit this morning via wheelchair.  Daughter at pt side.  C/o pain to left upper arm.  Pt states she always has pain in arm from prior surgeries.  Verbalized understanding of procedure.  Will continue to monitor.

## 2018-10-26 NOTE — TRANSFER OF CARE
"Anesthesia Transfer of Care Note    Patient: Dacia Martínez    Procedure(s) Performed: Procedure(s) (LRB):  INSERTION, CARDIAC PACEMAKER, BIVENTRICULAR (N/A)    Patient location: Cath Lab    Anesthesia Type: general    Transport from OR: Transported from OR on room air with adequate spontaneous ventilation    Post pain: adequate analgesia    Post assessment: no apparent anesthetic complications    Post vital signs: stable    Level of consciousness: awake    Nausea/Vomiting: no nausea/vomiting    Complications: none    Transfer of care protocol was followed      Last vitals:   Visit Vitals  /82 (BP Location: Right arm, Patient Position: Lying)   Pulse 84   Temp 36.4 °C (97.6 °F) (Oral)   Resp 17   Ht 5' 1" (1.549 m)   Wt 65.8 kg (145 lb)   LMP  (LMP Unknown)   SpO2 98%   Breastfeeding? No   BMI 27.40 kg/m²     "

## 2018-10-26 NOTE — SUBJECTIVE & OBJECTIVE
Past Medical History:   Diagnosis Date    A-fib     Arthritis     Asteroid hyalosis of left eye     Bilateral nonexudative age-related macular degeneration 6/3/2014    Breast cancer     Cataract     Chronic GERD 2017    CKD stage 3 due to type 2 diabetes mellitus     Coronary artery disease     Hypertension     Migraine headache     Mild nonproliferative diabetic retinopathy of both eyes 6/3/2014    Pneumonia     Type 2 diabetes mellitus with hyperglycemia     Vertigo        Past Surgical History:   Procedure Laterality Date    BREAST SURGERY      left lumpectomy    CARDIOVERSION N/A 2017    Performed by Jacques [facility] at [facility] CATH LAB    CARPAL TUNNEL RELEASE      left    CATARACT EXTRACTION      vance     SECTION      COLONOSCOPY N/A 3/5/2015    Performed by Andrés Hobson MD at [facility] ENDO (4TH FLR)    ESOPHAGOGASTRODUODENOSCOPY (EGD) N/A 2016    Performed by Rory Ogden MD at [facility] ENDO (2ND FLR)    EYE SURGERY      cataracts bilaterally    HYSTERECTOMY      partial    TRANSESOPHAGEAL ECHOCARDIOGRAM (REJI) N/A 2017    Performed by Ruma Long MD at [facility] CATH LAB    TRANSESOPHAGEAL ECHOCARDIOGRAM (REJI) N/A 2017    Performed by Angelique Taylor MD at [facility] CATH LAB       Review of patient's allergies indicates:  No Known Allergies    No current facility-administered medications on file prior to encounter.      Current Outpatient Medications on File Prior to Encounter   Medication Sig    ACCU-CHEK FASTCLIX Misc 1 lancet by Misc.(Non-Drug; Combo Route) route 3 (three) times daily. Pt to test blood glucose up to 3 times daily.    amiodarone (PACERONE) 200 MG Tab Take 1 tablet (200 mg total) by mouth once daily.    cholecalciferol, vitamin D3, 1,000 unit capsule Take 2 capsules (2,000 Units total) by mouth once daily. (Patient taking differently: Take 1,000 Units by mouth once daily. )    docusate sodium (COLACE) 100 MG capsule Take 100 mg  by mouth daily as needed for Constipation.    fluticasone (FLONASE) 50 mcg/actuation nasal spray SPRAY TWICE IN EACH NOSTRIL DAILY    furosemide (LASIX) 20 MG tablet Take 2 tablets (40 mg total) by mouth once daily.    metoprolol tartrate (LOPRESSOR) 50 MG tablet Take 1 tablet (50 mg total) by mouth 2 (two) times daily.    senna-docusate 8.6-50 mg (PERICOLACE) 8.6-50 mg per tablet Take 1 tablet by mouth once daily.    apixaban (ELIQUIS) 2.5 mg Tab Take 1 tablet (2.5 mg total) by mouth 2 (two) times daily.    benzonatate (TESSALON) 100 MG capsule Take 100 mg by mouth 3 (three) times daily as needed for Cough.    meclizine (ANTIVERT) 25 mg tablet Take 1 tablet (25 mg total) by mouth 3 (three) times daily as needed for Dizziness.    meclizine (ANTIVERT) 25 mg tablet Take 1 tablet (25 mg total) by mouth every 6 (six) hours.    predniSONE (DELTASONE) 5 MG tablet Take 1-2 tablets by mouth as needed for gout attack     Family History     Problem Relation (Age of Onset)    Blindness Brother    Cancer Mother    Cataracts Sister    Diabetes Father, Brother, Sister, Brother, Brother, Brother    Heart disease Mother    Hypertension Father, Brother    No Known Problems Daughter, Son, Daughter        Tobacco Use    Smoking status: Never Smoker    Smokeless tobacco: Never Used   Substance and Sexual Activity    Alcohol use: Yes    Drug use: No    Sexual activity: Not Currently     ROS     Review of Systems   Constitution: Negative for fevers/chills.   Positive for easily gets    weak and has malaise/fatigue.   HENT: Negative for ear pain and tinnitus.   Eyes: Negative for blurred vision.   Cardiovascular: Positive for palpitations. Negative for chest pain,  near-syncope, and syncope.   Respiratory:  Positive for shortness of breath   Endocrine:  Negative for polyuria.   Hematologic/Lymphatic: Negative for bruise/bleed easily.   Skin:  Negative for rash.   Musculoskeletal: reports chronic left shoulder/arm pain    Extremity: Negative for swelling in the lower extremities.   Gastrointestinal:  Negative for abdominal pain and change in bowel habit.   Genitourinary: Negative for frequency.   Neurological:  Reports occasional  dizziness.   Psychiatric/Behavioral:  Negative for depression, anxiety       Objective:     Vital Signs (Most Recent):  Temp: 97.6 °F (36.4 °C) (10/26/18 0635)  Pulse: 84 (10/26/18 0635)  Resp: 17 (10/26/18 0635)  BP: 124/82 (10/26/18 0635)  SpO2: 98 % (10/26/18 0635) Vital Signs (24h Range):  Temp:  [97.6 °F (36.4 °C)] 97.6 °F (36.4 °C)  Pulse:  [84] 84  Resp:  [17] 17  SpO2:  [98 %] 98 %  BP: (124)/(82) 124/82       Weight: 65.8 kg (145 lb)  Body mass index is 27.4 kg/m².    SpO2: 98 %  O2 Device (Oxygen Therapy): room air    Physical Exam    General: Well developed, well nourished, No distress on room air   HEENT: Head is normocephalic, atraumatic;  Lungs: Clear to auscultation bilaterally.  No wheezing. Normal respiratory effort.  Cardiovascular:  S1 S2 Normal rate, regular rhythm and normal heart sounds.    PMI is not displaced  Extremities: No LE edema. Pulses 2+ and symmetric.   Abdomen: Abdomen is soft, non-tender non-distended with normal bowel sounds.  Skin: Skin color, texture, turgor normal. No rashes.  Musculoskeletal: normal range of motion   Neurologic: Normal strength and tone. No focal numbness or weakness.   Psychiatric: normal mood and affect.  behavior is normal. Alert and oriented X 3.  Labs reviewed        Significant Imaging:   Chart reviewed   CONCLUSIONS     1 - Moderately depressed left ventricular systolic function (EF 30-35%).     2 - Biatrial enlargement.     3 - Mildly to moderately depressed right ventricular systolic function .     4 - Low flow, low gradient aortic valve stenosis with reduced EF ((ROBBIE 0.74 cm2, AVAi 0.44 cm2/m2, peak aortic jet velocity 1.5 m/s,MG 5 mmHg, EF 33%,SVi 11 mL/m2).     5 - Trivial mitral stenosis, MVA = 3.9 cm2.     6 - Mild mitral  regurgitation.     7 - Moderate to severe tricuspid regurgitation.     8 - Trivial pulmonic regurgitation.     9 - Increased central venous pressure.     10 - Pulmonary hypertension. The estimated PA systolic pressure is 58 mmHg.             This document has been electronically    SIGNED BY: Boo Mcnamara MD On: 06/21/2018 15:28

## 2018-10-26 NOTE — ANESTHESIA POSTPROCEDURE EVALUATION
"Anesthesia Post Evaluation    Patient: Dacia Martínez    Procedure(s) Performed: Procedure(s) (LRB):  INSERTION, CARDIAC PACEMAKER, BIVENTRICULAR (N/A)    Final Anesthesia Type: MAC  Patient location during evaluation: PACU  Patient participation: Yes- Able to Participate  Level of consciousness: awake and alert  Post-procedure vital signs: reviewed and stable  Pain management: adequate  Airway patency: patent  PONV status at discharge: No PONV  Anesthetic complications: no      Cardiovascular status: blood pressure returned to baseline  Respiratory status: unassisted  Hydration status: euvolemic  Follow-up not needed.        Visit Vitals  /84 (BP Location: Right arm, Patient Position: Lying)   Pulse 92   Temp 36.4 °C (97.5 °F) (Oral)   Resp 18   Ht 5' 1" (1.549 m)   Wt 65.8 kg (145 lb)   LMP  (LMP Unknown)   SpO2 98%   Breastfeeding? No   BMI 27.40 kg/m²       Pain/Ree Score: Pain Assessment Performed: Yes (10/26/2018 11:39 AM)  Presence of Pain: denies (10/26/2018 11:39 AM)  Ree Score: 9 (10/26/2018 11:15 AM)        "

## 2018-10-26 NOTE — PLAN OF CARE
Problem: Patient Care Overview  Goal: Plan of Care Review  Outcome: Ongoing (interventions implemented as appropriate)  Received report from EROS Fernandez. Patient s/p pacemaker placement, AAOx3. VSS, no c/o pain or discomfort at this time, resp even and unlabored. Foam dressing to right chest wall is c/d/i.  No active bleeding. No hematoma noted. Sling and swaddle intact to right arm. Post procedure protocol reviewed with patient and patient's family. Understanding verbalized. Family members at bedside. Nurse call bell within reach. Will continue to monitor per post procedure protocol.

## 2018-10-26 NOTE — HPI
"83 year old female with pAF s/p DCCV (06/06/17) HTN, CAD, breast cancer, migraines, IDDM, and CKD III, who [place] [facility] [place] for evaluation of a sudden-onset episode of palpitations/tachycardia. She was found to be in AF w/RVR at 120 bpm; an Echo at the time revealed an EF of 60%. Ms. Martínez was rate-controlled and discharged home.      Since discharge, Ms. Martínez notes only occasional fast rates; "not anything like before." She reports experiencing occasional dizziness; baseline. She denies chest pain, SOB/MCCOLLUM, palpitations, or syncope.      11/17: Minimal symptoms due to AF. Remains on metoprolol 100 mg bid as well as apixaban.      1/18: She has almost immediate recurrence of AF post CV 12/20/17. Feels better on amiodarone but sleeping more.      4/18: AF rate controlled on low dose amiodarone 200 mg qd. She is due for dental extraction. Her PCP advised no cessation of OAC.      ON last EP clinic visit >patient found to have : EF was 30-35% in AF with new LBBB. Prior ECG was AF with narrow complex 12/17, EF 60%.   Pt  Recommended for planned  CRT-P with plans for AVN ablation. Patient presented for this planned procedure. She denies any acute symptoms at present. Pt states with recurrent sinusitis > reports resolved, completed antibiotics. Denies any fevers, chills, malaise.                 "

## 2018-10-26 NOTE — NURSING TRANSFER
Nursing Transfer Note      10/26/2018     Transfer To: 318    Transfer via stretcher    Transfer with cardiac monitoring    Transported by RN    Medicines sent: no    Chart send with patient: Yes    Notified: daughter    Patient reassessed at: 10/26/18@1115hrs

## 2018-10-26 NOTE — H&P
"[facility]  Cardiac Electrophysiology  History and Physical     Admission Date: 10/26/2018  Code Status: Prior   Attending Provider: Jacques Burt MD   Principal Problem:AF    Subjective:     Chief Complaint:  Chronic AF  HPI:  83 year old female with pAF s/p DCCV (06/06/17) HTN, CAD, breast cancer, migraines, IDDM, and CKD III, who [place] [facility] [place] (07/07/17) for evaluation of a sudden-onset episode of palpitations/tachycardia. She was found to be in AF w/RVR at 120 bpm; an Echo at the time revealed an EF of 60%. Ms. Martínez was rate-controlled and discharged home.      Since discharge, Ms. Martínez notes only occasional fast rates; "not anything like before." She reports experiencing occasional dizziness; baseline. She denies chest pain, SOB/MCCOLLUM, palpitations, or syncope.      11/17: Minimal symptoms due to AF. Remains on metoprolol 100 mg bid as well as apixaban.      1/18: She has almost immediate recurrence of AF post CV 12/20/17. Feels better on amiodarone but sleeping more.      4/18: AF rate controlled on low dose amiodarone 200 mg qd. She is due for dental extraction. Her PCP advised no cessation of OAC.      ON last EP clinic visit >patient found to have : EF was 30-35% in AF with new LBBB. Prior ECG was AF with narrow complex 12/17, EF 60%.   Pt  Recommended for planned  CRT-P with plans for AVN ablation. Patient presented for this planned procedure. She denies any acute symptoms at present. Pt states with recurrent sinusitis > reports resolved, completed antibiotics. Denies any fevers, chills, malaise.                   Past Medical History:   Diagnosis Date    A-fib     Arthritis     Asteroid hyalosis of left eye     Bilateral nonexudative age-related macular degeneration 6/3/2014    Breast cancer     Cataract     Chronic GERD 9/18/2017    CKD stage 3 due to type 2 diabetes mellitus     Coronary artery disease     Hypertension     Migraine headache     Mild " nonproliferative diabetic retinopathy of both eyes 6/3/2014    Pneumonia     Type 2 diabetes mellitus with hyperglycemia     Vertigo        Past Surgical History:   Procedure Laterality Date    BREAST SURGERY      left lumpectomy    CARDIOVERSION N/A 2017    [facility] CATH LAB    CARPAL TUNNEL RELEASE      left    CATARACT EXTRACTION      vance     SECTION      COLONOSCOPY N/A 3/5/2015    Performed by Andrés [facility]son [facility] at [facility] ENDO (4TH FLR)    ESOPHAGOGASTRODUODENOSCOPY (EGD) N/A 2016    Performed by Rory Ogden MD at [facility] ENDO (2ND FLR)    EYE SURGERY      cataracts bilaterally    HYSTERECTOMY      partial    TRANSESOPHAGEAL ECHOCARDIOGRAM (REJI) N/A 2017    Performed by Ruma Long [facility] at [facility] CATH LAB    TRANSESOPHAGEAL ECHOCARDIOGRAM (REJI) N/A 2017    Performed by Angelique Taylor MD at [facility] CATH LAB       Review of patient's allergies indicates:  No Known Allergies    No current facility-administered medications on file prior to encounter.      Current Outpatient Medications on File Prior to Encounter   Medication Sig    ACCU-CHEK FASTCLIX Misc 1 lancet by Misc.(Non-Drug; Combo Route) route 3 (three) times daily. Pt to test blood glucose up to 3 times daily.    amiodarone (PACERONE) 200 MG Tab Take 1 tablet (200 mg total) by mouth once daily.    cholecalciferol, vitamin D3, 1,000 unit capsule Take 2 capsules (2,000 Units total) by mouth once daily. (Patient taking differently: Take 1,000 Units by mouth once daily. )    docusate sodium (COLACE) 100 MG capsule Take 100 mg by mouth daily as needed for Constipation.    fluticasone (FLONASE) 50 mcg/actuation nasal spray SPRAY TWICE IN EACH NOSTRIL DAILY    furosemide (LASIX) 20 MG tablet Take 2 tablets (40 mg total) by mouth once daily.    metoprolol tartrate (LOPRESSOR) 50 MG tablet Take 1 tablet (50 mg total) by mouth 2 (two) times daily.    senna-docusate 8.6-50 mg  (PERICOLACE) 8.6-50 mg per tablet Take 1 tablet by mouth once daily.    apixaban (ELIQUIS) 2.5 mg Tab Take 1 tablet (2.5 mg total) by mouth 2 (two) times daily.    benzonatate (TESSALON) 100 MG capsule Take 100 mg by mouth 3 (three) times daily as needed for Cough.    meclizine (ANTIVERT) 25 mg tablet Take 1 tablet (25 mg total) by mouth 3 (three) times daily as needed for Dizziness.    meclizine (ANTIVERT) 25 mg tablet Take 1 tablet (25 mg total) by mouth every 6 (six) hours.    predniSONE (DELTASONE) 5 MG tablet Take 1-2 tablets by mouth as needed for gout attack     Family History     Problem Relation (Age of Onset)    Blindness Brother    Cancer Mother    Cataracts Sister    Diabetes Father, Brother, Sister, Brother, Brother, Brother    Heart disease Mother    Hypertension Father, Brother    No Known Problems Daughter, Son, Daughter        Tobacco Use    Smoking status: Never Smoker    Smokeless tobacco: Never Used   Substance and Sexual Activity    Alcohol use: Yes    Drug use: No    Sexual activity: Not Currently     ROS     Review of Systems   Constitution: Negative for fevers/chills.   Positive for easily gets    weak and has malaise/fatigue.   HENT: Negative for ear pain and tinnitus.   Eyes: Negative for blurred vision.   Cardiovascular: Positive for palpitations. Negative for chest pain,  near-syncope, and syncope.   Respiratory:  Positive for shortness of breath   Endocrine:  Negative for polyuria.   Hematologic/Lymphatic: Negative for bruise/bleed easily.   Skin:  Negative for rash.   Musculoskeletal: reports chronic left shoulder/arm pain   Extremity: Negative for swelling in the lower extremities.   Gastrointestinal:  Negative for abdominal pain and change in bowel habit.   Genitourinary: Negative for frequency.   Neurological:  Reports occasional  dizziness.   Psychiatric/Behavioral:  Negative for depression, anxiety       Objective:     Vital Signs (Most Recent):  Temp: 97.6 °F (36.4 °C)  (10/26/18 0635)  Pulse: 84 (10/26/18 0635)  Resp: 17 (10/26/18 0635)  BP: 124/82 (10/26/18 0635)  SpO2: 98 % (10/26/18 0635) Vital Signs (24h Range):  Temp:  [97.6 °F (36.4 °C)] 97.6 °F (36.4 °C)  Pulse:  [84] 84  Resp:  [17] 17  SpO2:  [98 %] 98 %  BP: (124)/(82) 124/82       Weight: 65.8 kg (145 lb)  Body mass index is 27.4 kg/m².    SpO2: 98 %  O2 Device (Oxygen Therapy): room air    Physical Exam    General: Well developed, well nourished, No distress on room air   HEENT: Head is normocephalic, atraumatic;  Lungs: Clear to auscultation bilaterally.  No wheezing. Normal respiratory effort.  Cardiovascular:  Irregularly irregular  rhythm and normal heart sounds.    PMI is not displaced  Extremities: No LE edema. Pulses 2+ and symmetric.   Abdomen: Abdomen is soft, non-tender non-distended with normal bowel sounds.  Skin: Skin color, texture, turgor normal. No rashes.  Musculoskeletal: normal range of motion   Neurologic: Normal strength and tone. No focal numbness or weakness.   Psychiatric: normal mood and affect.  behavior is normal. Alert and oriented X 3.  Labs reviewed        Significant Imaging:   Chart reviewed   CONCLUSIONS     1 - Moderately depressed left ventricular systolic function (EF 30-35%).     2 - Biatrial enlargement.     3 - Mildly to moderately depressed right ventricular systolic function .     4 - Low flow, low gradient aortic valve stenosis with reduced EF ((ROBBIE 0.74 cm2, AVAi 0.44 cm2/m2, peak aortic jet velocity 1.5 m/s,MG 5 mmHg, EF 33%,SVi 11 mL/m2).     5 - Trivial mitral stenosis, MVA = 3.9 cm2.     6 - Mild mitral regurgitation.     7 - Moderate to severe tricuspid regurgitation.     8 - Trivial pulmonic regurgitation.     9 - Increased central venous pressure.     10 - Pulmonary hypertension. The estimated PA systolic pressure is 58 mmHg.             This document has been electronically    SIGNED BY: Boo Mcnamara MD On: 06/21/2018 15:28    Assessment and Plan:     Atrial  fibrillation    CRT- P implant  > eliquis was held > last dose Wednesday 10/24/18  Anesthesia for sedation              Procedure consent  obtained in clinic and on chart.    Prior to procedure, extensive discussion with patient regarding risks and benefits of  PPM implant by STAFF , and patient  would like to proceed.  The patient/ daugher  voices understanding and all questions have been answered. No further questions/concerns voiced at this time.          Hiren Duenas NP  Cardiac Electrophysiology  [facility]    STAFF MD Héctor Burt

## 2018-10-27 VITALS
TEMPERATURE: 98 F | HEIGHT: 61 IN | BODY MASS INDEX: 27.38 KG/M2 | WEIGHT: 145 LBS | RESPIRATION RATE: 19 BRPM | HEART RATE: 85 BPM | SYSTOLIC BLOOD PRESSURE: 107 MMHG | OXYGEN SATURATION: 99 % | DIASTOLIC BLOOD PRESSURE: 73 MMHG

## 2018-10-27 LAB — POCT GLUCOSE: 94 MG/DL (ref 70–110)

## 2018-10-27 PROCEDURE — 82962 GLUCOSE BLOOD TEST: CPT | Mod: HCNC

## 2018-10-27 PROCEDURE — 25000003 PHARM REV CODE 250: Mod: HCNC | Performed by: NURSE PRACTITIONER

## 2018-10-27 RX ORDER — TRAMADOL HYDROCHLORIDE 50 MG/1
50 TABLET ORAL EVERY 8 HOURS PRN
Qty: 15 TABLET | Refills: 0 | Status: SHIPPED | OUTPATIENT
Start: 2018-10-27 | End: 2018-11-04

## 2018-10-27 RX ORDER — CEPHALEXIN 500 MG/1
500 CAPSULE ORAL EVERY 12 HOURS
Qty: 10 CAPSULE | Refills: 0 | Status: SHIPPED | OUTPATIENT
Start: 2018-10-27 | End: 2018-11-01

## 2018-10-27 RX ADMIN — HYDROCODONE BITARTRATE AND ACETAMINOPHEN 1 TABLET: 5; 325 TABLET ORAL at 04:10

## 2018-10-27 NOTE — DISCHARGE INSTRUCTIONS
Post-Pacemaker Implantation Discharge Instructions       - Take Keflex 500 mg PO twice daily to complete 5 days total.        - Re-start eliquis after 5 days (when antibiotics are completed)       - Avoid lifting arm over head for approximately 2 weeks       - Avoid lifting or pushing > 10 lbs for approximately 6 weeks (including golf, bowling, etc)       - Avoid driving for 4-weeks post procedure       - Keep area clean and dry for 48 hours, then you may shower (avoid spraying/scrubbing the wound directly)       - Avoid swimming or submerging the area until scar is formed       - Follow up in Device Clinic for wound check as above with additional recommendations              --> Notify Cardiology/EP increased redness, warmth, drainage, or re-opening of the wound noted              --> please call [phone] option 2 during business hours or the main [facility] number and ask for on-call for device clinic after hours.      FOR PAIN:  Alternate tylenol (up to 500mg per dose) and ibuprofen (up to 400mg per dose), take as recommended on the bottle.  If unbearable, can take with tramadol

## 2018-10-27 NOTE — PROGRESS NOTES
Patient with c/o pain 7 out of 10 to right chest wall incision. norco isnt due until 530am  Per PRN orders. notfied  Dr. Gurrola. Order okay to give next dose now. Noted. Will monitor.

## 2018-10-27 NOTE — DISCHARGE SUMMARY
"[facility]  Cardiac Electrophysiology  Discharge Summary      Patient Name: Dacia Martínez  MRN: [id number]  Admission Date: 10/26/2018  Hospital Length of Stay: 0 days  Discharge Date and Time:  10/27/2018 8:07 AM  Attending Physician: Jacques Burt MD    Discharging Provider: Samuel Key MD  Primary Care Physician: Primary Doctor No    HPI:   83 year old female with pAF s/p DCCV (06/06/17) HTN, CAD, breast cancer, migraines, IDDM, and CKD III, who presented to [facility] (07/07/17) for evaluation of a sudden-onset episode of palpitations/tachycardia. She was found to be in AF w/RVR at 120 bpm; an Echo at the time revealed an EF of 60%. Ms. Martínez was rate-controlled and discharged home.      Since discharge, Ms. Martínez notes only occasional fast rates; "not anything like before." She reports experiencing occasional dizziness; baseline. She denies chest pain, SOB/MCCOLLUM, palpitations, or syncope.      11/17: Minimal symptoms due to AF. Remains on metoprolol 100 mg bid as well as apixaban.      1/18: She has almost immediate recurrence of AF post CV 12/20/17. Feels better on amiodarone but sleeping more.      4/18: AF rate controlled on low dose amiodarone 200 mg qd. She is due for dental extraction. Her PCP advised no cessation of OAC.      ON last EP clinic visit >patient found to have : EF was 30-35% in AF with new LBBB. Prior ECG was AF with narrow complex 12/17, EF 60%.   Pt  Recommended for planned  CRT-P with plans for AVN ablation. Patient presented for this planned procedure. She denies any acute symptoms at present. Pt states with recurrent sinusitis > reports resolved, completed antibiotics. Denies any fevers, chills, malaise.                   Procedure(s) (LRB):  INSERTION, CARDIAC PACEMAKER, BIVENTRICULAR (N/A)     Indwelling Lines/Drains at time of discharge:  Lines/Drains/Airways          None          Hospital Course:  Patient presented due to chronic atrial fibrillation for pacemaker " placement. Had a his-pacemaker placed. Recovered with no issues and good pacing based on telemetry reviewed. Instructions reviewed in detail with patient and family at bedside and she was discharged without any issues.    Consults:       Pending Diagnostic Studies:     None          Final Active Diagnoses:    Diagnosis Date Noted POA    PRINCIPAL PROBLEM:  Atrial fibrillation [I48.91] 10/26/2018 Yes      Problems Resolved During this Admission:     No new Assessment & Plan notes have been filed under this hospital service since the last note was generated.  Service: Arrhythmia      Discharged Condition: good    Disposition:     Follow Up:  Follow-up Information     Jacques Burt MD In 3 months.    Specialties:  Electrophysiology, Cardiovascular Disease  Contact information:  [address]  [facility] [id number]  [phone]             WOUND CHECK, [facility] In 1 week.               Patient Instructions:   No discharge procedures on file.  Medications:  Reconciled Home Medications:      Medication List      START taking these medications    cephALEXin 500 MG capsule  Commonly known as:  KEFLEX  Take 1 capsule (500 mg total) by mouth every 12 (twelve) hours. for 5 days     traMADol 50 mg tablet  Commonly known as:  ULTRAM  Take 1 tablet (50 mg total) by mouth every 8 (eight) hours as needed for Pain (Pain preventing sleep, can take in combination with tylenol or ibuprofen).        CHANGE how you take these medications    apixaban 2.5 mg Tab  Commonly known as:  ELIQUIS  Take 1 tablet (2.5 mg total) by mouth 2 (two) times daily. Resume on 11/1  What changed:  additional instructions     cholecalciferol (vitamin D3) 1,000 unit capsule  Commonly known as:  VITAMIN D3  Take 2 capsules (2,000 Units total) by mouth once daily.  What changed:  how much to take        CONTINUE taking these medications    ACCU-CHEK FASTCLIX LANCING DEV Misc  Generic drug:  lancets  1 lancet by Misc.(Non-Drug; Combo Route) route 3  (three) times daily. Pt to test blood glucose up to 3 times daily.     allopurinol 100 MG tablet  Commonly known as:  ZYLOPRIM  Take 1 and 1/2 tablets (150 mg total) by mouth once daily.     amiodarone 200 MG Tab  Commonly known as:  PACERONE  Take 1 tablet (200 mg total) by mouth once daily.     amoxicillin 500 MG capsule  Commonly known as:  AMOXIL  Take 1 capsule (500 mg total) by mouth 3 (three) times daily.     benzonatate 100 MG capsule  Commonly known as:  TESSALON  Take 100 mg by mouth 3 (three) times daily as needed for Cough.     diphenhydrAMINE-zinc acetate 1-0.1% cream  Commonly known as:  BENADRYL  Apply topically 2 (two) times daily as needed for Itching.     docusate sodium 100 MG capsule  Commonly known as:  COLACE  Take 100 mg by mouth daily as needed for Constipation.     doxepin 10 MG capsule  Commonly known as:  SINEQUAN  TAKE 1 CAPSULE BY MOUTH THREE TIMES A DAY     fluticasone 50 mcg/actuation nasal spray  Commonly known as:  FLONASE  SPRAY TWICE IN EACH NOSTRIL DAILY     furosemide 20 MG tablet  Commonly known as:  LASIX  Take 2 tablets (40 mg total) by mouth once daily.     HYDROcodone-acetaminophen 5-325 mg per tablet  Commonly known as:  NORCO  Take 1 tablet by mouth every 6 (six) hours as needed for Pain.     JANUVIA 50 MG Tab  Generic drug:  SITagliptin  Take 1 tablet (50 mg total) by mouth once daily.     * meclizine 25 mg tablet  Commonly known as:  ANTIVERT  Take 1 tablet (25 mg total) by mouth 3 (three) times daily as needed for Dizziness.     * meclizine 25 mg tablet  Commonly known as:  ANTIVERT  Take 1 tablet (25 mg total) by mouth every 6 (six) hours.     metoprolol tartrate 50 MG tablet  Commonly known as:  LOPRESSOR  Take 1 tablet (50 mg total) by mouth 2 (two) times daily.     predniSONE 5 MG tablet  Commonly known as:  DELTASONE  Take 1-2 tablets by mouth as needed for gout attack     senna-docusate 8.6-50 mg 8.6-50 mg per tablet  Commonly known as:  PERICOLACE  Take 1 tablet  by mouth once daily.     triamcinolone acetonide 0.1% 0.1 % cream  Commonly known as:  KENALOG  APPLY TOPICALLY TWO TIMES A DAY         * This list has 2 medication(s) that are the same as other medications prescribed for you. Read the directions carefully, and ask your doctor or other care provider to review them with you.                Time spent on the discharge of patient: 22 minutes    Samuel Key MD  Cardiac Electrophysiology  [facility]

## 2018-10-27 NOTE — HOSPITAL COURSE
Patient presented due to chronic atrial fibrillation for pacemaker placement. Had a his-pacemaker placed. Recovered with no issues and good pacing based on telemetry reviewed. Instructions reviewed in detail with patient and family at bedside and she was discharged without any issues.

## 2018-10-30 RX ORDER — PREDNISONE 5 MG/1
TABLET ORAL
Qty: 30 TABLET | Refills: 0 | Status: SHIPPED | OUTPATIENT
Start: 2018-10-30 | End: 2019-01-04 | Stop reason: SDUPTHER

## 2018-10-30 RX ORDER — BENZONATATE 100 MG/1
100 CAPSULE ORAL 3 TIMES DAILY
Qty: 90 CAPSULE | Refills: 11 | Status: CANCELLED | OUTPATIENT
Start: 2018-10-30 | End: 2019-01-01

## 2018-10-31 ENCOUNTER — TELEPHONE (OUTPATIENT)
Dept: OTOLARYNGOLOGY | Facility: CLINIC | Age: 83
End: 2018-10-31

## 2018-10-31 ENCOUNTER — TELEPHONE (OUTPATIENT)
Dept: RHEUMATOLOGY | Facility: CLINIC | Age: 83
End: 2018-10-31

## 2018-10-31 NOTE — TELEPHONE ENCOUNTER
----- Message from Myla Sánchez sent at 10/31/2018  1:26 PM CDT -----  Contact: Pt  Pt would like to be seen by Dr Macdonald for arthirtis and gout. Pt is established pt but there were no appointments coming up in the system . Pt can be reached at [phone].  Pt would like to be seen ASAP.    Thanks

## 2018-11-01 RX ORDER — HYDROCODONE BITARTRATE AND ACETAMINOPHEN 5; 325 MG/1; MG/1
1 TABLET ORAL EVERY 8 HOURS PRN
Qty: 90 TABLET | Refills: 0 | Status: SHIPPED | OUTPATIENT
Start: 2018-11-08 | End: 2018-11-02 | Stop reason: SDUPTHER

## 2018-11-01 NOTE — TELEPHONE ENCOUNTER
She is 2 weeks early for refill  Discussed no overusing hydrocodone iwht her  Will refill for #90 no more than 3 / d

## 2018-11-02 ENCOUNTER — TELEPHONE (OUTPATIENT)
Dept: RHEUMATOLOGY | Facility: CLINIC | Age: 83
End: 2018-11-02

## 2018-11-02 DIAGNOSIS — G89.4 CHRONIC PAIN SYNDROME: Primary | ICD-10-CM

## 2018-11-02 RX ORDER — HYDROCODONE BITARTRATE AND ACETAMINOPHEN 5; 325 MG/1; MG/1
1 TABLET ORAL EVERY 8 HOURS PRN
Qty: 90 TABLET | Refills: 0 | Status: SHIPPED | OUTPATIENT
Start: 2018-11-08 | End: 2018-12-13

## 2018-11-02 RX ORDER — BENZONATATE 100 MG/1
100 CAPSULE ORAL 3 TIMES DAILY
Qty: 90 CAPSULE | Refills: 11 | Status: CANCELLED | OUTPATIENT
Start: 2018-10-30 | End: 2019-01-01

## 2018-11-02 NOTE — TELEPHONE ENCOUNTER
----- Message from Isabelle Power MA sent at 11/2/2018  1:38 PM CDT -----  Contact: Self  Asking for a written RxIsabelle  ----- Message -----  From: Ana Garcia  Sent: 11/2/2018  12:13 PM  To: Darr[facility]S Staff    Pt is needing a call back regarding   HYDROcodone-acetaminophen (NORCO) 5-325 mg per tablet  Pt can be reached @home#

## 2018-11-04 ENCOUNTER — HOSPITAL ENCOUNTER (OUTPATIENT)
Facility: HOSPITAL | Age: 83
Discharge: HOME | End: 2018-11-07
Attending: EMERGENCY MEDICINE | Admitting: HOSPITALIST
Payer: MEDICARE

## 2018-11-04 DIAGNOSIS — E11.3293 CONTROLLED TYPE 2 DIABETES MELLITUS WITH BOTH EYES AFFECTED BY MILD NONPROLIFERATIVE RETINOPATHY WITHOUT MACULAR EDEMA, WITHOUT LONG-TERM CURRENT USE OF INSULIN: ICD-10-CM

## 2018-11-04 DIAGNOSIS — R79.89 ELEVATED BRAIN NATRIURETIC PEPTIDE (BNP) LEVEL: ICD-10-CM
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
## 2018-11-04 DIAGNOSIS — K59.02 CONSTIPATION DUE TO OUTLET DYSFUNCTION: ICD-10-CM

## 2018-11-04 DIAGNOSIS — M15.9 PRIMARY OSTEOARTHRITIS INVOLVING MULTIPLE JOINTS: Chronic | ICD-10-CM

## 2018-11-04 DIAGNOSIS — I95.1 ORTHOSTATIC SYNCOPE: Primary | ICD-10-CM

## 2018-11-04 DIAGNOSIS — I50.43 ACUTE ON CHRONIC COMBINED SYSTOLIC AND DIASTOLIC CONGESTIVE HEART FAILURE: ICD-10-CM
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
## 2018-11-04 DIAGNOSIS — I35.0 SEVERE AORTIC STENOSIS: ICD-10-CM

## 2018-11-04 DIAGNOSIS — N18.30 TYPE 2 DIABETES MELLITUS WITH STAGE 3 CHRONIC KIDNEY DISEASE, WITHOUT LONG-TERM CURRENT USE OF INSULIN: ICD-10-CM

## 2018-11-04 DIAGNOSIS — R26.81 UNSTEADY GAIT: ICD-10-CM

## 2018-11-04 DIAGNOSIS — I70.0 ATHEROSCLEROSIS OF AORTA: Chronic | ICD-10-CM

## 2018-11-04 DIAGNOSIS — E86.0 DEHYDRATION: ICD-10-CM

## 2018-11-04 DIAGNOSIS — R60.0 EDEMA EXTREMITIES: ICD-10-CM

## 2018-11-04 DIAGNOSIS — R55 SYNCOPE: ICD-10-CM

## 2018-11-04 DIAGNOSIS — R33.9 INCOMPLETE BLADDER EMPTYING: ICD-10-CM

## 2018-11-04 DIAGNOSIS — R55 SYNCOPE, UNSPECIFIED SYNCOPE TYPE: ICD-10-CM

## 2018-11-04 DIAGNOSIS — M1A.09X0 IDIOPATHIC CHRONIC GOUT OF MULTIPLE SITES WITHOUT TOPHUS: Chronic | ICD-10-CM

## 2018-11-04 DIAGNOSIS — E87.6 HYPOKALEMIA: ICD-10-CM

## 2018-11-04 DIAGNOSIS — E11.22 TYPE 2 DIABETES MELLITUS WITH STAGE 3 CHRONIC KIDNEY DISEASE, WITHOUT LONG-TERM CURRENT USE OF INSULIN: ICD-10-CM

## 2018-11-04 DIAGNOSIS — N18.30 CKD (CHRONIC KIDNEY DISEASE) STAGE 3, GFR 30-59 ML/MIN: ICD-10-CM

## 2018-11-04 DIAGNOSIS — E55.9 HYPOVITAMINOSIS D: ICD-10-CM

## 2018-11-04 DIAGNOSIS — R42 VERTIGO: ICD-10-CM

## 2018-11-04 DIAGNOSIS — Z85.3 HISTORY OF BREAST CANCER: ICD-10-CM

## 2018-11-04 DIAGNOSIS — I36.1 NON-RHEUMATIC TRICUSPID VALVE INSUFFICIENCY: ICD-10-CM

## 2018-11-04 DIAGNOSIS — I10 ESSENTIAL HYPERTENSION: ICD-10-CM

## 2018-11-04 PROBLEM — N17.9 AKI (ACUTE KIDNEY INJURY): Status: RESOLVED | Noted: 2018-06-23 | Resolved: 2018-11-04

## 2018-11-04 PROBLEM — E87.1 HYPONATREMIA: Status: RESOLVED | Noted: 2018-01-01 | Resolved: 2018-11-04

## 2018-11-04 PROBLEM — E87.5 HYPERKALEMIA: Status: RESOLVED | Noted: 2018-01-01 | Resolved: 2018-11-04

## 2018-11-04 PROBLEM — N89.8 FOUL SMELLING VAGINAL DISCHARGE: Status: ACTIVE | Noted: 2018-11-04

## 2018-11-04 PROBLEM — J06.9 UPPER RESPIRATORY INFECTION: Status: RESOLVED | Noted: 2017-12-15 | Resolved: 2018-11-04

## 2018-11-04 PROBLEM — E83.42 HYPOMAGNESEMIA WITH SECONDARY HYPOCALCEMIA: Status: ACTIVE | Noted: 2018-11-04

## 2018-11-04 PROBLEM — E83.51 HYPOMAGNESEMIA WITH SECONDARY HYPOCALCEMIA: Status: ACTIVE | Noted: 2018-11-04

## 2018-11-04 LAB
25(OH)D3+25(OH)D2 SERPL-MCNC: 18 NG/ML
ALBUMIN SERPL BCP-MCNC: 2.8 G/DL
ALP SERPL-CCNC: 68 U/L
ALT SERPL W/O P-5'-P-CCNC: 16 U/L
ANION GAP SERPL CALC-SCNC: 11 MMOL/L
ANION GAP SERPL CALC-SCNC: 14 MMOL/L
ASCENDING AORTA: 3.4 CM
AST SERPL-CCNC: 34 U/L
AV MEAN GRADIENT: 7.6 MMHG
AV PEAK GRADIENT: 11.95 MMHG
AV VALVE AREA: 0.66 CM2
BACTERIA #/AREA URNS AUTO: ABNORMAL /HPF
BASOPHILS # BLD AUTO: 0.03 K/UL
BASOPHILS NFR BLD: 0.4 %
BILIRUB SERPL-MCNC: 1.8 MG/DL
BILIRUB UR QL STRIP: NEGATIVE
BNP SERPL-MCNC: 2600 PG/ML
BSA FOR ECHO PROCEDURE: 1.71 M2
BUN SERPL-MCNC: 29 MG/DL
BUN SERPL-MCNC: 29 MG/DL
CALCIUM SERPL-MCNC: 7.5 MG/DL
CALCIUM SERPL-MCNC: 8.7 MG/DL
CHLORIDE SERPL-SCNC: 103 MMOL/L
CHLORIDE SERPL-SCNC: 107 MMOL/L
CLARITY UR REFRACT.AUTO: ABNORMAL
CO2 SERPL-SCNC: 15 MMOL/L
CO2 SERPL-SCNC: 18 MMOL/L
COLOR UR AUTO: YELLOW
CREAT SERPL-MCNC: 1.6 MG/DL
CREAT SERPL-MCNC: 1.6 MG/DL
CV ECHO LV RWT: 0.45 CM
DIFFERENTIAL METHOD: ABNORMAL
DOP CALC AO PEAK VEL: 1.73 M/S
DOP CALC AO VTI: 30.71 CM
DOP CALC LVOT AREA: 3.49 CM2
DOP CALC LVOT DIAMETER: 2.11 CM
DOP CALC LVOT STROKE VOLUME: 20.13 CM3
DOP CALCLVOT PEAK VEL VTI: 5.76 CM
E WAVE DECELERATION TIME: 109.21 MSEC
E/E' RATIO: 37
ECHO LV POSTERIOR WALL: 0.98 CM (ref 0.6–1.1)
EOSINOPHIL # BLD AUTO: 0.1 K/UL
EOSINOPHIL NFR BLD: 0.6 %
ERYTHROCYTE [DISTWIDTH] IN BLOOD BY AUTOMATED COUNT: 19.6 %
EST. GFR  (AFRICAN AMERICAN): 34.3 ML/MIN/1.73 M^2
EST. GFR  (AFRICAN AMERICAN): 34.3 ML/MIN/1.73 M^2
EST. GFR  (NON AFRICAN AMERICAN): 29.8 ML/MIN/1.73 M^2
EST. GFR  (NON AFRICAN AMERICAN): 29.8 ML/MIN/1.73 M^2
ESTIMATED AVG GLUCOSE: 126 MG/DL
FRACTIONAL SHORTENING: 12 % (ref 28–44)
GLUCOSE SERPL-MCNC: 104 MG/DL
GLUCOSE SERPL-MCNC: 129 MG/DL
GLUCOSE UR QL STRIP: NEGATIVE
HBA1C MFR BLD HPLC: 6 %
HCT VFR BLD AUTO: 36.7 %
HGB BLD-MCNC: 11.4 G/DL
HGB UR QL STRIP: NEGATIVE
HYALINE CASTS UR QL AUTO: 5 /LPF
IMM GRANULOCYTES # BLD AUTO: 0.04 K/UL
IMM GRANULOCYTES NFR BLD AUTO: 0.5 %
INTERVENTRICULAR SEPTUM: 1.1 CM (ref 0.6–1.1)
IVRT: 0.1 MSEC
KETONES UR QL STRIP: NEGATIVE
LA MAJOR: 6.18 CM
LA MINOR: 6.18 CM
LA WIDTH: 4.72 CM
LEFT ATRIUM SIZE: 4.5 CM
LEFT ATRIUM VOLUME INDEX: 65.2 ML/M2
LEFT ATRIUM VOLUME: 111.57 CM3
LEFT INTERNAL DIMENSION IN SYSTOLE: 3.81 CM (ref 2.1–4)
LEFT VENTRICLE DIASTOLIC VOLUME INDEX: 49.33 ML/M2
LEFT VENTRICLE DIASTOLIC VOLUME: 84.36 ML
LEFT VENTRICLE MASS INDEX: 89 G/M2
LEFT VENTRICLE SYSTOLIC VOLUME INDEX: 36.4 ML/M2
LEFT VENTRICLE SYSTOLIC VOLUME: 62.18 ML
LEFT VENTRICULAR INTERNAL DIMENSION IN DIASTOLE: 4.33 CM (ref 3.5–6)
LEFT VENTRICULAR MASS: 152.18 G
LEUKOCYTE ESTERASE UR QL STRIP: NEGATIVE
LV LATERAL E/E' RATIO: 37
LV SEPTAL E/E' RATIO: 37
LYMPHOCYTES # BLD AUTO: 1.3 K/UL
LYMPHOCYTES NFR BLD: 16.5 %
MAGNESIUM SERPL-MCNC: 1.4 MG/DL
MCH RBC QN AUTO: 28 PG
MCHC RBC AUTO-ENTMCNC: 31.1 G/DL
MCV RBC AUTO: 90 FL
MICROSCOPIC COMMENT: ABNORMAL
MONOCYTES # BLD AUTO: 0.8 K/UL
MONOCYTES NFR BLD: 10.6 %
MV PEAK E VEL: 1.11 M/S
MV STENOSIS PRESSURE HALF TIME: 31.67 MS
MV VALVE AREA P 1/2 METHOD: 6.95 CM2
NEUTROPHILS # BLD AUTO: 5.7 K/UL
NEUTROPHILS NFR BLD: 71.4 %
NITRITE UR QL STRIP: NEGATIVE
NRBC BLD-RTO: 1 /100 WBC
PH UR STRIP: 5 [PH] (ref 5–8)
PISA TR MAX VEL: 2.4 M/S
PLATELET # BLD AUTO: 231 K/UL
PMV BLD AUTO: 11 FL
POCT GLUCOSE: 124 MG/DL (ref 70–110)
POTASSIUM SERPL-SCNC: 2.7 MMOL/L
POTASSIUM SERPL-SCNC: 4.6 MMOL/L
PROT SERPL-MCNC: 6.2 G/DL
PROT UR QL STRIP: ABNORMAL
PTH-INTACT SERPL-MCNC: 285 PG/ML
RA MAJOR: 5.78 CM
RA PRESSURE: 8 MMHG
RA WIDTH: 6.73 CM
RBC # BLD AUTO: 4.07 M/UL
RBC #/AREA URNS AUTO: 2 /HPF (ref 0–4)
RETIRED EF AND QEF - SEE NOTES: 26.29 %
RIGHT VENTRICULAR END-DIASTOLIC DIMENSION: 5.32 CM
SINUS: 3.29 CM
SODIUM SERPL-SCNC: 132 MMOL/L
SODIUM SERPL-SCNC: 136 MMOL/L
SP GR UR STRIP: 1.01 (ref 1–1.03)
SQUAMOUS #/AREA URNS AUTO: 3 /HPF
STJ: 2.84 CM
TDI LATERAL: 0.03
TDI SEPTAL: 0.03
TDI: 0.03
TR MAX PG: 23.04 MMHG
TRICUSPID ANNULAR PLANE SYSTOLIC EXCURSION: 1.16 CM
TROPONIN I SERPL DL<=0.01 NG/ML-MCNC: 0.14 NG/ML
TV REST PULMONARY ARTERY PRESSURE: 31.04 MMHG
URN SPEC COLLECT METH UR: ABNORMAL
WBC # BLD AUTO: 7.92 K/UL
WBC #/AREA URNS AUTO: 1 /HPF (ref 0–5)

## 2018-11-04 PROCEDURE — 83880 ASSAY OF NATRIURETIC PEPTIDE: CPT

## 2018-11-04 PROCEDURE — 25000242 PHARM REV CODE 250 ALT 637 W/ HCPCS: Performed by: NURSE PRACTITIONER

## 2018-11-04 PROCEDURE — 93005 ELECTROCARDIOGRAM TRACING: CPT

## 2018-11-04 PROCEDURE — 25000003 PHARM REV CODE 250: Performed by: NURSE PRACTITIONER

## 2018-11-04 PROCEDURE — 36415 COLL VENOUS BLD VENIPUNCTURE: CPT

## 2018-11-04 PROCEDURE — 99285 EMERGENCY DEPT VISIT HI MDM: CPT | Mod: HCNC,,, | Performed by: NURSE PRACTITIONER

## 2018-11-04 PROCEDURE — 99218 PR INITIAL OBSERVATION CARE,LEVL I: CPT | Mod: 24,GC,, | Performed by: INTERNAL MEDICINE

## 2018-11-04 PROCEDURE — G0378 HOSPITAL OBSERVATION PER HR: HCPCS

## 2018-11-04 PROCEDURE — 63600175 PHARM REV CODE 636 W HCPCS: Performed by: NURSE PRACTITIONER

## 2018-11-04 PROCEDURE — 99285 EMERGENCY DEPT VISIT HI MDM: CPT | Mod: 25

## 2018-11-04 PROCEDURE — 81001 URINALYSIS AUTO W/SCOPE: CPT

## 2018-11-04 PROCEDURE — 83036 HEMOGLOBIN GLYCOSYLATED A1C: CPT

## 2018-11-04 PROCEDURE — 80048 BASIC METABOLIC PNL TOTAL CA: CPT

## 2018-11-04 PROCEDURE — 80053 COMPREHEN METABOLIC PANEL: CPT

## 2018-11-04 PROCEDURE — 82306 VITAMIN D 25 HYDROXY: CPT

## 2018-11-04 PROCEDURE — 83970 ASSAY OF PARATHORMONE: CPT

## 2018-11-04 PROCEDURE — 84484 ASSAY OF TROPONIN QUANT: CPT

## 2018-11-04 PROCEDURE — 87086 URINE CULTURE/COLONY COUNT: CPT

## 2018-11-04 PROCEDURE — 85025 COMPLETE CBC W/AUTO DIFF WBC: CPT

## 2018-11-04 PROCEDURE — 93010 ELECTROCARDIOGRAM REPORT: CPT | Mod: ,,, | Performed by: INTERNAL MEDICINE

## 2018-11-04 PROCEDURE — 83735 ASSAY OF MAGNESIUM: CPT

## 2018-11-04 PROCEDURE — 99220 PR INITIAL OBSERVATION CARE,LEVL III: CPT | Mod: ,,, | Performed by: NURSE PRACTITIONER

## 2018-11-04 RX ORDER — FLUTICASONE PROPIONATE 50 MCG
2 SPRAY, SUSPENSION (ML) NASAL DAILY PRN
Status: DISCONTINUED | OUTPATIENT
Start: 2018-11-04 | End: 2018-11-07 | Stop reason: HOSPADM

## 2018-11-04 RX ORDER — FUROSEMIDE 10 MG/ML
40 INJECTION INTRAMUSCULAR; INTRAVENOUS 2 TIMES DAILY
Status: DISCONTINUED | OUTPATIENT
Start: 2018-11-04 | End: 2018-11-05

## 2018-11-04 RX ORDER — ONDANSETRON 8 MG/1
8 TABLET, ORALLY DISINTEGRATING ORAL EVERY 8 HOURS PRN
Status: DISCONTINUED | OUTPATIENT
Start: 2018-11-04 | End: 2018-11-07 | Stop reason: HOSPADM

## 2018-11-04 RX ORDER — POTASSIUM CHLORIDE 7.45 MG/ML
10 INJECTION INTRAVENOUS
Status: DISCONTINUED | OUTPATIENT
Start: 2018-11-04 | End: 2018-11-04

## 2018-11-04 RX ORDER — METOPROLOL TARTRATE 50 MG/1
50 TABLET ORAL 2 TIMES DAILY
Status: DISCONTINUED | OUTPATIENT
Start: 2018-11-04 | End: 2018-11-07 | Stop reason: HOSPADM

## 2018-11-04 RX ORDER — GLUCAGON 1 MG
1 KIT INJECTION
Status: DISCONTINUED | OUTPATIENT
Start: 2018-11-04 | End: 2018-11-07 | Stop reason: HOSPADM

## 2018-11-04 RX ORDER — SODIUM CHLORIDE 0.9 % (FLUSH) 0.9 %
5 SYRINGE (ML) INJECTION
Status: DISCONTINUED | OUTPATIENT
Start: 2018-11-04 | End: 2018-11-07 | Stop reason: HOSPADM

## 2018-11-04 RX ORDER — BENZONATATE 100 MG/1
100 CAPSULE ORAL 3 TIMES DAILY PRN
Status: DISCONTINUED | OUTPATIENT
Start: 2018-11-04 | End: 2018-11-07 | Stop reason: HOSPADM

## 2018-11-04 RX ORDER — POTASSIUM CHLORIDE 750 MG/1
60 CAPSULE, EXTENDED RELEASE ORAL ONCE
Status: DISCONTINUED | OUTPATIENT
Start: 2018-11-04 | End: 2018-11-04

## 2018-11-04 RX ORDER — AMOXICILLIN 250 MG
1 CAPSULE ORAL 2 TIMES DAILY
Status: DISCONTINUED | OUTPATIENT
Start: 2018-11-04 | End: 2018-11-07 | Stop reason: HOSPADM

## 2018-11-04 RX ORDER — IBUPROFEN 200 MG
24 TABLET ORAL
Status: DISCONTINUED | OUTPATIENT
Start: 2018-11-04 | End: 2018-11-07 | Stop reason: HOSPADM

## 2018-11-04 RX ORDER — AMIODARONE HYDROCHLORIDE 200 MG/1
200 TABLET ORAL DAILY
Status: DISCONTINUED | OUTPATIENT
Start: 2018-11-04 | End: 2018-11-07 | Stop reason: HOSPADM

## 2018-11-04 RX ORDER — INSULIN ASPART 100 [IU]/ML
1-10 INJECTION, SOLUTION INTRAVENOUS; SUBCUTANEOUS
Status: DISCONTINUED | OUTPATIENT
Start: 2018-11-04 | End: 2018-11-07 | Stop reason: HOSPADM

## 2018-11-04 RX ORDER — FLUCONAZOLE 150 MG/1
150 TABLET ORAL ONCE
Status: COMPLETED | OUTPATIENT
Start: 2018-11-04 | End: 2018-11-04

## 2018-11-04 RX ORDER — ACETAMINOPHEN 500 MG
1000 TABLET ORAL EVERY 8 HOURS PRN
Status: DISCONTINUED | OUTPATIENT
Start: 2018-11-04 | End: 2018-11-07 | Stop reason: HOSPADM

## 2018-11-04 RX ORDER — MECLIZINE HYDROCHLORIDE 25 MG/1
25 TABLET ORAL 3 TIMES DAILY PRN
Status: DISCONTINUED | OUTPATIENT
Start: 2018-11-04 | End: 2018-11-07 | Stop reason: HOSPADM

## 2018-11-04 RX ORDER — CHOLECALCIFEROL (VITAMIN D3) 25 MCG
2000 TABLET ORAL DAILY
Status: DISCONTINUED | OUTPATIENT
Start: 2018-11-04 | End: 2018-11-07 | Stop reason: HOSPADM

## 2018-11-04 RX ORDER — IBUPROFEN 200 MG
16 TABLET ORAL
Status: DISCONTINUED | OUTPATIENT
Start: 2018-11-04 | End: 2018-11-07 | Stop reason: HOSPADM

## 2018-11-04 RX ADMIN — SENNOSIDES AND DOCUSATE SODIUM 1 TABLET: 8.6; 5 TABLET ORAL at 08:11

## 2018-11-04 RX ADMIN — ALLOPURINOL 150 MG: 300 TABLET ORAL at 02:11

## 2018-11-04 RX ADMIN — FLUTICASONE PROPIONATE 100 MCG: 50 SPRAY, METERED NASAL at 02:11

## 2018-11-04 RX ADMIN — MAGNESIUM SULFATE HEPTAHYDRATE 3 G: 500 INJECTION, SOLUTION INTRAMUSCULAR; INTRAVENOUS at 03:11

## 2018-11-04 RX ADMIN — POTASSIUM BICARBONATE 50 MEQ: 25 TABLET, EFFERVESCENT ORAL at 11:11

## 2018-11-04 RX ADMIN — VITAMIN D, TAB 1000IU (100/BT) 2000 UNITS: 25 TAB at 02:11

## 2018-11-04 RX ADMIN — Medication: at 06:11

## 2018-11-04 RX ADMIN — Medication: at 02:11

## 2018-11-04 RX ADMIN — AMIODARONE HYDROCHLORIDE 200 MG: 200 TABLET ORAL at 02:11

## 2018-11-04 RX ADMIN — APIXABAN 2.5 MG: 2.5 TABLET, FILM COATED ORAL at 08:11

## 2018-11-04 RX ADMIN — ACETAMINOPHEN 1000 MG: 500 TABLET, FILM COATED ORAL at 06:11

## 2018-11-04 RX ADMIN — FLUCONAZOLE 150 MG: 150 TABLET ORAL at 02:11

## 2018-11-04 RX ADMIN — FUROSEMIDE 40 MG: 10 INJECTION, SOLUTION INTRAVENOUS at 02:11

## 2018-11-04 NOTE — ED NOTES
Pt. Taken upstairs with transport and family. No distress noted. On portable tele box, verified by war room.

## 2018-11-04 NOTE — PLAN OF CARE
Problem: Patient Care Overview  Goal: Plan of Care Review  Outcome: Ongoing (interventions implemented as appropriate)    Patient is alert and oriented x 3. Daughter and granddaughter at bedside. Limb alert on left arm d/t history of mastectomy per report. Lungs clear posterior. BSC x 1 assist. Patient voids. Bladder scan post void low, see flow sheet.     Orthostatic blood pressure entered. Cony care completed and clean catch urine sent to lab. Periarea is reddened. Cleaned area and applied medicated cream. Patient has a right chest incision healing from pacemaker placement last week. IV inflitrated early in shift and ED RN came to start new IV as patient is a difficult stick.    Family educated to bring patients glasses' and hearing aids to better assist patient in daily living activities.     Per order, patient is to be  on a standing scale and results put in computer by 0800. Strict intake and output. Fluid restriction of 1,500 ml.     Will update on coming nurse and write on whiteboard. 2D Echo completed today. Call light in reach. Magnesium sulfate infusing. Call light in reach.

## 2018-11-04 NOTE — ASSESSMENT & PLAN NOTE
- continue amiodarone, metoprolol rate controlled  - has been off eliquis since prior to surgery, 10/24  - previously low dose 2.5mg, will resume, as she's > 80, weight is borderline 60kg once she's diuresed and recent falls/ collapse

## 2018-11-04 NOTE — CONSULTS
"[facility]  Heart Transplant  Consult Note    Patient Name: Dacia Martínez  MRN: [id number]  Admission Date: 2018  Hospital Length of Stay: 0 days  Attending Physician: Sj Camacho MD  Primary Care Provider: Primary Doctor No   Principal Problem:<principal problem not specified>    Inpatient consult to Cardiology  Consult performed by: Burton Peña MD  Consult ordered by: Cat Singleton NP  Reason for consult: syncope        Subjective:     History of Present Illness:  Pt is a 83 year old lady who we are consulted for syncope.     She has a hx of pAF who had a reduction in EF and LBBB so BiV was placed on 10/26 by Dr. Burt. No atrial lead was placed as there is plan to bring her back in 4-6 weeks to perform AVN ablation on place a RA lead at that time. Last night she had a syncopal episode after standing up and going to the restroom. She states that upon standing she felt lightheaded/dizzy and "everything went black and she passed out." She states that she was not out for long and immediately came back to she did not hit her head. Denies any palpitations, chest pain, shortness of breath.       Past Medical History:   Diagnosis Date    A-fib     Arthritis     Asteroid hyalosis of left eye     Bilateral nonexudative age-related macular degeneration 6/3/2014    Breast cancer     Cataract     Chronic GERD 2017    CKD stage 3 due to type 2 diabetes mellitus     Coronary artery disease     Hypertension     Migraine headache     Mild nonproliferative diabetic retinopathy of both eyes 6/3/2014    Pneumonia     Type 2 diabetes mellitus with hyperglycemia     Vertigo        Past Surgical History:   Procedure Laterality Date    BREAST SURGERY      left lumpectomy    CARDIOVERSION N/A 2017    Performed by Jacques [facility]urt [facility] at [facility] CATH LAB    CARPAL TUNNEL RELEASE      left    CATARACT EXTRACTION      vance     SECTION      COLONOSCOPY N/A 3/5/2015 "    Performed by Andrés Hobson MD at [facility] ENDO (4TH FLR)    ESOPHAGOGASTRODUODENOSCOPY (EGD) N/A 9/27/2016    Performed by Rory Ogden MD at [facility] ENDO (2ND FLR)    EYE SURGERY      cataracts bilaterally    HYSTERECTOMY      partial    IMPLANTATION OF BIVENTRICULAR HEART PACEMAKER N/A 10/26/2018    Procedure: INSERTION, CARDIAC PACEMAKER, BIVENTRICULAR;  Surgeon: Jacques Burt MD;  Location: [facility] CATH LAB;  Service: Cardiology;  Laterality: N/A;  AF, CRT-P, SJM, MAC, MB, 3 Prep    INSERTION, CARDIAC PACEMAKER, BIVENTRICULAR N/A 10/26/2018    Performed by Jacques Burt MD at [facility] CATH LAB    TRANSESOPHAGEAL ECHOCARDIOGRAM (REJI) N/A 12/20/2017    Performed by Ruma Long MD at [facility] CATH LAB    TRANSESOPHAGEAL ECHOCARDIOGRAM (REJI) N/A 6/6/2017    Performed by Angelique Taylor MD at [facility] CATH LAB       Review of patient's allergies indicates:  No Known Allergies    No current facility-administered medications for this encounter.      Current Outpatient Medications   Medication Sig    allopurinol (ZYLOPRIM) 100 MG tablet Take 1 and 1/2 tablets (150 mg total) by mouth once daily.    amiodarone (PACERONE) 200 MG Tab Take 1 tablet (200 mg total) by mouth once daily.    amoxicillin (AMOXIL) 500 MG capsule Take 1 capsule (500 mg total) by mouth 3 (three) times daily.    benzonatate (TESSALON) 100 MG capsule TAKE ONE CAPSULE BY MOUTH THREE TIMES A DAY AS NEEDED FOR COUGH    doxepin (SINEQUAN) 10 MG capsule TAKE 1 CAPSULE BY MOUTH THREE TIMES A DAY    furosemide (LASIX) 20 MG tablet Take 2 tablets (40 mg total) by mouth once daily.    [START ON 11/8/2018] HYDROcodone-acetaminophen (NORCO) 5-325 mg per tablet Take 1 tablet by mouth every 8 (eight) hours as needed for Pain.    metoprolol tartrate (LOPRESSOR) 50 MG tablet Take 1 tablet (50 mg total) by mouth 2 (two) times daily.    predniSONE (DELTASONE) 5 MG tablet Take 1-2 tablets by mouth as needed for gout attack    SITagliptin  (JANUVIA) 50 MG Tab Take 1 tablet (50 mg total) by mouth once daily.    traMADol (ULTRAM) 50 mg tablet Take 1 tablet (50 mg total) by mouth every 8 (eight) hours as needed for Pain (Pain preventing sleep, can take in combination with tylenol or ibuprofen).    ACCU-CHEK FASTCLIX Misc 1 lancet by Misc.(Non-Drug; Combo Route) route 3 (three) times daily. Pt to test blood glucose up to 3 times daily.    apixaban (ELIQUIS) 2.5 mg Tab Take 1 tablet (2.5 mg total) by mouth 2 (two) times daily. Resume on 11/1    benzonatate (TESSALON) 100 MG capsule Take 100 mg by mouth 3 (three) times daily as needed for Cough.    cholecalciferol, vitamin D3, 1,000 unit capsule Take 2 capsules (2,000 Units total) by mouth once daily. (Patient taking differently: Take 1,000 Units by mouth once daily. )    diphenhydrAMINE-zinc acetate 1-0.1% (BENADRYL) cream Apply topically 2 (two) times daily as needed for Itching.    docusate sodium (COLACE) 100 MG capsule Take 100 mg by mouth daily as needed for Constipation.    fluticasone (FLONASE) 50 mcg/actuation nasal spray SPRAY TWICE IN EACH NOSTRIL DAILY    meclizine (ANTIVERT) 25 mg tablet Take 1 tablet (25 mg total) by mouth 3 (three) times daily as needed for Dizziness.    meclizine (ANTIVERT) 25 mg tablet Take 1 tablet (25 mg total) by mouth every 6 (six) hours.    senna-docusate 8.6-50 mg (PERICOLACE) 8.6-50 mg per tablet Take 1 tablet by mouth once daily.    triamcinolone acetonide 0.1% (KENALOG) 0.1 % cream APPLY TOPICALLY TWO TIMES A DAY     Family History     Problem Relation (Age of Onset)    Blindness Brother    Cancer Mother    Cataracts Sister    Diabetes Father, Brother, Sister, Brother, Brother, Brother    Heart disease Mother    Hypertension Father, Brother    No Known Problems Daughter, Son, Daughter        Tobacco Use    Smoking status: Never Smoker    Smokeless tobacco: Never Used   Substance and Sexual Activity    Alcohol use: Yes    Drug use: No    Sexual  activity: Not Currently     Review of Systems   All other systems reviewed and are negative.    Objective:     Vital Signs (Most Recent):  Temp: 98.2 °F (36.8 °C) (11/04/18 0753)  Pulse: 88 (11/04/18 0929)  Resp: 20 (11/04/18 0753)  BP: 132/72 (11/04/18 0929)  SpO2: 100 % (11/04/18 0753) Vital Signs (24h Range):  Temp:  [98.2 °F (36.8 °C)] 98.2 °F (36.8 °C)  Pulse:  [86-88] 88  Resp:  [20] 20  SpO2:  [100 %] 100 %  BP: (116-137)/(71-80) 132/72     Patient Vitals for the past 72 hrs (Last 3 readings):   Weight   11/04/18 0753 68 kg (150 lb)     Body mass index is 28.34 kg/m².    No intake or output data in the 24 hours ending 11/04/18 0934    Physical Exam  GEN: Alert and oriented in NAD  NECK: no JVD appreciated   CVS: irreg rhythm, s1/s2, 2/6 sys murmur noted  PULM: CTAB no rales  ABD: NT/ND BS +  Extremities: warm and dry, palpable pulses, no edema  NEURO: Alert and oriented x 3  PSYCH: appropriate affect.         Significant Labs:  CBC:  Recent Labs   Lab 11/04/18 0817   WBC 7.92   RBC 4.07   HGB 11.4*   HCT 36.7*      MCV 90   MCH 28.0   MCHC 31.1*     BNP:  Recent Labs   Lab 11/04/18 0817   BNP 2,600*     CMP:  No results for input(s): GLU, CALCIUM, ALBUMIN, PROT, NA, K, CO2, CL, BUN, CREATININE, ALKPHOS, ALT, AST, BILITOT in the last 168 hours.   Coagulation:   No results for input(s): PT, INR, APTT in the last 168 hours.  LDH:  No results for input(s): LDH in the last 72 hours.  Microbiology:  Microbiology Results (last 7 days)     ** No results found for the last 168 hours. **          I have reviewed all pertinent labs within the past 24 hours.    Diagnostic Results:  I have reviewed and interpreted all pertinent imaging results/findings within the past 24 hours.    Assessment/Plan:     Syncope    Pt with pAF with recent reduction in EF 30-35% and LBBB so BiV was placed but does not have an RA lead as it is planned in the next 4-6 weeks with AVN ablation she presented with syncope upon standing and  walking to the restroom last night. These symptoms seem to be related to orthostatic hypotension her device was interrogated and there were no events and BiV is functioning appropriately.     Would place in observation with IM-C on telemetry, repeat echo to rule out effusion, orthostatic vitals (if positive give fluids).          Thank you for your consult. I will follow-up with patient. Please contact us if you have any additional questions.    Burton Peña MD  Heart Transplant  [facility]

## 2018-11-04 NOTE — ED NOTES
Patient identifiers verified and correct for Dacia Murphyd.   LOC: The patient is awake, alert and aware of environment with an appropriate affect, the patient is oriented x 3 and speaking appropriately.   APPEARANCE: Patient appears comfortable and in no acute distress, patient is clean and well groomed.  SKIN: The skin is warm and dry, color consistent with ethnicity, patient has normal skin turgor and moist mucus membranes, skin intact, no breakdown or bruising noted.   MUSCULOSKELETAL: Patient moving all extremities spontaneously, swelling noted to left arm and bilateral lower extremities.   RESPIRATORY: Airway is open and patent, respirations are spontaneous, patient has a normal effort and rate, no accessory muscle use noted, pt placed on continuous pulse ox with O2 sats noted at 97% on room air.  CARDIAC: Pt placed on cardiac monitor. Patient has a normal rate and regular rhythm, no edema noted, capillary refill < 3 seconds.   GASTRO: Soft and non tender to palpation, no distention noted, normoactive bowel sounds present in all four quadrants. Pt states bowel movements have been regular.  : Pt denies any pain or frequency with urination.  NEURO: Pt opens eyes spontaneously, behavior appropriate to situation, follows commands, facial expression symmetrical, bilateral hand grasp equal and even, purposeful motor response noted, normal sensation in all extremities when touched with a finger.

## 2018-11-04 NOTE — ASSESSMENT & PLAN NOTE
- trulicity caused rapid weight loss and loss of appetite  - home januvia working well, hold while in house

## 2018-11-04 NOTE — ED TRIAGE NOTES
Pt reports recent pacemaker placement on 10/26/18. Pt reports 2 falls this morning. Pt denies LOC. Pt states she has been weak lately. Pt has bilateral leg swelling from foot to knees and left arm swelling.

## 2018-11-04 NOTE — SUBJECTIVE & OBJECTIVE
Past Medical History:   Diagnosis Date    A-fib     Arthritis     Asteroid hyalosis of left eye     Bilateral nonexudative age-related macular degeneration 6/3/2014    Breast cancer     Cataract     Chronic GERD 2017    CKD stage 3 due to type 2 diabetes mellitus     Coronary artery disease     Hypertension     Migraine headache     Mild nonproliferative diabetic retinopathy of both eyes 6/3/2014    Pneumonia     Type 2 diabetes mellitus with hyperglycemia     Vertigo        Past Surgical History:   Procedure Laterality Date    BREAST SURGERY      left lumpectomy    CARDIOVERSION N/A 2017    Performed by Jacques Burt [facility] at [facility] CATH LAB    CARPAL TUNNEL RELEASE      left    CATARACT EXTRACTION      vance     SECTION      COLONOSCOPY N/A 3/5/2015    Performed by Andrés Hobson MD at [facility] ENDO (4TH FLR)    ESOPHAGOGASTRODUODENOSCOPY (EGD) N/A 2016    Performed by Rory Ogden MD at [facility] ENDO (2ND FLR)    EYE SURGERY      cataracts bilaterally    HYSTERECTOMY      partial    IMPLANTATION OF BIVENTRICULAR HEART PACEMAKER N/A 10/26/2018    Procedure: INSERTION, CARDIAC PACEMAKER, BIVENTRICULAR;  Surgeon: Jacques Burt MD;  Location: [facility] CATH LAB;  Service: Cardiology;  Laterality: N/A;  AF, CRT-P, SJM, MAC, MB, 3 Prep    INSERTION, CARDIAC PACEMAKER, BIVENTRICULAR N/A 10/26/2018    Performed by Jcaques Burt MD at [facility] CATH LAB    TRANSESOPHAGEAL ECHOCARDIOGRAM (REJI) N/A 2017    Performed by Ruma Long MD at [facility] CATH LAB    TRANSESOPHAGEAL ECHOCARDIOGRAM (REJI) N/A 2017    Performed by Angelique Taylor MD at [facility] CATH LAB       Review of patient's allergies indicates:   Allergen Reactions    Tramadol Hallucinations       No current facility-administered medications on file prior to encounter.      Current Outpatient Medications on File Prior to Encounter   Medication Sig    allopurinol (ZYLOPRIM) 100 MG tablet Take 1 and 1/2  tablets (150 mg total) by mouth once daily.    amiodarone (PACERONE) 200 MG Tab Take 1 tablet (200 mg total) by mouth once daily.    diphenhydrAMINE-zinc acetate 1-0.1% (BENADRYL) cream Apply topically 2 (two) times daily as needed for Itching.    furosemide (LASIX) 20 MG tablet Take 2 tablets (40 mg total) by mouth once daily.    [START ON 11/8/2018] HYDROcodone-acetaminophen (NORCO) 5-325 mg per tablet Take 1 tablet by mouth every 8 (eight) hours as needed for Pain.    meclizine (ANTIVERT) 25 mg tablet Take 1 tablet (25 mg total) by mouth every 6 (six) hours.    metoprolol tartrate (LOPRESSOR) 50 MG tablet Take 1 tablet (50 mg total) by mouth 2 (two) times daily.    SITagliptin (JANUVIA) 50 MG Tab Take 1 tablet (50 mg total) by mouth once daily.    triamcinolone acetonide 0.1% (KENALOG) 0.1 % cream APPLY TOPICALLY TWO TIMES A DAY    [DISCONTINUED] doxepin (SINEQUAN) 10 MG capsule TAKE 1 CAPSULE BY MOUTH THREE TIMES A DAY    [DISCONTINUED] traMADol (ULTRAM) 50 mg tablet Take 1 tablet (50 mg total) by mouth every 8 (eight) hours as needed for Pain (Pain preventing sleep, can take in combination with tylenol or ibuprofen).    ACCU-CHEK FASTCLIX Misc 1 lancet by Misc.(Non-Drug; Combo Route) route 3 (three) times daily. Pt to test blood glucose up to 3 times daily.    apixaban (ELIQUIS) 2.5 mg Tab Take 1 tablet (2.5 mg total) by mouth 2 (two) times daily. Resume on 11/1    benzonatate (TESSALON) 100 MG capsule TAKE ONE CAPSULE BY MOUTH THREE TIMES A DAY AS NEEDED FOR COUGH    cholecalciferol, vitamin D3, 1,000 unit capsule Take 2 capsules (2,000 Units total) by mouth once daily. (Patient taking differently: Take 1,000 Units by mouth once daily. )    fluticasone (FLONASE) 50 mcg/actuation nasal spray SPRAY TWICE IN EACH NOSTRIL DAILY (Patient taking differently: 2 sprays by Each Nare route daily as needed. )    predniSONE (DELTASONE) 5 MG tablet Take 1-2 tablets by mouth as needed for gout attack     [DISCONTINUED] amoxicillin (AMOXIL) 500 MG capsule Take 1 capsule (500 mg total) by mouth 3 (three) times daily.    [DISCONTINUED] benzonatate (TESSALON) 100 MG capsule Take 100 mg by mouth 3 (three) times daily as needed for Cough.    [DISCONTINUED] docusate sodium (COLACE) 100 MG capsule Take 100 mg by mouth daily as needed for Constipation.    [DISCONTINUED] meclizine (ANTIVERT) 25 mg tablet Take 1 tablet (25 mg total) by mouth 3 (three) times daily as needed for Dizziness.    [DISCONTINUED] senna-docusate 8.6-50 mg (PERICOLACE) 8.6-50 mg per tablet Take 1 tablet by mouth once daily.     Family History     Problem Relation (Age of Onset)    Blindness Brother    Cancer Mother    Cataracts Sister    Diabetes Father, Brother, Sister, Brother, Brother, Brother    Heart disease Mother    Hypertension Father, Brother    No Known Problems Daughter, Son, Daughter        Tobacco Use    Smoking status: Never Smoker    Smokeless tobacco: Never Used   Substance and Sexual Activity    Alcohol use: Yes    Drug use: No    Sexual activity: Not Currently     Review of Systems   Constitutional: Positive for activity change and unexpected weight change. Negative for appetite change, chills, fatigue and fever.   HENT: Positive for rhinorrhea (chronic intermittant). Negative for congestion, sinus pressure, sore throat and trouble swallowing.    Eyes: Negative for pain, redness and visual disturbance.   Respiratory: Positive for shortness of breath (with exertion). Negative for cough, chest tightness, wheezing and stridor.    Cardiovascular: Positive for leg swelling. Negative for chest pain and palpitations.   Gastrointestinal: Positive for constipation. Negative for abdominal distention, abdominal pain, blood in stool, diarrhea, nausea and vomiting.   Endocrine: Negative for cold intolerance and heat intolerance.   Genitourinary: Positive for difficulty urinating and vaginal discharge (malodorous). Negative for dysuria,  frequency, hematuria and urgency.   Musculoskeletal: Positive for arthralgias and gait problem. Negative for back pain, myalgias and neck pain.   Skin: Positive for rash (perineal area) and wound (surgical wound r chest). Negative for color change and pallor.   Allergic/Immunologic: Negative for immunocompromised state.   Neurological: Positive for dizziness (vertigo), syncope (as noted above), weakness, light-headedness and numbness (r carpal tunnel). Negative for tremors and headaches.   Hematological: Bruises/bleeds easily.   Psychiatric/Behavioral: Negative for agitation and confusion. The patient is not nervous/anxious.      Objective:     Vital Signs (Most Recent):  Temp: 97.9 °F (36.6 °C) (11/04/18 1244)  Pulse: 89 (11/04/18 1244)  Resp: 17 (11/04/18 1244)  BP: 119/65 (11/04/18 1244)  SpO2: (!) 92 % (11/04/18 1244) Vital Signs (24h Range):  Temp:  [97.9 °F (36.6 °C)-98.2 °F (36.8 °C)] 97.9 °F (36.6 °C)  Pulse:  [86-89] 89  Resp:  [15-20] 17  SpO2:  [92 %-100 %] 92 %  BP: (115-137)/(65-84) 119/65     Weight: 72.1 kg (158 lb 15.2 oz)  Body mass index is 30.03 kg/m².    Physical Exam   Constitutional: She is oriented to person, place, and time. She appears well-developed and well-nourished. No distress.   HENT:   Head: Normocephalic and atraumatic.   Right Ear: External ear normal.   Left Ear: External ear normal.   Nose: Nose normal.   Mouth/Throat: Oropharynx is clear and moist.   Eyes: Conjunctivae and EOM are normal. No scleral icterus.   Neck: Normal range of motion. Neck supple. Hepatojugular reflux and JVD (to tragus) present. No tracheal deviation present. No thyromegaly present.   Cardiovascular: Normal rate, regular rhythm and intact distal pulses. Exam reveals no gallop and no friction rub.   Murmur (aortic soft blowing murmer) heard.  Pulmonary/Chest: Effort normal and breath sounds normal. No respiratory distress. She has no wheezes. She has no rales.   Abdominal: Soft. Bowel sounds are normal. She  exhibits no distension and no mass. There is no tenderness. There is no rebound and no guarding.   Genitourinary: Vaginal discharge found.   Musculoskeletal: Normal range of motion. She exhibits edema. She exhibits no tenderness.   Neurological: She is alert and oriented to person, place, and time. No cranial nerve deficit or sensory deficit. She exhibits normal muscle tone. Coordination abnormal.   Skin: Skin is warm and dry. No rash noted. No erythema.   Psychiatric: She has a normal mood and affect. Her behavior is normal. Judgment and thought content normal.   Nursing note and vitals reviewed.        CRANIAL NERVES     CN III, IV, VI   Extraocular motions are normal.        Significant Labs:   CBC:   Recent Labs   Lab 11/04/18  0817   WBC 7.92   HGB 11.4*   HCT 36.7*        CMP:   Recent Labs   Lab 11/04/18  0952      K 2.7*      CO2 18*      BUN 29*   CREATININE 1.6*   CALCIUM 7.5*   PROT 6.2   ALBUMIN 2.8*   BILITOT 1.8*   ALKPHOS 68   AST 34   ALT 16   ANIONGAP 11   EGFRNONAA 29.8*     Cardiac Markers:   Recent Labs   Lab 11/04/18  0817   BNP 2,600*     Magnesium:   Recent Labs   Lab 11/04/18  0952   MG 1.4*     Troponin:   Recent Labs   Lab 11/04/18  0817   TROPONINI 0.139*       Significant Imaging: I have reviewed all pertinent imaging results/findings within the past 24 hours.

## 2018-11-04 NOTE — NURSING
Lost IV access. Updated NP about magnesium sulfate IVPB being late till new IV is started. Updated Charge on unit and called ED Charge Joie. ED will assist and send an ED RN to start IV. Patient is an extremely difficult stick.

## 2018-11-04 NOTE — ED NOTES
Pt. Moved up in bed per RNs, family at bs. Denies complaints or concerns. Plan to admit to obs. Will continue to monitor.

## 2018-11-04 NOTE — H&P
"[facility] Medicine  History & Physical    Patient Name: Dacia Martínez  MRN: [id number]  Admission Date: 11/4/2018  Attending Physician: Jason Arora MD   Primary Care Provider: Jasmeet Corral MD    [facility] Medicine Team: [facility] MED SONIA Davies NP     Patient information was obtained from patient, relative(s), past medical records and ER records.     Subjective:     Principal Problem:Syncope and collapse    Chief Complaint:   Chief Complaint   Patient presents with    Fall     family member reports 2 falls this am. pacemaker insertion 10/26.         HPI: Ms. Martínez is an 82 y/o F who presents to the ED with c/o of syncopal collapse x2 episodes.  First episode leaving bathroom at 11:30 pm where she attempted to void, was unable and "strained to get the urine out, but I as only able to get a little bit out" and her grandson caught her as she collapsed on the way to the bed.  He put her in the bed, she stated everything was pitch black for about two minutes.  She could hear but couldn't see.  She was being assisted out of her house this am to come to the hospital with her family and around about the 9th step (last step) on staircase she collapsed as if her body just gave way.  Her grandson caught her again, she felt dizzy with no blackness or syncope.  Of note, Dr. Shah inserted a BIV pacemaker on 10/26/18, she completed her abx course, and only used her sling for the first 3 days, she has not been using it at night. She has chronic ble edema, no more than usual and increased LUE edema (surgical lymph removal d/t breast ca with no compression sleeve per family).  She denies orthopnea, PND,  palpitations, chest pain, or shortness of breath at rest.  BNP elevated at 2600, Troponin elevated in setting of recent BIV pacer insertion 0.139, Mag low 1.4, K low 2.7.  ECG underlying afib, with demand v paced rhythm.      She is also c/o of perineal rash and odor.        PMH: " include h/o Breast CA L side (lymph node removal), Vertigo, OA/ osteopenia, BIV 10/26/18, LBBB, HTN, HLD, Permanent Afib, (due for an ablation in 4-6 weeks post procedure/ 12/7 with RA lead placement), PHTN, Severe AS 0.74/ MG 5, (low flow, low gradient), Anemia, chronic pain syndrome, chronic sinusitis, CKD III, Systolic HF EF 33%, Colon polyps (due for colo soon), constipation, NIDDM, Gout.      She lives with her granddaughter and grandson.  Family states she's not terribly mobile on an average basis.  They feel she's been getting more and more sob as of late.  They do cook with salt, though they say they have minimized amount more than they once did.  Patient feels her dry weight is about 150 lbs.  She weighed in at 157 lbs on standing ED scale, and 158 lbs on obs floor scale.  She has chronic dizziness/ vertigo which she takes meclizine intermittently.       Cardiologist: Dr. Burt  Echo: 6/21/18  1 - Moderately depressed left ventricular systolic function (EF 30-35%).     2 - Biatrial enlargement.     3 - Mildly to moderately depressed right ventricular systolic function .     4 - Low flow, low gradient aortic valve stenosis with reduced EF ((ROBBIE 0.74 cm2, AVAi 0.44 cm2/m2, peak aortic jet velocity 1.5 m/s,MG 5 mmHg, EF 33%,SVi 11 mL/m2).     5 - Trivial mitral stenosis, MVA = 3.9 cm2.     6 - Mild mitral regurgitation.     7 - Moderate to severe tricuspid regurgitation.     8 - Trivial pulmonic regurgitation.     9 - Increased central venous pressure.     10 - Pulmonary hypertension. The estimated PA systolic pressure is 58 mmHg.     Past Medical History:   Diagnosis Date    A-fib     Arthritis     Asteroid hyalosis of left eye     Bilateral nonexudative age-related macular degeneration 6/3/2014    Breast cancer     Cataract     Chronic GERD 9/18/2017    CKD stage 3 due to type 2 diabetes mellitus     Coronary artery disease     Hypertension     Migraine headache     Mild nonproliferative  diabetic retinopathy of both eyes 6/3/2014    Pneumonia     Type 2 diabetes mellitus with hyperglycemia     Vertigo        Past Surgical History:   Procedure Laterality Date    BREAST SURGERY      left lumpectomy    CARDIOVERSION N/A 2017    Performed by [facility]urt [facility] at [facility] CATH LAB    CARPAL TUNNEL RELEASE      left    CATARACT EXTRACTION      vance     SECTION      COLONOSCOPY N/A 3/5/2015    Performed by Andrés Hobson [facility] at [facility] ENDO (4TH FLR)    ESOPHAGOGASTRODUODENOSCOPY (EGD) N/A 2016    Performed by Rory Ogden MD at [facility] ENDO (2ND FLR)    EYE SURGERY      cataracts bilaterally    HYSTERECTOMY      partial    IMPLANTATION OF BIVENTRICULAR HEART PACEMAKER N/A 10/26/2018    Procedure: INSERTION, CARDIAC PACEMAKER, BIVENTRICULAR;  Surgeon: Jacques Burt MD;  Location: [facility] CATH LAB;  Service: Cardiology;  Laterality: N/A;  AF, CRT-P, SJM, MAC, MB, 3 Prep    INSERTION, CARDIAC PACEMAKER, BIVENTRICULAR N/A 10/26/2018    Per[facility] by [facility] at [facility] CATH LAB    TRANSESOPHAGEAL ECHOCARDIOGRAM (REJI) N/A 2017    Performed by Ruma Long MD at [facility] CATH LAB    TRANSESOPHAGEAL ECHOCARDIOGRAM (REJI) N/A 2017    Performed by Angelique Taylor MD at [facility] CATH LAB       Review of patient's allergies indicates:   Allergen Reactions    Tramadol Hallucinations       No current facility-administered medications on file prior to encounter.      Current Outpatient Medications on File Prior to Encounter   Medication Sig    allopurinol (ZYLOPRIM) 100 MG tablet Take 1 and 1/2 tablets (150 mg total) by mouth once daily.    amiodarone (PACERONE) 200 MG Tab Take 1 tablet (200 mg total) by mouth once daily.    diphenhydrAMINE-zinc acetate 1-0.1% (BENADRYL) cream Apply topically 2 (two) times daily as needed for Itching.    furosemide (LASIX) 20 MG tablet Take 2 tablets (40 mg total) by mouth once daily.    [START ON 2018]  HYDROcodone-acetaminophen (NORCO) 5-325 mg per tablet Take 1 tablet by mouth every 8 (eight) hours as needed for Pain.    meclizine (ANTIVERT) 25 mg tablet Take 1 tablet (25 mg total) by mouth every 6 (six) hours.    metoprolol tartrate (LOPRESSOR) 50 MG tablet Take 1 tablet (50 mg total) by mouth 2 (two) times daily.    SITagliptin (JANUVIA) 50 MG Tab Take 1 tablet (50 mg total) by mouth once daily.    triamcinolone acetonide 0.1% (KENALOG) 0.1 % cream APPLY TOPICALLY TWO TIMES A DAY    [DISCONTINUED] doxepin (SINEQUAN) 10 MG capsule TAKE 1 CAPSULE BY MOUTH THREE TIMES A DAY    [DISCONTINUED] traMADol (ULTRAM) 50 mg tablet Take 1 tablet (50 mg total) by mouth every 8 (eight) hours as needed for Pain (Pain preventing sleep, can take in combination with tylenol or ibuprofen).    ACCU-CHEK FASTCLIX Misc 1 lancet by Misc.(Non-Drug; Combo Route) route 3 (three) times daily. Pt to test blood glucose up to 3 times daily.    apixaban (ELIQUIS) 2.5 mg Tab Take 1 tablet (2.5 mg total) by mouth 2 (two) times daily. Resume on 11/1    benzonatate (TESSALON) 100 MG capsule TAKE ONE CAPSULE BY MOUTH THREE TIMES A DAY AS NEEDED FOR COUGH    cholecalciferol, vitamin D3, 1,000 unit capsule Take 2 capsules (2,000 Units total) by mouth once daily. (Patient taking differently: Take 1,000 Units by mouth once daily. )    fluticasone (FLONASE) 50 mcg/actuation nasal spray SPRAY TWICE IN EACH NOSTRIL DAILY (Patient taking differently: 2 sprays by Each Nare route daily as needed. )    predniSONE (DELTASONE) 5 MG tablet Take 1-2 tablets by mouth as needed for gout attack    [DISCONTINUED] amoxicillin (AMOXIL) 500 MG capsule Take 1 capsule (500 mg total) by mouth 3 (three) times daily.    [DISCONTINUED] benzonatate (TESSALON) 100 MG capsule Take 100 mg by mouth 3 (three) times daily as needed for Cough.    [DISCONTINUED] docusate sodium (COLACE) 100 MG capsule Take 100 mg by mouth daily as needed for Constipation.     [DISCONTINUED] meclizine (ANTIVERT) 25 mg tablet Take 1 tablet (25 mg total) by mouth 3 (three) times daily as needed for Dizziness.    [DISCONTINUED] senna-docusate 8.6-50 mg (PERICOLACE) 8.6-50 mg per tablet Take 1 tablet by mouth once daily.     Family History     Problem Relation (Age of Onset)    Blindness Brother    Cancer Mother    Cataracts Sister    Diabetes Father, Brother, Sister, Brother, Brother, Brother    Heart disease Mother    Hypertension Father, Brother    No Known Problems Daughter, Son, Daughter        Tobacco Use    Smoking status: Never Smoker    Smokeless tobacco: Never Used   Substance and Sexual Activity    Alcohol use: Yes    Drug use: No    Sexual activity: Not Currently     Review of Systems   Constitutional: Positive for activity change and unexpected weight change. Negative for appetite change, chills, fatigue and fever.   HENT: Positive for rhinorrhea (chronic intermittant). Negative for congestion, sinus pressure, sore throat and trouble swallowing.    Eyes: Negative for pain, redness and visual disturbance.   Respiratory: Positive for shortness of breath (with exertion). Negative for cough, chest tightness, wheezing and stridor.    Cardiovascular: Positive for leg swelling. Negative for chest pain and palpitations.   Gastrointestinal: Positive for constipation. Negative for abdominal distention, abdominal pain, blood in stool, diarrhea, nausea and vomiting.   Endocrine: Negative for cold intolerance and heat intolerance.   Genitourinary: Positive for difficulty urinating and vaginal discharge (malodorous). Negative for dysuria, frequency, hematuria and urgency.   Musculoskeletal: Positive for arthralgias and gait problem. Negative for back pain, myalgias and neck pain.   Skin: Positive for rash (perineal area) and wound (surgical wound r chest). Negative for color change and pallor.   Allergic/Immunologic: Negative for immunocompromised state.   Neurological: Positive for  dizziness (vertigo), syncope (as noted above), weakness, light-headedness and numbness (r carpal tunnel). Negative for tremors and headaches.   Hematological: Bruises/bleeds easily.   Psychiatric/Behavioral: Negative for agitation and confusion. The patient is not nervous/anxious.      Objective:     Vital Signs (Most Recent):  Temp: 97.9 °F (36.6 °C) (11/04/18 1244)  Pulse: 89 (11/04/18 1244)  Resp: 17 (11/04/18 1244)  BP: 119/65 (11/04/18 1244)  SpO2: (!) 92 % (11/04/18 1244) Vital Signs (24h Range):  Temp:  [97.9 °F (36.6 °C)-98.2 °F (36.8 °C)] 97.9 °F (36.6 °C)  Pulse:  [86-89] 89  Resp:  [15-20] 17  SpO2:  [92 %-100 %] 92 %  BP: (115-137)/(65-84) 119/65     Weight: 72.1 kg (158 lb 15.2 oz)  Body mass index is 30.03 kg/m².    Physical Exam   Constitutional: She is oriented to person, place, and time. She appears well-developed and well-nourished. No distress.   HENT:   Head: Normocephalic and atraumatic.   Right Ear: External ear normal.   Left Ear: External ear normal.   Nose: Nose normal.   Mouth/Throat: Oropharynx is clear and moist.   Eyes: Conjunctivae and EOM are normal. No scleral icterus.   Neck: Normal range of motion. Neck supple. Hepatojugular reflux and JVD (to tragus) present. No tracheal deviation present. No thyromegaly present.   Cardiovascular: Normal rate, regular rhythm and intact distal pulses. Exam reveals no gallop and no friction rub.   Murmur (aortic soft blowing murmer) heard.  Pulmonary/Chest: Effort normal and breath sounds normal. No respiratory distress. She has no wheezes. She has no rales.   Abdominal: Soft. Bowel sounds are normal. She exhibits no distension and no mass. There is no tenderness. There is no rebound and no guarding.   Genitourinary: Vaginal discharge found.   Musculoskeletal: Normal range of motion. She exhibits edema. She exhibits no tenderness.   Neurological: She is alert and oriented to person, place, and time. No cranial nerve deficit or sensory deficit. She  exhibits normal muscle tone. Coordination abnormal.   Skin: Skin is warm and dry. No rash noted. No erythema.   Psychiatric: She has a normal mood and affect. Her behavior is normal. Judgment and thought content normal.   Nursing note and vitals reviewed.        CRANIAL NERVES     CN III, IV, VI   Extraocular motions are normal.        Significant Labs:   CBC:   Recent Labs   Lab 11/04/18  0817   WBC 7.92   HGB 11.4*   HCT 36.7*        CMP:   Recent Labs   Lab 11/04/18  0952      K 2.7*      CO2 18*      BUN 29*   CREATININE 1.6*   CALCIUM 7.5*   PROT 6.2   ALBUMIN 2.8*   BILITOT 1.8*   ALKPHOS 68   AST 34   ALT 16   ANIONGAP 11   EGFRNONAA 29.8*     Cardiac Markers:   Recent Labs   Lab 11/04/18 0817   BNP 2,600*     Magnesium:   Recent Labs   Lab 11/04/18 0952   MG 1.4*     Troponin:   Recent Labs   Lab 11/04/18 0817   TROPONINI 0.139*       Significant Imaging: I have reviewed all pertinent imaging results/findings within the past 24 hours.    Assessment/Plan:     * Syncope and collapse    - as noted above HPI  - New BIV pacer interrogated, no arrhythmias or bradycardia's noted  - severe electrolyte abnormalities coupled with acute systolic heart failure exacerbations  - check head CT noncon to r/o acute bleed (on low dose eliquis)  - monitor/ tele  - first set orthostatics negative, repeat now that she's on floor  - first syncopal episode appears to be vasovagal as she had been straining to urinate just prior to leaving the bathroom, second episode appears to be an acute muscular collapse no loss of vision   - she has underlying afib/ has been off her eliquis since prior to biv insertion, doubt this is embolic stroke but cannot rule out as possible differential       Acute on chronic congestive heart failure    - physical exam findings as noted above c/w volume overload, + NA with cooking per family, BNP elevated at 2600 higher than she's ever been.   - Check echo to r/o pericardial  effusion as she's s/p BIV pacemaker, also to look at SEVERE AS, low flow low gradient  - diurese with 40 iv bid after she gets her K and Mag repletion started   -recheck bmp at 2000 tonight       Chronic atrial fibrillation    - continue amiodarone, metoprolol rate controlled  - has been off eliquis since prior to surgery, 10/24  - previously low dose 2.5mg, will resume, as she's > 80, weight is borderline 60kg once she's diuresed and recent falls/ collapse       Hypokalemia due to loss of potassium    - replete aggressively, does not like liquid K nor micro k   - recheck at 2000  - NO PICC For IV K as it burns       Hypomagnesemia with secondary hypocalcemia    - 1.4, repleting with IV Mag  - most likely d/t lasix po   - CA level 7.5, corrected 8.46   - checking iPTH level  - has not been taking vitamin D either        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, without long-term current use of insulin    - trulicity caused rapid weight loss and loss of appetite  - home Adapticsuvia working well, hold while in house       Foul smelling vaginal discharge    - recent abx use, appears to have vaginal yeast infection that has spread to perineal area   -fluconazole 150mg po x1, should not cause interaction with amiodarone with one dose       Unsteady gait    - PT/ OT to eval  - precipitated by electrolyte abn       Vertigo    - chronic on meclizine prn        Itching    Upper back,  Topical remedies       Idiopathic chronic gout of multiple sites without tophus    - continue allopurinol, no need for PRN pred at present time       Incomplete bladder emptying    - bladder scan q4 if no urination, straight cath as needed.  - may need flomax   - check ua/ reflex cx high risk, recent abx and difficult to start stream       History of breast cancer    - s/p L lymph node removal, LUE edematous, will r/o dvt          VTE Risk Mitigation (From admission,  onward)        Ordered     apixaban tablet 2.5 mg  2 times daily      11/04/18 1325             Di Davies NP  Department of Hospital Medicine   [facility]

## 2018-11-04 NOTE — ED PROVIDER NOTES
Encounter Date: 2018       History     Chief Complaint   Patient presents with    Fall     family member reports 2 falls this am. pacemaker insertion 10/26.      Patient is an 83-year-old female with medical history of A-fib, GERD, CKD, CAD, DM, recent pacemaker placement (10/26) presenting to the ED for 2 syncopal episodes.  Patient states she had 1 episode this morning and 1 episode last night.  Patient states everything goes black and she falls.  Patient states she has been weak and not felt good since pacemaker placement.  Patient has not seen Cardiology since surgery.          Review of patient's allergies indicates:  No Known Allergies  Past Medical History:   Diagnosis Date    A-fib     Arthritis     Asteroid hyalosis of left eye     Bilateral nonexudative age-related macular degeneration 6/3/2014    Breast cancer     Cataract     Chronic GERD 2017    CKD stage 3 due to type 2 diabetes mellitus     Coronary artery disease     Hypertension     Migraine headache     Mild nonproliferative diabetic retinopathy of both eyes 6/3/2014    Pneumonia     Type 2 diabetes mellitus with hyperglycemia     Vertigo      Past Surgical History:   Procedure Laterality Date    BREAST SURGERY      left lumpectomy    CARDIOVERSION N/A 2017    Performed by Jacques [facility]urt MD at [facility] CATH LAB    CARPAL TUNNEL RELEASE      left    CATARACT EXTRACTION      vance     SECTION      COLONOSCOPY N/A 3/5/2015    Performed by Andrés Hobson MD at [facility] ENDO (4TH FLR)    ESOPHAGOGASTRODUODENOSCOPY (EGD) N/A 2016    Performed by Rory Ogden MD at [facility] ENDO (2ND FLR)    EYE SURGERY      cataracts bilaterally    HYSTERECTOMY      partial    IMPLANTATION OF BIVENTRICULAR HEART PACEMAKER N/A 10/26/2018    Procedure: INSERTION, CARDIAC PACEMAKER, BIVENTRICULAR;  Surgeon: Jacques Burt MD;  Location: [facility] CATH LAB;  Service: Cardiology;  Laterality: N/A;  AF, CRT-P, SJJOSUÉ, MAC, MB,  3 Prep    INSERTION, CARDIAC PACEMAKER, BIVENTRICULAR N/A 10/26/2018    Performed by Jacques [facility] MD at [facility] CATH LAB    TRANSESOPHAGEAL ECHOCARDIOGRAM (REJI) N/A 12/20/2017    Performed by Ruma Long MD at [facility] CATH LAB    TRANSESOPHAGEAL ECHOCARDIOGRAM (REJI) N/A 6/6/2017    Performed by Angelique Taylor MD at [facility] CATH LAB     Family History   Problem Relation Age of Onset    Cancer Mother         stomach    Heart disease Mother     Diabetes Father     Hypertension Father     Blindness Brother     Diabetes Brother     Hypertension Brother     Cataracts Sister     Diabetes Sister     Diabetes Brother     Diabetes Brother     Diabetes Brother     No Known Problems Daughter     No Known Problems Son     No Known Problems Daughter     Amblyopia Neg Hx     Glaucoma Neg Hx     Macular degeneration Neg Hx     Strabismus Neg Hx     Retinal detachment Neg Hx      Social History     Tobacco Use    Smoking status: Never Smoker    Smokeless tobacco: Never Used   Substance Use Topics    Alcohol use: Yes    Drug use: No     Review of Systems   Constitutional: Positive for activity change and fatigue. Negative for chills and fever.   Respiratory: Negative for cough, chest tightness, shortness of breath and wheezing.    Cardiovascular: Positive for leg swelling. Negative for chest pain and palpitations.   Gastrointestinal: Negative for abdominal pain, constipation, diarrhea, nausea and vomiting.   Genitourinary: Negative for difficulty urinating and dysuria.   Musculoskeletal: Negative for arthralgias, back pain and myalgias.   Skin: Positive for rash (abdomen and groin) and wound ( right upper chest ).   Neurological: Positive for dizziness, syncope and weakness. Negative for numbness and headaches.   Hematological: Does not bruise/bleed easily.       Physical Exam     Initial Vitals [11/04/18 0753]   BP Pulse Resp Temp SpO2   119/71 88 20 98.2 °F (36.8 °C) 100 %      MAP       --          Physical Exam    Nursing note and vitals reviewed.  Constitutional: She appears well-developed and well-nourished. She is cooperative. She does not have a sickly appearance. She does not appear ill. No distress.   HENT:   Head: Normocephalic and atraumatic.   Mouth/Throat: Uvula is midline and oropharynx is clear and moist. Mucous membranes are dry.   Eyes: Conjunctivae, EOM and lids are normal. Pupils are equal, round, and reactive to light.   Neck: Trachea normal, normal range of motion, full passive range of motion without pain and phonation normal. Neck supple. No spinous process tenderness and no muscular tenderness present. JVD present.   Cardiovascular: Normal rate, regular rhythm, normal heart sounds and intact distal pulses.   Pulses:       Radial pulses are 2+ on the right side, and 2+ on the left side.        Dorsalis pedis pulses are 2+ on the right side, and 2+ on the left side. The patient has a device (subcutaneous pacemaker) in the right chest.   Bilateral lower extremity (+2) swelling noted.   Pulmonary/Chest: Breath sounds normal. She exhibits tenderness ( over insertion site). She exhibits no bony tenderness, no crepitus, no edema and no swelling.       Abdominal: Soft. Normal appearance and bowel sounds are normal. There is no tenderness. There is no rigidity, no rebound and no guarding.   Musculoskeletal: Normal range of motion.   Neurological: She is alert and oriented to person, place, and time. She has normal strength. No sensory deficit. GCS eye subscore is 4. GCS verbal subscore is 5. GCS motor subscore is 6.   Skin: Skin is warm and dry. Rash noted. No abrasion and no laceration noted. No cyanosis. Nails show no clubbing.        Psychiatric: She has a normal mood and affect. Her speech is normal and behavior is normal. Judgment and thought content normal. Cognition and memory are normal.         ED Course   Procedures  Labs Reviewed   CBC W/ AUTO DIFFERENTIAL - Abnormal; Notable for  the following components:       Result Value    Hemoglobin 11.4 (*)     Hematocrit 36.7 (*)     MCHC 31.1 (*)     RDW 19.6 (*)     nRBC 1 (*)     Lymph% 16.5 (*)     All other components within normal limits   TROPONIN I - Abnormal; Notable for the following components:    Troponin I 0.139 (*)     All other components within normal limits   B-TYPE NATRIURETIC PEPTIDE - Abnormal; Notable for the following components:    BNP 2,600 (*)     All other components within normal limits   COMPREHENSIVE METABOLIC PANEL - Abnormal; Notable for the following components:    Potassium 2.7 (*)     CO2 18 (*)     BUN, Bld 29 (*)     Creatinine 1.6 (*)     Calcium 7.5 (*)     Albumin 2.8 (*)     Total Bilirubin 1.8 (*)     eGFR if  34.3 (*)     eGFR if non  29.8 (*)     All other components within normal limits          Imaging Results          X-Ray Chest PA And Lateral (Final result)  Result time 11/04/18 08:49:10    Final result by Atul Gregory III, MD (11/04/18 08:49:10)                 Impression:      See above    Pulmonary edema versus pneumonia.      Electronically signed by: Atul Gregory MD  Date:    11/04/2018  Time:    08:49             Narrative:    EXAMINATION:  XR CHEST PA AND LATERAL    CLINICAL HISTORY:  Chest Pain;    FINDINGS:  There is a pacer, cardiomegaly, aortic plaque, mild edema and small pleural effusions.                                       APC / Resident Notes:   Emergent evaluation of a 84 yo female patient presenting to the ER with chief complaint of syncope this morning.  Pt states she recently had a pacemaker placed and has had felt weak since that time.  Patient states she had a syncopal episode this morning and 1 yesterday.  Patient also endorses bilateral lower extremity edema since the pacemaker placement.  On exam patient A&O x3. Pacemaker wound noted and right upper chest.  Site tender to palpation. Abdomen soft and nontender. Patient does have mild rash  noted to lower abdomen and groin.  EKG shows right bundle branch block.  Plus two edema noted on bilateral lower extremities. Plus one DP noted.  Patient denies any increased pain with palpation.  I will get labs, chest x-ray, consult Cardiology due to recent pacemaker placement and reassess. Differential diagnoses include but are not limited to vasovagal syncope, arrhythmia, AMI, orthostatic (either dehydration or acute blood loss), seizure, stroke, AAA rupture, or Aortic dissection.  I discussed the care of this patient with my Supervising Physician.      Patient's CBC unremarkable.  H&H stable at 11.4 and 36.7.  CMP remarkable for a critically low potassium of 2.7.  BUN and creatinine elevated at 29 and 1.6.  Troponin elevated at 0.139.  BNP elevated 2600.  Cardiology evaluated patient and feels this is dehydration.  Orthostatic vital signs positive.  Patient's chest x-ray shows pulmonary edema versus pneumonia.      Advised patient and family of lab and imaging results.  Will admit patient to observation for evaluation.  All questions and concerns addressed.  Patient to be admitted to Dr. Arora service.          Attending Attestation:     Physician Attestation Statement for NP/PA:   I discussed this assessment and plan of this patient with the NP/PA, but I did not personally examine the patient. The face to face encounter was performed by the NP/PA.    Other NP/PA Attestation Additions:      Medical Decision Makin82 y/o female w/ recent PM placement here w/ syncope. Clinically w/ CHF exacerbation. Cards evaluated; no PM malfunction. Hypokalemia found on labs as well as elevated BNP. Cards recommend admission to - for further management of fluid status.                    Clinical Impression:   The primary encounter diagnosis was Orthostatic syncope. Diagnoses of Fall, Syncope, Syncope, unspecified syncope type, Hypokalemia, Elevated brain natriuretic peptide (BNP) level, and Dehydration were also  pertinent to this visit.      Disposition:   Disposition: Admitted  Condition: Stable                        Cat Singleton NP  11/04/18 0568       Sj Camacho MD  11/05/18 2426

## 2018-11-04 NOTE — SUBJECTIVE & OBJECTIVE
Past Medical History:   Diagnosis Date    A-fib     Arthritis     Asteroid hyalosis of left eye     Bilateral nonexudative age-related macular degeneration 6/3/2014    Breast cancer     Cataract     Chronic GERD 2017    CKD stage 3 due to type 2 diabetes mellitus     Coronary artery disease     Hypertension     Migraine headache     Mild nonproliferative diabetic retinopathy of both eyes 6/3/2014    Pneumonia     Type 2 diabetes mellitus with hyperglycemia     Vertigo        Past Surgical History:   Procedure Laterality Date    BREAST SURGERY      left lumpectomy    CARDIOVERSION N/A 2017    Performed by Jacques [facility] MD at [facility] CATH LAB    CARPAL TUNNEL RELEASE      left    CATARACT EXTRACTION      vance     SECTION      COLONOSCOPY N/A 3/5/2015    Performed by Andrés Hobson MD at [facility] ENDO (4TH FLR)    ESOPHAGOGASTRODUODENOSCOPY (EGD) N/A 2016    Performed by Rory Ogden MD at [facility] ENDO (2ND FLR)    EYE SURGERY      cataracts bilaterally    HYSTERECTOMY      partial    IMPLANTATION OF BIVENTRICULAR HEART PACEMAKER N/A 10/26/2018    Procedure: INSERTION, CARDIAC PACEMAKER, BIVENTRICULAR;  Surgeon: Jacques Burt MD;  Location: [facility] CATH LAB;  Service: Cardiology;  Laterality: N/A;  AF, CRT-P, SJM, MAC, MB, 3 Prep    INSERTION, CARDIAC PACEMAKER, BIVENTRICULAR N/A 10/26/2018    Performed by Jacques Burt MD at [facility] CATH LAB    TRANSESOPHAGEAL ECHOCARDIOGRAM (REJI) N/A 2017    Performed by Ruma Long MD at [facility] CATH LAB    TRANSESOPHAGEAL ECHOCARDIOGRAM (REJI) N/A 2017    Performed by Angelique Taylor MD at [facility] CATH LAB       Review of patient's allergies indicates:  No Known Allergies    No current facility-administered medications for this encounter.      Current Outpatient Medications   Medication Sig    allopurinol (ZYLOPRIM) 100 MG tablet Take 1 and 1/2 tablets (150 mg total) by mouth once daily.    amiodarone (PACERONE)  200 MG Tab Take 1 tablet (200 mg total) by mouth once daily.    amoxicillin (AMOXIL) 500 MG capsule Take 1 capsule (500 mg total) by mouth 3 (three) times daily.    benzonatate (TESSALON) 100 MG capsule TAKE ONE CAPSULE BY MOUTH THREE TIMES A DAY AS NEEDED FOR COUGH    doxepin (SINEQUAN) 10 MG capsule TAKE 1 CAPSULE BY MOUTH THREE TIMES A DAY    furosemide (LASIX) 20 MG tablet Take 2 tablets (40 mg total) by mouth once daily.    [START ON 11/8/2018] HYDROcodone-acetaminophen (NORCO) 5-325 mg per tablet Take 1 tablet by mouth every 8 (eight) hours as needed for Pain.    metoprolol tartrate (LOPRESSOR) 50 MG tablet Take 1 tablet (50 mg total) by mouth 2 (two) times daily.    predniSONE (DELTASONE) 5 MG tablet Take 1-2 tablets by mouth as needed for gout attack    SITagliptin (JANUVIA) 50 MG Tab Take 1 tablet (50 mg total) by mouth once daily.    traMADol (ULTRAM) 50 mg tablet Take 1 tablet (50 mg total) by mouth every 8 (eight) hours as needed for Pain (Pain preventing sleep, can take in combination with tylenol or ibuprofen).    ACCU-CHEK FASTCLIX Misc 1 lancet by Misc.(Non-Drug; Combo Route) route 3 (three) times daily. Pt to test blood glucose up to 3 times daily.    apixaban (ELIQUIS) 2.5 mg Tab Take 1 tablet (2.5 mg total) by mouth 2 (two) times daily. Resume on 11/1    benzonatate (TESSALON) 100 MG capsule Take 100 mg by mouth 3 (three) times daily as needed for Cough.    cholecalciferol, vitamin D3, 1,000 unit capsule Take 2 capsules (2,000 Units total) by mouth once daily. (Patient taking differently: Take 1,000 Units by mouth once daily. )    diphenhydrAMINE-zinc acetate 1-0.1% (BENADRYL) cream Apply topically 2 (two) times daily as needed for Itching.    docusate sodium (COLACE) 100 MG capsule Take 100 mg by mouth daily as needed for Constipation.    fluticasone (FLONASE) 50 mcg/actuation nasal spray SPRAY TWICE IN EACH NOSTRIL DAILY    meclizine (ANTIVERT) 25 mg tablet Take 1 tablet (25 mg  total) by mouth 3 (three) times daily as needed for Dizziness.    meclizine (ANTIVERT) 25 mg tablet Take 1 tablet (25 mg total) by mouth every 6 (six) hours.    senna-docusate 8.6-50 mg (PERICOLACE) 8.6-50 mg per tablet Take 1 tablet by mouth once daily.    triamcinolone acetonide 0.1% (KENALOG) 0.1 % cream APPLY TOPICALLY TWO TIMES A DAY     Family History     Problem Relation (Age of Onset)    Blindness Brother    Cancer Mother    Cataracts Sister    Diabetes Father, Brother, Sister, Brother, Brother, Brother    Heart disease Mother    Hypertension Father, Brother    No Known Problems Daughter, Son, Daughter        Tobacco Use    Smoking status: Never Smoker    Smokeless tobacco: Never Used   Substance and Sexual Activity    Alcohol use: Yes    Drug use: No    Sexual activity: Not Currently     Review of Systems   All other systems reviewed and are negative.    Objective:     Vital Signs (Most Recent):  Temp: 98.2 °F (36.8 °C) (11/04/18 0753)  Pulse: 88 (11/04/18 0929)  Resp: 20 (11/04/18 0753)  BP: 132/72 (11/04/18 0929)  SpO2: 100 % (11/04/18 0753) Vital Signs (24h Range):  Temp:  [98.2 °F (36.8 °C)] 98.2 °F (36.8 °C)  Pulse:  [86-88] 88  Resp:  [20] 20  SpO2:  [100 %] 100 %  BP: (116-137)/(71-80) 132/72     Patient Vitals for the past 72 hrs (Last 3 readings):   Weight   11/04/18 0753 68 kg (150 lb)     Body mass index is 28.34 kg/m².    No intake or output data in the 24 hours ending 11/04/18 0934    Physical Exam  GEN: Alert and oriented in NAD  NECK: no JVD appreciated   CVS: irreg rhythm, s1/s2, no MRG  PULM: CTAB no rales  ABD: NT/ND BS +  Extremities: warm and dry, palpable pulses, no edema  NEURO: Alert and oriented x 3  PSYCH: appropriate affect.         Significant Labs:  CBC:  Recent Labs   Lab 11/04/18 0817   WBC 7.92   RBC 4.07   HGB 11.4*   HCT 36.7*      MCV 90   MCH 28.0   MCHC 31.1*     BNP:  Recent Labs   Lab 11/04/18  0817   BNP 2,600*     CMP:  No results for input(s): GLU,  CALCIUM, ALBUMIN, PROT, NA, K, CO2, CL, BUN, CREATININE, ALKPHOS, ALT, AST, BILITOT in the last 168 hours.   Coagulation:   No results for input(s): PT, INR, APTT in the last 168 hours.  LDH:  No results for input(s): LDH in the last 72 hours.  Microbiology:  Microbiology Results (last 7 days)     ** No results found for the last 168 hours. **          I have reviewed all pertinent labs within the past 24 hours.    Diagnostic Results:  I have reviewed and interpreted all pertinent imaging results/findings within the past 24 hours.

## 2018-11-04 NOTE — ASSESSMENT & PLAN NOTE
- recent abx use, appears to have vaginal yeast infection that has spread to perineal area   -fluconazole 150mg po x1, should not cause interaction with amiodarone with one dose

## 2018-11-04 NOTE — ASSESSMENT & PLAN NOTE
- bladder scan q4 if no urination, straight cath as needed.  - may need flomax   - check ua/ reflex cx high risk, recent abx and difficult to start stream

## 2018-11-04 NOTE — ASSESSMENT & PLAN NOTE
- as noted above HPI  - New BIV pacer interrogated, no arrhythmias or bradycardia's noted  - severe electrolyte abnormalities coupled with acute systolic heart failure exacerbations  - check head CT noncon to r/o acute bleed (on low dose eliquis)  - monitor/ tele  - first set orthostatics negative, repeat now that she's on floor  - first syncopal episode appears to be vasovagal as she had been straining to urinate just prior to leaving the bathroom, second episode appears to be an acute muscular collapse no loss of vision   - she has underlying afib/ has been off her eliquis since prior to biv insertion, doubt this is embolic stroke but cannot rule out as possible differential

## 2018-11-04 NOTE — ED NOTES
Attempted to call report to 3rd floor obs. RN unavailable. Will attempt again at 1155 per request.

## 2018-11-04 NOTE — HPI
"Ms. Martínez is an 83 year old female with past medical history of left sided breast CA (s/p lymph node removal), vertigo on meclizine, OA/osteopenia, HTN, HLD, permanent AFib (pending ablation with RA lead placement in ~4-6 weeks), systolic CHF (EF 33%) s/p recent biV in October 2018, LBBB, pulmHTN, severe AS (low flow, low gradient), anemia, chronic pain, chronic sinusitis, CKD III, NIDDM, gout, constipation, and colon polyps. She presented to the ED due to two episodes of syncopal collapse. First episode occurred after straining to urinate, upon leaving the restroom ~11:30pm she collapsed into grandsons arms and was assisted to bed. Once in bed she reported everything was "pitch black" for ~2 minutes and that she could hear but not see. Second episode occurred just PTA at [facility], she was being assisted down the steps of her home (9 steps total), at the last step she collapsed as if her "body just gave way". She was again assisted by grandson, she did not fall. During episode she felt dizzy, however denies "blacking out" or syncope. She denied orthopnea, PND,  palpitations, chest pain, or shortness of breath at rest. She endorses chronic BLE edema and chronic LUE edema (related to lymph node removal, no compression sleeve per family), and perineal rash and odor. She lives with grand daughter and grandson, she is ambulatory within the home however minimally active outside the home. She is compliant with medications, however not with a low sodium diet. She reports a dry weight of ~150 lbs. She denies ETOH, tobacco, or illicit drug use.     Of note she recently (10/26/18) underwent biV pacemaker insertion, she completed antibiotic course, however has not been compliant with sling use. Insertion site approximated and healing, no evidence of infection.     At admission, CBC unremarkable, BNP elevated ~2600, troponin positive 0.139; chemistry remarkable for hypomagnesemia at 1.4, hypokalemia at 2.7, and known CKD.  EKG with " underlying AFib and demand v paced rhythm, HgA1C 6.0/125, VitD 18, and . UA negative. CXR with mild edema and small pleural effusions. Head CT negative. The patient was admitted to the Hospital Medicine Service for further evaluation and management.

## 2018-11-04 NOTE — ASSESSMENT & PLAN NOTE
- physical exam findings as noted above c/w volume overload, + NA with cooking per family, BNP elevated at 2600 higher than she's ever been.   - Check echo to r/o pericardial effusion as she's s/p BIV pacemaker, also to look at SEVERE AS, low flow low gradient  - diurese with 40 iv bid after she gets her K and Mag repletion started   -recheck bmp at 2000 tonight

## 2018-11-04 NOTE — ASSESSMENT & PLAN NOTE
- replete aggressively, does not like liquid K nor micro k   - recheck at 2000  - NO PICC For IV K as it burns

## 2018-11-04 NOTE — ASSESSMENT & PLAN NOTE
- 1.4, repleting with IV Mag  - most likely d/t lasix po   - CA level 7.5, corrected 8.46   - checking iPTH level  - has not been taking vitamin D either

## 2018-11-04 NOTE — ASSESSMENT & PLAN NOTE
Pt with pAF with recent reduction in EF 30-35% and LBBB so BiV was placed but does not have an RA lead as it is planned in the next 4-6 weeks with AVN ablation she presented with syncope upon standing and walking to the restroom last night. These symptoms seem to be related to orthostatic hypotension her device was interrogated and there were no events and BiV is functioning appropriately.     Would place in observation with IM-C on telemetry, repeat echo to rule out effusion, orthostatic vitals (if positive give fluids).

## 2018-11-04 NOTE — HPI
"Pt is a 83 year old lady who we are consulted for syncope.     She has a hx of pAF who had a reduction in EF and LBBB so BiV was placed on 10/26 by Dr. Burt. No atrial lead was placed as there is plan to bring her back in 4-6 weeks to perform AVN ablation on place a RA lead at that time. Last night she had a syncopal episode after standing up and going to the restroom. She states that upon standing she felt lightheaded/dizzy and "everything went black and she passed out." She states that she was not out for long and immediately came back to she did not hit her head. Denies any palpitations, chest pain, shortness of breath.     "

## 2018-11-05 PROBLEM — I50.43 ACUTE ON CHRONIC COMBINED SYSTOLIC AND DIASTOLIC CONGESTIVE HEART FAILURE: Status: ACTIVE | Noted: 2018-06-21

## 2018-11-05 PROBLEM — E03.9 HYPOTHYROIDISM: Status: ACTIVE | Noted: 2018-11-05
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
## 2018-11-05 PROBLEM — R60.0 EDEMA OF UPPER EXTREMITY: Status: ACTIVE | Noted: 2018-11-05

## 2018-11-05 PROBLEM — E21.3 HYPERPARATHYROIDISM: Status: ACTIVE | Noted: 2018-11-05

## 2018-11-05 PROBLEM — N89.8 FOUL SMELLING VAGINAL DISCHARGE: Status: RESOLVED | Noted: 2018-11-04 | Resolved: 2018-11-05

## 2018-11-05 LAB
ALBUMIN SERPL BCP-MCNC: 3 G/DL
ALP SERPL-CCNC: 70 U/L
ALT SERPL W/O P-5'-P-CCNC: 21 U/L
ANION GAP SERPL CALC-SCNC: 12 MMOL/L
AST SERPL-CCNC: 40 U/L
BILIRUB SERPL-MCNC: 1.6 MG/DL
BUN SERPL-MCNC: 29 MG/DL
CALCIUM SERPL-MCNC: 8.4 MG/DL
CHLORIDE SERPL-SCNC: 104 MMOL/L
CHOLEST SERPL-MCNC: 149 MG/DL
CHOLEST/HDLC SERPL: 5.7 {RATIO}
CO2 SERPL-SCNC: 19 MMOL/L
CREAT SERPL-MCNC: 1.5 MG/DL
EST. GFR  (AFRICAN AMERICAN): 37.1 ML/MIN/1.73 M^2
EST. GFR  (NON AFRICAN AMERICAN): 32.2 ML/MIN/1.73 M^2
GLUCOSE SERPL-MCNC: 77 MG/DL
HDLC SERPL-MCNC: 26 MG/DL
HDLC SERPL: 17.4 %
LDLC SERPL CALC-MCNC: 108.2 MG/DL
MAGNESIUM SERPL-MCNC: 2 MG/DL
NONHDLC SERPL-MCNC: 123 MG/DL
PHOSPHATE SERPL-MCNC: 3.4 MG/DL
POCT GLUCOSE: 122 MG/DL (ref 70–110)
POCT GLUCOSE: 123 MG/DL (ref 70–110)
POCT GLUCOSE: 75 MG/DL (ref 70–110)
POTASSIUM SERPL-SCNC: 3.3 MMOL/L
POTASSIUM SERPL-SCNC: 4.2 MMOL/L
PROT SERPL-MCNC: 6.7 G/DL
SODIUM SERPL-SCNC: 135 MMOL/L
T4 FREE SERPL-MCNC: 0.93 NG/DL
THYROPEROXIDASE IGG SERPL-ACNC: <6 IU/ML
TRIGL SERPL-MCNC: 74 MG/DL
TSH SERPL DL<=0.005 MIU/L-ACNC: 12.48 UIU/ML

## 2018-11-05 PROCEDURE — 84100 ASSAY OF PHOSPHORUS: CPT

## 2018-11-05 PROCEDURE — 99214 OFFICE O/P EST MOD 30 MIN: CPT | Mod: GC,,, | Performed by: INTERNAL MEDICINE

## 2018-11-05 PROCEDURE — 99225 PR SUBSEQUENT OBSERVATION CARE,LEVEL II: CPT | Mod: ,,, | Performed by: NURSE PRACTITIONER

## 2018-11-05 PROCEDURE — 80053 COMPREHEN METABOLIC PANEL: CPT

## 2018-11-05 PROCEDURE — 99024 POSTOP FOLLOW-UP VISIT: CPT | Mod: ,,, | Performed by: INTERNAL MEDICINE

## 2018-11-05 PROCEDURE — 63600175 PHARM REV CODE 636 W HCPCS: Performed by: NURSE PRACTITIONER

## 2018-11-05 PROCEDURE — 84443 ASSAY THYROID STIM HORMONE: CPT

## 2018-11-05 PROCEDURE — G0378 HOSPITAL OBSERVATION PER HR: HCPCS

## 2018-11-05 PROCEDURE — 80061 LIPID PANEL: CPT

## 2018-11-05 PROCEDURE — 83735 ASSAY OF MAGNESIUM: CPT

## 2018-11-05 PROCEDURE — 25000003 PHARM REV CODE 250: Performed by: NURSE PRACTITIONER

## 2018-11-05 PROCEDURE — 84132 ASSAY OF SERUM POTASSIUM: CPT

## 2018-11-05 PROCEDURE — 86376 MICROSOMAL ANTIBODY EACH: CPT

## 2018-11-05 PROCEDURE — 84439 ASSAY OF FREE THYROXINE: CPT

## 2018-11-05 PROCEDURE — 36415 COLL VENOUS BLD VENIPUNCTURE: CPT

## 2018-11-05 RX ORDER — POTASSIUM CHLORIDE 750 MG/1
60 CAPSULE, EXTENDED RELEASE ORAL ONCE
Status: COMPLETED | OUTPATIENT
Start: 2018-11-05 | End: 2018-11-05

## 2018-11-05 RX ORDER — FUROSEMIDE 10 MG/ML
40 INJECTION INTRAMUSCULAR; INTRAVENOUS 3 TIMES DAILY
Status: DISCONTINUED | OUTPATIENT
Start: 2018-11-05 | End: 2018-11-06

## 2018-11-05 RX ORDER — BENZONATATE 100 MG/1
100 CAPSULE ORAL 3 TIMES DAILY
Qty: 90 CAPSULE | Refills: 11 | Status: SHIPPED | OUTPATIENT
Start: 2018-11-05 | End: 2019-01-01

## 2018-11-05 RX ORDER — LEVOTHYROXINE SODIUM 25 UG/1
25 TABLET ORAL
Status: DISCONTINUED | OUTPATIENT
Start: 2018-11-05 | End: 2018-11-07 | Stop reason: HOSPADM

## 2018-11-05 RX ORDER — MIRTAZAPINE 7.5 MG/1
7.5 TABLET, FILM COATED ORAL NIGHTLY
Status: DISCONTINUED | OUTPATIENT
Start: 2018-11-05 | End: 2018-11-07 | Stop reason: HOSPADM

## 2018-11-05 RX ADMIN — ALLOPURINOL 150 MG: 300 TABLET ORAL at 08:11

## 2018-11-05 RX ADMIN — Medication: at 10:11

## 2018-11-05 RX ADMIN — FLUTICASONE PROPIONATE 100 MCG: 50 SPRAY, METERED NASAL at 08:11

## 2018-11-05 RX ADMIN — SENNOSIDES AND DOCUSATE SODIUM 1 TABLET: 8.6; 5 TABLET ORAL at 08:11

## 2018-11-05 RX ADMIN — APIXABAN 2.5 MG: 2.5 TABLET, FILM COATED ORAL at 08:11

## 2018-11-05 RX ADMIN — FUROSEMIDE 40 MG: 10 INJECTION, SOLUTION INTRAMUSCULAR; INTRAVENOUS at 02:11

## 2018-11-05 RX ADMIN — METOPROLOL TARTRATE 50 MG: 50 TABLET ORAL at 08:11

## 2018-11-05 RX ADMIN — POTASSIUM CHLORIDE 60 MEQ: 750 CAPSULE, EXTENDED RELEASE ORAL at 09:11

## 2018-11-05 RX ADMIN — ONDANSETRON 8 MG: 8 TABLET, ORALLY DISINTEGRATING ORAL at 02:11

## 2018-11-05 RX ADMIN — MECLIZINE HYDROCHLORIDE 25 MG: 25 TABLET ORAL at 11:11

## 2018-11-05 RX ADMIN — VALSARTAN 20 MG: 40 TABLET, FILM COATED ORAL at 10:11

## 2018-11-05 RX ADMIN — VITAMIN D, TAB 1000IU (100/BT) 1000 UNITS: 25 TAB at 08:11

## 2018-11-05 RX ADMIN — AMIODARONE HYDROCHLORIDE 200 MG: 200 TABLET ORAL at 08:11

## 2018-11-05 RX ADMIN — ACETAMINOPHEN 1000 MG: 500 TABLET, FILM COATED ORAL at 04:11

## 2018-11-05 RX ADMIN — FUROSEMIDE 40 MG: 10 INJECTION, SOLUTION INTRAMUSCULAR; INTRAVENOUS at 08:11

## 2018-11-05 RX ADMIN — FUROSEMIDE 40 MG: 10 INJECTION, SOLUTION INTRAVENOUS at 08:11

## 2018-11-05 RX ADMIN — MIRTAZAPINE 7.5 MG: 7.5 TABLET ORAL at 08:11

## 2018-11-05 RX ADMIN — LEVOTHYROXINE SODIUM 25 MCG: 25 TABLET ORAL at 10:11

## 2018-11-05 NOTE — ASSESSMENT & PLAN NOTE
--Subclinical hypothyroidism given normal T4  --she states she will occasionally have some fatigue, cold intolerance, palpitations  --she has been on amiodarone which can cause some thyrotoxicity, would need to consider if continuing this medication has any ongoing benefit  --recommend starting levothyroxine 25 mcg daily  --check TPO antibodies  --repeat TSH on 12/3 lab appointment

## 2018-11-05 NOTE — CONSULTS
Consult note already completed by Dr Peña on 11/4/2018. Please refer to note for recommendations.    Cindy Hemphill MD   Cardiology Fellow, PGY-4

## 2018-11-05 NOTE — CONSULTS
[facility]  Endocrinology  Consult Note    Consult Requested by: Jason Arora MD   Reason for admit: Syncope and collapse    HISTORY OF PRESENT ILLNESS:  Ms Martínez is an 84 yo F with PMH of CKD, osteopenia, Vitamin D deficiency (on Vitamin D supplementation), T2DM, pAF (s/p recent CRT-pacemaker placement, amiodarone) who presented on 11/4 with syncope.  She is currently admitted for work up for syncope.  Endocrinology was consulted for hypothyroidism (TSH 12.5, free T4 0.93), and hyperparathyroidism ().            Medications and/or Treatments Impacting Glycemic Control:  Immunotherapy:    Immunosuppressants     None        Steroids:   Hormones (From admission, onward)    None        Pressors:    Autonomic Drugs (From admission, onward)    None          Medications Prior to Admission   Medication Sig Dispense Refill Last Dose    allopurinol (ZYLOPRIM) 100 MG tablet Take 1 and 1/2 tablets (150 mg total) by mouth once daily. 60 tablet 2 11/3/2018    amiodarone (PACERONE) 200 MG Tab Take 1 tablet (200 mg total) by mouth once daily. 30 tablet 2 11/3/2018    diphenhydrAMINE-zinc acetate 1-0.1% (BENADRYL) cream Apply topically 2 (two) times daily as needed for Itching.  0 11/3/2018 at Unknown time    furosemide (LASIX) 20 MG tablet Take 2 tablets (40 mg total) by mouth once daily. 60 tablet 11 11/3/2018 at Unknown time    [START ON 11/8/2018] HYDROcodone-acetaminophen (NORCO) 5-325 mg per tablet Take 1 tablet by mouth every 8 (eight) hours as needed for Pain. 90 tablet 0 Past Week at Unknown time    meclizine (ANTIVERT) 25 mg tablet Take 1 tablet (25 mg total) by mouth every 6 (six) hours. 120 tablet 2 Past Month at Unknown time    metoprolol tartrate (LOPRESSOR) 50 MG tablet Take 1 tablet (50 mg total) by mouth 2 (two) times daily. 60 tablet 11 11/3/2018 at 1830    SITagliptin (JANUVIA) 50 MG Tab Take 1 tablet (50 mg total) by mouth once daily. 30 tablet 5 11/3/2018 at 1830     triamcinolone acetonide 0.1% (KENALOG) 0.1 % cream APPLY TOPICALLY TWO TIMES A DAY 80 g 0 Past Week at Unknown time    [DISCONTINUED] doxepin (SINEQUAN) 10 MG capsule TAKE 1 CAPSULE BY MOUTH THREE TIMES A DAY 90 capsule 0 Past Week at Unknown time    ACCU-CHEK FASTCLIX Misc 1 lancet by Misc.(Non-Drug; Combo Route) route 3 (three) times daily. Pt to test blood glucose up to 3 times daily. 100 each 11 10/25/2018 at 2200    apixaban (ELIQUIS) 2.5 mg Tab Take 1 tablet (2.5 mg total) by mouth 2 (two) times daily. Resume on 11/1 60 tablet 11 10/23/2018    cholecalciferol, vitamin D3, 1,000 unit capsule Take 2 capsules (2,000 Units total) by mouth once daily. (Patient taking differently: Take 1,000 Units by mouth once daily. ) 180 capsule 3 Unknown at Unknown time    fluticasone (FLONASE) 50 mcg/actuation nasal spray SPRAY TWICE IN EACH NOSTRIL DAILY (Patient taking differently: 2 sprays by Each Nare route daily as needed. ) 16 g 5 Past Week at Unknown time    predniSONE (DELTASONE) 5 MG tablet Take 1-2 tablets by mouth as needed for gout attack 30 tablet 0 More than a month at Unknown time    [DISCONTINUED] amoxicillin (AMOXIL) 500 MG capsule Take 1 capsule (500 mg total) by mouth 3 (three) times daily. 21 capsule 0 10/25/2018    [DISCONTINUED] benzonatate (TESSALON) 100 MG capsule TAKE ONE CAPSULE BY MOUTH THREE TIMES A DAY AS NEEDED FOR COUGH 90 capsule 11 More than a month at Unknown time       Current Facility-Administered Medications   Medication Dose Route Frequency Provider Last Rate Last Dose    acetaminophen tablet 1,000 mg  1,000 mg Oral Q8H PRN Di Davies NP   1,000 mg at 11/05/18 0416    allopurinol split tablet 150 mg  150 mg Oral Daily Di Davies NP   150 mg at 11/05/18 0854    amiodarone tablet 200 mg  200 mg Oral Daily Di Davies NP   200 mg at 11/05/18 0852    apixaban tablet 2.5 mg  2.5 mg Oral BID Di Davies NP   2.5 mg at 11/05/18 9397    benzonatate  capsule 100 mg  100 mg Oral TID [facility]N [facility]        dextrose 50% injection 12.5 g  12.5 g Intravenous PRN [facility] Samson, NP        dextrose 50% injection 25 g  25 g Intravenous PRN [facility], NP        diphenhydrAMINE-zinc acetate 1-0.1% cream   Topical (Top) BID PRN [facility], KIMO        fluticasone 50 mcg/actuation nasal spray 100 mcg  2 spray Each Nare Daily PRN [facility] Samson, NP   100 mcg at 11/05/18 0849    furosemide injection 40 mg  40 mg Intravenous TID [facility], NP        glucagon (human recombinant) injection 1 mg  1 mg Intramuscular PRN [facility] Samson, NP        glucose chewable tablet 16 g  16 g Oral PRN [facility], NP        glucose chewable tablet 24 g  24 g Oral PRN [facility] Samson, NP        influenza (FLUZONE HIGH-DOSE) vaccine 0.5 mL  0.5 mL Intramuscular vaccine x 1 dose Jason Arora MD        insulin aspart U-100 pen 1-10 Units  1-10 Units Subcutaneous QID (AC + HS) PRN [facility] Samson, NP        meclizine tablet 25 mg  25 mg Oral TID PRN [facility], KIMO        metoprolol tartrate (LOPRESSOR) tablet 50 mg  50 mg Oral BID [facility], NP   50 mg at 11/05/18 0854    ondansetron disintegrating tablet 8 mg  8 mg Oral Q8H PRN [facility] Samson, KIMO        senna-docusate 8.6-50 mg per tablet 1 tablet  1 tablet Oral BID [facility], NP   1 tablet at 11/05/18 0856    sodium chloride 0.9% flush 5 mL  5 mL Intravenous PRN [facility] Samson, NP        vitamin D 1000 units tablet 2,000 Units  2,000 Units Oral Daily [facility], NP   1,000 Units at 11/05/18 0852       PMH, PSH, FH, SH reviewed     ROS    Review of Systems   Constitutional: Positive for fatigue. Negative for chills and fever.   HENT: Negative for sneezing and sore throat.    Eyes: Negative for pain and visual disturbance.   Respiratory: Negative for cough, shortness of breath and wheezing.    Cardiovascular:  Positive for palpitations and leg swelling. Negative for chest pain.   Gastrointestinal: Negative for abdominal pain, constipation, diarrhea and nausea.   Endocrine: Positive for cold intolerance.   Genitourinary: Negative for dysuria, frequency and urgency.   Allergic/Immunologic: Negative for environmental allergies.   Neurological: Negative for dizziness, light-headedness, numbness and headaches.   Psychiatric/Behavioral: Negative for agitation. The patient is not nervous/anxious.        Labs Reviewed and Include:  BASELINE Creatinine:   [unfilled]  [unfilled]  [facility]  Recent Labs   Lab 11/05/18  0344   GLU 77   CALCIUM 8.4*   ALBUMIN 3.0*   PROT 6.7   *   K 3.3*   CO2 19*      BUN 29*   CREATININE 1.5*   ALKPHOS 70   ALT 21   AST 40   BILITOT 1.6*     Lab Results   Component Value Date    HGBA1C 6.0 (H) 11/04/2018       Nutritional status:   Body mass index is 29.7 kg/m².  Lab Results   Component Value Date    ALBUMIN 3.0 (L) 11/05/2018    ALBUMIN 2.8 (L) 11/04/2018    ALBUMIN 3.4 (L) 09/28/2018     No results found for: PREALBUMIN    Estimated Creatinine Clearance: 26.1 mL/min (A) (based on SCr of 1.5 mg/dL (H)).    POCT Glucose results:    Current Insulin Regimen:         PHYSICAL EXAMINATION:  Vitals:    11/05/18 1003   BP:    Pulse: 100   Resp:    Temp:      Body mass index is 29.7 kg/m².    Physical Exam   Constitutional: She is oriented to person, place, and time. She appears well-developed and well-nourished. No distress.   HENT:   Head: Normocephalic and atraumatic.   Nose: Nose normal.   Eyes: EOM are normal. No scleral icterus.   Neck: Normal range of motion. Neck supple. No tracheal deviation present. No thyromegaly present.   No palpable nodules or goiter   Cardiovascular: Normal rate and regular rhythm. Exam reveals no gallop and no friction rub.   No murmur heard.  Pulmonary/Chest: Effort normal. No respiratory distress. She has no wheezes. She has no rales.   Abdominal: Soft. Bowel  sounds are normal. There is no tenderness.   Musculoskeletal: She exhibits no edema.   Neurological: She is alert and oriented to person, place, and time.   Skin: Skin is warm and dry.     .     ASSESSMENT and PLAN:    Hyperparathyroidism    --likely Secondary Hyperparathyroidism  --due to Vitamin D deficiency, CKD  --was started on Vitamin D3 1000 iU daily on 5/9  --corrected calcium is WNL  --With prolonged, severe vitamin D deficiency (<10), secondary hyperparathyroidism can occur, which leads to demineralization of bones.  Muscle weakness and difficulty ambulating could have contributed to the syncopal episodes  --Vitamin D level is low at 18  --recommend continuing Vitamin D3 supplementation, patient's family has only been giving it to her twice a month, patient's family needs education regarding the importance of taking this medication daily.    --recheck PTH, Vitamin D level in 6 months.     Hypothyroidism    --Subclinical hypothyroidism given normal T4  --she states she will occasionally have some fatigue, cold intolerance, palpitations  --she has been on amiodarone which can cause some thyrotoxicity, would need to consider if continuing this medication has any ongoing benefit  --recommend starting levothyroxine 25 mcg daily  --check TPO antibodies  --repeat TSH on 12/3 lab appointment               Missy Duke MD  Endocrinology  [facility]

## 2018-11-05 NOTE — PLAN OF CARE
CM went to the room for DC needs assessment. Was asked to come back later as pt was being assisted to BR.

## 2018-11-05 NOTE — CARE UPDATE
Pt's cbg at 0745 was 75, pt received breakfast tray, pt encouraged to eat. Pt at 75%. Will continue to monitor. Pt has been ambulating to bedside commode with assistance and urinating. Pt denies pain/dizziness/SOB. Denies complaints. Cami Moreno RN [id number] 11/5/18

## 2018-11-05 NOTE — SUBJECTIVE & OBJECTIVE
Interval History: Feeling better, no acute events o/n.  Diuresing, weight down one pound per standing scale. Goal weight approx 150 lbs.  No chest pain, LH, dizzy, palpitations.      Review of Systems   Constitutional: Positive for activity change and unexpected weight change. Negative for appetite change, chills, fatigue and fever.   HENT: Negative for congestion, rhinorrhea (chronic intermittant) and trouble swallowing.    Respiratory: Positive for shortness of breath (with exertion). Negative for cough, chest tightness, wheezing and stridor.    Cardiovascular: Positive for leg swelling. Negative for chest pain and palpitations.   Gastrointestinal: Negative for abdominal distention, abdominal pain, blood in stool, constipation (BM this am), diarrhea, nausea and vomiting.   Endocrine: Positive for cold intolerance.   Genitourinary: Positive for frequency (d/t lasix). Negative for difficulty urinating, dysuria, hematuria, urgency and vaginal discharge (resolved).   Musculoskeletal: Positive for arthralgias and gait problem. Negative for back pain, myalgias and neck pain.   Skin: Positive for rash (perineal area) and wound (surgical wound r chest). Negative for color change and pallor.   Neurological: Positive for weakness and numbness (r carpal tunnel). Negative for dizziness (chronic vertigo), syncope, light-headedness and headaches.   Hematological: Bruises/bleeds easily.   Psychiatric/Behavioral: Positive for sleep disturbance (didn't sleep well o/n). Negative for confusion. The patient is not nervous/anxious.      Objective:     Vital Signs (Most Recent):  Temp: 98.5 °F (36.9 °C) (11/05/18 1155)  Pulse: 101 (11/05/18 1155)  Resp: 18 (11/05/18 1155)  BP: 122/76 (11/05/18 1155)  SpO2: 95 % (11/05/18 1155) Vital Signs (24h Range):  Temp:  [96.1 °F (35.6 °C)-98.5 °F (36.9 °C)] 98.5 °F (36.9 °C)  Pulse:  [] 101  Resp:  [18] 18  SpO2:  [94 %-99 %] 95 %  BP: ()/(57-85) 122/76     Weight: 71.3 kg (157 lb 3  oz)  Body mass index is 29.7 kg/m².    Intake/Output Summary (Last 24 hours) at 11/5/2018 1500  Last data filed at 11/5/2018 1000  Gross per 24 hour   Intake 450 ml   Output 1450 ml   Net -1000 ml      Physical Exam   Constitutional: She is oriented to person, place, and time. She appears well-developed and well-nourished. No distress.   HENT:   Head: Normocephalic and atraumatic.   Right Ear: External ear normal.   Left Ear: External ear normal.   Nose: Nose normal.   Mouth/Throat: Oropharynx is clear and moist.   Eyes: Conjunctivae and EOM are normal. No scleral icterus.   Neck: Normal range of motion. Neck supple. Hepatojugular reflux and JVD (to tragus) present. No tracheal deviation present. No thyromegaly present.   Cardiovascular: Normal rate, regular rhythm and intact distal pulses. Exam reveals no gallop and no friction rub.   Murmur (aortic soft blowing murmer) heard.  Pulmonary/Chest: Effort normal and breath sounds normal. No respiratory distress. She has no wheezes. She has no rales.   Abdominal: Soft. Bowel sounds are normal. She exhibits no distension and no mass. There is no tenderness. There is no rebound and no guarding.   Genitourinary: No vaginal discharge found.   Musculoskeletal: Normal range of motion. She exhibits edema. She exhibits no tenderness.   Neurological: She is alert and oriented to person, place, and time. No cranial nerve deficit or sensory deficit. She exhibits normal muscle tone. Coordination (needs assistance) abnormal.   Skin: Skin is warm and dry. No rash noted. No erythema.   Psychiatric: She has a normal mood and affect. Her behavior is normal. Judgment and thought content normal.   Nursing note and vitals reviewed.      Significant Labs:   BMP:   Recent Labs   Lab 11/05/18  0344   GLU 77   *   K 3.3*      CO2 19*   BUN 29*   CREATININE 1.5*   CALCIUM 8.4*   MG 2.0     CBC:   Recent Labs   Lab 11/04/18  0817   WBC 7.92   HGB 11.4*   HCT 36.7*        CMP:    Recent Labs   Lab 11/04/18 0952 11/04/18 2004 11/05/18  0344    132* 135*   K 2.7* 4.6 3.3*    103 104   CO2 18* 15* 19*    129* 77   BUN 29* 29* 29*   CREATININE 1.6* 1.6* 1.5*   CALCIUM 7.5* 8.7 8.4*   PROT 6.2  --  6.7   ALBUMIN 2.8*  --  3.0*   BILITOT 1.8*  --  1.6*   ALKPHOS 68  --  70   AST 34  --  40   ALT 16  --  21   ANIONGAP 11 14 12   EGFRNONAA 29.8* 29.8* 32.2*     Cardiac Markers:   Recent Labs   Lab 11/04/18  0817   BNP 2,600*     Lipid Panel:   Recent Labs   Lab 11/05/18  0344   CHOL 149   HDL 26*   LDLCALC 108.2   TRIG 74   CHOLHDL 17.4*     Magnesium:   Recent Labs   Lab 11/04/18 0952 11/05/18  0344   MG 1.4* 2.0     Troponin:   Recent Labs   Lab 11/04/18  0817   TROPONINI 0.139*     TSH:   Recent Labs   Lab 11/05/18  0344   TSH 12.478*     Lab Results   Component Value Date    .0 (H) 11/04/2018    CALCIUM 8.4 (L) 11/05/2018    PHOS 3.4 11/05/2018       Significant Imaging: I have reviewed all pertinent imaging results/findings within the past 24 hours.     Echo 11/4/18  · Left atrium is severely dilated.  · The left ventricle cavity is normal.  · Septal wall has abnormal motion.  · Moderate global hypokinetic wall motion.  · Left ventricle ejection fraction is severely decreased at 25%  · Right ventricular cavity size is moderately dilated.  · RV systolic function is normal.  · Mitral valve is mildly sclerotic.  · Mild mitral regurgitation.  · Moderate tricuspid regurgitation.  · Diastolic pattern consistent with atrial fibrillation observed.  · The aortic valve is sclerotic without reduced excursion.  · Pulmonic valve shows trace regurgitation.  · The estimated PA systolic pressure is 31.04 mm Hg  · Moderately elevated central venous pressure (8 mm Hg).  · Right atrium is moderately dilated.  · Patent foramen ovale present with left to right shunting indicated by color flow Doppler.     Head CT:   1. No CT evidence of acute intracranial abnormality. Clinical  correlation and further evaluation as warranted.  2. Generalized cerebral volume loss, findings suggestive of chronic microvascular ischemic change and remote left parasagittal occipital lobe infarct.

## 2018-11-05 NOTE — HPI
82 YO F w hx of pAF s/p DCCV (2017), who had a reduction in EF (60 -> 30-35%) and LBBB so CRT-P was placed on 10/26 by Dr. Burt. She was brought yesterday after syncopal episode (most likely orthostatic vs vasovagal) on Saturday night.    She does not remember very well the event but cardiology note from the ED documented that she felt lightheaded when she stood up in the restroom and walked to her bedroom. She and her family members say that she was caught by her grandson and did not hit her head. They deny loss of control of sphincters, jerking of extremities, or biting of her tongue.     Evaluation during this admission, has included negative orthostatic vitals, and bedside TTE by cardiology fellow in the ED that ruled out pericardial effusion, and CRT-P interrogation that resulted in normal RV and LV lead thresholds and no events.     She is scheduled for AVN ablation in December.

## 2018-11-05 NOTE — ASSESSMENT & PLAN NOTE
- worsening TSH, normal free t4, normal TPO ab, endo recommending start synthroid 25 and repeat pth and vit d in 6 months.

## 2018-11-05 NOTE — SUBJECTIVE & OBJECTIVE
PMH, PSH, FH, SH reviewed     ROS    Review of Systems   Constitutional: Positive for fatigue. Negative for chills and fever.   HENT: Negative for sneezing and sore throat.    Eyes: Negative for pain and visual disturbance.   Respiratory: Negative for cough, shortness of breath and wheezing.    Cardiovascular: Positive for palpitations and leg swelling. Negative for chest pain.   Gastrointestinal: Negative for abdominal pain, constipation, diarrhea and nausea.   Endocrine: Positive for cold intolerance.   Genitourinary: Negative for dysuria, frequency and urgency.   Allergic/Immunologic: Negative for environmental allergies.   Neurological: Negative for dizziness, light-headedness, numbness and headaches.   Psychiatric/Behavioral: Negative for agitation. The patient is not nervous/anxious.        Labs Reviewed and Include:  BASELINE Creatinine:   @[facility]@  @[facility]@  @[facility]@  [facility]   Lab 11/05/18  0344   GLU 77   CALCIUM 8.4*   ALBUMIN 3.0*   PROT 6.7   *   K 3.3*   CO2 19*      BUN 29*   CREATININE 1.5*   ALKPHOS 70   ALT 21   AST 40   BILITOT 1.6*     Lab Results   Component Value Date    HGBA1C 6.0 (H) 11/04/2018       Nutritional status:   Body mass index is 29.7 kg/m².  Lab Results   Component Value Date    ALBUMIN 3.0 (L) 11/05/2018    ALBUMIN 2.8 (L) 11/04/2018    ALBUMIN 3.4 (L) 09/28/2018     No results found for: PREALBUMIN    Estimated Creatinine Clearance: 26.1 mL/min (A) (based on SCr of 1.5 mg/dL (H)).    POCT Glucose results:    Current Insulin Regimen:         PHYSICAL EXAMINATION:  Vitals:    11/05/18 1003   BP:    Pulse: 100   Resp:    Temp:      Body mass index is 29.7 kg/m².    Physical Exam   Constitutional: She is oriented to person, place, and time. She appears well-developed and well-nourished. No distress.   HENT:   Head: Normocephalic and atraumatic.   Nose: Nose normal.   Eyes: EOM are normal. No scleral icterus.   Neck: Normal range of motion. Neck supple. No tracheal  deviation present. No thyromegaly present.   No palpable nodules or goiter   Cardiovascular: Normal rate and regular rhythm. Exam reveals no gallop and no friction rub.   No murmur heard.  Pulmonary/Chest: Effort normal. No respiratory distress. She has no wheezes. She has no rales.   Abdominal: Soft. Bowel sounds are normal. There is no tenderness.   Musculoskeletal: She exhibits no edema.   Neurological: She is alert and oriented to person, place, and time.   Skin: Skin is warm and dry.

## 2018-11-05 NOTE — ASSESSMENT & PLAN NOTE
- trulicity caused rapid weight loss and loss of appetite  - home januvia working well, hold while in house  - hgba1c 6.0

## 2018-11-05 NOTE — ASSESSMENT & PLAN NOTE
- physical exam findings as noted above c/w volume overload, +JVD to earlobe, peripheral edema, + NA with cooking per family, BNP elevated at 2600 higher than she's ever been.   - Echo negative for pericardial effusion, s/p BIV pacemaker week ago, also to look at SEVERE AS, low flow low gradient does not appear to have.  Does have slightly lower EF from previous and septal wall motion abn.  She has not been on GDMT as noted above.  Restarting her on ARB as she may need entresto in future.   - Minimal diuresis with 40 iv bid, increased to TID  - goal weight roughly 150 lbs, currently 157 lbs.  - electrolytes actively being repleted  - no h/o ischemic work up, new onset LBBB, EF 25%, unable to determine if she needs upgrade to ICD or life vest in interim.  Discussing options with EP and cards co managed. Unsure how long she's been rate controlled as Afib could be contributing factor to depressed EF.   - worsening TSH, consulted endo, started on low dose synthroid, TPO ab normal, PTH elevated, though in setting of vitamin D deficiency and lack of supplementation will restart Vitamin D and recheck PTH in 6 months along with Vit D level.

## 2018-11-05 NOTE — ASSESSMENT & PLAN NOTE
- Admit 1.4, repleted with IV Mag, now 2.0  - most likely d/t lasix po   - CA level 7.5, corrected 8.46,   - iPTH level 285   - has not been taking vitamin D either   - endo consulted resume vit d supp, repeat pth and vit D level in 6 months

## 2018-11-05 NOTE — ASSESSMENT & PLAN NOTE
CHADS-VASC 6  S/p failed DCCV (2017)  Pending AVN ablation in December    Recommendations:  -Continue with home regimen: apixaban, metoprolol tartrate 50 Q12h, amiodarone 200 daily

## 2018-11-05 NOTE — PLAN OF CARE
Problem: Patient Care Overview  Goal: Plan of Care Review  Outcome: Ongoing (interventions implemented as appropriate)  Pt resting in bed, orthostatics BP complete and weight obtained, VSS, call light in reach, siderails up x2 for safety, assist x1 to bathroom, 1500ml fluid restriction observed, will cont to monitor pt.

## 2018-11-05 NOTE — ASSESSMENT & PLAN NOTE
--likely Secondary Hyperparathyroidism  --due to Vitamin D deficiency, CKD  --was started on Vitamin D3 1000 iU daily on 5/9  --corrected calcium is WNL  --With prolonged, severe vitamin D deficiency (<10), secondary hyperparathyroidism can occur, which leads to demineralization of bones.  Muscle weakness and difficulty ambulating could have contributed to the syncopal episodes  --Vitamin D level is low at 18  --recommend continuing Vitamin D3 supplementation, patient's family has only been giving it to her twice a month, patient's family needs education regarding the importance of taking this medication daily.    --recheck PTH, Vitamin D level in 6 months.

## 2018-11-05 NOTE — ASSESSMENT & PLAN NOTE
Severely depressed LVEF 30-35% w LBBB s/p recent CRT-P  Bedside TTE ruled out effusion  Interrogation of the device showed no event leading to syncope or device malfunction    Recommendations  -CRT-P working well, absence of pericardial effusion, we recommend to work up other potential causes of syncope.

## 2018-11-05 NOTE — ASSESSMENT & PLAN NOTE
- as noted above HPI  - New BIV pacer interrogated, no arrhythmias or bradycardia's noted  - severe electrolyte abnormalities coupled with acute systolic heart failure exacerbation  - Head CT noncon to r/o acute bleed (on low dose eliquis) negative, check MRI as she has patent Foramen Ovale  - monitor/ tele  - Two sets orthostatics negative, one set done with both electronic and manual which looked +, repeat was normal   - first syncopal episode appears to be possibly vasovagal as she had been straining to urinate just prior to leaving the bathroom, second episode appears to be an acute muscular collapse no loss of vision   - she has underlying afib/ has been off her eliquis since prior to biv insertion, doubt this is embolic stroke but cannot rule out as possible differential

## 2018-11-05 NOTE — SUBJECTIVE & OBJECTIVE
Past Medical History:   Diagnosis Date    A-fib     Arthritis     Asteroid hyalosis of left eye     Bilateral nonexudative age-related macular degeneration 6/3/2014    Breast cancer     Cataract     Chronic GERD 2017    CKD stage 3 due to type 2 diabetes mellitus     Coronary artery disease     Hypertension     Migraine headache     Mild nonproliferative diabetic retinopathy of both eyes 6/3/2014    Pneumonia     Type 2 diabetes mellitus with hyperglycemia     Vertigo        Past Surgical History:   Procedure Laterality Date    BREAST SURGERY      left lumpectomy    CARDIOVERSION N/A 2017    Performed by Jacques Burt [facility] at [facility]    CARPAL TUNNEL RELEASE      left    CATARACT EXTRACTION      vance     SECTION      COLONOSCOPY N/A 3/5/2015    Performed by Andrsé Hobson MD at [facility] (4TH FLR)    ESOPHAGOGASTRODUODENOSCOPY (EGD) N/A 2016    Performed by Rory Ogden MD at [facility] (2ND FLR)    EYE SURGERY      cataracts bilaterally    HYSTERECTOMY      partial    IMPLANTATION OF BIVENTRICULAR HEART PACEMAKER N/A 10/26/2018    Procedure: INSERTION, CARDIAC PACEMAKER, BIVENTRICULAR;  Surgeon: Jacques Burt MD;  Location: [facility]ATH [facility];  Service: Cardiology;  Laterality: N/A;  AF, CRT-P, SJM, MAC, MB, 3 Prep    INSERTION, CARDIAC PACEMAKER, BIVENTRICULAR N/A 10/26/2018    Performed by Jacques Burt [facility] at [facility]    TRANSESOPHAGEAL ECHOCARDIOGRAM (REJI) N/A 2017    Performed by Ruma Long MD at [facility]    TRANSESOPHAGEAL ECHOCARDIOGRAM (REJI) N/A 2017    Performed by Angelique Taylor MD at [facility]       Review of patient's allergies indicates:   Allergen Reactions    Tramadol Hallucinations       No current facility-administered medications on file prior to encounter.      Current Outpatient Medications on File Prior to Encounter   Medication Sig    allopurinol (ZYLOPRIM) 100 MG tablet Take 1 and 1/2  tablets (150 mg total) by mouth once daily.    amiodarone (PACERONE) 200 MG Tab Take 1 tablet (200 mg total) by mouth once daily.    diphenhydrAMINE-zinc acetate 1-0.1% (BENADRYL) cream Apply topically 2 (two) times daily as needed for Itching.    furosemide (LASIX) 20 MG tablet Take 2 tablets (40 mg total) by mouth once daily.    [START ON 11/8/2018] HYDROcodone-acetaminophen (NORCO) 5-325 mg per tablet Take 1 tablet by mouth every 8 (eight) hours as needed for Pain.    meclizine (ANTIVERT) 25 mg tablet Take 1 tablet (25 mg total) by mouth every 6 (six) hours.    metoprolol tartrate (LOPRESSOR) 50 MG tablet Take 1 tablet (50 mg total) by mouth 2 (two) times daily.    SITagliptin (JANUVIA) 50 MG Tab Take 1 tablet (50 mg total) by mouth once daily.    triamcinolone acetonide 0.1% (KENALOG) 0.1 % cream APPLY TOPICALLY TWO TIMES A DAY    ACCU-CHEK FASTCLIX Misc 1 lancet by Misc.(Non-Drug; Combo Route) route 3 (three) times daily. Pt to test blood glucose up to 3 times daily.    apixaban (ELIQUIS) 2.5 mg Tab Take 1 tablet (2.5 mg total) by mouth 2 (two) times daily. Resume on 11/1    benzonatate (TESSALON) 100 MG capsule TAKE ONE CAPSULE BY MOUTH THREE TIMES A DAY AS NEEDED FOR COUGH    cholecalciferol, vitamin D3, 1,000 unit capsule Take 2 capsules (2,000 Units total) by mouth once daily. (Patient taking differently: Take 1,000 Units by mouth once daily. )    fluticasone (FLONASE) 50 mcg/actuation nasal spray SPRAY TWICE IN EACH NOSTRIL DAILY (Patient taking differently: 2 sprays by Each Nare route daily as needed. )    predniSONE (DELTASONE) 5 MG tablet Take 1-2 tablets by mouth as needed for gout attack     Family History     Problem Relation (Age of Onset)    Blindness Brother    Cancer Mother    Cataracts Sister    Diabetes Father, Brother, Sister, Brother, Brother, Brother    Heart disease Mother    Hypertension Father, Brother    No Known Problems Daughter, Son, Daughter        Tobacco Use     Smoking status: Never Smoker    Smokeless tobacco: Never Used   Substance and Sexual Activity    Alcohol use: Yes    Drug use: No    Sexual activity: Not Currently     Review of Systems   Constitution: Positive for weakness. Negative for chills and decreased appetite.   HENT: Negative for congestion, ear discharge and ear pain.    Eyes: Negative for blurred vision and discharge.   Cardiovascular: Negative for chest pain, claudication, cyanosis and dyspnea on exertion.   Respiratory: Negative for cough and hemoptysis.    Endocrine: Negative for cold intolerance and heat intolerance.   Skin: Negative for color change and nail changes.   Musculoskeletal: Negative for arthritis and back pain.   Gastrointestinal: Negative for bloating and abdominal pain.   Genitourinary: Negative for bladder incontinence and decreased libido.   Neurological: Positive for light-headedness. Negative for aphonia and brief paralysis.     Objective:     Vital Signs (Most Recent):  Temp: 96.4 °F (35.8 °C) (11/05/18 0750)  Pulse: 76 (11/05/18 1105)  Resp: 18 (11/05/18 0750)  BP: 123/85 (11/05/18 1045)  SpO2: 95 % (11/05/18 0731) Vital Signs (24h Range):  Temp:  [96.1 °F (35.6 °C)-97.9 °F (36.6 °C)] 96.4 °F (35.8 °C)  Pulse:  [] 76  Resp:  [17-18] 18  SpO2:  [92 %-99 %] 95 %  BP: ()/(57-85) 123/85       Weight: 71.3 kg (157 lb 3 oz)  Body mass index is 29.7 kg/m².    SpO2: 95 %  O2 Device (Oxygen Therapy): room air    Physical Exam   Constitutional: She is oriented to person, place, and time. She appears well-developed and well-nourished.   HENT:   Head: Normocephalic and atraumatic.   Nose: Nose normal.   Mouth/Throat: No oropharyngeal exudate.   Eyes: Right eye exhibits no discharge. Left eye exhibits no discharge. No scleral icterus.   Neck: Normal range of motion. Neck supple. JVD present.   Cardiovascular: Normal rate, regular rhythm, S1 normal and S2 normal. Exam reveals no gallop, no S3, no S4, no distant heart sounds, no  friction rub, no midsystolic click and no opening snap.   Murmur heard.  Pulses:       Radial pulses are 2+ on the right side, and 2+ on the left side.        Femoral pulses are 2+ on the right side, and 2+ on the left side.  Aortic systolic murmur   Pulmonary/Chest: Effort normal. No respiratory distress. She has no wheezes. She has rales. She exhibits no tenderness.   Abdominal: Soft. Bowel sounds are normal. She exhibits no distension. There is no tenderness. There is no rebound.   Musculoskeletal: Normal range of motion. She exhibits edema. She exhibits no tenderness or deformity.   Lymphadenopathy:     She has no cervical adenopathy.   Neurological: She is alert and oriented to person, place, and time. No cranial nerve deficit.   Skin: Skin is warm and dry.   Psychiatric: She has a normal mood and affect. Her behavior is normal.       Significant Labs:   BMP:   Recent Labs   Lab 11/04/18 0952 11/04/18 2004 11/05/18  0344    129* 77    132* 135*   K 2.7* 4.6 3.3*    103 104   CO2 18* 15* 19*   BUN 29* 29* 29*   CREATININE 1.6* 1.6* 1.5*   CALCIUM 7.5* 8.7 8.4*   MG 1.4*  --  2.0   , CMP:   Recent Labs   Lab 11/04/18 0952 11/04/18 2004 11/05/18  0344    132* 135*   K 2.7* 4.6 3.3*    103 104   CO2 18* 15* 19*    129* 77   BUN 29* 29* 29*   CREATININE 1.6* 1.6* 1.5*   CALCIUM 7.5* 8.7 8.4*   PROT 6.2  --  6.7   ALBUMIN 2.8*  --  3.0*   BILITOT 1.8*  --  1.6*   ALKPHOS 68  --  70   AST 34  --  40   ALT 16  --  21   ANIONGAP 11 14 12   ESTGFRAFRICA 34.3* 34.3* 37.1*   EGFRNONAA 29.8* 29.8* 32.2*   , CBC:   Recent Labs   Lab 11/04/18  0817   WBC 7.92   HGB 11.4*   HCT 36.7*       and INR: No results for input(s): INR, PROTIME in the last 48 hours.    Significant Imaging: TTE, EKG that showed QRS on the 140s and HR 79

## 2018-11-05 NOTE — PROGRESS NOTES
"[facility] Hospital Medicine  Progress Note    Patient Name: Dacia Martínez  MRN: [id number]  Patient Class: OP- Observation   Admission Date: 11/4/2018  Length of Stay: 0 days  Attending Physician: Jason Arora MD  Primary Care Provider: Jasmeet Corral MD    [facility] Medicine Team: [facility] MED SONIA Davies NP    Subjective:     Principal Problem:Syncope and collapse    HPI:  Ms. Martínez is an 84 y/o F who presents to the ED with c/o of syncopal collapse x2 episodes.  First episode leaving bathroom at 11:30 pm where she attempted to void, was unable and "strained to get the urine out, but I as only able to get a little bit out" and her grandson caught her as she collapsed on the way to the bed.  He put her in the bed, she stated everything was pitch black for about two minutes.  She could hear but couldn't see.  She was being assisted out of her house this am to come to the hospital with her family and around about the 9th step (last step) on staircase she collapsed as if her body just gave way.  Her grandson caught her again, she felt dizzy with no blackness or syncope.  Of note, Dr. Shah inserted a BIV pacemaker on 10/26/18, she completed her abx course, and only used her sling for the first 3 days, she has not been using it at night. She has chronic ble edema, no more than usual and increased LUE edema (surgical lymph removal d/t breast ca with no compression sleeve per family).  She denies orthopnea, PND,  palpitations, chest pain, or shortness of breath at rest.  BNP elevated at 2600, Troponin elevated in setting of recent BIV pacer insertion 0.139, Mag low 1.4, K low 2.7.  ECG underlying afib, with demand v paced rhythm.      She is also c/o of perineal rash and odor.        PMH: include h/o Breast CA L side (lymph node removal), Vertigo, OA/ osteopenia, BIV 10/26/18, LBBB, HTN, HLD, Permanent Afib, (due for an ablation in 4-6 weeks post procedure/ 12/7 with RA lead " placement), PHTN, Severe AS 0.74/ MG 5, (low flow, low gradient), Anemia, chronic pain syndrome, chronic sinusitis, CKD III, Systolic HF EF 33%, Colon polyps (due for colo soon), constipation, NIDDM, Gout.      She lives with her granddaughter and grandson.  Family states she's not terribly mobile on an average basis.  They feel she's been getting more and more sob as of late.  They do cook with salt, though they say they have minimized amount more than they once did.  Patient feels her dry weight is about 150 lbs.  She weighed in at 157 lbs on standing ED scale, and 158 lbs on obs floor scale.  She has chronic dizziness/ vertigo which she takes meclizine intermittently.       Cardiologist: Dr. Burt  Echo: 6/21/18  1 - Moderately depressed left ventricular systolic function (EF 30-35%).     2 - Biatrial enlargement.     3 - Mildly to moderately depressed right ventricular systolic function .     4 - Low flow, low gradient aortic valve stenosis with reduced EF ((ROBBIE 0.74 cm2, AVAi 0.44 cm2/m2, peak aortic jet velocity 1.5 m/s,MG 5 mmHg, EF 33%,SVi 11 mL/m2).     5 - Trivial mitral stenosis, MVA = 3.9 cm2.     6 - Mild mitral regurgitation.     7 - Moderate to severe tricuspid regurgitation.     8 - Trivial pulmonic regurgitation.     9 - Increased central venous pressure.     10 - Pulmonary hypertension. The estimated PA systolic pressure is 58 mmHg.     Hospital Course:  Admitted to hospital medicine for syncope collapse, electrolyte derangement, and acute on chronic systolic heart failure exacerbation. She has NO ischemic work up in computer from [facility].  She had normal EF a year ago, progressive AFib with frequents bouts of admissions.  She developed worsening EF to 30-35%, she was on an ACEI year ago, then switched to BIDIL for some reason.  She was admitted in August 2018 with bout of hypotension and vertigo when BIDIL was d/c'ed with plan to see PCP post hospitalization to resume BIDIL.  She didn't see her pcp  till October 11th and no mention of addition GDMT at that time, though its noted she's hypertensive.  She developed a new onset LBBB coupled with her HF and is s/p BIV 10/25/18.  She presents with syncopal episode not on GDMT for HF and no h/o of ischemic work up.  Discussed case with linnea andres and EP.  No further cardiac evidence to suggest why she had syncopal episode, ? Vasovagal as she had been straining in bathroom prior to first episode.  She does have a patent PFO noted on echo with L to R shunt (previously R to L, though if flow is reversed, doubt a clot would hit upstream) Head CT with no acute abnormalities, will get head MRI to be sure.  Second episode she sounded like her legs gave way d/t progressive weakness.  Her TSH and iPTH has been worsening over past several months along with borderline hypocalcemia on admission.  TPO ab normal, free t4 normal, endocrine consulted with recommendation to start synthroid 25mcg po daily before breakfast.  She has not been taking her vitamin D supplement, so vitamin D level was 18, < 10 can show progressive muscle weakness.  Rec's to repeat vitamin D and PTH in 6 months after she resumes taking her vitamin D upon discharge.  Discussed case with EP; she is due for an AVN ablation in December and will also get a RA lead placed at that time. If she continues to have a depressed EF post ablation they will consider ischemic work up post procedure.  Though the question would remain is she needs an upgrade to a BIV/ICD at that point.     She presented acutely volume overloaded with negative orthostatic vital signs x2. NO c/o LH or dizziness since admission.  Initiated IVP diuresis 40mg bid with only 1 lb weight loss, increased to tid as she has physical exam findings noted for significant volume overload, weight up approximately 7lbs from dry weight of 150 lbs, family history of progressive sob and decreased ambulation and decreased ability to perform personal hygiene.   PT OT to see in am, not assigned today.     Dispo: once fully diuresed, needs f/u with cardiology Didi Anderson MD, home with family / 24 hr care with family, ? PT/OT needs (not seen today, ordered on admit)    Interval History: Feeling better, no acute events o/n.  Diuresing, weight down one pound per standing scale. Goal weight approx 150 lbs.  No chest pain, LH, dizzy, palpitations.      Review of Systems   Constitutional: Positive for activity change and unexpected weight change. Negative for appetite change, chills, fatigue and fever.   HENT: Negative for congestion, rhinorrhea (chronic intermittant) and trouble swallowing.    Respiratory: Positive for shortness of breath (with exertion). Negative for cough, chest tightness, wheezing and stridor.    Cardiovascular: Positive for leg swelling. Negative for chest pain and palpitations.   Gastrointestinal: Negative for abdominal distention, abdominal pain, blood in stool, constipation (BM this am), diarrhea, nausea and vomiting.   Endocrine: Positive for cold intolerance.   Genitourinary: Positive for frequency (d/t lasix). Negative for difficulty urinating, dysuria, hematuria, urgency and vaginal discharge (resolved).   Musculoskeletal: Positive for arthralgias and gait problem. Negative for back pain, myalgias and neck pain.   Skin: Positive for rash (perineal area) and wound (surgical wound r chest). Negative for color change and pallor.   Neurological: Positive for weakness and numbness (r carpal tunnel). Negative for dizziness (chronic vertigo), syncope, light-headedness and headaches.   Hematological: Bruises/bleeds easily.   Psychiatric/Behavioral: Positive for sleep disturbance (didn't sleep well o/n). Negative for confusion. The patient is not nervous/anxious.      Objective:     Vital Signs (Most Recent):  Temp: 98.5 °F (36.9 °C) (11/05/18 1155)  Pulse: 101 (11/05/18 1155)  Resp: 18 (11/05/18 1155)  BP: 122/76 (11/05/18 1155)  SpO2: 95 % (11/05/18  1155) Vital Signs (24h Range):  Temp:  [96.1 °F (35.6 °C)-98.5 °F (36.9 °C)] 98.5 °F (36.9 °C)  Pulse:  [] 101  Resp:  [18] 18  SpO2:  [94 %-99 %] 95 %  BP: ()/(57-85) 122/76     Weight: 71.3 kg (157 lb 3 oz)  Body mass index is 29.7 kg/m².    Intake/Output Summary (Last 24 hours) at 11/5/2018 1500  Last data filed at 11/5/2018 1000  Gross per 24 hour   Intake 450 ml   Output 1450 ml   Net -1000 ml      Physical Exam   Constitutional: She is oriented to person, place, and time. She appears well-developed and well-nourished. No distress.   HENT:   Head: Normocephalic and atraumatic.   Right Ear: External ear normal.   Left Ear: External ear normal.   Nose: Nose normal.   Mouth/Throat: Oropharynx is clear and moist.   Eyes: Conjunctivae and EOM are normal. No scleral icterus.   Neck: Normal range of motion. Neck supple. Hepatojugular reflux and JVD (to tragus) present. No tracheal deviation present. No thyromegaly present.   Cardiovascular: Normal rate, regular rhythm and intact distal pulses. Exam reveals no gallop and no friction rub.   Murmur (aortic soft blowing murmer) heard.  Pulmonary/Chest: Effort normal and breath sounds normal. No respiratory distress. She has no wheezes. She has no rales.   Abdominal: Soft. Bowel sounds are normal. She exhibits no distension and no mass. There is no tenderness. There is no rebound and no guarding.   Genitourinary: No vaginal discharge found.   Musculoskeletal: Normal range of motion. She exhibits edema. She exhibits no tenderness.   Neurological: She is alert and oriented to person, place, and time. No cranial nerve deficit or sensory deficit. She exhibits normal muscle tone. Coordination (needs assistance) abnormal.   Skin: Skin is warm and dry. No rash noted. No erythema.   Psychiatric: She has a normal mood and affect. Her behavior is normal. Judgment and thought content normal.   Nursing note and vitals reviewed.      Significant Labs:   BMP:   Recent Labs    Lab 11/05/18  0344   GLU 77   *   K 3.3*      CO2 19*   BUN 29*   CREATININE 1.5*   CALCIUM 8.4*   MG 2.0     CBC:   Recent Labs   Lab 11/04/18  0817   WBC 7.92   HGB 11.4*   HCT 36.7*        CMP:   Recent Labs   Lab 11/04/18  0952 11/04/18  2004 11/05/18  0344    132* 135*   K 2.7* 4.6 3.3*    103 104   CO2 18* 15* 19*    129* 77   BUN 29* 29* 29*   CREATININE 1.6* 1.6* 1.5*   CALCIUM 7.5* 8.7 8.4*   PROT 6.2  --  6.7   ALBUMIN 2.8*  --  3.0*   BILITOT 1.8*  --  1.6*   ALKPHOS 68  --  70   AST 34  --  40   ALT 16  --  21   ANIONGAP 11 14 12   EGFRNONAA 29.8* 29.8* 32.2*     Cardiac Markers:   Recent Labs   Lab 11/04/18  0817   BNP 2,600*     Lipid Panel:   Recent Labs   Lab 11/05/18  0344   CHOL 149   HDL 26*   LDLCALC 108.2   TRIG 74   CHOLHDL 17.4*     Magnesium:   Recent Labs   Lab 11/04/18  0952 11/05/18  0344   MG 1.4* 2.0     Troponin:   Recent Labs   Lab 11/04/18  0817   TROPONINI 0.139*     TSH:   Recent Labs   Lab 11/05/18  0344   TSH 12.478*     Lab Results   Component Value Date    .0 (H) 11/04/2018    CALCIUM 8.4 (L) 11/05/2018    PHOS 3.4 11/05/2018       Significant Imaging: I have reviewed all pertinent imaging results/findings within the past 24 hours.     Echo 11/4/18  · Left atrium is severely dilated.  · The left ventricle cavity is normal.  · Septal wall has abnormal motion.  · Moderate global hypokinetic wall motion.  · Left ventricle ejection fraction is severely decreased at 25%  · Right ventricular cavity size is moderately dilated.  · RV systolic function is normal.  · Mitral valve is mildly sclerotic.  · Mild mitral regurgitation.  · Moderate tricuspid regurgitation.  · Diastolic pattern consistent with atrial fibrillation observed.  · The aortic valve is sclerotic without reduced excursion.  · Pulmonic valve shows trace regurgitation.  · The estimated PA systolic pressure is 31.04 mm Hg  · Moderately elevated central venous pressure (8 mm  Hg).  · Right atrium is moderately dilated.  · Patent foramen ovale present with left to right shunting indicated by color flow Doppler.     Head CT:   1. No CT evidence of acute intracranial abnormality. Clinical correlation and further evaluation as warranted.  2. Generalized cerebral volume loss, findings suggestive of chronic microvascular ischemic change and remote left parasagittal occipital lobe infarct.    Assessment/Plan:      * Syncope and collapse    - as noted above HPI  - New BIV pacer interrogated, no arrhythmias or bradycardia's noted  - severe electrolyte abnormalities coupled with acute systolic heart failure exacerbation  - Head CT noncon to r/o acute bleed (on low dose eliquis) negative, check MRI as she has patent Foramen Ovale  - monitor/ tele  - Two sets orthostatics negative, one set done with both electronic and manual which looked +, repeat was normal   - first syncopal episode appears to be possibly vasovagal as she had been straining to urinate just prior to leaving the bathroom, second episode appears to be an acute muscular collapse no loss of vision   - she has underlying afib/ has been off her eliquis since prior to biv insertion, doubt this is embolic stroke but cannot rule out as possible differential         Acute on chronic combined systolic and diastolic congestive heart failure    - physical exam findings as noted above c/w volume overload, +JVD to earlobe, peripheral edema, + NA with cooking per family, BNP elevated at 2600 higher than she's ever been.   - Echo negative for pericardial effusion, s/p BIV pacemaker week ago, also to look at SEVERE AS, low flow low gradient does not appear to have.  Does have slightly lower EF from previous and septal wall motion abn.  She has not been on GDMT as noted above.  Restarting her on ARB as she may need entresto in future.   - Minimal diuresis with 40 iv bid, increased to TID  - goal weight roughly 150 lbs, currently 157 lbs.  -  electrolytes actively being repleted  - no h/o ischemic work up, new onset LBBB, EF 25%, unable to determine if she needs upgrade to ICD or life vest in interim.  Discussing options with EP and cards co managed. Unsure how long she's been rate controlled as Afib could be contributing factor to depressed EF.   - worsening TSH, consulted endo, started on low dose synthroid, TPO ab normal, PTH elevated, though in setting of vitamin D deficiency and lack of supplementation will restart Vitamin D and recheck PTH in 6 months along with Vit D level.        Chronic atrial fibrillation    - Atrial Fibrillation controlled currently with Beta Blocker and Amiodarone. [id number] score 4/ 8.5%. Anticoagulation indicated currently. Anticoagulation done with Eliquis  - rate controlled, ? For how long though, this could be contributing to heart failure, EP recommending continued regimen  - had been off eliquis since10/24 prior to surgery   - previously low dose 2.5mg, will resume, as she's > 80, weight is borderline 60kg once she's diuresed and recent falls/ collapse         Hypokalemia due to loss of potassium    - replete aggressively, does not like liquid K nor micro k   - recheck at 2000         Hypomagnesemia with secondary hypocalcemia    - Admit 1.4, repleted with IV Mag, now 2.0  - most likely d/t lasix po   - CA level 7.5, corrected 8.46,   - iPTH level 285   - has not been taking vitamin D either   - endo consulted resume vit d supp, repeat pth and vit D level in 6 months       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, without long-term current use of insulin    - trulicity caused rapid weight loss and loss of appetite  - home januvia working well, hold while in house  - hgba1c 6.0     Edema of upper extremity    LUE edema, h/o radiation / chemo and lymphnodectomy s/p breast ca, checking DVT scan     Hypothyroidism    - worsening TSH, normal free  t4, normal TPO ab, endo recommending start synthroid 25 and repeat pth and vit d in 6 months.        Hyperparathyroidism    - , vitamin d level 18    -endo recommending repeat in 6 months, no treatment now       Unsteady gait    - PT/ OT to eval to see in am  - ? precipitated by electrolyte abn       Vertigo    - chronic on meclizine prn        Itching    Upper back,  Topical remedies       Idiopathic chronic gout of multiple sites without tophus    - continue allopurinol, no need for PRN pred at present time       History of breast cancer    - s/p radiation and chemo with lymph node removal only, now with LUE edema, will r/o dvt          VTE Risk Mitigation (From admission, onward)        Ordered     apixaban tablet 2.5 mg  2 times daily      11/04/18 1325              Di Davies NP  Department of Hospital Medicine   [facility]

## 2018-11-05 NOTE — HPI
Ms Martínez is an 84 yo F with PMH of CKD, osteopenia, Vitamin D deficiency (on Vitamin D supplementation), T2DM, pAF, chronic systolic heart failure (s/p recent CRT-pacemaker placement, amiodarone) who presented on 11/4 with syncope.  She is currently admitted for work up for syncope.  Endocrinology was consulted for hypothyroidism (TSH 12.5, free T4 0.93), and hyperparathyroidism ().

## 2018-11-05 NOTE — ASSESSMENT & PLAN NOTE
- Atrial Fibrillation controlled currently with Beta Blocker and Amiodarone. [id number] score 4/ 8.5%. Anticoagulation indicated currently. Anticoagulation done with Eliquis  - rate controlled, ? For how long though, this could be contributing to heart failure, EP recommending continued regimen  - had been off eliquis since10/24 prior to surgery   - previously low dose 2.5mg, will resume, as she's > 80, weight is borderline 60kg once she's diuresed and recent falls/ collapse

## 2018-11-05 NOTE — CONSULTS
[facility] [address]  Cardiac Electrophysiology  Consult Note    Admission Date: 11/4/2018  Code Status: Full Code   Attending Provider: Jason Arora MD  Consulting Provider: Nilesh Tarango MD  Principal Problem:Syncope and collapse    Consults  Subjective:     Chief Complaint:  Syncope     HPI:   82 YO F w hx of pAF s/p DCCV (2017), who had a reduction in EF (60 -> 30-35%) and LBBB so CRT-P was placed on 10/26 by Dr. Burt. She was brought yesterday after syncopal episode (most likely orthostatic vs vasovagal) on Saturday night.    She does not remember very well the event but cardiology note from the ED documented that she felt lightheaded when she stood up in the restroom and walked to her bedroom. She and her family members say that she was caught by her grandson and did not hit her head. They deny loss of control of sphincters, jerking of extremities, or biting of her tongue.     Evaluation during this admission, has included negative orthostatic vitals, and bedside TTE by cardiology fellow in the ED that ruled out pericardial effusion, and CRT-P interrogation that resulted in normal RV and LV lead thresholds and no events.     She is scheduled for AVN ablation in December.      Past Medical History:   Diagnosis Date    A-fib     Arthritis     Asteroid hyalosis of left eye     Bilateral nonexudative age-related macular degeneration 6/3/2014    Breast cancer     Cataract     Chronic GERD 9/18/2017    CKD stage 3 due to type 2 diabetes mellitus     Coronary artery disease     Hypertension     Migraine headache     Mild nonproliferative diabetic retinopathy of both eyes 6/3/2014    Pneumonia     Type 2 diabetes mellitus with hyperglycemia     Vertigo        Past Surgical History:   Procedure Laterality Date    BREAST SURGERY      left lumpectomy    CARDIOVERSION N/A 12/20/2017    [facility]ATH LAB    CARPAL TUNNEL RELEASE       left    CATARACT EXTRACTION      vance     SECTION      COLONOSCOPY N/A 3/5/2015    Performed by Andrés Hobson MD at [facility] ENDO (4TH FLR)    ESOPHAGOGASTRODUODENOSCOPY (EGD) N/A 2016    Performed by [facility] at [facility] (2ND FLR)    EYE SURGERY      cataracts bilaterally    HYSTERECTOMY      partial    IMPLANTATION OF BIVENTRICULAR HEART PACEMAKER N/A 10/26/2018    Procedure: INSERTION, CARDIAC PACEMAKER, BIVENTRICULAR;  Surgeon: Jacques Burt MD;  Location: [facility] CATH LAB;  Service: Cardiology;  Laterality: N/A;  AF, CRT-P, SJM, MAC, MB, 3 Prep    INSERTION, CARDIAC PACEMAKER, BIVENTRICULAR N/A 10/26/2018    Performed by Jacques Burt MD at [facility] CATH LAB    TRANSESOPHAGEAL ECHOCARDIOGRAM (REJI) N/A 2017    Performed by Ruma Long MD at [facility] CATH LAB    TRANSESOPHAGEAL ECHOCARDIOGRAM (REJI) N/A 2017    Performed by Angelique Taylor MD at [facility] CATH LAB       Review of patient's allergies indicates:   Allergen Reactions    Tramadol Hallucinations       No current facility-administered medications on file prior to encounter.      Current Outpatient Medications on File Prior to Encounter   Medication Sig    allopurinol (ZYLOPRIM) 100 MG tablet Take 1 and 1/2 tablets (150 mg total) by mouth once daily.    amiodarone (PACERONE) 200 MG Tab Take 1 tablet (200 mg total) by mouth once daily.    diphenhydrAMINE-zinc acetate 1-0.1% (BENADRYL) cream Apply topically 2 (two) times daily as needed for Itching.    furosemide (LASIX) 20 MG tablet Take 2 tablets (40 mg total) by mouth once daily.    [START ON 2018] HYDROcodone-acetaminophen (NORCO) 5-325 mg per tablet Take 1 tablet by mouth every 8 (eight) hours as needed for Pain.    meclizine (ANTIVERT) 25 mg tablet Take 1 tablet (25 mg total) by mouth every 6 (six) hours.    metoprolol tartrate (LOPRESSOR) 50 MG tablet Take 1 tablet (50 mg total) by mouth 2 (two) times daily.    SITagliptin (JANUVIA) 50 MG  Tab Take 1 tablet (50 mg total) by mouth once daily.    triamcinolone acetonide 0.1% (KENALOG) 0.1 % cream APPLY TOPICALLY TWO TIMES A DAY    ACCU-CHEK FASTCLIX Misc 1 lancet by Misc.(Non-Drug; Combo Route) route 3 (three) times daily. Pt to test blood glucose up to 3 times daily.    apixaban (ELIQUIS) 2.5 mg Tab Take 1 tablet (2.5 mg total) by mouth 2 (two) times daily. Resume on 11/1    benzonatate (TESSALON) 100 MG capsule TAKE ONE CAPSULE BY MOUTH THREE TIMES A DAY AS NEEDED FOR COUGH    cholecalciferol, vitamin D3, 1,000 unit capsule Take 2 capsules (2,000 Units total) by mouth once daily. (Patient taking differently: Take 1,000 Units by mouth once daily. )    fluticasone (FLONASE) 50 mcg/actuation nasal spray SPRAY TWICE IN EACH NOSTRIL DAILY (Patient taking differently: 2 sprays by Each Nare route daily as needed. )    predniSONE (DELTASONE) 5 MG tablet Take 1-2 tablets by mouth as needed for gout attack     Family History     Problem Relation (Age of Onset)    Blindness Brother    Cancer Mother    Cataracts Sister    Diabetes Father, Brother, Sister, Brother, Brother, Brother    Heart disease Mother    Hypertension Father, Brother    No Known Problems Daughter, Son, Daughter        Tobacco Use    Smoking status: Never Smoker    Smokeless tobacco: Never Used   Substance and Sexual Activity    Alcohol use: Yes    Drug use: No    Sexual activity: Not Currently     Review of Systems   Constitution: Positive for weakness. Negative for chills and decreased appetite.   HENT: Negative for congestion, ear discharge and ear pain.    Eyes: Negative for blurred vision and discharge.   Cardiovascular: Negative for chest pain, claudication, cyanosis and dyspnea on exertion.   Respiratory: Negative for cough and hemoptysis.    Endocrine: Negative for cold intolerance and heat intolerance.   Skin: Negative for color change and nail changes.   Musculoskeletal: Negative for arthritis and back pain.    Gastrointestinal: Negative for bloating and abdominal pain.   Genitourinary: Negative for bladder incontinence and decreased libido.   Neurological: Positive for light-headedness. Negative for aphonia and brief paralysis.     Objective:     Vital Signs (Most Recent):  Temp: 96.4 °F (35.8 °C) (11/05/18 0750)  Pulse: 76 (11/05/18 1105)  Resp: 18 (11/05/18 0750)  BP: 123/85 (11/05/18 1045)  SpO2: 95 % (11/05/18 0731) Vital Signs (24h Range):  Temp:  [96.1 °F (35.6 °C)-97.9 °F (36.6 °C)] 96.4 °F (35.8 °C)  Pulse:  [] 76  Resp:  [17-18] 18  SpO2:  [92 %-99 %] 95 %  BP: ()/(57-85) 123/85       Weight: 71.3 kg (157 lb 3 oz)  Body mass index is 29.7 kg/m².    SpO2: 95 %  O2 Device (Oxygen Therapy): room air    Physical Exam   Constitutional: She is oriented to person, place, and time. She appears well-developed and well-nourished.   HENT:   Head: Normocephalic and atraumatic.   Nose: Nose normal.   Mouth/Throat: No oropharyngeal exudate.   Eyes: Right eye exhibits no discharge. Left eye exhibits no discharge. No scleral icterus.   Neck: Normal range of motion. Neck supple. JVD present.   Cardiovascular: Normal rate, regular rhythm, S1 normal and S2 normal. Exam reveals no gallop, no S3, no S4, no distant heart sounds, no friction rub, no midsystolic click and no opening snap.   Murmur heard.  Pulses:       Radial pulses are 2+ on the right side, and 2+ on the left side.        Femoral pulses are 2+ on the right side, and 2+ on the left side.  Aortic systolic murmur   Pulmonary/Chest: Effort normal. No respiratory distress. She has no wheezes. She has rales. She exhibits no tenderness.   Abdominal: Soft. Bowel sounds are normal. She exhibits no distension. There is no tenderness. There is no rebound.   Musculoskeletal: Normal range of motion. She exhibits edema. She exhibits no tenderness or deformity.   Lymphadenopathy:     She has no cervical adenopathy.   Neurological: She is alert and oriented to person,  place, and time. No cranial nerve deficit.   Skin: Skin is warm and dry.   Psychiatric: She has a normal mood and affect. Her behavior is normal.       Significant Labs:   BMP:   Recent Labs   Lab 11/04/18  0952 11/04/18 2004 11/05/18  0344    129* 77    132* 135*   K 2.7* 4.6 3.3*    103 104   CO2 18* 15* 19*   BUN 29* 29* 29*   CREATININE 1.6* 1.6* 1.5*   CALCIUM 7.5* 8.7 8.4*   MG 1.4*  --  2.0   , CMP:   Recent Labs   Lab 11/04/18 0952 11/04/18 2004 11/05/18  0344    132* 135*   K 2.7* 4.6 3.3*    103 104   CO2 18* 15* 19*    129* 77   BUN 29* 29* 29*   CREATININE 1.6* 1.6* 1.5*   CALCIUM 7.5* 8.7 8.4*   PROT 6.2  --  6.7   ALBUMIN 2.8*  --  3.0*   BILITOT 1.8*  --  1.6*   ALKPHOS 68  --  70   AST 34  --  40   ALT 16  --  21   ANIONGAP 11 14 12   ESTGFRAFRICA 34.3* 34.3* 37.1*   EGFRNONAA 29.8* 29.8* 32.2*   , CBC:   Recent Labs   Lab 11/04/18  0817   WBC 7.92   HGB 11.4*   HCT 36.7*       and INR: No results for input(s): INR, PROTIME in the last 48 hours.    Significant Imaging: TTE, EKG that showed QRS on the 140s and HR 79       CHADS2 for A-FIB Stroke Risk  Age?: 75 years old or older  CHF history: Yes  HTN history: Yes  Diabetes Mellitus History: Yes  Female?: Yes      Assessment and Plan:     Chronic atrial fibrillation    CHADS-VASC 6  S/p failed DCCV (2017)  Pending AVN ablation in December    Recommendations:  -Continue with home regimen: apixaban, metoprolol tartrate 50 Q12h, amiodarone 200 daily     Chronic systolic congestive heart failure    Severely depressed LVEF 30-35% w LBBB s/p recent CRT-P  Bedside TTE ruled out effusion  Interrogation of the device showed no event leading to syncope or device malfunction    Recommendations  -CRT-P working well, absence of pericardial effusion, we recommend to work up other potential causes of syncope.           Case discussed w Dr Javed Tarango MD  Cardiac Electrophysiology  [facility]

## 2018-11-05 NOTE — NURSING
Pt was unable to undergo MRI, pt has recent pacemaker placement and MRI reports must be at least 6 weeks post-op to perform MRI. Notified NP Lanks. Called venous doppler to get patient for scan, reports will send for patient.

## 2018-11-06 PROBLEM — Z79.01 CURRENT USE OF LONG TERM ANTICOAGULATION: Status: ACTIVE | Noted: 2018-11-06

## 2018-11-06 PROBLEM — R55 SYNCOPE AND COLLAPSE: Status: ACTIVE | Noted: 2018-11-06

## 2018-11-06 LAB
ALBUMIN SERPL BCP-MCNC: 3.3 G/DL
ALP SERPL-CCNC: 73 U/L
ALT SERPL W/O P-5'-P-CCNC: 38 U/L
ANION GAP SERPL CALC-SCNC: 15 MMOL/L
AST SERPL-CCNC: 72 U/L
BACTERIA UR CULT: NORMAL
BACTERIA UR CULT: NORMAL
BILIRUB SERPL-MCNC: 2.4 MG/DL
BUN SERPL-MCNC: 29 MG/DL
CALCIUM SERPL-MCNC: 8.8 MG/DL
CHLORIDE SERPL-SCNC: 103 MMOL/L
CO2 SERPL-SCNC: 15 MMOL/L
CREAT SERPL-MCNC: 1.6 MG/DL
EST. GFR  (AFRICAN AMERICAN): 34.3 ML/MIN/1.73 M^2
EST. GFR  (NON AFRICAN AMERICAN): 29.8 ML/MIN/1.73 M^2
GLUCOSE SERPL-MCNC: 92 MG/DL
MAGNESIUM SERPL-MCNC: 1.9 MG/DL
POCT GLUCOSE: 111 MG/DL (ref 70–110)
POCT GLUCOSE: 112 MG/DL (ref 70–110)
POCT GLUCOSE: 129 MG/DL (ref 70–110)
POCT GLUCOSE: 99 MG/DL (ref 70–110)
POTASSIUM SERPL-SCNC: 4.6 MMOL/L
PROT SERPL-MCNC: 7.4 G/DL
SODIUM SERPL-SCNC: 133 MMOL/L

## 2018-11-06 PROCEDURE — 97161 PT EVAL LOW COMPLEX 20 MIN: CPT

## 2018-11-06 PROCEDURE — 97165 OT EVAL LOW COMPLEX 30 MIN: CPT

## 2018-11-06 PROCEDURE — 80053 COMPREHEN METABOLIC PANEL: CPT

## 2018-11-06 PROCEDURE — 25000003 PHARM REV CODE 250: Performed by: NURSE PRACTITIONER

## 2018-11-06 PROCEDURE — 63600175 PHARM REV CODE 636 W HCPCS: Performed by: NURSE PRACTITIONER

## 2018-11-06 PROCEDURE — G8989 SELF CARE D/C STATUS: HCPCS | Mod: CJ

## 2018-11-06 PROCEDURE — 36415 COLL VENOUS BLD VENIPUNCTURE: CPT

## 2018-11-06 PROCEDURE — G8987 SELF CARE CURRENT STATUS: HCPCS | Mod: CJ

## 2018-11-06 PROCEDURE — G0378 HOSPITAL OBSERVATION PER HR: HCPCS

## 2018-11-06 PROCEDURE — 99226 PR SUBSEQUENT OBSERVATION CARE,LEVEL III: CPT | Mod: ,,, | Performed by: NURSE PRACTITIONER

## 2018-11-06 PROCEDURE — G8978 MOBILITY CURRENT STATUS: HCPCS | Mod: CJ

## 2018-11-06 PROCEDURE — G8988 SELF CARE GOAL STATUS: HCPCS | Mod: CJ

## 2018-11-06 PROCEDURE — 83735 ASSAY OF MAGNESIUM: CPT

## 2018-11-06 PROCEDURE — 99214 OFFICE O/P EST MOD 30 MIN: CPT | Mod: GC,,, | Performed by: INTERNAL MEDICINE

## 2018-11-06 PROCEDURE — G8980 MOBILITY D/C STATUS: HCPCS | Mod: CJ

## 2018-11-06 RX ORDER — BISACODYL 10 MG
10 SUPPOSITORY, RECTAL RECTAL DAILY PRN
Status: DISCONTINUED | OUTPATIENT
Start: 2018-11-06 | End: 2018-11-07 | Stop reason: HOSPADM

## 2018-11-06 RX ORDER — FUROSEMIDE 40 MG/1
80 TABLET ORAL 2 TIMES DAILY
Status: DISCONTINUED | OUTPATIENT
Start: 2018-11-06 | End: 2018-11-06

## 2018-11-06 RX ORDER — POLYETHYLENE GLYCOL 3350 17 G/17G
17 POWDER, FOR SOLUTION ORAL 2 TIMES DAILY PRN
Status: DISCONTINUED | OUTPATIENT
Start: 2018-11-06 | End: 2018-11-07 | Stop reason: HOSPADM

## 2018-11-06 RX ORDER — TAMSULOSIN HYDROCHLORIDE 0.4 MG/1
0.4 CAPSULE ORAL DAILY
Status: DISCONTINUED | OUTPATIENT
Start: 2018-11-07 | End: 2018-11-07 | Stop reason: HOSPADM

## 2018-11-06 RX ORDER — LANOLIN ALCOHOL/MO/W.PET/CERES
800 CREAM (GRAM) TOPICAL 2 TIMES DAILY
Status: DISCONTINUED | OUTPATIENT
Start: 2018-11-06 | End: 2018-11-07 | Stop reason: HOSPADM

## 2018-11-06 RX ORDER — FUROSEMIDE 10 MG/ML
60 INJECTION INTRAMUSCULAR; INTRAVENOUS 3 TIMES DAILY
Status: DISCONTINUED | OUTPATIENT
Start: 2018-11-06 | End: 2018-11-07

## 2018-11-06 RX ADMIN — METOPROLOL TARTRATE 50 MG: 50 TABLET ORAL at 08:11

## 2018-11-06 RX ADMIN — MAGNESIUM OXIDE TAB 400 MG (241.3 MG ELEMENTAL MG) 800 MG: 400 (241.3 MG) TAB at 11:11

## 2018-11-06 RX ADMIN — SENNOSIDES AND DOCUSATE SODIUM 1 TABLET: 8.6; 5 TABLET ORAL at 08:11

## 2018-11-06 RX ADMIN — LEVOTHYROXINE SODIUM 25 MCG: 25 TABLET ORAL at 06:11

## 2018-11-06 RX ADMIN — VALSARTAN 20 MG: 40 TABLET, FILM COATED ORAL at 08:11

## 2018-11-06 RX ADMIN — MAGNESIUM OXIDE TAB 400 MG (241.3 MG ELEMENTAL MG) 800 MG: 400 (241.3 MG) TAB at 08:11

## 2018-11-06 RX ADMIN — VITAMIN D, TAB 1000IU (100/BT) 2000 UNITS: 25 TAB at 08:11

## 2018-11-06 RX ADMIN — APIXABAN 2.5 MG: 2.5 TABLET, FILM COATED ORAL at 08:11

## 2018-11-06 RX ADMIN — FUROSEMIDE 60 MG: 10 INJECTION, SOLUTION INTRAMUSCULAR; INTRAVENOUS at 12:11

## 2018-11-06 RX ADMIN — MIRTAZAPINE 7.5 MG: 7.5 TABLET ORAL at 08:11

## 2018-11-06 RX ADMIN — FUROSEMIDE 60 MG: 10 INJECTION, SOLUTION INTRAMUSCULAR; INTRAVENOUS at 08:11

## 2018-11-06 RX ADMIN — AMIODARONE HYDROCHLORIDE 200 MG: 200 TABLET ORAL at 08:11

## 2018-11-06 RX ADMIN — Medication: at 10:11

## 2018-11-06 RX ADMIN — ALLOPURINOL 150 MG: 300 TABLET ORAL at 08:11

## 2018-11-06 RX ADMIN — FUROSEMIDE 80 MG: 40 TABLET ORAL at 10:11

## 2018-11-06 RX ADMIN — FLUTICASONE PROPIONATE 100 MCG: 50 SPRAY, METERED NASAL at 10:11

## 2018-11-06 RX ADMIN — FUROSEMIDE 60 MG: 10 INJECTION, SOLUTION INTRAMUSCULAR; INTRAVENOUS at 03:11

## 2018-11-06 NOTE — ASSESSMENT & PLAN NOTE
"EF 25-30%, BNP 2600 on admit  Continue medical therapy with beta blocker (Metoprolol), ARB (valsartan), diuresis (lasix)  May have been tachycardia mediated worsening of EF due to AFib, patient now has CRT-P with plans for AVN ablation and PM upgrade; LVEF may improve following these interventions  LVEF is stable compared to earlier this year (6/18), however she prior had reported severe AS, currently with evidence of low flow, DSE may not increase her gradients/flow enough to unmask severe AS if it is truly present, however, this test can be performed outpatient please order the DSE with an indication or comment stating "for the evaluation of aortic stenosis" so that it can be specifically evaluated  Please refer the patient for cardiology follow-up on hospital discharge    "

## 2018-11-06 NOTE — SUBJECTIVE & OBJECTIVE
Interval History: No events over night. Has no new complaints today. She has walked with family without problems here.    Review of Systems   Constitution: Negative for chills and decreased appetite.   HENT: Negative for congestion, ear discharge and ear pain.    Eyes: Negative for blurred vision and discharge.   Cardiovascular: Negative for chest pain, claudication, cyanosis and dyspnea on exertion.   Respiratory: Negative for cough and hemoptysis.    Endocrine: Negative for cold intolerance and heat intolerance.   Skin: Negative for color change and nail changes.   Musculoskeletal: Negative for arthritis and back pain.   Gastrointestinal: Negative for bloating and abdominal pain.   Genitourinary: Negative for bladder incontinence and decreased libido.   Neurological: Negative for aphonia and brief paralysis.     Objective:     Vital Signs (Most Recent):  Temp: 96 °F (35.6 °C) (11/06/18 0826)  Pulse: 95 (11/06/18 0826)  Resp: 18 (11/06/18 0826)  BP: 135/75 (11/06/18 0826)  SpO2: 95 % (11/06/18 0325) Vital Signs (24h Range):  Temp:  [96 °F (35.6 °C)-98.5 °F (36.9 °C)] 96 °F (35.6 °C)  Pulse:  [] 95  Resp:  [15-18] 18  SpO2:  [95 %-97 %] 95 %  BP: (112-135)/(69-85) 135/75     Weight: 71.3 kg (157 lb 3 oz)  Body mass index is 29.7 kg/m².     SpO2: 95 %  O2 Device (Oxygen Therapy): room air    Physical Exam   Constitutional: She is oriented to person, place, and time. She appears well-developed and well-nourished.   HENT:   Head: Normocephalic and atraumatic.   Nose: Nose normal.   Mouth/Throat: No oropharyngeal exudate.   Eyes: Right eye exhibits no discharge. Left eye exhibits no discharge. No scleral icterus.   Neck: Normal range of motion. Neck supple. No JVD present.   Cardiovascular: Normal rate, regular rhythm, S1 normal and S2 normal. Exam reveals no gallop, no S3, no S4, no distant heart sounds, no friction rub, no midsystolic click and no opening snap.   Murmur heard.  Pulses:       Radial pulses are 2+  on the right side, and 2+ on the left side.        Femoral pulses are 2+ on the right side, and 2+ on the left side.  Aortic ejection murmur   Pulmonary/Chest: Effort normal and breath sounds normal. No respiratory distress. She has no wheezes. She has no rales. She exhibits no tenderness.   Abdominal: Soft. Bowel sounds are normal. She exhibits no distension. There is no tenderness. There is no rebound.   Musculoskeletal: Normal range of motion. She exhibits no edema, tenderness or deformity.   Lymphadenopathy:     She has no cervical adenopathy.   Neurological: She is alert and oriented to person, place, and time. No cranial nerve deficit.   Skin: Skin is warm and dry.   Psychiatric: She has a normal mood and affect. Her behavior is normal.       Significant Labs:   BMP:   Recent Labs   Lab 11/04/18 2004 11/05/18 0344 11/05/18 1931 11/06/18  0605   * 77  --  92   * 135*  --  133*   K 4.6 3.3* 4.2 4.6    104  --  103   CO2 15* 19*  --  15*   BUN 29* 29*  --  29*   CREATININE 1.6* 1.5*  --  1.6*   CALCIUM 8.7 8.4*  --  8.8   MG  --  2.0  --  1.9   , CMP:   Recent Labs   Lab 11/04/18 2004 11/05/18 0344 11/05/18 1931 11/06/18  0605   * 135*  --  133*   K 4.6 3.3* 4.2 4.6    104  --  103   CO2 15* 19*  --  15*   * 77  --  92   BUN 29* 29*  --  29*   CREATININE 1.6* 1.5*  --  1.6*   CALCIUM 8.7 8.4*  --  8.8   PROT  --  6.7  --  7.4   ALBUMIN  --  3.0*  --  3.3*   BILITOT  --  1.6*  --  2.4*   ALKPHOS  --  70  --  73   AST  --  40  --  72*   ALT  --  21  --  38   ANIONGAP 14 12  --  15   ESTGFRAFRICA 34.3* 37.1*  --  34.3*   EGFRNONAA 29.8* 32.2*  --  29.8*    and CBC: No results for input(s): WBC, HGB, HCT, PLT in the last 48 hours.    Significant Imaging: Telemetry showing AFib

## 2018-11-06 NOTE — PLAN OF CARE
Endocrinology will sign off.  Please see last endocrine note for final recommendations from 11/5 10:22 AM.  Obtain repeat labs as previously described.    Thank you for this consult, please re-consult with any new questions.    Missy Duke, PGY-3  Endocrinology

## 2018-11-06 NOTE — ASSESSMENT & PLAN NOTE
CHADS VASc 5  Controlled ventricular response, continue metoprolol, amiodarone  Anticoagulation with apixaban

## 2018-11-06 NOTE — PT/OT/SLP EVAL
Occupational Therapy   Evaluation and Discharge Note    Name: Dacia Martínez  MRN: [id number]  Admitting Diagnosis:  Falls      Recommendations:     Discharge Recommendations: home health OT  Discharge Equipment Recommendations:  walker, rolling, bedside commode  Barriers to discharge:  None    History:     Occupational Profile:  Living Environment: Lives in  home with 9 DIA, HR's, with granddtr and grandson  Previous level of function: assist with LE dress   Roles and Routines: mom, grandmom  Equipment Owned:  none  Assistance upon Discharge: yes    Past Medical History:   Diagnosis Date    A-fib     Arthritis     Asteroid hyalosis of left eye     Bilateral nonexudative age-related macular degeneration 6/3/2014    Breast cancer     Cataract     Chronic GERD 2017    CKD stage 3 due to type 2 diabetes mellitus     Coronary artery disease     Hypertension     Migraine headache     Mild nonproliferative diabetic retinopathy of both eyes 6/3/2014    Pneumonia     Type 2 diabetes mellitus with hyperglycemia     Vertigo        Past Surgical History:   Procedure Laterality Date    BREAST SURGERY      left lumpectomy    CARDIOVERSION N/A 2017    [facility] CATH LAB    CARPAL TUNNEL RELEASE      left    CATARACT EXTRACTION      vance     SECTION      COLONOSCOPY N/A 3/5/2015    Performed by Andrés Hobson MD at [facility] ENDO (4TH FLR)    ESOPHAGOGASTRODUODENOSCOPY (EGD) N/A 2016    Performed by Rory Ogden MD at [facility] ENDO (2ND FLR)    EYE SURGERY      cataracts bilaterally    HYSTERECTOMY      partial    IMPLANTATION OF BIVENTRICULAR HEART PACEMAKER N/A 10/26/2018    Procedure: INSERTION, CARDIAC PACEMAKER, BIVENTRICULAR;  Surgeon: Jacques Burt MD;  Location: [facility] CATH LAB;  Service: Cardiology;  Laterality: N/A;  AF, CRT-P, SJM, MAC, MB, 3 Prep    INSERTION, CARDIAC PACEMAKER, BIVENTRICULAR N/A 10/26/2018    Performed by Jacques ESTEVEZ  "MD Javed at [facility] CATH LAB    TRANSESOPHAGEAL ECHOCARDIOGRAM (REJI) N/A 2017    Performed by Ruma Long MD at [facility] CATH LAB    TRANSESOPHAGEAL ECHOCARDIOGRAM (REJI) N/A 2017    Performed by Angelique Taylor MD at [facility] CATH LAB       Subjective     Chief Complaint: none stated  Patient/Family stated goals: go home  Communicated with: nsg and PT prior to session.  Pain/Comfort:  · Pain Rating 1: 0/10    Patients cultural, spiritual, Jainism conflicts given the current situation: none    Objective:     Patient found with: telemetry    General Precautions: Standard, fall   Orthopedic Precautions:    Braces:       Occupational Performance:    Bed Mobility:    · Sup to sit with SBA    Functional Mobility/Transfers:  · Sit to stand with SBA without AD  · Functional Mobility: ambulated 20 ft without RW and 60 ft with RW with CGA    Activities of Daily Living:  · UE dress with gown with setup  · Min assist LE dress    Cognitive/Visual Perceptual:  Follows basic commands appropriately    Physical Exam:  wfl LUE, RUE elbow and hand ROM grossly wfl and limited at shoulder due to recent pacemaker    Patient left up in chair with call button in reach and family present    [facility] 6 Click:  [facility] Total Score: 21    Treatment & Education:  -Pt/family edu on OT role/POC  -Importance of OOB activity with staff assistance  -Safety during functional t/f and mobility   - White board updated  - Multiple self care tasks completed-- assistance level noted above  - All questions/concerns answered within OT scope of practice           Education:    Assessment:     Dacia Martínez is a 83 y.o. female with a medical diagnosis of Falls. At this time, patient is functioning at their prior level of function and does not require further acute OT services.     Clinical Decision Makin.  OT Low:  "Pt evaluation falls under low complexity for evaluation coding due to performance deficits noted in 1-3 areas as stated above and " "0 co-morbities affecting current functional status. Data obtained from problem focused assessments. No modifications or assistance was required for completion of evaluation. Only brief occupational profile and history review completed."     Plan:     During this hospitalization, patient does not require further acute OT services.  Please re-consult if situation changes.    · Plan of Care Reviewed with: patient, family    This Plan of care has been discussed with the patient who was involved in its development and understands and is in agreement with the identified goals and treatment plan    GOALS:   Multidisciplinary Problems     Occupational Therapy Goals     Not on file          Multidisciplinary Problems (Resolved)        Problem: Occupational Therapy Goal    Goal Priority Disciplines Outcome Interventions   Occupational Therapy Goal   (Resolved)     OT, PT/OT Outcome(s) achieved                    Time Tracking:     OT Date of Treatment: 11/06/18  OT Start Time: 0830  OT Stop Time: 0842  OT Total Time (min): 12 min    Billable Minutes:Evaluation 12 min    Maryana Nava OT  11/6/2018    "

## 2018-11-06 NOTE — ASSESSMENT & PLAN NOTE
It is uncertain if this patient experienced true syncope; the first falling episode may have been vasovagal following straining on the commode, the second seemed to be a mechanical fall following which the patient denied loss of consciousness to me  Orthostatics negative  Will need further evaluation of AS which can be done as outpatient DSE, no evidence of arrhythmias on device interrogation per initial consult note

## 2018-11-06 NOTE — NURSING
Notified Team J that pt's IV was removed accidentally early this am. Pt has been switched to oral lasix for now, pt can remain with no IV currently, will be seen by hospitalist today. Will continue to monitor. Cami Moreno RN 11/6/18

## 2018-11-06 NOTE — PLAN OF CARE
Problem: Occupational Therapy Goal  Goal: Occupational Therapy Goal  Outcome: Outcome(s) achieved Date Met: 11/06/18  sherry/simone Nava, OTR

## 2018-11-06 NOTE — PLAN OF CARE
11/06/18 1001   Discharge Assessment   Assessment Type Discharge Planning Assessment   Confirmed/corrected address and phone number on facesheet? Yes   Assessment information obtained from? Patient;Medical Record;Caregiver   Expected Length of Stay (days) 3   Communicated expected length of stay with patient/caregiver yes   Prior to hospitilization cognitive status: Alert/Oriented   Prior to hospitalization functional status: Independent   Current cognitive status: Alert/Oriented   Current Functional Status: Independent   Lives With grandchild(jaswant)   Able to Return to Prior Arrangements yes   Is patient able to care for self after discharge? Yes   Patient's perception of discharge disposition home health   Readmission Within The Last 30 Days no previous admission in last 30 days   Patient currently being followed by outpatient case management? No   Patient currently receives any other outside agency services? No   Equipment Currently Used at Home none   Do you have any problems affording any of your prescribed medications? No   Is the patient taking medications as prescribed? yes   Does the patient have transportation home? Yes   Transportation Available family or friend will provide   Does the patient receive services at the [facility]madin Clinic? No   Discharge Plan A Home with family;Home Health   Placed in Obs for Syncope, CHF A-Fib and Electrolyte imbalance. Lives with grandchildren and has beed independent in her ADLs. Plan is to DC home. PT recommending HHC, RW and BSC. Pt and family are in agreement.

## 2018-11-06 NOTE — PROGRESS NOTES
[facility]  Cardiac Electrophysiology  Progress Note    Admission Date: 11/4/2018  Code Status: Full Code   Attending Physician: Patricia Najera MD   Expected Discharge Date: 11/7/2018  Principal Problem:Falls    Subjective:     Interval History: No events over night. Has no new complaints today. She has walked with family without problems here.    Review of Systems   Constitution: Negative for chills and decreased appetite.   HENT: Negative for congestion, ear discharge and ear pain.    Eyes: Negative for blurred vision and discharge.   Cardiovascular: Negative for chest pain, claudication, cyanosis and dyspnea on exertion.   Respiratory: Negative for cough and hemoptysis.    Endocrine: Negative for cold intolerance and heat intolerance.   Skin: Negative for color change and nail changes.   Musculoskeletal: Negative for arthritis and back pain.   Gastrointestinal: Negative for bloating and abdominal pain.   Genitourinary: Negative for bladder incontinence and decreased libido.   Neurological: Negative for aphonia and brief paralysis.     Objective:     Vital Signs (Most Recent):  Temp: 96 °F (35.6 °C) (11/06/18 0826)  Pulse: 95 (11/06/18 0826)  Resp: 18 (11/06/18 0826)  BP: 135/75 (11/06/18 0826)  SpO2: 95 % (11/06/18 0325) Vital Signs (24h Range):  Temp:  [96 °F (35.6 °C)-98.5 °F (36.9 °C)] 96 °F (35.6 °C)  Pulse:  [] 95  Resp:  [15-18] 18  SpO2:  [95 %-97 %] 95 %  BP: (112-135)/(69-85) 135/75     Weight: 71.3 kg (157 lb 3 oz)  Body mass index is 29.7 kg/m².     SpO2: 95 %  O2 Device (Oxygen Therapy): room air    Physical Exam   Constitutional: She is oriented to person, place, and time. She appears well-developed and well-nourished.   HENT:   Head: Normocephalic and atraumatic.   Nose: Nose normal.   Mouth/Throat: No oropharyngeal exudate.   Eyes: Right eye exhibits no discharge. Left eye exhibits no discharge. No scleral icterus.   Neck: Normal range of motion. Neck supple. No JVD  present.   Cardiovascular: Normal rate, regular rhythm, S1 normal and S2 normal. Exam reveals no gallop, no S3, no S4, no distant heart sounds, no friction rub, no midsystolic click and no opening snap.   Murmur heard.  Pulses:       Radial pulses are 2+ on the right side, and 2+ on the left side.        Femoral pulses are 2+ on the right side, and 2+ on the left side.  Aortic ejection murmur   Pulmonary/Chest: Effort normal and breath sounds normal. No respiratory distress. She has no wheezes. She has no rales. She exhibits no tenderness.   Abdominal: Soft. Bowel sounds are normal. She exhibits no distension. There is no tenderness. There is no rebound.   Musculoskeletal: Normal range of motion. She exhibits no edema, tenderness or deformity.   Lymphadenopathy:     She has no cervical adenopathy.   Neurological: She is alert and oriented to person, place, and time. No cranial nerve deficit.   Skin: Skin is warm and dry.   Psychiatric: She has a normal mood and affect. Her behavior is normal.       Significant Labs:   BMP:   Recent Labs   Lab 11/04/18 2004 11/05/18 0344 11/05/18  1931 11/06/18  0605   * 77  --  92   * 135*  --  133*   K 4.6 3.3* 4.2 4.6    104  --  103   CO2 15* 19*  --  15*   BUN 29* 29*  --  29*   CREATININE 1.6* 1.5*  --  1.6*   CALCIUM 8.7 8.4*  --  8.8   MG  --  2.0  --  1.9   , CMP:   Recent Labs   Lab 11/04/18 2004 11/05/18 0344 11/05/18  1931 11/06/18  0605   * 135*  --  133*   K 4.6 3.3* 4.2 4.6    104  --  103   CO2 15* 19*  --  15*   * 77  --  92   BUN 29* 29*  --  29*   CREATININE 1.6* 1.5*  --  1.6*   CALCIUM 8.7 8.4*  --  8.8   PROT  --  6.7  --  7.4   ALBUMIN  --  3.0*  --  3.3*   BILITOT  --  1.6*  --  2.4*   ALKPHOS  --  70  --  73   AST  --  40  --  72*   ALT  --  21  --  38   ANIONGAP 14 12  --  15   ESTGFRAFRICA 34.3* 37.1*  --  34.3*   EGFRNONAA 29.8* 32.2*  --  29.8*    and CBC: No results for input(s): WBC, HGB, HCT, PLT in the last 48  hours.    Significant Imaging: Telemetry showing AFib    Assessment and Plan:     Chronic atrial fibrillation    CHADS-VASC 6  S/p failed DCCV (2017)  Pending AVN ablation in December    Recommendations:  -Continue with home regimen: apixaban, metoprolol tartrate 50 Q12h, amiodarone 200 daily  -Stable from our stand point    Case discussed w Dr Burt. We will sign off     Chronic systolic congestive heart failure    Severely depressed LVEF 30-35% w LBBB s/p recent CRT-P  Bedside TTE ruled out effusion  Interrogation of the device showed no event leading to syncope or device malfunction    Recommendations  -CRT-P working well, absence of pericardial effusion, we recommend to work up other potential causes of syncope.             Nilesh Tarango MD  Cardiac Electrophysiology  [facility]

## 2018-11-06 NOTE — PROGRESS NOTES
[facility]  Cardiology  Progress Note    Patient Name: Dacia Martínez  MRN: [id number]  Admission Date: 11/4/2018  Hospital Length of Stay: 0 days  Code Status: Full Code   Attending Physician: Patricia Najera MD   Primary Care Physician: Jasmeet Corral MD  Expected Discharge Date: 11/7/2018  Principal Problem:Falls    Subjective:     Interval History: Patient reports feeling well, she denies any dizziness, chest pain, dyspnea, palpitations, light headedness or syncope. She feels she is at her baseline an does not report any symptoms or concerns at this time.    Review of Systems   Constitution: Negative for chills, diaphoresis, fever and night sweats.   HENT: Negative.    Eyes: Negative for blurred vision and photophobia.   Cardiovascular: Negative for chest pain, claudication, cyanosis, dyspnea on exertion, irregular heartbeat, leg swelling, near-syncope, orthopnea, palpitations, paroxysmal nocturnal dyspnea and syncope.   Respiratory: Negative for cough, hemoptysis, shortness of breath and wheezing.    Skin: Negative for color change and rash.   Musculoskeletal: Negative for back pain and falls.   Gastrointestinal: Negative for abdominal pain, constipation, diarrhea, nausea and vomiting.   Genitourinary: Negative for dysuria and hematuria.   Neurological: Negative for focal weakness, numbness and seizures.   Psychiatric/Behavioral: Negative for altered mental status. The patient is not nervous/anxious.      Objective:     Vital Signs (Most Recent):  Temp: 96.8 °F (36 °C) (11/06/18 1608)  Pulse: 86 (11/06/18 1608)  Resp: 18 (11/06/18 1608)  BP: 112/78 (11/06/18 1608)  SpO2: 98 % (11/06/18 1608) Vital Signs (24h Range):  Temp:  [96 °F (35.6 °C)-98.5 °F (36.9 °C)] 96.8 °F (36 °C)  Pulse:  [] 86  Resp:  [15-18] 18  SpO2:  [95 %-98 %] 98 %  BP: (112-135)/(69-78) 112/78     Weight: 71.3 kg (157 lb 3 oz)  Body mass index is 29.7 kg/m².     SpO2: 98 %  O2 Device (Oxygen Therapy): room  air      Intake/Output Summary (Last 24 hours) at 11/6/2018 1658  Last data filed at 11/6/2018 1500  Gross per 24 hour   Intake --   Output 2100 ml   Net -2100 ml       Lines/Drains/Airways          None          Physical Exam   Constitutional: She is oriented to person, place, and time. She appears well-developed and well-nourished. No distress.   HENT:   Head: Normocephalic and atraumatic.   Eyes: EOM are normal. Pupils are equal, round, and reactive to light.   Neck: Normal range of motion. No JVD present.   Cardiovascular: Normal rate and intact distal pulses. Exam reveals no gallop and no friction rub.   No murmur heard.  Irregularly irregular rhythm. Distant heart sounds   Pulmonary/Chest: Effort normal and breath sounds normal. No respiratory distress. She has no wheezes. She has no rales.   Abdominal: Soft. Bowel sounds are normal. She exhibits no distension. There is no tenderness.   Musculoskeletal: Normal range of motion. She exhibits no edema.   Neurological: She is alert and oriented to person, place, and time.   Skin: Skin is warm. No rash noted. She is not diaphoretic.   Psychiatric: She has a normal mood and affect. Her behavior is normal.       Significant Labs:     Recent Results (from the past 24 hour(s))   Potassium    Collection Time: 11/05/18  7:31 PM   Result Value Ref Range    Potassium 4.2 3.5 - 5.1 mmol/L   Comprehensive Metabolic Panel (CMP)    Collection Time: 11/06/18  6:05 AM   Result Value Ref Range    Sodium 133 (L) 136 - 145 mmol/L    Potassium 4.6 3.5 - 5.1 mmol/L    Chloride 103 95 - 110 mmol/L    CO2 15 (L) 23 - 29 mmol/L    Glucose 92 70 - 110 mg/dL    BUN, Bld 29 (H) 8 - 23 mg/dL    Creatinine 1.6 (H) 0.5 - 1.4 mg/dL    Calcium 8.8 8.7 - 10.5 mg/dL    Total Protein 7.4 6.0 - 8.4 g/dL    Albumin 3.3 (L) 3.5 - 5.2 g/dL    Total Bilirubin 2.4 (H) 0.1 - 1.0 mg/dL    Alkaline Phosphatase 73 55 - 135 U/L    AST 72 (H) 10 - 40 U/L    ALT 38 10 - 44 U/L    Anion Gap 15 8 - 16 mmol/L     eGFR if  34.3 (A) >60 mL/min/1.73 m^2    eGFR if non  29.8 (A) >60 mL/min/1.73 m^2   Magnesium    Collection Time: 11/06/18  6:05 AM   Result Value Ref Range    Magnesium 1.9 1.6 - 2.6 mg/dL   POCT glucose    Collection Time: 11/06/18  7:51 AM   Result Value Ref Range    POCT Glucose 99 70 - 110 mg/dL   POCT glucose    Collection Time: 11/06/18 11:56 AM   Result Value Ref Range    POCT Glucose 112 (H) 70 - 110 mg/dL   POCT glucose    Collection Time: 11/06/18  4:25 PM   Result Value Ref Range    POCT Glucose 111 (H) 70 - 110 mg/dL         Significant Imaging:     Imaging Results          X-Ray Chest PA And Lateral (Final result)  Result time 11/04/18 08:49:10    Final result by Atul Gregory III, MD (11/04/18 08:49:10)                 Impression:      See above    Pulmonary edema versus pneumonia.      Electronically signed by: Atul Gregory MD  Date:    11/04/2018  Time:    08:49             Narrative:    EXAMINATION:  XR CHEST PA AND LATERAL    CLINICAL HISTORY:  Chest Pain;    FINDINGS:  There is a pacer, cardiomegaly, aortic plaque, mild edema and small pleural effusions.                                  Assessment and Plan:       * Falls    It is uncertain if this patient experienced true syncope; the first falling episode may have been vasovagal following straining on the commode, the second seemed to be a mechanical fall following which the patient denied loss of consciousness to me  Orthostatics negative  Will need further evaluation of AS which can be done as outpatient DSE, no evidence of arrhythmias on device interrogation per initial consult note     Chronic atrial fibrillation    CHADS VASc 5  Controlled ventricular response, continue metoprolol, amiodarone  Anticoagulation with apixaban     Acute on chronic combined systolic and diastolic congestive heart failure    EF 25-30%, BNP 2600 on admit  Continue medical therapy with beta blocker (Metoprolol), ARB  "(valsartan), diuresis (lasix)  May have been tachycardia mediated worsening of EF due to AFib, patient now has CRT-P with plans for AVN ablation and PM upgrade; LVEF may improve following these interventions  LVEF is stable compared to earlier this year (6/18), however she prior had reported severe AS, currently with evidence of low flow, DSE may not increase her gradients/flow enough to unmask severe AS if it is truly present, however, this test can be performed outpatient please order the DSE with an indication or comment stating "for the evaluation of aortic stenosis" so that it can be specifically evaluated  Please refer the patient for cardiology follow-up on hospital discharge           VTE Risk Mitigation (From admission, onward)        Ordered     apixaban tablet 2.5 mg  2 times daily      11/04/18 1749          Davy Hernandez MD  Cardiology  [facility]  "

## 2018-11-06 NOTE — ASSESSMENT & PLAN NOTE
CHADS-VASC 6  S/p failed DCCV (2017)  Pending AVN ablation in December    Recommendations:  -Continue with home regimen: apixaban, metoprolol tartrate 50 Q12h, amiodarone 200 daily  -Stable from our stand point    Case discussed w Dr Burt. We will sign off

## 2018-11-06 NOTE — PLAN OF CARE
Problem: Physical Therapy Goal  Goal: Physical Therapy Goal  Outcome: Outcome(s) achieved Date Met: 11/06/18  Pt discharged from physical therapy due to independence with functional mobility at this time. Home assist from family.  Celestino Garcia, SPT  11/6/18  I certify that I was present in the room directing the student in service delivery and guiding them using my skilled judgment. As the co-signing therapist I have reviewed the students documentation and am responsible for the treatment, assessment, and plan.     Sneha Glynn PT 11/6/18

## 2018-11-06 NOTE — SUBJECTIVE & OBJECTIVE
Interval History: Patient reports feeling well today. She has not had any recurrent falls and has not experienced light headedness or syncope. She denies chest pain or dyspnea and reports no new symptoms or concerns.    Review of Systems   Constitution: Negative for chills, diaphoresis, fever and night sweats.   HENT: Negative.    Eyes: Negative for blurred vision and photophobia.   Cardiovascular: Negative for chest pain, claudication, cyanosis, dyspnea on exertion, irregular heartbeat, leg swelling, near-syncope, orthopnea, palpitations, paroxysmal nocturnal dyspnea and syncope.   Respiratory: Negative for cough, hemoptysis, shortness of breath and wheezing.    Skin: Negative for color change and rash.   Musculoskeletal: Negative for back pain and falls.   Gastrointestinal: Negative for abdominal pain, constipation, diarrhea, nausea and vomiting.   Genitourinary: Negative for dysuria and hematuria.   Neurological: Negative for focal weakness, numbness and seizures.   Psychiatric/Behavioral: Negative for altered mental status. The patient is not nervous/anxious.      Objective:     Vital Signs (Most Recent):  Temp: 98.5 °F (36.9 °C) (11/05/18 1500)  Pulse: 90 (11/05/18 1700)  Resp: 17 (11/05/18 1500)  BP: 132/78 (11/05/18 1500)  SpO2: 96 % (11/05/18 1500) Vital Signs (24h Range):  Temp:  [96.1 °F (35.6 °C)-98.5 °F (36.9 °C)] 98.5 °F (36.9 °C)  Pulse:  [] 90  Resp:  [17-18] 17  SpO2:  [94 %-99 %] 96 %  BP: (105-132)/(57-85) 132/78     Weight: 71.3 kg (157 lb 3 oz)  Body mass index is 29.7 kg/m².     SpO2: 96 %  O2 Device (Oxygen Therapy): room air      Intake/Output Summary (Last 24 hours) at 11/5/2018 1853  Last data filed at 11/5/2018 1700  Gross per 24 hour   Intake 450 ml   Output 2200 ml   Net -1750 ml       Lines/Drains/Airways     Peripheral Intravenous Line                 Peripheral IV - Single Lumen 11/04/18 1555 Anterior;Right Forearm 1 day                Physical Exam   Constitutional: She is  oriented to person, place, and time. She appears well-developed and well-nourished. No distress.   HENT:   Head: Normocephalic and atraumatic.   Eyes: EOM are normal. Pupils are equal, round, and reactive to light.   Neck: Normal range of motion. No JVD present.   Cardiovascular: Normal rate, regular rhythm and intact distal pulses. Exam reveals no gallop and no friction rub.   Murmur (Grade II/VI systolic murmur at right upper sternal border) heard.  Pulmonary/Chest: Effort normal and breath sounds normal. No respiratory distress. She has no wheezes. She has no rales.   Abdominal: Soft. Bowel sounds are normal. She exhibits no distension. There is no tenderness.   Musculoskeletal: Normal range of motion. She exhibits no edema.   Neurological: She is alert and oriented to person, place, and time.   Skin: Skin is warm. No rash noted. She is not diaphoretic.   Psychiatric: She has a normal mood and affect. Her behavior is normal.       Significant Labs:     Recent Results (from the past 24 hour(s))   Basic metabolic panel    Collection Time: 11/04/18  8:04 PM   Result Value Ref Range    Sodium 132 (L) 136 - 145 mmol/L    Potassium 4.6 3.5 - 5.1 mmol/L    Chloride 103 95 - 110 mmol/L    CO2 15 (L) 23 - 29 mmol/L    Glucose 129 (H) 70 - 110 mg/dL    BUN, Bld 29 (H) 8 - 23 mg/dL    Creatinine 1.6 (H) 0.5 - 1.4 mg/dL    Calcium 8.7 8.7 - 10.5 mg/dL    Anion Gap 14 8 - 16 mmol/L    eGFR if African American 34.3 (A) >60 mL/min/1.73 m^2    eGFR if non  29.8 (A) >60 mL/min/1.73 m^2   POCT glucose    Collection Time: 11/04/18 11:26 PM   Result Value Ref Range    POCT Glucose 124 (H) 70 - 110 mg/dL   TSH    Collection Time: 11/05/18  3:44 AM   Result Value Ref Range    TSH 12.478 (H) 0.400 - 4.000 uIU/mL   Comprehensive Metabolic Panel (CMP)    Collection Time: 11/05/18  3:44 AM   Result Value Ref Range    Sodium 135 (L) 136 - 145 mmol/L    Potassium 3.3 (L) 3.5 - 5.1 mmol/L    Chloride 104 95 - 110 mmol/L     CO2 19 (L) 23 - 29 mmol/L    Glucose 77 70 - 110 mg/dL    BUN, Bld 29 (H) 8 - 23 mg/dL    Creatinine 1.5 (H) 0.5 - 1.4 mg/dL    Calcium 8.4 (L) 8.7 - 10.5 mg/dL    Total Protein 6.7 6.0 - 8.4 g/dL    Albumin 3.0 (L) 3.5 - 5.2 g/dL    Total Bilirubin 1.6 (H) 0.1 - 1.0 mg/dL    Alkaline Phosphatase 70 55 - 135 U/L    AST 40 10 - 40 U/L    ALT 21 10 - 44 U/L    Anion Gap 12 8 - 16 mmol/L    eGFR if African American 37.1 (A) >60 mL/min/1.73 m^2    eGFR if non  32.2 (A) >60 mL/min/1.73 m^2   Magnesium    Collection Time: 11/05/18  3:44 AM   Result Value Ref Range    Magnesium 2.0 1.6 - 2.6 mg/dL   Phosphorus    Collection Time: 11/05/18  3:44 AM   Result Value Ref Range    Phosphorus 3.4 2.7 - 4.5 mg/dL   T4, free    Collection Time: 11/05/18  3:44 AM   Result Value Ref Range    Free T4 0.93 0.71 - 1.51 ng/dL   Lipid panel    Collection Time: 11/05/18  3:44 AM   Result Value Ref Range    Cholesterol 149 120 - 199 mg/dL    Triglycerides 74 30 - 150 mg/dL    HDL 26 (L) 40 - 75 mg/dL    LDL Cholesterol 108.2 63.0 - 159.0 mg/dL    HDL/Chol Ratio 17.4 (L) 20.0 - 50.0 %    Total Cholesterol/HDL Ratio 5.7 (H) 2.0 - 5.0    Non-HDL Cholesterol 123 mg/dL   Thyroid peroxidase antibody    Collection Time: 11/05/18  4:00 AM   Result Value Ref Range    Thyroperoxidase Antibodies <6.0 <6.0 IU/mL   POCT glucose    Collection Time: 11/05/18  7:33 AM   Result Value Ref Range    POCT Glucose 75 70 - 110 mg/dL   POCT glucose    Collection Time: 11/05/18 11:33 AM   Result Value Ref Range    POCT Glucose 122 (H) 70 - 110 mg/dL   POCT glucose    Collection Time: 11/05/18  4:57 PM   Result Value Ref Range    POCT Glucose 123 (H) 70 - 110 mg/dL         Significant Imaging:     Imaging Results          X-Ray Chest PA And Lateral (Final result)  Result time 11/04/18 08:49:10    Final result by Atul Gregory III, MD (11/04/18 08:49:10)                 Impression:      See above    Pulmonary edema versus  pneumonia.      Electronically signed by: Atul Gregory MD  Date:    11/04/2018  Time:    08:49             Narrative:    EXAMINATION:  XR CHEST PA AND LATERAL    CLINICAL HISTORY:  Chest Pain;    FINDINGS:  There is a pacer, cardiomegaly, aortic plaque, mild edema and small pleural effusions.

## 2018-11-06 NOTE — ASSESSMENT & PLAN NOTE
EF 25-30%, BNP 2600  Continue medical therapy with beta blocker (Metoprolol), ARB (valsartan), diuresis (lasix)  May have been tachycardia mediated worsening of EF due to AFib, patient now has CRT-P with plans for AVN ablation and PM upgrade; LVEF may improve following these interventions  LVEF is stable compared to earlier this year (6/18), however she prior had reported severe AS, currently with evidence of low flow, DSE will likely not increase her gradients/flow enough to unmask severe AS if it is truly present  Patient symptomatically improved, continue current therapy to restore euvolemic status  strict I/O, daily weights

## 2018-11-06 NOTE — PROGRESS NOTES
[facility]  Cardiology  Progress Note    Patient Name: Dacia Martínez  MRN: [id number]  Admission Date: 11/4/2018  Hospital Length of Stay: 0 days  Code Status: Full Code   Attending Physician: Jason Arora MD   Primary Care Physician: Jasmeet Corral MD  Expected Discharge Date: 11/7/2018  Principal Problem:Falls    Subjective:     Hospital Course:   No notes on file    Interval History: Patient reports feeling well today. She has not had any recurrent falls and has not experienced light headedness or syncope. She denies chest pain or dyspnea and reports no new symptoms or concerns.    Review of Systems   Constitution: Negative for chills, diaphoresis, fever and night sweats.   HENT: Negative.    Eyes: Negative for blurred vision and photophobia.   Cardiovascular: Negative for chest pain, claudication, cyanosis, dyspnea on exertion, irregular heartbeat, leg swelling, near-syncope, orthopnea, palpitations, paroxysmal nocturnal dyspnea and syncope.   Respiratory: Negative for cough, hemoptysis, shortness of breath and wheezing.    Skin: Negative for color change and rash.   Musculoskeletal: Negative for back pain and falls.   Gastrointestinal: Negative for abdominal pain, constipation, diarrhea, nausea and vomiting.   Genitourinary: Negative for dysuria and hematuria.   Neurological: Negative for focal weakness, numbness and seizures.   Psychiatric/Behavioral: Negative for altered mental status. The patient is not nervous/anxious.      Objective:     Vital Signs (Most Recent):  Temp: 98.5 °F (36.9 °C) (11/05/18 1500)  Pulse: 90 (11/05/18 1700)  Resp: 17 (11/05/18 1500)  BP: 132/78 (11/05/18 1500)  SpO2: 96 % (11/05/18 1500) Vital Signs (24h Range):  Temp:  [96.1 °F (35.6 °C)-98.5 °F (36.9 °C)] 98.5 °F (36.9 °C)  Pulse:  [] 90  Resp:  [17-18] 17  SpO2:  [94 %-99 %] 96 %  BP: (105-132)/(57-85) 132/78     Weight: 71.3 kg (157 lb 3 oz)  Body mass index is 29.7 kg/m².     SpO2: 96 %  O2 Device  (Oxygen Therapy): room air      Intake/Output Summary (Last 24 hours) at 11/5/2018 1853  Last data filed at 11/5/2018 1700  Gross per 24 hour   Intake 450 ml   Output 2200 ml   Net -1750 ml       Lines/Drains/Airways     Peripheral Intravenous Line                 Peripheral IV - Single Lumen 11/04/18 1555 Anterior;Right Forearm 1 day                Physical Exam   Constitutional: She is oriented to person, place, and time. She appears well-developed and well-nourished. No distress.   HENT:   Head: Normocephalic and atraumatic.   Eyes: EOM are normal. Pupils are equal, round, and reactive to light.   Neck: Normal range of motion. No JVD present.   Cardiovascular: Normal rate, regular rhythm and intact distal pulses. Exam reveals no gallop and no friction rub.   Murmur (Grade II/VI systolic murmur at right upper sternal border) heard.  Pulmonary/Chest: Effort normal and breath sounds normal. No respiratory distress. She has no wheezes. She has no rales.   Abdominal: Soft. Bowel sounds are normal. She exhibits no distension. There is no tenderness.   Musculoskeletal: Normal range of motion. She exhibits no edema.   Neurological: She is alert and oriented to person, place, and time.   Skin: Skin is warm. No rash noted. She is not diaphoretic.   Psychiatric: She has a normal mood and affect. Her behavior is normal.       Significant Labs:     Recent Results (from the past 24 hour(s))   Basic metabolic panel    Collection Time: 11/04/18  8:04 PM   Result Value Ref Range    Sodium 132 (L) 136 - 145 mmol/L    Potassium 4.6 3.5 - 5.1 mmol/L    Chloride 103 95 - 110 mmol/L    CO2 15 (L) 23 - 29 mmol/L    Glucose 129 (H) 70 - 110 mg/dL    BUN, Bld 29 (H) 8 - 23 mg/dL    Creatinine 1.6 (H) 0.5 - 1.4 mg/dL    Calcium 8.7 8.7 - 10.5 mg/dL    Anion Gap 14 8 - 16 mmol/L    eGFR if African American 34.3 (A) >60 mL/min/1.73 m^2    eGFR if non  29.8 (A) >60 mL/min/1.73 m^2   POCT glucose    Collection Time: 11/04/18  11:26 PM   Result Value Ref Range    POCT Glucose 124 (H) 70 - 110 mg/dL   TSH    Collection Time: 11/05/18  3:44 AM   Result Value Ref Range    TSH 12.478 (H) 0.400 - 4.000 uIU/mL   Comprehensive Metabolic Panel (CMP)    Collection Time: 11/05/18  3:44 AM   Result Value Ref Range    Sodium 135 (L) 136 - 145 mmol/L    Potassium 3.3 (L) 3.5 - 5.1 mmol/L    Chloride 104 95 - 110 mmol/L    CO2 19 (L) 23 - 29 mmol/L    Glucose 77 70 - 110 mg/dL    BUN, Bld 29 (H) 8 - 23 mg/dL    Creatinine 1.5 (H) 0.5 - 1.4 mg/dL    Calcium 8.4 (L) 8.7 - 10.5 mg/dL    Total Protein 6.7 6.0 - 8.4 g/dL    Albumin 3.0 (L) 3.5 - 5.2 g/dL    Total Bilirubin 1.6 (H) 0.1 - 1.0 mg/dL    Alkaline Phosphatase 70 55 - 135 U/L    AST 40 10 - 40 U/L    ALT 21 10 - 44 U/L    Anion Gap 12 8 - 16 mmol/L    eGFR if African American 37.1 (A) >60 mL/min/1.73 m^2    eGFR if non  32.2 (A) >60 mL/min/1.73 m^2   Magnesium    Collection Time: 11/05/18  3:44 AM   Result Value Ref Range    Magnesium 2.0 1.6 - 2.6 mg/dL   Phosphorus    Collection Time: 11/05/18  3:44 AM   Result Value Ref Range    Phosphorus 3.4 2.7 - 4.5 mg/dL   T4, free    Collection Time: 11/05/18  3:44 AM   Result Value Ref Range    Free T4 0.93 0.71 - 1.51 ng/dL   Lipid panel    Collection Time: 11/05/18  3:44 AM   Result Value Ref Range    Cholesterol 149 120 - 199 mg/dL    Triglycerides 74 30 - 150 mg/dL    HDL 26 (L) 40 - 75 mg/dL    LDL Cholesterol 108.2 63.0 - 159.0 mg/dL    HDL/Chol Ratio 17.4 (L) 20.0 - 50.0 %    Total Cholesterol/HDL Ratio 5.7 (H) 2.0 - 5.0    Non-HDL Cholesterol 123 mg/dL   Thyroid peroxidase antibody    Collection Time: 11/05/18  4:00 AM   Result Value Ref Range    Thyroperoxidase Antibodies <6.0 <6.0 IU/mL   POCT glucose    Collection Time: 11/05/18  7:33 AM   Result Value Ref Range    POCT Glucose 75 70 - 110 mg/dL   POCT glucose    Collection Time: 11/05/18 11:33 AM   Result Value Ref Range    POCT Glucose 122 (H) 70 - 110 mg/dL   POCT glucose     Collection Time: 11/05/18  4:57 PM   Result Value Ref Range    POCT Glucose 123 (H) 70 - 110 mg/dL         Significant Imaging:     Imaging Results          X-Ray Chest PA And Lateral (Final result)  Result time 11/04/18 08:49:10    Final result by Atul Gregory III, MD (11/04/18 08:49:10)                 Impression:      See above    Pulmonary edema versus pneumonia.      Electronically signed by: Atul Gregory MD  Date:    11/04/2018  Time:    08:49             Narrative:    EXAMINATION:  XR CHEST PA AND LATERAL    CLINICAL HISTORY:  Chest Pain;    FINDINGS:  There is a pacer, cardiomegaly, aortic plaque, mild edema and small pleural effusions.                                  Assessment and Plan:       * Falls    It is uncertain if this patient experienced true syncope; the first falling episode may have been vasovagal following straining on the commode, the second seemed to be a mechanical fall following which the patient denied loss of consciousness to me  Orthostatics negative  Will need further evaluation of AS, no evidence of arrhythmias on device interrogation per initial consult note     Chronic atrial fibrillation    CHADS VASc 5  Controlled ventricular response, continue metoprolol  Anticoagulation with apixaban     Acute on chronic combined systolic and diastolic congestive heart failure    EF 25-30%, BNP 2600  Continue medical therapy with beta blocker (Metoprolol), ARB (valsartan), diuresis (lasix)  May have been tachycardia mediated worsening of EF due to AFib, patient now has CRT-P with plans for AVN ablation and PM upgrade; LVEF may improve following these interventions  LVEF is stable compared to earlier this year (6/18), however she prior had reported severe AS, currently with evidence of low flow, DSE will likely not increase her gradients/flow enough to unmask severe AS if it is truly present  Patient symptomatically improved, continue current therapy to restore euvolemic status  strict  I/O, daily weights         VTE Risk Mitigation (From admission, onward)        Ordered     apixaban tablet 2.5 mg  2 times daily      11/04/18 0039          Davy Hernandez MD  Cardiology  [facility]

## 2018-11-06 NOTE — SUBJECTIVE & OBJECTIVE
Interval History: Patient reports feeling well, she denies any dizziness, chest pain, dyspnea, palpitations, light headedness or syncope. She feels she is at her baseline an does not report any symptoms or concerns at this time.    Review of Systems   Constitution: Negative for chills, diaphoresis, fever and night sweats.   HENT: Negative.    Eyes: Negative for blurred vision and photophobia.   Cardiovascular: Negative for chest pain, claudication, cyanosis, dyspnea on exertion, irregular heartbeat, leg swelling, near-syncope, orthopnea, palpitations, paroxysmal nocturnal dyspnea and syncope.   Respiratory: Negative for cough, hemoptysis, shortness of breath and wheezing.    Skin: Negative for color change and rash.   Musculoskeletal: Negative for back pain and falls.   Gastrointestinal: Negative for abdominal pain, constipation, diarrhea, nausea and vomiting.   Genitourinary: Negative for dysuria and hematuria.   Neurological: Negative for focal weakness, numbness and seizures.   Psychiatric/Behavioral: Negative for altered mental status. The patient is not nervous/anxious.      Objective:     Vital Signs (Most Recent):  Temp: 96.8 °F (36 °C) (11/06/18 1608)  Pulse: 86 (11/06/18 1608)  Resp: 18 (11/06/18 1608)  BP: 112/78 (11/06/18 1608)  SpO2: 98 % (11/06/18 1608) Vital Signs (24h Range):  Temp:  [96 °F (35.6 °C)-98.5 °F (36.9 °C)] 96.8 °F (36 °C)  Pulse:  [] 86  Resp:  [15-18] 18  SpO2:  [95 %-98 %] 98 %  BP: (112-135)/(69-78) 112/78     Weight: 71.3 kg (157 lb 3 oz)  Body mass index is 29.7 kg/m².     SpO2: 98 %  O2 Device (Oxygen Therapy): room air      Intake/Output Summary (Last 24 hours) at 11/6/2018 1658  Last data filed at 11/6/2018 1500  Gross per 24 hour   Intake --   Output 2100 ml   Net -2100 ml       Lines/Drains/Airways          None          Physical Exam   Constitutional: She is oriented to person, place, and time. She appears well-developed and well-nourished. No distress.   HENT:   Head:  Normocephalic and atraumatic.   Eyes: EOM are normal. Pupils are equal, round, and reactive to light.   Neck: Normal range of motion. No JVD present.   Cardiovascular: Normal rate and intact distal pulses. Exam reveals no gallop and no friction rub.   No murmur heard.  Irregularly irregular rhythm. Distant heart sounds   Pulmonary/Chest: Effort normal and breath sounds normal. No respiratory distress. She has no wheezes. She has no rales.   Abdominal: Soft. Bowel sounds are normal. She exhibits no distension. There is no tenderness.   Musculoskeletal: Normal range of motion. She exhibits no edema.   Neurological: She is alert and oriented to person, place, and time.   Skin: Skin is warm. No rash noted. She is not diaphoretic.   Psychiatric: She has a normal mood and affect. Her behavior is normal.       Significant Labs:     Recent Results (from the past 24 hour(s))   Potassium    Collection Time: 11/05/18  7:31 PM   Result Value Ref Range    Potassium 4.2 3.5 - 5.1 mmol/L   Comprehensive Metabolic Panel (CMP)    Collection Time: 11/06/18  6:05 AM   Result Value Ref Range    Sodium 133 (L) 136 - 145 mmol/L    Potassium 4.6 3.5 - 5.1 mmol/L    Chloride 103 95 - 110 mmol/L    CO2 15 (L) 23 - 29 mmol/L    Glucose 92 70 - 110 mg/dL    BUN, Bld 29 (H) 8 - 23 mg/dL    Creatinine 1.6 (H) 0.5 - 1.4 mg/dL    Calcium 8.8 8.7 - 10.5 mg/dL    Total Protein 7.4 6.0 - 8.4 g/dL    Albumin 3.3 (L) 3.5 - 5.2 g/dL    Total Bilirubin 2.4 (H) 0.1 - 1.0 mg/dL    Alkaline Phosphatase 73 55 - 135 U/L    AST 72 (H) 10 - 40 U/L    ALT 38 10 - 44 U/L    Anion Gap 15 8 - 16 mmol/L    eGFR if African American 34.3 (A) >60 mL/min/1.73 m^2    eGFR if non  29.8 (A) >60 mL/min/1.73 m^2   Magnesium    Collection Time: 11/06/18  6:05 AM   Result Value Ref Range    Magnesium 1.9 1.6 - 2.6 mg/dL   POCT glucose    Collection Time: 11/06/18  7:51 AM   Result Value Ref Range    POCT Glucose 99 70 - 110 mg/dL   POCT glucose    Collection  Time: 11/06/18 11:56 AM   Result Value Ref Range    POCT Glucose 112 (H) 70 - 110 mg/dL   POCT glucose    Collection Time: 11/06/18  4:25 PM   Result Value Ref Range    POCT Glucose 111 (H) 70 - 110 mg/dL         Significant Imaging:     Imaging Results          X-Ray Chest PA And Lateral (Final result)  Result time 11/04/18 08:49:10    Final result by Atul Gregory III, MD (11/04/18 08:49:10)                 Impression:      See above    Pulmonary edema versus pneumonia.      Electronically signed by: Atul Gregory MD  Date:    11/04/2018  Time:    08:49             Narrative:    EXAMINATION:  XR CHEST PA AND LATERAL    CLINICAL HISTORY:  Chest Pain;    FINDINGS:  There is a pacer, cardiomegaly, aortic plaque, mild edema and small pleural effusions.

## 2018-11-06 NOTE — PLAN OF CARE
Problem: Patient Care Overview  Goal: Plan of Care Review  Outcome: Ongoing (interventions implemented as appropriate)  Patient alert and oriented; VS stable; no signs of acute distress noted; patient able to walk to bedside commode with two person assist without any dizziness; patient did have mild complaints of dizziness after night time medications; no falls this shift; will continue to monitor the patient; call light in reach; instructed patient to call for assistance if needed

## 2018-11-06 NOTE — PT/OT/SLP EVAL
"Physical Therapy Evaluation / Discharge Summary    Patient Name:  Dacia Martínez   MRN:  [id number]    Recommendations:     Discharge Recommendations:  home health PT   Discharge Equipment Recommendations: walker, rolling, bedside commode  Barriers to discharge: Inaccessible home (9 DIA with handrail)    Assessment:     Dacia Martínez is a 83 y.o. female admitted with a medical diagnosis of Falls.  She presents with the following impairments/functional limitations:  gait instability, impaired balance, impaired endurance, weakness, impaired functional mobilty. Pt ambulated 20' without AD, then 60' with RW with min A to CGA. Pt will have assist from family upon d/c and pt's granddaughter stated they did not require more inpatient PT at this time.     Rehab Prognosis:  good    Recent Surgery: * No surgery found *      Plan:     Pt discharged from physical therapy due to independence with functional mobility at this time. Pt will have 24/7 assist from granddaughter and other family members.     · Plan of Care Expires:  12/06/18   Plan of Care Reviewed with: patient, family    Subjective     Communicated with nurse prior to session.  Patient found supine upon PT entry to room, agreeable to evaluation.    "I've already been up this morning."    Chief Complaint: none stated  Pain/Comfort:  · Pain Rating 1: 0/10    Patients cultural, spiritual, Mandaen conflicts given the current situation: none noted    Living Environment:  Pt lives with granddaughter in [facility] with 9 DIA with handrail. Bathroom has tub/shower combo.   Prior to admission, patient required assist for some ADLs. Pt ambulated household distances independently without AD. Patient has the following equipment: none.  Upon discharge, patient will have assistance from granddaughter, other family members.    Objective:     Patient found with: telemetry     General Precautions: Standard, fall   Orthopedic Precautions:N/A   Braces: N/A     Exams:  · Cognitive Exam:  Patient " is oriented to Person, Place and Situation  · Postural Exam:  Patient presented with the following abnormalities:    · -       Rounded shoulders  · -       Forward head  · RLE ROM: WFL  · RLE Strength: grossly 4-/5 throughout  · LLE ROM: WFL  · LLE Strength: grossly 4-/5 throughout     Functional Mobility:  · Bed Mobility:     · Scooting: contact guard assistance  · Supine to Sit: contact guard assistance  · Transfers:     · Sit to Stand:  stand by assistance with no AD; pt required minimal assist for safety when turning to sit in bedside chair, pt attempted to sit prior to lining up with bedside chair  · Gait:   · Trial 1: 20' with no AD with min A (LOB x 1 to the left but pt was able to recover with min A).   · Trial 2: 60' with RW with CGA, limited by fatigue. Pt ambulated at a slow pace with decreased base of support and instability noted. No overt LOB while using RW. Denied dizziness or light-headedness.    AM-PAC 6 CLICK MOBILITY  Total Score:19     Therapeutic Activities and Exercises:  Discussed DME needs, assist from family upon discharge, importance of OOB activity. Pt and family verbalized their understanding.    Patient left up in chair with family present.    GOALS:   Multidisciplinary Problems     Physical Therapy Goals     Not on file          Multidisciplinary Problems (Resolved)        Problem: Physical Therapy Goal    Goal Priority Disciplines Outcome Goal Variances Interventions   Physical Therapy Goal   (Resolved)     PT, PT/OT Outcome(s) achieved                     History:     Past Medical History:   Diagnosis Date    A-fib     Arthritis     Asteroid hyalosis of left eye     Bilateral nonexudative age-related macular degeneration 6/3/2014    Breast cancer     Cataract     Chronic GERD 9/18/2017    CKD stage 3 due to type 2 diabetes mellitus     Coronary artery disease     Hypertension     Migraine headache     Mild nonproliferative diabetic retinopathy of both eyes 6/3/2014     Pneumonia     Type 2 diabetes mellitus with hyperglycemia     Vertigo        Past Surgical History:   Procedure Laterality Date    BREAST SURGERY      left lumpectomy    CARDIOVERSION N/A 2017    Performed by Jacques Burt [facility] at [facility] CATH LAB    CARPAL TUNNEL RELEASE      left    CATARACT EXTRACTION      vance     SECTION      COLONOSCOPY N/A 3/5/2015    Performed by Andrés Hobson MD at [facility] ENDO (4TH FLR)    ESOPHAGOGASTRODUODENOSCOPY (EGD) N/A 2016    Performed by Rory Ogden MD at [facility] ENDO (2ND FLR)    EYE SURGERY      cataracts bilaterally    HYSTERECTOMY      partial    IMPLANTATION OF BIVENTRICULAR HEART PACEMAKER N/A 10/26/2018    Procedure: INSERTION, CARDIAC PACEMAKER, BIVENTRICULAR;  Surgeon: Jacques Burt MD;  Location: [facility] CATH [facility];  Service: Cardiology;  Laterality: N/A;  AF, CRT-P, SJM, MAC, MB, 3 Prep    INSERTION, CARDIAC PACEMAKER, BIVENTRICULAR N/A 10/26/2018    Performed by Jacques [facility]urt MD at [facility] CATH LAB    TRANSESOPHAGEAL ECHOCARDIOGRAM (REJI) N/A 2017    Performed by Ruma Long MD at [facility] CATH LAB    TRANSESOPHAGEAL ECHOCARDIOGRAM (REJI) N/A 2017    Performed by Angelique Taylor MD at [facility] CATH LAB       Clinical Decision Making:     History  Co-morbidities and personal factors that may impact the plan of care Examination  Body Structures and Functions, activity limitations and participation restrictions that may impact the plan of care Clinical Presentation   Decision Making/ Complexity Score   Co-morbidities:   [] Time since onset of injury / illness / exacerbation  [] Status of current condition  []Patient's cognitive status and safety concerns    [] Multiple Medical Problems (see med hx)  Personal Factors:   [] Patient's age  [] Prior Level of function   [x] Patient's home situation (environment and family support)  [] Patient's level of motivation  [] Expected progression of patient      HISTORY:(criteria)    []  98217 - no personal factors/history    [x] 59348 - has 1-2 personal factor/comorbidity     [] 95536 - has >3 personal factor/comorbidity     Body Regions:  [] Objective examination findings  [] Head     []  Neck  [] Trunk   [] Upper Extremity  [] Lower Extremity    Body Systems:  [x] For communication ability, affect, cognition, language, and learning style: the assessment of the ability to make needs known, consciousness, orientation (person, place, and time), expected emotional /behavioral responses, and learning preferences (eg, learning barriers, education  needs)  [x] For the neuromuscular system: a general assessment of gross coordinated movement (eg, balance, gait, locomotion, transfers, and transitions) and motor function  (motor control and motor learning)  [] For the musculoskeletal system: the assessment of gross symmetry, gross range of motion, gross strength, height, and weight  [] For the integumentary system: the assessment of pliability(texture), presence of scar formation, skin color, and skin integrity  [] For cardiovascular/pulmonary system: the assessment of heart rate, respiratory rate, blood pressure, and edema     Activity limitations:    [x] Patient's cognitive status and saf ety concerns          [] Status of current condition      [] Weight bearing restriction  [] Cardiopulmunary Restriction    Participation Restrictions:   [] Goals and goal agreement with the patient     [] Rehab potential (prognosis) and probable outcome      Examination of Body System: (criteria)    [] 72993 - addressing 1-2 elements    [x] 68210 - addressing a total of 3 or more elements     [] 94670 -  Addressing a total of 4 or more elements         Clinical Presentation: (criteria)  Stable - 29880     On examination of body system using standardized tests and measures patient presents with (CHOOSE ONE) elements from any of the following: body structures and functions, activity limitations, and/or participation  restrictions.  Leading to a clinical presentation that is considered (CHOOSE ONE)                              Clinical Decision Making  (Eval Complexity):  Low- 53023     Time Tracking:     PT Received On: 11/06/18  PT Start Time: 0828     PT Stop Time: 0842  PT Total Time (min): 14 min     Billable Minutes: Evaluation 14    Celestino Garcia, [facility]  11/06/2018   I certify that I was present in the room directing the student in service delivery and guiding them using my skilled judgment. As the co-signing therapist I have reviewed the students documentation and am responsible for the treatment, assessment, and plan.     Sneha Glynn PT 11/6/18

## 2018-11-06 NOTE — ASSESSMENT & PLAN NOTE
It is uncertain if this patient experienced true syncope; the first falling episode may have been vasovagal following straining on the commode, the second seemed to be a mechanical fall following which the patient denied loss of consciousness to me  Orthostatics negative  Will need further evaluation of AS, no evidence of arrhythmias on device interrogation per initial consult note

## 2018-11-07 VITALS
HEIGHT: 61 IN | SYSTOLIC BLOOD PRESSURE: 106 MMHG | BODY MASS INDEX: 28.01 KG/M2 | DIASTOLIC BLOOD PRESSURE: 72 MMHG | RESPIRATION RATE: 18 BRPM | HEART RATE: 109 BPM | TEMPERATURE: 97 F | WEIGHT: 148.38 LBS | OXYGEN SATURATION: 95 %

## 2018-11-07 PROBLEM — E87.6 HYPOKALEMIA DUE TO LOSS OF POTASSIUM: Status: RESOLVED | Noted: 2018-11-04 | Resolved: 2018-11-07

## 2018-11-07 PROBLEM — R55 SYNCOPE AND COLLAPSE: Status: RESOLVED | Noted: 2018-11-06 | Resolved: 2018-11-07

## 2018-11-07 PROBLEM — E83.51 HYPOMAGNESEMIA WITH SECONDARY HYPOCALCEMIA: Status: RESOLVED | Noted: 2018-11-04 | Resolved: 2018-11-07

## 2018-11-07 PROBLEM — E83.42 HYPOMAGNESEMIA WITH SECONDARY HYPOCALCEMIA: Status: RESOLVED | Noted: 2018-11-04 | Resolved: 2018-11-07

## 2018-11-07 LAB
ANION GAP SERPL CALC-SCNC: 15 MMOL/L
BASOPHILS # BLD AUTO: 0.05 K/UL
BASOPHILS NFR BLD: 0.9 %
BUN SERPL-MCNC: 28 MG/DL
CALCIUM SERPL-MCNC: 8.9 MG/DL
CHLORIDE SERPL-SCNC: 104 MMOL/L
CO2 SERPL-SCNC: 19 MMOL/L
CREAT SERPL-MCNC: 1.6 MG/DL
DIFFERENTIAL METHOD: ABNORMAL
EOSINOPHIL # BLD AUTO: 0.2 K/UL
EOSINOPHIL NFR BLD: 3 %
ERYTHROCYTE [DISTWIDTH] IN BLOOD BY AUTOMATED COUNT: 19.9 %
EST. GFR  (AFRICAN AMERICAN): 34.3 ML/MIN/1.73 M^2
EST. GFR  (NON AFRICAN AMERICAN): 29.8 ML/MIN/1.73 M^2
GLUCOSE SERPL-MCNC: 72 MG/DL
HCT VFR BLD AUTO: 38.2 %
HGB BLD-MCNC: 12.1 G/DL
IMM GRANULOCYTES # BLD AUTO: 0.01 K/UL
IMM GRANULOCYTES NFR BLD AUTO: 0.2 %
LYMPHOCYTES # BLD AUTO: 1.4 K/UL
LYMPHOCYTES NFR BLD: 25 %
MAGNESIUM SERPL-MCNC: 1.9 MG/DL
MCH RBC QN AUTO: 27.9 PG
MCHC RBC AUTO-ENTMCNC: 31.7 G/DL
MCV RBC AUTO: 88 FL
MONOCYTES # BLD AUTO: 0.7 K/UL
MONOCYTES NFR BLD: 13.2 %
NEUTROPHILS # BLD AUTO: 3.2 K/UL
NEUTROPHILS NFR BLD: 57.7 %
NRBC BLD-RTO: 1 /100 WBC
PLATELET # BLD AUTO: 232 K/UL
PMV BLD AUTO: 10.3 FL
POCT GLUCOSE: 116 MG/DL (ref 70–110)
POTASSIUM SERPL-SCNC: 4.1 MMOL/L
RBC # BLD AUTO: 4.34 M/UL
SODIUM SERPL-SCNC: 138 MMOL/L
WBC # BLD AUTO: 5.61 K/UL

## 2018-11-07 PROCEDURE — 99217 PR OBSERVATION CARE DISCHARGE: CPT | Mod: ,,, | Performed by: NURSE PRACTITIONER

## 2018-11-07 PROCEDURE — 83735 ASSAY OF MAGNESIUM: CPT

## 2018-11-07 PROCEDURE — 85025 COMPLETE CBC W/AUTO DIFF WBC: CPT

## 2018-11-07 PROCEDURE — 36415 COLL VENOUS BLD VENIPUNCTURE: CPT

## 2018-11-07 PROCEDURE — 25000003 PHARM REV CODE 250: Performed by: NURSE PRACTITIONER

## 2018-11-07 PROCEDURE — 80048 BASIC METABOLIC PNL TOTAL CA: CPT

## 2018-11-07 PROCEDURE — 63600175 PHARM REV CODE 636 W HCPCS: Performed by: NURSE PRACTITIONER

## 2018-11-07 PROCEDURE — G0378 HOSPITAL OBSERVATION PER HR: HCPCS

## 2018-11-07 RX ORDER — TAMSULOSIN HYDROCHLORIDE 0.4 MG/1
0.4 CAPSULE ORAL DAILY
Qty: 30 CAPSULE | Refills: 11 | Status: SHIPPED | OUTPATIENT
Start: 2018-11-08 | End: 2018-11-27 | Stop reason: ALTCHOICE

## 2018-11-07 RX ORDER — LEVOTHYROXINE SODIUM 25 UG/1
25 TABLET ORAL
Qty: 30 TABLET | Refills: 11 | Status: SHIPPED | OUTPATIENT
Start: 2018-11-08 | End: 2019-01-01 | Stop reason: SDUPTHER

## 2018-11-07 RX ORDER — VIT C/E/ZN/COPPR/LUTEIN/ZEAXAN 250MG-90MG
2000 CAPSULE ORAL DAILY
Qty: 180 CAPSULE | Refills: 3 | COMMUNITY
Start: 2018-11-07

## 2018-11-07 RX ORDER — VALSARTAN 40 MG/1
20 TABLET ORAL DAILY
Qty: 45 TABLET | Refills: 3 | Status: SHIPPED | OUTPATIENT
Start: 2018-11-08 | End: 2019-01-01 | Stop reason: ALTCHOICE

## 2018-11-07 RX ORDER — CYCLOBENZAPRINE HCL 5 MG
5 TABLET ORAL ONCE
Status: DISCONTINUED | OUTPATIENT
Start: 2018-11-07 | End: 2018-11-07 | Stop reason: HOSPADM

## 2018-11-07 RX ORDER — FUROSEMIDE 40 MG/1
40 TABLET ORAL 2 TIMES DAILY
Status: DISCONTINUED | OUTPATIENT
Start: 2018-11-07 | End: 2018-11-07 | Stop reason: HOSPADM

## 2018-11-07 RX ORDER — MIRTAZAPINE 7.5 MG/1
7.5 TABLET, FILM COATED ORAL NIGHTLY
Qty: 30 TABLET | Refills: 11 | Status: SHIPPED | OUTPATIENT
Start: 2018-11-07 | End: 2019-01-01 | Stop reason: SDUPTHER

## 2018-11-07 RX ORDER — FUROSEMIDE 20 MG/1
40 TABLET ORAL 2 TIMES DAILY
Qty: 120 TABLET | Refills: 11 | Status: SHIPPED | OUTPATIENT
Start: 2018-11-07 | End: 2019-01-01 | Stop reason: SDUPTHER

## 2018-11-07 RX ORDER — DOXYCYCLINE 100 MG/1
100 CAPSULE ORAL EVERY 12 HOURS
Qty: 14 CAPSULE | Refills: 0 | Status: SHIPPED | OUTPATIENT
Start: 2018-11-07 | End: 2018-11-14

## 2018-11-07 RX ADMIN — VALSARTAN 20 MG: 40 TABLET, FILM COATED ORAL at 09:11

## 2018-11-07 RX ADMIN — FUROSEMIDE 60 MG: 10 INJECTION, SOLUTION INTRAMUSCULAR; INTRAVENOUS at 09:11

## 2018-11-07 RX ADMIN — MAGNESIUM OXIDE TAB 400 MG (241.3 MG ELEMENTAL MG) 800 MG: 400 (241.3 MG) TAB at 09:11

## 2018-11-07 RX ADMIN — AMIODARONE HYDROCHLORIDE 200 MG: 200 TABLET ORAL at 09:11

## 2018-11-07 RX ADMIN — TAMSULOSIN HYDROCHLORIDE 0.4 MG: 0.4 CAPSULE ORAL at 09:11

## 2018-11-07 RX ADMIN — ACETAMINOPHEN 1000 MG: 500 TABLET, FILM COATED ORAL at 10:11

## 2018-11-07 RX ADMIN — LEVOTHYROXINE SODIUM 25 MCG: 25 TABLET ORAL at 06:11

## 2018-11-07 RX ADMIN — APIXABAN 2.5 MG: 2.5 TABLET, FILM COATED ORAL at 09:11

## 2018-11-07 RX ADMIN — VITAMIN D, TAB 1000IU (100/BT) 2000 UNITS: 25 TAB at 09:11

## 2018-11-07 RX ADMIN — METOPROLOL TARTRATE 50 MG: 50 TABLET ORAL at 09:11

## 2018-11-07 RX ADMIN — ALLOPURINOL 150 MG: 300 TABLET ORAL at 09:11

## 2018-11-07 RX ADMIN — SENNOSIDES AND DOCUSATE SODIUM 1 TABLET: 8.6; 5 TABLET ORAL at 09:11

## 2018-11-07 NOTE — PLAN OF CARE
[facility]    HOME HEALTH ORDERS  FACE TO FACE ENCOUNTER    Patient Name: Dacia Martínez  YOB: 1935    PCP: Jasmeet Corral MD   PCP Address: [address] / [place] [address]  PCP Phone Number: [phone]  PCP Fax: [phone]    Encounter Date: 11/07/2018    Admit to Home Health    Diagnoses:  Active Hospital Problems    Diagnosis  POA    Falls [W19.XXXA]  Yes     Priority: 2     Unsteady gait [R26.81]  Yes     Priority: 3     Acute on chronic combined systolic and diastolic congestive heart failure [I50.43]  Yes     Priority: 4     Severe aortic stenosis [I35.0]  Yes     Priority: 5     Essential hypertension [I10]  Yes     Priority: 5     Chronic atrial fibrillation [I48.2]  Yes     Priority: 6     Current use of long term anticoagulation [Z79.01]  Not Applicable     Priority: 7     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, without long-term current use of insulin [E11.3293]  Yes     Priority: 7     CKD (chronic kidney disease) stage 3, GFR 30-59 ml/min [N18.3]  Yes     Priority: 8     Hypothyroidism [E03.9]  Yes     Priority: 10     Hyperparathyroidism [E21.3]  Yes     Priority: 12     Hypovitaminosis D [E55.9]  Yes     Priority: 13     Atherosclerosis of aorta [I70.0]  Yes     Priority: 14      Chronic     Seen on abdominal CT from 09/25/16      Idiopathic chronic gout of multiple sites without tophus [M1A.09X0]  Yes     Priority: 15      Chronic    Edema of upper extremity [R60.0]  Yes     Priority: 16     History of breast cancer [Z85.3]  Not Applicable     Priority: 17     Primary osteoarthritis involving multiple joints [M15.0]  Yes     Priority: 18      Chronic    Vertigo [R42]  Yes     Priority: 19     Constipation due to outlet dysfunction [K59.02]  Yes     Priority: 20     Itching [L29.9]  Yes      Resolved Hospital Problems    Diagnosis Date Resolved POA     *Syncope and collapse [R55] 11/07/2018 Unknown     Priority: 1 - High    Hypokalemia due to loss of potassium [E87.6] 11/07/2018 Yes     Priority: 9     Hypomagnesemia with secondary hypocalcemia [E83.42, E83.51] 11/07/2018 Yes     Priority: 11     Foul smelling vaginal discharge [N89.8] 11/05/2018 Yes    Incomplete bladder emptying [R33.9] 11/05/2018 Yes       Future Appointments   Date Time Provider Department Center   11/16/2018  9:00 AM Jasmeet Corral MD [facility] IM [facility] PCW   12/3/2018 10:00 AM LAB, [place] [facility] [place]   12/7/2018  6:00 AM 3PREP, FLORINDA BERNAL [facility] SSCU [facility]   1/29/2019 10:30 AM EKG, APPT [facility] EKG [facility]   1/29/2019 11:40 AM PACEMAKER, ICD [facility] ARRHPRO [facility]   1/29/2019  2:30 PM Sneha Kent NP [facility]     I have seen and examined this patient face to face today. My clinical findings that support the need for the home health skilled services and home bound status are the following:  Weakness/numbness causing balance and gait disturbance due to Heart Failure and Weakness/Debility making it taxing to leave home.  Patient with medication mismanagement issues requiring home bound status as evidenced by  Unstable vital signs (blood pressure, heart rate), Poor understanding of medication regimen/dosage and Poor adherence to medication regimen/dosage.    Allergies:  Review of patient's allergies indicates:   Allergen Reactions    Tramadol Hallucinations       Diet: cardiac diet, fluid restriction: 1800 mL/day and 2 gram sodium diet    Activities: activity as tolerated    Nursing:   SN to complete comprehensive assessment including routine vital signs. Instruct on disease process and s/s of complications to report to MD. Review/verify medication list sent home with the patient at time of discharge  and instruct patient/caregiver as needed. Frequency may be adjusted depending on start of care date.    Notify PCP if SBP > 160 or < 90; DBP > 90 or < 50; HR >  120 or < 50; Temp > 101.    CONSULTS:    Physical Therapy to evaluate and treat. Evaluate for home safety and equipment needs; Establish/upgrade home exercise program. Perform / instruct on therapeutic exercises, gait training, transfer training, and Range of Motion.  Occupational Therapy to evaluate and treat. Evaluate home environment for safety and equipment needs. Perform/Instruct on transfers, ADL training, ROM, and therapeutic exercises.    MISCELLANEOUS CARE:  Heart Failure:      SN to instruct on the following:    Instruct on the definition of CHF.   Instruct on the signs/sympoms of CHF to be reported.   Instruct on and monitor daily weights.   Instruct on factors that cause exacerbation.   Instruct on action, dose, schedule, and side effects of medications.   Instruct on diet as prescribed.   Instruct on activity allowed.   Instruct on life-style modifications for life long management of CHF   SN to assess compliance with daily weights, diet, medications, fluid retention,    safety precautions, activities permitted and life-style modifications.   Additional 1-2 SN visits per week as needed for signs and symptoms     of CHF exacerbation.      For Weight Gain > 2-3 lbs in 1 day or 4-6 lbs over 1 week notify PCP:  Increase furosemide (oral diuretic) dose to 40 mg TID for 3 days temporarily  Obtain BMP lab test in 3 days  If weight does not decrease by 3-5 lbs after 5 days of increased diuretic usage: Notify PCP.    WOUND CARE ORDERS  n/a    Medications: Review discharge medications with patient and family and provide education.      Current Discharge Medication List      START taking these medications    Details   levothyroxine (SYNTHROID) 25 MCG tablet Take 1 tablet (25 mcg total) by mouth before breakfast.  Qty: 30 tablet, Refills: 11      mirtazapine (REMERON) 7.5 MG Tab Take 1 tablet (7.5 mg total) by mouth every evening.  Qty: 30 tablet, Refills: 11      tamsulosin (FLOMAX) 0.4 mg Cap Take 1 capsule (0.4  mg total) by mouth once daily.  Qty: 30 capsule, Refills: 11      valsartan (DIOVAN) 40 MG tablet Take 0.5 tablets (20 mg total) by mouth once daily.  Qty: 45 tablet, Refills: 3         CONTINUE these medications which have CHANGED    Details   cholecalciferol, vitamin D3, (VITAMIN D3) 1,000 unit capsule Take 2 capsules (2,000 Units total) by mouth once daily.  Qty: 180 capsule, Refills: 3      furosemide (LASIX) 20 MG tablet Take 2 tablets (40 mg total) by mouth 2 (two) times daily.  Qty: 120 tablet, Refills: 11      SITagliptin (JANUVIA) 25 MG Tab Take 1 tablet (25 mg total) by mouth once daily.  Qty: 30 tablet, Refills: 11    Associated Diagnoses: Type 2 diabetes mellitus with stage 3 chronic kidney disease, without long-term current use of insulin         CONTINUE these medications which have NOT CHANGED    Details   allopurinol (ZYLOPRIM) 100 MG tablet Take 1 and 1/2 tablets (150 mg total) by mouth once daily.  Qty: 60 tablet, Refills: 2    Associated Diagnoses: Idiopathic chronic gout of multiple sites without tophus      amiodarone (PACERONE) 200 MG Tab Take 1 tablet (200 mg total) by mouth once daily.  Qty: 30 tablet, Refills: 2    Associated Diagnoses: Atrial fibrillation, unspecified type      diphenhydrAMINE-zinc acetate 1-0.1% (BENADRYL) cream Apply topically 2 (two) times daily as needed for Itching.  Refills: 0    Associated Diagnoses: Itching      HYDROcodone-acetaminophen (NORCO) 5-325 mg per tablet Take 1 tablet by mouth every 8 (eight) hours as needed for Pain.  Qty: 90 tablet, Refills: 0    Associated Diagnoses: Chronic pain syndrome      meclizine (ANTIVERT) 25 mg tablet Take 1 tablet (25 mg total) by mouth every 6 (six) hours.  Qty: 120 tablet, Refills: 2      metoprolol tartrate (LOPRESSOR) 50 MG tablet Take 1 tablet (50 mg total) by mouth 2 (two) times daily.  Qty: 60 tablet, Refills: 11      triamcinolone acetonide 0.1% (KENALOG) 0.1 % cream APPLY TOPICALLY TWO TIMES A DAY  Qty: 80 g,  Refills: 0    Associated Diagnoses: Pruritus      ACCU-CHEK FASTCLIX Misc 1 lancet by Misc.(Non-Drug; Combo Route) route 3 (three) times daily. Pt to test blood glucose up to 3 times daily.  Qty: 100 each, Refills: 11    Comments: OK to change to lancets + fingerstick device that is covered by patient's insurance if Accuchek device is not covered  Associated Diagnoses: Type 2 diabetes mellitus with stage 3 chronic kidney disease, without long-term current use of insulin      apixaban (ELIQUIS) 2.5 mg Tab Take 1 tablet (2.5 mg total) by mouth 2 (two) times daily. Resume on 11/1  Qty: 60 tablet, Refills: 11      benzonatate (TESSALON) 100 MG capsule TAKE ONE CAPSULE BY MOUTH THREE TIMES A DAY AS NEEDED FOR COUGH  Qty: 90 capsule, Refills: 11      fluticasone (FLONASE) 50 mcg/actuation nasal spray SPRAY TWICE IN EACH NOSTRIL DAILY  Qty: 16 g, Refills: 5      predniSONE (DELTASONE) 5 MG tablet Take 1-2 tablets by mouth as needed for gout attack  Qty: 30 tablet, Refills: 0         STOP taking these medications       doxepin (SINEQUAN) 10 MG capsule Comments:   Reason for Stopping:         amoxicillin (AMOXIL) 500 MG capsule Comments:   Reason for Stopping:               I certify that this patient is confined to her home and needs intermittent skilled nursing care, physical therapy and occupational therapy.      Nivia Carey, DNP, AG-ACNP, BC  Department of Hospital Medicine  [facility]

## 2018-11-07 NOTE — NURSING
Performed ambulatory oxygenation study. Before walk, HR = 100 & O2 sats = 100% on RA. During ambulation HR = 115, O2 sats = 94%. Pt denies SOB or fatigue.    Called results to Louann on Team J @ [id number]    Estrella East RN

## 2018-11-07 NOTE — ASSESSMENT & PLAN NOTE
-, vitamin D level 18   -continue vitamin D supplementation  -repeat labs in ~6 months, PCP to follow

## 2018-11-07 NOTE — ASSESSMENT & PLAN NOTE
-at time of admission she was also noted to be volume overloaded, diuresis initiated with IVP lasix >> transition to PO when appropriate    -currently net negative 1.8 liters and weight down 1 lb   -she estimates dry weight is ~150 lbs however unsure of accuracy, currently 157 lbs   -recorded I&Os and weights appear inaccurate  -of note she had an normal EF ~ a year ago and likely due to progressive Afib she had noted decline in EF to 30-35% with development of LBBB   -at time of decline in EF she was initiated on ACEi which was later transitioned to bidil; in August 2018 bidil was discontinued due to hypotension and vertigo   -GDMT had not been resumed in outpt setting nor an ischemic evaluation completed at this time  -lopressor and valsartan initiated, would likely transition to entresto if EF dose not improve with appropriate GDMT  -EP was consulted and plan for AVN ablation in December and will place RA lead; will repeat echo in outpt setting and if EF remains depressed will pursue ischemic evaluation and/or consideration of device upgrade to biV-ICD  -[facility] Cardiology consulted, agree with plan of care and recommend close outpt follow up  -continue tele monitoring  -cardiac diet with fluid restriction  -strict I&Os and daily weights  -outpt follow up with Cardiology at [place]

## 2018-11-07 NOTE — ASSESSMENT & PLAN NOTE
-per patient trulicity caused rapid weight loss and loss of appetite  -hold home januvia  -HgA1C 6  -low dose SSI in house  -monitor accuchecks AC/HS and PRN hypoglycemic protocol

## 2018-11-07 NOTE — SUBJECTIVE & OBJECTIVE
"Interval History: Resting in bed, family at bedside. She reports "feeling fine". Denies needs or complaints. Patient and family updated on plan of care, need for IV diuresis, and close follow up in outpt setting after discharge. She denies chest pain, palpitations, or shortness of breath. Denies any acute events or distress overnight.     Review of Systems   Constitutional: Positive for activity change and unexpected weight change. Negative for appetite change, chills, fatigue and fever.   HENT: Negative for congestion, rhinorrhea (chronic intermittant), sinus pressure, sinus pain, sneezing, sore throat and trouble swallowing.    Respiratory: Negative for cough, chest tightness, shortness of breath and wheezing.    Cardiovascular: Positive for leg swelling (at baseline). Negative for chest pain and palpitations.   Gastrointestinal: Negative for abdominal distention, abdominal pain, constipation, diarrhea, nausea and vomiting.   Endocrine: Positive for cold intolerance.   Genitourinary: Positive for frequency (d/t lasix). Negative for difficulty urinating, dysuria, hematuria, urgency and vaginal discharge (resolved).   Musculoskeletal: Positive for arthralgias and gait problem. Negative for back pain, myalgias and neck pain.   Skin: Positive for rash (perineal area) and wound (surgical wound r chest). Negative for color change and pallor.   Neurological: Positive for weakness and numbness (R carpal tunnel). Negative for dizziness (chronic vertigo), syncope, light-headedness and headaches.   Hematological: Bruises/bleeds easily.   Psychiatric/Behavioral: Negative for confusion and sleep disturbance. The patient is not nervous/anxious.      Objective:     Vital Signs (Most Recent):  Temp: 98.6 °F (37 °C) (11/06/18 1947)  Pulse: 88 (11/06/18 1947)  Resp: (!) 24 (11/06/18 1947)  BP: 107/73 (11/06/18 1947)  SpO2: (!) 94 % (11/06/18 1947) Vital Signs (24h Range):  Temp:  [96 °F (35.6 °C)-98.6 °F (37 °C)] 98.6 °F (37 " °C)  Pulse:  [86-98] 88  Resp:  [16-24] 24  SpO2:  [94 %-98 %] 94 %  BP: (107-135)/(69-78) 107/73     Weight: 71.3 kg (157 lb 3 oz)  Body mass index is 29.7 kg/m².    Intake/Output Summary (Last 24 hours) at 11/6/2018 2129  Last data filed at 11/6/2018 1500  Gross per 24 hour   Intake --   Output 1600 ml   Net -1600 ml      Physical Exam   Constitutional: She is oriented to person, place, and time. She appears well-developed and well-nourished. No distress.   HENT:   Head: Normocephalic and atraumatic.   Eyes: Conjunctivae are normal. Pupils are equal, round, and reactive to light. No scleral icterus.   Neck: Normal range of motion. Neck supple. JVD (below jaw line) present. No hepatojugular reflux present.   Cardiovascular: Normal rate, regular rhythm and intact distal pulses. Exam reveals no gallop and no friction rub.   Murmur (aortic soft blowing murmer) heard.  Pulmonary/Chest: Effort normal and breath sounds normal. No respiratory distress. She has no wheezes. She has no rales.   Abdominal: Soft. Bowel sounds are normal. She exhibits no distension and no mass. There is no tenderness. There is no guarding.   Genitourinary: No vaginal discharge found.   Musculoskeletal: Normal range of motion. She exhibits edema (pretibial 1+ pitting BLE). She exhibits no tenderness.   Neurological: She is alert and oriented to person, place, and time. No cranial nerve deficit or sensory deficit. She exhibits normal muscle tone. Coordination (needs assistance) abnormal.   Skin: Skin is warm and dry. Capillary refill takes 2 to 3 seconds. No rash noted. No erythema.   Psychiatric: She has a normal mood and affect. Judgment and thought content normal.   Nursing note and vitals reviewed.    Significant Labs:   A1C:   Recent Labs   Lab 08/01/18  0525 11/04/18  1542   HGBA1C 6.2* 6.0*     CMP:   Recent Labs   Lab 11/05/18  0344 11/05/18  1931 11/06/18  0605   *  --  133*   K 3.3* 4.2 4.6     --  103   CO2 19*  --  15*    GLU 77  --  92   BUN 29*  --  29*   CREATININE 1.5*  --  1.6*   CALCIUM 8.4*  --  8.8   PROT 6.7  --  7.4   ALBUMIN 3.0*  --  3.3*   BILITOT 1.6*  --  2.4*   ALKPHOS 70  --  73   AST 40  --  72*   ALT 21  --  38   ANIONGAP 12  --  15   EGFRNONAA 32.2*  --  29.8*     Lipid Panel:   Recent Labs   Lab 11/05/18  0344   CHOL 149   HDL 26*   LDLCALC 108.2   TRIG 74   CHOLHDL 17.4*     Magnesium:   Recent Labs   Lab 11/05/18  0344 11/06/18  0605   MG 2.0 1.9     All pertinent labs within the past 24 hours have been reviewed.    Significant Imaging: I have reviewed all pertinent imaging results/findings within the past 24 hours.

## 2018-11-07 NOTE — DISCHARGE INSTRUCTIONS
Weigh yourself daily. If you have more than a 2 lb. weight gain in one day OR 5 lb. Weight gain in one week, call your doctor.     Limit yourself to 1500 ml of fluids per day. That equals about 1 8 oz. cup with meals & in between meals.

## 2018-11-07 NOTE — ASSESSMENT & PLAN NOTE
-rate controlled  -continue home amiodarone and initiated lopressor  -continue OAC with apixaban, had been held prior to procedure  -[id number] score 4/ 8.5%  -continue tele monitoring

## 2018-11-07 NOTE — ASSESSMENT & PLAN NOTE
-s/p radiation and chemo with lymph node removal only  -no acute issues  -LUE edema at baseline >> US negative for DVT

## 2018-11-07 NOTE — PLAN OF CARE
11/07/18 1323   Final Note   Assessment Type Final Discharge Note   Anticipated Discharge Disposition Home-Health   Hospital Follow Up  Appt(s) scheduled? Yes

## 2018-11-07 NOTE — ASSESSMENT & PLAN NOTE
-no acute issues   -no home statin  -lipid panel with cholesterol 149,  triglycerides 74, HDL 26,   -cardiac diet encouraged

## 2018-11-07 NOTE — ASSESSMENT & PLAN NOTE
-admitted 11/4 due to syncopal collapse, acute on chronic systolic CHF exacerbation, and electrolyte abnormalities  -admission CBC unremarkable, BNP elevated ~2600, troponin positive 0.139; chemistry remarkable for hypomagnesemia at 1.4, hypokalemia at 2.7, and known CKD.  EKG with underlying AFib and demand v paced rhythm, HgA1C 6.0/125, VitD 18, and . UA negative. CXR with mild edema and small pleural effusions. Head CT negative.   -syncopal workup unremarkable   -head CT negative, unable to obtain MRI due to recent device placement   -device interrogation without evidence of arrhythmia   -orthostatics negative  -syncope most likely related to vasovagal response during voiding with progressive muscle weakness and fatigue  -admission THS noted to be ~12, TPO abnormal, free T4 normal, vit D level 18, and borderline hypocalcemia  -Endocrine consulted, recommended initiation of levothyroxine at 25 mcg and encouraged compliance with daily vitamin D supplement   -will need outpt follow up and repeat labs in ~6 months  -fall precautions  -continue tele monitoring  -continue home meclizine for vertigo

## 2018-11-07 NOTE — PLAN OF CARE
Problem: Pressure Ulcer Risk (Edward Scale) (Adult,Obstetrics,Pediatric)  Goal: Skin Integrity  Patient will demonstrate the desired outcomes by discharge/transition of care.  Outcome: Ongoing (interventions implemented as appropriate)  Pt has maintained skin integrity at this time.  Pt is ambulating frequently to the BSCC with the help of family at bedside.

## 2018-11-07 NOTE — ASSESSMENT & PLAN NOTE
-admission THS noted to be ~12, TPO abnormal, free T4 normal, vit D level 18, and borderline hypocalcemia  -Endocrine consulted, recommended initiation of levothyroxine at 25 mcg and encouraged compliance with daily vitamin D supplement  -will need outpt follow up and repeat labs in ~6 months

## 2018-11-07 NOTE — PROGRESS NOTES
"[facility] Hospital Medicine  Progress Note    Patient Name: Dacia Martínez  MRN: [id number]  Patient Class: OP- Observation   Admission Date: 11/4/2018  Length of Stay: 0 days  Attending Physician: Patricia Najera MD  Primary Care Provider: Jasmeet Corral MD    [facility] Medicine Team: [facility]OSP MED J Nivia Carey NP    Subjective:     Principal Problem:Syncope and collapse    HPI:  Ms. Martínez is an 83 year old female with past medical history of left sided breast CA (s/p lymph node removal), vertigo on meclizine, OA/osteopenia, HTN, HLD, permanent AFib (pending ablation with RA lead placement in ~4-6 weeks), systolic CHF (EF 33%) s/p recent biV in October 2018, LBBB, pulmHTN, severe AS (low flow, low gradient), anemia, chronic pain, chronic sinusitis, CKD III, NIDDM, gout, constipation, and colon polyps. She presented to the ED due to two episodes of syncopal collapse. First episode occurred after straining to urinate, upon leaving the restroom ~11:30pm she collapsed into grandsons arms and was assisted to bed. Once in bed she reported everything was "pitch black" for ~2 minutes and that she could hear but not see. Second episode occurred just PTA at [facility], she was being assisted down the steps of her home (9 steps total), at the last step she collapsed as if her "body just gave way". She was again assisted by grandson, she did not fall. During episode she felt dizzy, however denies "blacking out" or syncope. She denied orthopnea, PND,  palpitations, chest pain, or shortness of breath at rest. She endorses chronic BLE edema and chronic LUE edema (related to lymph node removal, no compression sleeve per family), and perineal rash and odor. She lives with grand daughter and grandson, she is ambulatory within the home however minimally active outside the home. She is compliant with medications, however not with a low sodium diet. She reports a dry weight of ~150 lbs. She denies ETOH, tobacco, or " illicit drug use.     Of note she recently (10/26/18) underwent biV pacemaker insertion, she completed antibiotic course, however has not been compliant with sling use. Insertion site approximated and healing, no evidence of infection.     At admission, CBC unremarkable, BNP elevated ~2600, troponin positive 0.139; chemistry remarkable for hypomagnesemia at 1.4, hypokalemia at 2.7, and known CKD.  EKG with underlying AFib and demand v paced rhythm, HgA1C 6.0/125, VitD 18, and . UA negative. CXR with mild edema and small pleural effusions. Head CT negative. The patient was admitted to the Hospital Medicine Service for further evaluation and management.     Hospital Course:  Ms. Martínez was admitted 11/4 due to syncopal collapse, acute on chronic systolic CHF exacerbation, and electrolyte abnormalities. Syncopal workup unremarkable, head CT negative, unable to obtain MRI due to recent device placement. Device interrogation without evidence of arrhythmia.  Orthostatics negative. Syncope most likely related to vasovagal response during voiding with progressive muscle weakness and fatigue. On admission THS noted to be ~12, TPO abnormal, free T4 normal, vit D level 18, and borderline hypocalcemia. Endocrine consulted, recommended initiation of levothyroxine at 25 mcg and encouraged compliance with daily vitamin D supplement. Will need outpt follow up and repeat labs in ~6 months.     At time of admission she was also noted to be volume overloaded, diuresis initiated with IVP lasix, currently net negative 1.8 liters and weight down 1 lb. She estimates dry weight is ~150 lbs however unsure of accuracy, currently 157 lbs. Recorded I&Os and weights appear inaccurate. Of note she had an normal EF ~ a year ago and likely due to progressive Afib she had noted decline in EF to 30-35% with development of LBBB. At time of decline in EF she was initiated on ACEi which was later transitioned to bidil; in August 2018 bidil was  "discontinued due to hypotension and vertigo. GDMT had not been resumed in outpt setting nor an ischemic evaluation completed at this time. Lopressor and valsartan initiated, would likely transition to entresto if EF dose not improve with appropriate GDMT. EP was consulted and plan for AVN ablation in December and will place RA lead; will repeat echo in outpt setting and if EF remains depressed will pursue ischemic evaluation and/or consideration of device upgrade to biV-ICD. Co-Managed Cardiology consulted, agree with plan of care and recommend close outpt follow up.     Disposition plans: home with family when medically ready, will arrange HH at [place]. Will need outpt follow up with Cardiology, EP, and PCP at [place]. Repeat TSH in December (PCP to follow) and repeat PTH and vitamin D level in ~ 6 months (PCP to follow).     Interval History: Resting in bed, family at bedside. She reports "feeling fine". Denies needs or complaints. Patient and family updated on plan of care, need for IV diuresis, and close follow up in outpt setting after discharge. She denies chest pain, palpitations, or shortness of breath. Denies any acute events or distress overnight.     Review of Systems   Constitutional: Positive for activity change and unexpected weight change. Negative for appetite change, chills, fatigue and fever.   HENT: Negative for congestion, rhinorrhea (chronic intermittant), sinus pressure, sinus pain, sneezing, sore throat and trouble swallowing.    Respiratory: Negative for cough, chest tightness, shortness of breath and wheezing.    Cardiovascular: Positive for leg swelling (at baseline). Negative for chest pain and palpitations.   Gastrointestinal: Negative for abdominal distention, abdominal pain, constipation, diarrhea, nausea and vomiting.   Endocrine: Positive for cold intolerance.   Genitourinary: Positive for frequency (d/t lasix). Negative for difficulty urinating, dysuria, hematuria, urgency and vaginal " discharge (resolved).   Musculoskeletal: Positive for arthralgias and gait problem. Negative for back pain, myalgias and neck pain.   Skin: Positive for rash (perineal area) and wound (surgical wound r chest). Negative for color change and pallor.   Neurological: Positive for weakness and numbness (R carpal tunnel). Negative for dizziness (chronic vertigo), syncope, light-headedness and headaches.   Hematological: Bruises/bleeds easily.   Psychiatric/Behavioral: Negative for confusion and sleep disturbance. The patient is not nervous/anxious.      Objective:     Vital Signs (Most Recent):  Temp: 98.6 °F (37 °C) (11/06/18 1947)  Pulse: 88 (11/06/18 1947)  Resp: (!) 24 (11/06/18 1947)  BP: 107/73 (11/06/18 1947)  SpO2: (!) 94 % (11/06/18 1947) Vital Signs (24h Range):  Temp:  [96 °F (35.6 °C)-98.6 °F (37 °C)] 98.6 °F (37 °C)  Pulse:  [86-98] 88  Resp:  [16-24] 24  SpO2:  [94 %-98 %] 94 %  BP: (107-135)/(69-78) 107/73     Weight: 71.3 kg (157 lb 3 oz)  Body mass index is 29.7 kg/m².    Intake/Output Summary (Last 24 hours) at 11/6/2018 2129  Last data filed at 11/6/2018 1500  Gross per 24 hour   Intake --   Output 1600 ml   Net -1600 ml      Physical Exam   Constitutional: She is oriented to person, place, and time. She appears well-developed and well-nourished. No distress.   HENT:   Head: Normocephalic and atraumatic.   Eyes: Conjunctivae are normal. Pupils are equal, round, and reactive to light. No scleral icterus.   Neck: Normal range of motion. Neck supple. JVD (below jaw line) present. No hepatojugular reflux present.   Cardiovascular: Normal rate, regular rhythm and intact distal pulses. Exam reveals no gallop and no friction rub.   Murmur (aortic soft blowing murmer) heard.  Pulmonary/Chest: Effort normal and breath sounds normal. No respiratory distress. She has no wheezes. She has no rales.   Abdominal: Soft. Bowel sounds are normal. She exhibits no distension and no mass. There is no tenderness. There is no  guarding.   Genitourinary: No vaginal discharge found.   Musculoskeletal: Normal range of motion. She exhibits edema (pretibial 1+ pitting BLE). She exhibits no tenderness.   Neurological: She is alert and oriented to person, place, and time. No cranial nerve deficit or sensory deficit. She exhibits normal muscle tone. Coordination (needs assistance) abnormal.   Skin: Skin is warm and dry. Capillary refill takes 2 to 3 seconds. No rash noted. No erythema.   Psychiatric: She has a normal mood and affect. Judgment and thought content normal.   Nursing note and vitals reviewed.    Significant Labs:   A1C:   Recent Labs   Lab 08/01/18  0525 11/04/18  1542   HGBA1C 6.2* 6.0*     CMP:   Recent Labs   Lab 11/05/18  0344 11/05/18  1931 11/06/18  0605   *  --  133*   K 3.3* 4.2 4.6     --  103   CO2 19*  --  15*   GLU 77  --  92   BUN 29*  --  29*   CREATININE 1.5*  --  1.6*   CALCIUM 8.4*  --  8.8   PROT 6.7  --  7.4   ALBUMIN 3.0*  --  3.3*   BILITOT 1.6*  --  2.4*   ALKPHOS 70  --  73   AST 40  --  72*   ALT 21  --  38   ANIONGAP 12  --  15   EGFRNONAA 32.2*  --  29.8*     Lipid Panel:   Recent Labs   Lab 11/05/18  0344   CHOL 149   HDL 26*   LDLCALC 108.2   TRIG 74   CHOLHDL 17.4*     Magnesium:   Recent Labs   Lab 11/05/18  0344 11/06/18  0605   MG 2.0 1.9     All pertinent labs within the past 24 hours have been reviewed.    Significant Imaging: I have reviewed all pertinent imaging results/findings within the past 24 hours.    Assessment/Plan:      * Syncope and collapse    -admitted 11/4 due to syncopal collapse, acute on chronic systolic CHF exacerbation, and electrolyte abnormalities  -admission CBC unremarkable, BNP elevated ~2600, troponin positive 0.139; chemistry remarkable for hypomagnesemia at 1.4, hypokalemia at 2.7, and known CKD.  EKG with underlying AFib and demand v paced rhythm, HgA1C 6.0/125, VitD 18, and . UA negative. CXR with mild edema and small pleural effusions. Head CT  negative.   -syncopal workup unremarkable   -head CT negative, unable to obtain MRI due to recent device placement   -device interrogation without evidence of arrhythmia   -orthostatics negative  -syncope most likely related to vasovagal response during voiding with progressive muscle weakness and fatigue  -admission THS noted to be ~12, TPO abnormal, free T4 normal, vit D level 18, and borderline hypocalcemia  -Endocrine consulted, recommended initiation of levothyroxine at 25 mcg and encouraged compliance with daily vitamin D supplement   -will need outpt follow up and repeat labs in ~6 months  -fall precautions  -continue tele monitoring  -continue home meclizine for vertigo     Falls    -see above  -fall precautions  -continue vitamin D supplementation  -PT/OT following       Unsteady gait    -see above      Acute on chronic combined systolic and diastolic congestive heart failure    -at time of admission she was also noted to be volume overloaded, diuresis initiated with IVP lasix >> transition to PO when appropriate    -currently net negative 1.8 liters and weight down 1 lb   -she estimates dry weight is ~150 lbs however unsure of accuracy, currently 157 lbs   -recorded I&Os and weights appear inaccurate  -of note she had an normal EF ~ a year ago and likely due to progressive Afib she had noted decline in EF to 30-35% with development of LBBB   -at time of decline in EF she was initiated on ACEi which was later transitioned to bidil; in August 2018 bidil was discontinued due to hypotension and vertigo   -GDMT had not been resumed in outpt setting nor an ischemic evaluation completed at this time  -lopressor and valsartan initiated, would likely transition to entresto if EF dose not improve with appropriate GDMT  -EP was consulted and plan for AVN ablation in December and will place RA lead; will repeat echo in outpt setting and if EF remains depressed will pursue ischemic evaluation and/or consideration of  device upgrade to biV-ICD  -Co-Managed Cardiology consulted, agree with plan of care and recommend close outpt follow up  -continue tele monitoring  -cardiac diet with fluid restriction  -strict I&Os and daily weights  -outpt follow up with Cardiology at [place]      Severe aortic stenosis    -known low flow, low gradient per past echos  -see above for diuresis plans     Essential hypertension    -BP controlled  -continue lopressor and valsartan  -monitor      Chronic atrial fibrillation    -rate controlled  -continue home amiodarone and initiated lopressor  -continue OAC with apixaban, had been held prior to procedure  -[id number] score 4/ 8.5%  -continue tele monitoring      Current use of long term anticoagulation    -see above      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, without long-term current use of insulin    -per patient trulicity caused rapid weight loss and loss of appetite  -hold home januvia  -HgA1C 6  -low dose SSI in house  -monitor accuchecks AC/HS and PRN hypoglycemic protocol      CKD (chronic kidney disease) stage 3, GFR 30-59 ml/min    -Scr stable at baseline (1.5-1.8)  -monitor with ARB and diuresis   -avoid hypotension     Hypokalemia due to loss of potassium    -repleted and now WNL  -monitor      Hypothyroidism    -admission THS noted to be ~12, TPO abnormal, free T4 normal, vit D level 18, and borderline hypocalcemia  -Endocrine consulted, recommended initiation of levothyroxine at 25 mcg and encouraged compliance with daily vitamin D supplement  -will need outpt follow up and repeat labs in ~6 months     Hypomagnesemia with secondary hypocalcemia    -repleted and now WNL  -continue supplementation as needed  -continue vit D supplement       Hyperparathyroidism    -, vitamin D level 18   -continue vitamin D supplementation  -repeat labs in ~6 months, PCP to follow      Hypovitaminosis D    -see above       Atherosclerosis of aorta    -no acute issues   -no home statin  -lipid panel with cholesterol 149,  triglycerides 74, HDL 26,   -cardiac diet encouraged      Idiopathic chronic gout of multiple sites without tophus    -no acute flares  -continue home allopurinol  -uses prednisone PRN for flares     Edema of upper extremity    -chronic LUE edema, h/o radiation / chemo and lymphnodectomy s/p breast CA  -US negative for DVT     History of breast cancer    -s/p radiation and chemo with lymph node removal only  -no acute issues  -LUE edema at baseline >> US negative for DVT     Primary osteoarthritis involving multiple joints    -no acute issues  -PT/OT following  -fall precautions      Vertigo    -chronic  -continue home meclizine PRN      Constipation due to outlet dysfunction    -no acute issues  -bowel regimen in place      Itching    -improved  -notes pruritus in upper back  -continue topical remedies       VTE Risk Mitigation (From admission, onward)        Ordered     apixaban tablet 2.5 mg  2 times daily      11/04/18 1325          Nivia Carey, DNP, AG-ACNP, BC  Department of Hospital Medicine  [facility]  Pager [phone]  [facility] [id number]

## 2018-11-08 DIAGNOSIS — N25.81 SECONDARY HYPERPARATHYROIDISM: Primary | ICD-10-CM

## 2018-11-08 DIAGNOSIS — E03.9 HYPOTHYROIDISM, UNSPECIFIED TYPE: ICD-10-CM

## 2018-11-09 NOTE — ASSESSMENT & PLAN NOTE
-admitted 11/4 due to syncopal collapse, acute on chronic systolic CHF exacerbation, and electrolyte abnormalities  -admission CBC unremarkable, BNP elevated ~2600, troponin positive 0.139; chemistry remarkable for hypomagnesemia at 1.4, hypokalemia at 2.7, and known CKD.  EKG with underlying AFib and demand v paced rhythm, HgA1C 6.0/125, VitD 18, and . UA negative. CXR with mild edema and small pleural effusions. Head CT negative.   -syncopal workup unremarkable   -head CT negative, unable to obtain MRI due to recent device placement   -device interrogation without evidence of arrhythmia   -orthostatics negative  -syncope most likely related to vasovagal response during voiding with progressive muscle weakness and fatigue  -admission THS noted to be ~12, TPO abnormal, free T4 normal, vit D level 18, and borderline hypocalcemia  -Endocrine consulted, recommended initiation of levothyroxine at 25 mcg and encouraged compliance with daily vitamin D supplement   -will need outpt follow up and repeat labs in ~6 months, ordered (PCP to follow)

## 2018-11-09 NOTE — ASSESSMENT & PLAN NOTE
-chronic LUE edema, h/o radiation / chemo and lymphnodectomy s/p breast CA  -US negative for DVT  -will follow up with Heme/Onc to further assess edema

## 2018-11-09 NOTE — ASSESSMENT & PLAN NOTE
-at time of admission she was also noted to be volume overloaded, diuresis initiated with IVP lasix >> transition to PO when appropriate    -at discharge net negative 6.6 liters and weight down 9 lbs; weight at  lbs    -she estimates dry weight is ~150 lbs however unsure of accuracy  -of note she had an normal EF ~ a year ago and likely due to progressive Afib she had noted decline in EF to 30-35% with development of LBBB   -at time of decline in EF she was initiated on ACEi which was later transitioned to bidil; in August 2018 bidil was discontinued due to hypotension and vertigo   -GDMT had not been resumed in outpt setting nor an ischemic evaluation completed at this time  -lopressor and valsartan initiated, would likely transition to entresto if EF dose not improve with appropriate GDMT  -EP was consulted and plan for AVN ablation in December and will place RA lead; will repeat echo in outpt setting and if EF remains depressed will pursue ischemic evaluation and/or consideration of device upgrade to biV-ICD  -Co-Managed Cardiology consulted, agree with plan of care and recommend close outpt follow up  -cardiac diet with fluid restriction encouraged  -strict I&Os and daily weights encouraged   -outpt follow up with Cardiology at [place] scheduled

## 2018-11-09 NOTE — ASSESSMENT & PLAN NOTE
-per patient trulicity caused rapid weight loss and loss of appetite  -resume home januvia  -HgA1C 6

## 2018-11-09 NOTE — DISCHARGE SUMMARY
"[facility] Hospital Medicine  Discharge Summary      Patient Name: Dacia Martínez  MRN: [id number]  Admission Date: 11/4/2018  Hospital Length of Stay: 0 days  Discharge Date and Time: 11/7/2018  1:16 PM  Attending Physician: No att. providers found   Discharging Provider: Nivia Carey NP  Primary Care Provider: Jasmeet Corral MD  [facility] Medicine Team: [facility] HOSP MED J Nivia Carey NP    HPI:   Ms. Martínez is an 83 year old female with past medical history of left sided breast CA (s/p lymph node removal), vertigo on meclizine, OA/osteopenia, HTN, HLD, permanent AFib (pending ablation with RA lead placement in ~4-6 weeks), systolic CHF (EF 33%) s/p recent biV in October 2018, LBBB, pulmHTN, severe AS (low flow, low gradient), anemia, chronic pain, chronic sinusitis, CKD III, NIDDM, gout, constipation, and colon polyps. She presented to the ED due to two episodes of syncopal collapse. First episode occurred after straining to urinate, upon leaving the restroom ~11:30pm she collapsed into grandsons arms and was assisted to bed. Once in bed she reported everything was "pitch black" for ~2 minutes and that she could hear but not see. Second episode occurred just [facility] at [facility], she was being assisted down the steps of her home (9 steps total), at the last step she collapsed as if her "body just gave way". She was again assisted by grandson, she did not fall. During episode she felt dizzy, however denies "blacking out" or syncope. She denied orthopnea, PND,  palpitations, chest pain, or shortness of breath at rest. She endorses chronic BLE edema and chronic LUE edema (related to lymph node removal, no compression sleeve per family), and perineal rash and odor. She lives with grand daughter and grandson, she is ambulatory within the home however minimally active outside the home. She is compliant with medications, however not with a low sodium diet. She reports a dry weight of ~150 lbs. She denies " ETOH, tobacco, or illicit drug use.     Of note she recently (10/26/18) underwent biV pacemaker insertion, she completed antibiotic course, however has not been compliant with sling use. Insertion site approximated and healing, no evidence of infection.     At admission, CBC unremarkable, BNP elevated ~2600, troponin positive 0.139; chemistry remarkable for hypomagnesemia at 1.4, hypokalemia at 2.7, and known CKD.  EKG with underlying AFib and demand v paced rhythm, HgA1C 6.0/125, VitD 18, and . UA negative. CXR with mild edema and small pleural effusions. Head CT negative. The patient was admitted to the Hospital Medicine Service for further evaluation and management.     * No surgery found *      Hospital Course:   Ms. Martínez was admitted 11/4 due to syncopal collapse, acute on chronic systolic CHF exacerbation, and electrolyte abnormalities. Syncopal workup unremarkable, head CT negative, unable to obtain MRI due to recent device placement. Device interrogation without evidence of arrhythmia.  Orthostatics negative. Syncope most likely related to vasovagal response during voiding with progressive muscle weakness and fatigue. On admission THS noted to be ~12, TPO abnormal, free T4 normal, vit D level 18, and borderline hypocalcemia. Endocrine consulted, recommended initiation of levothyroxine at 25 mcg and encouraged compliance with daily vitamin D supplement. Will need outpt follow up and repeat labs in ~6 months (ordered)    At time of admission she was also noted to be volume overloaded, diuresis initiated with IVP lasix then transitioned to PO, at discharge net negative 6.6 liters liters and weight down 9 lbs; discharge weight 148 lbs. She estimates dry weight is ~150 lbs however unsure of accuracy, currently 157 lbs. Of note she had an normal EF ~ a year ago and likely due to progressive Afib she had noted decline in EF to 30-35% with development of LBBB. At time of decline in EF she was initiated on  ACEi which was later transitioned to bidil; in August 2018 bidil was discontinued due to hypotension and vertigo. GDMT had not been resumed in outpt setting nor an ischemic evaluation completed at this time. Lopressor and valsartan initiated, would likely transition to entresto if EF dose not improve with appropriate GDMT in outpt setting. EP was consulted and plan for AVN ablation in December and will place RA lead; will repeat echo in outpt setting and if EF remains depressed will pursue ischemic evaluation and/or consideration of device upgrade to biV-ICD. Co-Managed Cardiology consulted, agree with plan of care and recommend close outpt follow up.     Disposition: home with family, HH arranged at discharge. Outpt follow up with Cardiology, EP, and PCP scheduled. Repeat TSH in December (PCP to follow) and repeat PTH and vitamin D level in ~ 6 months (PCP to follow); both ordered.      Consults:   Consults (From admission, onward)        Status Ordering Provider     Inpatient consult to Cardiology  Once     Provider:  (Not yet assigned)    Completed GLORIA GOYAL     Inpatient consult to Cardiology  Once     Provider:  (Not yet assigned)    Completed JESSICA GARCIA     Inpatient consult to Endocrinology  Once     Provider:  (Not yet assigned)    Completed JESSICA GARCIA          * Syncope and collapse-resolved as of 11/7/2018    -admitted 11/4 due to syncopal collapse, acute on chronic systolic CHF exacerbation, and electrolyte abnormalities  -admission CBC unremarkable, BNP elevated ~2600, troponin positive 0.139; chemistry remarkable for hypomagnesemia at 1.4, hypokalemia at 2.7, and known CKD.  EKG with underlying AFib and demand v paced rhythm, HgA1C 6.0/125, VitD 18, and . UA negative. CXR with mild edema and small pleural effusions. Head CT negative.   -syncopal workup unremarkable   -head CT negative, unable to obtain MRI due to recent device placement   -device interrogation without  evidence of arrhythmia   -orthostatics negative  -syncope most likely related to vasovagal response during voiding with progressive muscle weakness and fatigue  -admission THS noted to be ~12, TPO abnormal, free T4 normal, vit D level 18, and borderline hypocalcemia  -Endocrine consulted, recommended initiation of levothyroxine at 25 mcg and encouraged compliance with daily vitamin D supplement   -will need outpt follow up and repeat labs in ~6 months, ordered (PCP to follow)     Falls    -see above  -continue vitamin D supplementation  -PT/OT ordered for HH     Unsteady gait    -see above      Acute on chronic combined systolic and diastolic congestive heart failure    -at time of admission she was also noted to be volume overloaded, diuresis initiated with IVP lasix >> transition to PO when appropriate    -at discharge net negative 6.6 liters and weight down 9 lbs; weight at  lbs    -she estimates dry weight is ~150 lbs however unsure of accuracy  -of note she had an normal EF ~ a year ago and likely due to progressive Afib she had noted decline in EF to 30-35% with development of LBBB   -at time of decline in EF she was initiated on ACEi which was later transitioned to bidil; in August 2018 bidil was discontinued due to hypotension and vertigo   -GDMT had not been resumed in outpt setting nor an ischemic evaluation completed at this time  -lopressor and valsartan initiated, would likely transition to entresto if EF dose not improve with appropriate GDMT  -EP was consulted and plan for AVN ablation in December and will place RA lead; will repeat echo in outpt setting and if EF remains depressed will pursue ischemic evaluation and/or consideration of device upgrade to biV-ICD  -Co-Managed Cardiology consulted, agree with plan of care and recommend close outpt follow up  -cardiac diet with fluid restriction encouraged  -strict I&Os and daily weights encouraged   -outpt follow up with Cardiology at [facility] scheduled       Severe aortic stenosis    -known low flow, low gradient per past echos  -see above for diuresis plans     Essential hypertension    -BP controlled  -continue lopressor and valsartan     Chronic atrial fibrillation    -rate controlled  -continue home amiodarone and initiated lopressor >> tolerating   -continue OAC with apixaban  -[id number] score 4/ 8.5%     Current use of long term anticoagulation    -see above      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, without long-term current use of insulin    -per patient trulicity caused rapid weight loss and loss of appetite  -resume home januvia  -HgA1C 6     CKD (chronic kidney disease) stage 3, GFR 30-59 ml/min    -Scr stable at baseline (1.5-1.8)     Hypokalemia due to loss of potassium-resolved as of 11/7/2018    -repleted and now WNL     Hypothyroidism    -admission THS noted to be ~12, TPO abnormal, free T4 normal, vit D level 18, and borderline hypocalcemia  -Endocrine consulted, recommended initiation of levothyroxine at 25 mcg and encouraged compliance with daily vitamin D supplement  -will need outpt follow up and repeat labs in ~6 months (ordered)     Hypomagnesemia with secondary hypocalcemia-resolved as of 11/7/2018    -repleted and now WNL  -continue vit D supplement       Hyperparathyroidism    -, vitamin D level 18   -continue vitamin D supplementation  -repeat labs in ~6 months, PCP to follow      Hypovitaminosis D    -see above      Atherosclerosis of aorta    -no acute issues   -no home statin  -lipid panel with cholesterol 149,  triglycerides 74, HDL 26,   -cardiac diet encouraged      Idiopathic chronic gout of multiple sites without tophus    -no acute flares  -continue home allopurinol  -uses prednisone PRN for flares     Edema of upper extremity    -chronic LUE edema, h/o radiation / chemo and lymphnodectomy s/p breast CA  -US negative for DVT  -will  follow up with Heme/Onc to further assess edema      History of breast cancer    -s/p radiation and chemo with lymph node removal only  -no acute issues  -LUE edema at baseline >> US negative for DVT     Primary osteoarthritis involving multiple joints    -no acute issues  -PT/OT with HH     Vertigo    -chronic  -continue home meclizine PRN      Constipation due to outlet dysfunction    -no acute issues     Itching    -improved  -notes pruritus in upper back  -continue topical remedies     Foul smelling vaginal discharge-resolved as of 11/5/2018    -resolved  -treated with one time fluconazole     Incomplete bladder emptying-resolved as of 11/5/2018    -no acute retention  -initiated flomax        Final Active Diagnoses:    Diagnosis Date Noted POA    Falls [W19.XXXA] 11/04/2018 Yes    Unsteady gait [R26.81] 08/02/2018 Yes    Acute on chronic combined systolic and diastolic congestive heart failure [I50.43] 06/21/2018 Yes    Severe aortic stenosis [I35.0] 06/23/2018 Yes    Essential hypertension [I10] 01/10/2013 Yes    Chronic atrial fibrillation [I48.2] 07/31/2018 Yes    Current use of long term anticoagulation [Z79.01] 11/06/2018 Not Applicable    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, without long-term current use of insulin [E11.3293] 08/08/2017 Yes    CKD (chronic kidney disease) stage 3, GFR 30-59 ml/min [N18.3] 01/01/2018 Yes    Hypothyroidism [E03.9] 11/05/2018 Yes    Hyperparathyroidism [E21.3] 11/05/2018 Yes    Hypovitaminosis D [E55.9] 05/09/2018 Yes    Atherosclerosis of aorta [I70.0] 11/02/2016 Yes     Chronic    Idiopathic chronic gout of multiple sites without tophus [M1A.09X0] 09/21/2015 Yes     Chronic    Edema of upper extremity [R60.0] 11/05/2018 Yes    History of breast cancer [Z85.3] 05/22/2013 Not Applicable    Primary osteoarthritis involving multiple joints [M15.0] 09/21/2015 Yes     Chronic     "Vertigo [R42] 08/01/2018 Yes    Constipation due to outlet dysfunction [K59.02] 09/15/2016 Yes    Itching [L29.9] 01/01/2018 Yes      Problems Resolved During this Admission:    Diagnosis Date Noted Date Resolved POA    PRINCIPAL PROBLEM:  Syncope and collapse [R55] 11/06/2018 11/07/2018 Yes    Hypokalemia due to loss of potassium [E87.6] 11/04/2018 11/07/2018 Yes    Hypomagnesemia with secondary hypocalcemia [E83.42, E83.51] 11/04/2018 11/07/2018 Yes    Foul smelling vaginal discharge [N89.8] 11/04/2018 11/05/2018 Yes    Incomplete bladder emptying [R33.9] 06/07/2013 11/05/2018 Yes       Discharged Condition: good    Disposition: Home or Self Care    Follow Up:  Follow-up Information     Bharath Wells MD. Schedule an appointment as soon as possible for a visit in 1 week.    Specialties:  Hematology and Oncology, Hematology  Why:  to follow up on arm pain and lymphatic system   Contact information:  [address] [address]  [phone]                 Patient Instructions:      WALKER FOR HOME USE     Order Specific Question Answer Comments   Type of Walker: Adult (5'4"-6'6")    With wheels? Yes    Height: 5' 1" (1.549 m)    Weight: 71.3 kg (157 lb 3 oz)    Length of need (1-99 months): 99    Does patient have medical equipment at home? none    Please check all that apply: Patient's condition impairs ambulation.    Please check all that apply: Patient is unable to safely ambulate without equipment.    Vendor: [facility]    Expected Date of Delivery: 11/6/2018      COMMODE FOR HOME USE     Order Specific Question Answer Comments   Type: Standard    Height: 5' 1" (1.549 m)    Weight: 71.3 kg (157 lb 3 oz)    Does patient have medical equipment at home? none    Length of need (1-99 months): 99    Vendor: [facility]    Expected Date of Delivery: 11/6/2018      Ambulatory referral to Hematology / Oncology   Referral Priority: Routine Referral Type: Consultation   Referral Reason: Specialty " Services Required   Referred to Provider: RESHMA TORO Requested Specialty: Hematology and Oncology   Number of Visits Requested: 1     Notify your health care provider if you experience any of the following:  temperature >100.4     Notify your health care provider if you experience any of the following:  persistent nausea and vomiting or diarrhea     Notify your health care provider if you experience any of the following:  redness, tenderness, or signs of infection (pain, swelling, redness, odor or green/yellow discharge around incision site)     Notify your health care provider if you experience any of the following:  difficulty breathing or increased cough     Notify your health care provider if you experience any of the following:  severe persistent headache     Notify your health care provider if you experience any of the following:  persistent dizziness, light-headedness, or visual disturbances     Notify your health care provider if you experience any of the following:  increased confusion or weakness     Activity as tolerated     Review of Systems   Constitutional: Positive for activity change. Negative for appetite change, chills, fatigue and fever.   HENT: Negative for congestion, rhinorrhea (chronic intermittant), sinus pressure, sinus pain, sneezing, sore throat and trouble swallowing.    Respiratory: Negative for cough, chest tightness, shortness of breath and wheezing.    Cardiovascular: Positive for leg swelling (at baseline). Negative for chest pain and palpitations.   Gastrointestinal: Negative for abdominal distention, abdominal pain, constipation, diarrhea, nausea and vomiting.   Endocrine: Positive for cold intolerance.   Genitourinary:  Negative for difficulty urinating, dysuria, hematuria, urgency and vaginal discharge (resolved).   Musculoskeletal: Positive for arthralgias and gait problem. Negative for back pain, myalgias and neck pain.   Skin: Positive for rash (perineal area) and wound  (surgical wound r chest). Negative for color change and pallor.   Neurological: Positive for weakness and numbness (R carpal tunnel). Negative for dizziness (chronic vertigo), syncope, light-headedness and headaches.   Hematological: Bruises/bleeds easily.   Psychiatric/Behavioral: Negative for confusion and sleep disturbance. The patient is not nervous/anxious.      Physical Exam   Constitutional: She is oriented to person, place, and time. She appears well-developed and well-nourished. No distress.   HENT:   Head: Normocephalic and atraumatic.   Eyes: Conjunctivae are normal. Pupils are equal, round, and reactive to light. No scleral icterus.   Neck: Normal range of motion. Neck supple.  No hepatojugular reflux or JVD present.   Cardiovascular: Normal rate, regular rhythm and intact distal pulses. Exam reveals no gallop and no friction rub.   Murmur (aortic soft blowing murmer) heard.  Pulmonary/Chest: Effort normal and breath sounds normal. No respiratory distress. She has no wheezes. She has no rales.   Abdominal: Soft. Bowel sounds are normal. She exhibits no distension and no mass. There is no tenderness. There is no guarding.   Genitourinary: No vaginal discharge found.   Musculoskeletal: Normal range of motion. She exhibits edema (pretibial 1+ pitting BLE). She exhibits no tenderness.   Neurological: She is alert and oriented to person, place, and time. No cranial nerve deficit or sensory deficit. She exhibits normal muscle tone. Coordination (needs assistance) abnormal.   Skin: Skin is warm and dry. Capillary refill takes 2 to 3 seconds. No rash noted. No erythema.   Psychiatric: She has a normal mood and affect. Judgment and thought content normal.   Nursing note and vitals reviewed.    Significant Diagnostic Studies: Labs:   BMP:   Recent Labs   Lab 11/07/18  0550   GLU 72      K 4.1      CO2 19*   BUN 28*   CREATININE 1.6*   CALCIUM 8.9   MG 1.9   , CBC   Recent Labs   Lab 11/07/18  0550    WBC 5.61   HGB 12.1   HCT 38.2      , A1C:   Recent Labs   Lab 08/01/18  0525 11/04/18  1542   HGBA1C 6.2* 6.0*    and All labs within the past 24 hours have been reviewed    Pending Diagnostic Studies:     None         Medications:  Reconciled Home Medications:      Medication List      START taking these medications    doxycycline 100 MG capsule  Commonly known as:  MONODOX  Take 1 capsule (100 mg total) by mouth every 12 (twelve) hours. for 7 days     levothyroxine 25 MCG tablet  Commonly known as:  SYNTHROID  Take 1 tablet (25 mcg total) by mouth daily before breakfast.     mirtazapine 7.5 MG Tab  Commonly known as:  REMERON  Take 1 tablet (7.5 mg total) by mouth every evening.     tamsulosin 0.4 mg Cap  Commonly known as:  FLOMAX  Take 1 capsule (0.4 mg total) by mouth once daily.     valsartan 40 MG tablet  Commonly known as:  DIOVAN  Take 0.5 tablets (20 mg total) by mouth once daily.        CHANGE how you take these medications    cholecalciferol (vitamin D3) 1,000 unit capsule  Commonly known as:  VITAMIN D3  Take 2 capsules (2,000 Units total) by mouth once daily.  What changed:  how much to take     fluticasone 50 mcg/actuation nasal spray  Commonly known as:  FLONASE  SPRAY TWICE IN EACH NOSTRIL DAILY  What changed:    · when to take this  · reasons to take this     furosemide 20 MG tablet  Commonly known as:  LASIX  Take 2 tablets (40 mg total) by mouth 2 (two) times daily.  What changed:  when to take this     JANUVIA 25 MG Tab  Generic drug:  SITagliptin  Take 1 tablet (25 mg total) by mouth once daily.  What changed:    · medication strength  · how much to take        CONTINUE taking these medications    ACCU-CHEK FASTCLIX LANCING DEV Misc  Generic drug:  lancets  1 lancet by Misc.(Non-Drug; Combo Route) route 3 (three) times daily. Pt to test blood glucose up to 3 times daily.     allopurinol 100 MG tablet  Commonly known as:  ZYLOPRIM  Take 1 and 1/2 tablets (150 mg total) by mouth once  daily.     amiodarone 200 MG Tab  Commonly known as:  PACERONE  Take 1 tablet (200 mg total) by mouth once daily.     benzonatate 100 MG capsule  Commonly known as:  TESSALON  TAKE ONE CAPSULE BY MOUTH THREE TIMES A DAY AS NEEDED FOR COUGH     diphenhydrAMINE-zinc acetate 1-0.1% cream  Commonly known as:  BENADRYL  Apply topically 2 (two) times daily as needed for Itching.     ELIQUIS 2.5 mg Tab  Generic drug:  apixaban  Take 1 tablet (2.5 mg total) by mouth 2 (two) times daily. Resume on 11/1     HYDROcodone-acetaminophen 5-325 mg per tablet  Commonly known as:  NORCO  Take 1 tablet by mouth every 8 (eight) hours as needed for Pain.     meclizine 25 mg tablet  Commonly known as:  ANTIVERT  Take 1 tablet (25 mg total) by mouth every 6 (six) hours.     metoprolol tartrate 50 MG tablet  Commonly known as:  LOPRESSOR  Take 1 tablet (50 mg total) by mouth 2 (two) times daily.     predniSONE 5 MG tablet  Commonly known as:  DELTASONE  Take 1-2 tablets by mouth as needed for gout attack     triamcinolone acetonide 0.1% 0.1 % cream  Commonly known as:  KENALOG  APPLY TOPICALLY TWO TIMES A DAY        STOP taking these medications    amoxicillin 500 MG capsule  Commonly known as:  AMOXIL     doxepin 10 MG capsule  Commonly known as:  SINEQUAN          Indwelling Lines/Drains at time of discharge:   Lines/Drains/Airways          None        Time spent on the discharge of patient: 45 minutes  Patient was seen and examined on the date of discharge and determined to be suitable for discharge.      Nivia Carey, DNP, AG-ACNP, BC  Department of Hospital Medicine  [facility]

## 2018-11-09 NOTE — ASSESSMENT & PLAN NOTE
-admission THS noted to be ~12, TPO abnormal, free T4 normal, vit D level 18, and borderline hypocalcemia  -Endocrine consulted, recommended initiation of levothyroxine at 25 mcg and encouraged compliance with daily vitamin D supplement  -will need outpt follow up and repeat labs in ~6 months (ordered)

## 2018-11-09 NOTE — ASSESSMENT & PLAN NOTE
-rate controlled  -continue home amiodarone and initiated lopressor >> tolerating   -continue OAC with apixaban  -[id number] score 4/ 8.5%

## 2018-11-19 ENCOUNTER — TELEPHONE (OUTPATIENT)
Dept: ADMINISTRATIVE | Facility: CLINIC | Age: 83
End: 2018-11-19

## 2018-11-19 ENCOUNTER — OFFICE VISIT (OUTPATIENT)
Dept: INTERNAL MEDICINE | Facility: CLINIC | Age: 83
End: 2018-11-19
Payer: MEDICARE

## 2018-11-19 VITALS
BODY MASS INDEX: 27.39 KG/M2 | HEART RATE: 72 BPM | DIASTOLIC BLOOD PRESSURE: 72 MMHG | OXYGEN SATURATION: 99 % | HEIGHT: 61 IN | TEMPERATURE: 98 F | SYSTOLIC BLOOD PRESSURE: 114 MMHG | WEIGHT: 145.06 LBS

## 2018-11-19 DIAGNOSIS — E21.3 HYPERPARATHYROIDISM: ICD-10-CM

## 2018-11-19 DIAGNOSIS — R55 SYNCOPE AND COLLAPSE: ICD-10-CM

## 2018-11-19 DIAGNOSIS — N18.30 CKD (CHRONIC KIDNEY DISEASE) STAGE 3, GFR 30-59 ML/MIN: ICD-10-CM

## 2018-11-19 DIAGNOSIS — E55.9 HYPOVITAMINOSIS D: ICD-10-CM

## 2018-11-19 DIAGNOSIS — I50.22 CHRONIC SYSTOLIC CONGESTIVE HEART FAILURE: ICD-10-CM

## 2018-11-19 DIAGNOSIS — I48.91 ATRIAL FIBRILLATION, UNSPECIFIED TYPE: ICD-10-CM

## 2018-11-19 DIAGNOSIS — E03.9 HYPOTHYROIDISM, UNSPECIFIED TYPE: ICD-10-CM

## 2018-11-19 DIAGNOSIS — G31.84 MILD COGNITIVE IMPAIRMENT: ICD-10-CM

## 2018-11-19 DIAGNOSIS — I35.0 SEVERE AORTIC STENOSIS: ICD-10-CM

## 2018-11-19 PROCEDURE — 99499 UNLISTED E&M SERVICE: CPT | Mod: S$GLB,,, | Performed by: FAMILY MEDICINE

## 2018-11-19 PROCEDURE — 3074F SYST BP LT 130 MM HG: CPT | Mod: CPTII,S$GLB,, | Performed by: FAMILY MEDICINE

## 2018-11-19 PROCEDURE — 3078F DIAST BP <80 MM HG: CPT | Mod: CPTII,S$GLB,, | Performed by: FAMILY MEDICINE

## 2018-11-19 PROCEDURE — 99999 PR PBB SHADOW E&M-EST. PATIENT-LVL IV: CPT | Mod: PBBFAC,,, | Performed by: FAMILY MEDICINE

## 2018-11-19 PROCEDURE — 1101F PT FALLS ASSESS-DOCD LE1/YR: CPT | Mod: CPTII,S$GLB,, | Performed by: FAMILY MEDICINE

## 2018-11-19 PROCEDURE — 99213 OFFICE O/P EST LOW 20 MIN: CPT | Mod: S$GLB,,, | Performed by: FAMILY MEDICINE

## 2018-11-19 NOTE — TELEPHONE ENCOUNTER
Home Health SOC 11/09/2018 - 01/07/2019 with [facility] ([place]) - Dr. Patricia Najera. Patient received SN, PT and OT services.

## 2018-11-19 NOTE — PROGRESS NOTES
"Subjective:      Patient ID: Dacia Martínez is a 83 y.o. female.    Chief Complaint: Follow-up      HPI:  Dacia Martínez is an 83 year old female with atherosclerosis of aorta, persistent atrial fibrillation, severe aortic stenosis, anemia, chronic pain syndrome, chronic sinusitis, chronic systolic congestive heart failure, chronic kidney disease stage 3, history of colon polyps, constipation, dilated cardiomyopathy, diabetes mellitus type 2, gastroesophageal reflux disease, gout, hyperlipidemia, hypertension, hearing loss, intracranial atherosclerosis, long term use of opiates, left bundle branch block, cardiac murmur, microalbuminuria, osteoarthritis, osteopenia, and vertigo who presents for hospital follow up.    Patient accompanied by her daughter who provides most of the HPI.    Hospitalized 11/4/18 - 11/7/18 due to syncopal collapse x 2.  First episode occurred at home after urination, patient attempted to get up to leave the restroom, collapsed into her grandsons arms.  Was brought to her bed.  States she could not see and "everything was black" for a couple of minutes.  Second episode occurred prior to [place] [facility] [place]; patient was walking down the steps to her home when her "body gave out."  Again caught by her grandson, did not fall; denies loss of consciousness.  Did experience some associated dizziness at that time.  CT head negative for acute process, no arrhythmia on device interrogation.  Diagnosed with vasovagal syncope.  In the hospital the patient was noted to have an elevated TSH and was started on levothyroxine 25 mcg by mouth daily by the endocrine service.  Patient was also noted to be volume overloaded on admission.  Echocardiogram 11/4/18 obtained showing left atrium severely dilated, septal wall has abnormal motion, moderate global hypokinetic wall motion, left ventricle ejection fraction is severely decreased at 25%, right ventricular cavity size is moderately dilated, mitral valve is " mildly sclerotic, mild mitral regurgitation, moderate tricuspid regurgitation, diastolic pattern consistent with atrial fibrillation observed, pulmonic valve shows trace regurgitation, estimated PA systolic pressure is 31.04 mm Hg, moderately elevated central venous pressure (8 mm Hg), right atrium is moderately dilated, moderate-to-severe aortic valve stenosis, and atent foramen ovale present with left to right shunting indicated by color flow Doppler.  Diuresed with IV Lasix which was transitioned to PO.  Lost net 9 lbs to dry weight of ~ 148 - 150 lbs.  To have AVN ablation in December.  To follow up with EP and cardiology as outpatient for repeat echocardiogram.  To have repeat TSH in December; previously ordered.  To have repeat PTH (elevated during hospitalization) and vitamin D in 6 months.  To do physical therapy at home with home health services.  Initially when asked what brings the patient in today, the patient was unable to recall the events of her hospitalization.  Patient was able to recall some of these events with subsequent prompting.  Lives with her grandson.  Does not cook or drive any more.  Endorses gradually worsening memory deficits most noticeably with people's names and phone numbers.  Weights 145 lbs today.  Taking furosemide 20 mg 2 tabs by mouth twice daily; knows to increase furosemide dosage and notify physician for 3+ lbs. weight gain in a day; weighing self daily.  GFR 34.3, stage 3b.  Last seen by nephrology 7/19/18 and was to follow up with them in 1-2 months from that time--did not do this.  Denies further episodes of syncope/presyncope or loss of consciousness.  Has appointments with cardiology/EP 11/27/18 and 1/29/19.      Past Medical History:   Diagnosis Date    A-fib     Arthritis     Asteroid hyalosis of left eye     Bilateral nonexudative age-related macular degeneration 6/3/2014    Breast cancer     Cataract     Chronic GERD 9/18/2017    CKD stage 3 due to type 2  diabetes mellitus     Coronary artery disease     Hypertension     Migraine headache     Mild nonproliferative diabetic retinopathy of both eyes 6/3/2014    Pneumonia     Type 2 diabetes mellitus with hyperglycemia     Vertigo        Past Surgical History:   Procedure Laterality Date    BREAST SURGERY      left lumpectomy    CARDIOVERSION N/A 2017    [facility] at [facility] CATH [facility]    CARPAL TUNNEL RELEASE      left    CATARACT EXTRACTION      vance     SECTION      COLONOSCOPY N/A 3/5/2015    Performed by Andrés Hobson [facility] at [facility] ENDO (4TH FLR)    ESOPHAGOGASTRODUODENOSCOPY (EGD) N/A 2016    Performed by Rory Ogden [facility] at [facility] END[facility] (2ND FLR)    EYE SURGERY      cataracts bilaterally    HYSTERECTOMY      partial    IMPLANTATION OF BIVENTRICULAR HEART PACEMAKER N/A 10/26/2018    Procedure: INSERTION, CARDIAC PACEMAKER, BIVENTRICULAR;  Surgeon: Jacques Burt MD;  Location: [facility] CATH LAB;  Service: Cardiology;  Laterality: N/A;  AF, CRT-P, SJM, MAC, MB, 3 Prep    INSERTION, CARDIAC PACEMAKER, BIVENTRICULAR N/A 10/26/2018    [facility] CATH [facility]    TRANSESOPHAGEAL ECHOCARDIOGRAM (REJI) N/A 2017    [facility] Ruma Long [facility] at [facility] CATH [facility]    TRANSESOPHAGEAL ECHOCARDIOGRAM (REJI) N/A 2017    Performed by Angelique Taylor [facility] at [facility] CATH LAB       Family History   Problem Relation Age of Onset    Cancer Mother         stomach    Heart disease Mother     Diabetes Father     Hypertension Father     Blindness Brother     Diabetes Brother     Hypertension Brother     Cataracts Sister     Diabetes Sister     Diabetes Brother     Diabetes Brother     Diabetes Brother     No Known Problems Daughter     No Known Problems Son     No Known Problems Daughter     Amblyopia Neg Hx     Glaucoma Neg Hx     Macular degeneration Neg Hx     Strabismus Neg Hx     Retinal detachment Neg Hx        Social  "History     Socioeconomic History    Marital status: Single     Spouse name: None    Number of children: 4    Years of education: None    Highest education level: None   Social Needs    Financial resource strain: None    Food insecurity - worry: None    Food insecurity - inability: None    Transportation needs - medical: None    Transportation needs - non-medical: None   Occupational History    None   Tobacco Use    Smoking status: Never Smoker    Smokeless tobacco: Never Used   Substance and Sexual Activity    Alcohol use: Yes    Drug use: No    Sexual activity: Not Currently   Other Topics Concern    None   Social History Narrative    Family supportive.     Granddaughter helps set out her medications in pillbox, helps check her blood sugar.       Review of Systems   Constitutional: Negative for chills, fatigue and fever.   HENT: Negative for congestion, nosebleeds, rhinorrhea, sore throat and trouble swallowing.    Eyes: Negative for pain and visual disturbance.   Respiratory: Negative for cough, shortness of breath and wheezing.    Cardiovascular: Negative for chest pain.   Gastrointestinal: Negative for abdominal distention, abdominal pain, constipation, diarrhea, nausea and vomiting.   Genitourinary: Negative for decreased urine volume, difficulty urinating, dysuria, hematuria and urgency.   Musculoskeletal: Positive for joint swelling. Negative for arthralgias, back pain and myalgias.   Skin: Negative for color change and rash.   Neurological: Negative for dizziness, tremors, light-headedness, numbness and headaches.   Psychiatric/Behavioral: Negative for agitation, behavioral problems and confusion. The patient is not nervous/anxious.      Objective:     Vitals:    11/19/18 0921   BP: 114/72   BP Location: Right arm   Patient Position: Sitting   BP Method: Medium (Manual)   Pulse: 72   Temp: 97.6 °F (36.4 °C)   SpO2: 99%   Weight: 65.8 kg (145 lb 1 oz)   Height: 5' 1" (1.549 m)       Physical " Exam   Constitutional: She is oriented to person, place, and time. She appears well-developed and well-nourished.   HENT:   Head: Normocephalic and atraumatic.   Right Ear: External ear normal.   Left Ear: External ear normal.   Nose: Nose normal.   Mouth/Throat: Oropharynx is clear and moist.   Eyes: Conjunctivae and EOM are normal. Pupils are equal, round, and reactive to light.   Neck: Normal range of motion. Neck supple. No tracheal deviation present.   Cardiovascular: Normal rate and normal heart sounds. An irregularly irregular rhythm present. Exam reveals no gallop and no friction rub.   No murmur heard.  Distant heart sounds.  Trace pitting edema to bilateral feet and ankles noted.   Pulmonary/Chest: Effort normal and breath sounds normal. No respiratory distress. She has no wheezes. She has no rales.   Abdominal: Soft. Bowel sounds are normal. She exhibits no distension. There is no tenderness. There is no rebound and no guarding.   Musculoskeletal: Normal range of motion. She exhibits no edema or deformity.        Right ankle: She exhibits swelling.        Left ankle: She exhibits swelling.   Lymphadenopathy:     She has no cervical adenopathy.   Neurological: She is alert and oriented to person, place, and time. Coordination normal.   Skin: Skin is warm and dry.   Psychiatric: She has a normal mood and affect. Her behavior is normal.   Nursing note and vitals reviewed.     Assessment:      1. Syncope and collapse    2. Atrial fibrillation, unspecified type    3. Chronic systolic congestive heart failure    4. CKD (chronic kidney disease) stage 3, GFR 30-59 ml/min    5. Hypothyroidism, unspecified type    6. Hyperparathyroidism    7. Hypovitaminosis D    8. Mild cognitive impairment    9. Severe aortic stenosis      Plan:   Dacia was seen today for follow-up.    Diagnoses and all orders for this visit:    Syncope and collapse        -     No further recurrence reported.  Recommended patient rise to a  standing position slowly.  Continue to monitor.  Return to clinic for any worsening.    Atrial fibrillation, unspecified type        -     Continue current regimen and regular follow up with EP and cardiology as previously scheduled.    Chronic systolic congestive heart failure        -     Continue daily weights, current regimen, and regular follow up with EP and cardiology as previously scheduled.    CKD (chronic kidney disease) stage 3, GFR 30-59 ml/min        -     Instructed patient to schedule follow up appointment with nephrology.  Avoid NSAIDs.  Will need to balance furosemide use and renal function.    Hypothyroidism, unspecified type        -     Continue current regimen.  Repeat studies pending.     Hyperparathyroidism        -     Continue current regimen.  Repeat studies pending.     Hypovitaminosis D        -     Continue current regimen.  Repeat studies pending.     Mild cognitive impairment  -     Ambulatory Referral to Neurology    Severe aortic stenosis        -     Continue regular follow up with cardiology as previously scheduled.

## 2018-11-23 DIAGNOSIS — L29.9 PRURITUS: ICD-10-CM

## 2018-11-23 RX ORDER — TRIAMCINOLONE ACETONIDE 1 MG/G
CREAM TOPICAL 2 TIMES DAILY
Qty: 80 G | Refills: 0 | Status: SHIPPED | OUTPATIENT
Start: 2018-11-23 | End: 2018-12-19

## 2018-11-23 NOTE — TELEPHONE ENCOUNTER
----- Message from Mariza Segundo sent at 11/23/2018  3:04 PM CST -----  Contact: [phone]  Patient is requesting an ointment or cream for her back. Patient stated its itching her to death.    Please advise, thanks

## 2018-11-27 ENCOUNTER — OFFICE VISIT (OUTPATIENT)
Dept: CARDIOLOGY | Facility: CLINIC | Age: 83
End: 2018-11-27
Payer: MEDICARE

## 2018-11-27 VITALS
DIASTOLIC BLOOD PRESSURE: 66 MMHG | WEIGHT: 147.94 LBS | SYSTOLIC BLOOD PRESSURE: 104 MMHG | HEART RATE: 98 BPM | HEIGHT: 62 IN | BODY MASS INDEX: 27.22 KG/M2

## 2018-11-27 DIAGNOSIS — I95.1 ORTHOSTATIC SYNCOPE: ICD-10-CM

## 2018-11-27 DIAGNOSIS — I48.91 ATRIAL FIBRILLATION WITH RVR: ICD-10-CM

## 2018-11-27 DIAGNOSIS — I50.43 ACUTE ON CHRONIC COMBINED SYSTOLIC AND DIASTOLIC CONGESTIVE HEART FAILURE: ICD-10-CM

## 2018-11-27 DIAGNOSIS — I35.0 SEVERE AORTIC STENOSIS: ICD-10-CM

## 2018-11-27 PROCEDURE — 99214 OFFICE O/P EST MOD 30 MIN: CPT | Mod: GC,S$GLB,, | Performed by: STUDENT IN AN ORGANIZED HEALTH CARE EDUCATION/TRAINING PROGRAM

## 2018-11-27 PROCEDURE — 3074F SYST BP LT 130 MM HG: CPT | Mod: CPTII,GC,S$GLB, | Performed by: STUDENT IN AN ORGANIZED HEALTH CARE EDUCATION/TRAINING PROGRAM

## 2018-11-27 PROCEDURE — 1101F PT FALLS ASSESS-DOCD LE1/YR: CPT | Mod: CPTII,GC,S$GLB, | Performed by: STUDENT IN AN ORGANIZED HEALTH CARE EDUCATION/TRAINING PROGRAM

## 2018-11-27 PROCEDURE — 3078F DIAST BP <80 MM HG: CPT | Mod: CPTII,GC,S$GLB, | Performed by: STUDENT IN AN ORGANIZED HEALTH CARE EDUCATION/TRAINING PROGRAM

## 2018-11-27 PROCEDURE — 99999 PR PBB SHADOW E&M-EST. PATIENT-LVL IV: CPT | Mod: PBBFAC,GC,, | Performed by: STUDENT IN AN ORGANIZED HEALTH CARE EDUCATION/TRAINING PROGRAM

## 2018-11-27 RX ORDER — METOPROLOL SUCCINATE 100 MG/1
100 TABLET, EXTENDED RELEASE ORAL DAILY
Qty: 30 TABLET | Refills: 11 | Status: SHIPPED | OUTPATIENT
Start: 2018-11-27 | End: 2019-01-01

## 2018-11-27 RX ORDER — DOXEPIN HYDROCHLORIDE 10 MG/1
10 CAPSULE ORAL NIGHTLY
COMMUNITY
End: 2019-01-01 | Stop reason: SDUPTHER

## 2018-11-27 NOTE — PROGRESS NOTES
I have personally taken the history and examined this patient and agree with the Fellow's note as stated above.    Would do w/u for AS on back burner until we see how she responds to Dr. Burt's plans.

## 2018-11-27 NOTE — ASSESSMENT & PLAN NOTE
- Metoprolol succinate 100mg and Amiodarone  - Plan for AVN ablation + RA lead palcement by EP  - Cont OAC

## 2018-11-27 NOTE — ASSESSMENT & PLAN NOTE
-Euvolemic on exam, cont 40 BID Lasix  - Switch to 100mg Toprolol XL  - Leave on valsartan for now and revaluate post BiV upograde/ AVN ablation for need for entresto (a bit cautious now due to CKD, Age)  -

## 2018-11-27 NOTE — PROGRESS NOTES
Subjective:    Patient ID:  Dacia Martínez is a 83 y.o. female who presents for follow-up of Syncope and collapse (Hospital d/c 11/7/18); Congestive Heart Failure; and Medication Management (Synthroid)      HPI  83 F with PMH of HFrEF 25% s/p , Afib on AC, CKD, Low flow low Grad AS presenting post D/C early November for syncope and HF exacerbation. Doing well today. No extra fluid, no chest pain, continues to have bouts of dizziness with no real room spinnning which is mostly positional (getting up from bed etc). She is awaiting RA lead placement and AVN ablation by EP.     Review of Systems   Constitution: Negative for chills, decreased appetite and diaphoresis.   HENT: Negative for congestion and ear discharge.    Eyes: Negative for blurred vision and discharge.   Cardiovascular: Negative for chest pain, dyspnea on exertion, irregular heartbeat, leg swelling and paroxysmal nocturnal dyspnea.   Respiratory: Negative for cough, hemoptysis and shortness of breath.    Gastrointestinal: Negative for abdominal pain.   Neurological: Positive for dizziness.        Objective:    Physical Exam   Constitutional: She is oriented to person, place, and time. She appears well-developed and well-nourished. No distress.   Eyes: Conjunctivae are normal. Pupils are equal, round, and reactive to light.   Neck: No tracheal deviation present. No thyromegaly present.   Cardiovascular: Normal rate, regular rhythm and intact distal pulses. Exam reveals no gallop and no friction rub.   Murmur heard.  2/6 systolic murmur   Pulmonary/Chest: Effort normal and breath sounds normal. No respiratory distress. She has no wheezes. She has no rales.   Abdominal: Soft. Bowel sounds are normal. She exhibits no distension. There is no tenderness.   Musculoskeletal: She exhibits no edema or deformity.   Neurological: She is alert and oriented to person, place, and time. No cranial nerve deficit. Coordination normal.   Skin: Skin is warm and dry. She is  not diaphoretic.   Psychiatric: She has a normal mood and affect. Her behavior is normal.         Assessment:       1. Acute on chronic combined systolic and diastolic congestive heart failure    2. Atrial fibrillation with RVR    3. Severe aortic stenosis    4. Orthostatic syncope         Plan:       Acute on chronic combined systolic and diastolic congestive heart failure  -Euvolemic on exam, cont 40 BID Lasix  - Switch to 100mg Toprolol XL  - Leave on valsartan for now and revaluate post BiV upograde/ AVN ablation for need for entresto (a bit cautious now due to CKD, Age)  -     Atrial fibrillation with RVR  - Metoprolol succinate 100mg and Amiodarone  - Plan for AVN ablation + RA lead palcement by EP  - Cont OAC    Severe aortic stenosis  - Agree that AS has to be evaluated with DSE for severity,   -will plan on DSE after ablation, CRT upgrade if EF doesn't improve  - ROBBIE 0.6MG 7.6  - May be playing a role in dizziness?    Orthostatic syncope  - No orthostatic hypotension today on BP check  - Will stop tamsulosin

## 2018-11-27 NOTE — ASSESSMENT & PLAN NOTE
- Agree that AS has to be evaluated with DSE for severity, will order.  - ROBBIE 0.6MG 7.6  - May be playing a role in dizziness?

## 2018-12-01 DIAGNOSIS — I48.91 ATRIAL FIBRILLATION, UNSPECIFIED TYPE: ICD-10-CM

## 2018-12-01 RX ORDER — AMIODARONE HYDROCHLORIDE 200 MG/1
200 TABLET ORAL DAILY
Qty: 30 TABLET | Refills: 2 | Status: ON HOLD | OUTPATIENT
Start: 2018-12-01 | End: 2018-12-08 | Stop reason: HOSPADM

## 2018-12-03 ENCOUNTER — LAB VISIT (OUTPATIENT)
Dept: LAB | Facility: HOSPITAL | Age: 83
End: 2018-12-03
Attending: INTERNAL MEDICINE
Payer: MEDICARE

## 2018-12-03 DIAGNOSIS — I48.19 PERSISTENT ATRIAL FIBRILLATION: ICD-10-CM

## 2018-12-03 LAB
ANION GAP SERPL CALC-SCNC: 12 MMOL/L
APTT BLDCRRT: 24.2 SEC
BASOPHILS # BLD AUTO: 0.05 K/UL
BASOPHILS NFR BLD: 0.9 %
BUN SERPL-MCNC: 22 MG/DL
CALCIUM SERPL-MCNC: 9.3 MG/DL
CHLORIDE SERPL-SCNC: 107 MMOL/L
CO2 SERPL-SCNC: 24 MMOL/L
CREAT SERPL-MCNC: 1.4 MG/DL
DIFFERENTIAL METHOD: ABNORMAL
EOSINOPHIL # BLD AUTO: 0.2 K/UL
EOSINOPHIL NFR BLD: 4.1 %
ERYTHROCYTE [DISTWIDTH] IN BLOOD BY AUTOMATED COUNT: 17.9 %
EST. GFR  (AFRICAN AMERICAN): 40 ML/MIN/1.73 M^2
EST. GFR  (NON AFRICAN AMERICAN): 35 ML/MIN/1.73 M^2
GLUCOSE SERPL-MCNC: 82 MG/DL
HCT VFR BLD AUTO: 38.7 %
HGB BLD-MCNC: 11.6 G/DL
INR PPP: 1.1
LYMPHOCYTES # BLD AUTO: 1.9 K/UL
LYMPHOCYTES NFR BLD: 32.5 %
MCH RBC QN AUTO: 27.7 PG
MCHC RBC AUTO-ENTMCNC: 30 G/DL
MCV RBC AUTO: 92 FL
MONOCYTES # BLD AUTO: 0.6 K/UL
MONOCYTES NFR BLD: 9.9 %
NEUTROPHILS # BLD AUTO: 3.1 K/UL
NEUTROPHILS NFR BLD: 52.3 %
PLATELET # BLD AUTO: 192 K/UL
PMV BLD AUTO: 12 FL
POTASSIUM SERPL-SCNC: 3.5 MMOL/L
PROTHROMBIN TIME: 11.3 SEC
RBC # BLD AUTO: 4.19 M/UL
SODIUM SERPL-SCNC: 143 MMOL/L
WBC # BLD AUTO: 5.85 K/UL

## 2018-12-03 PROCEDURE — 85730 THROMBOPLASTIN TIME PARTIAL: CPT

## 2018-12-03 PROCEDURE — 36415 COLL VENOUS BLD VENIPUNCTURE: CPT

## 2018-12-03 PROCEDURE — 85025 COMPLETE CBC W/AUTO DIFF WBC: CPT

## 2018-12-03 PROCEDURE — 80048 BASIC METABOLIC PNL TOTAL CA: CPT

## 2018-12-03 PROCEDURE — 85610 PROTHROMBIN TIME: CPT

## 2018-12-06 ENCOUNTER — TELEPHONE (OUTPATIENT)
Dept: ELECTROPHYSIOLOGY | Facility: CLINIC | Age: 83
End: 2018-12-06

## 2018-12-06 ENCOUNTER — ANESTHESIA EVENT (OUTPATIENT)
Dept: MEDSURG UNIT | Facility: HOSPITAL | Age: 83
End: 2018-12-06
Payer: MEDICARE

## 2018-12-06 NOTE — TELEPHONE ENCOUNTER
Left message for patient to return call to review pre-op instructions. Left message advising to fast after 12mn and be here for 6am. Reminded to hold Eliquis and Januvia in am. Call back # left for her to call back and confirm that she rec'd the instructions.

## 2018-12-07 ENCOUNTER — ANESTHESIA (OUTPATIENT)
Dept: MEDSURG UNIT | Facility: HOSPITAL | Age: 83
End: 2018-12-07
Payer: MEDICARE

## 2018-12-07 ENCOUNTER — HOSPITAL ENCOUNTER (OUTPATIENT)
Facility: HOSPITAL | Age: 83
Discharge: HOME | End: 2018-12-08
Attending: INTERNAL MEDICINE | Admitting: INTERNAL MEDICINE
Payer: MEDICARE

## 2018-12-07 DIAGNOSIS — I48.91 ATRIAL FIBRILLATION: ICD-10-CM

## 2018-12-07 DIAGNOSIS — I48.19 ATRIAL FIBRILLATION, PERSISTENT: Primary | ICD-10-CM

## 2018-12-07 LAB
POCT GLUCOSE: 109 MG/DL (ref 70–110)
POCT GLUCOSE: 115 MG/DL (ref 70–110)
POCT GLUCOSE: 144 MG/DL (ref 70–110)
POCT GLUCOSE: 150 MG/DL (ref 70–110)
POCT GLUCOSE: 96 MG/DL (ref 70–110)

## 2018-12-07 PROCEDURE — 27201037 HC PRESSURE MONITORING SET UP

## 2018-12-07 PROCEDURE — D9220A PRA ANESTHESIA: Mod: CRNA,,, | Performed by: NURSE ANESTHETIST, CERTIFIED REGISTERED

## 2018-12-07 PROCEDURE — 37000008 HC ANESTHESIA 1ST 15 MINUTES: Performed by: INTERNAL MEDICINE

## 2018-12-07 PROCEDURE — 82962 GLUCOSE BLOOD TEST: CPT

## 2018-12-07 PROCEDURE — 93010 ELECTROCARDIOGRAM REPORT: CPT | Mod: 59,,, | Performed by: INTERNAL MEDICINE

## 2018-12-07 PROCEDURE — C1732 CATH, EP, DIAG/ABL, 3D/VECT: HCPCS | Performed by: INTERNAL MEDICINE

## 2018-12-07 PROCEDURE — 37000009 HC ANESTHESIA EA ADD 15 MINS: Performed by: INTERNAL MEDICINE

## 2018-12-07 PROCEDURE — 93005 ELECTROCARDIOGRAM TRACING: CPT

## 2018-12-07 PROCEDURE — 25000003 PHARM REV CODE 250: Performed by: NURSE ANESTHETIST, CERTIFIED REGISTERED

## 2018-12-07 PROCEDURE — 93010 ELECTROCARDIOGRAM REPORT: CPT | Mod: 76,,, | Performed by: INTERNAL MEDICINE

## 2018-12-07 PROCEDURE — C1894 INTRO/SHEATH, NON-LASER: HCPCS | Performed by: INTERNAL MEDICINE

## 2018-12-07 PROCEDURE — 93286 PERI-PX EVAL PM/LDLS PM IP: CPT | Mod: 26,,, | Performed by: INTERNAL MEDICINE

## 2018-12-07 PROCEDURE — 63600175 PHARM REV CODE 636 W HCPCS: Performed by: NURSE ANESTHETIST, CERTIFIED REGISTERED

## 2018-12-07 PROCEDURE — D9220A PRA ANESTHESIA: Mod: ANES,,, | Performed by: ANESTHESIOLOGY

## 2018-12-07 PROCEDURE — 93286 PERI-PX EVAL PM/LDLS PM IP: CPT | Performed by: INTERNAL MEDICINE

## 2018-12-07 PROCEDURE — A4216 STERILE WATER/SALINE, 10 ML: HCPCS | Performed by: NURSE ANESTHETIST, CERTIFIED REGISTERED

## 2018-12-07 PROCEDURE — 93650 ICAR CATH ABLTJ AV NODE FUNC: CPT | Performed by: INTERNAL MEDICINE

## 2018-12-07 PROCEDURE — 25000003 PHARM REV CODE 250: Performed by: INTERNAL MEDICINE

## 2018-12-07 PROCEDURE — 93650 ICAR CATH ABLTJ AV NODE FUNC: CPT | Mod: 79,,, | Performed by: INTERNAL MEDICINE

## 2018-12-07 PROCEDURE — 82962 GLUCOSE BLOOD TEST: CPT | Mod: 91 | Performed by: INTERNAL MEDICINE

## 2018-12-07 PROCEDURE — 71000039 HC RECOVERY, EACH ADD'L HOUR: Performed by: INTERNAL MEDICINE

## 2018-12-07 PROCEDURE — 71000033 HC RECOVERY, INTIAL HOUR: Performed by: INTERNAL MEDICINE

## 2018-12-07 PROCEDURE — 63600175 PHARM REV CODE 636 W HCPCS: Performed by: INTERNAL MEDICINE

## 2018-12-07 RX ORDER — FUROSEMIDE 40 MG/1
40 TABLET ORAL 2 TIMES DAILY
Status: DISCONTINUED | OUTPATIENT
Start: 2018-12-07 | End: 2018-12-08 | Stop reason: HOSPADM

## 2018-12-07 RX ORDER — EPHEDRINE SULFATE 50 MG/ML
INJECTION, SOLUTION INTRAVENOUS
Status: DISCONTINUED | OUTPATIENT
Start: 2018-12-07 | End: 2018-12-07

## 2018-12-07 RX ORDER — LEVOTHYROXINE SODIUM 25 UG/1
25 TABLET ORAL
Status: DISCONTINUED | OUTPATIENT
Start: 2018-12-08 | End: 2018-12-08 | Stop reason: HOSPADM

## 2018-12-07 RX ORDER — BENZONATATE 100 MG/1
100 CAPSULE ORAL 3 TIMES DAILY
Status: DISCONTINUED | OUTPATIENT
Start: 2018-12-07 | End: 2018-12-08 | Stop reason: HOSPADM

## 2018-12-07 RX ORDER — SODIUM CHLORIDE 9 MG/ML
INJECTION, SOLUTION INTRAVENOUS CONTINUOUS
Status: DISCONTINUED | OUTPATIENT
Start: 2018-12-07 | End: 2018-12-07

## 2018-12-07 RX ORDER — IBUPROFEN 200 MG
24 TABLET ORAL
Status: DISCONTINUED | OUTPATIENT
Start: 2018-12-07 | End: 2018-12-08 | Stop reason: HOSPADM

## 2018-12-07 RX ORDER — MIRTAZAPINE 7.5 MG/1
7.5 TABLET, FILM COATED ORAL NIGHTLY
Status: DISCONTINUED | OUTPATIENT
Start: 2018-12-07 | End: 2018-12-08 | Stop reason: HOSPADM

## 2018-12-07 RX ORDER — INSULIN ASPART 100 [IU]/ML
0-5 INJECTION, SOLUTION INTRAVENOUS; SUBCUTANEOUS
Status: DISCONTINUED | OUTPATIENT
Start: 2018-12-07 | End: 2018-12-08 | Stop reason: HOSPADM

## 2018-12-07 RX ORDER — DEXMEDETOMIDINE HYDROCHLORIDE 100 UG/ML
INJECTION, SOLUTION INTRAVENOUS
Status: DISCONTINUED | OUTPATIENT
Start: 2018-12-07 | End: 2018-12-07

## 2018-12-07 RX ORDER — FENTANYL CITRATE 50 UG/ML
INJECTION, SOLUTION INTRAMUSCULAR; INTRAVENOUS
Status: DISCONTINUED | OUTPATIENT
Start: 2018-12-07 | End: 2018-12-07

## 2018-12-07 RX ORDER — CEFAZOLIN SODIUM 1 G/3ML
2 INJECTION, POWDER, FOR SOLUTION INTRAMUSCULAR; INTRAVENOUS
Status: DISCONTINUED | OUTPATIENT
Start: 2018-12-07 | End: 2018-12-07 | Stop reason: HOSPADM

## 2018-12-07 RX ORDER — SODIUM CHLORIDE 9 MG/ML
INJECTION, SOLUTION INTRAVENOUS CONTINUOUS PRN
Status: DISCONTINUED | OUTPATIENT
Start: 2018-12-07 | End: 2018-12-07

## 2018-12-07 RX ORDER — VASOPRESSIN 20 [USP'U]/ML
INJECTION, SOLUTION INTRAMUSCULAR; SUBCUTANEOUS
Status: DISCONTINUED | OUTPATIENT
Start: 2018-12-07 | End: 2018-12-07

## 2018-12-07 RX ORDER — PHENYLEPHRINE HYDROCHLORIDE 10 MG/ML
INJECTION INTRAVENOUS
Status: DISCONTINUED | OUTPATIENT
Start: 2018-12-07 | End: 2018-12-07

## 2018-12-07 RX ORDER — METOPROLOL SUCCINATE 100 MG/1
100 TABLET, EXTENDED RELEASE ORAL DAILY
Status: DISCONTINUED | OUTPATIENT
Start: 2018-12-08 | End: 2018-12-08 | Stop reason: HOSPADM

## 2018-12-07 RX ORDER — FLUTICASONE PROPIONATE 50 MCG
2 SPRAY, SUSPENSION (ML) NASAL DAILY PRN
Status: DISCONTINUED | OUTPATIENT
Start: 2018-12-07 | End: 2018-12-08 | Stop reason: HOSPADM

## 2018-12-07 RX ORDER — IBUPROFEN 200 MG
16 TABLET ORAL
Status: DISCONTINUED | OUTPATIENT
Start: 2018-12-07 | End: 2018-12-08 | Stop reason: HOSPADM

## 2018-12-07 RX ORDER — GLUCAGON 1 MG
1 KIT INJECTION
Status: DISCONTINUED | OUTPATIENT
Start: 2018-12-07 | End: 2018-12-08 | Stop reason: HOSPADM

## 2018-12-07 RX ORDER — FENTANYL CITRATE 50 UG/ML
25 INJECTION, SOLUTION INTRAMUSCULAR; INTRAVENOUS EVERY 5 MIN PRN
Status: DISCONTINUED | OUTPATIENT
Start: 2018-12-07 | End: 2018-12-08 | Stop reason: HOSPADM

## 2018-12-07 RX ORDER — LIDOCAINE HYDROCHLORIDE 20 MG/ML
INJECTION, SOLUTION INFILTRATION; PERINEURAL
Status: DISCONTINUED | OUTPATIENT
Start: 2018-12-07 | End: 2018-12-08 | Stop reason: HOSPADM

## 2018-12-07 RX ORDER — HEPARIN SODIUM 200 [USP'U]/100ML
INJECTION, SOLUTION INTRAVENOUS
Status: DISCONTINUED | OUTPATIENT
Start: 2018-12-07 | End: 2018-12-08 | Stop reason: HOSPADM

## 2018-12-07 RX ORDER — DOXEPIN HYDROCHLORIDE 10 MG/1
10 CAPSULE ORAL NIGHTLY
Status: DISCONTINUED | OUTPATIENT
Start: 2018-12-07 | End: 2018-12-08 | Stop reason: HOSPADM

## 2018-12-07 RX ADMIN — FUROSEMIDE 40 MG: 40 TABLET ORAL at 08:12

## 2018-12-07 RX ADMIN — FENTANYL CITRATE 25 MCG: 50 INJECTION, SOLUTION INTRAMUSCULAR; INTRAVENOUS at 07:12

## 2018-12-07 RX ADMIN — DOXEPIN HYDROCHLORIDE 10 MG: 10 CAPSULE ORAL at 08:12

## 2018-12-07 RX ADMIN — DEXMEDETOMIDINE HYDROCHLORIDE 0.5 MCG/KG/HR: 100 INJECTION, SOLUTION, CONCENTRATE INTRAVENOUS at 07:12

## 2018-12-07 RX ADMIN — PHENYLEPHRINE HYDROCHLORIDE 200 MCG: 10 INJECTION INTRAVENOUS at 07:12

## 2018-12-07 RX ADMIN — EPHEDRINE SULFATE 10 MG: 50 INJECTION, SOLUTION INTRAMUSCULAR; INTRAVENOUS; SUBCUTANEOUS at 08:12

## 2018-12-07 RX ADMIN — DEXMEDETOMIDINE HYDROCHLORIDE 40 MCG: 100 INJECTION, SOLUTION, CONCENTRATE INTRAVENOUS at 07:12

## 2018-12-07 RX ADMIN — DEXTROSE 2 G: 50 INJECTION, SOLUTION INTRAVENOUS at 07:12

## 2018-12-07 RX ADMIN — VASOPRESSIN 2 UNITS: 20 INJECTION INTRAVENOUS at 08:12

## 2018-12-07 RX ADMIN — APIXABAN 2.5 MG: 2.5 TABLET, FILM COATED ORAL at 08:12

## 2018-12-07 RX ADMIN — MIRTAZAPINE 7.5 MG: 7.5 TABLET ORAL at 08:12

## 2018-12-07 RX ADMIN — BENZONATATE 100 MG: 100 CAPSULE ORAL at 08:12

## 2018-12-07 RX ADMIN — SODIUM CHLORIDE: 0.9 INJECTION, SOLUTION INTRAVENOUS at 07:12

## 2018-12-07 RX ADMIN — PHENYLEPHRINE HYDROCHLORIDE 200 MCG: 10 INJECTION INTRAVENOUS at 08:12

## 2018-12-07 NOTE — Clinical Note
Manual pressure applied to the right femoral vein sheath insertion site. Manual pressure applied until hemostasis achieved; then applied gauze and tegaderm

## 2018-12-07 NOTE — OP NOTE
"    Post EP Procedure Note  Referring Physician: Jacques Burt MD  Procedure: ABLATION (N/A)       Access: Right CFV      See full report for further details    Intervention:   Successful AVN   Closure device: Manual pressure    Post Cath Exam:   /75 (BP Location: Right arm, Patient Position: Lying)   Pulse 91   Temp 98.7 °F (37.1 °C) (Oral)   Resp 16   Ht 5' 1.5" (1.562 m)   Wt 66.7 kg (147 lb)   LMP  (LMP Unknown)   SpO2 95%   Breastfeeding? No   BMI 27.33 kg/m²   No unusual pain, hematoma, thrill or bruit at vascular access site.  Distal pulse present without signs of ischemia.    Recommendations:   - Routine post-cath care  -Bed rest x 4 hrs  -resume meds (eliquis tonight)  -Discontinue amiodarone    YI ZEPEDA  PGY 5  "

## 2018-12-07 NOTE — TRANSFER OF CARE
"Anesthesia Transfer of Care Note    Patient: Dacia Martínez    Procedure(s) Performed: Procedure(s) (LRB):  ABLATION (N/A)    Patient location: PACU    Anesthesia Type: MAC    Transport from OR: Transported from OR on room air with adequate spontaneous ventilation. Transported from OR on 6-10 L/min O2 by face mask with adequate spontaneous ventilation    Post pain: adequate analgesia    Post assessment: no apparent anesthetic complications    Post vital signs: stable    Level of consciousness: awake and alert    Nausea/Vomiting: no nausea/vomiting    Complications: none    Transfer of care protocol was followed      Last vitals:   Visit Vitals  /75 (BP Location: Right arm, Patient Position: Lying)   Pulse 91   Temp 37.1 °C (98.7 °F) (Oral)   Resp 16   Ht 5' 1.5" (1.562 m)   Wt 66.7 kg (147 lb)   LMP  (LMP Unknown)   SpO2 95%   Breastfeeding? No   BMI 27.33 kg/m²     "

## 2018-12-07 NOTE — PLAN OF CARE
Vss. Pt vpaced on cm hr 90. sats 96% on room air.  No sob or pain noted.  Right groin incision with guaze/trans film. No hematoma or drainage noted.  Pt slight hard of hearing vance, but denies hearing aids.  Palpable pulses noted. See flowsheet for full assessment

## 2018-12-07 NOTE — HPI
83 year old female with pAF s/p DCCV (06/06/17, 12/20/2017) HTN, CAD, breast cancer, migraines, IDDM, and CKD III, who presented to [facility] [place] (07/07/[place]) for evaluation of a sudden-onset episode of palpitations/tachycardia. She was found to be in AF w/RVR at 120 bpm; an Echo at the time revealed an EF of 60%. Ms. Martínez was rate-controlled and discharged home.      8/2018EF was 30-35% in AF with new LBBB. -underwent CRT-P placement 10/2018.Patient was admitted 11/2018 with syncope. No arrhythmogenic cause found.patient is here for AVN ablation.

## 2018-12-07 NOTE — PROGRESS NOTES
Pt with discomfort to bladder. Unable to void with purewick.  Dr lopez notified. Ok to in and out cath per md order.  pericare done and sterile procedure done with pam dinero and ariel knox rn in ep pacu.  In and out cath placed. Pt tolerated well.  Clear yellow urine noted upon placement.  To be removed prior to transfer to sscu 317. vss

## 2018-12-07 NOTE — ANESTHESIA PREPROCEDURE EVALUATION
12/07/2018  Dacia Martínez is a 83 y.o., female   Patient Active Problem List   Diagnosis    Essential hypertension    Hyperlipidemia    History of breast cancer    Seasonal allergic rhinitis    Idiopathic chronic gout of multiple sites without tophus    Primary osteoarthritis involving multiple joints    Murmur, cardiac    BMI 28.0-28.9,adult    Microalbuminuria    Colon polyps    Constipation due to outlet dysfunction    Anemia    Type 2 diabetes mellitus with stage 3 chronic kidney disease, without long-term current use of insulin    Atherosclerosis of aorta    Nocturia    Postmenopausal    Chronic sinusitis    Hearing loss    Long term current use of opiate analgesic    EKG, abnormal    Persistent atrial fibrillation    Chronic pain syndrome    Atrial fibrillation with RVR    History of cardioversion    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, without long-term current use of insulin    Nonexudative age-related macular degeneration, bilateral, intermediate dry stage    Chronic GERD    Tachycardia    Shortness of breath    Chronic systolic congestive heart failure    Itching    CKD (chronic kidney disease) stage 3, GFR 30-59 ml/min    Monocular diplopia    Insufficiency of tear film of both eyes    Osteopenia of multiple sites    Hypovitaminosis D    Acute on chronic combined systolic and diastolic congestive heart failure    Severe aortic stenosis    Chronic atrial fibrillation    Intracranial atherosclerosis    Vertigo    Unsteady gait    Dilated cardiomyopathy    LBBB (left bundle branch block)    Atrial fibrillation    Falls    Orthostatic syncope    Hyperparathyroidism    Hypothyroidism    Edema of upper extremity    Syncope and collapse    Current use of long term  anticoagulation    Mild cognitive impairment    Atrial fibrillation, persistent         Anesthesia Evaluation         Review of Systems      Physical Exam  General:  Well nourished    Airway/Jaw/Neck:  Airway Findings: Mouth Opening: Normal Tongue: Normal  General Airway Assessment: Adult  Mallampati: II  Improves to II with phonation.  TM Distance: Normal, at least 6 cm      Dental:  Dental Findings: In tact   Chest/Lungs:  Chest/Lungs Findings: Clear to auscultation     Heart/Vascular:  Heart Findings: Rate: Normal  Rhythm: Regular Rhythm  Sounds: Normal        Mental Status:  Mental Status Findings:  Cooperative, Alert and Oriented         Anesthesia Plan  Type of Anesthesia, risks & benefits discussed:  Anesthesia Type:  general  Patient's Preference: General  Intra-op Monitoring Plan: standard ASA monitors  Intra-op Monitoring Plan Comments: Standard ASA monitors.   Post Op Pain Control Plan: per primary service following discharge from PACU  Post Op Pain Control Plan Comments: Per primary service.     Induction:   IV  Beta Blocker:  Patient is not currently on a Beta-Blocker (No further documentation required).       Informed Consent: Patient understands risks and agrees with Anesthesia plan.  Questions answered. Anesthesia consent signed with patient.  ASA Score: 4     Day of Surgery Review of History & Physical:    H&P update referred to the surgeon.     Anesthesia Plan Notes: Chart reviewed, patient interviewed and examined.  The plan for general anesthesia was explained.  Questions were answered and the consent was signed.  Dane FRAIRE         Ready For Surgery From Anesthesia Perspective.

## 2018-12-07 NOTE — NURSING TRANSFER
Nursing Transfer Note      12/7/2018     Transfer To: ep pacu 2 to s[facility]u 317    Transfer via stretcher    Transfer with cardiac monitoring, tele box 317  Transported by pam dinero    Medicines sent: none    Chart send with patient: Yes    Notified: daughter    Patient reassessed at: 12/7/18 1015, next due at 1045. q30min    Upon arrival to floor: cardiac monitor applied, patient oriented to room, call bell in reach and bed in lowest position

## 2018-12-07 NOTE — ASSESSMENT & PLAN NOTE
Plan for AVN ablation today   [facility] CRT-P in place  Risks and benefits explained to patient and she is agreeable to proceed.

## 2018-12-07 NOTE — H&P
[facility]  Cardiac Electrophysiology  History and Physical     Admission Date: 2018  Code Status: Prior   Attending Provider: Jacques Burt MD   Principal Problem:<principal problem not specified>    Subjective:     Chief Complaint:  Atrial fibrillation     HPI:  83 year old female with pAF s/p DCCV (17, 2017) HTN, CAD, breast cancer, migraines, IDDM, and CKD III, who presented to [facility] ED (17) for evaluation of a sudden-onset episode of palpitations/tachycardia. She was found to be in AF w/RVR at 120 bpm; an Echo at the time revealed an EF of 60%. Ms. Martínez was rate-controlled and discharged home.      2018EF was 30-35% in AF with new LBBB. -underwent CRT-P placement 10/2018.Patient was admitted 2018 with syncope. No arrhythmogenic cause found.patient is here for AVN ablation.      Past Medical History:   Diagnosis Date    A-fib     Arthritis     Asteroid hyalosis of left eye     Bilateral nonexudative age-related macular degeneration 6/3/2014    Breast cancer     Cataract     CHF (congestive heart failure)     Chronic GERD 2017    CKD stage 3 due to type 2 diabetes mellitus     Coronary artery disease     Encounter for blood transfusion     Hypertension     Migraine headache     Mild nonproliferative diabetic retinopathy of both eyes 6/3/2014    Pneumonia     Type 2 diabetes mellitus with hyperglycemia     Vertigo        Past Surgical History:   Procedure Laterality Date    BREAST SURGERY      left lumpectomy    CARDIOVERSION N/A 2017    Performed by Jacques [facility]urt MD at [facility] CATH LAB    CARPAL TUNNEL RELEASE      left    CATARACT EXTRACTION      vance     SECTION      COLONOSCOPY N/A 3/5/2015    Performed by Andrés Hobson MD at [facility] ENDO (4TH FLR)    ESOPHAGOGASTRODUODENOSCOPY (EGD) N/A 2016    Performed by Rory Ogden MD at [facility] ENDO (2ND FLR)    EYE SURGERY      cataracts bilaterally    HYSTERECTOMY       partial    IMPLANTATION OF BIVENTRICULAR HEART PACEMAKER N/A 10/26/2018    Procedure: INSERTION, CARDIAC PACEMAKER, BIVENTRICULAR;  Surgeon: Jacques Burt MD;  Location: [facility] CATH LAB;  Service: Cardiology;  Laterality: N/A;  AF, CRT-P, SJM, MAC, MB, 3 Prep    INSERTION, CARDIAC PACEMAKER, BIVENTRICULAR N/A 10/26/2018    Performed by Jacques Burt MD at [facility] CATH LAB    TRANSESOPHAGEAL ECHOCARDIOGRAM (REJI) N/A 12/20/2017    Performed by Ruma Long MD at [facility] CATH LAB    TRANSESOPHAGEAL ECHOCARDIOGRAM (REJI) N/A 6/6/2017    Performed by Angelique Taylor MD at [facility] CATH LAB       Review of patient's allergies indicates:   Allergen Reactions    Tramadol Hallucinations       No current facility-administered medications on file prior to encounter.      Current Outpatient Medications on File Prior to Encounter   Medication Sig    apixaban (ELIQUIS) 2.5 mg Tab Take 1 tablet (2.5 mg total) by mouth 2 (two) times daily. Resume on 11/1    ACCU-CHEK FASTCLIX Misc 1 lancet by Misc.(Non-Drug; Combo Route) route 3 (three) times daily. Pt to test blood glucose up to 3 times daily.    fluticasone (FLONASE) 50 mcg/actuation nasal spray SPRAY TWICE IN EACH NOSTRIL DAILY (Patient taking differently: 2 sprays by Each Nare route daily as needed. )    meclizine (ANTIVERT) 25 mg tablet Take 1 tablet (25 mg total) by mouth every 6 (six) hours.     Family History     Problem Relation (Age of Onset)    Blindness Brother    Cancer Mother    Cataracts Sister    Diabetes Father, Brother, Sister, Brother, Brother, Brother    Heart disease Mother    Hypertension Father, Brother    No Known Problems Daughter, Son, Daughter        Tobacco Use    Smoking status: Never Smoker    Smokeless tobacco: Never Used   Substance and Sexual Activity    Alcohol use: Yes    Drug use: No    Sexual activity: Not Currently     Review of Systems   All other systems reviewed and are negative.    Objective:     Vital Signs (Most  Recent):    Vital Signs (24h Range):             There is no height or weight on file to calculate BMI.            Physical Exam  General: alert, awake and oriented x 3  Eyes:PERRL.   Neck:no JVD   Lungs:  clear to auscultation bilaterally   Cardiovascular: Heart: regular rate and rhythm, S1, S2 normal, no murmur, click, rub or gallop.   Chest Wall: no tenderness.   Pulses-2+ radial  Extremities: no cyanosis 1+ edema.   Abdomen/Rectal: Abdomen: soft, non-tender non-distented; bowel sounds normal  Neurologic: Normal strength and tone. No focal numbness or weakness  Significant Labs: CMP: No results for input(s): NA, K, CL, CO2, GLU, BUN, CREATININE, CALCIUM, PROT, ALBUMIN, BILITOT, ALKPHOS, AST, ALT, ANIONGAP, ESTGFRAFRICA, EGFRNONAA in the last 48 hours. and CBC: No results for input(s): WBC, HGB, HCT, PLT in the last 48 hours.    Significant Imaging: Echocardiogram:   2D echo with color flow doppler:   Results for orders placed or performed during the hospital encounter of 06/21/18   2D echo with color flow doppler   Result Value Ref Range    QEF 33 (A) 55 - 65    Mitral Valve Regurgitation MILD     Aortic Valve Stenosis SEVERE (A)     Est. PA Systolic Pressure 58.03 (A)     Mitral Valve Mobility NORMAL     Mitral Valve Stenosis TRIVIAL     Tricuspid Valve Regurgitation MODERATE TO SEVERE (A)      Assessment and Plan:     Atrial fibrillation, persistent    Plan for AVN ablation today   [facility] CRT-P in place  Risks and benefits explained to patient and she is agreeable to proceed.  Last dose of eliquis yesterday pm       Macario Moon MD  Cardiac Electrophysiology  [facility]

## 2018-12-07 NOTE — SUBJECTIVE & OBJECTIVE
Past Medical History:   Diagnosis Date    A-fib     Arthritis     Asteroid hyalosis of left eye     Bilateral nonexudative age-related macular degeneration 6/3/2014    Breast cancer     Cataract     CHF (congestive heart failure)     Chronic GERD 2017    CKD stage 3 due to type 2 diabetes mellitus     Coronary artery disease     Encounter for blood transfusion     Hypertension     Migraine headache     Mild nonproliferative diabetic retinopathy of both eyes 6/3/2014    Pneumonia     Type 2 diabetes mellitus with hyperglycemia     Vertigo        Past Surgical History:   Procedure Laterality Date    BREAST SURGERY      left lumpectomy    CARDIOVERSION N/A 2017    Performed by Jacques [facility] at [facility] CATH LAB    CARPAL TUNNEL RELEASE      left    CATARACT EXTRACTION      vance     SECTION      COLONOSCOPY N/A 3/5/2015    Performed by Andrés Hobson MD at [facility] ENDO (4TH FLR)    ESOPHAGOGASTRODUODENOSCOPY (EGD) N/A 2016    Performed by Rory Ogden MD at [facility] ENDO (2ND FLR)    EYE SURGERY      cataracts bilaterally    HYSTERECTOMY      partial    IMPLANTATION OF BIVENTRICULAR HEART PACEMAKER N/A 10/26/2018    Procedure: INSERTION, CARDIAC PACEMAKER, BIVENTRICULAR;  Surgeon: Jacques Burt MD;  Location: [facility] CATH [facility];  Service: Cardiology;  Laterality: N/A;  AF, CRT-P, SJM, MAC, MB, 3 Prep    INSERTION, CARDIAC PACEMAKER, BIVENTRICULAR N/A 10/26/2018    Performed by Jacques Burt MD at [facility] CATH LAB    TRANSESOPHAGEAL ECHOCARDIOGRAM (REJI) N/A 2017    Performed by Ruma Long MD at [facility] CATH LAB    TRANSESOPHAGEAL ECHOCARDIOGRAM (REJI) N/A 2017    Performed by Angelique Taylor MD at [facility] CATH LAB       Review of patient's allergies indicates:   Allergen Reactions    Tramadol Hallucinations       No current facility-administered medications on file prior to encounter.      Current Outpatient Medications on File Prior to Encounter    Medication Sig    apixaban (ELIQUIS) 2.5 mg Tab Take 1 tablet (2.5 mg total) by mouth 2 (two) times daily. Resume on 11/1    ACCU-CHEK FASTCLIX Misc 1 lancet by Misc.(Non-Drug; Combo Route) route 3 (three) times daily. Pt to test blood glucose up to 3 times daily.    fluticasone (FLONASE) 50 mcg/actuation nasal spray SPRAY TWICE IN EACH NOSTRIL DAILY (Patient taking differently: 2 sprays by Each Nare route daily as needed. )    meclizine (ANTIVERT) 25 mg tablet Take 1 tablet (25 mg total) by mouth every 6 (six) hours.     Family History     Problem Relation (Age of Onset)    Blindness Brother    Cancer Mother    Cataracts Sister    Diabetes Father, Brother, Sister, Brother, Brother, Brother    Heart disease Mother    Hypertension Father, Brother    No Known Problems Daughter, Son, Daughter        Tobacco Use    Smoking status: Never Smoker    Smokeless tobacco: Never Used   Substance and Sexual Activity    Alcohol use: Yes    Drug use: No    Sexual activity: Not Currently     Review of Systems   All other systems reviewed and are negative.    Objective:     Vital Signs (Most Recent):    Vital Signs (24h Range):             There is no height or weight on file to calculate BMI.            Physical Exam  General: alert, awake and oriented x 3  Eyes:PERRL.   Neck:no JVD   Lungs:  clear to auscultation bilaterally   Cardiovascular: Heart: regular rate and rhythm, S1, S2 normal, no murmur, click, rub or gallop.   Chest Wall: no tenderness.   Pulses-2+ radial  Extremities: no cyanosis 1+ edema.   Abdomen/Rectal: Abdomen: soft, non-tender non-distented; bowel sounds normal  Neurologic: Normal strength and tone. No focal numbness or weakness  Significant Labs: CMP: No results for input(s): NA, K, CL, CO2, GLU, BUN, CREATININE, CALCIUM, PROT, ALBUMIN, BILITOT, ALKPHOS, AST, ALT, ANIONGAP, ESTGFRAFRICA, EGFRNONAA in the last 48 hours. and CBC: No results for input(s): WBC, HGB, HCT, PLT in the last 48  hours.    Significant Imaging: Echocardiogram:   2D echo with color flow doppler:   Results for orders placed or performed during the hospital encounter of 06/21/18   2D echo with color flow doppler   Result Value Ref Range    QEF 33 (A) 55 - 65    Mitral Valve Regurgitation MILD     Aortic Valve Stenosis SEVERE (A)     Est. PA Systolic Pressure 58.03 (A)     Mitral Valve Mobility NORMAL     Mitral Valve Stenosis TRIVIAL     Tricuspid Valve Regurgitation MODERATE TO SEVERE (A)

## 2018-12-07 NOTE — Clinical Note
Applied cardiac monitor, heel and elbow foam protectors,warming blanket, safety straps; heels elevated off bed with foam cushion

## 2018-12-07 NOTE — PLAN OF CARE
Problem: Patient Care Overview  Goal: Plan of Care Review  Outcome: Ongoing (interventions implemented as appropriate)  Received report from EROS Andres. Patient s/p RFA, AAOx3. VSS, no c/o pain or discomfort at this time, resp even and unlabored. right groin gauze/tegaderm dressing is CDI. Pulses 1+.  No active bleeding. No hematoma noted. Post procedure protocol reviewed with patient and patient's family. Understanding verbalized. Family members at bedside. Nurse call bell within reach. Will continue to monitor per post procedure protocol.

## 2018-12-07 NOTE — ANESTHESIA POSTPROCEDURE EVALUATION
"Anesthesia Post Evaluation    Patient: Dacia Martínez    Procedure(s) Performed: Procedure(s) (LRB):  ABLATION (N/A)    Final Anesthesia Type: general  Patient location during evaluation: PACU  Patient participation: Yes- Able to Participate  Level of consciousness: awake and alert  Post-procedure vital signs: reviewed and stable  Pain management: adequate  Airway patency: patent  PONV status at discharge: No PONV  Anesthetic complications: no      Cardiovascular status: hemodynamically stable  Respiratory status: unassisted  Hydration status: euvolemic  Follow-up not needed.        Visit Vitals  /86 (BP Location: Right arm, Patient Position: Lying)   Pulse 94   Temp 36.3 °C (97.3 °F) (Oral)   Resp 16   Ht 5' 1.5" (1.562 m)   Wt 66.7 kg (147 lb)   LMP  (LMP Unknown)   SpO2 97%   Breastfeeding? No   BMI 27.33 kg/m²       Pain/Ree Score: Pain Assessment Performed: Yes (12/7/2018 11:15 AM)  Presence of Pain: denies (12/7/2018 11:15 AM)  Pain Rating Prior to Med Admin: 0 (12/7/2018 10:15 AM)  Ree Score: 9 (12/7/2018 10:15 AM)        "

## 2018-12-08 VITALS
HEART RATE: 95 BPM | DIASTOLIC BLOOD PRESSURE: 71 MMHG | RESPIRATION RATE: 18 BRPM | OXYGEN SATURATION: 97 % | TEMPERATURE: 99 F | HEIGHT: 62 IN | BODY MASS INDEX: 27.05 KG/M2 | SYSTOLIC BLOOD PRESSURE: 119 MMHG | WEIGHT: 147 LBS

## 2018-12-08 RX ORDER — ADHESIVE BANDAGE
30 BANDAGE TOPICAL DAILY PRN
Status: DISCONTINUED | OUTPATIENT
Start: 2018-12-08 | End: 2018-12-08 | Stop reason: HOSPADM

## 2018-12-08 NOTE — DISCHARGE INSTRUCTIONS
1. Do not strain or lift anything greater than 5 lb for 1 week.  2. Do not drive or operate any dangerous machinery for 24 hours.   3. Keep the dressing on, clean, and dry for 24 hours.   4. After 24 hours, the dressing may be removed and a shower is allowed.   5. Clean the area with mild soap and water and then cover it with a bandage.   6. Once the skin has healed, bathing in a tub or swimming is allowed.   7. Inspect the groin site daily and report to the physician any swelling at the site that   cannot be controlled with manual pressure for 10 minutes, unusual pain at the   access site or affected extremity, unusual swelling at the access site, or signs or   symptoms of infection such as redness, pain, or fever.     Call 911 if you have:   Bleeding from the puncture site that you cannot stop by doing the following:   Relax and lie down right away. Keep your leg flat and apply firm pressure to the site using your fingers and a gauze pad. Keep the pressure on for 20 minutes. Continue this until the bleeding stops. This may take awhile. When bleeding stops, cover the site with a sterile bandage and keep your leg still as much as possible.

## 2018-12-08 NOTE — PLAN OF CARE
Problem: Patient Care Overview  Goal: Plan of Care Review  Outcome: Ongoing (interventions implemented as appropriate)  AM assessment completed at this time. Pt resting quietly in bed. nad noted. resp even, nonlabored. Daughter at bedside. Right groin drsg cdi. Plan of care reviewed with patient and daughter. Both verbalized understanding. Pt denies needs or complaints. Will continue to monitor.

## 2018-12-08 NOTE — HOSPITAL COURSE
Patient underwent successful AVN ablation.patient has CRT-P and permanent atrial fibrillation. She tolerated the procedure well and was monitored overnight. Discharged home in a stable condition.Recommended to Discontinue Amiodarone.

## 2018-12-08 NOTE — DISCHARGE SUMMARY
[facility]  Cardiac Electrophysiology  Discharge Summary      Patient Name: Dacia Martínez  MRN: [id number]  Admission Date: 12/7/2018  Hospital Length of Stay: 0 days  Discharge Date and Time:  12/08/2018 10:53 AM  Attending Physician: No att. providers found    Discharging Provider: Macario Moon MD  Primary Care Physician: Jasmeet Corral MD    HPI:   83 year old female with pAF s/p DCCV (06/06/17, 12/20/2017) HTN, CAD, breast cancer, migraines, IDDM, and CKD III, who presented to [facility] ED (07/07/17) for evaluation of a sudden-onset episode of palpitations/tachycardia. She was found to be in AF w/RVR at 120 bpm; an Echo at the time revealed an EF of 60%. Ms. Martínez was rate-controlled and discharged home.      8/2018EF was 30-35% in AF with new LBBB. -underwent CRT-P placement 10/2018.Patient was admitted 11/2018 with syncope. No arrhythmogenic cause found.patient is here for AVN ablation.      Procedure(s) (LRB):  ABLATION (N/A)     Indwelling Lines/Drains at time of discharge:  Lines/Drains/Airways          None          Hospital Course:  Patient underwent successful AVN ablation.patient has CRT-P and permanent atrial fibrillation. She tolerated the procedure well and was monitored overnight. Discharged home in a stable condition.Discontinue Amiodarone.    Consults: None    Significant Diagnostic Studies: Labs: CMP No results for input(s): NA, K, CL, CO2, GLU, BUN, CREATININE, CALCIUM, PROT, ALBUMIN, BILITOT, ALKPHOS, AST, ALT, ANIONGAP, ESTGFRAFRICA, EGFRNONAA in the last 48 hours. and CBC No results for input(s): WBC, HGB, HCT, PLT in the last 48 hours.    Pending Diagnostic Studies:     None          Final Active Diagnoses:    Diagnosis Date Noted POA    Atrial fibrillation, persistent [I48.1] 12/07/2018 Yes      Problems Resolved During this Admission:     No new Assessment & Plan notes have been filed under this hospital service since the last note was generated.  Service:  Arrhythmia      Discharged Condition: good    Disposition: Home or Self Care    Follow Up:  Follow-up Information     Jacques Burt MD In 6 weeks.    Specialties:  Electrophysiology, Cardiovascular Disease  Contact information:  [address]  [facility] [id number] [phone]                 Patient Instructions:      Other restrictions (specify):     Notify your health care provider if you experience any of the following:  temperature >100.4     Notify your health care provider if you experience any of the following:  persistent nausea and vomiting or diarrhea     Notify your health care provider if you experience any of the following:  severe uncontrolled pain     Notify your health care provider if you experience any of the following:  redness, tenderness, or signs of infection (pain, swelling, redness, odor or green/yellow discharge around incision site)     Notify your health care provider if you experience any of the following:  difficulty breathing or increased cough     Notify your health care provider if you experience any of the following:  severe persistent headache     Notify your health care provider if you experience any of the following:  worsening rash     Notify your health care provider if you experience any of the following:  persistent dizziness, light-headedness, or visual disturbances     Notify your health care provider if you experience any of the following:  increased confusion or weakness     Medications:  Reconciled Home Medications:      Medication List      CHANGE how you take these medications    fluticasone 50 mcg/actuation nasal spray  Commonly known as:  FLONASE  SPRAY TWICE IN EACH NOSTRIL DAILY  What changed:    · when to take this  · reasons to take this        CONTINUE taking these medications    ACCU-CHEK FASTCLIX LANCING DEV Misc  Generic drug:  lancets  1 lancet by Misc.(Non-Drug; Combo Route) route 3 (three) times daily. Pt to test blood glucose up to 3 times  daily.     allopurinol 100 MG tablet  Commonly known as:  ZYLOPRIM  Take 1 and 1/2 tablets (150 mg total) by mouth once daily.     benzonatate 100 MG capsule  Commonly known as:  TESSALON  TAKE ONE CAPSULE BY MOUTH THREE TIMES A DAY AS NEEDED FOR COUGH     cholecalciferol (vitamin D3) 1,000 unit capsule  Commonly known as:  VITAMIN D3  Take 2 capsules (2,000 Units total) by mouth once daily.     diphenhydrAMINE-zinc acetate 1-0.1% cream  Commonly known as:  BENADRYL  Apply topically 2 (two) times daily as needed for Itching.     doxepin 10 MG capsule  Commonly known as:  SINEQUAN  Take 10 mg by mouth every evening.     ELIQUIS 2.5 mg Tab  Generic drug:  apixaban  Take 1 tablet (2.5 mg total) by mouth 2 (two) times daily. Resume on 11/1     furosemide 20 MG tablet  Commonly known as:  LASIX  Take 2 tablets (40 mg total) by mouth 2 (two) times daily.     HYDROcodone-acetaminophen 5-325 mg per tablet  Commonly known as:  NORCO  Take 1 tablet by mouth every 8 (eight) hours as needed for Pain.     JANUVIA 25 MG Tab  Generic drug:  SITagliptin  Take 1 tablet (25 mg total) by mouth once daily.     levothyroxine 25 MCG tablet  Commonly known as:  SYNTHROID  Take 1 tablet (25 mcg total) by mouth daily before breakfast.     meclizine 25 mg tablet  Commonly known as:  ANTIVERT  Take 1 tablet (25 mg total) by mouth every 6 (six) hours.     metoprolol succinate 100 MG 24 hr tablet  Commonly known as:  TOPROL-XL  Take 1 tablet (100 mg total) by mouth once daily.     mirtazapine 7.5 MG Tab  Commonly known as:  REMERON  Take 1 tablet (7.5 mg total) by mouth every evening.     predniSONE 5 MG tablet  Commonly known as:  DELTASONE  Take 1-2 tablets by mouth as needed for gout attack     triamcinolone acetonide 0.1% 0.1 % cream  Commonly known as:  KENALOG  APPLY TOPICALLY TWO TIMES A DAY     valsartan 40 MG tablet  Commonly known as:  DIOVAN  Take 0.5 tablets (20 mg total) by mouth once daily.        STOP taking these medications     amiodarone 200 MG Tab  Commonly known as:  PACERONE            Time spent on the discharge of patient: 35 minutes    Macario Moon MD  Cardiac Electrophysiology  [facility]

## 2018-12-11 ENCOUNTER — IMMUNIZATION (OUTPATIENT)
Dept: PHARMACY | Facility: CLINIC | Age: 83
End: 2018-12-11
Payer: MEDICARE

## 2018-12-11 ENCOUNTER — OFFICE VISIT (OUTPATIENT)
Dept: OTOLARYNGOLOGY | Facility: CLINIC | Age: 83
End: 2018-12-11
Payer: MEDICARE

## 2018-12-11 VITALS — DIASTOLIC BLOOD PRESSURE: 86 MMHG | SYSTOLIC BLOOD PRESSURE: 133 MMHG | TEMPERATURE: 97 F | HEART RATE: 91 BPM

## 2018-12-11 DIAGNOSIS — H92.03 EAR DISCOMFORT, BILATERAL: ICD-10-CM

## 2018-12-11 DIAGNOSIS — K08.109 MISSING TEETH, ACQUIRED: ICD-10-CM

## 2018-12-11 DIAGNOSIS — R09.82 PND (POST-NASAL DRIP): ICD-10-CM

## 2018-12-11 DIAGNOSIS — J35.1 TONSILLAR HYPERTROPHY: ICD-10-CM

## 2018-12-11 DIAGNOSIS — J34.89 RHINORRHEA: ICD-10-CM

## 2018-12-11 DIAGNOSIS — R51.9 FRONTAL HEADACHE: Primary | ICD-10-CM

## 2018-12-11 DIAGNOSIS — Z88.9 HISTORY OF SEASONAL ALLERGIES: ICD-10-CM

## 2018-12-11 PROCEDURE — 3075F SYST BP GE 130 - 139MM HG: CPT | Mod: CPTII,S$GLB,, | Performed by: OTOLARYNGOLOGY

## 2018-12-11 PROCEDURE — 99213 OFFICE O/P EST LOW 20 MIN: CPT | Mod: S$GLB,,, | Performed by: OTOLARYNGOLOGY

## 2018-12-11 PROCEDURE — 3079F DIAST BP 80-89 MM HG: CPT | Mod: CPTII,S$GLB,, | Performed by: OTOLARYNGOLOGY

## 2018-12-11 PROCEDURE — 99999 PR PBB SHADOW E&M-EST. PATIENT-LVL IV: CPT | Mod: PBBFAC,,, | Performed by: OTOLARYNGOLOGY

## 2018-12-11 PROCEDURE — 1101F PT FALLS ASSESS-DOCD LE1/YR: CPT | Mod: CPTII,S$GLB,, | Performed by: OTOLARYNGOLOGY

## 2018-12-11 RX ORDER — AMOXICILLIN 500 MG/1
500 CAPSULE ORAL 3 TIMES DAILY
Qty: 30 CAPSULE | Refills: 0 | Status: SHIPPED | OUTPATIENT
Start: 2018-12-11 | End: 2019-01-01

## 2018-12-11 NOTE — PATIENT INSTRUCTIONS
Rx for Amoxil 500 mg # 30; take TID for sinusitis if tolerated  Written Rx for Atrovent 0.03% ; 1 spray @ nostril BID prn nasal drip  Hydration encouraged  Pt. may use AYR nasal mist prn  RTC prn

## 2018-12-11 NOTE — PROGRESS NOTES
"CC:"Siinus"   HPI:Ms. Martínez is an  83-year-old  female who is accompanied by younger relative; she presents in a wheelchair.  She indicates frontal headaches and nasal congestion symptoms which she equates  to a sinus condition.  She usually responds to a course of amoxicillin.    She was last treated by me ( Amoxil 500 mg TID) for similar symptoms 10/18/2018.  She indicates that her ears bother her as well.  She indicates clear mucus discharge from her nose presently.  She underwent an ablation procedure 12/07/2018.  Her medication list includes Eliquis,  Norco and  Remeron among others  She was  evaluated in ED  11/04/2018 for orthostatic syncope.  She completed a CT scan of the head 8/2018 without contrast then which indicated  no CT evidence of acute intracranial abnormality.  There was generalized cerebral volume loss and findings suggestive of chronic microvascular ischemic change and a remote left parasagittal occipital lobe infarct.  Mastoid air cells and paranasal sinuses were clear of acute processes.    Past Medical History:   Diagnosis Date    A-fib     Arthritis     Asteroid hyalosis of left eye     Bilateral nonexudative age-related macular degeneration 6/3/2014    Breast cancer     Cataract     CHF (congestive heart failure)     Chronic GERD 9/18/2017    CKD stage 3 due to type 2 diabetes mellitus     Coronary artery disease     Encounter for blood transfusion     Hypertension     Migraine headache     Mild nonproliferative diabetic retinopathy of both eyes 6/3/2014    Pneumonia     Type 2 diabetes mellitus with hyperglycemia     Vertigo       Allergies: Tramadol  Past Surgical History:   Procedure Laterality Date    ABLATION N/A 12/7/2018    Procedure: ABLATION;  Surgeon: Jacques Burt MD;  [facility]: [facility];  Service: Cardiology;  Laterality: N/A;  AF, AVN, RFA, Choice, MB, 3 Prep *SJM CRT-P in situ*    ABLATION N/A 12/7/2018    Performed by Jacques ESTEVEZ" MD Javed at [facility] EP LAB    BREAST SURGERY      left lumpectomy    CARDIOVERSION N/A 2017    Performed by Jacques Burt MD at [facility] CATH LAB    CARPAL TUNNEL RELEASE      left    CATARACT EXTRACTION      vance     SECTION      COLONOSCOPY N/A 3/5/2015    Performed by Andrés Hobson MD at [facility] ENDO (4TH FLR)    ESOPHAGOGASTRODUODENOSCOPY (EGD) N/A 2016    Performed by Rory Ogden MD at [facility] ENDO (2ND FLR)    EYE SURGERY      cataracts bilaterally    HYSTERECTOMY      partial    IMPLANTATION OF BIVENTRICULAR HEART PACEMAKER N/A 10/26/2018    Procedure: INSERTION, CARDIAC PACEMAKER, BIVENTRICULAR;  Surgeon: Jacques Burt MD;  Location: [facility] CATH LAB;  Service: Cardiology;  Laterality: N/A;  AF, CRT-P, SJM, MAC, MB, 3 Prep    INSERTION, CARDIAC PACEMAKER, BIVENTRICULAR N/A 10/26/2018    Performed by Jacques Burt MD at [facility] CATH LAB    TRANSESOPHAGEAL ECHOCARDIOGRAM (REJI) N/A 2017    Performed by Ruma Long MD at [facility] CATH LAB    TRANSESOPHAGEAL ECHOCARDIOGRAM (REJI) N/A 2017    Performed by Angelique Taylor MD at [facility] CATH LAB       Current Outpatient Medications on File Prior to Visit   Medication Sig Dispense Refill    ACCU-CHEK FASTCLIX Misc 1 lancet by Misc.(Non-Drug; Combo Route) route 3 (three) times daily. Pt to test blood glucose up to 3 times daily. 100 each 11    allopurinol (ZYLOPRIM) 100 MG tablet Take 1 and 1/2 tablets (150 mg total) by mouth once daily. 60 tablet 2    apixaban (ELIQUIS) 2.5 mg Tab Take 1 tablet (2.5 mg total) by mouth 2 (two) times daily. Resume on  60 tablet 11    benzonatate (TESSALON) 100 MG capsule TAKE ONE CAPSULE BY MOUTH THREE TIMES A DAY AS NEEDED FOR COUGH 90 capsule 11    cholecalciferol, vitamin D3, (VITAMIN D3) 1,000 unit capsule Take 2 capsules (2,000 Units total) by mouth once daily. 180 capsule 3    diphenhydrAMINE-zinc acetate 1-0.1% (BENADRYL) cream Apply topically 2 (two) times daily as  needed for Itching.  0    doxepin (SINEQUAN) 10 MG capsule Take 10 mg by mouth every evening.      fluticasone (FLONASE) 50 mcg/actuation nasal spray SPRAY TWICE IN EACH NOSTRIL DAILY (Patient taking differently: 2 sprays by Each Nare route daily as needed. ) 16 g 5    furosemide (LASIX) 20 MG tablet Take 2 tablets (40 mg total) by mouth 2 (two) times daily. 120 tablet 11    HYDROcodone-acetaminophen (NORCO) 5-325 mg per tablet Take 1 tablet by mouth every 8 (eight) hours as needed for Pain. 90 tablet 0    levothyroxine (SYNTHROID) 25 MCG tablet Take 1 tablet (25 mcg total) by mouth daily before breakfast. 30 tablet 11    meclizine (ANTIVERT) 25 mg tablet Take 1 tablet (25 mg total) by mouth every 6 (six) hours. 120 tablet 2    metoprolol succinate (TOPROL-XL) 100 MG 24 hr tablet Take 1 tablet (100 mg total) by mouth once daily. 30 tablet 11    mirtazapine (REMERON) 7.5 MG Tab Take 1 tablet (7.5 mg total) by mouth every evening. 30 tablet 11    predniSONE (DELTASONE) 5 MG tablet Take 1-2 tablets by mouth as needed for gout attack 30 tablet 0    SITagliptin (JANUVIA) 25 MG Tab Take 1 tablet (25 mg total) by mouth once daily. 30 tablet 11    triamcinolone acetonide 0.1% (KENALOG) 0.1 % cream APPLY TOPICALLY TWO TIMES A DAY 80 g 0    valsartan (DIOVAN) 40 MG tablet Take 0.5 tablets (20 mg total) by mouth once daily. 45 tablet 3     No current facility-administered medications on file prior to visit.          PE:  Blood pressure 133/86 pulse 91 temperature 96.9 height 5 ft 1-1/2 in weight 147 lb   general: Alert and oriented lady in acute distress   otologic exam reveals no evidence of fluid or infection behind either eardrum.  There is a horizontal wrinkle of the mid right tympanic membrane noted.    There is no specific evidence of purulent nasal discharge in either nasal passage.    Oropharyngeal exam reveals evidence for 3+ cryptic tonsils without exudate.  There is no evidence of uvular swelling or  posterior pharyngitis.    Neck examination is within normal limits.      DIAGNOSIS:     ICD-10-CM ICD-9-CM    1. Frontal headache R51 784.0 amoxicillin (AMOXIL) 500 MG capsule   2. Ear discomfort, bilateral H92.03 388.70    3. PND (post-nasal drip) R09.82 784.91 amoxicillin (AMOXIL) 500 MG capsule   4. Rhinorrhea J34.89 478.19 amoxicillin (AMOXIL) 500 MG capsule   5. History of seasonal allergies Z88.9 V15.09    6. Tonsillar hypertrophy J35.1 474.11    7. Missing teeth, acquired K08.109 525.40      525.10      PLAN: Rx for Amoxil 500 mg # 30; take TID for sinusitis if tolerated  Written Rx for Atrovent 0.03% ; 1 spray @ nostril BID prn nasal drip  Hydration encouraged  Pt. may use AYR nasal mist prn  RTC prn

## 2018-12-19 DIAGNOSIS — L29.9 PRURITUS: ICD-10-CM

## 2018-12-19 RX ORDER — TRIAMCINOLONE ACETONIDE 1 MG/G
CREAM TOPICAL 2 TIMES DAILY
Qty: 80 G | Refills: 0 | Status: SHIPPED | OUTPATIENT
Start: 2018-12-19 | End: 2019-01-04 | Stop reason: SDUPTHER

## 2018-12-26 ENCOUNTER — TELEPHONE (OUTPATIENT)
Dept: RHEUMATOLOGY | Facility: CLINIC | Age: 83
End: 2018-12-26

## 2018-12-26 RX ORDER — HYDROCODONE BITARTRATE AND ACETAMINOPHEN 5; 325 MG/1; MG/1
1-2 TABLET ORAL EVERY 6 HOURS PRN
Qty: 60 TABLET | Refills: 0 | Status: SHIPPED | OUTPATIENT
Start: 2018-12-26 | End: 2019-01-01

## 2019-01-01 ENCOUNTER — HOSPITAL ENCOUNTER (OUTPATIENT)
Dept: CARDIOLOGY | Facility: CLINIC | Age: 84
Discharge: HOME | End: 2019-01-29
Payer: MEDICARE

## 2019-01-01 ENCOUNTER — TELEPHONE (OUTPATIENT)
Dept: INTERNAL MEDICINE | Facility: CLINIC | Age: 84
End: 2019-01-01

## 2019-01-01 ENCOUNTER — OFFICE VISIT (OUTPATIENT)
Dept: INTERNAL MEDICINE | Facility: CLINIC | Age: 84
End: 2019-01-01
Payer: MEDICARE

## 2019-01-01 ENCOUNTER — TELEPHONE (OUTPATIENT)
Dept: ELECTROPHYSIOLOGY | Facility: CLINIC | Age: 84
End: 2019-01-01

## 2019-01-01 ENCOUNTER — OFFICE VISIT (OUTPATIENT)
Dept: OPHTHALMOLOGY | Facility: CLINIC | Age: 84
End: 2019-01-01
Payer: MEDICARE

## 2019-01-01 ENCOUNTER — NURSE TRIAGE (OUTPATIENT)
Dept: ADMINISTRATIVE | Facility: CLINIC | Age: 84
End: 2019-01-01

## 2019-01-01 ENCOUNTER — TELEPHONE (OUTPATIENT)
Dept: CARDIOLOGY | Facility: HOSPITAL | Age: 84
End: 2019-01-01

## 2019-01-01 ENCOUNTER — OFFICE VISIT (OUTPATIENT)
Dept: OTOLARYNGOLOGY | Facility: CLINIC | Age: 84
End: 2019-01-01
Payer: MEDICARE

## 2019-01-01 ENCOUNTER — LAB VISIT (OUTPATIENT)
Dept: LAB | Facility: HOSPITAL | Age: 84
End: 2019-01-01
Attending: FAMILY MEDICINE
Payer: MEDICARE

## 2019-01-01 ENCOUNTER — TELEPHONE (OUTPATIENT)
Dept: OTOLARYNGOLOGY | Facility: CLINIC | Age: 84
End: 2019-01-01

## 2019-01-01 ENCOUNTER — HOSPITAL ENCOUNTER (OUTPATIENT)
Dept: CARDIOLOGY | Facility: CLINIC | Age: 84
Discharge: HOME | End: 2019-02-28
Attending: STUDENT IN AN ORGANIZED HEALTH CARE EDUCATION/TRAINING PROGRAM
Payer: MEDICARE

## 2019-01-01 ENCOUNTER — HOSPITAL ENCOUNTER (EMERGENCY)
Facility: HOSPITAL | Age: 84
Discharge: HOME | End: 2019-10-23
Attending: EMERGENCY MEDICINE
Payer: MEDICARE

## 2019-01-01 ENCOUNTER — TELEPHONE (OUTPATIENT)
Dept: PAIN MEDICINE | Facility: CLINIC | Age: 84
End: 2019-01-01

## 2019-01-01 ENCOUNTER — OFFICE VISIT (OUTPATIENT)
Dept: RHEUMATOLOGY | Facility: CLINIC | Age: 84
End: 2019-01-01
Payer: MEDICARE

## 2019-01-01 ENCOUNTER — LAB VISIT (OUTPATIENT)
Dept: LAB | Facility: HOSPITAL | Age: 84
End: 2019-01-01
Attending: INTERNAL MEDICINE
Payer: MEDICARE

## 2019-01-01 ENCOUNTER — TELEPHONE (OUTPATIENT)
Dept: RHEUMATOLOGY | Facility: CLINIC | Age: 84
End: 2019-01-01

## 2019-01-01 ENCOUNTER — TELEPHONE (OUTPATIENT)
Dept: PHARMACY | Facility: CLINIC | Age: 84
End: 2019-01-01

## 2019-01-01 ENCOUNTER — DOCUMENTATION ONLY (OUTPATIENT)
Dept: PAIN MEDICINE | Facility: CLINIC | Age: 84
End: 2019-01-01

## 2019-01-01 ENCOUNTER — PES CALL (OUTPATIENT)
Dept: ADMINISTRATIVE | Facility: CLINIC | Age: 84
End: 2019-01-01

## 2019-01-01 ENCOUNTER — OFFICE VISIT (OUTPATIENT)
Dept: ENDOCRINOLOGY | Facility: CLINIC | Age: 84
End: 2019-01-01
Payer: MEDICARE

## 2019-01-01 ENCOUNTER — HOSPITAL ENCOUNTER (INPATIENT)
Facility: HOSPITAL | Age: 84
LOS: 13 days | DRG: 064 | End: 2020-01-13
Attending: EMERGENCY MEDICINE | Admitting: PSYCHIATRY & NEUROLOGY
Payer: MEDICARE

## 2019-01-01 ENCOUNTER — INITIAL CONSULT (OUTPATIENT)
Dept: DERMATOLOGY | Facility: CLINIC | Age: 84
End: 2019-01-01
Payer: MEDICARE

## 2019-01-01 ENCOUNTER — TELEPHONE (OUTPATIENT)
Dept: ENDOCRINOLOGY | Facility: CLINIC | Age: 84
End: 2019-01-01

## 2019-01-01 ENCOUNTER — INITIAL CONSULT (OUTPATIENT)
Dept: RHEUMATOLOGY | Facility: CLINIC | Age: 84
End: 2019-01-01
Payer: MEDICARE

## 2019-01-01 ENCOUNTER — PATIENT OUTREACH (OUTPATIENT)
Dept: ADMINISTRATIVE | Facility: OTHER | Age: 84
End: 2019-01-01

## 2019-01-01 ENCOUNTER — CLINICAL SUPPORT (OUTPATIENT)
Dept: CARDIOLOGY | Facility: HOSPITAL | Age: 84
End: 2019-01-01
Attending: INTERNAL MEDICINE
Payer: MEDICARE

## 2019-01-01 ENCOUNTER — HOSPITAL ENCOUNTER (OUTPATIENT)
Dept: CARDIOLOGY | Facility: CLINIC | Age: 84
Discharge: HOME | End: 2019-02-21
Attending: STUDENT IN AN ORGANIZED HEALTH CARE EDUCATION/TRAINING PROGRAM
Payer: MEDICARE

## 2019-01-01 ENCOUNTER — PATIENT OUTREACH (OUTPATIENT)
Dept: ADMINISTRATIVE | Facility: HOSPITAL | Age: 84
End: 2019-01-01

## 2019-01-01 ENCOUNTER — CLINICAL SUPPORT (OUTPATIENT)
Dept: INTERNAL MEDICINE | Facility: CLINIC | Age: 84
End: 2019-01-01
Payer: MEDICARE

## 2019-01-01 ENCOUNTER — OFFICE VISIT (OUTPATIENT)
Dept: CARDIOLOGY | Facility: CLINIC | Age: 84
End: 2019-01-01
Payer: MEDICARE

## 2019-01-01 ENCOUNTER — CLINICAL SUPPORT (OUTPATIENT)
Dept: CARDIOLOGY | Facility: HOSPITAL | Age: 84
End: 2019-01-01
Payer: MEDICARE

## 2019-01-01 ENCOUNTER — OFFICE VISIT (OUTPATIENT)
Dept: ELECTROPHYSIOLOGY | Facility: CLINIC | Age: 84
End: 2019-01-01
Payer: MEDICARE

## 2019-01-01 ENCOUNTER — HOSPITAL ENCOUNTER (OUTPATIENT)
Dept: RADIOLOGY | Facility: CLINIC | Age: 84
Discharge: HOME | End: 2019-04-22
Attending: INTERNAL MEDICINE
Payer: MEDICARE

## 2019-01-01 ENCOUNTER — OFFICE VISIT (OUTPATIENT)
Dept: OPTOMETRY | Facility: CLINIC | Age: 84
End: 2019-01-01
Payer: MEDICARE

## 2019-01-01 ENCOUNTER — HOSPITAL ENCOUNTER (OUTPATIENT)
Dept: RADIOLOGY | Facility: HOSPITAL | Age: 84
Discharge: HOME | End: 2019-05-06
Attending: ANESTHESIOLOGY
Payer: MEDICARE

## 2019-01-01 ENCOUNTER — IMMUNIZATION (OUTPATIENT)
Dept: PHARMACY | Facility: CLINIC | Age: 84
End: 2019-01-01
Payer: MEDICARE

## 2019-01-01 ENCOUNTER — TELEPHONE (OUTPATIENT)
Dept: ENDOSCOPY | Facility: HOSPITAL | Age: 84
End: 2019-01-01

## 2019-01-01 VITALS
HEIGHT: 61 IN | DIASTOLIC BLOOD PRESSURE: 74 MMHG | HEART RATE: 70 BPM | SYSTOLIC BLOOD PRESSURE: 122 MMHG | OXYGEN SATURATION: 99 % | WEIGHT: 158 LBS | BODY MASS INDEX: 29.83 KG/M2

## 2019-01-01 VITALS
TEMPERATURE: 99 F | HEIGHT: 60 IN | RESPIRATION RATE: 18 BRPM | OXYGEN SATURATION: 98 % | SYSTOLIC BLOOD PRESSURE: 180 MMHG | DIASTOLIC BLOOD PRESSURE: 82 MMHG | HEART RATE: 71 BPM | BODY MASS INDEX: 28.47 KG/M2 | WEIGHT: 145 LBS

## 2019-01-01 VITALS
SYSTOLIC BLOOD PRESSURE: 118 MMHG | DIASTOLIC BLOOD PRESSURE: 68 MMHG | RESPIRATION RATE: 28 BRPM | WEIGHT: 145 LBS | HEIGHT: 61 IN | WEIGHT: 150 LBS | DIASTOLIC BLOOD PRESSURE: 60 MMHG | HEIGHT: 61 IN | SYSTOLIC BLOOD PRESSURE: 124 MMHG | HEART RATE: 75 BPM | BODY MASS INDEX: 27.38 KG/M2 | BODY MASS INDEX: 28.32 KG/M2

## 2019-01-01 VITALS
HEIGHT: 61 IN | WEIGHT: 150 LBS | BODY MASS INDEX: 28.32 KG/M2 | HEART RATE: 73 BPM | HEIGHT: 61 IN | SYSTOLIC BLOOD PRESSURE: 120 MMHG | BODY MASS INDEX: 27.77 KG/M2 | WEIGHT: 147.06 LBS | OXYGEN SATURATION: 100 % | HEART RATE: 74 BPM | DIASTOLIC BLOOD PRESSURE: 80 MMHG | SYSTOLIC BLOOD PRESSURE: 106 MMHG | DIASTOLIC BLOOD PRESSURE: 52 MMHG

## 2019-01-01 VITALS
BODY MASS INDEX: 28.47 KG/M2 | SYSTOLIC BLOOD PRESSURE: 124 MMHG | HEIGHT: 61 IN | WEIGHT: 150.81 LBS | HEART RATE: 76 BPM | DIASTOLIC BLOOD PRESSURE: 59 MMHG

## 2019-01-01 VITALS
DIASTOLIC BLOOD PRESSURE: 73 MMHG | BODY MASS INDEX: 30.6 KG/M2 | WEIGHT: 162.06 LBS | HEART RATE: 70 BPM | SYSTOLIC BLOOD PRESSURE: 128 MMHG | HEIGHT: 61 IN

## 2019-01-01 VITALS
OXYGEN SATURATION: 98 % | WEIGHT: 165.13 LBS | HEIGHT: 61 IN | HEART RATE: 70 BPM | BODY MASS INDEX: 31.18 KG/M2 | SYSTOLIC BLOOD PRESSURE: 134 MMHG | DIASTOLIC BLOOD PRESSURE: 82 MMHG

## 2019-01-01 VITALS
BODY MASS INDEX: 31.55 KG/M2 | DIASTOLIC BLOOD PRESSURE: 74 MMHG | TEMPERATURE: 98 F | HEART RATE: 71 BPM | WEIGHT: 167.13 LBS | SYSTOLIC BLOOD PRESSURE: 130 MMHG | HEIGHT: 61 IN | OXYGEN SATURATION: 99 %

## 2019-01-01 VITALS
DIASTOLIC BLOOD PRESSURE: 72 MMHG | HEIGHT: 61 IN | SYSTOLIC BLOOD PRESSURE: 119 MMHG | BODY MASS INDEX: 32.51 KG/M2 | WEIGHT: 172.19 LBS | HEART RATE: 69 BPM | TEMPERATURE: 98 F

## 2019-01-01 VITALS
BODY MASS INDEX: 30.6 KG/M2 | OXYGEN SATURATION: 99 % | WEIGHT: 162.06 LBS | TEMPERATURE: 98 F | SYSTOLIC BLOOD PRESSURE: 120 MMHG | HEART RATE: 70 BPM | HEIGHT: 61 IN | DIASTOLIC BLOOD PRESSURE: 76 MMHG

## 2019-01-01 VITALS
WEIGHT: 163.56 LBS | DIASTOLIC BLOOD PRESSURE: 77 MMHG | SYSTOLIC BLOOD PRESSURE: 118 MMHG | HEIGHT: 60 IN | BODY MASS INDEX: 32.11 KG/M2 | HEART RATE: 66 BPM

## 2019-01-01 VITALS
HEART RATE: 75 BPM | DIASTOLIC BLOOD PRESSURE: 98 MMHG | HEIGHT: 61 IN | SYSTOLIC BLOOD PRESSURE: 158 MMHG | BODY MASS INDEX: 27.78 KG/M2

## 2019-01-01 VITALS
DIASTOLIC BLOOD PRESSURE: 81 MMHG | HEART RATE: 77 BPM | WEIGHT: 160.94 LBS | SYSTOLIC BLOOD PRESSURE: 148 MMHG | BODY MASS INDEX: 30.39 KG/M2 | HEIGHT: 61 IN

## 2019-01-01 VITALS
SYSTOLIC BLOOD PRESSURE: 144 MMHG | DIASTOLIC BLOOD PRESSURE: 63 MMHG | HEIGHT: 61 IN | DIASTOLIC BLOOD PRESSURE: 67 MMHG | SYSTOLIC BLOOD PRESSURE: 112 MMHG | WEIGHT: 154.56 LBS | BODY MASS INDEX: 29.18 KG/M2 | HEART RATE: 72 BPM | HEART RATE: 70 BPM

## 2019-01-01 VITALS — SYSTOLIC BLOOD PRESSURE: 118 MMHG | DIASTOLIC BLOOD PRESSURE: 69 MMHG | HEART RATE: 69 BPM

## 2019-01-01 VITALS
WEIGHT: 165.38 LBS | DIASTOLIC BLOOD PRESSURE: 57 MMHG | TEMPERATURE: 98 F | BODY MASS INDEX: 31.23 KG/M2 | SYSTOLIC BLOOD PRESSURE: 104 MMHG | HEIGHT: 61 IN | HEART RATE: 69 BPM

## 2019-01-01 VITALS
HEIGHT: 61 IN | BODY MASS INDEX: 29.18 KG/M2 | WEIGHT: 154.56 LBS | SYSTOLIC BLOOD PRESSURE: 122 MMHG | TEMPERATURE: 98 F | DIASTOLIC BLOOD PRESSURE: 64 MMHG | HEART RATE: 70 BPM | OXYGEN SATURATION: 100 %

## 2019-01-01 VITALS
HEIGHT: 61 IN | WEIGHT: 167 LBS | SYSTOLIC BLOOD PRESSURE: 115 MMHG | BODY MASS INDEX: 31.53 KG/M2 | DIASTOLIC BLOOD PRESSURE: 60 MMHG

## 2019-01-01 DIAGNOSIS — G31.84 MILD COGNITIVE IMPAIRMENT: ICD-10-CM

## 2019-01-01 DIAGNOSIS — I48.19 PERSISTENT ATRIAL FIBRILLATION: ICD-10-CM

## 2019-01-01 DIAGNOSIS — M1A.09X0 IDIOPATHIC CHRONIC GOUT OF MULTIPLE SITES WITHOUT TOPHUS: Chronic | ICD-10-CM

## 2019-01-01 DIAGNOSIS — N18.30 TYPE 2 DIABETES MELLITUS WITH STAGE 3 CHRONIC KIDNEY DISEASE, WITHOUT LONG-TERM CURRENT USE OF INSULIN: ICD-10-CM

## 2019-01-01 DIAGNOSIS — J01.91 ACUTE RECURRENT SINUSITIS, UNSPECIFIED LOCATION: Primary | ICD-10-CM

## 2019-01-01 DIAGNOSIS — E11.22 TYPE 2 DIABETES MELLITUS WITH STAGE 3 CHRONIC KIDNEY DISEASE, WITHOUT LONG-TERM CURRENT USE OF INSULIN: Primary | ICD-10-CM

## 2019-01-01 DIAGNOSIS — M15.9 PRIMARY OSTEOARTHRITIS INVOLVING MULTIPLE JOINTS: Chronic | ICD-10-CM

## 2019-01-01 DIAGNOSIS — M10.9 GOUT, ARTHRITIS: ICD-10-CM

## 2019-01-01 DIAGNOSIS — J32.0 CHRONIC MAXILLARY SINUSITIS: Primary | ICD-10-CM

## 2019-01-01 DIAGNOSIS — S80.10XA HEMATOMA OF LOWER LEG: ICD-10-CM

## 2019-01-01 DIAGNOSIS — J30.2 SEASONAL ALLERGIC RHINITIS, UNSPECIFIED TRIGGER: ICD-10-CM

## 2019-01-01 DIAGNOSIS — I10 ESSENTIAL HYPERTENSION: ICD-10-CM

## 2019-01-01 DIAGNOSIS — N18.30 CKD (CHRONIC KIDNEY DISEASE) STAGE 3, GFR 30-59 ML/MIN: ICD-10-CM

## 2019-01-01 DIAGNOSIS — J32.9 SINUSITIS, UNSPECIFIED CHRONICITY, UNSPECIFIED LOCATION: ICD-10-CM

## 2019-01-01 DIAGNOSIS — E11.3293 CONTROLLED TYPE 2 DIABETES MELLITUS WITH BOTH EYES AFFECTED BY MILD NONPROLIFERATIVE RETINOPATHY WITHOUT MACULAR EDEMA, WITHOUT LONG-TERM CURRENT USE OF INSULIN: ICD-10-CM

## 2019-01-01 DIAGNOSIS — T36.95XA ANTIBIOTIC-INDUCED YEAST INFECTION: ICD-10-CM

## 2019-01-01 DIAGNOSIS — R39.89 SUSPECTED UTI: Primary | ICD-10-CM

## 2019-01-01 DIAGNOSIS — E11.22 TYPE 2 DIABETES MELLITUS WITH STAGE 3 CHRONIC KIDNEY DISEASE, WITHOUT LONG-TERM CURRENT USE OF INSULIN: ICD-10-CM

## 2019-01-01 DIAGNOSIS — B37.31 VAGINAL YEAST INFECTION: Primary | ICD-10-CM

## 2019-01-01 DIAGNOSIS — I50.31 ACUTE DIASTOLIC HEART FAILURE: Primary | ICD-10-CM

## 2019-01-01 DIAGNOSIS — M1A.09X0 IDIOPATHIC CHRONIC GOUT OF MULTIPLE SITES WITHOUT TOPHUS: ICD-10-CM

## 2019-01-01 DIAGNOSIS — T14.8XXA HEMATOMA: Primary | ICD-10-CM

## 2019-01-01 DIAGNOSIS — I50.22 CHRONIC SYSTOLIC CONGESTIVE HEART FAILURE: ICD-10-CM

## 2019-01-01 DIAGNOSIS — J18.9 PNEUMONIA OF LEFT LOWER LOBE DUE TO INFECTIOUS ORGANISM: ICD-10-CM

## 2019-01-01 DIAGNOSIS — I35.0 SEVERE AORTIC STENOSIS: ICD-10-CM

## 2019-01-01 DIAGNOSIS — Z95.0 CARDIAC PACEMAKER IN SITU: Primary | ICD-10-CM

## 2019-01-01 DIAGNOSIS — G89.4 CHRONIC PAIN SYNDROME: ICD-10-CM

## 2019-01-01 DIAGNOSIS — N18.4 STAGE 4 CHRONIC KIDNEY DISEASE: ICD-10-CM

## 2019-01-01 DIAGNOSIS — H04.201 RIGHT EPIPHORA: Primary | ICD-10-CM

## 2019-01-01 DIAGNOSIS — I35.0 MODERATE AORTIC STENOSIS: ICD-10-CM

## 2019-01-01 DIAGNOSIS — B37.9 ANTIBIOTIC-INDUCED YEAST INFECTION: ICD-10-CM

## 2019-01-01 DIAGNOSIS — H61.21 IMPACTED CERUMEN OF RIGHT EAR: ICD-10-CM

## 2019-01-01 DIAGNOSIS — G93.5 BRAIN COMPRESSION: ICD-10-CM

## 2019-01-01 DIAGNOSIS — J02.9 SORE THROAT: ICD-10-CM

## 2019-01-01 DIAGNOSIS — Z00.00 ENCOUNTER FOR PREVENTIVE HEALTH EXAMINATION: Primary | ICD-10-CM

## 2019-01-01 DIAGNOSIS — L29.9 ITCHING: ICD-10-CM

## 2019-01-01 DIAGNOSIS — G89.4 CHRONIC PAIN SYNDROME: Primary | ICD-10-CM

## 2019-01-01 DIAGNOSIS — M15.9 PRIMARY OSTEOARTHRITIS INVOLVING MULTIPLE JOINTS: Primary | Chronic | ICD-10-CM

## 2019-01-01 DIAGNOSIS — L29.9 PRURITUS: ICD-10-CM

## 2019-01-01 DIAGNOSIS — M35.3 PMR (POLYMYALGIA RHEUMATICA): Primary | ICD-10-CM

## 2019-01-01 DIAGNOSIS — Z88.9 HISTORY OF SEASONAL ALLERGIES: ICD-10-CM

## 2019-01-01 DIAGNOSIS — B37.31 VAGINAL YEAST INFECTION: ICD-10-CM

## 2019-01-01 DIAGNOSIS — J30.9 ALLERGIC RHINITIS, UNSPECIFIED SEASONALITY, UNSPECIFIED TRIGGER: Primary | ICD-10-CM

## 2019-01-01 DIAGNOSIS — E03.9 HYPOTHYROIDISM, UNSPECIFIED TYPE: ICD-10-CM

## 2019-01-01 DIAGNOSIS — I63.9 STROKE: ICD-10-CM

## 2019-01-01 DIAGNOSIS — H35.3132 NONEXUDATIVE AGE-RELATED MACULAR DEGENERATION, BILATERAL, INTERMEDIATE DRY STAGE: ICD-10-CM

## 2019-01-01 DIAGNOSIS — Z23 NEED FOR TETANUS BOOSTER: ICD-10-CM

## 2019-01-01 DIAGNOSIS — Z95.0 BIVENTRICULAR CARDIAC PACEMAKER IN SITU: Primary | ICD-10-CM

## 2019-01-01 DIAGNOSIS — E03.8 SUBCLINICAL HYPOTHYROIDISM: Primary | ICD-10-CM

## 2019-01-01 DIAGNOSIS — Z51.81 ENCOUNTER FOR MONITORING ALLOPURINOL THERAPY: ICD-10-CM

## 2019-01-01 DIAGNOSIS — M25.50 ARTHRALGIA, UNSPECIFIED JOINT: ICD-10-CM

## 2019-01-01 DIAGNOSIS — I35.0 SEVERE AORTIC STENOSIS: Primary | ICD-10-CM

## 2019-01-01 DIAGNOSIS — I48.91 ATRIAL FIBRILLATION WITH RVR: ICD-10-CM

## 2019-01-01 DIAGNOSIS — H43.22 ASTEROID HYALOSIS OF LEFT EYE: ICD-10-CM

## 2019-01-01 DIAGNOSIS — J06.9 URI WITH COUGH AND CONGESTION: ICD-10-CM

## 2019-01-01 DIAGNOSIS — I48.20 CHRONIC ATRIAL FIBRILLATION: ICD-10-CM

## 2019-01-01 DIAGNOSIS — Z79.891 LONG TERM CURRENT USE OF OPIATE ANALGESIC: ICD-10-CM

## 2019-01-01 DIAGNOSIS — J32.9 RECURRENT SINUS INFECTIONS: ICD-10-CM

## 2019-01-01 DIAGNOSIS — E11.3293 CONTROLLED TYPE 2 DIABETES MELLITUS WITH BOTH EYES AFFECTED BY MILD NONPROLIFERATIVE RETINOPATHY WITHOUT MACULAR EDEMA, WITHOUT LONG-TERM CURRENT USE OF INSULIN: Primary | ICD-10-CM

## 2019-01-01 DIAGNOSIS — H04.123 INSUFFICIENCY OF TEAR FILM OF BOTH EYES: Primary | ICD-10-CM

## 2019-01-01 DIAGNOSIS — M85.89 OSTEOPENIA OF MULTIPLE SITES: ICD-10-CM

## 2019-01-01 DIAGNOSIS — G56.01 CARPAL TUNNEL SYNDROME OF RIGHT WRIST: Primary | ICD-10-CM

## 2019-01-01 DIAGNOSIS — R09.01: ICD-10-CM

## 2019-01-01 DIAGNOSIS — H73.893 RETRACTION OF TYMPANIC MEMBRANE OF BOTH EARS: ICD-10-CM

## 2019-01-01 DIAGNOSIS — R55 SYNCOPE AND COLLAPSE: ICD-10-CM

## 2019-01-01 DIAGNOSIS — J35.1 TONSILLAR HYPERTROPHY: ICD-10-CM

## 2019-01-01 DIAGNOSIS — D48.5 NEOPLASM OF UNCERTAIN BEHAVIOR OF SKIN: Primary | ICD-10-CM

## 2019-01-01 DIAGNOSIS — I50.21 ACUTE SYSTOLIC HEART FAILURE: ICD-10-CM

## 2019-01-01 DIAGNOSIS — H57.13 PERIORBITAL PAIN, BILATERAL: ICD-10-CM

## 2019-01-01 DIAGNOSIS — I63.412 EMBOLIC STROKE INVOLVING LEFT MIDDLE CEREBRAL ARTERY: ICD-10-CM

## 2019-01-01 DIAGNOSIS — E78.49 OTHER HYPERLIPIDEMIA: ICD-10-CM

## 2019-01-01 DIAGNOSIS — L85.3 XEROSIS OF SKIN: ICD-10-CM

## 2019-01-01 DIAGNOSIS — I63.521 ACUTE ISCHEMIC RIGHT ACA STROKE: ICD-10-CM

## 2019-01-01 DIAGNOSIS — L98.9 SKIN LESION OF RIGHT LOWER EXTREMITY: ICD-10-CM

## 2019-01-01 DIAGNOSIS — I50.43 ACUTE ON CHRONIC COMBINED SYSTOLIC AND DIASTOLIC CONGESTIVE HEART FAILURE: ICD-10-CM

## 2019-01-01 DIAGNOSIS — I70.0 ATHEROSCLEROSIS OF AORTA: ICD-10-CM

## 2019-01-01 DIAGNOSIS — G93.6 CYTOTOXIC BRAIN EDEMA: ICD-10-CM

## 2019-01-01 DIAGNOSIS — G25.3 MYOCLONIC JERKING: ICD-10-CM
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
## 2019-01-01 DIAGNOSIS — I63.522 ACUTE ISCHEMIC LEFT ACA STROKE: ICD-10-CM

## 2019-01-01 DIAGNOSIS — I45.9 HEART BLOCK: Primary | ICD-10-CM

## 2019-01-01 DIAGNOSIS — H00.14 CHALAZION LEFT UPPER EYELID: ICD-10-CM

## 2019-01-01 DIAGNOSIS — Z95.0 CARDIAC PACEMAKER IN SITU: ICD-10-CM

## 2019-01-01 DIAGNOSIS — N18.30 TYPE 2 DIABETES MELLITUS WITH STAGE 3 CHRONIC KIDNEY DISEASE, WITHOUT LONG-TERM CURRENT USE OF INSULIN: Primary | ICD-10-CM

## 2019-01-01 DIAGNOSIS — H16.223 KERATOCONJUNCTIVITIS SICCA NOT SPECIFIED AS SJOGREN'S, BILATERAL: ICD-10-CM

## 2019-01-01 DIAGNOSIS — I44.7 LBBB (LEFT BUNDLE BRANCH BLOCK): ICD-10-CM

## 2019-01-01 DIAGNOSIS — I65.23 BILATERAL CAROTID ARTERY STENOSIS: ICD-10-CM

## 2019-01-01 DIAGNOSIS — N25.81 SECONDARY HYPERPARATHYROIDISM: ICD-10-CM

## 2019-01-01 DIAGNOSIS — M35.3 PMR (POLYMYALGIA RHEUMATICA): ICD-10-CM

## 2019-01-01 DIAGNOSIS — I63.512 ACUTE ISCHEMIC LEFT MCA STROKE: ICD-10-CM

## 2019-01-01 DIAGNOSIS — I50.42 CHRONIC COMBINED SYSTOLIC AND DIASTOLIC HEART FAILURE: ICD-10-CM

## 2019-01-01 DIAGNOSIS — E55.9 HYPOVITAMINOSIS D: ICD-10-CM

## 2019-01-01 DIAGNOSIS — L29.9 PRURITUS: Primary | ICD-10-CM

## 2019-01-01 DIAGNOSIS — I50.42 CHRONIC COMBINED SYSTOLIC AND DIASTOLIC HEART FAILURE: Primary | ICD-10-CM

## 2019-01-01 DIAGNOSIS — I45.9 HEART BLOCK: ICD-10-CM

## 2019-01-01 DIAGNOSIS — Z96.1 PSEUDOPHAKIA OF BOTH EYES: ICD-10-CM

## 2019-01-01 DIAGNOSIS — R09.81 NASAL CONGESTION: ICD-10-CM

## 2019-01-01 DIAGNOSIS — E11.9 TYPE 2 DIABETES MELLITUS WITHOUT OPHTHALMIC MANIFESTATIONS: Primary | ICD-10-CM

## 2019-01-01 DIAGNOSIS — I50.31 ACUTE DIASTOLIC HEART FAILURE: ICD-10-CM

## 2019-01-01 DIAGNOSIS — E03.8 SUBCLINICAL HYPOTHYROIDISM: ICD-10-CM

## 2019-01-01 DIAGNOSIS — R41.82 ALTERED MENTAL STATUS: ICD-10-CM

## 2019-01-01 DIAGNOSIS — R51.9 HEADACHE IN FRONT OF HEAD: Primary | ICD-10-CM

## 2019-01-01 DIAGNOSIS — K02.9 DENTAL CARIES: ICD-10-CM

## 2019-01-01 DIAGNOSIS — Z79.899 ENCOUNTER FOR MONITORING ALLOPURINOL THERAPY: ICD-10-CM

## 2019-01-01 DIAGNOSIS — I70.0 ATHEROSCLEROSIS OF AORTA: Primary | Chronic | ICD-10-CM

## 2019-01-01 DIAGNOSIS — M19.90 OSTEOARTHRITIS, UNSPECIFIED OSTEOARTHRITIS TYPE, UNSPECIFIED SITE: Primary | ICD-10-CM

## 2019-01-01 DIAGNOSIS — L81.9 HYPERPIGMENTATION: ICD-10-CM

## 2019-01-01 DIAGNOSIS — H04.123 DRY EYE SYNDROME OF BOTH EYES: ICD-10-CM

## 2019-01-01 DIAGNOSIS — I42.0 DILATED CARDIOMYOPATHY: ICD-10-CM

## 2019-01-01 DIAGNOSIS — E21.3 HYPERPARATHYROIDISM: ICD-10-CM

## 2019-01-01 LAB
25(OH)D3+25(OH)D2 SERPL-MCNC: 14 NG/ML (ref 30–96)
ALBUMIN SERPL BCP-MCNC: 3.3 G/DL (ref 3.5–5.2)
ALBUMIN SERPL BCP-MCNC: 3.3 G/DL (ref 3.5–5.2)
ALP SERPL-CCNC: 61 U/L (ref 55–135)
ALP SERPL-CCNC: 62 U/L (ref 55–135)
ALT SERPL W/O P-5'-P-CCNC: 5 U/L (ref 10–44)
ALT SERPL W/O P-5'-P-CCNC: 8 U/L (ref 10–44)
ANION GAP SERPL CALC-SCNC: 11 MMOL/L (ref 8–16)
ANION GAP SERPL CALC-SCNC: 12 MMOL/L (ref 8–16)
ANION GAP SERPL CALC-SCNC: 9 MMOL/L (ref 8–16)
ASCENDING AORTA: 2.95 CM
ASCENDING AORTA: 3.02 CM
AST SERPL-CCNC: 13 U/L (ref 10–40)
AST SERPL-CCNC: 17 U/L (ref 10–40)
AV INDEX (PROSTH): 0.29
AV INDEX (PROSTH): 0.31
AV MEAN GRADIENT: 12.01 MMHG
AV MEAN GRADIENT: 8.59 MMHG
AV PEAK GRADIENT: 12.96 MMHG
AV PEAK GRADIENT: 14.44 MMHG
AV VALVE AREA: 0.94 CM2
AV VALVE AREA: 1.06 CM2
AV VELOCITY RATIO: 0.3
AV VELOCITY RATIO: 0.45
BASOPHILS # BLD AUTO: 0.03 K/UL (ref 0–0.2)
BASOPHILS # BLD AUTO: 0.06 K/UL (ref 0–0.2)
BASOPHILS NFR BLD: 0.3 % (ref 0–1.9)
BASOPHILS NFR BLD: 0.9 % (ref 0–1.9)
BILIRUB SERPL-MCNC: 0.7 MG/DL (ref 0.1–1)
BILIRUB SERPL-MCNC: 0.8 MG/DL (ref 0.1–1)
BSA FOR ECHO PROCEDURE: 1.68 M2
BSA FOR ECHO PROCEDURE: 1.71 M2
BUN SERPL-MCNC: 20 MG/DL (ref 8–23)
BUN SERPL-MCNC: 23 MG/DL (ref 6–30)
BUN SERPL-MCNC: 37 MG/DL (ref 8–23)
BUN SERPL-MCNC: 59 MG/DL (ref 8–23)
CALCIUM SERPL-MCNC: 8.5 MG/DL (ref 8.7–10.5)
CALCIUM SERPL-MCNC: 8.7 MG/DL (ref 8.7–10.5)
CALCIUM SERPL-MCNC: 9.4 MG/DL (ref 8.7–10.5)
CHLORIDE SERPL-SCNC: 101 MMOL/L (ref 95–110)
CHLORIDE SERPL-SCNC: 103 MMOL/L (ref 95–110)
CHLORIDE SERPL-SCNC: 104 MMOL/L (ref 95–110)
CHLORIDE SERPL-SCNC: 108 MMOL/L (ref 95–110)
CHOLEST SERPL-MCNC: 209 MG/DL (ref 120–199)
CHOLEST/HDLC SERPL: 4.4 {RATIO} (ref 2–5)
CO2 SERPL-SCNC: 21 MMOL/L (ref 23–29)
CO2 SERPL-SCNC: 27 MMOL/L (ref 23–29)
CO2 SERPL-SCNC: 32 MMOL/L (ref 23–29)
CREAT SERPL-MCNC: 1.2 MG/DL (ref 0.5–1.4)
CREAT SERPL-MCNC: 1.3 MG/DL (ref 0.5–1.4)
CREAT SERPL-MCNC: 1.4 MG/DL (ref 0.5–1.4)
CREAT SERPL-MCNC: 1.7 MG/DL (ref 0.5–1.4)
CREAT SERPL-MCNC: 2.5 MG/DL (ref 0.5–1.4)
CRP SERPL-MCNC: 41.2 MG/L (ref 0–8.2)
CRP SERPL-MCNC: 51.5 MG/L (ref 0–8.2)
CV ECHO LV RWT: 0.37 CM
CV ECHO LV RWT: 0.43 CM
CV STRESS BASE HR: 75 BPM
DIASTOLIC BLOOD PRESSURE: 84 MMHG
DIFFERENTIAL METHOD: ABNORMAL
DIFFERENTIAL METHOD: ABNORMAL
DOP CALC AO PEAK VEL: 1.8 M/S
DOP CALC AO PEAK VEL: 1.9 M/S
DOP CALC AO VTI: 36 CM
DOP CALC AO VTI: 37.69 CM
DOP CALC LVOT AREA: 3.2 CM2
DOP CALC LVOT AREA: 3.46 CM2
DOP CALC LVOT DIAMETER: 2.02 CM
DOP CALC LVOT DIAMETER: 2.1 CM
DOP CALC LVOT PEAK VEL: 0.58 M/S
DOP CALC LVOT PEAK VEL: 0.81 M/S
DOP CALC LVOT STROKE VOLUME: 35.59 CM3
DOP CALC LVOT STROKE VOLUME: 38.08 CM3
DOP CALCLVOT PEAK VEL VTI: 11 CM
DOP CALCLVOT PEAK VEL VTI: 11.11 CM
E WAVE DECELERATION TIME: 164.39 MSEC
E WAVE DECELERATION TIME: 165.73 MSEC
E/A RATIO: 3.24
E/E' RATIO: 25.56
ECHO LV POSTERIOR WALL: 0.77 CM (ref 0.6–1.1)
ECHO LV POSTERIOR WALL: 0.9 CM (ref 0.6–1.1)
EOSINOPHIL # BLD AUTO: 0.2 K/UL (ref 0–0.5)
EOSINOPHIL # BLD AUTO: 0.2 K/UL (ref 0–0.5)
EOSINOPHIL NFR BLD: 1.7 % (ref 0–8)
EOSINOPHIL NFR BLD: 2.7 % (ref 0–8)
ERYTHROCYTE [DISTWIDTH] IN BLOOD BY AUTOMATED COUNT: 16.1 % (ref 11.5–14.5)
ERYTHROCYTE [DISTWIDTH] IN BLOOD BY AUTOMATED COUNT: 16.5 % (ref 11.5–14.5)
ERYTHROCYTE [SEDIMENTATION RATE] IN BLOOD BY WESTERGREN METHOD: 39 MM/HR (ref 0–36)
EST. GFR  (AFRICAN AMERICAN): 20 ML/MIN/1.73 M^2
EST. GFR  (AFRICAN AMERICAN): 32 ML/MIN/1.73 M^2
EST. GFR  (AFRICAN AMERICAN): 43.8 ML/MIN/1.73 M^2
EST. GFR  (NON AFRICAN AMERICAN): 17.4 ML/MIN/1.73 M^2
EST. GFR  (NON AFRICAN AMERICAN): 27 ML/MIN/1.73 M^2
EST. GFR  (NON AFRICAN AMERICAN): 38 ML/MIN/1.73 M^2
ESTIMATED AVG GLUCOSE: 151 MG/DL (ref 68–131)
FRACTIONAL SHORTENING: 15 % (ref 28–44)
FRACTIONAL SHORTENING: 27 % (ref 28–44)
GLUCOSE SERPL-MCNC: 105 MG/DL (ref 70–110)
GLUCOSE SERPL-MCNC: 120 MG/DL (ref 70–110)
GLUCOSE SERPL-MCNC: 121 MG/DL (ref 70–110)
GLUCOSE SERPL-MCNC: 128 MG/DL (ref 70–110)
HBA1C MFR BLD HPLC: 6.9 % (ref 4–5.6)
HCT VFR BLD AUTO: 36.8 % (ref 37–48.5)
HCT VFR BLD AUTO: 40.5 % (ref 37–48.5)
HCT VFR BLD CALC: 41 %PCV (ref 36–54)
HDLC SERPL-MCNC: 48 MG/DL (ref 40–75)
HDLC SERPL: 23 % (ref 20–50)
HGB BLD-MCNC: 11.4 G/DL (ref 12–16)
HGB BLD-MCNC: 12.2 G/DL (ref 12–16)
IMM GRANULOCYTES # BLD AUTO: 0.03 K/UL (ref 0–0.04)
IMM GRANULOCYTES NFR BLD AUTO: 0.4 % (ref 0–0.5)
INR PPP: 1.1 (ref 0.8–1.2)
INTERVENTRICULAR SEPTUM: 0.79 CM (ref 0.6–1.1)
INTERVENTRICULAR SEPTUM: 1 CM (ref 0.6–1.1)
IVRT: 0.09 MSEC
LA MAJOR: 6.42 CM
LA MAJOR: 6.54 CM
LA MINOR: 6.36 CM
LA MINOR: 6.5 CM
LA WIDTH: 4.28 CM
LA WIDTH: 4.65 CM
LDLC SERPL CALC-MCNC: 136.4 MG/DL (ref 63–159)
LEFT ATRIUM SIZE: 3.94 CM
LEFT ATRIUM SIZE: 4.25 CM
LEFT ATRIUM VOLUME INDEX: 59.5 ML/M2
LEFT ATRIUM VOLUME INDEX: 61.2 ML/M2
LEFT ATRIUM VOLUME: 100.81 CM3
LEFT ATRIUM VOLUME: 99.51 CM3
LEFT INTERNAL DIMENSION IN SYSTOLE: 3.05 CM (ref 2.1–4)
LEFT INTERNAL DIMENSION IN SYSTOLE: 3.56 CM (ref 2.1–4)
LEFT VENTRICLE DIASTOLIC VOLUME INDEX: 48.02 ML/M2
LEFT VENTRICLE DIASTOLIC VOLUME INDEX: 49.04 ML/M2
LEFT VENTRICLE DIASTOLIC VOLUME: 79.12 ML
LEFT VENTRICLE DIASTOLIC VOLUME: 81.98 ML
LEFT VENTRICLE MASS INDEX: 59.7 G/M2
LEFT VENTRICLE MASS INDEX: 76.5 G/M2
LEFT VENTRICLE SYSTOLIC VOLUME INDEX: 21.8 ML/M2
LEFT VENTRICLE SYSTOLIC VOLUME INDEX: 32.2 ML/M2
LEFT VENTRICLE SYSTOLIC VOLUME: 36.5 ML
LEFT VENTRICLE SYSTOLIC VOLUME: 52.98 ML
LEFT VENTRICULAR INTERNAL DIMENSION IN DIASTOLE: 4.2 CM (ref 3.5–6)
LEFT VENTRICULAR INTERNAL DIMENSION IN DIASTOLE: 4.21 CM (ref 3.5–6)
LEFT VENTRICULAR MASS: 127.81 G
LEFT VENTRICULAR MASS: 98.34 G
LV LATERAL E/E' RATIO: 19.17
LV SEPTAL E/E' RATIO: 38.33
LYMPHOCYTES # BLD AUTO: 1.9 K/UL (ref 1–4.8)
LYMPHOCYTES # BLD AUTO: 2.3 K/UL (ref 1–4.8)
LYMPHOCYTES NFR BLD: 25 % (ref 18–48)
LYMPHOCYTES NFR BLD: 27.8 % (ref 18–48)
MCH RBC QN AUTO: 28.3 PG (ref 27–31)
MCH RBC QN AUTO: 28.4 PG (ref 27–31)
MCHC RBC AUTO-ENTMCNC: 30.1 G/DL (ref 32–36)
MCHC RBC AUTO-ENTMCNC: 31 G/DL (ref 32–36)
MCV RBC AUTO: 92 FL (ref 82–98)
MCV RBC AUTO: 94 FL (ref 82–98)
MONOCYTES # BLD AUTO: 0.7 K/UL (ref 0.3–1)
MONOCYTES # BLD AUTO: 0.7 K/UL (ref 0.3–1)
MONOCYTES NFR BLD: 10.3 % (ref 4–15)
MONOCYTES NFR BLD: 7.6 % (ref 4–15)
MV PEAK A VEL: 0.37 M/S
MV PEAK E VEL: 1.15 M/S
MV PEAK E VEL: 1.2 M/S
NEUTROPHILS # BLD AUTO: 4 K/UL (ref 1.8–7.7)
NEUTROPHILS # BLD AUTO: 5.9 K/UL (ref 1.8–7.7)
NEUTROPHILS NFR BLD: 57.9 % (ref 38–73)
NEUTROPHILS NFR BLD: 65.4 % (ref 38–73)
NONHDLC SERPL-MCNC: 161 MG/DL
NRBC BLD-RTO: 0 /100 WBC
OHS CV CPX 1 MINUTE RECOVERY HEART RATE: 75 BPM
OHS CV CPX 85 PERCENT MAX PREDICTED HEART RATE MALE: 114
OHS CV CPX MAX PREDICTED HEART RATE: 134
OHS CV CPX PATIENT IS FEMALE: 1
OHS CV CPX PATIENT IS MALE: 0
OHS CV CPX PEAK DIASTOLIC BLOOD PRESSURE: 72 MMHG
OHS CV CPX PEAK HEAR RATE: 75 BPM
OHS CV CPX PEAK RATE PRESSURE PRODUCT: NORMAL
OHS CV CPX PEAK SYSTOLIC BLOOD PRESSURE: 146 MMHG
OHS CV CPX PERCENT MAX PREDICTED HEART RATE ACHIEVED: 56
OHS CV CPX PERCENT TARGET HEART RATE ACHIEVED: 65.79
OHS CV CPX RATE PRESSURE PRODUCT PRESENTING: NORMAL
OHS CV CPX TARGET HEART RATE: 114
PISA TR MAX VEL: 3.15 M/S
PLATELET # BLD AUTO: 170 K/UL (ref 150–350)
PLATELET # BLD AUTO: 230 K/UL (ref 150–350)
PMV BLD AUTO: 11.7 FL (ref 9.2–12.9)
PMV BLD AUTO: 13.4 FL (ref 9.2–12.9)
POC IONIZED CALCIUM: 1.1 MMOL/L (ref 1.06–1.42)
POC PTINR: 1.1 (ref 0.9–1.2)
POC PTWBT: 13.7 SEC (ref 9.7–14.3)
POC TCO2 (MEASURED): 35 MMOL/L (ref 23–29)
POCT GLUCOSE: 109 MG/DL (ref 70–110)
POTASSIUM BLD-SCNC: 3.3 MMOL/L (ref 3.5–5.1)
POTASSIUM SERPL-SCNC: 3.4 MMOL/L (ref 3.5–5.1)
POTASSIUM SERPL-SCNC: 3.5 MMOL/L (ref 3.5–5.1)
POTASSIUM SERPL-SCNC: 5.1 MMOL/L (ref 3.5–5.1)
PROT SERPL-MCNC: 7.3 G/DL (ref 6–8.4)
PROT SERPL-MCNC: 8.1 G/DL (ref 6–8.4)
PROTHROMBIN TIME: 11.6 SEC (ref 9–12.5)
PTH-INTACT SERPL-MCNC: 289 PG/ML (ref 9–77)
PULM VEIN S/D RATIO: 0.49
PV PEAK D VEL: 0.57 M/S
PV PEAK S VEL: 0.28 M/S
PV PEAK S VEL: 0.33 M/S
PV PEAK VELOCITY: 0.48 CM/S
RA MAJOR: 6.02 CM
RA MAJOR: 6.1 CM
RA PRESSURE: 3 MMHG
RA WIDTH: 5.63 CM
RA WIDTH: 6.25 CM
RBC # BLD AUTO: 4.02 M/UL (ref 4–5.4)
RBC # BLD AUTO: 4.31 M/UL (ref 4–5.4)
RETIRED EF AND QEF - SEE NOTES: 27 %
RIGHT VENTRICULAR END-DIASTOLIC DIMENSION: 4.2 CM
RIGHT VENTRICULAR END-DIASTOLIC DIMENSION: 4.48 CM
RV TISSUE DOPPLER FREE WALL SYSTOLIC VELOCITY 1 (APICAL 4 CHAMBER VIEW): 5.63 M/S
RV TISSUE DOPPLER FREE WALL SYSTOLIC VELOCITY 1 (APICAL 4 CHAMBER VIEW): 8.38 M/S
SAMPLE: ABNORMAL
SAMPLE: NORMAL
SAMPLE: NORMAL
SINUS: 3.26 CM
SINUS: 3.38 CM
SODIUM BLD-SCNC: 145 MMOL/L (ref 136–145)
SODIUM SERPL-SCNC: 136 MMOL/L (ref 136–145)
SODIUM SERPL-SCNC: 145 MMOL/L (ref 136–145)
SODIUM SERPL-SCNC: 146 MMOL/L (ref 136–145)
STJ: 2.67 CM
STJ: 2.89 CM
SYSTOLIC BLOOD PRESSURE: 142 MMHG
T4 FREE SERPL-MCNC: 0.97 NG/DL (ref 0.71–1.51)
TDI LATERAL: 0.06
TDI SEPTAL: 0.03
TDI: 0.05
TR MAX PG: 39.69 MMHG
TRICUSPID ANNULAR PLANE SYSTOLIC EXCURSION: 0.92 CM
TRICUSPID ANNULAR PLANE SYSTOLIC EXCURSION: 1.01 CM
TRIGL SERPL-MCNC: 123 MG/DL (ref 30–150)
TSH SERPL DL<=0.005 MIU/L-ACNC: 13.53 UIU/ML (ref 0.4–4)
TV REST PULMONARY ARTERY PRESSURE: 43 MMHG
URATE SERPL-MCNC: 6.3 MG/DL (ref 2.4–5.7)
WBC # BLD AUTO: 6.91 K/UL (ref 3.9–12.7)
WBC # BLD AUTO: 9.07 K/UL (ref 3.9–12.7)

## 2019-01-01 PROCEDURE — 93351 STRESS TTE COMPLETE: CPT | Mod: S$GLB,,, | Performed by: INTERNAL MEDICINE

## 2019-01-01 PROCEDURE — 99999 PR PBB SHADOW E&M-EST. PATIENT-LVL IV: CPT | Mod: PBBFAC,,, | Performed by: INTERNAL MEDICINE

## 2019-01-01 PROCEDURE — 3078F DIAST BP <80 MM HG: CPT | Mod: CPTII,GC,S$GLB, | Performed by: STUDENT IN AN ORGANIZED HEALTH CARE EDUCATION/TRAINING PROGRAM

## 2019-01-01 PROCEDURE — 99999 PR PBB SHADOW E&M-EST. PATIENT-LVL III: ICD-10-PCS | Mod: PBBFAC,,, | Performed by: NURSE PRACTITIONER

## 2019-01-01 PROCEDURE — 99291 CRITICAL CARE FIRST HOUR: CPT | Mod: ,,, | Performed by: EMERGENCY MEDICINE

## 2019-01-01 PROCEDURE — 3074F PR MOST RECENT SYSTOLIC BLOOD PRESSURE < 130 MM HG: ICD-10-PCS | Mod: CPTII,S$GLB,, | Performed by: INTERNAL MEDICINE

## 2019-01-01 PROCEDURE — 1126F PR PAIN SEVERITY QUANTIFIED, NO PAIN PRESENT: ICD-10-PCS | Mod: S$GLB,,, | Performed by: NURSE PRACTITIONER

## 2019-01-01 PROCEDURE — G0439 PR MEDICARE ANNUAL WELLNESS SUBSEQUENT VISIT: ICD-10-PCS | Mod: S$GLB,,, | Performed by: NURSE PRACTITIONER

## 2019-01-01 PROCEDURE — 85025 COMPLETE CBC W/AUTO DIFF WBC: CPT

## 2019-01-01 PROCEDURE — 99213 PR OFFICE/OUTPT VISIT, EST, LEVL III, 20-29 MIN: ICD-10-PCS | Mod: GC,S$GLB,, | Performed by: STUDENT IN AN ORGANIZED HEALTH CARE EDUCATION/TRAINING PROGRAM

## 2019-01-01 PROCEDURE — 72120 X-RAY BEND ONLY L-S SPINE: CPT | Mod: 26,,, | Performed by: RADIOLOGY

## 2019-01-01 PROCEDURE — 99999 PR PBB SHADOW E&M-EST. PATIENT-LVL III: CPT | Mod: PBBFAC,,, | Performed by: INTERNAL MEDICINE

## 2019-01-01 PROCEDURE — 3074F PR MOST RECENT SYSTOLIC BLOOD PRESSURE < 130 MM HG: ICD-10-PCS | Mod: CPTII,S$GLB,, | Performed by: FAMILY MEDICINE

## 2019-01-01 PROCEDURE — 99999 PR PBB SHADOW E&M-EST. PATIENT-LVL IV: ICD-10-PCS | Mod: PBBFAC,,, | Performed by: INTERNAL MEDICINE

## 2019-01-01 PROCEDURE — 99282 EMERGENCY DEPT VISIT SF MDM: CPT

## 2019-01-01 PROCEDURE — 1101F PR PT FALLS ASSESS DOC 0-1 FALLS W/OUT INJ PAST YR: ICD-10-PCS | Mod: CPTII,GC,S$GLB, | Performed by: STUDENT IN AN ORGANIZED HEALTH CARE EDUCATION/TRAINING PROGRAM

## 2019-01-01 PROCEDURE — 85610 PROTHROMBIN TIME: CPT

## 2019-01-01 PROCEDURE — 1101F PR PT FALLS ASSESS DOC 0-1 FALLS W/OUT INJ PAST YR: ICD-10-PCS | Mod: CPTII,S$GLB,, | Performed by: OTOLARYNGOLOGY

## 2019-01-01 PROCEDURE — 99214 PR OFFICE/OUTPT VISIT, EST, LEVL IV, 30-39 MIN: ICD-10-PCS | Mod: S$GLB,,, | Performed by: NURSE PRACTITIONER

## 2019-01-01 PROCEDURE — 3078F PR MOST RECENT DIASTOLIC BLOOD PRESSURE < 80 MM HG: ICD-10-PCS | Mod: CPTII,S$GLB,, | Performed by: FAMILY MEDICINE

## 2019-01-01 PROCEDURE — 93010 ELECTROCARDIOGRAM REPORT: CPT | Mod: S$GLB,,, | Performed by: INTERNAL MEDICINE

## 2019-01-01 PROCEDURE — 36415 COLL VENOUS BLD VENIPUNCTURE: CPT

## 2019-01-01 PROCEDURE — 1101F PT FALLS ASSESS-DOCD LE1/YR: CPT | Mod: CPTII,S$GLB,, | Performed by: INTERNAL MEDICINE

## 2019-01-01 PROCEDURE — 99999 PR PBB SHADOW E&M-EST. PATIENT-LVL V: CPT | Mod: PBBFAC,,, | Performed by: NURSE PRACTITIONER

## 2019-01-01 PROCEDURE — 99499 UNLISTED E&M SERVICE: CPT | Mod: S$GLB,,, | Performed by: FAMILY MEDICINE

## 2019-01-01 PROCEDURE — 3078F DIAST BP <80 MM HG: CPT | Mod: CPTII,GC,S$GLB, | Performed by: INTERNAL MEDICINE

## 2019-01-01 PROCEDURE — 93296 REM INTERROG EVL PM/IDS: CPT

## 2019-01-01 PROCEDURE — 99214 OFFICE O/P EST MOD 30 MIN: CPT | Mod: S$GLB,,, | Performed by: INTERNAL MEDICINE

## 2019-01-01 PROCEDURE — 99214 PR OFFICE/OUTPT VISIT, EST, LEVL IV, 30-39 MIN: ICD-10-PCS | Mod: S$GLB,,, | Performed by: INTERNAL MEDICINE

## 2019-01-01 PROCEDURE — 99282 EMERGENCY DEPT VISIT SF MDM: CPT | Mod: ,,, | Performed by: EMERGENCY MEDICINE

## 2019-01-01 PROCEDURE — 3078F PR MOST RECENT DIASTOLIC BLOOD PRESSURE < 80 MM HG: ICD-10-PCS | Mod: CPTII,S$GLB,, | Performed by: INTERNAL MEDICINE

## 2019-01-01 PROCEDURE — 88312 SPECIAL STAINS GROUP 1: CPT | Mod: 26,,, | Performed by: PATHOLOGY

## 2019-01-01 PROCEDURE — 99999 PR PBB SHADOW E&M-EST. PATIENT-LVL II: ICD-10-PCS | Mod: PBBFAC,,, | Performed by: DERMATOLOGY

## 2019-01-01 PROCEDURE — 82565 ASSAY OF CREATININE: CPT

## 2019-01-01 PROCEDURE — 3078F PR MOST RECENT DIASTOLIC BLOOD PRESSURE < 80 MM HG: ICD-10-PCS | Mod: CPTII,S$GLB,, | Performed by: OTOLARYNGOLOGY

## 2019-01-01 PROCEDURE — 1101F PT FALLS ASSESS-DOCD LE1/YR: CPT | Mod: CPTII,S$GLB,, | Performed by: FAMILY MEDICINE

## 2019-01-01 PROCEDURE — 1101F PT FALLS ASSESS-DOCD LE1/YR: CPT | Mod: CPTII,GC,S$GLB, | Performed by: INTERNAL MEDICINE

## 2019-01-01 PROCEDURE — 72100 X-RAY EXAM L-S SPINE 2/3 VWS: CPT | Mod: 26,,, | Performed by: RADIOLOGY

## 2019-01-01 PROCEDURE — 3078F DIAST BP <80 MM HG: CPT | Mod: CPTII,S$GLB,, | Performed by: INTERNAL MEDICINE

## 2019-01-01 PROCEDURE — 99223 PR INITIAL HOSPITAL CARE,LEVL III: ICD-10-PCS | Mod: AI,,, | Performed by: PSYCHIATRY & NEUROLOGY

## 2019-01-01 PROCEDURE — 1101F PT FALLS ASSESS-DOCD LE1/YR: CPT | Mod: CPTII,GC,S$GLB, | Performed by: STUDENT IN AN ORGANIZED HEALTH CARE EDUCATION/TRAINING PROGRAM

## 2019-01-01 PROCEDURE — 3074F SYST BP LT 130 MM HG: CPT | Mod: CPTII,S$GLB,, | Performed by: INTERNAL MEDICINE

## 2019-01-01 PROCEDURE — 99999 PR PBB SHADOW E&M-EST. PATIENT-LVL III: CPT | Mod: PBBFAC,,, | Performed by: NURSE PRACTITIONER

## 2019-01-01 PROCEDURE — 99999 PR PBB SHADOW E&M-EST. PATIENT-LVL IV: ICD-10-PCS | Mod: PBBFAC,GC,, | Performed by: STUDENT IN AN ORGANIZED HEALTH CARE EDUCATION/TRAINING PROGRAM

## 2019-01-01 PROCEDURE — 93005 ELECTROCARDIOGRAM TRACING: CPT

## 2019-01-01 PROCEDURE — 99999 PR PBB SHADOW E&M-EST. PATIENT-LVL V: CPT | Mod: PBBFAC,GC,, | Performed by: STUDENT IN AN ORGANIZED HEALTH CARE EDUCATION/TRAINING PROGRAM

## 2019-01-01 PROCEDURE — 99999 PR PBB SHADOW E&M-EST. PATIENT-LVL III: ICD-10-PCS | Mod: PBBFAC,,, | Performed by: OTOLARYNGOLOGY

## 2019-01-01 PROCEDURE — 99999 PR PBB SHADOW E&M-EST. PATIENT-LVL IV: CPT | Mod: PBBFAC,,, | Performed by: FAMILY MEDICINE

## 2019-01-01 PROCEDURE — 99999 PR PBB SHADOW E&M-EST. PATIENT-LVL I: CPT | Mod: PBBFAC,,,

## 2019-01-01 PROCEDURE — 93306 TRANSTHORACIC ECHO (TTE) COMPLETE (CUPID ONLY): ICD-10-PCS | Mod: S$GLB,,, | Performed by: INTERNAL MEDICINE

## 2019-01-01 PROCEDURE — 99999 PR PBB SHADOW E&M-EST. PATIENT-LVL IV: ICD-10-PCS | Mod: PBBFAC,,, | Performed by: FAMILY MEDICINE

## 2019-01-01 PROCEDURE — 1101F PT FALLS ASSESS-DOCD LE1/YR: CPT | Mod: CPTII,S$GLB,, | Performed by: NURSE PRACTITIONER

## 2019-01-01 PROCEDURE — 1101F PT FALLS ASSESS-DOCD LE1/YR: CPT | Mod: CPTII,S$GLB,, | Performed by: OTOLARYNGOLOGY

## 2019-01-01 PROCEDURE — 92014 PR EYE EXAM, EST PATIENT,COMPREHESV: ICD-10-PCS | Mod: S$GLB,,, | Performed by: OPTOMETRIST

## 2019-01-01 PROCEDURE — 1101F PR PT FALLS ASSESS DOC 0-1 FALLS W/OUT INJ PAST YR: ICD-10-PCS | Mod: CPTII,S$GLB,, | Performed by: FAMILY MEDICINE

## 2019-01-01 PROCEDURE — 80047 BASIC METABLC PNL IONIZED CA: CPT

## 2019-01-01 PROCEDURE — 3078F DIAST BP <80 MM HG: CPT | Mod: CPTII,S$GLB,, | Performed by: OTOLARYNGOLOGY

## 2019-01-01 PROCEDURE — 3078F PR MOST RECENT DIASTOLIC BLOOD PRESSURE < 80 MM HG: ICD-10-PCS | Mod: CPTII,S$GLB,, | Performed by: NURSE PRACTITIONER

## 2019-01-01 PROCEDURE — 99213 PR OFFICE/OUTPT VISIT, EST, LEVL III, 20-29 MIN: ICD-10-PCS | Mod: S$GLB,,, | Performed by: INTERNAL MEDICINE

## 2019-01-01 PROCEDURE — 1159F PR MEDICATION LIST DOCUMENTED IN MEDICAL RECORD: ICD-10-PCS | Mod: S$GLB,,, | Performed by: FAMILY MEDICINE

## 2019-01-01 PROCEDURE — 88305 TISSUE EXAM BY PATHOLOGIST: CPT | Performed by: PATHOLOGY

## 2019-01-01 PROCEDURE — 90714 TD VACC NO PRESV 7 YRS+ IM: CPT | Mod: S$GLB,,, | Performed by: NURSE PRACTITIONER

## 2019-01-01 PROCEDURE — 3078F DIAST BP <80 MM HG: CPT | Mod: CPTII,S$GLB,, | Performed by: FAMILY MEDICINE

## 2019-01-01 PROCEDURE — 99999 PR PBB SHADOW E&M-EST. PATIENT-LVL II: ICD-10-PCS | Mod: PBBFAC,,, | Performed by: OPTOMETRIST

## 2019-01-01 PROCEDURE — 99999 PR PBB SHADOW E&M-EST. PATIENT-LVL IV: CPT | Mod: PBBFAC,GC,, | Performed by: STUDENT IN AN ORGANIZED HEALTH CARE EDUCATION/TRAINING PROGRAM

## 2019-01-01 PROCEDURE — 90471 TD VACCINE GREATER THAN OR EQUAL TO 7YO PRESERVATIVE FREE IM: ICD-10-PCS | Mod: S$GLB,,, | Performed by: NURSE PRACTITIONER

## 2019-01-01 PROCEDURE — 99213 OFFICE O/P EST LOW 20 MIN: CPT | Mod: S$GLB,,, | Performed by: OTOLARYNGOLOGY

## 2019-01-01 PROCEDURE — 1101F PR PT FALLS ASSESS DOC 0-1 FALLS W/OUT INJ PAST YR: ICD-10-PCS | Mod: CPTII,S$GLB,, | Performed by: INTERNAL MEDICINE

## 2019-01-01 PROCEDURE — 3078F DIAST BP <80 MM HG: CPT | Mod: CPTII,S$GLB,, | Performed by: NURSE PRACTITIONER

## 2019-01-01 PROCEDURE — 93351 ECHOCARDIOGRAM STRESS TEST (CUPID ONLY): ICD-10-PCS | Mod: S$GLB,,, | Performed by: INTERNAL MEDICINE

## 2019-01-01 PROCEDURE — 3075F PR MOST RECENT SYSTOLIC BLOOD PRESS GE 130-139MM HG: ICD-10-PCS | Mod: CPTII,S$GLB,, | Performed by: INTERNAL MEDICINE

## 2019-01-01 PROCEDURE — 1126F AMNT PAIN NOTED NONE PRSNT: CPT | Mod: S$GLB,,, | Performed by: FAMILY MEDICINE

## 2019-01-01 PROCEDURE — 3078F PR MOST RECENT DIASTOLIC BLOOD PRESSURE < 80 MM HG: ICD-10-PCS | Mod: CPTII,GC,S$GLB, | Performed by: STUDENT IN AN ORGANIZED HEALTH CARE EDUCATION/TRAINING PROGRAM

## 2019-01-01 PROCEDURE — 82962 GLUCOSE BLOOD TEST: CPT

## 2019-01-01 PROCEDURE — 99999 PR PBB SHADOW E&M-EST. PATIENT-LVL II: CPT | Mod: PBBFAC,,, | Performed by: OPTOMETRIST

## 2019-01-01 PROCEDURE — 92014 PR EYE EXAM, EST PATIENT,COMPREHESV: ICD-10-PCS | Mod: S$GLB,,, | Performed by: OPHTHALMOLOGY

## 2019-01-01 PROCEDURE — 93280 PM DEVICE PROGR EVAL DUAL: CPT

## 2019-01-01 PROCEDURE — 3074F PR MOST RECENT SYSTOLIC BLOOD PRESSURE < 130 MM HG: ICD-10-PCS | Mod: CPTII,S$GLB,, | Performed by: OTOLARYNGOLOGY

## 2019-01-01 PROCEDURE — 3079F PR MOST RECENT DIASTOLIC BLOOD PRESSURE 80-89 MM HG: ICD-10-PCS | Mod: CPTII,S$GLB,, | Performed by: FAMILY MEDICINE

## 2019-01-01 PROCEDURE — 90714 TD VACCINE GREATER THAN OR EQUAL TO 7YO PRESERVATIVE FREE IM: ICD-10-PCS | Mod: S$GLB,,, | Performed by: NURSE PRACTITIONER

## 2019-01-01 PROCEDURE — 99282 PR EMERGENCY DEPT VISIT,LEVEL II: ICD-10-PCS | Mod: ,,, | Performed by: EMERGENCY MEDICINE

## 2019-01-01 PROCEDURE — 99213 OFFICE O/P EST LOW 20 MIN: CPT | Mod: S$GLB,,, | Performed by: INTERNAL MEDICINE

## 2019-01-01 PROCEDURE — 99214 OFFICE O/P EST MOD 30 MIN: CPT | Mod: GC,S$GLB,, | Performed by: STUDENT IN AN ORGANIZED HEALTH CARE EDUCATION/TRAINING PROGRAM

## 2019-01-01 PROCEDURE — 99213 PR OFFICE/OUTPT VISIT, EST, LEVL III, 20-29 MIN: ICD-10-PCS | Mod: S$GLB,,, | Performed by: OTOLARYNGOLOGY

## 2019-01-01 PROCEDURE — 92014 COMPRE OPH EXAM EST PT 1/>: CPT | Mod: S$GLB,,, | Performed by: OPHTHALMOLOGY

## 2019-01-01 PROCEDURE — 80061 LIPID PANEL: CPT

## 2019-01-01 PROCEDURE — 99213 OFFICE O/P EST LOW 20 MIN: CPT | Mod: GC,S$GLB,, | Performed by: STUDENT IN AN ORGANIZED HEALTH CARE EDUCATION/TRAINING PROGRAM

## 2019-01-01 PROCEDURE — 3077F PR MOST RECENT SYSTOLIC BLOOD PRESSURE >= 140 MM HG: ICD-10-PCS | Mod: CPTII,S$GLB,, | Performed by: NURSE PRACTITIONER

## 2019-01-01 PROCEDURE — 99999 PR PBB SHADOW E&M-EST. PATIENT-LVL V: CPT | Mod: PBBFAC,GC,, | Performed by: INTERNAL MEDICINE

## 2019-01-01 PROCEDURE — 92226 PR SPECIAL EYE EXAM, SUBSEQUENT: CPT | Mod: 50,S$GLB,, | Performed by: OPHTHALMOLOGY

## 2019-01-01 PROCEDURE — 99999 PR PBB SHADOW E&M-EST. PATIENT-LVL III: ICD-10-PCS | Mod: PBBFAC,,, | Performed by: INTERNAL MEDICINE

## 2019-01-01 PROCEDURE — 3079F DIAST BP 80-89 MM HG: CPT | Mod: CPTII,S$GLB,, | Performed by: FAMILY MEDICINE

## 2019-01-01 PROCEDURE — 99212 OFFICE O/P EST SF 10 MIN: CPT | Mod: S$GLB,,, | Performed by: DERMATOLOGY

## 2019-01-01 PROCEDURE — 88305 TISSUE EXAM BY PATHOLOGIST: CPT | Mod: 26,,, | Performed by: PATHOLOGY

## 2019-01-01 PROCEDURE — 99900035 HC TECH TIME PER 15 MIN (STAT)

## 2019-01-01 PROCEDURE — 84439 ASSAY OF FREE THYROXINE: CPT

## 2019-01-01 PROCEDURE — 99291 PR CRITICAL CARE, E/M 30-74 MINUTES: ICD-10-PCS | Mod: ,,, | Performed by: EMERGENCY MEDICINE

## 2019-01-01 PROCEDURE — 3074F PR MOST RECENT SYSTOLIC BLOOD PRESSURE < 130 MM HG: ICD-10-PCS | Mod: CPTII,S$GLB,, | Performed by: NURSE PRACTITIONER

## 2019-01-01 PROCEDURE — 3075F SYST BP GE 130 - 139MM HG: CPT | Mod: CPTII,S$GLB,, | Performed by: INTERNAL MEDICINE

## 2019-01-01 PROCEDURE — 84443 ASSAY THYROID STIM HORMONE: CPT

## 2019-01-01 PROCEDURE — 99214 OFFICE O/P EST MOD 30 MIN: CPT | Mod: S$GLB,,, | Performed by: NURSE PRACTITIONER

## 2019-01-01 PROCEDURE — 1101F PT FALLS ASSESS-DOCD LE1/YR: CPT | Mod: CPTII,S$GLB,, | Performed by: DERMATOLOGY

## 2019-01-01 PROCEDURE — 1159F MED LIST DOCD IN RCRD: CPT | Mod: S$GLB,,, | Performed by: NURSE PRACTITIONER

## 2019-01-01 PROCEDURE — 99499 RISK ADDL DX/OHS AUDIT: ICD-10-PCS | Mod: S$GLB,,, | Performed by: OPHTHALMOLOGY

## 2019-01-01 PROCEDURE — 1159F MED LIST DOCD IN RCRD: CPT | Mod: S$GLB,,, | Performed by: FAMILY MEDICINE

## 2019-01-01 PROCEDURE — 93294 CARDIAC DEVICE CHECK - REMOTE: ICD-10-PCS | Mod: ,,, | Performed by: INTERNAL MEDICINE

## 2019-01-01 PROCEDURE — 86140 C-REACTIVE PROTEIN: CPT

## 2019-01-01 PROCEDURE — 93010 EKG 12-LEAD: ICD-10-PCS | Mod: ,,, | Performed by: INTERNAL MEDICINE

## 2019-01-01 PROCEDURE — 93010 RHYTHM STRIP: ICD-10-PCS | Mod: S$GLB,,, | Performed by: INTERNAL MEDICINE

## 2019-01-01 PROCEDURE — 72120 XR LUMBAR SPINE AP AND LAT WITH FLEX/EXT: ICD-10-PCS | Mod: 26,,, | Performed by: RADIOLOGY

## 2019-01-01 PROCEDURE — 99999 PR PBB SHADOW E&M-EST. PATIENT-LVL II: ICD-10-PCS | Mod: PBBFAC,,, | Performed by: OPHTHALMOLOGY

## 2019-01-01 PROCEDURE — 99213 PR OFFICE/OUTPT VISIT, EST, LEVL III, 20-29 MIN: ICD-10-PCS | Mod: S$GLB,,, | Performed by: FAMILY MEDICINE

## 2019-01-01 PROCEDURE — 77080 DXA BONE DENSITY AXIAL: CPT | Mod: 26,,, | Performed by: INTERNAL MEDICINE

## 2019-01-01 PROCEDURE — 80048 BASIC METABOLIC PNL TOTAL CA: CPT

## 2019-01-01 PROCEDURE — 93294 REM INTERROG EVL PM/LDLS PM: CPT | Mod: ,,, | Performed by: INTERNAL MEDICINE

## 2019-01-01 PROCEDURE — 99499 RISK ADDL DX/OHS AUDIT: ICD-10-PCS | Mod: S$GLB,,, | Performed by: FAMILY MEDICINE

## 2019-01-01 PROCEDURE — 1159F PR MEDICATION LIST DOCUMENTED IN MEDICAL RECORD: ICD-10-PCS | Mod: S$GLB,,, | Performed by: DERMATOLOGY

## 2019-01-01 PROCEDURE — 93005 RHYTHM STRIP: ICD-10-PCS | Mod: S$GLB,,, | Performed by: INTERNAL MEDICINE

## 2019-01-01 PROCEDURE — 99999 PR PBB SHADOW E&M-EST. PATIENT-LVL III: CPT | Mod: PBBFAC,,, | Performed by: OTOLARYNGOLOGY

## 2019-01-01 PROCEDURE — 25000003 PHARM REV CODE 250: Performed by: PHYSICIAN ASSISTANT

## 2019-01-01 PROCEDURE — 99999 PR PBB SHADOW E&M-EST. PATIENT-LVL III: ICD-10-PCS | Mod: PBBFAC,GC,, | Performed by: STUDENT IN AN ORGANIZED HEALTH CARE EDUCATION/TRAINING PROGRAM

## 2019-01-01 PROCEDURE — 99213 OFFICE O/P EST LOW 20 MIN: CPT | Mod: S$GLB,,, | Performed by: FAMILY MEDICINE

## 2019-01-01 PROCEDURE — 92012 INTRM OPH EXAM EST PATIENT: CPT | Mod: S$GLB,,, | Performed by: OPHTHALMOLOGY

## 2019-01-01 PROCEDURE — 3074F SYST BP LT 130 MM HG: CPT | Mod: CPTII,S$GLB,, | Performed by: FAMILY MEDICINE

## 2019-01-01 PROCEDURE — 1101F PR PT FALLS ASSESS DOC 0-1 FALLS W/OUT INJ PAST YR: ICD-10-PCS | Mod: CPTII,S$GLB,, | Performed by: NURSE PRACTITIONER

## 2019-01-01 PROCEDURE — 84550 ASSAY OF BLOOD/URIC ACID: CPT

## 2019-01-01 PROCEDURE — 99999 PR PBB SHADOW E&M-EST. PATIENT-LVL III: CPT | Mod: PBBFAC,,, | Performed by: FAMILY MEDICINE

## 2019-01-01 PROCEDURE — 99213 PR OFFICE/OUTPT VISIT, EST, LEVL III, 20-29 MIN: ICD-10-PCS | Mod: 25,S$GLB,, | Performed by: FAMILY MEDICINE

## 2019-01-01 PROCEDURE — 99999 PR PBB SHADOW E&M-EST. PATIENT-LVL III: ICD-10-PCS | Mod: PBBFAC,,, | Performed by: FAMILY MEDICINE

## 2019-01-01 PROCEDURE — 99999 PR PBB SHADOW E&M-EST. PATIENT-LVL V: ICD-10-PCS | Mod: PBBFAC,GC,, | Performed by: STUDENT IN AN ORGANIZED HEALTH CARE EDUCATION/TRAINING PROGRAM

## 2019-01-01 PROCEDURE — 88305 TISSUE EXAM BY PATHOLOGIST: ICD-10-PCS | Mod: 26,,, | Performed by: PATHOLOGY

## 2019-01-01 PROCEDURE — 85652 RBC SED RATE AUTOMATED: CPT

## 2019-01-01 PROCEDURE — 88342 IMHCHEM/IMCYTCHM 1ST ANTB: CPT | Mod: 26,,, | Performed by: PATHOLOGY

## 2019-01-01 PROCEDURE — 99214 PR OFFICE/OUTPT VISIT, EST, LEVL IV, 30-39 MIN: ICD-10-PCS | Mod: GC,S$GLB,, | Performed by: STUDENT IN AN ORGANIZED HEALTH CARE EDUCATION/TRAINING PROGRAM

## 2019-01-01 PROCEDURE — 93005 ELECTROCARDIOGRAM TRACING: CPT | Mod: S$GLB,,, | Performed by: INTERNAL MEDICINE

## 2019-01-01 PROCEDURE — 3074F SYST BP LT 130 MM HG: CPT | Mod: CPTII,GC,S$GLB, | Performed by: STUDENT IN AN ORGANIZED HEALTH CARE EDUCATION/TRAINING PROGRAM

## 2019-01-01 PROCEDURE — 3078F PR MOST RECENT DIASTOLIC BLOOD PRESSURE < 80 MM HG: ICD-10-PCS | Mod: CPTII,GC,S$GLB, | Performed by: INTERNAL MEDICINE

## 2019-01-01 PROCEDURE — 3079F PR MOST RECENT DIASTOLIC BLOOD PRESSURE 80-89 MM HG: ICD-10-PCS | Mod: CPTII,S$GLB,, | Performed by: NURSE PRACTITIONER

## 2019-01-01 PROCEDURE — 83036 HEMOGLOBIN GLYCOSYLATED A1C: CPT

## 2019-01-01 PROCEDURE — 99291 CRITICAL CARE FIRST HOUR: CPT | Mod: 25

## 2019-01-01 PROCEDURE — 99999 PR PBB SHADOW E&M-EST. PATIENT-LVL II: CPT | Mod: PBBFAC,,, | Performed by: OPHTHALMOLOGY

## 2019-01-01 PROCEDURE — 72100 X-RAY EXAM L-S SPINE 2/3 VWS: CPT | Mod: TC

## 2019-01-01 PROCEDURE — 83970 ASSAY OF PARATHORMONE: CPT

## 2019-01-01 PROCEDURE — 3075F SYST BP GE 130 - 139MM HG: CPT | Mod: CPTII,S$GLB,, | Performed by: NURSE PRACTITIONER

## 2019-01-01 PROCEDURE — 92014 COMPRE OPH EXAM EST PT 1/>: CPT | Mod: S$GLB,,, | Performed by: OPTOMETRIST

## 2019-01-01 PROCEDURE — 99223 1ST HOSP IP/OBS HIGH 75: CPT | Mod: AI,,, | Performed by: PSYCHIATRY & NEUROLOGY

## 2019-01-01 PROCEDURE — 77080 DXA BONE DENSITY AXIAL: CPT | Mod: TC

## 2019-01-01 PROCEDURE — 93010 ELECTROCARDIOGRAM REPORT: CPT | Mod: ,,, | Performed by: INTERNAL MEDICINE

## 2019-01-01 PROCEDURE — 3079F DIAST BP 80-89 MM HG: CPT | Mod: CPTII,S$GLB,, | Performed by: NURSE PRACTITIONER

## 2019-01-01 PROCEDURE — 99214 OFFICE O/P EST MOD 30 MIN: CPT | Mod: S$GLB,,, | Performed by: FAMILY MEDICINE

## 2019-01-01 PROCEDURE — 3077F SYST BP >= 140 MM HG: CPT | Mod: CPTII,GC,S$GLB, | Performed by: STUDENT IN AN ORGANIZED HEALTH CARE EDUCATION/TRAINING PROGRAM

## 2019-01-01 PROCEDURE — 99214 PR OFFICE/OUTPT VISIT, EST, LEVL IV, 30-39 MIN: ICD-10-PCS | Mod: S$GLB,,, | Performed by: FAMILY MEDICINE

## 2019-01-01 PROCEDURE — 1159F MED LIST DOCD IN RCRD: CPT | Mod: S$GLB,,, | Performed by: DERMATOLOGY

## 2019-01-01 PROCEDURE — 72100 XR LUMBAR SPINE AP AND LAT WITH FLEX/EXT: ICD-10-PCS | Mod: 26,,, | Performed by: RADIOLOGY

## 2019-01-01 PROCEDURE — 80053 COMPREHEN METABOLIC PANEL: CPT

## 2019-01-01 PROCEDURE — 3074F SYST BP LT 130 MM HG: CPT | Mod: CPTII,S$GLB,, | Performed by: NURSE PRACTITIONER

## 2019-01-01 PROCEDURE — 1101F PR PT FALLS ASSESS DOC 0-1 FALLS W/OUT INJ PAST YR: ICD-10-PCS | Mod: CPTII,GC,S$GLB, | Performed by: INTERNAL MEDICINE

## 2019-01-01 PROCEDURE — 99499 UNLISTED E&M SERVICE: CPT | Mod: S$GLB,,, | Performed by: OPHTHALMOLOGY

## 2019-01-01 PROCEDURE — 99212 PR OFFICE/OUTPT VISIT, EST, LEVL II, 10-19 MIN: ICD-10-PCS | Mod: S$GLB,,, | Performed by: DERMATOLOGY

## 2019-01-01 PROCEDURE — 69209 PR REMOVAL IMPACTED CERUMEN USING IRRIGATION/LAVAGE, UNILATERAL: ICD-10-PCS | Mod: RT,S$GLB,, | Performed by: FAMILY MEDICINE

## 2019-01-01 PROCEDURE — 3077F PR MOST RECENT SYSTOLIC BLOOD PRESSURE >= 140 MM HG: ICD-10-PCS | Mod: CPTII,GC,S$GLB, | Performed by: STUDENT IN AN ORGANIZED HEALTH CARE EDUCATION/TRAINING PROGRAM

## 2019-01-01 PROCEDURE — 3074F SYST BP LT 130 MM HG: CPT | Mod: CPTII,GC,S$GLB, | Performed by: INTERNAL MEDICINE

## 2019-01-01 PROCEDURE — 99999 PR PBB SHADOW E&M-EST. PATIENT-LVL III: CPT | Mod: PBBFAC,,, | Performed by: OPHTHALMOLOGY

## 2019-01-01 PROCEDURE — 99999 PR PBB SHADOW E&M-EST. PATIENT-LVL II: CPT | Mod: PBBFAC,,, | Performed by: DERMATOLOGY

## 2019-01-01 PROCEDURE — 69209 REMOVE IMPACTED EAR WAX UNI: CPT | Mod: RT,S$GLB,, | Performed by: FAMILY MEDICINE

## 2019-01-01 PROCEDURE — 1126F AMNT PAIN NOTED NONE PRSNT: CPT | Mod: S$GLB,,, | Performed by: NURSE PRACTITIONER

## 2019-01-01 PROCEDURE — 77080 DEXA BONE DENSITY SPINE HIP: ICD-10-PCS | Mod: 26,,, | Performed by: INTERNAL MEDICINE

## 2019-01-01 PROCEDURE — 3074F PR MOST RECENT SYSTOLIC BLOOD PRESSURE < 130 MM HG: ICD-10-PCS | Mod: CPTII,GC,S$GLB, | Performed by: STUDENT IN AN ORGANIZED HEALTH CARE EDUCATION/TRAINING PROGRAM

## 2019-01-01 PROCEDURE — 88342 CHG IMMUNOCYTOCHEMISTRY: ICD-10-PCS | Mod: 26,,, | Performed by: PATHOLOGY

## 2019-01-01 PROCEDURE — 11000001 HC ACUTE MED/SURG PRIVATE ROOM

## 2019-01-01 PROCEDURE — 92012 PR EYE EXAM, EST PATIENT,INTERMED: ICD-10-PCS | Mod: S$GLB,,, | Performed by: OPHTHALMOLOGY

## 2019-01-01 PROCEDURE — 3074F SYST BP LT 130 MM HG: CPT | Mod: CPTII,S$GLB,, | Performed by: OTOLARYNGOLOGY

## 2019-01-01 PROCEDURE — 93306 TTE W/DOPPLER COMPLETE: CPT | Mod: S$GLB,,, | Performed by: INTERNAL MEDICINE

## 2019-01-01 PROCEDURE — 3074F PR MOST RECENT SYSTOLIC BLOOD PRESSURE < 130 MM HG: ICD-10-PCS | Mod: CPTII,GC,S$GLB, | Performed by: INTERNAL MEDICINE

## 2019-01-01 PROCEDURE — 99214 OFFICE O/P EST MOD 30 MIN: CPT | Mod: GC,S$GLB,, | Performed by: INTERNAL MEDICINE

## 2019-01-01 PROCEDURE — 1159F PR MEDICATION LIST DOCUMENTED IN MEDICAL RECORD: ICD-10-PCS | Mod: S$GLB,,, | Performed by: NURSE PRACTITIONER

## 2019-01-01 PROCEDURE — 99213 OFFICE O/P EST LOW 20 MIN: CPT | Mod: 25,S$GLB,, | Performed by: FAMILY MEDICINE

## 2019-01-01 PROCEDURE — 88312 PR  SPECIAL STAINS,GROUP I: ICD-10-PCS | Mod: 26,,, | Performed by: PATHOLOGY

## 2019-01-01 PROCEDURE — 3075F PR MOST RECENT SYSTOLIC BLOOD PRESS GE 130-139MM HG: ICD-10-PCS | Mod: CPTII,S$GLB,, | Performed by: NURSE PRACTITIONER

## 2019-01-01 PROCEDURE — 93296 REM INTERROG EVL PM/IDS: CPT | Performed by: INTERNAL MEDICINE

## 2019-01-01 PROCEDURE — 90471 IMMUNIZATION ADMIN: CPT | Mod: S$GLB,,, | Performed by: NURSE PRACTITIONER

## 2019-01-01 PROCEDURE — 1126F PR PAIN SEVERITY QUANTIFIED, NO PAIN PRESENT: ICD-10-PCS | Mod: S$GLB,,, | Performed by: FAMILY MEDICINE

## 2019-01-01 PROCEDURE — 82306 VITAMIN D 25 HYDROXY: CPT

## 2019-01-01 PROCEDURE — 99999 PR PBB SHADOW E&M-EST. PATIENT-LVL V: ICD-10-PCS | Mod: PBBFAC,,, | Performed by: NURSE PRACTITIONER

## 2019-01-01 PROCEDURE — 99999 PR PBB SHADOW E&M-EST. PATIENT-LVL I: ICD-10-PCS | Mod: PBBFAC,,,

## 2019-01-01 PROCEDURE — 1101F PR PT FALLS ASSESS DOC 0-1 FALLS W/OUT INJ PAST YR: ICD-10-PCS | Mod: CPTII,S$GLB,, | Performed by: DERMATOLOGY

## 2019-01-01 PROCEDURE — 99999 PR PBB SHADOW E&M-EST. PATIENT-LVL V: ICD-10-PCS | Mod: PBBFAC,GC,, | Performed by: INTERNAL MEDICINE

## 2019-01-01 PROCEDURE — 99999 PR PBB SHADOW E&M-EST. PATIENT-LVL III: ICD-10-PCS | Mod: PBBFAC,,, | Performed by: OPHTHALMOLOGY

## 2019-01-01 PROCEDURE — 25500020 PHARM REV CODE 255: Performed by: EMERGENCY MEDICINE

## 2019-01-01 PROCEDURE — 92226 PR SPECIAL EYE EXAM, SUBSEQUENT: ICD-10-PCS | Mod: 50,S$GLB,, | Performed by: OPHTHALMOLOGY

## 2019-01-01 PROCEDURE — 3077F SYST BP >= 140 MM HG: CPT | Mod: CPTII,S$GLB,, | Performed by: NURSE PRACTITIONER

## 2019-01-01 PROCEDURE — 99999 PR PBB SHADOW E&M-EST. PATIENT-LVL III: CPT | Mod: PBBFAC,GC,, | Performed by: STUDENT IN AN ORGANIZED HEALTH CARE EDUCATION/TRAINING PROGRAM

## 2019-01-01 PROCEDURE — 99214 PR OFFICE/OUTPT VISIT, EST, LEVL IV, 30-39 MIN: ICD-10-PCS | Mod: GC,S$GLB,, | Performed by: INTERNAL MEDICINE

## 2019-01-01 PROCEDURE — G0439 PPPS, SUBSEQ VISIT: HCPCS | Mod: S$GLB,,, | Performed by: NURSE PRACTITIONER

## 2019-01-01 RX ORDER — FUROSEMIDE 20 MG/1
40 TABLET ORAL 2 TIMES DAILY
Qty: 120 TABLET | Refills: 11 | Status: CANCELLED | OUTPATIENT
Start: 2019-01-01 | End: 2020-11-24

## 2019-01-01 RX ORDER — HYDROCODONE BITARTRATE AND ACETAMINOPHEN 5; 325 MG/1; MG/1
1-2 TABLET ORAL EVERY 12 HOURS PRN
Qty: 60 TABLET | Refills: 0 | Status: SHIPPED | OUTPATIENT
Start: 2019-01-01 | End: 2019-01-01 | Stop reason: SDUPTHER

## 2019-01-01 RX ORDER — HYDROCODONE BITARTRATE AND ACETAMINOPHEN 5; 325 MG/1; MG/1
1-2 TABLET ORAL EVERY 12 HOURS PRN
Qty: 60 TABLET | Refills: 0 | Status: SHIPPED | OUTPATIENT
Start: 2019-01-01 | End: 2019-01-01

## 2019-01-01 RX ORDER — AMOXICILLIN 500 MG/1
500 CAPSULE ORAL 2 TIMES DAILY
Qty: 20 CAPSULE | Refills: 0 | Status: ON HOLD | OUTPATIENT
Start: 2019-01-01 | End: 2020-01-01 | Stop reason: ALTCHOICE

## 2019-01-01 RX ORDER — LABETALOL HCL 20 MG/4 ML
10 SYRINGE (ML) INTRAVENOUS
Status: DISCONTINUED | OUTPATIENT
Start: 2019-01-01 | End: 2020-01-01

## 2019-01-01 RX ORDER — FLUCONAZOLE 150 MG/1
TABLET ORAL
Qty: 2 TABLET | Refills: 0 | Status: SHIPPED | OUTPATIENT
Start: 2019-01-01 | End: 2019-01-01 | Stop reason: SDUPTHER

## 2019-01-01 RX ORDER — HYDROCODONE BITARTRATE AND ACETAMINOPHEN 5; 325 MG/1; MG/1
TABLET ORAL
Qty: 60 TABLET | Refills: 0 | Status: SHIPPED | OUTPATIENT
Start: 2019-01-01 | End: 2019-01-01 | Stop reason: SDUPTHER

## 2019-01-01 RX ORDER — PREDNISONE 5 MG/1
TABLET ORAL
Qty: 30 TABLET | Refills: 0 | Status: SHIPPED | OUTPATIENT
Start: 2019-01-01 | End: 2019-01-01

## 2019-01-01 RX ORDER — ASPIRIN 325 MG
325 TABLET, DELAYED RELEASE (ENTERIC COATED) ORAL ONCE
Status: COMPLETED | OUTPATIENT
Start: 2019-01-01 | End: 2019-01-01

## 2019-01-01 RX ORDER — ALLOPURINOL 100 MG/1
150 TABLET ORAL DAILY
Qty: 60 TABLET | Refills: 2 | Status: SHIPPED | OUTPATIENT
Start: 2019-01-01 | End: 2019-01-01 | Stop reason: SDUPTHER

## 2019-01-01 RX ORDER — MIRTAZAPINE 7.5 MG/1
7.5 TABLET, FILM COATED ORAL NIGHTLY
Qty: 30 TABLET | Refills: 11 | Status: CANCELLED | OUTPATIENT
Start: 2019-01-01 | End: 2020-11-24

## 2019-01-01 RX ORDER — FLUCONAZOLE 150 MG/1
TABLET ORAL
Qty: 2 TABLET | Refills: 0 | Status: ON HOLD | OUTPATIENT
Start: 2019-01-01 | End: 2020-01-01 | Stop reason: ALTCHOICE

## 2019-01-01 RX ORDER — ALLOPURINOL 100 MG/1
150 TABLET ORAL DAILY
Qty: 60 TABLET | Refills: 2 | Status: SHIPPED | OUTPATIENT
Start: 2019-01-01 | End: 2019-01-01

## 2019-01-01 RX ORDER — GLUCAGON 1 MG
1 KIT INJECTION
Status: DISCONTINUED | OUTPATIENT
Start: 2019-01-01 | End: 2020-01-01

## 2019-01-01 RX ORDER — METOPROLOL SUCCINATE 50 MG/1
150 TABLET, EXTENDED RELEASE ORAL DAILY
Qty: 90 TABLET | Refills: 11 | Status: SHIPPED | OUTPATIENT
Start: 2019-01-01 | End: 2019-01-01

## 2019-01-01 RX ORDER — HYDROCODONE BITARTRATE AND ACETAMINOPHEN 5; 325 MG/1; MG/1
1-2 TABLET ORAL EVERY 12 HOURS PRN
Qty: 60 TABLET | Refills: 0 | Status: SHIPPED | OUTPATIENT
Start: 2019-01-01 | End: 2020-01-01

## 2019-01-01 RX ORDER — ALLOPURINOL 100 MG/1
150 TABLET ORAL DAILY
Qty: 45 TABLET | Refills: 3 | Status: SHIPPED | OUTPATIENT
Start: 2019-01-01 | End: 2020-01-27

## 2019-01-01 RX ORDER — ATORVASTATIN CALCIUM 10 MG/1
10 TABLET, FILM COATED ORAL DAILY
Status: DISCONTINUED | OUTPATIENT
Start: 2020-01-01 | End: 2019-01-01

## 2019-01-01 RX ORDER — MIRTAZAPINE 7.5 MG/1
7.5 TABLET, FILM COATED ORAL NIGHTLY
Status: DISCONTINUED | OUTPATIENT
Start: 2019-01-01 | End: 2020-01-01

## 2019-01-01 RX ORDER — FUROSEMIDE 20 MG/1
40 TABLET ORAL 2 TIMES DAILY
Qty: 120 TABLET | Refills: 0 | Status: SHIPPED | OUTPATIENT
Start: 2019-01-01 | End: 2019-01-01 | Stop reason: SDUPTHER

## 2019-01-01 RX ORDER — GABAPENTIN 300 MG/1
300 CAPSULE ORAL 3 TIMES DAILY PRN
Status: DISCONTINUED | OUTPATIENT
Start: 2019-01-01 | End: 2020-01-01

## 2019-01-01 RX ORDER — ERGOCALCIFEROL 1.25 MG/1
50000 CAPSULE ORAL
Qty: 12 CAPSULE | Refills: 0 | Status: SHIPPED | OUTPATIENT
Start: 2019-01-01 | End: 2019-01-01

## 2019-01-01 RX ORDER — FLUTICASONE PROPIONATE 50 MCG
2 SPRAY, SUSPENSION (ML) NASAL DAILY
Qty: 16 G | Refills: 5 | Status: SHIPPED | OUTPATIENT
Start: 2019-01-01 | End: 2019-01-01 | Stop reason: SDUPTHER

## 2019-01-01 RX ORDER — ATORVASTATIN CALCIUM 20 MG/1
40 TABLET, FILM COATED ORAL DAILY
Status: DISCONTINUED | OUTPATIENT
Start: 2020-01-01 | End: 2020-01-01

## 2019-01-01 RX ORDER — PREDNISONE 20 MG/1
TABLET ORAL
Qty: 30 TABLET | Refills: 0 | Status: SHIPPED | OUTPATIENT
Start: 2019-01-01 | End: 2019-01-01 | Stop reason: SDUPTHER

## 2019-01-01 RX ORDER — FUROSEMIDE 20 MG/1
40 TABLET ORAL 2 TIMES DAILY
Qty: 120 TABLET | Refills: 0 | Status: CANCELLED | OUTPATIENT
Start: 2019-01-01 | End: 2020-01-24

## 2019-01-01 RX ORDER — HYDROCODONE BITARTRATE AND ACETAMINOPHEN 5; 325 MG/1; MG/1
TABLET ORAL
Qty: 60 TABLET | Refills: 0 | Status: SHIPPED | OUTPATIENT
Start: 2019-01-01 | End: 2019-01-01

## 2019-01-01 RX ORDER — FLUTICASONE PROPIONATE 50 MCG
2 SPRAY, SUSPENSION (ML) NASAL DAILY
Qty: 16 G | Refills: 5 | Status: CANCELLED | OUTPATIENT
Start: 2019-01-01 | End: 2020-03-22

## 2019-01-01 RX ORDER — ACETAMINOPHEN 325 MG/1
650 TABLET ORAL EVERY 6 HOURS PRN
Status: DISCONTINUED | OUTPATIENT
Start: 2019-01-01 | End: 2020-01-01

## 2019-01-01 RX ORDER — MIRTAZAPINE 7.5 MG/1
7.5 TABLET, FILM COATED ORAL NIGHTLY
Qty: 30 TABLET | Refills: 0 | Status: SHIPPED | OUTPATIENT
Start: 2019-01-01 | End: 2019-01-01 | Stop reason: SDUPTHER

## 2019-01-01 RX ORDER — HYDROCORTISONE 25 MG/G
CREAM TOPICAL 2 TIMES DAILY
Qty: 28 G | Refills: 2 | Status: SHIPPED | OUTPATIENT
Start: 2019-01-01

## 2019-01-01 RX ORDER — GABAPENTIN 300 MG/1
300 CAPSULE ORAL 3 TIMES DAILY PRN
Qty: 90 CAPSULE | Refills: 3 | Status: SHIPPED | OUTPATIENT
Start: 2019-01-01 | End: 2020-02-21

## 2019-01-01 RX ORDER — DOXEPIN HYDROCHLORIDE 10 MG/1
10 CAPSULE ORAL NIGHTLY
Status: DISCONTINUED | OUTPATIENT
Start: 2019-01-01 | End: 2020-01-01

## 2019-01-01 RX ORDER — HEPARIN SODIUM 5000 [USP'U]/ML
5000 INJECTION, SOLUTION INTRAVENOUS; SUBCUTANEOUS EVERY 8 HOURS
Status: DISCONTINUED | OUTPATIENT
Start: 2019-01-01 | End: 2019-01-01 | Stop reason: SDUPTHER

## 2019-01-01 RX ORDER — ATORVASTATIN CALCIUM 10 MG/1
10 TABLET, FILM COATED ORAL DAILY
Qty: 90 TABLET | Refills: 3 | Status: SHIPPED | OUTPATIENT
Start: 2019-01-01 | End: 2020-04-22

## 2019-01-01 RX ORDER — INSULIN ASPART 100 [IU]/ML
0-5 INJECTION, SOLUTION INTRAVENOUS; SUBCUTANEOUS EVERY 6 HOURS PRN
Status: DISCONTINUED | OUTPATIENT
Start: 2019-01-01 | End: 2020-01-01

## 2019-01-01 RX ORDER — GUAIFENESIN 400 MG/1
400 TABLET ORAL EVERY 4 HOURS PRN
Qty: 30 TABLET | Refills: 0 | Status: SHIPPED | OUTPATIENT
Start: 2019-01-01 | End: 2019-01-01

## 2019-01-01 RX ORDER — PREDNISONE 20 MG/1
20 TABLET ORAL DAILY
Qty: 30 TABLET | Refills: 2 | Status: SHIPPED | OUTPATIENT
Start: 2019-01-01 | End: 2019-01-01

## 2019-01-01 RX ORDER — SODIUM CHLORIDE 0.9 % (FLUSH) 0.9 %
10 SYRINGE (ML) INJECTION
Status: DISCONTINUED | OUTPATIENT
Start: 2019-01-01 | End: 2020-01-01

## 2019-01-01 RX ORDER — HYDROCODONE BITARTRATE AND ACETAMINOPHEN 5; 325 MG/1; MG/1
1-2 TABLET ORAL EVERY 12 HOURS PRN
Qty: 60 TABLET | Refills: 0 | Status: CANCELLED | OUTPATIENT
Start: 2019-01-01 | End: 2019-01-01

## 2019-01-01 RX ORDER — AZELASTINE 1 MG/ML
SPRAY, METERED NASAL
Qty: 30 ML | Refills: 1 | Status: ON HOLD | OUTPATIENT
Start: 2019-01-01 | End: 2020-01-01 | Stop reason: ALTCHOICE

## 2019-01-01 RX ORDER — MECLIZINE HYDROCHLORIDE 25 MG/1
25 TABLET ORAL EVERY 6 HOURS
Qty: 120 TABLET | Refills: 2 | Status: CANCELLED | OUTPATIENT
Start: 2019-01-01 | End: 2019-01-01

## 2019-01-01 RX ORDER — AMOXICILLIN AND CLAVULANATE POTASSIUM 500; 125 MG/1; MG/1
1 TABLET, FILM COATED ORAL 2 TIMES DAILY
Qty: 14 TABLET | Refills: 0 | Status: SHIPPED | OUTPATIENT
Start: 2019-01-01 | End: 2019-01-01

## 2019-01-01 RX ORDER — ALLOPURINOL 100 MG/1
150 TABLET ORAL DAILY
Qty: 45 TABLET | Refills: 3 | Status: SHIPPED | OUTPATIENT
Start: 2019-01-01 | End: 2019-01-01

## 2019-01-01 RX ORDER — DOXEPIN HYDROCHLORIDE 10 MG/1
10 CAPSULE ORAL NIGHTLY
Qty: 30 CAPSULE | Refills: 2 | Status: SHIPPED | OUTPATIENT
Start: 2019-01-01 | End: 2019-01-01 | Stop reason: SDUPTHER

## 2019-01-01 RX ORDER — TRIAMCINOLONE ACETONIDE 1 MG/G
CREAM TOPICAL
Qty: 454 G | Refills: 3 | Status: SHIPPED | OUTPATIENT
Start: 2019-01-01

## 2019-01-01 RX ORDER — AMOXICILLIN 500 MG/1
500 TABLET, FILM COATED ORAL EVERY 12 HOURS
Qty: 20 TABLET | Refills: 0 | Status: SHIPPED | OUTPATIENT
Start: 2019-01-01 | End: 2019-01-01

## 2019-01-01 RX ORDER — PREDNISONE 20 MG/1
TABLET ORAL
Qty: 30 TABLET | Refills: 0 | Status: SHIPPED | OUTPATIENT
Start: 2019-01-01

## 2019-01-01 RX ORDER — FUROSEMIDE 20 MG/1
40 TABLET ORAL 2 TIMES DAILY
Qty: 120 TABLET | Refills: 0 | Status: SHIPPED | OUTPATIENT
Start: 2019-01-01 | End: 2020-01-27

## 2019-01-01 RX ORDER — FLUTICASONE PROPIONATE 50 MCG
2 SPRAY, SUSPENSION (ML) NASAL DAILY
Qty: 15.8 ML | Refills: 5 | Status: SHIPPED | OUTPATIENT
Start: 2019-01-01 | End: 2020-12-18

## 2019-01-01 RX ORDER — DOXEPIN HYDROCHLORIDE 10 MG/1
10 CAPSULE ORAL NIGHTLY
Qty: 30 CAPSULE | Refills: 2 | Status: SHIPPED | OUTPATIENT
Start: 2019-01-01

## 2019-01-01 RX ORDER — ALLOPURINOL 100 MG/1
150 TABLET ORAL DAILY
Qty: 45 TABLET | Refills: 3 | Status: SHIPPED | OUTPATIENT
Start: 2019-01-01 | End: 2019-01-01 | Stop reason: SDUPTHER

## 2019-01-01 RX ORDER — HYDROCORTISONE 25 MG/G
CREAM TOPICAL 2 TIMES DAILY
Qty: 28 G | Refills: 2 | Status: SHIPPED | OUTPATIENT
Start: 2019-01-01 | End: 2019-01-01 | Stop reason: SDUPTHER

## 2019-01-01 RX ORDER — HYDROCORTISONE 25 MG/G
CREAM TOPICAL 2 TIMES DAILY
Qty: 453.6 G | Refills: 2 | Status: SHIPPED | OUTPATIENT
Start: 2019-01-01 | End: 2019-01-01 | Stop reason: SDUPTHER

## 2019-01-01 RX ORDER — MIRTAZAPINE 7.5 MG/1
7.5 TABLET, FILM COATED ORAL NIGHTLY
Qty: 30 TABLET | Refills: 0 | Status: CANCELLED | OUTPATIENT
Start: 2019-01-01 | End: 2020-01-24

## 2019-01-01 RX ORDER — ONDANSETRON 4 MG/1
4 TABLET, ORALLY DISINTEGRATING ORAL EVERY 6 HOURS PRN
Status: DISCONTINUED | OUTPATIENT
Start: 2019-01-01 | End: 2020-01-01

## 2019-01-01 RX ORDER — BENZONATATE 100 MG/1
100 CAPSULE ORAL 3 TIMES DAILY PRN
Qty: 30 CAPSULE | Refills: 0 | Status: SHIPPED | OUTPATIENT
Start: 2019-01-01 | End: 2019-01-01

## 2019-01-01 RX ORDER — CYCLOSPORINE 0.5 MG/ML
1 EMULSION OPHTHALMIC 2 TIMES DAILY
Qty: 60 VIAL | Refills: 1 | Status: SHIPPED | OUTPATIENT
Start: 2019-01-01 | End: 2020-09-11

## 2019-01-01 RX ORDER — LEVOTHYROXINE SODIUM 25 UG/1
25 TABLET ORAL
Qty: 30 TABLET | Refills: 11 | Status: SHIPPED | OUTPATIENT
Start: 2019-01-01 | End: 2020-12-18

## 2019-01-01 RX ORDER — FLUCONAZOLE 150 MG/1
TABLET ORAL
Qty: 2 TABLET | Refills: 0 | Status: SHIPPED | OUTPATIENT
Start: 2019-01-01 | End: 2019-01-01 | Stop reason: ALTCHOICE

## 2019-01-01 RX ORDER — MIRTAZAPINE 7.5 MG/1
7.5 TABLET, FILM COATED ORAL NIGHTLY
Qty: 30 TABLET | Refills: 0 | Status: SHIPPED | OUTPATIENT
Start: 2019-01-01 | End: 2020-01-27

## 2019-01-01 RX ORDER — AMOXICILLIN 250 MG
1 CAPSULE ORAL DAILY PRN
Status: DISCONTINUED | OUTPATIENT
Start: 2019-01-01 | End: 2020-01-01

## 2019-01-01 RX ORDER — CLINDAMYCIN HYDROCHLORIDE 150 MG/1
150 CAPSULE ORAL EVERY 6 HOURS
Status: DISCONTINUED | OUTPATIENT
Start: 2020-01-01 | End: 2020-01-01

## 2019-01-01 RX ADMIN — ASPIRIN 325 MG: 325 TABLET, DELAYED RELEASE ORAL at 10:12

## 2019-01-01 RX ADMIN — IOHEXOL 100 ML: 350 INJECTION, SOLUTION INTRAVENOUS at 07:12

## 2019-01-01 ASSESSMENT — ROUTINE ASSESSMENT OF PATIENT INDEX DATA (RAPID3)
PAIN SCORE: 5.5
MDHAQ FUNCTION SCORE: .8
PSYCHOLOGICAL DISTRESS SCORE: 0
PAIN SCORE: 7.5
PSYCHOLOGICAL DISTRESS SCORE: 2.2
FATIGUE SCORE: 3
PSYCHOLOGICAL DISTRESS SCORE: 0
WHEN YOU AWAKENED IN THE MORNING OVER THE LAST WEEK, PLEASE INDICATE THE AMOUNT OF TIME IT TAKES UNTIL YOU ARE AS LIMBER AS YOU WILL BE FOR THE DAY: 1.5 HOURS
PATIENT GLOBAL ASSESSMENT SCORE: 5
PATIENT GLOBAL ASSESSMENT SCORE: 7
MDHAQ FUNCTION SCORE: 1.1
PSYCHOLOGICAL DISTRESS SCORE: 2.2
TOTAL RAPID3 SCORE: 1.83
PATIENT GLOBAL ASSESSMENT SCORE: 0
PATIENT GLOBAL ASSESSMENT SCORE: 5
FATIGUE SCORE: 6.5
MDHAQ FUNCTION SCORE: 0
FATIGUE SCORE: 6.5
AM STIFFNESS SCORE: 1, YES
PAIN SCORE: 8
FATIGUE SCORE: 5
MDHAQ FUNCTION SCORE: .3
AM STIFFNESS SCORE: 1, YES
TOTAL RAPID3 SCORE: 5.89
PAIN SCORE: 7
AM STIFFNESS SCORE: 0, NO
TOTAL RAPID3 SCORE: 4.5
TOTAL RAPID3 SCORE: 5.22

## 2019-01-04 ENCOUNTER — TELEPHONE (OUTPATIENT)
Dept: RHEUMATOLOGY | Facility: CLINIC | Age: 84
End: 2019-01-04

## 2019-01-04 DIAGNOSIS — M10.9 GOUT, ARTHRITIS: Primary | ICD-10-CM

## 2019-01-04 DIAGNOSIS — L29.9 PRURITUS: ICD-10-CM

## 2019-01-04 RX ORDER — PREDNISONE 5 MG/1
TABLET ORAL
Qty: 30 TABLET | Refills: 0 | Status: SHIPPED | OUTPATIENT
Start: 2019-01-04 | End: 2019-01-04 | Stop reason: SDUPTHER

## 2019-01-04 RX ORDER — PREDNISONE 5 MG/1
TABLET ORAL
Qty: 30 TABLET | Refills: 0 | Status: CANCELLED | OUTPATIENT
Start: 2019-01-04

## 2019-01-04 RX ORDER — PREDNISONE 5 MG/1
TABLET ORAL
Qty: 30 TABLET | Refills: 0 | Status: SHIPPED | OUTPATIENT
Start: 2019-01-04 | End: 2019-01-01 | Stop reason: SDUPTHER

## 2019-01-04 NOTE — TELEPHONE ENCOUNTER
----- Message from Ana Garcia sent at 1/4/2019 10:39 AM CST -----  Contact: Self  Rx Refill/Request     Is this a Refill or New Rx:  refill  Rx Name and Strength:   triamcinolone acetonide 0.1% (KENALOG) 0.1 % cream  Preferred Pharmacy with phone number:   Communication Preference:[email or web address]  Additional Information:

## 2019-01-07 ENCOUNTER — TELEPHONE (OUTPATIENT)
Dept: ENDOCRINOLOGY | Facility: CLINIC | Age: 84
End: 2019-01-07

## 2019-01-07 RX ORDER — TRIAMCINOLONE ACETONIDE 1 MG/G
CREAM TOPICAL 2 TIMES DAILY
Qty: 80 G | Refills: 0 | Status: SHIPPED | OUTPATIENT
Start: 2019-01-07 | End: 2019-01-01 | Stop reason: ALTCHOICE

## 2019-01-07 NOTE — TELEPHONE ENCOUNTER
----- Message from Wilda Salazar MA sent at 1/7/2019  8:52 AM CST -----  Contact: Melanie Fritz, Pt's Daughter  [phone]       ----- Message -----  From: Alissa Greer  Sent: 1/7/2019   8:45 AM  To: Siri Ryan Staff    Pt's daughter Melanie is requesting a call back from the nurse to get her mother in as soon as possible as her skin is turning very dark in discoloration since the decrease in her diabetic medication.    I attempted to schedule but the first available was not until May 30th.    Melanie may be reached at [phone].    Thank you.  LC

## 2019-01-28 PROBLEM — H00.14 CHALAZION LEFT UPPER EYELID: Status: ACTIVE | Noted: 2019-01-01

## 2019-01-28 NOTE — PROGRESS NOTES
HPI     Eye Problem      Additional comments: overdue check              Comments     DLS 8/8/17 Eyes have been watering for the past couple of weeks. About a week ago she started with bump SKIP          Previously done OCT  OD: Fibrotic PED, Vit-mac adhesion  OS: lamellar hole , ERM    A/P    1. Dry AMD OD  -Pt asymptomatic for central vision changes  -AREDS/Amsler monitoring  -Monitor annually    2. Mild NPDR OD  No DME, T2  -Continue BS/BP/chol control    3. Asteroid hyalosis OS  -Dense - but ASx    4. PCIOL OU    5. Chalazion SKIP  Warm compresses  Lid scrubs              1 yr. OCT

## 2019-01-29 PROBLEM — Z95.0 BIVENTRICULAR CARDIAC PACEMAKER IN SITU: Status: ACTIVE | Noted: 2019-01-01

## 2019-01-29 NOTE — PROGRESS NOTES
"Ms. Martínez is a patient of Dr. Burt and was last seen in clinic 8/21/2018.      Subjective:   Patient ID:  Dacia Martínez is a 83 y.o. female who presents for follow-up of Cardiomyopathy  .     HPI:    Ms. Martínez is a 83 y.o. female with AF, HTN, CAD, breast CA, migraines, DM, CKD here for follow up after device implantation and AVN ablation.     Background:    Ms. Martínez has a history of pAF s/p DCCV (06/06/17) HTN, CAD, breast cancer, migraines, IDDM, and CKD III, who presented to [facility] ED (07/07/17) for evaluation of a sudden-onset episode of palpitations/tachycardia. She was found to be in AF w/RVR at 120 bpm; an Echo at the time revealed an EF of 60%. Ms. Martínez was rate-controlled and discharged home.     Since discharge, Ms. Martínez notes only occasional fast rates; "not anything like before." She reports experiencing occasional dizziness; baseline. She denies chest pain, SOB/MCCOLLUM, palpitations, or syncope.     11/17: Minimal symptoms due to AF. Remains on metoprolol 100 mg bid as well as apixaban.     1/18: She has almost immediate recurrence of AF post CV 12/20/17. Feels better on amiodarone but sleeping more.     4/18: AF rate controlled on low dose amiodarone 200 mg qd. She is due for dental extraction. Her PCP advised no cessation of OAC.     8/2018: EF was 30-35% in AF with new LBBB. Prior ECG was AF with narrow complex 12/17, EF 60%. She denies new symptoms related to her depressed EF but has chronic MCCOLLUM and LE edema. She remains on mow dose eliquis as well as amiodarone. She is due to see interventional neurology for abnormalities seen on MRA of the brain.     Update (01/29/2019):    On 10/26/2018 she underwent successful implant of PPM BIV with RV lead and His bundle pacing.    On 12/7/2018 she underwent successful AVN ablation.    Today she says she feels well with no new cardiac complaints. Ms. Martínez denies chest pain with exertion or at rest, palpitations, SOB, MCCOLLUM, dizziness, or syncope. BP elevated in clinic " but she says she has not yet taken her BP meds today.     She is currently taking eliquis 2.5mg BID for stroke prophylaxis and denies significant bleeding episodes. She is currently being treated with metoprolol succinate 100mg daily for HR control. Kidney function is stable, with a creatinine of 1.4 on 12/3/2018.    Device Interrogation (1/29/2019) reveals an intrinsic AF with CHB with stable lead and device function. No ventricular arrhythmias or treated episodes were noted.  She paces 100% in the His. Estimated battery longevity 7 years.     I have personally reviewed the patient's EKG today, which shows v pacing at 90bpm. QRS is 124ms.    Recent Cardiac Tests:    2D Echo (11/4/2018):  ·   Left atrium is severely dilated.  · The left ventricle cavity is normal.  · Septal wall has abnormal motion.  · Moderate global hypokinetic wall motion.  · Left ventricle ejection fraction is severely decreased at 25%  · RV systolic function is normal.  · Right ventricular cavity size is moderately dilated.  · Mitral valve is mildly sclerotic.  · Mild mitral regurgitation.  · Moderate tricuspid regurgitation.  · Diastolic pattern consistent with atrial fibrillation observed.  · Pulmonic valve shows trace regurgitation.  · The estimated PA systolic pressure is 31.04 mm Hg  · Moderately elevated central venous pressure (8 mm Hg).  · Right atrium is moderately dilated.  · Moderate-to-severe aortic valve stenosis.  · ROBBIE is 0.66 cm2; peak velocity is 1.73 m/s; mean gradient is 7.60 mmHg.  Patent foramen ovale present with left to right shunting indicated by color flow Doppler.    Current Outpatient Medications   Medication Sig    ACCU-CHEK FASTCLIX Misc 1 lancet by Misc.(Non-Drug; Combo Route) route 3 (three) times daily. Pt to test blood glucose up to 3 times daily.    allopurinol (ZYLOPRIM) 100 MG tablet Take 1 and 1/2 tablets (150 mg total) by mouth once daily.    amoxicillin (AMOXIL) 500 MG capsule Take 1 capsule (500 mg  total) by mouth 3 (three) times daily.    apixaban (ELIQUIS) 2.5 mg Tab Take 1 tablet (2.5 mg total) by mouth 2 (two) times daily. Resume on 11/1    benzonatate (TESSALON) 100 MG capsule TAKE ONE CAPSULE BY MOUTH THREE TIMES A DAY AS NEEDED FOR COUGH    cholecalciferol, vitamin D3, (VITAMIN D3) 1,000 unit capsule Take 2 capsules (2,000 Units total) by mouth once daily.    diphenhydrAMINE-zinc acetate 1-0.1% (BENADRYL) cream Apply topically 2 (two) times daily as needed for Itching.    doxepin (SINEQUAN) 10 MG capsule Take 10 mg by mouth every evening.    fluticasone (FLONASE) 50 mcg/actuation nasal spray SPRAY TWICE IN EACH NOSTRIL DAILY (Patient taking differently: 2 sprays by Each Nare route daily as needed. )    furosemide (LASIX) 20 MG tablet Take 2 tablets (40 mg total) by mouth 2 (two) times daily.    ipratropium (ATROVENT) 0.03 % nasal spray instill 1 spray into each nostril 2 times a day if needed for nasal drip    levothyroxine (SYNTHROID) 25 MCG tablet Take 1 tablet (25 mcg total) by mouth daily before breakfast.    meclizine (ANTIVERT) 25 mg tablet Take 1 tablet (25 mg total) by mouth every 6 (six) hours.    metoprolol succinate (TOPROL-XL) 100 MG 24 hr tablet Take 1 tablet (100 mg total) by mouth once daily.    mirtazapine (REMERON) 7.5 MG Tab Take 1 tablet (7.5 mg total) by mouth every evening.    predniSONE (DELTASONE) 5 MG tablet Take 1-2 tablets by mouth as needed for gout attack    SITagliptin (JANUVIA) 25 MG Tab Take 1 tablet (25 mg total) by mouth once daily.    triamcinolone acetonide 0.1% (KENALOG) 0.1 % cream APPLY TOPICALLY TWO TIMES A DAY    valsartan (DIOVAN) 40 MG tablet Take 0.5 tablets (20 mg total) by mouth once daily.     No current facility-administered medications for this visit.      Review of Systems   Constitution: Negative for malaise/fatigue.   Cardiovascular: Negative for chest pain, dyspnea on exertion, irregular heartbeat, leg swelling and palpitations.  "  Respiratory: Negative for shortness of breath.    Hematologic/Lymphatic: Negative for bleeding problem.   Skin: Negative for rash.   Musculoskeletal: Negative for myalgias.   Gastrointestinal: Negative for hematemesis, hematochezia and nausea.   Genitourinary: Negative for hematuria.   Neurological: Negative for light-headedness.   Psychiatric/Behavioral: Negative for altered mental status.   Allergic/Immunologic: Negative for persistent infections.     Objective:        BP (!) 148/81   Pulse 77   Ht 5' 1" (1.549 m)   Wt 73 kg (160 lb 15 oz)   LMP  (LMP Unknown)   BMI 30.41 kg/m²     Physical Exam   Constitutional: She is oriented to person, place, and time. She appears well-developed and well-nourished.   HENT:   Head: Normocephalic.   Nose: Nose normal.   Eyes: Pupils are equal, round, and reactive to light.   Cardiovascular: Normal rate, regular rhythm, S1 normal and S2 normal.   No murmur heard.  Pulses:       Radial pulses are 2+ on the right side, and 2+ on the left side.   Pulmonary/Chest: Breath sounds normal. No respiratory distress.   Device to LUCW.   Abdominal: Normal appearance.   Musculoskeletal: Normal range of motion. She exhibits no edema.   Neurological: She is alert and oriented to person, place, and time.   Skin: Skin is warm and dry. No erythema.   Psychiatric: She has a normal mood and affect. Her speech is normal and behavior is normal.   Nursing note and vitals reviewed.    Lab Results   Component Value Date     12/03/2018    K 3.5 12/03/2018    MG 1.9 11/07/2018    BUN 22 12/03/2018    CREATININE 1.4 12/03/2018    ALT 38 11/06/2018    AST 72 (H) 11/06/2018    HGB 11.6 (L) 12/03/2018    HCT 38.7 12/03/2018    HCT 39 09/28/2018    TSH 12.478 (H) 11/05/2018    LDLCALC 108.2 11/05/2018       Recent Labs   Lab 01/02/18  2231 06/21/18  0837 10/22/18  1103 12/03/18  0902   INR 1.1 1.4 H 1.3 H 1.1       Assessment:     1. Biventricular cardiac pacemaker in situ    2. Persistent atrial " fibrillation    3. Dilated cardiomyopathy    4. Essential hypertension    5. LBBB (left bundle branch block)      Plan:     In summary, Ms. Martínez is a 83 y.o. female with AF, HTN, CAD, breast CA, migraines, DM, CKD here for follow up after device implantation and AVN ablation.   Ms. Martínez is doing well from a device perspective with stable lead and device function. No ventricular arrhythmia noted. Chronic AF on eliquis for CVA prophylaxis.  100% His pacing with narrow QRS. No recent CHF exacerbations.    Continue current medication regimen and device settings.   Follow up in device clinic as scheduled.   Follow up in EP clinic in 1 year, sooner as needed.     *A copy of this note has been sent to Dr. Burt*    Follow-up in about 1 year (around 1/29/2020).    ------------------------------------------------------------------    PATEL Desouza, NP-C  [facility]

## 2019-01-29 NOTE — TELEPHONE ENCOUNTER
Please schedule f/u appt in Feb, due per 's last note. Also needs appt in Pain Clinic for chronic pain. Thanks JANES

## 2019-02-11 NOTE — TELEPHONE ENCOUNTER
Called patient. Discussed with her to get in contact with her PCP, Dr. Corral.   I have also messaged him and his staff to see if they can help her       ----- Message from Ignacia Kingston MA sent at 2/8/2019  4:46 PM CST -----  Contact: Self/ [phone]      ----- Message -----  From: Yariel Miranda  Sent: 2/8/2019   3:34 PM  To: Marnie Anne Staff    Patient would like call back to see if she can get a medication to help with her yeast infection. Patient pharmacy is [facility].

## 2019-02-11 NOTE — TELEPHONE ENCOUNTER
Spoke with pt and she stated verbalized understanding  Pt denied appt  Pt stated she would call back

## 2019-02-11 NOTE — TELEPHONE ENCOUNTER
Notified by patient's rheumatologist she is complaining of a yeast infection.  Please contact patient and offer appointment for further evaluation.

## 2019-02-11 NOTE — TELEPHONE ENCOUNTER
----- Message from Dayana Dye MD sent at 2/11/2019  9:36 AM CST -----  Dr. Corral,   This is a patient that you follow. She contacted us about a possible yeast infection. I called her and told her to get in contact with your office. Thank you for your help.     Dayana Dye   Rheumatology

## 2019-02-19 NOTE — ASSESSMENT & PLAN NOTE
- S/p AVN ablation 12/7/18 Dr. Burt    - S/p CRT-P  - Increase Metoprolol succinate 100mg -> 150mg  - Eliquis 2.5mg BID

## 2019-02-19 NOTE — ASSESSMENT & PLAN NOTE
- Euvolemic on exam    - Dry weight is 147lbs    - LVef 25%     - Follow up limited TTE if EF still reduced switch to Entereto and titrate up as tolerated  - Continue 40 BID Lasix and Valsartan 20mg  - Increase Toprolol XL 100mg -> 150mg  - Compliant with low salt, fluid restriction diet   - BMP / Mag in 1 week

## 2019-02-19 NOTE — PROGRESS NOTES
PCP - Jasmeet Corral MD  Subjective:   Patient ID:  Dacia Martínez is a 83 y.o. y.o. female who presents for  3 month follow up.    - Patient of Dr. Escalona   - Hx of Permanent Afib s/p AVN ablation with CRT-P (Dr. Burt), HFrEF with low flow low gradient AS (EF 25%), HTN, CAD, Breast CA, DM, CKD     Patient is here with daughter, she is doing well. No new complaints. At baseline weak and unsteady on her feet, states she may fall but havent suffered any yet. Denies chest pain, SOB at rest, palpitations, dizziness, no LE edema, no orthopnea, weight gain. Able to complete all of her ADLs, independent. Here in a wheelchair due to the distance from the parking lot however at home able to ambulate without assistance.       History:     Social History     Tobacco Use    Smoking status: Never Smoker    Smokeless tobacco: Never Used   Substance Use Topics    Alcohol use: Yes     Family History   Problem Relation Age of Onset    Cancer Mother         stomach    Heart disease Mother     Diabetes Father     Hypertension Father     Blindness Brother     Diabetes Brother     Hypertension Brother     Cataracts Sister     Diabetes Sister     Diabetes Brother     Diabetes Brother     Diabetes Brother     No Known Problems Daughter     No Known Problems Son     No Known Problems Daughter     Amblyopia Neg Hx     Glaucoma Neg Hx     Macular degeneration Neg Hx     Strabismus Neg Hx     Retinal detachment Neg Hx        Meds:     Review of patient's allergies indicates:   Allergen Reactions    Tramadol Hallucinations       Current Outpatient Medications:     ACCU-CHEK FASTCLIX Misc, 1 lancet by Misc.(Non-Drug; Combo Route) route 3 (three) times daily. Pt to test blood glucose up to 3 times daily., Disp: 100 each, Rfl: 11    allopurinol (ZYLOPRIM) 100 MG tablet, Take 1 and 1/2 tablets (150 mg total) by mouth once daily., Disp: 60 tablet, Rfl: 2    apixaban (ELIQUIS) 2.5 mg Tab, Take 1 tablet (2.5 mg total)  by mouth 2 (two) times daily. Resume on 11/1, Disp: 60 tablet, Rfl: 11    cholecalciferol, vitamin D3, (VITAMIN D3) 1,000 unit capsule, Take 2 capsules (2,000 Units total) by mouth once daily., Disp: 180 capsule, Rfl: 3    diphenhydrAMINE-zinc acetate 1-0.1% (BENADRYL) cream, Apply topically 2 (two) times daily as needed for Itching., Disp: , Rfl: 0    fluconazole (DIFLUCAN) 150 MG Tab, Take 1 tablet by mouth once and then repeat dose with 1 tablet by mouth again 3 days later., Disp: 2 tablet, Rfl: 0    fluticasone (FLONASE) 50 mcg/actuation nasal spray, SPRAY TWICE IN EACH NOSTRIL DAILY (Patient taking differently: 2 sprays by Each Nare route daily as needed. ), Disp: 16 g, Rfl: 5    furosemide (LASIX) 20 MG tablet, Take 2 tablets (40 mg total) by mouth 2 (two) times daily., Disp: 120 tablet, Rfl: 11    HYDROcodone-acetaminophen (NORCO) 5-325 mg per tablet, Take 1 to 2 tablets by mouth every 6 hours as needed for pain, Disp: 60 tablet, Rfl: 0    hydrocortisone 2.5 % cream, Apply topically 2 (two) times daily., Disp: 28 g, Rfl: 2    ipratropium (ATROVENT) 0.03 % nasal spray, instill 1 spray into each nostril 2 times a day if needed for nasal drip, Disp: 30 mL, Rfl: 1    levothyroxine (SYNTHROID) 25 MCG tablet, Take 1 tablet (25 mcg total) by mouth daily before breakfast., Disp: 30 tablet, Rfl: 11    meclizine (ANTIVERT) 25 mg tablet, Take 1 tablet (25 mg total) by mouth every 6 (six) hours., Disp: 120 tablet, Rfl: 2    metoprolol succinate (TOPROL-XL) 50 MG 24 hr tablet, Take 3 tablets (150 mg total) by mouth once daily., Disp: 90 tablet, Rfl: 11    mirtazapine (REMERON) 7.5 MG Tab, Take 1 tablet (7.5 mg total) by mouth every evening., Disp: 30 tablet, Rfl: 11    predniSONE (DELTASONE) 5 MG tablet, Take 1-2 tablets by mouth as needed for gout attack, Disp: 30 tablet, Rfl: 0    SITagliptin (JANUVIA) 25 MG Tab, Take 1 tablet (25 mg total) by mouth once daily., Disp: 30 tablet, Rfl: 11    valsartan  "(DIOVAN) 40 MG tablet, Take 0.5 tablets (20 mg total) by mouth once daily., Disp: 45 tablet, Rfl: 3    amoxicillin (AMOXIL) 500 MG capsule, Take 1 capsule (500 mg total) by mouth 3 (three) times daily., Disp: 30 capsule, Rfl: 0    benzonatate (TESSALON) 100 MG capsule, TAKE ONE CAPSULE BY MOUTH THREE TIMES A DAY AS NEEDED FOR COUGH, Disp: 90 capsule, Rfl: 11    doxepin (SINEQUAN) 10 MG capsule, Take 10 mg by mouth every evening., Disp: , Rfl:       ROS    Objective:   BP (!) 124/59 (BP Location: Right arm, Patient Position: Sitting, BP Method: Large (Automatic))   Pulse 76   Ht 5' 1" (1.549 m)   Wt 68.4 kg (150 lb 12.7 oz)   LMP  (LMP Unknown)   BMI 28.49 kg/m²   Physical Exam    Labs:     Lab Results   Component Value Date     12/03/2018    K 3.5 12/03/2018     12/03/2018    CO2 24 12/03/2018    BUN 22 12/03/2018    CREATININE 1.4 12/03/2018    ANIONGAP 12 12/03/2018     Lab Results   Component Value Date    HGBA1C 6.0 (H) 11/04/2018     Lab Results   Component Value Date    BNP 2,600 (H) 11/04/2018    BNP 1,828 (H) 06/21/2018     (H) 01/02/2018       Lab Results   Component Value Date    WBC 5.85 12/03/2018    HGB 11.6 (L) 12/03/2018    HCT 38.7 12/03/2018    HCT 39 09/28/2018     12/03/2018    GRAN 3.1 12/03/2018    GRAN 52.3 12/03/2018     Lab Results   Component Value Date    CHOL 149 11/05/2018    HDL 26 (L) 11/05/2018    LDLCALC 108.2 11/05/2018    TRIG 74 11/05/2018       Lab Results   Component Value Date     12/03/2018    K 3.5 12/03/2018     12/03/2018    CO2 24 12/03/2018    BUN 22 12/03/2018    CREATININE 1.4 12/03/2018    ANIONGAP 12 12/03/2018     Lab Results   Component Value Date    HGBA1C 6.0 (H) 11/04/2018     Lab Results   Component Value Date    BNP 2,600 (H) 11/04/2018    BNP 1,828 (H) 06/21/2018     (H) 01/02/2018    Lab Results   Component Value Date    WBC 5.85 12/03/2018    HGB 11.6 (L) 12/03/2018    HCT 38.7 12/03/2018    HCT 39 " 09/28/2018     12/03/2018    GRAN 3.1 12/03/2018    GRAN 52.3 12/03/2018     Lab Results   Component Value Date    CHOL 149 11/05/2018    HDL 26 (L) 11/05/2018    LDLCALC 108.2 11/05/2018    TRIG 74 11/05/2018                Cardiovascular Imaging:   EKG: DDD with narrow , Bi-V paced rhythm     Echo: No results found for: EF    · Left atrium is severely dilated.  · The left ventricle cavity is normal.  · Septal wall has abnormal motion.  · Moderate global hypokinetic wall motion.  · Left ventricle ejection fraction is severely decreased at 25%  · RV systolic function is normal.  · Right ventricular cavity size is moderately dilated.  · Mitral valve is mildly sclerotic.  · Mild mitral regurgitation.  · Moderate tricuspid regurgitation.  · Diastolic pattern consistent with atrial fibrillation observed.  · Pulmonic valve shows trace regurgitation.  · The estimated PA systolic pressure is 31.04 mm Hg  · Moderately elevated central venous pressure (8 mm Hg).  · Right atrium is moderately dilated.  · Moderate-to-severe aortic valve stenosis.  · ROBBIE is 0.66 cm2; peak velocity is 1.73 m/s; mean gradient is 7.60 mmHg.  · Patent foramen ovale present with left to right shunting indicated by color flow Doppler.         Assessment & Plan:     Chronic pain syndrome  - Diffuse join pain and deformation with swelling  - Amb referral to Rheumatology     Persistent atrial fibrillation    - S/p AVN ablation 12/7/18 Dr. Burt    - S/p CRT-P  - Increase Metoprolol succinate 100mg -> 150mg  - Eliquis 2.5mg BID       Chronic systolic congestive heart failure   - Euvolemic on exam    - Dry weight is 147lbs    - LVef 25%     - Follow up limited TTE if EF still reduced switch to Entereto and titrate up as tolerated  - Continue 40 BID Lasix and Valsartan 20mg  - Increase Toprolol XL 100mg -> 150mg  - Compliant with low salt, fluid restriction diet   - BMP / Mag in 1 week        Severe aortic stenosis  Low flow low gradient  severe AS (ROBBIE 0.6 / PV 1.7 / PG 10 )  - Repeat limited TTE if LVef still low, will order DSE   - May need structural cardiology evaluation for TAVR             Signed:  Robina Dowd M.D.  Page # [phone]  Cardiovascular Fellow PGY-IV  [facility]

## 2019-02-19 NOTE — PROGRESS NOTES
Subjective:      Patient ID: Dacia Martínez is a 83 y.o. female.    Chief Complaint: Follow-up      HPI:  Dacia Martínez is an 83 year old female with atherosclerosis of aorta, persistent atrial fibrillation, severe aortic stenosis, anemia, chronic pain syndrome, chronic sinusitis, chronic systolic congestive heart failure, chronic kidney disease stage 3, history of colon polyps, constipation, dilated cardiomyopathy, diabetes mellitus type 2, gastroesophageal reflux disease, gout, hyperlipidemia, hypertension, hearing loss, intracranial atherosclerosis, long term use of opiates, left bundle branch block, cardiac murmur, microalbuminuria, osteoarthritis, osteopenia, and vertigo who presents to clinic today due to complaints of a yeast infection and for a 3 month follow up.    Was notified by patient's rheumatologist that the patient contacted their office complaining of a yeast infection.  Endorses some vaginal itching with associated vaginal discharge.      Complains of sore throat today.  Began 2-3 days ago.  States she ate a few cups of ice which she feels contributed to this.  States her throat pain is gradually improving.  Did have some associated rhinorrhea.  Denies associated fevers, chills, cough, dysphagia.    Presents a tube of triamcinolone cream.  States she applied this to her hands recently and her veins look more prominent now.  Typically uses on her back for pruritus.  States it causes some burning when she applied it to her feet recently.    On 12/7/2018 she underwent successful AVN ablation.  She is currently taking eliquis 2.5mg BID for stroke prophylaxis and denies significant bleeding episodes. She is currently being treated with metoprolol succinate 100mg daily for HR control. Kidney function is stable, with a creatinine of 1.4 on 12/3/2018.    A1c 11/4/18 6%.  Taking Januvia 50 mg by mouth daily.  Checks blood glucose levels every other day with values typically around the 100's.  Last eye exam  18.  Endorses some numbness to her hands but does also have carpal tunnel syndrome.  See's Dr. Horner, endocrinology.      Past Medical History:   Diagnosis Date    A-fib     Arthritis     Asteroid hyalosis of left eye     Bilateral nonexudative age-related macular degeneration 6/3/2014    Breast cancer     Cataract     CHF (congestive heart failure)     Chronic GERD 2017    CKD stage 3 due to type 2 diabetes mellitus     Coronary artery disease     Encounter for blood transfusion     Hypertension     Migraine headache     Mild nonproliferative diabetic retinopathy of both eyes 6/3/2014    Pneumonia     Type 2 diabetes mellitus with hyperglycemia     Vertigo        Past Surgical History:   Procedure Laterality Date    ABLATION N/A 2018    Performed by [facility] at [facility]    BREAST SURGERY      left lumpectomy    CARDIOVERSION N/A 2017    Performed by [facility]    CARPAL TUNNEL RELEASE      left    CATARACT EXTRACTION      vance     SECTION      COLONOSCOPY N/A 3/5/2015    Performed by Andrés Hobson MD at [facility] END[facility] (4TH FLR)    ESOPHAGOGASTRODUODENOSCOPY (EGD) N/A 2016    Performed by Rory Ogden MD at [facility] (2ND FLR)    EYE SURGERY      cataracts bilaterally    HYSTERECTOMY      partial    INSERTION, CARDIAC PACEMAKER, BIVENTRICULAR N/A 10/26/2018    Performed by [facility] CATH [facility]    TRANSESOPHAGEAL ECHOCARDIOGRAM (REJI) N/A 2017    Performed by Ruma Long [facility] at [facility] C[facility]    TRANSESOPHAGEAL ECHOCARDIOGRAM (REJI) N/A 2017    Performed by Angelique Taylor MD at [facility] CATH [facility]       Family History   Problem Relation Age of Onset    Cancer Mother         stomach    Heart disease Mother     Diabetes Father     Hypertension Father     Blindness Brother     Diabetes Brother     Hypertension Brother     Cataracts Sister     Diabetes Sister     Diabetes  Brother     Diabetes Brother     Diabetes Brother     No Known Problems Daughter     No Known Problems Son     No Known Problems Daughter     Amblyopia Neg Hx     Glaucoma Neg Hx     Macular degeneration Neg Hx     Strabismus Neg Hx     Retinal detachment Neg Hx        Social History     Socioeconomic History    Marital status: Single     Spouse name: None    Number of children: 4    Years of education: None    Highest education level: None   Social Needs    Financial resource strain: None    Food insecurity - worry: None    Food insecurity - inability: None    Transportation needs - medical: None    Transportation needs - non-medical: None   Occupational History    None   Tobacco Use    Smoking status: Never Smoker    Smokeless tobacco: Never Used   Substance and Sexual Activity    Alcohol use: Yes    Drug use: No    Sexual activity: Not Currently   Other Topics Concern    None   Social History Narrative    Family supportive.     Granddaughter helps set out her medications in pillbox, helps check her blood sugar.       Review of Systems   Constitutional: Negative for chills, fatigue and fever.   HENT: Positive for sore throat. Negative for congestion, hearing loss, nosebleeds, rhinorrhea and trouble swallowing.    Eyes: Negative for pain and visual disturbance.   Respiratory: Negative for cough, shortness of breath and wheezing.    Cardiovascular: Negative for chest pain and palpitations.   Gastrointestinal: Negative for abdominal distention, abdominal pain, constipation, diarrhea, nausea and vomiting.   Genitourinary: Positive for vaginal discharge. Negative for decreased urine volume, difficulty urinating, dysuria, hematuria and urgency.        + Vaginal itching.   Musculoskeletal: Negative for arthralgias, back pain and myalgias.   Skin: Positive for color change. Negative for rash.   Neurological: Negative for dizziness, tremors, weakness, light-headedness, numbness and headaches.  "  Psychiatric/Behavioral: Negative for agitation, behavioral problems and confusion. The patient is not nervous/anxious.      Objective:     Vitals:    02/19/19 0931   BP: 120/80   BP Location: Right arm   Patient Position: Sitting   BP Method: Medium (Manual)   Pulse: 73   SpO2: 100%   Weight: 68 kg (150 lb)   Height: 5' 1" (1.549 m)       Physical Exam   Constitutional: She is oriented to person, place, and time. She appears well-developed and well-nourished.   Using wheelchair.   HENT:   Head: Normocephalic and atraumatic.   Right Ear: External ear normal.   Left Ear: External ear normal.   Nose: Nose normal.   Mouth/Throat: Posterior oropharyngeal erythema present. No oropharyngeal exudate, posterior oropharyngeal edema or tonsillar abscesses.   Eyes: Conjunctivae and EOM are normal. Pupils are equal, round, and reactive to light.   Neck: Normal range of motion. Neck supple. No tracheal deviation present.   Cardiovascular: Normal rate, regular rhythm and normal heart sounds. Exam reveals no gallop and no friction rub.   No murmur heard.  Pulmonary/Chest: Effort normal and breath sounds normal. No respiratory distress. She has no wheezes. She has no rales.   Abdominal: Soft. Bowel sounds are normal. She exhibits no distension. There is no tenderness. There is no rebound and no guarding.   Musculoskeletal: Normal range of motion. She exhibits no edema or deformity.   Neurological: She is alert and oriented to person, place, and time. Coordination normal.   Skin: Skin is warm and dry. Capillary refill takes less than 2 seconds.   Hyperpigmentation noted over knuckles and toes bilaterally.   Psychiatric: She has a normal mood and affect. Her behavior is normal.   Nursing note and vitals reviewed.     Assessment:      1. Vaginal yeast infection    2. Pruritus    3. Sore throat    4. Chronic atrial fibrillation    5. Type 2 diabetes mellitus with stage 3 chronic kidney disease, without long-term current use of insulin "      Plan:   Dacia was seen today for follow-up.    Diagnoses and all orders for this visit:    Vaginal yeast infection  -     fluconazole (DIFLUCAN) 150 MG Tab; Take 1 tablet by mouth once and then repeat dose with 1 tablet by mouth again 3 days later.  Instructed patient to return to clinic if no improvement.    Pruritus  -     Discontinue triamcinolone.  Recommended OTC Benadryl cream PRN; patient states this has not been effective in the past.  Start hydrocortisone 2.5 % cream; Apply topically 2 (two) times daily as needed for itching; recommended patient use this only PRN and not scheduled as chronic topical steroid application may cause pigmentation changes and skin thinning.    Sore throat        -     Gradually improving.  Likely from associated post-nasal drip; continue Flonase.  Decrease oral ice consumption.  Return to clinic if no improvement.    Chronic atrial fibrillation        -     Continue current regimen and regular follow up with cardiology/EP.    Type 2 diabetes mellitus with stage 3 chronic kidney disease, without long-term current use of insulin        -     Follow up with endocrinology.

## 2019-02-19 NOTE — ASSESSMENT & PLAN NOTE
Low flow low gradient severe AS (ROBBIE 0.6 / PV 1.7 / PG 10 )  - Repeat limited TTE if LVef still low, will order DSE   - May need structural cardiology evaluation for TAVR

## 2019-02-20 NOTE — PROGRESS NOTES
"Subjective:       Patient ID: Dacia Martínez is a 83 y.o. female.    Chief Complaint: No chief complaint on file.    HPI   Ms. Martínez is a 83 year old female with past medical history of DM,HTN, afib, HF, CKD3,  Osteoarthritis, gouthere for follow up.   Was followed by Dr. Macdonald for chronic pain 2/2 OA. Last seen in clinic 8/2018. At that time she was continued on Hydrocodone (5-325, 1-2 tabs every 6 hours prn pain) and allopurinol 150mg daily. She also takes prednisone prn for her gout flares.     Currently left shoulder has been hurting on and off. She states it is worse since this morning due to her getting an echo done and positioning. She also has b/l hands pain and stiffness in her DIP>PIP. No real swelling. Has right CMC pain. Has morning stiffness but is unable to quantify for me. Her gout she states that occurs in her wrist, knees ankles. She takes allopurinol daily and for her flares she uses prednisone for her pain. She is unable to tell me how many times a week she took prednisone.     Last uric acid 7/2018 of 5.1   In 2006 Her CHARLOTTE negative, ANCA negative, RF 34 and CCP negative.     Review of Systems   Constitutional: Negative for chills and fever.   HENT: Negative for mouth sores.    Eyes: Negative for pain and redness.   Respiratory: Negative for shortness of breath.    Cardiovascular: Negative for chest pain.   Gastrointestinal: Negative for constipation and diarrhea.   Genitourinary: Negative for dysuria.   Musculoskeletal: Positive for arthralgias and joint swelling.   Neurological: Negative for dizziness and headaches.   Psychiatric/Behavioral: Negative for agitation.         Objective:   BP (!) 158/98   Pulse 75   Ht 5' 1" (1.549 m)   LMP  (LMP Unknown)   BMI 27.78 kg/m²      Physical Exam   Constitutional: She is oriented to person, place, and time and well-developed, well-nourished, and in no distress.   HENT:   Head: Normocephalic and atraumatic.   Mouth/Throat: No oropharyngeal exudate. "   Eyes: Conjunctivae are normal. Pupils are equal, round, and reactive to light. No scleral icterus.   Neck: Normal range of motion.   Cardiovascular: Normal rate, regular rhythm and normal heart sounds.    Pulmonary/Chest: Effort normal and breath sounds normal.   Abdominal: Soft. She exhibits no distension.       Right Side Rheumatological Exam     Examination finds the shoulder, elbow, wrist, knee, 1st PIP, 2nd PIP, 3rd PIP, 4th PIP, 5th PIP and 5th MCP normal.    The patient is tender to palpation of the 1st MCP, 2nd MCP, 3rd MCP and 4th MCP    She has swelling of the 1st MCP    Left Side Rheumatological Exam     Examination finds the shoulder, elbow, wrist, knee, 1st PIP, 2nd PIP, 2nd MCP, 3rd PIP, 3rd MCP, 4th PIP, 4th MCP, 5th PIP and 5th MCP normal.    She has swelling of the 1st MCP      Neurological: She is alert and oriented to person, place, and time.   Skin: Skin is warm and dry. No rash noted. She is not diaphoretic.     Musculoskeletal:   ++Heberdens and Bouchards nodes           Assessment:       1. Osteoarthritis, unspecified osteoarthritis type, unspecified site    2. Gout, arthritis    3. Idiopathic chronic gout of multiple sites without tophus    4. Itching            Plan:       Ms. Martínez is a 83 year old female with past medical history of DM,HTN, afib, HF, CKD3,  Osteoarthritis, gout here for follow up. Her exam is somewhat concerning for RA thus we will further workup. (She has had +RF in 2006).     #arthritis   -definitely has OA will workup and see if she has overlying disease   -will get RF, CCP, CHARLOTTE, inflammatory markers, arthritis survey  -will refill hydrocodone for 60 tablets   -referral to PMR for pain medicine     #Gout   Last uric acid 7/2018 of 5.1   -will get uric acid level  -continue allopurinol 150mg daily and prednisone prn       Patient seen and discussed with Dr. Hewitt    RTMARIELENA depending on labs or in 6 mos with Dr. Torres Dye MD  Rheumatology PGY 4

## 2019-02-21 NOTE — PATIENT INSTRUCTIONS
Vitamin D level was low.  Please take Vitamin D3 2000 iU daily.    Januvia 25mg daily.  A1c at goal.  Repeat a1c in 6 months.  Nerve pain medication prescribed (gabapentin 300mg - start with nightly as needed).    Bone mineral density study due in March.    Last dose of lasix to be taken around 4-6pm.    Thyroid hormone levels slightly abnormal.  Recommend starting levothyroxine 25mcg daily.  Will repeat studies 2 months after starting therapy.      Labs again in 2 months (thyroid), 6 months (diabetes).    Return to clinic if labs normal in one year.

## 2019-02-21 NOTE — PROGRESS NOTES
"Subjective:       Patient ID: Dacia Martínez is a 83 y.o. female.    Chief Complaint: Diabetes  FU of DM.    Diabetes   Pertinent negatives for hypoglycemia include no headaches. Pertinent negatives for diabetes include no chest pain.      With regards to weight loss/gain:  Weight currently 148, which is stable.  Notes that her weight loss has subsided since the discontinuation of trulicity.  She is taking Januvia as prescribed without any issues.    With regards to the diabetes:  Last seen in wnhnes73/2018.  Presents today with daughter.    Diagnosed:  ~2000  Last A1C: 6.0% 11/2018    Current regimen:   Januvia 25mg daily (adjusted for renal function)    Previous regimen:  trulicity - For three years    Diet/exercise - walks around the house, still able to complete ADLs  In clinic by wheelchair    Missed doses? no    # times a day testing:  Twice weekly      Log reviewed: no  BG range recall: 70s - 160s    Last eye exam: 8/8/17  Last podiatry exam: 1/9/18     Typical meals: trying to keep to a healthy diet, but she also eats off of her diet on occasion.     Education - last visit: na  Exercise - minimal activity.    Recent plans to have pace maker placed to "control [her] fast heart rate"    Hypoglycemia: no  Associated symptoms: na    Current complaints: sinus problems/headaches.      Denies any thirst, polyuria, polydipsia, weight loss, nausea or blurred vision.   Denies numbness or tingling of feet   lower extremity swelling      Does not have a Medic alert tag     Diabetes complications:   Numbness/tingling    No ARB/ACEi, or statin on file.    In regards to dyslipidemia:  No statin on file    Recent diagnosis of subclinical hypothyroidism.   Prescribed LT4 25mcg daily, but has not started.    Endorses constipation, dry skin, and decreased energy.    Review of Systems   Constitutional: Negative for unexpected weight change.   HENT: Positive for congestion, postnasal drip, rhinorrhea, sinus pain and sore throat. " "Negative for sinus pressure and sneezing.    Eyes: Negative for visual disturbance.   Respiratory: Negative for shortness of breath.    Cardiovascular: Negative for chest pain.   Gastrointestinal: Negative for abdominal pain.   Genitourinary: Positive for difficulty urinating. Negative for dysuria and urgency.   Musculoskeletal: Negative for arthralgias.   Skin: Negative for wound.   Neurological: Negative for headaches.   Hematological: Does not bruise/bleed easily.   Psychiatric/Behavioral: Negative for sleep disturbance.         Objective:       Vitals:    02/21/19 0916   BP: (!) 106/52   Pulse: 74   Weight: 66.7 kg (147 lb 0.8 oz)   Height: 5' 1" (1.549 m)   Body mass index is 27.78 kg/m².      Physical Exam   Constitutional: She is oriented to person, place, and time. She appears well-developed. No distress.   Pleasant elderly female, non icteric, NAD   Neck: No thyromegaly present.   Cardiovascular: Normal rate.   Pulmonary/Chest: Effort normal.   Abdominal: Soft.   Musculoskeletal: She exhibits no edema or deformity.   Feet no cuts or scratches  Shoes appropriate    Neurological: She is alert and oriented to person, place, and time.   Skin: No erythema.   No nodules   Vitals reviewed.        Protective Sensation (w/ 10 gram monofilament):  Right: decreased  Left: Intact    Decreased vibratory sensation on L.  Normal vibratory sensation on R.    Visual Inspection:  Normal -  Bilateral and Onychomycosis -  Bilateral    Pedal Pulses:   Right: Present  Left: Present    Posterior tibialis:   Right:Present  Left: Present        Lab Results   Component Value Date    HGBA1C 6.0 (H) 11/04/2018       Chemistry        Component Value Date/Time     12/03/2018 0902    K 3.5 12/03/2018 0902     12/03/2018 0902    CO2 24 12/03/2018 0902    BUN 22 12/03/2018 0902    CREATININE 1.4 12/03/2018 0902    GLU 82 12/03/2018 0902        Component Value Date/Time    CALCIUM 9.3 12/03/2018 0902    ALKPHOS 73 11/06/2018 " 0605    AST 72 (H) 11/06/2018 0605    ALT 38 11/06/2018 0605    BILITOT 2.4 (H) 11/06/2018 0605    ESTGFRAFRICA 40 (A) 12/03/2018 0902    EGFRNONAA 35 (A) 12/03/2018 0902        Lab Results   Component Value Date    WBC 5.85 12/03/2018    HGB 11.6 (L) 12/03/2018    HCT 38.7 12/03/2018    MCV 92 12/03/2018     12/03/2018     TSH   Date Value Ref Range Status   11/05/2018 12.478 (H) 0.400 - 4.000 uIU/mL Final     Free T4   Date Value Ref Range Status   11/05/2018 0.93 0.71 - 1.51 ng/dL Final     Thyroperoxidase Antibodies   Date Value Ref Range Status   11/05/2018 <6.0 <6.0 IU/mL Final     Lab Results   Component Value Date    CHOL 149 11/05/2018    CHOL 163 08/02/2018    CHOL 195 05/08/2018     Lab Results   Component Value Date    HDL 26 (L) 11/05/2018    HDL 41 08/02/2018    HDL 53 05/08/2018     Lab Results   Component Value Date    LDLCALC 108.2 11/05/2018    LDLCALC 110.4 08/02/2018    LDLCALC 130.0 05/08/2018     Lab Results   Component Value Date    TRIG 74 11/05/2018    TRIG 58 08/02/2018    TRIG 60 05/08/2018         Assessment:       1. Type 2 diabetes mellitus with stage 3 chronic kidney disease, without long-term current use of insulin    2. Subclinical hypothyroidism    3. Idiopathic chronic gout of multiple sites without tophus    4. CKD (chronic kidney disease) stage 3, GFR 30-59 ml/min    5. Osteopenia of multiple sites        Plan:       Type 2 diabetes mellitus with stage 3 chronic kidney disease, without long-term current use of insulin  Last a1c - 6.0% (Goal A1C <7.5%). Can be low secondary to AoCD.  Patient denies hypoglycemia.   Reviewed goals of therapy are to get the best control we can without hypoglycemia.       Patient not a candidate for (GFR 32-40):  SGLT2i  Metformin  Sulfonyurea    No interested in trulicity - previous negative effect (significant weight loss >60lbs).     Medication changes:   Continue Januvia 25mg daily  Discussed with patient that if she would like a trial off  medication okay to do so given her POC results.  However, with discontinuation would REQUIRE log of BG levels.  Patient decided to continue therapy at this time.  Started gabapentin 300mg PRN - nightly, but up to three times daily   Discussed SE profile.  Recommended night time dosing given common reports of drowsiness. May cause vivid dreams. Caution with titration given renal function.    Advised frequent self blood glucose monitoring.    Patient encouraged to document glucose results and bring them to every clinic visit.  BG logs provided to patient. Plans to mail each log upon completion (envelope provided at previous visit).     Hypoglycemia precautions discussed. Instructed on precautions before driving.       Periodic follow ups for eye evaluations, foot care and dental care suggested.     Foot exam not UTD.  Completed today.  Eye exam UTD.  Defer vaccinations to PCP.  Keep follow up with cardiology.        Hypothyroidism, subclinical (negative antibodies)  TSH 12 with normal FT4.  Started on LT4 25mcg daily.  No repeat since initiation of therapy.  Recommend repeating labs today.    Goal is TSH <10, given that elevated TSH can be normal in elderly patients.   Avoid exogenous hyperthyroidism as this can accelerate bone loss and increase risk of CV complications.    Reviewed usual  times of thyroid hormone changes - call if significant weight changes    Reviewed that symptoms of hypothyroidism may not correlate with tsh, and a normal TSH is the goal of therapy.  Educated patient that symptoms are not a justification for over treatment.  Received verbal acknowledgement from patient.      BMI 27 - 27.9, adult  Body mass index is 27.78 kg/m².  Weight stable.  Patient not interested in losing any more weight.  Feels that she is too small.        Idiopathic chronic gout of multiple sites without tophus  On PDN, may increase BG.  Patient notes that she does not take this as prescribed.  Only as needed.  Requested  refill on prescription, but deferred to prescribing provider.       CKD (chronic kidney disease) stage 3, GFR 30-59 ml/min  GFR 32.  Minimizes available options for anti-hyperglycemic medications.  Januvia is renally dosed.  Recommend Lasix last dose around 5-6p to prevent frequent urination throughout the night, should assist with improved sleep.        Osteopenia  Vitamin D 18 (previous >30).  Patient stopped taking supplement.    Recommend Vitamin D3 2000 iU daily.   Repeat DXA due 3/2019         RTC in 1 year.  Labs - 6 months    Shelby Horner MD  Endocrinology Fellow     This case has been reviewed with staff, Dr. Lee.

## 2019-02-22 NOTE — TELEPHONE ENCOUNTER
Interval update:     Discussed TTE findings with Ms. Martínez, explained her LV function is still depressed with Mod-severe AS (ROBBIE 0.94 cm2; peak velocity is 1.90 m/s; mean gradient is 8.59 mmHg). Will pursue DSE. Orders placed. She had no further questions or me.     Patient asked to be called at [phone], if not able to reach at her primary number.       Robina Dowd M.D.  Page # [phone]  Cardiovascular Fellow PGY-IV  [facility]

## 2019-02-25 NOTE — PROGRESS NOTES
I, Astrid Lee, have personally taken the history and physical exam and I agree with Dr. Horner's assessment and plan.

## 2019-02-28 NOTE — NURSING
Discharge instructions given. Verbalizes understanding. Discharged ambulatory to clinic lobby to daughter.

## 2019-03-01 NOTE — TELEPHONE ENCOUNTER
Called and spoke to patient regarding missed scheduled appointment today. Rescheduled patient for Wednesday 3/6 @ 10:20 AM to come to arrhythmia clinic. Pt requested me call her granddaughter Genie to notify her of new appointment date and time. Genie verbalized understanding and appreciated the phone call.

## 2019-03-06 NOTE — PATIENT INSTRUCTIONS
Artificial tears four times a day.  Lubricating ointment or gel at bedtime.  Zaditor twice a day as needed for itching.  Appointment with Dr. Fuentes for further evaluation.

## 2019-03-06 NOTE — PROGRESS NOTES
HPI     Triage pt   DLS:01/28/2019 Matthieu  Patient states OU water a lot past few months. OS seen some shooting   lights few weeks ago.    Eye drops:OTC eyedrops prn OU    I have personally interviewed the patient, reviewed the history and   examined the patient and agree with the technician's exam.    Eyes itch and feel scratchy. Pulls out strands of mucus. Sees Dr. Sommers   for nonproliferative diabetic retinoparhy.    Last edited by Dvay Brown MD on 3/6/2019  9:14 AM. (History)            Assessment /Plan     For exam results, see Encounter Report.    Insufficiency of tear film of both eyes      Chronic tear issues. Recommend artificial tears four times a day and lubricating ointment at night. Try Zaditor twice daily if itching persists. Appointment with   Dr. Fuentes for further evaluation.

## 2019-03-11 NOTE — TELEPHONE ENCOUNTER
----- Message from Elaine Steven sent at 3/11/2019 10:44 AM CDT -----  Contact: Patient [phone]  RX request - refill or new RX.  Is this a refill or new RX: Refill   RX name and strength: hydrocortisone 2.5 % cream  Directions:   Is this a 30 day or 90 day RX:    Local pharmacy or mail order pharmacy:  local  Pharmacy name and phone # [facility] [phone] (Phone)  [phone] (Fax)      Comments:    Please call and advise  Thank you

## 2019-03-12 NOTE — TELEPHONE ENCOUNTER
----- Message from Elaine Steven sent at 3/11/2019  5:06 PM CDT -----  Contact: Patient [phone]  Patient uses ointment on different parts of her body; back, legs and arms and runs out quickly. Pharmacy has 453 mg available with Doctor Approval.    Please call and advise  Thank you

## 2019-03-12 NOTE — TELEPHONE ENCOUNTER
Sent in refill for patient preferred quantity though why is she applying steroid cream all over her body?  Needs to only apply steroid cream to areas of skin with abnormalities that are responsive to steroids.  Excessive use of topical steroids on areas of skin that do not need it can lead to skin atrophy (degradation of the skin) and can cause itching itself.  Please notify patient.

## 2019-03-12 NOTE — TELEPHONE ENCOUNTER
Pt is calling to let PCP know that she uses the cream all over her body. Pt would like to have a new Rx for 453 mg of the ointment to cover her so that she doesn't run out prior to her next refill

## 2019-03-12 NOTE — TELEPHONE ENCOUNTER
Spoke with pt and she stated since she left the ED about 2 yrs ago she have this itch that comes on her back and arms sometimes other places   Pt stated that a rheumatologist MD over at main campus on the 5th floor was the MD who gave her the cream and told her to apply it where she do.the patient scheduled an appt to discuss pt states she would like to go see someone for this matter

## 2019-03-12 NOTE — TELEPHONE ENCOUNTER
Does not need follow up with me for this, have discussed this with the patient previously.  Placed referral to dermatology; please process and notify patient once complete.

## 2019-03-13 NOTE — TELEPHONE ENCOUNTER
Unable to leave message pt voicemail full   Does not need follow up with MD for this, have discussed this with the patient previously.  Placed referral to dermatology; please process and notify patient once complete.

## 2019-03-20 NOTE — PROGRESS NOTES
Called and discuss prescription of Norco with Ms. Martínez. Discussed with her that as a department we will no longer prescribing. I will give her one month refill and then no more. She is to contact [facility] and make an appt (referral placed at last visit). We will make an appt and mail to her. She may also discuss use with her PCP and see if they will take over the prescriptions.     Case discussed with Dr. Hewitt who agrees with this plan.

## 2019-03-21 NOTE — TELEPHONE ENCOUNTER
Message left for pt to call office  Does not need follow up with MD for this, have discussed this with the patient previously.  Placed referral to dermatology; please process and notify patient once complete.

## 2019-03-25 NOTE — PROGRESS NOTES
HPI     Stephanie /elina/joie miller sicca  Dry AMD OD  Mild NPDR OD -No DME, T2  Asteroid hyalosis OS    Refresh Liquidgel PRN OU  Refresh Tears PRN OU  Systane Ultra PRN OU    Patient states she previously tried to use AT's but she feels like they   made her eye worse with itching. She states she has not used any tears in   about 5 days.           Last edited by Azra Fuentes MD on 3/26/2019  9:57 AM. (History)            Assessment /Plan     For exam results, see Encounter Report.    Insufficiency of tear film of both eyes    Keratoconjunctivitis sicca not specified as Sjogren's, bilateral    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, without long-term current use of insulin    Nonexudative age-related macular degeneration, bilateral, intermediate dry stage    Asteroid hyalosis of left eye    Other orders  -     cyclosporine 0.05 % Drop; Apply 1 drop to eye 2 (two) times daily.  Dispense: 16.5 mL; Refill: 11      k sicca/evaporative dry eye  - NI with ATs  - trial of restasis, if NI can try xiidra.    F/up REE with dr. salter 3 mo. Me prn

## 2019-03-29 NOTE — PROGRESS NOTES
Subjective:      Patient ID: Dacia Martínez is a 83 y.o. female.    Chief Complaint: Advice Only      HPI:  Dacia Martínez is an 83 year old female with atherosclerosis of aorta, persistent atrial fibrillation, severe aortic stenosis, anemia, chronic pain syndrome, chronic sinusitis, chronic systolic congestive heart failure, chronic kidney disease stage 3, history of colon polyps, constipation, dilated cardiomyopathy, diabetes mellitus type 2, gastroesophageal reflux disease, gout, hyperlipidemia, hypertension, hearing loss, intracranial atherosclerosis, long term use of opiates, left bundle branch block, cardiac murmur, microalbuminuria, osteoarthritis, osteopenia, and vertigo who presents to clinic today requesting medication refills.    Patient accompanied by her daughter today.    Requests refills on her doxepin 10 mg takes this for itching.  States her itching has been stable--intermittent issue.  Denies presence of rash currently.  When asked where her itching bothers her she states her back.  When asked to point to the areas that give her problems she states there are red areas that give her itching, points to hemangioma on her trunk.    Was to have thyroid rechecked per endocrinology but never completed these labs.    States she was told she needed to find someone else to help her with her hydrocodone.  Was previously given refills of this from her rheumatologist.  Takes hydrocodone-acetaminophen 5-325 mg by mouth every 8 hours.      Takes prednisone PRN gout flares.          Past Medical History:   Diagnosis Date    A-fib     Arthritis     Asteroid hyalosis of left eye     Bilateral nonexudative age-related macular degeneration 6/3/2014    Breast cancer     Cataract     CHF (congestive heart failure)     Chronic GERD 9/18/2017    CKD stage 3 due to type 2 diabetes mellitus     Coronary artery disease     Encounter for blood transfusion     Hypertension     Migraine headache     Mild  nonproliferative diabetic retinopathy of both eyes 6/3/2014    Pneumonia     Type 2 diabetes mellitus with hyperglycemia     Vertigo        Past Surgical History:   Procedure Laterality Date    ABLATION N/A 2018    [facility] EP LAB    BREAST SURGERY      left lumpectomy    CARDIOVERSION N/A 2017    [facility] CATH LAB    CARPAL TUNNEL RELEASE      left    CATARACT EXTRACTION      vance     SECTION      COLONOSCOPY N/A 3/5/2015    [facility] Andr[facility] ENDO (4TH FLR)    ESOPHAGOGASTRODUODENOSCOPY (EGD) N/A 2016    Per[facility] Rory Ogden [facility] ENDO (2ND FLR)    EYE SURGERY      cataracts bilaterally    HYSTERECTOMY      partial    INSERTION, CARDIAC PACEMAKER, BIVENTRICULAR N/A 10/26/2018    [facility] CATH LAB    TRANSESOPHAGEAL ECHOCARDIOGRAM (REJI) N/A 2017    [facility] Ruma Long [facility] CATH LAB    TRANSESOPHAGEAL ECHOCARDIOGRAM (REJI) N/A 2017    Performed by Angelique Taylor [facility] at [facility] CATH LAB       Family History   Problem Relation Age of Onset    Cancer Mother         stomach    Heart disease Mother     Diabetes Father     Hypertension Father     Blindness Brother     Diabetes Brother     Hypertension Brother     Cataracts Sister     Diabetes Sister     Diabetes Brother     Diabetes Brother     Diabetes Brother     No Known Problems Daughter     No Known Problems Son     No Known Problems Daughter     Amblyopia Neg Hx     Glaucoma Neg Hx     Macular degeneration Neg Hx     Strabismus Neg Hx     Retinal detachment Neg Hx        Social History     Socioeconomic History    Marital status: Single     Spouse name: None    Number of children: 4    Years of education: None    Highest education level: None   Occupational History    None   Social Needs    Financial resource strain: None    Food  insecurity:     Worry: None     Inability: None    Transportation needs:     Medical: None     Non-medical: None   Tobacco Use    Smoking status: Never Smoker    Smokeless tobacco: Never Used   Substance and Sexual Activity    Alcohol use: Yes    Drug use: No    Sexual activity: Not Currently   Lifestyle    Physical activity:     Days per week: None     Minutes per session: None    Stress: None   Relationships    Social connections:     Talks on phone: None     Gets together: None     Attends Church service: None     Active member of club or organization: None     Attends meetings of clubs or organizations: None     Relationship status: None    Intimate partner violence:     Fear of current or ex partner: None     Emotionally abused: None     Physically abused: None     Forced sexual activity: None   Other Topics Concern    None   Social History Narrative    Family supportive.     Granddaughter helps set out her medications in pillbox, helps check her blood sugar.       Review of Systems   Constitutional: Negative for chills, fatigue and fever.   HENT: Negative for congestion, hearing loss, nosebleeds, rhinorrhea, sore throat and trouble swallowing.    Eyes: Negative for pain and visual disturbance.   Respiratory: Negative for cough, shortness of breath and wheezing.    Cardiovascular: Negative for chest pain and palpitations.   Gastrointestinal: Negative for abdominal distention, abdominal pain, constipation, diarrhea, nausea and vomiting.   Genitourinary: Negative for decreased urine volume, difficulty urinating, dysuria, hematuria and urgency.   Musculoskeletal: Positive for arthralgias.   Skin: Negative for color change and rash.   Neurological: Negative for dizziness, tremors, weakness, light-headedness, numbness and headaches.   Psychiatric/Behavioral: Negative for agitation, behavioral problems and confusion. The patient is not nervous/anxious.      Objective:     Vitals:    03/29/19 1024   BP:  "122/74   BP Location: Right arm   Patient Position: Sitting   BP Method: Medium (Manual)   Pulse: 70   SpO2: 99%   Weight: 71.7 kg (158 lb)   Height: 5' 1" (1.549 m)       Physical Exam   Constitutional: She is oriented to person, place, and time. She appears well-developed and well-nourished.   HENT:   Head: Normocephalic and atraumatic.   Right Ear: External ear normal.   Left Ear: External ear normal.   Nose: Nose normal.   Mouth/Throat: Oropharynx is clear and moist.   Eyes: Pupils are equal, round, and reactive to light. Conjunctivae and EOM are normal.   Neck: Normal range of motion. Neck supple. No tracheal deviation present.   Cardiovascular: Normal rate, regular rhythm and normal heart sounds. Exam reveals no gallop and no friction rub.   No murmur heard.  Pulmonary/Chest: Effort normal and breath sounds normal. No respiratory distress. She has no wheezes. She has no rales.   Abdominal: Soft. Bowel sounds are normal. She exhibits no distension. There is no tenderness. There is no rebound and no guarding.   Musculoskeletal: Normal range of motion. She exhibits no edema or deformity.   Lymphadenopathy:     She has no cervical adenopathy.   Neurological: She is alert and oriented to person, place, and time. Coordination normal.   Skin: Skin is warm and dry. No rash noted.        Psychiatric: She has a normal mood and affect. Her behavior is normal.   Nursing note and vitals reviewed.     Assessment:      1. Itching    2. Chronic pain syndrome    3. Need for tetanus booster      Plan:   Dacia was seen today for advice only.    Diagnoses and all orders for this visit:    Itching  -     doxepin (SINEQUAN) 10 MG capsule; Take 1 capsule (10 mg total) by mouth every evening.  -     Notified patient that her itching may in fact be related to her chronic opiate medication use.  No obvious rash noted to patient's back.  Recommend patient avoid topical steroid use if no rash present and to avoid using this daily.  " Patient's daughter initially stated they did not plan on keeping the appointment with dermatology as they were getting the doxepin refills today; recommended patient keep appointment with dermatology for further evaluation/management.  Would prefer patient to not be on anti-cholinergic and instead would prefer to wean patient from chronic opiates but patient is not amenable to this plan, states her itching began before she ever took opiates.    Chronic pain syndrome  -     Ambulatory Referral to Pain Clinic; notified patient that I would prefer she discuss refills of chronic opiate medication with a pain management specialist; would prefer to wean patient off of this.    Need for tetanus booster        -     To return to clinic 4/1/19 for Tdap

## 2019-04-01 NOTE — PROGRESS NOTES
Subjective:       Patient ID:  Dacia Martínez is a 83 y.o. female who presents for   Chief Complaint   Patient presents with    Itching     back, X few months, very itchy, prev tx     Skin Discoloration     face, and hands, [id number], no tx      Itching  - Initial  Affected locations: back  Duration: 1 year  Signs / symptoms: itching  Severity: mild  Timing: constant  Aggravated by: nothing  Treatments tried: +hot showers. + dove soap. doxepin 10mg/day.     Skin Discoloration  - Initial  Affected locations: face  Duration: 3 years  Signs and Symptoms: darkness.  Severity: mild  Timing: constant  Aggravated by: nothing  Relieving factors/Treatments tried: nothing  Improvement on treatment: no relief        Review of Systems   Constitutional: Negative for fever, chills and fatigue.   Skin: Positive for itching.   Hematologic/Lymphatic: Bruises/bleeds easily (Eliquis).        Objective:    Physical Exam   Constitutional: She appears well-developed and well-nourished. No distress.   Neurological: She is alert and oriented to person, place, and time. She is not disoriented.   Psychiatric: She has a normal mood and affect.   Skin:   Areas Examined (abnormalities noted in diagram):   Head / Face Inspection Performed  Neck Inspection Performed  Chest / Axilla Inspection Performed  Back Inspection Performed  RUE Inspected  LUE Inspection Performed                   Diagram Legend     Erythematous scaling macule/papule c/w actinic keratosis       Vascular papule c/w angioma      Pigmented verrucoid papule/plaque c/w seborrheic keratosis      Yellow umbilicated papule c/w sebaceous hyperplasia      Irregularly shaped tan macule c/w lentigo     1-2 mm smooth white papules consistent with Milia      Movable subcutaneous cyst with punctum c/w epidermal inclusion cyst      Subcutaneous movable cyst c/w pilar cyst      Firm pink to brown papule c/w dermatofibroma      Pedunculated fleshy papule(s) c/w skin tag(s)      Evenly  pigmented macule c/w junctional nevus     Mildly variegated pigmented, slightly irregular-bordered macule c/w mildly atypical nevus      Flesh colored to evenly pigmented papule c/w intradermal nevus       Pink pearly papule/plaque c/w basal cell carcinoma      Erythematous hyperkeratotic cursted plaque c/w SCC      Surgical scar with no sign of skin cancer recurrence      Open and closed comedones      Inflammatory papules and pustules      Verrucoid papule consistent consistent with wart     Erythematous eczematous patches and plaques     Dystrophic onycholytic nail with subungual debris c/w onychomycosis     Umbilicated papule    Erythematous-base heme-crusted tan verrucoid plaque consistent with inflamed seborrheic keratosis     Erythematous Silvery Scaling Plaque c/w Psoriasis     See annotation      Assessment / Plan:        Pruritus  Xerosis of skin  Discussed mutiple risk factors that can contribute to itching, such as CKD, DM2,   -     triamcinolone acetonide 0.1% (KENALOG) 0.1 % cream; apply to affected area twice daily for x2 weeks, then as needed for itching, irritation.  Dispense: 454 g; Refill: 3    Good skin care regimen discussed including limiting to one bath or shower/day, using lukewarm water with mild soap and moisturizing cream to skin 1 - 2x/day. Brochure was provided and reviewed with patient.    Discussed with patient the importance of not manipulating skin lesions. Trauma often exacerbates condition. Trimming nails is recommended to avoid puncturing skin.     Ice for itching    Ok to continue Doxepin per family medicine    Hyperpigmentation 2/2 chronic sun exposure & sun sensitivity w/ medications     Patient instructed in importance in daily sun protection of at least spf 30. Sun avoidance and topical protection discussed.   Recommend elta MD 30 for daily use on face and neck.  Patient encouraged to wear hat for all outdoor exposure.                      Follow up in about 3 months (around  7/1/2019), or if symptoms worsen or fail to improve.

## 2019-04-01 NOTE — LETTER
April 1, 2019      Jasmeet Corral MD  [address]  [facility] [address]           [facility] - Dermatology  [address]  [facility] [address]  Phone: [phone]  Fax: [phone]          Patient: Dacia Martínez   MR Number: [id number]   YOB: 1935   Date of Visit: 4/1/2019       Dear Dr. Jasmeet Corral:    Thank you for referring Dacia Martínez to me for evaluation. Attached you will find relevant portions of my assessment and plan of care.    If you have questions, please do not hesitate to call me. I look forward to following Dacia Martínez along with you.    Sincerely,    Sheila Santiago, NP    Enclosure  CC:  No Recipients    If you would like to receive this communication electronically, please contact [email or web address] or [phone] to request more information on [employer] Link access.    For providers and/or their staff who would like to refer a patient to [facility], please contact us through our one-stop-shop provider referral line, [facility], at [phone].    If you feel you have received this communication in error or would no longer like to receive these types of communications, please e-mail [email or web address]

## 2019-04-01 NOTE — PATIENT INSTRUCTIONS
XEROSIS (DRY SKIN)        1. Definition    Xerosis is the term for dry skin.  We all have a natural oil coating over our skin produced by the skin oil glands.  If this oil is removed, the skin becomes dry which can lead to cracking, which can lead to inflammation.  Xerosis is usually a long-term problem that recurs often, especially in the winter.    2. Cause     Long hot baths or showers can remove our natural oil and lead to xerosis.  One should never take more than one bath or shower a day and for no longer than ten minutes.   Use of harsh soaps such as Zest, Dial, and Ivory can worsen and cause xerosis.   Cold winter weather worsens xerosis because the amount of moisture contained in cold air is much less than the amount of moisture in warm air.    3. Treatment     Treatment is intended to restore the natural oil to your skin.  Keep the skin lubricated.     Do not take more than one bath or shower a day.  Use lukewarm water, not hot.  Hot water dries out the skin.     Use a gentle moisturizing soap such as Cetaphil soap, Oil of Olay, Dove, Basis, Ivory moisture care, Restoraderm cleanser.     When toweling dry, dont rub.  Blot the skin so there is still some water left on the skin.  You should apply a moisturizing cream to all of the skin such as Cerave cream, Cetaphil cream, Restoraderm or Eucerin Original Formula cream.   Alpha hydroxyacid lotions, i.e., AmLactin, also work very well for preventing dry skin, but may burn when used on inflamed or reddened skin.     If you like to swim during the winter months, you should not use soap when getting out of the pool.  When you have finished swimming, rinse off the chlorine with cool to warm water.  If this will be the only shower of the day, then you may use Cetaphil or another mild soap to cleanse your skin.  After the shower, apply a moisturizing cream to all of the skin as above.        [address] [place] [address]/ [phone]  [phone] FAX/ [email or web address]

## 2019-04-17 NOTE — LETTER
April 17, 2019      No Recipients           [facility]  [address]  [facility] [address]  Phone: [phone]  Fax: [phone]          Patient: Dacia Martínez   MR Number: [id number]   YOB: 1935   Date of Visit: 4/17/2019       Dear :    Thank you for referring Dacia Martínez to me for evaluation. Attached you will find relevant portions of my assessment and plan of care.    If you have questions, please do not hesitate to call me. I look forward to following Dacia Martínez along with you.    Sincerely,    Jb Nuno MD    Enclosure  CC:  No Recipients    If you would like to receive this communication electronically, please contact [email or web address] or [phone] to request more information on [employer] Link access.    For providers and/or their staff who would like to refer a patient to [facility], please contact us through our one-stop-shop provider referral line, [facility], at [phone].    If you feel you have received this communication in error or would no longer like to receive these types of communications, please e-mail [email or web address]

## 2019-04-17 NOTE — PROGRESS NOTES
"Chief Complaint   Patient presents with    Disease Management       Patient with gout for a follow up    Subjective:       Patient ID: Dacia Martínez is a 83 y.o. female.    Chief Complaint: Disease Management    HPI   Ms. Martínez is a 83 year old female with past medical history of DM,HTN, afib, HF, CKD3,  Osteoarthritis, gout here for follow up.     Was followed by Dr. Macdonald for chronic pain 2/2 OA.     She has been on Hydrocodone (5-325, 1-2 tabs every 6 hours prn pain) and allopurinol 150mg daily. She also takes prednisone prn for her gout flares.     Currently she comes in with     Pain in the shoulders  Cant raise the arms  DIPs  PIPs  CMCs  First MCPs  Feet  Hips   Difficult for her to get in and out of the car    Last uric acid 7/2018 of 5.1   In 2006 Her CHARLOTTE negative, ANCA negative, RF 34 and CCP negative.     2019 we did blood work  Uric acid 5.9  ESR > 120  CRP 44.1  RF 19,slightly positive  CCP neg    TSH abnormal/T4 nml    Review of Systems     Constitutional: Negative for chills and fever.   HENT: Negative for mouth sores.    Eyes: Negative for pain and redness.   Respiratory: Negative for shortness of breath.    Cardiovascular: Negative for chest pain.   Gastrointestinal: Negative for constipation and diarrhea.   Genitourinary: Negative for dysuria.   Musculoskeletal: Positive for arthralgias and joint swelling.   Neurological: Negative for dizziness and headaches.   Psychiatric/Behavioral: Negative for agitation.         Objective:   /73   Pulse 70   Ht 5' 1" (1.549 m)   Wt 73.5 kg (162 lb 0.6 oz)   LMP  (LMP Unknown)   BMI 30.62 kg/m²      Physical Exam   Constitutional: She is oriented to person, place, and time and well-developed, well-nourished, and in no distress.   HENT:   Head: Normocephalic and atraumatic.   Mouth/Throat: No oropharyngeal exudate.   Eyes: Conjunctivae are normal. Pupils are equal, round, and reactive to light. No scleral icterus.   Neck: Normal range of motion. "   Cardiovascular: Normal rate, regular rhythm and normal heart sounds.    Pulmonary/Chest: Effort normal and breath sounds normal.   Abdominal: Soft. She exhibits no distension.       Neurological: She is alert and oriented to person, place, and time.   Skin: Skin is warm and dry. No rash noted. She is not diaphoretic.     Musculoskeletal:     ++Heberdens and Bouchards nodes   Tender shoulders and hips and reduced ROM        Assessment:       1. PMR (polymyalgia rheumatica)    2. Idiopathic chronic gout of multiple sites without tophus    3. Primary osteoarthritis involving multiple joints            Plan:       Ms. Martínez is a 83 year old female with past medical history of DM,HTN, afib, HF, CKD3,  Osteoarthritis, gout here for follow up.     She currently has severe pain in the b/l shoulders and hips and hands and feet  She has limited ROM in the mornings in the shoulders and hips for 1 hour    Labs show very high ESR,CRP  Her uric acid is less than 6    I suspect she has PMR + stable gout + osteoarthritis     -continue hydrocodone  -prednisone 20 mg daily  -continue allopurinol 150mg daily     Come back in 4 weeks with labs     -vit d low  Sent ergocalciferol    -talk to PCP about the thyroid labs

## 2019-04-22 NOTE — PATIENT INSTRUCTIONS
Rx for Amoxil 500 mg #20; take BID   Trial of both Flonase NSS and Astelin nasal spray; one spray of each to each nostril BID; discontinue if nasal bleeding  Consider sinus x rays pending course

## 2019-04-22 NOTE — PROGRESS NOTES
CC:  Seasonal allergies, frontal headache rule out sinus infection  HPI:Ms. Martínez is an 83-year-old  female who  skin complains of frontal headaches which she routinely equates with  a possible sinus condition/infection.  She is accompanied by younger female today.    She usually responds well to sinus medication for 6-7 months and her symptoms recur.  She arrives in a wheelchair.  She indicates exposure to pollen recently.  She indicates a history of seasonal allergies.  She indicates eye problems.  She remembers a previous prescription for a nasal spray; I find evidence of Atrovent medication in the EMR.  I last evaluated her in mid December 2018 for frontal headaches and a possible sinus condition then.  She was treated with a course of Amoxil 500 mg 3 times a day for 10 days as well as Atrovent 0.03% nasal spray 1 spray to each nostril twice a day.  I advised a dental consultation regarding dental caries.  She completed a CT scan of the head without contrast 11/04/2018 which indicated no evidence of acute sinusitis or any sinus pathology.  There was generalized cerebral volume loss noted in findings suggestive of chronic microvascular ischemic change and remote left parasagittal occipital lobe infarct.  Her medication list includes Eliquis.    Past Medical History:   Diagnosis Date    A-fib     Arthritis     Asteroid hyalosis of left eye     Bilateral nonexudative age-related macular degeneration 6/3/2014    Breast cancer     Cataract     CHF (congestive heart failure)     Chronic GERD 9/18/2017    CKD stage 3 due to type 2 diabetes mellitus     Coronary artery disease     Encounter for blood transfusion     Hypertension     Migraine headache     Mild nonproliferative diabetic retinopathy of both eyes 6/3/2014    Pneumonia     Type 2 diabetes mellitus with hyperglycemia     Vertigo     Allergies:  Tramadol  Current Outpatient Medications on File Prior to Visit   Medication Sig  Dispense Refill    ACCU-CHEK FASTCLIX Misc 1 lancet by Misc.(Non-Drug; Combo Route) route 3 (three) times daily. Pt to test blood glucose up to 3 times daily. 100 each 11    allopurinol (ZYLOPRIM) 100 MG tablet Take 1 and 1/2 tablets (150 mg total) by mouth once daily. 45 tablet 3    amoxicillin (AMOXIL) 500 MG capsule Take 1 capsule (500 mg total) by mouth 3 (three) times daily. 30 capsule 0    apixaban (ELIQUIS) 2.5 mg Tab Take 1 tablet (2.5 mg total) by mouth 2 (two) times daily. Resume on 11/1 60 tablet 11    benzonatate (TESSALON) 100 MG capsule TAKE ONE CAPSULE BY MOUTH THREE TIMES A DAY AS NEEDED FOR COUGH 90 capsule 11    cholecalciferol, vitamin D3, (VITAMIN D3) 1,000 unit capsule Take 2 capsules (2,000 Units total) by mouth once daily. 180 capsule 3    cyclosporine 0.05 % Drop Apply 1 drop to eye 2 (two) times daily. 16.5 mL 11    diphenhydrAMINE-zinc acetate 1-0.1% (BENADRYL) cream Apply topically 2 (two) times daily as needed for Itching.  0    doxepin (SINEQUAN) 10 MG capsule Take 1 capsule (10 mg total) by mouth every evening. 30 capsule 2    ergocalciferol (ERGOCALCIFEROL) 50,000 unit Cap Take 1 capsule (50,000 Units total) by mouth every 7 days. for 12 doses 12 capsule 0    fluconazole (DIFLUCAN) 150 MG Tab Take 1 tablet by mouth once and then repeat dose with 1 tablet by mouth again 3 days later. 2 tablet 0    fluticasone (FLONASE) 50 mcg/actuation nasal spray SPRAY TWICE IN EACH NOSTRIL DAILY 16 g 5    furosemide (LASIX) 20 MG tablet Take 2 tablets (40 mg total) by mouth 2 (two) times daily. 120 tablet 11    gabapentin (NEURONTIN) 300 MG capsule Take 1 capsule (300 mg total) by mouth 3 (three) times daily as needed. 90 capsule 3    [START ON 5/17/2019] HYDROcodone-acetaminophen (NORCO) 5-325 mg per tablet Take 1 to 2 tablets by mouth every 6 hours as needed for pain 60 tablet 0    HYDROcodone-acetaminophen (NORCO) 5-325 mg per tablet Take 1 to 2 tablets by mouth every 6 hours as  needed for pain 60 tablet 0    hydrocortisone 2.5 % cream Apply topically 2 (two) times daily. 28 g 2    hydrocortisone 2.5 % cream Apply topically 2 (two) times daily. 453.6 g 2    ipratropium (ATROVENT) 0.03 % nasal spray instill 1 spray into each nostril 2 times a day if needed for nasal drip 30 mL 1    levothyroxine (SYNTHROID) 25 MCG tablet Take 1 tablet (25 mcg total) by mouth daily before breakfast. 30 tablet 11    metoprolol succinate (TOPROL-XL) 100 MG 24 hr tablet Take 1 tablet by mouth once daily. Take with Metoprol succinate 50 mg for a total daily dose of 150mg 30 tablet 11    metoprolol succinate (TOPROL-XL) 50 MG 24 hr tablet Take 1 tablet by mouth once daily. Take with Metoprol succinate 100 mg for a total daily dose of 150mg 30 tablet 11    mirtazapine (REMERON) 7.5 MG Tab Take 1 tablet (7.5 mg total) by mouth every evening. 30 tablet 11    predniSONE (DELTASONE) 20 MG tablet Take 1 tablet (20 mg total) by mouth once daily. 30 tablet 2    SITagliptin (JANUVIA) 25 MG Tab Take 1 tablet (25 mg total) by mouth once daily. 30 tablet 11    triamcinolone acetonide 0.1% (KENALOG) 0.1 % cream apply to affected area twice daily for x2 weeks, then as needed for itching, irritation. 454 g 3    valsartan (DIOVAN) 40 MG tablet Take 0.5 tablets (20 mg total) by mouth once daily. 45 tablet 3    meclizine (ANTIVERT) 25 mg tablet Take 1 tablet (25 mg total) by mouth every 6 (six) hours. 120 tablet 2     No current facility-administered medications on file prior to visit.          Her medical problem list is extensive and includes history of breast cancer, seasonal allergic rhinitis, type 2 diabetes with stage 3 chronic kidney disease, hearing loss, long-term use of opiate analgesics, persistent atrial fibrillation, chronic pain syndrome, essential hypertension, idiopathic chronic gout of multiple sites, controlled type 2 diabetes, chronic GERD, dyspnea, chronic systolic congestive heart failure,  hypovitaminosis D, severe aortic stenosis, chronic atrial fibrillation, unsteady gait, vertigo, orthostatic high syncope, hypothyroidism, hyperparathyroidism, mild cognitive impairment    PE:  Blood pressure 112/63 pulse 72 height 5 ft 1 in weight 162 lb  General:  Alert and oriented lady in no acute distress  Otologic exam is within normal limits.  Nasal exam reveals evidence for pale turbinate mucosa without specific evidence of polypoid disease or purulent discharge in either passage.  Alex-Synephrine sprayed in each.  Oropharyngeal exam reveals evidence for left tonsil hypertrophy greater than right.  There is no benny evidence of acute tonsillitis, exudative tonsillitis, pharyngitis or uvular swelling.  Neck examination is within normal limits.      DIAGNOSIS:     ICD-10-CM ICD-9-CM    1. Headache in front of head R51 784.0    2. History of seasonal allergies Z88.9 V15.09    3. Tonsillar hypertrophy J35.1 474.11    4. Dental caries K02.9 521.00      PLAN: Rx for Amoxil 500 mg #20; take BID   Trial of both Flonase NSS and Astelin nasal spray; one spray of each to each nostril BID; discontinue if nasal bleeding  Consider sinus x rays pending course

## 2019-04-23 NOTE — ASSESSMENT & PLAN NOTE
- Ask Rheumatologist first before I start low dose statins due to multiple myalgia and polyrhuematoid issues  -  and diabetic..

## 2019-04-23 NOTE — PROGRESS NOTES
Subjective:    Patient ID:  Dacia Martínez is a 83 y.o. female who presents for follow-up of Acute diastolic heart failure (2 month f/u )      HPI  83 F with PMH of HFrEF 30% s/p CRT-P, Afib on AC, CKD, Low flow low Grad AS (Moderate AS on DSE) presenting for follow up. No dizziness. She is s/p AVN ablation in 12/18. Recently had increase of Metoprolol to 150mg which she tolerated well in Feb 19.    Review of Systems   Constitution: Negative for chills, decreased appetite and diaphoresis.   HENT: Negative for congestion and ear discharge.    Eyes: Negative for blurred vision and discharge.   Cardiovascular: Negative for chest pain, dyspnea on exertion, irregular heartbeat, leg swelling and paroxysmal nocturnal dyspnea.   Respiratory: Negative for cough, hemoptysis and shortness of breath.    Gastrointestinal: Negative for abdominal pain.        Objective:    Physical Exam   Constitutional: She is oriented to person, place, and time. She appears well-developed and well-nourished. No distress.   Eyes: Pupils are equal, round, and reactive to light. Conjunctivae are normal.   Neck: No tracheal deviation present. No thyromegaly present.   Cardiovascular: Normal rate, regular rhythm and intact distal pulses. Exam reveals no gallop and no friction rub.   Murmur heard.  2/6 systolic murmur   Pulmonary/Chest: Effort normal and breath sounds normal. No respiratory distress. She has no wheezes. She has no rales.   Abdominal: Soft. Bowel sounds are normal. She exhibits no distension. There is no tenderness.   Musculoskeletal: She exhibits no edema or deformity.   Neurological: She is alert and oriented to person, place, and time. No cranial nerve deficit. Coordination normal.   Skin: Skin is warm and dry. She is not diaphoretic.   Psychiatric: She has a normal mood and affect. Her behavior is normal.         Assessment:       1. Chronic combined systolic and diastolic heart failure    2. Acute on chronic combined systolic and  This should go to Dr. Kamala Watson diastolic congestive heart failure    3. Moderate aortic stenosis    4. Chronic atrial fibrillation    5. Other hyperlipidemia    6. Essential hypertension         Plan:       Moderate aortic stenosis  - Mod AS on DSE: ROBBIE 1.06 MG 8mmHg  - 2/6 DOUGLAS on RUSB  - Asymptomatic    Acute on chronic combined systolic and diastolic congestive heart failure  - Continue GDMT: Toprolol , Stop Valsartan, start Entresto 24/26  - Check BMP on Thursday then in 3 weeks  - Lasix for vol management  - TTE in August if no improvement -> ICD      Chronic atrial fibrillation  - Continue Eliquis  - s/p AVN, BiV paced    Hyperlipidemia  - Ask Rheumatologist first before I start low dose statins due to multiple myalgia and polyrhuematoid issues  -  and diabetic..  ADDENDUM  Discussed with Rheumatologist ok for statin, 10 Lipitor    Essential hypertension  - Usually better controlled than this fought with daughter  - Encouraged low salt diet and take meds

## 2019-04-23 NOTE — ASSESSMENT & PLAN NOTE
- Usually better controlled than this fought with daughter  - Encouraged low salt diet and take meds

## 2019-04-23 NOTE — ASSESSMENT & PLAN NOTE
- Continue GDMT: Toprolol , Stop Valsartan, start Entresto 24/26  - Check BMP on Thursday then in 3 weeks  - Lasix for vol management  - TTE in August if no improvement -> ICD

## 2019-04-25 NOTE — TELEPHONE ENCOUNTER
Called patient to update on DXA results and thyroid labs.    No answer to two different numbers/attempts.  Unable to leave voicemail.    Continue LT4 25mcg daily, accepting higher TSH given age.  Calcium and vitamin D supplement given osteopenia, but no indication for prescription therapy.      Shelby Horner MD  Endocrinology Fellow

## 2019-05-08 NOTE — TELEPHONE ENCOUNTER
Sent in Rx for diflucan; please notify patient.  Recommend urgent care/follow up visit if symptoms do not improve after taking diflucan.

## 2019-05-08 NOTE — TELEPHONE ENCOUNTER
She recently saw a new rheumatologist, who gave her a new medication,  and it's causing her to have a vaginal yeast infection.  She is complaining of severe vaginal itching.  Not interested in answering many questions.  Per her medication profile she was started on amoxicillin on 4/22/19 by ENT, and this is likely the culprit.      Reason for Disposition   MODERATE-SEVERE itching (i.e., interferes with school, work, or sleep)    Protocols used: VAGINAL SYMPTOMS-A-AH

## 2019-05-08 NOTE — TELEPHONE ENCOUNTER
Please contact patient.  Enquire about symptoms (discharge, odor, fevers/chills, etc.).  Notify me with what you find out.

## 2019-05-08 NOTE — TELEPHONE ENCOUNTER
Spoke with pt----c/o vaginal itching and a little white discharge x2 days----denies pain, fevers/chills, no odor, and no burning.

## 2019-05-14 NOTE — TELEPHONE ENCOUNTER
Spoke with carlton the daughter of the pt and let her know that we had nothing available for the pt earlier than her apponintment

## 2019-05-29 NOTE — PROGRESS NOTES
Chief Complaint   Patient presents with    Disease Management       Patient with gout for a follow up    Subjective:       Patient ID: Dacia Martínez is a 83 y.o. female.    Chief Complaint: Disease Management    HPI   Ms. Martínez is a 83 year old female with past medical history of DM,HTN, afib, HF, CKD3,  Osteoarthritis, gout here for follow up.     Was followed by Dr. Macdonald for chronic pain 2/2 OA.     She has been on Hydrocodone (5-325, 1-2 tabs every 6 hours prn pain) and allopurinol 150mg daily. She also takes prednisone prn for her gout flares.     She presented with :    Pain in the shoulders  Cant raise the arms  DIPs  PIPs  CMCs  First MCPs  Feet  Hips   Difficult for her to get in and out of the car    Last uric acid 7/2018 of 5.1   In 2006 Her CHARLOTTE negative, ANCA negative, RF 34 and CCP negative.     2019 we did blood work  Uric acid 5.9  ESR > 120  CRP 44.1  RF 19,slightly positive  CCP neg    TSH abnormal/T4 nml    So we gave her prednisone 20 mg and started treating for PMR   We wanted her to take it for 4 weeks and then do labs and do a taper     She did not take it daily  She just takes it prn    She also had a gout attack in the right ankle  More of a pain not much swelling   She took prednisone and that helped    Review of Systems     Constitutional: Negative for chills and fever.   HENT: Negative for mouth sores.    Eyes: Negative for pain and redness.   Respiratory: Negative for shortness of breath.    Cardiovascular: Negative for chest pain.   Gastrointestinal: Negative for constipation and diarrhea.   Genitourinary: Negative for dysuria.   Musculoskeletal: Positive for arthralgias and joint swelling.   Neurological: Negative for dizziness and headaches.   Psychiatric/Behavioral: Negative for agitation.         Objective:   /77   Pulse 66   Ht 5' (1.524 m)   Wt 74.2 kg (163 lb 9.3 oz)   LMP  (LMP Unknown)   BMI 31.95 kg/m²      Physical Exam   Constitutional: She is oriented to person,  place, and time and well-developed, well-nourished, and in no distress.   HENT:   Head: Normocephalic and atraumatic.   Mouth/Throat: No oropharyngeal exudate.   Eyes: Conjunctivae are normal. Pupils are equal, round, and reactive to light. No scleral icterus.   Neck: Normal range of motion.   Cardiovascular: Normal rate, regular rhythm and normal heart sounds.    Pulmonary/Chest: Effort normal and breath sounds normal.   Abdominal: Soft. She exhibits no distension.       Neurological: She is alert and oriented to person, place, and time.   Skin: Skin is warm and dry. No rash noted. She is not diaphoretic.     Musculoskeletal:     ++Heberdens and Bouchards nodes           Assessment:       1. Idiopathic chronic gout of multiple sites without tophus            Plan:       Ms. Martínez is a 83 year old female with past medical history of DM,HTN, afib, HF, CKD3,  Osteoarthritis, gout here for follow up.     She PRESENTED WITH  severe pain in the b/l shoulders and hips and hands and feet  She had limited ROM in the mornings in the shoulders and hips for 1 hour    Labs showed very high ESR,CRP  Her uric acid is less than 6    I suspected she has PMR + stable gout + osteoarthritis     -I continued hydrocodone  -I added prednisone 20 mg daily  -continued allopurinol 150mg daily     Unfortunately she didn't take prednisone daily    She wants to continue doing what she is doing    She also claims that she had a gout attack in the right ankle   She says it was pain > swelling  I dont have labs from today    At this point I will just continue her current medications     I will give her norco for June and July  Labs for august

## 2019-06-04 NOTE — TELEPHONE ENCOUNTER
Nurse returned patient called. Nurse explain to her that we don't have any morning appt and re-scheduled her at [facility].   ----- Message from Bennett Holder sent at 6/4/2019 12:17 PM CDT -----  Contact: patient  Please call pt at [phone]    Patient would like to discuss getting a morning appt only and cancel the current appt    Thank you

## 2019-06-21 NOTE — PROGRESS NOTES
"Subjective:      Patient ID: Dacia Martínez is a 84 y.o. female.    Chief Complaint: Follow-up and Medication Refill      HPI:  Dacia Martínez is an 84 year old female with atherosclerosis of aorta, persistent atrial fibrillation, severe aortic stenosis, anemia, chronic pain syndrome, chronic sinusitis, chronic systolic congestive heart failure, chronic kidney disease stage 3, history of colon polyps, constipation, dilated cardiomyopathy, diabetes mellitus type 2, gastroesophageal reflux disease, gout, hyperlipidemia, hypertension, hearing loss, intracranial atherosclerosis, long term use of opiates, left bundle branch block, cardiac murmur, microalbuminuria, osteoarthritis, osteopenia, and vertigo who presents to clinic today complaining of sinus pressure and left shoulder pain.    Complains of left shoulder pain today.  States the pain "moves around" however.  States it will affect multiple different joints.  States her Norco does not help with her pain.  Refuses referral to pain management today. Has follow up with rheumatology 7/26/19.  Eudora prescribed by rheumatology.    States she has some sinus pressure in her head currently.  States she tried to get an appointment with Dr. Ribeiro, ENT.  Endorses associated chills, sneezing, runny nose.  Not taking anything for her symptoms currently.  States she uses a nasal spray in a white bottle "sometimes."  Denies associated sore throat, post-nasal drip, chest pain, shortness.  States she has a lot of wax in her right ear as well, endorses tinnitus to the right ear which improves throughout the day.  States she needs antibiotics.      Past Medical History:   Diagnosis Date    A-fib     Arthritis     Asteroid hyalosis of left eye     Bilateral nonexudative age-related macular degeneration 6/3/2014    Breast cancer     Cataract     CHF (congestive heart failure)     Chronic GERD 9/18/2017    CKD stage 3 due to type 2 diabetes mellitus     Coronary artery disease  "    Encounter for blood transfusion     Hypertension     Migraine headache     Mild nonproliferative diabetic retinopathy of both eyes 6/3/2014    Pneumonia     Type 2 diabetes mellitus with hyperglycemia     Vertigo        Past Surgical History:   Procedure Laterality Date    ABLATION N/A 2018    [facility] EP LAB    BREAST SURGERY      left lumpectomy    CARDIOVERSION N/A 2017    [facility] CATH LAB    CARPAL TUNNEL RELEASE      left    CATARACT EXTRACTION      vance     SECTION      COLONOSCOPY N/A 3/5/2015    Performed by Andrés Hobson [facility] ENDO (4TH FLR)    ESOPHAGOGASTRODUODENOSCOPY (EGD) N/A 2016    Performed by Rory Ogden [facility] ENDO (2ND FLR)    EYE SURGERY      cataracts bilaterally    HYSTERECTOMY      partial    INSERTION, CARDIAC PACEMAKER, BIVENTRICULAR N/A 10/26/2018    Per[facility] by [facility] CATH LAB    TRANSESOPHAGEAL ECHOCARDIOGRAM (REJI) N/A 2017    Performed by Ruma Long [facility] CATH LAB    TRANSESOPHAGEAL ECHOCARDIOGRAM (ERJI) N/A 2017    Performed by Angelique Taylor [facility] at [facility] CATH LAB       Family History   Problem Relation Age of Onset    Cancer Mother         stomach    Heart disease Mother     Diabetes Father     Hypertension Father     Blindness Brother     Diabetes Brother     Hypertension Brother     Cataracts Sister     Diabetes Sister     Diabetes Brother     Diabetes Brother     Diabetes Brother     No Known Problems Daughter     No Known Problems Son     No Known Problems Daughter     Amblyopia Neg Hx     Glaucoma Neg Hx     Macular degeneration Neg Hx     Strabismus Neg Hx     Retinal detachment Neg Hx        Social History     Socioeconomic History    Marital status: Single     Spouse name: Not on file    Number of children: 4    Years of education: Not on file    Highest education level: Not  on file   Occupational History    Not on file   Social Needs    Financial resource strain: Not on file    Food insecurity:     Worry: Not on file     Inability: Not on file    Transportation needs:     Medical: Not on file     Non-medical: Not on file   Tobacco Use    Smoking status: Never Smoker    Smokeless tobacco: Never Used   Substance and Sexual Activity    Alcohol use: Yes    Drug use: No    Sexual activity: Not Currently   Lifestyle    Physical activity:     Days per week: Not on file     Minutes per session: Not on file    Stress: Not on file   Relationships    Social connections:     Talks on phone: Not on file     Gets together: Not on file     Attends Synagogue service: Not on file     Active member of club or organization: Not on file     Attends meetings of clubs or organizations: Not on file     Relationship status: Not on file   Other Topics Concern    Not on file   Social History Narrative    Family supportive.     Granddaughter helps set out her medications in pillbox, helps check her blood sugar.       Review of Systems   Constitutional: Positive for chills. Negative for fatigue and fever.   HENT: Positive for rhinorrhea, sinus pressure and sneezing. Negative for congestion, nosebleeds, sore throat and trouble swallowing.    Eyes: Negative for pain and visual disturbance.   Respiratory: Negative for cough, shortness of breath and wheezing.    Cardiovascular: Negative for chest pain and palpitations.   Gastrointestinal: Negative for abdominal distention, abdominal pain, constipation, diarrhea, nausea and vomiting.   Genitourinary: Negative for decreased urine volume, difficulty urinating, dysuria, hematuria and urgency.   Musculoskeletal: Positive for arthralgias. Negative for myalgias.   Skin: Negative for color change and rash.   Neurological: Negative for dizziness, tremors, weakness, light-headedness, numbness and headaches.   Psychiatric/Behavioral: Negative for agitation,  "behavioral problems and confusion. The patient is not nervous/anxious.      Objective:     Vitals:    06/21/19 1046   BP: 122/64   BP Location: Right arm   Patient Position: Sitting   BP Method: Medium (Manual)   Pulse: 70   Temp: 97.9 °F (36.6 °C)   SpO2: 100%   Weight: 70.1 kg (154 lb 8.7 oz)   Height: 5' 1" (1.549 m)       Physical Exam   Constitutional: She is oriented to person, place, and time. She appears well-developed and well-nourished.   HENT:   Head: Normocephalic and atraumatic.   Right Ear: Hearing and external ear normal.   Left Ear: Hearing, tympanic membrane, external ear and ear canal normal.   Nose: Nose normal.   Mouth/Throat: Oropharynx is clear and moist.   Cerumen impaction noted to right ear.   Eyes: Pupils are equal, round, and reactive to light. Conjunctivae are normal.   Neck: Neck supple. No tracheal deviation present.   Cardiovascular: Normal rate, regular rhythm and normal heart sounds. Exam reveals no gallop and no friction rub.   No murmur heard.  Pulmonary/Chest: Effort normal and breath sounds normal. No respiratory distress. She has no wheezes. She has no rales.   Abdominal: Soft. Bowel sounds are normal. She exhibits no distension. There is no tenderness. There is no rebound and no guarding.   Musculoskeletal: She exhibits no edema or deformity.        Left shoulder: She exhibits no bony tenderness, no swelling, no effusion and no crepitus.   Neurological: She is alert and oriented to person, place, and time. Coordination normal.   Skin: Skin is warm and dry.   Psychiatric: She has a normal mood and affect. Her behavior is normal.   Nursing note and vitals reviewed.     Assessment:      1. Chronic maxillary sinusitis    2. Primary osteoarthritis involving multiple joints      Plan:   Dacia was seen today for follow-up and medication refill.    Diagnoses and all orders for this visit:    Chronic maxillary sinusitis        -     No signs of any bacterial infection, will avoid " prescribing any antibiotic today, patient notified.  Recommend patient use both Flonase and azelastine nasal sprays 1 spray to each nostril twice daily as previously recommended by ENT.  Recommended OTC guaifenesin as well.  Follow up with ENT.    Primary osteoarthritis involving multiple joints        -     Recommended OTC Apsercreme with lidocaine PRN.  Continue current regimen; follow up with rheumatology.    Impacted cerumen of right ear         -     Irrigated per nursing today.

## 2019-06-22 NOTE — HOSPITAL COURSE
Ms. Martínez was admitted 11/4 due to syncopal collapse, acute on chronic systolic CHF exacerbation, and electrolyte abnormalities. Syncopal workup unremarkable, head CT negative, unable to obtain MRI due to recent device placement. Device interrogation without evidence of arrhythmia.  Orthostatics negative. Syncope most likely related to vasovagal response during voiding with progressive muscle weakness and fatigue. On admission THS noted to be ~12, TPO abnormal, free T4 normal, vit D level 18, and borderline hypocalcemia. Endocrine consulted, recommended initiation of levothyroxine at 25 mcg and encouraged compliance with daily vitamin D supplement. Will need outpt follow up and repeat labs in ~6 months (ordered)    At time of admission she was also noted to be volume overloaded, diuresis initiated with IVP lasix then transitioned to PO, at discharge net negative 6.6 liters liters and weight down 9 lbs; discharge weight 148 lbs. She estimates dry weight is ~150 lbs however unsure of accuracy, currently 157 lbs. Of note she had an normal EF ~ a year ago and likely due to progressive Afib she had noted decline in EF to 30-35% with development of LBBB. At time of decline in EF she was initiated on ACEi which was later transitioned to bidil; in August 2018 bidil was discontinued due to hypotension and vertigo. GDMT had not been resumed in outpt setting nor an ischemic evaluation completed at this time. Lopressor and valsartan initiated, would likely transition to entresto if EF dose not improve with appropriate GDMT in outpt setting. EP was consulted and plan for AVN ablation in December and will place RA lead; will repeat echo in outpt setting and if EF remains depressed will pursue ischemic evaluation and/or consideration of device upgrade to biV-ICD. Co-Managed Cardiology consulted, agree with plan of care and recommend close outpt follow up.     Disposition: home with family, HH arranged at discharge. Outpt follow up  with Cardiology, EP, and PCP scheduled. Repeat TSH in December (PCP to follow) and repeat PTH and vitamin D level in ~ 6 months (PCP to follow); both ordered.    22-Jun-2019 15:47

## 2019-06-24 NOTE — TELEPHONE ENCOUNTER
----- Message from Mary Sahu sent at 6/24/2019  1:44 PM CDT -----  Contact: pt  Please call above pt at [phone] said she has been calling and no one is returning he calls has a sinus infection need to speak with the nurse ASAP thanks

## 2019-07-08 NOTE — PROGRESS NOTES
Nurse spoke with patient regarding her appt with (PM). Nurse explained IPM, treatment options and requested imaging. Orders placed patient verbalized understanding and would like to proceed with appt.     
Controlled with current regime

## 2019-07-22 NOTE — TELEPHONE ENCOUNTER
----- Message from Lisa Wills MA sent at 7/22/2019  3:03 PM CDT -----  Contact: [phone]      ----- Message -----  From: Barbara Anne  Sent: 7/22/2019   3:01 PM  To: Shayna [facility] Staff    Patient is requesting a call from the office regarding her yeast infection,  Please call and advise.    Thank you

## 2019-07-22 NOTE — TELEPHONE ENCOUNTER
----- Message from Barbara Anne sent at 7/22/2019  3:01 PM CDT -----  Contact: [phone]  Patient is requesting a call from the office regarding her yeast infection,  Please call and advise.    Thank you

## 2019-07-26 NOTE — PROGRESS NOTES
History of present illness:  84-year-old female I follow for chronic pain due to osteoarthritis.  She remains on hydrocodone.  She takes 1-2 daily.  She has not been getting the prescriptions filled early.  She also has a history of gout.  She is on allopurinol 150 mg daily.  She has been taking it regularly.  Her last uric acid level was 5.7.  She takes prednisone when she feels like a gout attack is coming on, she does not take it on a regular basis.  She has had problems with increased inflammatory markers in the past.  She was seen by  during my absence.  Her inflammatory markers had increased.  There was concern that she might have PMR.  She was told to take prednisone on a regular basis but she still continues to take it only when she has problems.    Her pain persists.  It is worse during the day.  It is better in the morning.  She denies any morning stiffness.  She has had some nocturnal pain.  She complains of swelling in her left 4th finger for the past several days.  She has had no trauma.  She has been using heat with some relief.  She denies topical medications.  She has been having sinus problems and is scheduled to see ENT next week.  She has had no other changes in her medical condition.    Physical examination:  Musculoskeletal:  She has full range of motion of the shoulders bilaterally without pain on range of motion.  She has bony hypertrophy of the L elbow with L2 effusion.  Wrists are unremarkable.  She has bony hypertrophy of the 1st CMC bilaterally.  She has bony hypertrophy of the PIP is in DIP.  She is tender on the left 4th PIP but has no synovitis.  She has bony hypertrophy of the knees.  She has decreased range of motion of the ankles.  She has bilateral bunion deformities.    Assessment:  1.  Osteoarthritis  2.  Inter critical gout  3.  Increased inflammatory markers.  Her symptoms are somewhat atypical for PMR.  I wonder if it might be related to her sinus  problems    Plans:  1.  I recommend she take prednisone on a daily basis  2.  She is to continue allopurinol and hydrocodone as before  3.  Return to see me in 4 months

## 2019-08-05 NOTE — PROGRESS NOTES
Subjective:       Patient ID: Dacia Martínez is a 84 y.o. female.    Chief Complaint: Sinus Problem (Eyes always runnung ) and Sinusitis    HPI: Ms. Martínez is an 84 year old AAF with chronic pain syndrome, seasonal allergic rhinitis, long-term use of opiate analgesics, atrial fibrillation and type 2 diabetes with CKD stage 3 who indicates watery right eye symptoms and bilateral medial periorbital pain and pressure symptoms as well as nasal congestion; she equates these  symptoms to a possible sinus condition, per usual.  She is accompanied by her daughter today  She was examined by Dr. Jasmeet Corral 06/21/2019 for left shoulder pain which Norco was not relieving.  She refused a referral to the pain management service.  She also indicates some sinus pressure symptoms in her head currently.  She is having a hard time getting an appointment with me.  She indicated associated chills, sneezing and runny nose symptoms.  She utilizes nasal spray and a white bottle sometimes .  She indicated a wax problem affecting her right ear as well as right ear tinnitus symptoms.  She is asking for antibiotics specifically.  They were ultimately not prescribed although she was diagnosed with chronic maxillary sinusitis and primary osteoarthritis involving multiple joints.  There is no specific sign of any bacterial infection according to the notes.  Use of both Flonase and Astelin nasal sprays were encouraged as previously advised.  She was asked to follow-up with the ENT service on a p.r.n. basis.  Her right ear canal was irrigated.  I have treated her in the past for sinus related symptoms/recurrent infections.  Her last visit with me was 04/22/2019.  She was treated with a 10 day course of amoxicillin then.  She completed a CT scan of the head without contrast in early November 2018 which indicated generalized cerebral volume loss and findings suggestive chronic microvascular ischemic change and remote left parasagittal occipital  lobe infarct.    There was a small region of encephalomalacia involving the left parasagittal occipital lobe.  The mastoid air cells and paranasal sinuses were clear of any acute process.    Past Medical History:   Diagnosis Date    A-fib     Arthritis     Asteroid hyalosis of left eye     Bilateral nonexudative age-related macular degeneration 6/3/2014    Breast cancer     Cataract     CHF (congestive heart failure)     Chronic GERD 9/18/2017    CKD stage 3 due to type 2 diabetes mellitus     Coronary artery disease     Encounter for blood transfusion     Hypertension     Migraine headache     Mild nonproliferative diabetic retinopathy of both eyes 6/3/2014    Pneumonia     Type 2 diabetes mellitus with hyperglycemia     Vertigo     Allergies:  Tramadol  Current Outpatient Medications on File Prior to Visit   Medication Sig Dispense Refill    ACCU-CHEK FASTCLIX Misc 1 lancet by Misc.(Non-Drug; Combo Route) route 3 (three) times daily. Pt to test blood glucose up to 3 times daily. 100 each 11    allopurinol (ZYLOPRIM) 100 MG tablet Take 1 and 1/2 tablets (150 mg total) by mouth once daily. 45 tablet 3    apixaban (ELIQUIS) 2.5 mg Tab Take 1 tablet (2.5 mg total) by mouth 2 (two) times daily. Resume on 11/1 60 tablet 11    atorvastatin (LIPITOR) 10 MG tablet Take 1 tablet (10 mg total) by mouth once daily. 90 tablet 3    azelastine (ASTELIN) 137 mcg (0.1 %) nasal spray Use one spray by Nasal route twice daily for allergies 30 mL 1    cholecalciferol, vitamin D3, (VITAMIN D3) 1,000 unit capsule Take 2 capsules (2,000 Units total) by mouth once daily. 180 capsule 3    cyclosporine 0.05 % Drop Apply 1 drop to eye 2 (two) times daily. 16.5 mL 11    diphenhydrAMINE-zinc acetate 1-0.1% (BENADRYL) cream Apply topically 2 (two) times daily as needed for Itching.  0    doxepin (SINEQUAN) 10 MG capsule Take 1 capsule (10 mg total) by mouth every evening. 30 capsule 2    fluconazole (DIFLUCAN) 150 MG  Tab Take 1 tablet by mouth once and then repeat dose with 1 tablet by mouth again 3 days later. 2 tablet 0    fluticasone (FLONASE) 50 mcg/actuation nasal spray SPRAY TWICE IN EACH NOSTRIL DAILY 16 g 5    fluticasone (FLONASE) 50 mcg/actuation nasal spray Use one spray by Each Nare route twice daily 16 g 2    furosemide (LASIX) 20 MG tablet Take 2 tablets (40 mg total) by mouth 2 (two) times daily. 120 tablet 11    gabapentin (NEURONTIN) 300 MG capsule Take 1 capsule (300 mg total) by mouth 3 (three) times daily as needed. 90 capsule 3    HYDROcodone-acetaminophen (NORCO) 5-325 mg per tablet Take 1-2 tablets by mouth every 12 (twelve) hours as needed for Pain. 60 tablet 0    hydrocortisone 2.5 % cream Apply topically 2 (two) times daily. 28 g 2    ipratropium (ATROVENT) 0.03 % nasal spray instill 1 spray into each nostril 2 times a day if needed for nasal drip 30 mL 1    levothyroxine (SYNTHROID) 25 MCG tablet Take 1 tablet (25 mcg total) by mouth daily before breakfast. 30 tablet 11    metoprolol succinate (TOPROL-XL) 100 MG 24 hr tablet Take 1 tablet by mouth once daily. Take with Metoprol succinate 50 mg for a total daily dose of 150mg 30 tablet 11    metoprolol succinate (TOPROL-XL) 50 MG 24 hr tablet Take 1 tablet by mouth once daily. Take with Metoprol succinate 100 mg for a total daily dose of 150mg 30 tablet 11    mirtazapine (REMERON) 7.5 MG Tab Take 1 tablet (7.5 mg total) by mouth every evening. 30 tablet 11    predniSONE (DELTASONE) 20 MG tablet Take one tab by mouth once daily for a joint flare only as needed 30 tablet 0    sacubitril-valsartan (ENTRESTO) 24-26 mg per tablet Take 1 tablet by mouth 2 (two) times daily. 60 tablet 11    SITagliptin (JANUVIA) 25 MG Tab Take 1 tablet (25 mg total) by mouth once daily. 30 tablet 11    triamcinolone acetonide 0.1% (KENALOG) 0.1 % cream apply to affected area twice daily for x2 weeks, then as needed for itching, irritation. 454 g 3     [DISCONTINUED] meclizine (ANTIVERT) 25 mg tablet Take 1 tablet (25 mg total) by mouth 3 (three) times daily as needed for Dizziness. 20 tablet 0     No current facility-administered medications on file prior to visit.      Past Surgical History:   Procedure Laterality Date    ABLATION N/A 2018    Performed by [facility] at [facility] EP LAB    BREAST SURGERY      left lumpectomy    CARDIOVERSION N/A 2017    Performed by [facility] at [facility] CATH LAB    CARPAL TUNNEL RELEASE      left    CATARACT EXTRACTION      vance     SECTION      COLONOSCOPY N/A 3/5/2015    Performed by Andrés Hobson MD at [facility] ENDO (4TH FLR)    ESOPHAGOGASTRODUODENOSCOPY (EGD) N/A 2016    Performed by Rory Ogden MD at [facility] ENDO (2ND FLR)    EYE SURGERY      cataracts bilaterally    HYSTERECTOMY      partial    INSERTION, CARDIAC PACEMAKER, BIVENTRICULAR N/A 10/26/2018    Performed by Jacques [facility] at [facility] CATH LAB    TRANSESOPHAGEAL ECHOCARDIOGRAM (REJI) N/A 2017    Performed by Ruma Long MD at [facility] CATH LAB    TRANSESOPHAGEAL ECHOCARDIOGRAM (REJI) N/A 2017    Performed by Angelique Taylor MD at [facility] CATH LAB       Review of Systems        Objective:     Blood pressure 118/69 pulse 69 height 5 ft 1 in weight 165 lb  General:  Alert and oriented lady in no acute distress  Physical Exam   HENT:   Head:       Ears:    Nose:       Mouth/Throat:       Eyes:           Assessment:       1. Right epiphora    2. Nasal congestion    3. Retraction of tympanic membrane of both ears    4. Periorbital pain, bilateral        Plan:     Rx for Amoxil 500 mg # 20; take BID for infection  Continue use of Flonase NSS; 1 spray @ lateral nostril once a day for now;   may use Astelin nasl spray 1 spray @ nostril BID prn allergy sx   Pt. encouraged to follow up with ophthalmologist ( [phone]) re: OD epiphora/watering; r/o lacrimal duct problem  Consider sinus CT pending  course  Gentle middle ear insufflation techniques encouraged; politzerization attempted  RTC prn

## 2019-08-27 NOTE — TELEPHONE ENCOUNTER
----- Message from Cami Victor sent at 8/27/2019 12:16 PM CDT -----  Type:  Patient Requesting Referral    Who Called: pt     Referral to What Specialty: hand specialist    Reason for Referral: Carpal Tunnel in right hand    Does the patient want the referral with a specific physician?:    [facility]    Would the patient rather a call back or a response via [facility]? Call back     Best Call Back Number: [phone]    Additional Information:

## 2019-08-28 NOTE — TELEPHONE ENCOUNTER
----- Message from Rachel Rubio sent at 8/28/2019  2:51 PM CDT -----  Contact: self   Patient is calling about getting a doctor at main [facility] for her carpal tunnel. Please call & advise

## 2019-09-13 NOTE — PROGRESS NOTES
HPI     Last eye exam was 7/3/17 with     Pt states her eyes are running water.  Pt had a little girl had a marble in her hair pony tail and it hit her in   the OS. OS is now irritaed, watering, and has a little bit of pain along   with some itching.  Pt occasionally uses OTC eye drops, and some other eye drops but she does   not know the name of them.  Pt states she has some white floaters.  Pt states she wears reading glasses, and does not need distance eye   glasses.  Patient denies diplopia, headaches, and pain.    Hemoglobin A1C       Date                     Value               Ref Range             Status                11/04/2018               6.0 (H)             4.0 - 5.6 %           Final                   Last edited by Ema Reynolds on 9/12/2019  9:06 AM. (History)            Assessment /Plan     For exam results, see Encounter Report.    Type 2 diabetes mellitus without ophthalmic manifestations    Dry eye syndrome of both eyes  -     cycloSPORINE (RESTASIS) 0.05 % ophthalmic emulsion; Place 0.4 mLs (1 drop total) into both eyes 2 (two) times daily.  Dispense: 60 vial; Refill: 1    Nonexudative age-related macular degeneration, bilateral, intermediate dry stage    Pseudophakia of both eyes            1.  No retinopathy--monitor yearly.  BS control.  2.   Start Restasis bid ou.  Educated patient on SE--redness, stinging.  Supplement with artificial tears as needed.  3.  Stable-followed by Dr. Sommers.  4.   Continue w/ current rx.  Retina flat and intact OU--no holes, tears, breaks, or RDs.

## 2019-10-15 NOTE — TELEPHONE ENCOUNTER
----- Message from Isabelle Power MA sent at 10/15/2019 10:39 AM CDT -----  Contact: Self [phone]      ----- Message -----  From: Marie Meza  Sent: 10/15/2019  10:06 AM CDT  To: [facility] Staff    .Refill request.  HYDROcodone-acetaminophen (NORCO) 5-325 mg per tablet & predniSONE (DELTASONE) 20 MG tablet    ..  [facility] [address] [place] [address]  Phone: [phone] Fax: [phone]

## 2019-10-23 NOTE — DISCHARGE INSTRUCTIONS
You have a hematoma on your leg that is well contained.  You can take Tylenol as needed for pain control, please elevate your leg.  You can try cold compresses if this helps her symptoms.      If you have any increasing swelling of the leg, any pulsation, any increasing pain, any increasing redness, any fevers, any other bleeding, or any other new, worsening or worrisome symptoms please return to the emergency department.    Otherwise please follow-up with your primary care doctor within 1-2 weeks if your leg symptoms are not completely resolved.    Your blood pressure was elevated today.  Please continue take your blood pressure medications as prescribed and follow up with your primary care doctor for continued management of your blood pressure.

## 2019-10-23 NOTE — ED PROVIDER NOTES
Encounter Date: 10/23/2019    SCRIBE #1 NOTE: I, Xiomy Hansen, am scribing for, and in the presence of,  Dr. Roche. I have scribed the following portions of the note - Other sections scribed: HPI, ROS, PE.       History     Chief Complaint   Patient presents with    Leg Injury     r lower leg hit on table hematoma,  on blood thinners,    HPI:     Dacia Martínez is a 84 [facility] to [facility] ED complaining of hematoma to right shin. She states she hit her leg on a table upon standing. Occurred this morning at 10 AM (about 8.5 hours ago). She denies any previous injuries to the right leg, any symptoms prior to leg trauma, or any other injuries. She is not having any pain unless her leg hangs while sitting down. Denies bleeding anywhere else. Denies falls, headaches, CP, SOB, or abdominal pain. Denies pulsating pain or color change.    She has a history of DM, A-fib, HTN, CKD, CAD, and CHF.    The history is provided by the patient, medical records and a relative. No  was used.     Review of patient's allergies indicates:   Allergen Reactions    Tramadol Hallucinations     Past Medical History:   Diagnosis Date    A-fib     Arthritis     Asteroid hyalosis of left eye     Bilateral nonexudative age-related macular degeneration 6/3/2014    Breast cancer     Cataract     CHF (congestive heart failure)     Chronic GERD 9/18/2017    CKD stage 3 due to type 2 diabetes mellitus     Coronary artery disease     Encounter for blood transfusion     Hypertension     Migraine headache     Mild nonproliferative diabetic retinopathy of both eyes 6/3/2014    Pneumonia     Type 2 diabetes mellitus with hyperglycemia     Vertigo      Past Surgical History:   Procedure Laterality Date    ABLATION N/A 12/7/2018    Procedure: ABLATION;  Surgeon: Jacques Burt MD;  Location: [facility] EP LAB;  Service: Cardiology;  Laterality: N/A;  AF, AVN, RFA, Choice, MB, 3 Prep *SJM CRT-P  in situ*    BREAST SURGERY      left lumpectomy    CARPAL TUNNEL RELEASE      left    CATARACT EXTRACTION      vance     SECTION      EYE SURGERY      cataracts bilaterally    HYSTERECTOMY      partial    IMPLANTATION OF BIVENTRICULAR HEART PACEMAKER N/A 10/26/2018    Procedure: INSERTION, CARDIAC PACEMAKER, BIVENTRICULAR;  Surgeon: Jacques Burt MD;  Location: [facility] CATH LAB;  Service: Cardiology;  Laterality: N/A;  AF, CRT-P, SJM, MAC, MB, 3 Prep     Family History   Problem Relation Age of Onset    Cancer Mother         stomach    Heart disease Mother     Diabetes Father     Hypertension Father     Blindness Brother     Diabetes Brother     Hypertension Brother     Cataracts Sister     Diabetes Sister     Diabetes Brother     Diabetes Brother     Diabetes Brother     No Known Problems Daughter     No Known Problems Son     No Known Problems Daughter     Amblyopia Neg Hx     Glaucoma Neg Hx     Macular degeneration Neg Hx     Strabismus Neg Hx     Retinal detachment Neg Hx      Social History     Tobacco Use    Smoking status: Never Smoker    Smokeless tobacco: Never Used   Substance Use Topics    Alcohol use: Yes    Drug use: No     Review of Systems  Constitutional:  No Fever, No Chills,   Eyes: No Vision Changes  ENT/Mouth: No sore throat, No rhinorrhea  Cardiovascular:  No Chest Pain, No Palpitations  Respiratory:  No Cough, No SOB  Gastrointestinal:  No Nausea, No Vomiting, No Diarrhea, No abdo pain.  Genitourinary:  No  pain, No dysuria   Musculoskeletal:  No Arthralgias, No Back Pain, No Neck Pain  Skin:  Positive for hematoma to RLE  Neuro:  No Weakness, No Numbness, No Paresthesias, No Dizziness, No Headache    Physical Exam     Initial Vitals [10/23/19 1801]   BP Pulse Resp Temp SpO2   (!) 180/82 71 18 98.6 °F (37 °C) 98 %      MAP       --         Physical Exam    Nursing note and vitals reviewed.    Physical Exam:  CONSTITUTIONAL: Well developed, well  nourished, in no acute distress, calm  HENT: Normocephalic, atraumatic    EYES: Sclerae anicteric   NECK: Supple  RESPIRATORY: Breathing comfortably. Speaking in full sentences   NEUROLOGIC: Alert, interacting normally. No facial droop.   MSK: Moving all four extremities.  Skin: Right leg over distal shin has a 3 x 3 cm hematoma with some fluctuance with no surrounding erythema. No pulsation. No signs of tenderness or bony tenderness.   Psych: Mood and affect normal.             ED Course   Procedures  Labs Reviewed - No data to display       Imaging Results    None          Medical Decision Making:   History:   Old Medical Records: I decided to obtain old medical records.    85 y/o Female with minor trauma to shin with hematoma formation. Patient is ambulatory. No significant pain. Not consistent with fracture. No active bleeding. No symptoms prior to minor leg trauma. No fever. Non consistent with abscess. At this time, risk of unroofing hematoma risks outweighs benefits. Recommend Tylenol for symptom control and cold compresses and return instruction for increased swelling, pulsation, redness, or worsening symptoms. No signs of other dangerous bleeding or trauma.             Scribe Attestation:   Scribe #1: I performed the above scribed service and the documentation accurately describes the services I performed. I attest to the accuracy of the note.    Attending Attestation:           Physician Attestation for Scribe:  Physician Attestation Statement for Scribe #1: I, Dr. Roche, reviewed documentation, as scribed by Xiomy Hansen in my presence, and it is both accurate and complete.                    Clinical Impression:     1. Hematoma                                   Sivakumar Roche MD  10/24/19 4667

## 2019-11-09 NOTE — PROGRESS NOTES
History of present illness:  84-year-old female I follow for chronic pain due to osteoarthritis.  She remains on hydrocodone.  She takes 1-2 daily.  She has not been getting the prescriptions filled early.  She also has a history of gout.  She is on allopurinol 150 mg daily.  She has been taking it regularly.  Her last uric acid level was 5.7.  She takes prednisone when she feels like a gout attack is coming on, she does not take it on a regular basis.  She has had problems with increased inflammatory markers in the past.  She was seen by  during my absence.  Her inflammatory markers had increased.  There was concern that she might have PMR.  She was told to take prednisone on a regular basis but she still continues to take it only when she has problems.    Her symptoms are doing about the same.  She has had no recent gout attacks.  She did develop a hematoma on the right shin.  She was seen in urgent care.  She has been using heat on the area.  It is getting smaller.  She has had no other recent medical problems.    Physical examination was not performed, the entire time was counseling.    Assessment:  Chronic pain syndrome    Plans:  Laboratory studies ordered.  Continue medications as before.  Return to see me in 4 months.

## 2019-11-12 NOTE — TELEPHONE ENCOUNTER
----- Message from Richardson Macdonald MD sent at 11/12/2019  9:17 AM CST -----  Inflammation test having improved but are still elevated.  The uric acid is where it is supposed to be.  The kidney function looks a little better.

## 2019-11-14 NOTE — PATIENT INSTRUCTIONS
"  [facility]  Nurse Care Line Available For 24/7 Assistance    This service is free and available by calling [phone] or [phone].    [facility] provides appointment booking, health education and advisory services 24 hours a day, seven days a week. Specially trained registered nurses are available to discuss your health care concerns, to help you decide if your symptoms require going to the emergency room for assessment or a visit to a physician and to recommend appropriate self-care techniques.      Bladder Infection, Female (Adult)    Urine is normally doesn't have any bacteria in it. But bacteria can get into the urinary tract from the skin around the rectum. Or they can travel in the blood from elsewhere in the body. Once they are in your urinary tract, they can cause infection in the urethra (urethritis), the bladder (cystitis), or the kidneys (pyelonephritis).  The most common place for an infection is in the bladder. This is called a bladder infection. This is one of the most common infections in women. Most bladder infections are easily treated. They are not serious unless the infection spreads to the kidney.  The phrases "bladder infection," "UTI," and "cystitis" are often used to describe the same thing. But they are not always the same. Cystitis is an inflammation of the bladder. The most common cause of cystitis is an infection.  Symptoms  The infection causes inflammation in the urethra and bladder. This causes many of the symptoms. The most common symptoms of a bladder infection are:  · Pain or burning when urinating  · Having to urinate more often than usual  · Urgent need to urinate  · Only a small amount of urine comes out  · Blood in urine  · Abdominal discomfort. This is usually in the lower abdomen above the pubic bone.  · Cloudy urine  · Strong- or bad-smelling urine  · Unable to urinate (urinary retention)  · Unable to hold urine in (urinary incontinence)  · Fever  · Loss of " appetite  · Confusion (in older adults)  Causes  Bladder infections are not contagious. You can't get one from someone else, from a toilet seat, or from sharing a bath.  The most common cause of bladder infections is bacteria from the bowels. The bacteria get onto the skin around the opening of the urethra. From there, they can get into the urine and travel up to the bladder, causing inflammation and infection. This usually happens because of:  · Wiping improperly after urinating. Always wipe from front to back.  · Bowel incontinence  · Pregnancy  · Procedures such as having a catheter inserted  · Older age  · Not emptying your bladder. This can allow bacteria a chance to grow in your urine.  · Dehydration  · Constipation  · Sex  · Use of a diaphragm for birth control   Treatment  Bladder infections are diagnosed by a urine test. They are treated with antibiotics and usually clear up quickly without complications. Treatment helps prevent a more serious kidney infection.  Medicines  Medicines can help in the treatment of a bladder infection:  · Take antibiotics until they are used up, even if you feel better. It is important to finish them to make sure the infection has cleared.  · You can use acetaminophen or ibuprofen for pain, fever, or discomfort, unless another medicine was prescribed. If you have chronic liver or kidney disease, talk with your healthcare provider before using these medicines. Also talk with your provider if you've ever had a stomach ulcer or gastrointestinal bleeding, or are taking blood-thinner medicines.  · If you are given phenazopydridine to reduce burning with urination, it will cause your urine to become a bright orange color. This can stain clothing.  Care and prevention  These self-care steps can help prevent future infections:  · Drink plenty of fluids to prevent dehydration and flush out your bladder. Do this unless you must restrict fluids for other health reasons, or your doctor  told you not to.  · Proper cleaning after going to the bathroom is important. Wipe from front to back after using the toilet to prevent the spread of bacteria.  · Urinate more often. Don't try to hold urine in for a long time.  · Wear loose-fitting clothes and cotton underwear. Avoid tight-fitting pants.  · Improve your diet and prevent constipation. Eat more fresh fruit and vegetables, and fiber, and less junk and fatty foods.  · Avoid sex until your symptoms are gone.  · Avoid caffeine, alcohol, and spicy foods. These can irritate your bladder.  · Urinate right after intercourse to flush out your bladder.  · If you use birth control pills and have frequent bladder infections, discuss it with your doctor.  Follow-up care  Call your healthcare provider if all symptoms are not gone after 3 days of treatment. This is especially important if you have repeat infections.  If a culture was done, you will be told if your treatment needs to be changed. If directed, you can call to find out the results.  If X-rays were done, you will be told if the results will affect your treatment.  Call 911  Call 911 if any of the following occur:  · Trouble breathing  · Hard to wake up or confusion  · Fainting or loss of consciousness  · Rapid heart rate  When to seek medical advice  Call your healthcare provider right away if any of these occur:  · Fever of 100.4ºF (38.0ºC) or higher, or as directed by your healthcare provider  · Symptoms are not better by the third day of treatment  · Back or belly (abdominal) pain that gets worse  · Repeated vomiting, or unable to keep medicine down  · Weakness or dizziness  · Vaginal discharge  · Pain, redness, or swelling in the outer vaginal area (labia)  Date Last Reviewed: 10/1/2016  © 6034-1953 [employer]. [address] [place] [address], [place] [address]. All rights reserved. This information is not intended as a substitute for professional medical care. Always follow your healthcare  professional's instructions.        Hematoma  A hematoma is a collection of blood trapped outside of a blood vessel. It is what we think of as a bruise or a contusion. It is usually seen under the skin as a black and blue spot on your arm or leg, or a bump on your head after an injury. It can be almost anywhere on or in your body. It can also occur in an internal organ where it can be more serious.  A hematoma is caused by an injury with damage to small blood vessels. This causes blood to leak into the tissues. Blood forms a pocket under the skin that swells and looks like a purplish patch.  Gradually the blood in the hematoma is absorbed back into the body. The swelling and pain of the hematoma will go away. This takes from 1 to 4 weeks, depending on the size of the hematoma. The skin over the hematoma may turn bluish then brown and yellow as the blood is dissolved and absorbed. Usually, this only takes a couple of weeks but can last months.  Home care  · Limit motion of the joints near the hematoma. If the hematoma is large and painful, avoid sports and other vigorous physical activity until the swelling and pain goes away.  · Apply an ice pack (ice cubes in a plastic bag, wrapped in a thin towel or a frozen bag of peas) over the injured area for 20 minutes every 1 to 2 hours the first day. Continue with ice packs 3 to 4 times a day for the next 2 days. Continue the use of ice packs for relief of pain and swelling as needed.  · If you need anything for pain, you can take acetaminophen, unless you were given a different pain medicine to use. Talk with your doctor before using this medicine if you have chronic liver or kidney disease. Also talk with your doctor if you have had a stomach ulcer or digestive tract bleeding, or are taking blood-thinner medicines.  Follow-up care  Follow up with your healthcare provider, or as advised. If X-rays or a CT scan were done, you will be notified if there is a change in the  reading, especially if it affects treatment.  When to seek medical advice  Call your healthcare provider right away f any of the following occur:  · Redness around the hematoma  · Increase in pain or warmth in the hematoma  · Increase in size of the hematoma  · Fever of 100.4ºF (38ºC) or higher, or as directed by your healthcare provider  · If the hematoma is on the arm or leg, watch for:  ¨ Increased swelling or pain in the extremity  ¨ Numbness or tingling or blue color of the hand or foot  Date Last Reviewed: 11/5/2015  © 2142-0589 [employer]. [address] [place] [address]. All rights reserved. This information is not intended as a substitute for professional medical care. Always follow your healthcare professional's instructions.

## 2019-11-14 NOTE — PROGRESS NOTES
Subjective:       Patient ID: Dacia Martínez is a 84 y.o. female.    Chief Complaint: Cough and Urinary Tract Infection    HPI  80 y/o woman with h/o DM2, HTN, HLD, atherosclerosis, aortic stenosis, gout, chronic rhinitis/sinusitis, h/o breast cancer (s/p treatment ~2000, no recurrence), and other medical conditions here for urgent visit for possible UTI.  Now following with Dr Corral for primary care.    Concern for UTI - frequency, maybe dysuria, bladder pressure. Started sometime in October. No hematuria.  No nausea, abdominal pain, or new back pain. No discharge or change in odor noted. No noticeable changes in memory or awareness    Leg pain / injury, bump on leg - seen in ER 10/23/19 for hematoma on shin after hitting leg on edge of a tablet  Not painful but sometimes a little tender. Feels this has been steadily improving.    Cough, mild congestion / postnasal drip recently. No fever, hemoptysis, or weight loss.     Review of Systems   Constitutional: Negative for activity change, fever and unexpected weight change.   HENT: Positive for congestion, postnasal drip, rhinorrhea and sinus pressure. Negative for ear pain, sinus pain and sore throat.    Eyes: Negative for visual disturbance.   Respiratory: Positive for cough. Negative for shortness of breath.    Cardiovascular: Negative for chest pain and leg swelling.   Gastrointestinal: Negative for abdominal pain, blood in stool and nausea.   Genitourinary: Positive for dysuria and frequency. Negative for difficulty urinating and pelvic pain.   Musculoskeletal: Negative for back pain (no new pain), gait problem and joint swelling.   Skin: Negative for rash.   Neurological: Negative for weakness and numbness.   Hematological: Bruises/bleeds easily (on eliquis).   Psychiatric/Behavioral: Negative for dysphoric mood. The patient is not nervous/anxious.         Mood appears good during visit.         Past medical history, surgical history, and family medical history  "reviewed and updated as appropriate.    Medications and allergies reviewed.     Objective:          Vitals:    11/14/19 0932   BP: 130/74   BP Location: Left arm   Patient Position: Sitting   BP Method: Medium (Manual)   Pulse: 71   Temp: 97.7 °F (36.5 °C)   SpO2: 99%   Weight: 75.8 kg (167 lb 1.7 oz)   Height: 5' 1" (1.549 m)     Body mass index is 31.57 kg/m².  Physical Exam   Constitutional: She appears well-developed and well-nourished. No distress.   HENT:   Head: Normocephalic and atraumatic.   Nose: Mucosal edema (and erythema of nasal turbinates) present.   Mouth/Throat: Posterior oropharyngeal erythema (mild erythema) present. No oropharyngeal exudate.   Eyes: Pupils are equal, round, and reactive to light. Conjunctivae and EOM are normal. No scleral icterus.   Neck: Neck supple.   Cardiovascular: Normal rate, regular rhythm and normal heart sounds.   No murmur heard.  Pulmonary/Chest: Effort normal and breath sounds normal. No respiratory distress.   Abdominal: Soft. Bowel sounds are normal. She exhibits no distension. There is no tenderness.   Musculoskeletal: She exhibits tenderness (mild tenderness appropriately over hematoma). She exhibits no edema.   Lymphadenopathy:     She has no cervical adenopathy.   Neurological: She is alert. No cranial nerve deficit. Gait normal.   Oriented to name, situation; knows family. Difficulty with short-term memory somewhat.    Skin: Skin is warm and dry. No rash noted. No erythema.   Psychiatric: She has a normal mood and affect.   Vitals reviewed.            Lab Results   Component Value Date    WBC 9.07 11/11/2019    HGB 11.4 (L) 11/11/2019    HCT 36.8 (L) 11/11/2019     11/11/2019    CHOL 149 11/05/2018    TRIG 74 11/05/2018    HDL 26 (L) 11/05/2018    ALT 8 (L) 11/11/2019    AST 13 11/11/2019     (H) 11/11/2019    K 3.5 11/11/2019     11/11/2019    CREATININE 1.7 (H) 11/11/2019    BUN 37 (H) 11/11/2019    CO2 27 11/11/2019    TSH 6.055 (H) " 04/16/2019    INR 1.1 12/03/2018    HGBA1C 6.0 (H) 11/04/2018       Assessment:       1. Suspected UTI    2. Hematoma of lower leg    3. Sinusitis, unspecified chronicity, unspecified location    4. Essential hypertension    5. Chronic systolic congestive heart failure    6. Chronic atrial fibrillation    7. Other hyperlipidemia    8. Moderate aortic stenosis    9. Seasonal allergic rhinitis, unspecified trigger    10. Hyperparathyroidism    11. Stage 4 chronic kidney disease        Plan:   Dacia was seen today for cough and urinary tract infection.    Diagnoses and all orders for this visit:    Suspected UTI  -     POCT urine dipstick without microscope  -     Urine culture  -     amoxicillin-clavulanate 500-125mg (AUGMENTIN) 500-125 mg Tab; Take 1 tablet (500 mg total) by mouth 2 (two) times daily To treat urinary tract infection for 7 days  POC UA consistent with infection; will treat with antibiotics (avoiding nitrofurantoin, bactrim due to age, risk of hyperkalemia or other problems) - instructed to contact clinic if symptoms continue  Counseled re: how to avoid bladder infections in the future    Hematoma of lower leg - improving in comparison with previous photos    Seasonal allergic rhinitis, unspecified trigger  Sinusitis, unspecified chronicity, unspecified location  Comments:  mild, no indication currently for antibiotic. Counseled re: home care with saline rinse, steroid nasal spray, antihistamine. RTC if worsening or no improvement    Essential hypertension  Chronic systolic congestive heart failure  Chronic atrial fibrillation  Other hyperlipidemia  Moderate aortic stenosis  BP at / near goal tonight. No changes to medications today. Follow up with cardiology as planned    Hyperparathyroidism - reviewed, recommended f/u with PCP on this as well as schedule visit with Endocrinology    Stage 4 chronic kidney disease - improved on recent labs, reviewed in detail, answered all question    Health  maintenance reviewed with patient.   They will consider shingles vaccine  TDaP generally not covered by insurance - she defers this for now    Follow up in about 2 months (around 1/14/2020) for follow up with PCP.    Gómez Jimenez MD  Internal Medicine  [facility]  11/14/2019

## 2019-11-15 NOTE — TELEPHONE ENCOUNTER
Try calling pt daughter, no answer called pt, pt stated they will come by Monday and pick the urine test up.

## 2019-11-15 NOTE — TELEPHONE ENCOUNTER
----- Message from Aman Vizcarra sent at 11/15/2019  4:51 PM CST -----  Contact: Self [phone]  Patient is returning a phone call.  Who left a message for the patient: Dipti Holliday  Does patient know what this is regarding:unknown    Comments:

## 2019-11-15 NOTE — TELEPHONE ENCOUNTER
UCx was not sent yesterday  New order for home collect UCx placed - please contact patient (via her daughter) to see if they can pick this up for her to do at home, then return it to the lab.  Ideally this would be before she starts taking the antibiotic, but if not, I would still like her to have it done.

## 2019-11-18 PROBLEM — R60.0 EDEMA OF UPPER EXTREMITY: Status: RESOLVED | Noted: 2018-11-05 | Resolved: 2019-01-01

## 2019-11-18 PROBLEM — N18.4 STAGE 4 CHRONIC KIDNEY DISEASE: Status: ACTIVE | Noted: 2019-01-01

## 2019-11-18 PROBLEM — R55 SYNCOPE AND COLLAPSE: Status: RESOLVED | Noted: 2018-11-06 | Resolved: 2019-01-01

## 2019-11-18 PROBLEM — R06.02 SHORTNESS OF BREATH: Status: RESOLVED | Noted: 2017-12-15 | Resolved: 2019-01-01

## 2019-11-18 NOTE — TELEPHONE ENCOUNTER
----- Message from Mable Wiley sent at 11/18/2019  2:43 PM CST -----  Contact: patient [phone]  Patient would like to get test results  Name of test  u/a  Date of test: 11/18/19    Ordering provider:    Where was the test performed:  Primary care  Would the patient rather a call back or a response via [facility]?:  call    Comments:  Patient is concerned because she did not have much urine in the specimen container.

## 2019-11-19 NOTE — TELEPHONE ENCOUNTER
The lab she had done was the urine culture, not urinalysis -- this will take a few days to come back. Have not gotten a notice that specimen was inadequate.   Please check to see if her UTI symptoms have improved with the antibiotics.

## 2019-11-20 NOTE — TELEPHONE ENCOUNTER
----- Message from Gómez Jimenez MD sent at 11/20/2019  1:31 AM CST -----  Please let patient know that urine specimen was likely contaminated by bacteria from the skin, but doesn't clearly show a bacteria causing UTI.  Check to see if her symptoms have improved with the antibiotics so far.

## 2019-11-20 NOTE — TELEPHONE ENCOUNTER
Pt informed. She states that she is feeling much better. She will complete her antibiotic tomorrow. She will increase fluid intake.

## 2019-11-26 NOTE — PATIENT INSTRUCTIONS
Counseling and Referral of Other Preventative  (Italic type indicates deductible and co-insurance are waived)    Patient Name: Dacia Martínez  Today's Date: 11/26/2019    Health Maintenance       Date Due Completion Date    TETANUS VACCINE 06/19/1953 ---    Sign Pain Contract 06/19/1953 ---    Complete Opioid Risk Tool 06/19/1954 ---    Shingles Vaccine (1 of 2) 06/19/1986 ---    Hemoglobin A1c 05/04/2019 11/4/2018    Lipid Panel 11/05/2019 11/5/2018    Foot Exam 02/21/2020 2/21/2019    Eye Exam 09/12/2020 9/12/2019    DEXA SCAN 04/22/2021 4/22/2019    Override on 3/21/2006: Done        No orders of the defined types were placed in this encounter.    The following information is provided to all patients.  This information is to help you find resources for any of the problems found today that may be affecting your health:                Living healthy guide: www.[facility].[place].[facility]      Understanding Diabetes: www.diabetes.org      Eating healthy: www.cdc.gov/healthyweight      CDC home safety checklist: www.cdc.gov/steadi/patient.html      Agency on Aging: www.goea.[place].[facility]      Alcoholics anonymous (AA): www.aa.org      Physical Activity: www.marybel.nih.gov/of1tkjg      Tobacco use: www.quitwithusla.org

## 2019-11-27 NOTE — TELEPHONE ENCOUNTER
----- Message from Mable Kruse sent at 11/27/2019  8:33 AM CST -----  Contact: self/[phone]  Pt called in regards to getting a Rx due to having a yeast infection.    [facility]  Please advise

## 2019-11-27 NOTE — TELEPHONE ENCOUNTER
Called and spoke with Mrs Martínez and she states that she is having a yeast infection and needs something for it.    Thanks.    Demetria

## 2019-11-27 NOTE — PROGRESS NOTES
"Dacia Martínez presented for a  Medicare AWV and comprehensive Health Risk Assessment today. The following components were reviewed and updated:    · Medical history  · Family History  · Social history  · Allergies and Current Medications  · Health Risk Assessment  · Health Maintenance  · Care Team     ** See Completed Assessments for Annual Wellness Visit within the encounter summary.**       The following assessments were completed:  · Living Situation  · CAGE  · Depression Screening  · Timed Get Up and Go  · Whisper Test  · Cognitive Function Screening*cognitive impairment  · Nutrition Screening  · ADL Screening  · PAQ Screening    Vitals:    11/26/19 0752   BP: 134/82   Pulse: 70   SpO2: 98%   Weight: 74.9 kg (165 lb 2 oz)   Height: 5' 1" (1.549 m)     Body mass index is 31.2 kg/m².  Physical Exam   Constitutional: She is oriented to person, place, and time. She appears well-developed.   obese   HENT:   Head: Normocephalic and atraumatic.   Nose: Nose normal.   Eyes: Conjunctivae and EOM are normal.   Cardiovascular: Normal rate, regular rhythm, normal heart sounds and intact distal pulses.   No murmur heard.  Pulmonary/Chest: Effort normal and breath sounds normal.   Musculoskeletal: Normal range of motion.   Neurological: She is alert and oriented to person, place, and time.   Skin: Skin is warm and dry.   Psychiatric: She has a normal mood and affect. Her behavior is normal. Judgment and thought content normal.   Nursing note and vitals reviewed.        Diagnoses and health risks identified today and associated recommendations/orders:    1. Encounter for preventive health examination  Assessment performed. Health maintenance updated. Chart review completed.      2. Cat scratch  - (In Office Administered) Td Vaccine - Preservative Free    3. Vaginal yeast infection  Recent antibiotic use.  Requests refill.  - fluconazole (DIFLUCAN) 150 MG Tab; Take 1 tablet by mouth once and then repeat dose with 1 tablet by mouth " again 3 days later.  Dispense: 2 tablet; Refill: 0    4. Mild cognitive impairment  Chronic.  Stable.  Followed by PCP.    5. 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  Stable with current medication regimen.  Followed by Endocrinology    Provided Dacia with a 5-10 year written screening schedule and personal prevention plan. Recommendations were developed using the USPSTF age appropriate recommendations. Education, counseling, and referrals were provided as needed. After Visit Summary printed and given to patient which includes a list of additional screenings\tests needed.    Follow up for Annual Wellness Visit in 1 year, ;sooner if problems.    ROSSY Cates

## 2019-11-27 NOTE — TELEPHONE ENCOUNTER
Per chart review patient was prescribed fluconazole 150 mg take 1 tablet by mouth once then repeat 1 tablet again 3 days later by a Lilia Flores NP and sent to [facility].  Recommend patient take this for her yeast infection and if no improvement then would recommend follow up with OBGYN.  Please notify patient.

## 2019-12-12 NOTE — PROGRESS NOTES
Subjective:      Patient ID: Dacia Martínez is a 84 y.o. female.    Chief Complaint:  Follow-up    History of Present Illness  Dacia Martínez presents today for follow up of Type 2 DM, hypothyroidism, osteopenia, and dyslipidemia.  Previous patient of Dr. Horner. Last seen in 4/25/19. This is her first visit with me.     With regards to the diabetes:    Diagnosed ~ 2000  Family History of Type 2 Dm in daughter, mother, and father  Known complications:  DKA/HHS- none  RN- has; up to date  PN- has; tolerable on gabapentin 300 mg as needed   Nephropathy- sees nephro due for appt   CAD- none    Current regimen:  Januvia 25 mg daily    Missed doses? No    Other medications tried:  Trulicity for about 3 years but caused too much weight loss. Notes that her weight loss has subsided since the discontinuation of trulicity.  She is taking Januvia as prescribed without any issues.    # times a day testing- checking BG twice weekly   Log reviewed: none Oral recall unknown- as her granddaughter sticks her finger and does not tell her BG  Hypoglycemic event? None   Yes, did not need assistance   Knows how to correct with 15 grams of carbs- juice, coke, or a peppermint.     She states that she is not following DM diet. She lives with grandkids. She is still cooking.   Dietary recall:  Eats 2 meals daily.   B: sometimes-consists of grits or egg sandwich  L: skipped  D: pork chop with corn with rice.  Snacks: states that snacking is her problem-she loves cookies.   Drinks: water     Exercise - around the house, still able to complete ADLs. She is not using cane or walker at home.      Education - last visit: has been in past, does not wish go again     Has a Medic alert tag? Does not have    Diabetes Management Status  Statin: Taking  ACE/ARB: Not taking  She hit her right lower leg in Nov 2019 and followed up in ED and PCP for hematoma. Has a cold today and wants cough medication    Lab Results   Component Value Date    HGBA1C 6.0 (H)  11/04/2018    HGBA1C 6.2 (H) 08/01/2018    HGBA1C 6.0 (H) 05/08/2018     Screening or Prevention Patient's value Goal Complete/Controlled?   HgA1C Testing and Control   Lab Results   Component Value Date    HGBA1C 6.0 (H) 11/04/2018      Annually/Less than 8% No   Lipid profile : 11/05/2018 Annually No   LDL control Lab Results   Component Value Date    LDLCALC 108.2 11/05/2018    Annually/Less than 100 mg/dl  No   Nephropathy screening Lab Results   Component Value Date    LABMICR 184.0 07/10/2017     Lab Results   Component Value Date    PROTEINUA 2+ (A) 11/04/2018    Annually No   Blood pressure BP Readings from Last 1 Encounters:   12/16/19 115/60    Less than 140/90 Yes   Dilated retinal exam : 09/12/2019 Annually Yes   Foot exam   : 02/21/2019 Annually Yes       With regards to her hypothyroidism:    Currentmedication:  Generic LT4 25 mcg daily    Takes thyroid medication on an empty stomach and waits 30-45 minutes before eating drinking or taking other medications  Missed doses: none    current symptoms:     [] weight gain  [] Fatigue  [x] Constipation           [] Hair loss  [] Brittle nails   [] Mental fog [] Chest pain, palpations, or sob   [] Heat/cold intolerance  [] Denies complaints    Family history of thyroid disease: none     Ref. Range 11/5/2018 03:44 11/5/2018 04:00 4/16/2019 10:00   TSH Latest Ref Range: 0.400 - 4.000 uIU/mL 12.478 (H)  6.055 (H)   Free T4 Latest Ref Range: 0.71 - 1.51 ng/dL 0.93  0.88   Thyroperoxidase Antibodies Latest Ref Range: <6.0 IU/mL  <6.0        With regards to dyslipidemia,     She is on atorvastatin 10 mg daily     Ref. Range 11/5/2018 03:44   Cholesterol Latest Ref Range: 120 - 199 mg/dL 149   HDL Latest Ref Range: 40 - 75 mg/dL 26 (L)   Hdl/Cholesterol Ratio Latest Ref Range: 20.0 - 50.0 % 17.4 (L)   LDL Cholesterol External Latest Ref Range: 63.0 - 159.0 mg/dL 108.2   Non-HDL Cholesterol Latest Units: mg/dL 123   Total Cholesterol/HDL Ratio Latest Ref Range: 2.0 -  5.0  5.7 (H)   Triglycerides Latest Ref Range: 30 - 150 mg/dL 74     With regards to osteopenia,    No recent falls  Vit D 2000 IU daily  Not on oral calcium but reports she has in diet.   No fractures     DXA 4/22/19  FINDINGS:  Lumbar Spine: Lumbar bone mineral density L1-L4 is 0.979g/cm2, which is a T-score of -0.6. The Z-score is 1.5.  Total Hip: Total hip bone mineral density is 0.721g/cm2.  The T-score is -1.8, and the Z-score is -0.3.  Femoral neck: Bone mineral density is 0.665g/cm2 and the T-score is -1.7 and the Z-score is -0.1 g/cm2.  There is a 9.4% risk of a major osteoporotic fracture and a 2.7% risk of hip fracture in the next 10 years (FRAX).  Compared with previous DXA, BMD at the lumbar spine has declined, and the BMD at the total hip has declined.      Impression     Osteopenia of the femoral neck and total hip.  There is a significant decline in BMD at the total hip (-9.8%) and lumbar spine (-4.0%) when compared to previous study done in 2017.  FRAX calculation, not adjusted for TBS, does not support treatment for osteoporosis.  RECOMMENDATIONS of [facility]:  1.  Calcium 4464-0554 mg daily and vitamin D 800-1000 units daily, adequate exercise.  2.  Recommended therapy Glucocorticoids may adversely affect quality and quantity of bone.  Consider bisphosphonate if patient requires prednisone 2.5 mg or greater for 3 months or more.  3.  Repeat BMD in 2 years     Review of Systems   Constitutional: Negative for fatigue.   Eyes: Negative for visual disturbance.   Respiratory: Negative for shortness of breath.    Cardiovascular: Negative for chest pain.   Gastrointestinal: Negative for abdominal pain.   Musculoskeletal: Negative for arthralgias.   Skin: Negative for wound.   Neurological: Negative for headaches.   Hematological: Does not bruise/bleed easily.   Psychiatric/Behavioral: Negative for sleep disturbance.       Objective:   Physical Exam  "  Constitutional: She appears well-developed.   Neck: No thyromegaly present.   Cardiovascular: Normal rate.   Murmur heard.  Pulmonary/Chest: Effort normal. She has wheezes.   Abdominal: Soft.   Vitals reviewed.  Diabetes Foot Exam:   Denies sores or lesions on bilateral feet  Shoes appropriate  DM foot exam deferred, done in 2/21/19  Right lower leg hematoma without edema or erythema and surrounded by xerosis    Visit Vitals  /60   Ht 5' 1" (1.549 m)   Wt 75.8 kg (167 lb)   LMP  (LMP Unknown)   BMI 31.55 kg/m²        Lab Review:   Lab Results   Component Value Date    HGBA1C 6.0 (H) 11/04/2018    HGBA1C 6.2 (H) 08/01/2018    HGBA1C 6.0 (H) 05/08/2018      Lab Results   Component Value Date    CHOL 149 11/05/2018    HDL 26 (L) 11/05/2018    LDLCALC 108.2 11/05/2018    TRIG 74 11/05/2018    CHOLHDL 17.4 (L) 11/05/2018     Lab Results   Component Value Date     (H) 11/11/2019    K 3.5 11/11/2019     11/11/2019    CO2 27 11/11/2019     (H) 11/11/2019    BUN 37 (H) 11/11/2019    CREATININE 1.7 (H) 11/11/2019    CALCIUM 8.5 (L) 11/11/2019    PROT 7.3 11/11/2019    ALBUMIN 3.3 (L) 11/11/2019    BILITOT 0.7 11/11/2019    ALKPHOS 62 11/11/2019    AST 13 11/11/2019    ALT 8 (L) 11/11/2019    ANIONGAP 11 11/11/2019    ESTGFRAFRICA 32 (A) 11/11/2019    EGFRNONAA 27 (A) 11/11/2019    TSH 6.055 (H) 04/16/2019     Vit D, 25-Hydroxy   Date Value Ref Range Status   04/16/2019 14 (L) 30 - 96 ng/mL Final     Comment:     Vitamin D deficiency.........<10 ng/mL                              Vitamin D insufficiency......10-29 ng/mL       Vitamin D sufficiency........> or equal to 30 ng/mL  Vitamin D toxicity............>100 ng/mL       Assessment and Plan     1. Subclinical hypothyroidism  TSH   2. Type 2 diabetes mellitus with stage 3 chronic kidney disease, without long-term current use of insulin  Hemoglobin A1c   3. 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 BMI 31.0-31.9,adult     5. Idiopathic chronic gout of multiple sites without tophus     6. CKD (chronic kidney disease) stage 3, GFR 30-59 ml/min     7. Hypovitaminosis D     8. Osteopenia of multiple sites     9. Hypothyroidism, unspecified type         Type 2 diabetes mellitus with stage 3 chronic kidney disease, without long-term current use of insulin  Last a1c - 6.0% (Goal A1C <7.5%). Can be low secondary to AoCD.  Patient denies hypoglycemia.   Reviewed goals of therapy are to get the best control we can without hypoglycemia.       Patient not a candidate for (GFR 32-40):  SGLT2i  Metformin  Sulfonyurea     No interested in trulicity - previous negative effect (significant weight loss >60lbs).     Medication changes:   Continue Januvia 25mg daily  Given BG log to record BGs prior to next appt  Continue gabapentin 300mg PRN - nightly, but up to three times daily   Discussed SE profile.  Recommended night time dosing given common reports of drowsiness. May cause vivid dreams. Caution with titration given renal function.     Advised frequent self blood glucose monitoring.    Patient encouraged to document glucose results and bring them to every clinic visit.  BG logs provided to patient.     Hypoglycemia precautions discussed. Instructed on precautions before driving.       Periodic follow ups for eye evaluations, foot care and dental care suggested.     Eye exam UTD.  Defer vaccinations to PCP.   Keep follow up with cardiology.   Due for nephro appt-encouraged to make appt  Follow up with PCP for hematoma  Follow up with PCP or urgent care for cough/cold symptoms  Check A1c       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, without long-term current use of insulin  See above    Hypothyroidism  -- Negative antibodies  Continue LT4 25mcg daily.    Recommend  repeating labs today.     Goal is TSH <10, given that elevated TSH can be normal in elderly patients.   Avoid exogenous hyperthyroidism as this can accelerate bone loss and increase risk of CV complications.     Reviewed usual  times of thyroid hormone changes - call if significant weight changes     Reviewed that symptoms of hypothyroidism may not correlate with tsh, and a normal TSH is the goal of therapy.  Educated patient that symptoms are not a justification for over treatment.  Received verbal acknowledgement from patient.    BMI 31.0-31.9,adult  Body mass index is 31.55 kg/m².  Weight gain since discontinuation of Trulicity  Encouraged low carb snacks        Idiopathic chronic gout of multiple sites without tophus  On PDN, may increase BG.  Patient notes that she does not take this as prescribed.  Only as needed.      CKD (chronic kidney disease) stage 3, GFR 30-59 ml/min  GFR 32.  Minimizes available options for anti-hyperglycemic medications.  Januvia is renally dosed.  Recommend follow up with neprhologist    Hypovitaminosis D  Continue Vitamin D3 2000 iU daily.      Osteopenia of multiple sites  Recommend Calcium 9312-0482 mg daily-preferred sources from food   Recommend Vitamin D3 2000 iU daily.    Repeat DXA due 4/2021         Follow up in about 6 months (around 6/16/2020).    Case discussed with Dr. Hinojosa  Recommendations were discussed with the patient in detail  The patient verbalized understanding and agrees with the plan outlined as above.

## 2019-12-14 NOTE — TELEPHONE ENCOUNTER
----- Message from Meera Daniels sent at 12/13/2019  8:49 AM CST -----  Contact: pt  Pt would like to be called back regarding her cold would like a rx - has a appt Monday    Pt can be reached at-[phone]

## 2019-12-16 NOTE — TELEPHONE ENCOUNTER
----- Message from Sofia Donahue MA sent at 12/16/2019  8:05 AM CST -----  Contact: [phone] (daughter) carlton   Pt would like to know if she can be seen today . She is experiencing terrible problems with her sinuses . Pt. is on her way here today for another appt.     [phone] (daughter) carlton

## 2019-12-16 NOTE — ASSESSMENT & PLAN NOTE
-- Negative antibodies  Continue LT4 25mcg daily.    Recommend repeating labs today.     Goal is TSH <10, given that elevated TSH can be normal in elderly patients.   Avoid exogenous hyperthyroidism as this can accelerate bone loss and increase risk of CV complications.     Reviewed usual  times of thyroid hormone changes - call if significant weight changes     Reviewed that symptoms of hypothyroidism may not correlate with tsh, and a normal TSH is the goal of therapy.  Educated patient that symptoms are not a justification for over treatment.  Received verbal acknowledgement from patient.

## 2019-12-16 NOTE — ASSESSMENT & PLAN NOTE
On PDN, may increase BG.  Patient notes that she does not take this as prescribed.  Only as needed.

## 2019-12-16 NOTE — ASSESSMENT & PLAN NOTE
Recommend Calcium 4901-0775 mg daily-preferred sources from food   Recommend Vitamin D3 2000 iU daily.    Repeat DXA due 4/2021

## 2019-12-16 NOTE — ASSESSMENT & PLAN NOTE
Last a1c - 6.0% (Goal A1C <7.5%). Can be low secondary to AoCD.  Patient denies hypoglycemia.   Reviewed goals of therapy are to get the best control we can without hypoglycemia.       Patient not a candidate for (GFR 32-40):  SGLT2i  Metformin  Sulfonyurea     No interested in trulicity - previous negative effect (significant weight loss >60lbs).     Medication changes:   Continue Januvia 25mg daily  Given BG log to record BGs prior to next appt  Continue gabapentin 300mg PRN - nightly, but up to three times daily   Discussed SE profile.  Recommended night time dosing given common reports of drowsiness. May cause vivid dreams. Caution with titration given renal function.     Advised frequent self blood glucose monitoring.    Patient encouraged to document glucose results and bring them to every clinic visit.  BG logs provided to patient.     Hypoglycemia precautions discussed. Instructed on precautions before driving.       Periodic follow ups for eye evaluations, foot care and dental care suggested.     Eye exam UTD.  Defer vaccinations to PCP.   Keep follow up with cardiology.   Due for nephro appt-encouraged to make appt  Follow up with PCP for hematoma  Follow up with PCP or urgent care for cough/cold symptoms  Check A1c

## 2019-12-16 NOTE — ASSESSMENT & PLAN NOTE
Body mass index is 31.55 kg/m².  Weight gain since discontinuation of Trulicity  Encouraged low carb snacks

## 2019-12-16 NOTE — ASSESSMENT & PLAN NOTE
GFR 32.  Minimizes available options for anti-hyperglycemic medications.  Januvia is renally dosed.  Recommend follow up with neprhologist

## 2019-12-16 NOTE — PATIENT INSTRUCTIONS
Snacks can be an important part of a balanced, healthy meal plan. They allow you to eat more frequently, feeling full and satisfied throughout the day. Also, they allow you to spread carbohydrates evenly, which may stabilize blood sugars.  Plus, snacks are enjoyable!     The amount of carbohydrate needed at snacks varies. Generally, about 15-30 grams of carbohydrate per snack is recommended.  Below you will find some tasty treats.       0-5 gm carb   Crystal Light   Vitamin Water Zero   Herbal tea, unsweetened   2 tsp peanut butter on celery   1./2 cup sugar-free jell-o   1 sugar-free popsicle   ¼ cup blueberries   8oz Blue [place] unsweetened almond milk   5 baby carrots & celery sticks, cucumbers, bell peppers dipped in ¼ cup salsa, 2Tbsp light ranch dressing or 2Tbsp plain Greek yogurt   10 Goldfish crackers   ½ oz low-fat cheese or string cheese   1 closed handful of nuts, unsalted   1 Tbsp of sunflower seeds, unsalted   1 cup Smart Pop popcorn   1 whole grain brown rice cake        15 gm carb   1 small piece of fruit or ½ banana or 1/2 cup lite canned fruit   3 brigette cracker squares   3 cups Smart Pop popcorn, top spray butter, Patel lite salt or cinnamon and Truvia   5 Vanilla Wafers   ½ cup low fat, no added sugar ice cream or frozen yogurt (Blue bell, Blue Bunny, Weight Watchers, Skinny Cow)   ½ turkey, ham, or chicken sandwich   ½ c fruit with ½ c Cottage cheese   4-6 unsalted wheat crackers with 1 oz low fat cheese or 1 tbsp peanut butter    30-45 goldfish crackers (depending on flavor)    7-8 Religious mini brown rice cakes (caramel, apple cinnamon, chocolate)    12 Religious mini brown rice cakes (cheddar, bbq, ranch)    1/3 cup hummus dip with raw veg   1/2 whole wheat rc, 1Tbsp hummus   Mini Pizza (1/2 whole wheat English muffin, low-fat  cheese, tomato sauce)   100 calorie snack pack (Oreo, Chips Ahoy, Ritz Mix, Baked Cheetos)   4-6 oz. light or Greek Style yogurt  (Chobani, Yoplait, [place], [facility])   ½ cup sugar-free pudding     6 in. wheat tortilla or cr oven toasted chips (topped with spray butter flavoring, cinnamon, Truvia OR spray butter, garlic powder, chili powder)    18 BBQ Popchips (available at [employer], [employer], Fresh Market)     Estimated Calcium Content of Foods:  Produce  Serving Size Estimated Calcium*    Martha greens, frozen 8 oz 360 mg   Broccoli nguyễn 8 oz 200 mg   Kale, frozen 8 oz 180 mg   Soy Beans, green, boiled 8 oz 175 mg   Bok Mckinley, cooked, boiled 8 oz 160 mg   Figs, dried 2 figs 65 mg   Broccoli, fresh, cooked 8 oz 60 mg   Oranges 1 whole 55 mg   Seafood Serving Size Estimated Calcium*    Sardines, canned with bones 3 oz 325 mg   Keavy, canned with bones 3 oz 180 mg   Shrimp, canned 3 oz 125 mg   Dairy Serving Size Estimated Calcium*    Ricotta, part-skim 4 oz 335 mg   Yogurt, plain, low-fat 6 oz 310 mg   Milk, skim, low-fat, whole 8 oz 300 mg   Yogurt with fruit, low-fat 6 oz 260 mg   Mozzarella, part-skim 1 oz 210 mg   Cheddar 1 oz 205 mg   Yogurt, Greek 6 oz 200 mg   American Cheese 1 oz 195 mg   Feta Cheese 4 oz 140 mg   Cottage Cheese, 2% 4 oz 105 mg   Frozen yogurt, vanilla 8 oz 105 mg   Ice Cream, vanilla 8 oz 85 mg   Parmesan 1 tbsp 55 mg   Fortified Food Serving Size Estimated Calcium*   [place] milk, rice milk or soy milk, fortified 8 oz 300 mg   Orange juice and other fruit juices, fortified 8 oz 300 mg   Tofu, prepared with calcium 4 oz 205 mg   Waffle, frozen, fortified 2 pieces 200 mg   Oatmeal, fortified 1 packet 140 mg   English muffin, fortified 1 muffin 100 mg   Cereal, fortified 8 oz 100-1,000 mg   Other Serving Size Estimated Calcium*   Mac & cheese, frozen 1 package 325 mg   Pizza, cheese, frozen 1 serving 115 mg   Pudding, chocolate, prepared with 2% milk 4 oz 160 mg   Beans, baked, canned 4 oz 160 mg   *The calcium content listed for most foods is estimated and can vary due to multiple factors. Check the food label  to determine how much calcium is in a particular product.  If you read the nutrition label for a food source, it lists the % calcium in that food.  For an 8 oz glass of milk, for example, the label states calcium 30%.  This is equivalent to 300 mg of calcium (multiply the listed number by 10).   **Table from the National [employer]opor[employer] Foundation      Follow up with the kidney doctor

## 2019-12-19 NOTE — PROGRESS NOTES
Subjective:      Patient ID: Dacia Martínez is a 84 y.o. female.    Chief Complaint: Cold Exposure (x 6days); Cough (x 6days ); Wheezing; and Fever (first 3 days )      HPI:  Dacia Martínez is an 84 year old female with atherosclerosis of aorta, persistent atrial fibrillation, severe aortic stenosis, anemia, chronic pain syndrome, chronic sinusitis, chronic systolic congestive heart failure, chronic kidney disease stage 3, history of colon polyps, constipation, dilated cardiomyopathy, diabetes mellitus type 2, gastroesophageal reflux disease, gout, hyperlipidemia, hypertension, hearing loss, intracranial atherosclerosis, long term use of opiates, left bundle branch block, cardiac murmur, microalbuminuria, osteoarthritis, osteopenia, and vertigo who presents to clinic today complaining of cold symptoms.    Patient accompanied by her daughter today.    Complains of cold symptoms recently.   Requests Rx for Tessalon for cough.  Developed subjective fever 5 days ago, subsequently developed cough productive of white sputum.  Endorses associated sneezing, both ears stopped up, bilateral ear pain, decreasing hearing, rhinorrhea (white colored), sore throat.  Granddaughter had similar symptoms recently.  Called ENT and was sent in Rx for amoxicillin 500 mg PO BID for 10 days, currently on day 3 with mild improvement.  Takes Tylenol as well.      Denies associated otorrhea, epistaxis, dysphagia, lymph node swelling, chest pain, shortness of breath.    Requests refills on Synthroid (FT4 WNL, TSH mildly elevated 4/16/19, repeat TSH has been ordered and pending per endocrinology), Benadryl cream, Diflucan (currently on amoxicillin and frequently has yeast infections when on Abx, denies symptoms currently).    States she bumped her leg a while ago and has a lesion to that area now.  Has large hypertrophic hyperpigmented and dark lesion to right anterior shin.  Patient states this has been decreasing in size.  States the area does not  hurt like it used to.  States the area does itch at times.      Past Medical History:   Diagnosis Date    A-fib     Arthritis     Asteroid hyalosis of left eye     Bilateral nonexudative age-related macular degeneration 6/3/2014    Breast cancer     Cataract     CHF (congestive heart failure)     Chronic GERD 2017    CKD stage 3 due to type 2 diabetes mellitus     Coronary artery disease     Encounter for blood transfusion     Hypertension     Migraine headache     Mild nonproliferative diabetic retinopathy of both eyes 6/3/2014    Pneumonia     Type 2 diabetes mellitus with hyperglycemia     Vertigo        Past Surgical History:   Procedure Laterality Date    ABLATION N/A 2018    Procedure: ABLATION;  Surgeon: Jacques Burt MD;  Location: [facility];  Service: Cardiology;  Laterality: N/A;  AF, AVN, RFA, Choice, MB, 3 Prep *SJM CRT-P in situ*    BREAST SURGERY      left lumpectomy    CARPAL TUNNEL RELEASE      left    CATARACT EXTRACTION      vance     SECTION      EYE SURGERY      cataracts bilaterally    HYSTERECTOMY      partial    IMPLANTATION OF BIVENTRICULAR HEART PACEMAKER N/A 10/26/2018    Procedure: INSERTION, CARDIAC PACEMAKER, BIVENTRICULAR;  Surgeon: Jacques Burt MD;  Location: [facility];  Service: Cardiology;  Laterality: N/A;  AF, CRT-P, SJM, MAC, MB, 3 Prep       Family History   Problem Relation Age of Onset    Cancer Mother         stomach    Heart disease Mother     Diabetes Father     Hypertension Father     Blindness Brother     Diabetes Brother     Hypertension Brother     Cataracts Sister     Diabetes Sister     Diabetes Brother     Diabetes Brother     Diabetes Brother     No Known Problems Daughter     No Known Problems Son     No Known Problems Daughter     Amblyopia Neg Hx     Glaucoma Neg Hx     Macular degeneration Neg Hx     Strabismus Neg Hx     Retinal detachment Neg Hx        Social History      Socioeconomic History    Marital status: Single     Spouse name: Not on file    Number of children: 4    Years of education: Not on file    Highest education level: Not on file   Occupational History    Not on file   Social Needs    Financial resource strain: Not on file    Food insecurity:     Worry: Not on file     Inability: Not on file    Transportation needs:     Medical: Not on file     Non-medical: Not on file   Tobacco Use    Smoking status: Never Smoker    Smokeless tobacco: Never Used   Substance and Sexual Activity    Alcohol use: Yes    Drug use: No    Sexual activity: Not Currently   Lifestyle    Physical activity:     Days per week: Not on file     Minutes per session: Not on file    Stress: Not on file   Relationships    Social connections:     Talks on phone: Not on file     Gets together: Not on file     Attends [employer] service: Not on file     Active member of club or organization: Not on file     Attends meetings of clubs or organizations: Not on file     Relationship status: Not on file   Other Topics Concern    Not on file   Social History Narrative    Family supportive.     Granddaughter helps set out her medications in pillbox, helps check her blood sugar.       Review of Systems   Constitutional: Positive for fever. Negative for chills and fatigue.   HENT: Positive for ear pain, hearing loss, sneezing and sore throat. Negative for congestion, nosebleeds, rhinorrhea and trouble swallowing.    Eyes: Negative for pain and visual disturbance.   Respiratory: Positive for cough. Negative for shortness of breath and wheezing.    Cardiovascular: Negative for chest pain and palpitations.   Gastrointestinal: Negative for abdominal distention, abdominal pain, constipation, diarrhea, nausea and vomiting.   Genitourinary: Negative for decreased urine volume, difficulty urinating, dysuria, hematuria and urgency.   Musculoskeletal: Negative for arthralgias, back pain and myalgias.  "  Skin: Negative for rash.        + Skin lesion   Neurological: Negative for dizziness, tremors, weakness, light-headedness, numbness and headaches.   Psychiatric/Behavioral: Negative for agitation, behavioral problems and confusion. The patient is not nervous/anxious.      Objective:     Vitals:    12/19/19 1115   BP: 120/76   BP Location: Right arm   Patient Position: Sitting   BP Method: Medium (Manual)   Pulse: 70   Temp: 97.7 °F (36.5 °C)   TempSrc: Oral   SpO2: 99%   Weight: 73.5 kg (162 lb 0.6 oz)   Height: 5' 1" (1.549 m)       Physical Exam   Constitutional: She is oriented to person, place, and time. She appears well-developed and well-nourished.   HENT:   Head: Normocephalic and atraumatic.   Right Ear: External ear normal.   Left Ear: External ear normal.   Nose: Nose normal.   Mouth/Throat: Oropharynx is clear and moist.   Eyes: Pupils are equal, round, and reactive to light. Conjunctivae and EOM are normal.   Neck: Normal range of motion. Neck supple. No tracheal deviation present.   Cardiovascular: Normal rate, regular rhythm and normal heart sounds. Exam reveals no gallop and no friction rub.   No murmur heard.  Pulmonary/Chest: Effort normal and breath sounds normal. No respiratory distress. She has no wheezes. She has no rales.   Abdominal: Soft. Bowel sounds are normal. She exhibits no distension. There is no tenderness. There is no rebound and no guarding.   Musculoskeletal: Normal range of motion. She exhibits no edema or deformity.   Neurological: She is alert and oriented to person, place, and time. Coordination normal.   Skin: Skin is warm and dry. Lesion noted.   Psychiatric: She has a normal mood and affect. Her behavior is normal.   Nursing note and vitals reviewed.             Assessment:      1. URI with cough and congestion    2. Skin lesion of right lower extremity    3. Antibiotic-induced yeast infection    4. Itching    5. Hypothyroidism, unspecified type      Plan:   Dacia was seen " today for cold exposure, cough, wheezing and fever.    Diagnoses and all orders for this visit:    URI with cough and congestion  -     benzonatate (TESSALON) 100 MG capsule; Take 1 capsule (100 mg total) by mouth 3 (three) times daily as needed.  -     fluticasone propionate (FLONASE) 50 mcg/actuation nasal spray; SPRAY TWICE IN EACH NOSTRIL DAILY  -     guaiFENesin (HUMIBID E) 400 mg Tab; Take 1 tablet (400 mg total) by mouth every 4 (four) hours as needed.  -     Complete course of antibiotics as prescribed per ENT but suspect viral etiology.  Start Rx's as above.  Recommended adequate rest/hydration.  Return to clinic if no improvement after 7-10 days or sooner if worsening.    Skin lesion of right lower extremity  -     Ambulatory Referral to Dermatology for further evaluation and management; recommended patient schedule this as soon as possible.    Antibiotic-induced yeast infection  -     fluconazole (DIFLUCAN) 150 MG Tab; Take 1 tablet by mouth once and then repeat dose with 1 tablet by mouth again 3 days later prescribed if patient develops yeast infection while on Abx at patient request    Itching  -     Continue diphenhydrAMINE-zinc acetate 1-0.1% (BENADRYL) cream; Apply topically 2 (two) times daily as needed for Itching, refilled.    Hypothyroidism, unspecified type  -     Continue levothyroxine (SYNTHROID) 25 MCG tablet; Take 1 tablet (25 mcg total) by mouth daily before breakfast, refilled.  Complete follow up studies as ordered per endocrinology.

## 2019-12-20 NOTE — LETTER
December 31, 2019      Jasmeet Corral MD  [address]  [facility] [address]           [facility] - Dermatology  [address]  [facility] [address]  Phone: [phone]  Fax: [phone]          Patient: Dacia Martínez   MR Number: [id number]   YOB: 1935   Date of Visit: 12/20/2019       Dear Dr. Jasmeet Corral:    Thank you for referring Dacia Martínez to me for evaluation. Attached you will find relevant portions of my assessment and plan of care.    If you have questions, please do not hesitate to call me. I look forward to following Dacia Martínez along with you.    Sincerely,    Lori Barnett MD    Enclosure  CC:  No Recipients    If you would like to receive this communication electronically, please contact [email or web address] or [phone] to request more information on [facility] access.    For providers and/or their staff who would like to refer a patient to [facility], please contact us through our one-stop-shop provider referral line, [facility], at [phone].    If you feel you have received this communication in error or would no longer like to receive these types of communications, please e-mail [email or web address]

## 2019-12-20 NOTE — PROGRESS NOTES
"  Subjective:       Patient ID:  Dacia Martínez is a 84 y.o. female who presents for   Chief Complaint   Patient presents with    Lesion     right lower leg     Lesion  - Initial  Affected locations: right lower leg  Duration: 2 months  Signs / symptoms: crusting, irritated and non-healing  Severity: mild to moderate  Timing: constant  Relieving factors/Treatments tried: nothing  Improvement on treatment: mild    Pt reports trauma to area and it has not healed. She was told to "leave it alone" and has not been washing the area or putting on any ointments or bandages.    Review of Systems   Skin: Positive for dry skin. Negative for itching, rash, daily sunscreen use, activity-related sunscreen use, recent sunburn and wears hat.   Hematologic/Lymphatic: Bruises/bleeds easily.        Objective:    Physical Exam   Constitutional: She appears well-developed and well-nourished. No distress.   Neurological: She is alert and oriented to person, place, and time. She is not disoriented.   Psychiatric: She has a normal mood and affect.   Skin:   Areas Examined (abnormalities noted in diagram):   RLE Inspected  LLE Inspection Performed              Diagram Legend     Erythematous scaling macule/papule c/w actinic keratosis       Vascular papule c/w angioma      Pigmented verrucoid papule/plaque c/w seborrheic keratosis      Yellow umbilicated papule c/w sebaceous hyperplasia      Irregularly shaped tan macule c/w lentigo     1-2 mm smooth white papules consistent with Milia      Movable subcutaneous cyst with punctum c/w epidermal inclusion cyst      Subcutaneous movable cyst c/w pilar cyst      Firm pink to brown papule c/w dermatofibroma      Pedunculated fleshy papule(s) c/w skin tag(s)      Evenly pigmented macule c/w junctional nevus     Mildly variegated pigmented, slightly irregular-bordered macule c/w mildly atypical nevus      Flesh colored to evenly pigmented papule c/w intradermal nevus       Pink pearly papule/plaque " c/w basal cell carcinoma      Erythematous hyperkeratotic cursted plaque c/w SCC      Surgical scar with no sign of skin cancer recurrence      Open and closed comedones      Inflammatory papules and pustules      Verrucoid papule consistent consistent with wart     Erythematous eczematous patches and plaques     Dystrophic onycholytic nail with subungual debris c/w onychomycosis     Umbilicated papule    Erythematous-base heme-crusted tan verrucoid plaque consistent with inflamed seborrheic keratosis     Erythematous Silvery Scaling Plaque c/w Psoriasis     See annotation          Assessment / Plan:      Pathology Orders:     Normal Orders This Visit    Specimen to Pathology, Dermatology     Comments:    Number of Specimens:->1  ------------------------->-------------------------  Spec 1 Procedure:->Biopsy  Spec 1 Clinical Impression:->2 cm exophytic dark  hyperkeratotic nodule scab r/o skin cancer  Spec 1 Source:->right lower leg    Questions:    Procedure Type:  Dermatology and skin neoplasms    Number of Specimens:  1    ------------------------:  -------------------------    Spec 1 Procedure:  Biopsy    Spec 1 Clinical Impression:  2 cm exophytic dark hyperkeratotic nodule scab r/o skin cancer    Spec 1 Source:  right lower leg        Neoplasm of uncertain behavior of skin - favor scab  -     Specimen to Pathology, Dermatology    Area was soaked in hydrogen peroxide and gently lifted off.  No underlying lesion was present.  Removed lesion was sent for pathology evaluation.    Encouraged pt to wash area daily and cover with vaseline and bandaid until fully healed.           Follow up if symptoms worsen or fail to improve.

## 2019-12-31 PROBLEM — I63.9 STROKE: Status: ACTIVE | Noted: 2019-01-01

## 2019-12-31 PROBLEM — I63.412 EMBOLIC STROKE INVOLVING LEFT MIDDLE CEREBRAL ARTERY: Status: ACTIVE | Noted: 2019-01-01

## 2020-01-01 VITALS
OXYGEN SATURATION: 91 % | DIASTOLIC BLOOD PRESSURE: 47 MMHG | BODY MASS INDEX: 34.12 KG/M2 | SYSTOLIC BLOOD PRESSURE: 112 MMHG | HEIGHT: 62 IN | TEMPERATURE: 98 F | WEIGHT: 185.44 LBS

## 2020-01-01 PROBLEM — J18.9 PNEUMONIA: Status: ACTIVE | Noted: 2020-01-01

## 2020-01-01 PROBLEM — R79.89 TROPONIN LEVEL ELEVATED: Status: ACTIVE | Noted: 2020-01-01

## 2020-01-01 LAB
ADENOVIRUS: NOT DETECTED
ALBUMIN SERPL BCP-MCNC: 1.9 G/DL (ref 3.5–5.2)
ALBUMIN SERPL BCP-MCNC: 2 G/DL (ref 3.5–5.2)
ALBUMIN SERPL BCP-MCNC: 2.1 G/DL (ref 3.5–5.2)
ALBUMIN SERPL BCP-MCNC: 2.2 G/DL (ref 3.5–5.2)
ALBUMIN SERPL BCP-MCNC: 2.2 G/DL (ref 3.5–5.2)
ALBUMIN SERPL BCP-MCNC: 2.5 G/DL (ref 3.5–5.2)
ALBUMIN SERPL BCP-MCNC: 2.5 G/DL (ref 3.5–5.2)
ALBUMIN SERPL BCP-MCNC: 2.7 G/DL (ref 3.5–5.2)
ALBUMIN SERPL BCP-MCNC: 2.9 G/DL (ref 3.5–5.2)
ALBUMIN SERPL BCP-MCNC: 3.2 G/DL (ref 3.5–5.2)
ALLENS TEST: ABNORMAL
ALP SERPL-CCNC: 37 U/L (ref 55–135)
ALP SERPL-CCNC: 40 U/L (ref 55–135)
ALP SERPL-CCNC: 42 U/L (ref 55–135)
ALP SERPL-CCNC: 46 U/L (ref 55–135)
ALP SERPL-CCNC: 47 U/L (ref 55–135)
ALP SERPL-CCNC: 48 U/L (ref 55–135)
ALP SERPL-CCNC: 49 U/L (ref 55–135)
ALP SERPL-CCNC: 49 U/L (ref 55–135)
ALP SERPL-CCNC: 54 U/L (ref 55–135)
ALP SERPL-CCNC: 58 U/L (ref 55–135)
ALP SERPL-CCNC: 59 U/L (ref 55–135)
ALP SERPL-CCNC: 59 U/L (ref 55–135)
ALT SERPL W/O P-5'-P-CCNC: 11 U/L (ref 10–44)
ALT SERPL W/O P-5'-P-CCNC: 11 U/L (ref 10–44)
ALT SERPL W/O P-5'-P-CCNC: 13 U/L (ref 10–44)
ALT SERPL W/O P-5'-P-CCNC: 16 U/L (ref 10–44)
ALT SERPL W/O P-5'-P-CCNC: 5 U/L (ref 10–44)
ALT SERPL W/O P-5'-P-CCNC: 8 U/L (ref 10–44)
ALT SERPL W/O P-5'-P-CCNC: 9 U/L (ref 10–44)
ALT SERPL W/O P-5'-P-CCNC: 9 U/L (ref 10–44)
ALT SERPL W/O P-5'-P-CCNC: <5 U/L (ref 10–44)
AMMONIA PLAS-SCNC: 31 UMOL/L (ref 10–50)
AMORPH CRY UR QL COMP ASSIST: ABNORMAL
ANION GAP SERPL CALC-SCNC: 10 MMOL/L (ref 8–16)
ANION GAP SERPL CALC-SCNC: 11 MMOL/L (ref 8–16)
ANION GAP SERPL CALC-SCNC: 12 MMOL/L (ref 8–16)
ANION GAP SERPL CALC-SCNC: 12 MMOL/L (ref 8–16)
ANION GAP SERPL CALC-SCNC: 13 MMOL/L (ref 8–16)
ANION GAP SERPL CALC-SCNC: 14 MMOL/L (ref 8–16)
APTT BLDCRRT: 22.8 SEC (ref 21–32)
APTT BLDCRRT: 25.7 SEC (ref 21–32)
APTT BLDCRRT: 26 SEC (ref 21–32)
APTT BLDCRRT: 39.7 SEC (ref 21–32)
APTT BLDCRRT: 46.4 SEC (ref 21–32)
APTT BLDCRRT: 56.1 SEC (ref 21–32)
ASCENDING AORTA: 3.27 CM
AST SERPL-CCNC: 15 U/L (ref 10–40)
AST SERPL-CCNC: 16 U/L (ref 10–40)
AST SERPL-CCNC: 16 U/L (ref 10–40)
AST SERPL-CCNC: 17 U/L (ref 10–40)
AST SERPL-CCNC: 17 U/L (ref 10–40)
AST SERPL-CCNC: 25 U/L (ref 10–40)
AST SERPL-CCNC: 25 U/L (ref 10–40)
AST SERPL-CCNC: 38 U/L (ref 10–40)
AST SERPL-CCNC: 54 U/L (ref 10–40)
AST SERPL-CCNC: 62 U/L (ref 10–40)
AST SERPL-CCNC: 70 U/L (ref 10–40)
AST SERPL-CCNC: 76 U/L (ref 10–40)
AV INDEX (PROSTH): 0.32
AV MEAN GRADIENT: 9 MMHG
AV PEAK GRADIENT: 15 MMHG
AV VALVE AREA: 1.12 CM2
AV VELOCITY RATIO: 0.29
BACTERIA #/AREA URNS AUTO: ABNORMAL /HPF
BACTERIA BLD CULT: NORMAL
BACTERIA SPEC AEROBE CULT: ABNORMAL
BACTERIA SPEC AEROBE CULT: ABNORMAL
BACTERIA SPEC AEROBE CULT: NO GROWTH
BACTERIA SPEC AEROBE CULT: NORMAL
BASOPHILS # BLD AUTO: 0.05 K/UL (ref 0–0.2)
BASOPHILS # BLD AUTO: 0.05 K/UL (ref 0–0.2)
BASOPHILS # BLD AUTO: 0.06 K/UL (ref 0–0.2)
BASOPHILS # BLD AUTO: 0.06 K/UL (ref 0–0.2)
BASOPHILS # BLD AUTO: 0.07 K/UL (ref 0–0.2)
BASOPHILS # BLD AUTO: 0.08 K/UL (ref 0–0.2)
BASOPHILS # BLD AUTO: 0.09 K/UL (ref 0–0.2)
BASOPHILS # BLD AUTO: 0.09 K/UL (ref 0–0.2)
BASOPHILS # BLD AUTO: 0.1 K/UL (ref 0–0.2)
BASOPHILS # BLD AUTO: 0.1 K/UL (ref 0–0.2)
BASOPHILS # BLD AUTO: 0.11 K/UL (ref 0–0.2)
BASOPHILS NFR BLD: 0.5 % (ref 0–1.9)
BASOPHILS NFR BLD: 0.6 % (ref 0–1.9)
BASOPHILS NFR BLD: 0.7 % (ref 0–1.9)
BASOPHILS NFR BLD: 0.8 % (ref 0–1.9)
BASOPHILS NFR BLD: 0.8 % (ref 0–1.9)
BASOPHILS NFR BLD: 0.9 % (ref 0–1.9)
BASOPHILS NFR BLD: 1 % (ref 0–1.9)
BILIRUB SERPL-MCNC: 0.7 MG/DL (ref 0.1–1)
BILIRUB SERPL-MCNC: 0.8 MG/DL (ref 0.1–1)
BILIRUB SERPL-MCNC: 0.9 MG/DL (ref 0.1–1)
BILIRUB SERPL-MCNC: 0.9 MG/DL (ref 0.1–1)
BILIRUB SERPL-MCNC: 1 MG/DL (ref 0.1–1)
BILIRUB SERPL-MCNC: 1.1 MG/DL (ref 0.1–1)
BILIRUB SERPL-MCNC: 1.1 MG/DL (ref 0.1–1)
BILIRUB SERPL-MCNC: 1.2 MG/DL (ref 0.1–1)
BILIRUB SERPL-MCNC: 1.3 MG/DL (ref 0.1–1)
BILIRUB SERPL-MCNC: 1.3 MG/DL (ref 0.1–1)
BILIRUB SERPL-MCNC: 1.7 MG/DL (ref 0.1–1)
BILIRUB SERPL-MCNC: 2.3 MG/DL (ref 0.1–1)
BILIRUB UR QL STRIP: NEGATIVE
BNP SERPL-MCNC: 1090 PG/ML (ref 0–99)
BORDETELLA PARAPERTUSSIS (IS1001): NOT DETECTED
BORDETELLA PERTUSSIS (PTXP): NOT DETECTED
BSA FOR ECHO PROCEDURE: 1.86 M2
BUN SERPL-MCNC: 19 MG/DL (ref 8–23)
BUN SERPL-MCNC: 21 MG/DL (ref 8–23)
BUN SERPL-MCNC: 24 MG/DL (ref 8–23)
BUN SERPL-MCNC: 30 MG/DL (ref 8–23)
BUN SERPL-MCNC: 30 MG/DL (ref 8–23)
BUN SERPL-MCNC: 36 MG/DL (ref 8–23)
BUN SERPL-MCNC: 37 MG/DL (ref 8–23)
BUN SERPL-MCNC: 44 MG/DL (ref 8–23)
BUN SERPL-MCNC: 49 MG/DL (ref 8–23)
BUN SERPL-MCNC: 53 MG/DL (ref 8–23)
BUN SERPL-MCNC: 54 MG/DL (ref 8–23)
CALCIUM SERPL-MCNC: 6.5 MG/DL (ref 8.7–10.5)
CALCIUM SERPL-MCNC: 8 MG/DL (ref 8.7–10.5)
CALCIUM SERPL-MCNC: 8.2 MG/DL (ref 8.7–10.5)
CALCIUM SERPL-MCNC: 8.3 MG/DL (ref 8.7–10.5)
CALCIUM SERPL-MCNC: 8.4 MG/DL (ref 8.7–10.5)
CALCIUM SERPL-MCNC: 8.4 MG/DL (ref 8.7–10.5)
CALCIUM SERPL-MCNC: 8.5 MG/DL (ref 8.7–10.5)
CALCIUM SERPL-MCNC: 8.6 MG/DL (ref 8.7–10.5)
CALCIUM SERPL-MCNC: 8.6 MG/DL (ref 8.7–10.5)
CHLAMYDIA PNEUMONIAE: NOT DETECTED
CHLORIDE SERPL-SCNC: 105 MMOL/L (ref 95–110)
CHLORIDE SERPL-SCNC: 107 MMOL/L (ref 95–110)
CHLORIDE SERPL-SCNC: 108 MMOL/L (ref 95–110)
CHLORIDE SERPL-SCNC: 108 MMOL/L (ref 95–110)
CHLORIDE SERPL-SCNC: 111 MMOL/L (ref 95–110)
CHLORIDE SERPL-SCNC: 111 MMOL/L (ref 95–110)
CHLORIDE SERPL-SCNC: 112 MMOL/L (ref 95–110)
CHLORIDE SERPL-SCNC: 113 MMOL/L (ref 95–110)
CHLORIDE SERPL-SCNC: 113 MMOL/L (ref 95–110)
CHLORIDE SERPL-SCNC: 115 MMOL/L (ref 95–110)
CHLORIDE SERPL-SCNC: 117 MMOL/L (ref 95–110)
CHLORIDE SERPL-SCNC: 119 MMOL/L (ref 95–110)
CHLORIDE SERPL-SCNC: 119 MMOL/L (ref 95–110)
CK MB SERPL-MCNC: 1.4 NG/ML (ref 0.1–6.5)
CK MB SERPL-RTO: 2.2 % (ref 0–5)
CK SERPL-CCNC: 63 U/L (ref 20–180)
CK SERPL-CCNC: 70 U/L (ref 20–180)
CLARITY UR REFRACT.AUTO: ABNORMAL
CO2 SERPL-SCNC: 19 MMOL/L (ref 23–29)
CO2 SERPL-SCNC: 20 MMOL/L (ref 23–29)
CO2 SERPL-SCNC: 22 MMOL/L (ref 23–29)
CO2 SERPL-SCNC: 23 MMOL/L (ref 23–29)
CO2 SERPL-SCNC: 24 MMOL/L (ref 23–29)
CO2 SERPL-SCNC: 25 MMOL/L (ref 23–29)
CO2 SERPL-SCNC: 25 MMOL/L (ref 23–29)
CO2 SERPL-SCNC: 26 MMOL/L (ref 23–29)
COLOR UR AUTO: YELLOW
CORONAVIRUS 229E, COMMON COLD VIRUS: NOT DETECTED
CORONAVIRUS HKU1, COMMON COLD VIRUS: NOT DETECTED
CORONAVIRUS NL63, COMMON COLD VIRUS: NOT DETECTED
CORONAVIRUS OC43, COMMON COLD VIRUS: NOT DETECTED
CREAT SERPL-MCNC: 1 MG/DL (ref 0.5–1.4)
CREAT SERPL-MCNC: 1.1 MG/DL (ref 0.5–1.4)
CREAT SERPL-MCNC: 1.2 MG/DL (ref 0.5–1.4)
CREAT SERPL-MCNC: 1.2 MG/DL (ref 0.5–1.4)
CREAT SERPL-MCNC: 1.3 MG/DL (ref 0.5–1.4)
CREAT SERPL-MCNC: 1.4 MG/DL (ref 0.5–1.4)
CREAT SERPL-MCNC: 1.6 MG/DL (ref 0.5–1.4)
CREAT SERPL-MCNC: 1.7 MG/DL (ref 0.5–1.4)
CREAT SERPL-MCNC: 1.9 MG/DL (ref 0.5–1.4)
CREAT SERPL-MCNC: 2.1 MG/DL (ref 0.5–1.4)
CREAT SERPL-MCNC: 2.1 MG/DL (ref 0.5–1.4)
CREAT UR-MCNC: 82 MG/DL (ref 15–325)
CV ECHO LV RWT: 0.58 CM
DELSYS: ABNORMAL
DIFFERENTIAL METHOD: ABNORMAL
DOP CALC AO PEAK VEL: 1.95 M/S
DOP CALC AO VTI: 36.95 CM
DOP CALC LVOT AREA: 3.5 CM2
DOP CALC LVOT DIAMETER: 2.1 CM
DOP CALC LVOT PEAK VEL: 0.56 M/S
DOP CALC LVOT STROKE VOLUME: 41.54 CM3
DOP CALCLVOT PEAK VEL VTI: 12 CM
E/E' RATIO: 21.6 M/S
ECHO LV POSTERIOR WALL: 1.08 CM (ref 0.6–1.1)
EOSINOPHIL # BLD AUTO: 0 K/UL (ref 0–0.5)
EOSINOPHIL # BLD AUTO: 0 K/UL (ref 0–0.5)
EOSINOPHIL # BLD AUTO: 0.1 K/UL (ref 0–0.5)
EOSINOPHIL # BLD AUTO: 0.3 K/UL (ref 0–0.5)
EOSINOPHIL # BLD AUTO: 0.4 K/UL (ref 0–0.5)
EOSINOPHIL # BLD AUTO: 0.4 K/UL (ref 0–0.5)
EOSINOPHIL NFR BLD: 0.2 % (ref 0–8)
EOSINOPHIL NFR BLD: 0.3 % (ref 0–8)
EOSINOPHIL NFR BLD: 0.7 % (ref 0–8)
EOSINOPHIL NFR BLD: 0.8 % (ref 0–8)
EOSINOPHIL NFR BLD: 0.8 % (ref 0–8)
EOSINOPHIL NFR BLD: 1.6 % (ref 0–8)
EOSINOPHIL NFR BLD: 1.9 % (ref 0–8)
EOSINOPHIL NFR BLD: 2.3 % (ref 0–8)
EOSINOPHIL NFR BLD: 2.5 % (ref 0–8)
EOSINOPHIL NFR BLD: 2.6 % (ref 0–8)
EOSINOPHIL NFR BLD: 2.9 % (ref 0–8)
EOSINOPHIL NFR BLD: 3 % (ref 0–8)
EOSINOPHIL NFR BLD: 3.3 % (ref 0–8)
EOSINOPHIL NFR BLD: 3.5 % (ref 0–8)
ERYTHROCYTE [DISTWIDTH] IN BLOOD BY AUTOMATED COUNT: 16 % (ref 11.5–14.5)
ERYTHROCYTE [DISTWIDTH] IN BLOOD BY AUTOMATED COUNT: 16 % (ref 11.5–14.5)
ERYTHROCYTE [DISTWIDTH] IN BLOOD BY AUTOMATED COUNT: 16.1 % (ref 11.5–14.5)
ERYTHROCYTE [DISTWIDTH] IN BLOOD BY AUTOMATED COUNT: 16.2 % (ref 11.5–14.5)
ERYTHROCYTE [DISTWIDTH] IN BLOOD BY AUTOMATED COUNT: 16.3 % (ref 11.5–14.5)
ERYTHROCYTE [DISTWIDTH] IN BLOOD BY AUTOMATED COUNT: 16.3 % (ref 11.5–14.5)
ERYTHROCYTE [DISTWIDTH] IN BLOOD BY AUTOMATED COUNT: 16.4 % (ref 11.5–14.5)
ERYTHROCYTE [DISTWIDTH] IN BLOOD BY AUTOMATED COUNT: 16.5 % (ref 11.5–14.5)
ERYTHROCYTE [DISTWIDTH] IN BLOOD BY AUTOMATED COUNT: 16.6 % (ref 11.5–14.5)
ERYTHROCYTE [DISTWIDTH] IN BLOOD BY AUTOMATED COUNT: 16.9 % (ref 11.5–14.5)
ERYTHROCYTE [SEDIMENTATION RATE] IN BLOOD BY WESTERGREN METHOD: 18 MM/H
ERYTHROCYTE [SEDIMENTATION RATE] IN BLOOD BY WESTERGREN METHOD: 40 MM/H
EST. GFR  (AFRICAN AMERICAN): 24.6 ML/MIN/1.73 M^2
EST. GFR  (AFRICAN AMERICAN): 24.6 ML/MIN/1.73 M^2
EST. GFR  (AFRICAN AMERICAN): 27.7 ML/MIN/1.73 M^2
EST. GFR  (AFRICAN AMERICAN): 31.7 ML/MIN/1.73 M^2
EST. GFR  (AFRICAN AMERICAN): 34.1 ML/MIN/1.73 M^2
EST. GFR  (AFRICAN AMERICAN): 40.1 ML/MIN/1.73 M^2
EST. GFR  (AFRICAN AMERICAN): 43.8 ML/MIN/1.73 M^2
EST. GFR  (AFRICAN AMERICAN): 48.3 ML/MIN/1.73 M^2
EST. GFR  (AFRICAN AMERICAN): 48.3 ML/MIN/1.73 M^2
EST. GFR  (AFRICAN AMERICAN): 53.7 ML/MIN/1.73 M^2
EST. GFR  (AFRICAN AMERICAN): >60 ML/MIN/1.73 M^2
EST. GFR  (NON AFRICAN AMERICAN): 21.3 ML/MIN/1.73 M^2
EST. GFR  (NON AFRICAN AMERICAN): 21.3 ML/MIN/1.73 M^2
EST. GFR  (NON AFRICAN AMERICAN): 24 ML/MIN/1.73 M^2
EST. GFR  (NON AFRICAN AMERICAN): 27.5 ML/MIN/1.73 M^2
EST. GFR  (NON AFRICAN AMERICAN): 29.6 ML/MIN/1.73 M^2
EST. GFR  (NON AFRICAN AMERICAN): 34.8 ML/MIN/1.73 M^2
EST. GFR  (NON AFRICAN AMERICAN): 38 ML/MIN/1.73 M^2
EST. GFR  (NON AFRICAN AMERICAN): 41.9 ML/MIN/1.73 M^2
EST. GFR  (NON AFRICAN AMERICAN): 41.9 ML/MIN/1.73 M^2
EST. GFR  (NON AFRICAN AMERICAN): 46.5 ML/MIN/1.73 M^2
EST. GFR  (NON AFRICAN AMERICAN): 52.2 ML/MIN/1.73 M^2
FINAL PATHOLOGIC DIAGNOSIS: NORMAL
FIO2: 27
FIO2: 28
FIO2: 40
FLOW: 2
FLOW: 3
FLUBV RNA NPH QL NAA+NON-PROBE: NOT DETECTED
FRACTIONAL SHORTENING: 28 % (ref 28–44)
GLUCOSE SERPL-MCNC: 125 MG/DL (ref 70–110)
GLUCOSE SERPL-MCNC: 127 MG/DL (ref 70–110)
GLUCOSE SERPL-MCNC: 132 MG/DL (ref 70–110)
GLUCOSE SERPL-MCNC: 135 MG/DL (ref 70–110)
GLUCOSE SERPL-MCNC: 142 MG/DL (ref 70–110)
GLUCOSE SERPL-MCNC: 150 MG/DL (ref 70–110)
GLUCOSE SERPL-MCNC: 153 MG/DL (ref 70–110)
GLUCOSE SERPL-MCNC: 155 MG/DL (ref 70–110)
GLUCOSE SERPL-MCNC: 158 MG/DL (ref 70–110)
GLUCOSE SERPL-MCNC: 184 MG/DL (ref 70–110)
GLUCOSE SERPL-MCNC: 193 MG/DL (ref 70–110)
GLUCOSE SERPL-MCNC: 201 MG/DL (ref 70–110)
GLUCOSE SERPL-MCNC: 215 MG/DL (ref 70–110)
GLUCOSE UR QL STRIP: NEGATIVE
GRAM STN SPEC: ABNORMAL
GRAM STN SPEC: NORMAL
GROSS: NORMAL
HCO3 UR-SCNC: 20.5 MMOL/L (ref 24–28)
HCO3 UR-SCNC: 22.1 MMOL/L (ref 24–28)
HCO3 UR-SCNC: 22.3 MMOL/L (ref 24–28)
HCO3 UR-SCNC: 22.6 MMOL/L (ref 24–28)
HCO3 UR-SCNC: 22.8 MMOL/L (ref 24–28)
HCO3 UR-SCNC: 22.9 MMOL/L (ref 24–28)
HCO3 UR-SCNC: 23.2 MMOL/L (ref 24–28)
HCO3 UR-SCNC: 23.4 MMOL/L (ref 24–28)
HCO3 UR-SCNC: 23.5 MMOL/L (ref 24–28)
HCO3 UR-SCNC: 23.6 MMOL/L (ref 24–28)
HCO3 UR-SCNC: 24 MMOL/L (ref 24–28)
HCO3 UR-SCNC: 24.5 MMOL/L (ref 24–28)
HCO3 UR-SCNC: 24.6 MMOL/L (ref 24–28)
HCO3 UR-SCNC: 25.1 MMOL/L (ref 24–28)
HCO3 UR-SCNC: 25.1 MMOL/L (ref 24–28)
HCO3 UR-SCNC: 25.4 MMOL/L (ref 24–28)
HCO3 UR-SCNC: 25.7 MMOL/L (ref 24–28)
HCO3 UR-SCNC: 25.7 MMOL/L (ref 24–28)
HCO3 UR-SCNC: 25.8 MMOL/L (ref 24–28)
HCO3 UR-SCNC: 26 MMOL/L (ref 24–28)
HCO3 UR-SCNC: 26.4 MMOL/L (ref 24–28)
HCO3 UR-SCNC: 26.6 MMOL/L (ref 24–28)
HCO3 UR-SCNC: 26.7 MMOL/L (ref 24–28)
HCO3 UR-SCNC: 26.8 MMOL/L (ref 24–28)
HCO3 UR-SCNC: 27.4 MMOL/L (ref 24–28)
HCO3 UR-SCNC: 28.3 MMOL/L (ref 24–28)
HCO3 UR-SCNC: 28.8 MMOL/L (ref 24–28)
HCO3 UR-SCNC: 30.9 MMOL/L (ref 24–28)
HCT VFR BLD AUTO: 29.9 % (ref 37–48.5)
HCT VFR BLD AUTO: 30.3 % (ref 37–48.5)
HCT VFR BLD AUTO: 30.8 % (ref 37–48.5)
HCT VFR BLD AUTO: 30.9 % (ref 37–48.5)
HCT VFR BLD AUTO: 31 % (ref 37–48.5)
HCT VFR BLD AUTO: 31.4 % (ref 37–48.5)
HCT VFR BLD AUTO: 31.5 % (ref 37–48.5)
HCT VFR BLD AUTO: 31.8 % (ref 37–48.5)
HCT VFR BLD AUTO: 35 % (ref 37–48.5)
HCT VFR BLD AUTO: 35.6 % (ref 37–48.5)
HCT VFR BLD AUTO: 35.8 % (ref 37–48.5)
HCT VFR BLD AUTO: 36.1 % (ref 37–48.5)
HCT VFR BLD AUTO: 37.8 % (ref 37–48.5)
HCT VFR BLD AUTO: 40.5 % (ref 37–48.5)
HGB BLD-MCNC: 10 G/DL (ref 12–16)
HGB BLD-MCNC: 10.1 G/DL (ref 12–16)
HGB BLD-MCNC: 10.4 G/DL (ref 12–16)
HGB BLD-MCNC: 10.5 G/DL (ref 12–16)
HGB BLD-MCNC: 11.6 G/DL (ref 12–16)
HGB BLD-MCNC: 11.8 G/DL (ref 12–16)
HGB BLD-MCNC: 8.8 G/DL (ref 12–16)
HGB BLD-MCNC: 8.9 G/DL (ref 12–16)
HGB BLD-MCNC: 9.1 G/DL (ref 12–16)
HGB BLD-MCNC: 9.1 G/DL (ref 12–16)
HGB BLD-MCNC: 9.2 G/DL (ref 12–16)
HGB UR QL STRIP: ABNORMAL
HPIV1 RNA NPH QL NAA+NON-PROBE: NOT DETECTED
HPIV2 RNA NPH QL NAA+NON-PROBE: NOT DETECTED
HPIV3 RNA NPH QL NAA+NON-PROBE: NOT DETECTED
HPIV4 RNA NPH QL NAA+NON-PROBE: NOT DETECTED
HUMAN METAPNEUMOVIRUS: NOT DETECTED
HYALINE CASTS UR QL AUTO: 0 /LPF
IMM GRANULOCYTES # BLD AUTO: 0.04 K/UL (ref 0–0.04)
IMM GRANULOCYTES # BLD AUTO: 0.05 K/UL (ref 0–0.04)
IMM GRANULOCYTES # BLD AUTO: 0.06 K/UL (ref 0–0.04)
IMM GRANULOCYTES # BLD AUTO: 0.07 K/UL (ref 0–0.04)
IMM GRANULOCYTES # BLD AUTO: 0.09 K/UL (ref 0–0.04)
IMM GRANULOCYTES NFR BLD AUTO: 0.4 % (ref 0–0.5)
IMM GRANULOCYTES NFR BLD AUTO: 0.5 % (ref 0–0.5)
IMM GRANULOCYTES NFR BLD AUTO: 0.6 % (ref 0–0.5)
IMM GRANULOCYTES NFR BLD AUTO: 0.7 % (ref 0–0.5)
INFLUENZA A (SUBTYPES H1,H1-2009,H3): NOT DETECTED
INFLUENZA A, MOLECULAR: NEGATIVE
INFLUENZA B, MOLECULAR: NEGATIVE
INR PPP: 1.1 (ref 0.8–1.2)
INR PPP: 1.1 (ref 0.8–1.2)
INR PPP: 1.3 (ref 0.8–1.2)
INTERVENTRICULAR SEPTUM: 1.02 CM (ref 0.6–1.1)
KETONES UR QL STRIP: NEGATIVE
LA MAJOR: 5.29 CM
LA MINOR: 5.21 CM
LA WIDTH: 4.78 CM
LACTATE SERPL-SCNC: 1.2 MMOL/L (ref 0.5–2.2)
LEFT ATRIUM SIZE: 3.93 CM
LEFT ATRIUM VOLUME INDEX: 46.4 ML/M2
LEFT ATRIUM VOLUME: 83.82 CM3
LEFT INTERNAL DIMENSION IN SYSTOLE: 2.7 CM (ref 2.1–4)
LEFT VENTRICLE DIASTOLIC VOLUME INDEX: 33.29 ML/M2
LEFT VENTRICLE DIASTOLIC VOLUME: 60.12 ML
LEFT VENTRICLE MASS INDEX: 68 G/M2
LEFT VENTRICLE SYSTOLIC VOLUME INDEX: 15 ML/M2
LEFT VENTRICLE SYSTOLIC VOLUME: 27.07 ML
LEFT VENTRICULAR INTERNAL DIMENSION IN DIASTOLE: 3.75 CM (ref 3.5–6)
LEFT VENTRICULAR MASS: 123.29 G
LEUKOCYTE ESTERASE UR QL STRIP: NEGATIVE
LV LATERAL E/E' RATIO: 15.43 M/S
LV SEPTAL E/E' RATIO: 36 M/S
LYMPHOCYTES # BLD AUTO: 0.7 K/UL (ref 1–4.8)
LYMPHOCYTES # BLD AUTO: 0.7 K/UL (ref 1–4.8)
LYMPHOCYTES # BLD AUTO: 0.8 K/UL (ref 1–4.8)
LYMPHOCYTES # BLD AUTO: 0.9 K/UL (ref 1–4.8)
LYMPHOCYTES # BLD AUTO: 1 K/UL (ref 1–4.8)
LYMPHOCYTES # BLD AUTO: 1 K/UL (ref 1–4.8)
LYMPHOCYTES # BLD AUTO: 1.2 K/UL (ref 1–4.8)
LYMPHOCYTES # BLD AUTO: 1.4 K/UL (ref 1–4.8)
LYMPHOCYTES # BLD AUTO: 1.5 K/UL (ref 1–4.8)
LYMPHOCYTES # BLD AUTO: 1.5 K/UL (ref 1–4.8)
LYMPHOCYTES # BLD AUTO: 1.6 K/UL (ref 1–4.8)
LYMPHOCYTES NFR BLD: 12.7 % (ref 18–48)
LYMPHOCYTES NFR BLD: 13.5 % (ref 18–48)
LYMPHOCYTES NFR BLD: 17.5 % (ref 18–48)
LYMPHOCYTES NFR BLD: 18.2 % (ref 18–48)
LYMPHOCYTES NFR BLD: 6.3 % (ref 18–48)
LYMPHOCYTES NFR BLD: 6.7 % (ref 18–48)
LYMPHOCYTES NFR BLD: 7 % (ref 18–48)
LYMPHOCYTES NFR BLD: 7.8 % (ref 18–48)
LYMPHOCYTES NFR BLD: 7.9 % (ref 18–48)
LYMPHOCYTES NFR BLD: 8.1 % (ref 18–48)
LYMPHOCYTES NFR BLD: 8.3 % (ref 18–48)
LYMPHOCYTES NFR BLD: 8.5 % (ref 18–48)
LYMPHOCYTES NFR BLD: 8.5 % (ref 18–48)
LYMPHOCYTES NFR BLD: 9.3 % (ref 18–48)
MAGNESIUM SERPL-MCNC: 1.3 MG/DL (ref 1.6–2.6)
MAGNESIUM SERPL-MCNC: 2.1 MG/DL (ref 1.6–2.6)
MAGNESIUM SERPL-MCNC: 2.2 MG/DL (ref 1.6–2.6)
MAGNESIUM SERPL-MCNC: 2.5 MG/DL (ref 1.6–2.6)
MAGNESIUM SERPL-MCNC: 3.9 MG/DL (ref 1.6–2.6)
MCH RBC QN AUTO: 26.8 PG (ref 27–31)
MCH RBC QN AUTO: 26.9 PG (ref 27–31)
MCH RBC QN AUTO: 27.1 PG (ref 27–31)
MCH RBC QN AUTO: 27.1 PG (ref 27–31)
MCH RBC QN AUTO: 27.2 PG (ref 27–31)
MCH RBC QN AUTO: 27.5 PG (ref 27–31)
MCH RBC QN AUTO: 27.6 PG (ref 27–31)
MCH RBC QN AUTO: 27.7 PG (ref 27–31)
MCH RBC QN AUTO: 27.8 PG (ref 27–31)
MCH RBC QN AUTO: 27.9 PG (ref 27–31)
MCH RBC QN AUTO: 27.9 PG (ref 27–31)
MCH RBC QN AUTO: 28.2 PG (ref 27–31)
MCHC RBC AUTO-ENTMCNC: 27.7 G/DL (ref 32–36)
MCHC RBC AUTO-ENTMCNC: 28.9 G/DL (ref 32–36)
MCHC RBC AUTO-ENTMCNC: 28.9 G/DL (ref 32–36)
MCHC RBC AUTO-ENTMCNC: 29 G/DL (ref 32–36)
MCHC RBC AUTO-ENTMCNC: 29.1 G/DL (ref 32–36)
MCHC RBC AUTO-ENTMCNC: 29.1 G/DL (ref 32–36)
MCHC RBC AUTO-ENTMCNC: 29.2 G/DL (ref 32–36)
MCHC RBC AUTO-ENTMCNC: 29.3 G/DL (ref 32–36)
MCHC RBC AUTO-ENTMCNC: 29.4 G/DL (ref 32–36)
MCHC RBC AUTO-ENTMCNC: 29.5 G/DL (ref 32–36)
MCHC RBC AUTO-ENTMCNC: 29.5 G/DL (ref 32–36)
MCHC RBC AUTO-ENTMCNC: 29.8 G/DL (ref 32–36)
MCHC RBC AUTO-ENTMCNC: 29.8 G/DL (ref 32–36)
MCHC RBC AUTO-ENTMCNC: 30.7 G/DL (ref 32–36)
MCV RBC AUTO: 100 FL (ref 82–98)
MCV RBC AUTO: 91 FL (ref 82–98)
MCV RBC AUTO: 91 FL (ref 82–98)
MCV RBC AUTO: 92 FL (ref 82–98)
MCV RBC AUTO: 92 FL (ref 82–98)
MCV RBC AUTO: 93 FL (ref 82–98)
MCV RBC AUTO: 94 FL (ref 82–98)
MCV RBC AUTO: 95 FL (ref 82–98)
MCV RBC AUTO: 96 FL (ref 82–98)
MCV RBC AUTO: 96 FL (ref 82–98)
MICROSCOPIC COMMENT: ABNORMAL
MIN VOL: 6.51
MIN VOL: 6.78
MIN VOL: 7.27
MODE: ABNORMAL
MONOCYTES # BLD AUTO: 0.5 K/UL (ref 0.3–1)
MONOCYTES # BLD AUTO: 0.6 K/UL (ref 0.3–1)
MONOCYTES # BLD AUTO: 0.6 K/UL (ref 0.3–1)
MONOCYTES # BLD AUTO: 0.7 K/UL (ref 0.3–1)
MONOCYTES # BLD AUTO: 0.8 K/UL (ref 0.3–1)
MONOCYTES # BLD AUTO: 0.9 K/UL (ref 0.3–1)
MONOCYTES # BLD AUTO: 1 K/UL (ref 0.3–1)
MONOCYTES # BLD AUTO: 1.1 K/UL (ref 0.3–1)
MONOCYTES # BLD AUTO: 1.2 K/UL (ref 0.3–1)
MONOCYTES NFR BLD: 5.1 % (ref 4–15)
MONOCYTES NFR BLD: 5.7 % (ref 4–15)
MONOCYTES NFR BLD: 5.9 % (ref 4–15)
MONOCYTES NFR BLD: 6.3 % (ref 4–15)
MONOCYTES NFR BLD: 6.9 % (ref 4–15)
MONOCYTES NFR BLD: 7 % (ref 4–15)
MONOCYTES NFR BLD: 7.1 % (ref 4–15)
MONOCYTES NFR BLD: 7.6 % (ref 4–15)
MONOCYTES NFR BLD: 7.9 % (ref 4–15)
MONOCYTES NFR BLD: 7.9 % (ref 4–15)
MONOCYTES NFR BLD: 8.4 % (ref 4–15)
MONOCYTES NFR BLD: 8.6 % (ref 4–15)
MONOCYTES NFR BLD: 8.7 % (ref 4–15)
MONOCYTES NFR BLD: 9.2 % (ref 4–15)
MV PEAK E VEL: 1.08 M/S
MYCOPLASMA PNEUMONIAE: NOT DETECTED
NEUTROPHILS # BLD AUTO: 11.3 K/UL (ref 1.8–7.7)
NEUTROPHILS # BLD AUTO: 11.6 K/UL (ref 1.8–7.7)
NEUTROPHILS # BLD AUTO: 6.2 K/UL (ref 1.8–7.7)
NEUTROPHILS # BLD AUTO: 6.5 K/UL (ref 1.8–7.7)
NEUTROPHILS # BLD AUTO: 7.5 K/UL (ref 1.8–7.7)
NEUTROPHILS # BLD AUTO: 8.3 K/UL (ref 1.8–7.7)
NEUTROPHILS # BLD AUTO: 8.4 K/UL (ref 1.8–7.7)
NEUTROPHILS # BLD AUTO: 8.5 K/UL (ref 1.8–7.7)
NEUTROPHILS # BLD AUTO: 8.8 K/UL (ref 1.8–7.7)
NEUTROPHILS # BLD AUTO: 8.9 K/UL (ref 1.8–7.7)
NEUTROPHILS # BLD AUTO: 9 K/UL (ref 1.8–7.7)
NEUTROPHILS # BLD AUTO: 9.4 K/UL (ref 1.8–7.7)
NEUTROPHILS # BLD AUTO: 9.5 K/UL (ref 1.8–7.7)
NEUTROPHILS # BLD AUTO: 9.7 K/UL (ref 1.8–7.7)
NEUTROPHILS NFR BLD: 71 % (ref 38–73)
NEUTROPHILS NFR BLD: 73.1 % (ref 38–73)
NEUTROPHILS NFR BLD: 77.2 % (ref 38–73)
NEUTROPHILS NFR BLD: 78 % (ref 38–73)
NEUTROPHILS NFR BLD: 79.3 % (ref 38–73)
NEUTROPHILS NFR BLD: 80.2 % (ref 38–73)
NEUTROPHILS NFR BLD: 80.2 % (ref 38–73)
NEUTROPHILS NFR BLD: 80.6 % (ref 38–73)
NEUTROPHILS NFR BLD: 80.8 % (ref 38–73)
NEUTROPHILS NFR BLD: 80.8 % (ref 38–73)
NEUTROPHILS NFR BLD: 82.1 % (ref 38–73)
NEUTROPHILS NFR BLD: 82.4 % (ref 38–73)
NEUTROPHILS NFR BLD: 83.9 % (ref 38–73)
NEUTROPHILS NFR BLD: 84.4 % (ref 38–73)
NITRITE UR QL STRIP: NEGATIVE
NRBC BLD-RTO: 0 /100 WBC
PCO2 BLDA: 24.9 MMHG (ref 35–45)
PCO2 BLDA: 25.5 MMHG (ref 35–45)
PCO2 BLDA: 27.4 MMHG (ref 35–45)
PCO2 BLDA: 27.9 MMHG (ref 35–45)
PCO2 BLDA: 30.1 MMHG (ref 35–45)
PCO2 BLDA: 30.3 MMHG (ref 35–45)
PCO2 BLDA: 31.2 MMHG (ref 35–45)
PCO2 BLDA: 31.2 MMHG (ref 35–45)
PCO2 BLDA: 31.5 MMHG (ref 35–45)
PCO2 BLDA: 32.1 MMHG (ref 35–45)
PCO2 BLDA: 32.4 MMHG (ref 35–45)
PCO2 BLDA: 32.6 MMHG (ref 35–45)
PCO2 BLDA: 32.6 MMHG (ref 35–45)
PCO2 BLDA: 32.8 MMHG (ref 35–45)
PCO2 BLDA: 33.8 MMHG (ref 35–45)
PCO2 BLDA: 34.5 MMHG (ref 35–45)
PCO2 BLDA: 34.7 MMHG (ref 35–45)
PCO2 BLDA: 35.5 MMHG (ref 35–45)
PCO2 BLDA: 35.8 MMHG (ref 35–45)
PCO2 BLDA: 35.8 MMHG (ref 35–45)
PCO2 BLDA: 37.3 MMHG (ref 35–45)
PCO2 BLDA: 38.5 MMHG (ref 35–45)
PCO2 BLDA: 39 MMHG (ref 35–45)
PCO2 BLDA: 40.7 MMHG (ref 35–45)
PCO2 BLDA: 42.7 MMHG (ref 35–45)
PCO2 BLDA: 43.7 MMHG (ref 35–45)
PEEP: 5
PH SMN: 7.39 [PH] (ref 7.35–7.45)
PH SMN: 7.43 [PH] (ref 7.35–7.45)
PH SMN: 7.44 [PH] (ref 7.35–7.45)
PH SMN: 7.45 [PH] (ref 7.35–7.45)
PH SMN: 7.46 [PH] (ref 7.35–7.45)
PH SMN: 7.47 [PH] (ref 7.35–7.45)
PH SMN: 7.48 [PH] (ref 7.35–7.45)
PH SMN: 7.49 [PH] (ref 7.35–7.45)
PH SMN: 7.49 [PH] (ref 7.35–7.45)
PH SMN: 7.5 [PH] (ref 7.35–7.45)
PH SMN: 7.51 [PH] (ref 7.35–7.45)
PH SMN: 7.52 [PH] (ref 7.35–7.45)
PH SMN: 7.52 [PH] (ref 7.35–7.45)
PH SMN: 7.53 [PH] (ref 7.35–7.45)
PH SMN: 7.53 [PH] (ref 7.35–7.45)
PH SMN: 7.54 [PH] (ref 7.35–7.45)
PH SMN: 7.55 [PH] (ref 7.35–7.45)
PH SMN: 7.56 [PH] (ref 7.35–7.45)
PH UR STRIP: 5 [PH] (ref 5–8)
PHOSPHATE SERPL-MCNC: 2.2 MG/DL (ref 2.7–4.5)
PHOSPHATE SERPL-MCNC: 2.3 MG/DL (ref 2.7–4.5)
PHOSPHATE SERPL-MCNC: 2.5 MG/DL (ref 2.7–4.5)
PHOSPHATE SERPL-MCNC: 2.6 MG/DL (ref 2.7–4.5)
PHOSPHATE SERPL-MCNC: 2.9 MG/DL (ref 2.7–4.5)
PHOSPHATE SERPL-MCNC: 3.2 MG/DL (ref 2.7–4.5)
PHOSPHATE SERPL-MCNC: 3.3 MG/DL (ref 2.7–4.5)
PHOSPHATE SERPL-MCNC: 4.2 MG/DL (ref 2.7–4.5)
PHOSPHATE SERPL-MCNC: 4.8 MG/DL (ref 2.7–4.5)
PISA TR MAX VEL: 3.32 M/S
PLATELET # BLD AUTO: 102 K/UL (ref 150–350)
PLATELET # BLD AUTO: 142 K/UL (ref 150–350)
PLATELET # BLD AUTO: 146 K/UL (ref 150–350)
PLATELET # BLD AUTO: 158 K/UL (ref 150–350)
PLATELET # BLD AUTO: 164 K/UL (ref 150–350)
PLATELET # BLD AUTO: 176 K/UL (ref 150–350)
PLATELET # BLD AUTO: 178 K/UL (ref 150–350)
PLATELET # BLD AUTO: 178 K/UL (ref 150–350)
PLATELET # BLD AUTO: 192 K/UL (ref 150–350)
PLATELET # BLD AUTO: 207 K/UL (ref 150–350)
PLATELET # BLD AUTO: 238 K/UL (ref 150–350)
PLATELET # BLD AUTO: 240 K/UL (ref 150–350)
PLATELET # BLD AUTO: 301 K/UL (ref 150–350)
PLATELET # BLD AUTO: 303 K/UL (ref 150–350)
PMV BLD AUTO: 12.2 FL (ref 9.2–12.9)
PMV BLD AUTO: 12.3 FL (ref 9.2–12.9)
PMV BLD AUTO: 12.3 FL (ref 9.2–12.9)
PMV BLD AUTO: 12.4 FL (ref 9.2–12.9)
PMV BLD AUTO: 12.6 FL (ref 9.2–12.9)
PMV BLD AUTO: 12.7 FL (ref 9.2–12.9)
PMV BLD AUTO: 12.7 FL (ref 9.2–12.9)
PMV BLD AUTO: 12.8 FL (ref 9.2–12.9)
PMV BLD AUTO: 12.9 FL (ref 9.2–12.9)
PMV BLD AUTO: 13 FL (ref 9.2–12.9)
PMV BLD AUTO: 13.1 FL (ref 9.2–12.9)
PMV BLD AUTO: 13.2 FL (ref 9.2–12.9)
PMV BLD AUTO: 13.4 FL (ref 9.2–12.9)
PMV BLD AUTO: ABNORMAL FL (ref 9.2–12.9)
PO2 BLDA: 100 MMHG (ref 80–100)
PO2 BLDA: 104 MMHG (ref 80–100)
PO2 BLDA: 107 MMHG (ref 80–100)
PO2 BLDA: 109 MMHG (ref 80–100)
PO2 BLDA: 111 MMHG (ref 80–100)
PO2 BLDA: 115 MMHG (ref 80–100)
PO2 BLDA: 117 MMHG (ref 80–100)
PO2 BLDA: 118 MMHG (ref 80–100)
PO2 BLDA: 125 MMHG (ref 80–100)
PO2 BLDA: 128 MMHG (ref 80–100)
PO2 BLDA: 129 MMHG (ref 80–100)
PO2 BLDA: 134 MMHG (ref 80–100)
PO2 BLDA: 141 MMHG (ref 80–100)
PO2 BLDA: 157 MMHG (ref 80–100)
PO2 BLDA: 158 MMHG (ref 80–100)
PO2 BLDA: 179 MMHG (ref 80–100)
PO2 BLDA: 192 MMHG (ref 80–100)
PO2 BLDA: 208 MMHG (ref 80–100)
PO2 BLDA: 274 MMHG (ref 80–100)
PO2 BLDA: 55 MMHG (ref 80–100)
PO2 BLDA: 60 MMHG (ref 80–100)
PO2 BLDA: 73 MMHG (ref 80–100)
PO2 BLDA: 78 MMHG (ref 80–100)
PO2 BLDA: 79 MMHG (ref 80–100)
PO2 BLDA: 83 MMHG (ref 80–100)
PO2 BLDA: 87 MMHG (ref 80–100)
PO2 BLDA: 91 MMHG (ref 80–100)
PO2 BLDA: 94 MMHG (ref 80–100)
POC BE: -1 MMOL/L
POC BE: -2 MMOL/L
POC BE: -2 MMOL/L
POC BE: 0 MMOL/L
POC BE: 1 MMOL/L
POC BE: 2 MMOL/L
POC BE: 3 MMOL/L
POC BE: 4 MMOL/L
POC BE: 4 MMOL/L
POC BE: 5 MMOL/L
POC BE: 5 MMOL/L
POC BE: 8 MMOL/L
POC SATURATED O2: 100 % (ref 95–100)
POC SATURATED O2: 91 % (ref 95–100)
POC SATURATED O2: 93 % (ref 95–100)
POC SATURATED O2: 95 % (ref 95–100)
POC SATURATED O2: 96 % (ref 95–100)
POC SATURATED O2: 97 % (ref 95–100)
POC SATURATED O2: 98 % (ref 95–100)
POC SATURATED O2: 99 % (ref 95–100)
POC TCO2: 21 MMOL/L (ref 23–27)
POC TCO2: 23 MMOL/L (ref 23–27)
POC TCO2: 24 MMOL/L (ref 23–27)
POC TCO2: 25 MMOL/L (ref 23–27)
POC TCO2: 25 MMOL/L (ref 23–27)
POC TCO2: 26 MMOL/L (ref 23–27)
POC TCO2: 27 MMOL/L (ref 23–27)
POC TCO2: 28 MMOL/L (ref 23–27)
POC TCO2: 30 MMOL/L (ref 23–27)
POC TCO2: 30 MMOL/L (ref 23–27)
POC TCO2: 32 MMOL/L (ref 23–27)
POCT GLUCOSE: 121 MG/DL (ref 70–110)
POCT GLUCOSE: 123 MG/DL (ref 70–110)
POCT GLUCOSE: 125 MG/DL (ref 70–110)
POCT GLUCOSE: 128 MG/DL (ref 70–110)
POCT GLUCOSE: 129 MG/DL (ref 70–110)
POCT GLUCOSE: 131 MG/DL (ref 70–110)
POCT GLUCOSE: 132 MG/DL (ref 70–110)
POCT GLUCOSE: 135 MG/DL (ref 70–110)
POCT GLUCOSE: 137 MG/DL (ref 70–110)
POCT GLUCOSE: 137 MG/DL (ref 70–110)
POCT GLUCOSE: 140 MG/DL (ref 70–110)
POCT GLUCOSE: 141 MG/DL (ref 70–110)
POCT GLUCOSE: 143 MG/DL (ref 70–110)
POCT GLUCOSE: 147 MG/DL (ref 70–110)
POCT GLUCOSE: 147 MG/DL (ref 70–110)
POCT GLUCOSE: 148 MG/DL (ref 70–110)
POCT GLUCOSE: 149 MG/DL (ref 70–110)
POCT GLUCOSE: 149 MG/DL (ref 70–110)
POCT GLUCOSE: 154 MG/DL (ref 70–110)
POCT GLUCOSE: 159 MG/DL (ref 70–110)
POCT GLUCOSE: 162 MG/DL (ref 70–110)
POCT GLUCOSE: 164 MG/DL (ref 70–110)
POCT GLUCOSE: 165 MG/DL (ref 70–110)
POCT GLUCOSE: 167 MG/DL (ref 70–110)
POCT GLUCOSE: 170 MG/DL (ref 70–110)
POCT GLUCOSE: 173 MG/DL (ref 70–110)
POCT GLUCOSE: 175 MG/DL (ref 70–110)
POCT GLUCOSE: 177 MG/DL (ref 70–110)
POCT GLUCOSE: 184 MG/DL (ref 70–110)
POCT GLUCOSE: 185 MG/DL (ref 70–110)
POCT GLUCOSE: 186 MG/DL (ref 70–110)
POCT GLUCOSE: 188 MG/DL (ref 70–110)
POCT GLUCOSE: 192 MG/DL (ref 70–110)
POCT GLUCOSE: 194 MG/DL (ref 70–110)
POCT GLUCOSE: 195 MG/DL (ref 70–110)
POCT GLUCOSE: 195 MG/DL (ref 70–110)
POCT GLUCOSE: 196 MG/DL (ref 70–110)
POCT GLUCOSE: 201 MG/DL (ref 70–110)
POCT GLUCOSE: 205 MG/DL (ref 70–110)
POCT GLUCOSE: 207 MG/DL (ref 70–110)
POCT GLUCOSE: 222 MG/DL (ref 70–110)
POCT GLUCOSE: 235 MG/DL (ref 70–110)
POCT GLUCOSE: 269 MG/DL (ref 70–110)
POTASSIUM SERPL-SCNC: 3.1 MMOL/L (ref 3.5–5.1)
POTASSIUM SERPL-SCNC: 3.3 MMOL/L (ref 3.5–5.1)
POTASSIUM SERPL-SCNC: 3.3 MMOL/L (ref 3.5–5.1)
POTASSIUM SERPL-SCNC: 3.5 MMOL/L (ref 3.5–5.1)
POTASSIUM SERPL-SCNC: 3.6 MMOL/L (ref 3.5–5.1)
POTASSIUM SERPL-SCNC: 3.7 MMOL/L (ref 3.5–5.1)
POTASSIUM SERPL-SCNC: 3.7 MMOL/L (ref 3.5–5.1)
POTASSIUM SERPL-SCNC: 3.8 MMOL/L (ref 3.5–5.1)
POTASSIUM SERPL-SCNC: 3.8 MMOL/L (ref 3.5–5.1)
POTASSIUM SERPL-SCNC: 3.9 MMOL/L (ref 3.5–5.1)
POTASSIUM SERPL-SCNC: 4.1 MMOL/L (ref 3.5–5.1)
POTASSIUM SERPL-SCNC: 4.2 MMOL/L (ref 3.5–5.1)
POTASSIUM SERPL-SCNC: 4.3 MMOL/L (ref 3.5–5.1)
POTASSIUM SERPL-SCNC: 4.6 MMOL/L (ref 3.5–5.1)
POTASSIUM SERPL-SCNC: 4.9 MMOL/L (ref 3.5–5.1)
PROCALCITONIN SERPL IA-MCNC: 0.34 NG/ML
PROCALCITONIN SERPL IA-MCNC: 0.61 NG/ML
PROCALCITONIN SERPL IA-MCNC: 0.99 NG/ML
PROT SERPL-MCNC: 5.8 G/DL (ref 6–8.4)
PROT SERPL-MCNC: 6.7 G/DL (ref 6–8.4)
PROT SERPL-MCNC: 6.8 G/DL (ref 6–8.4)
PROT SERPL-MCNC: 6.9 G/DL (ref 6–8.4)
PROT SERPL-MCNC: 7 G/DL (ref 6–8.4)
PROT SERPL-MCNC: 7.1 G/DL (ref 6–8.4)
PROT SERPL-MCNC: 7.2 G/DL (ref 6–8.4)
PROT SERPL-MCNC: 7.2 G/DL (ref 6–8.4)
PROT SERPL-MCNC: 7.7 G/DL (ref 6–8.4)
PROT UR QL STRIP: ABNORMAL
PROTHROMBIN TIME: 11.3 SEC (ref 9–12.5)
PROTHROMBIN TIME: 11.5 SEC (ref 9–12.5)
PROTHROMBIN TIME: 12.8 SEC (ref 9–12.5)
RA MAJOR: 5.28 CM
RA PRESSURE: 15 MMHG
RA WIDTH: 4.96 CM
RBC # BLD AUTO: 3.25 M/UL (ref 4–5.4)
RBC # BLD AUTO: 3.28 M/UL (ref 4–5.4)
RBC # BLD AUTO: 3.3 M/UL (ref 4–5.4)
RBC # BLD AUTO: 3.32 M/UL (ref 4–5.4)
RBC # BLD AUTO: 3.34 M/UL (ref 4–5.4)
RBC # BLD AUTO: 3.35 M/UL (ref 4–5.4)
RBC # BLD AUTO: 3.38 M/UL (ref 4–5.4)
RBC # BLD AUTO: 3.43 M/UL (ref 4–5.4)
RBC # BLD AUTO: 3.6 M/UL (ref 4–5.4)
RBC # BLD AUTO: 3.64 M/UL (ref 4–5.4)
RBC # BLD AUTO: 3.77 M/UL (ref 4–5.4)
RBC # BLD AUTO: 3.79 M/UL (ref 4–5.4)
RBC # BLD AUTO: 4.11 M/UL (ref 4–5.4)
RBC # BLD AUTO: 4.23 M/UL (ref 4–5.4)
RBC #/AREA URNS AUTO: 11 /HPF (ref 0–4)
RESPIRATORY INFECTION PANEL SOURCE: NORMAL
RIGHT VENTRICULAR END-DIASTOLIC DIMENSION: 4.61 CM
RSV RNA NPH QL NAA+NON-PROBE: NOT DETECTED
RV TISSUE DOPPLER FREE WALL SYSTOLIC VELOCITY 1 (APICAL 4 CHAMBER VIEW): 6.04 CM/S
RV+EV RNA NPH QL NAA+NON-PROBE: NOT DETECTED
SAMPLE: ABNORMAL
SINUS: 3.46 CM
SITE: ABNORMAL
SODIUM SERPL-SCNC: 143 MMOL/L (ref 136–145)
SODIUM SERPL-SCNC: 143 MMOL/L (ref 136–145)
SODIUM SERPL-SCNC: 144 MMOL/L (ref 136–145)
SODIUM SERPL-SCNC: 145 MMOL/L (ref 136–145)
SODIUM SERPL-SCNC: 145 MMOL/L (ref 136–145)
SODIUM SERPL-SCNC: 147 MMOL/L (ref 136–145)
SODIUM SERPL-SCNC: 148 MMOL/L (ref 136–145)
SODIUM SERPL-SCNC: 148 MMOL/L (ref 136–145)
SODIUM SERPL-SCNC: 152 MMOL/L (ref 136–145)
SODIUM SERPL-SCNC: 154 MMOL/L (ref 136–145)
SODIUM SERPL-SCNC: 156 MMOL/L (ref 136–145)
SODIUM SERPL-SCNC: 158 MMOL/L (ref 136–145)
SODIUM SERPL-SCNC: 158 MMOL/L (ref 136–145)
SODIUM SERPL-SCNC: 159 MMOL/L (ref 136–145)
SODIUM UR-SCNC: 40 MMOL/L (ref 20–250)
SP GR UR STRIP: 1.02 (ref 1–1.03)
SP02: 100
SP02: 95
SP02: 96
SP02: 97
SP02: 98
SP02: 98
SP02: 99
SP02: 99
SPECIMEN SOURCE: NORMAL
SQUAMOUS #/AREA URNS AUTO: 4 /HPF
STJ: 3.01 CM
TB INDURATION 48 - 72 HR READ: 0 MM
TDI LATERAL: 0.07 M/S
TDI SEPTAL: 0.03 M/S
TDI: 0.05 M/S
TR MAX PG: 44 MMHG
TRICUSPID ANNULAR PLANE SYSTOLIC EXCURSION: 1.35 CM
TROPONIN I SERPL DL<=0.01 NG/ML-MCNC: 0.18 NG/ML (ref 0–0.03)
TROPONIN I SERPL DL<=0.01 NG/ML-MCNC: 0.52 NG/ML (ref 0–0.03)
TROPONIN I SERPL DL<=0.01 NG/ML-MCNC: 0.8 NG/ML (ref 0–0.03)
TROPONIN I SERPL DL<=0.01 NG/ML-MCNC: 0.92 NG/ML (ref 0–0.03)
TROPONIN I SERPL DL<=0.01 NG/ML-MCNC: 0.94 NG/ML (ref 0–0.03)
TROPONIN I SERPL DL<=0.01 NG/ML-MCNC: 0.97 NG/ML (ref 0–0.03)
TROPONIN I SERPL DL<=0.01 NG/ML-MCNC: 0.99 NG/ML (ref 0–0.03)
TV REST PULMONARY ARTERY PRESSURE: 59 MMHG
URN SPEC COLLECT METH UR: ABNORMAL
UUN UR-MCNC: 844 MG/DL (ref 140–1050)
VANCOMYCIN SERPL-MCNC: 10.6 UG/ML
VANCOMYCIN SERPL-MCNC: 15.7 UG/ML
VANCOMYCIN SERPL-MCNC: 16.9 UG/ML
VANCOMYCIN SERPL-MCNC: 20.6 UG/ML
VANCOMYCIN TROUGH SERPL-MCNC: 17.6 UG/ML (ref 10–22)
VANCOMYCIN TROUGH SERPL-MCNC: 18.4 UG/ML (ref 10–22)
VT: 360
WBC # BLD AUTO: 10.64 K/UL (ref 3.9–12.7)
WBC # BLD AUTO: 10.76 K/UL (ref 3.9–12.7)
WBC # BLD AUTO: 10.86 K/UL (ref 3.9–12.7)
WBC # BLD AUTO: 10.93 K/UL (ref 3.9–12.7)
WBC # BLD AUTO: 10.97 K/UL (ref 3.9–12.7)
WBC # BLD AUTO: 11.15 K/UL (ref 3.9–12.7)
WBC # BLD AUTO: 11.33 K/UL (ref 3.9–12.7)
WBC # BLD AUTO: 11.63 K/UL (ref 3.9–12.7)
WBC # BLD AUTO: 11.82 K/UL (ref 3.9–12.7)
WBC # BLD AUTO: 13.73 K/UL (ref 3.9–12.7)
WBC # BLD AUTO: 14.44 K/UL (ref 3.9–12.7)
WBC # BLD AUTO: 8.72 K/UL (ref 3.9–12.7)
WBC # BLD AUTO: 8.83 K/UL (ref 3.9–12.7)
WBC # BLD AUTO: 8.88 K/UL (ref 3.9–12.7)
WBC #/AREA URNS AUTO: 5 /HPF (ref 0–5)

## 2020-01-01 PROCEDURE — 99291 CRITICAL CARE FIRST HOUR: CPT | Mod: GC,,, | Performed by: PSYCHIATRY & NEUROLOGY

## 2020-01-01 PROCEDURE — 25000003 PHARM REV CODE 250: Performed by: NURSE PRACTITIONER

## 2020-01-01 PROCEDURE — 25000242 PHARM REV CODE 250 ALT 637 W/ HCPCS: Performed by: PHYSICIAN ASSISTANT

## 2020-01-01 PROCEDURE — 94640 AIRWAY INHALATION TREATMENT: CPT

## 2020-01-01 PROCEDURE — 27000221 HC OXYGEN, UP TO 24 HOURS

## 2020-01-01 PROCEDURE — 20000000 HC ICU ROOM

## 2020-01-01 PROCEDURE — 83735 ASSAY OF MAGNESIUM: CPT

## 2020-01-01 PROCEDURE — 99222 1ST HOSP IP/OBS MODERATE 55: CPT | Mod: ,,, | Performed by: EMERGENCY MEDICINE

## 2020-01-01 PROCEDURE — 85025 COMPLETE CBC W/AUTO DIFF WBC: CPT

## 2020-01-01 PROCEDURE — 87077 CULTURE AEROBIC IDENTIFY: CPT

## 2020-01-01 PROCEDURE — 31720 CLEARANCE OF AIRWAYS: CPT

## 2020-01-01 PROCEDURE — 25000242 PHARM REV CODE 250 ALT 637 W/ HCPCS: Performed by: STUDENT IN AN ORGANIZED HEALTH CARE EDUCATION/TRAINING PROGRAM

## 2020-01-01 PROCEDURE — 63600175 PHARM REV CODE 636 W HCPCS: Performed by: NURSE PRACTITIONER

## 2020-01-01 PROCEDURE — 95720 PR EEG, W/VIDEO, CONT RECORD, I&R, >12<26 HRS: ICD-10-PCS | Mod: ,,, | Performed by: PSYCHIATRY & NEUROLOGY

## 2020-01-01 PROCEDURE — 94761 N-INVAS EAR/PLS OXIMETRY MLT: CPT

## 2020-01-01 PROCEDURE — 63600175 PHARM REV CODE 636 W HCPCS: Performed by: PSYCHIATRY & NEUROLOGY

## 2020-01-01 PROCEDURE — 37799 UNLISTED PX VASCULAR SURGERY: CPT

## 2020-01-01 PROCEDURE — A4217 STERILE WATER/SALINE, 500 ML: HCPCS | Performed by: PSYCHIATRY & NEUROLOGY

## 2020-01-01 PROCEDURE — 97110 THERAPEUTIC EXERCISES: CPT

## 2020-01-01 PROCEDURE — 25000003 PHARM REV CODE 250: Performed by: INTERNAL MEDICINE

## 2020-01-01 PROCEDURE — 99222 PR INITIAL HOSPITAL CARE,LEVL II: ICD-10-PCS | Mod: ,,, | Performed by: EMERGENCY MEDICINE

## 2020-01-01 PROCEDURE — 63600175 PHARM REV CODE 636 W HCPCS: Performed by: PHYSICIAN ASSISTANT

## 2020-01-01 PROCEDURE — 99233 PR SUBSEQUENT HOSPITAL CARE,LEVL III: ICD-10-PCS | Mod: 25,,, | Performed by: NURSE PRACTITIONER

## 2020-01-01 PROCEDURE — 87205 SMEAR GRAM STAIN: CPT

## 2020-01-01 PROCEDURE — 82803 BLOOD GASES ANY COMBINATION: CPT

## 2020-01-01 PROCEDURE — 85610 PROTHROMBIN TIME: CPT

## 2020-01-01 PROCEDURE — 63600175 PHARM REV CODE 636 W HCPCS: Performed by: STUDENT IN AN ORGANIZED HEALTH CARE EDUCATION/TRAINING PROGRAM

## 2020-01-01 PROCEDURE — 99232 PR SUBSEQUENT HOSPITAL CARE,LEVL II: ICD-10-PCS | Mod: ,,, | Performed by: NURSE PRACTITIONER

## 2020-01-01 PROCEDURE — 99233 SBSQ HOSP IP/OBS HIGH 50: CPT | Mod: 25,,, | Performed by: NURSE PRACTITIONER

## 2020-01-01 PROCEDURE — 94668 MNPJ CHEST WALL SBSQ: CPT

## 2020-01-01 PROCEDURE — 25000003 PHARM REV CODE 250: Performed by: STUDENT IN AN ORGANIZED HEALTH CARE EDUCATION/TRAINING PROGRAM

## 2020-01-01 PROCEDURE — 84100 ASSAY OF PHOSPHORUS: CPT

## 2020-01-01 PROCEDURE — 95711 VEEG 2-12 HR UNMONITORED: CPT

## 2020-01-01 PROCEDURE — 87070 CULTURE OTHR SPECIMN AEROBIC: CPT

## 2020-01-01 PROCEDURE — 99900035 HC TECH TIME PER 15 MIN (STAT)

## 2020-01-01 PROCEDURE — 99291 PR CRITICAL CARE, E/M 30-74 MINUTES: ICD-10-PCS | Mod: GC,,, | Performed by: PSYCHIATRY & NEUROLOGY

## 2020-01-01 PROCEDURE — 36415 COLL VENOUS BLD VENIPUNCTURE: CPT

## 2020-01-01 PROCEDURE — 25000242 PHARM REV CODE 250 ALT 637 W/ HCPCS: Performed by: NURSE PRACTITIONER

## 2020-01-01 PROCEDURE — 99900026 HC AIRWAY MAINTENANCE (STAT)

## 2020-01-01 PROCEDURE — 85730 THROMBOPLASTIN TIME PARTIAL: CPT

## 2020-01-01 PROCEDURE — 95718 EEG PHYS/QHP 2-12 HR W/VEEG: CPT | Mod: ,,, | Performed by: PSYCHIATRY & NEUROLOGY

## 2020-01-01 PROCEDURE — 99233 PR SUBSEQUENT HOSPITAL CARE,LEVL III: ICD-10-PCS | Mod: ,,, | Performed by: PSYCHIATRY & NEUROLOGY

## 2020-01-01 PROCEDURE — 11000001 HC ACUTE MED/SURG PRIVATE ROOM

## 2020-01-01 PROCEDURE — 87040 BLOOD CULTURE FOR BACTERIA: CPT

## 2020-01-01 PROCEDURE — 99233 PR SUBSEQUENT HOSPITAL CARE,LEVL III: ICD-10-PCS | Mod: ,,, | Performed by: EMERGENCY MEDICINE

## 2020-01-01 PROCEDURE — 80053 COMPREHEN METABOLIC PANEL: CPT

## 2020-01-01 PROCEDURE — 25000003 PHARM REV CODE 250: Performed by: PSYCHIATRY & NEUROLOGY

## 2020-01-01 PROCEDURE — 99233 SBSQ HOSP IP/OBS HIGH 50: CPT | Mod: ,,, | Performed by: PSYCHIATRY & NEUROLOGY

## 2020-01-01 PROCEDURE — 95813 PR EEG, EXTENDED, 61-119 MINS: ICD-10-PCS | Mod: 26,,, | Performed by: PSYCHIATRY & NEUROLOGY

## 2020-01-01 PROCEDURE — 99238 PR HOSPITAL DISCHARGE DAY,<30 MIN: ICD-10-PCS | Mod: ,,, | Performed by: PHYSICIAN ASSISTANT

## 2020-01-01 PROCEDURE — 99222 PR INITIAL HOSPITAL CARE,LEVL II: ICD-10-PCS | Mod: ,,, | Performed by: NURSE PRACTITIONER

## 2020-01-01 PROCEDURE — 84484 ASSAY OF TROPONIN QUANT: CPT

## 2020-01-01 PROCEDURE — 97535 SELF CARE MNGMENT TRAINING: CPT

## 2020-01-01 PROCEDURE — 94667 MNPJ CHEST WALL 1ST: CPT

## 2020-01-01 PROCEDURE — 84100 ASSAY OF PHOSPHORUS: CPT | Mod: 91

## 2020-01-01 PROCEDURE — 63600175 PHARM REV CODE 636 W HCPCS

## 2020-01-01 PROCEDURE — 85025 COMPLETE CBC W/AUTO DIFF WBC: CPT | Mod: 91

## 2020-01-01 PROCEDURE — 95720 EEG PHY/QHP EA INCR W/VEEG: CPT | Mod: ,,, | Performed by: PSYCHIATRY & NEUROLOGY

## 2020-01-01 PROCEDURE — 25000003 PHARM REV CODE 250: Performed by: PHYSICIAN ASSISTANT

## 2020-01-01 PROCEDURE — 93880 EXTRACRANIAL BILAT STUDY: CPT | Performed by: SURGERY

## 2020-01-01 PROCEDURE — 99292 CRITICAL CARE ADDL 30 MIN: CPT | Mod: GC,,, | Performed by: ANESTHESIOLOGY

## 2020-01-01 PROCEDURE — 84145 PROCALCITONIN (PCT): CPT

## 2020-01-01 PROCEDURE — 99232 PR SUBSEQUENT HOSPITAL CARE,LEVL II: ICD-10-PCS | Mod: ,,, | Performed by: INTERNAL MEDICINE

## 2020-01-01 PROCEDURE — A4217 STERILE WATER/SALINE, 500 ML: HCPCS | Performed by: STUDENT IN AN ORGANIZED HEALTH CARE EDUCATION/TRAINING PROGRAM

## 2020-01-01 PROCEDURE — 94002 VENT MGMT INPAT INIT DAY: CPT

## 2020-01-01 PROCEDURE — 97802 MEDICAL NUTRITION INDIV IN: CPT

## 2020-01-01 PROCEDURE — 80202 ASSAY OF VANCOMYCIN: CPT

## 2020-01-01 PROCEDURE — 80048 BASIC METABOLIC PNL TOTAL CA: CPT

## 2020-01-01 PROCEDURE — 99232 SBSQ HOSP IP/OBS MODERATE 35: CPT | Mod: ,,, | Performed by: INTERNAL MEDICINE

## 2020-01-01 PROCEDURE — 99222 1ST HOSP IP/OBS MODERATE 55: CPT | Mod: GC,,, | Performed by: INTERNAL MEDICINE

## 2020-01-01 PROCEDURE — 99222 1ST HOSP IP/OBS MODERATE 55: CPT | Mod: ,,, | Performed by: NURSE PRACTITIONER

## 2020-01-01 PROCEDURE — 82570 ASSAY OF URINE CREATININE: CPT

## 2020-01-01 PROCEDURE — 94003 VENT MGMT INPAT SUBQ DAY: CPT

## 2020-01-01 PROCEDURE — 99233 SBSQ HOSP IP/OBS HIGH 50: CPT | Mod: GC,,, | Performed by: PSYCHIATRY & NEUROLOGY

## 2020-01-01 PROCEDURE — 84484 ASSAY OF TROPONIN QUANT: CPT | Mod: 91

## 2020-01-01 PROCEDURE — 63600175 PHARM REV CODE 636 W HCPCS: Performed by: INTERNAL MEDICINE

## 2020-01-01 PROCEDURE — 82550 ASSAY OF CK (CPK): CPT

## 2020-01-01 PROCEDURE — 86580 TB INTRADERMAL TEST: CPT | Performed by: STUDENT IN AN ORGANIZED HEALTH CARE EDUCATION/TRAINING PROGRAM

## 2020-01-01 PROCEDURE — 99233 PR SUBSEQUENT HOSPITAL CARE,LEVL III: ICD-10-PCS | Mod: GC,,, | Performed by: PSYCHIATRY & NEUROLOGY

## 2020-01-01 PROCEDURE — 95718 PR EEG, W/VIDEO, CONT RECORD, I&R, 2-12 HRS: ICD-10-PCS | Mod: ,,, | Performed by: PSYCHIATRY & NEUROLOGY

## 2020-01-01 PROCEDURE — C1751 CATH, INF, PER/CENT/MIDLINE: HCPCS

## 2020-01-01 PROCEDURE — 82800 BLOOD PH: CPT

## 2020-01-01 PROCEDURE — 99232 SBSQ HOSP IP/OBS MODERATE 35: CPT | Mod: ,,, | Performed by: NURSE PRACTITIONER

## 2020-01-01 PROCEDURE — 99232 SBSQ HOSP IP/OBS MODERATE 35: CPT | Mod: GC,,, | Performed by: INTERNAL MEDICINE

## 2020-01-01 PROCEDURE — 99291 PR CRITICAL CARE, E/M 30-74 MINUTES: ICD-10-PCS | Mod: GC,,, | Performed by: ANESTHESIOLOGY

## 2020-01-01 PROCEDURE — 95813 EEG EXTND MNTR 61-119 MIN: CPT | Mod: 26,,, | Performed by: PSYCHIATRY & NEUROLOGY

## 2020-01-01 PROCEDURE — 36600 WITHDRAWAL OF ARTERIAL BLOOD: CPT

## 2020-01-01 PROCEDURE — 99223 PR INITIAL HOSPITAL CARE,LEVL III: ICD-10-PCS | Mod: ,,, | Performed by: NURSE PRACTITIONER

## 2020-01-01 PROCEDURE — 99223 1ST HOSP IP/OBS HIGH 75: CPT | Mod: ,,, | Performed by: NURSE PRACTITIONER

## 2020-01-01 PROCEDURE — 84295 ASSAY OF SERUM SODIUM: CPT

## 2020-01-01 PROCEDURE — 84300 ASSAY OF URINE SODIUM: CPT

## 2020-01-01 PROCEDURE — 97164 PT RE-EVAL EST PLAN CARE: CPT

## 2020-01-01 PROCEDURE — 85730 THROMBOPLASTIN TIME PARTIAL: CPT | Mod: 91

## 2020-01-01 PROCEDURE — 99231 SBSQ HOSP IP/OBS SF/LOW 25: CPT | Mod: ,,, | Performed by: EMERGENCY MEDICINE

## 2020-01-01 PROCEDURE — 97168 OT RE-EVAL EST PLAN CARE: CPT

## 2020-01-01 PROCEDURE — 83880 ASSAY OF NATRIURETIC PEPTIDE: CPT

## 2020-01-01 PROCEDURE — 36620 INSERTION CATHETER ARTERY: CPT

## 2020-01-01 PROCEDURE — 99292 PR CRITICAL CARE, ADDL 30 MIN: ICD-10-PCS | Mod: GC,,, | Performed by: ANESTHESIOLOGY

## 2020-01-01 PROCEDURE — 99231 PR SUBSEQUENT HOSPITAL CARE,LEVL I: ICD-10-PCS | Mod: ,,, | Performed by: EMERGENCY MEDICINE

## 2020-01-01 PROCEDURE — 95700 EEG CONT REC W/VID EEG TECH: CPT

## 2020-01-01 PROCEDURE — 84540 ASSAY OF URINE/UREA-N: CPT

## 2020-01-01 PROCEDURE — 51798 US URINE CAPACITY MEASURE: CPT

## 2020-01-01 PROCEDURE — 81001 URINALYSIS AUTO W/SCOPE: CPT

## 2020-01-01 PROCEDURE — 36620 INSERTION CATHETER ARTERY: CPT | Mod: ,,, | Performed by: NURSE PRACTITIONER

## 2020-01-01 PROCEDURE — 84132 ASSAY OF SERUM POTASSIUM: CPT

## 2020-01-01 PROCEDURE — 97530 THERAPEUTIC ACTIVITIES: CPT

## 2020-01-01 PROCEDURE — 87186 SC STD MICRODIL/AGAR DIL: CPT

## 2020-01-01 PROCEDURE — 84295 ASSAY OF SERUM SODIUM: CPT | Mod: 91

## 2020-01-01 PROCEDURE — 99232 PR SUBSEQUENT HOSPITAL CARE,LEVL II: ICD-10-PCS | Mod: GC,,, | Performed by: INTERNAL MEDICINE

## 2020-01-01 PROCEDURE — 25000003 PHARM REV CODE 250

## 2020-01-01 PROCEDURE — 99291 CRITICAL CARE FIRST HOUR: CPT | Mod: GC,,, | Performed by: ANESTHESIOLOGY

## 2020-01-01 PROCEDURE — 31500 INSERT EMERGENCY AIRWAY: CPT

## 2020-01-01 PROCEDURE — 82553 CREATINE MB FRACTION: CPT

## 2020-01-01 PROCEDURE — 87502 INFLUENZA DNA AMP PROBE: CPT

## 2020-01-01 PROCEDURE — 87633 RESP VIRUS 12-25 TARGETS: CPT

## 2020-01-01 PROCEDURE — 76937 US GUIDE VASCULAR ACCESS: CPT

## 2020-01-01 PROCEDURE — 30200315 PPD INTRADERMAL TEST REV CODE 302: Performed by: STUDENT IN AN ORGANIZED HEALTH CARE EDUCATION/TRAINING PROGRAM

## 2020-01-01 PROCEDURE — 99222 PR INITIAL HOSPITAL CARE,LEVL II: ICD-10-PCS | Mod: GC,,, | Performed by: INTERNAL MEDICINE

## 2020-01-01 PROCEDURE — 51702 INSERT TEMP BLADDER CATH: CPT

## 2020-01-01 PROCEDURE — 99238 HOSP IP/OBS DSCHRG MGMT 30/<: CPT | Mod: ,,, | Performed by: PHYSICIAN ASSISTANT

## 2020-01-01 PROCEDURE — 99900017 HC EXTUBATION W/PARAMETERS (STAT)

## 2020-01-01 PROCEDURE — 95714 VEEG EA 12-26 HR UNMNTR: CPT

## 2020-01-01 PROCEDURE — 95708 EEG WO VID EA 12-26HR UNMNTR: CPT

## 2020-01-01 PROCEDURE — 36620 ARTERIAL LINE: ICD-10-PCS | Mod: ,,, | Performed by: NURSE PRACTITIONER

## 2020-01-01 PROCEDURE — 82140 ASSAY OF AMMONIA: CPT

## 2020-01-01 PROCEDURE — 36410 VNPNXR 3YR/> PHY/QHP DX/THER: CPT

## 2020-01-01 PROCEDURE — 99233 SBSQ HOSP IP/OBS HIGH 50: CPT | Mod: ,,, | Performed by: EMERGENCY MEDICINE

## 2020-01-01 PROCEDURE — 83605 ASSAY OF LACTIC ACID: CPT

## 2020-01-01 PROCEDURE — 92610 EVALUATE SWALLOWING FUNCTION: CPT

## 2020-01-01 PROCEDURE — 97165 OT EVAL LOW COMPLEX 30 MIN: CPT

## 2020-01-01 RX ORDER — MAGNESIUM SULFATE HEPTAHYDRATE 40 MG/ML
4 INJECTION, SOLUTION INTRAVENOUS
Status: DISCONTINUED | OUTPATIENT
Start: 2020-01-01 | End: 2020-01-01

## 2020-01-01 RX ORDER — LEVALBUTEROL 1.25 MG/.5ML
1.25 SOLUTION, CONCENTRATE RESPIRATORY (INHALATION) EVERY 8 HOURS
Status: DISCONTINUED | OUTPATIENT
Start: 2020-01-01 | End: 2020-01-01

## 2020-01-01 RX ORDER — ROCURONIUM BROMIDE 10 MG/ML
INJECTION, SOLUTION INTRAVENOUS
Status: COMPLETED
Start: 2020-01-01 | End: 2020-01-01

## 2020-01-01 RX ORDER — DOXEPIN HYDROCHLORIDE 10 MG/1
10 CAPSULE ORAL NIGHTLY
Status: DISCONTINUED | OUTPATIENT
Start: 2020-01-01 | End: 2020-01-01

## 2020-01-01 RX ORDER — GUAIFENESIN 100 MG/5ML
10 SOLUTION ORAL 3 TIMES DAILY
Status: DISCONTINUED | OUTPATIENT
Start: 2020-01-01 | End: 2020-01-01

## 2020-01-01 RX ORDER — HYDRALAZINE HYDROCHLORIDE 20 MG/ML
10 INJECTION INTRAMUSCULAR; INTRAVENOUS ONCE
Status: COMPLETED | OUTPATIENT
Start: 2020-01-01 | End: 2020-01-01

## 2020-01-01 RX ORDER — PROPOFOL 10 MG/ML
5 INJECTION, EMULSION INTRAVENOUS CONTINUOUS
Status: DISCONTINUED | OUTPATIENT
Start: 2020-01-01 | End: 2020-01-01

## 2020-01-01 RX ORDER — FUROSEMIDE 10 MG/ML
20 INJECTION INTRAMUSCULAR; INTRAVENOUS ONCE
Status: COMPLETED | OUTPATIENT
Start: 2020-01-01 | End: 2020-01-01

## 2020-01-01 RX ORDER — ONDANSETRON 4 MG/1
4 TABLET, ORALLY DISINTEGRATING ORAL EVERY 6 HOURS PRN
Status: DISCONTINUED | OUTPATIENT
Start: 2020-01-01 | End: 2020-01-01

## 2020-01-01 RX ORDER — METOPROLOL TARTRATE 50 MG/1
100 TABLET ORAL NIGHTLY
Status: DISCONTINUED | OUTPATIENT
Start: 2020-01-01 | End: 2020-01-01

## 2020-01-01 RX ORDER — CLINDAMYCIN PHOSPHATE 600 MG/50ML
600 INJECTION, SOLUTION INTRAVENOUS
Status: DISCONTINUED | OUTPATIENT
Start: 2020-01-01 | End: 2020-01-01

## 2020-01-01 RX ORDER — ATORVASTATIN CALCIUM 20 MG/1
80 TABLET, FILM COATED ORAL DAILY
Status: DISCONTINUED | OUTPATIENT
Start: 2020-01-01 | End: 2020-01-01

## 2020-01-01 RX ORDER — LABETALOL HCL 20 MG/4 ML
10 SYRINGE (ML) INTRAVENOUS EVERY 6 HOURS PRN
Status: DISCONTINUED | OUTPATIENT
Start: 2020-01-01 | End: 2020-01-01

## 2020-01-01 RX ORDER — IPRATROPIUM BROMIDE AND ALBUTEROL SULFATE 2.5; .5 MG/3ML; MG/3ML
3 SOLUTION RESPIRATORY (INHALATION) EVERY 4 HOURS
Status: DISCONTINUED | OUTPATIENT
Start: 2020-01-01 | End: 2020-01-01

## 2020-01-01 RX ORDER — HEPARIN SODIUM 5000 [USP'U]/ML
5000 INJECTION, SOLUTION INTRAVENOUS; SUBCUTANEOUS EVERY 8 HOURS
Status: DISCONTINUED | OUTPATIENT
Start: 2020-01-01 | End: 2020-01-01

## 2020-01-01 RX ORDER — POTASSIUM CHLORIDE 7.45 MG/ML
40 INJECTION INTRAVENOUS
Status: DISCONTINUED | OUTPATIENT
Start: 2020-01-01 | End: 2020-01-01

## 2020-01-01 RX ORDER — CHLORHEXIDINE GLUCONATE ORAL RINSE 1.2 MG/ML
15 SOLUTION DENTAL 2 TIMES DAILY
Status: DISCONTINUED | OUTPATIENT
Start: 2020-01-01 | End: 2020-01-01

## 2020-01-01 RX ORDER — MORPHINE SULFATE 1 MG/ML
1 INJECTION, SOLUTION INTRAVENOUS CONTINUOUS
Status: DISCONTINUED | OUTPATIENT
Start: 2020-01-01 | End: 2020-01-01 | Stop reason: HOSPADM

## 2020-01-01 RX ORDER — SODIUM CHLORIDE FOR INHALATION 3 %
4 VIAL, NEBULIZER (ML) INHALATION EVERY 4 HOURS
Status: DISCONTINUED | OUTPATIENT
Start: 2020-01-01 | End: 2020-01-01

## 2020-01-01 RX ORDER — MIRTAZAPINE 7.5 MG/1
7.5 TABLET, FILM COATED ORAL NIGHTLY
Status: DISCONTINUED | OUTPATIENT
Start: 2020-01-01 | End: 2020-01-01

## 2020-01-01 RX ORDER — FUROSEMIDE 10 MG/ML
60 INJECTION INTRAMUSCULAR; INTRAVENOUS ONCE
Status: COMPLETED | OUTPATIENT
Start: 2020-01-01 | End: 2020-01-01

## 2020-01-01 RX ORDER — POTASSIUM CHLORIDE 7.45 MG/ML
10 INJECTION INTRAVENOUS
Status: DISPENSED | OUTPATIENT
Start: 2020-01-01 | End: 2020-01-01

## 2020-01-01 RX ORDER — LEVOFLOXACIN 5 MG/ML
750 INJECTION, SOLUTION INTRAVENOUS
Status: DISCONTINUED | OUTPATIENT
Start: 2020-01-01 | End: 2020-01-01

## 2020-01-01 RX ORDER — ETOMIDATE 2 MG/ML
INJECTION INTRAVENOUS
Status: COMPLETED
Start: 2020-01-01 | End: 2020-01-01

## 2020-01-01 RX ORDER — VANCOMYCIN HCL IN 5 % DEXTROSE 1G/250ML
1000 PLASTIC BAG, INJECTION (ML) INTRAVENOUS
Status: DISCONTINUED | OUTPATIENT
Start: 2020-01-01 | End: 2020-01-01

## 2020-01-01 RX ORDER — SODIUM,POTASSIUM PHOSPHATES 280-250MG
2 POWDER IN PACKET (EA) ORAL
Status: DISCONTINUED | OUTPATIENT
Start: 2020-01-01 | End: 2020-01-01

## 2020-01-01 RX ORDER — LORAZEPAM 2 MG/ML
0.5 INJECTION INTRAMUSCULAR
Status: DISCONTINUED | OUTPATIENT
Start: 2020-01-01 | End: 2020-01-01 | Stop reason: HOSPADM

## 2020-01-01 RX ORDER — ACETAMINOPHEN 325 MG/1
650 TABLET ORAL EVERY 6 HOURS PRN
Status: DISCONTINUED | OUTPATIENT
Start: 2020-01-01 | End: 2020-01-01

## 2020-01-01 RX ORDER — METOPROLOL TARTRATE 50 MG/1
50 TABLET ORAL DAILY
Status: DISCONTINUED | OUTPATIENT
Start: 2020-01-01 | End: 2020-01-01

## 2020-01-01 RX ORDER — ETOMIDATE 2 MG/ML
14 INJECTION INTRAVENOUS ONCE
Status: COMPLETED | OUTPATIENT
Start: 2020-01-01 | End: 2020-01-01

## 2020-01-01 RX ORDER — VANCOMYCIN HCL IN 5 % DEXTROSE 1G/250ML
1000 PLASTIC BAG, INJECTION (ML) INTRAVENOUS ONCE
Status: COMPLETED | OUTPATIENT
Start: 2020-01-01 | End: 2020-01-01

## 2020-01-01 RX ORDER — ATORVASTATIN CALCIUM 20 MG/1
40 TABLET, FILM COATED ORAL DAILY
Status: DISCONTINUED | OUTPATIENT
Start: 2020-01-01 | End: 2020-01-01

## 2020-01-01 RX ORDER — LANOLIN ALCOHOL/MO/W.PET/CERES
800 CREAM (GRAM) TOPICAL
Status: DISCONTINUED | OUTPATIENT
Start: 2020-01-01 | End: 2020-01-01

## 2020-01-01 RX ORDER — MAGNESIUM SULFATE HEPTAHYDRATE 40 MG/ML
2 INJECTION, SOLUTION INTRAVENOUS
Status: DISCONTINUED | OUTPATIENT
Start: 2020-01-01 | End: 2020-01-01

## 2020-01-01 RX ORDER — MAGNESIUM SULFATE HEPTAHYDRATE 40 MG/ML
2 INJECTION, SOLUTION INTRAVENOUS
Status: COMPLETED | OUTPATIENT
Start: 2020-01-01 | End: 2020-01-01

## 2020-01-01 RX ORDER — AMOXICILLIN 250 MG
1 CAPSULE ORAL DAILY PRN
Status: DISCONTINUED | OUTPATIENT
Start: 2020-01-01 | End: 2020-01-01

## 2020-01-01 RX ORDER — ROCURONIUM BROMIDE 10 MG/ML
INJECTION, SOLUTION INTRAVENOUS
Status: DISPENSED
Start: 2020-01-01 | End: 2020-01-01

## 2020-01-01 RX ORDER — HEPARIN SODIUM,PORCINE/D5W 25000/250
12 INTRAVENOUS SOLUTION INTRAVENOUS CONTINUOUS
Status: DISPENSED | OUTPATIENT
Start: 2020-01-01 | End: 2020-01-01

## 2020-01-01 RX ORDER — POTASSIUM CHLORIDE 7.45 MG/ML
10 INJECTION INTRAVENOUS ONCE
Status: COMPLETED | OUTPATIENT
Start: 2020-01-01 | End: 2020-01-01

## 2020-01-01 RX ORDER — LEVOTHYROXINE SODIUM 25 UG/1
25 TABLET ORAL
Status: DISCONTINUED | OUTPATIENT
Start: 2020-01-01 | End: 2020-01-01

## 2020-01-01 RX ORDER — METOPROLOL TARTRATE 25 MG/1
50 TABLET, FILM COATED ORAL 2 TIMES DAILY
Status: DISCONTINUED | OUTPATIENT
Start: 2020-01-01 | End: 2020-01-01

## 2020-01-01 RX ORDER — POTASSIUM CHLORIDE 20 MEQ/15ML
40 SOLUTION ORAL
Status: DISCONTINUED | OUTPATIENT
Start: 2020-01-01 | End: 2020-01-01

## 2020-01-01 RX ORDER — SODIUM CHLORIDE 9 MG/ML
INJECTION, SOLUTION INTRAVENOUS CONTINUOUS
Status: ACTIVE | OUTPATIENT
Start: 2020-01-01 | End: 2020-01-01

## 2020-01-01 RX ORDER — FAMOTIDINE 20 MG/1
20 TABLET, FILM COATED ORAL DAILY
Status: DISCONTINUED | OUTPATIENT
Start: 2020-01-01 | End: 2020-01-01

## 2020-01-01 RX ORDER — SODIUM CHLORIDE 9 MG/ML
INJECTION, SOLUTION INTRAVENOUS CONTINUOUS
Status: DISCONTINUED | OUTPATIENT
Start: 2020-01-01 | End: 2020-01-01

## 2020-01-01 RX ORDER — PROPOFOL 10 MG/ML
INJECTION, EMULSION INTRAVENOUS
Status: COMPLETED
Start: 2020-01-01 | End: 2020-01-01

## 2020-01-01 RX ORDER — POTASSIUM CHLORIDE 20 MEQ/15ML
60 SOLUTION ORAL
Status: DISCONTINUED | OUTPATIENT
Start: 2020-01-01 | End: 2020-01-01

## 2020-01-01 RX ORDER — PHENYLEPHRINE HCL IN 0.9% NACL 1 MG/10 ML
SYRINGE (ML) INTRAVENOUS
Status: DISPENSED
Start: 2020-01-01 | End: 2020-01-01

## 2020-01-01 RX ORDER — AMOXICILLIN 250 MG
1 CAPSULE ORAL DAILY
Status: DISCONTINUED | OUTPATIENT
Start: 2020-01-01 | End: 2020-01-01

## 2020-01-01 RX ORDER — FAMOTIDINE 20 MG/1
20 TABLET, FILM COATED ORAL 2 TIMES DAILY
Status: DISCONTINUED | OUTPATIENT
Start: 2020-01-01 | End: 2020-01-01

## 2020-01-01 RX ORDER — DEXMEDETOMIDINE HYDROCHLORIDE 4 UG/ML
0.2 INJECTION, SOLUTION INTRAVENOUS CONTINUOUS
Status: DISCONTINUED | OUTPATIENT
Start: 2020-01-01 | End: 2020-01-01

## 2020-01-01 RX ORDER — IPRATROPIUM BROMIDE 0.5 MG/2.5ML
0.5 SOLUTION RESPIRATORY (INHALATION) EVERY 4 HOURS
Status: DISCONTINUED | OUTPATIENT
Start: 2020-01-01 | End: 2020-01-01 | Stop reason: ALTCHOICE

## 2020-01-01 RX ORDER — GABAPENTIN 300 MG/1
300 CAPSULE ORAL 3 TIMES DAILY PRN
Status: DISCONTINUED | OUTPATIENT
Start: 2020-01-01 | End: 2020-01-01

## 2020-01-01 RX ORDER — ROCURONIUM BROMIDE 10 MG/ML
80 INJECTION, SOLUTION INTRAVENOUS ONCE
Status: COMPLETED | OUTPATIENT
Start: 2020-01-01 | End: 2020-01-01

## 2020-01-01 RX ORDER — VANCOMYCIN HCL IN 5 % DEXTROSE 1G/250ML
1000 PLASTIC BAG, INJECTION (ML) INTRAVENOUS ONCE
Status: DISCONTINUED | OUTPATIENT
Start: 2020-01-01 | End: 2020-01-01

## 2020-01-01 RX ADMIN — PIPERACILLIN AND TAZOBACTAM 4.5 G: 4; .5 INJECTION, POWDER, FOR SOLUTION INTRAVENOUS at 06:01

## 2020-01-01 RX ADMIN — LEVALBUTEROL 1.25 MG: 1.25 SOLUTION, CONCENTRATE RESPIRATORY (INHALATION) at 08:01

## 2020-01-01 RX ADMIN — METOPROLOL TARTRATE 100 MG: 100 TABLET ORAL at 08:01

## 2020-01-01 RX ADMIN — APIXABAN 2.5 MG: 2.5 TABLET, FILM COATED ORAL at 09:01

## 2020-01-01 RX ADMIN — SODIUM CHLORIDE: 234 INJECTION INTRAMUSCULAR; INTRAVENOUS; SUBCUTANEOUS at 01:01

## 2020-01-01 RX ADMIN — IPRATROPIUM BROMIDE 0.5 MG: 0.5 SOLUTION RESPIRATORY (INHALATION) at 08:01

## 2020-01-01 RX ADMIN — CHLORHEXIDINE GLUCONATE 0.12% ORAL RINSE 15 ML: 1.2 LIQUID ORAL at 08:01

## 2020-01-01 RX ADMIN — METOPROLOL TARTRATE 100 MG: 100 TABLET ORAL at 10:01

## 2020-01-01 RX ADMIN — IPRATROPIUM BROMIDE 0.5 MG: 0.5 SOLUTION RESPIRATORY (INHALATION) at 04:01

## 2020-01-01 RX ADMIN — ATORVASTATIN CALCIUM 40 MG: 20 TABLET, FILM COATED ORAL at 02:01

## 2020-01-01 RX ADMIN — METOPROLOL TARTRATE 50 MG: 50 TABLET ORAL at 08:01

## 2020-01-01 RX ADMIN — IPRATROPIUM BROMIDE AND ALBUTEROL SULFATE 3 ML: .5; 3 SOLUTION RESPIRATORY (INHALATION) at 03:01

## 2020-01-01 RX ADMIN — HEPARIN SODIUM 5000 UNITS: 5000 INJECTION, SOLUTION INTRAVENOUS; SUBCUTANEOUS at 09:01

## 2020-01-01 RX ADMIN — ALLOPURINOL 150 MG: 100 TABLET ORAL at 08:01

## 2020-01-01 RX ADMIN — VANCOMYCIN HYDROCHLORIDE 750 MG: 750 INJECTION, POWDER, LYOPHILIZED, FOR SOLUTION INTRAVENOUS at 04:01

## 2020-01-01 RX ADMIN — INSULIN ASPART 2 UNITS: 100 INJECTION, SOLUTION INTRAVENOUS; SUBCUTANEOUS at 05:01

## 2020-01-01 RX ADMIN — MAGNESIUM SULFATE IN WATER 2 G: 40 INJECTION, SOLUTION INTRAVENOUS at 08:01

## 2020-01-01 RX ADMIN — LEVALBUTEROL 1.25 MG: 1.25 SOLUTION, CONCENTRATE RESPIRATORY (INHALATION) at 01:01

## 2020-01-01 RX ADMIN — CHLORHEXIDINE GLUCONATE 0.12% ORAL RINSE 15 ML: 1.2 LIQUID ORAL at 09:01

## 2020-01-01 RX ADMIN — ATORVASTATIN CALCIUM 80 MG: 20 TABLET, FILM COATED ORAL at 09:01

## 2020-01-01 RX ADMIN — INSULIN ASPART 2 UNITS: 100 INJECTION, SOLUTION INTRAVENOUS; SUBCUTANEOUS at 06:01

## 2020-01-01 RX ADMIN — METOPROLOL TARTRATE 50 MG: 50 TABLET ORAL at 09:01

## 2020-01-01 RX ADMIN — FUROSEMIDE 20 MG: 10 INJECTION, SOLUTION INTRAMUSCULAR; INTRAVENOUS at 12:01

## 2020-01-01 RX ADMIN — FAMOTIDINE 20 MG: 20 TABLET ORAL at 08:01

## 2020-01-01 RX ADMIN — ALLOPURINOL 150 MG: 100 TABLET ORAL at 09:01

## 2020-01-01 RX ADMIN — PIPERACILLIN AND TAZOBACTAM 4.5 G: 4; .5 INJECTION, POWDER, FOR SOLUTION INTRAVENOUS at 11:01

## 2020-01-01 RX ADMIN — PIPERACILLIN AND TAZOBACTAM 4.5 G: 4; .5 INJECTION, POWDER, FOR SOLUTION INTRAVENOUS at 09:01

## 2020-01-01 RX ADMIN — GUAIFENESIN 10 ML: 200 SOLUTION ORAL at 08:01

## 2020-01-01 RX ADMIN — PROPOFOL 5 MCG/KG/MIN: 10 INJECTION, EMULSION INTRAVENOUS at 08:01

## 2020-01-01 RX ADMIN — IPRATROPIUM BROMIDE AND ALBUTEROL SULFATE 3 ML: .5; 3 SOLUTION RESPIRATORY (INHALATION) at 11:01

## 2020-01-01 RX ADMIN — CHLORHEXIDINE GLUCONATE 0.12% ORAL RINSE 15 ML: 1.2 LIQUID ORAL at 11:01

## 2020-01-01 RX ADMIN — ATORVASTATIN CALCIUM 40 MG: 20 TABLET, FILM COATED ORAL at 09:01

## 2020-01-01 RX ADMIN — POTASSIUM CHLORIDE 10 MEQ: 7.46 INJECTION, SOLUTION INTRAVENOUS at 12:01

## 2020-01-01 RX ADMIN — SODIUM CHLORIDE: 234 INJECTION INTRAMUSCULAR; INTRAVENOUS; SUBCUTANEOUS at 07:01

## 2020-01-01 RX ADMIN — LEVOTHYROXINE SODIUM 25 MCG: 25 TABLET ORAL at 06:01

## 2020-01-01 RX ADMIN — PIPERACILLIN AND TAZOBACTAM 4.5 G: 4; .5 INJECTION, POWDER, FOR SOLUTION INTRAVENOUS at 02:01

## 2020-01-01 RX ADMIN — HEPARIN SODIUM 5000 UNITS: 5000 INJECTION, SOLUTION INTRAVENOUS; SUBCUTANEOUS at 06:01

## 2020-01-01 RX ADMIN — LEVOTHYROXINE SODIUM 25 MCG: 25 TABLET ORAL at 02:01

## 2020-01-01 RX ADMIN — IPRATROPIUM BROMIDE 0.5 MG: 0.5 SOLUTION RESPIRATORY (INHALATION) at 03:01

## 2020-01-01 RX ADMIN — SODIUM CHLORIDE SOLN NEBU 3% 4 ML: 3 NEBU SOLN at 11:01

## 2020-01-01 RX ADMIN — POTASSIUM CHLORIDE 40 MEQ: 20 SOLUTION ORAL at 09:01

## 2020-01-01 RX ADMIN — INSULIN ASPART 2 UNITS: 100 INJECTION, SOLUTION INTRAVENOUS; SUBCUTANEOUS at 11:01

## 2020-01-01 RX ADMIN — APIXABAN 2.5 MG: 2.5 TABLET, FILM COATED ORAL at 08:01

## 2020-01-01 RX ADMIN — IPRATROPIUM BROMIDE AND ALBUTEROL SULFATE 3 ML: .5; 3 SOLUTION RESPIRATORY (INHALATION) at 04:01

## 2020-01-01 RX ADMIN — HEPARIN SODIUM 5000 UNITS: 5000 INJECTION, SOLUTION INTRAVENOUS; SUBCUTANEOUS at 02:01

## 2020-01-01 RX ADMIN — IPRATROPIUM BROMIDE AND ALBUTEROL SULFATE 3 ML: .5; 3 SOLUTION RESPIRATORY (INHALATION) at 08:01

## 2020-01-01 RX ADMIN — IPRATROPIUM BROMIDE AND ALBUTEROL SULFATE 3 ML: .5; 3 SOLUTION RESPIRATORY (INHALATION) at 07:01

## 2020-01-01 RX ADMIN — ROCURONIUM BROMIDE 80 MG: 10 INJECTION, SOLUTION INTRAVENOUS at 05:01

## 2020-01-01 RX ADMIN — IPRATROPIUM BROMIDE 0.5 MG: 0.5 SOLUTION RESPIRATORY (INHALATION) at 06:01

## 2020-01-01 RX ADMIN — PROPOFOL 20 MCG/KG/MIN: 10 INJECTION, EMULSION INTRAVENOUS at 04:01

## 2020-01-01 RX ADMIN — METOPROLOL TARTRATE 100 MG: 100 TABLET ORAL at 09:01

## 2020-01-01 RX ADMIN — MIRTAZAPINE 7.5 MG: 7.5 TABLET ORAL at 10:01

## 2020-01-01 RX ADMIN — ACETAMINOPHEN 650 MG: 325 TABLET ORAL at 08:01

## 2020-01-01 RX ADMIN — MORPHINE SULFATE 1 MG/HR: 1 INJECTION, SOLUTION INTRAVENOUS at 08:01

## 2020-01-01 RX ADMIN — IPRATROPIUM BROMIDE AND ALBUTEROL SULFATE 3 ML: .5; 3 SOLUTION RESPIRATORY (INHALATION) at 09:01

## 2020-01-01 RX ADMIN — IPRATROPIUM BROMIDE 0.5 MG: 0.5 SOLUTION RESPIRATORY (INHALATION) at 11:01

## 2020-01-01 RX ADMIN — ATORVASTATIN CALCIUM 80 MG: 20 TABLET, FILM COATED ORAL at 08:01

## 2020-01-01 RX ADMIN — LEVALBUTEROL 1.25 MG: 1.25 SOLUTION, CONCENTRATE RESPIRATORY (INHALATION) at 11:01

## 2020-01-01 RX ADMIN — POTASSIUM CHLORIDE 10 MEQ: 7.46 INJECTION, SOLUTION INTRAVENOUS at 11:01

## 2020-01-01 RX ADMIN — FUROSEMIDE 60 MG: 10 INJECTION, SOLUTION INTRAMUSCULAR; INTRAVENOUS at 10:01

## 2020-01-01 RX ADMIN — LEVOTHYROXINE SODIUM 25 MCG: 25 TABLET ORAL at 05:01

## 2020-01-01 RX ADMIN — LABETALOL HCL IV SOLN PREFILLED SYRINGE 20 MG/4ML (5 MG/ML) 10 MG: 20/4 SOLUTION PREFILLED SYRINGE at 02:01

## 2020-01-01 RX ADMIN — VANCOMYCIN HYDROCHLORIDE 1000 MG: 1 INJECTION, POWDER, LYOPHILIZED, FOR SOLUTION INTRAVENOUS at 08:01

## 2020-01-01 RX ADMIN — SODIUM CHLORIDE: 234 INJECTION INTRAMUSCULAR; INTRAVENOUS; SUBCUTANEOUS at 09:01

## 2020-01-01 RX ADMIN — SODIUM CHLORIDE SOLN NEBU 3% 4 ML: 3 NEBU SOLN at 03:01

## 2020-01-01 RX ADMIN — POTASSIUM & SODIUM PHOSPHATES POWDER PACK 280-160-250 MG 2 PACKET: 280-160-250 PACK at 05:01

## 2020-01-01 RX ADMIN — LEVOTHYROXINE SODIUM 25 MCG: 25 TABLET ORAL at 08:01

## 2020-01-01 RX ADMIN — ALLOPURINOL 150 MG: 300 TABLET ORAL at 02:01

## 2020-01-01 RX ADMIN — IPRATROPIUM BROMIDE AND ALBUTEROL SULFATE 3 ML: .5; 3 SOLUTION RESPIRATORY (INHALATION) at 12:01

## 2020-01-01 RX ADMIN — PROPOFOL 5 MCG/KG/MIN: 10 INJECTION, EMULSION INTRAVENOUS at 06:01

## 2020-01-01 RX ADMIN — GABAPENTIN 300 MG: 300 CAPSULE ORAL at 09:01

## 2020-01-01 RX ADMIN — ACETAMINOPHEN 650 MG: 325 TABLET ORAL at 12:01

## 2020-01-01 RX ADMIN — POTASSIUM CHLORIDE 40 MEQ: 20 SOLUTION ORAL at 05:01

## 2020-01-01 RX ADMIN — SODIUM CHLORIDE SOLN NEBU 3% 4 ML: 3 NEBU SOLN at 08:01

## 2020-01-01 RX ADMIN — SODIUM CHLORIDE SOLN NEBU 3% 4 ML: 3 NEBU SOLN at 07:01

## 2020-01-01 RX ADMIN — PIPERACILLIN AND TAZOBACTAM 4.5 G: 4; .5 INJECTION, POWDER, FOR SOLUTION INTRAVENOUS at 12:01

## 2020-01-01 RX ADMIN — DOXEPIN HYDROCHLORIDE 10 MG: 10 CAPSULE ORAL at 09:01

## 2020-01-01 RX ADMIN — ACETAMINOPHEN 650 MG: 325 TABLET ORAL at 09:01

## 2020-01-01 RX ADMIN — ETOMIDATE 14 MG: 2 INJECTION INTRAVENOUS at 04:01

## 2020-01-01 RX ADMIN — GUAIFENESIN 10 ML: 200 SOLUTION ORAL at 09:01

## 2020-01-01 RX ADMIN — VANCOMYCIN HYDROCHLORIDE 1000 MG: 1 INJECTION, POWDER, LYOPHILIZED, FOR SOLUTION INTRAVENOUS at 04:01

## 2020-01-01 RX ADMIN — ACETAMINOPHEN 650 MG: 325 TABLET ORAL at 10:01

## 2020-01-01 RX ADMIN — LEVOTHYROXINE SODIUM 25 MCG: 25 TABLET ORAL at 09:01

## 2020-01-01 RX ADMIN — GUAIFENESIN 10 ML: 200 SOLUTION ORAL at 04:01

## 2020-01-01 RX ADMIN — APIXABAN 2.5 MG: 2.5 TABLET, FILM COATED ORAL at 02:01

## 2020-01-01 RX ADMIN — Medication 5 UNITS: at 06:01

## 2020-01-01 RX ADMIN — IPRATROPIUM BROMIDE 0.5 MG: 0.5 SOLUTION RESPIRATORY (INHALATION) at 01:01

## 2020-01-01 RX ADMIN — PIPERACILLIN AND TAZOBACTAM 4.5 G: 4; .5 INJECTION, POWDER, FOR SOLUTION INTRAVENOUS at 10:01

## 2020-01-01 RX ADMIN — POTASSIUM CHLORIDE 40 MEQ: 20 SOLUTION ORAL at 03:01

## 2020-01-01 RX ADMIN — SODIUM CHLORIDE: 234 INJECTION INTRAMUSCULAR; INTRAVENOUS; SUBCUTANEOUS at 05:01

## 2020-01-01 RX ADMIN — ACETAMINOPHEN 650 MG: 325 TABLET ORAL at 04:01

## 2020-01-01 RX ADMIN — DEXMEDETOMIDINE HYDROCHLORIDE 0.2 MCG/KG/HR: 4 INJECTION, SOLUTION INTRAVENOUS at 06:01

## 2020-01-01 RX ADMIN — VANCOMYCIN HYDROCHLORIDE 1000 MG: 1 INJECTION, POWDER, LYOPHILIZED, FOR SOLUTION INTRAVENOUS at 05:01

## 2020-01-01 RX ADMIN — HEPARIN SODIUM 5000 UNITS: 5000 INJECTION, SOLUTION INTRAVENOUS; SUBCUTANEOUS at 01:01

## 2020-01-01 RX ADMIN — PIPERACILLIN AND TAZOBACTAM 4.5 G: 4; .5 INJECTION, POWDER, FOR SOLUTION INTRAVENOUS at 04:01

## 2020-01-01 RX ADMIN — FUROSEMIDE 20 MG: 10 INJECTION, SOLUTION INTRAMUSCULAR; INTRAVENOUS at 01:01

## 2020-01-01 RX ADMIN — PIPERACILLIN AND TAZOBACTAM 4.5 G: 4; .5 INJECTION, POWDER, FOR SOLUTION INTRAVENOUS at 08:01

## 2020-01-01 RX ADMIN — FUROSEMIDE 20 MG: 10 INJECTION, SOLUTION INTRAMUSCULAR; INTRAVENOUS at 11:01

## 2020-01-01 RX ADMIN — POTASSIUM & SODIUM PHOSPHATES POWDER PACK 280-160-250 MG 2 PACKET: 280-160-250 PACK at 09:01

## 2020-01-01 RX ADMIN — SODIUM CHLORIDE: 0.9 INJECTION, SOLUTION INTRAVENOUS at 11:01

## 2020-01-01 RX ADMIN — PIPERACILLIN AND TAZOBACTAM 4.5 G: 4; .5 INJECTION, POWDER, FOR SOLUTION INTRAVENOUS at 03:01

## 2020-01-01 RX ADMIN — HEPARIN SODIUM 5000 UNITS: 5000 INJECTION, SOLUTION INTRAVENOUS; SUBCUTANEOUS at 10:01

## 2020-01-01 RX ADMIN — HEPARIN SODIUM 5000 UNITS: 5000 INJECTION, SOLUTION INTRAVENOUS; SUBCUTANEOUS at 05:01

## 2020-01-01 RX ADMIN — SENNOSIDES AND DOCUSATE SODIUM 1 TABLET: 8.6; 5 TABLET ORAL at 11:01

## 2020-01-01 RX ADMIN — SENNOSIDES AND DOCUSATE SODIUM 1 TABLET: 8.6; 5 TABLET ORAL at 08:01

## 2020-01-01 RX ADMIN — ACETAMINOPHEN 650 MG: 325 TABLET ORAL at 11:01

## 2020-01-01 RX ADMIN — HYDRALAZINE HYDROCHLORIDE 10 MG: 20 INJECTION INTRAMUSCULAR; INTRAVENOUS at 04:01

## 2020-01-01 RX ADMIN — HEPARIN SODIUM 12 UNITS/KG/HR: 10000 INJECTION, SOLUTION INTRAVENOUS at 08:01

## 2020-01-01 RX ADMIN — POTASSIUM & SODIUM PHOSPHATES POWDER PACK 280-160-250 MG 2 PACKET: 280-160-250 PACK at 01:01

## 2020-01-01 RX ADMIN — POTASSIUM CHLORIDE 10 MEQ: 7.46 INJECTION, SOLUTION INTRAVENOUS at 02:01

## 2020-01-01 RX ADMIN — HYDRALAZINE HYDROCHLORIDE 10 MG: 20 INJECTION INTRAMUSCULAR; INTRAVENOUS at 03:01

## 2020-01-01 RX ADMIN — PROPOFOL 10 MCG/KG/MIN: 10 INJECTION, EMULSION INTRAVENOUS at 04:01

## 2020-01-01 RX ADMIN — POTASSIUM CHLORIDE 10 MEQ: 7.46 INJECTION, SOLUTION INTRAVENOUS at 03:01

## 2020-01-01 RX ADMIN — LEVOTHYROXINE SODIUM 25 MCG: 25 TABLET ORAL at 07:01

## 2020-01-01 RX ADMIN — LORAZEPAM 0.5 MG: 2 INJECTION, SOLUTION INTRAMUSCULAR; INTRAVENOUS at 04:01

## 2020-01-01 RX ADMIN — PIPERACILLIN AND TAZOBACTAM 4.5 G: 4; .5 INJECTION, POWDER, FOR SOLUTION INTRAVENOUS at 07:01

## 2020-01-01 RX ADMIN — POTASSIUM & SODIUM PHOSPHATES POWDER PACK 280-160-250 MG 2 PACKET: 280-160-250 PACK at 08:01

## 2020-01-01 RX ADMIN — APIXABAN 2.5 MG: 2.5 TABLET, FILM COATED ORAL at 10:01

## 2020-01-01 RX ADMIN — LEVOFLOXACIN 750 MG: 5 INJECTION, SOLUTION INTRAVENOUS at 05:01

## 2020-01-01 RX ADMIN — GUAIFENESIN 10 ML: 200 SOLUTION ORAL at 02:01

## 2020-01-01 RX ADMIN — ACETAMINOPHEN 650 MG: 325 TABLET ORAL at 06:01

## 2020-01-01 RX ADMIN — SODIUM CHLORIDE: 0.9 INJECTION, SOLUTION INTRAVENOUS at 09:01

## 2020-01-01 RX ADMIN — SODIUM CHLORIDE 1000 ML: 0.9 INJECTION, SOLUTION INTRAVENOUS at 02:01

## 2020-01-01 RX ADMIN — LEVALBUTEROL 1.25 MG: 1.25 SOLUTION, CONCENTRATE RESPIRATORY (INHALATION) at 04:01

## 2020-01-01 RX ADMIN — SODIUM CHLORIDE SOLN NEBU 3% 4 ML: 3 NEBU SOLN at 04:01

## 2020-01-01 RX ADMIN — HEPARIN SODIUM 12 UNITS/KG/HR: 10000 INJECTION, SOLUTION INTRAVENOUS at 02:01

## 2020-01-01 NOTE — CONSULTS
"  [facility]  Adult Nutrition  Consult Note    SUMMARY     Recommendations    1. Should pt continue NPO, initiate TF with Glucerna 1.5 @ 20ml/hr and increase by 10 ml/hr Q4hrs to goal rate 41ml/hr (provides 1476 kcal, 81g pro, 748ml free water). Additional 150ml water flush Q4 hrs or per MD. Hold for residuals >500ml. 2. Should pt pass swallow evaluation, provide Cardiac/Carb Consistent diet with texture per SLP.  Goals: 1. Pt to receive nutrition by RD follow up.  Nutrition Goal Status: new  Communication of RD Recs: (POC)    Reason for Assessment    Reason For Assessment: consult  Diagnosis: stroke/CVA  Relevant Medical History: HTN, HLD, HF, DM, Afib  General Information Comments: Pt continues NPO s/p rapid response call this morning. Per SLP evaluation today, pt to remain NPO with alternate means of nutrition. Per family, pt with good po intake meals and no wt loss PTA. Completed NFPE today: pt appears nourished. Pt follows low sodium, carb consistent diet at home (daughters cook her meals).    Nutrition Risk Screen    Nutrition Risk Screen: dysphagia or difficulty swallowing    Nutrition/Diet History    Spiritual, Cultural Beliefs, Worship Practices, Values that Affect Care: no    Anthropometrics    Temp: 100 °F (37.8 °C)  Height Method: Stated  Height: 5' 1.5" (156.2 cm)  Height (inches): 61.5 in  Weight Method: Bed Scale  Weight: 78.9 kg (173 lb 15.1 oz)  Weight (lb): 173.94 lb  Ideal Body Weight (IBW), Female: 107.5 lb       Lab/Procedures/Meds    Pertinent Labs Reviewed: reviewed  Pertinent Labs Comments: A1c 6.9%, K 3.1, Mag 1.3, T. Bili 1.1  Pertinent Medications Reviewed: reviewed  Pertinent Medications Comments: statin, lasix, levothyroxine, mirtazapine      Estimated/Assessed Needs    Weight Used For Calorie Calculations: 78.9 kg (173 lb 15.1 oz)  Energy Calorie Requirements (kcal): 1486 kcal  Energy Need Method: Hidalgo-St Jeor(PAL 1.25)  Protein Requirements: 79-95g/day  Weight " Used For Protein Calculations: 78.9 kg (173 lb 15.1 oz)        RDA Method (mL): 1486         Nutrition Prescription Ordered    Current Diet Order: NPO    Evaluation of Received Nutrient/Fluid Intake       % Intake of Estimated Energy Needs: 0 - 25 %  % Meal Intake: NPO    Nutrition Risk    Level of Risk/Frequency of Follow-up: high     Assessment and Plan    Nutrition Problem  Swallowing difficulty    Related to (etiology):   stroke    Signs and Symptoms (as evidenced by):   Pt NPO with no alternate means nutrition.    Interventions(treatment strategy):  Collaboration of care with providers.  Enteral nutrition.    Nutrition Diagnosis Status:   New       Monitor and Evaluation    Food and Nutrient Intake: energy intake, food and beverage intake, enteral nutrition intake  Food and Nutrient Adminstration: diet order, enteral and parenteral nutrition administration  Anthropometric Measurements: weight, weight change  Biochemical Data, Medical Tests and Procedures: electrolyte and renal panel, gastrointestinal profile, glucose/endocrine profile, inflammatory profile, lipid profile  Nutrition-Focused Physical Findings: overall appearance, extremities, muscles and bones, head and eyes, skin     Malnutrition Assessment                 Orbital Region (Subcutaneous Fat Loss): well nourished  Upper Arm Region (Subcutaneous Fat Loss): well nourished  Thoracic and Lumbar Region: well nourished   Alevism Region (Muscle Loss): well nourished  Clavicle Bone Region (Muscle Loss): well nourished  Clavicle and Acromion Bone Region (Muscle Loss): well nourished  Scapular Bone Region (Muscle Loss): well nourished  Dorsal Hand (Muscle Loss): mild depletion  Patellar Region (Muscle Loss): well nourished  Anterior Thigh Region (Muscle Loss): well nourished  Posterior Calf Region (Muscle Loss): well nourished                 Nutrition Follow-Up    RD Follow-up?: Yes

## 2020-01-01 NOTE — ASSESSMENT & PLAN NOTE
Hx of HLD. Home dose of atorvastatin was 10 mg. On admission, pt was started on atorvastatin 40 mg.    -Lipid panel with total cholesterol of 209, ,   -Continue atorvastatin 40 mg daily

## 2020-01-01 NOTE — ED NOTES
Telemetry Verification   Patient placed on Telemetry Box  Verified with War Room  Box # [id number]   Monitor Tech Sheila    Rate 56   Rhythm Sinus Tanner

## 2020-01-01 NOTE — ASSESSMENT & PLAN NOTE
Pt with history of Afib anticoagulated with Eliquis and rate-controlled with metoprolol and biventricular pacemaker.   Presented to ED yesterday 12/31 for likely MCA stroke following dental extractions earlier that day. Eliquis was held 4 days prior to procedure. Resumed 1/1/2019 per primary team.    -CHADSVAS 9  -Eliquis re-started 1/1/20  -Will advise holding metoprolol for now, can re-start as needed

## 2020-01-01 NOTE — ASSESSMENT & PLAN NOTE
Continue home synthroid  Patient with elevated TSH but normal free T4, subclinical hypothyroidism  Discuss with hospital medicine co management

## 2020-01-01 NOTE — ASSESSMENT & PLAN NOTE
Pt with history of HFrEF and biventricular pacemaker. Last EF 30% in 2/2019. Stress test negative at that time. On lasix 40 mg BID, Entresto 24-26 mg, Metoprolol succinate 150 mg daily at home. On admit,Troponin elevated to 0.5-->0.9. EKG without any St changes. Cardiology consulted given patient's cardiac history, suspect demand ischemia as etiology, they do not feel any intervention necessary at this point given that pt has already received  mg and high intensity statin in setting of ischemic stroke.     -Advise trending troponins (Q6 hours x4) per cardiology recommendations   -TTE pending  -Home dose Lasix, Metoprolol, Entresto currently held in setting of MCA stroke and possible infection  -Overnight 12/31, pt with tachypnea and was given 1 dose lasix 20 mg IV  -Appears euvolemic on exam   -Advise gentle hydration with 50 ml/hr NS over 20 hours for 1 L total in setting of low EF and infection.   -I/Os, daily weights  -Replace K and Mg as needed

## 2020-01-01 NOTE — PLAN OF CARE
Problem: Occupational Therapy Goal  Goal: Occupational Therapy Goal  Description  Goals set on 1/1, with expiration date 1/15:  Patient will increase functional independence with ADLs by performing:    Grooming while EOB with Minimal Assistance.  UB Dressing with Moderate Assistance.  LB Dressing with Moderate Assistance.  Toileting from bedside commode with Moderate Assistance for hygiene and clothing management.   Rolling to Bilateral with Moderate Assistance.   Supine <> Sit with Moderate Assistance.  Squat pivot transfers with Moderate Assistance.  Patient's family / caregiver will demonstrate independence and safety with assisting patient with self-care skills and functional mobility.      Patient's family / caregiver will demonstrate independence with providing ROM and changes in bed positioning.      Outcome: Ongoing, Progressing     Eval complete. Pt tolerated session well. Initiate OT POC.  KESHA Peck  01/01/2020

## 2020-01-01 NOTE — ASSESSMENT & PLAN NOTE
Dacia Martínez is a 84 y.o. female with PMHx of HTN, HLD, HF, DM, a fib (eliquis) who presented to hospital with gait instability, RSW, and speech changes. She had been off her eliquis for 1 week due to scheduled dental procedure. Patient with L gaze, mild RSW, and speech difficulty when examined by stroke provider. EMS reported possible waxing and waning of symptoms in transit. She was taken for STAT CTA multiphase, which was negative for early ischemic changes, LVO, and significant stenosis. tPA decision delayed due to unclear LKN time since patient took valium for dental procedure. STAT MRI attempted, but patient has pacemaker that is not MRI compatible.     After further discussion with family and unclear LKN time, decision was made to not treat with tPA. Some improvement in symptoms on admission. Patient admitted for suspected cardioembolic L MCA stroke.    Antithrombotics for secondary stroke prevention: Aspirin: 325 mg once then resuming Apixaban 2.5 mg BID     Statins for secondary stroke prevention and hyperlipidemia, if present:   Statins: Atorvastatin- 40 mg daily    Aggressive risk factor modification: HTN, DM, HLD, Obesity, A-Fib     Rehab efforts: The patient has been evaluated by a stroke team provider and the therapy needs have been fully considered based off the presenting complaints and exam findings. The following therapy evaluations are needed: PT evaluate and treat, OT evaluate and treat, SLP evaluate and treat, PM&R evaluate for appropriate placement    Diagnostics ordered/pending: CT scan of head without contrast to asses brain parenchyma, TTE to assess cardiac function/status     VTE prophylaxis: Heparin 5000 units SQ every 8 hours    BP parameters: Infarct: No intervention, SBP <220

## 2020-01-01 NOTE — SUBJECTIVE & OBJECTIVE
Neurologic Chief Complaint: Aphasia and weakness     Subjective:     Interval History: Patient is seen for follow-up neurological assessment and treatment recommendations:    -CTA MP without any acute findings. At times will move her left arm however, per the medicine team's examination patient had hemineglect in hte right side which was not seen during my exam.  -Overnight, rapid response was called due to tachypnea up to 40 and fevers to 100.3. ABG wnl but CXR with possible left-sided infiltrate. WBC wnl. Pt was started on vanc/zosyn in setting of aspiration vs CAP pneumonia.   -Troponin elevated to 0.516 with repeat elevated to 0.935.Cardiology consulted and they believed this is demand ischemia, thus will trend troponin.  - Repeat CTH shows no evidence of major vascular distribution infarct or hemorrhage.       HPI, Past Medical, Family, and Social History remains the same as documented in the initial encounter.     Review of Systems   Constitutional: Negative for chills and fever.   Respiratory: Negative for cough.    Cardiovascular: Negative for chest pain.   Gastrointestinal: Negative for nausea and vomiting.   Neurological: Positive for speech difficulty and weakness. Negative for facial asymmetry and numbness.     Scheduled Meds:   allopurinol  150 mg Oral Daily    apixaban  2.5 mg Oral BID    atorvastatin  40 mg Oral Daily    doxepin  10 mg Oral QHS    levothyroxine  25 mcg Oral Before breakfast    mirtazapine  7.5 mg Oral QHS    piperacillin-tazobactam (ZOSYN) IVPB  4.5 g Intravenous Q8H    vancomycin (VANCOCIN) IVPB  1,000 mg Intravenous Q24H     Continuous Infusions:   sodium chloride 0.9%      sodium chloride 0.9%       PRN Meds:acetaminophen, Dextrose 10% Bolus, gabapentin, glucagon (human recombinant), insulin aspart U-100, labetalol, ondansetron, senna-docusate 8.6-50 mg, sodium chloride 0.9%, sodium chloride 0.9%, Pharmacy to dose Vancomycin consult **AND** vancomycin - pharmacy to  dose    Objective:     Vital Signs (Most Recent):  Temp: 100 °F (37.8 °C) (01/01/20 0726)  Pulse: 69 (01/01/20 1500)  Resp: (!) 21 (01/01/20 0726)  BP: 112/64 (01/01/20 0726)  SpO2: 97 % (01/01/20 0743)  BP Location: Right arm    Vital Signs Range (Last 24H):  Temp:  [98.3 °F (36.8 °C)-100.3 °F (37.9 °C)]   Pulse:  [69-72]   Resp:  [16-40]   BP: (112-160)/()   SpO2:  [97 %-100 %]   BP Location: Right arm    Physical Exam   Constitutional: No distress.   HENT:   Head: Normocephalic and atraumatic.   Mouth/Throat: No oropharyngeal exudate.   Eyes: No scleral icterus.   Neck: Normal range of motion. Neck supple.   No JVD noted   Cardiovascular: Normal rate.   Pulmonary/Chest: Effort normal and breath sounds normal. She has no wheezes. She has no rales.   Abdominal: Soft. She exhibits no distension. There is no guarding.   Musculoskeletal:   No LE edema. Bilateral SCDs in place   Lymphadenopathy:     She has no cervical adenopathy.   Neurological: She displays no tremor.   Skin: Skin is warm and dry. She is not diaphoretic.   Nursing note and vitals reviewed.      Neurological Exam:   LOC: alert  Attention Span: poor  Language: Global aphasia  Articulation: Mute/Anarthric  Orientation: Not oriented to person, place, and time  Visual Fields: Full  EOM (CN III, IV, VI): Full/intact  Pupils (CN II, III): PERRL  Facial Sensation (CN V): Normal  Facial Movement (CN VII): Unable to test   Gag Reflex: present  Reflexes: 2+ throughout  Motor: Arm left  Normal 5/5  Leg left  Paresis: 4/5  Arm right  Paresis: 3/5  Leg right Paresis: 2/5  Cebellar: No evidence of appendicular or axial ataxia  Sensation: Intact to light touch, temperature and vibration  Tone: Normal tone throughout    Laboratory:  CMP:   Recent Labs   Lab 01/01/20  0347   CALCIUM 8.6*   ALBUMIN 3.2*   PROT 7.7      K 3.1*   CO2 25      BUN 21   CREATININE 1.3   ALKPHOS 59   ALT 5*   AST 25   BILITOT 1.1*     BMP:   Recent Labs   Lab 01/01/20  034       K 3.1*      CO2 25   BUN 21   CREATININE 1.3   CALCIUM 8.6*     CBC:   Recent Labs   Lab 01/01/20 0347   WBC 8.88   RBC 4.11   HGB 11.6*   HCT 37.8      MCV 92   MCH 28.2   MCHC 30.7*     Lipid Panel:   Recent Labs   Lab 12/31/19 1929   CHOL 209*   LDLCALC 136.4   HDL 48   TRIG 123     Coagulation:   Recent Labs   Lab 01/01/20 0347   INR 1.1   APTT 22.8     Platelet Aggregation Study: No results for input(s): PLTAGG, PLTAGINTERP, PLTAGREGLACO, ADPPLTAGGREG in the last 168 hours.  Hgb A1C:   Recent Labs   Lab 12/31/19 2115   HGBA1C 6.9*     TSH:   Recent Labs   Lab 12/31/19 1929   TSH 13.528*       Diagnostic Results     Brain Imaging   CT 1/1/19   -No evidence of major vascular distribution infarct or hemorrhage.  -Chronic microvascular ischemic change and generalized cerebral volume loss, normal for age.    Vessel Imaging   CTA  12/31/19   -No acute intracranial pathology.  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  -In this exam limited by poor timing of contrast and motion artifact, no high-grade stenosis or major vessel occlusion.  Hypoplastic right A2 segment.    Cardiac Imaging   TTE pending

## 2020-01-01 NOTE — ASSESSMENT & PLAN NOTE
Stroke risk factor  A1C 6.9  Goal glucose 140-180  Low correction SSI for NPO patients until LEILANI completed  Diabetic diet if safe to swallow

## 2020-01-01 NOTE — PLAN OF CARE
Cardiology Plan of Care Note:    Was consulted by IM team 6 for evaluation of patient's elevated troponin levels.     Ms. Dacia Martínez is a 84 y.o. female with PMHx of HTN, HLD, HFrEF (EF of 30% in 02/2019 s/p CRT-P), DMII, Atrial fibrillation, low flow, low gradient AS (moderate on DSE) (on eliquis) who presented to hospital with gait instability, RSW, and speech changes. Originally concerned for embolic (given history of atrial fibrillation) stroke of left MCA, but discussed with Neurology and changes most likely 2/2 recent benzodiazepine usage as no evidence of infarct or hemorrhage on repeat CT head ordered today.     Troponin was ordered on admission and noted to be 0.5 -> repeat 0.9. EKG notable for V-paced rhythm without evidence of ischemia. Etiology most likely 2/2 demand ischemia (type II NSTEMI) in the setting of co-morbidites (possibly has aspiration PNA, slightly anemic etc).     Plan:  - Recommend trending troponin to peak    - Was treated with  mg on admission and atorvastatin 40 mg given concern for stroke -> please continue  - If troponin levels continue to increase exponentially, please call us back for additional recommendations    We will sign off. Please call us back with additional questions/concerns.    Sheridan Kim MD  Cardiology - PGY4  Pager [phone]

## 2020-01-01 NOTE — H&P
[facility]  Vascular Neurology  Comprehensive Stroke Center  History & Physical    Consults  Assessment/Plan:     Patient is a 84 y.o. year old female with:    * Embolic stroke involving left middle cerebral artery  Dacia Martínez is a 84 y.o. female with PMHx of HTN, HLD, HF, DM, a fib (eliquis) who presented to hospital with gait instability, RSW, and speech changes. She had been off her eliquis for 1 week due to scheduled dental procedure. Patient with L gaze, mild RSW, and speech difficulty when examined by stroke provider. EMS reported possible waxing and waning of symptoms in transit. She was taken for STAT CTA multiphase, which was negative for early ischemic changes, LVO, and significant stenosis. tPA decision delayed due to unclear LKN time since patient took valium for dental procedure. STAT MRI attempted, but patient has pacemaker that is not MRI compatible.     After further discussion with family and unclear LKN time, decision was made to not treat with tPA. Some improvement in symptoms on admission. Patient admitted for suspected cardioembolic L MCA stroke.    Antithrombotics for secondary stroke prevention: Aspirin: 325 mg once then resuming Apixaban 2.5 mg BID     Statins for secondary stroke prevention and hyperlipidemia, if present:   Statins: Atorvastatin- 40 mg daily    Aggressive risk factor modification: HTN, DM, HLD, Obesity, A-Fib     Rehab efforts: The patient has been evaluated by a stroke team provider and the therapy needs have been fully considered based off the presenting complaints and exam findings. The following therapy evaluations are needed: PT evaluate and treat, OT evaluate and treat, SLP evaluate and treat, PM&R evaluate for appropriate placement    Diagnostics ordered/pending: CT scan of head without contrast to asses brain parenchyma, TTE to assess cardiac function/status     VTE prophylaxis: Heparin 5000 units SQ every 8 hours    BP parameters: Infarct: No  intervention, SBP <220        Hypothyroidism  Continue home synthroid  Patient with elevated TSH but normal free T4, subclinical hypothyroidism  Discuss with hospital medicine co management    CKD (chronic kidney disease) stage 3, GFR 30-59 ml/min  Cr 1.3 on arrival  Patient had CTA but no fluids due to HF, monitor for evidence of contrast induced [id number]    Chronic systolic congestive heart failure  Stroke risk factor  TTE Feb 2019 with EF 40%  Patient on entresto, metoprolol, and lasix at home  Holding entresto and lasix in setting of acute stroke  Continuing metoprolol for rate control due to history of afib    Persistent atrial fibrillation  Stroke risk factor  On eliquis at home  Held x1 week for dental procedure  Resuming home eliquis  Continuing home metoprolol    Type 2 diabetes mellitus with stage 3 chronic kidney disease, without long-term current use of insulin  Stroke risk factor  A1C 6.9  Goal glucose 140-180  Low correction SSI for NPO patients until LEILANI completed  Diabetic diet if safe to swallow    Primary osteoarthritis involving multiple joints  PRN tylenol    Idiopathic chronic gout of multiple sites without tophus  Continue home allopurinol    Hyperlipidemia  Stroke risk factor  Patient on atorvastatin 10 mg daily at home   on arrival  Increasing to atorvastatin 40 mg daily    Essential hypertension  Stroke risk facotr  SBP <220   Holding home antihypertensives due to concern for acute stroke  Prn labetalol        STROKE DOCUMENTATION     Acute Stroke Times   Last Known Normal Date: 12/31/19  Last Known Normal Time: (unknown)  Symptom Onset Date: 12/31/19  Symptom Onset Time: (unknown)  Stroke Team Called Date: 12/31/19  Stroke Team Called Time: 1924  Stroke Team Arrival Date: 12/31/19  Stroke Team Arrival Time: 1929  CT Interpretation Time: 1939  Decision to Treat Time for Alteplase: 2039  Decision to Treat Time for IR: 1939    NIH Scale:  Interval: baseline  1a. Level of Consciousness:  0-->Alert, keenly responsive  1b. LOC Questions: 2-->Answers neither question correctly  1c. LOC Commands: 0-->Performs both tasks correctly  2. Best Gaze: 0-->Normal  3. Visual: 0-->No visual loss  4. Facial Palsy: 1-->Minor paralysis (flattened nasolabial fold, asymmetry on smiling)  5a. Motor Arm, Left: 0-->No drift, limb holds 90 (or 45) degrees for full 10 secs  5b. Motor Arm, Right: 0-->No drift, limb holds 90 (or 45) degrees for full 10 secs  6a. Motor Leg, Left: 0-->No drift, leg holds 30 degree position for full 5 secs  6b. Motor Leg, Right: 0-->No drift, leg holds 30 degree position for full 5 secs  7. Limb Ataxia: 0-->Absent  8. Sensory: 0-->Normal, no sensory loss  9. Best Language: 1-->Mild-to-moderate aphasia, some obvious loss of fluency or facility of comprehension, without significant limitation on ideas expressed or form of expression. Reduction of speech and/or comprehension, however, makes conversation. . . (see row details)  10. Dysarthria: 1-->Mild-to-moderate dysarthria, patient slurs at least some words and, at worst, can be understood with some difficulty  11. Extinction and Inattention (formerly Neglect): 0-->No abnormality  Total (NIH Stroke Scale): 5     Modified [place] Score: 3  [place] Coma Scale:    ABCD2 Score:    [id number] Score:   HAS -BLED Score:   ICH Score:   Hunt & Prather Classification:      Thrombolysis Candidate? No, Out of window , unclear last known normal    Delays to Thrombolysis?  No    Interventional Revascularization Candidate?   Is the patient eligible for mechanical endovascular reperfusion (YUDI)?  No; No large vessel occlusion    Hemorrhagic change of an Ischemic Stroke: Does this patient have an ischemic stroke with hemorrhagic changes? No         Subjective:     History of Present Illness:  Dacia Martínez is a 84 y.o. female with PMHx of HTN, HLD, DM, HF, a fib (eliquis) who presented to ED with gait instability, RSW, and speech difficulty. Patient took valium  today around 2:30 pm in preparation for 2 teeth to be extracted. After procedure, patient's family drove her home. When she arrived home, they noticed she was having difficulty ambulating. She later developed speech difficulty. Family called EMS. In transit to EMS reported waxing and waning symptoms. When she arrived to ED, she began having stuttering of speech and was noted to have a L gaze with a R facial droop. Stroke code was called.        Past Medical History:   Diagnosis Date    A-fib     Arthritis     Asteroid hyalosis of left eye     Bilateral nonexudative age-related macular degeneration 6/3/2014    Breast cancer     Cataract     CHF (congestive heart failure)     Chronic GERD 2017    CKD stage 3 due to type 2 diabetes mellitus     Coronary artery disease     Encounter for blood transfusion     Hypertension     Migraine headache     Mild nonproliferative diabetic retinopathy of both eyes 6/3/2014    Pneumonia     Type 2 diabetes mellitus with hyperglycemia     Vertigo      Past Surgical History:   Procedure Laterality Date    ABLATION N/A 2018    Procedure: ABLATION;  Surgeon: Jacques Burt MD;  Location: [facility] EP LAB;  Service: Cardiology;  Laterality: N/A;  AF, AVN, RFA, Choice, MB, 3 Prep *SJM CRT-P in situ*    BREAST SURGERY      left lumpectomy    CARPAL TUNNEL RELEASE      left    CATARACT EXTRACTION      vance     SECTION      EYE SURGERY      cataracts bilaterally    HYSTERECTOMY      partial    IMPLANTATION OF BIVENTRICULAR HEART PACEMAKER N/A 10/26/2018    Procedure: INSERTION, CARDIAC PACEMAKER, BIVENTRICULAR;  Surgeon: Jacques Burt MD;  Location: [facility] CATH LAB;  Service: Cardiology;  Laterality: N/A;  AF, CRT-P, SJM, MAC, MB, 3 Prep     Family History   Problem Relation Age of Onset    Cancer Mother         stomach    Heart disease Mother     Diabetes Father     Hypertension Father     Blindness Brother     Diabetes Brother      Hypertension Brother     Cataracts Sister     Diabetes Sister     Diabetes Brother     Diabetes Brother     Diabetes Brother     No Known Problems Daughter     No Known Problems Son     No Known Problems Daughter     Amblyopia Neg Hx     Glaucoma Neg Hx     Macular degeneration Neg Hx     Strabismus Neg Hx     Retinal detachment Neg Hx      Social History     Tobacco Use    Smoking status: Never Smoker    Smokeless tobacco: Never Used   Substance Use Topics    Alcohol use: Yes    Drug use: No     Review of patient's allergies indicates:   Allergen Reactions    Tramadol Hallucinations       Medications: I have reviewed the current medication administration record.      (Not in a hospital admission)    Review of Systems   Constitutional: Negative for chills and fever.   Respiratory: Negative for cough.    Cardiovascular: Negative for chest pain.   Gastrointestinal: Negative for nausea and vomiting.   Neurological: Positive for facial asymmetry and speech difficulty. Negative for weakness and numbness.     Objective:     Vital Signs (Most Recent):  Temp: 98.3 °F (36.8 °C) (12/31/19 1925)  Pulse: 70 (12/31/19 2100)  Resp: 20 (12/31/19 2100)  BP: (!) 157/93 (12/31/19 2100)  SpO2: 98 % (12/31/19 2100)    Vital Signs Range (Last 24H):  Temp:  [98.3 °F (36.8 °C)]   Pulse:  [70-72]   Resp:  [16-20]   BP: (113-160)/(74-93)   SpO2:  [97 %-99 %]     Physical Exam   Constitutional: She appears well-developed and well-nourished. No distress.   HENT:   Head: Normocephalic and atraumatic.   Cardiovascular: Normal rate.   Pulmonary/Chest: Effort normal. No respiratory distress.   Neurological: She is alert.   Skin: Skin is warm and dry.   Vitals reviewed.      Neurological Exam:   LOC: alert  Attention Span: poor  Language: Global aphasia  Articulation: Dysarthria  Orientation: Untestable due to severe aphasia   Visual Fields: Full  EOM (CN III, IV, VI): Full/intact  Facial Movement (CN VII): Lower facial weakness  on the Right  Motor: Arm left  Normal 5/5  Leg left  Normal 5/5  Arm right  Normal 5/5  Leg right Normal 5/5  Sensation: Intact to light touch, temperature and vibration  Tone: Normal tone throughout      Laboratory:  CMP:   Recent Labs   Lab 12/31/19 1929   CALCIUM 8.7   ALBUMIN 3.3*   PROT 8.1      K 3.4*   CO2 32*      BUN 20   CREATININE 1.3   ALKPHOS 61   ALT 5*   AST 17   BILITOT 0.8     CBC:   Recent Labs   Lab 12/31/19 1929   WBC 6.91   RBC 4.31   HGB 12.2   HCT 40.5      MCV 94   MCH 28.3   MCHC 30.1*     Lipid Panel:   Recent Labs   Lab 12/31/19 1929   CHOL 209*   LDLCALC 136.4   HDL 48   TRIG 123     Coagulation:   Recent Labs   Lab 12/31/19 1929   INR 1.1     Hgb A1C: No results for input(s): HGBA1C in the last 168 hours.  TSH:   Recent Labs   Lab 12/31/19 1929   TSH 13.528*       Diagnostic Results:      CTA Multiphase. Date: 12/31/19  No acute intracranial pathology.  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    In this exam limited by poor timing of contrast and motion artifact, no high-grade stenosis or major vessel occlusion.  Hypoplastic right A2 segment.    Subcentimeter subsegmental opacities in the left lung apex, with considerations including infection, non infectious inflammation, aspiration, and small airways disease.  Clinical correlation as warranted.    Other findings as above.        Soheila Arenas PA-C  Comprehensive Stroke Center  Department of Vascular Neurology   [facility]

## 2020-01-01 NOTE — ASSESSMENT & PLAN NOTE
Dacia Martínez is a 84 y.o. female with PMHx of HTN, HLD, HF, DM, a fib (eliquis) who presented to hospital with gait instability, RSW, and speech changes. She had been off her eliquis for 1 week due to scheduled dental procedure. Patient with L gaze, mild RSW, and speech difficulty when examined by stroke provider. EMS reported possible waxing and waning of symptoms in transit. She was taken for STAT CTA multiphase, which was negative for early ischemic changes, LVO, and significant stenosis. tPA decision delayed due to unclear LKN time since patient took valium for dental procedure. STAT MRI attempted, but patient has pacemaker that is not MRI compatible.     After further discussion with family and unclear LKN time, decision was made to not treat with tPA. Possibly cardioembolic stroke based on history   - As patient's neurological exam is fluctuating repeat CTH was done which did not show any stroke       Antithrombotics for secondary stroke prevention: Aspirin: 325 mg once then resuming Apixaban 2.5 mg BID     Statins for secondary stroke prevention and hyperlipidemia, if present:   Statins: Atorvastatin- 40 mg daily    Aggressive risk factor modification: HTN, DM, HLD, Obesity, A-Fib     Rehab efforts: The patient has been evaluated by a stroke team provider and the therapy needs have been fully considered based off the presenting complaints and exam findings. The following therapy evaluations are needed: PT evaluate and treat, OT evaluate and treat, SLP evaluate and treat, PM&R evaluate for appropriate placement    Diagnostics ordered/pending: TTE to assess cardiac function/status     VTE prophylaxis: Heparin 5000 units SQ every 8 hours    BP parameters: Infarct: No intervention, SBP <220

## 2020-01-01 NOTE — ASSESSMENT & PLAN NOTE
Stroke risk factor  On eliquis at home  Held x1 week for dental procedure  Resuming home eliquis  Continuing home metoprolol

## 2020-01-01 NOTE — PROGRESS NOTES
Pharmacokinetic Initial Assessment: IV Vancomycin    Assessment/Plan:  Initiate vancomycin 1000 mg IV Q24H (conservative dose given h/o CKD, however, current Scr appears to be better than baseline)  Goal trough 15-20 mcg/mL for potential aspiration PNA - obtain trough 1/3 @ 0815    Pharmacy will continue to follow and monitor vancomycin.      Thank you for the consult,   Cindy Toth, PharmD, [facility]  [id number]       Patient brief summary:  Dacia Martínez is a 84 y.o. female initiated on antimicrobial therapy with IV Vancomycin for treatment of suspected lower respiratory infection    Drug Allergies:   Review of patient's allergies indicates:   Allergen Reactions    Tramadol Hallucinations       Actual Body Weight:   78.9 kg    Renal Function:   Estimated Creatinine Clearance: 31.6 mL/min (based on SCr of 1.3 mg/dL).,     CBC (last 72 hours):  Recent Labs   Lab Result Units 12/31/19 1929 12/31/19 2115 01/01/20  0347   WBC K/uL 6.91  --  8.88   Hemoglobin g/dL 12.2  --  11.6*   Hemoglobin A1C %  --  6.9*  --    Hematocrit % 40.5  --  37.8   Platelets K/uL 230  --  238   Gran% % 57.9  --  73.1*   Lymph% % 27.8  --  18.2   Mono% % 10.3  --  7.1   Eosinophil% % 2.7  --  0.3   Basophil% % 0.9  --  0.6   Differential Method  Automated  --  Automated       Metabolic Panel (last 72 hours):  Recent Labs   Lab Result Units 12/31/19 1929 01/01/20  0347   Sodium mmol/L 145 144   Potassium mmol/L 3.4* 3.1*   Chloride mmol/L 104 105   CO2 mmol/L 32* 25   Glucose mg/dL 120* 153*   BUN, Bld mg/dL 20 21   Creatinine mg/dL 1.3 1.3   Albumin g/dL 3.3* 3.2*   Total Bilirubin mg/dL 0.8 1.1*   Alkaline Phosphatase U/L 61 59   AST U/L 17 25   ALT U/L 5* 5*   Magnesium mg/dL  --  1.3*   Phosphorus mg/dL  --  4.2       Drug levels (last 3 results):  No results for input(s): VANCOMYCINRA, VANCOMYCINPE, VANCOMYCINTR in the last 72 hours.    Microbiologic Results:  Microbiology Results (last 7 days)     ** No results found for the last 168  hours. **

## 2020-01-01 NOTE — CARE UPDATE
RAPID RESPONSE NURSE NOTE     Admit Date: 2019  LOS: 1  Code Status: Full Code   Date of Consult: 2020  : 1935  Age: 84 y.o.  Weight:   Wt Readings from Last 1 Encounters:   19 73.5 kg (162 lb 0.6 oz)     Sex: female  Race: Black or    Bed: [address] [place] [address] A:   MRN: [id number]  Time Rapid Response Team page Received: 0001  Time Rapid Response Team at Bedside: 0006  Time Rapid Response Team left Bedside: 0036  Was the patient discharged from an ICU this admission?   no  Was the patient discharged from a PACU within last 24 hours?  no  Did the patient receive conscious sedation/general anesthesia within last 24 hours?  no  Was the patient in the ED within the past 24 hours?  yes  Was the patient started on NIPPV within the past 24 hours?  no  Did this progress into an ARC or CPA:  no  Attending Physician: Carmen Cosby MD  Primary Service: Networked reference to record PCT   Consult Requested By: Carmen Cosby MD     SITUATION     Reason for Call: Called for tachypnea   Called to evaluate the patient for Respiratory    BACKGROUND     Why is the patient in the hospital?: Embolic stroke involving left middle cerebral artery    Patient has a past medical history of A-fib, Arthritis, Asteroid hyalosis of left eye, Bilateral nonexudative age-related macular degeneration, Breast cancer, Cataract, CHF (congestive heart failure), Chronic GERD, CKD stage 3 due to type 2 diabetes mellitus, Coronary artery disease, Encounter for blood transfusion, Hypertension, Migraine headache, Mild nonproliferative diabetic retinopathy of both eyes, Pneumonia, Stroke, Type 2 diabetes mellitus with hyperglycemia, and Vertigo.    ASSESSMENT/INTERVENTIONS     BP (!) 147/73 (Patient Position: Lying)   Pulse 70   Temp 100.3 °F (37.9 °C)   Resp (!) 40   Wt 73.5 kg (162 lb 0.6 oz)   LMP  (LMP Unknown)   SpO2 100%   BMI 30.62 kg/m²     What did you find: Upon arrival to room, patient with left sided gaze  and facial droop. Patient nonverbal and not following commands. Patient just admitted for new CVA. Rapid called for questionable respiratory status, as patient has periods of tachypnea with retractions breathing up to 40 times/minute. Otherwise, she is saturating 100% on 2L, lung sounds diminished throughout. ABG drawn and all WNL, chest xray reviewed from ED, which was questionable for edema. In addition, last EF 30%    RECOMMENDATIONS     We recommend: Continuous pulse ox and call for any other acute changes. Monitor respiratory status closely.     FOLLOW-UP/CONTINGENCY PLAN     Patient needs a second visit at : 1/1/20 AM    Call the Rapid Response Nurse, Hannah Bancroft, RN at x [phone] for additional questions or concerns.    PHYSICIAN ESCALATION     Orders received and case discussed with Torrie Calvillo NP.    Disposition: Remain in room 926.

## 2020-01-01 NOTE — PT/OT/SLP EVAL
Physical Therapy Evaluation/treatment    Patient Name:  Dacia Martínez   MRN:  [id number]    Recommendations:     Discharge Recommendations:  nursing facility, skilled   Discharge Equipment Recommendations: (TBD)   Barriers to discharge: None    Assessment:     Dacia Martínez is a 84 y.o. female admitted with a medical diagnosis of Embolic stroke involving left middle cerebral artery.  She presents with the following impairments/functional limitations:  weakness, impaired endurance, impaired self care skills, impaired functional mobilty, decreased lower extremity function, decreased upper extremity function, decreased coordination, visual deficits, impaired balance, decreased safety awareness, abnormal tone, decreased ROM, impaired fine motor.    Rehab Prognosis: Fair; patient would benefit from acute skilled PT services to address these deficits and reach maximum level of function.    Recent Surgery: * No surgery found *      Plan:     During this hospitalization, patient to be seen   to address the identified rehab impairments via gait training, therapeutic activities, therapeutic exercises and progress toward the following goals:    · Plan of Care Expires:  01/31/20    Subjective     Chief Complaint: None- needing to use restroom. Family gathered this through yes/no questions  Patient/Family Comments/goals: none  Pain/Comfort:  · Pain Rating 1: 0/10    Patients cultural, spiritual, Orthodoxy conflicts given the current situation: no    Living Environment:  Patient lives with daughter and grandchildren.  SSH, occasional assistance required with IADLs.   Roles and Routines: grandmother, mother  Prior to admission, patients level of function was mod I with occasional assistance.  Equipment used at home: none.  DME owned (not currently used): none.  Upon discharge, patient will have assistance from family.    Objective:     Communicated with nurse prior to session (cautioned about rapid response yesterday, recent NG tube  placement).  Patient found HOB elevated with bed alarm, oxygen, PureWick, telemetry, peripheral IV  upon PT entry to room. Pt's family present throughout session.    General Precautions: Standard, aspiration, NPO, fall   Orthopedic Precautions:N/A   Braces: N/A     Exams:  · Cognitive Exam:  Patient is oriented to Did not answer any questions posed  · RUE ROM/strength: 2/5 shoulder, elbow, wrist, hand-- able to sustain supported sitting after initiated by therapist  · LUE ROM/Strength: WNL  · RLE ROM/strength WNL  · LLE ROM/Strength: WNL  ·     Functional Mobility:  · Bed Mobility:  Rolling Left:  total assistance   · Rolling Right: total assistance  · Transfers:  Sit to Stand:  total assistance and of 2 persons with no AD, blocking RLE in case of buckling (none noted), leaning slightly toward her R side.  · Balance: Stood for ~15 seconds with Total A x 2. Sat edge of bed for ~10 mins with posterior lean (CGA-Max A, fluctuated).      Therapeutic Activities and Exercises:  ·  Pt sat edge of bed. Able to attend to midline (noted to have R neglect). Input placed at pt's R elbow to allow for supported sitting (initially not initiated but able to sustain for a short amt of time once I removed my hand).   · Educated family that pt is not ready for a bedside commode due to safety precautions (needing Total ax  2 for sit<>stand transfers). They understood. Nurse notified of assistance level and updated on board.    AM-PAC 6 CLICK MOBILITY  Total Score:9     Patient left HOB elevated with call button in reach.    GOALS:   Multidisciplinary Problems     Physical Therapy Goals        Problem: Physical Therapy Goal    Goal Priority Disciplines Outcome Goal Variances Interventions   Physical Therapy Goal     PT, PT/OT      Description:  Goals to be met by: 20     Patient will increase functional independence with mobility by performin. Supine to sit with Moderate Assistance  2. Sit to supine with Moderate  Assistance  3. Sit to stand transfer with Maximum Assistance  4. Bed to chair transfer with Maximum Assistance with/without AD.  5. Gait  x 5 feet with Maximum Assistance with/without AD.  6. Stand for 1 minute with Moderate Assistance with/without AD.  7. Lower extremity exercise program x20 reps per handout, with assistance as needed                      History:     Past Medical History:   Diagnosis Date    A-fib     Arthritis     Asteroid hyalosis of left eye     Bilateral nonexudative age-related macular degeneration 6/3/2014    Breast cancer     Cataract     CHF (congestive heart failure)     Chronic GERD 2017    CKD stage 3 due to type 2 diabetes mellitus     Coronary artery disease     Encounter for blood transfusion     Hypertension     Migraine headache     Mild nonproliferative diabetic retinopathy of both eyes 6/3/2014    Pneumonia     Stroke 2019    Type 2 diabetes mellitus with hyperglycemia     Vertigo        Past Surgical History:   Procedure Laterality Date    ABLATION N/A 2018    Procedure: ABLATION;  Surgeon: Jacques Burt MD;  Location: [facility] EP LAB;  Service: Cardiology;  Laterality: N/A;  AF, AVN, RFA, Choice, MB, 3 Prep *SJM CRT-P in situ*    BREAST SURGERY      left lumpectomy    CARPAL TUNNEL RELEASE      left    CATARACT EXTRACTION      vance     SECTION      EYE SURGERY      cataracts bilaterally    HYSTERECTOMY      partial    IMPLANTATION OF BIVENTRICULAR HEART PACEMAKER N/A 10/26/2018    Procedure: INSERTION, CARDIAC PACEMAKER, BIVENTRICULAR;  Surgeon: Jacques Burt MD;  Location: [facility] CATH LAB;  Service: Cardiology;  Laterality: N/A;  AF, CRT-P, SJM, MAC, MB, 3 Prep       Time Tracking:     PT Received On: 20  PT Start Time: 1200     PT Stop Time: 1226  PT Total Time (min): 26 min     Billable Minutes: Evaluation 15 and Therapeutic Activity 11      Sadia Malone, PT  2020

## 2020-01-01 NOTE — SUBJECTIVE & OBJECTIVE
Past Medical History:   Diagnosis Date    A-fib     Arthritis     Asteroid hyalosis of left eye     Bilateral nonexudative age-related macular degeneration 6/3/2014    Breast cancer     Cataract     CHF (congestive heart failure)     Chronic GERD 2017    CKD stage 3 due to type 2 diabetes mellitus     Coronary artery disease     Encounter for blood transfusion     Hypertension     Migraine headache     Mild nonproliferative diabetic retinopathy of both eyes 6/3/2014    Pneumonia     Stroke 2019    Type 2 diabetes mellitus with hyperglycemia     Vertigo        Past Surgical History:   Procedure Laterality Date    ABLATION N/A 2018    Procedure: ABLATION;  Surgeon: Jacques Burt MD;  Location: [facility] EP LAB;  Service: Cardiology;  Laterality: N/A;  AF, AVN, RFA, Choice, MB, 3 Prep *SJM CRT-P in situ*    BREAST SURGERY      left lumpectomy    CARPAL TUNNEL RELEASE      left    CATARACT EXTRACTION      vance     SECTION      EYE SURGERY      cataracts bilaterally    HYSTERECTOMY      partial    IMPLANTATION OF BIVENTRICULAR HEART PACEMAKER N/A 10/26/2018    Procedure: INSERTION, CARDIAC PACEMAKER, BIVENTRICULAR;  Surgeon: Jacques Burt MD;  Location: [facility] CATH LAB;  Service: Cardiology;  Laterality: N/A;  AF, CRT-P, SJM, MAC, MB, 3 Prep       Review of patient's allergies indicates:   Allergen Reactions    Tramadol Hallucinations       No current facility-administered medications on file prior to encounter.      Current Outpatient Medications on File Prior to Encounter   Medication Sig    ACCU-CHEK FASTCLIX Misc 1 lancet by Misc.(Non-Drug; Combo Route) route 3 (three) times daily. Pt to test blood glucose up to 3 times daily.    allopurinol (ZYLOPRIM) 100 MG tablet Take 1 and 1/2 tablets (150 mg total) by mouth once daily.    amoxicillin (AMOXIL) 500 MG capsule Take 1 capsule (500 mg total) by mouth 2 (two) times daily. For infection    apixaban (ELIQUIS)  2.5 mg Tab Take 1 tablet (2.5 mg total) by mouth 2 (two) times daily.    atorvastatin (LIPITOR) 10 MG tablet Take 1 tablet (10 mg total) by mouth once daily.    azelastine (ASTELIN) 137 mcg (0.1 %) nasal spray Use one spray by Nasal route twice daily for allergies    cholecalciferol, vitamin D3, (VITAMIN D3) 1,000 unit capsule Take 2 capsules (2,000 Units total) by mouth once daily.    clindamycin (CLEOCIN) 150 MG capsule TAKE ONE CAPSULE BY MOUTH EVERY 6 HOURS FOR 7 DAYS.    cycloSPORINE (RESTASIS) 0.05 % ophthalmic emulsion Place 0.4 mLs (1 drop total) into both eyes 2 (two) times daily.    diazePAM (VALIUM) 5 MG tablet Take 1 tablet by mouth the night before the appointment, and take 1 tablet by mouth one hour before the appointment    diphenhydrAMINE-zinc acetate 1-0.1% (BENADRYL) cream Apply topically 2 (two) times daily as needed for Itching.    doxepin (SINEQUAN) 10 MG capsule Take 1 capsule (10 mg total) by mouth every evening.    fluconazole (DIFLUCAN) 150 MG Tab Take 1 tablet by mouth once and then repeat dose with 1 tablet by mouth again 3 days later.    fluticasone propionate (FLONASE) 50 mcg/actuation nasal spray SPRAY TWICE IN EACH NOSTRIL DAILY    furosemide (LASIX) 20 MG tablet Take 2 tablets (40 mg total) by mouth 2 (two) times daily.    gabapentin (NEURONTIN) 300 MG capsule Take 1 capsule (300 mg total) by mouth 3 (three) times daily as needed.    HYDROcodone-acetaminophen (NORCO) 5-325 mg per tablet Take 1-2 tablets by mouth every 12 (twelve) hours as needed for Pain.    hydrocortisone 2.5 % cream Apply topically 2 (two) times daily.    ipratropium (ATROVENT) 0.03 % nasal spray instill 1 spray into each nostril 2 times a day if needed for nasal drip    levothyroxine (SYNTHROID) 25 MCG tablet Take 1 tablet (25 mcg total) by mouth daily before breakfast.    metoprolol succinate (TOPROL-XL) 100 MG 24 hr tablet Take 1 tablet by mouth once daily. Take with Metoprol succinate 50 mg for  a total daily dose of 150mg    metoprolol succinate (TOPROL-XL) 50 MG 24 hr tablet Take 1 tablet by mouth once daily. Take with Metoprol succinate 100 mg for a total daily dose of 150mg    mirtazapine (REMERON) 7.5 MG Tab Take 1 tablet (7.5 mg total) by mouth every evening.    predniSONE (DELTASONE) 20 MG tablet Take one tab by mouth once daily for a joint flare only as needed    sacubitril-valsartan (ENTRESTO) 24-26 mg per tablet Take 1 tablet by mouth 2 (two) times daily.    SITagliptin (JANUVIA) 25 MG Tab Take 1 tablet (25 mg total) by mouth once daily.    triamcinolone acetonide 0.1% (KENALOG) 0.1 % cream apply to affected area twice daily for x2 weeks, then as needed for itching, irritation.    [DISCONTINUED] meclizine (ANTIVERT) 25 mg tablet Take 1 tablet (25 mg total) by mouth 3 (three) times daily as needed for Dizziness.     Family History     Problem Relation (Age of Onset)    Blindness Brother    Cancer Mother    Cataracts Sister    Diabetes Father, Brother, Sister, Brother, Brother, Brother    Heart disease Mother    Hypertension Father, Brother    No Known Problems Daughter, Son, Daughter        Tobacco Use    Smoking status: Never Smoker    Smokeless tobacco: Never Used   Substance and Sexual Activity    Alcohol use: Yes    Drug use: No    Sexual activity: Not Currently     Review of Systems   Unable to perform ROS: Acuity of condition     Objective:     Vital Signs (Most Recent):  Temp: 100 °F (37.8 °C) (01/01/20 0726)  Pulse: 69 (01/01/20 0726)  Resp: (!) 21 (01/01/20 0726)  BP: 112/64 (01/01/20 0726)  SpO2: 97 % (01/01/20 0743) Vital Signs (24h Range):  Temp:  [98.3 °F (36.8 °C)-100.3 °F (37.9 °C)] 100 °F (37.8 °C)  Pulse:  [69-72] 69  Resp:  [16-40] 21  SpO2:  [97 %-100 %] 97 %  BP: (112-160)/() 112/64     Weight: 78.9 kg (173 lb 15.1 oz)  Body mass index is 32.33 kg/m².    Physical Exam   Constitutional: No distress.   Resting comfortably. Nasal oxygen in place.    HENT:   Head:  Normocephalic and atraumatic.   Mouth/Throat: No oropharyngeal exudate.   Eyes: No scleral icterus.   Right-sided neglect   Neck: Normal range of motion. Neck supple.   No JVD noted   Cardiovascular: Normal rate.   Murmur heard.  Pulmonary/Chest: Effort normal and breath sounds normal. She has no wheezes. She has no rales.   Abdominal: Soft. She exhibits no distension. There is no guarding.   Musculoskeletal:   No LE edema. Bilateral SCDs in place   Lymphadenopathy:     She has no cervical adenopathy.   Neurological: She displays no tremor. She exhibits abnormal muscle tone.   Unable to follow commands, aphasic  Right-sided neglect of upper and lower extremity.   Moves left upper and lower extremity spontaneously.   Symmetric smile.   No clonus        Skin: Skin is warm and dry. She is not diaphoretic.   Nursing note and vitals reviewed.      Significant Labs:   Blood Culture: No results for input(s): LABBLOO in the last 48 hours.  CBC:   Recent Labs   Lab 12/31/19 1926 12/31/19 1929 01/01/20 0347   WBC  --  6.91 8.88   HGB  --  12.2 11.6*   HCT 41 40.5 37.8   PLT  --  230 238     CMP:   Recent Labs   Lab 12/31/19 1929 01/01/20  0347    144   K 3.4* 3.1*    105   CO2 32* 25   * 153*   BUN 20 21   CREATININE 1.3 1.3   CALCIUM 8.7 8.6*   PROT 8.1 7.7   ALBUMIN 3.3* 3.2*   BILITOT 0.8 1.1*   ALKPHOS 61 59   AST 17 25   ALT 5* 5*   ANIONGAP 9 14   EGFRNONAA 38.0* 38.0*     Cardiac Markers: No results for input(s): CKMB, MYOGLOBIN, BNP, TROPISTAT in the last 48 hours.  Coagulation:   Recent Labs   Lab 01/01/20 0347   INR 1.1   APTT 22.8     POCT Glucose:   Recent Labs   Lab 12/31/19  2359 01/01/20  0619 01/01/20  0728   POCTGLUCOSE 164* 159* 137*     Respiratory Culture: No results for input(s): GSRESP, RESPIRATORYC in the last 48 hours.  Troponin:   Recent Labs   Lab 01/01/20 0347 01/01/20  0858   TROPONINI 0.516* 0.935*     TSH:   Recent Labs   Lab 12/31/19  1929   TSH 13.528*       Significant  Imaging: I have reviewed all pertinent imaging results/findings within the past 24 hours.

## 2020-01-01 NOTE — ASSESSMENT & PLAN NOTE
History of hypothyroidism. TSH on admission elevated to 13.528 with normal T4 at 0.97, agree with subclinical picture. On synthroid 25 mcg at home.    -Continue home synthroid 25 mcg

## 2020-01-01 NOTE — ASSESSMENT & PLAN NOTE
-Pt underwent dental extractions day of presentation. Family reports cough for few days prior to admission.  -Overnight on 12/31, pt became tachypnic with fever to 100.3 and rapid response was called. CXR with left sided consolidation. -Started on vanc/zosyn overnight. ABG wnl. WBC wnl. Procal mildly elevated to .99. O2 saturations %. Suspect possible aspiration pneumonia vs CAP.      Plan  1) Continue Broadspectrum antibiotics for now   2) BCx, sputum cxs pending  3) Influenza pending

## 2020-01-01 NOTE — ED PROVIDER NOTES
Encounter Date: 2019       History     Chief Complaint   Patient presents with    Aphasia     LKN 1830. Stopped taking eliquis this week secondary to dental procedure.      The patient presents with stroke symptoms. Onset was at 6:40 p.m..  She had sudden onset of right-sided weakness and difficulty speaking.  Per EMS, her symptoms largely resolved in route, however they have returned upon arrival to the emergency department.  She had a dental procedure today and took Valium around 2:30 p.m..  But the symptoms did not start until later.  She does take Eliquis, but this has been held for 1 week due to the dental procedure.    The history is provided by the EMS personnel and a relative.     Review of patient's allergies indicates:   Allergen Reactions    Tramadol Hallucinations     Past Medical History:   Diagnosis Date    A-fib     Arthritis     Asteroid hyalosis of left eye     Bilateral nonexudative age-related macular degeneration 6/3/2014    Breast cancer     Cataract     CHF (congestive heart failure)     Chronic GERD 2017    CKD stage 3 due to type 2 diabetes mellitus     Coronary artery disease     Encounter for blood transfusion     Hypertension     Migraine headache     Mild nonproliferative diabetic retinopathy of both eyes 6/3/2014    Pneumonia     Stroke 2019    Type 2 diabetes mellitus with hyperglycemia     Vertigo      Past Surgical History:   Procedure Laterality Date    ABLATION N/A 2018    Procedure: ABLATION;  Surgeon: Jacques Burt MD;  Location: [facility];  Service: Cardiology;  Laterality: N/A;  AF, AVN, RFA, Choice, MB, 3 Prep *SJM CRT-P in situ*    BREAST SURGERY      left lumpectomy    CARPAL TUNNEL RELEASE      left    CATARACT EXTRACTION      vance     SECTION      EYE SURGERY      cataracts bilaterally    HYSTERECTOMY      partial    IMPLANTATION OF BIVENTRICULAR HEART PACEMAKER N/A 10/26/2018    Procedure: INSERTION, CARDIAC  PACEMAKER, BIVENTRICULAR;  Surgeon: Jacques Burt MD;  Location: [facility] CATH LAB;  Service: Cardiology;  Laterality: N/A;  AF, CRT-P, SJM, MAC, MB, 3 Prep     Family History   Problem Relation Age of Onset    Cancer Mother         stomach    Heart disease Mother     Diabetes Father     Hypertension Father     Blindness Brother     Diabetes Brother     Hypertension Brother     Cataracts Sister     Diabetes Sister     Diabetes Brother     Diabetes Brother     Diabetes Brother     No Known Problems Daughter     No Known Problems Son     No Known Problems Daughter     Amblyopia Neg Hx     Glaucoma Neg Hx     Macular degeneration Neg Hx     Strabismus Neg Hx     Retinal detachment Neg Hx      Social History     Tobacco Use    Smoking status: Never Smoker    Smokeless tobacco: Never Used   Substance Use Topics    Alcohol use: Yes    Drug use: No     Review of Systems   Unable to perform ROS: Acuity of condition   Constitutional: Negative for chills and fever.   HENT: Positive for dental problem.    Respiratory: Negative for shortness of breath.    Cardiovascular: Negative for chest pain.   Gastrointestinal: Negative for nausea and vomiting.   Neurological: Positive for facial asymmetry, speech difficulty and weakness.       Physical Exam     Initial Vitals   BP Pulse Resp Temp SpO2   -- -- -- -- --      MAP       --         Physical Exam    Nursing note and vitals reviewed.  Constitutional: She appears well-developed and well-nourished. She is not diaphoretic. No distress.   Eyes:   The patient has a gaze preference to the left.   Neck: Normal range of motion.   Cardiovascular: Normal rate, regular rhythm, normal heart sounds and intact distal pulses. Exam reveals no friction rub.    Pulmonary/Chest: Breath sounds normal. No respiratory distress. She has no wheezes. She has no rhonchi. She has no rales.   Abdominal: Soft. Bowel sounds are normal. She exhibits no distension. There is no  tenderness. There is no rebound.   Musculoskeletal: She exhibits no edema.   Neurological: She is alert. No cranial nerve deficit or sensory deficit.   The patient has expressive aphasia with garbled speech. She has a right maynor-neglect.  She has right facial weakness. She has a right hemiparesis.  She attempts to follow commands with the left side.   Skin: Skin is warm and dry. Capillary refill takes less than 2 seconds.         ED Course   Procedures  Labs Reviewed   CBC W/ AUTO DIFFERENTIAL - Abnormal; Notable for the following components:       Result Value    Mean Corpuscular Hemoglobin Conc 30.1 (*)     RDW 16.1 (*)     All other components within normal limits   COMPREHENSIVE METABOLIC PANEL - Abnormal; Notable for the following components:    Potassium 3.4 (*)     CO2 32 (*)     Glucose 120 (*)     Albumin 3.3 (*)     ALT 5 (*)     eGFR if  43.8 (*)     eGFR if non  38.0 (*)     All other components within normal limits   TSH - Abnormal; Notable for the following components:    TSH 13.528 (*)     All other components within normal limits   LIPID PANEL - Abnormal; Notable for the following components:    Cholesterol 209 (*)     All other components within normal limits   ISTAT PROCEDURE - Abnormal; Notable for the following components:    POC Glucose 121 (*)     POC Potassium 3.3 (*)     POC TCO2 (MEASURED) 35 (*)     All other components within normal limits   PROTIME-INR   T4, FREE   HEMOGLOBIN A1C   URINALYSIS   POCT GLUCOSE   ISTAT PROCEDURE   ISTAT CREATININE          Imaging Results          X-Ray Chest AP Portable (Final result)  Result time 12/31/19 20:17:38    Final result by Feroz Michaud MD (12/31/19 20:17:38)                 Impression:      Patchy opacities projecting over the left mid and lower lung zones which may relate to infection, aspiration, or asymmetric edema.  Clinical correlation is advised.  Further evaluation as warranted.      Electronically  signed by: Feroz Michaud MD  Date:    12/31/2019  Time:    20:17             Narrative:    EXAMINATION:  XR CHEST AP PORTABLE    CLINICAL HISTORY:  Stroke;    TECHNIQUE:  Single frontal view of the chest was performed.    COMPARISON:  Chest radiograph 11/04/2018    FINDINGS:  The patient is rotated limiting assessment.  There is a right chest wall cardiac pacing device present.  Cardiac monitoring leads overlie the chest.  Cardiomediastinal silhouette appears mildly enlarged.  There are calcified left hilar lymph nodes present.  There are patchy opacities projecting over the left mid and lower lung zones.  The right hemithorax appears relatively well aerated.  No large pleural effusion or pneumothorax appreciated.  Visualized osseous structures appear grossly intact.                               CTA STROKE MULTI-PHASE (Final result)  Result time 12/31/19 20:41:08   Procedure changed from CT Head Without Contrast     Final result by Rory Cantu MD (12/31/19 20:41:08)                 Impression:      No acute intracranial pathology.  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    In this exam limited by poor timing of contrast and motion artifact, no high-grade stenosis or major vessel occlusion.  Hypoplastic right A2 segment.    Subcentimeter subsegmental opacities in the left lung apex, with considerations including infection, non infectious inflammation, aspiration, and small airways disease.  Clinical correlation as warranted.    Other findings as above.    Findings were reported by Dr. Cantu to Dr. Carmen Cosby at 19:50 via secure messaging.    Electronically signed by resident: Jorje Corbin  Date:    12/31/2019  Time:    19:47    Electronically signed by: Rory Cantu MD  Date:    12/31/2019  Time:    20:41             Narrative:    EXAMINATION:  CTA STROKE MULTI-PHASE    CLINICAL HISTORY:  Stroke;    TECHNIQUE:  Non contrast low dose  axial images were obtained thought the head. CT angiogram was performed from the level of the adilene to the top of the head following the IV administration of 100mL of Omnipaque 350.   Sagittal and coronal reconstructions and maximum intensity projection reconstructions were performed. Arterial stenosis percentages are based on NASCET measurement criteria.  Two additional phases of immediate post-contrast CTA images were performed through the head alone.    COMPARISON:  CT head without contrast 11/04/2018, MRI brain without contrast 08/01/2018, MRI brain without contrast 08/01/2018, CT head without contrast 07/28/2018.    FINDINGS:  Intracranial Compartment:    Prominence of the ventricles and sulci compatible with mild generalized cerebral volume loss.  No hydrocephalus. No extra-axial blood or fluid collections.    Mild chronic microvascular ischemic changes throughout the supratentorial white matter, as well as remote infarct in the left occipital paramedian region.  Stable prominent left basal ganglia perivascular space.  Partially empty sella configuration.  No parenchymal mass, hemorrhage, edema, or major vascular distribution infarct.    Skull/Extracranial Contents (limited evaluation): No fracture. Partial opacification of anterior left and ethmoid air cells.  Mastoid air cells and remaining paranasal sinuses are essentially clear.    Non-Vascular Structures of the Neck/Thoracic Inlet (limited evaluation): The thyroid is normal size with no focal lesions.  No significant axillary or supraclavicular lymphadenopathy.    Lung apices: Mild centrilobular emphysematous changes with upper lung zone predominance.  Subcentimeter subsegmental opacities in the left lung apex (axial series 5, image 16), with considerations including infection, non infectious inflammation, aspiration, and small airways disease.  No consolidation, pleural effusion, or pneumothorax.    Aorta: Normal 3 vessel arch with calcific  atherosclerosis of the aortic arch and origins of the great vessels.    Poor timing of contrast bolus and motion artifact limits evaluation of the extracranial anterior and posterior arteries.    Extracranial carotid circulation: Poorly evaluated due to contrast time    Extracranial vertebral circulation: Poorly evaluated due to contrast timing.    Intracranial Arteries: No focal high-grade stenosis, occlusion, or aneurysm.  Hypoplastic right A2 segment.  Calcification of the supraclinoid carotid arteries.  No definite large vessel occlusion is seen, although contrast opacity of the arteries is suboptimal.    Venous structures (limited evaluation): Normal.    Osseous structures: Age-appropriate degenerative changes of the visualized spine.                                 Medical Decision Making:   Initial Assessment:   The patient presents with acute stroke symptoms.  Although EMS reported they initially improved, she has symptoms on arrival to the emergency department.  Some of the symptoms are concerning for large vessel occlusion.  She was triaged immediately to the CT scanner for a noncontrast head CT and a CT a multiphase.  Vascular Neurology was consulted immediately upon arrival.  The patient did take Valium earlier today for a dental procedure, but had no symptoms until several hours later.  She was previously on Eliquis, but this was held 1 week ago.              Attending Attestation:         Attending Critical Care:   Critical Care Times:   ==============================================================  · Total Critical Care Time - exclusive of procedural time: 55 minutes.  ==============================================================  Critical care was necessary to treat or prevent imminent or life-threatening deterioration of the following conditions: CNS failure and stroke.   Critical care was time spent personally by me on the following activities: obtaining history from patient or relative,  examination of patient, review of x-rays / CT sent with the patient, review of old charts, ordering lab, x-rays, and/or EKG, development of treatment plan with patient or relative, ordering and performing treatments and interventions, discussion with consultants, interpretation of cardiac measurements and re-evaluation of patient's conition.   Critical Care Condition: life-threatening               ED Course as of Dec 31 2055   Tue Dec 31, 2019   1927 I discussed the case with vascular Neurology.  We will order a CT and a CTA multi phase.    [COREY]   2019 Vascular Neurology continues to evaluate the patient.  CT a multi phase is negative for large vessel occlusion.  Her symptoms are waxing and waning, and now there are inconsistent times reported by the family.  No tPA has been given.    [COREY]   2048 Chest x-ray, my interpretation:  Patchy infiltrates on the left.  Pneumonia and aspiration are in the differential.    [COREY]   2055 The patient has been sent emergently to MRI with vascular Neurology.  They will admit the patient.    [COREY]      ED Course User Index  [COREY] Mike Preciado MD                Clinical Impression:       ICD-10-CM ICD-9-CM   1. Stroke I63.9 434.91                             Mike Preciado MD  12/31/19 2055

## 2020-01-01 NOTE — CONSULTS
[facility] Medicine  Consult Note    Patient Name: Dacia Martínez  MRN: [id number]  Admission Date: 12/31/2019  Hospital Length of Stay: 1 days  Attending Physician: Carmen Cosby MD   Primary Care Provider: Jasmeet Corral MD           Patient information was obtained from relative(s), past medical records and ER records.     Inpatient consult to Hospital Medicine - Stroke Comanagement  Consult performed by: Palma Lee MD  Consult ordered by: Torrie Calvillo NP        Subjective:     Principal Problem: Embolic stroke involving left middle cerebral artery    Chief Complaint:   Chief Complaint   Patient presents with    Aphasia     LKN 1830. Stopped taking eliquis this week secondary to dental procedure.         HPI: Ms. Martínez is an 84yF with significant medical co-morbidities of HTN, DM2, HFrEF (30% in 2/2019 with pacemaker), HLD, CAD, A fib (on Eliquis), OA, Breast Cancer (s/p mastectomy, chemo, radiation in 2000), CKD3, Gout, and Hypothyroidism who was admitted to vascular neurology after family noticed right sided-weakness, gait instability, and slurred speech yesterday evening following dental procedure. Report that her Eliquis had been held for 4 days prior to the procedure. Also given Valium X2 prior /during procedure. Her daughters state that she had been altered since they picked her up from the surgery around 4:30 pm yesterday. 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 Unable to attain MRI due to pacemaker. She was given ASA 325mg and Atorvastatin 40 mg. Waxing and waning course of symptoms. Overnight, rapid response was called due to tachypnea up to 40 and fevers to 100.3. ABG wnl but CXR with possible left-sided infiltrate. WBC wnl. Pt was started on vanc/zosyn in setting of aspiration vs CAP pneumonia. Troponin elevated to 0.516 with repeat elevated to 0.935. Hospital Medicine was consulted for assistance with pneumonia,  elevated troponin, and her other multiple co-morbidities.     Past Medical History:   Diagnosis Date    A-fib     Arthritis     Asteroid hyalosis of left eye     Bilateral nonexudative age-related macular degeneration 6/3/2014    Breast cancer     Cataract     CHF (congestive heart failure)     Chronic GERD 2017    CKD stage 3 due to type 2 diabetes mellitus     Coronary artery disease     Encounter for blood transfusion     Hypertension     Migraine headache     Mild nonproliferative diabetic retinopathy of both eyes 6/3/2014    Pneumonia     Stroke 2019    Type 2 diabetes mellitus with hyperglycemia     Vertigo        Past Surgical History:   Procedure Laterality Date    ABLATION N/A 2018    Procedure: ABLATION;  Surgeon: Jacques Burt MD;  Location: [facility] EP LAB;  Service: Cardiology;  Laterality: N/A;  AF, AVN, RFA, Choice, MB, 3 Prep *SJM CRT-P in situ*    BREAST SURGERY      left lumpectomy    CARPAL TUNNEL RELEASE      left    CATARACT EXTRACTION      vance     SECTION      EYE SURGERY      cataracts bilaterally    HYSTERECTOMY      partial    IMPLANTATION OF BIVENTRICULAR HEART PACEMAKER N/A 10/26/2018    Procedure: INSERTION, CARDIAC PACEMAKER, BIVENTRICULAR;  Surgeon: Jacques Burt MD;  Location: [facility] CATH LAB;  Service: Cardiology;  Laterality: N/A;  AF, CRT-P, SJM, MAC, MB, 3 Prep       Review of patient's allergies indicates:   Allergen Reactions    Tramadol Hallucinations       No current facility-administered medications on file prior to encounter.      Current Outpatient Medications on File Prior to Encounter   Medication Sig    ACCU-CHEK FASTCLIX Misc 1 lancet by Misc.(Non-Drug; Combo Route) route 3 (three) times daily. Pt to test blood glucose up to 3 times daily.    allopurinol (ZYLOPRIM) 100 MG tablet Take 1 and 1/2 tablets (150 mg total) by mouth once daily.    amoxicillin (AMOXIL) 500 MG capsule Take 1 capsule (500 mg total) by  mouth 2 (two) times daily. For infection    apixaban (ELIQUIS) 2.5 mg Tab Take 1 tablet (2.5 mg total) by mouth 2 (two) times daily.    atorvastatin (LIPITOR) 10 MG tablet Take 1 tablet (10 mg total) by mouth once daily.    azelastine (ASTELIN) 137 mcg (0.1 %) nasal spray Use one spray by Nasal route twice daily for allergies    cholecalciferol, vitamin D3, (VITAMIN D3) 1,000 unit capsule Take 2 capsules (2,000 Units total) by mouth once daily.    clindamycin (CLEOCIN) 150 MG capsule TAKE ONE CAPSULE BY MOUTH EVERY 6 HOURS FOR 7 DAYS.    cycloSPORINE (RESTASIS) 0.05 % ophthalmic emulsion Place 0.4 mLs (1 drop total) into both eyes 2 (two) times daily.    diazePAM (VALIUM) 5 MG tablet Take 1 tablet by mouth the night before the appointment, and take 1 tablet by mouth one hour before the appointment    diphenhydrAMINE-zinc acetate 1-0.1% (BENADRYL) cream Apply topically 2 (two) times daily as needed for Itching.    doxepin (SINEQUAN) 10 MG capsule Take 1 capsule (10 mg total) by mouth every evening.    fluconazole (DIFLUCAN) 150 MG Tab Take 1 tablet by mouth once and then repeat dose with 1 tablet by mouth again 3 days later.    fluticasone propionate (FLONASE) 50 mcg/actuation nasal spray SPRAY TWICE IN EACH NOSTRIL DAILY    furosemide (LASIX) 20 MG tablet Take 2 tablets (40 mg total) by mouth 2 (two) times daily.    gabapentin (NEURONTIN) 300 MG capsule Take 1 capsule (300 mg total) by mouth 3 (three) times daily as needed.    HYDROcodone-acetaminophen (NORCO) 5-325 mg per tablet Take 1-2 tablets by mouth every 12 (twelve) hours as needed for Pain.    hydrocortisone 2.5 % cream Apply topically 2 (two) times daily.    ipratropium (ATROVENT) 0.03 % nasal spray instill 1 spray into each nostril 2 times a day if needed for nasal drip    levothyroxine (SYNTHROID) 25 MCG tablet Take 1 tablet (25 mcg total) by mouth daily before breakfast.    metoprolol succinate (TOPROL-XL) 100 MG 24 hr tablet Take 1  tablet by mouth once daily. Take with Metoprol succinate 50 mg for a total daily dose of 150mg    metoprolol succinate (TOPROL-XL) 50 MG 24 hr tablet Take 1 tablet by mouth once daily. Take with Metoprol succinate 100 mg for a total daily dose of 150mg    mirtazapine (REMERON) 7.5 MG Tab Take 1 tablet (7.5 mg total) by mouth every evening.    predniSONE (DELTASONE) 20 MG tablet Take one tab by mouth once daily for a joint flare only as needed    sacubitril-valsartan (ENTRESTO) 24-26 mg per tablet Take 1 tablet by mouth 2 (two) times daily.    SITagliptin (JANUVIA) 25 MG Tab Take 1 tablet (25 mg total) by mouth once daily.    triamcinolone acetonide 0.1% (KENALOG) 0.1 % cream apply to affected area twice daily for x2 weeks, then as needed for itching, irritation.    [DISCONTINUED] meclizine (ANTIVERT) 25 mg tablet Take 1 tablet (25 mg total) by mouth 3 (three) times daily as needed for Dizziness.     Family History     Problem Relation (Age of Onset)    Blindness Brother    Cancer Mother    Cataracts Sister    Diabetes Father, Brother, Sister, Brother, Brother, Brother    Heart disease Mother    Hypertension Father, Brother    No Known Problems Daughter, Son, Daughter        Tobacco Use    Smoking status: Never Smoker    Smokeless tobacco: Never Used   Substance and Sexual Activity    Alcohol use: Yes    Drug use: No    Sexual activity: Not Currently     Review of Systems   Unable to perform ROS: Acuity of condition     Objective:     Vital Signs (Most Recent):  Temp: 100 °F (37.8 °C) (01/01/20 0726)  Pulse: 69 (01/01/20 0726)  Resp: (!) 21 (01/01/20 0726)  BP: 112/64 (01/01/20 0726)  SpO2: 97 % (01/01/20 0743) Vital Signs (24h Range):  Temp:  [98.3 °F (36.8 °C)-100.3 °F (37.9 °C)] 100 °F (37.8 °C)  Pulse:  [69-72] 69  Resp:  [16-40] 21  SpO2:  [97 %-100 %] 97 %  BP: (112-160)/() 112/64     Weight: 78.9 kg (173 lb 15.1 oz)  Body mass index is 32.33 kg/m².    Physical Exam   Constitutional: No  distress.   Resting comfortably. Nasal oxygen in place.    HENT:   Head: Normocephalic and atraumatic.   Mouth/Throat: No oropharyngeal exudate.   Eyes: No scleral icterus.   Right-sided neglect   Neck: Normal range of motion. Neck supple.   No JVD noted   Cardiovascular: Normal rate.   Murmur heard.  Pulmonary/Chest: Effort normal and breath sounds normal. She has no wheezes. She has no rales.   Abdominal: Soft. She exhibits no distension. There is no guarding.   Musculoskeletal:   No LE edema. Bilateral SCDs in place   Lymphadenopathy:     She has no cervical adenopathy.   Neurological: She displays no tremor. She exhibits abnormal muscle tone.   Unable to follow commands, aphasic  Right-sided neglect of upper and lower extremity.   Moves left upper and lower extremity spontaneously.   Symmetric smile.   No clonus        Skin: Skin is warm and dry. She is not diaphoretic.   Nursing note and vitals reviewed.      Significant Labs:   Blood Culture: No results for input(s): LABBLOO in the last 48 hours.  CBC:   Recent Labs   Lab 12/31/19 1926 12/31/19 1929 01/01/20 0347   WBC  --  6.91 8.88   HGB  --  12.2 11.6*   HCT 41 40.5 37.8   PLT  --  230 238     CMP:   Recent Labs   Lab 12/31/19 1929 01/01/20 0347    144   K 3.4* 3.1*    105   CO2 32* 25   * 153*   BUN 20 21   CREATININE 1.3 1.3   CALCIUM 8.7 8.6*   PROT 8.1 7.7   ALBUMIN 3.3* 3.2*   BILITOT 0.8 1.1*   ALKPHOS 61 59   AST 17 25   ALT 5* 5*   ANIONGAP 9 14   EGFRNONAA 38.0* 38.0*     Cardiac Markers: No results for input(s): CKMB, MYOGLOBIN, BNP, TROPISTAT in the last 48 hours.  Coagulation:   Recent Labs   Lab 01/01/20  0347   INR 1.1   APTT 22.8     POCT Glucose:   Recent Labs   Lab 12/31/19  2359 01/01/20  0619 01/01/20  0728   POCTGLUCOSE 164* 159* 137*     Respiratory Culture: No results for input(s): GSRESP, RESPIRATORYC in the last 48 hours.  Troponin:   Recent Labs   Lab 01/01/20  0347 01/01/20  0858   TROPONINI 0.516* 0.935*      TSH:   Recent Labs   Lab 12/31/19 1929   TSH 13.528*       Significant Imaging: I have reviewed all pertinent imaging results/findings within the past 24 hours.    Assessment/Plan:     * Embolic stroke involving left middle cerebral artery  Ms. Martínez is a 84yF with CVA risk factors of HTN, HLD, DM2, and Afib off her AG for 4 days prior to admission for dental procedure on 12/31 who was brought in by EMS yesterday 12/31 for gait instability, facial droop, aphasia, confusion, and right-sided weakness. 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 Presumed MCA stroke given exam. Pt received ASA 325mg and Atorvastatin 40 mg. Admitted to vascular neurology. HM consulted for possible aspiration pneumonia and other medical co-morbidities.     -Eliquis resumed 1/1/20  -Continued management per primary team      Pneumonia  Pt is an 84yF with multiple co-morbidities who was admitted to vascular neurology for suspected MCA stroke. Pt underwent dental extractions day of presentation. Overnight on 12/31, pt became tachypnic with fever to 100.3 and rapid response was called. CXR with left sided consolidation. Started on vanc/zosyn overnight. ABG wnl. WBC wnl. Procal mildly elevated to .99. O2 saturations %. Suspect possible aspiration pneumonia vs CAP. Family reports cough for few days prior to admission.     -Agree with continued abx (vanc/zosyn, can narrow as needed)  -Sputum cultures pending  -Blood cultures pending  -TTE pending  -UA pending  - Respiratory viral panel pending  - IV fluids ordered  - Wean oxygen as tolerated, per chart review, pt was placed on 2L NC at request of family.       Hypothyroidism  History of hypothyroidism. TSH on admission elevated to 13.528 with normal T4 at 0.97, agree with subclinical picture. On synthroid 25 mcg at home.    -Continue home synthroid 25 mcg       CKD (chronic kidney disease) stage 3, GFR  30-59 ml/min  Hx of CKD3. Baseline Cr of ~1.5. CTA head done on admit to evaluate for acute CVA.    -Cr currently at 1.3 with BUN at 21  -Advise gently hydration with NS 50 ml/hr for 20 hours for total 1L fluids        Chronic systolic congestive heart failure  Pt with history of HFrEF and biventricular pacemaker. Last EF 30% in 2/2019. Stress test negative at that time. On lasix 40 mg BID, Entresto 24-26 mg, Metoprolol succinate 150 mg daily at home. On admit,Troponin elevated to 0.5-->0.9. EKG without any St changes. Cardiology consulted given patient's cardiac history, suspect demand ischemia as etiology, they do not feel any intervention necessary at this point given that pt has already received  mg and high intensity statin in setting of ischemic stroke.     -Advise trending troponins (Q6 hours x4) per cardiology recommendations   -TTE pending  -Home dose Lasix, Metoprolol, Entresto currently held in setting of MCA stroke and possible infection  -Overnight 12/31, pt with tachypnea and was given 1 dose lasix 20 mg IV  -Appears euvolemic on exam   -Advise gentle hydration with 50 ml/hr NS over 20 hours for 1 L total in setting of low EF and infection.   -I/Os, daily weights  -Replace K and Mg as needed      Persistent atrial fibrillation  Pt with history of Afib anticoagulated with Eliquis and rate-controlled with metoprolol and biventricular pacemaker.   Presented to ED yesterday 12/31 for likely MCA stroke following dental extractions earlier that day. Eliquis was held 4 days prior to procedure. Resumed 1/1/2019 per primary team.    -CHADSVAS 9  -Eliquis re-started 1/1/20  -Will advise holding metoprolol for now, can re-start as needed     Type 2 diabetes mellitus with stage 3 chronic kidney disease, without long-term current use of insulin  Recent A1c of 6.9. On Januvia 25mg at home. Currently NPO 2/2 concern for swallowing. Evaluated by speech who recommended tube feeds. POC glucoses have been in mid  100s.    -Low dose SSI   -Continue accuchecks   -Can adjust as needed pending tube feed initiation       Idiopathic chronic gout of multiple sites without tophus  Continue Allopurinol       Hyperlipidemia  Hx of HLD. Home dose of atorvastatin was 10 mg. On admission, pt was started on atorvastatin 40 mg.    -Lipid panel with total cholesterol of 209, ,   -Continue atorvastatin 40 mg daily       Essential hypertension  Pt with history of HTN. Home meds include lasix, entresto, metoprolol. Per primary team, goal SBP <220 in setting of likely MCA stroke.     -Holding Entresto, Lasix, Metoprolol in setting of acute CVA  -Has prn labetolol IV ordered  -BP ranging 112-160/      VTE Risk Mitigation (From admission, onward)         Ordered     apixaban tablet 2.5 mg  2 times daily      12/31/19 2111     IP VTE HIGH RISK PATIENT  Once      12/31/19 2049     Place sequential compression device  Until discontinued      12/31/19 2049                    Thank you for your consult. We will follow-up with patient. Please contact us if you have any additional questions.    Palma Lee MD   PGY-1  Department of Hospital Medicine   [facility]

## 2020-01-01 NOTE — HOSPITAL COURSE
01/01/2020 CTA MP without any acute findings. MRI not done as incompatible with pacemaker.Neurological exam patient is waxing and waning. At times will move her left arm however, per the medicine team's examination patient had hemineglect in hte right side which was not seen during my exam.nOvernight, rapid response was called due to tachypnea up to 40 and fevers to 100.3. ABG wnl but CXR with possible left-sided infiltrate. WBC wnl. Pt was started on vanc/zosyn in setting of aspiration vs CAP pneumonia. Troponin elevated to 0.516 with repeat elevated to 0.935.Cardiology consulted and they believed this is demand ischemia, thus will trend troponin. Repeat CTH shows no evidence of major vascular distribution infarct or hemorrhage.     01/02/2020 Elevated BUN/Cr (could be secondary to post contrast). Patient having intermittent episodes of hemiplegia on the right side. During transport to Echo, patient was exhibiting left sided myoclonic jerks with left gaze deviation. Ordered EEG. STAT CTH ordered. General neurology is aware. Awaiting respiratory culture before deescalation of antibiotics. Will be transferred to medicine floor tomorrow.     01/03/2020 Patient was spiking fevers. Antibiotics broadened to vancomycin and zosyn. Patient pan-cultured. Preliminary EEG shows diffuse slowing with epileptiform discharges. US of the carotids ordered.     01/04/2020 Sputum culture with enterobacter cloacae, pan-sensitive to antibiotics.  Repeat influenza pending. Echo without signs of vegetation. Blood cultures  negative. Increased free water. Pt still with fever in the evening of 1/3/20 so will not deescalate zosyn. Will discontinue vancomycin. US of the lower legs ordered to rule out other causes of fever.  Will order CT head to rule out any new infarctions.    01/05/2020 Patient was febrile  evening of 1/3/20 but improved 1/5/20 however, will keep on zosyn. CTH with redemonstration of hypodensity withi the superior left  frontal lobe and additional subtle hypodensity within the left frontal lobe periventricular white matter. US of the lower extremities does not reveal any DVT. On examination today  Patient was extremely somnolent and was exhibiting paradoxical breathing. ABG demonstrated respiratory alkalosis. CXR shows pulmonary edema. Due to possible respiratory decompensation as well as somnolent state, patient was transferred to [facility]      1/8/2020 CTH now showing interval development of large areas of recent infarction, L MCA and bilat JAIME with significant mass effect.

## 2020-01-01 NOTE — ED NOTES
"RN called to room by family. Family states "she's not breathing. She needs to be put on oxygen." Patient SPO2 100% on ra, NAD noted, non-labored breathing. Breath sounds clear. Patient denies symptoms. When RN asked patient's family why they believe she is "not breathing" they state "her skin's getting tight. She ain't breathing. You need to put her on oxygen." RN explained that there is no need for supplemental oxygen at this time. Family insisting oxygen and becoming increasingly agitated. Patient put on 2L nc for transport per family request.   "

## 2020-01-01 NOTE — HPI
Ms. Martínez is an 84yF with significant medical co-morbidities of HTN, DM2, HFrEF (30% in 2/2019 with pacemaker), HLD, CAD, A fib (on Eliquis), OA, Breast Cancer (s/p mastectomy, chemo, radiation in 2000), CKD3, Gout, and Hypothyroidism who was admitted to vascular neurology after family noticed right sided-weakness, gait instability, and slurred speech yesterday evening following dental procedure. Report that her Eliquis had been held for 4 days prior to the procedure. Also given Valium X2 prior /during procedure. Her daughters state that she had been altered since they picked her up from the surgery around 4:30 pm yesterday. 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 Unable to attain MRI due to pacemaker. She was given ASA 325mg and Atorvastatin 40 mg. Waxing and waning course of symptoms. Overnight, rapid response was called due to tachypnea up to 40 and fevers to 100.3. ABG wnl but CXR with possible left-sided infiltrate. WBC wnl. Pt was started on vanc/zosyn in setting of aspiration vs CAP pneumonia. Troponin elevated to 0.516 with repeat elevated to 0.935. Hospital Medicine was consulted for assistance with pneumonia, elevated troponin, and her other multiple co-morbidities.

## 2020-01-01 NOTE — PT/OT/SLP EVAL
"Occupational Therapy   Evaluation    Name: Dacia Martínez  MRN: [id number]  Admitting Diagnosis:  Embolic stroke involving left middle cerebral artery      Recommendations:     Discharge Recommendations: nursing facility, skilled  Discharge Equipment Recommendations:  other (see comments)(TBD)  Barriers to discharge:  Inaccessible home environment, Decreased caregiver support    Assessment:     Dacia Martínez is a 84 y.o. female with a medical diagnosis of Embolic stroke involving left middle cerebral artery.  She presents with performance deficits affecting function: weakness, impaired endurance, impaired sensation, impaired self care skills, impaired functional mobilty, gait instability, impaired balance, visual deficits, impaired cognition, decreased coordination, decreased upper extremity function, decreased lower extremity function, decreased safety awareness, abnormal tone, decreased ROM, impaired coordination, impaired fine motor, impaired cardiopulmonary response to activity.  Occupational Therapy recommends a Skilled Nursing Facility upon discharge to maximize return to PLOF and address deficits listed above.     Rehab Prognosis: Fair; patient would benefit from acute skilled OT services to address these deficits and reach maximum level of function.       Plan:     Patient to be seen 3 x/week to address the above listed problems via self-care/home management, therapeutic activities, therapeutic exercises, neuromuscular re-education, cognitive retraining, sensory integration  · Plan of Care Expires: 01/31/20  · Plan of Care Reviewed with: patient, family    Subjective     Chief Complaint: "yeah" patient answering most questions with yes, confusion and aphasia present. Pt oriented to self.     Patient/Family Comments/goals:  maximize return to PLOF    Occupational Profile:  Living Environment: Patient lives with daughter and grandchildren.  SS  Previous level of function: mod I, occasional assistance required with " IADLs.   Roles and Routines: grandmother, mother  Equipment Used at Home:  none  Assistance upon Discharge: supportive family    Pain/Comfort:  · Pain Rating 1: other (see comments)(pt verbalized no discomfort, endorsed dizziness seated EOB, pt confused/aphasic )  · Pain Rating Post-Intervention 1: other (see comments)    Patients cultural, spiritual, L[employer]-day conflicts given the current situation: no    Objective:     Communicated with: RN prior to session.  Patient found HOB elevated with bed alarm, NG tube, oxygen, PureWick, peripheral IV, telemetry upon OT entry to room.    General Precautions: Standard, aspiration, fall, NPO   Orthopedic Precautions:N/A   Braces: N/A     Occupational Performance:    Bed Mobility:    · Patient completed Rolling/Turning to Left with  maximal assistance and 2 persons  · Patient completed Rolling/Turning to Right with maximal assistance and 2 persons  · Patient completed Scooting/Bridging with maximal assistance and 2 persons  · Patient completed Supine to Sit with maximal assistance and 2 persons  · Patient completed Sit to Supine with maximal assistance and 2 persons    Functional Mobility/Transfers:  · Patient completed Sit <> Stand Transfer with maximal assistance  with  hand-held assist 2 person assist  · Functional Mobility: Static sitting fluctuated between CGA-Max A.     Activities of Daily Living:  · Feeding:  NPO NG in place  · Lower Body Dressing: maximal assistance donning sock  · Toileting: purewick in place      Cognitive/Visual Perceptual:  Cognitive/Psychosocial Skills:     -       Oriented to: Person   -       Follows Commands/attention:Inattentive and impaired command follow  -       Communication: expressive aphasia, receptive aphasia and dysarthria  -       Memory: difficult to assess 2* aphasia  -       Safety awareness/insight to disability: impaired   -       Mood/Affect/Coping skills/emotional control: Cooperative, Lethargic and  Pleasant  Visual/Perceptual:      -Impaired  visual field R inattention L gaze    Physical Exam:  Postural examination/scapula alignment:    -       Rounded shoulders  -       Forward head  -       Posterior pelvic tilt  -       Kyphosis  Skin integrity: Visible skin intact and Thin  Edema:  Mild RUE  Sensation:    -       Impaired  light/touch R side and sharp/dull R side  Dominant hand:    -       R handed  Upper Extremity Range of Motion:     -       Right Upper Extremity: Deficits: impaired AROM against gravity  -       Left Upper Extremity: WFL except impaired AROM against gravity  Upper Extremity Strength:    -       Right Upper Extremity: Deficits: 3-/5  -       Left Upper Extremity: WFL except 4/5    AMPAC 6 Click ADL:  AMPA Total Score: 11    Treatment & Education:  -OT eval complete  -Pt education on OT role and POC   -Importance of OOB activity with staff assistance  -Safety during functional transfer and mobility  -White board updated  -Multiple self-care tasks and functional mobility completed -- assistance level noted above  -All questions and concerns answered within OT scope of practice.     Education:    Patient left HOB elevated with all lines intact, call button in reach, bed alarm on, RN notified and RN and family present    GOALS:   Multidisciplinary Problems     Occupational Therapy Goals        Problem: Occupational Therapy Goal    Goal Priority Disciplines Outcome Interventions   Occupational Therapy Goal     OT, PT/OT Ongoing, Progressing    Description:  Goals set on 1/1, with expiration date 1/15:  Patient will increase functional independence with ADLs by performing:    Grooming while EOB with Minimal Assistance.  UB Dressing with Moderate Assistance.  LB Dressing with Moderate Assistance.  Toileting from bedside commode with Moderate Assistance for hygiene and clothing management.   Rolling to Bilateral with Moderate Assistance.   Supine <> Sit with Moderate Assistance.  Squat pivot  transfers with Moderate Assistance.  Patient's family / caregiver will demonstrate independence and safety with assisting patient with self-care skills and functional mobility.      Patient's family / caregiver will demonstrate independence with providing ROM and changes in bed positioning.                       History:     Past Medical History:   Diagnosis Date    A-fib     Arthritis     Asteroid hyalosis of left eye     Bilateral nonexudative age-related macular degeneration 6/3/2014    Breast cancer     Cataract     CHF (congestive heart failure)     Chronic GERD 2017    CKD stage 3 due to type 2 diabetes mellitus     Coronary artery disease     Encounter for blood transfusion     Hypertension     Migraine headache     Mild nonproliferative diabetic retinopathy of both eyes 6/3/2014    Pneumonia     Stroke 2019    Type 2 diabetes mellitus with hyperglycemia     Vertigo        Past Surgical History:   Procedure Laterality Date    ABLATION N/A 2018    Procedure: ABLATION;  Surgeon: Jacques Burt MD;  Location: [facility];  Service: Cardiology;  Laterality: N/A;  AF, AVN, RFA, Choice, MB, 3 Prep *SJM CRT-P in situ*    BREAST SURGERY      left lumpectomy    CARPAL TUNNEL RELEASE      left    CATARACT EXTRACTION      vance     SECTION      EYE SURGERY      cataracts bilaterally    HYSTERECTOMY      partial    IMPLANTATION OF BIVENTRICULAR HEART PACEMAKER N/A 10/26/2018    Procedure: INSERTION, CARDIAC PACEMAKER, BIVENTRICULAR;  Surgeon: Jacques Burt MD;  Location: [facility];  Service: Cardiology;  Laterality: N/A;  AF, CRT-P, SJM, MAC, MB, 3 Prep       Time Tracking:     OT Date of Treatment: 20  OT Start Time: 1200  OT Stop Time: 1226  OT Total Time (min): 26 min CoEval with PT    Billable Minutes:Evaluation 26    Ifrah Brown, OT  2020

## 2020-01-01 NOTE — ASSESSMENT & PLAN NOTE
Stroke risk facotr  SBP <220   Holding home antihypertensives due to concern for acute stroke  Prn labetalol

## 2020-01-01 NOTE — PROGRESS NOTES
[facility]  Vascular Neurology  Comprehensive Stroke Center  Progress Note    Assessment/Plan:     * Embolic stroke involving left middle cerebral artery  Dacia Martínez is a 84 y.o. female with PMHx of HTN, HLD, HF, DM, a fib (eliquis) who presented to hospital with gait instability, RSW, and speech changes. She had been off her eliquis for 1 week due to scheduled dental procedure. Patient with L gaze, mild RSW, and speech difficulty when examined by stroke provider. EMS reported possible waxing and waning of symptoms in transit. She was taken for STAT CTA multiphase, which was negative for early ischemic changes, LVO, and significant stenosis. tPA decision delayed due to unclear LKN time since patient took valium for dental procedure. STAT MRI attempted, but patient has pacemaker that is not MRI compatible.     After further discussion with family and unclear LKN time, decision was made to not treat with tPA. Possibly cardioembolic stroke based on history   - As patient's neurological exam is fluctuating repeat CTH was done which did not show any stroke       Antithrombotics for secondary stroke prevention: Aspirin: 325 mg once then resuming Apixaban 2.5 mg BID     Statins for secondary stroke prevention and hyperlipidemia, if present:   Statins: Atorvastatin- 40 mg daily    Aggressive risk factor modification: HTN, DM, HLD, Obesity, A-Fib     Rehab efforts: The patient has been evaluated by a stroke team provider and the therapy needs have been fully considered based off the presenting complaints and exam findings. The following therapy evaluations are needed: PT evaluate and treat, OT evaluate and treat, SLP evaluate and treat, PM&R evaluate for appropriate placement    Diagnostics ordered/pending: TTE to assess cardiac function/status     VTE prophylaxis: Heparin 5000 units SQ every 8 hours    BP parameters: Infarct: No intervention, SBP <220        Troponin level elevated  Troponin was  ordered on admission and noted to be 0.5 -> repeat 0.9. EKG notable for V-paced rhythm without evidence of ischemia. Etiology most likely 2/2 demand ischemia (type II NSTEMI) in the setting of co-morbidites (possibly has aspiration PNA, slightly anemic etc).      Plan:  - Recommend trending troponin to peak    - Was treated with  mg on admission and atorvastatin 40 mg given concern for stroke -> will continue  - If troponin levels continue to increase exponentially, please call cardiology     Pneumonia  -Pt underwent dental extractions day of presentation. Family reports cough for few days prior to admission.  -Overnight on 12/31, pt became tachypnic with fever to 100.3 and rapid response was called. CXR with left sided consolidation. -Started on vanc/zosyn overnight. ABG wnl. WBC wnl. Procal mildly elevated to .99. O2 saturations %. Suspect possible aspiration pneumonia vs CAP.      Plan  1) Continue Broadspectrum antibiotics for now   2) BCx, sputum cxs pending  3) Influenza pending     Hypothyroidism  Continue home synthroid  Patient with elevated TSH but normal free T4, subclinical hypothyroidism  Discuss with hospital medicine co management    CKD (chronic kidney disease) stage 3, GFR 30-59 ml/min  Cr 1.3 on arrival. Trending downwards     Chronic systolic congestive heart failure  Stroke risk factor  TTE Feb 2019 with EF 40%  Patient on entresto, metoprolol, and lasix at home  Holding entresto and lasix in setting of acute stroke  Continuing metoprolol for rate control due to history of afib    Persistent atrial fibrillation  Stroke risk factor  On eliquis at home  Held x1 week for dental procedure  Resuming home eliquis  Continuing home metoprolol    Type 2 diabetes mellitus with stage 3 chronic kidney disease, without long-term current use of insulin  Stroke risk factor  A1C 6.9  Goal glucose 140-180  Low correction SSI for NPO patients until LEILANI completed  Diabetic diet if safe to  swallow    Primary osteoarthritis involving multiple joints  PRN tylenol    Idiopathic chronic gout of multiple sites without tophus  Continue home allopurinol    Hyperlipidemia  Stroke risk factor  Patient on atorvastatin 10 mg daily at home   on arrival  Increasing to atorvastatin 40 mg daily    Essential hypertension  Stroke risk facotr  SBP <220   Holding home antihypertensives due to concern for acute stroke  Prn labetalol         01/01/2020 CTA MP without any acute findings. MRI not done as incompatible with pacemaker.Neurological exam patient is waxing and waning. At times will move her left arm however, per the medicine team's examination patient had hemineglect in hte right side which was not seen during my exam.nOvernight, rapid response was called due to tachypnea up to 40 and fevers to 100.3. ABG wnl but CXR with possible left-sided infiltrate. WBC wnl. Pt was started on vanc/zosyn in setting of aspiration vs CAP pneumonia. Troponin elevated to 0.516 with repeat elevated to 0.935.Cardiology consulted and they believed this is demand ischemia, thus will trend troponin. Repeat CTH shows no evidence of major vascular distribution infarct or hemorrhage.         STROKE DOCUMENTATION   Acute Stroke Times   Last Known Normal Date: 12/31/19  Last Known Normal Time: (unknown)  Symptom Onset Date: 12/31/19  Symptom Onset Time: (unknown)  Stroke Team Called Date: 12/31/19  Stroke Team Called Time: 1924  Stroke Team Arrival Date: 12/31/19  Stroke Team Arrival Time: 1929  CT Interpretation Time: 1939  Decision to Treat Time for Alteplase: 2039  Decision to Treat Time for IR: 1939    NIH Scale:  1a. Level of Consciousness: 0-->Alert, keenly responsive  1b. LOC Questions: 2-->Answers neither question correctly  1c. LOC Commands: 0-->Performs both tasks correctly  2. Best Gaze: 0-->Normal  3. Visual: 0-->No visual loss  4. Facial Palsy: 1-->Minor paralysis (flattened nasolabial fold, asymmetry on smiling)  5a.  Motor Arm, Left: 0-->No drift, limb holds 90 (or 45) degrees for full 10 secs  5b. Motor Arm, Right: 2-->Some effort against gravity, limb cannot get to or maintain (if cued) 90 (or 45) degrees, drifts down to bed, but has some effort against gravity  6a. Motor Leg, Left: 0-->No drift, leg holds 30 degree position for full 5 secs  6b. Motor Leg, Right: 3-->No effort against gravity, leg falls to bed immediately  7. Limb Ataxia: 0-->Absent  8. Sensory: 0-->Normal, no sensory loss  9. Best Language: 2-->Severe aphasia, all communication is through fragmentary expression, great need for inference, questioning, and guessing by the listener. Range of information that can be exchanged is limited, listener carries burden of. . . (see row details)  10. Dysarthria: 0-->Normal  11. Extinction and Inattention (formerly Neglect): 0-->No abnormality  Total (NIH Stroke Scale): 10       Modified [place] Score: 3  [place] Coma Scale:15   ABCD2 Score:    [id number] Score:   HAS -BLED Score:   ICH Score:   Hunt & Prather Classification:      Hemorrhagic change of an Ischemic Stroke: Does this patient have an ischemic stroke with hemorrhagic changes? No     Neurologic Chief Complaint: Aphasia and weakness     Subjective:     Interval History: Patient is seen for follow-up neurological assessment and treatment recommendations:    -CTA MP without any acute findings. At times will move her left arm however, per the medicine team's examination patient had hemineglect in hte right side which was not seen during my exam.  -Overnight, rapid response was called due to tachypnea up to 40 and fevers to 100.3. ABG wnl but CXR with possible left-sided infiltrate. WBC wnl. Pt was started on vanc/zosyn in setting of aspiration vs CAP pneumonia.   -Troponin elevated to 0.516 with repeat elevated to 0.935.Cardiology consulted and they believed this is demand ischemia, thus will trend troponin.  - Repeat CTH shows no evidence of major vascular  distribution infarct or hemorrhage.       HPI, Past Medical, Family, and Social History remains the same as documented in the initial encounter.     Review of Systems   Constitutional: Negative for chills and fever.   Respiratory: Negative for cough.    Cardiovascular: Negative for chest pain.   Gastrointestinal: Negative for nausea and vomiting.   Neurological: Positive for speech difficulty and weakness. Negative for facial asymmetry and numbness.     Scheduled Meds:   allopurinol  150 mg Oral Daily    apixaban  2.5 mg Oral BID    atorvastatin  40 mg Oral Daily    doxepin  10 mg Oral QHS    levothyroxine  25 mcg Oral Before breakfast    mirtazapine  7.5 mg Oral QHS    piperacillin-tazobactam (ZOSYN) IVPB  4.5 g Intravenous Q8H    vancomycin (VANCOCIN) IVPB  1,000 mg Intravenous Q24H     Continuous Infusions:   sodium chloride 0.9%      sodium chloride 0.9%       PRN Meds:acetaminophen, Dextrose 10% Bolus, gabapentin, glucagon (human recombinant), insulin aspart U-100, labetalol, ondansetron, senna-docusate 8.6-50 mg, sodium chloride 0.9%, sodium chloride 0.9%, Pharmacy to dose Vancomycin consult **AND** vancomycin - pharmacy to dose    Objective:     Vital Signs (Most Recent):  Temp: 100 °F (37.8 °C) (01/01/20 0726)  Pulse: 69 (01/01/20 1500)  Resp: (!) 21 (01/01/20 0726)  BP: 112/64 (01/01/20 0726)  SpO2: 97 % (01/01/20 0743)  BP Location: Right arm    Vital Signs Range (Last 24H):  Temp:  [98.3 °F (36.8 °C)-100.3 °F (37.9 °C)]   Pulse:  [69-72]   Resp:  [16-40]   BP: (112-160)/()   SpO2:  [97 %-100 %]   BP Location: Right arm    Physical Exam   Constitutional: No distress.   HENT:   Head: Normocephalic and atraumatic.   Mouth/Throat: No oropharyngeal exudate.   Eyes: No scleral icterus.   Neck: Normal range of motion. Neck supple.   No JVD noted   Cardiovascular: Normal rate.   Pulmonary/Chest: Effort normal and breath sounds normal. She has no wheezes. She has no rales.   Abdominal: Soft. She  exhibits no distension. There is no guarding.   Musculoskeletal:   No LE edema. Bilateral SCDs in place   Lymphadenopathy:     She has no cervical adenopathy.   Neurological: She displays no tremor.   Skin: Skin is warm and dry. She is not diaphoretic.   Nursing note and vitals reviewed.      Neurological Exam:   LOC: alert  Attention Span: poor  Language: Global aphasia  Articulation: Mute/Anarthric  Orientation: Not oriented to person, place, and time  Visual Fields: Full  EOM (CN III, IV, VI): Full/intact  Pupils (CN II, III): PERRL  Facial Sensation (CN V): Normal  Facial Movement (CN VII): Unable to test   Gag Reflex: present  Reflexes: 2+ throughout  Motor: Arm left  Normal 5/5  Leg left  Paresis: 4/5  Arm right  Paresis: 3/5  Leg right Paresis: 2/5  Cebellar: No evidence of appendicular or axial ataxia  Sensation: Intact to light touch, temperature and vibration  Tone: Normal tone throughout    Laboratory:  CMP:   Recent Labs   Lab 01/01/20 0347   CALCIUM 8.6*   ALBUMIN 3.2*   PROT 7.7      K 3.1*   CO2 25      BUN 21   CREATININE 1.3   ALKPHOS 59   ALT 5*   AST 25   BILITOT 1.1*     BMP:   Recent Labs   Lab 01/01/20 0347      K 3.1*      CO2 25   BUN 21   CREATININE 1.3   CALCIUM 8.6*     CBC:   Recent Labs   Lab 01/01/20 0347   WBC 8.88   RBC 4.11   HGB 11.6*   HCT 37.8      MCV 92   MCH 28.2   MCHC 30.7*     Lipid Panel:   Recent Labs   Lab 12/31/19 1929   CHOL 209*   LDLCALC 136.4   HDL 48   TRIG 123     Coagulation:   Recent Labs   Lab 01/01/20 0347   INR 1.1   APTT 22.8     Platelet Aggregation Study: No results for input(s): PLTAGG, PLTAGINTERP, PLTAGREGLACO, ADPPLTAGGREG in the last 168 hours.  Hgb A1C:   Recent Labs   Lab 12/31/19 2115   HGBA1C 6.9*     TSH:   Recent Labs   Lab 12/31/19 1929   TSH 13.528*       Diagnostic Results     Brain Imaging   CT 1/1/19   -No evidence of major vascular distribution infarct or hemorrhage.  -Chronic microvascular ischemic  change and generalized cerebral volume loss, normal for age.    Vessel Imaging   CTA MP 12/31/19   -No acute intracranial pathology.  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  -In this exam limited by poor timing of contrast and motion artifact, no high-grade stenosis or major vessel occlusion.  Hypoplastic right A2 segment.    Cardiac Imaging   TTE pending       Reji Quach MD  Comprehensive Stroke Center  Department of Vascular Neurology   [facility]

## 2020-01-01 NOTE — ASSESSMENT & PLAN NOTE
Cr 1.3 on arrival  Patient had CTA but no fluids due to HF, monitor for evidence of contrast induced [id number]

## 2020-01-01 NOTE — ASSESSMENT & PLAN NOTE
Pt is an 84yF with multiple co-morbidities who was admitted to vascular neurology for suspected MCA stroke. Pt underwent dental extractions day of presentation. Overnight on 12/31, pt became tachypnic with fever to 100.3 and rapid response was called. CXR with left sided consolidation. Started on vanc/zosyn overnight. ABG wnl. WBC wnl. Procal mildly elevated to .99. O2 saturations %. Suspect possible aspiration pneumonia vs CAP. Family reports cough for few days prior to admission.     -Agree with continued abx (vanc/zosyn, can narrow as needed)  -Sputum cultures pending  -Blood cultures pending  -TTE pending  -UA pending  - Respiratory viral panel pending  - IV fluids ordered  - Wean oxygen as tolerated, per chart review, pt was placed on 2L NC at request of family.

## 2020-01-01 NOTE — ASSESSMENT & PLAN NOTE
Pt with history of HTN. Home meds include lasix, entresto, metoprolol. Per primary team, goal SBP <220 in setting of likely MCA stroke.     -Holding Entresto, Lasix, Metoprolol in setting of acute CVA  -Has prn labetolol IV ordered  -BP ranging 112-160/

## 2020-01-01 NOTE — HPI
Dacia Martínez is a 84 y.o. female with PMHx of HTN, HLD, DM, HF, a fib (eliquis) who presented to ED with gait instability, RSW, and speech difficulty. Patient took valium today around 2:30 pm in preparation for 2 teeth to be extracted. After procedure, patient's family drove her home. When she arrived home, they noticed she was having difficulty ambulating. She later developed speech difficulty. Family called EMS. In transit to EMS reported waxing and waning symptoms. When she arrived to ED, she began having stuttering of speech and was noted to have a L gaze with a R facial droop. Stroke code was called.

## 2020-01-01 NOTE — ASSESSMENT & PLAN NOTE
Hx of CKD3. Baseline Cr of ~1.5. CTA head done on admit to evaluate for acute CVA.    -Cr currently at 1.3 with BUN at 21  -Advise gently hydration with NS 50 ml/hr for 20 hours for total 1L fluids

## 2020-01-01 NOTE — NURSING
Pt difficult to arouse, responds to pain. Pupils 3 brisk, equal and reactive. LUE and LLE moves spontaneously, not to command. RUE and RLE withdraws to noxious stimulation. POCT . /64,  HR 69, SpO2 100, temperature 100 axillary. Pt on 2 L NC. Notified MD Main with stroke team. STAT CT ordered, STAT ABG ordered. Bedside chest x-ray ordered. Order to place NGT. Will continue to monitor.

## 2020-01-01 NOTE — PLAN OF CARE
Problem: Adult Inpatient Plan of Care  Goal: Plan of Care Review  Outcome: Ongoing, Progressing   Recommendations    1. Should pt continue NPO, initiate TF with Glucerna 1.5 @ 20ml/hr and increase by 10 ml/hr Q4hrs to goal rate 41ml/hr (provides 1476 kcal, 81g pro, 748ml free water). Additional 150ml water flush Q4 hrs or per MD. Hold for residuals >500ml. 2. Should pt pass swallow evaluation, provide Cardiac/Carb Consistent diet with texture per SLP.  Goals: 1. Pt to receive nutrition by RD follow up.  Nutrition Goal Status: new  Communication of RD Recs: (POC)

## 2020-01-01 NOTE — ASSESSMENT & PLAN NOTE
Recent A1c of 6.9. On Januvia 25mg at home. Currently NPO 2/2 concern for swallowing. Evaluated by speech who recommended tube feeds. POC glucoses have been in mid 100s.    -Low dose SSI   -Continue accuchecks   -Can adjust as needed pending tube feed initiation

## 2020-01-01 NOTE — SUBJECTIVE & OBJECTIVE
Past Medical History:   Diagnosis Date    A-fib     Arthritis     Asteroid hyalosis of left eye     Bilateral nonexudative age-related macular degeneration 6/3/2014    Breast cancer     Cataract     CHF (congestive heart failure)     Chronic GERD 2017    CKD stage 3 due to type 2 diabetes mellitus     Coronary artery disease     Encounter for blood transfusion     Hypertension     Migraine headache     Mild nonproliferative diabetic retinopathy of both eyes 6/3/2014    Pneumonia     Type 2 diabetes mellitus with hyperglycemia     Vertigo      Past Surgical History:   Procedure Laterality Date    ABLATION N/A 2018    Procedure: ABLATION;  Surgeon: Jacques Burt MD;  Location: [facility] EP LAB;  Service: Cardiology;  Laterality: N/A;  AF, AVN, RFA, Choice, MB, 3 Prep *SJM CRT-P in situ*    BREAST SURGERY      left lumpectomy    CARPAL TUNNEL RELEASE      left    CATARACT EXTRACTION      vance     SECTION      EYE SURGERY      cataracts bilaterally    HYSTERECTOMY      partial    IMPLANTATION OF BIVENTRICULAR HEART PACEMAKER N/A 10/26/2018    Procedure: INSERTION, CARDIAC PACEMAKER, BIVENTRICULAR;  Surgeon: Jacques Burt MD;  Location: [facility] CATH LAB;  Service: Cardiology;  Laterality: N/A;  AF, CRT-P, SJM, MAC, MB, 3 Prep     Family History   Problem Relation Age of Onset    Cancer Mother         stomach    Heart disease Mother     Diabetes Father     Hypertension Father     Blindness Brother     Diabetes Brother     Hypertension Brother     Cataracts Sister     Diabetes Sister     Diabetes Brother     Diabetes Brother     Diabetes Brother     No Known Problems Daughter     No Known Problems Son     No Known Problems Daughter     Amblyopia Neg Hx     Glaucoma Neg Hx     Macular degeneration Neg Hx     Strabismus Neg Hx     Retinal detachment Neg Hx      Social History     Tobacco Use    Smoking status: Never Smoker    Smokeless tobacco: Never  Used   Substance Use Topics    Alcohol use: Yes    Drug use: No     Review of patient's allergies indicates:   Allergen Reactions    Tramadol Hallucinations       Medications: I have reviewed the current medication administration record.      (Not in a hospital admission)    Review of Systems   Constitutional: Negative for chills and fever.   Respiratory: Negative for cough.    Cardiovascular: Negative for chest pain.   Gastrointestinal: Negative for nausea and vomiting.   Neurological: Positive for facial asymmetry and speech difficulty. Negative for weakness and numbness.     Objective:     Vital Signs (Most Recent):  Temp: 98.3 °F (36.8 °C) (12/31/19 1925)  Pulse: 70 (12/31/19 2100)  Resp: 20 (12/31/19 2100)  BP: (!) 157/93 (12/31/19 2100)  SpO2: 98 % (12/31/19 2100)    Vital Signs Range (Last 24H):  Temp:  [98.3 °F (36.8 °C)]   Pulse:  [70-72]   Resp:  [16-20]   BP: (113-160)/(74-93)   SpO2:  [97 %-99 %]     Physical Exam   Constitutional: She appears well-developed and well-nourished. No distress.   HENT:   Head: Normocephalic and atraumatic.   Cardiovascular: Normal rate.   Pulmonary/Chest: Effort normal. No respiratory distress.   Neurological: She is alert.   Skin: Skin is warm and dry.   Vitals reviewed.      Neurological Exam:   LOC: alert  Attention Span: poor  Language: Global aphasia  Articulation: Dysarthria  Orientation: Untestable due to severe aphasia   Visual Fields: Full  EOM (CN III, IV, VI): Full/intact  Facial Movement (CN VII): Lower facial weakness on the Right  Motor: Arm left  Normal 5/5  Leg left  Normal 5/5  Arm right  Normal 5/5  Leg right Normal 5/5  Sensation: Intact to light touch, temperature and vibration  Tone: Normal tone throughout      Laboratory:  CMP:   Recent Labs   Lab 12/31/19 1929   CALCIUM 8.7   ALBUMIN 3.3*   PROT 8.1      K 3.4*   CO2 32*      BUN 20   CREATININE 1.3   ALKPHOS 61   ALT 5*   AST 17   BILITOT 0.8     CBC:   Recent Labs   Lab 12/31/19 1929    WBC 6.91   RBC 4.31   HGB 12.2   HCT 40.5      MCV 94   MCH 28.3   MCHC 30.1*     Lipid Panel:   Recent Labs   Lab 12/31/19 1929   CHOL 209*   LDLCALC 136.4   HDL 48   TRIG 123     Coagulation:   Recent Labs   Lab 12/31/19 1929   INR 1.1     Hgb A1C: No results for input(s): HGBA1C in the last 168 hours.  TSH:   Recent Labs   Lab 12/31/19 1929   TSH 13.528*       Diagnostic Results:      CTA Multiphase. Date: 12/31/19  No acute intracranial pathology.  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    In this exam limited by poor timing of contrast and motion artifact, no high-grade stenosis or major vessel occlusion.  Hypoplastic right A2 segment.    Subcentimeter subsegmental opacities in the left lung apex, with considerations including infection, non infectious inflammation, aspiration, and small airways disease.  Clinical correlation as warranted.    Other findings as above.

## 2020-01-01 NOTE — ASSESSMENT & PLAN NOTE
Stroke risk factor  Patient on atorvastatin 10 mg daily at home   on arrival  Increasing to atorvastatin 40 mg daily

## 2020-01-01 NOTE — ASSESSMENT & PLAN NOTE
Troponin was ordered on admission and noted to be 0.5 -> repeat 0.9. EKG notable for V-paced rhythm without evidence of ischemia. Etiology most likely 2/2 demand ischemia (type II NSTEMI) in the setting of co-morbidites (possibly has aspiration PNA, slightly anemic etc).      Plan:  - Recommend trending troponin to peak    - Was treated with  mg on admission and atorvastatin 40 mg given concern for stroke -> will continue  - If troponin levels continue to increase exponentially, please call cardiology

## 2020-01-01 NOTE — PLAN OF CARE
Recommend that pt. Be npo with strict aspiration precautions.    Problem: SLP Goal  Goal: SLP Goal  Description  Goals due 1/8  1.  Pt. Will participate in ongoing assessment of swallow at bedside  2.  Pt. Will participate in speech language evaluation   Outcome: Ongoing, Progressing

## 2020-01-01 NOTE — PLAN OF CARE
POC reviewed with pt and family at 1700. Pt unable to verbalize understanding due to aphasia. ABX administered. NS at 50 ml/hr to be administered once ABX are completed due to pt only having 2 IV accesses. Glucerna via NGT once TF are delivered to floor. Questions and concerns addressed. No acute events today. Pt progressing toward goals. Will continue to monitor. See flowsheets for full assessment and VS info.

## 2020-01-01 NOTE — ASSESSMENT & PLAN NOTE
Ms. Martínez is a 84yF with CVA risk factors of HTN, HLD, DM2, and Afib off her AG for 4 days prior to admission for dental procedure on 12/31 who was brought in by EMS yesterday 12/31 for gait instability, facial droop, aphasia, confusion, and right-sided weakness. 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 Presumed MCA stroke given exam. Pt received ASA 325mg and Atorvastatin 40 mg. Admitted to vascular neurology. HM consulted for possible aspiration pneumonia and other medical co-morbidities.     -Eliquis resumed 1/1/20  -Continued management per primary team

## 2020-01-01 NOTE — ASSESSMENT & PLAN NOTE
Stroke risk factor  TTE Feb 2019 with EF 40%  Patient on entresto, metoprolol, and lasix at home  Holding entresto and lasix in setting of acute stroke  Continuing metoprolol for rate control due to history of afib

## 2020-01-01 NOTE — PLAN OF CARE
Called by the nurse around 7:30 AM as patient had neurological change. Upon examination patient was sleeping. Sternal rub caused patient to be more awake, with mumbling. Per the night nurse this is not a change from her neurological status yesterday. As patient is afebrile and auscultation of the lungs are decreased throughout, will start patient on vanc/zosyn for possible aspiration PNA. Patient also has elevated troponin which we are trending. Hospital medicine also following.     Reji Quach MD

## 2020-01-01 NOTE — PT/OT/SLP EVAL
Speech Language Pathology Evaluation  Bedside Swallow    Patient Name:  Dacia Martínez   MRN:  [id number]  Admitting Diagnosis: Embolic stroke involving left middle cerebral artery    Recommendations:                 General Recommendations:  Dysphagia therapy and Speech language evaluation  Diet recommendations:  NPO, NPO   Aspiration Precautions: Strict aspiration precautions   General Precautions: Standard, aspiration, fall  Communication strategies:  none    History:     Past Medical History:   Diagnosis Date    A-fib     Arthritis     Asteroid hyalosis of left eye     Bilateral nonexudative age-related macular degeneration 6/3/2014    Breast cancer     Cataract     CHF (congestive heart failure)     Chronic GERD 2017    CKD stage 3 due to type 2 diabetes mellitus     Coronary artery disease     Encounter for blood transfusion     Hypertension     Migraine headache     Mild nonproliferative diabetic retinopathy of both eyes 6/3/2014    Pneumonia     Stroke 2019    Type 2 diabetes mellitus with hyperglycemia     Vertigo        Past Surgical History:   Procedure Laterality Date    ABLATION N/A 2018    Procedure: ABLATION;  Surgeon: Jacques Burt MD;  Location: [facility] EP LAB;  Service: Cardiology;  Laterality: N/A;  AF, AVN, RFA, Choice, MB, 3 Prep *SJM CRT-P in situ*    BREAST SURGERY      left lumpectomy    CARPAL TUNNEL RELEASE      left    CATARACT EXTRACTION      vance     SECTION      EYE SURGERY      cataracts bilaterally    HYSTERECTOMY      partial    IMPLANTATION OF BIVENTRICULAR HEART PACEMAKER N/A 10/26/2018    Procedure: INSERTION, CARDIAC PACEMAKER, BIVENTRICULAR;  Surgeon: Jacques Burt MD;  Location: [facility] CATH LAB;  Service: Cardiology;  Laterality: N/A;  AF, CRT-P, SJM, MAC, MB, 3 Prep       Social History: Patient lives with family.    Prior Intubation HX:  na    Modified Barium Swallow: na      Prior diet: regular/soft with thin      Subjective  "    "she ate fine before this" per daughter  Patient goals: jw    Pain/Comfort:  · Pain Rating 1: 0/10  · Pain Rating Post-Intervention 1: 0/10    Objective:     Oral Musculature Evaluation  · Oral Musculature: unable to assess due to poor participation/comprehension  · Dentition: scattered dentition  · Secretion Management: adequate  · Mucosal Quality: adequate  · Volitional Cough: not elicited  · Volitional Swallow: not elicited  · Voice Prior to PO Intake: min vocalizations elicited in spontaneous context    Bedside Swallow Eval:   Consistencies Assessed:  · Thin liquids teaspoon of water x3 trials     Oral Phase:   · Decreased closure around utensil  · Poor oral acceptance/inconsistent holding of bolus    Pharyngeal Phase:   · coughing/choking on 1/3 trials    Compensatory Strategies  · None    Treatment: Pt. Requiring cues to maintain alertness with wet vocal quality noted based on limited sample  Assessment:     Dacia Martínez is a 84 y.o. female with an SLP diagnosis of Aphasia and Dysphagia.      Goals:   Multidisciplinary Problems     SLP Goals        Problem: SLP Goal    Goal Priority Disciplines Outcome   SLP Goal     SLP Ongoing, Progressing   Description:  Goals due 1/8  1.  Pt. Will participate in ongoing assessment of swallow at bedside  2.  Pt. Will participate in speech language evaluation                    Plan:     · Patient to be seen:  4 x/week   · Plan of Care expires:  01/31/20  · Plan of Care reviewed with:  patient, family   · SLP Follow-Up:  Yes       Discharge recommendations:  nursing facility, skilled   Barriers to Discharge:  Level of Skilled Assistance Needed 24 hour    Time Tracking:     SLP Treatment Date:   01/01/20  Speech Start Time:  1030  Speech Stop Time:  1050     Speech Total Time (min):  20 min    Billable Minutes: Eval Swallow and Oral Function 20    Rianna Figueredo MA, CCC-SLP  01/01/2020           "

## 2020-01-01 NOTE — NURSING
Patient is nonverbal, not following commands and is tachypneic with abdominal retractions. Rapid team and NP notified. Will continue to monitor.

## 2020-01-02 PROBLEM — G25.3 MYOCLONIC JERKING: Status: ACTIVE | Noted: 2020-01-01

## 2020-01-02 PROBLEM — G81.91 RIGHT HEMIPLEGIA: Status: ACTIVE | Noted: 2020-01-01

## 2020-01-02 NOTE — ASSESSMENT & PLAN NOTE
Pt with history of Afib anticoagulated with Eliquis and rate-controlled with metoprolol and biventricular pacemaker.   Presented to ED yesterday 12/31 for likely MCA stroke following dental extractions earlier that day. Eliquis was held 4 days prior to procedure. Resumed 1/1/2019 per primary team.    -CHADSVAS 9  -Eliquis re-started 1/1/20  -Ok to re-start metoprolol

## 2020-01-02 NOTE — SUBJECTIVE & OBJECTIVE
Past Medical History:   Diagnosis Date    A-fib     Arthritis     Asteroid hyalosis of left eye     Bilateral nonexudative age-related macular degeneration 6/3/2014    Breast cancer     Cataract     CHF (congestive heart failure)     Chronic GERD 2017    CKD stage 3 due to type 2 diabetes mellitus     Coronary artery disease     Encounter for blood transfusion     Hypertension     Migraine headache     Mild nonproliferative diabetic retinopathy of both eyes 6/3/2014    Pneumonia     Stroke 2019    Type 2 diabetes mellitus with hyperglycemia     Vertigo      Past Surgical History:   Procedure Laterality Date    ABLATION N/A 2018    Procedure: ABLATION;  Surgeon: Jacques Burt MD;  Location: [facility] EP LAB;  Service: Cardiology;  Laterality: N/A;  AF, AVN, RFA, Choice, MB, 3 Prep *SJM CRT-P in situ*    BREAST SURGERY      left lumpectomy    CARPAL TUNNEL RELEASE      left    CATARACT EXTRACTION      vance     SECTION      EYE SURGERY      cataracts bilaterally    HYSTERECTOMY      partial    IMPLANTATION OF BIVENTRICULAR HEART PACEMAKER N/A 10/26/2018    Procedure: INSERTION, CARDIAC PACEMAKER, BIVENTRICULAR;  Surgeon: Jacques Burt MD;  Location: [facility] CATH LAB;  Service: Cardiology;  Laterality: N/A;  AF, CRT-P, SJM, MAC, MB, 3 Prep     Review of patient's allergies indicates:   Allergen Reactions    Tramadol Hallucinations       Scheduled Medications:    [START ON 1/3/2020] allopurinol  150 mg Per NG tube Daily    apixaban  2.5 mg Per NG tube BID    [START ON 1/3/2020] atorvastatin  40 mg Per NG tube Daily    [START ON 1/3/2020] levothyroxine  25 mcg Per NG tube Before breakfast    mirtazapine  7.5 mg Per NG tube QHS    piperacillin-tazobactam (ZOSYN) IVPB  4.5 g Intravenous Q8H    vancomycin (VANCOCIN) IVPB  1,000 mg Intravenous Q24H       PRN Medications: acetaminophen, Dextrose 10% Bolus, gabapentin, glucagon (human recombinant), insulin aspart  U-100, labetalol, ondansetron, senna-docusate 8.6-50 mg, sodium chloride 0.9%, sodium chloride 0.9%, Pharmacy to dose Vancomycin consult **AND** vancomycin - pharmacy to dose    Family History     Problem Relation (Age of Onset)    Blindness Brother    Cancer Mother    Cataracts Sister    Diabetes Father, Brother, Sister, Brother, Brother, Brother    Heart disease Mother    Hypertension Father, Brother    No Known Problems Daughter, Son, Daughter        Tobacco Use    Smoking status: Never Smoker    Smokeless tobacco: Never Used   Substance and Sexual Activity    Alcohol use: Yes    Drug use: No    Sexual activity: Not Currently     Review of Systems   Reason unable to perform ROS: aphasia.     Objective:     Vital Signs (Most Recent):  Temp: 98.4 °F (36.9 °C) (01/02/20 0639)  Pulse: 70 (01/02/20 0742)  Resp: 20 (01/02/20 0633)  BP: 123/67 (01/02/20 0639)  SpO2: 99 % (01/02/20 0738)    Vital Signs (24h Range):  Temp:  [97.4 °F (36.3 °C)-99.5 °F (37.5 °C)] 98.4 °F (36.9 °C)  Pulse:  [69-73] 70  Resp:  [18-20] 20  SpO2:  [97 %-100 %] 99 %  BP: (111-136)/(55-73) 123/67     Body mass index is 32.62 kg/m².    Physical Exam   Constitutional: She appears well-developed and well-nourished. She is sleeping. She is easily aroused.   HENT:   Head: Normocephalic and atraumatic.   Eyes: Right eye exhibits no discharge. Left eye exhibits no discharge.   Neck: Neck supple.   Cardiovascular: Intact distal pulses.   Pulmonary/Chest: Effort normal. No respiratory distress.   Abdominal: Soft. She exhibits no distension.   NGT in place   Musculoskeletal: She exhibits no edema or deformity.   R sided weakness    Neurological: She is easily aroused. She is disoriented (to year ).   + aphasia  Follows commands partially for L hand     Skin: Skin is warm and dry.   Psychiatric: She has a normal mood and affect. Her behavior is normal. Cognition and memory are impaired.   Vitals reviewed.         Diagnostic Results:   Labs:  Reviewed  ECG: Reviewed  X-Ray: Reviewed  CT: Reviewed

## 2020-01-02 NOTE — PROGRESS NOTES
[facility]  Vascular Neurology  Comprehensive Stroke Center  Progress Note    Assessment/Plan:     * Embolic stroke involving left middle cerebral artery  Dacia Martínez is a 84 y.o. female with PMHx of HTN, HLD, HF, DM, a fib (eliquis) who presented to hospital with gait instability, RSW, and speech changes. She had been off her eliquis for 1 week due to scheduled dental procedure. Patient with L gaze, mild RSW, and speech difficulty when examined by stroke provider. EMS reported possible waxing and waning of symptoms in transit. She was taken for STAT CTA multiphase, which was negative for early ischemic changes, LVO, and significant stenosis. tPA decision delayed due to unclear LKN time since patient took valium for dental procedure. STAT MRI attempted, but patient has pacemaker that is not MRI compatible.     After further discussion with family and unclear LKN time, decision was made to not treat with tPA. Possibly cardioembolic stroke based on history   - As patient's neurological exam is fluctuating repeat CTH was done which did not show any stroke       Antithrombotics for secondary stroke prevention: Aspirin: 325 mg once then resuming Apixaban 2.5 mg BID     Statins for secondary stroke prevention and hyperlipidemia, if present:   Statins: Atorvastatin- 40 mg daily    Aggressive risk factor modification: HTN, DM, HLD, Obesity, A-Fib     Rehab efforts: The patient has been evaluated by a stroke team provider and the therapy needs have been fully considered based off the presenting complaints and exam findings. The following therapy evaluations are needed: PT evaluate and treat, OT evaluate and treat, SLP evaluate and treat, PM&R evaluate for appropriate placement    Diagnostics ordered/pending: TTE to assess cardiac function/status     VTE prophylaxis: Heparin 5000 units SQ every 8 hours    BP parameters: Infarct: No intervention, SBP <220        Myoclonic jerking  20-30 second left sided  myoclonic jerking with left gaze deviation. This could explain her waxing and waning mental status   -Will obtain EEG  -STAT CTH ordered   -CK to rule out any rhabdo     Right hemiplegia  Intermittent episodes of right hemiplegia. DDx includes rita's paralysis. Will obtain STAT CTH once more to rule out stroke.     Troponin level elevated  Troponin was ordered on admission and noted to be 0.5 -> repeat 0.9. EKG notable for V-paced rhythm without evidence of ischemia. Etiology most likely 2/2 demand ischemia (type II NSTEMI) in the setting of co-morbidites (possibly has aspiration PNA, slightly anemic etc). Troponin trending downwards      y     Pneumonia  -Pt underwent dental extractions day of presentation. Family reports cough for few days prior to admission.  -Overnight on 12/31, pt became tachypnic with fever to 100.3 and rapid response was called. CXR with left sided consolidation. -Started on vanc/zosyn overnight. ABG wnl. WBC wnl. Procal mildly elevated to .99. O2 saturations %. Suspect possible aspiration pneumonia vs CAP.  Respiratory panel negative    Plan  1) Continue Broadspectrum antibiotics for now   2)  sputum cxs pending      Hypothyroidism  Continue home synthroid  Patient with elevated TSH but normal free T4, subclinical hypothyroidism  Discuss with hospital medicine co management    CKD (chronic kidney disease) stage 3, GFR 30-59 ml/min  Cr 1.3 on arrival. Trending upwards which could be due to contrast     Chronic systolic congestive heart failure  Stroke risk factor  TTE Feb 2019 with EF 40%  Patient on entresto, metoprolol, and lasix at home  Holding entresto and lasix in setting of acute stroke  - Patient was on metoprolol for rate control. Was initially discontinued by hospital medicine due to concern for sepsis however, will reinitiate  -Currently on lopressor 50 mg BID to be compatible with NG tube     Persistent atrial fibrillation  Stroke risk factor  On eliquis at home  Held x1  week for dental procedure  Resuming home eliquis  Continuing home metoprolol    Type 2 diabetes mellitus with stage 3 chronic kidney disease, without long-term current use of insulin  Stroke risk factor  A1C 6.9  Goal glucose 140-180  Low correction SSI for NPO patients until LEILANI completed  Diabetic diet if safe to swallow    Primary osteoarthritis involving multiple joints  PRN tylenol    Idiopathic chronic gout of multiple sites without tophus  Continue home allopurinol    Hyperlipidemia  Stroke risk factor  Patient on atorvastatin 10 mg daily at home   on arrival  Increasing to atorvastatin 40 mg daily    Essential hypertension  Stroke risk facotr  SBP <220   Holding home antihypertensives due to concern for acute stroke  Prn labetalol         01/01/2020 CTA MP without any acute findings. MRI not done as incompatible with pacemaker.Neurological exam patient is waxing and waning. At times will move her left arm however, per the medicine team's examination patient had hemineglect in hte right side which was not seen during my exam.nOvernight, rapid response was called due to tachypnea up to 40 and fevers to 100.3. ABG wnl but CXR with possible left-sided infiltrate. WBC wnl. Pt was started on vanc/zosyn in setting of aspiration vs CAP pneumonia. Troponin elevated to 0.516 with repeat elevated to 0.935.Cardiology consulted and they believed this is demand ischemia, thus will trend troponin. Repeat CTH shows no evidence of major vascular distribution infarct or hemorrhage.     01/02/2020 Elevated BUN/Cr (could be secondary to post contrast). Patient having intermittent episodes of hemiplegia on the right side. During transport to [facility], patient was exhibiting left sided myoclonic jerks with left gaze deviation. Ordered EEG. STAT CTH ordered. General neurology is aware. Awaiting respiratory culture before deescalation of antibiotics. Will be transferred to medicine floor tomorrow.     STROKE DOCUMENTATION    Acute Stroke Times   Last Known Normal Date: 12/31/19  Last Known Normal Time: (unknown)  Symptom Onset Date: 12/31/19  Symptom Onset Time: (unknown)  Stroke Team Called Date: 12/31/19  Stroke Team Called Time: 1924  Stroke Team Arrival Date: 12/31/19  Stroke Team Arrival Time: 1929  CT Interpretation Time: 1939  Decision to Treat Time for Alteplase: 2039  Decision to Treat Time for IR: 1939    NIH Scale:  1a. Level of Consciousness: 0-->Alert, keenly responsive  1b. LOC Questions: 2-->Answers neither question correctly  1c. LOC Commands: 2-->Performs neither task correctly  2. Best Gaze: 1-->Partial gaze palsy, gaze is abnormal in one or both eyes, but forced deviation or total gaze paresis is not present  3. Visual: 0-->No visual loss  4. Facial Palsy: 0-->Normal symmetrical movements  5a. Motor Arm, Left: 0-->No drift, limb holds 90 (or 45) degrees for full 10 secs  5b. Motor Arm, Right: 3-->No effort against gravity, limb falls  6a. Motor Leg, Left: 0-->No drift, leg holds 30 degree position for full 5 secs  6b. Motor Leg, Right: 3-->No effort against gravity, leg falls to bed immediately  7. Limb Ataxia: 0-->Absent  8. Sensory: 0-->Normal, no sensory loss  9. Best Language: 3-->Mute, global aphasia, no usable speech or auditory comprehension  10. Dysarthria: 2-->Severe dysarthria, patients speech is so slurred as to be unintelligible in the absence of or out of proportion to any dysphasia, or is mute/anarthric  11. Extinction and Inattention (formerly Neglect): 0-->No abnormality  Total (NIH Stroke Scale): 16       Modified [place] Score: 3  [place] Coma Scale:15   ABCD2 Score:    [id number] Score:   HAS -BLED Score:   ICH Score:   Hunt & Prather Classification:      Hemorrhagic change of an Ischemic Stroke: Does this patient have an ischemic stroke with hemorrhagic changes? No     Neurologic Chief Complaint: Aphasia and weakness     Subjective:     Interval History: Patient is seen for follow-up neurological  assessment and treatment recommendations:    -Rapid called overnight by nurse as patient was having difficulty breathing and an inability to arouse. However, vitals and lab within normal limit     -Patient exhibited myoclonic jerking of left side with left sided gaze deviation on her way to [facility]. EEG and STAT CTH ordered. General neurology consulted for seizures.     HPI, Past Medical, Family, and Social History remains the same as documented in the initial encounter.     Review of Systems   Constitutional: Negative for chills and fever.   Respiratory: Negative for cough.    Cardiovascular: Negative for chest pain.   Gastrointestinal: Negative for nausea and vomiting.   Neurological: Positive for speech difficulty and weakness. Negative for facial asymmetry and numbness.     Scheduled Meds:   [START ON 1/3/2020] allopurinol  150 mg Per NG tube Daily    apixaban  2.5 mg Per NG tube BID    [START ON 1/3/2020] atorvastatin  40 mg Per NG tube Daily    [START ON 1/3/2020] levothyroxine  25 mcg Per NG tube Before breakfast    mirtazapine  7.5 mg Per NG tube QHS    piperacillin-tazobactam (ZOSYN) IVPB  4.5 g Intravenous Q8H     Continuous Infusions:   sodium chloride 0.9% 50 mL/hr at 01/01/20 2106    sodium chloride 0.9%       PRN Meds:acetaminophen, Dextrose 10% Bolus, gabapentin, glucagon (human recombinant), insulin aspart U-100, labetalol, ondansetron, senna-docusate 8.6-50 mg, sodium chloride 0.9%, sodium chloride 0.9%, Pharmacy to dose Vancomycin consult **AND** vancomycin - pharmacy to dose    Objective:     Vital Signs (Most Recent):  Temp: 98.8 °F (37.1 °C) (01/02/20 1106)  Pulse: 69 (01/02/20 1106)  Resp: 20 (01/02/20 0633)  BP: (!) 166/79 (01/02/20 1106)  SpO2: 100 % (01/02/20 1106)  BP Location: Right arm    Vital Signs Range (Last 24H):  Temp:  [97.4 °F (36.3 °C)-99.5 °F (37.5 °C)]   Pulse:  [69-73]   Resp:  [18-20]   BP: (111-166)/(55-79)   SpO2:  [97 %-100 %]   BP Location: Right arm    Physical Exam    Constitutional: No distress.   HENT:   Head: Normocephalic and atraumatic.   Mouth/Throat: No oropharyngeal exudate.   Eyes: No scleral icterus.   Neck: Normal range of motion. Neck supple.   No JVD noted   Cardiovascular: Normal rate.   Pulmonary/Chest: Effort normal and breath sounds normal. She has no wheezes. She has no rales.   Abdominal: Soft. She exhibits no distension. There is no guarding.   Musculoskeletal:   No LE edema. Bilateral SCDs in place   Lymphadenopathy:     She has no cervical adenopathy.   Neurological: She displays no tremor.   Skin: Skin is warm and dry. She is not diaphoretic.   Nursing note and vitals reviewed.      Neurological Exam:   LOC: alert  Attention Span: poor  Language: Global aphasia  Articulation: Mute/Anarthric  Orientation: Not oriented to person, place, and time  Visual Fields: Full  EOM (CN III, IV, VI): Full/intact  Pupils (CN II, III): PERRL  Facial Sensation (CN V): Normal  Facial Movement (CN VII): Unable to test   Gag Reflex: present  Reflexes: 2+ throughout  Motor: Arm left  Normal 5/5  Leg left  Paresis: 4/5  Arm right  Paresis: 3/5  Leg right Paresis: 2/5  Cebellar: No evidence of appendicular or axial ataxia  Sensation: Intact to light touch, temperature and vibration  Tone: Normal tone throughout    Laboratory:  CMP:   Recent Labs   Lab 01/02/20  0435   CALCIUM 8.2*   ALBUMIN 2.9*   PROT 7.2      K 3.6   CO2 23      BUN 30*   CREATININE 1.6*   ALKPHOS 49*   ALT <5*   AST 17   BILITOT 1.3*     BMP:   Recent Labs   Lab 01/02/20  0435      K 3.6      CO2 23   BUN 30*   CREATININE 1.6*   CALCIUM 8.2*     CBC:   Recent Labs   Lab 01/02/20  0435   WBC 8.72   RBC 3.77*   HGB 10.4*   HCT 35.8*      MCV 95   MCH 27.6   MCHC 29.1*     Lipid Panel:   Recent Labs   Lab 12/31/19 1929   CHOL 209*   LDLCALC 136.4   HDL 48   TRIG 123     Coagulation:   Recent Labs   Lab 01/01/20  0347   INR 1.1   APTT 22.8     Platelet Aggregation Study: No  results for input(s): PLTAGG, PLTAGINTERP, PLTAGREGLACO, ADPPLTAGGREG in the last 168 hours.  Hgb A1C:   Recent Labs   Lab 12/31/19 2115   HGBA1C 6.9*     TSH:   Recent Labs   Lab 12/31/19  1929   TSH 13.528*       Diagnostic Results     Brain Imaging   CTH 1/1/19   -No evidence of major vascular distribution infarct or hemorrhage.  -Chronic microvascular ischemic change and generalized cerebral volume loss, normal for age.    Vessel Imaging   CTA MP 12/31/19   -No acute intracranial pathology.  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  -In this exam limited by poor timing of contrast and motion artifact, no high-grade stenosis or major vessel occlusion.  Hypoplastic right A2 segment.    Cardiac Imaging   TTE pending       Reji Quach MD  Comprehensive Stroke Center  Department of Vascular Neurology   [facility]

## 2020-01-02 NOTE — CONSULTS
Inpatient consult to Physical Medicine Rehab  Consult performed by: Aleshia Marcano NP  Consult ordered by: Soheila Arneas PA-C  Reason for consult: assess rehab needs        Reviewed patient history and current admission.  Rehab team following.  Full consult to follow.    PATEL Connolly, FNP-C  Physical Medicine & Rehabilitation   01/02/2020

## 2020-01-02 NOTE — HOSPITAL COURSE
01/01/2020: Bed mobility TA .  Sit to stand TA x 2 ppl.  LBD MaxA. SLP diagnosis of Aphasia and Dysphagia. NPO.    1/2/19: No therapy.

## 2020-01-02 NOTE — PLAN OF CARE
01/02/20 1441   Post-Acute Status   Post-Acute Authorization Placement   Post-Acute Placement Status Awaiting Internal Medical Clearance   Discharge Delays None known at this time   Yuliana Ellington RN  Case Management  Ext: [phone]  01/02/2020  2:42 PM

## 2020-01-02 NOTE — ASSESSMENT & PLAN NOTE
Stroke risk factor  TTE Feb 2019 with EF 40%  Patient on entresto, metoprolol, and lasix at home  Holding entresto and lasix in setting of acute stroke  - Patient was on metoprolol for rate control. Was initially discontinued by hospital medicine due to concern for sepsis however, will reinitiate  -Currently on lopressor 50 mg BID to be compatible with NG tube

## 2020-01-02 NOTE — HPI
Dacia Martínez is a 84-year-old female with PMHx of HTN, HLD, HF, DM, a-fib (eliquis).  Patient presented to [facility] on 12/31 with gait instability, RSW, and speech changes.  CTH revealed no acute pathology.  Not a tPA candidate.  CTA multiphase, which was negative for early ischemic changes, LVO, and significant stenosis.  MRI not done as incompatible with pacemaker.Neurological exam patient is waxing and waning. Hospital course complicated by rapid response 2/2 tachypnea, fevers with infiltrate on CXR now on IV abx, & elevated troponin (Cardiology consulted and they believed this is demand ischemia, thus will trend troponin). Repeat CTH shows no evidence of major vascular distribution infarct or hemorrhage per stroke team.    Functional History: Patient lives with daughter and grandchildren in a single story home with*** to enter.  Prior to admission, Mod (I) with ADLs and mobility. DME: none.

## 2020-01-02 NOTE — ASSESSMENT & PLAN NOTE
Intermittent episodes of right hemiplegia. DDx includes rita's paralysis. Will obtain STAT CTH once more to rule out stroke.

## 2020-01-02 NOTE — PLAN OF CARE
CM met with patient's family in room for [place] Planning Assessment.  Per granddaughter,  patient lives with daughter and granddaughter in a SSH with 9 steps to enter.   Per granddaughter, patient was independent with ADLS and used no DME for ambulation.  Per granddaughter, the patient will have assistance from large family upon discharge.   Discharge Planning Booklet given to patient/family and discussed.  All questions addressed.     01/02/20 1007   Discharge Assessment   Assessment Type Discharge Planning Assessment   Confirmed/corrected address and phone number on facesheet? Yes   Assessment information obtained from? Caregiver  (patient unable to answer questions.)   Expected Length of Stay (days) 7   Communicated expected length of stay with patient/caregiver yes   Prior to hospitilization cognitive status: Alert/Oriented   Prior to hospitalization functional status: Independent   Current cognitive status: Unable to Assess   Current Functional Status: Completely Dependent   Able to Return to Prior Arrangements other (see comments)  (tbd)   Is patient able to care for self after discharge? Unable to determine at this time (comments)   Who are your caregiver(s) and their phone number(s)? Octavio Miller (dtr) [phone]; Brooklyn Miller (granddtr) [phone]   Patient's perception of discharge disposition other (comments)  (jw)   Readmission Within the Last 30 Days no previous admission in last 30 days   Patient currently being followed by outpatient case management? No   Patient currently receives any other outside agency services? No   Equipment Currently Used at Home glucometer  (per gdtr)   Part D Coverage [employer]   Do you have any problems affording any of your prescribed medications? TBD   Is the patient taking medications as prescribed? yes  (per gdtr, who sets up meds)   Does the patient have transportation home? Yes   Transportation Anticipated family or friend will provide  (family)   Dialysis Name  and Scheduled days na   Does the patient receive services at the [facility]mad[facility] Clinic? No   Discharge Plan A Rehab   Discharge Plan B Skilled Nursing Facility   DME Needed Upon Discharge  other (see comments)  (tbd)   Patient/Family in Agreement with Plan yes   Yuliana Ellington RN  Case Management  Ext: [phone]  01/02/2020  2:49 PM

## 2020-01-02 NOTE — ASSESSMENT & PLAN NOTE
Recent A1c of 6.9. On Januvia 25mg at home. Currently NPO 2/2 concern for swallowing. Has NG in place for medications. Evaluated by speech who recommended tube feeds. POC glucoses have been in mid 100s.    -Low dose SSI   -Continue accuchecks   -Can adjust as needed

## 2020-01-02 NOTE — SIGNIFICANT EVENT
On her way to [facility], patient exhibited left sided myoclonic jerks with gaze deviation to the left. Suspecting seizures. Notified general neurology and decision was made to order EEG. Will not load patient with anti-seizure medications per recommendations.     Reji Quach MD

## 2020-01-02 NOTE — PROGRESS NOTES
[facility] Medicine  Progress Note    Patient Name: Dacia Martínez  MRN: [id number]  Patient Class: IP- Inpatient   Admission Date: 12/31/2019  Length of Stay: 2 days  Attending Physician: Carmen Cosby MD  Primary Care Provider: Jasmeet Corral MD        Subjective:     Principal Problem:Embolic stroke involving left middle cerebral artery        HPI:  Ms. Martínez is an 84yF with significant medical co-morbidities of HTN, DM2, HFrEF (30% in 2/2019 with pacemaker), HLD, CAD, A fib (on Eliquis), OA, Breast Cancer (s/p mastectomy, chemo, radiation in 2000), CKD3, Gout, and Hypothyroidism who was admitted to vascular neurology after family noticed right sided-weakness, gait instability, and slurred speech yesterday evening following dental procedure. Report that her Eliquis had been held for 4 days prior to the procedure. Also given Valium X2 prior /during procedure. Her daughters state that she had been altered since they picked her up from the surgery around 4:30 pm yesterday. 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 Unable to attain MRI due to pacemaker. She was given ASA 325mg and Atorvastatin 40 mg. Waxing and waning course of symptoms. Overnight, rapid response was called due to tachypnea up to 40 and fevers to 100.3. ABG wnl but CXR with possible left-sided infiltrate. WBC wnl. Pt was started on vanc/zosyn in setting of aspiration vs CAP pneumonia. Troponin elevated to 0.516 with repeat elevated to 0.935. Hospital Medicine was consulted for assistance with pneumonia, elevated troponin, and her other multiple co-morbidities.     Overview/Hospital Course:  Earlier AM on 1/2/20 rapid response was called for concern for decreased arousal and labored breathing that has been waxing and waning throughout admission. Later today on the way to echo, pt was also noted to have jerking movements of UE with concern for seizure-like activity. Neurology  was consulted by vascular neurology who recommended EEG monitoring. Additionally, STAT head CT was notable for small area on frontal lobe that may represent acute infarct. Still on vanc/zosyn for Aspiration vs CAP. Respiratory viral panel negative, sputum culture with many gram positive and grab negative rods, cocci, no specific organisms yet. Troponin peaked and down-trending.     Interval History: Pt noted to be more somnolent this AM but still arousable to sternal rub. Left-side gaze preference improved compared to yesterday, pt able to look at me on her right side. Still with right-sided upper and lower hemiparesis. Still non-verbal. On way to echo this AM, noted to have jerking movements. EEG and Stat head CT ordered.     Review of Systems   Unable to perform ROS: Acuity of condition     Objective:     Vital Signs (Most Recent):  Temp: 98.8 °F (37.1 °C) (01/02/20 1106)  Pulse: 69 (01/02/20 1106)  Resp: 20 (01/02/20 0633)  BP: (!) 166/79 (01/02/20 1106)  SpO2: 100 % (01/02/20 1106) Vital Signs (24h Range):  Temp:  [97.4 °F (36.3 °C)-99.5 °F (37.5 °C)] 98.8 °F (37.1 °C)  Pulse:  [69-73] 69  Resp:  [18-20] 20  SpO2:  [97 %-100 %] 100 %  BP: (111-166)/(55-79) 166/79     Weight: 79.4 kg (175 lb)  Body mass index is 32.01 kg/m².    Intake/Output Summary (Last 24 hours) at 1/2/2020 1408  Last data filed at 1/1/2020 1800  Gross per 24 hour   Intake 260 ml   Output 150 ml   Net 110 ml      Physical Exam   Constitutional: No distress.   Sleeping but arousal to sternal stimulation. Nasal oxygen in place. NG tube in place   HENT:   Head: Normocephalic and atraumatic.   Some intraoral swelling of tongue noted however difficult to assess degree of edema as pt will not follow command to open mouth   Eyes: No scleral icterus.   Right-sided neglect   Neck: Normal range of motion. Neck supple.   No JVD noted   Cardiovascular: Normal rate.   Murmur heard.  Pulmonary/Chest: Effort normal and breath sounds normal. She has no  wheezes. She has no rales.   Abdominal: Soft. She exhibits no distension.   Musculoskeletal:   No LE edema. Bilateral SCDs in place   Lymphadenopathy:     She has no cervical adenopathy.   Neurological: She displays no tremor. She exhibits abnormal muscle tone.   Appears more lethargic than prior exams.  Still unable to follow commands, aphasic.  Some drooling noted.  Right-sided hemiparesis of upper and lower extremity still present. Moves left upper extremity spontaneously.   Symmetric smile.   No clonus  Left-sided gaze preference improved from prior exams.        Skin: Skin is warm and dry. She is not diaphoretic.   Nursing note and vitals reviewed.      Significant Labs:   Blood Culture:   Recent Labs   Lab 01/01/20  1317   LABBLOO No Growth to date  No Growth to date     CBC:   Recent Labs   Lab 12/31/19 1929 01/01/20 0347 01/02/20  0435   WBC 6.91 8.88 8.72   HGB 12.2 11.6* 10.4*   HCT 40.5 37.8 35.8*    238 192     CMP:   Recent Labs   Lab 12/31/19 1929 01/01/20 0347 01/02/20  0435    144 144   K 3.4* 3.1* 3.6    105 107   CO2 32* 25 23   * 153* 155*   BUN 20 21 30*   CREATININE 1.3 1.3 1.6*   CALCIUM 8.7 8.6* 8.2*   PROT 8.1 7.7 7.2   ALBUMIN 3.3* 3.2* 2.9*   BILITOT 0.8 1.1* 1.3*   ALKPHOS 61 59 49*   AST 17 25 17   ALT 5* 5* <5*   ANIONGAP 9 14 14   EGFRNONAA 38.0* 38.0* 29.6*     Cardiac Markers: No results for input(s): CKMB, MYOGLOBIN, BNP, TROPISTAT in the last 48 hours.  Lipid Panel:   Recent Labs   Lab 12/31/19 1929   CHOL 209*   HDL 48   LDLCALC 136.4   TRIG 123   CHOLHDL 23.0     POCT Glucose:   Recent Labs   Lab 01/02/20  0525 01/02/20  0640 01/02/20  1213   POCTGLUCOSE 149* 123* 159*     Troponin:   Recent Labs   Lab 01/01/20  1902 01/02/20  0049 01/02/20  0435   TROPONINI 0.968* 0.917* 0.801*       Significant Imaging: CT 1/2/19-Interval development of a small region of decreased attenuation in the left frontal lobe superiorly concerning for possible acute/recent  infarction. Clinical correlation and further evaluation with MRI if patient compatible.  There is no evidence for acute intracranial hemorrhage or hydrocephalus.      Assessment/Plan:      * Embolic stroke involving left middle cerebral artery  Ms. Martínez is a 84yF with CVA risk factors of HTN, HLD, DM2, and Afib off her AG for 4 days prior to admission for dental procedure on 12/31 who was brought in by EMS 12/31 for gait instability, facial droop, aphasia, confusion, and right-sided weakness. 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 Presumed MCA stroke given exam. Pt received ASA 325mg and Atorvastatin 40 mg. Admitted to vascular neurology.  consulted for possible aspiration pneumonia and other medical co-morbidities.     1/2/20-pt with noted jerking of UE concerning for seziure-like activity. EEG and Head CT ordered per Vascular Neurology.    -CT 1/2/20 with small region of decreased attenuation in the left frontal lobe superiorly concerning for possible acute/recent infarction  -Eliquis resumed 1/1/20  -Continued management per primary team      Pneumonia  Pt is an 84yF with multiple co-morbidities who was admitted to vascular neurology for suspected MCA stroke. Pt underwent dental extractions day of presentation. Overnight on 12/31, pt became tachypnic with fever to 100.3 and rapid response was called. CXR with left sided consolidation. Started on vanc/zosyn overnight. ABG wnl. WBC wnl. Procal mildly elevated to .99. O2 saturations %. Suspect possible aspiration pneumonia vs CAP. Family reports cough for few days prior to admission.     1/2/20: no longer febrile, WBC still wnl.     -Agree with continued abx, will switch vanc/zosyn to Levaquin 750 mg Q48 hrs   -Sputum cultures with many gram negative and positive rods, cocci. No specific organisms isolated yet. Can tailor abx as cultures as needed  -Blood cultures  NGTD  -TTE without evidence of any vegetations   - UA still pending  - Respiratory viral panel negative  - IV fluids ordered  - Wean oxygen as tolerated, per chart review, pt was placed on 2L NC at request of family      Hypothyroidism  History of hypothyroidism. TSH on admission elevated to 13.528 with normal T4 at 0.97, agree with subclinical picture. On synthroid 25 mcg at home.    -Continue home synthroid 25 mcg       CKD (chronic kidney disease) stage 3, GFR 30-59 ml/min  Hx of CKD3. Baseline Cr of ~1.5-1.7. CTA head done on admit to evaluate for acute CVA.    -Cr currently at 1.3-->1.6 with BUN at 21-->30  -Advise gently hydration with NS 50 ml/hr for 20 hours for total 1L fluids  -Slight bump in creatine still around pt's baseline  -Continue to monitor Cr       Chronic systolic congestive heart failure  Pt with history of HFrEF and biventricular pacemaker. Last EF 30% in 2/2019. Stress test negative at that time. On lasix 40 mg BID, Entresto 24-26 mg, Metoprolol succinate 150 mg daily at home. On admit,Troponin elevated to 0.5-->0.9. EKG without any St changes. Cardiology consulted given patient's cardiac history, suspect demand ischemia as etiology, they do not feel any intervention necessary at this point given that pt has already received  mg and high intensity statin in setting of ischemic stroke.     -Troponins were tended (Q6 hours x4) per cardiology recommendations, have peaked and now down-trending. Suspect demand ischemia as etiology.   -Home dose Lasix, Entresto currently held in setting of MCA stroke and possible infection. Metoprolol re-started  -Overnight 12/31, pt with tachypnea and was given 1 dose lasix 20 mg IV  -Appears euvolemic on exam   -Gentle hydration with 50 ml/hr NS over 20 hours for 1 L total in setting of low EF and infection  -I/Os, daily weights  -Replace K and Mg as needed  -TTE 1/2/20  ·  Moderately decreased left ventricular systolic function. The estimated ejection  fraction is 35%  · Concentric left ventricular remodeling.  · Local segmental wall motion abnormalities.  · Moderate right ventricular enlargement.  · Mildly reduced right ventricular systolic function.  · Moderate biatrial enlargement.  · Aortic valve area is 1.12 cm2; peak velocity is 1.95 m/s; mean gradient is 9 mmHg.  · Mild-to-moderate aortic valve stenosis.  · Mild mitral regurgitation.  · Mild tricuspid regurgitation.  · The estimated PA systolic pressure is 59 mm Hg  · Pulmonary hypertension present.  · Elevated central venous pressure (15 mm Hg).  · Atrial fibrillation observed.           Persistent atrial fibrillation  Pt with history of Afib anticoagulated with Eliquis and rate-controlled with metoprolol and biventricular pacemaker.   Presented to ED yesterday 12/31 for likely MCA stroke following dental extractions earlier that day. Eliquis was held 4 days prior to procedure. Resumed 1/1/2019 per primary team.    -CHADSVASC 9  -Eliquis re-started 1/1/20  -Ok to re-start metoprolol       Type 2 diabetes mellitus with stage 3 chronic kidney disease, without long-term current use of insulin  Recent A1c of 6.9. On Januvia 25mg at home. Currently NPO 2/2 concern for swallowing. Has NG in place for medications. Evaluated by speech who recommended tube feeds. POC glucoses have been in mid 100s.    -Low dose SSI   -Continue accuchecks   -Can adjust as needed      Idiopathic chronic gout of multiple sites without tophus  Continue Allopurinol       Hyperlipidemia  Hx of HLD. Home dose of atorvastatin was 10 mg. On admission, pt was started on atorvastatin 40 mg.    -Lipid panel with total cholesterol of 209, ,   -Continue atorvastatin 40 mg daily       Essential hypertension  Pt with history of HTN. Home meds include lasix, entresto, metoprolol. Per primary team, goal SBP <220 in setting of likely MCA stroke.     -Holding Entresto, Lasix, in setting of acute CVA  -OK to re-start metoprolol   -Has prn  labetolol IV ordered  -BP ranging 111-135/55-63      VTE Risk Mitigation (From admission, onward)         Ordered     apixaban tablet 2.5 mg  2 times daily      01/01/20 2115     IP VTE HIGH RISK PATIENT  Once      12/31/19 2049     Place sequential compression device  Until discontinued      12/31/19 2049                      Palma Lee MD   [id number]  Department of Hospital Medicine   [facility]

## 2020-01-02 NOTE — ASSESSMENT & PLAN NOTE
Pt with history of HTN. Home meds include lasix, entresto, metoprolol. Per primary team, goal SBP <220 in setting of likely MCA stroke.     -Holding Entresto, Lasix, in setting of acute CVA  -OK to re-start metoprolol   -Has prn labetolol IV ordered  -BP ranging 111-135/55-63

## 2020-01-02 NOTE — PROGRESS NOTES
"[facility]  Adult Nutrition  Progress Note    SUMMARY       Recommendations    1. Continue TF with Glucerna 1.5 @ goal rate 41 ml/hr (provides 1476 kcal, 81g pro, 748ml free water). Additional 200ml water flush Q6hrs or per MD. Hold for residuals >500ml/hr.  Goals: 1. Pt to receive nutrition by RD follow up.  Nutrition Goal Status: goal met  Communication of RD Recs: (POC)    Reason for Assessment    Reason For Assessment: RD follow-up  Diagnosis: stroke/CVA  Relevant Medical History: HTN, HLD, HF, DM, Afib  Interdisciplinary Rounds: attended  General Information Comments: Pt started on TF, tolerating well at goal rate. Pt nourished per NFPE 1/01. Unable to follow up with pt today as she was with providers.  Nutrition Discharge Planning: unable to assess at this time.    Nutrition Risk Screen    Nutrition Risk Screen: tube feeding or parenteral nutrition    Nutrition/Diet History    Spiritual, Cultural Beliefs, Worship Practices, Values that Affect Care: no    Anthropometrics    Temp: 98.8 °F (37.1 °C)  Height Method: Stated  Height: 5' 2" (157.5 cm)  Height (inches): 62 in  Weight Method: Bed Scale  Weight: 79.4 kg (175 lb)  Weight (lb): 175 lb  Ideal Body Weight (IBW), Female: 110 lb  % Ideal Body Weight, Female (lb): 159.09 %  BMI (Calculated): 32       Lab/Procedures/Meds    Pertinent Labs Reviewed: reviewed  Pertinent Labs Comments: A1c 6.9%, BUN 30, Creat 1.6, Glu 155, T Bili 1.3  Pertinent Medications Reviewed: reviewed  Pertinent Medications Comments: statin, levothyroxine, mirtazapine, IVF      Estimated/Assessed Needs    Weight Used For Calorie Calculations: 78.9 kg (173 lb 15.1 oz)  Energy Calorie Requirements (kcal): 1486 kcal  Energy Need Method: Aldrich-St Jeor(PAL 1.25)  Protein Requirements: 79-95g/day  Weight Used For Protein Calculations: 78.9 kg (173 lb 15.1 oz)        RDA Method (mL): 1486  CHO Requirement: 186g CHO      Nutrition Prescription Ordered    Current Diet Order: " NPO  Current Nutrition Support Formula Ordered: Glucerna 1.5  Current Nutrition Support Rate Ordered: 41 (ml)  Current Nutrition Support Frequency Ordered: ml/hr    Evaluation of Received Nutrient/Fluid Intake    Enteral Calories (kcal): 1476  Enteral Protein (gm): 81  Enteral (Free Water) Fluid (mL): 748  % Kcal Needs: 99  % Protein Needs: 100  Comments: no LBM yet  % Intake of Estimated Energy Needs: 75 - 100 %  % Meal Intake: NPO    Nutrition Risk    Level of Risk/Frequency of Follow-up: low     Assessment and Plan    Nutrition Problem  Swallowing difficulty    Related to (etiology):   stroke    Signs and Symptoms (as evidenced by):   Pt on enteral feeding via NG tube.    Interventions(treatment strategy):  Collaboration of care with providers.  Enteral nutrition.    Nutrition Diagnosis Status:   Continues       Monitor and Evaluation    Food and Nutrient Intake: energy intake, enteral nutrition intake  Food and Nutrient Adminstration: enteral and parenteral nutrition administration  Anthropometric Measurements: weight, weight change  Biochemical Data, Medical Tests and Procedures: electrolyte and renal panel, gastrointestinal profile, glucose/endocrine profile, inflammatory profile, lipid profile  Nutrition-Focused Physical Findings: overall appearance, extremities, muscles and bones, head and eyes, skin     Malnutrition Assessment                 Orbital Region (Subcutaneous Fat Loss): well nourished  Upper Arm Region (Subcutaneous Fat Loss): well nourished  Thoracic and Lumbar Region: well nourished   Advent Region (Muscle Loss): well nourished  Clavicle Bone Region (Muscle Loss): well nourished  Clavicle and Acromion Bone Region (Muscle Loss): well nourished  Scapular Bone Region (Muscle Loss): well nourished  Dorsal Hand (Muscle Loss): mild depletion  Patellar Region (Muscle Loss): well nourished  Anterior Thigh Region (Muscle Loss): well nourished  Posterior Calf Region (Muscle Loss): well nourished                  Nutrition Follow-Up    RD Follow-up?: Yes

## 2020-01-02 NOTE — SUBJECTIVE & OBJECTIVE
Interval History: Pt noted to be more somnolent this AM but still arousable to sternal rub. Left-side gaze preference improved compared to yesterday, pt able to look at me on her right side. Still with right-sided upper and lower hemiparesis. Still non-verbal. On way to echo this AM, noted to have jerking movements. EEG and Stat head CT ordered.     Review of Systems   Unable to perform ROS: Acuity of condition     Objective:     Vital Signs (Most Recent):  Temp: 98.8 °F (37.1 °C) (01/02/20 1106)  Pulse: 69 (01/02/20 1106)  Resp: 20 (01/02/20 0633)  BP: (!) 166/79 (01/02/20 1106)  SpO2: 100 % (01/02/20 1106) Vital Signs (24h Range):  Temp:  [97.4 °F (36.3 °C)-99.5 °F (37.5 °C)] 98.8 °F (37.1 °C)  Pulse:  [69-73] 69  Resp:  [18-20] 20  SpO2:  [97 %-100 %] 100 %  BP: (111-166)/(55-79) 166/79     Weight: 79.4 kg (175 lb)  Body mass index is 32.01 kg/m².    Intake/Output Summary (Last 24 hours) at 1/2/2020 1408  Last data filed at 1/1/2020 1800  Gross per 24 hour   Intake 260 ml   Output 150 ml   Net 110 ml      Physical Exam   Constitutional: No distress.   Sleeping but arousal to sternal stimulation. Nasal oxygen in place. NG tube in place   HENT:   Head: Normocephalic and atraumatic.   Some intraoral swelling of tongue noted however difficult to assess degree of edema as pt will not follow command to open mouth   Eyes: No scleral icterus.   Right-sided neglect   Neck: Normal range of motion. Neck supple.   No JVD noted   Cardiovascular: Normal rate.   Murmur heard.  Pulmonary/Chest: Effort normal and breath sounds normal. She has no wheezes. She has no rales.   Abdominal: Soft. She exhibits no distension.   Musculoskeletal:   No LE edema. Bilateral SCDs in place   Lymphadenopathy:     She has no cervical adenopathy.   Neurological: She displays no tremor. She exhibits abnormal muscle tone.   Appears more lethargic than prior exams.  Still unable to follow commands, aphasic.  Some drooling noted.  Right-sided  hemiparesis of upper and lower extremity still present. Moves left upper extremity spontaneously.   Symmetric smile.   No clonus  Left-sided gaze preference improved from prior exams.        Skin: Skin is warm and dry. She is not diaphoretic.   Nursing note and vitals reviewed.      Significant Labs:   Blood Culture:   Recent Labs   Lab 01/01/20  1317   LABBLOO No Growth to date  No Growth to date     CBC:   Recent Labs   Lab 12/31/19 1929 01/01/20  0347 01/02/20  0435   WBC 6.91 8.88 8.72   HGB 12.2 11.6* 10.4*   HCT 40.5 37.8 35.8*    238 192     CMP:   Recent Labs   Lab 12/31/19 1929 01/01/20 0347 01/02/20  0435    144 144   K 3.4* 3.1* 3.6    105 107   CO2 32* 25 23   * 153* 155*   BUN 20 21 30*   CREATININE 1.3 1.3 1.6*   CALCIUM 8.7 8.6* 8.2*   PROT 8.1 7.7 7.2   ALBUMIN 3.3* 3.2* 2.9*   BILITOT 0.8 1.1* 1.3*   ALKPHOS 61 59 49*   AST 17 25 17   ALT 5* 5* <5*   ANIONGAP 9 14 14   EGFRNONAA 38.0* 38.0* 29.6*     Cardiac Markers: No results for input(s): CKMB, MYOGLOBIN, BNP, TROPISTAT in the last 48 hours.  Lipid Panel:   Recent Labs   Lab 12/31/19 1929   CHOL 209*   HDL 48   LDLCALC 136.4   TRIG 123   CHOLHDL 23.0     POCT Glucose:   Recent Labs   Lab 01/02/20  0525 01/02/20  0640 01/02/20  1213   POCTGLUCOSE 149* 123* 159*     Troponin:   Recent Labs   Lab 01/01/20  1902 01/02/20  0049 01/02/20  0435   TROPONINI 0.968* 0.917* 0.801*       Significant Imaging: CT 1/2/19-Interval development of a small region of decreased attenuation in the left frontal lobe superiorly concerning for possible acute/recent infarction. Clinical correlation and further evaluation with MRI if patient compatible.  There is no evidence for acute intracranial hemorrhage or hydrocephalus.

## 2020-01-02 NOTE — ASSESSMENT & PLAN NOTE
Pt with history of HFrEF and biventricular pacemaker. Last EF 30% in 2/2019. Stress test negative at that time. On lasix 40 mg BID, Entresto 24-26 mg, Metoprolol succinate 150 mg daily at home. On admit,Troponin elevated to 0.5-->0.9. EKG without any St changes. Cardiology consulted given patient's cardiac history, suspect demand ischemia as etiology, they do not feel any intervention necessary at this point given that pt has already received  mg and high intensity statin in setting of ischemic stroke.     -Troponins were tended (Q6 hours x4) per cardiology recommendations, have peaked and now down-trending. Suspect demand ischemia as etiology.   -Home dose Lasix, Entresto currently held in setting of MCA stroke and possible infection. Metoprolol re-started  -Overnight 12/31, pt with tachypnea and was given 1 dose lasix 20 mg IV  -Appears euvolemic on exam   -Gentle hydration with 50 ml/hr NS over 20 hours for 1 L total in setting of low EF and infection  -I/Os, daily weights  -Replace K and Mg as needed  -TTE 1/2/20  ·  Moderately decreased left ventricular systolic function. The estimated ejection fraction is 35%  · Concentric left ventricular remodeling.  · Local segmental wall motion abnormalities.  · Moderate right ventricular enlargement.  · Mildly reduced right ventricular systolic function.  · Moderate biatrial enlargement.  · Aortic valve area is 1.12 cm2; peak velocity is 1.95 m/s; mean gradient is 9 mmHg.  · Mild-to-moderate aortic valve stenosis.  · Mild mitral regurgitation.  · Mild tricuspid regurgitation.  · The estimated PA systolic pressure is 59 mm Hg  · Pulmonary hypertension present.  · Elevated central venous pressure (15 mm Hg).  · Atrial fibrillation observed.

## 2020-01-02 NOTE — CARE UPDATE
"RAPID RESPONSE NURSE NOTE     Admit Date: 2019  LOS: 2  Code Status: Full Code   Date of Consult: 2020  : 1935  Age: 84 y.o.  Weight:   Wt Readings from Last 1 Encounters:   20 79.6 kg (175 lb 7.8 oz)     Sex: female  Race: Black or    Bed: [address] [place] [address] A:   MRN: [id number]  Time Rapid Response Team page Received: 0637  Time Rapid Response Team at Bedside: 0637  Time Rapid Response Team left Bedside: 0645  Was the patient discharged from an ICU this admission?   no  Was the patient discharged from a PACU within last 24 hours?  no  Did the patient receive conscious sedation/general anesthesia within last 24 hours?  no  Was the patient in the ED within the past 24 hours?  no  Was the patient started on NIPPV within the past 24 hours?  no  Did this progress into an ARC or CPA:  no  Attending Physician: Carmen Cosby MD  Primary Service: Networked reference to record PCT   Consult Requested By: Carmen Cosby MD     SITUATION     Reason for Call: AMS  Called to evaluate the patient for Neuro    BACKGROUND     Why is the patient in the hospital?: Embolic stroke involving left middle cerebral artery    Patient has a past medical history of A-fib, Arthritis, Asteroid hyalosis of left eye, Bilateral nonexudative age-related macular degeneration, Breast cancer, Cataract, CHF (congestive heart failure), Chronic GERD, CKD stage 3 due to type 2 diabetes mellitus, Coronary artery disease, Encounter for blood transfusion, Hypertension, Migraine headache, Mild nonproliferative diabetic retinopathy of both eyes, Pneumonia, Stroke, Type 2 diabetes mellitus with hyperglycemia, and Vertigo.    ASSESSMENT/INTERVENTIONS     /67   Pulse 70   Temp 98.4 °F (36.9 °C)   Resp 18   Ht 5' 1.5" (1.562 m)   Wt 79.6 kg (175 lb 7.8 oz)   LMP  (LMP Unknown)   SpO2 100%   BMI 32.62 kg/m²     What did you find: Rapid called for decreased LOC. Per AM notes, patient has a waxing/waning neurological " status. CTH was repeated in AM without change. Patient arousable to vigorous stimuli. Opens eyes with granddaughter talking to her. HR 70. SpO2 100% on 1L NC. /67. RR 20 and unlabored. Torrie Calvillo NP w/ stroke team at bedside.     RECOMMENDATIONS     We recommend: High aspiration risk r/t waxing and waning neurological status.     FOLLOW-UP/CONTINGENCY PLAN     Patient needs a second visit at : 0900    Call the Rapid Response Nurse, Tracie Wood RN at x [phone] for additional questions or concerns.    PHYSICIAN ESCALATION     Orders received and case discussed with KIMO Calvillo.    Disposition: Remain in room 926A.

## 2020-01-02 NOTE — PT/OT/SLP PROGRESS
Occupational Therapy      Patient Name:  Dacia Martínez   MRN:  [id number]    Patient not seen today secondary to pt off the floor for echo and then transferred to stat CT scan. Will follow-up as scheduled and appropriate.    KESHA Whitehead  1/2/2020

## 2020-01-02 NOTE — ASSESSMENT & PLAN NOTE
Ms. Martínez is a 84yF with CVA risk factors of HTN, HLD, DM2, and Afib off her AG for 4 days prior to admission for dental procedure on 12/31 who was brought in by EMS 12/31 for gait instability, facial droop, aphasia, confusion, and right-sided weakness. 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 Presumed MCA stroke given exam. Pt received ASA 325mg and Atorvastatin 40 mg. Admitted to vascular neurology.  consulted for possible aspiration pneumonia and other medical co-morbidities.     1/2/20-pt with noted jerking of UE concerning for seziure-like activity. EEG and Head CT ordered per Vascular Neurology.    -CT 1/2/20 with small region of decreased attenuation in the left frontal lobe superiorly concerning for possible acute/recent infarction  -Eliquis resumed 1/1/20  -Continued management per primary team

## 2020-01-02 NOTE — NURSING
POC reviewed with patient and family. Patient is aphasic and is unable to verbalized understanding. All questions and concerns addressed. VSS. No acute events during shift. Glucerna via NGT administered. Safety measures maintained. Will continue to monitor.

## 2020-01-02 NOTE — HOSPITAL COURSE
On 1/2/20 AM rapid response was called for concern for decreased arousal and labored breathing that has been waxing and waning throughout admission. Troponin peaked and down-trended. Pt was also noted to have jerking movements of UE with concern for seizure-like activity on 1/2/20. Neurology was consulted by vascular neurology who recommended continuous EEG monitoring that did not show any large epileptiform activty. Additionally, STAT head CT was notable for small area on frontal lobe that may represent acute infarct. Was started on Vanc/zosyn for Aspiration vs CAP but was de-escalated to Levaquin on 1/2/20. Overnight 1/3/20, pt with recurrence of fevers and vanc/zosyn was re-started. Lactate WNL. Respiratory viral panel and repeat influenza negative but sputum culture with enterobacter cloacae, sensitive to zosyn. Echo without signs of vegetation. Blood cultures still negative. Pt febrile evening of 1/3/20 but improved 1/5/20, still on zosyn. Repeat heat CT with redemonstration of hypodensity within the superior left frontal lobe and additional subtle hypodensity within the left frontal lobe periventricular white matter. Also increasingly somnolent on 1/5/20, ABG with respiratory alkalosis, vascular neurology to discuss with neuro critical care. Patient to be admitted to [facility] for higher level of care.

## 2020-01-02 NOTE — ASSESSMENT & PLAN NOTE
Pt is an 84yF with multiple co-morbidities who was admitted to vascular neurology for suspected MCA stroke. Pt underwent dental extractions day of presentation. Overnight on 12/31, pt became tachypnic with fever to 100.3 and rapid response was called. CXR with left sided consolidation. Started on vanc/zosyn overnight. ABG wnl. WBC wnl. Procal mildly elevated to .99. O2 saturations %. Suspect possible aspiration pneumonia vs CAP. Family reports cough for few days prior to admission.     1/2/20: no longer febrile, WBC still wnl.     -Agree with continued abx (vanc/zosyn)  -Sputum cultures with many gram negative and positive rods, cocci. No specific organisms isolated yet. Can tailor abx as cultures as needed  -Blood cultures NGTD  -TTE without evidence of any vegetations   - UA still pending  - Respiratory viral panel negative  - IV fluids ordered  - Wean oxygen as tolerated, per chart review, pt was placed on 2L NC at request of family

## 2020-01-02 NOTE — PT/OT/SLP PROGRESS
Speech Language Pathology      Dacia Martínez  MRN: [id number]    Patient not seen today secondary to Pt unavailable upon SLP attempts.  Upon initial attempts, Patient CHARU for CT  . Upon attempt later service day, Pt unavailable 2/2 restroom needs/CNA at bedside. Will follow-up 1/3/20.      SUKHWINDER Medrano., [facility]-SLP  Speech-Language Pathology  Pager: [phone]  1/2/2020

## 2020-01-02 NOTE — CARE UPDATE
Rapid Response Respiratory Therapy Note      Code Status: Full Code   : 1935  Age: 84 y.o.  Weight:   Wt Readings from Last 1 Encounters:   20 79.6 kg (175 lb 7.8 oz)     Sex: female  Race: Black or    Bed: [address] [place] [address] A:   MRN: [id number]  Time page Received: 0632  Time Rapid Response RT at Bedside: 0633  Time Rapid Response RT left Bedside: 0645  Report given to: N/A    SITUATION     Evaluated patient for: Neuro    BACKGROUND     Patient has a past medical history of A-fib, Arthritis, Asteroid hyalosis of left eye, Bilateral nonexudative age-related macular degeneration, Breast cancer, Cataract, CHF (congestive heart failure), Chronic GERD, CKD stage 3 due to type 2 diabetes mellitus, Coronary artery disease, Encounter for blood transfusion, Hypertension, Migraine headache, Mild nonproliferative diabetic retinopathy of both eyes, Pneumonia, Stroke, Type 2 diabetes mellitus with hyperglycemia, and Vertigo.    ASSESSMENT/INTERVENTIONS     Upon arrival in room pt on 1L NC with SpO2 97%. Pt arousable to to stimuli. VSS, no SOB or distress noted. NP at bedside. Will continue to monitor.     Pulse: 72 Respiratory rate: 20 Temperature: Temp: 98.4 °F (36.9 °C) BP: BP: 123/67 SpO2:97  Level of Consciousness: Level of Consciousness (AVPU): responds to pain  Respiratory Effort: Respiratory Effort: Mild  Expansion/Accessory Muscle Usage: Expansion/Accessory Muscles/Retractions: no use of accessory muscles  All Lung Field Breath Sounds: All Lung Fields Breath Sounds: coarse  O2 Device/Concentration: NC/1L  Most recent blood gas:   Recent Labs     20  0743   PH 7.429   PCO2 42.7   PO2 128*   HCO3 28.3*   POCSATURATED 99   BE 4     PF Ratio Calculator  P/F Ratio: N/A   Current Respiratory Care Orders:   20 001  Pulse Oximetry Continuous Continuous      20 0016   20 001  Oxygen Continuous Continuous     References: Oxygen Titration Protocol   Question Answer Comment   Device  type: Low flow    Device: Nasal Cannula (1- 5 Liters)    LPM: 2    Titrate O2 per Oxygen Titration Protocol: Yes    To maintain SpO2 goal of: >= 90%    Notify MD of: Inability to achieve desired SpO2; Sudden change in patient status and requires 20% increase in FiO2; Patient requires >60% FiO2               NIPPV: No Surgical airway: No Vent: No  ETCO2 monitored:    Ambu at bedside: Ambu bag with the patient?: Yes, Adult Ambu    RECOMMENDATIONS   ?  We recommend: Pt is at risk for aspiration. Continue current POC and call with any acute issues.     ESCALATION      Discussed plan of care with Karli MALIN and primary RTChase.     FOLLOW-UP     Disposition: Remain in room 926.    Please call back the Rapid Response RT, Pratibha Campos CRT at x [phone] for any questions or concerns.

## 2020-01-02 NOTE — ASSESSMENT & PLAN NOTE
Troponin was ordered on admission and noted to be 0.5 -> repeat 0.9. EKG notable for V-paced rhythm without evidence of ischemia. Etiology most likely 2/2 demand ischemia (type II NSTEMI) in the setting of co-morbidites (possibly has aspiration PNA, slightly anemic etc). Troponin trending downwards      y

## 2020-01-02 NOTE — NURSING
Stroke team notified that ABX and NS infusion are delayed due to previous IV access in restricted arm. Stroke team is aware. Zosyn still infusing. Messaged pharmacy regarding vancomycin. Waiting for medication to be delivered. Will administer vancomycin once it is delivered. Will pass on to night shift.

## 2020-01-02 NOTE — SUBJECTIVE & OBJECTIVE
Neurologic Chief Complaint: Aphasia and weakness     Subjective:     Interval History: Patient is seen for follow-up neurological assessment and treatment recommendations:    -Rapid called overnight by nurse as patient was having difficulty breathing and an inability to arouse. However, vitals and lab within normal limit     -Patient exhibited myoclonic jerking of left side with left sided gaze deviation on her way to Echo. EEG and STAT CTH ordered. General neurology consulted for seizures.     HPI, Past Medical, Family, and Social History remains the same as documented in the initial encounter.     Review of Systems   Constitutional: Negative for chills and fever.   Respiratory: Negative for cough.    Cardiovascular: Negative for chest pain.   Gastrointestinal: Negative for nausea and vomiting.   Neurological: Positive for speech difficulty and weakness. Negative for facial asymmetry and numbness.     Scheduled Meds:   [START ON 1/3/2020] allopurinol  150 mg Per NG tube Daily    apixaban  2.5 mg Per NG tube BID    [START ON 1/3/2020] atorvastatin  40 mg Per NG tube Daily    [START ON 1/3/2020] levothyroxine  25 mcg Per NG tube Before breakfast    mirtazapine  7.5 mg Per NG tube QHS    piperacillin-tazobactam (ZOSYN) IVPB  4.5 g Intravenous Q8H     Continuous Infusions:   sodium chloride 0.9% 50 mL/hr at 01/01/20 2106    sodium chloride 0.9%       PRN Meds:acetaminophen, Dextrose 10% Bolus, gabapentin, glucagon (human recombinant), insulin aspart U-100, labetalol, ondansetron, senna-docusate 8.6-50 mg, sodium chloride 0.9%, sodium chloride 0.9%, Pharmacy to dose Vancomycin consult **AND** vancomycin - pharmacy to dose    Objective:     Vital Signs (Most Recent):  Temp: 98.8 °F (37.1 °C) (01/02/20 1106)  Pulse: 69 (01/02/20 1106)  Resp: 20 (01/02/20 0633)  BP: (!) 166/79 (01/02/20 1106)  SpO2: 100 % (01/02/20 1106)  BP Location: Right arm    Vital Signs Range (Last 24H):  Temp:  [97.4 °F (36.3 °C)-99.5 °F  (37.5 °C)]   Pulse:  [69-73]   Resp:  [18-20]   BP: (111-166)/(55-79)   SpO2:  [97 %-100 %]   BP Location: Right arm    Physical Exam   Constitutional: No distress.   HENT:   Head: Normocephalic and atraumatic.   Mouth/Throat: No oropharyngeal exudate.   Eyes: No scleral icterus.   Neck: Normal range of motion. Neck supple.   No JVD noted   Cardiovascular: Normal rate.   Pulmonary/Chest: Effort normal and breath sounds normal. She has no wheezes. She has no rales.   Abdominal: Soft. She exhibits no distension. There is no guarding.   Musculoskeletal:   No LE edema. Bilateral SCDs in place   Lymphadenopathy:     She has no cervical adenopathy.   Neurological: She displays no tremor.   Skin: Skin is warm and dry. She is not diaphoretic.   Nursing note and vitals reviewed.      Neurological Exam:   LOC: alert  Attention Span: poor  Language: Global aphasia  Articulation: Mute/Anarthric  Orientation: Not oriented to person, place, and time  Visual Fields: Full  EOM (CN III, IV, VI): Full/intact  Pupils (CN II, III): PERRL  Facial Sensation (CN V): Normal  Facial Movement (CN VII): Unable to test   Gag Reflex: present  Reflexes: 2+ throughout  Motor: Arm left  Normal 5/5  Leg left  Paresis: 4/5  Arm right  Paresis: 3/5  Leg right Paresis: 2/5  Cebellar: No evidence of appendicular or axial ataxia  Sensation: Intact to light touch, temperature and vibration  Tone: Normal tone throughout    Laboratory:  CMP:   Recent Labs   Lab 01/02/20  0435   CALCIUM 8.2*   ALBUMIN 2.9*   PROT 7.2      K 3.6   CO2 23      BUN 30*   CREATININE 1.6*   ALKPHOS 49*   ALT <5*   AST 17   BILITOT 1.3*     BMP:   Recent Labs   Lab 01/02/20  0435      K 3.6      CO2 23   BUN 30*   CREATININE 1.6*   CALCIUM 8.2*     CBC:   Recent Labs   Lab 01/02/20 0435   WBC 8.72   RBC 3.77*   HGB 10.4*   HCT 35.8*      MCV 95   MCH 27.6   MCHC 29.1*     Lipid Panel:   Recent Labs   Lab 12/31/19 1929   CHOL 209*   LDLCALC 136.4    HDL 48   TRIG 123     Coagulation:   Recent Labs   Lab 01/01/20  0347   INR 1.1   APTT 22.8     Platelet Aggregation Study: No results for input(s): PLTAGG, PLTAGINTERP, PLTAGREGLACO, ADPPLTAGGREG in the last 168 hours.  Hgb A1C:   Recent Labs   Lab 12/31/19  2115   HGBA1C 6.9*     TSH:   Recent Labs   Lab 12/31/19  1929   TSH 13.528*       Diagnostic Results     Brain Imaging   CTH 1/1/19   -No evidence of major vascular distribution infarct or hemorrhage.  -Chronic microvascular ischemic change and generalized cerebral volume loss, normal for age.    Vessel Imaging   CTA MP 12/31/19   -No acute intracranial pathology.  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  -In this exam limited by poor timing of contrast and motion artifact, no high-grade stenosis or major vessel occlusion.  Hypoplastic right A2 segment.    Cardiac Imaging   TTE pending

## 2020-01-02 NOTE — CONSULTS
[facility]  Physical Medicine & Rehab  Consult Note    Patient Name: Dacia Martínez  MRN: [id number]  Admission Date: 12/31/2019  Hospital Length of Stay: 2 days  Attending Physician: Carmen Cosby MD     Inpatient consult to Physical Medicine & Rehabilitation  Consult performed by: Aleshia Marcano NP  Consult requested by:  Carmen Cosby MD    Reason for Consult:  assess rehabilitation needs  Consults  Subjective:     Principal Problem: Embolic stroke involving left middle cerebral artery    HPI: Dacia Martínez is a 84-year-old female with PMHx of HTN, HLD, HF, DM, a-fib (eliquis).  Patient presented to [facility] on 12/31 with gait instability, RSW, and speech changes.  CTH revealed no acute pathology.  Not a tPA candidate.  CTA multiphase, which was negative for early ischemic changes, LVO, and significant stenosis.  MRI not done as incompatible with pacemaker.Neurological exam patient is waxing and waning. Hospital course complicated by rapid response 2/2 tachypnea, fevers with infiltrate on CXR now on IV abx, & elevated troponin (Cardiology consulted and they believed this is demand ischemia, thus will trend troponin). Repeat CTH shows no evidence of major vascular distribution infarct or hemorrhage per stroke team.    Functional History: Patient lives with daughter and grandchildren in a single story home.  Prior to admission, Mod (I) with ADLs and mobility. DME: none.    Hospital Course: 01/01/2020: Bed mobility TA .  Sit to stand TA x 2 ppl.  LBD MaxA. SLP diagnosis of Aphasia and Dysphagia. NPO.      Past Medical History:   Diagnosis Date    A-fib     Arthritis     Asteroid hyalosis of left eye     Bilateral nonexudative age-related macular degeneration 6/3/2014    Breast cancer     Cataract     CHF (congestive heart failure)     Chronic GERD 9/18/2017    CKD stage 3 due to type 2 diabetes mellitus     Coronary artery disease     Encounter for blood transfusion     Hypertension      Migraine headache     Mild nonproliferative diabetic retinopathy of both eyes 6/3/2014    Pneumonia     Stroke 2019    Type 2 diabetes mellitus with hyperglycemia     Vertigo      Past Surgical History:   Procedure Laterality Date    ABLATION N/A 2018    Procedure: ABLATION;  Surgeon: Jacques Burt MD;  Location: [facility] EP LAB;  Service: Cardiology;  Laterality: N/A;  AF, AVN, RFA, Choice, MB, 3 Prep *SJM CRT-P in situ*    BREAST SURGERY      left lumpectomy    CARPAL TUNNEL RELEASE      left    CATARACT EXTRACTION      vance     SECTION      EYE SURGERY      cataracts bilaterally    HYSTERECTOMY      partial    IMPLANTATION OF BIVENTRICULAR HEART PACEMAKER N/A 10/26/2018    Procedure: INSERTION, CARDIAC PACEMAKER, BIVENTRICULAR;  Surgeon: Jacques Burt MD;  Location: [facility] CATH LAB;  Service: Cardiology;  Laterality: N/A;  AF, CRT-P, SJM, MAC, MB, 3 Prep     Review of patient's allergies indicates:   Allergen Reactions    Tramadol Hallucinations       Scheduled Medications:    [START ON 1/3/2020] allopurinol  150 mg Per NG tube Daily    apixaban  2.5 mg Per NG tube BID    [START ON 1/3/2020] atorvastatin  40 mg Per NG tube Daily    [START ON 1/3/2020] levothyroxine  25 mcg Per NG tube Before breakfast    mirtazapine  7.5 mg Per NG tube QHS    piperacillin-tazobactam (ZOSYN) IVPB  4.5 g Intravenous Q8H    vancomycin (VANCOCIN) IVPB  1,000 mg Intravenous Q24H       PRN Medications: acetaminophen, Dextrose 10% Bolus, gabapentin, glucagon (human recombinant), insulin aspart U-100, labetalol, ondansetron, senna-docusate 8.6-50 mg, sodium chloride 0.9%, sodium chloride 0.9%, Pharmacy to dose Vancomycin consult **AND** vancomycin - pharmacy to dose    Family History     Problem Relation (Age of Onset)    Blindness Brother    Cancer Mother    Cataracts Sister    Diabetes Father, Brother, Sister, Brother, Brother, Brother    Heart disease Mother    Hypertension Father, Brother     No Known Problems Daughter, Son, Daughter        Tobacco Use    Smoking status: Never Smoker    Smokeless tobacco: Never Used   Substance and Sexual Activity    Alcohol use: Yes    Drug use: No    Sexual activity: Not Currently     Review of Systems   Reason unable to perform ROS: aphasia.     Objective:     Vital Signs (Most Recent):  Temp: 98.4 °F (36.9 °C) (01/02/20 0639)  Pulse: 70 (01/02/20 0742)  Resp: 20 (01/02/20 0633)  BP: 123/67 (01/02/20 0639)  SpO2: 99 % (01/02/20 0738)    Vital Signs (24h Range):  Temp:  [97.4 °F (36.3 °C)-99.5 °F (37.5 °C)] 98.4 °F (36.9 °C)  Pulse:  [69-73] 70  Resp:  [18-20] 20  SpO2:  [97 %-100 %] 99 %  BP: (111-136)/(55-73) 123/67     Body mass index is 32.62 kg/m².    Physical Exam   Constitutional: She appears well-developed and well-nourished. She is sleeping. She is easily aroused.   HENT:   Head: Normocephalic and atraumatic.   Eyes: Right eye exhibits no discharge. Left eye exhibits no discharge.   Neck: Neck supple.   Cardiovascular: Intact distal pulses.   Pulmonary/Chest: Effort normal. No respiratory distress.   Abdominal: Soft. She exhibits no distension.   NGT in place   Musculoskeletal: She exhibits no edema or deformity.   R sided weakness    Neurological: She is easily aroused w/ mild lethargy.  + aphasia  Follows commands partially for L hand     Skin: Skin is warm and dry.   Psychiatric: She has a normal mood and affect. Her behavior is normal. Cognition and memory are impaired.   Vitals reviewed.      Diagnostic Results:   Labs: Reviewed  ECG: Reviewed  X-Ray: Reviewed  CT: Reviewed  Assessment/Plan:     * Embolic stroke involving left middle cerebral artery  See hospital course for functional, cognitive/speech/language, and nutrition/swallow status.      Recommendations  -  Encourage mobility, OOB in chair at least 3 hours per day, and early ambulation as appropriate   -  PT/OT evaluate and treat  -  SLP speech and cognitive evaluate and treat  -   Monitor sleep disturbances and establish consistent sleep-wake cycle  -  Monitor for bowel and bladder dysfunction  -  Monitor for shoulder pain, subluxation, & spasticity  -  Monitor for and prevent skin breakdown and pressure ulcers  · Early mobility, repositioning/weight shifting every 20-30 minutes when sitting, turn patient every 2 hours, proper mattress/overlay and chair cushioning, pressure relief/heel protector boots  -  DVT prophylaxis (if appropriate)  -  Reviewed discharge options (IP rehab, SNF, HH therapy, and OP therapy)    Pneumonia       -primary team suspecs possible aspiration pneumonia vs CAP  -NGT in place  -on empiric IV abx     Participating with therapy. NGT in place. Will follow progress and discuss with PM&R staff for post acute care recommendation.      Thank you for your consult.     Aleshia Marcano NP  Department of Physical Medicine & Rehab  [facility]

## 2020-01-02 NOTE — ASSESSMENT & PLAN NOTE
20-30 second left sided myoclonic jerking with left gaze deviation. This could explain her waxing and waning mental status   -Will obtain EEG  -STAT CTH ordered   -CK to rule out any rhabdo

## 2020-01-02 NOTE — ASSESSMENT & PLAN NOTE
-Pt underwent dental extractions day of presentation. Family reports cough for few days prior to admission.  -Overnight on 12/31, pt became tachypnic with fever to 100.3 and rapid response was called. CXR with left sided consolidation. -Started on vanc/zosyn overnight. ABG wnl. WBC wnl. Procal mildly elevated to .99. O2 saturations %. Suspect possible aspiration pneumonia vs CAP.  Respiratory panel negative    Plan  1) Continue Broadspectrum antibiotics for now   2)  sputum cxs pending

## 2020-01-02 NOTE — ASSESSMENT & PLAN NOTE
Hx of CKD3. Baseline Cr of ~1.5-1.7. CTA head done on admit to evaluate for acute CVA.    -Cr currently at 1.3-->1.6 with BUN at 21-->30  -Advise gently hydration with NS 50 ml/hr for 20 hours for total 1L fluids  -Slight bump in creatine still around pt's baseline  -Continue to monitor Cr

## 2020-01-03 PROBLEM — R23.9 ALTERATION IN SKIN INTEGRITY: Status: ACTIVE | Noted: 2020-01-01

## 2020-01-03 NOTE — ASSESSMENT & PLAN NOTE
Ms. Martínez is a 84yF with CVA risk factors of HTN, HLD, DM2, and Afib off her AG for 4 days prior to admission for dental procedure on 12/31 who was brought in by EMS 12/31 for gait instability, facial droop, aphasia, confusion, and right-sided weakness. 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 Presumed MCA stroke given exam. Pt received ASA 325mg and Atorvastatin 40 mg. Admitted to vascular neurology. HM consulted for possible aspiration pneumonia and other medical co-morbidities.     -Eliquis resumed 1/1/20  -On 1/2/20-pt with noted jerking of UE concerning for seziure-like activity. EEG and Head CT ordered per Vascular Neurology.  -CT head notable for small region of decreased attenuation in the left frontal lobe superiorly concerning for possible acute/recent infarction  -Continuous EEG monitoring in place, carotid U/S ordered  -Being followed by physical medicine and rehab as well  -Ongoing concern for aspiration, agree with continued aspiration precautions and NPO until speech therapy feels she is safe to swallow  -Chlorhexidine oral solution ordered  -Continued management per primary team

## 2020-01-03 NOTE — ASSESSMENT & PLAN NOTE
Recent A1c of 6.9. On Januvia 25mg at home. Currently NPO 2/2 concern for swallowing. Has NG in place for medications. Evaluated by speech who recommended tube feeds. POC glucoses have been in mid 100-200ss.    -Low dose SSI   -Continue accuchecks   -Can adjust as needed

## 2020-01-03 NOTE — PT/OT/SLP PROGRESS
Physical Therapy      Patient Name:  Dacia Martínez   MRN:  [id number]  Admitting Diagnosis:  Stroke   Recent Surgery: * No surgery found *    Admit Date: 12/31/2019  Length of Stay: 3 days    Patient not seen today due to Other (Comment)(lethargy). Dacia Martínez's plan of care and PT goals reviewed on this date and remain appropriate. Will follow-up for progressive mobility 1/6/2020 per PT POC, pt availability, and as medically appropriate.    Franny Thomson, PT, DPT  1/3/2020

## 2020-01-03 NOTE — ASSESSMENT & PLAN NOTE
Hx of CKD3. Baseline Cr of ~1.5-1.7. CTA head done on admit to evaluate for acute CVA.     -Cr currently at 1.3-->1.6-->2.1 with BUN at 21-->30-->44  -Pt given NS 50 ml/hr for 20 hours for total 1L fluids on admission  -Will order another 75 ml/hr for 14 hours for total 1L 1/3/20 in setting of rising Cr  -Attempted to de-escalate abx of vanc/zosyn to levaquin however pt with recurrance of fevers overnight. Blood cultures with gram negative rods. Vanc/zosyn re-started. Pharmacy assisting with renal-dosing.   -Will continue to monitor Cr

## 2020-01-03 NOTE — CONSULTS
"Consult "family requesting tube feedings"    Spoke with RN, states there were questions about goal rate of TF. Per previous RD note, pt seen yesterday and TF was started. Recommendations provided that goal rate for TF is 41 mL/hr, which is what pt is currently tolerating.   RN to continue TF at Glucerna 1.5 @ goal rate of 41 mL/hr to meet >85% of EEN/EPN.     RD to continue to follow up with pt.    Thanks,  Anna Crowell, RD, LDN   "

## 2020-01-03 NOTE — SUBJECTIVE & OBJECTIVE
Interval History 1/3/2020:  Patient is seen for follow-up rehab evaluation and recommendations: EEG and NGT in place. No therapy yesterday.    HPI, Past Medical, Family, and Social History remains the same as documented in the initial encounter.    Scheduled Medications:    allopurinol  150 mg Per NG tube Daily    apixaban  2.5 mg Per NG tube BID    atorvastatin  40 mg Per NG tube Daily    levoFLOXacin  750 mg Intravenous Q48H    levothyroxine  25 mcg Per NG tube Before breakfast    magnesium sulfate IVPB  2 g Intravenous ED 1 Time    metoprolol tartrate  100 mg Per NG tube QHS    metoprolol tartrate  50 mg Per NG tube Daily    mirtazapine  7.5 mg Per NG tube QHS    vancomycin (VANCOCIN) IVPB  1,000 mg Intravenous Once       Diagnostic Results: Labs: Reviewed    PRN Medications: acetaminophen, Dextrose 10% Bolus, gabapentin, glucagon (human recombinant), insulin aspart U-100, labetalol, ondansetron, senna-docusate 8.6-50 mg, sodium chloride 0.9%, Pharmacy to dose Vancomycin consult **AND** vancomycin - pharmacy to dose    Review of Systems   Reason unable to perform ROS: aphasia.     Objective:     Vital Signs (Most Recent):  Temp: 99.1 °F (37.3 °C) (01/03/20 0819)  Pulse: 71 (01/03/20 0819)  Resp: 20 (01/03/20 0819)  BP: (!) 146/76 (01/03/20 0819)  SpO2: 96 % (01/03/20 0819)    Vital Signs (24h Range):  Temp:  [98.4 °F (36.9 °C)-100.8 °F (38.2 °C)] 99.1 °F (37.3 °C)  Pulse:  [68-71] 71  Resp:  [16-22] 20  SpO2:  [95 %-100 %] 96 %  BP: (127-178)/(66-85) 146/76     Physical Exam   Constitutional: She appears well-developed and well-nourished. She is sleeping. She is easily aroused.   EEG in place  Family at bedside   HENT:   Head: Normocephalic and atraumatic.   Eyes: Right eye exhibits no discharge. Left eye exhibits no discharge.   Neck: Neck supple.   Cardiovascular: Intact distal pulses.   Pulmonary/Chest: Effort normal. No respiratory distress.   Abdominal: Soft. She exhibits no distension.   NGT in  place   Musculoskeletal: She exhibits no edema or deformity.   R sided weakness    Neurological: She is easily aroused. She is disoriented.   + aphasia     Skin: Skin is warm and dry.   Psychiatric: She has a normal mood and affect. Her behavior is normal. Cognition and memory are impaired.   Vitals reviewed.

## 2020-01-03 NOTE — PLAN OF CARE
NO ACUTE CHANGES NOTED.  PATIENT REMAINS NON-VERBAL & DOES NOT FOLLOW COMMANDS.  SHE DOES OPEN EYES SPONTANEOUSLY BY END OF SHIFT.  NOTED MOVEMENT TO LUE BUT VERY WEAK.  NO MOVEMENT NOTED TO R SIDE.  RLE WITHDRAWS FROM PAIN.  R NARE NGT INFUSING GLUCERNA 1.5 @ 41 ML/HR.  NOTED MULTIPLE BMs ON DAY SHIFT & NIGHT SHIFT.  LOOSE, LIQUID STOOLS.  BARRIER CREAM APPLIED FREQUENTLY WITH EACH INCONTINENT EPISODE.  O2 @ 1L NC REMAINS ON.  EEG IN PROGRESS.  FAMILY REMAINS AT BEDSIDE.  TURNED Q2.  VSS.

## 2020-01-03 NOTE — PROGRESS NOTES
Pharmacokinetic Assessment Follow Up: IV Vancomycin    Vancomycin serum concentration assessment(s) and Plan:    · Vancomycin random level returned below therapeutic range of 15-20 mcg/mL for LRTI (10.6).   · Appropriate to re-dose vancomycin today.  · Will give x1 vancomycin IV 1000 mg.  · Daily vancomycin random level ordered.   · Re-dose as appropriate for levels < 20 mcg/mL and renal function.  · Schedule vancomycin when levels are consistently therapeutic and/or KHUSHBOO resolves.   · Ke: 0.021 hr-1, T 1/2: 33 hrs     Drug levels (last 3 results):  Recent Labs   Lab Result Units 01/03/20  0518   Vancomycin, Random ug/mL 10.6       Pharmacy will continue to follow and monitor vancomycin.    Please contact pharmacy at extension [phone] for questions regarding this assessment.    Thank you for the consult,   Mulugeta Reid       Patient brief summary:  Dacia Martínez is a 84 y.o. female initiated on antimicrobial therapy with IV Vancomycin for treatment of lower respiratory infection    The patient's current regimen is pulse dosing of vancomycin IV    Drug Allergies:   Review of patient's allergies indicates:   Allergen Reactions    Tramadol Hallucinations       Actual Body Weight:   79.4 kg    Renal Function:   Estimated Creatinine Clearance: 19.8 mL/min (A) (based on SCr of 2.1 mg/dL (H)).,     Dialysis Method (if applicable):  N/A    CBC (last 72 hours):  Recent Labs   Lab Result Units 12/31/19  1929 12/31/19  2115 01/01/20  0347 01/02/20  0435 01/03/20  0518   WBC K/uL 6.91  --  8.88 8.72 10.64   Hemoglobin g/dL 12.2  --  11.6* 10.4* 10.5*   Hemoglobin A1C %  --  6.9*  --   --   --    Hematocrit % 40.5  --  37.8 35.8* 35.6*   Platelets K/uL 230  --  238 192 176   Gran% % 57.9  --  73.1* 71.0 78.0*   Lymph% % 27.8  --  18.2 17.5* 12.7*   Mono% % 10.3  --  7.1 9.2 7.9   Eosinophil% % 2.7  --  0.3 0.8 0.2   Basophil% % 0.9  --  0.6 1.0 0.7   Differential Method  Automated  --  Automated Automated Automated        Metabolic Panel (last 72 hours):  Recent Labs   Lab Result Units 12/31/19  1929 01/01/20  0347 01/01/20  0858 01/02/20  0435 01/03/20  0518   Sodium mmol/L 145 144  --  144 143   Potassium mmol/L 3.4* 3.1*  --  3.6 3.8   Chloride mmol/L 104 105  --  107 108   CO2 mmol/L 32* 25  --  23 22*   Glucose mg/dL 120* 153*  --  155* 215*   BUN, Bld mg/dL 20 21  --  30* 44*   Creatinine mg/dL 1.3 1.3  --  1.6* 2.1*   Albumin g/dL 3.3* 3.2*  --  2.9* 2.7*   Total Bilirubin mg/dL 0.8 1.1*  --  1.3* 0.7   Alkaline Phosphatase U/L 61 59  --  49* 48*   AST U/L 17 25  --  17 16   ALT U/L 5* 5*  --  <5* 8*   Magnesium mg/dL  --  1.3*  --   --   --    Phosphorus mg/dL  --  4.2 4.8*  --   --        Vancomycin Administrations:  vancomycin given in the last 96 hours                   vancomycin in dextrose 5 % 1 gram/250 mL IVPB 1,000 mg (mg) 1,000 mg New Bag 01/02/20 0850                Microbiologic Results:  Microbiology Results (last 7 days)     Procedure Component Value Units Date/Time    Culture, Respiratory with Gram Stain [[id number]]  (Abnormal) Collected:  01/01/20 1814    Order Status:  Completed Specimen:  Respiratory from Sputum, Induced Updated:  01/03/20 0839     Respiratory Culture No S aureus isolated.      GRAM NEGATIVE RASHEL  Few  Identification and susceptibility pending  Normal respiratory tori also present       Gram Stain (Respiratory) <10 epithelial cells per low power field.     Gram Stain (Respiratory) Few WBC's     Gram Stain (Respiratory) Many Gram positive cocci     Gram Stain (Respiratory) Many Gram positive rods     Gram Stain (Respiratory) Many Gram negative rods     Gram Stain (Respiratory) Many Gram negative diplococci    Blood culture [[id number]] Collected:  01/03/20 0741    Order Status:  Sent Specimen:  Blood Updated:  01/03/20 0756    Blood culture [[id number]] Collected:  01/03/20 0741    Order Status:  Sent Specimen:  Blood Updated:  01/03/20 0756    Blood culture [[id number]] Collected:   01/01/20 1317    Order Status:  Completed Specimen:  Blood Updated:  01/02/20 1612     Blood Culture, Routine No Growth to date      No Growth to date    Narrative:       Collection has been rescheduled by TS2 at 01/01/2020 09:58 Reason:   nurses putting tube thru pts nose . will come back   Collection has been rescheduled by TS2 at 01/01/2020 09:58 Reason:   nurses putting tube thru pts nose . will come back     Blood culture [[id number]] Collected:  01/01/20 1317    Order Status:  Completed Specimen:  Blood Updated:  01/02/20 1612     Blood Culture, Routine No Growth to date      No Growth to date    Narrative:       Collection has been rescheduled by TS2 at 01/01/2020 09:58 Reason:   nurses putting tube thru pts nose . will come back   Collection has been rescheduled by TS2 at 01/01/2020 09:58 Reason:   nurses putting tube thru pts nose . will come back     Respiratory Infection Panel, Nasopharyngeal [[id number]] Collected:  01/01/20 1743    Order Status:  Completed Specimen:  Nasopharyngeal Swab Updated:  01/01/20 2236     Respiratory Infection Panel Source NP Swab     Adenovirus Not Detected     Coronavirus 229E Not Detected     Coronavirus HKU1 Not Detected     Coronavirus NL63 Not Detected     Coronavirus OC43 Not Detected     Human Metapneumovirus Not Detected     Human Rhinovirus/Enterovirus Not Detected     Influenza A (subtypes H1, H1-2009,H3) Not Detected     Influenza B Not Detected     Parainfluenza Virus 1 Not Detected     Parainfluenza Virus 2 Not Detected     Parainfluenza Virus 3 Not Detected     Parainfluenza Virus 4 Not Detected     Respiratory Syncytial Virus Not Detected     Bordetella Parapertussis (FW8538) Not Detected     Bordetella pertussis (ptxP) Not Detected     Chlamydia pneumoniae Not Detected     Mycoplasma pneumoniae Not Detected     Comment: Respiratory Infection Panel testing performed by Multiplex PCR.       Narrative:       For all other respiratory sources order [id number]  Respiratory  Viral Panel by PCR (RVPCR)

## 2020-01-03 NOTE — ASSESSMENT & PLAN NOTE
Pt with history of HFrEF and biventricular pacemaker. Last EF 30% in 2/2019. Stress test negative at that time. On lasix 40 mg BID, Entresto 24-26 mg, Metoprolol succinate 150 mg daily at home. On admit,Troponin elevated to 0.5-->0.9. EKG without any St changes. Cardiology consulted given patient's cardiac history, suspect demand ischemia as etiology, they do not feel any intervention necessary at this point given that pt has already received  mg and high intensity statin in setting of ischemic stroke.     -Troponins were tended (Q6 hours x4) per cardiology recommendations, have peaked and now down-trending. Suspect demand ischemia as etiology.   -Home dose Lasix, Entresto currently held in setting of MCA stroke and possible infection. Metoprolol re-started  -Overnight 12/31, pt with tachypnea and was given 1 dose lasix 20 mg IV  -Appears euvolemic on exam   -Gentle hydration given with 50 ml/hr NS over 20 hours for 1 L total in setting of low EF and infection  -I/Os, daily weights  -Replace K and Mg as needed  -TTE 1/2/20  ·  Moderately decreased left ventricular systolic function. The estimated ejection fraction is 35%  · Concentric left ventricular remodeling.  · Local segmental wall motion abnormalities.  · Moderate right ventricular enlargement.  · Mildly reduced right ventricular systolic function.  · Moderate biatrial enlargement.  · Aortic valve area is 1.12 cm2; peak velocity is 1.95 m/s; mean gradient is 9 mmHg.  · Mild-to-moderate aortic valve stenosis.  · Mild mitral regurgitation.  · Mild tricuspid regurgitation.  · The estimated PA systolic pressure is 59 mm Hg  · Pulmonary hypertension present.  · Elevated central venous pressure (15 mm Hg).  · Atrial fibrillation observed.

## 2020-01-03 NOTE — SUBJECTIVE & OBJECTIVE
Neurologic Chief Complaint: Aphasia and weakness     Subjective:     Interval History: Patient is seen for follow-up neurological assessment and treatment recommendations:    -Preliminary EEG shows diffuse slowing with no epileptiform discharges  -Respiratory culture with gram stain shows gram negative shree   -Patient spiking fevers- will broaden antibiotics. Patient pan-cultured   -Vascular carotid US ordered       HPI, Past Medical, Family, and Social History remains the same as documented in the initial encounter.     Review of Systems   Constitutional: Negative for chills and fever.   Respiratory: Negative for cough.    Cardiovascular: Negative for chest pain.   Gastrointestinal: Negative for nausea and vomiting.   Neurological: Positive for speech difficulty and weakness. Negative for facial asymmetry and numbness.     Scheduled Meds:   allopurinol  150 mg Per NG tube Daily    apixaban  2.5 mg Per NG tube BID    atorvastatin  40 mg Per NG tube Daily    chlorhexidine  15 mL Mouth/Throat BID    guaifenesin 100 mg/5 ml  10 mL Per NG tube TID    ipratropium  0.5 mg Nebulization Q4H    levalbuterol  1.25 mg Nebulization Q8H    levothyroxine  25 mcg Per NG tube Before breakfast    metoprolol tartrate  100 mg Per NG tube QHS    metoprolol tartrate  50 mg Per NG tube Daily    piperacillin-tazobactam (ZOSYN) IVPB  4.5 g Intravenous Q12H     Continuous Infusions:   sodium chloride 0.9% 75 mL/hr at 01/03/20 1112    sodium chloride 0.9%       PRN Meds:acetaminophen, Dextrose 10% Bolus, gabapentin, glucagon (human recombinant), insulin aspart U-100, labetalol, ondansetron, senna-docusate 8.6-50 mg, sodium chloride 0.9%, Pharmacy to dose Vancomycin consult **AND** vancomycin - pharmacy to dose    Objective:     Vital Signs (Most Recent):  Temp: 99.1 °F (37.3 °C) (01/03/20 0819)  Pulse: 71 (01/03/20 0819)  Resp: 20 (01/03/20 0819)  BP: (!) 146/76 (01/03/20 0819)  SpO2: 96 % (01/03/20 0819)  BP Location: Right  arm    Vital Signs Range (Last 24H):  Temp:  [98.4 °F (36.9 °C)-100.8 °F (38.2 °C)]   Pulse:  [68-71]   Resp:  [16-22]   BP: (127-178)/(66-85)   SpO2:  [95 %-100 %]   BP Location: Right arm    Physical Exam   Constitutional: No distress.   HENT:   Head: Normocephalic and atraumatic.   Mouth/Throat: No oropharyngeal exudate.   Eyes: No scleral icterus.   Neck: Normal range of motion. Neck supple.   No JVD noted   Cardiovascular: Normal rate.   Pulmonary/Chest: Effort normal and breath sounds normal. She has no wheezes. She has no rales.   Abdominal: Soft. She exhibits no distension. There is no guarding.   Musculoskeletal:   No LE edema. Bilateral SCDs in place   Lymphadenopathy:     She has no cervical adenopathy.   Neurological: She displays no tremor.   Skin: Skin is warm and dry. She is not diaphoretic.   Nursing note and vitals reviewed.      Neurological Exam:   LOC: drowsy  Attention Span: poor  Language: Global aphasia  Articulation: Mute/Anarthric  Orientation: Not oriented to person, place, and time  Visual Fields: Full  EOM (CN III, IV, VI): Full/intact  Pupils (CN II, III): PERRL  Facial Sensation (CN V): Normal  Facial Movement (CN VII): Unable to test   Gag Reflex: present  Reflexes: 2+ throughout  Motor: Arm left  Paresis: 3/5  Leg left  Paresis: 1/5  Arm right  Plegia 0/5  Leg right Plegia 0/5  Cebellar: No evidence of appendicular or axial ataxia  Sensation: Intact to light touch, temperature and vibration  Unable to test  Tone: Rigidity  LUE     Laboratory:  CMP:   Recent Labs   Lab 01/03/20 0518   CALCIUM 8.3*   ALBUMIN 2.7*   PROT 7.2      K 3.8   CO2 22*      BUN 44*   CREATININE 2.1*   ALKPHOS 48*   ALT 8*   AST 16   BILITOT 0.7     BMP:   Recent Labs   Lab 01/03/20 0518      K 3.8      CO2 22*   BUN 44*   CREATININE 2.1*   CALCIUM 8.3*     CBC:   Recent Labs   Lab 01/03/20 0518   WBC 10.64   RBC 3.79*   HGB 10.5*   HCT 35.6*      MCV 94   MCH 27.7   MCHC 29.5*      Lipid Panel:   Recent Labs   Lab 12/31/19 1929   CHOL 209*   LDLCALC 136.4   HDL 48   TRIG 123     Coagulation:   Recent Labs   Lab 01/01/20  0347   INR 1.1   APTT 22.8     Platelet Aggregation Study: No results for input(s): PLTAGG, PLTAGINTERP, PLTAGREGLACO, ADPPLTAGGREG in the last 168 hours.  Hgb A1C:   Recent Labs   Lab 12/31/19 2115   HGBA1C 6.9*     TSH:   Recent Labs   Lab 12/31/19 1929   TSH 13.528*       Diagnostic Results     Brain Imaging   CTH 1/1/19   -No evidence of major vascular distribution infarct or hemorrhage.  -Chronic microvascular ischemic change and generalized cerebral volume loss, normal for age.    Vessel Imaging   CTA MP 12/31/19   -No acute intracranial pathology.  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  -In this exam limited by poor timing of contrast and motion artifact, no high-grade stenosis or major vessel occlusion.  Hypoplastic right A2 segment.    Cardiac Imaging   TTE   · Moderately decreased left ventricular systolic function. The estimated ejection fraction is 35%  · Concentric left ventricular remodeling.  · Local segmental wall motion abnormalities.  · Moderate right ventricular enlargement.  · Mildly reduced right ventricular systolic function.  · Moderate biatrial enlargement.  · Aortic valve area is 1.12 cm2; peak velocity is 1.95 m/s; mean gradient is 9 mmHg.  · Mild-to-moderate aortic valve stenosis.  · Mild mitral regurgitation.  · Mild tricuspid regurgitation.  · The estimated PA systolic pressure is 59 mm Hg  · Pulmonary hypertension present.  · Elevated central venous pressure (15 mm Hg).  · Atrial fibrillation observed.

## 2020-01-03 NOTE — PROGRESS NOTES
[facility]  Vascular Neurology  Comprehensive Stroke Center  Progress Note    Assessment/Plan:     * Stroke  Dacia Martínez is a 84 y.o. female with PMHx of HTN, HLD, HF, DM, a fib (eliquis) who presented to hospital with gait instability, RSW, and speech changes. She had been off her eliquis for 1 week due to scheduled dental procedure. Patient with L gaze, mild RSW, and speech difficulty when examined by stroke provider. EMS reported possible waxing and waning of symptoms in transit. She was taken for STAT CTA multiphase, which was negative for early ischemic changes, LVO, and significant stenosis. tPA decision delayed due to unclear LKN time since patient took valium for dental procedure. STAT MRI attempted, but patient has pacemaker that is not MRI compatible.     After further discussion with family and unclear LKN time, decision was made to not treat with tPA. Possibly cardioembolic stroke based on history     CTH 1/2/20: Interval development of a small region of decreased attenuation in the left frontal lobe superiorly concerning for possible acute/recent infarction  Preliminary EEG: diffuse background slowing with no epileptiform discharges     Antithrombotics for secondary stroke prevention: Aspirin: 325 mg once then resuming Apixaban 2.5 mg BID     Statins for secondary stroke prevention and hyperlipidemia, if present:   Statins: Atorvastatin- 40 mg daily    Aggressive risk factor modification: HTN, DM, HLD, Obesity, A-Fib     Rehab efforts: The patient has been evaluated by a stroke team provider and the therapy needs have been fully considered based off the presenting complaints and exam findings. The following therapy evaluations are needed: PT evaluate and treat, OT evaluate and treat, SLP evaluate and treat, PM&R evaluate for appropriate placement    Diagnostics ordered/pending: TTE to assess cardiac function/status     VTE prophylaxis: Heparin 5000 units SQ every 8 hours    BP  parameters: Infarct: No intervention, SBP <220        Myoclonic jerking  20-30 second left sided myoclonic jerking with left gaze deviation. This could explain her waxing and waning mental status   -STAT CTH shows innterval development of a small region of decreased attenuation in the left frontal lobe superiorly concerning for possible acute/recent infarction.    -Preliminary EEG shows diffuse slowing with no epileptiform discharges     Right hemiplegia  Intermittent episodes of right hemiplegia.   CTH on 1/2/20 revealing interval development of a small region of decreased attenuation in the left frontal lobe superiorly concerning for possible acute/recent infarction.      Troponin level elevated  Troponin was ordered on admission and noted to be 0.5 -> repeat 0.9. EKG notable for V-paced rhythm without evidence of ischemia. Etiology most likely 2/2 demand ischemia (type II NSTEMI) in the setting of co-morbidites (possibly has aspiration PNA, slightly anemic etc). Troponin trending downwards        Pneumonia  -Pt underwent dental extractions day of presentation. Family reports cough for few days prior to admission.  -Overnight on 12/31, pt became tachypnic with fever to 100.3 and rapid response was called. CXR with left sided consolidation. -Started on vanc/zosyn overnight. ABG wnl. WBC wnl. Procal mildly elevated to .99. O2 saturations %. Suspect possible aspiration pneumonia vs CAP.  Respiratory panel negative    Plan  1) Continue Broads pectrum antibiotics for now   2)  sputum cxs revealed GN rods      Hypothyroidism  Continue home synthroid  Patient with elevated TSH but normal free T4, subclinical hypothyroidism  Discuss with hospital medicine co management    CKD (chronic kidney disease) stage 3, GFR 30-59 ml/min  Cr 1.3 on arrival. Trending upwards which could be due to contrast     Chronic systolic congestive heart failure  Stroke risk factor  TTE Feb 2019 with EF 40%  Patient on entresto, metoprolol,  and lasix at home  Holding entresto and lasix in setting of acute stroke  - Patient was on metoprolol for rate control. Was initially discontinued by hospital medicine due to concern for sepsis however, will reinitiate  -Currently on lopressor 50 mg AM, and 100 mg PM to be compatible with NG tube     Persistent atrial fibrillation  Stroke risk factor  On eliquis at home  Held x1 week for dental procedure  Resuming home eliquis  Continuing home metoprolol    Type 2 diabetes mellitus with stage 3 chronic kidney disease, without long-term current use of insulin  Stroke risk factor  A1C 6.9  Goal glucose 140-180  Low correction SSI for NPO patients until LEILANI completed  Diabetic diet if safe to swallow    Primary osteoarthritis involving multiple joints  PRN tylenol    Idiopathic chronic gout of multiple sites without tophus  Continue home allopurinol    Hyperlipidemia  Stroke risk factor  Patient on atorvastatin 10 mg daily at home   on arrival  Increasing to atorvastatin 40 mg daily    Essential hypertension  Stroke risk facotr  SBP <220   Holding home antihypertensives due to concern for acute stroke  Prn labetalol         01/01/2020 CTA MP without any acute findings. MRI not done as incompatible with pacemaker.Neurological exam patient is waxing and waning. At times will move her left arm however, per the medicine team's examination patient had hemineglect in hte right side which was not seen during my exam.nOvernight, rapid response was called due to tachypnea up to 40 and fevers to 100.3. ABG wnl but CXR with possible left-sided infiltrate. WBC wnl. Pt was started on vanc/zosyn in setting of aspiration vs CAP pneumonia. Troponin elevated to 0.516 with repeat elevated to 0.935.Cardiology consulted and they believed this is demand ischemia, thus will trend troponin. Repeat CTH shows no evidence of major vascular distribution infarct or hemorrhage.     01/02/2020 Elevated BUN/Cr (could be secondary to post  contrast). Patient having intermittent episodes of hemiplegia on the right side. During transport to [place], patient was exhibiting left sided myoclonic jerks with left gaze deviation. Ordered EEG. STAT CTH ordered. General neurology is aware. Awaiting respiratory culture before deescalation of antibiotics. Will be transferred to medicine floor tomorrow.     01/03/2020 Patient was spiking fevers. Antibiotics broadened to vancomycin and zosyn. Patient pan-cultured. Preliminary EEG shows diffuse slowing with epileptiform discharges. US of the carotids ordered.       STROKE DOCUMENTATION   Acute Stroke Times   Last Known Normal Date: 12/31/19  Last Known Normal Time: (unknown)  Symptom Onset Date: 12/31/19  Symptom Onset Time: (unknown)  Stroke Team Called Date: 12/31/19  Stroke Team Called Time: 1924  Stroke Team Arrival Date: 12/31/19  Stroke Team Arrival Time: 1929  CT Interpretation Time: 1939  Decision to Treat Time for Alteplase: 2039  Decision to Treat Time for IR: 1939    NIH Scale:  1a. Level of Consciousness: 2-->Not alert, requires repeated stimulation to attend, or is obtunded and requires strong or painful stimulation to make movements (not stereotyped)  1b. LOC Questions: 2-->Answers neither question correctly  1c. LOC Commands: 2-->Performs neither task correctly  2. Best Gaze: 1-->Partial gaze palsy, gaze is abnormal in one or both eyes, but forced deviation or total gaze paresis is not present  3. Visual: 0-->No visual loss  4. Facial Palsy: 0-->Normal symmetrical movements  5a. Motor Arm, Left: 0-->No drift, limb holds 90 (or 45) degrees for full 10 secs  5b. Motor Arm, Right: 4-->No movement  6a. Motor Leg, Left: 4-->No movement  6b. Motor Leg, Right: 4-->No movement  7. Limb Ataxia: 0-->Absent  8. Sensory: 0-->Normal, no sensory loss  9. Best Language: 3-->Mute, global aphasia, no usable speech or auditory comprehension  10. Dysarthria: 2-->Severe dysarthria, patients speech is so slurred as to be  unintelligible in the absence of or out of proportion to any dysphasia, or is mute/anarthric  11. Extinction and Inattention (formerly Neglect): 0-->No abnormality  Total (NIH Stroke Scale): 24       Modified [place] Score: 3  [place] Coma Scale:10   ABCD2 Score:    [id number] Score:   HAS -BLED Score:   ICH Score:   Hunt & Prather Classification:      Hemorrhagic change of an Ischemic Stroke: Does this patient have an ischemic stroke with hemorrhagic changes? No     Neurologic Chief Complaint: Aphasia and weakness     Subjective:     Interval History: Patient is seen for follow-up neurological assessment and treatment recommendations:    -Preliminary EEG shows diffuse slowing with no epileptiform discharges  -Respiratory culture with gram stain shows gram negative shree   -Patient spiking fevers- will broaden antibiotics. Patient pan-cultured   -Vascular carotid US ordered       HPI, Past Medical, Family, and Social History remains the same as documented in the initial encounter.     Review of Systems   Constitutional: Negative for chills and fever.   Respiratory: Negative for cough.    Cardiovascular: Negative for chest pain.   Gastrointestinal: Negative for nausea and vomiting.   Neurological: Positive for speech difficulty and weakness. Negative for facial asymmetry and numbness.     Scheduled Meds:   allopurinol  150 mg Per NG tube Daily    apixaban  2.5 mg Per NG tube BID    atorvastatin  40 mg Per NG tube Daily    chlorhexidine  15 mL Mouth/Throat BID    guaifenesin 100 mg/5 ml  10 mL Per NG tube TID    ipratropium  0.5 mg Nebulization Q4H    levalbuterol  1.25 mg Nebulization Q8H    levothyroxine  25 mcg Per NG tube Before breakfast    metoprolol tartrate  100 mg Per NG tube QHS    metoprolol tartrate  50 mg Per NG tube Daily    piperacillin-tazobactam (ZOSYN) IVPB  4.5 g Intravenous Q12H     Continuous Infusions:   sodium chloride 0.9% 75 mL/hr at 01/03/20 1112    sodium chloride 0.9%       PRN  Meds:acetaminophen, Dextrose 10% Bolus, gabapentin, glucagon (human recombinant), insulin aspart U-100, labetalol, ondansetron, senna-docusate 8.6-50 mg, sodium chloride 0.9%, Pharmacy to dose Vancomycin consult **AND** vancomycin - pharmacy to dose    Objective:     Vital Signs (Most Recent):  Temp: 99.1 °F (37.3 °C) (01/03/20 0819)  Pulse: 71 (01/03/20 0819)  Resp: 20 (01/03/20 0819)  BP: (!) 146/76 (01/03/20 0819)  SpO2: 96 % (01/03/20 0819)  BP Location: Right arm    Vital Signs Range (Last 24H):  Temp:  [98.4 °F (36.9 °C)-100.8 °F (38.2 °C)]   Pulse:  [68-71]   Resp:  [16-22]   BP: (127-178)/(66-85)   SpO2:  [95 %-100 %]   BP Location: Right arm    Physical Exam   Constitutional: No distress.   HENT:   Head: Normocephalic and atraumatic.   Mouth/Throat: No oropharyngeal exudate.   Eyes: No scleral icterus.   Neck: Normal range of motion. Neck supple.   No JVD noted   Cardiovascular: Normal rate.   Pulmonary/Chest: Effort normal and breath sounds normal. She has no wheezes. She has no rales.   Abdominal: Soft. She exhibits no distension. There is no guarding.   Musculoskeletal:   No LE edema. Bilateral SCDs in place   Lymphadenopathy:     She has no cervical adenopathy.   Neurological: She displays no tremor.   Skin: Skin is warm and dry. She is not diaphoretic.   Nursing note and vitals reviewed.      Neurological Exam:   LOC: drowsy  Attention Span: poor  Language: Global aphasia  Articulation: Mute/Anarthric  Orientation: Not oriented to person, place, and time  Visual Fields: Full  EOM (CN III, IV, VI): Full/intact  Pupils (CN II, III): PERRL  Facial Sensation (CN V): Normal  Facial Movement (CN VII): Unable to test   Gag Reflex: present  Reflexes: 2+ throughout  Motor: Arm left  Paresis: 3/5  Leg left  Paresis: 1/5  Arm right  Plegia 0/5  Leg right Plegia 0/5  Cebellar: No evidence of appendicular or axial ataxia  Sensation: Intact to light touch, temperature and vibration  Unable to test  Tone: Rigidity   LUE     Laboratory:  CMP:   Recent Labs   Lab 01/03/20 0518   CALCIUM 8.3*   ALBUMIN 2.7*   PROT 7.2      K 3.8   CO2 22*      BUN 44*   CREATININE 2.1*   ALKPHOS 48*   ALT 8*   AST 16   BILITOT 0.7     BMP:   Recent Labs   Lab 01/03/20 0518      K 3.8      CO2 22*   BUN 44*   CREATININE 2.1*   CALCIUM 8.3*     CBC:   Recent Labs   Lab 01/03/20 0518   WBC 10.64   RBC 3.79*   HGB 10.5*   HCT 35.6*      MCV 94   MCH 27.7   MCHC 29.5*     Lipid Panel:   Recent Labs   Lab 12/31/19 1929   CHOL 209*   LDLCALC 136.4   HDL 48   TRIG 123     Coagulation:   Recent Labs   Lab 01/01/20 0347   INR 1.1   APTT 22.8     Platelet Aggregation Study: No results for input(s): PLTAGG, PLTAGINTERP, PLTAGREGLACO, ADPPLTAGGREG in the last 168 hours.  Hgb A1C:   Recent Labs   Lab 12/31/19 2115   HGBA1C 6.9*     TSH:   Recent Labs   Lab 12/31/19 1929   TSH 13.528*       Diagnostic Results     Brain Imaging   CTH 1/1/19   -No evidence of major vascular distribution infarct or hemorrhage.  -Chronic microvascular ischemic change and generalized cerebral volume loss, normal for age.    Vessel Imaging   CTA  12/31/19   -No acute intracranial pathology.  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  -In this exam limited by poor timing of contrast and motion artifact, no high-grade stenosis or major vessel occlusion.  Hypoplastic right A2 segment.    Cardiac Imaging   TTE   · Moderately decreased left ventricular systolic function. The estimated ejection fraction is 35%  · Concentric left ventricular remodeling.  · Local segmental wall motion abnormalities.  · Moderate right ventricular enlargement.  · Mildly reduced right ventricular systolic function.  · Moderate biatrial enlargement.  · Aortic valve area is 1.12 cm2; peak velocity is 1.95 m/s; mean gradient is 9 mmHg.  · Mild-to-moderate aortic valve stenosis.  · Mild mitral regurgitation.  · Mild  tricuspid regurgitation.  · The estimated PA systolic pressure is 59 mm Hg  · Pulmonary hypertension present.  · Elevated central venous pressure (15 mm Hg).  · Atrial fibrillation observed.           Reji Quach MD  [facility]  Department of Vascular Neurology   [facility]

## 2020-01-03 NOTE — ASSESSMENT & PLAN NOTE
Intermittent episodes of right hemiplegia.   CTH on 1/2/20 revealing interval development of a small region of decreased attenuation in the left frontal lobe superiorly concerning for possible acute/recent infarction.

## 2020-01-03 NOTE — PLAN OF CARE
Called granddaughter Genie [phone] for choices for SNF. States will call back tomorrow or Monday.

## 2020-01-03 NOTE — ASSESSMENT & PLAN NOTE
Stroke risk factor  TTE Feb 2019 with EF 40%  Patient on entresto, metoprolol, and lasix at home  Holding entresto and lasix in setting of acute stroke  - Patient was on metoprolol for rate control. Was initially discontinued by hospital medicine due to concern for sepsis however, will reinitiate  -Currently on lopressor 50 mg AM, and 100 mg PM to be compatible with NG tube

## 2020-01-03 NOTE — PT/OT/SLP PROGRESS
Occupational Therapy      Patient Name:  Dacia Martínez   MRN:  [id number]    Patient not seen today secondary to Unavailable: Am attempt, patient not in room, out for CT scan.  PM attempt: patient unresponsive to sternal rubs and taps, loud stimuli, pt snoring deeply, RN informed and present at end of session. OT to follow up.     Will follow-up 10/6.    Ifrah Brown, OT  1/3/2020

## 2020-01-03 NOTE — PROGRESS NOTES
[facility] Medicine  Progress Note    Patient Name: Dacia Martínez  MRN: [id number]  Patient Class: IP- Inpatient   Admission Date: 12/31/2019  Length of Stay: 3 days  Attending Physician: Alissa Jones MD  Primary Care Provider: Jasmeet Corral MD        Subjective:     Principal Problem:Stroke        HPI:  Ms. Martínez is an 84yF with significant medical co-morbidities of HTN, DM2, HFrEF (30% in 2/2019 with pacemaker), HLD, CAD, A fib (on Eliquis), OA, Breast Cancer (s/p mastectomy, chemo, radiation in 2000), CKD3, Gout, and Hypothyroidism who was admitted to vascular neurology after family noticed right sided-weakness, gait instability, and slurred speech yesterday evening following dental procedure. Report that her Eliquis had been held for 4 days prior to the procedure. Also given Valium X2 prior /during procedure. Her daughters state that she had been altered since they picked her up from the surgery around 4:30 pm yesterday. 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 Unable to attain MRI due to pacemaker. She was given ASA 325mg and Atorvastatin 40 mg. Waxing and waning course of symptoms. Overnight, rapid response was called due to tachypnea up to 40 and fevers to 100.3. ABG wnl but CXR with possible left-sided infiltrate. WBC wnl. Pt was started on vanc/zosyn in setting of aspiration vs CAP pneumonia. Troponin elevated to 0.516 with repeat elevated to 0.935. Hospital Medicine was consulted for assistance with pneumonia, elevated troponin, and her other multiple co-morbidities.     Overview/Hospital Course:  On 1/2/20 AM rapid response was called for concern for decreased arousal and labored breathing that has been waxing and waning throughout admission. Troponin peaked and down-trended. Pt was also noted to have jerking movements of UE with concern for seizure-like activity on 1/2/20. Neurology was consulted by vascular neurology who  recommended continuous EEG monitoring. Additionally, STAT head CT was notable for small area on frontal lobe that may represent acute infarct. Was started on Vanc/zosyn for Aspiration vs CAP but was de-escalated to Levaquin on 1/2/20. Overnight 1/3/20, pt with recurrence of fevers and vanc/zosyn was re-started. Lactate WNL. Respiratory viral panel negative but sputum culture with gram negative rods. Echo without signs of vegetation. Blood cultures still negative. Fluids re-started for elevated Cr.     Interval History: Continuous EEG monitoring in place. Yesterday, abx were de-escalated to levaquin however pt had fevers overnight and vanc/zosyn re-started this AM. UA without signs of infection, lactate wnl however sputum cultures now growing gram negative rods. Creatine elevated to 2.1 this AM, will re-start fluids. Pt more awake/alert compared to yesterday however still with waxing and waning presentation.     Review of Systems   Unable to perform ROS: Acuity of condition     Objective:     Vital Signs (Most Recent):  Temp: 99.1 °F (37.3 °C) (01/03/20 1338)  Pulse: 70 (01/03/20 1338)  Resp: 20 (01/03/20 1338)  BP: 113/74 (01/03/20 1338)  SpO2: 100 % (01/03/20 1338) Vital Signs (24h Range):  Temp:  [98.4 °F (36.9 °C)-100.8 °F (38.2 °C)] 99.1 °F (37.3 °C)  Pulse:  [68-71] 70  Resp:  [16-22] 20  SpO2:  [95 %-100 %] 100 %  BP: (113-178)/(66-85) 113/74     Weight: 79.4 kg (175 lb)  Body mass index is 32.01 kg/m².    Intake/Output Summary (Last 24 hours) at 1/3/2020 1353  Last data filed at 1/3/2020 1200  Gross per 24 hour   Intake 2661 ml   Output --   Net 2661 ml      Physical Exam   Constitutional: No distress.   EEG monitoring in place. More awake/alert today. Nasal oxygen in place. NG tube in place   HENT:   Head: Normocephalic and atraumatic.   Notable swelling of tongue and right buccal area noted however difficult to assess degree of edema. No stridor present.    Eyes: No scleral icterus.   Right-sided neglect    Neck: Normal range of motion. Neck supple.   Cardiovascular: Normal rate.   Murmur heard.  Pulmonary/Chest: Effort normal. She has no wheezes.   Course breath sounds noted   Abdominal: Soft. She exhibits no distension.   Musculoskeletal:   No LE edema. Bilateral SCDs in place   Neurological: She displays no tremor. She exhibits abnormal muscle tone.   Appears more awake today.  Still unable to follow commands, aphasic.   Right-sided hemiparesis of upper and lower extremity still present. Moves left upper extremity spontaneously.   Left-sided gaze preference still present but improved from initial exam.        Skin: Skin is warm and dry. She is not diaphoretic.   Nursing note and vitals reviewed.      Significant Labs:   ABGs: No results for input(s): PH, PCO2, HCO3, POCSATURATED, BE, TOTALHB, COHB, METHB, O2HB, POCFIO2 in the last 48 hours.  Blood Culture:   No results for input(s): LABBLOO in the last 48 hours.  BMP:   Recent Labs   Lab 01/03/20  0518   *      K 3.8      CO2 22*   BUN 44*   CREATININE 2.1*   CALCIUM 8.3*     Recent Labs   Lab 01/03/20  0848   COLORU Yellow   SPECGRAV 1.020   PHUR 5.0   PROTEINUA 2+*   BACTERIA None     Microbiology Results (last 7 days)     Procedure Component Value Units Date/Time    Influenza A & B by Molecular [[id number]]     Order Status:  No result Specimen:  Nasopharyngeal Swab     Culture, Respiratory with Gram Stain [[id number]]  (Abnormal) Collected:  01/01/20 1814    Order Status:  Completed Specimen:  Respiratory from Sputum, Induced Updated:  01/03/20 0839     Respiratory Culture No S aureus isolated.      GRAM NEGATIVE RASHEL  Few  Identification and susceptibility pending  Normal respiratory tori also present       Significant Imaging: I have reviewed all pertinent imaging results/findings within the past 24 hours.      Assessment/Plan:      * Stroke  Ms. Martínez is a 84yF with CVA risk factors of HTN, HLD, DM2, and Afib off her AG for 4 days prior to  admission for dental procedure on 12/31 who was brought in by EMS 12/31 for gait instability, facial droop, aphasia, confusion, and right-sided weakness. 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 Presumed MCA stroke given exam. Pt received ASA 325mg and Atorvastatin 40 mg. Admitted to vascular neurology.  consulted for possible aspiration pneumonia and other medical co-morbidities.     -Eliquis resumed 1/1/20  -On 1/2/20-pt with noted jerking of UE concerning for seziure-like activity. EEG and Head CT ordered per Vascular Neurology.  -CT head notable for small region of decreased attenuation in the left frontal lobe superiorly concerning for possible acute/recent infarction  -Continuous EEG monitoring in place, carotid U/S ordered  -Being followed by physical medicine and rehab as well  -Ongoing concern for aspiration, agree with continued aspiration precautions and NPO until speech therapy feels she is safe to swallow  -Chlorhexidine oral solution ordered  -Continued management per primary team      Pneumonia  Pt is an 84yF with multiple co-morbidities who was admitted to vascular neurology for suspected MCA stroke. Pt underwent dental extractions day of presentation. Overnight on 12/31, pt became tachypnic with fever to 100.3 and rapid response was called. CXR with left sided consolidation. Started on vanc/zosyn overnight. ABG wnl. WBC wnl. Procal mildly elevated to .99. O2 saturations %. Suspect possible aspiration pneumonia vs CAP. Family reports cough for few days prior to admission.     1/2/20: no longer febrile, WBC still wnl.   1/3/20: recurrence of fevers to 100.3 overnight. WBC rising from 8-->10.6 Sputum cultures with gram negative rods, likely in setting of aspiration pneumonia. Lactate wnl.     -Abx were attempted to be de-escalated to Levaquin on 1/2/20 however with recurrence of fevers overnight vanz/zosyn was  re-started 1/3/20 AM  -Blood cultures NGTD  -TTE without evidence of any vegetations   - UA negative for infection  - Respiratory viral panel negative  - IV fluids ordered X2  - Guaifenesin 10 ml TID to assist with mucous production  - Will also advise zopinex breathing treatments Q8 hrs and ipratropium breathing treatments Q4 hrs   - Chest PT QID     Hypothyroidism  History of hypothyroidism. TSH on admission elevated to 13.528 with normal T4 at 0.97, agree with subclinical picture. On synthroid 25 mcg at home.    -Continue home synthroid 25 mcg       CKD (chronic kidney disease) stage 3, GFR 30-59 ml/min  Hx of CKD3. Baseline Cr of ~1.5-1.7. CTA head done on admit to evaluate for acute CVA.     -Cr currently at 1.3-->1.6-->2.1 with BUN at 21-->30-->44  -Pt given NS 50 ml/hr for 20 hours for total 1L fluids on admission  -Will order another 75 ml/hr for 14 hours for total 1L 1/3/20 in setting of rising Cr  -Attempted to de-escalate abx of vanc/zosyn to levaquin however pt with recurrance of fevers overnight. Blood cultures with gram negative rods. Vanc/zosyn re-started. Pharmacy assisting with renal-dosing.   -Will continue to monitor Cr      Chronic systolic congestive heart failure  Pt with history of HFrEF and biventricular pacemaker. Last EF 30% in 2/2019. Stress test negative at that time. On lasix 40 mg BID, Entresto 24-26 mg, Metoprolol succinate 150 mg daily at home. On admit,Troponin elevated to 0.5-->0.9. EKG without any St changes. Cardiology consulted given patient's cardiac history, suspect demand ischemia as etiology, they do not feel any intervention necessary at this point given that pt has already received  mg and high intensity statin in setting of ischemic stroke.     -Troponins were tended (Q6 hours x4) per cardiology recommendations, have peaked and now down-trending. Suspect demand ischemia as etiology.   -Home dose Lasix, Entresto currently held in setting of MCA stroke and possible  infection. Metoprolol re-started  -Overnight 12/31, pt with tachypnea and was given 1 dose lasix 20 mg IV  -Appears euvolemic on exam   -Gentle hydration given with 50 ml/hr NS over 20 hours for 1 L total in setting of low EF and infection  -I/Os, daily weights  -Replace K and Mg as needed  -TTE 1/2/20  ·  Moderately decreased left ventricular systolic function. The estimated ejection fraction is 35%  · Concentric left ventricular remodeling.  · Local segmental wall motion abnormalities.  · Moderate right ventricular enlargement.  · Mildly reduced right ventricular systolic function.  · Moderate biatrial enlargement.  · Aortic valve area is 1.12 cm2; peak velocity is 1.95 m/s; mean gradient is 9 mmHg.  · Mild-to-moderate aortic valve stenosis.  · Mild mitral regurgitation.  · Mild tricuspid regurgitation.  · The estimated PA systolic pressure is 59 mm Hg  · Pulmonary hypertension present.  · Elevated central venous pressure (15 mm Hg).  · Atrial fibrillation observed.           Persistent atrial fibrillation  Pt with history of Afib anticoagulated with Eliquis and rate-controlled with metoprolol and biventricular pacemaker.   Presented to ED yesterday 12/31 for likely MCA stroke following dental extractions earlier that day. Eliquis was held 4 days prior to procedure. Resumed 1/1/2019 per primary team.    -CHADSVASC 9  -Eliquis re-started 1/1/20  -Ok to re-start metoprolol       Type 2 diabetes mellitus with stage 3 chronic kidney disease, without long-term current use of insulin  Recent A1c of 6.9. On Januvia 25mg at home. Currently NPO 2/2 concern for swallowing. Has NG in place for medications. Evaluated by speech who recommended tube feeds. POC glucoses have been in mid 100-200ss.    -Low dose SSI   -Continue accuchecks   -Can adjust as needed      Idiopathic chronic gout of multiple sites without tophus  Continue Allopurinol       Hyperlipidemia  Hx of HLD. Home dose of atorvastatin was 10 mg. On admission, pt  was started on atorvastatin 40 mg.    -Lipid panel with total cholesterol of 209, ,   -Continue atorvastatin 40 mg daily       Essential hypertension  Pt with history of HTN. Home meds include lasix, entresto, metoprolol. Per primary team, goal SBP <220 in setting of likely MCA stroke.     -Holding Entresto, Lasix, in setting of acute CVA  -Metoprolol re-started  -Has prn labetolol IV ordered        VTE Risk Mitigation (From admission, onward)         Ordered     apixaban tablet 2.5 mg  2 times daily      01/01/20 2115     IP VTE HIGH RISK PATIENT  Once      12/31/19 2049     Place sequential compression device  Until discontinued      12/31/19 2049                      Palma Lee MD  Department of Hospital Medicine   [facility]

## 2020-01-03 NOTE — CARE UPDATE
Rapid Response Nurse AI Alert     AI alert received, chart check completed abnormal VS noted. Bedside RNBetsy contacted, no concerns verbalized at this time, instructed to call [phone] for further concerns or assistance.

## 2020-01-03 NOTE — PT/OT/SLP PROGRESS
Speech Language Pathology      Dacia Martínez  MRN: [id number]    Patient not seen today secondary to Other (Comment)(Min rousing/EKG monitoring). Not appropriate at this time for po trials, nonverbal Will follow-up 1/6.    Rianna Figueredo MA, CCC-SLP

## 2020-01-03 NOTE — PLAN OF CARE
01/03/20 1220   Post-Acute Status   Post-Acute Authorization Placement   Post-Acute Placement Status Pending State Certification       PASSR completed and 142 called in to the state. Awaiting 142 to be sent to the  to complete referrals for NH placement.   in contact with CM and Medical staff. Will continue to follow and offer support as needed.     Hugh Dennis LMSW  [facility]   Ext. [phone]

## 2020-01-03 NOTE — ASSESSMENT & PLAN NOTE
Pt with history of HTN. Home meds include lasix, entresto, metoprolol. Per primary team, goal SBP <220 in setting of likely MCA stroke.     -Holding Entresto, Lasix, in setting of acute CVA  -Metoprolol re-started  -Has prn labetolol IV ordered

## 2020-01-03 NOTE — PROGRESS NOTES
Pharmacist Renal Dose Adjustment Note    Dacia Martínez is a 84 y.o. female being treated with the medication piperacillin/tazobactam    Patient Data:    Vital Signs (Most Recent):  Temp: 99.1 °F (37.3 °C) (01/03/20 0819)  Pulse: 71 (01/03/20 0819)  Resp: 20 (01/03/20 0819)  BP: (!) 146/76 (01/03/20 0819)  SpO2: 96 % (01/03/20 0819)   Vital Signs (72h Range):  Temp:  [97.4 °F (36.3 °C)-100.8 °F (38.2 °C)]   Pulse:  [68-73]   Resp:  [16-40]   BP: (111-178)/()   SpO2:  [95 %-100 %]      Recent Labs   Lab 01/01/20  0347 01/02/20  0435 01/03/20  0518   CREATININE 1.3 1.6* 2.1*     Serum creatinine: 2.1 mg/dL (H) 01/03/20 0518  Estimated creatinine clearance: 19.8 mL/min (A)    Medication: Piperacillin/tazobactam 4.5 g IV q8h will be changed to Piperacillin/tazobactam 4.5 g IV q12h    Pharmacist's Name: Mulugeta Reid  Pharmacist's Extension: [phone]

## 2020-01-03 NOTE — PROGRESS NOTES
[facility]  Physical Medicine & Rehab  Progress Note    Patient Name: Dacia Martínez  MRN: [id number]  Admission Date: 12/31/2019  Length of Stay: 3 days  Attending Physician: Alissa Jones MD    Subjective:     Principal Problem:Embolic stroke involving left middle cerebral artery    Hospital Course:   01/01/2020: Bed mobility TA .  Sit to stand TA x 2 ppl.  LBD MaxA. SLP diagnosis of Aphasia and Dysphagia. NPO.    1/2/19: No therapy.     Interval History 1/3/2020:  Patient is seen for follow-up rehab evaluation and recommendations: EEG and NGT in place. No therapy yesterday.    HPI, Past Medical, Family, and Social History remains the same as documented in the initial encounter.    Scheduled Medications:    allopurinol  150 mg Per NG tube Daily    apixaban  2.5 mg Per NG tube BID    atorvastatin  40 mg Per NG tube Daily    levoFLOXacin  750 mg Intravenous Q48H    levothyroxine  25 mcg Per NG tube Before breakfast    magnesium sulfate IVPB  2 g Intravenous ED 1 Time    metoprolol tartrate  100 mg Per NG tube QHS    metoprolol tartrate  50 mg Per NG tube Daily    mirtazapine  7.5 mg Per NG tube QHS    vancomycin (VANCOCIN) IVPB  1,000 mg Intravenous Once       Diagnostic Results: Labs: Reviewed    PRN Medications: acetaminophen, Dextrose 10% Bolus, gabapentin, glucagon (human recombinant), insulin aspart U-100, labetalol, ondansetron, senna-docusate 8.6-50 mg, sodium chloride 0.9%, Pharmacy to dose Vancomycin consult **AND** vancomycin - pharmacy to dose    Review of Systems   Reason unable to perform ROS: aphasia.     Objective:     Vital Signs (Most Recent):  Temp: 99.1 °F (37.3 °C) (01/03/20 0819)  Pulse: 71 (01/03/20 0819)  Resp: 20 (01/03/20 0819)  BP: (!) 146/76 (01/03/20 0819)  SpO2: 96 % (01/03/20 0819)    Vital Signs (24h Range):  Temp:  [98.4 °F (36.9 °C)-100.8 °F (38.2 °C)] 99.1 °F (37.3 °C)  Pulse:  [68-71] 71  Resp:  [16-22] 20  SpO2:  [95 %-100 %] 96 %  BP: (127-178)/(66-85)  146/76     Physical Exam   Constitutional: She appears well-developed and well-nourished. She is sleeping. She is easily aroused.   EEG in place  Family at bedside   HENT:   Head: Normocephalic and atraumatic.   Eyes: Right eye exhibits no discharge. Left eye exhibits no discharge.   Neck: Neck supple.   Cardiovascular: Intact distal pulses.   Pulmonary/Chest: Effort normal. No respiratory distress.   Abdominal: Soft. She exhibits no distension.   NGT in place   Musculoskeletal: She exhibits no edema or deformity.   R sided weakness    Neurological: She is easily aroused. She is disoriented.   + aphasia  Skin: Skin is warm and dry.   Psychiatric: She has a normal mood and affect. Her behavior is normal. Cognition and memory are impaired.   Vitals reviewed.    Assessment/Plan:      * Embolic stroke involving left middle cerebral artery  See hospital course for functional, cognitive/speech/language, and nutrition/swallow status.      Recommendations  -  Encourage mobility, OOB in chair at least 3 hours per day, and early ambulation as appropriate   -  PT/OT evaluate and treat  -  SLP speech and cognitive evaluate and treat  -  Monitor sleep disturbances and establish consistent sleep-wake cycle  -  Monitor for bowel and bladder dysfunction  -  Monitor for shoulder pain, subluxation, & spasticity  -  Monitor for and prevent skin breakdown and pressure ulcers  · Early mobility, repositioning/weight shifting every 20-30 minutes when sitting, turn patient every 2 hours, proper mattress/overlay and chair cushioning, pressure relief/heel protector boots  -  DVT prophylaxis (if appropriate)  -  Reviewed discharge options (IP rehab, SNF, HH therapy, and OP therapy)    Myoclonic jerking  -EEG in progress  -neurology consulted    Right hemiplegia  -PT/OT eval & treat    Pneumonia  -NGT in place  -on empiric IV abx currently with rec to switch to Levaquin per ID    PAC recommendation: pending. Work up still in progress.        Aleshia Marcano NP  Department of Physical Medicine & Rehab   [facility]

## 2020-01-03 NOTE — ASSESSMENT & PLAN NOTE
Troponin was ordered on admission and noted to be 0.5 -> repeat 0.9. EKG notable for V-paced rhythm without evidence of ischemia. Etiology most likely 2/2 demand ischemia (type II NSTEMI) in the setting of co-morbidites (possibly has aspiration PNA, slightly anemic etc). Troponin trending downwards

## 2020-01-03 NOTE — SUBJECTIVE & OBJECTIVE
Interval History: Continuous EEG monitoring in place. Yesterday, abx were de-escalated to levaquin however pt had fevers overnight and vanc/zosyn re-started this AM. UA without signs of infection, lactate wnl however sputum cultures now growing gram negative rods. Creatine elevated to 2.1 this AM, will re-start fluids. Pt more awake/alert compared to yesterday however still with waxing and waning presentation.     Review of Systems   Unable to perform ROS: Acuity of condition     Objective:     Vital Signs (Most Recent):  Temp: 99.1 °F (37.3 °C) (01/03/20 1338)  Pulse: 70 (01/03/20 1338)  Resp: 20 (01/03/20 1338)  BP: 113/74 (01/03/20 1338)  SpO2: 100 % (01/03/20 1338) Vital Signs (24h Range):  Temp:  [98.4 °F (36.9 °C)-100.8 °F (38.2 °C)] 99.1 °F (37.3 °C)  Pulse:  [68-71] 70  Resp:  [16-22] 20  SpO2:  [95 %-100 %] 100 %  BP: (113-178)/(66-85) 113/74     Weight: 79.4 kg (175 lb)  Body mass index is 32.01 kg/m².    Intake/Output Summary (Last 24 hours) at 1/3/2020 1353  Last data filed at 1/3/2020 1200  Gross per 24 hour   Intake 2661 ml   Output --   Net 2661 ml      Physical Exam   Constitutional: No distress.   EEG monitoring in place. More awake/alert today. Nasal oxygen in place. NG tube in place   HENT:   Head: Normocephalic and atraumatic.   Notable swelling of tongue and right buccal area noted however difficult to assess degree of edema. No stridor present.    Eyes: No scleral icterus.   Right-sided neglect   Neck: Normal range of motion. Neck supple.   Cardiovascular: Normal rate.   Murmur heard.  Pulmonary/Chest: Effort normal. She has no wheezes.   Course breath sounds noted   Abdominal: Soft. She exhibits no distension.   Musculoskeletal:   No LE edema. Bilateral SCDs in place   Neurological: She displays no tremor. She exhibits abnormal muscle tone.   Appears more awake today.  Still unable to follow commands, aphasic.   Right-sided hemiparesis of upper and lower extremity still present. Moves left  upper extremity spontaneously.   Left-sided gaze preference still present but improved from initial exam.        Skin: Skin is warm and dry. She is not diaphoretic.   Nursing note and vitals reviewed.      Significant Labs:   ABGs: No results for input(s): PH, PCO2, HCO3, POCSATURATED, BE, TOTALHB, COHB, METHB, O2HB, POCFIO2 in the last 48 hours.  Blood Culture:   No results for input(s): LABBLOO in the last 48 hours.  BMP:   Recent Labs   Lab 01/03/20  0518   *      K 3.8      CO2 22*   BUN 44*   CREATININE 2.1*   CALCIUM 8.3*     Recent Labs   Lab 01/03/20  0848   COLORU Yellow   SPECGRAV 1.020   PHUR 5.0   PROTEINUA 2+*   BACTERIA None     Microbiology Results (last 7 days)     Procedure Component Value Units Date/Time    Influenza A & B by Molecular [[id number]]     Order Status:  No result Specimen:  Nasopharyngeal Swab     Culture, Respiratory with Gram Stain [[id number]]  (Abnormal) Collected:  01/01/20 1814    Order Status:  Completed Specimen:  Respiratory from Sputum, Induced Updated:  01/03/20 0839     Respiratory Culture No S aureus isolated.      GRAM NEGATIVE RASHEL  Few  Identification and susceptibility pending  Normal respiratory tori also present       Significant Imaging: I have reviewed all pertinent imaging results/findings within the past 24 hours.

## 2020-01-03 NOTE — CONSULTS
Wound care consult received from nursing for L groin blister. Pt with history of HTN, HLD, HF, DM, a fib (eliquis) admitted with gait instability, RSW, and speech changes found to have had embolic stroke involving left middle cerebral artery.  Upon assessment with family at bedside, noted partial thickness skin breakdown related to moisture to perineum and L groin 2x0.1x0.1cm.    Shallow small full thickness wound 1.3x0.9x0.1cm to R lower anterior leg which family reports as a trauma wound from a few weeks ago, hitting leg on object.  No visible signs of infection.  Media file not available.  Discussed recommendations with family, nurse and Dr. Quach Wound care orders placed.   Recommendations:  - MediHoney to R lower leg wound daily to reduce risk of infection and promote healing  - Clear [employer] moisture barrier to perineum and groin BID  Wound care team to follow pt prn [id number]

## 2020-01-03 NOTE — ASSESSMENT & PLAN NOTE
Pt is an 84yF with multiple co-morbidities who was admitted to vascular neurology for suspected MCA stroke. Pt underwent dental extractions day of presentation. Overnight on 12/31, pt became tachypnic with fever to 100.3 and rapid response was called. CXR with left sided consolidation. Started on vanc/zosyn overnight. ABG wnl. WBC wnl. Procal mildly elevated to .99. O2 saturations %. Suspect possible aspiration pneumonia vs CAP. Family reports cough for few days prior to admission.     1/2/20: no longer febrile, WBC still wnl.   1/3/20: recurrence of fevers to 100.3 overnight. WBC rising from 8-->10.6 Sputum cultures with gram negative rods, likely in setting of aspiration pneumonia. Lactate wnl.     -Abx were attempted to be de-escalated to Levaquin on 1/2/20 however with recurrence of fevers overnight vanz/zosyn was re-started 1/3/20 AM  -Blood cultures NGTD  -TTE without evidence of any vegetations   - UA negative for infection  - Respiratory viral panel negative  - IV fluids ordered X2  - Guaifenesin 10 ml TID to assist with mucous production  - Will also advise zopinex breathing treatments Q8 hrs and ipratropium breathing treatments Q4 hrs   - Chest PT QID

## 2020-01-03 NOTE — ASSESSMENT & PLAN NOTE
Dacia Martínez is a 84 y.o. female with PMHx of HTN, HLD, HF, DM, a fib (eliquis) who presented to hospital with gait instability, RSW, and speech changes. She had been off her eliquis for 1 week due to scheduled dental procedure. Patient with L gaze, mild RSW, and speech difficulty when examined by stroke provider. EMS reported possible waxing and waning of symptoms in transit. She was taken for STAT CTA multiphase, which was negative for early ischemic changes, LVO, and significant stenosis. tPA decision delayed due to unclear LKN time since patient took valium for dental procedure. STAT MRI attempted, but patient has pacemaker that is not MRI compatible.     After further discussion with family and unclear LKN time, decision was made to not treat with tPA. Possibly cardioembolic stroke based on history     CTH 1/2/20: Interval development of a small region of decreased attenuation in the left frontal lobe superiorly concerning for possible acute/recent infarction  Preliminary EEG: diffuse background slowing with no epileptiform discharges     Antithrombotics for secondary stroke prevention: Aspirin: 325 mg once then resuming Apixaban 2.5 mg BID     Statins for secondary stroke prevention and hyperlipidemia, if present:   Statins: Atorvastatin- 40 mg daily    Aggressive risk factor modification: HTN, DM, HLD, Obesity, A-Fib     Rehab efforts: The patient has been evaluated by a stroke team provider and the therapy needs have been fully considered based off the presenting complaints and exam findings. The following therapy evaluations are needed: PT evaluate and treat, OT evaluate and treat, SLP evaluate and treat, PM&R evaluate for appropriate placement    Diagnostics ordered/pending: TTE to assess cardiac function/status     VTE prophylaxis: Heparin 5000 units SQ every 8 hours    BP parameters: Infarct: No intervention, SBP <220

## 2020-01-03 NOTE — ASSESSMENT & PLAN NOTE
-Pt underwent dental extractions day of presentation. Family reports cough for few days prior to admission.  -Overnight on 12/31, pt became tachypnic with fever to 100.3 and rapid response was called. CXR with left sided consolidation. -Started on vanc/zosyn overnight. ABG wnl. WBC wnl. Procal mildly elevated to .99. O2 saturations %. Suspect possible aspiration pneumonia vs CAP.  Respiratory panel negative    Plan  1) Continue Broads pectrum antibiotics for now   2)  sputum cxs revealed GN rods

## 2020-01-04 PROBLEM — R50.9 FEVER: Status: ACTIVE | Noted: 2020-01-01

## 2020-01-04 NOTE — ASSESSMENT & PLAN NOTE
Ms. Martínez is a 84yF with CVA risk factors of HTN, HLD, DM2, and Afib off her AG for 4 days prior to admission for dental procedure on 12/31 who was brought in by EMS 12/31 for gait instability, facial droop, aphasia, confusion, and right-sided weakness. 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 Presumed MCA stroke given exam. Pt received ASA 325mg and Atorvastatin 40 mg. Admitted to vascular neurology. HM consulted for possible aspiration pneumonia and other medical co-morbidities.     -Eliquis resumed 1/1/20  -On 1/2/20-pt with noted jerking of UE concerning for seziure-like activity. EEG and Head CT ordered per Vascular Neurology.  -CT head notable for small region of decreased attenuation in the left frontal lobe superiorly concerning for possible acute/recent infarction  -Continuous EEG monitoring completed, carotid U/S ordered, pending  -Being followed by physical medicine and rehab as well  -Ongoing concern for aspiration, agree with continued aspiration precautions and NPO until speech therapy feels she is safe to swallow  -Chlorhexidine oral solution ordered  -Continued management per primary team

## 2020-01-04 NOTE — SUBJECTIVE & OBJECTIVE
Neurologic Chief Complaint: Aphasia and weakness     Subjective:     Interval History: Patient is seen for follow-up neurological assessment and treatment recommendations:    -patient more alert.  Still exhibiting right-sided upper extremity hemiplegia.  Will withdraw to pain in right lower extremity.  -free water increased to 250 ml q.i.d.  -sputum culture with Enterobacter cloacae that is pansensitive however because patient spiked fevers overnight will not  deescalate all antibiotics. Only vancomycin will be discontinued with the continuation of Zosyn  -ultrasound of the lower extremities to rule out other causes of fever  -CT had reordered  HPI, Past Medical, Family, and Social History remains the same as documented in the initial encounter.     Review of Systems   Constitutional: Negative for chills and fever.   Respiratory: Negative for cough.    Cardiovascular: Negative for chest pain.   Gastrointestinal: Negative for nausea and vomiting.   Neurological: Positive for speech difficulty and weakness. Negative for facial asymmetry and numbness.     Scheduled Meds:   allopurinol  150 mg Per NG tube Daily    apixaban  2.5 mg Per NG tube BID    atorvastatin  40 mg Per NG tube Daily    chlorhexidine  15 mL Mouth/Throat BID    guaifenesin 100 mg/5 ml  10 mL Per NG tube TID    ipratropium  0.5 mg Nebulization Q4H    levalbuterol  1.25 mg Nebulization Q8H    levothyroxine  25 mcg Per NG tube Before breakfast    metoprolol tartrate  100 mg Per NG tube QHS    metoprolol tartrate  50 mg Per NG tube Daily    piperacillin-tazobactam (ZOSYN) IVPB  4.5 g Intravenous Q12H     Continuous Infusions:   sodium chloride 0.9%       PRN Meds:acetaminophen, Dextrose 10% Bolus, gabapentin, glucagon (human recombinant), insulin aspart U-100, labetalol, ondansetron, senna-docusate 8.6-50 mg, sodium chloride 0.9%    Objective:     Vital Signs (Most Recent):  Temp: 99.8 °F (37.7 °C) (01/04/20 0819)  Pulse: 69 (01/04/20  8307)  Resp: 18 (01/04/20 1131)  BP: (!) 144/67 (01/04/20 0819)  SpO2: 99 % (01/04/20 1131)  BP Location: Right arm    Vital Signs Range (Last 24H):  Temp:  [96.5 °F (35.8 °C)-101.7 °F (38.7 °C)]   Pulse:  [62-84]   Resp:  [16-19]   BP: (112-144)/(56-68)   SpO2:  [95 %-100 %]   BP Location: Right arm    Physical Exam   Constitutional: No distress.   HENT:   Head: Normocephalic and atraumatic.   Mouth/Throat: No oropharyngeal exudate.   Eyes: No scleral icterus.   Neck: Normal range of motion. Neck supple.   No JVD noted   Cardiovascular: Normal rate.   Pulmonary/Chest: Effort normal and breath sounds normal. She has no wheezes. She has no rales.   Abdominal: Soft. She exhibits no distension. There is no guarding.   Musculoskeletal:   No LE edema. Bilateral SCDs in place   Lymphadenopathy:     She has no cervical adenopathy.   Neurological: She displays no tremor.   Skin: Skin is warm and dry. She is not diaphoretic.   Nursing note and vitals reviewed.      Neurological Exam:   LOC: drowsy  Attention Span: poor  Language: Global aphasia  Articulation: Mute/Anarthric  Orientation: Not oriented to person, place, and time  Visual Fields: Full  EOM (CN III, IV, VI): Full/intact  Pupils (CN II, III): PERRL  Facial Sensation (CN V): Normal  Facial Movement (CN VII): Unable to test   Gag Reflex: present  Reflexes: 2+ throughout  Motor: Arm left  Paresis: 3/5  Leg left  Paresis: 1/5  Arm right  Plegia 0/5  Leg right Plegia 1/5  Cebellar: No evidence of appendicular or axial ataxia  Sensation: Intact to light touch, temperature and vibration  Unable to test  Tone: Rigidity  LUE     Laboratory:  CMP:   Recent Labs   Lab 01/04/20 0416   CALCIUM 8.3*   ALBUMIN 2.5*   PROT 6.9      K 3.7   CO2 24   *   BUN 49*   CREATININE 2.1*   ALKPHOS 42*   ALT 8*   AST 15   BILITOT 0.9     BMP:   Recent Labs   Lab 01/04/20 0416      K 3.7   *   CO2 24   BUN 49*   CREATININE 2.1*   CALCIUM 8.3*     CBC:   Recent Labs    Lab 01/04/20  0416   WBC 10.86   RBC 3.64*   HGB 10.1*   HCT 35.0*   *   MCV 96   MCH 27.7   MCHC 28.9*     Lipid Panel:   Recent Labs   Lab 12/31/19 1929   CHOL 209*   LDLCALC 136.4   HDL 48   TRIG 123     Coagulation:   Recent Labs   Lab 01/01/20  0347   INR 1.1   APTT 22.8     Platelet Aggregation Study: No results for input(s): PLTAGG, PLTAGINTERP, PLTAGREGLACO, ADPPLTAGGREG in the last 168 hours.  Hgb A1C:   Recent Labs   Lab 12/31/19 2115   HGBA1C 6.9*     TSH:   Recent Labs   Lab 12/31/19 1929   TSH 13.528*       Diagnostic Results     Brain Imaging   CTH 1/1/19   -No evidence of major vascular distribution infarct or hemorrhage.  -Chronic microvascular ischemic change and generalized cerebral volume loss, normal for age.    Vessel Imaging   CTA MP 12/31/19   -No acute intracranial pathology.  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  -In this exam limited by poor timing of contrast and motion artifact, no high-grade stenosis or major vessel occlusion.  Hypoplastic right A2 segment.    Cardiac Imaging   TTE   · Moderately decreased left ventricular systolic function. The estimated ejection fraction is 35%  · Concentric left ventricular remodeling.  · Local segmental wall motion abnormalities.  · Moderate right ventricular enlargement.  · Mildly reduced right ventricular systolic function.  · Moderate biatrial enlargement.  · Aortic valve area is 1.12 cm2; peak velocity is 1.95 m/s; mean gradient is 9 mmHg.  · Mild-to-moderate aortic valve stenosis.  · Mild mitral regurgitation.  · Mild tricuspid regurgitation.  · The estimated PA systolic pressure is 59 mm Hg  · Pulmonary hypertension present.  · Elevated central venous pressure (15 mm Hg).  · Atrial fibrillation observed.

## 2020-01-04 NOTE — ASSESSMENT & PLAN NOTE
Pt is an 84yF with multiple co-morbidities who was admitted to vascular neurology for suspected MCA stroke. Pt underwent dental extractions day of presentation. Overnight on 12/31, pt became tachypnic with fever to 100.3 and rapid response was called. CXR with left sided consolidation. Started on vanc/zosyn overnight. ABG wnl. WBC wnl. Procal mildly elevated to .99. O2 saturations %. Suspect possible aspiration pneumonia vs CAP. Family reports cough for few days prior to admission.     1/2/20: no longer febrile, WBC still wnl.   1/3/20: recurrence of fevers to 100.3 overnight. WBC rising from 8-->10.6 Sputum cultures with gram negative rods, likely in setting of aspiration pneumonia. Lactate wnl.   1/4/20: still febrile up to 101.7 yesterday evening. Sputum cultures growing enterobacter cloacae, sensitive to zosyn. Blood cultures NGTD. WBC wnl. Repeat influenza pending. Will d/c vanc now that sputum culture has speciated. Given persistence of fevers, will continue zosyn, can de-escalate in coming days if fevers improve. Unclear etiology of fevers. Will repeat CXR. Also will order bilateral LE doppler to assess for DVT although PE unremarkable. Procal ordered for tomorrow AM.     -Blood cultures NGTD  -TTE without evidence of any vegetations   - UA negative for infection  - Respiratory viral panel negative  - IV fluids ordered X2  - Guaifenesin 10 ml TID to assist with mucous production  - Will also advise zopinex breathing treatments Q8 hrs and ipratropium breathing treatments Q4 hrs   - Chest PT QID

## 2020-01-04 NOTE — ASSESSMENT & PLAN NOTE
20-30 second left sided myoclonic jerking with left gaze deviation. This could explain her waxing and waning mental status   -STAT CTH shows innterval development of a small region of decreased attenuation in the left frontal lobe superiorly concerning for possible acute/recent infarction.    -Preliminary EEG shows diffuse slowing with no epileptiform discharges

## 2020-01-04 NOTE — SUBJECTIVE & OBJECTIVE
Interval History: Pt with fevers to 101.7 overnight. Still on vanc/zosyn. Blood cultures NGTD. Respiratory sputum with enterobacter cloacae, sensitive to zosyn. WBC still wnl. Breathing treatments and guaifenesin started yesterday. Per neuro, preliminary EEG without seizure-like activity. Repeat influenza pending. Will order repeat CXR. Also will order bilateral LE u/s to evaluate for DVT as etiology of fevers.     Review of Systems   Unable to perform ROS: Acuity of condition     Objective:     Vital Signs (Most Recent):  Temp: 99.8 °F (37.7 °C) (01/04/20 0819)  Pulse: 70 (01/04/20 1131)  Resp: 18 (01/04/20 1131)  BP: (!) 144/67 (01/04/20 0819)  SpO2: 99 % (01/04/20 1131) Vital Signs (24h Range):  Temp:  [93.3 °F (34.1 °C)-101.7 °F (38.7 °C)] 99.8 °F (37.7 °C)  Pulse:  [62-84] 70  Resp:  [16-20] 18  SpO2:  [95 %-100 %] 99 %  BP: (112-144)/(56-74) 144/67     Weight: 79.4 kg (175 lb)  Body mass index is 32.01 kg/m².    Intake/Output Summary (Last 24 hours) at 1/4/2020 1220  Last data filed at 1/4/2020 0600  Gross per 24 hour   Intake 915 ml   Output --   Net 915 ml      Physical Exam   Constitutional: No distress.   Awake, pulling at NG tube. Nasal oxygen in place.Still only looking at and moving left side.    HENT:   Head: Normocephalic and atraumatic.   Some right-sided facial swelling noted. No stridor present.    Eyes: No scleral icterus.   Right-sided neglect   Neck: Normal range of motion. Neck supple.   Cardiovascular: Normal rate.   Murmur heard.  Pulmonary/Chest: Effort normal. She has no wheezes.   Course breath sounds noted   Abdominal: Soft. She exhibits no distension.   Genitourinary:   Genitourinary Comments: Small area of swelling around left upper inguinal region. Not oozing. Not open. No surrounding swelling or erythema. Per daughter has been there for years.   Barrier cream applied to labial folds.    Musculoskeletal:   No LE edema. No areas of erythema or tenderness on bilateral LE. No UE edema. No  areas of fluctuance or induration. Left soft wrist restraint in place.   Neurological: She displays no tremor.   Slightly agitated, attempting to pull at NG tube with left hand. Still unable to follow commands, aphasic.  Right-sided hemiparesis of upper and lower extremity still present. Moves left upper extremity spontaneously.   Left-sided gaze preference.        Skin: Skin is warm and dry. She is not diaphoretic.   Nursing note and vitals reviewed.      Significant Labs:   Blood Culture:   Recent Labs   Lab 01/03/20  0741   LABBLOO No Growth to date  No Growth to date  No Growth to date  No Growth to date     CBC:   Recent Labs   Lab 01/03/20 0518 01/04/20  0416   WBC 10.64 10.86   HGB 10.5* 10.1*   HCT 35.6* 35.0*    146*     CMP:   Recent Labs   Lab 01/03/20 0518 01/04/20  0416    145   K 3.8 3.7    111*   CO2 22* 24   * 184*   BUN 44* 49*   CREATININE 2.1* 2.1*   CALCIUM 8.3* 8.3*   PROT 7.2 6.9   ALBUMIN 2.7* 2.5*   BILITOT 0.7 0.9   ALKPHOS 48* 42*   AST 16 15   ALT 8* 8*   ANIONGAP 13 10   EGFRNONAA 21.3* 21.3*     Lactic Acid:   Recent Labs   Lab 01/03/20  0741   LACTATE 1.2     POCT Glucose:   Recent Labs   Lab 01/03/20  2320 01/04/20  0554 01/04/20  1206   POCTGLUCOSE 184* 186* 175*       Significant Imaging: I have reviewed all pertinent imaging results/findings within the past 24 hours.

## 2020-01-04 NOTE — PROGRESS NOTES
Pharmacokinetic Assessment Follow Up: IV Vancomycin    Vancomycin serum concentration assessment(s):  · Random level this AM of 16.9 mcg/mL is within therapeutic target and appropriate for re-dosing today  · Pulse dosing due to KHUSHBOO: Scr 2.1 today (baseline ~1.5)    Vancomycin Regimen Plan:  1. Will re-dose with vancomycin 1000mg x1 now  2. Obtain random level with AM labs 1/5/20 and re-dose if <20 mcg/mL  3. Will continue to monitor renal function closely     Drug levels (last 3 results):  Recent Labs   Lab Result Units 01/03/20 0518 01/04/20  0416   Vancomycin, Random ug/mL 10.6 16.9       Pharmacy will continue to follow and monitor vancomycin.    Please contact pharmacy at extension [phone] for questions regarding this assessment.    Thank you for the consult,   Wendy Barrett       Patient brief summary:  Dacia Martínez is a 84 y.o. female initiated on antimicrobial therapy with IV Vancomycin for treatment of lower respiratory infection    Drug Allergies:   Review of patient's allergies indicates:   Allergen Reactions    Tramadol Hallucinations       Actual Body Weight:   79.4 kg    Renal Function:   Estimated Creatinine Clearance: 19.8 mL/min (A) (based on SCr of 2.1 mg/dL (H)).,       CBC (last 72 hours):  Recent Labs   Lab Result Units 01/02/20 0435 01/03/20 0518 01/04/20  0416   WBC K/uL 8.72 10.64 10.86   Hemoglobin g/dL 10.4* 10.5* 10.1*   Hematocrit % 35.8* 35.6* 35.0*   Platelets K/uL 192 176 146*   Gran% % 71.0 78.0* 77.2*   Lymph% % 17.5* 12.7* 13.5*   Mono% % 9.2 7.9 7.6   Eosinophil% % 0.8 0.2 0.7   Basophil% % 1.0 0.7 0.6   Differential Method  Automated Automated Automated       Metabolic Panel (last 72 hours):  Recent Labs   Lab Result Units 01/02/20 0435 01/03/20 0518 01/03/20  0848 01/04/20  0416   Sodium mmol/L 144 143  --  145   Sodium Urine Random mmol/L  --   --  40  --    Potassium mmol/L 3.6 3.8  --  3.7   Chloride mmol/L 107 108  --  111*   CO2 mmol/L 23 22*  --  24   Glucose mg/dL 155*  215*  --  184*   Glucose, UA   --   --  Negative  --    BUN, Bld mg/dL 30* 44*  --  49*   Creatinine mg/dL 1.6* 2.1*  --  2.1*   Creatinine, Random Ur mg/dL  --   --  82.0  --    Albumin g/dL 2.9* 2.7*  --  2.5*   Total Bilirubin mg/dL 1.3* 0.7  --  0.9   Alkaline Phosphatase U/L 49* 48*  --  42*   AST U/L 17 16  --  15   ALT U/L <5* 8*  --  8*       Vancomycin Administrations:  vancomycin given in the last 96 hours                   vancomycin in dextrose 5 % 1 gram/250 mL IVPB 1,000 mg (mg) 1,000 mg New Bag 01/03/20 0856    vancomycin in dextrose 5 % 1 gram/250 mL IVPB 1,000 mg (mg) 1,000 mg New Bag 01/02/20 0850                Microbiologic Results:  Microbiology Results (last 7 days)     Procedure Component Value Units Date/Time    Blood culture [[id number]] Collected:  01/03/20 0741    Order Status:  Completed Specimen:  Blood Updated:  01/04/20 0812     Blood Culture, Routine No Growth to date      No Growth to date    Blood culture [[id number]] Collected:  01/03/20 0741    Order Status:  Completed Specimen:  Blood Updated:  01/04/20 0812     Blood Culture, Routine No Growth to date      No Growth to date    Blood culture [[id number]] Collected:  01/01/20 1317    Order Status:  Completed Specimen:  Blood Updated:  01/03/20 1612     Blood Culture, Routine No Growth to date      No Growth to date      No Growth to date    Narrative:       Collection has been rescheduled by TS2 at 01/01/2020 09:58 Reason:   nurses putting tube thru pts nose . will come back   Collection has been rescheduled by TS2 at 01/01/2020 09:58 Reason:   nurses putting tube thru pts nose . will come back     Blood culture [[id number]] Collected:  01/01/20 1317    Order Status:  Completed Specimen:  Blood Updated:  01/03/20 1612     Blood Culture, Routine No Growth to date      No Growth to date      No Growth to date    Narrative:       Collection has been rescheduled by TS2 at 01/01/2020 09:58 Reason:   nurses putting tube thru pts nose . will  come back   Collection has been rescheduled by TS2 at 01/01/2020 09:58 Reason:   nurses putting tube thru pts nose . will come back     Influenza A & B by Molecular [[id number]]     Order Status:  No result Specimen:  Nasopharyngeal Swab     Culture, Respiratory with Gram Stain [[id number]]  (Abnormal) Collected:  01/01/20 1814    Order Status:  Completed Specimen:  Respiratory from Sputum, Induced Updated:  01/03/20 0839     Respiratory Culture No S aureus isolated.      GRAM NEGATIVE RASHEL  Few  Identification and susceptibility pending  Normal respiratory tori also present       Gram Stain (Respiratory) <10 epithelial cells per low power field.     Gram Stain (Respiratory) Few WBC's     Gram Stain (Respiratory) Many Gram positive cocci     Gram Stain (Respiratory) Many Gram positive rods     Gram Stain (Respiratory) Many Gram negative rods     Gram Stain (Respiratory) Many Gram negative diplococci    Respiratory Infection Panel, Nasopharyngeal [[id number]] Collected:  01/01/20 1743    Order Status:  Completed Specimen:  Nasopharyngeal Swab Updated:  01/01/20 2236     Respiratory Infection Panel Source NP Swab     Adenovirus Not Detected     Coronavirus 229E Not Detected     Coronavirus HKU1 Not Detected     Coronavirus NL63 Not Detected     Coronavirus OC43 Not Detected     Human Metapneumovirus Not Detected     Human Rhinovirus/Enterovirus Not Detected     Influenza A (subtypes H1, H1-2009,H3) Not Detected     Influenza B Not Detected     Parainfluenza Virus 1 Not Detected     Parainfluenza Virus 2 Not Detected     Parainfluenza Virus 3 Not Detected     Parainfluenza Virus 4 Not Detected     Respiratory Syncytial Virus Not Detected     Bordetella Parapertussis (MO2286) Not Detected     Bordetella pertussis (ptxP) Not Detected     Chlamydia pneumoniae Not Detected     Mycoplasma pneumoniae Not Detected     Comment: Respiratory Infection Panel testing performed by Multiplex PCR.       Narrative:       For all other  respiratory sources order [id number] Respiratory  Viral Panel by PCR (RVPCR)

## 2020-01-04 NOTE — ASSESSMENT & PLAN NOTE
Hx of CKD3. Baseline Cr of ~1.5-1.7. CTA head done on admit to evaluate for acute CVA.     -Cr currently at 1.3-->1.6-->2.1 with BUN at 21-->30-->44-->49  -Pt given NS 50 ml/hr for 20 hours for total 1L fluids on admission  -Fluids [id number]-21 ml/hr for 14 hours for total 1L 1/3/20 in setting of rising Cr  -Attempted to de-escalate abx of vanc/zosyn to levaquin however pt with recurrance of fevers overnight so they were re-started. -Sputum culture with gram negative rods (enterobacter cloacae on 1/4/20 sensitive to zosyn). Blood culture NGTD. Will d/c vanc.   -Will continue to monitor Cr

## 2020-01-04 NOTE — PLAN OF CARE
POC reviewed with patient and family. All questions and concerns reviewed family. Patient noted with expressive aphasia. Fall/safety precautions implemented and maintained. TF maintained. Blood glucose monitored. No acute events noted this shift. Please see flow sheet for full assessment and vitals. Bed locked in lowest position. Call bell within reach. Will continue to monitor.

## 2020-01-04 NOTE — ASSESSMENT & PLAN NOTE
Stroke risk factor  Patient on atorvastatin 10 mg daily at home   on arrival  Increasing to atorvastatin 80 mg daily

## 2020-01-04 NOTE — PROGRESS NOTES
VANCOMYCIN DOSING BY PHARMACY DISCONTINUATION NOTE    Dacia Martínez is a 84 y.o. female who had been consulted for vancomycin dosing.    The pharmacy consult for vancomycin dosing has been discontinued.     Vancomycin Dosing by Pharmacy Consult will sign-off. Please reconsult if necessary. Thank you for allowing us to participate in this patient's care.       Wendy Barrett, PharmD  EXT [phone]

## 2020-01-04 NOTE — PROGRESS NOTES
[facility]  Vascular Neurology  Comprehensive Stroke Center  Progress Note    Assessment/Plan:     * Stroke  Dacia Martínez is a 84 y.o. female with PMHx of HTN, HLD, HF, DM, a fib (eliquis) who presented to hospital with gait instability, RSW, and speech changes. She had been off her eliquis for 1 week due to scheduled dental procedure. Patient with L gaze, mild RSW, and speech difficulty when examined by stroke provider. EMS reported possible waxing and waning of symptoms in transit. She was taken for STAT CTA multiphase, which was negative for early ischemic changes, LVO, and significant stenosis. tPA decision delayed due to unclear LKN time since patient took valium for dental procedure. STAT MRI attempted, but patient has pacemaker that is not MRI compatible.     After further discussion with family and unclear LKN time, decision was made to not treat with tPA. Possibly cardioembolic stroke based on history     CTH 1/2/20: Interval development of a small region of decreased attenuation in the left frontal lobe superiorly concerning for possible acute/recent infarction  Preliminary EEG: diffuse background slowing with no epileptiform discharges     Antithrombotics for secondary stroke prevention: Aspirin: 325 mg once then resuming Apixaban 2.5 mg BID     Statins for secondary stroke prevention and hyperlipidemia, if present:   Statins: Atorvastatin- 80 mg daily    Aggressive risk factor modification: HTN, DM, HLD, Obesity, A-Fib     Rehab efforts: The patient has been evaluated by a stroke team provider and the therapy needs have been fully considered based off the presenting complaints and exam findings. The following therapy evaluations are needed: PT evaluate and treat, OT evaluate and treat, SLP evaluate and treat, PM&R evaluate for appropriate placement    Diagnostics ordered/pending: CT scan of head without contrast to asses brain parenchyma    VTE prophylaxis: Heparin 5000 units SQ  every 8 hours    BP parameters: Infarct: No intervention, SBP <220        Fever  2/2 PNA  -Will get US of the lower extremities to rule out other causes of fever     Myoclonic jerking  20-30 second left sided myoclonic jerking with left gaze deviation. This could explain her waxing and waning mental status   -STAT CTH shows innterval development of a small region of decreased attenuation in the left frontal lobe superiorly concerning for possible acute/recent infarction.    -Preliminary EEG shows diffuse slowing with no epileptiform discharges     Right hemiplegia  Intermittent episodes of right hemiplegia.   CTH on 1/2/20 revealing interval development of a small region of decreased attenuation in the left frontal lobe superiorly concerning for possible acute/recent infarction.      Troponin level elevated  Troponin was ordered on admission and noted to be 0.5 -> repeat 0.9. EKG notable for V-paced rhythm without evidence of ischemia. Etiology most likely 2/2 demand ischemia (type II NSTEMI) in the setting of co-morbidites (possibly has aspiration PNA, slightly anemic etc). Troponin trending downwards        Pneumonia  -Pt underwent dental extractions day of presentation. Family reports cough for few days prior to admission.  -Overnight on 12/31, pt became tachypnic with fever to 100.3 and rapid response was called. CXR with left sided consolidation. -Started on vanc/zosyn overnight. ABG wnl. WBC wnl. Procal mildly elevated to .99. O2 saturations %. Suspect possible aspiration pneumonia vs CAP.  Respiratory panel negative    Plan  1) Continue zosyn for now as patient was spiking fevers yesterday  2)  sputum cxs revealed Enterobacter Cloacae       Hypothyroidism  Continue home synthroid  Patient with elevated TSH but normal free T4, subclinical hypothyroidism  Discuss with hospital medicine co management    CKD (chronic kidney disease) stage 3, GFR 30-59 ml/min  Cr 1.3 on arrival. Trending upwards which could  be due to contrast versus nephrotoxcity due to antibioitcs  -Will increase free water enteral boluses     Chronic systolic congestive heart failure  Stroke risk factor  TTE Feb 2019 with EF 40%  Patient on entresto, metoprolol, and lasix at home  Holding entresto and lasix in setting of acute stroke  - Patient was on metoprolol for rate control. Was initially discontinued by hospital medicine due to concern for sepsis however, will reinitiate  -Currently on lopressor 50 mg AM, and 100 mg PM to be compatible with NG tube     Persistent atrial fibrillation  Stroke risk factor  On eliquis at home  Held x1 week for dental procedure  Resuming home eliquis  Continuing home metoprolol    Type 2 diabetes mellitus with stage 3 chronic kidney disease, without long-term current use of insulin  Stroke risk factor  A1C 6.9  Goal glucose 140-180  Low correction SSI for NPO patients until LEILANI completed  Diabetic diet if safe to swallow    Primary osteoarthritis involving multiple joints  PRN tylenol    Idiopathic chronic gout of multiple sites without tophus  Continue home allopurinol    Hyperlipidemia  Stroke risk factor  Patient on atorvastatin 10 mg daily at home   on arrival  Increasing to atorvastatin 80 mg daily    Essential hypertension  Stroke risk facotr  SBP <220   Holding home antihypertensives due to concern for acute stroke  Prn labetalol         01/01/2020 CTA MP without any acute findings. MRI not done as incompatible with pacemaker.Neurological exam patient is waxing and waning. At times will move her left arm however, per the medicine team's examination patient had hemineglect in hte right side which was not seen during my exam.nOvernight, rapid response was called due to tachypnea up to 40 and fevers to 100.3. ABG wnl but CXR with possible left-sided infiltrate. WBC wnl. Pt was started on vanc/zosyn in setting of aspiration vs CAP pneumonia. Troponin elevated to 0.516 with repeat elevated to  0.935.Cardiology consulted and they believed this is demand ischemia, thus will trend troponin. Repeat CTH shows no evidence of major vascular distribution infarct or hemorrhage.     01/02/2020 Elevated BUN/Cr (could be secondary to post contrast). Patient having intermittent episodes of hemiplegia on the right side. During transport to [place], patient was exhibiting left sided myoclonic jerks with left gaze deviation. Ordered EEG. STAT CTH ordered. General neurology is aware. Awaiting respiratory culture before deescalation of antibiotics. Will be transferred to medicine floor tomorrow.     01/03/2020 Patient was spiking fevers. Antibiotics broadened to vancomycin and zosyn. Patient pan-cultured. Preliminary EEG shows diffuse slowing with epileptiform discharges. US of the carotids ordered.     01/04/2020 Sputum culture with enterobacter cloacae, pan-sensitive to antibiotics.  Repeat influenza pending. Echo without signs of vegetation. Blood cultures  negative. Increased free water. Pt still with fever in the evening of 1/3/20 so will not deescalate zosyn. Will discontinue vancomycin. US of the lower legs ordered to rule out other causes of fever.  Will order CT head to rule out any new infarctions.    STROKE DOCUMENTATION   Acute Stroke Times   Last Known Normal Date: 12/31/19  Last Known Normal Time: (unknown)  Symptom Onset Date: 12/31/19  Symptom Onset Time: (unknown)  Stroke Team Called Date: 12/31/19  Stroke Team Called Time: 1924  Stroke Team Arrival Date: 12/31/19  Stroke Team Arrival Time: 1929  CT Interpretation Time: 1939  Decision to Treat Time for Alteplase: 2039  Decision to Treat Time for IR: 1939    NIH Scale:  1a. Level of Consciousness: (P) 0-->Alert, keenly responsive  1b. LOC Questions: (P) 0-->Answers both questions correctly  1c. LOC Commands: (P) 0-->Performs both tasks correctly  2. Best Gaze: (P) 0-->Normal  3. Visual: (P) 0-->No visual loss  4. Facial Palsy: (P) 0-->Normal symmetrical  movements  5a. Motor Arm, Left: (P) 0-->No drift, limb holds 90 (or 45) degrees for full 10 secs  5b. Motor Arm, Right: (P) 0-->No drift, limb holds 90 (or 45) degrees for full 10 secs  6a. Motor Leg, Left: (P) 0-->No drift, leg holds 30 degree position for full 5 secs  6b. Motor Leg, Right: (P) 0-->No drift, leg holds 30 degree position for full 5 secs  7. Limb Ataxia: (P) 0-->Absent  8. Sensory: (P) 0-->Normal, no sensory loss  9. Best Language: (P) 0-->No aphasia, normal  10. Dysarthria: (P) 0-->Normal  11. Extinction and Inattention (formerly Neglect): (P) 0-->No abnormality  Total (NIH Stroke Scale): 0       Modified Dori Score: 3  [place] Coma Scale:15   ABCD2 Score:    [id number] Score:   HAS -BLED Score:   ICH Score:   Hunt & Prather Classification:      Hemorrhagic change of an Ischemic Stroke: Does this patient have an ischemic stroke with hemorrhagic changes? No     Neurologic Chief Complaint: Aphasia and weakness     Subjective:     Interval History: Patient is seen for follow-up neurological assessment and treatment recommendations:    -patient more alert.  Still exhibiting right-sided upper extremity hemiplegia.  Will withdraw to pain in right lower extremity.  -free water increased to 250 ml q.i.d.  -sputum culture with Enterobacter cloacae that is pansensitive however because patient spiked fevers overnight will not  deescalate all antibiotics. Only vancomycin will be discontinued with the continuation of Zosyn  -ultrasound of the lower extremities to rule out other causes of fever  -CT had reordered  HPI, Past Medical, Family, and Social History remains the same as documented in the initial encounter.     Review of Systems   Constitutional: Negative for chills and fever.   Respiratory: Negative for cough.    Cardiovascular: Negative for chest pain.   Gastrointestinal: Negative for nausea and vomiting.   Neurological: Positive for speech difficulty and weakness. Negative for facial asymmetry and  numbness.     Scheduled Meds:   allopurinol  150 mg Per NG tube Daily    apixaban  2.5 mg Per NG tube BID    atorvastatin  40 mg Per NG tube Daily    chlorhexidine  15 mL Mouth/Throat BID    guaifenesin 100 mg/5 ml  10 mL Per NG tube TID    ipratropium  0.5 mg Nebulization Q4H    levalbuterol  1.25 mg Nebulization Q8H    levothyroxine  25 mcg Per NG tube Before breakfast    metoprolol tartrate  100 mg Per NG tube QHS    metoprolol tartrate  50 mg Per NG tube Daily    piperacillin-tazobactam (ZOSYN) IVPB  4.5 g Intravenous Q12H     Continuous Infusions:   sodium chloride 0.9%       PRN Meds:acetaminophen, Dextrose 10% Bolus, gabapentin, glucagon (human recombinant), insulin aspart U-100, labetalol, ondansetron, senna-docusate 8.6-50 mg, sodium chloride 0.9%    Objective:     Vital Signs (Most Recent):  Temp: 99.8 °F (37.7 °C) (01/04/20 0819)  Pulse: 69 (01/04/20 1517)  Resp: 18 (01/04/20 1131)  BP: (!) 144/67 (01/04/20 0819)  SpO2: 99 % (01/04/20 1131)  BP Location: Right arm    Vital Signs Range (Last 24H):  Temp:  [96.5 °F (35.8 °C)-101.7 °F (38.7 °C)]   Pulse:  [62-84]   Resp:  [16-19]   BP: (112-144)/(56-68)   SpO2:  [95 %-100 %]   BP Location: Right arm    Physical Exam   Constitutional: No distress.   HENT:   Head: Normocephalic and atraumatic.   Mouth/Throat: No oropharyngeal exudate.   Eyes: No scleral icterus.   Neck: Normal range of motion. Neck supple.   No JVD noted   Cardiovascular: Normal rate.   Pulmonary/Chest: Effort normal and breath sounds normal. She has no wheezes. She has no rales.   Abdominal: Soft. She exhibits no distension. There is no guarding.   Musculoskeletal:   No LE edema. Bilateral SCDs in place   Lymphadenopathy:     She has no cervical adenopathy.   Neurological: She displays no tremor.   Skin: Skin is warm and dry. She is not diaphoretic.   Nursing note and vitals reviewed.      Neurological Exam:   LOC: drowsy  Attention Span: poor  Language: Global  aphasia  Articulation: Mute/Anarthric  Orientation: Not oriented to person, place, and time  Visual Fields: Full  EOM (CN III, IV, VI): Full/intact  Pupils (CN II, III): PERRL  Facial Sensation (CN V): Normal  Facial Movement (CN VII): Unable to test   Gag Reflex: present  Reflexes: 2+ throughout  Motor: Arm left  Paresis: 3/5  Leg left  Paresis: 1/5  Arm right  Plegia 0/5  Leg right Plegia 1/5  Cebellar: No evidence of appendicular or axial ataxia  Sensation: Intact to light touch, temperature and vibration  Unable to test  Tone: Rigidity  LUE     Laboratory:  CMP:   Recent Labs   Lab 01/04/20  0416   CALCIUM 8.3*   ALBUMIN 2.5*   PROT 6.9      K 3.7   CO2 24   *   BUN 49*   CREATININE 2.1*   ALKPHOS 42*   ALT 8*   AST 15   BILITOT 0.9     BMP:   Recent Labs   Lab 01/04/20 0416      K 3.7   *   CO2 24   BUN 49*   CREATININE 2.1*   CALCIUM 8.3*     CBC:   Recent Labs   Lab 01/04/20 0416   WBC 10.86   RBC 3.64*   HGB 10.1*   HCT 35.0*   *   MCV 96   MCH 27.7   MCHC 28.9*     Lipid Panel:   Recent Labs   Lab 12/31/19 1929   CHOL 209*   LDLCALC 136.4   HDL 48   TRIG 123     Coagulation:   Recent Labs   Lab 01/01/20  0347   INR 1.1   APTT 22.8     Platelet Aggregation Study: No results for input(s): PLTAGG, PLTAGINTERP, PLTAGREGLACO, ADPPLTAGGREG in the last 168 hours.  Hgb A1C:   Recent Labs   Lab 12/31/19 2115   HGBA1C 6.9*     TSH:   Recent Labs   Lab 12/31/19 1929   TSH 13.528*       Diagnostic Results     Brain Imaging   CTH 1/1/19   -No evidence of major vascular distribution infarct or hemorrhage.  -Chronic microvascular ischemic change and generalized cerebral volume loss, normal for age.    Vessel Imaging   CTA  12/31/19   -No acute intracranial pathology.  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  -In this exam limited by poor timing of contrast and motion artifact, no high-grade stenosis or major vessel  occlusion.  Hypoplastic right A2 segment.    Cardiac Imaging   TTE   · Moderately decreased left ventricular systolic function. The estimated ejection fraction is 35%  · Concentric left ventricular remodeling.  · Local segmental wall motion abnormalities.  · Moderate right ventricular enlargement.  · Mildly reduced right ventricular systolic function.  · Moderate biatrial enlargement.  · Aortic valve area is 1.12 cm2; peak velocity is 1.95 m/s; mean gradient is 9 mmHg.  · Mild-to-moderate aortic valve stenosis.  · Mild mitral regurgitation.  · Mild tricuspid regurgitation.  · The estimated PA systolic pressure is 59 mm Hg  · Pulmonary hypertension present.  · Elevated central venous pressure (15 mm Hg).  · Atrial fibrillation observed.           Reji Quach MD  Com[facility] St[facility] Center  Department of Vascular Neurology   [facility]

## 2020-01-04 NOTE — ASSESSMENT & PLAN NOTE
Hx of HLD. Home dose of atorvastatin was 10 mg. On admission, pt was started on atorvastatin 40 mg. Carotid U/S ordered.     -Lipid panel with total cholesterol of 209, ,   -Continue atorvastatin 40 mg daily

## 2020-01-04 NOTE — PROGRESS NOTES
[facility] Medicine  Progress Note    Patient Name: Dacia Martínez  MRN: [id number]  Patient Class: IP- Inpatient   Admission Date: 12/31/2019  Length of Stay: 4 days  Attending Physician: Alissa Jones MD  Primary Care Provider: Jasmeet Corral MD        Subjective:     Principal Problem:Stroke        HPI:  Ms. Martínez is an 84yF with significant medical co-morbidities of HTN, DM2, HFrEF (30% in 2/2019 with pacemaker), HLD, CAD, A fib (on Eliquis), OA, Breast Cancer (s/p mastectomy, chemo, radiation in 2000), CKD3, Gout, and Hypothyroidism who was admitted to vascular neurology after family noticed right sided-weakness, gait instability, and slurred speech yesterday evening following dental procedure. Report that her Eliquis had been held for 4 days prior to the procedure. Also given Valium X2 prior /during procedure. Her daughters state that she had been altered since they picked her up from the surgery around 4:30 pm yesterday. 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 Unable to attain MRI due to pacemaker. She was given ASA 325mg and Atorvastatin 40 mg. Waxing and waning course of symptoms. Overnight, rapid response was called due to tachypnea up to 40 and fevers to 100.3. ABG wnl but CXR with possible left-sided infiltrate. WBC wnl. Pt was started on vanc/zosyn in setting of aspiration vs CAP pneumonia. Troponin elevated to 0.516 with repeat elevated to 0.935. Hospital Medicine was consulted for assistance with pneumonia, elevated troponin, and her other multiple co-morbidities.     Overview/Hospital Course:  On 1/2/20 AM rapid response was called for concern for decreased arousal and labored breathing that has been waxing and waning throughout admission. Troponin peaked and down-trended. Pt was also noted to have jerking movements of UE with concern for seizure-like activity on 1/2/20. Neurology was consulted by vascular neurology who  recommended continuous EEG monitoring. Additionally, STAT head CT was notable for small area on frontal lobe that may represent acute infarct. Was started on Vanc/zosyn for Aspiration vs CAP but was de-escalated to Levaquin on 1/2/20. Overnight 1/3/20, pt with recurrence of fevers and vanc/zosyn was re-started. Lactate WNL. Respiratory viral panel negative but sputum culture with enterobacter cloacae, sensitive to zosyn. Repeat influenza pending. Echo without signs of vegetation. Blood cultures still negative. Fluids re-started for elevated Cr. Pt still with fever in the evening of 1/3/20.     Interval History: Pt with fevers to 101.7 overnight. Still on vanc/zosyn. Blood cultures NGTD. Respiratory sputum with enterobacter cloacae, sensitive to zosyn. WBC still wnl. Breathing treatments and guaifenesin started yesterday. Per neuro, preliminary EEG without seizure-like activity. Repeat influenza pending. Will order repeat CXR. Also will order bilateral LE u/s to evaluate for DVT as etiology of fevers.     Review of Systems   Unable to perform ROS: Acuity of condition     Objective:     Vital Signs (Most Recent):  Temp: 99.8 °F (37.7 °C) (01/04/20 0819)  Pulse: 70 (01/04/20 1131)  Resp: 18 (01/04/20 1131)  BP: (!) 144/67 (01/04/20 0819)  SpO2: 99 % (01/04/20 1131) Vital Signs (24h Range):  Temp:  [93.3 °F (34.1 °C)-101.7 °F (38.7 °C)] 99.8 °F (37.7 °C)  Pulse:  [62-84] 70  Resp:  [16-20] 18  SpO2:  [95 %-100 %] 99 %  BP: (112-144)/(56-74) 144/67     Weight: 79.4 kg (175 lb)  Body mass index is 32.01 kg/m².    Intake/Output Summary (Last 24 hours) at 1/4/2020 1220  Last data filed at 1/4/2020 0600  Gross per 24 hour   Intake 915 ml   Output --   Net 915 ml      Physical Exam   Constitutional: No distress.   Awake, pulling at NG tube. Nasal oxygen in place.Still only looking at and moving left side.    HENT:   Head: Normocephalic and atraumatic.   Some right-sided facial swelling noted. No stridor present.    Eyes: No  scleral icterus.   Right-sided neglect   Neck: Normal range of motion. Neck supple.   Cardiovascular: Normal rate.   Murmur heard.  Pulmonary/Chest: Effort normal. She has no wheezes.   Course breath sounds noted   Abdominal: Soft. She exhibits no distension.   Genitourinary:   Genitourinary Comments: Small area of swelling around left upper inguinal region. Not oozing. Not open. No surrounding swelling or erythema. Per daughter has been there for years.   Barrier cream applied to labial folds.    Musculoskeletal:   No LE edema. No areas of erythema or tenderness on bilateral LE. No UE edema. No areas of fluctuance or induration. Left soft wrist restraint in place.   Neurological: She displays no tremor.   Slightly agitated, attempting to pull at NG tube with left hand. Still unable to follow commands, aphasic.  Right-sided hemiparesis of upper and lower extremity still present. Moves left upper extremity spontaneously.   Left-sided gaze preference.        Skin: Skin is warm and dry. She is not diaphoretic.   Nursing note and vitals reviewed.      Significant Labs:   Blood Culture:   Recent Labs   Lab 01/03/20  0741   LABBLOO No Growth to date  No Growth to date  No Growth to date  No Growth to date     CBC:   Recent Labs   Lab 01/03/20  0518 01/04/20  0416   WBC 10.64 10.86   HGB 10.5* 10.1*   HCT 35.6* 35.0*    146*     CMP:   Recent Labs   Lab 01/03/20  0518 01/04/20  0416    145   K 3.8 3.7    111*   CO2 22* 24   * 184*   BUN 44* 49*   CREATININE 2.1* 2.1*   CALCIUM 8.3* 8.3*   PROT 7.2 6.9   ALBUMIN 2.7* 2.5*   BILITOT 0.7 0.9   ALKPHOS 48* 42*   AST 16 15   ALT 8* 8*   ANIONGAP 13 10   EGFRNONAA 21.3* 21.3*     Lactic Acid:   Recent Labs   Lab 01/03/20  0741   LACTATE 1.2     POCT Glucose:   Recent Labs   Lab 01/03/20  2320 01/04/20  0554 01/04/20  1206   POCTGLUCOSE 184* 186* 175*       Significant Imaging: I have reviewed all pertinent imaging results/findings within the past 24  hours.      Assessment/Plan:      * Stroke  Ms. Martínez is a 84yF with CVA risk factors of HTN, HLD, DM2, and Afib off her AG for 4 days prior to admission for dental procedure on 12/31 who was brought in by EMS 12/31 for gait instability, facial droop, aphasia, confusion, and right-sided weakness. 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 Presumed MCA stroke given exam. Pt received ASA 325mg and Atorvastatin 40 mg. Admitted to vascular neurology.  consulted for possible aspiration pneumonia and other medical co-morbidities.     -Eliquis resumed 1/1/20  -On 1/2/20-pt with noted jerking of UE concerning for seziure-like activity. EEG and Head CT ordered per Vascular Neurology.  -CT head notable for small region of decreased attenuation in the left frontal lobe superiorly concerning for possible acute/recent infarction  -Continuous EEG monitoring completed, carotid U/S ordered, pending  -Being followed by physical medicine and rehab as well  -Ongoing concern for aspiration, agree with continued aspiration precautions and NPO until speech therapy feels she is safe to swallow  -Chlorhexidine oral solution ordered  -Continued management per primary team      Pneumonia  Pt is an 84yF with multiple co-morbidities who was admitted to vascular neurology for suspected MCA stroke. Pt underwent dental extractions day of presentation. Overnight on 12/31, pt became tachypnic with fever to 100.3 and rapid response was called. CXR with left sided consolidation. Started on vanc/zosyn overnight. ABG wnl. WBC wnl. Procal mildly elevated to .99. O2 saturations %. Suspect possible aspiration pneumonia vs CAP. Family reports cough for few days prior to admission.     1/2/20: no longer febrile, WBC still wnl.   1/3/20: recurrence of fevers to 100.3 overnight. WBC rising from 8-->10.6 Sputum cultures with gram negative rods, likely in setting of  aspiration pneumonia. Lactate wnl.   1/4/20: still febrile up to 101.7 yesterday evening. Sputum cultures growing enterobacter cloacae, sensitive to zosyn. Blood cultures NGTD. WBC wnl. Repeat influenza pending. Will d/c vanc now that sputum culture has speciated. Given persistence of fevers, will continue zosyn, can de-escalate in coming days if fevers improve. Unclear etiology of fevers. Will repeat CXR. Also will order bilateral LE doppler to assess for DVT although PE unremarkable. Procal ordered for tomorrow AM.     -Blood cultures NGTD  -TTE without evidence of any vegetations   - UA negative for infection  - Respiratory viral panel negative  - IV fluids ordered X2  - Guaifenesin 10 ml TID to assist with mucous production  - Will also advise zopinex breathing treatments Q8 hrs and ipratropium breathing treatments Q4 hrs   - Chest PT QID       Hypothyroidism  History of hypothyroidism. TSH on admission elevated to 13.528 with normal T4 at 0.97, agree with subclinical picture. On synthroid 25 mcg at home.    -Continue home synthroid 25 mcg       CKD (chronic kidney disease) stage 3, GFR 30-59 ml/min  Hx of CKD3. Baseline Cr of ~1.5-1.7. CTA head done on admit to evaluate for acute CVA.     -Cr currently at 1.3-->1.6-->2.1 with BUN at 21-->30-->44-->49  -Pt given NS 50 ml/hr for 20 hours for total 1L fluids on admission  -Fluids [id number] ml/hr for 14 hours for total 1L 1/3/20 in setting of rising Cr  -Attempted to de-escalate abx of vanc/zosyn to levaquin however pt with recurrance of fevers overnight so they were re-started. -Sputum culture with gram negative rods (enterobacter cloacae on 1/4/20 sensitive to zosyn). Blood culture NGTD. Will d/c vanc.   -Will continue to monitor Cr      Chronic systolic congestive heart failure  Pt with history of HFrEF and biventricular pacemaker. Last EF 30% in 2/2019. Stress test negative at that time. On lasix 40 mg BID, Entresto 24-26 mg, Metoprolol succinate 150 mg  daily at home. On admit,Troponin elevated to 0.5-->0.9. EKG without any St changes. Cardiology consulted given patient's cardiac history, suspect demand ischemia as etiology, they do not feel any intervention necessary at this point given that pt has already received  mg and high intensity statin in setting of ischemic stroke.     -Troponins were tended (Q6 hours x4) per cardiology recommendations, have peaked and now down-trending. Suspect demand ischemia as etiology.   -Home dose Lasix, Entresto currently held in setting of MCA stroke and possible infection. Metoprolol re-started  -Overnight 12/31, pt with tachypnea and was given 1 dose lasix 20 mg IV  -Appears euvolemic on exam   -Gentle hydration given with 50 ml/hr NS over 20 hours for 1 L total in setting of low EF and infection  -I/Os, daily weights  -Replace K and Mg as needed  -TTE 1/2/20  ·  Moderately decreased left ventricular systolic function. The estimated ejection fraction is 35%  · Concentric left ventricular remodeling.  · Local segmental wall motion abnormalities.  · Moderate right ventricular enlargement.  · Mildly reduced right ventricular systolic function.  · Moderate biatrial enlargement.  · Aortic valve area is 1.12 cm2; peak velocity is 1.95 m/s; mean gradient is 9 mmHg.  · Mild-to-moderate aortic valve stenosis.  · Mild mitral regurgitation.  · Mild tricuspid regurgitation.  · The estimated PA systolic pressure is 59 mm Hg  · Pulmonary hypertension present.  · Elevated central venous pressure (15 mm Hg).  · Atrial fibrillation observed.           Persistent atrial fibrillation  Pt with history of Afib anticoagulated with Eliquis and rate-controlled with metoprolol and biventricular pacemaker.   Presented to ED yesterday 12/31 for likely MCA stroke following dental extractions earlier that day. Eliquis was held 4 days prior to procedure. Resumed 1/1/2019 per primary team.    -[facility]  -Eliquis re-started 1/1/20  -Ok to re-start  metoprolol       Type 2 diabetes mellitus with stage 3 chronic kidney disease, without long-term current use of insulin  Recent A1c of 6.9. On Januvia 25mg at home. Currently NPO 2/2 concern for swallowing. Has NG in place for medications. Evaluated by speech who recommended tube feeds. POC glucoses have been in mid 100-200ss.    -Low dose SSI   -Continue accuchecks   -Can adjust as needed      Idiopathic chronic gout of multiple sites without tophus  Continue Allopurinol       Hyperlipidemia  Hx of HLD. Home dose of atorvastatin was 10 mg. On admission, pt was started on atorvastatin 40 mg. Carotid U/S ordered.     -Lipid panel with total cholesterol of 209, ,   -Continue atorvastatin 40 mg daily       Essential hypertension  Pt with history of HTN. Home meds include lasix, entresto, metoprolol. Per primary team, goal SBP <220 in setting of likely MCA stroke.     -Holding Entresto, Lasix, in setting of acute CVA  -Metoprolol re-started  -Has prn labetolol IV ordered        VTE Risk Mitigation (From admission, onward)         Ordered     apixaban tablet 2.5 mg  2 times daily      01/01/20 2115     IP VTE HIGH RISK PATIENT  Once      12/31/19 2049     Place sequential compression device  Until discontinued      12/31/19 2049                      Palma Lee MD   [id number]-[id number]  Department of Hospital Medicine   [facility]

## 2020-01-04 NOTE — ASSESSMENT & PLAN NOTE
Cr 1.3 on arrival. Trending upwards which could be due to contrast versus nephrotoxcity due to antibioitcs  -Will increase free water enteral boluses

## 2020-01-04 NOTE — ASSESSMENT & PLAN NOTE
Dacia Martínez is a 84 y.o. female with PMHx of HTN, HLD, HF, DM, a fib (eliquis) who presented to hospital with gait instability, RSW, and speech changes. She had been off her eliquis for 1 week due to scheduled dental procedure. Patient with L gaze, mild RSW, and speech difficulty when examined by stroke provider. EMS reported possible waxing and waning of symptoms in transit. She was taken for STAT CTA multiphase, which was negative for early ischemic changes, LVO, and significant stenosis. tPA decision delayed due to unclear LKN time since patient took valium for dental procedure. STAT MRI attempted, but patient has pacemaker that is not MRI compatible.     After further discussion with family and unclear LKN time, decision was made to not treat with tPA. Possibly cardioembolic stroke based on history     CTH 1/2/20: Interval development of a small region of decreased attenuation in the left frontal lobe superiorly concerning for possible acute/recent infarction  Preliminary EEG: diffuse background slowing with no epileptiform discharges     Antithrombotics for secondary stroke prevention: Aspirin: 325 mg once then resuming Apixaban 2.5 mg BID     Statins for secondary stroke prevention and hyperlipidemia, if present:   Statins: Atorvastatin- 80 mg daily    Aggressive risk factor modification: HTN, DM, HLD, Obesity, A-Fib     Rehab efforts: The patient has been evaluated by a stroke team provider and the therapy needs have been fully considered based off the presenting complaints and exam findings. The following therapy evaluations are needed: PT evaluate and treat, OT evaluate and treat, SLP evaluate and treat, PM&R evaluate for appropriate placement    Diagnostics ordered/pending: CT scan of head without contrast to asses brain parenchyma    VTE prophylaxis: Heparin 5000 units SQ every 8 hours    BP parameters: Infarct: No intervention, SBP <220

## 2020-01-05 NOTE — ASSESSMENT & PLAN NOTE
Ms. Martínez is a 84yF with CVA risk factors of HTN, HLD, DM2, and Afib off her AG for 4 days prior to admission for dental procedure on 12/31 who was brought in by EMS 12/31 for gait instability, facial droop, aphasia, confusion, and right-sided weakness. 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 Presumed MCA stroke given exam. Pt received ASA 325mg and Atorvastatin 40 mg. Admitted to vascular neurology. HM consulted for possible aspiration pneumonia and other medical co-morbidities.     -Eliquis resumed 1/1/20  -On 1/2/20-pt with noted jerking of UE concerning for seziure-like activity. EEG and Head CT ordered per Vascular Neurology.  -CT head notable for small region of decreased attenuation in the left frontal lobe superiorly concerning for possible acute/recent infarction  -Continuous EEG monitoring completed, carotid U/S ordered  -Being followed by physical medicine and rehab as well  -Ongoing concern for aspiration, agree with continued aspiration precautions and NPO until speech therapy feels she is safe to swallow  -Chlorhexidine oral solution ordered  -On 1/5/20, pt with increased somnolence and difficulty arousing. Appears to be using accesory muscle use to breathe. Oxygen saturations in mid 90s on 1-2L NC. ABG with respiratory alkalosis. CT head with possible small new infarct. Afebrile overnight, still on zosyn. Vascular neurology notified who consulted Neuro Critical Care.   -Pt to be admitted to Neurocritical care for intubation watch and higher level of care

## 2020-01-05 NOTE — PROGRESS NOTES
[facility]  Vascular Neurology  Comprehensive Stroke Center  Progress Note    Assessment/Plan:     * Stroke  Dacia Martínez is a 84 y.o. female with PMHx of HTN, HLD, HF, DM, a fib (eliquis) who presented to hospital with gait instability, RSW, and speech changes. She had been off her eliquis for 1 week due to scheduled dental procedure. Patient with L gaze, mild RSW, and speech difficulty when examined by stroke provider. EMS reported possible waxing and waning of symptoms in transit. She was taken for STAT CTA multiphase, which was negative for early ischemic changes, LVO, and significant stenosis. tPA decision delayed due to unclear LKN time since patient took valium for dental procedure. STAT MRI attempted, but patient has pacemaker that is not MRI compatible.      After further discussion with family and unclear LKN time, decision was made to not treat with tPA. Possibly cardioembolic stroke based on history     -CTH 1/2/20: Interval development of a small region of decreased attenuation in the left frontal lobe superiorly concerning for possible acute/recent infarction  -Preliminary EEG: diffuse background slowing with no epileptiform discharges   -CTH 1/5/20: redemonstration of ill-defined hypodensity within the superior left frontal lobe which may relate to evolving recent/acute infarct. Additional subtle hypodensity is present within the left frontal lobe periventricular white matter, somewhat more conspicuous from prior examination, possibly relating to additional small volume recent infarct.  No evidence of acute intracranial hemorrhage or midline shift.    Patient transferred to the ICU on 1/5/20 for decline in mental status as well as paradoxical breathing (possible respiratory decompensation-ABG showing pure respiratory alkalosis)     Antithrombotics for secondary stroke prevention: Aspirin: 325 mg once then resuming Apixaban 2.5 mg BID     Statins for secondary stroke prevention and  hyperlipidemia, if present:   Statins: Atorvastatin- 80 mg daily    Aggressive risk factor modification: HTN, DM, HLD, Obesity, A-Fib     Rehab efforts: The patient has been evaluated by a stroke team provider and the therapy needs have been fully considered based off the presenting complaints and exam findings. The following therapy evaluations are needed: PT evaluate and treat, OT evaluate and treat, SLP evaluate and treat, PM&R evaluate for appropriate placement    Diagnostics ordered/pending: None  Can consider EEG given decline in mental status     VTE prophylaxis: Heparin 5000 units SQ every 8 hours    BP parameters: Infarct: No intervention, SBP <220        Fever  2/2 PNA  -US of the lower extremities negative     Myoclonic jerking  20-30 second left sided myoclonic jerking with left gaze deviation. This could explain her waxing and waning mental status  -Preliminary EEG shows diffuse slowing with no epileptiform discharges   Plan  1) Can consider EEG given change in mental status     Right hemiplegia  Intermittent episodes of right hemiplegia.   CTH on 1/2/20 revealing interval development of a small region of decreased attenuation in the left frontal lobe superiorly concerning for possible acute/recent infarction.    CTH on 1/5/20 showing redemonstration of ill-defined hypodensity within the superior left frontal lobe which may relate to evolving recent/acute infarct.  Additional subtle hypodensity is present within the left frontal lobe periventricular white matter, somewhat more conspicuous from prior examination, possibly relating to additional small volume recent infarct.  No evidence of acute intracranial hemorrhage or midline shift.    Troponin level elevated  Troponin was ordered on admission and noted to be 0.5 -> repeat 0.9. EKG notable for V-paced rhythm without evidence of ischemia. Etiology most likely 2/2 demand ischemia (type II NSTEMI) in the setting of co-morbidites (possibly has aspiration  PNA, slightly anemic etc). Troponin trending downwards        Pneumonia  -Pt underwent dental extractions day of presentation. Family reports cough for few days prior to admission.  -Overnight on 12/31, pt became tachypnic with fever to 100.3 and rapid response was called. CXR with left sided consolidation. -Started on vanc/zosyn overnight. ABG wnl. WBC wnl. Procal mildly elevated to .99. O2 saturations %. Suspect possible aspiration pneumonia vs CAP.  Respiratory panel negative    Plan  1) Continue zosyn for now as patient was spiking fevers yesterday  2)  sputum cxs revealed Enterobacter Cloacae       Hypothyroidism  Continue home synthroid  Patient with elevated TSH but normal free T4, subclinical hypothyroidism  Discuss with hospital medicine co management    CKD (chronic kidney disease) stage 3, GFR 30-59 ml/min  Cr 1.3 on arrival. Trending upwards which could be due to contrast versus nephrotoxcity due to antibioitcs  -Will increase free water enteral boluses     Chronic systolic congestive heart failure  Stroke risk factor  TTE Feb 2019 with EF 40%  Patient on entresto, metoprolol, and lasix at home  Holding entresto and lasix in setting of acute stroke  - Patient was on metoprolol for rate control. Was initially discontinued by hospital medicine due to concern for sepsis however, will reinitiate  -Currently on lopressor 50 mg AM, and 100 mg PM to be compatible with NG tube     Persistent atrial fibrillation  Stroke risk factor  On eliquis at home  Held x1 week for dental procedure  Resuming home eliquis  Continuing home metoprolol    Type 2 diabetes mellitus with stage 3 chronic kidney disease, without long-term current use of insulin  Stroke risk factor  A1C 6.9  Goal glucose 140-180  Low correction SSI for NPO patients until LEILANI completed  Diabetic diet if safe to swallow    Primary osteoarthritis involving multiple joints  PRN tylenol    Idiopathic chronic gout of multiple sites without  tophus  Continue home allopurinol    Hyperlipidemia  Stroke risk factor  Patient on atorvastatin 10 mg daily at home   on arrival  Increasing to atorvastatin 80 mg daily    Essential hypertension  Stroke risk facotr  SBP <220   Holding home antihypertensives due to concern for acute stroke  Prn labetalol         01/01/2020 CTA MP without any acute findings. MRI not done as incompatible with pacemaker.Neurological exam patient is waxing and waning. At times will move her left arm however, per the medicine team's examination patient had hemineglect in hte right side which was not seen during my exam.nOvernight, rapid response was called due to tachypnea up to 40 and fevers to 100.3. ABG wnl but CXR with possible left-sided infiltrate. WBC wnl. Pt was started on vanc/zosyn in setting of aspiration vs CAP pneumonia. Troponin elevated to 0.516 with repeat elevated to 0.935.Cardiology consulted and they believed this is demand ischemia, thus will trend troponin. Repeat CTH shows no evidence of major vascular distribution infarct or hemorrhage.     01/02/2020 Elevated BUN/Cr (could be secondary to post contrast). Patient having intermittent episodes of hemiplegia on the right side. During transport to Echo, patient was exhibiting left sided myoclonic jerks with left gaze deviation. Ordered EEG. STAT CTH ordered. General neurology is aware. Awaiting respiratory culture before deescalation of antibiotics. Will be transferred to medicine floor tomorrow.     01/03/2020 Patient was spiking fevers. Antibiotics broadened to vancomycin and zosyn. Patient pan-cultured. Preliminary EEG shows diffuse slowing with epileptiform discharges. US of the carotids ordered.     01/04/2020 Sputum culture with enterobacter cloacae, pan-sensitive to antibiotics.  Repeat influenza pending. Echo without signs of vegetation. Blood cultures  negative. Increased free water. Pt still with fever in the evening of 1/3/20 so will not deescalate  zosyn. Will discontinue vancomycin. US of the lower legs ordered to rule out other causes of fever.  Will order CT head to rule out any new infarctions.    01/05/2020 Patient was febrile  evening of 1/3/20 but improved 1/5/20 however, will keep on zosyn. CTH with redemonstration of hypodensity withi the superior left frontal lobe and additional subtle hypodensity within the left frontal lobe periventricular white matter. US of the lower extremities does not reveal any DVT. On examination today  Patient was extremely somnolent and was exhibiting paradoxical breathing. ABG demonstrated respiratory alkalosis. CXR shows pulmonary edema. Due to possible respiratory decompensation as well as somnolent state, patient was transferred to [facility]        STROKE DOCUMENTATION   Acute Stroke Times   Last Known Normal Date: 12/31/19  Last Known Normal Time: (unknown)  Symptom Onset Date: 12/31/19  Symptom Onset Time: (unknown)  Stroke Team Called Date: 12/31/19  Stroke Team Called Time: 1924  Stroke Team Arrival Date: 12/31/19  Stroke Team Arrival Time: 1929  CT Interpretation Time: 1939  Decision to Treat Time for Alteplase: 2039  Decision to Treat Time for IR: 1939    NIH Scale:  1a. Level of Consciousness: 2-->Not alert, requires repeated stimulation to attend, or is obtunded and requires strong or painful stimulation to make movements (not stereotyped)  1b. LOC Questions: 2-->Answers neither question correctly  1c. LOC Commands: 2-->Performs neither task correctly  2. Best Gaze: 0-->Normal  3. Visual: 0-->No visual loss  4. Facial Palsy: 0-->Normal symmetrical movements  5a. Motor Arm, Left: 0-->No drift, limb holds 90 (or 45) degrees for full 10 secs  5b. Motor Arm, Right: 4-->No movement  6a. Motor Leg, Left: 3-->No effort against gravity, leg falls to bed immediately  6b. Motor Leg, Right: 3-->No effort against gravity, leg falls to bed immediately  7. Limb Ataxia: 0-->Absent  8. Sensory: 0-->Normal, no sensory loss  9. Best  Language: 3-->Mute, global aphasia, no usable speech or auditory comprehension  10. Dysarthria: 2-->Severe dysarthria, patients speech is so slurred as to be unintelligible in the absence of or out of proportion to any dysphasia, or is mute/anarthric  11. Extinction and Inattention (formerly Neglect): 0-->No abnormality  Total (NIH Stroke Scale): 21       Modified Amelia Score: 3  [place] Coma Scale:8   ABCD2 Score:    [id number] Score:   HAS -BLED Score:   ICH Score:   Hunt & Prather Classification:      Hemorrhagic change of an Ischemic Stroke: Does this patient have an ischemic stroke with hemorrhagic changes? No     Neurologic Chief Complaint: Aphasia and weakness     Subjective:     Interval History: Patient is seen for follow-up neurological assessment and treatment recommendations:    -patient afebrile overnight.  Currently on Zosyn.  -on examination patient was extremely lethargic and difficult to arouse. Repeat CT head showed evolution of superior frontal lobe infarct.  Subtle hypodensity in the left frontal lobe periventricular white matter as well.  -patient transferred to the neuro critical care unit after exhibiting paradoxical breathing as well as respiratory alkalosis on ABG.  Chest x-ray shows pulmonary edema.     HPI, Past Medical, Family, and Social History remains the same as documented in the initial encounter.     Review of Systems   Constitutional: Positive for fatigue. Negative for chills and fever.   Respiratory: Negative for cough.    Cardiovascular: Negative for chest pain.   Gastrointestinal: Negative for nausea and vomiting.   Neurological: Positive for speech difficulty and weakness. Negative for facial asymmetry and numbness.     Scheduled Meds:   albuterol-ipratropium  3 mL Nebulization Q4H    allopurinol  150 mg Per NG tube Daily    apixaban  2.5 mg Per NG tube BID    atorvastatin  80 mg Per NG tube Daily    chlorhexidine  15 mL Mouth/Throat BID    guaifenesin 100 mg/5 ml  10 mL  Per NG tube TID    levothyroxine  25 mcg Per NG tube Before breakfast    metoprolol tartrate  100 mg Per NG tube QHS    metoprolol tartrate  50 mg Per NG tube Daily    piperacillin-tazobactam (ZOSYN) IVPB  4.5 g Intravenous Q12H    vancomycin (VANCOCIN) IVPB  1,000 mg Intravenous Q24H     Continuous Infusions:   sodium chloride 0.9%       PRN Meds:acetaminophen, Dextrose 10% Bolus, gabapentin, glucagon (human recombinant), insulin aspart U-100, labetalol, ondansetron, senna-docusate 8.6-50 mg, sodium chloride 0.9%, vancomycin - pharmacy to dose    Objective:     Vital Signs (Most Recent):  Temp: 99.5 °F (37.5 °C) (01/05/20 1405)  Pulse: 69 (01/05/20 1554)  Resp: (!) 23 (01/05/20 1554)  BP: (!) 160/81 (01/05/20 1458)  SpO2: 100 % (01/05/20 1554)  BP Location: Right arm    Vital Signs Range (Last 24H):  Temp:  [91.4 °F (33 °C)-99.5 °F (37.5 °C)]   Pulse:  [69-80]   Resp:  [15-28]   BP: (122-160)/(58-81)   SpO2:  [95 %-100 %]   BP Location: Right arm    Physical Exam   Constitutional: No distress.   HENT:   Head: Normocephalic and atraumatic.   Mouth/Throat: No oropharyngeal exudate.   Eyes: No scleral icterus.   Neck: Normal range of motion. Neck supple.   No JVD noted   Cardiovascular: Normal rate.   Pulmonary/Chest: She is in respiratory distress. She has decreased breath sounds in the right upper field, the right middle field, the right lower field, the left upper field, the left middle field and the left lower field.   Abdominal: Soft. She exhibits no distension. There is no guarding.   Musculoskeletal:   No LE edema. Bilateral SCDs in place   Lymphadenopathy:     She has no cervical adenopathy.   Neurological: She displays no tremor.   Skin: Skin is warm and dry. She is not diaphoretic.   Nursing note and vitals reviewed.      Neurological Exam:   LOC: drowsy  Attention Span: poor  Language: Global aphasia  Articulation: Mute/Anarthric  Orientation: Not oriented to person, place, and time  Visual Fields:  Full  EOM (CN III, IV, VI): Full/intact  Pupils (CN II, III): PERRL  Facial Sensation (CN V): Normal  Facial Movement (CN VII): Unable to test   Gag Reflex: present  Reflexes: 2+ throughout  Motor: Arm left  Paresis: 2/5  Leg left  Paresis: 1/5  Arm right  Plegia 0/5  Leg right Plegia 1/5  Cebellar: No evidence of appendicular or axial ataxia  Sensation: Intact to light touch, temperature and vibration  Unable to test  Tone: Rigidity  LUE     Laboratory:  CMP:   Recent Labs   Lab 01/05/20  0420 01/05/20  1340   CALCIUM 8.5* 8.5*   ALBUMIN 2.5*  --    PROT 6.9  --     148*   K 4.1 4.3   CO2 20* 23   * 113*   BUN 54* 53*   CREATININE 1.9* 1.7*   ALKPHOS 40*  --    ALT 9*  --    AST 16  --    BILITOT 0.9  --      BMP:   Recent Labs   Lab 01/05/20  1340   *   K 4.3   *   CO2 23   BUN 53*   CREATININE 1.7*   CALCIUM 8.5*     CBC:   Recent Labs   Lab 01/05/20  0420   WBC 11.15   RBC 3.60*   HGB 10.0*   HCT 36.1*   *   *   MCH 27.8   MCHC 27.7*     Lipid Panel:   Recent Labs   Lab 12/31/19 1929   CHOL 209*   LDLCALC 136.4   HDL 48   TRIG 123     Coagulation:   Recent Labs   Lab 01/01/20  0347   INR 1.1   APTT 22.8     Platelet Aggregation Study: No results for input(s): PLTAGG, PLTAGINTERP, PLTAGREGLACO, ADPPLTAGGREG in the last 168 hours.  Hgb A1C:   Recent Labs   Lab 12/31/19 2115   HGBA1C 6.9*     TSH:   Recent Labs   Lab 12/31/19 1929   TSH 13.528*       Diagnostic Results     Brain Imaging   CT 1/1/19   -No evidence of major vascular distribution infarct or hemorrhage.  -Chronic microvascular ischemic change and generalized cerebral volume loss, normal for age.    Vessel Imaging   CTA  12/31/19   -No acute intracranial pathology.  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  -In this exam limited by poor timing of contrast and motion artifact, no high-grade stenosis or major vessel occlusion.  Hypoplastic right A2  segment.    Cardiac Imaging   TTE   · Moderately decreased left ventricular systolic function. The estimated ejection fraction is 35%  · Concentric left ventricular remodeling.  · Local segmental wall motion abnormalities.  · Moderate right ventricular enlargement.  · Mildly reduced right ventricular systolic function.  · Moderate biatrial enlargement.  · Aortic valve area is 1.12 cm2; peak velocity is 1.95 m/s; mean gradient is 9 mmHg.  · Mild-to-moderate aortic valve stenosis.  · Mild mitral regurgitation.  · Mild tricuspid regurgitation.  · The estimated PA systolic pressure is 59 mm Hg  · Pulmonary hypertension present.  · Elevated central venous pressure (15 mm Hg).  · Atrial fibrillation observed.           Reji Quach MD  Comprehensive Stroke Center  Department of Vascular Neurology   [facility]

## 2020-01-05 NOTE — SUBJECTIVE & OBJECTIVE
Interval History: LE u/s negative for thrombus. Afebrile overnight. Still on zosyn. Increasingly difficult to arouse this AM. Oxygen saturations were in 90s but ABG with respiratory alkalosis. Repeat head CT ordered by vascular neurology last night with possible new infarct. Neuro critical care consulted per vascular neurology.     Review of Systems   Unable to perform ROS: Acuity of condition     Objective:     Vital Signs (Most Recent):  Temp: 98.2 °F (36.8 °C) (01/05/20 1107)  Pulse: 74 (01/05/20 1131)  Resp: 18 (01/05/20 1131)  BP: (!) 122/58 (01/05/20 1107)  SpO2: 98 % (01/05/20 1131) Vital Signs (24h Range):  Temp:  [91.4 °F (33 °C)-99.2 °F (37.3 °C)] 98.2 °F (36.8 °C)  Pulse:  [69-80] 74  Resp:  [15-20] 18  SpO2:  [98 %-100 %] 98 %  BP: (122-143)/(58-73) 122/58     Weight: 79.4 kg (175 lb)  Body mass index is 32.01 kg/m².    Intake/Output Summary (Last 24 hours) at 1/5/2020 1331  Last data filed at 1/5/2020 0800  Gross per 24 hour   Intake 1593 ml   Output --   Net 1593 ml      Physical Exam   Constitutional: No distress.   Difficult to arouse, somnolent.   HENT:   Head: Normocephalic and atraumatic.   Right-sided facial swelling improving from prior exams.    Eyes: No scleral icterus.   Neck: Normal range of motion. Neck supple.   Soft, no nuchal rigidity noted.    Cardiovascular: Normal rate.   Murmur heard.  Pulmonary/Chest: She has no wheezes.   Course breath sounds, rhonchi. Increased secretions. Accessory muscle usage noted.    Abdominal: Soft. She exhibits no distension.   Genitourinary:   Genitourinary Comments: Barrier cream applied to labial folds. No new lesions noted.    Musculoskeletal:   No LE edema. Small ulcer on left anterior shin covered in dressing. Left soft wrist restraint in place.   Neurological:   Increasingly lethargic, difficult to arouse. Will move to pain. Unable to follow commands, aphasic.     Skin: Skin is warm and dry. She is not diaphoretic.   Nursing note and vitals  reviewed.      Significant Labs:   ABGs:   Recent Labs   Lab 01/05/20  1224   PH 7.564*   PCO2 30.3*   HCO3 27.4   POCSATURATED 99   BE 5     Blood Culture: No results for input(s): LABBLOO in the last 48 hours.  CBC:   Recent Labs   Lab 01/04/20  0416 01/05/20  0420   WBC 10.86 11.15   HGB 10.1* 10.0*   HCT 35.0* 36.1*   * 142*     CMP:   Recent Labs   Lab 01/04/20  0416 01/05/20  0420    145   K 3.7 4.1   * 115*   CO2 24 20*   * 193*   BUN 49* 54*   CREATININE 2.1* 1.9*   CALCIUM 8.3* 8.5*   PROT 6.9 6.9   ALBUMIN 2.5* 2.5*   BILITOT 0.9 0.9   ALKPHOS 42* 40*   AST 15 16   ALT 8* 9*   ANIONGAP 10 10   EGFRNONAA 21.3* 24.0*     POCT Glucose:   Recent Labs   Lab 01/04/20  1845 01/04/20  2305 01/05/20  0552   POCTGLUCOSE 167* 185* 201*     Respiratory Culture: No results for input(s): GSRESP, RESPIRATORYC in the last 48 hours.    Significant Imaging:     CT head 1/5/20:   1.  Redemonstration of ill-defined hypodensity within the superior left frontal lobe which may relate to evolving recent/acute infarct.  Additional subtle hypodensity is present within the left frontal lobe periventricular white matter, somewhat more conspicuous from prior examination, possibly relating to additional small volume recent infarct.  No evidence of acute intracranial hemorrhage or midline shift.    2.  Chronic senescent and microvascular ischemic changes.

## 2020-01-05 NOTE — ASSESSMENT & PLAN NOTE
Recent A1c of 6.9. On Januvia 25mg at home. Currently NPO 2/2 concern for swallowing. Has NG in place for medications. Evaluated by speech who recommended tube feeds. POC glucoses have been in mid 100-200ss.    -Low dose SSI 0-5 units (aspart) as needed. Discussed with patient's daughter about not using novolog as she was concerned that caused hypoglycemia in the past.   -Continue accuchecks   -Can adjust as needed

## 2020-01-05 NOTE — ASSESSMENT & PLAN NOTE
Intermittent episodes of right hemiplegia.   CTH on 1/2/20 revealing interval development of a small region of decreased attenuation in the left frontal lobe superiorly concerning for possible acute/recent infarction.    CTH on 1/5/20 showing redemonstration of ill-defined hypodensity within the superior left frontal lobe which may relate to evolving recent/acute infarct.  Additional subtle hypodensity is present within the left frontal lobe periventricular white matter, somewhat more conspicuous from prior examination, possibly relating to additional small volume recent infarct.  No evidence of acute intracranial hemorrhage or midline shift.

## 2020-01-05 NOTE — PROGRESS NOTES
[facility] Hospital Medicine  Progress Note    Patient Name: Dacia Martínez  MRN: [id number]  Patient Class: IP- Inpatient   Admission Date: 12/31/2019  Length of Stay: 5 days  Attending Physician: Ap Quintero MD  Primary Care Provider: Jasmeet Corral MD    Hospital Medicine Team: Networked reference to record PCT  Palma Lee MD    Subjective:     Principal Problem:Stroke        HPI:  Ms. Martínez is an 84yF with significant medical co-morbidities of HTN, DM2, HFrEF (30% in 2/2019 with pacemaker), HLD, CAD, A fib (on Eliquis), OA, Breast Cancer (s/p mastectomy, chemo, radiation in 2000), CKD3, Gout, and Hypothyroidism who was admitted to vascular neurology after family noticed right sided-weakness, gait instability, and slurred speech yesterday evening following dental procedure. Report that her Eliquis had been held for 4 days prior to the procedure. Also given Valium X2 prior /during procedure. Her daughters state that she had been altered since they picked her up from the surgery around 4:30 pm yesterday. 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 Unable to attain MRI due to pacemaker. She was given ASA 325mg and Atorvastatin 40 mg. Waxing and waning course of symptoms. Overnight, rapid response was called due to tachypnea up to 40 and fevers to 100.3. ABG wnl but CXR with possible left-sided infiltrate. WBC wnl. Pt was started on vanc/zosyn in setting of aspiration vs CAP pneumonia. Troponin elevated to 0.516 with repeat elevated to 0.935. Hospital Medicine was consulted for assistance with pneumonia, elevated troponin, and her other multiple co-morbidities.     Overview/Hospital Course:  On 1/2/20 AM rapid response was called for concern for decreased arousal and labored breathing that has been waxing and waning throughout admission. Troponin peaked and down-trended. Pt was also noted to have jerking movements of UE with concern for  seizure-like activity on 1/2/20. Neurology was consulted by vascular neurology who recommended continuous EEG monitoring that did not show any large epileptiform activty. Additionally, STAT head CT was notable for small area on frontal lobe that may represent acute infarct. Was started on Vanc/zosyn for Aspiration vs CAP but was de-escalated to Levaquin on 1/2/20. Overnight 1/3/20, pt with recurrence of fevers and vanc/zosyn was re-started. Lactate WNL. Respiratory viral panel and repeat influenza negative but sputum culture with enterobacter cloacae, sensitive to zosyn. Echo without signs of vegetation. Blood cultures still negative. Pt febrile evening of 1/3/20 but improved 1/5/20, still on zosyn. Repeat heat CT with redemonstration of hypodensity within the superior left frontal lobe and additional subtle hypodensity within the left frontal lobe periventricular white matter. Also increasingly somnolent on 1/5/20, ABG with respiratory alkalosis, vascular neurology to discuss with neuro critical care. Patient to be admitted to [facility] for higher level of care.    Interval History: LE u/s negative for thrombus. Afebrile overnight. Still on zosyn. Increasingly difficult to arouse this AM. Oxygen saturations were in 90s but ABG with respiratory alkalosis. Repeat head CT ordered by vascular neurology last night with possible new infarct. Neuro critical care consulted per vascular neurology.     Review of Systems   Unable to perform ROS: Acuity of condition     Objective:     Vital Signs (Most Recent):  Temp: 98.2 °F (36.8 °C) (01/05/20 1107)  Pulse: 74 (01/05/20 1131)  Resp: 18 (01/05/20 1131)  BP: (!) 122/58 (01/05/20 1107)  SpO2: 98 % (01/05/20 1131) Vital Signs (24h Range):  Temp:  [91.4 °F (33 °C)-99.2 °F (37.3 °C)] 98.2 °F (36.8 °C)  Pulse:  [69-80] 74  Resp:  [15-20] 18  SpO2:  [98 %-100 %] 98 %  BP: (122-143)/(58-73) 122/58     Weight: 79.4 kg (175 lb)  Body mass index is 32.01 kg/m².    Intake/Output Summary (Last  24 hours) at 1/5/2020 1331  Last data filed at 1/5/2020 0800  Gross per 24 hour   Intake 1593 ml   Output --   Net 1593 ml      Physical Exam   Constitutional:   Difficult to arouse, somnolent.   HENT:   Head: Normocephalic and atraumatic.   Right-sided facial swelling improving from prior exams.    Eyes: No scleral icterus.   Neck: Normal range of motion. Neck supple.   Soft, no nuchal rigidity noted.    Cardiovascular: Normal rate.   Murmur heard.  Pulmonary/Chest: She has no wheezes.   Course breath sounds, rhonchi. Increased secretions. Accessory muscle usage noted.    Abdominal: Soft. She exhibits no distension.   Genitourinary:   Genitourinary Comments: Barrier cream applied to labial folds. No new lesions noted.    Musculoskeletal:   No LE edema. Small ulcer on left anterior shin covered in dressing. Left soft wrist restraint in place.   Neurological:   Increasingly lethargic, difficult to arouse. Will move to pain. Unable to follow commands, aphasic.     Skin: Skin is warm and dry. She is not diaphoretic.   Nursing note and vitals reviewed.      Significant Labs:   ABGs:   Recent Labs   Lab 01/05/20  1224   PH 7.564*   PCO2 30.3*   HCO3 27.4   POCSATURATED 99   BE 5     Blood Culture: No results for input(s): LABBLOO in the last 48 hours.  CBC:   Recent Labs   Lab 01/04/20  0416 01/05/20  0420   WBC 10.86 11.15   HGB 10.1* 10.0*   HCT 35.0* 36.1*   * 142*     CMP:   Recent Labs   Lab 01/04/20  0416 01/05/20  0420    145   K 3.7 4.1   * 115*   CO2 24 20*   * 193*   BUN 49* 54*   CREATININE 2.1* 1.9*   CALCIUM 8.3* 8.5*   PROT 6.9 6.9   ALBUMIN 2.5* 2.5*   BILITOT 0.9 0.9   ALKPHOS 42* 40*   AST 15 16   ALT 8* 9*   ANIONGAP 10 10   EGFRNONAA 21.3* 24.0*     POCT Glucose:   Recent Labs   Lab 01/04/20  1845 01/04/20  2305 01/05/20  0552   POCTGLUCOSE 167* 185* 201*     Respiratory Culture: No results for input(s): GSRESP, RESPIRATORYC in the last 48 hours.    Significant Imaging:     CT  head 1/5/20:   1.  Redemonstration of ill-defined hypodensity within the superior left frontal lobe which may relate to evolving recent/acute infarct.  Additional subtle hypodensity is present within the left frontal lobe periventricular white matter, somewhat more conspicuous from prior examination, possibly relating to additional small volume recent infarct.  No evidence of acute intracranial hemorrhage or midline shift.    2.  Chronic senescent and microvascular ischemic changes.      Assessment/Plan:      * Stroke  Ms. Martínez is a 84yF with CVA risk factors of HTN, HLD, DM2, and Afib off her AG for 4 days prior to admission for dental procedure on 12/31 who was brought in by EMS 12/31 for gait instability, facial droop, aphasia, confusion, and right-sided weakness. 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 Presumed MCA stroke given exam. Pt received ASA 325mg and Atorvastatin 40 mg. Admitted to vascular neurology.  consulted for possible aspiration pneumonia and other medical co-morbidities.     -Eliquis resumed 1/1/20  -On 1/2/20-pt with noted jerking of UE concerning for seziure-like activity. EEG and Head CT ordered per Vascular Neurology.  -CT head notable for small region of decreased attenuation in the left frontal lobe superiorly concerning for possible acute/recent infarction  -Continuous EEG monitoring completed, carotid U/S ordered  -Being followed by physical medicine and rehab as well  -Ongoing concern for aspiration, agree with continued aspiration precautions and NPO until speech therapy feels she is safe to swallow  -Chlorhexidine oral solution ordered  -On 1/5/20, pt with increased somnolence and difficulty arousing. Appears to be using accesory muscle use to breathe. Oxygen saturations in mid 90s on 1-2L NC. ABG with respiratory alkalosis. CT head with possible small new infarct. Afebrile overnight, still on  zosyn. Vascular neurology notified who consulted Neuro Critical Care.   -Pt to be admitted to Neurocritical care for intubation watch and higher level of care      Pneumonia  Pt is an 84yF with multiple co-morbidities who was admitted to vascular neurology for suspected MCA stroke. Pt underwent dental extractions day of presentation. Overnight on 12/31, pt became tachypnic with fever to 100.3 and rapid response was called. CXR with left sided consolidation. Started on vanc/zosyn overnight. ABG wnl. WBC wnl. Procal mildly elevated to .99. O2 saturations %. Suspect possible aspiration pneumonia vs CAP. Family reports cough for few days prior to admission.     1/2/20: no longer febrile, WBC still wnl.   1/3/20: recurrence of fevers to 100.3 overnight. WBC rising from 8-->10.6 Sputum cultures with gram negative rods, likely in setting of aspiration pneumonia. Lactate wnl.   1/4/20: still febrile up to 101.7 evening. Sputum cultures growing enterobacter cloacae, sensitive to zosyn. Blood cultures NGTD. WBC wnl. Repeat influenza negative. Vanc was discontinued when sputum culture speciated. Given persistence of fevers, zosyn continued, can de-escalate in coming days if fevers improve. Unclear etiology of fevers. Repeat CXR with edema. Bilateral LE doppler to assess for DVT negative. Procal mildly elevated to 0.99.       -Blood cultures NGTD  -TTE without evidence of any vegetations   - UA negative for infection  - Respiratory viral panel negative  - IV fluids ordered X2  - Guaifenesin 10 ml TID to assist with mucous production  - Will also advise zopinex breathing treatments Q8 hrs and ipratropium breathing treatments Q4 hrs   - Chest PT QID     1/5/20-afebrile overnight. Still on zosyn. WBC wnl. However pt notably difficult to arouse this AM. ABG with respiratory alkalosis. Respiratory deep-suctioning to help. Appears to be using accessory muscle usage to breathe. Oxygen saturations in mid 90s, advise weaning  oxygen as tolerated as pt does not require oxygen at home and could be contributing to respiratory status. Patient to be admitted to Neurocritical care for increased level of care and intubation watch.     Hypothyroidism  History of hypothyroidism. TSH on admission elevated to 13.528 with normal T4 at 0.97, agree with subclinical picture. On synthroid 25 mcg at home.    -Continue home synthroid 25 mcg       CKD (chronic kidney disease) stage 3, GFR 30-59 ml/min  Hx of CKD3. Baseline Cr of ~1.5-1.7. CTA head done on admit to evaluate for acute CVA.     -Cr currently at 1.3-->1.6-->2.1-->1.9 with BUN at 21-->30-->44-->49-->54  -Pt given NS 50 ml/hr for 20 hours for total 1L fluids on admission  -Fluids [id number]-38 ml/hr for 14 hours for total 1L 1/3/20 in setting of rising Cr, now trending down  -Attempted to de-escalate abx of vanc/zosyn to levaquin however pt with recurrance of fevers overnight so they were re-started. -Sputum culture with gram negative rods (enterobacter cloacae on 1/4/20 sensitive to zosyn). Blood culture NGTD. Will d/c vanc.   -Will continue to monitor Cr      Chronic systolic congestive heart failure  Pt with history of HFrEF and biventricular pacemaker. Last EF 30% in 2/2019. Stress test negative at that time. On lasix 40 mg BID, Entresto 24-26 mg, Metoprolol succinate 150 mg daily at home. On admit,Troponin elevated to 0.5-->0.9. EKG without any St changes. Cardiology consulted given patient's cardiac history, suspect demand ischemia as etiology, they do not feel any intervention necessary at this point given that pt has already received  mg and high intensity statin in setting of ischemic stroke.     -Troponins were tended (Q6 hours x4) per cardiology recommendations, have peaked and now down-trending. Suspect demand ischemia as etiology.   -Home dose Lasix, Entresto currently held in setting of MCA stroke and possible infection. Metoprolol re-started  -Overnight 12/31, pt with  tachypnea and was given 1 dose lasix 20 mg IV  -Appears euvolemic on exam   -Gentle hydration given with 50 ml/hr NS over 20 hours for 1 L total in setting of low EF and infection  -I/Os, daily weights  -Replace K and Mg as needed  -TTE 1/2/20  ·  Moderately decreased left ventricular systolic function. The estimated ejection fraction is 35%  · Concentric left ventricular remodeling.  · Local segmental wall motion abnormalities.  · Moderate right ventricular enlargement.  · Mildly reduced right ventricular systolic function.  · Moderate biatrial enlargement.  · Aortic valve area is 1.12 cm2; peak velocity is 1.95 m/s; mean gradient is 9 mmHg.  · Mild-to-moderate aortic valve stenosis.  · Mild mitral regurgitation.  · Mild tricuspid regurgitation.  · The estimated PA systolic pressure is 59 mm Hg  · Pulmonary hypertension present.  · Elevated central venous pressure (15 mm Hg).  · Atrial fibrillation observed.           Persistent atrial fibrillation  Pt with history of Afib anticoagulated with Eliquis and rate-controlled with metoprolol and biventricular pacemaker.   Presented to ED yesterday 12/31 for likely MCA stroke following dental extractions earlier that day. Eliquis was held 4 days prior to procedure. Resumed 1/1/2019 per primary team.    -CHADSVASC 9  -Eliquis re-started 1/1/20  -Ok to re-start metoprolol       Type 2 diabetes mellitus with stage 3 chronic kidney disease, without long-term current use of insulin  Recent A1c of 6.9. On Januvia 25mg at home. Currently NPO 2/2 concern for swallowing. Has NG in place for medications. Evaluated by speech who recommended tube feeds. POC glucoses have been in mid 100-200ss.    -Low dose SSI 0-5 units (aspart) as needed. Discussed with patient's daughter about not using novolog as she was concerned that caused hypoglycemia in the past.   -Continue accuchecks   -Can adjust as needed      Idiopathic chronic gout of multiple sites without tophus  Continue Allopurinol        Hyperlipidemia  Hx of HLD. Home dose of atorvastatin was 10 mg. On admission, pt was started on atorvastatin 40 mg. Carotid U/S ordered per primary team.     -Lipid panel with total cholesterol of 209, ,   -Continue atorvastatin 40 mg daily       Essential hypertension  Pt with history of HTN. Home meds include lasix, entresto, metoprolol. Per primary team, goal SBP <220 in setting of likely MCA stroke.     -Holding Entresto, Lasix, in setting of acute CVA  -Metoprolol re-started  -Has prn labetolol IV ordered        VTE Risk Mitigation (From admission, onward)         Ordered     apixaban tablet 2.5 mg  2 times daily      01/01/20 2115     IP VTE HIGH RISK PATIENT  Once      12/31/19 2049     Place sequential compression device  Until discontinued      12/31/19 2049                      Palma Lee MD   [id number]  Department of Hospital Medicine   [facility]

## 2020-01-05 NOTE — ASSESSMENT & PLAN NOTE
Hx of CKD3. Baseline Cr of ~1.5-1.7. CTA head done on admit to evaluate for acute CVA.     -Cr currently at 1.3-->1.6-->2.1-->1.9 with BUN at 21-->30-->44-->49-->54  -Pt given NS 50 ml/hr for 20 hours for total 1L fluids on admission  -Fluids mr-dvtjjzp-20 ml/hr for 14 hours for total 1L 1/3/20 in setting of rising Cr, now trending down  -Attempted to de-escalate abx of vanc/zosyn to levaquin however pt with recurrance of fevers overnight so they were re-started. -Sputum culture with gram negative rods (enterobacter cloacae on 1/4/20 sensitive to zosyn). Blood culture NGTD. Will d/c vanc.   -Will continue to monitor Cr

## 2020-01-05 NOTE — ASSESSMENT & PLAN NOTE
Pt is an 84yF with multiple co-morbidities who was admitted to vascular neurology for suspected MCA stroke. Pt underwent dental extractions day of presentation. Overnight on 12/31, pt became tachypnic with fever to 100.3 and rapid response was called. CXR with left sided consolidation. Started on vanc/zosyn overnight. ABG wnl. WBC wnl. Procal mildly elevated to .99. O2 saturations %. Suspect possible aspiration pneumonia vs CAP. Family reports cough for few days prior to admission.     1/2/20: no longer febrile, WBC still wnl.   1/3/20: recurrence of fevers to 100.3 overnight. WBC rising from 8-->10.6 Sputum cultures with gram negative rods, likely in setting of aspiration pneumonia. Lactate wnl.   1/4/20: still febrile up to 101.7 evening. Sputum cultures growing enterobacter cloacae, sensitive to zosyn. Blood cultures NGTD. WBC wnl. Repeat influenza negative. Vanc was discontinued when sputum culture speciated. Given persistence of fevers, zosyn continued, can de-escalate in coming days if fevers improve. Unclear etiology of fevers. Repeat CXR with edema. Bilateral LE doppler to assess for DVT negative. Procal mildly elevated to 0.99.       -Blood cultures NGTD  -TTE without evidence of any vegetations   - UA negative for infection  - Respiratory viral panel negative  - IV fluids ordered X2  - Guaifenesin 10 ml TID to assist with mucous production  - Will also advise zopinex breathing treatments Q8 hrs and ipratropium breathing treatments Q4 hrs   - Chest PT QID     1/5/20-afebrile overnight. Still on zosyn. WBC wnl. However pt notably difficult to arouse this AM. ABG with respiratory alkalosis. Respiratory deep-suctioning to help. Appears to be using accessory muscle usage to breathe. Oxygen saturations in mid 90s, advise weaning oxygen as tolerated as pt does not require oxygen at home and could be contributing to respiratory status. Patient to be admitted to Neurocritical care for increased level of care  and intubation watch.

## 2020-01-05 NOTE — SUBJECTIVE & OBJECTIVE
Neurologic Chief Complaint: Aphasia and weakness     Subjective:     Interval History: Patient is seen for follow-up neurological assessment and treatment recommendations:    -patient afebrile overnight.  Currently on Zosyn.  -on examination patient was extremely lethargic and difficult to arouse. Repeat CT head showed evolution of superior frontal lobe infarct.  Subtle hypodensity in the left frontal lobe periventricular white matter as well.  -patient transferred to the neuro critical care unit after exhibiting paradoxical breathing as well as respiratory alkalosis on ABG.  Chest x-ray shows pulmonary edema.     HPI, Past Medical, Family, and Social History remains the same as documented in the initial encounter.     Review of Systems   Constitutional: Positive for fatigue. Negative for chills and fever.   Respiratory: Negative for cough.    Cardiovascular: Negative for chest pain.   Gastrointestinal: Negative for nausea and vomiting.   Neurological: Positive for speech difficulty and weakness. Negative for facial asymmetry and numbness.     Scheduled Meds:   albuterol-ipratropium  3 mL Nebulization Q4H    allopurinol  150 mg Per NG tube Daily    apixaban  2.5 mg Per NG tube BID    atorvastatin  80 mg Per NG tube Daily    chlorhexidine  15 mL Mouth/Throat BID    guaifenesin 100 mg/5 ml  10 mL Per NG tube TID    levothyroxine  25 mcg Per NG tube Before breakfast    metoprolol tartrate  100 mg Per NG tube QHS    metoprolol tartrate  50 mg Per NG tube Daily    piperacillin-tazobactam (ZOSYN) IVPB  4.5 g Intravenous Q12H    vancomycin (VANCOCIN) IVPB  1,000 mg Intravenous Q24H     Continuous Infusions:   sodium chloride 0.9%       PRN Meds:acetaminophen, Dextrose 10% Bolus, gabapentin, glucagon (human recombinant), insulin aspart U-100, labetalol, ondansetron, senna-docusate 8.6-50 mg, sodium chloride 0.9%, vancomycin - pharmacy to dose    Objective:     Vital Signs (Most Recent):  Temp: 99.5 °F (37.5 °C)  (01/05/20 1405)  Pulse: 69 (01/05/20 1554)  Resp: (!) 23 (01/05/20 1554)  BP: (!) 160/81 (01/05/20 1458)  SpO2: 100 % (01/05/20 1554)  BP Location: Right arm    Vital Signs Range (Last 24H):  Temp:  [91.4 °F (33 °C)-99.5 °F (37.5 °C)]   Pulse:  [69-80]   Resp:  [15-28]   BP: (122-160)/(58-81)   SpO2:  [95 %-100 %]   BP Location: Right arm    Physical Exam   Constitutional: No distress.   HENT:   Head: Normocephalic and atraumatic.   Mouth/Throat: No oropharyngeal exudate.   Eyes: No scleral icterus.   Neck: Normal range of motion. Neck supple.   No JVD noted   Cardiovascular: Normal rate.   Pulmonary/Chest: She is in respiratory distress. She has decreased breath sounds in the right upper field, the right middle field, the right lower field, the left upper field, the left middle field and the left lower field.   Abdominal: Soft. She exhibits no distension. There is no guarding.   Musculoskeletal:   No LE edema. Bilateral SCDs in place   Lymphadenopathy:     She has no cervical adenopathy.   Neurological: She displays no tremor.   Skin: Skin is warm and dry. She is not diaphoretic.   Nursing note and vitals reviewed.      Neurological Exam:   LOC: drowsy  Attention Span: poor  Language: Global aphasia  Articulation: Mute/Anarthric  Orientation: Not oriented to person, place, and time  Visual Fields: Full  EOM (CN III, IV, VI): Full/intact  Pupils (CN II, III): PERRL  Facial Sensation (CN V): Normal  Facial Movement (CN VII): Unable to test   Gag Reflex: present  Reflexes: 2+ throughout  Motor: Arm left  Paresis: 2/5  Leg left  Paresis: 1/5  Arm right  Plegia 0/5  Leg right Plegia 1/5  Cebellar: No evidence of appendicular or axial ataxia  Sensation: Intact to light touch, temperature and vibration  Unable to test  Tone: Rigidity  LUE     Laboratory:  CMP:   Recent Labs   Lab 01/05/20  0420 01/05/20  1340   CALCIUM 8.5* 8.5*   ALBUMIN 2.5*  --    PROT 6.9  --     148*   K 4.1 4.3   CO2 20* 23   * 113*    BUN 54* 53*   CREATININE 1.9* 1.7*   ALKPHOS 40*  --    ALT 9*  --    AST 16  --    BILITOT 0.9  --      BMP:   Recent Labs   Lab 01/05/20  1340   *   K 4.3   *   CO2 23   BUN 53*   CREATININE 1.7*   CALCIUM 8.5*     CBC:   Recent Labs   Lab 01/05/20  0420   WBC 11.15   RBC 3.60*   HGB 10.0*   HCT 36.1*   *   *   MCH 27.8   MCHC 27.7*     Lipid Panel:   Recent Labs   Lab 12/31/19 1929   CHOL 209*   LDLCALC 136.4   HDL 48   TRIG 123     Coagulation:   Recent Labs   Lab 01/01/20  0347   INR 1.1   APTT 22.8     Platelet Aggregation Study: No results for input(s): PLTAGG, PLTAGINTERP, PLTAGREGLACO, ADPPLTAGGREG in the last 168 hours.  Hgb A1C:   Recent Labs   Lab 12/31/19 2115   HGBA1C 6.9*     TSH:   Recent Labs   Lab 12/31/19 1929   TSH 13.528*       Diagnostic Results     Brain Imaging   CTH 1/1/19   -No evidence of major vascular distribution infarct or hemorrhage.  -Chronic microvascular ischemic change and generalized cerebral volume loss, normal for age.    Vessel Imaging   CTA  12/31/19   -No acute intracranial pathology.  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  -In this exam limited by poor timing of contrast and motion artifact, no high-grade stenosis or major vessel occlusion.  Hypoplastic right A2 segment.    Cardiac Imaging   TTE   · Moderately decreased left ventricular systolic function. The estimated ejection fraction is 35%  · Concentric left ventricular remodeling.  · Local segmental wall motion abnormalities.  · Moderate right ventricular enlargement.  · Mildly reduced right ventricular systolic function.  · Moderate biatrial enlargement.  · Aortic valve area is 1.12 cm2; peak velocity is 1.95 m/s; mean gradient is 9 mmHg.  · Mild-to-moderate aortic valve stenosis.  · Mild mitral regurgitation.  · Mild tricuspid regurgitation.  · The estimated PA systolic pressure is 59 mm Hg  · Pulmonary hypertension  present.  · Elevated central venous pressure (15 mm Hg).  · Atrial fibrillation observed.

## 2020-01-05 NOTE — PLAN OF CARE
Plan of care discussed with patient and her daughter. Family expressed understanding of her condition and her medical goals.

## 2020-01-05 NOTE — ASSESSMENT & PLAN NOTE
20-30 second left sided myoclonic jerking with left gaze deviation. This could explain her waxing and waning mental status  -Preliminary EEG shows diffuse slowing with no epileptiform discharges   Plan  1) Can consider EEG given change in mental status

## 2020-01-05 NOTE — NURSING
Patient arrived to [facility] from 926 to [facility] 9938    Type of stroke/diagnosis:  R MCA, embolic, stepped up to NCC for intubation watch    TPA start and end time (if applicable)    Thrombectomy start and end time (if applicable)    Current symptoms: lethargic,     Skin assessment done: Y  Wounds noted: RLE wound, R groin wound  LEILANI Armband Applied: yes    [facility] notified: KIMO Baker

## 2020-01-05 NOTE — PLAN OF CARE
POC reviewed with patient and daughter. All questions and concerns reviewed with daughter. Fall/safety precautions implemented and maintained. TF maintained with water boluses. Blood glucose monitored. No acute events noted this shift. Please see flow sheet for full assessment and vitals. Bed locked in lowest position. Call bell within reach. Will continue to monitor.

## 2020-01-05 NOTE — PROGRESS NOTES
Pharmacokinetic Initial Assessment: IV Vancomycin    Assessment/Plan:    · Vancomycin therapy re-initiated for possible bacteremia  · Will continue pulse dosing as done previously due to [id number]  · Give vancomycin 1000mg x1 now  · Obtain random level ~24 hrs post- dose on 1/6/20 @ 1500 and re-dose when <20 mcg/mL    Pharmacy will continue to follow and monitor vancomycin.      Please contact pharmacy at extension [phone] with any questions regarding this assessment.     Thank you for the consult,   Wendy Barrett       Patient brief summary:  Dacia Martínez is a 84 y.o. female initiated on antimicrobial therapy with IV Vancomycin for treatment of suspected bacteremia    Drug Allergies:   Review of patient's allergies indicates:   Allergen Reactions    Tramadol Hallucinations    Novolog flexpen u-100 insulin [insulin aspart u-100] Other (See Comments)     Perspiring and faint-like symptoms       Actual Body Weight:   79.4 kg    Renal Function:   Estimated Creatinine Clearance: 21.9 mL/min (A) (based on SCr of 1.9 mg/dL (H)).,       CBC (last 72 hours):  Recent Labs   Lab Result Units 01/03/20 0518 01/04/20 0416 01/05/20  0420   WBC K/uL 10.64 10.86 11.15   Hemoglobin g/dL 10.5* 10.1* 10.0*   Hematocrit % 35.6* 35.0* 36.1*   Platelets K/uL 176 146* 142*   Gran% % 78.0* 77.2* 80.8*   Lymph% % 12.7* 13.5* 9.3*   Mono% % 7.9 7.6 5.7   Eosinophil% % 0.2 0.7 3.0   Basophil% % 0.7 0.6 0.6   Differential Method  Automated Automated Automated       Metabolic Panel (last 72 hours):  Recent Labs   Lab Result Units 01/03/20 0518 01/03/20  0848 01/04/20  0416 01/05/20  0420   Sodium mmol/L 143  --  145 145   Sodium Urine Random mmol/L  --  40  --   --    Potassium mmol/L 3.8  --  3.7 4.1   Chloride mmol/L 108  --  111* 115*   CO2 mmol/L 22*  --  24 20*   Glucose mg/dL 215*  --  184* 193*   Glucose, UA   --  Negative  --   --    BUN, Bld mg/dL 44*  --  49* 54*   Creatinine mg/dL 2.1*  --  2.1* 1.9*   Creatinine, Random Ur mg/dL  --   82.0  --   --    Albumin g/dL 2.7*  --  2.5* 2.5*   Total Bilirubin mg/dL 0.7  --  0.9 0.9   Alkaline Phosphatase U/L 48*  --  42* 40*   AST U/L 16  --  15 16   ALT U/L 8*  --  8* 9*       Drug levels (last 3 results):  Recent Labs   Lab Result Units 01/03/20  0518 01/04/20  0416   Vancomycin, Random ug/mL 10.6 16.9       Microbiologic Results:  Microbiology Results (last 7 days)     Procedure Component Value Units Date/Time    Blood culture [[id number]] Collected:  01/03/20 0741    Order Status:  Completed Specimen:  Blood Updated:  01/05/20 0812     Blood Culture, Routine No Growth to date      No Growth to date      No Growth to date    Blood culture [[id number]] Collected:  01/03/20 0741    Order Status:  Completed Specimen:  Blood Updated:  01/05/20 0812     Blood Culture, Routine No Growth to date      No Growth to date      No Growth to date    Blood culture [[id number]] Collected:  01/01/20 1317    Order Status:  Completed Specimen:  Blood Updated:  01/04/20 1612     Blood Culture, Routine No Growth to date      No Growth to date      No Growth to date      No Growth to date    Narrative:       Collection has been rescheduled by TS2 at 01/01/2020 09:58 Reason:   nurses putting tube thru pts nose . will come back   Collection has been rescheduled by TS2 at 01/01/2020 09:58 Reason:   nurses putting tube thru pts nose . will come back     Blood culture [[id number]] Collected:  01/01/20 1317    Order Status:  Completed Specimen:  Blood Updated:  01/04/20 1612     Blood Culture, Routine No Growth to date      No Growth to date      No Growth to date      No Growth to date    Narrative:       Collection has been rescheduled by TS2 at 01/01/2020 09:58 Reason:   nurses putting tube thru pts nose . will come back   Collection has been rescheduled by TS2 at 01/01/2020 09:58 Reason:   nurses putting tube thru pts nose . will come back     Influenza A & B by Molecular [[id number]] Collected:  01/04/20 1200    Order Status:   Completed Specimen:  Nasopharyngeal Swab Updated:  01/04/20 1255     Influenza A, Molecular Negative     Influenza B, Molecular Negative     Flu A & B Source Nasal swab    Culture, Respiratory with Gram Stain [[id number]]  (Abnormal)  (Susceptibility) Collected:  01/01/20 1814    Order Status:  Completed Specimen:  Respiratory from Sputum, Induced Updated:  01/04/20 0933     Respiratory Culture No S aureus or Pseudomonas isolated.      ENTEROBACTER CLOACAE  Few  Normal respiratory tori also present       Gram Stain (Respiratory) <10 epithelial cells per low power field.     Gram Stain (Respiratory) Few WBC's     Gram Stain (Respiratory) Many Gram positive cocci     Gram Stain (Respiratory) Many Gram positive rods     Gram Stain (Respiratory) Many Gram negative rods     Gram Stain (Respiratory) Many Gram negative diplococci    Respiratory Infection Panel, Nasopharyngeal [[id number]] Collected:  01/01/20 1743    Order Status:  Completed Specimen:  Nasopharyngeal Swab Updated:  01/01/20 2236     Respiratory Infection Panel Source NP Swab     Adenovirus Not Detected     Coronavirus 229E Not Detected     Coronavirus HKU1 Not Detected     Coronavirus NL63 Not Detected     Coronavirus OC43 Not Detected     Human Metapneumovirus Not Detected     Human Rhinovirus/Enterovirus Not Detected     Influenza A (subtypes H1, H1-2009,H3) Not Detected     Influenza B Not Detected     Parainfluenza Virus 1 Not Detected     Parainfluenza Virus 2 Not Detected     Parainfluenza Virus 3 Not Detected     Parainfluenza Virus 4 Not Detected     Respiratory Syncytial Virus Not Detected     Bordetella Parapertussis (KY4703) Not Detected     Bordetella pertussis (ptxP) Not Detected     Chlamydia pneumoniae Not Detected     Mycoplasma pneumoniae Not Detected     Comment: Respiratory Infection Panel testing performed by Multiplex PCR.       Narrative:       For all other respiratory sources order [id number] Respiratory  Viral Panel by PCR (RVPCR)

## 2020-01-05 NOTE — NURSING
"Patient's daughter stated that her mother was allergic to the Novolog pen and does not want to continue with this medication. Stated, "My mother was given the wrong medication (novolog) at the pharmacy 5 years ago and started perspiring with faint-like symptoms."  This nurse added this med (novolog) to the allergy list and will discontinue this medication.  "

## 2020-01-05 NOTE — ASSESSMENT & PLAN NOTE
Dacia Martínez is a 84 y.o. female with PMHx of HTN, HLD, HF, DM, a fib (eliquis) who presented to hospital with gait instability, RSW, and speech changes. She had been off her eliquis for 1 week due to scheduled dental procedure. Patient with L gaze, mild RSW, and speech difficulty when examined by stroke provider. EMS reported possible waxing and waning of symptoms in transit. She was taken for STAT CTA multiphase, which was negative for early ischemic changes, LVO, and significant stenosis. tPA decision delayed due to unclear LKN time since patient took valium for dental procedure. STAT MRI attempted, but patient has pacemaker that is not MRI compatible.      After further discussion with family and unclear LKN time, decision was made to not treat with tPA. Possibly cardioembolic stroke based on history     -CTH 1/2/20: Interval development of a small region of decreased attenuation in the left frontal lobe superiorly concerning for possible acute/recent infarction  -Preliminary EEG: diffuse background slowing with no epileptiform discharges   -CTH 1/5/20: redemonstration of ill-defined hypodensity within the superior left frontal lobe which may relate to evolving recent/acute infarct. Additional subtle hypodensity is present within the left frontal lobe periventricular white matter, somewhat more conspicuous from prior examination, possibly relating to additional small volume recent infarct.  No evidence of acute intracranial hemorrhage or midline shift.    Patient transferred to the ICU on 1/5/20 for decline in mental status as well as paradoxical breathing (possible respiratory decompensation-ABG showing pure respiratory alkalosis)     Antithrombotics for secondary stroke prevention: Aspirin: 325 mg once then resuming Apixaban 2.5 mg BID     Statins for secondary stroke prevention and hyperlipidemia, if present:   Statins: Atorvastatin- 80 mg daily    Aggressive risk factor modification: HTN, DM, HLD, Obesity,  A-Fib     Rehab efforts: The patient has been evaluated by a stroke team provider and the therapy needs have been fully considered based off the presenting complaints and exam findings. The following therapy evaluations are needed: PT evaluate and treat, OT evaluate and treat, SLP evaluate and treat, PM&R evaluate for appropriate placement    Diagnostics ordered/pending: None  Can consider EEG given decline in mental status     VTE prophylaxis: Heparin 5000 units SQ every 8 hours    BP parameters: Infarct: No intervention, SBP <220

## 2020-01-05 NOTE — PROGRESS NOTES
Pt O2 98% on room air. Pt lung sounds coarse and diminished with increased work of breathing. Richard Sahu NP with NCC notified. ABG to be completed per verbal order. Will continue to monitor.

## 2020-01-05 NOTE — ASSESSMENT & PLAN NOTE
Hx of HLD. Home dose of atorvastatin was 10 mg. On admission, pt was started on atorvastatin 40 mg. Carotid U/S ordered per primary team.     -Lipid panel with total cholesterol of 209, ,   -Continue atorvastatin 40 mg daily

## 2020-01-05 NOTE — ASSESSMENT & PLAN NOTE
-Pt underwent dental extractions day of presentation. Family reports cough for few days prior to admission.  -Overnight on 12/31, pt became tachypnic with fever to 100.3 and rapid response was called. CXR with left sided consolidation. -Started on vanc/zosyn overnight. ABG wnl. WBC wnl. Procal mildly elevated to .99. O2 saturations %. Suspect possible aspiration pneumonia vs CAP.  Respiratory panel negative    Plan  1) Continue zosyn for now as patient was spiking fevers yesterday  2)  sputum cxs revealed Enterobacter Cloacae

## 2020-01-06 NOTE — PROGRESS NOTES
[facility]  Neurocritical Care  Progress Note    Admit Date: 12/31/2019  Service Date: 01/06/2020  Length of Stay: 6    Subjective:     Chief Complaint: Stroke    History of Present Illness: The patient is an 83yo female  with PMH of HTN, DM2, HFrEF (30% in 2/2019 with pacemaker), HLD, CAD, A fib (on Eliquis), OA, Breast Cancer (s/p mastectomy, chemo, radiation in 2000), CKD3, Gout, and Hypothyroidism who was originially admitted to vascular neurology after family noticed right sided-weakness, gait instability, and slurred speech yesterday evening (12/30) following dental procedure. Report that her Eliquis had been held for 4 days prior to the procedure.  Her daughters state that she had been altered since they picked her up from the surgery around 4:30 pm 12/30. 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 Unable to attain MRI due to pacemaker. She was given ASA 325mg and Atorvastatin 40 mg.  Overnight rapid response was called due to tachypnea up to 40 and fevers to 100.3. ABG wnl but CXR with possible left-sided infiltrate. WBC wnl. Pt was started on vanc/zosyn in setting of aspiration vs CAP pneumonia.     On 1/2/20 AM rapid response was called for concern for decreased arousal and labored breathing that has been waxing and waning throughout admission. Troponin peaked and down-trended. Pt was also noted to have jerking movements of UE with concern for seizure-like activity on 1/2/20. Neurology was consulted by vascular neurology who recommended continuous EEG monitoring that did not show any large epileptiform activty. Additionally, STAT head CT was notable for small area on frontal lobe that may represent acute infarct. Was started on Vanc/zosyn for Aspiration vs CAP but was de-escalated to Levaquin on 1/2/20. Overnight 1/3/20, pt with recurrence of fevers and vanc/zosyn was re-started. Lactate WNL. Respiratory viral panel and repeat influenza  negative but sputum culture with enterobacter cloacae, sensitive to zosyn. Echo without signs of vegetation. Blood cultures still negative. Pt febrile evening of 1/3/20 but improved 1/5/20, still on zosyn. Repeat heat CT with redemonstration of hypodensity within the superior left frontal lobe and additional subtle hypodensity within the left frontal lobe periventricular white matter. Also increasingly somnolent on 1/5/20, ABG with respiratory alkalosis, vascular neurology to discuss with neuro critical care. Patient to be admitted to [facility] for higher level of care.     1/5 Neuro Critical Care consulted for Decreased LOC, poor response, respiratory insufficiency. Patient found to be somnolent unable to follow commands, protecting airway. Transferred to [facility] for higher level of care and management.    Hospital Course: 01/05/2020 transfer from floor for decreased LOC, respiratory insufficiency  01/06/2020d/c eliquis, start heparin gtt minimal intensity for afib, cap overnight-nothing epileptiform, place A-line, ABG q6h, place hickman cath, 20 mg Lasix X1, continue enteral water flushed 200 ml q6h, repeat CBC-follow plts          Review of Systems  Unable to obtain a complete ROS due to level of consciousness  Objective:     Vitals:  Temp: 98.6 °F (37 °C)  Pulse: 70  Rhythm: paced rhythm  BP: 120/80  MAP (mmHg): 94  Resp: (!) 25  SpO2: 100 %  O2 Device (Oxygen Therapy): nasal cannula w/ humidification    Temp  Min: 97.7 °F (36.5 °C)  Max: 100.8 °F (38.2 °C)  Pulse  Min: 69  Max: 76  BP  Min: 97/52  Max: 185/83  MAP (mmHg)  Min: 71  Max: 120  Resp  Min: 12  Max: 53  SpO2  Min: 97 %  Max: 100 %    01/05 0701 - 01/06 0700  In: 1175 [I.V.:25]  Out: 400 [Urine:400]   Unmeasured Output  Urine Occurrence: 1  Stool Occurrence: 1  Emesis Occurrence: 0  Pad Count: 2       Physical Exam    Physical Exam:  GA: Alert, comfortable, no acute distress.   HEENT: No scleral icterus or JVD.   Pulmonary: Clear to auscultation A/P/L. No  wheezing, crackles, or rhonchi.  Cardiac: RRR S1 & S2 w/o rubs/murmurs/gallops.   Abdominal: Bowel sounds present x 4. No appreciable hepatosplenomegaly.  Skin: No jaundice, rashes, or visible lesions.  Neuro:  --GCS: E4V1 M4  --Mental Status:  Opens eyes to pain, doesn't track doesn't follow commands, aphasic  --CN II-XII grossly intact.   --Pupils 4mm, PERRL.   --Corneal reflex, gag, cough intact.  --LUE -spont  --RUE- withdraws  --LLE -withdraws  --RLE -withdraws    Medications:  Continuous  heparin (porcine) in D5W Last Rate: 12 Units/kg/hr (01/06/20 1404)   sodium chloride 0.9%    Scheduled  albuterol-ipratropium 3 mL Q4H   allopurinol 150 mg Daily   atorvastatin 80 mg Daily   chlorhexidine 15 mL BID   guaifenesin 100 mg/5 ml 10 mL TID   levothyroxine 25 mcg Before breakfast   metoprolol tartrate 100 mg QHS   metoprolol tartrate 50 mg Daily   piperacillin-tazobactam (ZOSYN) IVPB 4.5 g Q12H   senna-docusate 8.6-50 mg 1 tablet Daily   PRN  acetaminophen 650 mg Q6H PRN   Dextrose 10% Bolus 12.5 g PRN   gabapentin 300 mg TID PRN   glucagon (human recombinant) 1 mg PRN   insulin aspart U-100 0-5 Units Q6H PRN   labetalol 10 mg Q15 Min PRN   ondansetron 4 mg Q6H PRN   senna-docusate 8.6-50 mg 1 tablet Daily PRN   sodium chloride 0.9% 500 mL Continuous PRN   vancomycin - pharmacy to dose  pharmacy to manage frequency     Today I personally reviewed pertinent medications, lines/drains/airways, imaging, laboratory results,    Diet  Diet NPO      Assessment/Plan:     Neuro  Myoclonic jerking  EEG Monitoring  Seizure Precautions  01/06/2020-EEG cap nothing epileptiform -per epilepsy team      Derm  Alteration in skin integrity  Skin Assessment  Q Shift  Wound Care Following    Cardiac/Vascular  Bilateral carotid artery stenosis  As noted on Imaging    Chronic systolic congestive heart failure  EF 35%  Continuous Cardiac Monitoring  01/06/2020-20 mg Lasix X1, place hickman      Persistent atrial fibrillation  EKG  D/c  apixaban  -start heparin gtt    Hyperlipidemia  Monitor Lipid Panel  Continue Atorvastatin    Essential hypertension  Continuous Cardiac Monitoring  Vital Signs Q1 hour  -SBP goal 100-180  ECHO to assess left ventricular (LV) mass, systolic and diastolic function,  assess left atrial size and function.   EKG to determine baseline rhythm; identify abnormal arrhythmias                Renal/  CKD (chronic kidney disease) stage 3, GFR 30-59 ml/min  Monitor I/O's and daily weights  -Avoid nephrotoxic agents, NSAIDs, IV contrast, etc  -Renally dose medications   -Daily renal function panels   Limit/Avoid contrast studies   -Maintain Hgb >7.0  -Maintain MAPs <180           Endocrine  Hypothyroidism  Continue Synthroid  TSH 13.5      Type 2 diabetes mellitus with stage 3 chronic kidney disease, without long-term current use of insulin  A1c 6.9  RISS   POCT Q6  Nutritional Consult for dietary /caloric needs      Orthopedic  Idiopathic chronic gout of multiple sites without tophus  Continue Allopurinol  Continue Tylenol for pain    Other  Fever  Monitor temp Q4 hr  Tylenol for elevated temp          The patient is being Prophylaxed for:  Venous Thromboembolism with: Chemical  Stress Ulcer with: None  Ventilator Pneumonia with: not applicable    Activity Orders          Commode at bedside starting at 12/31 2047    Diet NPO: NPO starting at 12/31 2047    Commode at bedside starting at 12/31 2047    Straight Cath starting at 12/31 2046        Full Code    Marcia Larson NP  Neurocritical Care  [facility]

## 2020-01-06 NOTE — HPI
The patient is an 83yo female  with PMH of HTN, DM2, HFrEF (30% in 2/2019 with pacemaker), HLD, CAD, A fib (on Eliquis), OA, Breast Cancer (s/p mastectomy, chemo, radiation in 2000), CKD3, Gout, and Hypothyroidism who was originially admitted to vascular neurology after family noticed right sided-weakness, gait instability, and slurred speech yesterday evening (12/30) following dental procedure. Report that her Eliquis had been held for 4 days prior to the procedure.  Her daughters state that she had been altered since they picked her up from the surgery around 4:30 pm 12/30. 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 Unable to attain MRI due to pacemaker. She was given ASA 325mg and Atorvastatin 40 mg.  Overnight rapid response was called due to tachypnea up to 40 and fevers to 100.3. ABG wnl but CXR with possible left-sided infiltrate. WBC wnl. Pt was started on vanc/zosyn in setting of aspiration vs CAP pneumonia.     On 1/2/20 AM rapid response was called for concern for decreased arousal and labored breathing that has been waxing and waning throughout admission. Troponin peaked and down-trended. Pt was also noted to have jerking movements of UE with concern for seizure-like activity on 1/2/20. Neurology was consulted by vascular neurology who recommended continuous EEG monitoring that did not show any large epileptiform activty. Additionally, STAT head CT was notable for small area on frontal lobe that may represent acute infarct. Was started on Vanc/zosyn for Aspiration vs CAP but was de-escalated to Levaquin on 1/2/20. Overnight 1/3/20, pt with recurrence of fevers and vanc/zosyn was re-started. Lactate WNL. Respiratory viral panel and repeat influenza negative but sputum culture with enterobacter cloacae, sensitive to zosyn. Echo without signs of vegetation. Blood cultures still negative. Pt febrile evening of 1/3/20 but improved 1/5/20, still on zosyn. Repeat heat  CT with redemonstration of hypodensity within the superior left frontal lobe and additional subtle hypodensity within the left frontal lobe periventricular white matter. Also increasingly somnolent on 1/5/20, ABG with respiratory alkalosis, vascular neurology to discuss with neuro critical care. Patient to be admitted to [facility] for higher level of care.     1/5 Neuro Critical Care consulted for Decreased LOC, poor response, respiratory insufficiency. Patient found to be somnolent unable to follow commands, protecting airway. Transferred to [facility] for higher level of care and management.

## 2020-01-06 NOTE — PLAN OF CARE
Problem: Occupational Therapy Goal  Goal: Occupational Therapy Goal  Description  Goals set on 1/6, with expiration date 1/20:  Patient will increase functional independence with ADLs by performing:    Grooming while EOB with Mod Assistance.  UB Dressing with Moderate Assistance.  LB Dressing with Moderate Assistance.  Toileting from bedside commode with Moderate Assistance for hygiene and clothing management.   Rolling to Bilateral with Moderate Assistance.   Supine <> Sit with Moderate Assistance.  Squat pivot transfers with Moderate Assistance.  Patient's family / caregiver will demonstrate independence and safety with assisting patient with self-care skills and functional mobility.      Patient's family / caregiver will demonstrate independence with providing ROM and changes in bed positioning.       Outcome: Ongoing, Progressing       ReEval complete. Pt tolerated session well. Initiate OT POC.  KESHA Peck  01/06/2020

## 2020-01-06 NOTE — H&P
[facility]  Neurocritical Care  History & Physical    Admit Date: 12/31/2019  Service Date: 01/05/2020  Length of Stay: 5    Subjective:     Chief Complaint: Stroke    History of Present Illness: The patient is an 85yo female  with PMH of HTN, DM2, HFrEF (30% in 2/2019 with pacemaker), HLD, CAD, A fib (on Eliquis), OA, Breast Cancer (s/p mastectomy, chemo, radiation in 2000), CKD3, Gout, and Hypothyroidism who was originially admitted to vascular neurology after family noticed right sided-weakness, gait instability, and slurred speech yesterday evening (12/30) following dental procedure. Report that her Eliquis had been held for 4 days prior to the procedure.  Her daughters state that she had been altered since they picked her up from the surgery around 4:30 pm 12/30. 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 Unable to attain MRI due to pacemaker. She was given ASA 325mg and Atorvastatin 40 mg.  Overnight rapid response was called due to tachypnea up to 40 and fevers to 100.3. ABG wnl but CXR with possible left-sided infiltrate. WBC wnl. Pt was started on vanc/zosyn in setting of aspiration vs CAP pneumonia.     On 1/2/20 AM rapid response was called for concern for decreased arousal and labored breathing that has been waxing and waning throughout admission. Troponin peaked and down-trended. Pt was also noted to have jerking movements of UE with concern for seizure-like activity on 1/2/20. Neurology was consulted by vascular neurology who recommended continuous EEG monitoring that did not show any large epileptiform activty. Additionally, STAT head CT was notable for small area on frontal lobe that may represent acute infarct. Was started on Vanc/zosyn for Aspiration vs CAP but was de-escalated to Levaquin on 1/2/20. Overnight 1/3/20, pt with recurrence of fevers and vanc/zosyn was re-started. Lactate WNL. Respiratory viral panel and repeat influenza  negative but sputum culture with enterobacter cloacae, sensitive to zosyn. Echo without signs of vegetation. Blood cultures still negative. Pt febrile evening of 1/3/20 but improved 20, still on zosyn. Repeat heat CT with redemonstration of hypodensity within the superior left frontal lobe and additional subtle hypodensity within the left frontal lobe periventricular white matter. Also increasingly somnolent on 20, ABG with respiratory alkalosis, vascular neurology to discuss with neuro critical care. Patient to be admitted to [facility] for higher level of care.      Neuro Critical Care consulted for Decreased LOC, poor response, respiratory insufficiency. Patient found to be somnolent unable to follow commands, protecting airway. Transferred to [facility] for higher level of care and management.    Past Medical History:   Diagnosis Date    A-fib     Arthritis     Asteroid hyalosis of left eye     Bilateral nonexudative age-related macular degeneration 6/3/2014    Breast cancer     Cataract     CHF (congestive heart failure)     Chronic GERD 2017    CKD stage 3 due to type 2 diabetes mellitus     Coronary artery disease     Encounter for blood transfusion     Hypertension     Migraine headache     Mild nonproliferative diabetic retinopathy of both eyes 6/3/2014    Pneumonia     Stroke 2019    Type 2 diabetes mellitus with hyperglycemia     Vertigo      Past Surgical History:   Procedure Laterality Date    ABLATION N/A 2018    Procedure: ABLATION;  Surgeon: Jacques Burt MD;  Location: [facility];  Service: Cardiology;  Laterality: N/A;  AF, AVN, RFA, Choice, MB, 3 Prep *SJM CRT-P in situ*    BREAST SURGERY      left lumpectomy    CARPAL TUNNEL RELEASE      left    CATARACT EXTRACTION      vance     SECTION      EYE SURGERY      cataracts bilaterally    HYSTERECTOMY      partial    IMPLANTATION OF BIVENTRICULAR HEART PACEMAKER N/A 10/26/2018    Procedure:  INSERTION, CARDIAC PACEMAKER, BIVENTRICULAR;  Surgeon: Jacques Burt MD;  Location: [facility] CATH LAB;  Service: Cardiology;  Laterality: N/A;  AF, CRT-P, SJM, MAC, MB, 3 Prep      No current facility-administered medications on file prior to encounter.      Current Outpatient Medications on File Prior to Encounter   Medication Sig Dispense Refill    allopurinol (ZYLOPRIM) 100 MG tablet Take 1 and 1/2 tablets (150 mg total) by mouth once daily. 45 tablet 3    apixaban (ELIQUIS) 2.5 mg Tab Take 1 tablet (2.5 mg total) by mouth 2 (two) times daily. 60 tablet 11    fluticasone propionate (FLONASE) 50 mcg/actuation nasal spray SPRAY TWICE IN EACH NOSTRIL DAILY 15.8 mL 5    furosemide (LASIX) 20 MG tablet Take 2 tablets (40 mg total) by mouth 2 (two) times daily. 120 tablet 0    levothyroxine (SYNTHROID) 25 MCG tablet Take 1 tablet (25 mcg total) by mouth daily before breakfast. 30 tablet 11    metoprolol succinate (TOPROL-XL) 100 MG 24 hr tablet Take 1 tablet by mouth once daily. Take with Metoprol succinate 50 mg for a total daily dose of 150mg 30 tablet 11    metoprolol succinate (TOPROL-XL) 50 MG 24 hr tablet Take 1 tablet by mouth once daily. Take with Metoprol succinate 100 mg for a total daily dose of 150mg 30 tablet 11    mirtazapine (REMERON) 7.5 MG Tab Take 1 tablet (7.5 mg total) by mouth every evening. 30 tablet 0    sacubitril-valsartan (ENTRESTO) 24-26 mg per tablet Take 1 tablet by mouth 2 (two) times daily. 60 tablet 11    SITagliptin (JANUVIA) 25 MG Tab Take 1 tablet (25 mg total) by mouth once daily. 30 tablet 11    ACCU-CHEK FASTCLIX Misc 1 lancet by Misc.(Non-Drug; Combo Route) route 3 (three) times daily. Pt to test blood glucose up to 3 times daily. 100 each 11    atorvastatin (LIPITOR) 10 MG tablet Take 1 tablet (10 mg total) by mouth once daily. 90 tablet 3    cholecalciferol, vitamin D3, (VITAMIN D3) 1,000 unit capsule Take 2 capsules (2,000 Units total) by mouth once daily. 180  capsule 3    clindamycin (CLEOCIN) 150 MG capsule TAKE ONE CAPSULE BY MOUTH EVERY 6 HOURS FOR 7 DAYS. 28 capsule 0    cycloSPORINE (RESTASIS) 0.05 % ophthalmic emulsion Place 0.4 mLs (1 drop total) into both eyes 2 (two) times daily. 60 vial 1    diphenhydrAMINE-zinc acetate 1-0.1% (BENADRYL) cream Apply topically 2 (two) times daily as needed for Itching. 28 g 2    doxepin (SINEQUAN) 10 MG capsule Take 1 capsule (10 mg total) by mouth every evening. 30 capsule 2    gabapentin (NEURONTIN) 300 MG capsule Take 1 capsule (300 mg total) by mouth 3 (three) times daily as needed. 90 capsule 3    HYDROcodone-acetaminophen (NORCO) 5-325 mg per tablet Take 1-2 tablets by mouth every 12 (twelve) hours as needed for Pain. 60 tablet 0    hydrocortisone 2.5 % cream Apply topically 2 (two) times daily. 28 g 2    ipratropium (ATROVENT) 0.03 % nasal spray instill 1 spray into each nostril 2 times a day if needed for nasal drip 30 mL 1    predniSONE (DELTASONE) 20 MG tablet Take one tab by mouth once daily for a joint flare only as needed 30 tablet 0    triamcinolone acetonide 0.1% (KENALOG) 0.1 % cream apply to affected area twice daily for x2 weeks, then as needed for itching, irritation. 454 g 3    [DISCONTINUED] amoxicillin (AMOXIL) 500 MG capsule Take 1 capsule (500 mg total) by mouth 2 (two) times daily. For infection 20 capsule 0    [DISCONTINUED] azelastine (ASTELIN) 137 mcg (0.1 %) nasal spray Use one spray by Nasal route twice daily for allergies 30 mL 1    [DISCONTINUED] diazePAM (VALIUM) 5 MG tablet Take 1 tablet by mouth the night before the appointment, and take 1 tablet by mouth one hour before the appointment 2 tablet 0    [DISCONTINUED] fluconazole (DIFLUCAN) 150 MG Tab Take 1 tablet by mouth once and then repeat dose with 1 tablet by mouth again 3 days later. 2 tablet 0    [DISCONTINUED] meclizine (ANTIVERT) 25 mg tablet Take 1 tablet (25 mg total) by mouth 3 (three) times daily as needed for  Dizziness. 20 tablet 0      Allergies: Tramadol and Novolog flexpen u-100 insulin [insulin aspart u-100]    Family History   Problem Relation Age of Onset    Cancer Mother         stomach    Heart disease Mother     Diabetes Father     Hypertension Father     Blindness Brother     Diabetes Brother     Hypertension Brother     Cataracts Sister     Diabetes Sister     Diabetes Brother     Diabetes Brother     Diabetes Brother     No Known Problems Daughter     No Known Problems Son     No Known Problems Daughter     Amblyopia Neg Hx     Glaucoma Neg Hx     Macular degeneration Neg Hx     Strabismus Neg Hx     Retinal detachment Neg Hx      Social History     Tobacco Use    Smoking status: Never Smoker    Smokeless tobacco: Never Used   Substance Use Topics    Alcohol use: Yes    Drug use: No     Review of Systems   Unable to perform ROS: Mental status change     Objective:     Vitals:    Temp: 99.5 °F (37.5 °C)  Pulse: 69  Rhythm: paced rhythm  BP: (!) 160/81  MAP (mmHg): 101  Resp: (!) 23  SpO2: 100 %  O2 Device (Oxygen Therapy): nasal cannula    Temp  Min: 96.8 °F (36 °C)  Max: 99.5 °F (37.5 °C)  Pulse  Min: 69  Max: 80  BP  Min: 122/58  Max: 160/81  MAP (mmHg)  Min: 84  Max: 101  Resp  Min: 15  Max: 28  SpO2  Min: 95 %  Max: 100 %    01/04 0701 - 01/05 0700  In: 1743   Out: -    Unmeasured Output  Urine Occurrence: 1  Stool Occurrence: 0  Emesis Occurrence: 0  Pad Count: 1       Physical Exam   Constitutional: She appears well-developed and well-nourished.   HENT:   Head: Normocephalic and atraumatic.   Eyes: Pupils are equal, round, and reactive to light. EOM are normal.   Neck: Normal range of motion. Neck supple.   Cardiovascular:   Ventricular-paced rhythm   Pulmonary/Chest: She has decreased breath sounds in the right upper field, the right middle field, the right lower field, the left upper field, the left middle field and the left lower field.   Abdominal: Soft. Bowel sounds are  normal. She exhibits no distension.   Neurological:   Mental Status:  Somnolent, unable to follow commands     +Aphasia +  Pupils  4mm, PERRL.   +Corneal reflex, +gag, +cough   Spontaneous movement of extremities         Nursing note and vitals reviewed.    Unable to test orientation, language, memory, judgment, insight, fund of knowledge, shoulder shrug, tongue protrusion, coordination, gait due to level of consciousness.    Today I personally reviewed pertinent medications, lines/drains/airways, imaging, laboratory results, notably:        Assessment/Plan:     Neuro  Myoclonic jerking  EEG Monitoring  Seizure Precautions    Derm  Alteration in skin integrity  Skin Assessment  Q Shift  Wound Care Following    Pulmonary  Pneumonia  CXR for comparison  Breathing treatments Q4  ABG PRN  Continue Antibiotics  Respiratory cultures    Cardiac/Vascular  Bilateral carotid artery stenosis  As noted on Imaging    Troponin level elevated  Trend troponin  .80 last  Repeat troponin      Chronic systolic congestive heart failure  EF 35%  Continuous Cardiac Monitoring    Persistent atrial fibrillation  EKG  Continue Metoprolol  Continue Apixiban  Continuous Cardiac Monitoring    Hyperlipidemia  Monitor Lipid Panel  Continue Atorvastatin    Essential hypertension  Continuous Cardiac Monitoring  Vital Signs Q1 hour  Systolic (24hrs), Av , Min:122 , Max:160   CXR establish baseline; R/O cardiomegaly  ECHO to assess left ventricular (LV) mass, systolic and diastolic function,  assess left atrial size and function.   EKG to determine baseline rhythm; identify abnormal arrhythmias            Renal/  CKD (chronic kidney disease) stage 3, GFR 30-59 ml/min  Monitor I/O's and daily weights  -Avoid nephrotoxic agents, NSAIDs, IV contrast, etc  -Renally dose medications   -Daily renal function panels   Limit/Avoid contrast studies   -Maintain Hgb >7.0  -Maintain MAPs >65             Endocrine  Hypothyroidism  Continue Synthroid  TSH  13.5      Type 2 diabetes mellitus with stage 3 chronic kidney disease, without long-term current use of insulin  A1c 6.9  RISS   POCT Q6  Nutritional Consult for dietary /caloric needs      Orthopedic  Primary osteoarthritis involving multiple joints  History of  Continue tylenol for pain    Idiopathic chronic gout of multiple sites without tophus  Continue Allopurinol  Continue Tylenol for pain    Other  Fever  Monitor temp Q4 hr  Tylenol for elevated temp          The patient is being Prophylaxed for:  Venous Thromboembolism with: Mechanical  Stress Ulcer with: None  Ventilator Pneumonia with: none    Activity Orders          Commode at bedside starting at 12/31 2047    Diet NPO: NPO starting at 12/31 2047    Commode at bedside starting at 12/31 2047    Straight Cath starting at 12/31 2046        Full Code    Richard Sahu NP  Neurocritical Care  [facility]

## 2020-01-06 NOTE — ASSESSMENT & PLAN NOTE
Stroke risk factor  TTE Feb 2019 with EF 40% - repeat 1/2 with EF 35%  Patient on entresto, metoprolol, and lasix at home  Holding entresto and lasix in setting of acute stroke  - Patient was on metoprolol for rate control. Was initially discontinued by hospital medicine due to concern for sepsis however, will reinitiate  -Currently on lopressor 50 mg AM, and 100 mg PM to be compatible with NG tube

## 2020-01-06 NOTE — ASSESSMENT & PLAN NOTE
Dacia Martínez is a 84 y.o. female with PMHx of HTN, HLD, HF, DM, a fib (eliquis) who presented to hospital with gait instability, RSW, and speech changes. She had been off her eliquis for 1 week due to scheduled dental procedure. Patient with L gaze, mild RSW, and speech difficulty when examined by stroke provider. EMS reported possible waxing and waning of symptoms in transit. She was taken for STAT CTA multiphase, which was negative for early ischemic changes, LVO, and significant stenosis. tPA decision delayed due to unclear LKN time since patient took valium for dental procedure. STAT MRI attempted, but patient has pacemaker that is not MRI compatible.      After further discussion with family and unclear LKN time, decision was made to not treat with tPA. Possibly cardioembolic stroke based on history     -CTH 1/2/20: Interval development of a small region of decreased attenuation in the left frontal lobe superiorly concerning for possible acute/recent infarction  -Preliminary EEG: diffuse background slowing with no epileptiform discharges   -CTH 1/5/20: redemonstration of ill-defined hypodensity within the superior left frontal lobe which may relate to evolving recent/acute infarct. Additional subtle hypodensity is present within the left frontal lobe periventricular white matter, somewhat more conspicuous from prior examination, possibly relating to additional small volume recent infarct.  No evidence of acute intracranial hemorrhage or midline shift.    Patient transferred to the ICU on 1/5/20 for decline in mental status as well as paradoxical breathing (possible respiratory decompensation-ABG showing pure respiratory alkalosis). Currently intubation watch.    Antithrombotics for secondary stroke prevention: Heparin gtt for possible LP    Statins for secondary stroke prevention and hyperlipidemia, if present:   Statins: Atorvastatin- 80 mg daily    Aggressive risk factor modification: HTN, DM, HLD, Obesity,  A-Fib     Rehab efforts: The patient has been evaluated by a stroke team provider and the therapy needs have been fully considered based off the presenting complaints and exam findings. The following therapy evaluations are needed: PT evaluate and treat, OT evaluate and treat, SLP evaluate and treat, PM&R evaluate for appropriate placement    Diagnostics ordered/pending: None  Consider repeat EEG given continued decline in mental status, LP for further evaluation     VTE prophylaxis: Heparin 5000 units SQ every 8 hours    BP parameters: Infarct: No intervention, SBP <220

## 2020-01-06 NOTE — PLAN OF CARE
POC reviewed with pt and great granddaughter at 0500. Great granddaughter verbalized understanding, pt nonverbal. Questions and concerns addressed. Pt on cEEG cap. At midnight, pt opened eyes spontaneously post cEEG placement.  Shortly after, pt began grunting and breathing became labored. RU Bruner notified and came to bedside. ABGs ordered Nasal trumpet placed and nasal suctioning ordered q1h. Stat CXR and lasix ordered and administered. Grunting decreased. Will continue to monitor. See flowsheets for full assessment and VS info

## 2020-01-06 NOTE — ASSESSMENT & PLAN NOTE
Monitor I/O's and daily weights  -Avoid nephrotoxic agents, NSAIDs, IV contrast, etc  -Renally dose medications   -Daily renal function panels   Limit/Avoid contrast studies   -Maintain Hgb >7.0  -Maintain MAPs <180

## 2020-01-06 NOTE — ASSESSMENT & PLAN NOTE
Continuous Cardiac Monitoring  Vital Signs Q1 hour  -SBP goal 100-180  ECHO to assess left ventricular (LV) mass, systolic and diastolic function,  assess left atrial size and function.   EKG to determine baseline rhythm; identify abnormal arrhythmias

## 2020-01-06 NOTE — ASSESSMENT & PLAN NOTE
Monitor I/O's and daily weights  -Avoid nephrotoxic agents, NSAIDs, IV contrast, etc  -Renally dose medications   -Daily renal function panels   Limit/Avoid contrast studies   -Maintain Hgb >7.0  -Maintain MAPs >65

## 2020-01-06 NOTE — SUBJECTIVE & OBJECTIVE
Review of Systems  Unable to obtain a complete ROS due to level of consciousness  Objective:     Vitals:  Temp: 98.6 °F (37 °C)  Pulse: 70  Rhythm: paced rhythm  BP: 120/80  MAP (mmHg): 94  Resp: (!) 25  SpO2: 100 %  O2 Device (Oxygen Therapy): nasal cannula w/ humidification    Temp  Min: 97.7 °F (36.5 °C)  Max: 100.8 °F (38.2 °C)  Pulse  Min: 69  Max: 76  BP  Min: 97/52  Max: 185/83  MAP (mmHg)  Min: 71  Max: 120  Resp  Min: 12  Max: 53  SpO2  Min: 97 %  Max: 100 %    01/05 0701 - 01/06 0700  In: 1175 [I.V.:25]  Out: 400 [Urine:400]   Unmeasured Output  Urine Occurrence: 1  Stool Occurrence: 1  Emesis Occurrence: 0  Pad Count: 2       Physical Exam    Physical Exam:  GA: Alert, comfortable, no acute distress.   HEENT: No scleral icterus or JVD.   Pulmonary: Clear to auscultation A/P/L. No wheezing, crackles, or rhonchi.  Cardiac: RRR S1 & S2 w/o rubs/murmurs/gallops.   Abdominal: Bowel sounds present x 4. No appreciable hepatosplenomegaly.  Skin: No jaundice, rashes, or visible lesions.  Neuro:  --GCS: E4V1 M4  --Mental Status:  Opens eyes to pain, doesn't track doesn't follow commands, aphasic  --CN II-XII grossly intact.   --Pupils 4mm, PERRL.   --Corneal reflex, gag, cough intact.  --LUE -spont  --RUE- withdraws  --LLE -withdraws  --RLE -withdraws    Medications:  Continuous  heparin (porcine) in D5W Last Rate: 12 Units/kg/hr (01/06/20 1404)   sodium chloride 0.9%    Scheduled  albuterol-ipratropium 3 mL Q4H   allopurinol 150 mg Daily   atorvastatin 80 mg Daily   chlorhexidine 15 mL BID   guaifenesin 100 mg/5 ml 10 mL TID   levothyroxine 25 mcg Before breakfast   metoprolol tartrate 100 mg QHS   metoprolol tartrate 50 mg Daily   piperacillin-tazobactam (ZOSYN) IVPB 4.5 g Q12H   senna-docusate 8.6-50 mg 1 tablet Daily   PRN  acetaminophen 650 mg Q6H PRN   Dextrose 10% Bolus 12.5 g PRN   gabapentin 300 mg TID PRN   glucagon (human recombinant) 1 mg PRN   insulin aspart U-100 0-5 Units Q6H PRN   labetalol 10  mg Q15 Min PRN   ondansetron 4 mg Q6H PRN   senna-docusate 8.6-50 mg 1 tablet Daily PRN   sodium chloride 0.9% 500 mL Continuous PRN   vancomycin - pharmacy to dose  pharmacy to manage frequency     Today I personally reviewed pertinent medications, lines/drains/airways, imaging, laboratory results,    Diet  Diet NPO

## 2020-01-06 NOTE — PROGRESS NOTES
Pharmacist Renal Dose Adjustment Note    Dacia Martínez is a 84 y.o. female being treated with the medication piperacillin-tazobactam (Zosyn)    Recent Labs   Lab 01/05/20  0420 01/05/20  1340 01/06/20  0311   CREATININE 1.9* 1.7* 1.1     Serum creatinine: 1.1 mg/dL 01/06/20 0311  Estimated creatinine clearance: 37.8 mL/min    Zosyn 4.5 g Q12H will be increased to 4.5 g Q8H for improving SCr; CrCl >20 mL/min        Loan Britton, PharmD, [facility]  Neurocritical Care Pharmacist  [id number]

## 2020-01-06 NOTE — PLAN OF CARE
POC reviewed with pt and family at 1700. Pt family verbalized understanding. Questions and concerns addressed. No acute events today. Pt progressing toward goals. Will continue to monitor. See flowsheets for full assessment and VS info.

## 2020-01-06 NOTE — ASSESSMENT & PLAN NOTE
Stroke risk factor  A1C 6.9  Goal glucose 140-180  Low correction SSI for NPO patients until LEILANI completed  Diabetic diet when safe for swallow eval

## 2020-01-06 NOTE — PT/OT/SLP RE-EVAL
Physical Therapy Re-evaluation    Patient Name:  Dacia Martínez   MRN:  [id number]    Recommendations:     Discharge Recommendations:  nursing facility, skilled   Discharge Equipment Recommendations: (TBD)   Barriers to discharge: Inaccessible home and Decreased caregiver support    Assessment:     Dacia Martínez is a 84 y.o. female admitted with a medical diagnosis of Stroke.  She presents with the following impairments/functional limitations:  weakness, impaired endurance, impaired self care skills, impaired functional mobilty, gait instability, impaired balance, decreased upper extremity function, decreased lower extremity function, decreased safety awareness, impaired cognition. Pt very lethargic and required dependent assistance during re-evaluation. Pt demonstrated increased work of breathing and accessory muscle use seated EOB. Pt was returned to supine. Upon reattaching humidified oxygen to wall mount on right side of bed, water buildup caused patient to cough, immediate suction provided and patient breathing comfortably at end of session. RN present throughout. Pt would benefit from a skilled nursing facility to address the above listed deficits to improve functional mobility and overall safety.    Rehab Prognosis:  Fair; patient would benefit from acute skilled PT services to address these deficits and reach maximum level of function.      Recent Surgery: * No surgery found *      Plan:     During this hospitalization, patient to be seen 3 x/week to address the above listed problems via gait training, therapeutic activities, therapeutic exercises, neuromuscular re-education  · Plan of Care Expires:  01/31/20   Plan of Care Reviewed with: patient, family    Subjective     Communicated with nursing prior to session.  Patient found HOB elevated with bed alarm, blood pressure cuff, NG tube, oxygen, pressure relief boots, peripheral IV, pulse ox (continuous), SCD, telemetry upon PT entry to room, agreeable to  "evaluation.      Chief Complaint: none, pt lethargic   Patient comments/goals: Family goals: return to PLOF  Pain/Comfort:       Patients cultural, spiritual, Spiritism conflicts given the current situation: no      Objective:     Patient found with: bed alarm, blood pressure cuff, NG tube, oxygen, pressure relief boots, peripheral IV, pulse ox (continuous), SCD, telemetry     General Precautions: Standard, aspiration, NPO, fall   Orthopedic Precautions:N/A   Braces:       Exams:  · Cognitive Exam:  Patient somnolent and lethergic  · Eyes opened with "tickling" of feet, not verbalization made and pt unable to maintain EO  · Gross Motor Coordination:  Unable to assess  · Postural Exam:  Patient presented with the following abnormalities:    · -       Rounded shoulders  · -       Forward head  · -       Posterior pelvic tilt  · Slouched posture  · Sensation:  Withdraws to "tickling" of feet  · RLE ROM: PROM WFL  · RLE Strength: grossly 2/5 observed with withdrawal  · LLE ROM: PROM WFL  · LLE Strength: grossly 2/5 observed with withdrawal    Functional Mobility:  · Bed Mobility:     · Rolling Right: dependence and of 2 persons; pt grunted  · Supine to Sit: dependence and of 2 persons  · Sit to Supine: dependence and of 2 persons  · Transfers: deferred 2/2 decrease alertness  · Static Sitting Balance: dependence x 2 persons  · EOB: (~4-5 seconds)  increased work of breathing and accessory muscle use noted. Pt returned to supine. Paradoxical breathing pattern noted.    AM-PAC 6 CLICK MOBILITY  Total Score:        Therapeutic Activities and Exercises:   Patient/family educated on:  ·  role of PT in acute care  · goals of session  · benefits of mobilizing  · Discussed PT plan of care during hospitalization.   · how their diagnosis impacts their mobility within PT scope of practice.     Communication board updated and all questions answered within PT scope of practice.      Patient left HOB elevated with all lines intact, " call button in reach, bed alarm on and nursing and family present.    GOALS:   Multidisciplinary Problems     Physical Therapy Goals        Problem: Physical Therapy Goal    Goal Priority Disciplines Outcome Goal Variances Interventions   Physical Therapy Goal     PT, PT/OT Ongoing, Progressing     Description:  Goals to be met by: 20     Patient will increase functional independence with mobility by performin. Supine to sit with Moderate Assistance  2. Sit to supine with Moderate Assistance  3. Sit to stand transfer with Maximum Assistance  4. Bed to chair transfer with Maximum Assistance with/without AD.  5. Gait  x 5 feet with Maximum Assistance with/without AD.  6. Stand for 1 minute with Moderate Assistance with/without AD.  7. Lower extremity exercise program x20 reps per handout, with assistance as needed                      History:     Past Medical History:   Diagnosis Date    A-fib     Arthritis     Asteroid hyalosis of left eye     Bilateral nonexudative age-related macular degeneration 6/3/2014    Breast cancer     Cataract     CHF (congestive heart failure)     Chronic GERD 2017    CKD stage 3 due to type 2 diabetes mellitus     Coronary artery disease     Encounter for blood transfusion     Hypertension     Migraine headache     Mild nonproliferative diabetic retinopathy of both eyes 6/3/2014    Pneumonia     Stroke 2019    Type 2 diabetes mellitus with hyperglycemia     Vertigo        Past Surgical History:   Procedure Laterality Date    ABLATION N/A 2018    Procedure: ABLATION;  Surgeon: Jacques Burt MD;  Location: [facility];  Service: Cardiology;  Laterality: N/A;  AF, AVN, RFA, Choice, MB, 3 Prep *SJM CRT-P in situ*    BREAST SURGERY      left lumpectomy    CARPAL TUNNEL RELEASE      left    CATARACT EXTRACTION      vance     SECTION      EYE SURGERY      cataracts bilaterally    HYSTERECTOMY      partial    IMPLANTATION OF  BIVENTRICULAR HEART PACEMAKER N/A 10/26/2018    Procedure: INSERTION, CARDIAC PACEMAKER, BIVENTRICULAR;  Surgeon: Jacques Burt MD;  Location: [facility] CATH LAB;  Service: Cardiology;  Laterality: N/A;  AF, CRT-P, SJM, MAC, MB, 3 Prep       Time Tracking:     PT Received On: 01/06/20  PT Start Time: 1102     PT Stop Time: 1133  PT Total Time (min): 31 min (co-re-eval/treat with OT)    Billable Minutes: Re-eval 23 and Therapeutic Activity 8      Franny Thomson, PT  01/08/2020

## 2020-01-06 NOTE — PT/OT/SLP DISCHARGE
Occupational Therapy Discharge Summary    Dacia Martínez  MRN: [id number]   Principal Problem: Stroke      Patient Discharged from acute Occupational Therapy on 1/6.  Please refer to prior OT note dated 1/6 for functional status.    Assessment:      Patient transferred to ICU.     Objective:     GOALS:   Multidisciplinary Problems     Occupational Therapy Goals        Problem: Occupational Therapy Goal    Goal Priority Disciplines Outcome Interventions   Occupational Therapy Goal     OT, PT/OT Ongoing, Progressing    Description:  Goals set on 1/1, with expiration date 1/15:  Patient will increase functional independence with ADLs by performing:    Grooming while EOB with Minimal Assistance.  UB Dressing with Moderate Assistance.  LB Dressing with Moderate Assistance.  Toileting from bedside commode with Moderate Assistance for hygiene and clothing management.   Rolling to Bilateral with Moderate Assistance.   Supine <> Sit with Moderate Assistance.  Squat pivot transfers with Moderate Assistance.  Patient's family / caregiver will demonstrate independence and safety with assisting patient with self-care skills and functional mobility.      Patient's family / caregiver will demonstrate independence with providing ROM and changes in bed positioning.                       Reasons for Discontinuation of Therapy Services  Transfer to alternate level of care.      Plan:     Patient Discharged to: Neuro ICU.    Please reconsult OT when patient is medically appropriate.     Ifrah Brown, OT  1/6/2020

## 2020-01-06 NOTE — PROGRESS NOTES
[facility]  Vascular Neurology  Comprehensive Stroke Center  Progress Note    Assessment/Plan:     * Stroke  Dacia Martínez is a 84 y.o. female with PMHx of HTN, HLD, HF, DM, a fib (eliquis) who presented to hospital with gait instability, RSW, and speech changes. She had been off her eliquis for 1 week due to scheduled dental procedure. Patient with L gaze, mild RSW, and speech difficulty when examined by stroke provider. EMS reported possible waxing and waning of symptoms in transit. She was taken for STAT CTA multiphase, which was negative for early ischemic changes, LVO, and significant stenosis. tPA decision delayed due to unclear LKN time since patient took valium for dental procedure. STAT MRI attempted, but patient has pacemaker that is not MRI compatible.      After further discussion with family and unclear LKN time, decision was made to not treat with tPA. Possibly cardioembolic stroke based on history     -CTH 1/2/20: Interval development of a small region of decreased attenuation in the left frontal lobe superiorly concerning for possible acute/recent infarction  -Preliminary EEG: diffuse background slowing with no epileptiform discharges   -CTH 1/5/20: redemonstration of ill-defined hypodensity within the superior left frontal lobe which may relate to evolving recent/acute infarct. Additional subtle hypodensity is present within the left frontal lobe periventricular white matter, somewhat more conspicuous from prior examination, possibly relating to additional small volume recent infarct.  No evidence of acute intracranial hemorrhage or midline shift.    Patient transferred to the ICU on 1/5/20 for decline in mental status as well as paradoxical breathing (possible respiratory decompensation-ABG showing pure respiratory alkalosis). Currently intubation watch.    Antithrombotics for secondary stroke prevention: Heparin gtt for possible LP    Statins for secondary stroke prevention and  hyperlipidemia, if present:   Statins: Atorvastatin- 80 mg daily    Aggressive risk factor modification: HTN, DM, HLD, Obesity, A-Fib     Rehab efforts: The patient has been evaluated by a stroke team provider and the therapy needs have been fully considered based off the presenting complaints and exam findings. The following therapy evaluations are needed: PT evaluate and treat, OT evaluate and treat, SLP evaluate and treat, PM&R evaluate for appropriate placement    Diagnostics ordered/pending: None  Consider repeat EEG given continued decline in mental status, LP for further evaluation     VTE prophylaxis: Heparin 5000 units SQ every 8 hours    BP parameters: Infarct: No intervention, SBP <220        Fever  2/2 PNA  -US of the lower extremities negative     Myoclonic jerking  20-30 second left sided myoclonic jerking with left gaze deviation. This could explain her waxing and waning mental status  -Preliminary EEG shows diffuse slowing with no epileptiform discharges   Plan  1) Can consider EEG given change in mental status     Right hemiplegia  Intermittent episodes of right hemiplegia.   CTH on 1/2/20 revealing interval development of a small region of decreased attenuation in the left frontal lobe superiorly concerning for possible acute/recent infarction.    CTH on 1/5/20 showing redemonstration of ill-defined hypodensity within the superior left frontal lobe which may relate to evolving recent/acute infarct.  Additional subtle hypodensity is present within the left frontal lobe periventricular white matter, somewhat more conspicuous from prior examination, possibly relating to additional small volume recent infarct.  No evidence of acute intracranial hemorrhage or midline shift.    Troponin level elevated  Troponin was ordered on admission and noted to be 0.5 -> repeat 0.9. EKG notable for V-paced rhythm without evidence of ischemia. Etiology most likely 2/2 demand ischemia (type II NSTEMI) in the setting of  co-morbidites (possibly has aspiration PNA, slightly anemic etc). Troponin trending downwards        Pneumonia  -Pt underwent dental extractions day of presentation. Family reports cough for few days prior to admission.  -Overnight on 12/31, pt became tachypnic with fever to 100.3 and rapid response was called. CXR with left sided consolidation. -Started on vanc/zosyn overnight. ABG wnl. WBC wnl. Procal mildly elevated to .99. O2 saturations %. Suspect possible aspiration pneumonia vs CAP.  Respiratory panel negative    Plan  1) Continue zosyn for now as patient was spiking fevers yesterday  2)  sputum cxs revealed Enterobacter Cloacae       Hypothyroidism  Continue home synthroid  Patient with elevated TSH but normal free T4, subclinical hypothyroidism    CKD (chronic kidney disease) stage 3, GFR 30-59 ml/min  Cr 1.3 on arrival. Trending upwards which could be due to contrast versus nephrotoxcity due to antibiotics  Continue free water enteral boluses     Chronic systolic congestive heart failure  Stroke risk factor  TTE Feb 2019 with EF 40% - repeat 1/2 with EF 35%  Patient on entresto, metoprolol, and lasix at home  Holding entresto and lasix in setting of acute stroke  - Patient was on metoprolol for rate control. Was initially discontinued by hospital medicine due to concern for sepsis however, will reinitiate  -Currently on lopressor 50 mg AM, and 100 mg PM to be compatible with NG tube     Persistent atrial fibrillation  Stroke risk factor  On eliquis at home  Held x1 week for dental procedure  Continue home eliquis  Continue home metoprolol    Type 2 diabetes mellitus with stage 3 chronic kidney disease, without long-term current use of insulin  Stroke risk factor  A1C 6.9  Goal glucose 140-180  Low correction SSI for NPO patients until LEILANI completed  Diabetic diet when safe for swallow eval    Primary osteoarthritis involving multiple joints  PRN tylenol    Idiopathic chronic gout of multiple sites  without tophus  Continue home allopurinol    Hyperlipidemia  Stroke risk factor  Patient on atorvastatin 10 mg daily at home   on arrival  Increasing to atorvastatin 80 mg daily    Essential hypertension  Stroke risk factor  SBP <220   Holding home antihypertensives due to concern for acute stroke  Prn labetalol         01/01/2020 CTA MP without any acute findings. MRI not done as incompatible with pacemaker.Neurological exam patient is waxing and waning. At times will move her left arm however, per the medicine team's examination patient had hemineglect in hte right side which was not seen during my exam.nOvernight, rapid response was called due to tachypnea up to 40 and fevers to 100.3. ABG wnl but CXR with possible left-sided infiltrate. WBC wnl. Pt was started on vanc/zosyn in setting of aspiration vs CAP pneumonia. Troponin elevated to 0.516 with repeat elevated to 0.935.Cardiology consulted and they believed this is demand ischemia, thus will trend troponin. Repeat CTH shows no evidence of major vascular distribution infarct or hemorrhage.     01/02/2020 Elevated BUN/Cr (could be secondary to post contrast). Patient having intermittent episodes of hemiplegia on the right side. During transport to Echo, patient was exhibiting left sided myoclonic jerks with left gaze deviation. Ordered EEG. STAT CTH ordered. General neurology is aware. Awaiting respiratory culture before deescalation of antibiotics. Will be transferred to medicine floor tomorrow.     01/03/2020 Patient was spiking fevers. Antibiotics broadened to vancomycin and zosyn. Patient pan-cultured. Preliminary EEG shows diffuse slowing with epileptiform discharges. US of the carotids ordered.     01/04/2020 Sputum culture with enterobacter cloacae, pan-sensitive to antibiotics.  Repeat influenza pending. Echo without signs of vegetation. Blood cultures  negative. Increased free water. Pt still with fever in the evening of 1/3/20 so will not  deescalate zosyn. Will discontinue vancomycin. US of the lower legs ordered to rule out other causes of fever.  Will order CT head to rule out any new infarctions.    01/05/2020 Patient was febrile  evening of 1/3/20 but improved 1/5/20 however, will keep on zosyn. CTH with redemonstration of hypodensity withi the superior left frontal lobe and additional subtle hypodensity within the left frontal lobe periventricular white matter. US of the lower extremities does not reveal any DVT. On examination today  Patient was extremely somnolent and was exhibiting paradoxical breathing. ABG demonstrated respiratory alkalosis. CXR shows pulmonary edema. Due to possible respiratory decompensation as well as somnolent state, patient was transferred to [facility]        STROKE DOCUMENTATION   Acute Stroke Times   Last Known Normal Date: 12/31/19  Last Known Normal Time: (unknown)  Symptom Onset Date: 12/31/19  Symptom Onset Time: (unknown)  Stroke Team Called Date: 12/31/19  Stroke Team Called Time: 1924  Stroke Team Arrival Date: 12/31/19  Stroke Team Arrival Time: 1929  CT Interpretation Time: 1939  Decision to Treat Time for Alteplase: 2039  Decision to Treat Time for IR: 1939    NIH Scale:  1a. Level of Consciousness: 2-->Not alert, requires repeated stimulation to attend, or is obtunded and requires strong or painful stimulation to make movements (not stereotyped)  1b. LOC Questions: 2-->Answers neither question correctly  1c. LOC Commands: 2-->Performs neither task correctly  2. Best Gaze: 0-->Normal  3. Visual: 0-->No visual loss  4. Facial Palsy: 0-->Normal symmetrical movements  5a. Motor Arm, Left: 3-->No effort against gravity, limb falls  5b. Motor Arm, Right: 3-->No effort against gravity, limb falls  6a. Motor Leg, Left: 3-->No effort against gravity, leg falls to bed immediately  6b. Motor Leg, Right: 3-->No effort against gravity, leg falls to bed immediately  7. Limb Ataxia: 0-->Absent  8. Sensory: 0-->Normal, no  sensory loss  9. Best Language: 3-->Mute, global aphasia, no usable speech or auditory comprehension  10. Dysarthria: 2-->Severe dysarthria, patients speech is so slurred as to be unintelligible in the absence of or out of proportion to any dysphasia, or is mute/anarthric  11. Extinction and Inattention (formerly Neglect): 0-->No abnormality  Total (NIH Stroke Scale): 23       Modified Dori Score: 3  Cambridge Coma Scale:    ABCD2 Score:    [id number] Score:   HAS -BLED Score:   ICH Score:   Hunt & Prather Classification:      Hemorrhagic change of an Ischemic Stroke: Does this patient have an ischemic stroke with hemorrhagic changes? No     Neurologic Chief Complaint: Aphasia and weakness     Subjective:     Interval History: Patient is seen for follow-up neurological assessment and treatment recommendations:    Patient continues to be difficult to arouse, lethargic. Had increased work of breathing and tachypnea after manipulation by OT, improved with supplemental oxygen and rest. However, mentation did not improve throughout the day.    HPI, Past Medical, Family, and Social History remains the same as documented in the initial encounter.     Review of Systems   Unable to perform ROS: Mental status change   Constitutional: Positive for fatigue. Negative for chills and fever.   Respiratory: Negative for cough.    Cardiovascular: Negative for chest pain.   Gastrointestinal: Negative for nausea and vomiting.   Neurological: Positive for speech difficulty and weakness. Negative for facial asymmetry and numbness.     Scheduled Meds:   albuterol-ipratropium  3 mL Nebulization Q4H    allopurinol  150 mg Per NG tube Daily    atorvastatin  80 mg Per NG tube Daily    chlorhexidine  15 mL Mouth/Throat BID    guaifenesin 100 mg/5 ml  10 mL Per NG tube TID    levothyroxine  25 mcg Per NG tube Before breakfast    metoprolol tartrate  100 mg Per NG tube QHS    metoprolol tartrate  50 mg Per NG tube Daily     piperacillin-tazobactam (ZOSYN) IVPB  4.5 g Intravenous Q8H    senna-docusate 8.6-50 mg  1 tablet Per NG tube Daily    vancomycin (VANCOCIN) IVPB  1,000 mg Intravenous Q24H     Continuous Infusions:   heparin (porcine) in D5W 12 Units/kg/hr (01/06/20 1602)    sodium chloride 0.9%       PRN Meds:acetaminophen, Dextrose 10% Bolus, gabapentin, glucagon (human recombinant), insulin aspart U-100, labetalol, ondansetron, senna-docusate 8.6-50 mg, sodium chloride 0.9%    Objective:     Vital Signs (Most Recent):  Temp: 99.3 °F (37.4 °C) (01/06/20 1602)  Pulse: 70 (01/06/20 1602)  Resp: 20 (01/06/20 1602)  BP: 129/61 (01/06/20 1602)  SpO2: 100 % (01/06/20 1602)  BP Location: Left leg    Vital Signs Range (Last 24H):  Temp:  [97.7 °F (36.5 °C)-100.8 °F (38.2 °C)]   Pulse:  [69-76]   Resp:  [12-53]   BP: ()/(49-90)   SpO2:  [97 %-100 %]   BP Location: Left leg    Physical Exam   Constitutional: No distress.   HENT:   Head: Normocephalic and atraumatic.   Mouth/Throat: No oropharyngeal exudate.   Eyes: No scleral icterus.   Neck: Normal range of motion. Neck supple.   No JVD noted   Cardiovascular: Normal rate.   Pulmonary/Chest: No respiratory distress. She has decreased breath sounds in the right upper field, the right middle field, the right lower field, the left upper field, the left middle field and the left lower field. She has no wheezes. She has no rales.   Abdominal: Soft. She exhibits no distension. There is no guarding.   Musculoskeletal:   No LE edema. Bilateral SCDs in place   Lymphadenopathy:     She has no cervical adenopathy.   Neurological: She displays no tremor.   Skin: Skin is warm and dry. She is not diaphoretic.   Nursing note and vitals reviewed.      Neurological Exam:   LOC: drowsy  Attention Span: poor  Language: Global aphasia  Articulation: Mute/Anarthric  Orientation: Not oriented to person, place, and time  Visual Fields: Full  EOM (CN III, IV, VI): Full/intact  Pupils (CN II, III):  PERRL  Facial Sensation (CN V): Normal  Facial Movement (CN VII): Unable to test   Gag Reflex: present  Reflexes: 2+ throughout  Cebellar: No evidence of appendicular or axial ataxia  Sensation: Intact to light touch, temperature and vibration  Unable to test  Tone: Rigidity  LUE     Laboratory:  CMP:   Recent Labs   Lab 01/06/20  0311   CALCIUM 6.5*   ALBUMIN 1.9*   PROT 5.8*   *   K 3.9   CO2 19*   *   BUN 37*   CREATININE 1.1   ALKPHOS 37*   ALT 8*   AST 17   BILITOT 0.8     BMP:   Recent Labs   Lab 01/06/20  0311   *   K 3.9   *   CO2 19*   BUN 37*   CREATININE 1.1   CALCIUM 6.5*     CBC:   Recent Labs   Lab 01/06/20  1152   WBC 11.33   RBC 3.35*   HGB 9.2*   HCT 31.8*      MCV 95   MCH 27.5   MCHC 28.9*     Lipid Panel:   Recent Labs   Lab 12/31/19 1929   CHOL 209*   LDLCALC 136.4   HDL 48   TRIG 123     Coagulation:   Recent Labs   Lab 01/06/20  1152   INR 1.3*   APTT 26.0     Hgb A1C:   Recent Labs   Lab 12/31/19 2115   HGBA1C 6.9*     TSH:   Recent Labs   Lab 12/31/19 1929   TSH 13.528*       Diagnostic Results     Brain Imaging   CT 1/1/19   -No evidence of major vascular distribution infarct or hemorrhage.  -Chronic microvascular ischemic change and generalized cerebral volume loss, normal for age.    Vessel Imaging   CTA  12/31/19   -No acute intracranial pathology.  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  -In this exam limited by poor timing of contrast and motion artifact, no high-grade stenosis or major vessel occlusion.  Hypoplastic right A2 segment.    Cardiac Imaging   TTE   · Moderately decreased left ventricular systolic function. The estimated ejection fraction is 35%  · Concentric left ventricular remodeling.  · Local segmental wall motion abnormalities.  · Moderate right ventricular enlargement.  · Mildly reduced right ventricular systolic function.  · Moderate biatrial enlargement.  · Aortic  valve area is 1.12 cm2; peak velocity is 1.95 m/s; mean gradient is 9 mmHg.  · Mild-to-moderate aortic valve stenosis.  · Mild mitral regurgitation.  · Mild tricuspid regurgitation.  · The estimated PA systolic pressure is 59 mm Hg  · Pulmonary hypertension present.  · Elevated central venous pressure (15 mm Hg).  · Atrial fibrillation observed.           Sneha Segundo MD  [facility]  Department of Vascular Neurology   [facility]

## 2020-01-06 NOTE — ASSESSMENT & PLAN NOTE
Cr 1.3 on arrival. Trending upwards which could be due to contrast versus nephrotoxcity due to antibiotics  Continue free water enteral boluses

## 2020-01-06 NOTE — PLAN OF CARE
SW attempted to meet with the Pt family at bedside, but no one was present and Pt was being given bath.    Mervat Olivares LCSW  Neurocritical Care   [facility]  [id number]

## 2020-01-06 NOTE — ASSESSMENT & PLAN NOTE
Stroke risk factor  On eliquis at home  Held x1 week for dental procedure  Continue home eliquis  Continue home metoprolol

## 2020-01-06 NOTE — SUBJECTIVE & OBJECTIVE
Neurologic Chief Complaint: Aphasia and weakness     Subjective:     Interval History: Patient is seen for follow-up neurological assessment and treatment recommendations:    Patient continues to be difficult to arouse, lethargic. Had increased work of breathing and tachypnea after manipulation by OT, improved with supplemental oxygen and rest. However, mentation did not improve throughout the day.    HPI, Past Medical, Family, and Social History remains the same as documented in the initial encounter.     Review of Systems   Unable to perform ROS: Mental status change   Constitutional: Positive for fatigue. Negative for chills and fever.   Respiratory: Negative for cough.    Cardiovascular: Negative for chest pain.   Gastrointestinal: Negative for nausea and vomiting.   Neurological: Positive for speech difficulty and weakness. Negative for facial asymmetry and numbness.     Scheduled Meds:   albuterol-ipratropium  3 mL Nebulization Q4H    allopurinol  150 mg Per NG tube Daily    atorvastatin  80 mg Per NG tube Daily    chlorhexidine  15 mL Mouth/Throat BID    guaifenesin 100 mg/5 ml  10 mL Per NG tube TID    levothyroxine  25 mcg Per NG tube Before breakfast    metoprolol tartrate  100 mg Per NG tube QHS    metoprolol tartrate  50 mg Per NG tube Daily    piperacillin-tazobactam (ZOSYN) IVPB  4.5 g Intravenous Q8H    senna-docusate 8.6-50 mg  1 tablet Per NG tube Daily    vancomycin (VANCOCIN) IVPB  1,000 mg Intravenous Q24H     Continuous Infusions:   heparin (porcine) in D5W 12 Units/kg/hr (01/06/20 1602)    sodium chloride 0.9%       PRN Meds:acetaminophen, Dextrose 10% Bolus, gabapentin, glucagon (human recombinant), insulin aspart U-100, labetalol, ondansetron, senna-docusate 8.6-50 mg, sodium chloride 0.9%    Objective:     Vital Signs (Most Recent):  Temp: 99.3 °F (37.4 °C) (01/06/20 1602)  Pulse: 70 (01/06/20 1602)  Resp: 20 (01/06/20 1602)  BP: 129/61 (01/06/20 1602)  SpO2: 100 % (01/06/20  1602)  BP Location: Left leg    Vital Signs Range (Last 24H):  Temp:  [97.7 °F (36.5 °C)-100.8 °F (38.2 °C)]   Pulse:  [69-76]   Resp:  [12-53]   BP: ()/(49-90)   SpO2:  [97 %-100 %]   BP Location: Left leg    Physical Exam   Constitutional: No distress.   HENT:   Head: Normocephalic and atraumatic.   Mouth/Throat: No oropharyngeal exudate.   Eyes: No scleral icterus.   Neck: Normal range of motion. Neck supple.   No JVD noted   Cardiovascular: Normal rate.   Pulmonary/Chest: No respiratory distress. She has decreased breath sounds in the right upper field, the right middle field, the right lower field, the left upper field, the left middle field and the left lower field. She has no wheezes. She has no rales.   Abdominal: Soft. She exhibits no distension. There is no guarding.   Musculoskeletal:   No LE edema. Bilateral SCDs in place   Lymphadenopathy:     She has no cervical adenopathy.   Neurological: She displays no tremor.   Skin: Skin is warm and dry. She is not diaphoretic.   Nursing note and vitals reviewed.      Neurological Exam:   LOC: drowsy  Attention Span: poor  Language: Global aphasia  Articulation: Mute/Anarthric  Orientation: Not oriented to person, place, and time  Visual Fields: Full  EOM (CN III, IV, VI): Full/intact  Pupils (CN II, III): PERRL  Facial Sensation (CN V): Normal  Facial Movement (CN VII): Unable to test   Gag Reflex: present  Reflexes: 2+ throughout  Cebellar: No evidence of appendicular or axial ataxia  Sensation: Intact to light touch, temperature and vibration  Unable to test  Tone: Rigidity  LUE     Laboratory:  CMP:   Recent Labs   Lab 01/06/20  0311   CALCIUM 6.5*   ALBUMIN 1.9*   PROT 5.8*   *   K 3.9   CO2 19*   *   BUN 37*   CREATININE 1.1   ALKPHOS 37*   ALT 8*   AST 17   BILITOT 0.8     BMP:   Recent Labs   Lab 01/06/20  0311   *   K 3.9   *   CO2 19*   BUN 37*   CREATININE 1.1   CALCIUM 6.5*     CBC:   Recent Labs   Lab 01/06/20  1152   WBC  11.33   RBC 3.35*   HGB 9.2*   HCT 31.8*      MCV 95   MCH 27.5   MCHC 28.9*     Lipid Panel:   Recent Labs   Lab 12/31/19 1929   CHOL 209*   LDLCALC 136.4   HDL 48   TRIG 123     Coagulation:   Recent Labs   Lab 01/06/20  1152   INR 1.3*   APTT 26.0     Hgb A1C:   Recent Labs   Lab 12/31/19 2115   HGBA1C 6.9*     TSH:   Recent Labs   Lab 12/31/19 1929   TSH 13.528*       Diagnostic Results     Brain Imaging   CTH 1/1/19   -No evidence of major vascular distribution infarct or hemorrhage.  -Chronic microvascular ischemic change and generalized cerebral volume loss, normal for age.    Vessel Imaging   CTA MP 12/31/19   -No acute intracranial pathology.  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  -In this exam limited by poor timing of contrast and motion artifact, no high-grade stenosis or major vessel occlusion.  Hypoplastic right A2 segment.    Cardiac Imaging   TTE   · Moderately decreased left ventricular systolic function. The estimated ejection fraction is 35%  · Concentric left ventricular remodeling.  · Local segmental wall motion abnormalities.  · Moderate right ventricular enlargement.  · Mildly reduced right ventricular systolic function.  · Moderate biatrial enlargement.  · Aortic valve area is 1.12 cm2; peak velocity is 1.95 m/s; mean gradient is 9 mmHg.  · Mild-to-moderate aortic valve stenosis.  · Mild mitral regurgitation.  · Mild tricuspid regurgitation.  · The estimated PA systolic pressure is 59 mm Hg  · Pulmonary hypertension present.  · Elevated central venous pressure (15 mm Hg).  · Atrial fibrillation observed.

## 2020-01-06 NOTE — PROGRESS NOTES
Pharmacokinetic Assessment Follow Up: IV Vancomycin    Assessment and Plan:  Vancomycin random level drawn approximately 24-hours post dose is 15.7 mcg/mL  SCr appears to be improving (down to 1.1mg/dL from 1.7 yesterday) and UOP picking up  Will schedule vancomycin regimen 1000 mg Q24H  Draw trough before third dose on 1/7 at 16:00    Drug levels (last 3 results):  Recent Labs   Lab Result Units 01/04/20  0416 01/06/20  1554   Vancomycin, Random ug/mL 16.9 15.7       Pharmacy will continue to follow and monitor vancomycin. Please call for questions regarding this assessment.    Thank you for the consult,   Loan Britton, PharmD, [facility]  Neurocritical Care Pharmacist  [id number]       Patient brief summary:  Dacia Martínez is a 84 y.o. female initiated on antimicrobial therapy with IV Vancomycin for treatment of bacteremia    Drug Allergies:   Review of patient's allergies indicates:   Allergen Reactions    Tramadol Hallucinations    Novolog flexpen u-100 insulin [insulin aspart u-100] Other (See Comments)     Perspiring and faint-like symptoms       Actual Body Weight:   79.4 kg    Renal Function:   Estimated Creatinine Clearance: 37.8 mL/min (based on SCr of 1.1 mg/dL).,     Dialysis Method (if applicable):  N/A    CBC (last 72 hours):  Recent Labs   Lab Result Units 01/04/20 0416 01/05/20 0420 01/06/20  0311 01/06/20  1152   WBC K/uL 10.86 11.15 8.83 11.33   Hemoglobin g/dL 10.1* 10.0* 11.8* 9.2*   Hematocrit % 35.0* 36.1* 40.5 31.8*   Platelets K/uL 146* 142* 102* 158   Gran% % 77.2* 80.8* 84.4* 83.9*   Lymph% % 13.5* 9.3* 7.8* 8.5*   Mono% % 7.6 5.7 5.1 5.9   Eosinophil% % 0.7 3.0 1.6 0.8   Basophil% % 0.6 0.6 0.6 0.5   Differential Method  Automated Automated Automated Automated       Metabolic Panel (last 72 hours):  Recent Labs   Lab Result Units 01/04/20  0416 01/05/20  0420 01/05/20  1340 01/06/20  0311   Sodium mmol/L 145 145 148* 152*   Potassium mmol/L 3.7 4.1 4.3 3.9   Chloride mmol/L 111* 115* 113* 119*    CO2 mmol/L 24 20* 23 19*   Glucose mg/dL 184* 193* 201* 150*   BUN, Bld mg/dL 49* 54* 53* 37*   Creatinine mg/dL 2.1* 1.9* 1.7* 1.1   Albumin g/dL 2.5* 2.5*  --  1.9*   Total Bilirubin mg/dL 0.9 0.9  --  0.8   Alkaline Phosphatase U/L 42* 40*  --  37*   AST U/L 15 16  --  17   ALT U/L 8* 9*  --  8*   Magnesium mg/dL  --   --   --  2.2   Phosphorus mg/dL  --   --   --  2.3*       Vancomycin Administrations:  vancomycin given in the last 96 hours                   vancomycin in dextrose 5 % 1 gram/250 mL IVPB 1,000 mg (mg) 1,000 mg New Bag 01/05/20 1600                Microbiologic Results:  Microbiology Results (last 7 days)     Procedure Component Value Units Date/Time    Blood culture [[id number]] Collected:  01/01/20 1317    Order Status:  Completed Specimen:  Blood Updated:  01/06/20 1612     Blood Culture, Routine No growth after 5 days.    Narrative:       Collection has been rescheduled by TS2 at 01/01/2020 09:58 Reason:   nurses putting tube thru pts nose . will come back   Collection has been rescheduled by TS2 at 01/01/2020 09:58 Reason:   nurses putting tube thru pts nose . will come back     Blood culture [[id number]] Collected:  01/01/20 1317    Order Status:  Completed Specimen:  Blood Updated:  01/06/20 1612     Blood Culture, Routine No growth after 5 days.    Narrative:       Collection has been rescheduled by TS2 at 01/01/2020 09:58 Reason:   nurses putting tube thru pts nose . will come back   Collection has been rescheduled by TS2 at 01/01/2020 09:58 Reason:   nurses putting tube thru pts nose . will come back     Blood culture [[id number]] Collected:  01/03/20 0741    Order Status:  Completed Specimen:  Blood Updated:  01/06/20 0812     Blood Culture, Routine No Growth to date      No Growth to date      No Growth to date      No Growth to date    Blood culture [[id number]] Collected:  01/03/20 0741    Order Status:  Completed Specimen:  Blood Updated:  01/06/20 0812     Blood Culture, Routine No  Growth to date      No Growth to date      No Growth to date      No Growth to date    Influenza A & B by Molecular [[id number]] Collected:  01/04/20 1200    Order Status:  Completed Specimen:  Nasopharyngeal Swab Updated:  01/04/20 1255     Influenza A, Molecular Negative     Influenza B, Molecular Negative     Flu A & B Source Nasal swab    Culture, Respiratory with Gram Stain [[id number]]  (Abnormal)  (Susceptibility) Collected:  01/01/20 1814    Order Status:  Completed Specimen:  Respiratory from Sputum, Induced Updated:  01/04/20 0933     Respiratory Culture No S aureus or Pseudomonas isolated.      ENTEROBACTER CLOACAE  Few  Normal respiratory tori also present       Gram Stain (Respiratory) <10 epithelial cells per low power field.     Gram Stain (Respiratory) Few WBC's     Gram Stain (Respiratory) Many Gram positive cocci     Gram Stain (Respiratory) Many Gram positive rods     Gram Stain (Respiratory) Many Gram negative rods     Gram Stain (Respiratory) Many Gram negative diplococci    Respiratory Infection Panel, Nasopharyngeal [[id number]] Collected:  01/01/20 1743    Order Status:  Completed Specimen:  Nasopharyngeal Swab Updated:  01/01/20 2236     Respiratory Infection Panel Source NP Swab     Adenovirus Not Detected     Coronavirus 229E Not Detected     Coronavirus HKU1 Not Detected     Coronavirus NL63 Not Detected     Coronavirus OC43 Not Detected     Human Metapneumovirus Not Detected     Human Rhinovirus/Enterovirus Not Detected     Influenza A (subtypes H1, H1-2009,H3) Not Detected     Influenza B Not Detected     Parainfluenza Virus 1 Not Detected     Parainfluenza Virus 2 Not Detected     Parainfluenza Virus 3 Not Detected     Parainfluenza Virus 4 Not Detected     Respiratory Syncytial Virus Not Detected     Bordetella Parapertussis (GC6922) Not Detected     Bordetella pertussis (ptxP) Not Detected     Chlamydia pneumoniae Not Detected     Mycoplasma pneumoniae Not Detected     Comment:  Respiratory Infection Panel testing performed by Multiplex PCR.       Narrative:       For all other respiratory sources order [id number] Respiratory  Viral Panel by PCR (RVPCR)

## 2020-01-06 NOTE — CHAPLAIN
Spiritual Care Encounter  Please contact the Department of Spiritual Care, your Unit  or the On-call  at any time if any needs are brought to your attention. i.e. Anytime you note spiritual, existential or emotional distress or there is a new diagnosis and/or prognosis.      Purpose:  To provide emotional, spiritual and/or existential support to the patient and/or their loved one's during a time of crisis, trauma, or death.  Visit Type: Initial Visit  Visit Category: Critical Care, Spiritual Assessment, Other (Comment)(Potential Emergent Intubation w/ Family Support)  Visited With: Patient, Family, Health care provider([facility] 'Sameera' at Bedside)  Number of Family Visited: 1([facility] (Sameera))  Length of Visit: 106 Minutes  Continue Visiting: Yes  Referral From: Nurse  Referral To: ,      Assessment/Intervention:  I attended to the bedside of the patient after notification that the team would likely have to intubate and family at the bedside was understandably distraught. Upon my arrival the patients great-granddaughter was holding the patients hands and talking to her reassuringly. I too offered words of comfort to the patient, when at times she seemed anxious. I asked if they would appreciate a word of prayer to which the family readily agreed. I said a prayer over the patient for peace for not only herself but her family and those who love her so dearly. 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 The great-granddaughter explained that the patient is the last living of her four siblings. The patient lives in a multigenerational household with her daughter, two granddaughters and three great-grandchildren. The patient has raised  all the children (i.e. Grandchildren and Great-grands), particularly her great-grandchild Sameera, who is often at the bedside. Sameera even pursued nursing for two years simply because she wanted to be able to take care of her grandmother before it became overwhelming. Sameera is currently taking a year off from Business school to take care of the patient. The family is especially distressed due to the patients inability to respond to them and particularly talk. I encouraged Sameera to take some time to rest but she is worried that she will miss any moment her grand-mother says something. The patient and her great-granddaughter are very reliant on their diamond and Hoahaoism, but I have yet to be able to complete a full spiritual assessment due to the patients current clinical/medical condition as well as the limited number of family present.    Spiritual Resources Requested: Prayer       Patient Spiritual Encounters  Care Provided: Prayer support, Compassionate presence, Other care (specify)(Words of Reassurance)  Patient Coping: Anxiety, Other (Comment)(Difficult to assess to due patient's current medical status)  Comments - Patient: Patient continues to grow more critical, seems mildly aware of family presence and responds to such    Family Spiritual Encounters  Care Provided: Reflective listening, Life review/ reflection, Prayer support, Compassionate presence, Assessed diamond resources, Grief care, Explored pt/family concerns  Family Coping: Anxiety, Open/discussion, Sadness, Living with uncertainty, Active diamond, Relying on spiritual resources, Using diamond/ community resources, Other (Comment)(Great-grandaughter very reliant on her diamond)  Comments - Family: Potentially complicated family dynamics based on time with spent at bedside with Great-grandauter/primary care-giver.[facility] has taken a year of from [place] to care for patient    Stated Scientologist: Roman Catholic  Scientologist Given By:Other Family Member  Importance  of Radhika Values to Patient: Important  Importance of Specific Yazidi Values to Patient: Unable to Assess   EPIC Charted Taoist: Congregation          OF NOTE:  Spiritual, Cultural Beliefs, Yazidi Practices, Values that Affect Care: no                   Plan of Care/Goals:  The family is very tightly interwoven with one another and each others lives as even the great-grandchildren are of adult age.  I highly encourage the team to contact a  when it is time to have a GOC conversation as not only will the family need great emotional and spiritual support but due to the baseline spiritual and Gnosticist beliefs, it may prove beneficial to have a  present to assist in addressing this aspect of the patients GOC.    Follow-up is required due to the continued support needed for the family particularly when the conversation for GOC arises. The patient and/or their family/support(s) were informed of how to contact the Spiritual Care Department as well as our availability and functions.  Please contact the Department of Spiritual Care, your Unit  or the On-call  at any time when you have concerns brought to your attention prior to our follow-up. i.e. Anytime you note spiritual, existential or emotional distress or there is a new diagnosis and/or prognosis.       Diya Loaiza    Office: [phone]  On-Call 24/7: [phone]  Department of [place]ual [place] [facility]

## 2020-01-06 NOTE — SUBJECTIVE & OBJECTIVE
Past Medical History:   Diagnosis Date    A-fib     Arthritis     Asteroid hyalosis of left eye     Bilateral nonexudative age-related macular degeneration 6/3/2014    Breast cancer     Cataract     CHF (congestive heart failure)     Chronic GERD 2017    CKD stage 3 due to type 2 diabetes mellitus     Coronary artery disease     Encounter for blood transfusion     Hypertension     Migraine headache     Mild nonproliferative diabetic retinopathy of both eyes 6/3/2014    Pneumonia     Stroke 2019    Type 2 diabetes mellitus with hyperglycemia     Vertigo      Past Surgical History:   Procedure Laterality Date    ABLATION N/A 2018    Procedure: ABLATION;  Surgeon: Jacques Burt MD;  Location: [facility] EP LAB;  Service: Cardiology;  Laterality: N/A;  AF, AVN, RFA, Choice, MB, 3 Prep *SJM CRT-P in situ*    BREAST SURGERY      left lumpectomy    CARPAL TUNNEL RELEASE      left    CATARACT EXTRACTION      vance     SECTION      EYE SURGERY      cataracts bilaterally    HYSTERECTOMY      partial    IMPLANTATION OF BIVENTRICULAR HEART PACEMAKER N/A 10/26/2018    Procedure: INSERTION, CARDIAC PACEMAKER, BIVENTRICULAR;  Surgeon: Jacques Burt MD;  Location: [facility] CATH LAB;  Service: Cardiology;  Laterality: N/A;  AF, CRT-P, SJM, MAC, MB, 3 Prep      No current facility-administered medications on file prior to encounter.      Current Outpatient Medications on File Prior to Encounter   Medication Sig Dispense Refill    allopurinol (ZYLOPRIM) 100 MG tablet Take 1 and 1/2 tablets (150 mg total) by mouth once daily. 45 tablet 3    apixaban (ELIQUIS) 2.5 mg Tab Take 1 tablet (2.5 mg total) by mouth 2 (two) times daily. 60 tablet 11    fluticasone propionate (FLONASE) 50 mcg/actuation nasal spray SPRAY TWICE IN EACH NOSTRIL DAILY 15.8 mL 5    furosemide (LASIX) 20 MG tablet Take 2 tablets (40 mg total) by mouth 2 (two) times daily. 120 tablet 0    levothyroxine (SYNTHROID)  25 MCG tablet Take 1 tablet (25 mcg total) by mouth daily before breakfast. 30 tablet 11    metoprolol succinate (TOPROL-XL) 100 MG 24 hr tablet Take 1 tablet by mouth once daily. Take with Metoprol succinate 50 mg for a total daily dose of 150mg 30 tablet 11    metoprolol succinate (TOPROL-XL) 50 MG 24 hr tablet Take 1 tablet by mouth once daily. Take with Metoprol succinate 100 mg for a total daily dose of 150mg 30 tablet 11    mirtazapine (REMERON) 7.5 MG Tab Take 1 tablet (7.5 mg total) by mouth every evening. 30 tablet 0    sacubitril-valsartan (ENTRESTO) 24-26 mg per tablet Take 1 tablet by mouth 2 (two) times daily. 60 tablet 11    SITagliptin (JANUVIA) 25 MG Tab Take 1 tablet (25 mg total) by mouth once daily. 30 tablet 11    ACCU-CHEK FASTCLIX Misc 1 lancet by Misc.(Non-Drug; Combo Route) route 3 (three) times daily. Pt to test blood glucose up to 3 times daily. 100 each 11    atorvastatin (LIPITOR) 10 MG tablet Take 1 tablet (10 mg total) by mouth once daily. 90 tablet 3    cholecalciferol, vitamin D3, (VITAMIN D3) 1,000 unit capsule Take 2 capsules (2,000 Units total) by mouth once daily. 180 capsule 3    clindamycin (CLEOCIN) 150 MG capsule TAKE ONE CAPSULE BY MOUTH EVERY 6 HOURS FOR 7 DAYS. 28 capsule 0    cycloSPORINE (RESTASIS) 0.05 % ophthalmic emulsion Place 0.4 mLs (1 drop total) into both eyes 2 (two) times daily. 60 vial 1    diphenhydrAMINE-zinc acetate 1-0.1% (BENADRYL) cream Apply topically 2 (two) times daily as needed for Itching. 28 g 2    doxepin (SINEQUAN) 10 MG capsule Take 1 capsule (10 mg total) by mouth every evening. 30 capsule 2    gabapentin (NEURONTIN) 300 MG capsule Take 1 capsule (300 mg total) by mouth 3 (three) times daily as needed. 90 capsule 3    HYDROcodone-acetaminophen (NORCO) 5-325 mg per tablet Take 1-2 tablets by mouth every 12 (twelve) hours as needed for Pain. 60 tablet 0    hydrocortisone 2.5 % cream Apply topically 2 (two) times daily. 28 g 2     ipratropium (ATROVENT) 0.03 % nasal spray instill 1 spray into each nostril 2 times a day if needed for nasal drip 30 mL 1    predniSONE (DELTASONE) 20 MG tablet Take one tab by mouth once daily for a joint flare only as needed 30 tablet 0    triamcinolone acetonide 0.1% (KENALOG) 0.1 % cream apply to affected area twice daily for x2 weeks, then as needed for itching, irritation. 454 g 3    [DISCONTINUED] amoxicillin (AMOXIL) 500 MG capsule Take 1 capsule (500 mg total) by mouth 2 (two) times daily. For infection 20 capsule 0    [DISCONTINUED] azelastine (ASTELIN) 137 mcg (0.1 %) nasal spray Use one spray by Nasal route twice daily for allergies 30 mL 1    [DISCONTINUED] diazePAM (VALIUM) 5 MG tablet Take 1 tablet by mouth the night before the appointment, and take 1 tablet by mouth one hour before the appointment 2 tablet 0    [DISCONTINUED] fluconazole (DIFLUCAN) 150 MG Tab Take 1 tablet by mouth once and then repeat dose with 1 tablet by mouth again 3 days later. 2 tablet 0    [DISCONTINUED] meclizine (ANTIVERT) 25 mg tablet Take 1 tablet (25 mg total) by mouth 3 (three) times daily as needed for Dizziness. 20 tablet 0      Allergies: Tramadol and Novolog flexpen u-100 insulin [insulin aspart u-100]    Family History   Problem Relation Age of Onset    Cancer Mother         stomach    Heart disease Mother     Diabetes Father     Hypertension Father     Blindness Brother     Diabetes Brother     Hypertension Brother     Cataracts Sister     Diabetes Sister     Diabetes Brother     Diabetes Brother     Diabetes Brother     No Known Problems Daughter     No Known Problems Son     No Known Problems Daughter     Amblyopia Neg Hx     Glaucoma Neg Hx     Macular degeneration Neg Hx     Strabismus Neg Hx     Retinal detachment Neg Hx      Social History     Tobacco Use    Smoking status: Never Smoker    Smokeless tobacco: Never Used   Substance Use Topics    Alcohol use: Yes    Drug use: No      Review of Systems   Unable to perform ROS: Mental status change     Objective:     Vitals:    Temp: 99.5 °F (37.5 °C)  Pulse: 69  Rhythm: paced rhythm  BP: (!) 160/81  MAP (mmHg): 101  Resp: (!) 23  SpO2: 100 %  O2 Device (Oxygen Therapy): nasal cannula    Temp  Min: 96.8 °F (36 °C)  Max: 99.5 °F (37.5 °C)  Pulse  Min: 69  Max: 80  BP  Min: 122/58  Max: 160/81  MAP (mmHg)  Min: 84  Max: 101  Resp  Min: 15  Max: 28  SpO2  Min: 95 %  Max: 100 %    01/04 0701 - 01/05 0700  In: 1743   Out: -    Unmeasured Output  Urine Occurrence: 1  Stool Occurrence: 0  Emesis Occurrence: 0  Pad Count: 1       Physical Exam   Constitutional: She appears well-developed and well-nourished.   HENT:   Head: Normocephalic and atraumatic.   Eyes: Pupils are equal, round, and reactive to light. EOM are normal.   Neck: Normal range of motion. Neck supple.   Cardiovascular:   Ventricular-paced rhythm   Pulmonary/Chest: She has decreased breath sounds in the right upper field, the right middle field, the right lower field, the left upper field, the left middle field and the left lower field.   Abdominal: Soft. Bowel sounds are normal. She exhibits no distension.   Neurological:   Mental Status:  Somnolent, unable to follow commands     +Aphasia +  Pupils  4mm, PERRL.   +Corneal reflex, +gag, +cough   Spontaneous movement of extremities         Nursing note and vitals reviewed.    Unable to test orientation, language, memory, judgment, insight, fund of knowledge, shoulder shrug, tongue protrusion, coordination, gait due to level of consciousness.    Today I personally reviewed pertinent medications, lines/drains/airways, imaging, laboratory results, notably:

## 2020-01-06 NOTE — ASSESSMENT & PLAN NOTE
Continuous Cardiac Monitoring  Vital Signs Q1 hour  Systolic (24hrs), Av , Min:122 , Max:160   CXR establish baseline; R/O cardiomegaly  ECHO to assess left ventricular (LV) mass, systolic and diastolic function,  assess left atrial size and function.   EKG to determine baseline rhythm; identify abnormal arrhythmias

## 2020-01-06 NOTE — PLAN OF CARE
Re-evaluation completed. POC re-established.    Franny Thomson, PT, DPT  2020      Problem: Physical Therapy Goal  Goal: Physical Therapy Goal  Description  Goals to be met by: 20     Patient will increase functional independence with mobility by performin. Supine to sit with Moderate Assistance  2. Sit to supine with Moderate Assistance  3. Sit to stand transfer with Maximum Assistance  4. Bed to chair transfer with Maximum Assistance with/without AD.  5. Gait  x 5 feet with Maximum Assistance with/without AD.  6. Stand for 1 minute with Moderate Assistance with/without AD.  7. Lower extremity exercise program x20 reps per handout, with assistance as needed     Outcome: Ongoing, Progressing

## 2020-01-06 NOTE — HOSPITAL COURSE
01/05/2020 transfer from floor for decreased LOC, respiratory insufficiency  01/06/2020d/c eliquis, start heparin gtt minimal intensity for afib, cap overnight-nothing epileptiform, place A-line, ABG q6h, place hickman cath, 20 mg Lasix X1, continue enteral water flushed 200 ml q6h, repeat CBC-follow plts  01/07/2020 pt still with poor neurological exam. CTH showed small L frontal hypodensity cocnerning for stroke. Will diurese with 60 lasix IV today. Follow up cultures  01/08/2020 naeon. satting well on room air. Neurologically unchanged from yesterday. CTH reviewed showing large progression of L MCA and bilateral JAIME distribution infarcts with generalized edema, mass effect and associated SAH.   1/9: discussion with daughter at bedside, d/c RICARDO hickman, lore, discussions with palliative care  1/10: Na goal > 145, intubated for airway, family meeting on saturday

## 2020-01-07 PROBLEM — R41.82 ALTERED MENTAL STATUS: Status: ACTIVE | Noted: 2020-01-01

## 2020-01-07 PROBLEM — N18.30 CKD (CHRONIC KIDNEY DISEASE) STAGE 3, GFR 30-59 ML/MIN: Status: RESOLVED | Noted: 2018-01-01 | Resolved: 2020-01-01

## 2020-01-07 NOTE — ASSESSMENT & PLAN NOTE
Dacia Martínez is a 84 y.o. female with PMHx of HTN, HLD, HF, DM, a fib (eliquis) who presented to hospital with gait instability, RSW, and speech changes. She had been off her eliquis for 1 week due to scheduled dental procedure. Patient with L gaze, mild RSW, and speech difficulty when examined by stroke provider. EMS reported possible waxing and waning of symptoms in transit. She was taken for STAT CTA multiphase, which was negative for early ischemic changes, LVO, and significant stenosis. tPA decision delayed due to unclear LKN time since patient took valium for dental procedure. STAT MRI attempted, but patient has pacemaker that is not MRI compatible.      After further discussion with family and unclear LKN time, decision was made to not treat with tPA. Possibly cardioembolic stroke based on history     -CTH 1/2/20: Interval development of a small region of decreased attenuation in the left frontal lobe superiorly concerning for possible acute/recent infarction  -Preliminary EEG: diffuse background slowing with no epileptiform discharges   -CTH 1/5/20: redemonstration of ill-defined hypodensity within the superior left frontal lobe which may relate to evolving recent/acute infarct. Additional subtle hypodensity is present within the left frontal lobe periventricular white matter, somewhat more conspicuous from prior examination, possibly relating to additional small volume recent infarct.  No evidence of acute intracranial hemorrhage or midline shift.    Patient transferred to the ICU on 1/5/20 for decline in mental status as well as paradoxical breathing (possible respiratory decompensation-ABG showing pure respiratory alkalosis). Currently intubation watch.    Antithrombotics for secondary stroke prevention: Heparin gtt for possible LP    Statins for secondary stroke prevention and hyperlipidemia, if present:   Statins: Atorvastatin- 80 mg daily    Aggressive risk factor modification: HTN, DM, HLD, Obesity,  A-Fib     Rehab efforts: The patient has been evaluated by a stroke team provider and the therapy needs have been fully considered based off the presenting complaints and exam findings. The following therapy evaluations are needed: PT evaluate and treat, OT evaluate and treat, SLP evaluate and treat, PM&R evaluate for appropriate placement    Diagnostics ordered/pending: None  Follow-up EEG reading, LP for further evaluation     VTE prophylaxis: Heparin 5000 units SQ every 8 hours    BP parameters: Infarct: No intervention, SBP <220

## 2020-01-07 NOTE — ASSESSMENT & PLAN NOTE
Monitor I/O's and daily weights  -Avoid nephrotoxic agents, NSAIDs, IV contrast, etc  -Renally dose medications   -Daily renal function panels   Limit/Avoid contrast studies   -Maintain Hgb >7.0  -Maintain SBPs <180

## 2020-01-07 NOTE — SUBJECTIVE & OBJECTIVE
Review of Systems    Unable to obtain a complete ROS due to level of consciousness  Objective:     Vitals:  Temp: 99.5 °F (37.5 °C)  Pulse: 70  Rhythm: paced rhythm  BP: 131/60  MAP (mmHg): 86  Resp: 20  SpO2: 100 %  O2 Device (Oxygen Therapy): room air    Temp  Min: 98.4 °F (36.9 °C)  Max: 101.5 °F (38.6 °C)  Pulse  Min: 69  Max: 72  BP  Min: 99/49  Max: 197/84  MAP (mmHg)  Min: 71  Max: 121  Resp  Min: 16  Max: 37  SpO2  Min: 96 %  Max: 100 %    01/06 0701 - 01/07 0700  In: 1713.2 [I.V.:193.2]  Out: 1295 [Urine:1295]   Unmeasured Output  Urine Occurrence: 1  Stool Occurrence: 1  Emesis Occurrence: 0  Pad Count: 2       Physical Exam      Physical Exam:  GA: Alert, comfortable, no acute distress.   HEENT: No scleral icterus or JVD.   Pulmonary: Clear to auscultation A/P/L. No wheezing, crackles, or rhonchi.  Cardiac: RRR S1 & S2 w/o rubs/murmurs/gallops.   Abdominal: Bowel sounds present x 4. No appreciable hepatosplenomegaly.  Skin: No jaundice, rashes, or visible lesions.  Neuro:  --GCS: E2V1 M4  --Mental Status:  Opens eyes to pain, doesn't track doesn't follow commands, aphasic  --CN II-XII unable to fully assess  --Pupils 4mm, PERRL.   --LUE -spont  --RUE- withdraws  --LLE -withdraws  --RLE -withdraws    Medications:  Continuous    heparin (porcine) in D5W Last Rate: 12 Units/kg/hr (01/07/20 1202)   sodium chloride 0.9%    Scheduled    albuterol-ipratropium 3 mL Q4H   allopurinol 150 mg Daily   atorvastatin 80 mg Daily   levothyroxine 25 mcg Before breakfast   metoprolol tartrate 100 mg QHS   metoprolol tartrate 50 mg Daily   piperacillin-tazobactam (ZOSYN) IVPB 4.5 g Q8H   senna-docusate 8.6-50 mg 1 tablet Daily   vancomycin (VANCOCIN) IVPB 1,000 mg Q24H   PRN    acetaminophen 650 mg Q6H PRN   Dextrose 10% Bolus 12.5 g PRN   glucagon (human recombinant) 1 mg PRN   insulin aspart U-100 0-5 Units Q6H PRN   labetalol 10 mg Q15 Min PRN   ondansetron 4 mg Q6H PRN   sodium chloride 0.9% 500 mL Continuous PRN      Today I personally reviewed pertinent medications, lines/drains/airways, imaging, laboratory results,    Diet  Diet NPO

## 2020-01-07 NOTE — PROGRESS NOTES
Pharmacokinetic Assessment Follow Up: IV Vancomycin    Assessment and Plan:  · Vancomycin trough drawn appropriately and was therapeutic at 18.4 mcg/mL.  · Bg significantly improved since admission.  · Okay to continue vancomycin 1,000 mg IV every 24 hours.  · Next trough scheduled for 1/9 at 1600.    Drug levels (last 3 results):  Recent Labs   Lab Result Units 01/06/20  1554 01/07/20  1625   Vancomycin, Random ug/mL 15.7  --    Vancomycin-Trough ug/mL  --  18.4       Pharmacy will continue to follow and monitor vancomycin. Please call for questions regarding this assessment.    Thank you for the consult,   Alem Akers, PharmD, BCCCP  Neurocritical Care Clinical Pharmacist  Spectralink: [id number]  ________________________________________________________________________________________________________________________________       Patient brief summary:  Dacia Martínez is a 84 y.o. female initiated on antimicrobial therapy with IV Vancomycin for treatment of bacteremia    Drug Allergies:   Review of patient's allergies indicates:   Allergen Reactions    Tramadol Hallucinations    Novolog flexpen u-100 insulin [insulin aspart u-100] Other (See Comments)     Perspiring and faint-like symptoms       Actual Body Weight:   79.4 kg    Renal Function:   Estimated Creatinine Clearance: 34.7 mL/min (based on SCr of 1.2 mg/dL).,     Dialysis Method (if applicable):  N/A    CBC (last 72 hours):  Recent Labs   Lab Result Units 01/05/20  0420 01/06/20  0311 01/06/20  1152 01/07/20  0226 01/07/20  0610   WBC K/uL 11.15 8.83 11.33 11.63 11.82   Hemoglobin g/dL 10.0* 11.8* 9.2* 9.2* 9.2*   Hematocrit % 36.1* 40.5 31.8* 30.9* 31.4*   Platelets K/uL 142* 102* 158 164 178   Gran% % 80.8* 84.4* 83.9* 80.6* 82.1*   Lymph% % 9.3* 7.8* 8.5* 7.9* 7.0*   Mono% % 5.7 5.1 5.9 6.9 6.3   Eosinophil% % 3.0 1.6 0.8 3.3 3.5   Basophil% % 0.6 0.6 0.5 0.8 0.6   Differential Method  Automated Automated Automated Automated Automated       Metabolic  Panel (last 72 hours):  Recent Labs   Lab Result Units 01/05/20  0420 01/05/20  1340 01/06/20  0311 01/07/20  0226 01/07/20  0610   Sodium mmol/L 145 148* 152*  --  147*   Potassium mmol/L 4.1 4.3 3.9  --  3.8   Chloride mmol/L 115* 113* 119*  --  113*   CO2 mmol/L 20* 23 19*  --  24   Glucose mg/dL 193* 201* 150*  --  132*   BUN, Bld mg/dL 54* 53* 37*  --  36*   Creatinine mg/dL 1.9* 1.7* 1.1  --  1.2   Albumin g/dL 2.5*  --  1.9*  --  2.2*   Total Bilirubin mg/dL 0.9  --  0.8  --  1.2*   Alkaline Phosphatase U/L 40*  --  37*  --  46*   AST U/L 16  --  17  --  25   ALT U/L 9*  --  8*  --  9*   Magnesium mg/dL  --   --  2.2 2.5  --    Phosphorus mg/dL  --   --  2.3* 3.2  --        Vancomycin Administrations:  vancomycin given in the last 96 hours                   vancomycin in dextrose 5 % 1 gram/250 mL IVPB 1,000 mg (mg) 1,000 mg New Bag 01/05/20 1600                Microbiologic Results:  Microbiology Results (last 7 days)     Procedure Component Value Units Date/Time    Blood culture [[id number]] Collected:  01/03/20 0741    Order Status:  Completed Specimen:  Blood Updated:  01/07/20 0812     Blood Culture, Routine No Growth to date      No Growth to date      No Growth to date      No Growth to date      No Growth to date    Blood culture [[id number]] Collected:  01/03/20 0741    Order Status:  Completed Specimen:  Blood Updated:  01/07/20 0812     Blood Culture, Routine No Growth to date      No Growth to date      No Growth to date      No Growth to date      No Growth to date    Blood culture [[id number]] Collected:  01/06/20 6121    Order Status:  Completed Specimen:  Blood from Peripheral, Ankle, Right Updated:  01/07/20 0715     Blood Culture, Routine No Growth to date    Narrative:       Blood cultures x 2 different sites. 4 bottles total. Please  draw cultures before administering antibiotics.    Blood culture [[id number]] Collected:  01/06/20 0165    Order Status:  Completed Specimen:  Blood from  Peripheral, Ankle, Left Updated:  01/07/20 0715     Blood Culture, Routine No Growth to date    Narrative:       Blood cultures x 2 different sites. 4 bottles total. Please  draw cultures before administering antibiotics.    Culture, Respiratory with Gram Stain [[id number]] Collected:  01/07/20 0025    Order Status:  Completed Specimen:  Respiratory from Sputum, Induced Updated:  01/07/20 0550     Respiratory Culture Specimen inadequate - culture not performed     Gram Stain (Respiratory) >10epis/lfp and <than many WBC's     Gram Stain (Respiratory) Predominance of oropharyngeal tori. Please recollect.    Blood culture [[id number]] Collected:  01/01/20 1317    Order Status:  Completed Specimen:  Blood Updated:  01/06/20 1612     Blood Culture, Routine No growth after 5 days.    Narrative:       Collection has been rescheduled by TS2 at 01/01/2020 09:58 Reason:   nurses putting tube thru pts nose . will come back   Collection has been rescheduled by TS2 at 01/01/2020 09:58 Reason:   nurses putting tube thru pts nose . will come back     Blood culture [[id number]] Collected:  01/01/20 1317    Order Status:  Completed Specimen:  Blood Updated:  01/06/20 1612     Blood Culture, Routine No growth after 5 days.    Narrative:       Collection has been rescheduled by TS2 at 01/01/2020 09:58 Reason:   nurses putting tube thru pts nose . will come back   Collection has been rescheduled by TS2 at 01/01/2020 09:58 Reason:   nurses putting tube thru pts nose . will come back     Influenza A & B by Molecular [[id number]] Collected:  01/04/20 1200    Order Status:  Completed Specimen:  Nasopharyngeal Swab Updated:  01/04/20 1255     Influenza A, Molecular Negative     Influenza B, Molecular Negative     Flu A & B Source Nasal swab    Culture, Respiratory with Gram Stain [[id number]]  (Abnormal)  (Susceptibility) Collected:  01/01/20 1814    Order Status:  Completed Specimen:  Respiratory from Sputum, Induced Updated:  01/04/20 0907      Respiratory Culture No S aureus or Pseudomonas isolated.      ENTEROBACTER CLOACAE  Few  Normal respiratory tori also present       Gram Stain (Respiratory) <10 epithelial cells per low power field.     Gram Stain (Respiratory) Few WBC's     Gram Stain (Respiratory) Many Gram positive cocci     Gram Stain (Respiratory) Many Gram positive rods     Gram Stain (Respiratory) Many Gram negative rods     Gram Stain (Respiratory) Many Gram negative diplococci    Respiratory Infection Panel, Nasopharyngeal [[id number]] Collected:  01/01/20 1743    Order Status:  Completed Specimen:  Nasopharyngeal Swab Updated:  01/01/20 2236     Respiratory Infection Panel Source NP Swab     Adenovirus Not Detected     Coronavirus 229E Not Detected     Coronavirus HKU1 Not Detected     Coronavirus NL63 Not Detected     Coronavirus OC43 Not Detected     Human Metapneumovirus Not Detected     Human Rhinovirus/Enterovirus Not Detected     Influenza A (subtypes H1, H1-2009,H3) Not Detected     Influenza B Not Detected     Parainfluenza Virus 1 Not Detected     Parainfluenza Virus 2 Not Detected     Parainfluenza Virus 3 Not Detected     Parainfluenza Virus 4 Not Detected     Respiratory Syncytial Virus Not Detected     Bordetella Parapertussis (XU6109) Not Detected     Bordetella pertussis (ptxP) Not Detected     Chlamydia pneumoniae Not Detected     Mycoplasma pneumoniae Not Detected     Comment: Respiratory Infection Panel testing performed by Multiplex PCR.       Narrative:       For all other respiratory sources order [id number] Respiratory  Viral Panel by PCR (RVPCR)

## 2020-01-07 NOTE — SUBJECTIVE & OBJECTIVE
Neurologic Chief Complaint: Aphasia and weakness     Subjective:     Interval History: Patient is seen for follow-up neurological assessment and treatment recommendations:    Patient continues to be difficult to arouse, lethargic. EEG currently on. Possible LP tomorrow.    HPI, Past Medical, Family, and Social History remains the same as documented in the initial encounter.     Review of Systems   Unable to perform ROS: Mental status change   Constitutional: Positive for fatigue. Negative for chills and fever.   Respiratory: Negative for cough.    Cardiovascular: Negative for chest pain.   Gastrointestinal: Negative for nausea and vomiting.   Neurological: Positive for speech difficulty and weakness. Negative for facial asymmetry and numbness.     Scheduled Meds:   albuterol-ipratropium  3 mL Nebulization Q4H    allopurinol  150 mg Per NG tube Daily    atorvastatin  80 mg Per NG tube Daily    levothyroxine  25 mcg Per NG tube Before breakfast    metoprolol tartrate  100 mg Per NG tube QHS    metoprolol tartrate  50 mg Per NG tube Daily    piperacillin-tazobactam (ZOSYN) IVPB  4.5 g Intravenous Q8H    senna-docusate 8.6-50 mg  1 tablet Per NG tube Daily    vancomycin (VANCOCIN) IVPB  1,000 mg Intravenous Q24H     Continuous Infusions:   heparin (porcine) in D5W 12 Units/kg/hr (01/07/20 1702)    sodium chloride 0.9%       PRN Meds:acetaminophen, Dextrose 10% Bolus, glucagon (human recombinant), insulin aspart U-100, labetalol, ondansetron, sodium chloride 0.9%    Objective:     Vital Signs (Most Recent):  Temp: (!) 101.7 °F (38.7 °C) (01/07/20 1702)  Pulse: 70 (01/07/20 1702)  Resp: (!) 22 (01/07/20 1702)  BP: 138/68 (01/07/20 1702)  SpO2: 100 % (01/07/20 1702)  BP Location: Left leg    Vital Signs Range (Last 24H):  Temp:  [99.3 °F (37.4 °C)-101.7 °F (38.7 °C)]   Pulse:  [69-73]   Resp:  [16-29]   BP: (106-197)/()   SpO2:  [96 %-100 %]   Arterial Line BP: (101-176)/(42-68)   BP Location: Left  leg    Physical Exam   Constitutional: No distress.   HENT:   Head: Normocephalic and atraumatic.   Mouth/Throat: No oropharyngeal exudate.   Eyes: No scleral icterus.   Neck: Normal range of motion. Neck supple.   No JVD noted   Cardiovascular: Normal rate.   Pulmonary/Chest: No respiratory distress. She has decreased breath sounds in the right upper field, the right middle field, the right lower field, the left upper field, the left middle field and the left lower field. She has no wheezes. She has no rales.   Abdominal: Soft. She exhibits no distension. There is no guarding.   Musculoskeletal:   No LE edema. Bilateral SCDs in place   Lymphadenopathy:     She has no cervical adenopathy.   Neurological: She displays no tremor.   Skin: Skin is warm and dry. She is not diaphoretic.   Nursing note and vitals reviewed.      Neurological Exam:   LOC: drowsy  Attention Span: poor  Language: Global aphasia  Articulation: Mute/Anarthric  Orientation: Not oriented to person, place, and time  Visual Fields: Full  EOM (CN III, IV, VI): Full/intact  Pupils (CN II, III): PERRL  Facial Sensation (CN V): Normal  Facial Movement (CN VII): Unable to test   Gag Reflex: present  Reflexes: 2+ throughout  Cebellar: No evidence of appendicular or axial ataxia  Sensation: Intact to light touch, temperature and vibration  Unable to test  Tone: Rigidity  LUE     Laboratory:  CMP:   Recent Labs   Lab 01/07/20  0610   CALCIUM 8.6*   ALBUMIN 2.2*   PROT 6.7   *   K 3.8   CO2 24   *   BUN 36*   CREATININE 1.2   ALKPHOS 46*   ALT 9*   AST 25   BILITOT 1.2*     BMP:   Recent Labs   Lab 01/07/20  0610   *   K 3.8   *   CO2 24   BUN 36*   CREATININE 1.2   CALCIUM 8.6*     CBC:   Recent Labs   Lab 01/07/20  0610   WBC 11.82   RBC 3.30*   HGB 9.2*   HCT 31.4*      MCV 95   MCH 27.9   MCHC 29.3*     Lipid Panel:   Recent Labs   Lab 12/31/19  1929   CHOL 209*   LDLCALC 136.4   HDL 48   TRIG 123     Coagulation:   Recent  Labs   Lab 01/06/20  1152  01/07/20  0226   INR 1.3*  --   --    APTT 26.0   < > 46.4*    < > = values in this interval not displayed.     Hgb A1C:   Recent Labs   Lab 12/31/19  2115   HGBA1C 6.9*     TSH:   Recent Labs   Lab 12/31/19  1929   TSH 13.528*       Diagnostic Results     Brain Imaging   CTH 1/1/19   -No evidence of major vascular distribution infarct or hemorrhage.  -Chronic microvascular ischemic change and generalized cerebral volume loss, normal for age.    Vessel Imaging   CTA MP 12/31/19   -No acute intracranial pathology.  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  -In this exam limited by poor timing of contrast and motion artifact, no high-grade stenosis or major vessel occlusion.  Hypoplastic right A2 segment.    Cardiac Imaging   TTE   · Moderately decreased left ventricular systolic function. The estimated ejection fraction is 35%  · Concentric left ventricular remodeling.  · Local segmental wall motion abnormalities.  · Moderate right ventricular enlargement.  · Mildly reduced right ventricular systolic function.  · Moderate biatrial enlargement.  · Aortic valve area is 1.12 cm2; peak velocity is 1.95 m/s; mean gradient is 9 mmHg.  · Mild-to-moderate aortic valve stenosis.  · Mild mitral regurgitation.  · Mild tricuspid regurgitation.  · The estimated PA systolic pressure is 59 mm Hg  · Pulmonary hypertension present.  · Elevated central venous pressure (15 mm Hg).  · Atrial fibrillation observed.

## 2020-01-07 NOTE — PROGRESS NOTES
[facility]  Vascular Neurology  Comprehensive Stroke Center  Progress Note    Assessment/Plan:     * Stroke  Dacia Martínez is a 84 y.o. female with PMHx of HTN, HLD, HF, DM, a fib (eliquis) who presented to hospital with gait instability, RSW, and speech changes. She had been off her eliquis for 1 week due to scheduled dental procedure. Patient with L gaze, mild RSW, and speech difficulty when examined by stroke provider. EMS reported possible waxing and waning of symptoms in transit. She was taken for STAT CTA multiphase, which was negative for early ischemic changes, LVO, and significant stenosis. tPA decision delayed due to unclear LKN time since patient took valium for dental procedure. STAT MRI attempted, but patient has pacemaker that is not MRI compatible.      After further discussion with family and unclear LKN time, decision was made to not treat with tPA. Possibly cardioembolic stroke based on history     -CTH 1/2/20: Interval development of a small region of decreased attenuation in the left frontal lobe superiorly concerning for possible acute/recent infarction  -Preliminary EEG: diffuse background slowing with no epileptiform discharges   -CTH 1/5/20: redemonstration of ill-defined hypodensity within the superior left frontal lobe which may relate to evolving recent/acute infarct. Additional subtle hypodensity is present within the left frontal lobe periventricular white matter, somewhat more conspicuous from prior examination, possibly relating to additional small volume recent infarct.  No evidence of acute intracranial hemorrhage or midline shift.    Patient transferred to the ICU on 1/5/20 for decline in mental status as well as paradoxical breathing (possible respiratory decompensation-ABG showing pure respiratory alkalosis). Currently intubation watch.    Antithrombotics for secondary stroke prevention: Heparin gtt for possible LP    Statins for secondary stroke prevention and  hyperlipidemia, if present:   Statins: Atorvastatin- 80 mg daily    Aggressive risk factor modification: HTN, DM, HLD, Obesity, A-Fib     Rehab efforts: The patient has been evaluated by a stroke team provider and the therapy needs have been fully considered based off the presenting complaints and exam findings. The following therapy evaluations are needed: PT evaluate and treat, OT evaluate and treat, SLP evaluate and treat, PM&R evaluate for appropriate placement    Diagnostics ordered/pending: None  Follow-up EEG reading, LP for further evaluation     VTE prophylaxis: Heparin 5000 units SQ every 8 hours    BP parameters: Infarct: No intervention, SBP <220        Fever  2/2 PNA  -US of the lower extremities negative     Myoclonic jerking  20-30 second left sided myoclonic jerking with left gaze deviation. This could explain her waxing and waning mental status  -Preliminary EEG shows diffuse slowing with no epileptiform discharges     Follow-up EEG readings    Right hemiplegia  Intermittent episodes of right hemiplegia.   CTH on 1/2/20 revealing interval development of a small region of decreased attenuation in the left frontal lobe superiorly concerning for possible acute/recent infarction.    CTH on 1/5/20 showing redemonstration of ill-defined hypodensity within the superior left frontal lobe which may relate to evolving recent/acute infarct.  Additional subtle hypodensity is present within the left frontal lobe periventricular white matter, somewhat more conspicuous from prior examination, possibly relating to additional small volume recent infarct.  No evidence of acute intracranial hemorrhage or midline shift.    Troponin level elevated  Troponin was ordered on admission and noted to be 0.5 -> repeat 0.9. EKG notable for V-paced rhythm without evidence of ischemia. Etiology most likely 2/2 demand ischemia (type II NSTEMI) in the setting of co-morbidites (possibly has aspiration PNA, slightly anemic etc).  Troponin trending downwards        Pneumonia  -Pt underwent dental extractions day of presentation. Family reports cough for few days prior to admission.  -Overnight on 12/31, pt became tachypnic with fever to 100.3 and rapid response was called. CXR with left sided consolidation. -Started on vanc/zosyn overnight. ABG wnl. WBC wnl. Procal mildly elevated to .99. O2 saturations %. Suspect possible aspiration pneumonia vs CAP.  Respiratory panel negative    Continue zosyn   Sputum cxs revealed Enterobacter Cloacae       Hypothyroidism  Continue home synthroid  Patient with elevated TSH but normal free T4, subclinical hypothyroidism    CKD (chronic kidney disease) stage 3, GFR 30-59 ml/min  Cr 1.3 on arrival. Baseline 1.1. KHUSHBOO resolved.    Chronic systolic congestive heart failure  Stroke risk factor  TTE Feb 2019 with EF 40% - repeat 1/2 with EF 35%  Patient on entresto, metoprolol, and lasix at home  Holding entresto and lasix in setting of acute stroke  - Patient was on metoprolol for rate control. Was initially discontinued by hospital medicine due to concern for sepsis however, will reinitiate  -Currently on lopressor 50 mg AM, and 100 mg PM to be compatible with NG tube     Persistent atrial fibrillation  Stroke risk factor  On eliquis at home  Held x1 week for dental procedure  Continue home eliquis  Continue home metoprolol    Type 2 diabetes mellitus with stage 3 chronic kidney disease, without long-term current use of insulin  Stroke risk factor  A1C 6.9  Goal glucose 140-180  Low correction SSI for NPO patients until LEILANI completed  Diabetic diet when safe for swallow eval    Primary osteoarthritis involving multiple joints  PRN tylenol    Idiopathic chronic gout of multiple sites without tophus  Continue home allopurinol    Hyperlipidemia  Stroke risk factor  Patient on atorvastatin 10 mg daily at home   on arrival  Increased to atorvastatin 80 mg daily    Essential hypertension  Stroke risk  factor  SBP <220   Holding home antihypertensives due to concern for acute stroke  Prn labetalol         01/01/2020 CTA MP without any acute findings. MRI not done as incompatible with pacemaker.Neurological exam patient is waxing and waning. At times will move her left arm however, per the medicine team's examination patient had hemineglect in hte right side which was not seen during my exam.nOvernight, rapid response was called due to tachypnea up to 40 and fevers to 100.3. ABG wnl but CXR with possible left-sided infiltrate. WBC wnl. Pt was started on vanc/zosyn in setting of aspiration vs CAP pneumonia. Troponin elevated to 0.516 with repeat elevated to 0.935.Cardiology consulted and they believed this is demand ischemia, thus will trend troponin. Repeat CTH shows no evidence of major vascular distribution infarct or hemorrhage.     01/02/2020 Elevated BUN/Cr (could be secondary to post contrast). Patient having intermittent episodes of hemiplegia on the right side. During transport to Echo, patient was exhibiting left sided myoclonic jerks with left gaze deviation. Ordered EEG. STAT CTH ordered. General neurology is aware. Awaiting respiratory culture before deescalation of antibiotics. Will be transferred to medicine floor tomorrow.     01/03/2020 Patient was spiking fevers. Antibiotics broadened to vancomycin and zosyn. Patient pan-cultured. Preliminary EEG shows diffuse slowing with epileptiform discharges. US of the carotids ordered.     01/04/2020 Sputum culture with enterobacter cloacae, pan-sensitive to antibiotics.  Repeat influenza pending. Echo without signs of vegetation. Blood cultures  negative. Increased free water. Pt still with fever in the evening of 1/3/20 so will not deescalate zosyn. Will discontinue vancomycin. US of the lower legs ordered to rule out other causes of fever.  Will order CT head to rule out any new infarctions.    01/05/2020 Patient was febrile  evening of 1/3/20 but improved  1/5/20 however, will keep on zosyn. CTH with redemonstration of hypodensity withi the superior left frontal lobe and additional subtle hypodensity within the left frontal lobe periventricular white matter. US of the lower extremities does not reveal any DVT. On examination today  Patient was extremely somnolent and was exhibiting paradoxical breathing. ABG demonstrated respiratory alkalosis. CXR shows pulmonary edema. Due to possible respiratory decompensation as well as somnolent state, patient was transferred to [facility]        STROKE DOCUMENTATION   Acute Stroke Times   Last Known Normal Date: 12/31/19  Last Known Normal Time: (unknown)  Symptom Onset Date: 12/31/19  Symptom Onset Time: (unknown)  Stroke Team Called Date: 12/31/19  Stroke Team Called Time: 1924  Stroke Team Arrival Date: 12/31/19  Stroke Team Arrival Time: 1929  CT Interpretation Time: 1939  Decision to Treat Time for Alteplase: 2039  Decision to Treat Time for IR: 1939    NIH Scale:  1a. Level of Consciousness: 2-->Not alert, requires repeated stimulation to attend, or is obtunded and requires strong or painful stimulation to make movements (not stereotyped)  1b. LOC Questions: 2-->Answers neither question correctly  1c. LOC Commands: 2-->Performs neither task correctly  2. Best Gaze: 0-->Normal  3. Visual: 0-->No visual loss  4. Facial Palsy: 1-->Minor paralysis (flattened nasolabial fold, asymmetry on smiling)  5a. Motor Arm, Left: 3-->No effort against gravity, limb falls  5b. Motor Arm, Right: 4-->No movement  6a. Motor Leg, Left: 3-->No effort against gravity, leg falls to bed immediately  6b. Motor Leg, Right: 3-->No effort against gravity, leg falls to bed immediately  7. Limb Ataxia: 0-->Absent  8. Sensory: 1-->Mild-to-moderate sensory loss, patient feels pinprick is less sharp or is dull on the affected side, or there is a loss of superficial pain with pinprick, but patient is aware of being touched  9. Best Language: 3-->Mute, global  aphasia, no usable speech or auditory comprehension  10. Dysarthria: 2-->Severe dysarthria, patients speech is so slurred as to be unintelligible in the absence of or out of proportion to any dysphasia, or is mute/anarthric  11. Extinction and Inattention (formerly Neglect): 0-->No abnormality  Total (NIH Stroke Scale): 26       Modified [place] Score: 3  [place] Coma Scale:    ABCD2 Score:    [id number] Score:   HAS -BLED Score:   ICH Score:   Hunt & Prather Classification:      Hemorrhagic change of an Ischemic Stroke: Does this patient have an ischemic stroke with hemorrhagic changes? No     Neurologic Chief Complaint: Aphasia and weakness     Subjective:     Interval History: Patient is seen for follow-up neurological assessment and treatment recommendations:    Patient continues to be difficult to arouse, lethargic. EEG currently on. Possible LP tomorrow.    HPI, Past Medical, Family, and Social History remains the same as documented in the initial encounter.     Review of Systems   Unable to perform ROS: Mental status change   Constitutional: Positive for fatigue. Negative for chills and fever.   Respiratory: Negative for cough.    Cardiovascular: Negative for chest pain.   Gastrointestinal: Negative for nausea and vomiting.   Neurological: Positive for speech difficulty and weakness. Negative for facial asymmetry and numbness.     Scheduled Meds:   albuterol-ipratropium  3 mL Nebulization Q4H    allopurinol  150 mg Per NG tube Daily    atorvastatin  80 mg Per NG tube Daily    levothyroxine  25 mcg Per NG tube Before breakfast    metoprolol tartrate  100 mg Per NG tube QHS    metoprolol tartrate  50 mg Per NG tube Daily    piperacillin-tazobactam (ZOSYN) IVPB  4.5 g Intravenous Q8H    senna-docusate 8.6-50 mg  1 tablet Per NG tube Daily    vancomycin (VANCOCIN) IVPB  1,000 mg Intravenous Q24H     Continuous Infusions:   heparin (porcine) in D5W 12 Units/kg/hr (01/07/20 9509)    sodium chloride 0.9%        PRN Meds:acetaminophen, Dextrose 10% Bolus, glucagon (human recombinant), insulin aspart U-100, labetalol, ondansetron, sodium chloride 0.9%    Objective:     Vital Signs (Most Recent):  Temp: (!) 101.7 °F (38.7 °C) (01/07/20 1702)  Pulse: 70 (01/07/20 1702)  Resp: (!) 22 (01/07/20 1702)  BP: 138/68 (01/07/20 1702)  SpO2: 100 % (01/07/20 1702)  BP Location: Left leg    Vital Signs Range (Last 24H):  Temp:  [99.3 °F (37.4 °C)-101.7 °F (38.7 °C)]   Pulse:  [69-73]   Resp:  [16-29]   BP: (106-197)/()   SpO2:  [96 %-100 %]   Arterial Line BP: (101-176)/(42-68)   BP Location: Left leg    Physical Exam   Constitutional: No distress.   HENT:   Head: Normocephalic and atraumatic.   Mouth/Throat: No oropharyngeal exudate.   Eyes: No scleral icterus.   Neck: Normal range of motion. Neck supple.   No JVD noted   Cardiovascular: Normal rate.   Pulmonary/Chest: No respiratory distress. She has decreased breath sounds in the right upper field, the right middle field, the right lower field, the left upper field, the left middle field and the left lower field. She has no wheezes. She has no rales.   Abdominal: Soft. She exhibits no distension. There is no guarding.   Musculoskeletal:   No LE edema. Bilateral SCDs in place   Lymphadenopathy:     She has no cervical adenopathy.   Neurological: She displays no tremor.   Skin: Skin is warm and dry. She is not diaphoretic.   Nursing note and vitals reviewed.      Neurological Exam:   LOC: drowsy  Attention Span: poor  Language: Global aphasia  Articulation: Mute/Anarthric  Orientation: Not oriented to person, place, and time  Visual Fields: Full  EOM (CN III, IV, VI): Full/intact  Pupils (CN II, III): PERRL  Facial Sensation (CN V): Normal  Facial Movement (CN VII): Unable to test   Gag Reflex: present  Reflexes: 2+ throughout  Cebellar: No evidence of appendicular or axial ataxia  Sensation: Intact to light touch, temperature and vibration  Unable to test  Tone: Rigidity  LUE      Laboratory:  CMP:   Recent Labs   Lab 01/07/20  0610   CALCIUM 8.6*   ALBUMIN 2.2*   PROT 6.7   *   K 3.8   CO2 24   *   BUN 36*   CREATININE 1.2   ALKPHOS 46*   ALT 9*   AST 25   BILITOT 1.2*     BMP:   Recent Labs   Lab 01/07/20  0610   *   K 3.8   *   CO2 24   BUN 36*   CREATININE 1.2   CALCIUM 8.6*     CBC:   Recent Labs   Lab 01/07/20  0610   WBC 11.82   RBC 3.30*   HGB 9.2*   HCT 31.4*      MCV 95   MCH 27.9   MCHC 29.3*     Lipid Panel:   Recent Labs   Lab 12/31/19 1929   CHOL 209*   LDLCALC 136.4   HDL 48   TRIG 123     Coagulation:   Recent Labs   Lab 01/06/20  1152  01/07/20  0226   INR 1.3*  --   --    APTT 26.0   < > 46.4*    < > = values in this interval not displayed.     Hgb A1C:   Recent Labs   Lab 12/31/19 2115   HGBA1C 6.9*     TSH:   Recent Labs   Lab 12/31/19 1929   TSH 13.528*       Diagnostic Results     Brain Imaging   CTH 1/1/19   -No evidence of major vascular distribution infarct or hemorrhage.  -Chronic microvascular ischemic change and generalized cerebral volume loss, normal for age.    Vessel Imaging   CTA  12/31/19   -No acute intracranial pathology.  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  -In this exam limited by poor timing of contrast and motion artifact, no high-grade stenosis or major vessel occlusion.  Hypoplastic right A2 segment.    Cardiac Imaging   TTE   · Moderately decreased left ventricular systolic function. The estimated ejection fraction is 35%  · Concentric left ventricular remodeling.  · Local segmental wall motion abnormalities.  · Moderate right ventricular enlargement.  · Mildly reduced right ventricular systolic function.  · Moderate biatrial enlargement.  · Aortic valve area is 1.12 cm2; peak velocity is 1.95 m/s; mean gradient is 9 mmHg.  · Mild-to-moderate aortic valve stenosis.  · Mild mitral regurgitation.  · Mild tricuspid regurgitation.  · The estimated  PA systolic pressure is 59 mm Hg  · Pulmonary hypertension present.  · Elevated central venous pressure (15 mm Hg).  · Atrial fibrillation observed.           Sneha Segundo MD  Comprehensive Stroke Center  Department of Vascular Neurology   [facility]

## 2020-01-07 NOTE — ASSESSMENT & PLAN NOTE
Stroke risk factor  SBP <220   Holding home antihypertensives due to concern for acute stroke  Prn labetalol

## 2020-01-07 NOTE — ASSESSMENT & PLAN NOTE
Stroke risk factor  Patient on atorvastatin 10 mg daily at home   on arrival  Increased to atorvastatin 80 mg daily

## 2020-01-07 NOTE — ASSESSMENT & PLAN NOTE
20-30 second left sided myoclonic jerking with left gaze deviation. This could explain her waxing and waning mental status  -Preliminary EEG shows diffuse slowing with no epileptiform discharges     Follow-up EEG readings

## 2020-01-07 NOTE — PLAN OF CARE
POC reviewed with Ms. Pederson and family at bedside. Pt's family verbalized understanding. Questions and concerns addressed. Lethargic all day and not following commands. Continue watching respiratory status, intubation watch d/t AMS. R radial Art line done for ABG monitoring. Henderson placed, lasix given, not diurese much. Heparin gtt started at 12uKh, next aPTT due at 2000. PIVs obtained by midline team. Small BM x2. Wound care done. Bath done. Pt progressing toward goals. Will continue to monitor. See flowsheets for full assessment and VS info.

## 2020-01-07 NOTE — PLAN OF CARE
Patient NPO.  Not medically ready for discharge.        01/07/20 1511   Discharge Reassessment   Assessment Type Discharge Planning Reassessment   Provided patient/caregiver education on the expected discharge date and the discharge plan Yes   Do you have any problems affording any of your prescribed medications? TBD   Discharge Plan A Skilled Nursing Facility   Discharge Plan B Rehab   DME Needed Upon Discharge  other (see comments)  (tbd)   Patient choice form signed by patient/caregiver N/A   Anticipated Discharge Disposition SNF   Can the patient answer the patient profile reliably? No, cognitively impaired   How does the patient rate their overall health at the present time?   (jw\)   Describe the patient's ability to walk at the present time. Does not walk or unable to take any steps at all   How often would a person be available to care for the patient? Whenever needed   Number of comorbid conditions (as recorded on the chart) Five or more   Post-Acute Status   Post-Acute Authorization Placement   Post-Acute Placement Status Awaiting Internal Medical Clearance   Discharge Delays (!) Diet Not Ready for Discharge  (Patient NPO)     Marie Damon RN, CCRN-K, [facility]  Neuro-Critical Care   [phone]

## 2020-01-07 NOTE — PLAN OF CARE
POC reviewed with pt and family at 0500.Family verbalized understanding. Questions and concerns addressed. No acute events overnight. Pt Tmax 101.1. PRN acetaminophen administered x2.  Pt progressing toward goals. Will continue to monitor. See flowsheets for full assessment and VS info

## 2020-01-07 NOTE — ASSESSMENT & PLAN NOTE
-Pt underwent dental extractions day of presentation. Family reports cough for few days prior to admission.  -Overnight on 12/31, pt became tachypnic with fever to 100.3 and rapid response was called. CXR with left sided consolidation. -Started on vanc/zosyn overnight. ABG wnl. WBC wnl. Procal mildly elevated to .99. O2 saturations %. Suspect possible aspiration pneumonia vs CAP.  Respiratory panel negative    Continue zosyn   Sputum cxs revealed Enterobacter Cloacae

## 2020-01-07 NOTE — ASSESSMENT & PLAN NOTE
Place 24hr cEEG  Will obtain follow up CTH tomorrow morning  Hold heparin ggt at 0500 tomorrow for possibility of LP tomorrow.  Discontinue possible offending agents gabapentin  ID- febrile, slight leukocytosis. F/u cultures. Continue vanc and zosyn. Currently NGTD

## 2020-01-07 NOTE — PROGRESS NOTES
[facility]  Neurocritical Care  Progress Note    Admit Date: 12/31/2019  Service Date: 01/07/2020  Length of Stay: 7    Subjective:     Chief Complaint: Stroke    History of Present Illness: The patient is an 83yo female  with PMH of HTN, DM2, HFrEF (30% in 2/2019 with pacemaker), HLD, CAD, A fib (on Eliquis), OA, Breast Cancer (s/p mastectomy, chemo, radiation in 2000), CKD3, Gout, and Hypothyroidism who was originially admitted to vascular neurology after family noticed right sided-weakness, gait instability, and slurred speech yesterday evening (12/30) following dental procedure. Report that her Eliquis had been held for 4 days prior to the procedure.  Her daughters state that she had been altered since they picked her up from the surgery around 4:30 pm 12/30. 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 Unable to attain MRI due to pacemaker. She was given ASA 325mg and Atorvastatin 40 mg.  Overnight rapid response was called due to tachypnea up to 40 and fevers to 100.3. ABG wnl but CXR with possible left-sided infiltrate. WBC wnl. Pt was started on vanc/zosyn in setting of aspiration vs CAP pneumonia.     On 1/2/20 AM rapid response was called for concern for decreased arousal and labored breathing that has been waxing and waning throughout admission. Troponin peaked and down-trended. Pt was also noted to have jerking movements of UE with concern for seizure-like activity on 1/2/20. Neurology was consulted by vascular neurology who recommended continuous EEG monitoring that did not show any large epileptiform activty. Additionally, STAT head CT was notable for small area on frontal lobe that may represent acute infarct. Was started on Vanc/zosyn for Aspiration vs CAP but was de-escalated to Levaquin on 1/2/20. Overnight 1/3/20, pt with recurrence of fevers and vanc/zosyn was re-started. Lactate WNL. Respiratory viral panel and repeat influenza  negative but sputum culture with enterobacter cloacae, sensitive to zosyn. Echo without signs of vegetation. Blood cultures still negative. Pt febrile evening of 1/3/20 but improved 1/5/20, still on zosyn. Repeat heat CT with redemonstration of hypodensity within the superior left frontal lobe and additional subtle hypodensity within the left frontal lobe periventricular white matter. Also increasingly somnolent on 1/5/20, ABG with respiratory alkalosis, vascular neurology to discuss with neuro critical care. Patient to be admitted to [facility] for higher level of care.     1/5 Neuro Critical Care consulted for Decreased LOC, poor response, respiratory insufficiency. Patient found to be somnolent unable to follow commands, protecting airway. Transferred to [facility] for higher level of care and management.    Hospital Course: 01/05/2020 transfer from floor for decreased LOC, respiratory insufficiency  01/06/2020d/c eliquis, start heparin gtt minimal intensity for afib, cap overnight-nothing epileptiform, place A-line, ABG q6h, place hickman cath, 20 mg Lasix X1, continue enteral water flushed 200 ml q6h, repeat CBC-follow plts  01/07/2020 pt still with poor neurological exam. CTH showed small L frontal hypodensity cocnerning for stroke. Will diurese with 60 lasix IV today. Follow up cultures        Review of Systems    Unable to obtain a complete ROS due to level of consciousness  Objective:     Vitals:  Temp: 99.5 °F (37.5 °C)  Pulse: 70  Rhythm: paced rhythm  BP: 131/60  MAP (mmHg): 86  Resp: 20  SpO2: 100 %  O2 Device (Oxygen Therapy): room air    Temp  Min: 98.4 °F (36.9 °C)  Max: 101.5 °F (38.6 °C)  Pulse  Min: 69  Max: 72  BP  Min: 99/49  Max: 197/84  MAP (mmHg)  Min: 71  Max: 121  Resp  Min: 16  Max: 37  SpO2  Min: 96 %  Max: 100 %    01/06 0701 - 01/07 0700  In: 1713.2 [I.V.:193.2]  Out: 1295 [Urine:1295]   Unmeasured Output  Urine Occurrence: 1  Stool Occurrence: 1  Emesis Occurrence: 0  Pad Count: 2       Physical  Exam      Physical Exam:  GA: Alert, comfortable, no acute distress.   HEENT: No scleral icterus or JVD.   Pulmonary: Clear to auscultation A/P/L. No wheezing, crackles, or rhonchi.  Cardiac: RRR S1 & S2 w/o rubs/murmurs/gallops.   Abdominal: Bowel sounds present x 4. No appreciable hepatosplenomegaly.  Skin: No jaundice, rashes, or visible lesions.  Neuro:  --GCS: E2V1 M4  --Mental Status:  Opens eyes to pain, doesn't track doesn't follow commands, aphasic  --CN II-XII unable to fully assess  --Pupils 4mm, PERRL.   --LUE -spont  --RUE- withdraws  --LLE -withdraws  --RLE -withdraws    Medications:  Continuous    heparin (porcine) in D5W Last Rate: 12 Units/kg/hr (01/07/20 1202)   sodium chloride 0.9%    Scheduled    albuterol-ipratropium 3 mL Q4H   allopurinol 150 mg Daily   atorvastatin 80 mg Daily   levothyroxine 25 mcg Before breakfast   metoprolol tartrate 100 mg QHS   metoprolol tartrate 50 mg Daily   piperacillin-tazobactam (ZOSYN) IVPB 4.5 g Q8H   senna-docusate 8.6-50 mg 1 tablet Daily   vancomycin (VANCOCIN) IVPB 1,000 mg Q24H   PRN    acetaminophen 650 mg Q6H PRN   Dextrose 10% Bolus 12.5 g PRN   glucagon (human recombinant) 1 mg PRN   insulin aspart U-100 0-5 Units Q6H PRN   labetalol 10 mg Q15 Min PRN   ondansetron 4 mg Q6H PRN   sodium chloride 0.9% 500 mL Continuous PRN     Today I personally reviewed pertinent medications, lines/drains/airways, imaging, laboratory results,    Diet  Diet NPO      Assessment/Plan:     Neuro  Altered mental status  Place 24hr cEEG  Will obtain follow up CTH tomorrow morning  Hold heparin ggt at 0500 tomorrow for possibility of LP tomorrow.  Discontinue possible offending agents gabapentin  ID- febrile, slight leukocytosis. F/u cultures. Continue vanc and zosyn. Currently NGTD    Myoclonic jerking  EEG Monitoring  Seizure Precautions  01/07/2020-EEG cap nothing epileptiform -per epilepsy team      Derm  Alteration in skin integrity  Skin Assessment  Q Shift  Wound Care  Following    Cardiac/Vascular  Bilateral carotid artery stenosis  As noted on Imaging    Chronic systolic congestive heart failure  EF 35%  Continuous Cardiac Monitoring  01/07/2020-60 mg Lasix X1 today, place hickman      Persistent atrial fibrillation  Heparin gtt    Hyperlipidemia  Monitor Lipid Panel  Continue Atorvastatin    Essential hypertension  Continuous Cardiac Monitoring  Vital Signs Q1 hour  -SBP goal 100-180  ECHO 35% EF                   Renal/  CKD (chronic kidney disease) stage 3, GFR 30-59 ml/min  Monitor I/O's and daily weights  -Avoid nephrotoxic agents, NSAIDs, IV contrast, etc  -Renally dose medications   -Daily renal function panels   Limit/Avoid contrast studies   -Maintain Hgb >7.0  -Maintain SBPs <180           Endocrine  Hypothyroidism  Continue Synthroid  TSH 13.5      Type 2 diabetes mellitus with stage 3 chronic kidney disease, without long-term current use of insulin  A1c 6.9  RISS   POCT Q6  Nutritional Consult for dietary /caloric needs      Orthopedic  Idiopathic chronic gout of multiple sites without tophus  Continue Allopurinol  Continue Tylenol for pain    Other  Fever  Monitor temp Q4 hr  Tylenol for elevated temp          The patient is being Prophylaxed for:  Venous Thromboembolism with: Mechanical or Chemical  Stress Ulcer with: None  Ventilator Pneumonia with: none    Activity Orders          Commode at bedside starting at 12/31 2047    Diet NPO: NPO starting at 12/31 2047    Commode at bedside starting at 12/31 2047    Straight Cath starting at 12/31 2046        Full Code    Davy Muñoz MD  Neurocritical Care  [facility]

## 2020-01-08 PROBLEM — R79.89 TROPONIN LEVEL ELEVATED: Status: RESOLVED | Noted: 2020-01-01 | Resolved: 2020-01-01

## 2020-01-08 NOTE — PLAN OF CARE
POC reviewed with pt and family at 0400. Pt unable to verbalize understanding. Questions and concerns addressed with family. No acute events overnight. Pt still NPO. On room air. CEEG in place. Heparin gtt off at 5 am. CT scan this morning. Tylenol given for temp. Will continue to monitor. See flowsheets for full assessment and VS info

## 2020-01-08 NOTE — HPI
The patient is an 85yo female  with PMH of HTN, DM2, HFrEF (30% in 2/2019 with pacemaker), HLD, CAD, A fib (on Eliquis), OA, Breast Cancer (s/p mastectomy, chemo, radiation in 2000), CKD3, Gout, and Hypothyroidism who was originially admitted to vascular neurology after family noticed right sided-weakness, gait instability, and slurred speech yesterday evening (12/30) following dental procedure. Report that her Eliquis had been held for 4 days prior to the procedure.  Her daughters state that she had been altered since they picked her up from the surgery around 4:30 pm 12/30. 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 Unable to attain MRI due to pacemaker. She was given ASA 325mg and Atorvastatin 40 mg.  Overnight rapid response was called due to tachypnea up to 40 and fevers to 100.3. ABG wnl but CXR with possible left-sided infiltrate. WBC wnl. Pt was started on vanc/zosyn in setting of aspiration vs CAP pneumonia.      On 1/2/20 AM rapid response was called for concern for decreased arousal and labored breathing that has been waxing and waning throughout admission. Troponin peaked and down-trended. Pt was also noted to have jerking movements of UE with concern for seizure-like activity on 1/2/20. Neurology was consulted by vascular neurology who recommended continuous EEG monitoring that did not show any large epileptiform activty. Additionally, STAT head CT was notable for small area on frontal lobe that may represent acute infarct. Was started on Vanc/zosyn for Aspiration vs CAP but was de-escalated to Levaquin on 1/2/20. Overnight 1/3/20, pt with recurrence of fevers and vanc/zosyn was re-started. Lactate WNL. Respiratory viral panel and repeat influenza negative but sputum culture with enterobacter cloacae, sensitive to zosyn. Echo without signs of vegetation. Blood cultures still negative. Pt febrile evening of 1/3/20 but improved 1/5/20, still on zosyn. Repeat heat  CT with redemonstration of hypodensity within the superior left frontal lobe and additional subtle hypodensity within the left frontal lobe periventricular white matter. Also increasingly somnolent on 1/5/20, ABG with respiratory alkalosis, vascular neurology to discuss with neuro critical care. Patient to be admitted to [facility] for higher level of care.     1/5 Neuro Critical Care consulted for Decreased LOC, poor response, respiratory insufficiency. Patient found to be somnolent unable to follow commands, protecting airway. Transferred to [facility] for higher level of care and management.     Hospital Course: 01/05/2020 transfer from floor for decreased LOC, respiratory insufficiency  01/06/2020d/c eliquis, start heparin gtt minimal intensity for afib, cap overnight-nothing epileptiform, place A-line, ABG q6h, place hickman cath, 20 mg Lasix X1, continue enteral water flushed 200 ml q6h, repeat CBC-follow plts  01/07/2020 pt still with poor neurological exam. CTH showed small L frontal hypodensity cocnerning for stroke. Will diurese with 60 lasix IV today. Follow up cultures  01/08/2020 naeon. satting well on room air. Neurologically unchanged from yesterday. CTH reviewed showing large progression of L MCA and bilateral JAIME distribution infarcts with generalized edema, mass effect and associated SAH.     In this setting, palliative medicine was consulted to help with medical decision making and aid in the formation of goals of care.

## 2020-01-08 NOTE — PROGRESS NOTES
Pharmacokinetic Assessment Follow Up: IV Vancomycin    Assessment and Plan:  · Vancomycin trough drawn appropriately and was therapeutic at 20.6 mcg/mL.  · Bg significantly improved since admission, though serum creatinine slightly increased from yesterday s/p diuresis with furosemide.  · Slightly reduce dose to 750 mg IV every 24 hours to start this afternoon at 1715.  · Next trough scheduled for 1/10 at 1630.    Drug levels (last 3 results):  Recent Labs   Lab Result Units 01/06/20  1554 01/07/20  1625 01/08/20  1525   Vancomycin, Random ug/mL 15.7  --  20.6   Vancomycin-Trough ug/mL  --  18.4  --        Pharmacy will continue to follow and monitor vancomycin. Please call for questions regarding this assessment.    Thank you for the consult,   Alem Akers, PharmD, [facility]  Neurocritical Care Clinical Pharmacist  Spectralink: [id number]  ________________________________________________________________________________________________________________________________       Patient brief summary:  Dacia Martínez is a 84 y.o. female initiated on antimicrobial therapy with IV Vancomycin for treatment of bacteremia    Drug Allergies:   Review of patient's allergies indicates:   Allergen Reactions    Tramadol Hallucinations    Novolog flexpen u-100 insulin [insulin aspart u-100] Other (See Comments)     Perspiring and faint-like symptoms       Actual Body Weight:   79.4 kg    Renal Function:   Estimated Creatinine Clearance: 29.7 mL/min (based on SCr of 1.4 mg/dL).,     Dialysis Method (if applicable):  N/A    CBC (last 72 hours):  Recent Labs   Lab Result Units 01/06/20  0311 01/06/20  1152 01/07/20  0226 01/07/20  0610 01/08/20  0130   WBC K/uL 8.83 11.33 11.63 11.82 10.93   Hemoglobin g/dL 11.8* 9.2* 9.2* 9.2* 9.1*   Hematocrit % 40.5 31.8* 30.9* 31.4* 31.0*   Platelets K/uL 102* 158 164 178 178   Gran% % 84.4* 83.9* 80.6* 82.1* 80.2*   Lymph% % 7.8* 8.5* 7.9* 7.0* 8.5*   Mono% % 5.1 5.9 6.9 6.3 7.0   Eosinophil% % 1.6  0.8 3.3 3.5 2.9   Basophil% % 0.6 0.5 0.8 0.6 0.9   Differential Method  Automated Automated Automated Automated Automated       Metabolic Panel (last 72 hours):  Recent Labs   Lab Result Units 01/06/20  0311 01/07/20  0226 01/07/20  0610 01/08/20  0130   Sodium mmol/L 152*  --  147* 144   Potassium mmol/L 3.9  --  3.8 3.5   Chloride mmol/L 119*  --  113* 108   CO2 mmol/L 19*  --  24 25   Glucose mg/dL 150*  --  132* 125*   BUN, Bld mg/dL 37*  --  36* 30*   Creatinine mg/dL 1.1  --  1.2 1.4   Albumin g/dL 1.9*  --  2.2* 2.2*   Total Bilirubin mg/dL 0.8  --  1.2* 1.3*   Alkaline Phosphatase U/L 37*  --  46* 47*   AST U/L 17  --  25 38   ALT U/L 8*  --  9* 8*   Magnesium mg/dL 2.2 2.5  --  2.2   Phosphorus mg/dL 2.3* 3.2  --  2.9       Vancomycin Administrations:  vancomycin given in the last 96 hours                   vancomycin in dextrose 5 % 1 gram/250 mL IVPB 1,000 mg (mg) 1,000 mg New Bag 01/05/20 1600                Microbiologic Results:  Microbiology Results (last 7 days)     Procedure Component Value Units Date/Time    Blood culture [[id number]] Collected:  01/03/20 0741    Order Status:  Completed Specimen:  Blood Updated:  01/08/20 0812     Blood Culture, Routine No growth after 5 days.    Blood culture [[id number]] Collected:  01/03/20 0741    Order Status:  Completed Specimen:  Blood Updated:  01/08/20 0812     Blood Culture, Routine No growth after 5 days.    Blood culture [[id number]] Collected:  01/06/20 2252    Order Status:  Completed Specimen:  Blood from Peripheral, Ankle, Right Updated:  01/08/20 0613     Blood Culture, Routine No Growth to date      No Growth to date    Narrative:       Blood cultures x 2 different sites. 4 bottles total. Please  draw cultures before administering antibiotics.    Blood culture [[id number]] Collected:  01/06/20 2251    Order Status:  Completed Specimen:  Blood from Peripheral, Ankle, Left Updated:  01/08/20 0613     Blood Culture, Routine No Growth to date      No  Growth to date    Narrative:       Blood cultures x 2 different sites. 4 bottles total. Please  draw cultures before administering antibiotics.    Culture, Respiratory with Gram Stain [[id number]] Collected:  01/07/20 0025    Order Status:  Completed Specimen:  Respiratory from Sputum, Induced Updated:  01/07/20 0550     Respiratory Culture Specimen inadequate - culture not performed     Gram Stain (Respiratory) >10epis/lfp and <than many WBC's     Gram Stain (Respiratory) Predominance of oropharyngeal tori. Please recollect.    Blood culture [[id number]] Collected:  01/01/20 1317    Order Status:  Completed Specimen:  Blood Updated:  01/06/20 1612     Blood Culture, Routine No growth after 5 days.    Narrative:       Collection has been rescheduled by TS2 at 01/01/2020 09:58 Reason:   nurses putting tube thru pts nose . will come back   Collection has been rescheduled by TS2 at 01/01/2020 09:58 Reason:   nurses putting tube thru pts nose . will come back     Blood culture [[id number]] Collected:  01/01/20 1317    Order Status:  Completed Specimen:  Blood Updated:  01/06/20 1612     Blood Culture, Routine No growth after 5 days.    Narrative:       Collection has been rescheduled by TS2 at 01/01/2020 09:58 Reason:   nurses putting tube thru pts nose . will come back   Collection has been rescheduled by TS2 at 01/01/2020 09:58 Reason:   nurses putting tube thru pts nose . will come back     Influenza A & B by Molecular [[id number]] Collected:  01/04/20 1200    Order Status:  Completed Specimen:  Nasopharyngeal Swab Updated:  01/04/20 1255     Influenza A, Molecular Negative     Influenza B, Molecular Negative     Flu A & B Source Nasal swab    Culture, Respiratory with Gram Stain [[id number]]  (Abnormal)  (Susceptibility) Collected:  01/01/20 1814    Order Status:  Completed Specimen:  Respiratory from Sputum, Induced Updated:  01/04/20 0933     Respiratory Culture No S aureus or Pseudomonas isolated.      ENTEROBACTER  CLOACAE  Few  Normal respiratory tori also present       Gram Stain (Respiratory) <10 epithelial cells per low power field.     Gram Stain (Respiratory) Few WBC's     Gram Stain (Respiratory) Many Gram positive cocci     Gram Stain (Respiratory) Many Gram positive rods     Gram Stain (Respiratory) Many Gram negative rods     Gram Stain (Respiratory) Many Gram negative diplococci    Respiratory Infection Panel, Nasopharyngeal [[id number]] Collected:  01/01/20 1743    Order Status:  Completed Specimen:  Nasopharyngeal Swab Updated:  01/01/20 2236     Respiratory Infection Panel Source NP Swab     Adenovirus Not Detected     Coronavirus 229E Not Detected     Coronavirus HKU1 Not Detected     Coronavirus NL63 Not Detected     Coronavirus OC43 Not Detected     Human Metapneumovirus Not Detected     Human Rhinovirus/Enterovirus Not Detected     Influenza A (subtypes H1, H1-2009,H3) Not Detected     Influenza B Not Detected     Parainfluenza Virus 1 Not Detected     Parainfluenza Virus 2 Not Detected     Parainfluenza Virus 3 Not Detected     Parainfluenza Virus 4 Not Detected     Respiratory Syncytial Virus Not Detected     Bordetella Parapertussis (LB9064) Not Detected     Bordetella pertussis (ptxP) Not Detected     Chlamydia pneumoniae Not Detected     Mycoplasma pneumoniae Not Detected     Comment: Respiratory Infection Panel testing performed by Multiplex PCR.       Narrative:       For all other respiratory sources order [id number] Respiratory  Viral Panel by PCR (RVPCR)

## 2020-01-08 NOTE — PLAN OF CARE
SW spoke with Pt family at bedside. They reported they are not going to place her in a nursing home and will have 24 hour care to take her home when it is time. SW to provided HH list tomorrow for them to review. They are also interested in [employer] or [employer].     Mervat Olivares LCSW  Neurocritical Care   [facility]  [id number]

## 2020-01-08 NOTE — PROGRESS NOTES
[facility]  Vascular Neurology  Comprehensive Stroke Center  Progress Note    Assessment/Plan:     * Stroke  Dacia Martínez is a 84 y.o. female with PMHx of HTN, HLD, HF, DM, a fib (eliquis) who presented to hospital with gait instability, RSW, and speech changes. She had been off her eliquis for 1 week due to scheduled dental procedure. Patient with L gaze, mild RSW, and speech difficulty when examined by stroke provider. EMS reported possible waxing and waning of symptoms in transit. She was taken for STAT CTA multiphase, which was negative for early ischemic changes, LVO, and significant stenosis. tPA decision delayed due to unclear LKN time since patient took valium for dental procedure. STAT MRI attempted, but patient has pacemaker that is not MRI compatible.      After further discussion with family and unclear LKN time, decision was made to not treat with tPA. Possibly cardioembolic stroke based on history     -CTH 1/2/20: Interval development of a small region of decreased attenuation in the left frontal lobe superiorly concerning for possible acute/recent infarction  -Preliminary EEG: diffuse background slowing with no epileptiform discharges   -CTH 1/5/20: redemonstration of ill-defined hypodensity within the superior left frontal lobe which may relate to evolving recent/acute infarct. Additional subtle hypodensity is present within the left frontal lobe periventricular white matter, somewhat more conspicuous from prior examination, possibly relating to additional small volume recent infarct.  No evidence of acute intracranial hemorrhage or midline shift.  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  Significant intracranial mass effect approximately 0.8 cm rightward midline shift at this time. Interval development small foci of subarachnoid hemorrhage within the left sylvian fissure and in the solitary sulcus  near the right temporal occipital junction.    Patient transferred to the ICU on 1/5/20 for decline in mental status as well as paradoxical breathing (possible respiratory decompensation-ABG showing pure respiratory alkalosis). Subsequent CTH 1/8/20 with Interval development of large areas of recent infarction involving the L MCA, bilat JAIME distributions. Significant intracranial mass effect with rightward midline shift. Patient's family continues to be hopeful and would like full resuscitative efforts to be done; however, family was urged to have discussions amongst each other regarding decisions that need to be made should patient not recover.    Antithrombotics for secondary stroke prevention: holding all    Statins for secondary stroke prevention and hyperlipidemia, if present:   Statins: Atorvastatin- 80 mg daily    Aggressive risk factor modification: HTN, DM, HLD, Obesity, A-Fib     Rehab efforts: The patient has been evaluated by a stroke team provider and the therapy needs have been fully considered based off the presenting complaints and exam findings. The following therapy evaluations are needed: PT evaluate and treat, OT evaluate and treat, SLP evaluate and treat, PM&R evaluate for appropriate placement    Diagnostics ordered/pending: None     VTE prophylaxis: Heparin 5000 units SQ every 8 hours    BP parameters: Infarct: No intervention, SBP <220        Fever  2/2 PNA  -US of the lower extremities negative     Myoclonic jerking  20-30 second left sided myoclonic jerking with left gaze deviation. This could explain her waxing and waning mental status  -Preliminary EEG shows diffuse slowing with no epileptiform discharges     Follow-up EEG readings    Right hemiplegia  Intermittent episodes of right hemiplegia.   CTH on 1/2/20 revealing interval development of a small region of decreased attenuation in the left frontal lobe superiorly concerning for possible acute/recent infarction.    CTH on 1/5/20  showing redemonstration of ill-defined hypodensity within the superior left frontal lobe which may relate to evolving recent/acute infarct.  Additional subtle hypodensity is present within the left frontal lobe periventricular white matter, somewhat more conspicuous from prior examination, possibly relating to additional small volume recent infarct.  No evidence of acute intracranial hemorrhage or midline shift.  CTH 1/8/20 with interval development of large areas of recent infarction involving L MCA and bilat JAIME    Pneumonia  -Pt underwent dental extractions day of presentation. Family reports cough for few days prior to admission.  -Overnight on 12/31, pt became tachypnic with fever to 100.3 and rapid response was called. CXR with left sided consolidation. -Started on vanc/zosyn overnight. ABG wnl. WBC wnl. Procal mildly elevated to .99. O2 saturations %. Suspect possible aspiration pneumonia vs CAP.  Respiratory panel negative    Continue zosyn   Sputum cxs revealed Enterobacter Cloacae       Hypothyroidism  Continue home synthroid  Patient with elevated TSH but normal free T4, subclinical hypothyroidism    CKD (chronic kidney disease) stage 3, GFR 30-59 ml/min  Cr 1.3 on arrival. Baseline 1.1. KHUSHBOO resolved.    Chronic systolic congestive heart failure  Stroke risk factor  TTE Feb 2019 with EF 40% - repeat 1/2 with EF 35%  Patient on entresto, metoprolol, and lasix at home  Holding entresto and lasix in setting of acute stroke  - Patient was on metoprolol for rate control. Was initially discontinued by hospital medicine due to concern for sepsis however, will reinitiate  -Currently on lopressor 50 mg AM, and 100 mg PM to be compatible with NG tube     Persistent atrial fibrillation  Stroke risk factor  On eliquis at home  Held x1 week for dental procedure  Continue home eliquis  Continue home metoprolol    Type 2 diabetes mellitus with stage 3 chronic kidney disease, without long-term current use of  insulin  Stroke risk factor  A1C 6.9  Goal glucose 140-180  Low correction SSI for NPO patients until LEILANI completed  Diabetic diet when safe for swallow eval    Primary osteoarthritis involving multiple joints  PRN tylenol    Idiopathic chronic gout of multiple sites without tophus  Continue home allopurinol    Hyperlipidemia  Stroke risk factor  Patient on atorvastatin 10 mg daily at home   on arrival  Increased to atorvastatin 80 mg daily    Essential hypertension  Stroke risk factor  SBP <220   Holding home antihypertensives due to concern for acute stroke  Prn labetalol         01/01/2020 CTA MP without any acute findings. MRI not done as incompatible with pacemaker.Neurological exam patient is waxing and waning. At times will move her left arm however, per the medicine team's examination patient had hemineglect in hte right side which was not seen during my exam.nOvernight, rapid response was called due to tachypnea up to 40 and fevers to 100.3. ABG wnl but CXR with possible left-sided infiltrate. WBC wnl. Pt was started on vanc/zosyn in setting of aspiration vs CAP pneumonia. Troponin elevated to 0.516 with repeat elevated to 0.935.Cardiology consulted and they believed this is demand ischemia, thus will trend troponin. Repeat CTH shows no evidence of major vascular distribution infarct or hemorrhage.     01/02/2020 Elevated BUN/Cr (could be secondary to post contrast). Patient having intermittent episodes of hemiplegia on the right side. During transport to [place], patient was exhibiting left sided myoclonic jerks with left gaze deviation. Ordered EEG. STAT CTH ordered. General neurology is aware. Awaiting respiratory culture before deescalation of antibiotics. Will be transferred to medicine floor tomorrow.     01/03/2020 Patient was spiking fevers. Antibiotics broadened to vancomycin and zosyn. Patient pan-cultured. Preliminary EEG shows diffuse slowing with epileptiform discharges. US of the carotids  ordered.     01/04/2020 Sputum culture with enterobacter cloacae, pan-sensitive to antibiotics.  Repeat influenza pending. Echo without signs of vegetation. Blood cultures  negative. Increased free water. Pt still with fever in the evening of 1/3/20 so will not deescalate zosyn. Will discontinue vancomycin. US of the lower legs ordered to rule out other causes of fever.  Will order CT head to rule out any new infarctions.    01/05/2020 Patient was febrile  evening of 1/3/20 but improved 1/5/20 however, will keep on zosyn. CTH with redemonstration of hypodensity withi the superior left frontal lobe and additional subtle hypodensity within the left frontal lobe periventricular white matter. US of the lower extremities does not reveal any DVT. On examination today  Patient was extremely somnolent and was exhibiting paradoxical breathing. ABG demonstrated respiratory alkalosis. CXR shows pulmonary edema. Due to possible respiratory decompensation as well as somnolent state, patient was transferred to [facility]      1/8/2020 CTH now showing interval development of large areas of recent infarction, L MCA and bilat JAIME with significant mass effect.     STROKE DOCUMENTATION   Acute Stroke Times   Last Known Normal Date: 12/31/19  Last Known Normal Time: (unknown)  Symptom Onset Date: 12/31/19  Symptom Onset Time: (unknown)  Stroke Team Called Date: 12/31/19  Stroke Team Called Time: 1924  Stroke Team Arrival Date: 12/31/19  Stroke Team Arrival Time: 1929  CT Interpretation Time: 1939  Decision to Treat Time for Alteplase: 2039  Decision to Treat Time for IR: 1939    NIH Scale:  1a. Level of Consciousness: 0-->Alert, keenly responsive  1b. LOC Questions: 0-->Answers both questions correctly  1c. LOC Commands: 0-->Performs both tasks correctly  2. Best Gaze: 0-->Normal  3. Visual: 0-->No visual loss  4. Facial Palsy: 0-->Normal symmetrical movements  5a. Motor Arm, Left: 0-->No drift, limb holds 90 (or 45) degrees for full 10  secs  5b. Motor Arm, Right: 0-->No drift, limb holds 90 (or 45) degrees for full 10 secs  6a. Motor Leg, Left: 0-->No drift, leg holds 30 degree position for full 5 secs  6b. Motor Leg, Right: 0-->No drift, leg holds 30 degree position for full 5 secs  7. Limb Ataxia: 0-->Absent  8. Sensory: 0-->Normal, no sensory loss  9. Best Language: 0-->No aphasia, normal  10. Dysarthria: 0-->Normal  11. Extinction and Inattention (formerly Neglect): 0-->No abnormality  Total (NIH Stroke Scale): 0       Modified [place] Score: 3  Brigantine Coma Scale:    ABCD2 Score:    [id number] Score:   HAS -BLED Score:   ICH Score:   Hunt & Prather Classification:      Hemorrhagic change of an Ischemic Stroke: Does this patient have an ischemic stroke with hemorrhagic changes? No     Neurologic Chief Complaint: Aphasia and weakness, L frontal, L MCA, bilateral JAIME    Subjective:     Interval History: Patient is seen for follow-up neurological assessment and treatment recommendations:    CT head done this am showing interval development of large infarct. Patient continues to be difficult to arouse. Sister at bedside.     HPI, Past Medical, Family, and Social History remains the same as documented in the initial encounter.     Review of Systems   Unable to perform ROS: Mental status change   Constitutional: Positive for fatigue. Negative for chills and fever.   Respiratory: Negative for cough.    Cardiovascular: Negative for chest pain.   Gastrointestinal: Negative for nausea and vomiting.   Neurological: Positive for speech difficulty and weakness. Negative for facial asymmetry and numbness.     Scheduled Meds:   albuterol-ipratropium  3 mL Nebulization Q4H    allopurinol  150 mg Per NG tube Daily    atorvastatin  80 mg Per NG tube Daily    levothyroxine  25 mcg Per NG tube Before breakfast    metoprolol tartrate  100 mg Per NG tube QHS    metoprolol tartrate  50 mg Per NG tube Daily    piperacillin-tazobactam (ZOSYN) IVPB  4.5 g  Intravenous Q12H    senna-docusate 8.6-50 mg  1 tablet Per NG tube Daily    vancomycin (VANCOCIN) IVPB  750 mg Intravenous Q24H     Continuous Infusions:   sodium chloride 0.9%       PRN Meds:acetaminophen, Dextrose 10% Bolus, glucagon (human recombinant), insulin aspart U-100, labetalol, ondansetron, sodium chloride 0.9%    Objective:     Vital Signs (Most Recent):  Temp: (!) 100.9 °F (38.3 °C) (01/08/20 1111)  Pulse: 73 (01/08/20 1102)  Resp: (!) 24 (01/08/20 1102)  BP: 119/62 (01/08/20 1102)  SpO2: 96 % (01/08/20 1102)  BP Location: Left leg    Vital Signs Range (Last 24H):  Temp:  [99.3 °F (37.4 °C)-101.7 °F (38.7 °C)]   Pulse:  [69-73]   Resp:  [17-29]   BP: (110-152)/(48-78)   SpO2:  [96 %-100 %]   Arterial Line BP: (126-176)/(46-68)   BP Location: Left leg    Physical Exam   Constitutional: No distress.   HENT:   Head: Normocephalic and atraumatic.   Mouth/Throat: No oropharyngeal exudate.   Eyes: No scleral icterus.   Neck: Normal range of motion. Neck supple.   No JVD noted   Cardiovascular: Normal rate.   Pulmonary/Chest: No respiratory distress. She has decreased breath sounds in the right upper field, the right middle field, the right lower field, the left upper field, the left middle field and the left lower field. She has no wheezes. She has no rales.   Abdominal: Soft. She exhibits no distension. There is no guarding.   Musculoskeletal:   No LE edema. Bilateral SCDs in place   Lymphadenopathy:     She has no cervical adenopathy.   Neurological: She displays no tremor.   Skin: Skin is warm and dry. She is not diaphoretic.   Nursing note and vitals reviewed.      Neurological Exam:   LOC: drowsy  Attention Span: poor  Language: Global aphasia  Articulation: Mute/Anarthric  Orientation: Not oriented to person, place, and time  Visual Fields: Full  EOM (CN III, IV, VI): Full/intact  Pupils (CN II, III): PERRL  Facial Sensation (CN V): Normal  Facial Movement (CN VII): Unable to test   Gag Reflex:  present  Reflexes: 2+ throughout  Cebellar: No evidence of appendicular or axial ataxia  Sensation: Intact to light touch, temperature and vibration  Unable to test  Tone: Rigidity  LUE     Laboratory:  CMP:   Recent Labs   Lab 01/08/20  0130   CALCIUM 8.4*   ALBUMIN 2.2*   PROT 6.9      K 3.5   CO2 25      BUN 30*   CREATININE 1.4   ALKPHOS 47*   ALT 8*   AST 38   BILITOT 1.3*     BMP:   Recent Labs   Lab 01/08/20  0130      K 3.5      CO2 25   BUN 30*   CREATININE 1.4   CALCIUM 8.4*     CBC:   Recent Labs   Lab 01/08/20 0130   WBC 10.93   RBC 3.34*   HGB 9.1*   HCT 31.0*      MCV 93   MCH 27.2   MCHC 29.4*     Lipid Panel:   No results for input(s): CHOL, LDLCALC, HDL, TRIG in the last 168 hours.  Coagulation:   Recent Labs   Lab 01/06/20  1152  01/08/20 0130   INR 1.3*  --   --    APTT 26.0   < > 56.1*    < > = values in this interval not displayed.     Hgb A1C:   No results for input(s): HGBA1C in the last 168 hours.  TSH:   No results for input(s): TSH in the last 168 hours.    Diagnostic Results     Brain Imaging   CT 1/1/20   -No evidence of major vascular distribution infarct or hemorrhage.  -Chronic microvascular ischemic change and generalized cerebral volume loss, normal for age.    CT 1/5/20  1.  Redemonstration of ill-defined hypodensity within the superior left frontal lobe which may relate to evolving recent/acute infarct.  Additional subtle hypodensity is present within the left frontal lobe periventricular white matter, somewhat more conspicuous from prior examination, possibly relating to additional small volume recent infarct.  No evidence of acute intracranial hemorrhage or midline shift.  2.  Chronic senescent and microvascular ischemic changes.    CT 1/8/20  Interval development of large areas of recent infarction involving the left MCA bilateral JAIME distributions.  Significant intracranial mass effect approximately 0.8 cm rightward midline shift at this  time.  Interval development small foci of subarachnoid hemorrhage within the left sylvian fissure and in the solitary sulcus near the right temporal occipital junction.    Vessel Imaging   CTA MP 12/31/19   -No acute intracranial pathology.  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  -In this exam limited by poor timing of contrast and motion artifact, no high-grade stenosis or major vessel occlusion.  Hypoplastic right A2 segment.    Cardiac Imaging   TTE   · Moderately decreased left ventricular systolic function. The estimated ejection fraction is 35%  · Concentric left ventricular remodeling.  · Local segmental wall motion abnormalities.  · Moderate right ventricular enlargement.  · Mildly reduced right ventricular systolic function.  · Moderate biatrial enlargement.  · Aortic valve area is 1.12 cm2; peak velocity is 1.95 m/s; mean gradient is 9 mmHg.  · Mild-to-moderate aortic valve stenosis.  · Mild mitral regurgitation.  · Mild tricuspid regurgitation.  · The estimated PA systolic pressure is 59 mm Hg  · Pulmonary hypertension present.  · Elevated central venous pressure (15 mm Hg).  · Atrial fibrillation observed.           Sneha Segundo MD  Comprehensive Stroke Center  Department of Vascular Neurology   [facility]

## 2020-01-08 NOTE — SUBJECTIVE & OBJECTIVE
Neurologic Chief Complaint: Aphasia and weakness, L frontal, L MCA, bilateral JAIME    Subjective:     Interval History: Patient is seen for follow-up neurological assessment and treatment recommendations:    CT head done this am showing interval development of large infarct. Patient continues to be difficult to arouse. Sister at bedside.     HPI, Past Medical, Family, and Social History remains the same as documented in the initial encounter.     Review of Systems   Unable to perform ROS: Mental status change   Constitutional: Positive for fatigue. Negative for chills and fever.   Respiratory: Negative for cough.    Cardiovascular: Negative for chest pain.   Gastrointestinal: Negative for nausea and vomiting.   Neurological: Positive for speech difficulty and weakness. Negative for facial asymmetry and numbness.     Scheduled Meds:   albuterol-ipratropium  3 mL Nebulization Q4H    allopurinol  150 mg Per NG tube Daily    atorvastatin  80 mg Per NG tube Daily    levothyroxine  25 mcg Per NG tube Before breakfast    metoprolol tartrate  100 mg Per NG tube QHS    metoprolol tartrate  50 mg Per NG tube Daily    piperacillin-tazobactam (ZOSYN) IVPB  4.5 g Intravenous Q12H    senna-docusate 8.6-50 mg  1 tablet Per NG tube Daily    vancomycin (VANCOCIN) IVPB  750 mg Intravenous Q24H     Continuous Infusions:   sodium chloride 0.9%       PRN Meds:acetaminophen, Dextrose 10% Bolus, glucagon (human recombinant), insulin aspart U-100, labetalol, ondansetron, sodium chloride 0.9%    Objective:     Vital Signs (Most Recent):  Temp: (!) 100.9 °F (38.3 °C) (01/08/20 1111)  Pulse: 73 (01/08/20 1102)  Resp: (!) 24 (01/08/20 1102)  BP: 119/62 (01/08/20 1102)  SpO2: 96 % (01/08/20 1102)  BP Location: Left leg    Vital Signs Range (Last 24H):  Temp:  [99.3 °F (37.4 °C)-101.7 °F (38.7 °C)]   Pulse:  [69-73]   Resp:  [17-29]   BP: (110-152)/(48-78)   SpO2:  [96 %-100 %]   Arterial Line BP: (126-176)/(46-68)   BP Location: Left  leg    Physical Exam   Constitutional: No distress.   HENT:   Head: Normocephalic and atraumatic.   Mouth/Throat: No oropharyngeal exudate.   Eyes: No scleral icterus.   Neck: Normal range of motion. Neck supple.   No JVD noted   Cardiovascular: Normal rate.   Pulmonary/Chest: No respiratory distress. She has decreased breath sounds in the right upper field, the right middle field, the right lower field, the left upper field, the left middle field and the left lower field. She has no wheezes. She has no rales.   Abdominal: Soft. She exhibits no distension. There is no guarding.   Musculoskeletal:   No LE edema. Bilateral SCDs in place   Lymphadenopathy:     She has no cervical adenopathy.   Neurological: She displays no tremor.   Skin: Skin is warm and dry. She is not diaphoretic.   Nursing note and vitals reviewed.      Neurological Exam:   LOC: drowsy  Attention Span: poor  Language: Global aphasia  Articulation: Mute/Anarthric  Orientation: Not oriented to person, place, and time  Visual Fields: Full  EOM (CN III, IV, VI): Full/intact  Pupils (CN II, III): PERRL  Facial Sensation (CN V): Normal  Facial Movement (CN VII): Unable to test   Gag Reflex: present  Reflexes: 2+ throughout  Cebellar: No evidence of appendicular or axial ataxia  Sensation: Intact to light touch, temperature and vibration  Unable to test  Tone: Rigidity  LUE     Laboratory:  CMP:   Recent Labs   Lab 01/08/20 0130   CALCIUM 8.4*   ALBUMIN 2.2*   PROT 6.9      K 3.5   CO2 25      BUN 30*   CREATININE 1.4   ALKPHOS 47*   ALT 8*   AST 38   BILITOT 1.3*     BMP:   Recent Labs   Lab 01/08/20 0130      K 3.5      CO2 25   BUN 30*   CREATININE 1.4   CALCIUM 8.4*     CBC:   Recent Labs   Lab 01/08/20 0130   WBC 10.93   RBC 3.34*   HGB 9.1*   HCT 31.0*      MCV 93   MCH 27.2   MCHC 29.4*     Lipid Panel:   No results for input(s): CHOL, LDLCALC, HDL, TRIG in the last 168 hours.  Coagulation:   Recent Labs   Lab  01/06/20  1152  01/08/20  0130   INR 1.3*  --   --    APTT 26.0   < > 56.1*    < > = values in this interval not displayed.     Hgb A1C:   No results for input(s): HGBA1C in the last 168 hours.  TSH:   No results for input(s): TSH in the last 168 hours.    Diagnostic Results     Brain Imaging   CT 1/1/20   -No evidence of major vascular distribution infarct or hemorrhage.  -Chronic microvascular ischemic change and generalized cerebral volume loss, normal for age.    CTH 1/5/20  1.  Redemonstration of ill-defined hypodensity within the superior left frontal lobe which may relate to evolving recent/acute infarct.  Additional subtle hypodensity is present within the left frontal lobe periventricular white matter, somewhat more conspicuous from prior examination, possibly relating to additional small volume recent infarct.  No evidence of acute intracranial hemorrhage or midline shift.  2.  Chronic senescent and microvascular ischemic changes.    CT 1/8/20  Interval development of large areas of recent infarction involving the left MCA bilateral JAIME distributions.  Significant intracranial mass effect approximately 0.8 cm rightward midline shift at this time.  Interval development small foci of subarachnoid hemorrhage within the left sylvian fissure and in the solitary sulcus near the right temporal occipital junction.    Vessel Imaging   CTA  12/31/19   -No acute intracranial pathology.  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  -In this exam limited by poor timing of contrast and motion artifact, no high-grade stenosis or major vessel occlusion.  Hypoplastic right A2 segment.    Cardiac Imaging   TTE   · Moderately decreased left ventricular systolic function. The estimated ejection fraction is 35%  · Concentric left ventricular remodeling.  · Local segmental wall motion abnormalities.  · Moderate right ventricular enlargement.  · Mildly reduced right  ventricular systolic function.  · Moderate biatrial enlargement.  · Aortic valve area is 1.12 cm2; peak velocity is 1.95 m/s; mean gradient is 9 mmHg.  · Mild-to-moderate aortic valve stenosis.  · Mild mitral regurgitation.  · Mild tricuspid regurgitation.  · The estimated PA systolic pressure is 59 mm Hg  · Pulmonary hypertension present.  · Elevated central venous pressure (15 mm Hg).  · Atrial fibrillation observed.

## 2020-01-08 NOTE — ASSESSMENT & PLAN NOTE
Dacia Martínez is a 84 y.o. female with PMHx of HTN, HLD, HF, DM, a fib (eliquis) who presented to hospital with gait instability, RSW, and speech changes. She had been off her eliquis for 1 week due to scheduled dental procedure. Patient with L gaze, mild RSW, and speech difficulty when examined by stroke provider. EMS reported possible waxing and waning of symptoms in transit. She was taken for STAT CTA multiphase, which was negative for early ischemic changes, LVO, and significant stenosis. tPA decision delayed due to unclear LKN time since patient took valium for dental procedure. STAT MRI attempted, but patient has pacemaker that is not MRI compatible.      After further discussion with family and unclear LKN time, decision was made to not treat with tPA. Possibly cardioembolic stroke based on history     -CTH 1/2/20: Interval development of a small region of decreased attenuation in the left frontal lobe superiorly concerning for possible acute/recent infarction  -Preliminary EEG: diffuse background slowing with no epileptiform discharges   -CTH 1/5/20: redemonstration of ill-defined hypodensity within the superior left frontal lobe which may relate to evolving recent/acute infarct. Additional subtle hypodensity is present within the left frontal lobe periventricular white matter, somewhat more conspicuous from prior examination, possibly relating to additional small volume recent infarct.  No evidence of acute intracranial hemorrhage or midline shift.  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  Significant intracranial mass effect approximately 0.8 cm rightward midline shift at this time. Interval development small foci of subarachnoid hemorrhage within the left sylvian fissure and in the solitary sulcus near the right temporal occipital junction.    Patient transferred to the ICU on 1/5/20 for decline in mental status as well as  paradoxical breathing (possible respiratory decompensation-ABG showing pure respiratory alkalosis). Subsequent CTH 1/8/20 with Interval development of large areas of recent infarction involving the L MCA, bilat JAIME distributions. Significant intracranial mass effect with rightward midline shift. Patient's family continues to be hopeful and would like full resuscitative efforts to be done; however, family was urged to have discussions amongst each other regarding decisions that need to be made should patient not recover.    Antithrombotics for secondary stroke prevention: holding all    Statins for secondary stroke prevention and hyperlipidemia, if present:   Statins: Atorvastatin- 80 mg daily    Aggressive risk factor modification: HTN, DM, HLD, Obesity, A-Fib     Rehab efforts: The patient has been evaluated by a stroke team provider and the therapy needs have been fully considered based off the presenting complaints and exam findings. The following therapy evaluations are needed: PT evaluate and treat, OT evaluate and treat, SLP evaluate and treat, PM&R evaluate for appropriate placement    Diagnostics ordered/pending: None     VTE prophylaxis: Heparin 5000 units SQ every 8 hours    BP parameters: Infarct: No intervention, SBP <220

## 2020-01-08 NOTE — ASSESSMENT & PLAN NOTE
Place 24hr cEEG  CTH showing severe progression of L MCA and bilateral JAIME distribution infarcts with local mass effect 0.8cm with associated SAH.  Discontinue heparin ggt.   coags today. SQH tomorrow pending results  In light of new intracranial imaging, LP no longer indicated.  ID- febrile, slight leukocytosis. F/u cultures. Continue vanc and zosyn. Currently NGTD

## 2020-01-08 NOTE — CONSULTS
"  [facility]  Adult Nutrition  Consult Note    SUMMARY     Recommendations  As medically able, recommend increasing TF goal rate.   Isosource 1.5 @ 45mL/hr.   - Provides 1620kcals, 73g protein and 825mL free water.   - If BG elevates, recommend changing TF formula to Glucerna 1.5 @ goal 45mL/hr.     RD to monitor.    Goals: 1. Pt to receive nutrition by RD follow up.  Nutrition Goal Status: goal met  Communication of RD Recs: (POC)    Reason for Assessment    Reason For Assessment: consult, RD follow-up  Diagnosis: stroke/CVA  Relevant Medical History: HTN, HLD, HF, DM, Afib  Interdisciplinary Rounds: attended  General Information Comments: Pt transferred to ICU 1/5. Pt remains NPO. TF restarted, tolerating at trickle. Pt previously receiving continuous TF on stepdown however stopped on tx to ICU. . Per family, pt with good po intake meals and no wt loss PTA. Completed NFPE 1/1: pt appears nourished. Pt follows low sodium, carb consistent diet at home (daughters cook her meals).  Nutrition Discharge Planning: unable to assess at this time.    Nutrition Risk Screen    Nutrition Risk Screen: dysphagia or difficulty swallowing    Nutrition/Diet History    Spiritual, Cultural Beliefs, Alevism Practices, Values that Affect Care: no    Anthropometrics    Temp: 99.7 °F (37.6 °C)  Height Method: Stated  Height: 5' 2" (157.5 cm)  Height (inches): 62 in  Weight Method: Bed Scale  Weight: 79.4 kg (175 lb)  Weight (lb): 175 lb  Ideal Body Weight (IBW), Female: 110 lb  % Ideal Body Weight, Female (lb): 159.09 %  BMI (Calculated): 32  BMI Grade: 30 - 34.9- obesity - grade I       Lab/Procedures/Meds    Pertinent Labs Reviewed: reviewed  Pertinent Labs Comments: POCT Glu 121-129  Pertinent Medications Reviewed: reviewed  Pertinent Medications Comments: insulin    Estimated/Assessed Needs    Weight Used For Calorie Calculations: 78.9 kg (173 lb 15.1 oz)  Energy Calorie Requirements (kcal): 1486 kcal  Energy " Need Method: [place] Jeor(PAL 1.25)  Protein Requirements: 79-95g/day  Weight Used For Protein Calculations: 78.9 kg (173 lb 15.1 oz)        RDA Method (mL): 1486  CHO Requirement: 186g CHO      Nutrition Prescription Ordered    Current Diet Order: NPO  Nutrition Order Comments: TF at goal  Current Nutrition Support Formula Ordered: Isosource 1.5  Current Nutrition Support Rate Ordered: 20 (ml)  Current Nutrition Support Frequency Ordered: ml/hr    Evaluation of Received Nutrient/Fluid Intake    Enteral Calories (kcal): 720  Enteral Protein (gm): 33  Enteral (Free Water) Fluid (mL): 367    % Kcal Needs: 48  % Protein Needs: 42    I/O: +5.2L since admit, good UOP, LBM 1/7    Energy Calories Required: not meeting needs  Protein Required: not meeting needs  Fluid Required: other (see comments)(per MD)    Tolerance: tolerating    % Intake of Estimated Energy Needs: 25 - 50 %  % Meal Intake: NPO    Nutrition Risk    Level of Risk/Frequency of Follow-up: low     Assessment and Plan    Nutrition Problem  Swallowing difficulty     Related to (etiology):   stroke     Signs and Symptoms (as evidenced by):   Pt NPO with no alternate means nutrition.     Interventions(treatment strategy):  Collaboration of care with providers.  Enteral nutrition.     Nutrition Diagnosis Status:   Continues       Monitor and Evaluation    Food and Nutrient Intake: enteral nutrition intake  Food and Nutrient Adminstration: enteral and parenteral nutrition administration  Anthropometric Measurements: weight, weight change  Biochemical Data, Medical Tests and Procedures: electrolyte and renal panel, gastrointestinal profile, glucose/endocrine profile, inflammatory profile, lipid profile  Nutrition-Focused Physical Findings: overall appearance, extremities, muscles and bones, head and eyes, skin     Malnutrition Assessment                 Orbital Region (Subcutaneous Fat Loss): well nourished  Upper Arm Region (Subcutaneous Fat Loss): well  nourished  Thoracic and Lumbar Region: well nourished   [place] Region (Muscle Loss): well nourished  Clavicle Bone Region (Muscle Loss): well nourished  Clavicle and Acromion Bone Region (Muscle Loss): well nourished  Scapular Bone Region (Muscle Loss): well nourished  Dorsal Hand (Muscle Loss): mild depletion  Patellar Region (Muscle Loss): well nourished  Anterior Thigh Region (Muscle Loss): well nourished  Posterior Calf Region (Muscle Loss): well nourished                 Nutrition Follow-Up    RD Follow-up?: Yes

## 2020-01-08 NOTE — PROGRESS NOTES
[facility]  Neurocritical Care  Progress Note    Admit Date: 12/31/2019  Service Date: 01/08/2020  Length of Stay: 8    Subjective:     Chief Complaint: Stroke    History of Present Illness: The patient is an 85yo female  with PMH of HTN, DM2, HFrEF (30% in 2/2019 with pacemaker), HLD, CAD, A fib (on Eliquis), OA, Breast Cancer (s/p mastectomy, chemo, radiation in 2000), CKD3, Gout, and Hypothyroidism who was originially admitted to vascular neurology after family noticed right sided-weakness, gait instability, and slurred speech yesterday evening (12/30) following dental procedure. Report that her Eliquis had been held for 4 days prior to the procedure.  Her daughters state that she had been altered since they picked her up from the surgery around 4:30 pm 12/30. 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 Unable to attain MRI due to pacemaker. She was given ASA 325mg and Atorvastatin 40 mg.  Overnight rapid response was called due to tachypnea up to 40 and fevers to 100.3. ABG wnl but CXR with possible left-sided infiltrate. WBC wnl. Pt was started on vanc/zosyn in setting of aspiration vs CAP pneumonia.     On 1/2/20 AM rapid response was called for concern for decreased arousal and labored breathing that has been waxing and waning throughout admission. Troponin peaked and down-trended. Pt was also noted to have jerking movements of UE with concern for seizure-like activity on 1/2/20. Neurology was consulted by vascular neurology who recommended continuous EEG monitoring that did not show any large epileptiform activty. Additionally, STAT head CT was notable for small area on frontal lobe that may represent acute infarct. Was started on Vanc/zosyn for Aspiration vs CAP but was de-escalated to Levaquin on 1/2/20. Overnight 1/3/20, pt with recurrence of fevers and vanc/zosyn was re-started. Lactate WNL. Respiratory viral panel and repeat influenza  negative but sputum culture with enterobacter cloacae, sensitive to zosyn. Echo without signs of vegetation. Blood cultures still negative. Pt febrile evening of 1/3/20 but improved 1/5/20, still on zosyn. Repeat heat CT with redemonstration of hypodensity within the superior left frontal lobe and additional subtle hypodensity within the left frontal lobe periventricular white matter. Also increasingly somnolent on 1/5/20, ABG with respiratory alkalosis, vascular neurology to discuss with neuro critical care. Patient to be admitted to [facility] for higher level of care.     1/5 Neuro Critical Care consulted for Decreased LOC, poor response, respiratory insufficiency. Patient found to be somnolent unable to follow commands, protecting airway. Transferred to [facility] for higher level of care and management.    Hospital Course: 01/05/2020 transfer from floor for decreased LOC, respiratory insufficiency  01/06/2020d/c eliquis, start heparin gtt minimal intensity for afib, cap overnight-nothing epileptiform, place A-line, ABG q6h, place hickman cath, 20 mg Lasix X1, continue enteral water flushed 200 ml q6h, repeat CBC-follow plts  01/07/2020 pt still with poor neurological exam. CTH showed small L frontal hypodensity cocnerning for stroke. Will diurese with 60 lasix IV today. Follow up cultures  01/08/2020 naeon. satting well on room air. Neurologically unchanged from yesterday. CTH reviewed showing large progression of L MCA and bilateral JAIME distribution infarcts with generalized edema, mass effect and associated SAH.           Review of Systems    Unable to obtain a complete ROS due to level of consciousness  Objective:     Vitals:  Temp: 99.7 °F (37.6 °C)  Pulse: 70  Rhythm: paced rhythm  BP: (!) 101/55  MAP (mmHg): 71  Resp: (!) 23  SpO2: 95 %  O2 Device (Oxygen Therapy): room air    Temp  Min: 99.7 °F (37.6 °C)  Max: 101.7 °F (38.7 °C)  Pulse  Min: 69  Max: 73  BP  Min: 101/55  Max: 152/60  MAP (mmHg)  Min: 69  Max: 94  Resp   Min: 17  Max: 29  SpO2  Min: 95 %  Max: 100 %    01/07 0701 - 01/08 0700  In: 947.9 [I.V.:287.9]  Out: 2075 [Urine:2075]   Unmeasured Output  Urine Occurrence: 1  Stool Occurrence: 0  Emesis Occurrence: 0  Pad Count: 2       Physical Exam      Physical Exam:  GA: Alert, comfortable, no acute distress.   Skin: No jaundice, rashes, or visible lesions.  Neuro:  --GCS: E2V2 M4  --Mental Status:  Opens eyes to pain, doesn't track doesn't follow commands, aphasic  --CN II-XII unable to fully assess  --Pupils 4mm, PERRL.   --LUE spontaneous movement  -- WD RUE and BLE    Medications:  Continuous    sodium chloride 0.9%   Scheduled    albuterol-ipratropium 3 mL Q4H   allopurinol 150 mg Daily   atorvastatin 80 mg Daily   levothyroxine 25 mcg Before breakfast   metoprolol tartrate 100 mg QHS   metoprolol tartrate 50 mg Daily   piperacillin-tazobactam (ZOSYN) IVPB 4.5 g Q12H   senna-docusate 8.6-50 mg 1 tablet Daily   vancomycin (VANCOCIN) IVPB 750 mg Q24H   PRN    acetaminophen 650 mg Q6H PRN   Dextrose 10% Bolus 12.5 g PRN   glucagon (human recombinant) 1 mg PRN   insulin aspart U-100 0-5 Units Q6H PRN   labetalol 10 mg Q6H PRN   ondansetron 4 mg Q6H PRN   sodium chloride 0.9% 500 mL Continuous PRN     Today I personally reviewed pertinent medications, lines/drains/airways, imaging, laboratory results,   Notable CTH 01/08    Diet  Diet NPO      Assessment/Plan:     Neuro  Altered mental status  Place 24hr cEEG  CTH showing severe progression of L MCA and bilateral JAIME distribution infarcts with local mass effect 0.8cm with associated SAH.  Discontinue heparin ggt.   coags today. SQH tomorrow pending results  In light of new intracranial imaging, LP no longer indicated.  ID- febrile, slight leukocytosis. F/u cultures. Continue vanc and zosyn. Currently NGTD    Myoclonic jerking  EEG Monitoring  Seizure Precautions  01/08/2020-EEG cap nothing epileptiform -per epilepsy team      Derm  Alteration in skin integrity  Skin  Assessment  Q Shift  Wound Care Following    Cardiac/Vascular  Bilateral carotid artery stenosis  As noted on Imaging    Chronic systolic congestive heart failure  EF 35%  Continuous Cardiac Monitoring  Lasix yesterday.       Persistent atrial fibrillation  Heparin gtt discontinued due to recent CTH    Hyperlipidemia  Monitor Lipid Panel  Continue Atorvastatin    Essential hypertension  Continuous Cardiac Monitoring  Vital Signs Q1 hour  -SBP goal 100-180  ECHO 35% EF                   Renal/  CKD (chronic kidney disease) stage 3, GFR 30-59 ml/min  Monitor I/O's and daily weights  -Avoid nephrotoxic agents, NSAIDs, IV contrast, etc  -Renally dose medications   -Daily renal function panels   Limit/Avoid contrast studies   -Maintain Hgb >7.0  -Maintain SBPs <180           Endocrine  Hypothyroidism  Continue Synthroid  TSH 13.5      Type 2 diabetes mellitus with stage 3 chronic kidney disease, without long-term current use of insulin  A1c 6.9  RISS   POCT Q6  Nutritional Consult for dietary /caloric needs      Orthopedic  Idiopathic chronic gout of multiple sites without tophus  Continue Allopurinol  Continue Tylenol for pain    Other  Fever  Monitor temp Q4 hr  Tylenol for elevated temp  Vanc and zosyn  Try for repeat sputum culture today      Start Tube feeds at 20cc/hr today    The patient is being Prophylaxed for:  Venous Thromboembolism with: Mechanical  Stress Ulcer with: None  Ventilator Pneumonia with: not applicable    Activity Orders          Commode at bedside starting at 12/31 2047    Diet NPO: NPO starting at 12/31 2047    Commode at bedside starting at 12/31 2047    Straight Cath starting at 12/31 2046        Full Code    Davy Muñoz MD  Neurocritical Care  [facility]

## 2020-01-08 NOTE — SUBJECTIVE & OBJECTIVE
Review of Systems    Unable to obtain a complete ROS due to level of consciousness  Objective:     Vitals:  Temp: 99.7 °F (37.6 °C)  Pulse: 70  Rhythm: paced rhythm  BP: (!) 101/55  MAP (mmHg): 71  Resp: (!) 23  SpO2: 95 %  O2 Device (Oxygen Therapy): room air    Temp  Min: 99.7 °F (37.6 °C)  Max: 101.7 °F (38.7 °C)  Pulse  Min: 69  Max: 73  BP  Min: 101/55  Max: 152/60  MAP (mmHg)  Min: 69  Max: 94  Resp  Min: 17  Max: 29  SpO2  Min: 95 %  Max: 100 %    01/07 0701 - 01/08 0700  In: 947.9 [I.V.:287.9]  Out: 2075 [Urine:2075]   Unmeasured Output  Urine Occurrence: 1  Stool Occurrence: 0  Emesis Occurrence: 0  Pad Count: 2       Physical Exam      Physical Exam:  GA: Alert, comfortable, no acute distress.   Skin: No jaundice, rashes, or visible lesions.  Neuro:  --GCS: E2V2 M4  --Mental Status:  Opens eyes to pain, doesn't track doesn't follow commands, aphasic  --CN II-XII unable to fully assess  --Pupils 4mm, PERRL.   --LUE spontaneous movement  -- WD RUE and BLE    Medications:  Continuous    sodium chloride 0.9%   Scheduled    albuterol-ipratropium 3 mL Q4H   allopurinol 150 mg Daily   atorvastatin 80 mg Daily   levothyroxine 25 mcg Before breakfast   metoprolol tartrate 100 mg QHS   metoprolol tartrate 50 mg Daily   piperacillin-tazobactam (ZOSYN) IVPB 4.5 g Q12H   senna-docusate 8.6-50 mg 1 tablet Daily   vancomycin (VANCOCIN) IVPB 750 mg Q24H   PRN    acetaminophen 650 mg Q6H PRN   Dextrose 10% Bolus 12.5 g PRN   glucagon (human recombinant) 1 mg PRN   insulin aspart U-100 0-5 Units Q6H PRN   labetalol 10 mg Q6H PRN   ondansetron 4 mg Q6H PRN   sodium chloride 0.9% 500 mL Continuous PRN     Today I personally reviewed pertinent medications, lines/drains/airways, imaging, laboratory results,   Notable CT 01/08    Diet  Diet NPO

## 2020-01-08 NOTE — ASSESSMENT & PLAN NOTE
Monitor temp Q4 hr  Tylenol for elevated temp  Vanc and zosyn  Try for repeat sputum culture today

## 2020-01-08 NOTE — PT/OT/SLP PROGRESS
Physical Therapy      Patient Name:  Dacia Martínez   MRN:  [id number]    Patient not seen today secondary to Other (Comment)(hold per RN 2/2 patient lethargy ). Will follow-up 1/9/2020 as pt medically appropriate.    Franny Thomson, PT

## 2020-01-08 NOTE — NURSING
Pt grunts with breathing, appears belly breathing. VSS at this time. RU [place] at bedside. TF turned off incase there is a need for intubation. WCTM

## 2020-01-08 NOTE — PLAN OF CARE
POC reviewed with Ms. Pederson's grand-daughter at bedside at 1700. Pt's grand-daughter verbalized understanding. Questions and concerns addressed. CEEG for 24 hrs placed. Ammonia sent off. Lasix x1 given, UO ~1L. BM x 3. CT in AM. Stop heparin gtt at 0500 in am. Possible LP tomorrow. Pt progressing toward goals. Will continue to monitor. See flowsheets for full assessment and VS info.

## 2020-01-08 NOTE — ASSESSMENT & PLAN NOTE
Intermittent episodes of right hemiplegia.   CTH on 1/2/20 revealing interval development of a small region of decreased attenuation in the left frontal lobe superiorly concerning for possible acute/recent infarction.    CTH on 1/5/20 showing redemonstration of ill-defined hypodensity within the superior left frontal lobe which may relate to evolving recent/acute infarct.  Additional subtle hypodensity is present within the left frontal lobe periventricular white matter, somewhat more conspicuous from prior examination, possibly relating to additional small volume recent infarct.  No evidence of acute intracranial hemorrhage or midline shift.  CTH 1/8/20 with interval development of large areas of recent infarction involving L MCA and bilat JAIME

## 2020-01-08 NOTE — SUBJECTIVE & OBJECTIVE
Interval History: chart reviewed and discussed with Dr Perez    Medications:  Continuous Infusions:   sodium chloride 0.9%       Scheduled Meds:   albuterol-ipratropium  3 mL Nebulization Q4H    allopurinol  150 mg Per NG tube Daily    atorvastatin  80 mg Per NG tube Daily    levothyroxine  25 mcg Per NG tube Before breakfast    metoprolol tartrate  100 mg Per NG tube QHS    metoprolol tartrate  50 mg Per NG tube Daily    piperacillin-tazobactam (ZOSYN) IVPB  4.5 g Intravenous Q12H    senna-docusate 8.6-50 mg  1 tablet Per NG tube Daily    vancomycin (VANCOCIN) IVPB  750 mg Intravenous Q24H     PRN Meds:acetaminophen, Dextrose 10% Bolus, glucagon (human recombinant), insulin aspart U-100, labetalol, ondansetron, sodium chloride 0.9%    Objective:     Vital Signs (Most Recent):  Temp: 99.7 °F (37.6 °C) (01/08/20 1302)  Pulse: 69 (01/08/20 1503)  Resp: (!) 23 (01/08/20 1503)  BP: (!) 101/55 (01/08/20 1302)  SpO2: 99 % (01/08/20 1503) Vital Signs (24h Range):  Temp:  [99.7 °F (37.6 °C)-101.7 °F (38.7 °C)] 99.7 °F (37.6 °C)  Pulse:  [69-73] 69  Resp:  [17-27] 23  SpO2:  [95 %-100 %] 99 %  BP: (101-138)/(48-78) 101/55  Arterial Line BP: (124-163)/(46-64) 124/46     Weight: 79.4 kg (175 lb)  Body mass index is 32.01 kg/m².    Review of Symptoms    Unable to assess due to acuity of condition    Symptom Assessment (ESAS 0-10 scale)  ESAS 0 1 2 3 4 5 6 7 8 9 10   Pain              Dyspnea              Anxiety              Nausea              Depression               Anorexia              Fatigue              Insomnia              Restlessness               Agitation              CAM / Delirium __ --  ___+   Constipation     __ --  ___+   Diarrhea           __ --  ___+  Bowel Management Plan (BMP): Yes    Comments: none    Pain Assessment: no noverbal signs    OME in 24 hours: 0    Performance Status: 10    Physical Exam   Constitutional: No distress.   HENT:   Head: Normocephalic and atraumatic.   Mouth/Throat:  No oropharyngeal exudate.   Eyes: No scleral icterus.   Neck: Normal range of motion. Neck supple.   No JVD noted   Cardiovascular: Normal rate.   Pulmonary/Chest: No respiratory distress. She has decreased breath sounds in the right upper field, the right middle field, the right lower field, the left upper field, the left middle field and the left lower field. She has no wheezes. She has no rales.   Abdominal: Soft. She exhibits no distension. There is no guarding.   Musculoskeletal:   No LE edema. Bilateral SCDs in place   Lymphadenopathy:     She has no cervical adenopathy.   Neurological: She displays no tremor.   Skin: Skin is warm and dry. She is not diaphoretic.   Nursing note and vitals reviewed.    Significant Labs:   CBC:   Recent Labs   Lab 01/07/20  0226 01/07/20  0610 01/08/20  0130   WBC 11.63 11.82 10.93   HGB 9.2* 9.2* 9.1*   HCT 30.9* 31.4* 31.0*    178 178     CMP:   Recent Labs   Lab 01/07/20  0610 01/08/20  0130   * 144   K 3.8 3.5   * 108   CO2 24 25   * 125*   BUN 36* 30*   CREATININE 1.2 1.4   CALCIUM 8.6* 8.4*   PROT 6.7 6.9   ALBUMIN 2.2* 2.2*   BILITOT 1.2* 1.3*   ALKPHOS 46* 47*   AST 25 38   ALT 9* 8*   ANIONGAP 10 11   EGFRNONAA 41.9* 34.8*     LFT:   Recent Labs   Lab 01/08/20  0130   AST 38   ALKPHOS 47*   BILITOT 1.3*     Prealbumin: No results for input(s): PREALBUMIN in the last 48 hours.  CBC:   Recent Labs   Lab 01/08/20  0130   WBC 10.93   HGB 9.1*   HCT 31.0*   MCV 93        BMP:  Recent Labs   Lab 01/08/20  0130   *      K 3.5      CO2 25   BUN 30*   CREATININE 1.4   CALCIUM 8.4*   MG 2.2     LFT:  Lab Results   Component Value Date    AST 38 01/08/2020    ALKPHOS 47 (L) 01/08/2020    BILITOT 1.3 (H) 01/08/2020     Albumin:   Albumin   Date Value Ref Range Status   01/08/2020 2.2 (L) 3.5 - 5.2 g/dL Final     Protein:   Total Protein   Date Value Ref Range Status   01/08/2020 6.9 6.0 - 8.4 g/dL Final     Lactic acid:   Lab  Results   Component Value Date    LACTATE 1.2 01/03/2020    LACTATE 3.2 (H) 12/14/2017       Significant Imaging: CT: I have reviewed all pertinent results/findings within the past 24 hours:  ct head  -CTH 1/2/20: Interval development of a small region of decreased attenuation in the left frontal lobe superiorly concerning for possible acute/recent infarction  -Preliminary EEG: diffuse background slowing with no epileptiform discharges   -CTH 1/5/20: redemonstration of ill-defined hypodensity within the superior left frontal lobe which may relate to evolving recent/acute infarct. Additional subtle hypodensity is present within the left frontal lobe periventricular white matter, somewhat more conspicuous from prior examination, possibly relating to additional small volume recent infarct.  No evidence of acute intracranial hemorrhage or midline shift.  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  Significant intracranial mass effect approximately 0.8 cm rightward midline shift at this time. Interval development small foci of subarachnoid hemorrhage within the left sylvian fissure and in the solitary sulcus near the right temporal occipital junction    Advanced Directives::  Living Will: No  [place]POST: No  Do Not Resuscitate Status: No  Medical Power of : No    Two daughters:  Ya Miller [phone]  Jennifer Kim    Grand daughter:  Genie Herrera [phone]      Decision-Making Capacity: Patient answered questions, Patient unable to communicate due to disease severity/cognitive impairment    Living Arrangements: Lives with family    Psychosocial/Cultural: as answered by family  Patient's most important priorities:  family    Patient's biggest concerns/fears:  Living in a nursing home    Previous death/end of life care history:  Not known    Patient's goals/hopes:  Family hopes to avoid NH and wants to have her home  eventually    Spiritual:     F- Radhika and Belief: yes    I - Importance: yes  .  C - Community: yes    A - Address in Care: encouraged to involve community

## 2020-01-08 NOTE — PLAN OF CARE
POC reviewed with pt and family at 1400. Pt globally aphasic unable to verbalize understanding. Family questions and concerns addressed. No acute events today. CT head obtained in AM, revealing large LMCA. Family discussion done at bedside with MD Ana. Family discussing goals of care with eachother. Santiago a-line in place. Will continue to monitor. See flowsheets for full assessment and VS info.

## 2020-01-08 NOTE — PT/OT/SLP PROGRESS
Occupational Therapy      Patient Name:  Dacia Martínez   MRN:  [id number]    Patient not seen today secondary to Unavailable (Comment)(MD present with family and patient upon OT attempt, OT unable to return in PM. To follow up tomorrow. ). Will follow-up 1/9.    Ifrah Brown, OT  1/8/2020

## 2020-01-09 PROBLEM — G93.41 ACUTE METABOLIC ENCEPHALOPATHY: Status: ACTIVE | Noted: 2020-01-01

## 2020-01-09 PROBLEM — I63.522 ACUTE ISCHEMIC LEFT ACA STROKE: Status: ACTIVE | Noted: 2020-01-01

## 2020-01-09 PROBLEM — Z51.5 PALLIATIVE CARE ENCOUNTER: Status: ACTIVE | Noted: 2020-01-01

## 2020-01-09 PROBLEM — I63.512 ACUTE ISCHEMIC LEFT MCA STROKE: Status: ACTIVE | Noted: 2019-01-01

## 2020-01-09 PROBLEM — R53.81 DEBILITY: Status: ACTIVE | Noted: 2020-01-01

## 2020-01-09 PROBLEM — G93.6 CYTOTOXIC BRAIN EDEMA: Status: ACTIVE | Noted: 2020-01-01

## 2020-01-09 PROBLEM — G93.5 BRAIN COMPRESSION: Status: ACTIVE | Noted: 2020-01-01

## 2020-01-09 PROBLEM — I63.521 ACUTE ISCHEMIC RIGHT ACA STROKE: Status: ACTIVE | Noted: 2020-01-01

## 2020-01-09 NOTE — SUBJECTIVE & OBJECTIVE
Neurologic Chief Complaint: Aphasia and weakness, L frontal, L MCA, bilateral JAIME    Subjective:     Interval History: Patient is seen for follow-up neurological assessment and treatment recommendations:    Goals of care discussions between all services with family - there are conflicting views regarding aggressive measures at this time, patient likely to require intubation soon. She remains somnolent and unarouseable.     HPI, Past Medical, Family, and Social History remains the same as documented in the initial encounter.     Review of Systems   Unable to perform ROS: Mental status change   Constitutional: Positive for fatigue. Negative for chills and fever.   Respiratory: Negative for cough.    Cardiovascular: Negative for chest pain.   Gastrointestinal: Negative for nausea and vomiting.   Neurological: Positive for speech difficulty and weakness. Negative for facial asymmetry and numbness.     Scheduled Meds:   albuterol-ipratropium  3 mL Nebulization Q4H    allopurinol  150 mg Per NG tube Daily    atorvastatin  80 mg Per NG tube Daily    heparin (porcine)  5,000 Units Subcutaneous Q8H    levothyroxine  25 mcg Per NG tube Before breakfast    metoprolol tartrate  100 mg Per NG tube QHS    metoprolol tartrate  50 mg Per NG tube Daily    piperacillin-tazobactam (ZOSYN) IVPB  4.5 g Intravenous Q8H    senna-docusate 8.6-50 mg  1 tablet Per NG tube Daily    sodium chloride 3%  4 mL Nebulization Q4H    vancomycin (VANCOCIN) IVPB  750 mg Intravenous Q24H     Continuous Infusions:   sodium chloride 0.9%       PRN Meds:acetaminophen, Dextrose 10% Bolus, glucagon (human recombinant), insulin aspart U-100, labetalol, magnesium oxide, magnesium oxide, ondansetron, potassium chloride 10%, potassium chloride 10%, potassium chloride 10%, potassium, sodium phosphates, potassium, sodium phosphates, potassium, sodium phosphates, sodium chloride 0.9%    Objective:     Vital Signs (Most Recent):  Temp: 99.7 °F (37.6 °C)  (01/09/20 1416)  Pulse: 70 (01/09/20 1416)  Resp: (!) 32 (01/09/20 1416)  BP: 132/61 (01/09/20 1402)  SpO2: 100 % (01/09/20 1416)  BP Location: Left leg    Vital Signs Range (Last 24H):  Temp:  [99 °F (37.2 °C)-101.3 °F (38.5 °C)]   Pulse:  [69-76]   Resp:  [20-76]   BP: (103-149)/(47-81)   SpO2:  [93 %-100 %]   Arterial Line BP: (118-178)/(45-69)   BP Location: Left leg    Physical Exam   Constitutional: No distress.   HENT:   Head: Normocephalic and atraumatic.   Mouth/Throat: No oropharyngeal exudate.   Eyes: No scleral icterus.   Neck: Normal range of motion. Neck supple.   No JVD noted   Cardiovascular: Normal rate.   Pulmonary/Chest: No respiratory distress. She has no wheezes. She has rhonchi. She has no rales.   Abdominal: Soft. She exhibits no distension. There is no guarding.   Musculoskeletal:   No LE edema. Bilateral SCDs in place   Lymphadenopathy:     She has no cervical adenopathy.   Neurological: She displays no tremor.   Skin: Skin is warm and dry. She is not diaphoretic.   Nursing note and vitals reviewed.      Neurological Exam:   LOC: drowsy  Attention Span: poor  Language: Global aphasia  Articulation: Mute/Anarthric  Orientation: Not oriented to person, place, and time  Visual Fields: Full  EOM (CN III, IV, VI): Full/intact  Pupils (CN II, III): PERRL  Facial Sensation (CN V): Normal  Facial Movement (CN VII): Unable to test   Gag Reflex: present  Reflexes: 2+ throughout  Cebellar: No evidence of appendicular or axial ataxia  Sensation: Intact to light touch, temperature and vibration  Unable to test  Tone: Rigidity  LUE     Laboratory:  CMP:   Recent Labs   Lab 01/09/20 0134   CALCIUM 8.4*   ALBUMIN 2.1*   PROT 6.8      K 3.3*   CO2 23   *   BUN 24*   CREATININE 1.1   ALKPHOS 49*   ALT 11   AST 54*   BILITOT 1.0     BMP:   Recent Labs   Lab 01/09/20  0134      K 3.3*   *   CO2 23   BUN 24*   CREATININE 1.1   CALCIUM 8.4*     CBC:   Recent Labs   Lab 01/09/20  0134    WBC 10.76   RBC 3.38*   HGB 9.1*   HCT 30.8*      MCV 91   MCH 26.9*   MCHC 29.5*     Coagulation:   Recent Labs   Lab 01/08/20  2324   INR 1.1   APTT 25.7       Diagnostic Results     Brain Imaging   CTH 1/1/20   -No evidence of major vascular distribution infarct or hemorrhage.  -Chronic microvascular ischemic change and generalized cerebral volume loss, normal for age.    CTH 1/5/20  1.  Redemonstration of ill-defined hypodensity within the superior left frontal lobe which may relate to evolving recent/acute infarct.  Additional subtle hypodensity is present within the left frontal lobe periventricular white matter, somewhat more conspicuous from prior examination, possibly relating to additional small volume recent infarct.  No evidence of acute intracranial hemorrhage or midline shift.  2.  Chronic senescent and microvascular ischemic changes.    CTH 1/8/20  Interval development of large areas of recent infarction involving the left MCA bilateral JAIME distributions.  Significant intracranial mass effect approximately 0.8 cm rightward midline shift at this time.  Interval development small foci of subarachnoid hemorrhage within the left sylvian fissure and in the solitary sulcus near the right temporal occipital junction.    Vessel Imaging   CTA  12/31/19   -No acute intracranial pathology.  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  -In this exam limited by poor timing of contrast and motion artifact, no high-grade stenosis or major vessel occlusion.  Hypoplastic right A2 segment.    Cardiac Imaging   TTE   · Moderately decreased left ventricular systolic function. The estimated ejection fraction is 35%  · Concentric left ventricular remodeling.  · Local segmental wall motion abnormalities.  · Moderate right ventricular enlargement.  · Mildly reduced right ventricular systolic function.  · Moderate biatrial enlargement.  · Aortic valve area  is 1.12 cm2; peak velocity is 1.95 m/s; mean gradient is 9 mmHg.  · Mild-to-moderate aortic valve stenosis.  · Mild mitral regurgitation.  · Mild tricuspid regurgitation.  · The estimated PA systolic pressure is 59 mm Hg  · Pulmonary hypertension present.  · Elevated central venous pressure (15 mm Hg).  · Atrial fibrillation observed.

## 2020-01-09 NOTE — ASSESSMENT & PLAN NOTE
"85 yo female with interval development of large areas of recent infarction involving the left MCA bilateral JAIME distributions.  Significant intracranial mass effect approximately 0.8 cm rightward midline shift at this time. Interval development small foci of subarachnoid hemorrhage within the left sylvian fissure and in the solitary sulcus near the right temporal occipital junction.    Poor prognosis    1) Symptoms: pt remains without appearance of distress    2) Code status: FC    3) Psychosocial: not ; worked at [employer] for 30 + years    4) Medicolegal: surrogate DM  Two daughters:  (Themarycruz)Ya Miller [phone]  Jennifer Kim    Grand daughter:  Genie Herrera [phone]    5) Spiritual:  Alevism;encouraged to involved community and family     6) Goals of care/discussion:    1.9 Met with Octavio at the bedside and reviewed the discussion she had with the team yesterday. She relates she continues to struggle with the idea she feels responsible for her mom's CVA due to stopping anticoagulant due to impending dental extraction.  She knows she was following physician orders but wishes the outcome was different.      She relayed her family's expectation that they want to have "everything" done to have her live as long as possible.  We supported her in hoping that the pt does well but also expressed our concern she has experienced a terminal event.  I shared that the decision making that has to occur is not so much whether she lives or dies but rather how she spends the time that is left.  She feels that if time were short she would want the pt to be in a home setting, but also relayed that several family members are pushing to fight as long as possible because they believe God will heal her.  They also have another close friend who survived after being told he was going to die 20 years ago and believe that is testimony to the work of God.      She did endorse the goal is to have the pt home " but also needs to have more information including seeing the CT scans as well as understanding why there is no surgery to be done for the stroke to save her life.  I realyed this to NICU team.     I recommended a family meeting occur over the next 48 hours to have a forum for information sharing and discussing what matters most based on the clinical picture and certainty of her poor prognosis.  She thinks 1000 Saturday, but I urged to occur sooner.     Total time with the daughter was 45 minutes.     1.8 Met with graddaughter Genie through whom the family prefers to have all information steered towards.  Family makes decisions together but everyone looks to daughter Ya Ledesma to make all decisions.    According to Genie the family has a good understanding of the condition and poor predicted prognostication. After talking as a family they would like to proceed with intubation as warranted for a short time trial as seen fit by her medical team.  IF not improvements noted, then the family will do everything they can to have her in the home setting.  Open to ongoing support and helping with medical decision making as appropriate.    7) Disposition plan: TBD; ongoing time trial    Plan:  -ongoing supportive care; continued support of the family in medical decision making  -currently family would want a time limited trial on vent if warranted  -family hopes to avoid NH at all costs and would take the pt home  - support  -will reach out to PCP    Discussed with Dr Simpson and team    Domenico Sherman MD  Palliative Medicine

## 2020-01-09 NOTE — CONSULTS
[facility]  Palliative Medicine  Consult Note    Patient Name: Dacia Martínez  MRN: [id number]  Admission Date: 12/31/2019  Hospital Length of Stay: 9 days  Code Status: Full Code   Attending Provider: Blanca Perez MD  Consulting Provider: Domenico Sherman MD  Primary Care Physician: Jasmeet Corral MD  Principal Problem:Stroke    Patient information was obtained from relative(s) and ER records.      Consults  Assessment/Plan:     Palliative care encounter  1) Symptoms: none noted    2) Code status: FC    3) Psychosocial: not ; worked at [employer] for 30 + years    4) Medicolegal: surrogate DM  Two daughters:  Ya Miller [phone]  Jennifer Kim    Grand daughter:  Genie Herrera [phone]    5) Spiritual:  Mosque;encouraged to involved community and family     6) Goals of care/discussion:    Met with graddaughter Genie through whom the family prefers to have all information steered towards.  Family makes decisions together but everyone looks to daughter Ya Ledesma to make all decisions.    According to Genie the family has a good understanding of the condition and poor predicted prognostication. After talking as a family they would like to proceed with intubation as warranted for a short time trial as seen fit by her medical team.  IF not improvements noted, then the family will do everything they can to have her in the home setting.  Open to ongoing support and helping with medical decision making as appropriate.    7) Disposition plan: TBD; ongoing time trial    Plan:  -ongoing supportive care  -family would want a time limited trial on vent if warranted  -family hopes to avoid NH at all costs and would take the pt home    Discussed with Dr Ana Sherman MD  Palliative Medicine          Thank you for your consult. I will follow-up with patient. Please contact us if you have any additional questions.    Subjective:     HPI:   The patient is an 83yo female  with  PMH of HTN, DM2, HFrEF (30% in 2/2019 with pacemaker), HLD, CAD, A fib (on Eliquis), OA, Breast Cancer (s/p mastectomy, chemo, radiation in 2000), CKD3, Gout, and Hypothyroidism who was originially admitted to vascular neurology after family noticed right sided-weakness, gait instability, and slurred speech yesterday evening (12/30) following dental procedure. Report that her Eliquis had been held for 4 days prior to the procedure.  Her daughters state that she had been altered since they picked her up from the surgery around 4:30 pm 12/30. 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 Unable to attain MRI due to pacemaker. She was given ASA 325mg and Atorvastatin 40 mg.  Overnight rapid response was called due to tachypnea up to 40 and fevers to 100.3. ABG wnl but CXR with possible left-sided infiltrate. WBC wnl. Pt was started on vanc/zosyn in setting of aspiration vs CAP pneumonia.      On 1/2/20 AM rapid response was called for concern for decreased arousal and labored breathing that has been waxing and waning throughout admission. Troponin peaked and down-trended. Pt was also noted to have jerking movements of UE with concern for seizure-like activity on 1/2/20. Neurology was consulted by vascular neurology who recommended continuous EEG monitoring that did not show any large epileptiform activty. Additionally, STAT head CT was notable for small area on frontal lobe that may represent acute infarct. Was started on Vanc/zosyn for Aspiration vs CAP but was de-escalated to Levaquin on 1/2/20. Overnight 1/3/20, pt with recurrence of fevers and vanc/zosyn was re-started. Lactate WNL. Respiratory viral panel and repeat influenza negative but sputum culture with enterobacter cloacae, sensitive to zosyn. Echo without signs of vegetation. Blood cultures still negative. Pt febrile evening of 1/3/20 but improved 1/5/20, still on zosyn. Repeat heat CT with redemonstration of  hypodensity within the superior left frontal lobe and additional subtle hypodensity within the left frontal lobe periventricular white matter. Also increasingly somnolent on 1/5/20, ABG with respiratory alkalosis, vascular neurology to discuss with neuro critical care. Patient to be admitted to [facility] for higher level of care.     1/5 Neuro Critical Care consulted for Decreased LOC, poor response, respiratory insufficiency. Patient found to be somnolent unable to follow commands, protecting airway. Transferred to [facility] for higher level of care and management.     Hospital Course: 01/05/2020 transfer from floor for decreased LOC, respiratory insufficiency  01/06/2020d/c eliquis, start heparin gtt minimal intensity for afib, cap overnight-nothing epileptiform, place A-line, ABG q6h, place hickman cath, 20 mg Lasix X1, continue enteral water flushed 200 ml q6h, repeat CBC-follow plts  01/07/2020 pt still with poor neurological exam. CTH showed small L frontal hypodensity cocnerning for stroke. Will diurese with 60 lasix IV today. Follow up cultures  01/08/2020 naeon. satting well on room air. Neurologically unchanged from yesterday. CTH reviewed showing large progression of L MCA and bilateral JAIME distribution infarcts with generalized edema, mass effect and associated SAH.     In this setting, palliative medicine was consulted to help with medical decision making and aid in the formation of goals of care.       Hospital Course:  No notes on file    Interval History: chart reviewed and discussed with Dr Perez    Medications:  Continuous Infusions:   sodium chloride 0.9%       Scheduled Meds:   albuterol-ipratropium  3 mL Nebulization Q4H    allopurinol  150 mg Per NG tube Daily    atorvastatin  80 mg Per NG tube Daily    levothyroxine  25 mcg Per NG tube Before breakfast    metoprolol tartrate  100 mg Per NG tube QHS    metoprolol tartrate  50 mg Per NG tube Daily    piperacillin-tazobactam (ZOSYN) IVPB  4.5 g  Intravenous Q12H    senna-docusate 8.6-50 mg  1 tablet Per NG tube Daily    vancomycin (VANCOCIN) IVPB  750 mg Intravenous Q24H     PRN Meds:acetaminophen, Dextrose 10% Bolus, glucagon (human recombinant), insulin aspart U-100, labetalol, ondansetron, sodium chloride 0.9%    Objective:     Vital Signs (Most Recent):  Temp: 99.7 °F (37.6 °C) (01/08/20 1302)  Pulse: 69 (01/08/20 1503)  Resp: (!) 23 (01/08/20 1503)  BP: (!) 101/55 (01/08/20 1302)  SpO2: 99 % (01/08/20 1503) Vital Signs (24h Range):  Temp:  [99.7 °F (37.6 °C)-101.7 °F (38.7 °C)] 99.7 °F (37.6 °C)  Pulse:  [69-73] 69  Resp:  [17-27] 23  SpO2:  [95 %-100 %] 99 %  BP: (101-138)/(48-78) 101/55  Arterial Line BP: (124-163)/(46-64) 124/46     Weight: 79.4 kg (175 lb)  Body mass index is 32.01 kg/m².    Review of Symptoms    Unable to assess due to acuity of condition    Symptom Assessment (ESAS 0-10 scale)  ESAS 0 1 2 3 4 5 6 7 8 9 10   Pain              Dyspnea              Anxiety              Nausea              Depression               Anorexia              Fatigue              Insomnia              Restlessness               Agitation              CAM / Delirium __ --  ___+   Constipation     __ --  ___+   Diarrhea           __ --  ___+  Bowel Management Plan (BMP): Yes    Comments: none    Pain Assessment: no noverbal signs    OME in 24 hours: 0    Performance Status: 10    Physical Exam   Constitutional: No distress.   HENT:   Head: Normocephalic and atraumatic.   Mouth/Throat: No oropharyngeal exudate.   Eyes: No scleral icterus.   Neck: Normal range of motion. Neck supple.   No JVD noted   Cardiovascular: Normal rate.   Pulmonary/Chest: No respiratory distress. She has decreased breath sounds in the right upper field, the right middle field, the right lower field, the left upper field, the left middle field and the left lower field. She has no wheezes. She has no rales.   Abdominal: Soft. She exhibits no distension. There is no guarding.    Musculoskeletal:   No LE edema. Bilateral SCDs in place   Lymphadenopathy:     She has no cervical adenopathy.   Neurological: She displays no tremor.   Skin: Skin is warm and dry. She is not diaphoretic.   Nursing note and vitals reviewed.    Significant Labs:   CBC:   Recent Labs   Lab 01/07/20  0226 01/07/20  0610 01/08/20  0130   WBC 11.63 11.82 10.93   HGB 9.2* 9.2* 9.1*   HCT 30.9* 31.4* 31.0*    178 178     CMP:   Recent Labs   Lab 01/07/20  0610 01/08/20  0130   * 144   K 3.8 3.5   * 108   CO2 24 25   * 125*   BUN 36* 30*   CREATININE 1.2 1.4   CALCIUM 8.6* 8.4*   PROT 6.7 6.9   ALBUMIN 2.2* 2.2*   BILITOT 1.2* 1.3*   ALKPHOS 46* 47*   AST 25 38   ALT 9* 8*   ANIONGAP 10 11   EGFRNONAA 41.9* 34.8*     LFT:   Recent Labs   Lab 01/08/20  0130   AST 38   ALKPHOS 47*   BILITOT 1.3*     Prealbumin: No results for input(s): PREALBUMIN in the last 48 hours.  CBC:   Recent Labs   Lab 01/08/20  0130   WBC 10.93   HGB 9.1*   HCT 31.0*   MCV 93        BMP:  Recent Labs   Lab 01/08/20  0130   *      K 3.5      CO2 25   BUN 30*   CREATININE 1.4   CALCIUM 8.4*   MG 2.2     LFT:  Lab Results   Component Value Date    AST 38 01/08/2020    ALKPHOS 47 (L) 01/08/2020    BILITOT 1.3 (H) 01/08/2020     Albumin:   Albumin   Date Value Ref Range Status   01/08/2020 2.2 (L) 3.5 - 5.2 g/dL Final     Protein:   Total Protein   Date Value Ref Range Status   01/08/2020 6.9 6.0 - 8.4 g/dL Final     Lactic acid:   Lab Results   Component Value Date    LACTATE 1.2 01/03/2020    LACTATE 3.2 (H) 12/14/2017       Significant Imaging: CT: I have reviewed all pertinent results/findings within the past 24 hours:  ct head  -CTH 1/2/20: Interval development of a small region of decreased attenuation in the left frontal lobe superiorly concerning for possible acute/recent infarction  -Preliminary EEG: diffuse background slowing with no epileptiform discharges   -CTH 1/5/20: redemonstration of  ill-defined hypodensity within the superior left frontal lobe which may relate to evolving recent/acute infarct. Additional subtle hypodensity is present within the left frontal lobe periventricular white matter, somewhat more conspicuous from prior examination, possibly relating to additional small volume recent infarct.  No evidence of acute intracranial hemorrhage or midline shift.  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  Significant intracranial mass effect approximately 0.8 cm rightward midline shift at this time. Interval development small foci of subarachnoid hemorrhage within the left sylvian fissure and in the solitary sulcus near the right temporal occipital junction    Advanced Directives::  Living Will: No  LaPOST: No  Do Not Resuscitate Status: No  Medical Power of : No    Two daughters:  Ya Miller [phone]  Jennifer Kim    Grand daughter:  Genie Herrera [phone]      Decision-Making Capacity: Patient answered questions, Patient unable to communicate due to disease severity/cognitive impairment    Living Arrangements: Lives with family    Psychosocial/Cultural: as answered by family  Patient's most important priorities:  family    Patient's biggest concerns/fears:  Living in a nursing home    Previous death/end of life care history:  Not known    Patient's goals/hopes:  Family hopes to avoid NH and wants to have her home eventually    Spiritual:     F- Radhika and Belief: yes    I - Importance: yes  .  C - Community: yes    A - Address in Care: encouraged to involve community         Domenico Sherman MD  Palliative Medicine  [facility]

## 2020-01-09 NOTE — PROGRESS NOTES
[facility]  Vascular Neurology  Comprehensive Stroke Center  Progress Note    Assessment/Plan:     * Stroke  Dacia Martínez is a 84 y.o. female with PMHx of HTN, HLD, HF, DM, a fib (eliquis) who presented to hospital with gait instability, RSW, and speech changes. She had been off her eliquis for 1 week due to scheduled dental procedure. Patient with L gaze, mild RSW, and speech difficulty when examined by stroke provider. EMS reported possible waxing and waning of symptoms in transit. She was taken for STAT CTA multiphase, which was negative for early ischemic changes, LVO, and significant stenosis. tPA decision delayed due to unclear LKN time since patient took valium for dental procedure. STAT MRI attempted, but patient has pacemaker that is not MRI compatible.      After further discussion with family and unclear LKN time, decision was made to not treat with tPA. Possibly cardioembolic stroke based on history     -CTH 1/2/20: Interval development of a small region of decreased attenuation in the left frontal lobe superiorly concerning for possible acute/recent infarction  -Preliminary EEG: diffuse background slowing with no epileptiform discharges   -CTH 1/5/20: redemonstration of ill-defined hypodensity within the superior left frontal lobe which may relate to evolving recent/acute infarct. Additional subtle hypodensity is present within the left frontal lobe periventricular white matter, somewhat more conspicuous from prior examination, possibly relating to additional small volume recent infarct.  No evidence of acute intracranial hemorrhage or midline shift.  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  Significant intracranial mass effect approximately 0.8 cm rightward midline shift at this time. Interval development small foci of subarachnoid hemorrhage within the left sylvian fissure and in the solitary sulcus  near the right temporal occipital junction.    Patient transferred to the ICU on 1/5/20 for decline in mental status as well as paradoxical breathing (possible respiratory decompensation-ABG showing pure respiratory alkalosis). Subsequent CTH 1/8/20 with Interval development of large areas of recent infarction involving the L MCA, bilat JAIME distributions. Significant intracranial mass effect with rightward midline shift. Patient's family continues to be hopeful and would like full resuscitative efforts to be done; however, family was urged to have discussions amongst each other regarding decisions that need to be made should patient not recover.    Goals of care discussions with family 1/9 - discussed that likelihood of recovery from this last major stroke is very unlikely and that her respiratory status has declined greatly. Intubation is in the imminent future and family should discuss amongst themselves about whether drastic measures should be pursued at this time and how far care should be taken. Daughter present in room and one of patient's granddaughters are understanding that the stroke is very large and that they should let God handle it from here - that concentrating on comfort is best for the patient at this time. However, patient's other granddaughter as well as daughter are seeking all measures at this time and would like her intubated if necessary. Discussions ongoing.    Antithrombotics for secondary stroke prevention: holding all    Statins for secondary stroke prevention and hyperlipidemia, if present:   Statins: Atorvastatin- 80 mg daily    Aggressive risk factor modification: HTN, DM, HLD, Obesity, A-Fib     Rehab efforts: The patient has been evaluated by a stroke team provider and the therapy needs have been fully considered based off the presenting complaints and exam findings. The following therapy evaluations are needed: PT evaluate and treat, OT evaluate and treat, SLP evaluate and treat,  PM&R evaluate for appropriate placement    Diagnostics ordered/pending: None     VTE prophylaxis: Heparin 5000 units SQ every 8 hours    BP parameters: Infarct: No intervention, SBP <220        Fever  2/2 PNA  -US of the lower extremities negative     Myoclonic jerking  20-30 second left sided myoclonic jerking with left gaze deviation. This could explain her waxing and waning mental status  -Preliminary EEG shows diffuse slowing with no epileptiform discharges     Follow-up EEG readings    Right hemiplegia  Intermittent episodes of right hemiplegia.   CTH on 1/2/20 revealing interval development of a small region of decreased attenuation in the left frontal lobe superiorly concerning for possible acute/recent infarction.    CTH on 1/5/20 showing redemonstration of ill-defined hypodensity within the superior left frontal lobe which may relate to evolving recent/acute infarct.  Additional subtle hypodensity is present within the left frontal lobe periventricular white matter, somewhat more conspicuous from prior examination, possibly relating to additional small volume recent infarct.  No evidence of acute intracranial hemorrhage or midline shift.  CTH 1/8/20 with interval development of large areas of recent infarction involving L MCA and bilat JAIME    Pneumonia  -Pt underwent dental extractions day of presentation. Family reports cough for few days prior to admission.  -Overnight on 12/31, pt became tachypnic with fever to 100.3 and rapid response was called. CXR with left sided consolidation. -Started on vanc/zosyn overnight. ABG wnl. WBC wnl. Procal mildly elevated to .99. O2 saturations %. Suspect possible aspiration pneumonia vs CAP.  Respiratory panel negative    Continue zosyn   Sputum cxs revealed Enterobacter Cloacae       Hypothyroidism  Continue home synthroid  Patient with elevated TSH but normal free T4, subclinical hypothyroidism    CKD (chronic kidney disease) stage 3, GFR 30-59 ml/min  Cr 1.3 on  arrival. Baseline 1.1. KHUSHBOO resolved.    Chronic systolic congestive heart failure  Stroke risk factor  TTE Feb 2019 with EF 40% - repeat 1/2 with EF 35%  Patient on entresto, metoprolol, and lasix at home  Holding entresto and lasix in setting of acute stroke  - Patient was on metoprolol for rate control. Was initially discontinued by hospital medicine due to concern for sepsis however, will reinitiate  -Currently on lopressor 50 mg AM, and 100 mg PM to be compatible with NG tube     Persistent atrial fibrillation  Stroke risk factor  On eliquis at home  Held x1 week for dental procedure  Continue home eliquis  Continue home metoprolol    Type 2 diabetes mellitus with stage 3 chronic kidney disease, without long-term current use of insulin  Stroke risk factor  A1C 6.9  Goal glucose 140-180  Low correction SSI for NPO patients until LEILANI completed  Diabetic diet when safe for swallow eval    Primary osteoarthritis involving multiple joints  PRN tylenol    Idiopathic chronic gout of multiple sites without tophus  Continue home allopurinol    Hyperlipidemia  Stroke risk factor  Patient on atorvastatin 10 mg daily at home   on arrival  Increased to atorvastatin 80 mg daily    Essential hypertension  Stroke risk factor  SBP <220   Holding home antihypertensives due to concern for acute stroke  Prn labetalol         01/01/2020 CTA MP without any acute findings. MRI not done as incompatible with pacemaker.Neurological exam patient is waxing and waning. At times will move her left arm however, per the medicine team's examination patient had hemineglect in hte right side which was not seen during my exam.nOvernight, rapid response was called due to tachypnea up to 40 and fevers to 100.3. ABG wnl but CXR with possible left-sided infiltrate. WBC wnl. Pt was started on vanc/zosyn in setting of aspiration vs CAP pneumonia. Troponin elevated to 0.516 with repeat elevated to 0.935.Cardiology consulted and they believed this  is demand ischemia, thus will trend troponin. Repeat CTH shows no evidence of major vascular distribution infarct or hemorrhage.     01/02/2020 Elevated BUN/Cr (could be secondary to post contrast). Patient having intermittent episodes of hemiplegia on the right side. During transport to [place], patient was exhibiting left sided myoclonic jerks with left gaze deviation. Ordered EEG. STAT CTH ordered. General neurology is aware. Awaiting respiratory culture before deescalation of antibiotics. Will be transferred to medicine floor tomorrow.     01/03/2020 Patient was spiking fevers. Antibiotics broadened to vancomycin and zosyn. Patient pan-cultured. Preliminary EEG shows diffuse slowing with epileptiform discharges. US of the carotids ordered.     01/04/2020 Sputum culture with enterobacter cloacae, pan-sensitive to antibiotics.  Repeat influenza pending. Echo without signs of vegetation. Blood cultures  negative. Increased free water. Pt still with fever in the evening of 1/3/20 so will not deescalate zosyn. Will discontinue vancomycin. US of the lower legs ordered to rule out other causes of fever.  Will order CT head to rule out any new infarctions.    01/05/2020 Patient was febrile  evening of 1/3/20 but improved 1/5/20 however, will keep on zosyn. CTH with redemonstration of hypodensity withi the superior left frontal lobe and additional subtle hypodensity within the left frontal lobe periventricular white matter. US of the lower extremities does not reveal any DVT. On examination today  Patient was extremely somnolent and was exhibiting paradoxical breathing. ABG demonstrated respiratory alkalosis. CXR shows pulmonary edema. Due to possible respiratory decompensation as well as somnolent state, patient was transferred to [facility]      1/8/2020 CTH now showing interval development of large areas of recent infarction, L MCA and bilat JAIME with significant mass effect.     STROKE DOCUMENTATION   Acute Stroke Times   Last  Known Normal Date: 12/31/19  Last Known Normal Time: (unknown)  Symptom Onset Date: 12/31/19  Symptom Onset Time: (unknown)  Stroke Team Called Date: 12/31/19  Stroke Team Called Time: 1924  Stroke Team Arrival Date: 12/31/19  Stroke Team Arrival Time: 1929  CT Interpretation Time: 1939  Decision to Treat Time for Alteplase: 2039  Decision to Treat Time for IR: 1939    NIH Scale:  1a. Level of Consciousness: 2-->Not alert, requires repeated stimulation to attend, or is obtunded and requires strong or painful stimulation to make movements (not stereotyped)  1b. LOC Questions: 2-->Answers neither question correctly  1c. LOC Commands: 2-->Performs neither task correctly  2. Best Gaze: 0-->Normal  3. Visual: 0-->No visual loss  4. Facial Palsy: 1-->Minor paralysis (flattened nasolabial fold, asymmetry on smiling)  5a. Motor Arm, Left: 4-->No movement  5b. Motor Arm, Right: 4-->No movement  6a. Motor Leg, Left: 3-->No effort against gravity, leg falls to bed immediately  6b. Motor Leg, Right: 3-->No effort against gravity, leg falls to bed immediately  7. Limb Ataxia: 0-->Absent  8. Sensory: 1-->Mild-to-moderate sensory loss, patient feels pinprick is less sharp or is dull on the affected side, or there is a loss of superficial pain with pinprick, but patient is aware of being touched  9. Best Language: 2-->Severe aphasia, all communication is through fragmentary expression, great need for inference, questioning, and guessing by the listener. Range of information that can be exchanged is limited, listener carries burden of. . . (see row details)  10. Dysarthria: 2-->Severe dysarthria, patients speech is so slurred as to be unintelligible in the absence of or out of proportion to any dysphasia, or is mute/anarthric  11. Extinction and Inattention (formerly Neglect): 0-->No abnormality  Total (NIH Stroke Scale): 26       Modified Wise Score: 3  [place] Coma Scale:    ABCD2 Score:    [id number] Score:   HAS -BLED Score:    ICH Score:   Hunt & Prather Classification:      Hemorrhagic change of an Ischemic Stroke: Does this patient have an ischemic stroke with hemorrhagic changes? Yes    Neurologic Chief Complaint: Aphasia and weakness, L frontal, L MCA, bilateral JAIME    Subjective:     Interval History: Patient is seen for follow-up neurological assessment and treatment recommendations:    Goals of care discussions between all services with family - there are conflicting views regarding aggressive measures at this time, patient likely to require intubation soon. She remains somnolent and unarouseable.     HPI, Past Medical, Family, and Social History remains the same as documented in the initial encounter.     Review of Systems   Unable to perform ROS: Mental status change   Constitutional: Positive for fatigue. Negative for chills and fever.   Respiratory: Negative for cough.    Cardiovascular: Negative for chest pain.   Gastrointestinal: Negative for nausea and vomiting.   Neurological: Positive for speech difficulty and weakness. Negative for facial asymmetry and numbness.     Scheduled Meds:   albuterol-ipratropium  3 mL Nebulization Q4H    allopurinol  150 mg Per NG tube Daily    atorvastatin  80 mg Per NG tube Daily    heparin (porcine)  5,000 Units Subcutaneous Q8H    levothyroxine  25 mcg Per NG tube Before breakfast    metoprolol tartrate  100 mg Per NG tube QHS    metoprolol tartrate  50 mg Per NG tube Daily    piperacillin-tazobactam (ZOSYN) IVPB  4.5 g Intravenous Q8H    senna-docusate 8.6-50 mg  1 tablet Per NG tube Daily    sodium chloride 3%  4 mL Nebulization Q4H    vancomycin (VANCOCIN) IVPB  750 mg Intravenous Q24H     Continuous Infusions:   sodium chloride 0.9%       PRN Meds:acetaminophen, Dextrose 10% Bolus, glucagon (human recombinant), insulin aspart U-100, labetalol, magnesium oxide, magnesium oxide, ondansetron, potassium chloride 10%, potassium chloride 10%, potassium chloride 10%, potassium,  sodium phosphates, potassium, sodium phosphates, potassium, sodium phosphates, sodium chloride 0.9%    Objective:     Vital Signs (Most Recent):  Temp: 99.7 °F (37.6 °C) (01/09/20 1416)  Pulse: 70 (01/09/20 1416)  Resp: (!) 32 (01/09/20 1416)  BP: 132/61 (01/09/20 1402)  SpO2: 100 % (01/09/20 1416)  BP Location: Left leg    Vital Signs Range (Last 24H):  Temp:  [99 °F (37.2 °C)-101.3 °F (38.5 °C)]   Pulse:  [69-76]   Resp:  [20-76]   BP: (103-149)/(47-81)   SpO2:  [93 %-100 %]   Arterial Line BP: (118-178)/(45-69)   BP Location: Left leg    Physical Exam   Constitutional: No distress.   HENT:   Head: Normocephalic and atraumatic.   Mouth/Throat: No oropharyngeal exudate.   Eyes: No scleral icterus.   Neck: Normal range of motion. Neck supple.   No JVD noted   Cardiovascular: Normal rate.   Pulmonary/Chest: No respiratory distress. She has no wheezes. She has rhonchi. She has no rales.   Abdominal: Soft. She exhibits no distension. There is no guarding.   Musculoskeletal:   No LE edema. Bilateral SCDs in place   Lymphadenopathy:     She has no cervical adenopathy.   Neurological: She displays no tremor.   Skin: Skin is warm and dry. She is not diaphoretic.   Nursing note and vitals reviewed.      Neurological Exam:   LOC: drowsy  Attention Span: poor  Language: Global aphasia  Articulation: Mute/Anarthric  Orientation: Not oriented to person, place, and time  Visual Fields: Full  EOM (CN III, IV, VI): Full/intact  Pupils (CN II, III): PERRL  Facial Sensation (CN V): Normal  Facial Movement (CN VII): Unable to test   Gag Reflex: present  Reflexes: 2+ throughout  Cebellar: No evidence of appendicular or axial ataxia  Sensation: Intact to light touch, temperature and vibration  Unable to test  Tone: Rigidity  LUE     Laboratory:  CMP:   Recent Labs   Lab 01/09/20  0134   CALCIUM 8.4*   ALBUMIN 2.1*   PROT 6.8      K 3.3*   CO2 23   *   BUN 24*   CREATININE 1.1   ALKPHOS 49*   ALT 11   AST 54*   BILITOT 1.0      BMP:   Recent Labs   Lab 01/09/20  0134      K 3.3*   *   CO2 23   BUN 24*   CREATININE 1.1   CALCIUM 8.4*     CBC:   Recent Labs   Lab 01/09/20  0134   WBC 10.76   RBC 3.38*   HGB 9.1*   HCT 30.8*      MCV 91   MCH 26.9*   MCHC 29.5*     Coagulation:   Recent Labs   Lab 01/08/20  2324   INR 1.1   APTT 25.7       Diagnostic Results     Brain Imaging   CTH 1/1/20   -No evidence of major vascular distribution infarct or hemorrhage.  -Chronic microvascular ischemic change and generalized cerebral volume loss, normal for age.    CTH 1/5/20  1.  Redemonstration of ill-defined hypodensity within the superior left frontal lobe which may relate to evolving recent/acute infarct.  Additional subtle hypodensity is present within the left frontal lobe periventricular white matter, somewhat more conspicuous from prior examination, possibly relating to additional small volume recent infarct.  No evidence of acute intracranial hemorrhage or midline shift.  2.  Chronic senescent and microvascular ischemic changes.    CTH 1/8/20  Interval development of large areas of recent infarction involving the left MCA bilateral JAIME distributions.  Significant intracranial mass effect approximately 0.8 cm rightward midline shift at this time.  Interval development small foci of subarachnoid hemorrhage within the left sylvian fissure and in the solitary sulcus near the right temporal occipital junction.    Vessel Imaging   CTA  12/31/19   -No acute intracranial pathology.  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  -In this exam limited by poor timing of contrast and motion artifact, no high-grade stenosis or major vessel occlusion.  Hypoplastic right A2 segment.    Cardiac Imaging   TTE   · Moderately decreased left ventricular systolic function. The estimated ejection fraction is 35%  · Concentric left ventricular remodeling.  · Local segmental wall motion  abnormalities.  · Moderate right ventricular enlargement.  · Mildly reduced right ventricular systolic function.  · Moderate biatrial enlargement.  · Aortic valve area is 1.12 cm2; peak velocity is 1.95 m/s; mean gradient is 9 mmHg.  · Mild-to-moderate aortic valve stenosis.  · Mild mitral regurgitation.  · Mild tricuspid regurgitation.  · The estimated PA systolic pressure is 59 mm Hg  · Pulmonary hypertension present.  · Elevated central venous pressure (15 mm Hg).  · Atrial fibrillation observed.           Sneha Segundo MD  [facility]  Department of Vascular Neurology   [facility]

## 2020-01-09 NOTE — PLAN OF CARE
POC reviewed with pt and family at 1300. Patient globally aphasic, only responding to pain. Family at bedside verbalizes understanding. No acute events today. Will continue to monitor. See flowsheets for full assessment and VS info. Neuro status unchanged. POC discussed with daughter at beside per NCC. Family meeting scheduled for Saturday 1/11 to determine next steps in POC. SBP < 180 maintained, No PRN required. Intubation watch continued and trumpet remains in place. TF held. Henderson removed.

## 2020-01-09 NOTE — PROGRESS NOTES
"[facility]  Palliative Medicine  Progress Note    Patient Name: Dacia Martínez  MRN: [id number]  Admission Date: 12/31/2019  Hospital Length of Stay: 9 days  Code Status: Full Code   Attending Provider: Bhupinder Simpson MD  Consulting Provider: Domenico Sherman MD  Primary Care Physician: Jasmeet Corral MD  Principal Problem:Stroke    Patient information was obtained from relative(s).      Assessment/Plan:     Palliative care encounter  83 yo female with interval development of large areas of recent infarction involving the left MCA bilateral JAIME distributions.  Significant intracranial mass effect approximately 0.8 cm rightward midline shift at this time. Interval development small foci of subarachnoid hemorrhage within the left sylvian fissure and in the solitary sulcus near the right temporal occipital junction.    Poor prognosis    1) Symptoms: pt remains without appearance of distress    2) Code status: FC    3) Psychosocial: not ; worked at [employer] for 30 + years    4) Medicolegal: surrogate DM  Two daughters:  (Thereedras)Ya Miller [phone]  Jennifer Kim    Grand daughter:  Genie Herrera [phone]    5) Spiritual:  Taoist;encouraged to involved community and family     6) Goals of care/discussion:    1.9 Met with Octavio at the bedside and reviewed the discussion she had with the team yesterday. She relates she continues to struggle with the idea she feels responsible for her mom's CVA due to stopping anticoagulant due to impending dental extraction.  She knows she was following physician orders but wishes the outcome was different.      She relayed her family's expectation that they want to have "everything" done to have her live as long as possible.  We supported her in hoping that the pt does well but also expressed our concern she has experienced a terminal event.  I shared that the decision making that has to occur is not so much whether she lives or dies but " rather how she spends the time that is left.  She feels that if time were short she would want the pt to be in a home setting, but also relayed that several family members are pushing to fight as long as possible because they believe God will heal her.  They also have another close friend who survived after being told he was going to die 20 years ago and believe that is testimony to the work of God.      She did endorse the goal is to have the pt home but also needs to have more information including seeing the CT scans as well as understanding why there is no surgery to be done for the stroke to save her life.  I realyed this to NICU team.     I recommended a family meeting occur over the next 48 hours to have a forum for information sharing and discussing what matters most based on the clinical picture and certainty of her poor prognosis.  She thinks 1000 Saturday, but I urged to occur sooner.     Total time with the daughter was 45 minutes.     1.8 Met with graddalaura Prescott through whom the family prefers to have all information steered towards.  Family makes decisions together but everyone looks to daughter Ya Ledesma to make all decisions.    According to Genie the family has a good understanding of the condition and poor predicted prognostication. After talking as a family they would like to proceed with intubation as warranted for a short time trial as seen fit by her medical team.  IF not improvements noted, then the family will do everything they can to have her in the home setting.  Open to ongoing support and helping with medical decision making as appropriate.    7) Disposition plan: TBD; ongoing time trial    Plan:  -ongoing supportive care; continued support of the family in medical decision making  -currently family would want a time limited trial on vent if warranted  -family hopes to avoid NH at all costs and would take the pt home  - support  -will reach out to PCP    Discussed with   Tatiana and team    Domenico Sherman MD  Palliative Medicine          I will follow-up with patient. Please contact us if you have any additional questions.    Subjective:     Chief Complaint:   Chief Complaint   Patient presents with    Aphasia     LKN 1830. Stopped taking eliquis this week secondary to dental procedure.        HPI:   The patient is an 85yo female  with PMH of HTN, DM2, HFrEF (30% in 2/2019 with pacemaker), HLD, CAD, A fib (on Eliquis), OA, Breast Cancer (s/p mastectomy, chemo, radiation in 2000), CKD3, Gout, and Hypothyroidism who was originially admitted to vascular neurology after family noticed right sided-weakness, gait instability, and slurred speech yesterday evening (12/30) following dental procedure. Report that her Eliquis had been held for 4 days prior to the procedure.  Her daughters state that she had been altered since they picked her up from the surgery around 4:30 pm 12/30. 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 Unable to attain MRI due to pacemaker. She was given ASA 325mg and Atorvastatin 40 mg.  Overnight rapid response was called due to tachypnea up to 40 and fevers to 100.3. ABG wnl but CXR with possible left-sided infiltrate. WBC wnl. Pt was started on vanc/zosyn in setting of aspiration vs CAP pneumonia.      On 1/2/20 AM rapid response was called for concern for decreased arousal and labored breathing that has been waxing and waning throughout admission. Troponin peaked and down-trended. Pt was also noted to have jerking movements of UE with concern for seizure-like activity on 1/2/20. Neurology was consulted by vascular neurology who recommended continuous EEG monitoring that did not show any large epileptiform activty. Additionally, STAT head CT was notable for small area on frontal lobe that may represent acute infarct. Was started on Vanc/zosyn for Aspiration vs CAP but was de-escalated to Levaquin on 1/2/20. Overnight  1/3/20, pt with recurrence of fevers and vanc/zosyn was re-started. Lactate WNL. Respiratory viral panel and repeat influenza negative but sputum culture with enterobacter cloacae, sensitive to zosyn. Echo without signs of vegetation. Blood cultures still negative. Pt febrile evening of 1/3/20 but improved 1/5/20, still on zosyn. Repeat heat CT with redemonstration of hypodensity within the superior left frontal lobe and additional subtle hypodensity within the left frontal lobe periventricular white matter. Also increasingly somnolent on 1/5/20, ABG with respiratory alkalosis, vascular neurology to discuss with neuro critical care. Patient to be admitted to [facility] for higher level of care.     1/5 Neuro Critical Care consulted for Decreased LOC, poor response, respiratory insufficiency. Patient found to be somnolent unable to follow commands, protecting airway. Transferred to [facility] for higher level of care and management.     Hospital Course: 01/05/2020 transfer from floor for decreased LOC, respiratory insufficiency  01/06/2020d/c eliquis, start heparin gtt minimal intensity for afib, cap overnight-nothing epileptiform, place A-line, ABG q6h, place hickman cath, 20 mg Lasix X1, continue enteral water flushed 200 ml q6h, repeat CBC-follow plts  01/07/2020 pt still with poor neurological exam. CTH showed small L frontal hypodensity cocnerning for stroke. Will diurese with 60 lasix IV today. Follow up cultures  01/08/2020 naeon. satting well on room air. Neurologically unchanged from yesterday. CTH reviewed showing large progression of L MCA and bilateral JAIME distribution infarcts with generalized edema, mass effect and associated SAH.     In this setting, palliative medicine was consulted to help with medical decision making and aid in the formation of goals of care.       Hospital Course:  No notes on file    Interval History: no events overnight.  Pt still with sonorous respirations but less so than yesterday.  Afebrile  and no seizures. Daughter Theodoria at the bedside    Medications:  Continuous Infusions:   sodium chloride 0.9%       Scheduled Meds:   albuterol-ipratropium  3 mL Nebulization Q4H    allopurinol  150 mg Per NG tube Daily    atorvastatin  80 mg Per NG tube Daily    heparin (porcine)  5,000 Units Subcutaneous Q8H    levothyroxine  25 mcg Per NG tube Before breakfast    metoprolol tartrate  100 mg Per NG tube QHS    metoprolol tartrate  50 mg Per NG tube Daily    piperacillin-tazobactam (ZOSYN) IVPB  4.5 g Intravenous Q8H    senna-docusate 8.6-50 mg  1 tablet Per NG tube Daily    sodium chloride 3%  4 mL Nebulization Q4H    vancomycin (VANCOCIN) IVPB  750 mg Intravenous Q24H     PRN Meds:acetaminophen, Dextrose 10% Bolus, glucagon (human recombinant), insulin aspart U-100, labetalol, magnesium oxide, magnesium oxide, ondansetron, potassium chloride 10%, potassium chloride 10%, potassium chloride 10%, potassium, sodium phosphates, potassium, sodium phosphates, potassium, sodium phosphates, sodium chloride 0.9%    Objective:     Vital Signs (Most Recent):  Temp: 99.7 °F (37.6 °C) (01/09/20 1602)  Pulse: 69 (01/09/20 1614)  Resp: (!) 22 (01/09/20 1614)  BP: (!) 143/71 (01/09/20 1602)  SpO2: 100 % (01/09/20 1614) Vital Signs (24h Range):  Temp:  [99.3 °F (37.4 °C)-101.3 °F (38.5 °C)] 99.7 °F (37.6 °C)  Pulse:  [69-73] 69  Resp:  [20-38] 22  SpO2:  [93 %-100 %] 100 %  BP: (103-149)/(47-81) 143/71  Arterial Line BP: (118-178)/(45-69) 167/59     Weight: 79.4 kg (175 lb)  Body mass index is 32.01 kg/m².    Review of Symptoms    Unable to assess due to acuity of condition    Symptom Assessment (ESAS 0-10 scale)  ESAS 0 1 2 3 4 5 6 7 8 9 10   Pain              Dyspnea              Anxiety              Nausea              Depression               Anorexia              Fatigue              Insomnia              Restlessness               Agitation              CAM / Delirium __ --  ___+   Constipation     __ --  ___+    Diarrhea           __ --  ___+  Bowel Management Plan (BMP): Yes    Comments: none    Pain Assessment: no noverbal signs    OME in 24 hours: 0    Performance Status: 10    Physical Exam     Constitutional: No distress.   HENT:   Head: Normocephalic and atraumatic.   Mouth/Throat: No oropharyngeal exudate.   Eyes: No scleral icterus.   Neck: Normal range of motion. Neck supple.   No JVD noted   Cardiovascular: Normal rate.   Pulmonary/Chest: No respiratory distress. She has decreased breath sounds in the right upper field, the right middle field, the right lower field, the left upper field, the left middle field and the left lower field. She has no wheezes. She has no rales.   Abdominal: Soft. She exhibits no distension. There is no guarding.   Musculoskeletal:   No LE edema. Bilateral SCDs in place   Lymphadenopathy:     She has no cervical adenopathy.   Neurological: She displays no tremor.   Skin: Skin is warm and dry. She is not diaphoretic.   Nursing note and vitals reviewed.    Significant Labs:   CBC:   Recent Labs   Lab 01/08/20 0130 01/09/20 0134   WBC 10.93 10.76   HGB 9.1* 9.1*   HCT 31.0* 30.8*    207     CMP:   Recent Labs   Lab 01/08/20 0130 01/09/20 0134    144   K 3.5 3.3*    111*   CO2 25 23   * 127*   BUN 30* 24*   CREATININE 1.4 1.1   CALCIUM 8.4* 8.4*   PROT 6.9 6.8   ALBUMIN 2.2* 2.1*   BILITOT 1.3* 1.0   ALKPHOS 47* 49*   AST 38 54*   ALT 8* 11   ANIONGAP 11 10   EGFRNONAA 34.8* 46.5*     LFT:   Recent Labs   Lab 01/09/20 0134   AST 54*   ALKPHOS 49*   BILITOT 1.0     Prealbumin: No results for input(s): PREALBUMIN in the last 48 hours.  CBC:   Recent Labs   Lab 01/09/20 0134   WBC 10.76   HGB 9.1*   HCT 30.8*   MCV 91        BMP:  Recent Labs   Lab 01/09/20 0134   *      K 3.3*   *   CO2 23   BUN 24*   CREATININE 1.1   CALCIUM 8.4*   MG 3.9*     LFT:  Lab Results   Component Value Date    AST 54 (H) 01/09/2020    ALKPHOS 49 (L)  01/09/2020    BILITOT 1.0 01/09/2020     Albumin:   Albumin   Date Value Ref Range Status   01/09/2020 2.1 (L) 3.5 - 5.2 g/dL Final     Protein:   Total Protein   Date Value Ref Range Status   01/09/2020 6.8 6.0 - 8.4 g/dL Final     Lactic acid:   Lab Results   Component Value Date    LACTATE 1.2 01/03/2020    LACTATE 3.2 (H) 12/14/2017       Significant Imaging: CT: I have reviewed all pertinent results/findings within the past 24 hours:  ct head  -CTH 1/2/20: Interval development of a small region of decreased attenuation in the left frontal lobe superiorly concerning for possible acute/recent infarction  -Preliminary EEG: diffuse background slowing with no epileptiform discharges   -CTH 1/5/20: redemonstration of ill-defined hypodensity within the superior left frontal lobe which may relate to evolving recent/acute infarct. Additional subtle hypodensity is present within the left frontal lobe periventricular white matter, somewhat more conspicuous from prior examination, possibly relating to additional small volume recent infarct.  No evidence of acute intracranial hemorrhage or midline shift.  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  Significant intracranial mass effect approximately 0.8 cm rightward midline shift at this time. Interval development small foci of subarachnoid hemorrhage within the left sylvian fissure and in the solitary sulcus near the right temporal occipital junction    Advanced Directives::  Living Will: No  LaPOST: No  Do Not Resuscitate Status: No  Medical Power of : No    Two daughters:  Ya Miller [phone]  Jennifer Kim    Grand daughter:  Genie Herrera [phone]      Decision-Making Capacity: Patient answered questions, Patient unable to communicate due to disease severity/cognitive impairment    Living Arrangements: Lives with family    Psychosocial/Cultural: as answered by  family  Patient's most important priorities:  family    Patient's biggest concerns/fears:  Living in a nursing home    Previous death/end of life care history:  Not known    Patient's goals/hopes:  Family hopes to avoid NH and wants to have her home eventually    Spiritual:     F- Radhika and Belief: yes    I - Importance: yes  .  C - Community: yes    A - Address in Care: encouraged to involve community       > 50% of 45 min visit spent in chart review, face to face discussion of goals of care,  symptom assessment, coordination of care and emotional support.    Domenico Sherman MD  Palliative Medicine  [facility]

## 2020-01-09 NOTE — SUBJECTIVE & OBJECTIVE
Interval History: no events overnight.  Pt still with sonorous respirations but less so than yesterday.  Afebrile and no seizures. Daughter Theodoria at the bedside    Medications:  Continuous Infusions:   sodium chloride 0.9%       Scheduled Meds:   albuterol-ipratropium  3 mL Nebulization Q4H    allopurinol  150 mg Per NG tube Daily    atorvastatin  80 mg Per NG tube Daily    heparin (porcine)  5,000 Units Subcutaneous Q8H    levothyroxine  25 mcg Per NG tube Before breakfast    metoprolol tartrate  100 mg Per NG tube QHS    metoprolol tartrate  50 mg Per NG tube Daily    piperacillin-tazobactam (ZOSYN) IVPB  4.5 g Intravenous Q8H    senna-docusate 8.6-50 mg  1 tablet Per NG tube Daily    sodium chloride 3%  4 mL Nebulization Q4H    vancomycin (VANCOCIN) IVPB  750 mg Intravenous Q24H     PRN Meds:acetaminophen, Dextrose 10% Bolus, glucagon (human recombinant), insulin aspart U-100, labetalol, magnesium oxide, magnesium oxide, ondansetron, potassium chloride 10%, potassium chloride 10%, potassium chloride 10%, potassium, sodium phosphates, potassium, sodium phosphates, potassium, sodium phosphates, sodium chloride 0.9%    Objective:     Vital Signs (Most Recent):  Temp: 99.7 °F (37.6 °C) (01/09/20 1602)  Pulse: 69 (01/09/20 1614)  Resp: (!) 22 (01/09/20 1614)  BP: (!) 143/71 (01/09/20 1602)  SpO2: 100 % (01/09/20 1614) Vital Signs (24h Range):  Temp:  [99.3 °F (37.4 °C)-101.3 °F (38.5 °C)] 99.7 °F (37.6 °C)  Pulse:  [69-73] 69  Resp:  [20-38] 22  SpO2:  [93 %-100 %] 100 %  BP: (103-149)/(47-81) 143/71  Arterial Line BP: (118-178)/(45-69) 167/59     Weight: 79.4 kg (175 lb)  Body mass index is 32.01 kg/m².    Review of Symptoms    Unable to assess due to acuity of condition    Symptom Assessment (ESAS 0-10 scale)  ESAS 0 1 2 3 4 5 6 7 8 9 10   Pain              Dyspnea              Anxiety              Nausea              Depression               Anorexia              Fatigue              Insomnia               Restlessness               Agitation              CAM / Delirium __ --  ___+   Constipation     __ --  ___+   Diarrhea           __ --  ___+  Bowel Management Plan (BMP): Yes    Comments: none    Pain Assessment: no noverbal signs    OME in 24 hours: 0    Performance Status: 10    Physical Exam     Constitutional: No distress.   HENT:   Head: Normocephalic and atraumatic.   Mouth/Throat: No oropharyngeal exudate.   Eyes: No scleral icterus.   Neck: Normal range of motion. Neck supple.   No JVD noted   Cardiovascular: Normal rate.   Pulmonary/Chest: No respiratory distress. She has decreased breath sounds in the right upper field, the right middle field, the right lower field, the left upper field, the left middle field and the left lower field. She has no wheezes. She has no rales.   Abdominal: Soft. She exhibits no distension. There is no guarding.   Musculoskeletal:   No LE edema. Bilateral SCDs in place   Lymphadenopathy:     She has no cervical adenopathy.   Neurological: She displays no tremor.   Skin: Skin is warm and dry. She is not diaphoretic.   Nursing note and vitals reviewed.    Significant Labs:   CBC:   Recent Labs   Lab 01/08/20 0130 01/09/20 0134   WBC 10.93 10.76   HGB 9.1* 9.1*   HCT 31.0* 30.8*    207     CMP:   Recent Labs   Lab 01/08/20 0130 01/09/20 0134    144   K 3.5 3.3*    111*   CO2 25 23   * 127*   BUN 30* 24*   CREATININE 1.4 1.1   CALCIUM 8.4* 8.4*   PROT 6.9 6.8   ALBUMIN 2.2* 2.1*   BILITOT 1.3* 1.0   ALKPHOS 47* 49*   AST 38 54*   ALT 8* 11   ANIONGAP 11 10   EGFRNONAA 34.8* 46.5*     LFT:   Recent Labs   Lab 01/09/20 0134   AST 54*   ALKPHOS 49*   BILITOT 1.0     Prealbumin: No results for input(s): PREALBUMIN in the last 48 hours.  CBC:   Recent Labs   Lab 01/09/20 0134   WBC 10.76   HGB 9.1*   HCT 30.8*   MCV 91        BMP:  Recent Labs   Lab 01/09/20 0134   *      K 3.3*   *   CO2 23   BUN 24*   CREATININE 1.1    CALCIUM 8.4*   MG 3.9*     LFT:  Lab Results   Component Value Date    AST 54 (H) 01/09/2020    ALKPHOS 49 (L) 01/09/2020    BILITOT 1.0 01/09/2020     Albumin:   Albumin   Date Value Ref Range Status   01/09/2020 2.1 (L) 3.5 - 5.2 g/dL Final     Protein:   Total Protein   Date Value Ref Range Status   01/09/2020 6.8 6.0 - 8.4 g/dL Final     Lactic acid:   Lab Results   Component Value Date    LACTATE 1.2 01/03/2020    LACTATE 3.2 (H) 12/14/2017       Significant Imaging: CT: I have reviewed all pertinent results/findings within the past 24 hours:  ct head  -CTH 1/2/20: Interval development of a small region of decreased attenuation in the left frontal lobe superiorly concerning for possible acute/recent infarction  -Preliminary EEG: diffuse background slowing with no epileptiform discharges   -CTH 1/5/20: redemonstration of ill-defined hypodensity within the superior left frontal lobe which may relate to evolving recent/acute infarct. Additional subtle hypodensity is present within the left frontal lobe periventricular white matter, somewhat more conspicuous from prior examination, possibly relating to additional small volume recent infarct.  No evidence of acute intracranial hemorrhage or midline shift.  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  Significant intracranial mass effect approximately 0.8 cm rightward midline shift at this time. Interval development small foci of subarachnoid hemorrhage within the left sylvian fissure and in the solitary sulcus near the right temporal occipital junction    Advanced Directives::  Living Will: No  LaPOST: No  Do Not Resuscitate Status: No  Medical Power of : No    Two daughters:  Ya Miller [phone]  Jennifer Kim    Grand daughter:  Genie Herrera [phone]      Decision-Making Capacity: Patient answered questions, Patient unable to communicate due to disease  severity/cognitive impairment    Living Arrangements: Lives with family    Psychosocial/Cultural: as answered by family  Patient's most important priorities:  family    Patient's biggest concerns/fears:  Living in a nursing home    Previous death/end of life care history:  Not known    Patient's goals/hopes:  Family hopes to avoid NH and wants to have her home eventually    Spiritual:     F- Radhika and Belief: yes    I - Importance: yes  .  C - Community: yes    A - Address in Care: encouraged to involve community

## 2020-01-09 NOTE — PLAN OF CARE
POC reviewed with pt and family at 0400. Questions and concerns addressed with family. No acute events overnight. Pt remains NPO due to intubation watch, trickle feeds held per NCC. No gtts. Nasal trumpet in place. Pt on 2L nasal cannula this AM. Abdominal breathing and grunting overnight. Chest X-ray ordered. Flexi in place for liquid Bms. Tylenol given for temp. Bath given. Will continue to monitor. See flowsheets for full assessment and VS info

## 2020-01-09 NOTE — PLAN OF CARE
01/09/20 1546   Post-Acute Status   Post-Acute Authorization Home Health/Hospice   Home Health/Hospice Status   (HH list/AIM provider brochures given)     SW met with Pt family member at bedside to discuss HH and AIM possibilities as per discussion with family yesterday. Pt family member reported they have a meeting on Sat with the MD team and will decide the dc plan then.    Mervat Olivares LCSW  Neurocritical Care   [facility]  [id number]

## 2020-01-09 NOTE — PT/OT/SLP PROGRESS
"Physical Therapy Treatment    Patient Name:  Dacia Martínez   MRN:  [id number]    Recommendations:     Discharge Recommendations:  nursing facility, skilled   Discharge Equipment Recommendations: other (see comments)(TBD)   Barriers to discharge: Inaccessible home and Decreased caregiver support    Assessment:     Dacia Martínez is a 84 y.o. female admitted with a medical diagnosis of Stroke.  She presents with the following impairments/functional limitations:  weakness, impaired endurance, impaired self care skills, impaired functional mobilty, gait instability, impaired balance, decreased upper extremity function, decreased lower extremity function, decreased safety awareness, impaired sensation. Pt's family insisting on D/C home per CM and chart review. PT attempted to discuss plan for D/C home and acquire information regarding set up and support. Pt's daughter demonstrated a flat affect and stated she is unable to make any decisions or answer questions until she meets with all family members. Family non-receptive to discussion of home set-up and potential D/C home, despite insisting to case management they wish to go home. Palliative care is following. PT to re-attempt discussion and family training after family meeting on Saturday. Pt only appropriate for PROM this date 2/2 increased work of breathing and intubation watch.    Rehab Prognosis: Fair and Poor; patient would benefit from acute skilled PT services to address these deficits and reach maximum level of function.    Recent Surgery: * No surgery found *      Plan:     During this hospitalization, patient to be seen 3 x/week to address the identified rehab impairments via therapeutic activities, therapeutic exercises, neuromuscular re-education and progress toward the following goals:    · Plan of Care Expires:  01/31/20    Subjective     Chief Complaint: pt non-verbal  Patient/Family Comments/goals: "I can't answer any questions or discuss anythin' until after I " "speak to y'all" - "y'all" refers to pt's entire family  Pain/Comfort:  · Pain Rating 1: 0/10  · Pain Rating Post-Intervention 1: 0/10      Objective:     Communicated with nursing prior to session.  Patient found HOB elevated with bed alarm, blood pressure cuff, bowel management system, hickman catheter, NG tube, oxygen, peripheral IV, pulse ox (continuous), SCD, telemetry upon PT entry to room.     General Precautions: Standard, aspiration, NPO, fall   Orthopedic Precautions:N/A   Braces: N/A     Observations:  · Pt still withdrawing from "tickling" stimulus on feet    Functional Mobility:  · Not performed 2/2 increased work of breathing      AM-PAC 6 CLICK MOBILITY  Turning over in bed (including adjusting bedclothes, sheets and blankets)?: 1  Sitting down on and standing up from a chair with arms (e.g., wheelchair, bedside commode, etc.): 1  Moving from lying on back to sitting on the side of the bed?: 1  Moving to and from a bed to a chair (including a wheelchair)?: 1  Need to walk in hospital room?: 1  Climbing 3-5 steps with a railing?: 1  Basic Mobility Total Score: 6       Therapeutic Activities and Exercises:  PROM performed on BLE in all planes x 10 reps.  Family educated on role of therapy and goals of session. Discussed PT plan for discussion regarding home set-up and family education. Family appeared non-receptive to information with a flat affect. See assessment above.  Communication board updated.  All questions answered within PT scope of practice.       Patient left HOB elevated with all lines intact, call button in reach, bed alarm on, nursing notified and family present.    GOALS:   Multidisciplinary Problems     Physical Therapy Goals        Problem: Physical Therapy Goal    Goal Priority Disciplines Outcome Goal Variances Interventions   Physical Therapy Goal     PT, PT/OT Ongoing, Not Progressing     Description:  Goals to be met by: 1/8/20, extended to 1/15/2020    Patient will increase " functional independence with mobility by performin. Supine to sit with Moderate Assistance  2. Sit to supine with Moderate Assistance  3. Sit to stand transfer with Maximum Assistance  4. Bed to chair transfer with Maximum Assistance with/without AD.  5. Gait  x 5 feet with Maximum Assistance with/without AD.  6. Stand for 1 minute with Moderate Assistance with/without AD.  7. Lower extremity exercise program x20 reps per handout, with assistance as needed                       Time Tracking:     PT Received On: 20  PT Start Time: 1223     PT Stop Time: 1231  PT Total Time (min): 8 min     Billable Minutes: Therapeutic Exercise 8    Treatment Type: Treatment  PT/PTA: PT     PTA Visit Number: 0     Franny Thomson, PT  2020

## 2020-01-09 NOTE — PLAN OF CARE
Continue current POC.    Franny Thomson, PT, DPT  2020      Problem: Physical Therapy Goal  Goal: Physical Therapy Goal  Description  Goals to be met by: 20, extended to 1/15/2020    Patient will increase functional independence with mobility by performin. Supine to sit with Moderate Assistance  2. Sit to supine with Moderate Assistance  3. Sit to stand transfer with Maximum Assistance  4. Bed to chair transfer with Maximum Assistance with/without AD.  5. Gait  x 5 feet with Maximum Assistance with/without AD.  6. Stand for 1 minute with Moderate Assistance with/without AD.  7. Lower extremity exercise program x20 reps per handout, with assistance as needed      Outcome: Ongoing, Not Progressing

## 2020-01-09 NOTE — ASSESSMENT & PLAN NOTE
Dacia Martínez is a 84 y.o. female with PMHx of HTN, HLD, HF, DM, a fib (eliquis) who presented to hospital with gait instability, RSW, and speech changes. She had been off her eliquis for 1 week due to scheduled dental procedure. Patient with L gaze, mild RSW, and speech difficulty when examined by stroke provider. EMS reported possible waxing and waning of symptoms in transit. She was taken for STAT CTA multiphase, which was negative for early ischemic changes, LVO, and significant stenosis. tPA decision delayed due to unclear LKN time since patient took valium for dental procedure. STAT MRI attempted, but patient has pacemaker that is not MRI compatible.      After further discussion with family and unclear LKN time, decision was made to not treat with tPA. Possibly cardioembolic stroke based on history     -CTH 1/2/20: Interval development of a small region of decreased attenuation in the left frontal lobe superiorly concerning for possible acute/recent infarction  -Preliminary EEG: diffuse background slowing with no epileptiform discharges   -CTH 1/5/20: redemonstration of ill-defined hypodensity within the superior left frontal lobe which may relate to evolving recent/acute infarct. Additional subtle hypodensity is present within the left frontal lobe periventricular white matter, somewhat more conspicuous from prior examination, possibly relating to additional small volume recent infarct.  No evidence of acute intracranial hemorrhage or midline shift.  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  Significant intracranial mass effect approximately 0.8 cm rightward midline shift at this time. Interval development small foci of subarachnoid hemorrhage within the left sylvian fissure and in the solitary sulcus near the right temporal occipital junction.    Patient transferred to the ICU on 1/5/20 for decline in mental status as well as  paradoxical breathing (possible respiratory decompensation-ABG showing pure respiratory alkalosis). Subsequent CTH 1/8/20 with Interval development of large areas of recent infarction involving the L MCA, bilat JAIME distributions. Significant intracranial mass effect with rightward midline shift. Patient's family continues to be hopeful and would like full resuscitative efforts to be done; however, family was urged to have discussions amongst each other regarding decisions that need to be made should patient not recover.    Goals of care discussions with family 1/9 - discussed that likelihood of recovery from this last major stroke is very unlikely and that her respiratory status has declined greatly. Intubation is in the imminent future and family should discuss amongst themselves about whether drastic measures should be pursued at this time and how far care should be taken. Daughter present in room and one of patient's granddaughters are understanding that the stroke is very large and that they should let God handle it from here - that concentrating on comfort is best for the patient at this time. However, patient's other granddaughter as well as daughter are seeking all measures at this time and would like her intubated if necessary. Discussions ongoing.    Antithrombotics for secondary stroke prevention: holding all    Statins for secondary stroke prevention and hyperlipidemia, if present:   Statins: Atorvastatin- 80 mg daily    Aggressive risk factor modification: HTN, DM, HLD, Obesity, A-Fib     Rehab efforts: The patient has been evaluated by a stroke team provider and the therapy needs have been fully considered based off the presenting complaints and exam findings. The following therapy evaluations are needed: PT evaluate and treat, OT evaluate and treat, SLP evaluate and treat, PM&R evaluate for appropriate placement    Diagnostics ordered/pending: None     VTE prophylaxis: Heparin 5000 units SQ every 8  hours    BP parameters: Infarct: No intervention, SBP <220

## 2020-01-09 NOTE — NURSING
Pt transported to CT via bed on portable monitor with RN x1. Emergency equipment available. ID band on.     0820 pt returned to room 9068. Pt tolerated transport well. On bedside monitor. Family at bedside. Bed in low position, bed wheels locked, bed alarm on. WCTM

## 2020-01-09 NOTE — ASSESSMENT & PLAN NOTE
1) Symptoms: none noted    2) Code status: FC    3) Psychosocial: not ; worked at [employer] for 30 + years    4) Medicolegal: surrogate DM  Two daughters:  Ya Miller [phone]  Jennifer Kim    Grand daughter:  Genie Herrera [phone]    5) Spiritual:  Jew;encouraged to involved community and family     6) Goals of care/discussion:    Met with graddaughter Genie through whom the family prefers to have all information steered towards.  Family makes decisions together but everyone looks to daughter Ya Ledesma to make all decisions.    According to Genie the family has a good understanding of the condition and poor predicted prognostication. After talking as a family they would like to proceed with intubation as warranted for a short time trial as seen fit by her medical team.  IF not improvements noted, then the family will do everything they can to have her in the home setting.  Open to ongoing support and helping with medical decision making as appropriate.    7) Disposition plan: TBD; ongoing time trial    Plan:  -ongoing supportive care  -family would want a time limited trial on vent if warranted  -family hopes to avoid NH at all costs and would take the pt home    Discussed with Dr Ana Sherman MD  Palliative Medicine

## 2020-01-09 NOTE — PLAN OF CARE
Per MD, patient on intubation watch.  Not medically ready for discharge.        01/09/20 1532   Discharge Reassessment   Assessment Type Discharge Planning Reassessment   Provided patient/caregiver education on the expected discharge date and the discharge plan Yes   Do you have any problems affording any of your prescribed medications? TBD   Discharge Plan A Home with family;Home Health;Hospice/home  (Per SW, family wants to care for patient at home when discharged. )   Discharge Plan B Other  (tbd)   DME Needed Upon Discharge  other (see comments)  (tbd)   Patient choice form signed by patient/caregiver N/A   Anticipated Discharge Disposition Home-Health   Can the patient answer the patient profile reliably? No, cognitively impaired   Describe the patient's ability to walk at the present time. Does not walk or unable to take any steps at all   How often would a person be available to care for the patient? Whenever needed   Number of comorbid conditions (as recorded on the chart) Five or more   Post-Acute Status   Post-Acute Authorization Placement;Home Health/Hospice   Post-Acute Placement Status Awaiting Internal Medical Clearance   Discharge Delays (!) Diet Not Ready for Discharge       Marie Damon RN, CCRN-K, [facility]  Neuro-Critical Care   X [phone]

## 2020-01-10 PROBLEM — G25.3 MYOCLONIC JERKING: Status: RESOLVED | Noted: 2020-01-01 | Resolved: 2020-01-01

## 2020-01-10 NOTE — PROGRESS NOTES
[facility]  Neurocritical Care  Progress Note    Admit Date: 12/31/2019  Service Date: 01/09/2020  Length of Stay: 9    Subjective:     Chief Complaint: Acute ischemic left MCA stroke    History of Present Illness: The patient is an 85yo female  with PMH of HTN, DM2, HFrEF (30% in 2/2019 with pacemaker), HLD, CAD, A fib (on Eliquis), OA, Breast Cancer (s/p mastectomy, chemo, radiation in 2000), CKD3, Gout, and Hypothyroidism who was originially admitted to vascular neurology after family noticed right sided-weakness, gait instability, and slurred speech yesterday evening (12/30) following dental procedure. Report that her Eliquis had been held for 4 days prior to the procedure.  Her daughters state that she had been altered since they picked her up from the surgery around 4:30 pm 12/30. 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 Unable to attain MRI due to pacemaker. She was given ASA 325mg and Atorvastatin 40 mg.  Overnight rapid response was called due to tachypnea up to 40 and fevers to 100.3. ABG wnl but CXR with possible left-sided infiltrate. WBC wnl. Pt was started on vanc/zosyn in setting of aspiration vs CAP pneumonia.     On 1/2/20 AM rapid response was called for concern for decreased arousal and labored breathing that has been waxing and waning throughout admission. Troponin peaked and down-trended. Pt was also noted to have jerking movements of UE with concern for seizure-like activity on 1/2/20. Neurology was consulted by vascular neurology who recommended continuous EEG monitoring that did not show any large epileptiform activty. Additionally, STAT head CT was notable for small area on frontal lobe that may represent acute infarct. Was started on Vanc/zosyn for Aspiration vs CAP but was de-escalated to Levaquin on 1/2/20. Overnight 1/3/20, pt with recurrence of fevers and vanc/zosyn was re-started. Lactate WNL. Respiratory viral panel and  repeat influenza negative but sputum culture with enterobacter cloacae, sensitive to zosyn. Echo without signs of vegetation. Blood cultures still negative. Pt febrile evening of 1/3/20 but improved 1/5/20, still on zosyn. Repeat heat CT with redemonstration of hypodensity within the superior left frontal lobe and additional subtle hypodensity within the left frontal lobe periventricular white matter. Also increasingly somnolent on 1/5/20, ABG with respiratory alkalosis, vascular neurology to discuss with neuro critical care. Patient to be admitted to [facility] for higher level of care.     1/5 Neuro Critical Care consulted for Decreased LOC, poor response, respiratory insufficiency. Patient found to be somnolent unable to follow commands, protecting airway. Transferred to [facility] for higher level of care and management.    Hospital Course: 01/05/2020 transfer from floor for decreased LOC, respiratory insufficiency  01/06/2020d/c eliquis, start heparin gtt minimal intensity for afib, cap overnight-nothing epileptiform, place A-line, ABG q6h, place hickman cath, 20 mg Lasix X1, continue enteral water flushed 200 ml q6h, repeat CBC-follow plts  01/07/2020 pt still with poor neurological exam. CTH showed small L frontal hypodensity cocnerning for stroke. Will diurese with 60 lasix IV today. Follow up cultures  01/08/2020 naeon. satting well on room air. Neurologically unchanged from yesterday. CTH reviewed showing large progression of L MCA and bilateral JAIME distribution infarcts with generalized edema, mass effect and associated SAH.   1/9: discussion with daughter at bedside, d/c RICARDO hickman, lore, discussions with palliative care    Review of Symptoms:   Unable to obtain due to mental status    Physical Exam:  GA: comfortable, no acute distress.   HEENT: No scleral icterus or JVD.   Pulmonary: Clear to auscultation A/L. No wheezing, crackles, or rhonchi.  Cardiac: RRR S1 & S2 w/o rubs/murmurs/gallops.   Abdominal: Bowel sounds  present x 4. No appreciable hepatosplenomegaly.  Skin: No jaundice, rashes, or visible lesions.  Pulses: 1+ DP bilat    Neuro:  --sedation: none  --GCS: E2V1M4  --Mental Status: obtunded, no commands, only opens eyes briefly to deep pain    --CN II-XII grossly intact.   --Pupils 3-->2mm, PERRL.   --brainstem: intact  --Motor: withdrawal throughout left side, right side flaccid  --sensory: intact to soft touch and pain throughout  --Reflexes: not tested  --Gait: deferred    Recent Labs   Lab 01/09/20 0134   WBC 10.76   RBC 3.38*   HGB 9.1*   HCT 30.8*      MCV 91   MCH 26.9*   MCHC 29.5*     Recent Labs   Lab 01/09/20 0134   CALCIUM 8.4*   PROT 6.8      K 3.3*   CO2 23   *   BUN 24*   CREATININE 1.1   ALKPHOS 49*   ALT 11   AST 54*   BILITOT 1.0     Recent Labs   Lab 01/08/20  2324   INR 1.1   APTT 25.7          Temp: 98.9 °F (37.2 °C)  Pulse: 71  Rhythm: paced rhythm  BP: 130/69  MAP (mmHg): 94  Resp: (!) 26  SpO2: 100 %  O2 Device (Oxygen Therapy): nasal cannula    Temp  Min: 98.9 °F (37.2 °C)  Max: 101.3 °F (38.5 °C)  Pulse  Min: 69  Max: 73  BP  Min: 103/47  Max: 149/58  MAP (mmHg)  Min: 68  Max: 102  Resp  Min: 20  Max: 38  SpO2  Min: 93 %  Max: 100 %    01/08 0701 - 01/09 0700  In: 840 [I.V.:120]  Out: 810 [Urine:810]   Unmeasured Output  Urine Occurrence: 1  Stool Occurrence: 0  Emesis Occurrence: 0  Pad Count: 2    Nutrition Prescription Ordered  Current Diet Order: NPO  Nutrition Order Comments: TF at goal  Current Nutrition Support Formula Ordered: Isosource 1.5  Current Nutrition Support Rate Ordered: 20 (ml)  Current Nutrition Support Frequency Ordered: ml/hr  Last Bowel Movement: 01/09/20    Body mass index is 32.01 kg/m².      I have personally reviewed all labs, imaging, and studies today      Assessment/Plan:     Neuro  * Acute ischemic left MCA stroke  Stroke workup  Will need OAC  PT OT  Discussion with daughter at bedside today about severity of stroke, locations, and  neurologic recovery potential. She would like to have another meeting with more family around on Saturday with imaging explained to whole family.    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, without long-term current use of insulin  insulin    Derm  Alteration in skin integrity  Skin Assessment  Q Shift  Wound Care Following    Pulmonary  Pneumonia    Breathing treatments Q4  ABG PRN  Continue Antibiotics  ETT watch    Cardiac/Vascular  Chronic systolic congestive heart failure  EF 35%  Continuous Cardiac Monitoring  Diurese prn  Avoid fluid over load      Persistent atrial fibrillation  Heparin gtt discontinued due to recent CTH  Will need OAC  Discussion with family on goals of care      Hyperlipidemia  Monitor Lipid Panel  Continue Atorvastatin    Essential hypertension  Continuous Cardiac Monitoring  Vital Signs Q1 hour  -SBP goal 100-180  ECHO 35% EF                   Renal/  CKD (chronic kidney disease) stage 3, GFR 30-59 ml/min  Monitor I/O's and daily weights  -Avoid nephrotoxic agents, NSAIDs, IV contrast, etc  -Renally dose medications   -Daily renal function panels   Limit/Avoid contrast studies   -Maintain Hgb >7.0  -Maintain SBPs <180           Endocrine  Hypothyroidism  Continue Synthroid  TSH 13.5      Type 2 diabetes mellitus with stage 3 chronic kidney disease, without long-term  current use of insulin  A1c 6.9  RISS   POCT Q6  Nutritional Consult for dietary /caloric needs            The patient is being Prophylaxed for:  Venous Thromboembolism with: Chemical  Stress Ulcer with: Not Applicable   Ventilator Pneumonia with: not applicable    Activity Orders          Commode at bedside starting at 12/31 2047    Diet NPO: NPO starting at 12/31 2047    Commode at bedside starting at 12/31 2047    Straight Cath starting at 12/31 2046        Full Code    Bhupinder Simpson MD  Neurocritical Care  [facility]    34     minutes of Critical care time was spent personally by me on the following activities: development of treatment plan with patient or surrogate and bedside caregivers, discussions with consultants, evaluation of patient's response to treatment, examination of patient, ordering and performing treatments and interventions, ordering and review of laboratory studies, ordering and review of radiographic studies, pulse oximetry, antibiotic titration if applicable, vasopressor titration if applicable, re-evaluation of patient's condition. This critical care time did not overlap with that of any other provider or involve time for any procedures. There is potential for acute life threatening neurological and or medical decompensation therefore will continue to monitor closely. Current critical conditions posing threat to organ systems, limb, or life include: multiple territory strokes, brain edema, brain compression, hydrocephalus, acute encephalopathy, ETT watch, PNA

## 2020-01-10 NOTE — PROGRESS NOTES
[facility]  Vascular Neurology  Comprehensive Stroke Center  Progress Note    Assessment/Plan:     * Acute ischemic left MCA stroke  Dacia Martínez is a 84 y.o. female with PMHx of HTN, HLD, HF, DM, a fib (eliquis) who presented to hospital with gait instability, RSW, and speech changes. She had been off her eliquis for 1 week due to scheduled dental procedure. Patient with L gaze, mild RSW, and speech difficulty when examined by stroke provider. EMS reported possible waxing and waning of symptoms in transit. She was taken for STAT CTA multiphase, which was negative for early ischemic changes, LVO, and significant stenosis. tPA decision delayed due to unclear LKN time since patient took valium for dental procedure. STAT MRI attempted, but patient has pacemaker that is not MRI compatible.      After further discussion with family and unclear LKN time, decision was made to not treat with tPA. Possibly cardioembolic stroke based on history     -CTH 1/2/20: Interval development of a small region of decreased attenuation in the left frontal lobe superiorly concerning for possible acute/recent infarction  -Preliminary EEG: diffuse background slowing with no epileptiform discharges   -CTH 1/5/20: redemonstration of ill-defined hypodensity within the superior left frontal lobe which may relate to evolving recent/acute infarct. Additional subtle hypodensity is present within the left frontal lobe periventricular white matter, somewhat more conspicuous from prior examination, possibly relating to additional small volume recent infarct.  No evidence of acute intracranial hemorrhage or midline shift.  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  Significant intracranial mass effect approximately 0.8 cm rightward midline shift at this time. Interval development small foci of subarachnoid hemorrhage within the left sylvian fissure and  in the solitary sulcus near the right temporal occipital junction.    Patient transferred to the ICU on 1/5/20 for decline in mental status as well as paradoxical breathing (possible respiratory decompensation-ABG showing pure respiratory alkalosis). Subsequent CTH 1/8/20 with Interval development of large areas of recent infarction involving the L MCA, bilat JAIME distributions. Significant intracranial mass effect with rightward midline shift. Patient's family continues to be hopeful and would like full resuscitative efforts to be done; however, family was urged to have discussions amongst each other regarding decisions that need to be made should patient not recover.    Goals of care discussions with family 1/9 - discussed that likelihood of recovery from this last major stroke is very unlikely and that her respiratory status has declined greatly. Intubation is in the imminent future and family should discuss amongst themselves about whether drastic measures should be pursued at this time and how far care should be taken. Daughter present in room and one of patient's granddaughters are understanding that the stroke is very large and that they should let God handle it from here - that concentrating on comfort is best for the patient at this time. However, patient's other granddaughter as well as daughter are seeking all measures at this time and would like her intubated if necessary. Family discussion planned for 1/11.     Antithrombotics for secondary stroke prevention: holding all    Statins for secondary stroke prevention and hyperlipidemia, if present:   Statins: Atorvastatin- 80 mg daily    Aggressive risk factor modification: HTN, DM, HLD, Obesity, A-Fib     Rehab efforts: The patient has been evaluated by a stroke team provider and the therapy needs have been fully considered based off the presenting complaints and exam findings. The following therapy evaluations are needed: PT evaluate and treat, OT evaluate  and treat, SLP evaluate and treat, PM&R evaluate for appropriate placement    Diagnostics ordered/pending: None     VTE prophylaxis: Heparin 5000 units SQ every 8 hours    BP parameters: Infarct: No intervention, SBP <220        Fever  2/2 PNA  -US of the lower extremities negative     Right hemiplegia  Intermittent episodes of right hemiplegia.   CTH on 1/2/20 revealing interval development of a small region of decreased attenuation in the left frontal lobe superiorly concerning for possible acute/recent infarction.    CTH on 1/5/20 showing redemonstration of ill-defined hypodensity within the superior left frontal lobe which may relate to evolving recent/acute infarct.  Additional subtle hypodensity is present within the left frontal lobe periventricular white matter, somewhat more conspicuous from prior examination, possibly relating to additional small volume recent infarct.  No evidence of acute intracranial hemorrhage or midline shift.  CTH 1/8/20 with interval development of large areas of recent infarction involving L MCA and bilat JAIME    Pneumonia  -Pt underwent dental extractions day of presentation. Family reports cough for few days prior to admission.  -Overnight on 12/31, pt became tachypnic with fever to 100.3 and rapid response was called. CXR with left sided consolidation. -Started on vanc/zosyn overnight. ABG wnl. WBC wnl. Procal mildly elevated to .99. O2 saturations %. Suspect possible aspiration pneumonia vs CAP.  Respiratory panel negative    Continue zosyn   Sputum cxs revealed Enterobacter Cloacae       Hypothyroidism  Continue home synthroid  Patient with elevated TSH but normal free T4, subclinical hypothyroidism    CKD (chronic kidney disease) stage 3, GFR 30-59 ml/min  Cr 1.3 on arrival. Baseline 1.1. KHUSHBOO resolved.    Chronic systolic congestive heart failure  Stroke risk factor  TTE Feb 2019 with EF 40% - repeat 1/2 with EF 35%  Patient on entresto, metoprolol, and lasix at  home  Holding entresto and lasix in setting of acute stroke  - Patient was on metoprolol for rate control. Was initially discontinued by hospital medicine due to concern for sepsis however, will reinitiate  - Currently on lopressor 50 mg AM, and 100 mg PM to be compatible with NG tube     Persistent atrial fibrillation  Stroke risk factor  On eliquis at home  Held x1 week for dental procedure  Continue home metoprolol  Eliquis DC'd and transitioned to heparin gtt - all held 2/2 large stroke progression    Type 2 diabetes mellitus with stage 3 chronic kidney disease, without long-term current use of insulin  Stroke risk factor  A1C 6.9  Goal glucose 140-180  Low correction SSI for NPO patients until LEILANI completed  Diabetic diet when safe for swallow eval    Primary osteoarthritis involving multiple joints  PRN tylenol    Idiopathic chronic gout of multiple sites without tophus  Continue home allopurinol    Hyperlipidemia  Stroke risk factor  Patient on atorvastatin 10 mg daily at home   on arrival  Increased to atorvastatin 80 mg daily    Essential hypertension  Stroke risk factor  SBP <220   Holding home antihypertensives due to concern for acute stroke  Prn labetalol         01/01/2020 CTA MP without any acute findings. MRI not done as incompatible with pacemaker.Neurological exam patient is waxing and waning. At times will move her left arm however, per the medicine team's examination patient had hemineglect in hte right side which was not seen during my exam.nOvernight, rapid response was called due to tachypnea up to 40 and fevers to 100.3. ABG wnl but CXR with possible left-sided infiltrate. WBC wnl. Pt was started on vanc/zosyn in setting of aspiration vs CAP pneumonia. Troponin elevated to 0.516 with repeat elevated to 0.935.Cardiology consulted and they believed this is demand ischemia, thus will trend troponin. Repeat CTH shows no evidence of major vascular distribution infarct or hemorrhage.      01/02/2020 Elevated BUN/Cr (could be secondary to post contrast). Patient having intermittent episodes of hemiplegia on the right side. During transport to [place], patient was exhibiting left sided myoclonic jerks with left gaze deviation. Ordered EEG. STAT CTH ordered. General neurology is aware. Awaiting respiratory culture before deescalation of antibiotics. Will be transferred to medicine floor tomorrow.     01/03/2020 Patient was spiking fevers. Antibiotics broadened to vancomycin and zosyn. Patient pan-cultured. Preliminary EEG shows diffuse slowing with epileptiform discharges. US of the carotids ordered.     01/04/2020 Sputum culture with enterobacter cloacae, pan-sensitive to antibiotics.  Repeat influenza pending. Echo without signs of vegetation. Blood cultures  negative. Increased free water. Pt still with fever in the evening of 1/3/20 so will not deescalate zosyn. Will discontinue vancomycin. US of the lower legs ordered to rule out other causes of fever.  Will order CT head to rule out any new infarctions.    01/05/2020 Patient was febrile  evening of 1/3/20 but improved 1/5/20 however, will keep on zosyn. CTH with redemonstration of hypodensity withi the superior left frontal lobe and additional subtle hypodensity within the left frontal lobe periventricular white matter. US of the lower extremities does not reveal any DVT. On examination today  Patient was extremely somnolent and was exhibiting paradoxical breathing. ABG demonstrated respiratory alkalosis. CXR shows pulmonary edema. Due to possible respiratory decompensation as well as somnolent state, patient was transferred to [facility]      1/8/2020 CTH now showing interval development of large areas of recent infarction, L MCA and bilat JAIME with significant mass effect.     STROKE DOCUMENTATION   Acute Stroke Times   Last Known Normal Date: 12/31/19  Last Known Normal Time: (unknown)  Symptom Onset Date: 12/31/19  Symptom Onset Time:  (unknown)  Stroke Team Called Date: 12/31/19  Stroke Team Called Time: 1924  Stroke Team Arrival Date: 12/31/19  Stroke Team Arrival Time: 1929  CT Interpretation Time: 1939  Decision to Treat Time for Alteplase: 2039  Decision to Treat Time for IR: 1939    NIH Scale:  1a. Level of Consciousness: 2-->Not alert, requires repeated stimulation to attend, or is obtunded and requires strong or painful stimulation to make movements (not stereotyped)  1b. LOC Questions: 2-->Answers neither question correctly  1c. LOC Commands: 2-->Performs neither task correctly  2. Best Gaze: 0-->Normal  3. Visual: 0-->No visual loss  4. Facial Palsy: 1-->Minor paralysis (flattened nasolabial fold, asymmetry on smiling)  5a. Motor Arm, Left: 4-->No movement  5b. Motor Arm, Right: 4-->No movement  6a. Motor Leg, Left: 3-->No effort against gravity, leg falls to bed immediately  6b. Motor Leg, Right: 3-->No effort against gravity, leg falls to bed immediately  7. Limb Ataxia: 0-->Absent  8. Sensory: 1-->Mild-to-moderate sensory loss, patient feels pinprick is less sharp or is dull on the affected side, or there is a loss of superficial pain with pinprick, but patient is aware of being touched  9. Best Language: 2-->Severe aphasia, all communication is through fragmentary expression, great need for inference, questioning, and guessing by the listener. Range of information that can be exchanged is limited, listener carries burden of. . . (see row details)  10. Dysarthria: 2-->Severe dysarthria, patients speech is so slurred as to be unintelligible in the absence of or out of proportion to any dysphasia, or is mute/anarthric  11. Extinction and Inattention (formerly Neglect): 0-->No abnormality  Total (NIH Stroke Scale): 26       Modified Dori Score: 3  [place] Coma Scale:    ABCD2 Score:    [id number] Score:   HAS -BLED Score:   ICH Score:   Hunt & Prather Classification:      Hemorrhagic change of an Ischemic Stroke: Does this patient have  an ischemic stroke with hemorrhagic changes? No     Neurologic Chief Complaint: Aphasia and weakness, L frontal, L MCA, bilateral JAIME    Subjective:     Interval History: Patient is seen for follow-up neurological assessment and treatment recommendations:    Patient neurologically the same from yesterday. Blood gas improved. Still not intubated, plan for family meeting tomorrow.    HPI, Past Medical, Family, and Social History remains the same as documented in the initial encounter.     Review of Systems   Unable to perform ROS: Patient unresponsive   Constitutional: Positive for fatigue. Negative for chills and fever.   Respiratory: Negative for cough.    Cardiovascular: Negative for chest pain.   Gastrointestinal: Negative for nausea and vomiting.   Neurological: Positive for speech difficulty and weakness. Negative for facial asymmetry and numbness.     Scheduled Meds:   albuterol-ipratropium  3 mL Nebulization Q4H    allopurinol  150 mg Per NG tube Daily    atorvastatin  80 mg Per NG tube Daily    heparin (porcine)  5,000 Units Subcutaneous Q8H    levothyroxine  25 mcg Per NG tube Before breakfast    metoprolol tartrate  100 mg Per NG tube QHS    metoprolol tartrate  50 mg Per NG tube Daily    piperacillin-tazobactam (ZOSYN) IVPB  4.5 g Intravenous Q8H    senna-docusate 8.6-50 mg  1 tablet Per NG tube Daily    sodium chloride 3%  4 mL Nebulization Q4H    vancomycin (VANCOCIN) IVPB  750 mg Intravenous Q24H     Continuous Infusions:   buffered 2% sodium acetate 86meq, sodium chloride 86meq, sterile water for inj IV soln 30 mL/hr at 01/10/20 1002    sodium chloride 0.9%       PRN Meds:acetaminophen, Dextrose 10% Bolus, glucagon (human recombinant), insulin aspart U-100, labetalol, magnesium oxide, magnesium oxide, ondansetron, potassium chloride 10%, potassium chloride 10%, potassium chloride 10%, potassium, sodium phosphates, potassium, sodium phosphates, potassium, sodium phosphates, sodium chloride  0.9%    Objective:     Vital Signs (Most Recent):  Temp: 98.9 °F (37.2 °C) (01/10/20 0702)  Pulse: 70 (01/10/20 1002)  Resp: (!) 30 (01/10/20 1002)  BP: 130/82 (01/10/20 1002)  SpO2: (!) 94 % (01/10/20 1002)  BP Location: Left leg    Vital Signs Range (Last 24H):  Temp:  [98.7 °F (37.1 °C)-100.2 °F (37.9 °C)]   Pulse:  [69-85]   Resp:  [18-38]   BP: (125-143)/(51-82)   SpO2:  [94 %-100 %]   Arterial Line BP: (157-174)/(55-69)   BP Location: Left leg    Physical Exam   Constitutional: No distress.   HENT:   Head: Normocephalic and atraumatic.   Mouth/Throat: No oropharyngeal exudate.   Eyes: No scleral icterus.   Neck: Normal range of motion. Neck supple.   No JVD noted   Cardiovascular: Normal rate.   Pulmonary/Chest: No respiratory distress. She has no wheezes. She has rhonchi. She has no rales.   Abdominal: Soft. She exhibits no distension. There is no guarding.   Musculoskeletal:   No LE edema. Bilateral SCDs in place   Lymphadenopathy:     She has no cervical adenopathy.   Neurological: She displays no tremor.   Skin: Skin is warm and dry. She is not diaphoretic.   Nursing note and vitals reviewed.      Neurological Exam:   LOC: drowsy  Attention Span: poor  Language: Global aphasia  Articulation: Mute/Anarthric  Orientation: Not oriented to person, place, and time  Visual Fields: Full  EOM (CN III, IV, VI): Full/intact  Pupils (CN II, III): PERRL  Facial Sensation (CN V): Normal  Facial Movement (CN VII): Unable to test   Gag Reflex: present  Reflexes: 2+ throughout  Cebellar: No evidence of appendicular or axial ataxia  Sensation: Intact to light touch, temperature and vibration  Unable to test  Tone: Rigidity  LUE     Laboratory:  CMP:   Recent Labs   Lab 01/10/20  0126 01/10/20  0809   CALCIUM 8.5*  --    ALBUMIN 2.0*  --    PROT 6.9  --    *  147* 148*   K 3.3*  --    CO2 23  --    *  --    BUN 19  --    CREATININE 1.0  --    ALKPHOS 54*  --    ALT 13  --    AST 62*  --    BILITOT 1.1*  --       BMP:   Recent Labs   Lab 01/10/20  0126 01/10/20  0809   *  147* 148*   K 3.3*  --    *  --    CO2 23  --    BUN 19  --    CREATININE 1.0  --    CALCIUM 8.5*  --      CBC:   Recent Labs   Lab 01/10/20  0126   WBC 10.97   RBC 3.28*   HGB 8.9*   HCT 29.9*      MCV 91   MCH 27.1   MCHC 29.8*     Coagulation:   Recent Labs   Lab 01/08/20  2324   INR 1.1   APTT 25.7       Diagnostic Results     Brain Imaging   CTH 1/1/20   -No evidence of major vascular distribution infarct or hemorrhage.  -Chronic microvascular ischemic change and generalized cerebral volume loss, normal for age.    CTH 1/5/20  1.  Redemonstration of ill-defined hypodensity within the superior left frontal lobe which may relate to evolving recent/acute infarct.  Additional subtle hypodensity is present within the left frontal lobe periventricular white matter, somewhat more conspicuous from prior examination, possibly relating to additional small volume recent infarct.  No evidence of acute intracranial hemorrhage or midline shift.  2.  Chronic senescent and microvascular ischemic changes.    CTH 1/8/20  Interval development of large areas of recent infarction involving the left MCA bilateral JAIME distributions.  Significant intracranial mass effect approximately 0.8 cm rightward midline shift at this time.  Interval development small foci of subarachnoid hemorrhage within the left sylvian fissure and in the solitary sulcus near the right temporal occipital junction.    Vessel Imaging   CTA MP 12/31/19   -No acute intracranial pathology.  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  -In this exam limited by poor timing of contrast and motion artifact, no high-grade stenosis or major vessel occlusion.  Hypoplastic right A2 segment.    Cardiac Imaging   TTE   · Moderately decreased left ventricular systolic function. The estimated ejection fraction is 35%  · Concentric left  ventricular remodeling.  · Local segmental wall motion abnormalities.  · Moderate right ventricular enlargement.  · Mildly reduced right ventricular systolic function.  · Moderate biatrial enlargement.  · Aortic valve area is 1.12 cm2; peak velocity is 1.95 m/s; mean gradient is 9 mmHg.  · Mild-to-moderate aortic valve stenosis.  · Mild mitral regurgitation.  · Mild tricuspid regurgitation.  · The estimated PA systolic pressure is 59 mm Hg  · Pulmonary hypertension present.  · Elevated central venous pressure (15 mm Hg).  · Atrial fibrillation observed.           Sneha Segundo MD  Comprehensive Stroke Center  Department of Vascular Neurology   [facility]

## 2020-01-10 NOTE — ASSESSMENT & PLAN NOTE
Due to seizures and multiple strokes, brain compression, brain edema, hydrocephlaus  Goal euthermia  Goal Na > 140  Goal euvolemia  Goal euglycemia  Intubated for airway protection

## 2020-01-10 NOTE — ASSESSMENT & PLAN NOTE
Heparin gtt discontinued due to recent CTH  Will need OAC  Discussion with family on goals of care

## 2020-01-10 NOTE — PLAN OF CARE
POC reviewed with pt at 0530. Patient aphasic and unable to verbalize understanding. No acute events overnight. Pt progressing toward goals. Will continue to monitor. See flowsheets for full assessment and VS info     Started on 2% 30 mL/hr to achieve a NA >145

## 2020-01-10 NOTE — ASSESSMENT & PLAN NOTE
Stroke workup  Will need OAC  PT OT  Discussion with daughter at bedside today about severity of stroke, locations, and neurologic recovery potential. She would like to have another meeting with more family around on Saturday with imaging explained to whole family.

## 2020-01-10 NOTE — PLAN OF CARE
POC reviewed with pt and family at 1400. Patient intubated, not following commands. Sedation present. Family at bedside verbalizes understanding. Questions and concerns addressed. Will continue to monitor. See flowsheets for full assessment and VS info. Neuro status unchanged. Awaiting to obtain CTH. Intubated d/t resp decline. SBP < 180 maintained w/ PRN labetalol and hydralazine. Trending ABGs. 2% continued, propofol gtt started. Electrolytes replaced and rechecked.

## 2020-01-10 NOTE — ASSESSMENT & PLAN NOTE
Dacia Martínez is a 84 y.o. female with PMHx of HTN, HLD, HF, DM, a fib (eliquis) who presented to hospital with gait instability, RSW, and speech changes. She had been off her eliquis for 1 week due to scheduled dental procedure. Patient with L gaze, mild RSW, and speech difficulty when examined by stroke provider. EMS reported possible waxing and waning of symptoms in transit. She was taken for STAT CTA multiphase, which was negative for early ischemic changes, LVO, and significant stenosis. tPA decision delayed due to unclear LKN time since patient took valium for dental procedure. STAT MRI attempted, but patient has pacemaker that is not MRI compatible.      After further discussion with family and unclear LKN time, decision was made to not treat with tPA. Possibly cardioembolic stroke based on history     -CTH 1/2/20: Interval development of a small region of decreased attenuation in the left frontal lobe superiorly concerning for possible acute/recent infarction  -Preliminary EEG: diffuse background slowing with no epileptiform discharges   -CTH 1/5/20: redemonstration of ill-defined hypodensity within the superior left frontal lobe which may relate to evolving recent/acute infarct. Additional subtle hypodensity is present within the left frontal lobe periventricular white matter, somewhat more conspicuous from prior examination, possibly relating to additional small volume recent infarct.  No evidence of acute intracranial hemorrhage or midline shift.  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  Significant intracranial mass effect approximately 0.8 cm rightward midline shift at this time. Interval development small foci of subarachnoid hemorrhage within the left sylvian fissure and in the solitary sulcus near the right temporal occipital junction.    Patient transferred to the ICU on 1/5/20 for decline in mental status as well as  paradoxical breathing (possible respiratory decompensation-ABG showing pure respiratory alkalosis). Subsequent CTH 1/8/20 with Interval development of large areas of recent infarction involving the L MCA, bilat JAIME distributions. Significant intracranial mass effect with rightward midline shift. Patient's family continues to be hopeful and would like full resuscitative efforts to be done; however, family was urged to have discussions amongst each other regarding decisions that need to be made should patient not recover.    Goals of care discussions with family 1/9 - discussed that likelihood of recovery from this last major stroke is very unlikely and that her respiratory status has declined greatly. Intubation is in the imminent future and family should discuss amongst themselves about whether drastic measures should be pursued at this time and how far care should be taken. Daughter present in room and one of patient's granddaughters are understanding that the stroke is very large and that they should let God handle it from here - that concentrating on comfort is best for the patient at this time. However, patient's other granddaughter as well as daughter are seeking all measures at this time and would like her intubated if necessary. Family discussion planned for 1/11.     Antithrombotics for secondary stroke prevention: holding all    Statins for secondary stroke prevention and hyperlipidemia, if present:   Statins: Atorvastatin- 80 mg daily    Aggressive risk factor modification: HTN, DM, HLD, Obesity, A-Fib     Rehab efforts: The patient has been evaluated by a stroke team provider and the therapy needs have been fully considered based off the presenting complaints and exam findings. The following therapy evaluations are needed: PT evaluate and treat, OT evaluate and treat, SLP evaluate and treat, PM&R evaluate for appropriate placement    Diagnostics ordered/pending: None     VTE prophylaxis: Heparin 5000  units SQ every 8 hours    BP parameters: Infarct: No intervention, SBP <220

## 2020-01-10 NOTE — CONSULTS
Single lumen 18G x 8CM midline placed right cephalic vein. Max dwell date 2/8/20, Lot# [id number].  Needle advanced into the vessel under real time ultrasound guidance.  Image recorded and saved.

## 2020-01-10 NOTE — CHAPLAIN
" visited pt's room. At the moment, a young granddaughter and another lady who had  into the family was present. Based on their diamond history, the  earned immediate relational capitol. They admitted to me the shock of the pt's condition since she was always so active and outgoing. She is the "glue" of her family. Their hyperreligiosity is based on the family having fasted and prayed for another family members in a health crisis previously who is still with them today. The  was able to gently draw their attention to the age difference between the two situations by commending their diamond, sharing appropriate personal testimony and causing them to reflect on the realization of the diamond and abundant eternal life in "which no eye has seen or ear has heard" about the things the Lord has stored up for His righteous ones. The  closed with a prayer for God's good, pleasing and perfect will to be done. Unfortunately, the daughter did not relieve the granddaughter last night as previously planned. The  will try to meet more family members in time.     "

## 2020-01-10 NOTE — PROGRESS NOTES
"[facility]  Palliative Medicine  Progress Note    Patient Name: Dacia Martínez  MRN: [id number]  Admission Date: 12/31/2019  Hospital Length of Stay: 10 days  Code Status: Full Code   Attending Provider: Bhupinder Simpson MD  Consulting Provider: Domenico Sherman MD  Primary Care Physician: Jasmeet Corral MD  Principal Problem:Acute ischemic left MCA stroke      Assessment/Plan:     Palliative care encounter  83 yo female with interval development of large areas of recent infarction involving the left MCA bilateral JAIME distributions.  Significant intracranial mass effect approximately 0.8 cm rightward midline shift at this time. Interval development small foci of subarachnoid hemorrhage within the left sylvian fissure and in the solitary sulcus near the right temporal occipital junction.    Poor prognosis    1) Symptoms: pt remains without appearance of distress    2) Code status: FC    3) Psychosocial: not ; worked at [employer] for 30 + years    4) Medicolegal: surrogate DM  Two daughters:  (Theodoria)Ya Paul [phone]- family recognizes as the final decision maker  Jennifer Kim    Grand daughter:  Genie Herrera [phone]    5) Spiritual:  Religion;encouraged to involved community and family     6) Goals of care/discussion:    1.10 Daughters not granddaughter present today.  Family meeting planned tomorrow      1.9 Met with Octavio at the bedside and reviewed the discussion she had with the team yesterday. She relates she continues to struggle with the idea she feels responsible for her mom's CVA due to stopping anticoagulant due to impending dental extraction.  She knows she was following physician orders but wishes the outcome was different.      She relayed her family's expectation that they want to have "everything" done to have her live as long as possible.  We supported her in hoping that the pt does well but also expressed our concern she has experienced a terminal " event.  I shared that the decision making that has to occur is not so much whether she lives or dies but rather how she spends the time that is left.  She feels that if time were short she would want the pt to be in a home setting, but also relayed that several family members are pushing to fight as long as possible because they believe God will heal her.  They also have another close friend who survived after being told he was going to die 20 years ago and believe that is testimony to the work of God.      She did endorse the goal is to have the pt home but also needs to have more information including seeing the CT scans as well as understanding why there is no surgery to be done for the stroke to save her life.  I realyed this to NICU team.     I recommended a family meeting occur over the next 48 hours to have a forum for information sharing and discussing what matters most based on the clinical picture and certainty of her poor prognosis.  She thinks 1000 Saturday, but I urged to occur sooner.     Total time with the daughter was 45 minutes.     1.8 Met with graddaughter Genie through whom the family prefers to have all information steered towards.  Family makes decisions together but everyone looks to yuval Ledesma to make all decisions.    According to Genie the family has a good understanding of the condition and poor predicted prognostication. After talking as a family they would like to proceed with intubation as warranted for a short time trial as seen fit by her medical team.  IF not improvements noted, then the family will do everything they can to have her in the home setting.  Open to ongoing support and helping with medical decision making as appropriate.    7) Disposition plan: TBD; ongoing time trial    Plan:  -ongoing supportive care; continued support of the family in medical decision making  -currently family would want a time limited trial on vent if warranted  -Primary goal of family:  hopes to avoid NH at all costs and would take the pt home  - support appreciated    Family meeting Saturday    Will remain available as needed.  Please call with questions    Domenico Sherman MD  Palliative Medicine          I will follow-up with patient Monday. Please contact us if you have any additional questions or if needed sooner.    Subjective:     Chief Complaint:   Chief Complaint   Patient presents with    Aphasia     LKN 1830. Stopped taking eliquis this week secondary to dental procedure.        HPI:   The patient is an 83yo female  with PMH of HTN, DM2, HFrEF (30% in 2/2019 with pacemaker), HLD, CAD, A fib (on Eliquis), OA, Breast Cancer (s/p mastectomy, chemo, radiation in 2000), CKD3, Gout, and Hypothyroidism who was originially admitted to vascular neurology after family noticed right sided-weakness, gait instability, and slurred speech yesterday evening (12/30) following dental procedure. Report that her Eliquis had been held for 4 days prior to the procedure.  Her daughters state that she had been altered since they picked her up from the surgery around 4:30 pm 12/30. 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 Unable to attain MRI due to pacemaker. She was given ASA 325mg and Atorvastatin 40 mg.  Overnight rapid response was called due to tachypnea up to 40 and fevers to 100.3. ABG wnl but CXR with possible left-sided infiltrate. WBC wnl. Pt was started on vanc/zosyn in setting of aspiration vs CAP pneumonia.      On 1/2/20 AM rapid response was called for concern for decreased arousal and labored breathing that has been waxing and waning throughout admission. Troponin peaked and down-trended. Pt was also noted to have jerking movements of UE with concern for seizure-like activity on 1/2/20. Neurology was consulted by vascular neurology who recommended continuous EEG monitoring that did not show any large epileptiform activty. Additionally, STAT head  CT was notable for small area on frontal lobe that may represent acute infarct. Was started on Vanc/zosyn for Aspiration vs CAP but was de-escalated to Levaquin on 1/2/20. Overnight 1/3/20, pt with recurrence of fevers and vanc/zosyn was re-started. Lactate WNL. Respiratory viral panel and repeat influenza negative but sputum culture with enterobacter cloacae, sensitive to zosyn. Echo without signs of vegetation. Blood cultures still negative. Pt febrile evening of 1/3/20 but improved 1/5/20, still on zosyn. Repeat heat CT with redemonstration of hypodensity within the superior left frontal lobe and additional subtle hypodensity within the left frontal lobe periventricular white matter. Also increasingly somnolent on 1/5/20, ABG with respiratory alkalosis, vascular neurology to discuss with neuro critical care. Patient to be admitted to [facility] for higher level of care.     1/5 Neuro Critical Care consulted for Decreased LOC, poor response, respiratory insufficiency. Patient found to be somnolent unable to follow commands, protecting airway. Transferred to [facility] for higher level of care and management.     Hospital Course: 01/05/2020 transfer from floor for decreased LOC, respiratory insufficiency  01/06/2020d/c eliquis, start heparin gtt minimal intensity for afib, cap overnight-nothing epileptiform, place A-line, ABG q6h, place hickman cath, 20 mg Lasix X1, continue enteral water flushed 200 ml q6h, repeat CBC-follow plts  01/07/2020 pt still with poor neurological exam. CTH showed small L frontal hypodensity cocnerning for stroke. Will diurese with 60 lasix IV today. Follow up cultures  01/08/2020 naeon. satting well on room air. Neurologically unchanged from yesterday. CTH reviewed showing large progression of L MCA and bilateral JAIME distribution infarcts with generalized edema, mass effect and associated SAH.     In this setting, palliative medicine was consulted to help with medical decision making and aid in the  formation of goals of care.       Hospital Course:  No notes on file    Interval History: no events overnight.  Great-granddaughter at the bedside.  Family planning on coming tomorrow for family meeting  Medications:  Continuous Infusions:   buffered 2% sodium acetate 86meq, sodium chloride 86meq, sterile water for inj IV soln 30 mL/hr at 01/10/20 1335    sodium chloride 0.9%       Scheduled Meds:   albuterol-ipratropium  3 mL Nebulization Q4H    allopurinol  150 mg Per NG tube Daily    atorvastatin  80 mg Per NG tube Daily    heparin (porcine)  5,000 Units Subcutaneous Q8H    hydrALAZINE  10 mg Intravenous Once    levothyroxine  25 mcg Per NG tube Before breakfast    metoprolol tartrate  100 mg Per NG tube QHS    metoprolol tartrate  50 mg Per NG tube Daily    piperacillin-tazobactam (ZOSYN) IVPB  4.5 g Intravenous Q8H    senna-docusate 8.6-50 mg  1 tablet Per NG tube Daily    sodium chloride 3%  4 mL Nebulization Q4H    vancomycin (VANCOCIN) IVPB  750 mg Intravenous Q24H     PRN Meds:acetaminophen, Dextrose 10% Bolus, glucagon (human recombinant), insulin aspart U-100, labetalol, magnesium oxide, magnesium oxide, ondansetron, potassium chloride 10%, potassium chloride 10%, potassium chloride 10%, potassium, sodium phosphates, potassium, sodium phosphates, potassium, sodium phosphates, sodium chloride 0.9%    Objective:     Vital Signs (Most Recent):  Temp: 98.7 °F (37.1 °C) (01/10/20 1102)  Pulse: 69 (01/10/20 1542)  Resp: (!) 24 (01/10/20 1542)  BP: (!) 139/58 (01/10/20 1202)  SpO2: 98 % (01/10/20 1542) Vital Signs (24h Range):  Temp:  [98.7 °F (37.1 °C)-99.9 °F (37.7 °C)] 98.7 °F (37.1 °C)  Pulse:  [69-85] 69  Resp:  [18-32] 24  SpO2:  [94 %-100 %] 98 %  BP: (125-146)/(51-82) 139/58  Arterial Line BP: (157-178)/(59-77) 178/75     Weight: 79.4 kg (175 lb)  Body mass index is 32.01 kg/m².    Review of Symptoms    Unable to assess due to acuity of condition    Symptom Assessment (ESAS 0-10  scale)  ESAS 0 1 2 3 4 5 6 7 8 9 10   Pain              Dyspnea              Anxiety              Nausea              Depression               Anorexia              Fatigue              Insomnia              Restlessness               Agitation              CAM / Delirium __ --  ___+   Constipation     __ --  ___+   Diarrhea           __ --  ___+  Bowel Management Plan (BMP): Yes    Comments: none    Pain Assessment: no noverbal signs    OME in 24 hours: 0    Performance Status: 10    Physical Exam     Constitutional: No distress.   HENT:   Head: Normocephalic and atraumatic.   Mouth/Throat: No oropharyngeal exudate.   Eyes: No scleral icterus.   Neck: Normal range of motion. Neck supple.   No JVD noted   Cardiovascular: Normal rate.   Pulmonary/Chest: No respiratory distress. She has decreased breath sounds in the right upper field, the right middle field, the right lower field, the left upper field, the left middle field and the left lower field. She has no wheezes. She has no rales.   Abdominal: Soft. She exhibits no distension. There is no guarding.   Musculoskeletal:   No LE edema. Bilateral SCDs in place   Lymphadenopathy:     She has no cervical adenopathy.   Neurological: She displays no tremor.   Skin: Skin is warm and dry. She is not diaphoretic.   Nursing note and vitals reviewed.    Significant Labs:   CBC:   Recent Labs   Lab 01/09/20  0134 01/10/20  0126   WBC 10.76 10.97   HGB 9.1* 8.9*   HCT 30.8* 29.9*    240     CMP:   Recent Labs   Lab 01/09/20  0134  01/10/20  0126 01/10/20  0809 01/10/20  1318      < > 147*  147* 148* 152*   K 3.3*  --  3.3*  --   --    *  --  112*  --   --    CO2 23  --  23  --   --    *  --  135*  --   --    BUN 24*  --  19  --   --    CREATININE 1.1  --  1.0  --   --    CALCIUM 8.4*  --  8.5*  --   --    PROT 6.8  --  6.9  --   --    ALBUMIN 2.1*  --  2.0*  --   --    BILITOT 1.0  --  1.1*  --   --    ALKPHOS 49*  --  54*  --   --    AST 54*  --   62*  --   --    ALT 11  --  13  --   --    ANIONGAP 10  --  12  --   --    EGFRNONAA 46.5*  --  52.2*  --   --     < > = values in this interval not displayed.     LFT:   Recent Labs   Lab 01/10/20  0126   AST 62*   ALKPHOS 54*   BILITOT 1.1*     Prealbumin: No results for input(s): PREALBUMIN in the last 48 hours.  CBC:   Recent Labs   Lab 01/10/20  0126   WBC 10.97   HGB 8.9*   HCT 29.9*   MCV 91        BMP:  Recent Labs   Lab 01/10/20  0126  01/10/20  1318   *  --   --    *  147*   < > 152*   K 3.3*  --   --    *  --   --    CO2 23  --   --    BUN 19  --   --    CREATININE 1.0  --   --    CALCIUM 8.5*  --   --    MG 2.1  --   --     < > = values in this interval not displayed.     LFT:  Lab Results   Component Value Date    AST 62 (H) 01/10/2020    ALKPHOS 54 (L) 01/10/2020    BILITOT 1.1 (H) 01/10/2020     Albumin:   Albumin   Date Value Ref Range Status   01/10/2020 2.0 (L) 3.5 - 5.2 g/dL Final     Protein:   Total Protein   Date Value Ref Range Status   01/10/2020 6.9 6.0 - 8.4 g/dL Final     Lactic acid:   Lab Results   Component Value Date    LACTATE 1.2 01/03/2020    LACTATE 3.2 (H) 12/14/2017       Significant Imaging: CT: I have reviewed all pertinent results/findings within the past 24 hours:  ct head  -CTH 1/2/20: Interval development of a small region of decreased attenuation in the left frontal lobe superiorly concerning for possible acute/recent infarction  -Preliminary EEG: diffuse background slowing with no epileptiform discharges   -CTH 1/5/20: redemonstration of ill-defined hypodensity within the superior left frontal lobe which may relate to evolving recent/acute infarct. Additional subtle hypodensity is present within the left frontal lobe periventricular white matter, somewhat more conspicuous from prior examination, possibly relating to additional small volume recent infarct.  No evidence of acute intracranial hemorrhage or midline shift.  -CTH 1/8/20: Interval  development of large areas of recent infarction involving the left MCA bilateral JAIME distributions.  Significant intracranial mass effect approximately 0.8 cm rightward midline shift at this time. Interval development small foci of subarachnoid hemorrhage within the left sylvian fissure and in the solitary sulcus near the right temporal occipital junction    Advanced Directives::  Living Will: No  LaPOST: No  Do Not Resuscitate Status: No  Medical Power of : No    Two daughters:  Ya Miller [phone]  Jennifer Kim    Grand daughter:  Genie Herrera [phone]      Decision-Making Capacity: Patient answered questions, Patient unable to communicate due to disease severity/cognitive impairment    Living Arrangements: Lives with family    Psychosocial/Cultural: as answered by family  Patient's most important priorities:  family    Patient's biggest concerns/fears:  Living in a nursing home    Previous death/end of life care history:  Not known    Patient's goals/hopes:  Family hopes to avoid NH and wants to have her home eventually    Spiritual:     F- Radhika and Belief: yes    I - Importance: yes  .  C - Community: yes    A - Address in Care: encouraged to involve community       Domenico Sherman MD  Palliative Medicine  [facility]

## 2020-01-10 NOTE — ASSESSMENT & PLAN NOTE
"85 yo female with interval development of large areas of recent infarction involving the left MCA bilateral JAIME distributions.  Significant intracranial mass effect approximately 0.8 cm rightward midline shift at this time. Interval development small foci of subarachnoid hemorrhage within the left sylvian fissure and in the solitary sulcus near the right temporal occipital junction.    Poor prognosis    1) Symptoms: pt remains without appearance of distress    2) Code status: FC    3) Psychosocial: not ; worked at [employer] for 30 + years    4) Medicolegal: surrogate DM  Two daughters:  (Theodoria)Ya Miller [phone]- family recognizes as the final decision maker  Jennifer Kim    Grand daughter:  Genie Herrera [phone]    5) Spiritual:  Yarsani;encouraged to involved community and family     6) Goals of care/discussion:    1.10 Daughters not granddaughter present today.  Family meeting planned tomorrow      1.9 Met with Mirandaras at the bedside and reviewed the discussion she had with the team yesterday. She relates she continues to struggle with the idea she feels responsible for her mom's CVA due to stopping anticoagulant due to impending dental extraction.  She knows she was following physician orders but wishes the outcome was different.      She relayed her family's expectation that they want to have "everything" done to have her live as long as possible.  We supported her in hoping that the pt does well but also expressed our concern she has experienced a terminal event.  I shared that the decision making that has to occur is not so much whether she lives or dies but rather how she spends the time that is left.  She feels that if time were short she would want the pt to be in a home setting, but also relayed that several family members are pushing to fight as long as possible because they believe God will heal her.  They also have another close friend who survived after being told he " was going to die 20 years ago and believe that is testimony to the work of God.      She did endorse the goal is to have the pt home but also needs to have more information including seeing the CT scans as well as understanding why there is no surgery to be done for the stroke to save her life.  I realyed this to NICU team.     I recommended a family meeting occur over the next 48 hours to have a forum for information sharing and discussing what matters most based on the clinical picture and certainty of her poor prognosis.  She thinks 1000 Saturday, but I urged to occur sooner.     Total time with the daughter was 45 minutes.     1.8 Met with graddaughter Genie through whom the family prefers to have all information steered towards.  Family makes decisions together but everyone looks to daughter Ya Ledesma to make all decisions.    According to Genie the family has a good understanding of the condition and poor predicted prognostication. After talking as a family they would like to proceed with intubation as warranted for a short time trial as seen fit by her medical team.  IF not improvements noted, then the family will do everything they can to have her in the home setting.  Open to ongoing support and helping with medical decision making as appropriate.    7) Disposition plan: TBD; ongoing time trial    Plan:  -ongoing supportive care; continued support of the family in medical decision making  -currently family would want a time limited trial on vent if warranted  -Primary goal of family: hopes to avoid NH at all costs and would take the pt home  - support appreciated    Family meeting Saturday    Will remain available as needed.  Please call with questions    Domenico Sherman MD  Palliative Medicine

## 2020-01-10 NOTE — PROGRESS NOTES
Wound care follow up:  Wounds to L groin and R lower leg healing with no complications.  Recommend to continue with wound care orders in place.     R lower leg wound 1.3x0.9x0.1cm, pale red/pink tissue, scant serosanguinous drainage

## 2020-01-10 NOTE — PROGRESS NOTES
[facility]  Neurocritical Care  Progress Note    Admit Date: 12/31/2019  Service Date: 01/10/2020  Length of Stay: 10    Subjective:     Chief Complaint: Acute ischemic left MCA stroke    History of Present Illness: The patient is an 85yo female  with PMH of HTN, DM2, HFrEF (30% in 2/2019 with pacemaker), HLD, CAD, A fib (on Eliquis), OA, Breast Cancer (s/p mastectomy, chemo, radiation in 2000), CKD3, Gout, and Hypothyroidism who was originially admitted to vascular neurology after family noticed right sided-weakness, gait instability, and slurred speech yesterday evening (12/30) following dental procedure. Report that her Eliquis had been held for 4 days prior to the procedure.  Her daughters state that she had been altered since they picked her up from the surgery around 4:30 pm 12/30. 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 Unable to attain MRI due to pacemaker. She was given ASA 325mg and Atorvastatin 40 mg.  Overnight rapid response was called due to tachypnea up to 40 and fevers to 100.3. ABG wnl but CXR with possible left-sided infiltrate. WBC wnl. Pt was started on vanc/zosyn in setting of aspiration vs CAP pneumonia.     On 1/2/20 AM rapid response was called for concern for decreased arousal and labored breathing that has been waxing and waning throughout admission. Troponin peaked and down-trended. Pt was also noted to have jerking movements of UE with concern for seizure-like activity on 1/2/20. Neurology was consulted by vascular neurology who recommended continuous EEG monitoring that did not show any large epileptiform activty. Additionally, STAT head CT was notable for small area on frontal lobe that may represent acute infarct. Was started on Vanc/zosyn for Aspiration vs CAP but was de-escalated to Levaquin on 1/2/20. Overnight 1/3/20, pt with recurrence of fevers and vanc/zosyn was re-started. Lactate WNL. Respiratory viral panel and  repeat influenza negative but sputum culture with enterobacter cloacae, sensitive to zosyn. Echo without signs of vegetation. Blood cultures still negative. Pt febrile evening of 1/3/20 but improved 1/5/20, still on zosyn. Repeat heat CT with redemonstration of hypodensity within the superior left frontal lobe and additional subtle hypodensity within the left frontal lobe periventricular white matter. Also increasingly somnolent on 1/5/20, ABG with respiratory alkalosis, vascular neurology to discuss with neuro critical care. Patient to be admitted to [facility] for higher level of care.     1/5 Neuro Critical Care consulted for Decreased LOC, poor response, respiratory insufficiency. Patient found to be somnolent unable to follow commands, protecting airway. Transferred to [facility] for higher level of care and management.    Hospital Course: 01/05/2020 transfer from floor for decreased LOC, respiratory insufficiency  01/06/2020d/c eliquis, start heparin gtt minimal intensity for afib, cap overnight-nothing epileptiform, place A-line, ABG q6h, place hickman cath, 20 mg Lasix X1, continue enteral water flushed 200 ml q6h, repeat CBC-follow plts  01/07/2020 pt still with poor neurological exam. CTH showed small L frontal hypodensity cocnerning for stroke. Will diurese with 60 lasix IV today. Follow up cultures  01/08/2020 naeon. satting well on room air. Neurologically unchanged from yesterday. CTH reviewed showing large progression of L MCA and bilateral JAIME distribution infarcts with generalized edema, mass effect and associated SAH.   1/9: discussion with daughter at bedside, d/c RICARDO hickman, lore, discussions with palliative care  1/10: Na goal > 145, intubated for airway, family meeting on saturday    Review of Symptoms:   Unable to fully obtain due to mental status    Physical Exam:  GA:  comfortable, no acute distress then gurgling and moaning  HEENT: No scleral icterus or JVD.   Pulmonary: Clear to auscultation no  wheeze  Cardiac: RRR S1 & S2 w/o rubs/murmurs/gallops.   Abdominal: Bowel sounds present x 4. No appreciable hepatosplenomegaly.  Skin: No jaundice, rashes, or visible lesions.  Pulses:1+ DP bilat    Neuro:  --sedation: none--Propofol  --GCS: E4V2M5  --Mental Status: encephalopathic, no commands    --CN II-XII grossly intact.   --Pupils 3-->2mm, PERRL.   --brainstem: week cough and gag  --Motor: right side increased tone, left side withdrawal in uppers, bilat lowers triple flexion  --sensory: intact to soft touch and pain throughout  --Reflexes: not tested  --Gait: deferred    Recent Labs   Lab 01/10/20  0126   WBC 10.97   RBC 3.28*   HGB 8.9*   HCT 29.9*      MCV 91   MCH 27.1   MCHC 29.8*     Recent Labs   Lab 01/10/20  0126  01/10/20  1610   CALCIUM 8.5*  --   --    PROT 6.9  --   --    *  147*   < > 152*   K 3.3*  --   --    CO2 23  --   --    *  --   --    BUN 19  --   --    CREATININE 1.0  --   --    ALKPHOS 54*  --   --    ALT 13  --   --    AST 62*  --   --    BILITOT 1.1*  --   --     < > = values in this interval not displayed.     No results for input(s): PT, INR, APTT in the last 24 hours.  Vent Mode: A/C  Oxygen Concentration (%):  [] 40  Resp Rate Total:  [18 br/min] 18 br/min  Vt Set:  [360 mL] 360 mL  PEEP/CPAP:  [5 cmH20] 5 cmH20  Mean Airway Pressure:  [7.7 cmH20-8.7 cmH20] 8.7 cmH20    Temp: 98.8 °F (37.1 °C)  Pulse: 70  Rhythm: paced rhythm  BP: (!) 145/63  MAP (mmHg): 113  Resp: 18  SpO2: 100 %  Oxygen Concentration (%): 40  O2 Device (Oxygen Therapy): ventilator  Vent Mode: A/C  Set Rate: 18 bmp  Vt Set: 360 mL  PEEP/CPAP: 5 cmH20  Peak Airway Pressure: 18 cmH2O  Mean Airway Pressure: 8.7 cmH20  Plateau Pressure: 0 cmH20    Temp  Min: 98.7 °F (37.1 °C)  Max: 99 °F (37.2 °C)  Pulse  Min: 69  Max: 85  BP  Min: 125/58  Max: 147/103  MAP (mmHg)  Min: 74  Max: 120  Resp  Min: 18  Max: 30  SpO2  Min: 94 %  Max: 100 %  Oxygen Concentration (%)  Min: 40  Max: 100    01/09 0701  - 01/10 0700  In: 990 [I.V.:350]  Out: 780 [Urine:530]   Unmeasured Output  Urine Occurrence: 1  Stool Occurrence: 1  Emesis Occurrence: 0  Pad Count: 2    Nutrition Prescription Ordered  Current Diet Order: NPO  Nutrition Order Comments: TF at goal  Current Nutrition Support Formula Ordered: Isosource 1.5  Current Nutrition Support Rate Ordered: 20 (ml)  Current Nutrition Support Frequency Ordered: ml/hr  Last Bowel Movement: 01/10/20    Body mass index is 32.01 kg/m².      I have personally reviewed all labs, imaging, and studies today      Assessment/Plan:     Neuro  * Acute ischemic left MCA stroke  Stroke workup  Will need OAC  PT OT  Discussion with daughter at bedside about severity of stroke, locations, and neurologic recovery potential. She would like to have another meeting with more family around on Saturday with imaging explained to whole family.    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, without long-term current use of insulin  insulin    Derm  Alteration in skin integrity  Skin Assessment  Q Shift  Wound Care Following    Pulmonary  Pneumonia    Breathing treatments Q4  ABG PRN  Continue Antibiotics  Intubated for airway  Send sputum cultures    Cardiac/Vascular  Chronic systolic congestive heart failure  EF 35%  Continuous Cardiac Monitoring  Diurese prn  Avoid fluid over load      Persistent atrial fibrillation  Heparin gtt discontinued due to recent CTH  Will need OAC  Discussion  with family on goals of care      Hyperlipidemia  Monitor Lipid Panel  Continue Atorvastatin    Essential hypertension  Continuous Cardiac Monitoring  Vital Signs Q1 hour  -SBP goal 100-180  ECHO 35% EF                   Renal/  CKD (chronic kidney disease) stage 3, GFR 30-59 ml/min  Monitor I/O's and daily weights  -Avoid nephrotoxic agents, NSAIDs, IV contrast, etc  -Renally dose medications   -Daily renal function panels   Limit/Avoid contrast studies   -Maintain Hgb >7.0  -Maintain SBPs <180           Endocrine  Hypothyroidism  Continue Synthroid  TSH 13.5      Type 2 diabetes mellitus with stage 3 chronic kidney disease, without long-term current use of insulin  A1c 6.9  RISS   POCT Q6  Nutritional Consult for dietary /caloric needs            The patient is being Prophylaxed for:  Venous Thromboembolism with: Chemical  Stress Ulcer with: H2B  Ventilator Pneumonia with: chlorhexidine oral care    Activity Orders          Commode at bedside starting at 12/31 2047    Diet NPO: NPO starting at 12/31 2047    Commode at bedside starting at 12/31 2047    Straight Cath starting at 12/31 2046        Full Code    Bhupinder Simpson MD  Neurocritical Care  [facility]    33     minutes of Critical care time was spent personally by me on the following activities: development of treatment plan with patient or surrogate and bedside caregivers, discussions with consultants, evaluation of patient's response to treatment, examination of patient, ordering and performing treatments and interventions, ordering and review of laboratory studies, ordering and review of radiographic studies, pulse oximetry, antibiotic titration if applicable, vasopressor titration if applicable, re-evaluation of patient's condition. This critical care time did not overlap with that of any other provider or involve time for any procedures. There is potential for acute life threatening neurological and or medical decompensation  therefore will continue to monitor closely. Current critical conditions posing threat to organ systems, limb, or life include:multiple territory strokes, brain edema, brain compression, hydrocephalus, acute encephalopathy, intubated, PNA

## 2020-01-10 NOTE — PROGRESS NOTES
Patient remains hypertensive -200 despite PRN labetalol IV being administered. Sats 94-97% on RA. Neuro exam unchanged. KIMO Keane with [facility] notified, no new orders at this time. Will continue to monitor closely.

## 2020-01-10 NOTE — SUBJECTIVE & OBJECTIVE
Neurologic Chief Complaint: Aphasia and weakness, L frontal, L MCA, bilateral JAIME    Subjective:     Interval History: Patient is seen for follow-up neurological assessment and treatment recommendations:    Patient neurologically the same from yesterday. Blood gas improved. Still not intubated, plan for family meeting tomorrow.    HPI, Past Medical, Family, and Social History remains the same as documented in the initial encounter.     Review of Systems   Unable to perform ROS: Patient unresponsive   Constitutional: Positive for fatigue. Negative for chills and fever.   Respiratory: Negative for cough.    Cardiovascular: Negative for chest pain.   Gastrointestinal: Negative for nausea and vomiting.   Neurological: Positive for speech difficulty and weakness. Negative for facial asymmetry and numbness.     Scheduled Meds:   albuterol-ipratropium  3 mL Nebulization Q4H    allopurinol  150 mg Per NG tube Daily    atorvastatin  80 mg Per NG tube Daily    heparin (porcine)  5,000 Units Subcutaneous Q8H    levothyroxine  25 mcg Per NG tube Before breakfast    metoprolol tartrate  100 mg Per NG tube QHS    metoprolol tartrate  50 mg Per NG tube Daily    piperacillin-tazobactam (ZOSYN) IVPB  4.5 g Intravenous Q8H    senna-docusate 8.6-50 mg  1 tablet Per NG tube Daily    sodium chloride 3%  4 mL Nebulization Q4H    vancomycin (VANCOCIN) IVPB  750 mg Intravenous Q24H     Continuous Infusions:   buffered 2% sodium acetate 86meq, sodium chloride 86meq, sterile water for inj IV soln 30 mL/hr at 01/10/20 1002    sodium chloride 0.9%       PRN Meds:acetaminophen, Dextrose 10% Bolus, glucagon (human recombinant), insulin aspart U-100, labetalol, magnesium oxide, magnesium oxide, ondansetron, potassium chloride 10%, potassium chloride 10%, potassium chloride 10%, potassium, sodium phosphates, potassium, sodium phosphates, potassium, sodium phosphates, sodium chloride 0.9%    Objective:     Vital Signs (Most  Recent):  Temp: 98.9 °F (37.2 °C) (01/10/20 0702)  Pulse: 70 (01/10/20 1002)  Resp: (!) 30 (01/10/20 1002)  BP: 130/82 (01/10/20 1002)  SpO2: (!) 94 % (01/10/20 1002)  BP Location: Left leg    Vital Signs Range (Last 24H):  Temp:  [98.7 °F (37.1 °C)-100.2 °F (37.9 °C)]   Pulse:  [69-85]   Resp:  [18-38]   BP: (125-143)/(51-82)   SpO2:  [94 %-100 %]   Arterial Line BP: (157-174)/(55-69)   BP Location: Left leg    Physical Exam   Constitutional: No distress.   HENT:   Head: Normocephalic and atraumatic.   Mouth/Throat: No oropharyngeal exudate.   Eyes: No scleral icterus.   Neck: Normal range of motion. Neck supple.   No JVD noted   Cardiovascular: Normal rate.   Pulmonary/Chest: No respiratory distress. She has no wheezes. She has rhonchi. She has no rales.   Abdominal: Soft. She exhibits no distension. There is no guarding.   Musculoskeletal:   No LE edema. Bilateral SCDs in place   Lymphadenopathy:     She has no cervical adenopathy.   Neurological: She displays no tremor.   Skin: Skin is warm and dry. She is not diaphoretic.   Nursing note and vitals reviewed.      Neurological Exam:   LOC: drowsy  Attention Span: poor  Language: Global aphasia  Articulation: Mute/Anarthric  Orientation: Not oriented to person, place, and time  Visual Fields: Full  EOM (CN III, IV, VI): Full/intact  Pupils (CN II, III): PERRL  Facial Sensation (CN V): Normal  Facial Movement (CN VII): Unable to test   Gag Reflex: present  Reflexes: 2+ throughout  Cebellar: No evidence of appendicular or axial ataxia  Sensation: Intact to light touch, temperature and vibration  Unable to test  Tone: Rigidity  LUE     Laboratory:  CMP:   Recent Labs   Lab 01/10/20  0126 01/10/20  0809   CALCIUM 8.5*  --    ALBUMIN 2.0*  --    PROT 6.9  --    *  147* 148*   K 3.3*  --    CO2 23  --    *  --    BUN 19  --    CREATININE 1.0  --    ALKPHOS 54*  --    ALT 13  --    AST 62*  --    BILITOT 1.1*  --      BMP:   Recent Labs   Lab 01/10/20  0126  01/10/20  0809   *  147* 148*   K 3.3*  --    *  --    CO2 23  --    BUN 19  --    CREATININE 1.0  --    CALCIUM 8.5*  --      CBC:   Recent Labs   Lab 01/10/20  0126   WBC 10.97   RBC 3.28*   HGB 8.9*   HCT 29.9*      MCV 91   MCH 27.1   MCHC 29.8*     Coagulation:   Recent Labs   Lab 01/08/20  2324   INR 1.1   APTT 25.7       Diagnostic Results     Brain Imaging   CT 1/1/20   -No evidence of major vascular distribution infarct or hemorrhage.  -Chronic microvascular ischemic change and generalized cerebral volume loss, normal for age.    CTH 1/5/20  1.  Redemonstration of ill-defined hypodensity within the superior left frontal lobe which may relate to evolving recent/acute infarct.  Additional subtle hypodensity is present within the left frontal lobe periventricular white matter, somewhat more conspicuous from prior examination, possibly relating to additional small volume recent infarct.  No evidence of acute intracranial hemorrhage or midline shift.  2.  Chronic senescent and microvascular ischemic changes.    CTH 1/8/20  Interval development of large areas of recent infarction involving the left MCA bilateral JAIME distributions.  Significant intracranial mass effect approximately 0.8 cm rightward midline shift at this time.  Interval development small foci of subarachnoid hemorrhage within the left sylvian fissure and in the solitary sulcus near the right temporal occipital junction.    Vessel Imaging   CTA  12/31/19   -No acute intracranial pathology.  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  -In this exam limited by poor timing of contrast and motion artifact, no high-grade stenosis or major vessel occlusion.  Hypoplastic right A2 segment.    Cardiac Imaging   TTE   · Moderately decreased left ventricular systolic function. The estimated ejection fraction is 35%  · Concentric left ventricular remodeling.  · Local segmental  wall motion abnormalities.  · Moderate right ventricular enlargement.  · Mildly reduced right ventricular systolic function.  · Moderate biatrial enlargement.  · Aortic valve area is 1.12 cm2; peak velocity is 1.95 m/s; mean gradient is 9 mmHg.  · Mild-to-moderate aortic valve stenosis.  · Mild mitral regurgitation.  · Mild tricuspid regurgitation.  · The estimated PA systolic pressure is 59 mm Hg  · Pulmonary hypertension present.  · Elevated central venous pressure (15 mm Hg).  · Atrial fibrillation observed.

## 2020-01-10 NOTE — ASSESSMENT & PLAN NOTE
Stroke risk factor  On eliquis at home  Held x1 week for dental procedure  Continue home metoprolol  Eliquis DC'd and transitioned to heparin gtt - all held 2/2 large stroke progression

## 2020-01-10 NOTE — SUBJECTIVE & OBJECTIVE
Interval History: no events overnight.  Great-granddaughter at the bedside.  Family planning on coming tomorrow for family meeting  Medications:  Continuous Infusions:   buffered 2% sodium acetate 86meq, sodium chloride 86meq, sterile water for inj IV soln 30 mL/hr at 01/10/20 1335    sodium chloride 0.9%       Scheduled Meds:   albuterol-ipratropium  3 mL Nebulization Q4H    allopurinol  150 mg Per NG tube Daily    atorvastatin  80 mg Per NG tube Daily    heparin (porcine)  5,000 Units Subcutaneous Q8H    hydrALAZINE  10 mg Intravenous Once    levothyroxine  25 mcg Per NG tube Before breakfast    metoprolol tartrate  100 mg Per NG tube QHS    metoprolol tartrate  50 mg Per NG tube Daily    piperacillin-tazobactam (ZOSYN) IVPB  4.5 g Intravenous Q8H    senna-docusate 8.6-50 mg  1 tablet Per NG tube Daily    sodium chloride 3%  4 mL Nebulization Q4H    vancomycin (VANCOCIN) IVPB  750 mg Intravenous Q24H     PRN Meds:acetaminophen, Dextrose 10% Bolus, glucagon (human recombinant), insulin aspart U-100, labetalol, magnesium oxide, magnesium oxide, ondansetron, potassium chloride 10%, potassium chloride 10%, potassium chloride 10%, potassium, sodium phosphates, potassium, sodium phosphates, potassium, sodium phosphates, sodium chloride 0.9%    Objective:     Vital Signs (Most Recent):  Temp: 98.7 °F (37.1 °C) (01/10/20 1102)  Pulse: 69 (01/10/20 1542)  Resp: (!) 24 (01/10/20 1542)  BP: (!) 139/58 (01/10/20 1202)  SpO2: 98 % (01/10/20 1542) Vital Signs (24h Range):  Temp:  [98.7 °F (37.1 °C)-99.9 °F (37.7 °C)] 98.7 °F (37.1 °C)  Pulse:  [69-85] 69  Resp:  [18-32] 24  SpO2:  [94 %-100 %] 98 %  BP: (125-146)/(51-82) 139/58  Arterial Line BP: (157-178)/(59-77) 178/75     Weight: 79.4 kg (175 lb)  Body mass index is 32.01 kg/m².    Review of Symptoms    Unable to assess due to acuity of condition    Symptom Assessment (ESAS 0-10 scale)  ESAS 0 1 2 3 4 5 6 7 8 9 10   Pain              Dyspnea               Anxiety              Nausea              Depression               Anorexia              Fatigue              Insomnia              Restlessness               Agitation              CAM / Delirium __ --  ___+   Constipation     __ --  ___+   Diarrhea           __ --  ___+  Bowel Management Plan (BMP): Yes    Comments: none    Pain Assessment: no noverbal signs    OME in 24 hours: 0    Performance Status: 10    Physical Exam     Constitutional: No distress.   HENT:   Head: Normocephalic and atraumatic.   Mouth/Throat: No oropharyngeal exudate.   Eyes: No scleral icterus.   Neck: Normal range of motion. Neck supple.   No JVD noted   Cardiovascular: Normal rate.   Pulmonary/Chest: No respiratory distress. She has decreased breath sounds in the right upper field, the right middle field, the right lower field, the left upper field, the left middle field and the left lower field. She has no wheezes. She has no rales.   Abdominal: Soft. She exhibits no distension. There is no guarding.   Musculoskeletal:   No LE edema. Bilateral SCDs in place   Lymphadenopathy:     She has no cervical adenopathy.   Neurological: She displays no tremor.   Skin: Skin is warm and dry. She is not diaphoretic.   Nursing note and vitals reviewed.    Significant Labs:   CBC:   Recent Labs   Lab 01/09/20  0134 01/10/20  0126   WBC 10.76 10.97   HGB 9.1* 8.9*   HCT 30.8* 29.9*    240     CMP:   Recent Labs   Lab 01/09/20  0134  01/10/20  0126 01/10/20  0809 01/10/20  1318      < > 147*  147* 148* 152*   K 3.3*  --  3.3*  --   --    *  --  112*  --   --    CO2 23  --  23  --   --    *  --  135*  --   --    BUN 24*  --  19  --   --    CREATININE 1.1  --  1.0  --   --    CALCIUM 8.4*  --  8.5*  --   --    PROT 6.8  --  6.9  --   --    ALBUMIN 2.1*  --  2.0*  --   --    BILITOT 1.0  --  1.1*  --   --    ALKPHOS 49*  --  54*  --   --    AST 54*  --  62*  --   --    ALT 11  --  13  --   --    ANIONGAP 10  --  12  --   --     EGFRNONAA 46.5*  --  52.2*  --   --     < > = values in this interval not displayed.     LFT:   Recent Labs   Lab 01/10/20  0126   AST 62*   ALKPHOS 54*   BILITOT 1.1*     Prealbumin: No results for input(s): PREALBUMIN in the last 48 hours.  CBC:   Recent Labs   Lab 01/10/20  0126   WBC 10.97   HGB 8.9*   HCT 29.9*   MCV 91        BMP:  Recent Labs   Lab 01/10/20  0126  01/10/20  1318   *  --   --    *  147*   < > 152*   K 3.3*  --   --    *  --   --    CO2 23  --   --    BUN 19  --   --    CREATININE 1.0  --   --    CALCIUM 8.5*  --   --    MG 2.1  --   --     < > = values in this interval not displayed.     LFT:  Lab Results   Component Value Date    AST 62 (H) 01/10/2020    ALKPHOS 54 (L) 01/10/2020    BILITOT 1.1 (H) 01/10/2020     Albumin:   Albumin   Date Value Ref Range Status   01/10/2020 2.0 (L) 3.5 - 5.2 g/dL Final     Protein:   Total Protein   Date Value Ref Range Status   01/10/2020 6.9 6.0 - 8.4 g/dL Final     Lactic acid:   Lab Results   Component Value Date    LACTATE 1.2 01/03/2020    LACTATE 3.2 (H) 12/14/2017       Significant Imaging: CT: I have reviewed all pertinent results/findings within the past 24 hours:  ct head  -CTH 1/2/20: Interval development of a small region of decreased attenuation in the left frontal lobe superiorly concerning for possible acute/recent infarction  -Preliminary EEG: diffuse background slowing with no epileptiform discharges   -CTH 1/5/20: redemonstration of ill-defined hypodensity within the superior left frontal lobe which may relate to evolving recent/acute infarct. Additional subtle hypodensity is present within the left frontal lobe periventricular white matter, somewhat more conspicuous from prior examination, possibly relating to additional small volume recent infarct.  No evidence of acute intracranial hemorrhage or midline shift.  -CTH 1/8/20: Interval development of large areas of recent infarction involving the left MCA  bilateral JAIME distributions.  Significant intracranial mass effect approximately 0.8 cm rightward midline shift at this time. Interval development small foci of subarachnoid hemorrhage within the left sylvian fissure and in the solitary sulcus near the right temporal occipital junction    Advanced Directives::  Living Will: No  LaPOST: No  Do Not Resuscitate Status: No  Medical Power of : No    Two daughters:  Ya Miller [phone]  Jennifer Kim    Grand daughter:  Genie Herrera [phone]      Decision-Making Capacity: Patient answered questions, Patient unable to communicate due to disease severity/cognitive impairment    Living Arrangements: Lives with family    Psychosocial/Cultural: as answered by family  Patient's most important priorities:  family    Patient's biggest concerns/fears:  Living in a nursing home    Previous death/end of life care history:  Not known    Patient's goals/hopes:  Family hopes to avoid NH and wants to have her home eventually    Spiritual:     F- Radhika and Belief: yes    I - Importance: yes  .  C - Community: yes    A - Address in Care: encouraged to involve community

## 2020-01-10 NOTE — ASSESSMENT & PLAN NOTE
Stroke risk factor  TTE Feb 2019 with EF 40% - repeat 1/2 with EF 35%  Patient on entresto, metoprolol, and lasix at home  Holding entresto and lasix in setting of acute stroke  - Patient was on metoprolol for rate control. Was initially discontinued by hospital medicine due to concern for sepsis however, will reinitiate  - Currently on lopressor 50 mg AM, and 100 mg PM to be compatible with NG tube

## 2020-01-10 NOTE — CONSULTS
Therapy with vancomycin complete and/or consult discontinued by provider.  Pharmacy will sign off, please re-consult as needed.    Alem Akers, PharmD, BCCCP  Neurocritical Care Clinical Pharmacist  Spectralink: [phone]

## 2020-01-10 NOTE — SIGNIFICANT EVENT
ICU Attending    Called to bedside for gugling breaths and moaning sounds  No commands  desats to high 80s  No help with suctioning  Nasal trumpet patent  Likely aspirating over time as discussed with family    Decision made to intubate for airway protection  Discussed with family at bedside    cxr  abg  Repeat head CT joaquin Simpson MD MPH  NeuroCritical Care & Vascular Neurolog

## 2020-01-10 NOTE — ASSESSMENT & PLAN NOTE
Stroke workup  Will need OAC  PT OT  Discussion with daughter at bedside about severity of stroke, locations, and neurologic recovery potential. She would like to have another meeting with more family around on Saturday with imaging explained to whole family.

## 2020-01-10 NOTE — ASSESSMENT & PLAN NOTE
Due to seizures and multiple strokes, brain compression, brain edema, hydrocephlaus  Goal euthermia  Goal Na > 140  Goal euvolemia  Goal euglycemia  ETT watch

## 2020-01-10 NOTE — ASSESSMENT & PLAN NOTE
20-30 second left sided myoclonic jerking with left gaze deviation. This could explain her waxing and waning mental status

## 2020-01-10 NOTE — ASSESSMENT & PLAN NOTE
Breathing treatments Q4  ABG PRN  Continue Antibiotics  Intubated for airway  Send sputum cultures

## 2020-01-10 NOTE — PT/OT/SLP PROGRESS
Occupational Therapy      Patient Name:  Dacia Martínez   MRN:  [id number]    Patient not seen today secondary to OT holding on session until caregiver/family meeting with MD team on Saturday. OT to follow up Monday to provide caregiver training as needed. Will follow-up 1/13/20.    Ifrah Brown, OT  1/10/2020

## 2020-01-11 NOTE — PLAN OF CARE
POC reviewed with pt and daughter at 0500. Pt unable to verbalize understanding due to being intubated. Questions and concerns addressed with daughter. No acute events overnight. Pt progressing toward goals. Will continue to monitor. See flowsheets for full assessment and VS info     CT complete   2% decreased to 15 mL/hr to achieve a  sodium goal of >145.   Q6 sodiums  Propofol @ 10 mcg/kg/min  Tube feeding remains off  Trending ABGs

## 2020-01-11 NOTE — PROGRESS NOTES
ICU Progress Note  Neurocritical Care    Admit Date: 12/31/2019  LOS: 11    CC: Acute ischemic left MCA stroke    Code Status: Full Code     SUBJECTIVE:     Interval History/Significant Events:     Acute embolic stroke left mca/lito  Right lito/  arf- intubated last night  Afebrile  Wbc- wnl  Hypernatremia  hyperchloremia  Ct scan reviewed    Medications:  Continuous Infusions:   propofol 5 mcg/kg/min (01/11/20 1105)    buffered 2% sodium acetate 86meq, sodium chloride 86meq, sterile water for inj IV soln 15 mL/hr at 01/11/20 1105    sodium chloride 0.9%       Scheduled Meds:   albuterol-ipratropium  3 mL Nebulization Q4H    allopurinol  150 mg Per NG tube Daily    atorvastatin  80 mg Per NG tube Daily    chlorhexidine  15 mL Mouth/Throat BID    famotidine  20 mg Per NG tube Daily    heparin (porcine)  5,000 Units Subcutaneous Q8H    levothyroxine  25 mcg Per NG tube Before breakfast    metoprolol tartrate  100 mg Per NG tube QHS    metoprolol tartrate  50 mg Per NG tube Daily    senna-docusate 8.6-50 mg  1 tablet Per NG tube Daily    sodium chloride 3%  4 mL Nebulization Q4H     PRN Meds:.acetaminophen, Dextrose 10% Bolus, glucagon (human recombinant), insulin aspart U-100, labetalol, magnesium oxide, magnesium oxide, ondansetron, potassium chloride 10%, potassium chloride 10%, potassium chloride 10%, potassium, sodium phosphates, potassium, sodium phosphates, potassium, sodium phosphates, sodium chloride 0.9%    OBJECTIVE:   Vital Signs (Most Recent):   Temp: 97 °F (36.1 °C) (01/11/20 1105)  Pulse: 70 (01/11/20 1105)  Resp: 19 (01/11/20 1105)  BP: (!) 115/55 (01/11/20 1105)  SpO2: 100 % (01/11/20 1105)    Vital Signs (24h Range):   Temp:  [97 °F (36.1 °C)-98.9 °F (37.2 °C)] 97 °F (36.1 °C)  Pulse:  [69-73] 70  Resp:  [6-29] 19  SpO2:  [98 %-100 %] 100 %  BP: (100-159)/() 115/55  Arterial Line BP: (113-198)/(42-82) 131/53    ICP/CPP (Last 24h):        I & O (Last 24h):     Intake/Output Summary  (Last 24 hours) at 1/11/2020 1214  Last data filed at 1/11/2020 1105  Gross per 24 hour   Intake 1023.74 ml   Output 1400 ml   Net -376.26 ml     Physical Exam:  GA: Alert, comfortable, no acute distress.   HEENT: No scleral icterus or JVD.   Pulmonary: Clear to auscultation A/P/L. No wheezing, crackles, or rhonchi.  Cardiac: RRR S1 & S2 w/o rubs/murmurs/gallops.   Abdominal: Bowel sounds present x 4. No appreciable hepatosplenomegaly.  Skin: No jaundice, rashes, or visible lesions.  Neuro:    On propofol gtt  Pupils 3 mm reactive  Moves le's spont  ue withdrawals    Vent Data:   Vent Mode: A/C  Oxygen Concentration (%):  [] 40  Resp Rate Total:  [18 br/min-20 br/min] 18 br/min  Vt Set:  [360 mL] 360 mL  PEEP/CPAP:  [5 cmH20] 5 cmH20  Mean Airway Pressure:  [7.7 cmH20-9.1 cmH20] 8.9 cmH20    Lines/Drains/Airway:   a4         Airway - Non-Surgical 01/10/20 1647 Endotracheal Tube (Active)   Secured at 24 cm 1/11/2020 11:05 AM   Measured At Lips 1/11/2020 11:05 AM   Secured Location Left 1/11/2020 11:05 AM   Secured by [employer] tube hunter 1/11/2020 11:05 AM   Bite Block none 1/11/2020 11:05 AM   Site Condition Cool;Dry 1/11/2020 11:05 AM   Status Intact;Secured 1/11/2020 11:05 AM   Site Assessment Clean;Dry 1/11/2020 11:05 AM   Cuff Pressure 26 cm H2O 1/11/2020 11:01 AM             Arterial Line 01/06/20 1625 Right Radial (Active)   Site Assessment Clean;Dry;Intact;No redness;No swelling 1/11/2020 11:05 AM   Line Status Pulsatile blood flow 1/11/2020 11:05 AM   Art Line Waveform Appropriate;Square wave test performed 1/11/2020 11:05 AM   Arterial Line Interventions Zeroed and calibrated;Leveled 1/11/2020 11:05 AM   Color/Movement/Sensation Capillary refill less than 3 sec 1/11/2020 11:05 AM   Dressing Type Transparent 1/11/2020 11:05 AM   Dressing Status Biopatch in place;Clean;Dry;Intact 1/11/2020 11:05 AM   Dressing Intervention Dressing reinforced 1/11/2020 11:05 AM   Dressing Change Due 01/17/20 1/11/2020  11:05 AM           NG/OG Tube 01/01/20 1202 Right nostril (Active)   Placement Check placement verified by x-ray;placement verified by aspirate characteristics;placement verified by distal tube length measurement 1/11/2020 11:05 AM   Advancement advanced manually 1/10/2020  3:02 PM   Tolerance no signs/symptoms of discomfort 1/11/2020 11:05 AM   Securement secured to nostril center w/ adhesive device 1/11/2020 11:05 AM   Clamp Status/Tolerance clamped 1/11/2020 11:05 AM   Suction Setting/Drainage Method dependent drainage 1/7/2020  3:02 PM   Insertion Site Appearance no redness, warmth, tenderness, skin breakdown, drainage 1/11/2020 11:05 AM   Drainage None 1/11/2020  3:02 AM   Flush/Irrigation flushed w/;water;no resistance met 1/11/2020  9:05 AM   Feeding Method continuous 1/8/2020  3:02 PM   Feeding Action feeding held 1/11/2020 11:05 AM   Current Rate (mL/hr) 0 mL/hr 1/11/2020  3:02 AM   Goal Rate (mL/hr) 0 mL/hr 1/11/2020  3:02 AM   Intake (mL) 60 mL 1/11/2020  9:05 AM   Water Bolus (mL) 0 mL 1/11/2020  3:02 AM   Rate Formula Tube Feeding (mL/hr) 0 mL/hr 1/10/2020 11:02 PM   Formula Name isosource 1/8/2020  3:02 PM   Intake (mL) - Formula Tube Feeding 0 1/11/2020  7:05 AM   Residual Amount (ml) 0 ml 1/11/2020  7:05 AM            Rectal Tube 01/09/20 0400 rectal tube w/ balloon (indicate number of mLs) (Active)   Balloon Inflation Volume (mL) 45 1/11/2020 11:05 AM   Reposition drainage bags for BMS & Henderson on opposite sides of bed 1/11/2020 11:05 AM   Outcome gas expelled, stool evacuated 1/11/2020 11:05 AM   Stool Color Brown 1/11/2020 11:05 AM   Insertion Site Appearance no redness, warmth, tenderness, skin breakdown, drainage 1/11/2020 11:05 AM   Flush/Irrigation flushed w/;water 1/11/2020  3:02 AM   Intake (mL) 50 mL 1/10/2020  7:02 PM   Rectal Tube Output 0 mL 1/11/2020  9:05 AM       Female External Urinary Catheter 01/09/20 1710 (Active)   Skin no redness;no breakdown 1/11/2020 11:05 AM   Tolerance no  signs/symptoms of discomfort 1/11/2020 11:05 AM   Suction Continuous suction at 70 mmHg 1/11/2020 11:05 AM   Date of last wick change 01/11/20 1/11/2020  7:05 AM   Time of last wick change 1902 1/11/2020  7:05 AM   Output (mL) 0 mL 1/11/2020  9:05 AM     Nutrition/Tube Feeds (if NPO state why): t feeds    Labs:  ABG:   Recent Labs   Lab 01/11/20  1108   PH 7.481*   PO2 141*   PCO2 35.8   HCO3 26.7   POCSATURATED 99   BE 3     BMP:  Recent Labs   Lab 01/11/20  0133 01/11/20  0828   *  154* 154*   K 4.2  --    *  --    CO2 24  --    BUN 19  --    CREATININE 1.1  --    *  --    MG 2.2  --    PHOS 2.2*  --      LFT:   Lab Results   Component Value Date    AST 70 (H) 01/11/2020    ALT 11 01/11/2020    ALKPHOS 58 01/11/2020    BILITOT 1.7 (H) 01/11/2020    ALBUMIN 2.0 (L) 01/11/2020    PROT 7.1 01/11/2020     CBC:   Lab Results   Component Value Date    WBC 14.44 (H) 01/11/2020    HGB 9.2 (L) 01/11/2020    HCT 31.5 (L) 01/11/2020    MCV 92 01/11/2020     01/11/2020     Microbiology x 7d:   Microbiology Results (last 7 days)     Procedure Component Value Units Date/Time    Blood culture [[id number]] Collected:  01/06/20 2252    Order Status:  Completed Specimen:  Blood from Peripheral, Ankle, Right Updated:  01/11/20 0612     Blood Culture, Routine No Growth to date      No Growth to date      No Growth to date      No Growth to date      No Growth to date    Narrative:       Blood cultures x 2 different sites. 4 bottles total. Please  draw cultures before administering antibiotics.    Blood culture [[id number]] Collected:  01/06/20 2251    Order Status:  Completed Specimen:  Blood from Peripheral, Ankle, Left Updated:  01/11/20 0612     Blood Culture, Routine No Growth to date      No Growth to date      No Growth to date      No Growth to date      No Growth to date    Narrative:       Blood cultures x 2 different sites. 4 bottles total. Please  draw cultures before administering antibiotics.     Culture, Respiratory with Gram Stain [[id number]] Collected:  01/10/20 1752    Order Status:  Completed Specimen:  Respiratory from Endotracheal Aspirate Updated:  01/10/20 2245     Gram Stain (Respiratory) <10 epithelial cells per low power field.     Gram Stain (Respiratory) Rare WBC's     Gram Stain (Respiratory) No organisms seen    Narrative:       Mini-BAL.    Blood culture [[id number]] Collected:  01/03/20 0741    Order Status:  Completed Specimen:  Blood Updated:  01/08/20 0812     Blood Culture, Routine No growth after 5 days.    Blood culture [[id number]] Collected:  01/03/20 0741    Order Status:  Completed Specimen:  Blood Updated:  01/08/20 0812     Blood Culture, Routine No growth after 5 days.    Culture, Respiratory with Gram Stain [[id number]] Collected:  01/07/20 0025    Order Status:  Completed Specimen:  Respiratory from Sputum, Induced Updated:  01/07/20 0550     Respiratory Culture Specimen inadequate - culture not performed     Gram Stain (Respiratory) >10epis/lfp and <than many WBC's     Gram Stain (Respiratory) Predominance of oropharyngeal tori. Please recollect.    Blood culture [[id number]] Collected:  01/01/20 1317    Order Status:  Completed Specimen:  Blood Updated:  01/06/20 1612     Blood Culture, Routine No growth after 5 days.    Narrative:       Collection has been rescheduled by TS2 at 01/01/2020 09:58 Reason:   nurses putting tube thru pts nose . will come back   Collection has been rescheduled by TS2 at 01/01/2020 09:58 Reason:   nurses putting tube thru pts nose . will come back     Blood culture [[id number]] Collected:  01/01/20 1317    Order Status:  Completed Specimen:  Blood Updated:  01/06/20 1612     Blood Culture, Routine No growth after 5 days.    Narrative:       Collection has been rescheduled by TS2 at 01/01/2020 09:58 Reason:   nurses putting tube thru pts nose . will come back   Collection has been rescheduled by TS2 at 01/01/2020 09:58 Reason:   nurses putting tube  thru pts nose . will come back     Influenza A & B by Molecular [[id number]] Collected:  01/04/20 1200    Order Status:  Completed Specimen:  Nasopharyngeal Swab Updated:  01/04/20 1255     Influenza A, Molecular Negative     Influenza B, Molecular Negative     Flu A & B Source Nasal swab        Imaging:   CT head-Evolving large left MCA, left JAIME and right JAIME distribution infarctions.  Surrounding edema and mass effect resulting in 7 mm rightward midline shift.  I personally reviewed the above image.    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  explained poor prognosis and that there is very little chance of a  meaningful recovery  They wish to talk about further care amongst them selves    Advised DNR for now    Critical secondary to Patient has a condition that poses threat to life and bodily function: follow exam/wean propofol/family  discussion      Cc time 32 mins     Critical care was time spent personally by me on the following activities: development of treatment plan with patient or surrogate and bedside caregivers, discussions with consultants, evaluation of patient's response to treatment, examination of patient, ordering and performing treatments and interventions, ordering and review of laboratory studies, ordering and review of radiographic studies, pulse oximetry, re-evaluation of patient's condition. This critical care time did not overlap with that of any other provider or involve time for any procedures.

## 2020-01-11 NOTE — PLAN OF CARE
POC reviewed with pt and family at 1400. Pt unable to verbalize understanding. Questions and concerns addressed with family. No acute events today. Propofol DC'd- patient remained calm and comfortable. Per family, no chest compressions in the event of cardiac arrest- MD aware and ordered. Trickle feeds started. Pt progressing toward goals. Will continue to monitor. See flowsheets for full assessment and VS info.

## 2020-01-12 NOTE — PLAN OF CARE
POC reviewed with pt and family at 1230. Pt unable to verbalize understanding. Questions and concerns addressed with family. Dr. Patel had a conference with family regarding plan of care but family unable to decide at this time about withdrawing care. Resuscitation status changed to DNR per family's wishes. Pt not progressing toward goals. No urine output since 12 MN- low urine residual volumes noted- NS 1 L bolus infusing. Will continue to monitor. See flowsheets for full assessment and VS info.

## 2020-01-12 NOTE — PLAN OF CARE
POC reviewed with pt and her daughter at 0500. Pt's daughter verbalized understanding. Questions and concerns addressed. About propofol titrated down to maintain the BP. Electrolytes replaced.  retained urine - bladder scan and straight cath: 450ml colleted. Will continue to monitor. See flowsheets for full assessment and VS info.

## 2020-01-12 NOTE — CONSULTS
Visited pt with 6 family members; they asked for prayers for them for strength. They didn't seem in mood to talk deeply, another  saw them earlier. But they welcomed and appreciated prayer. Circled up and I said a general prayer for mercy, luis, peace and comfort for pt and for strength, wisdom. Discernment and comfort for the family. Chaplains will continue to follow.

## 2020-01-12 NOTE — NURSING
TF turned off and NGT connected to LIWS for gastric decompression. Oral and in- line suctioning performed with minimal secretions noted. Extubation to room air done per orders. DNR signed and in the chart. Family at the bedside. Will continue to monitor.

## 2020-01-12 NOTE — NURSING
Patient has intermittent coughing episodes approximately every 5-10 minutes, accompanied by low tidal volumes, tachypnea and elevated BP. Minimal secretions suctioned out. O2 sats remained 100%. RU Parnell with NCC made aware. Propofol drip restarted. Will continue to monitor.

## 2020-01-12 NOTE — NURSING
After Dr. Patel discussed with the family the patient's condition, prognosis, and his recommendations for the second time today, patient's adult grand daughter, Amy Pérez, requested that the family be given more time to make a decision regarding withdrawal of care.

## 2020-01-12 NOTE — PROGRESS NOTES
ICU Progress Note  Neurocritical Care    Admit Date: 12/31/2019  LOS: 12    CC: Acute ischemic left MCA stroke    Code Status: Partial Code     SUBJECTIVE:     Interval History/Significant Events:   Left MCA/JAIME and R JAIME AIS  Acute resp failure  Afebrile  Wbc decreasing  Hypernatremia  Elect. abnl  hyperbili  Oliguria            Medications:  Continuous Infusions:   dexmedetomidine (PRECEDEX) infusion      buffered 2% sodium acetate 86meq, sodium chloride 86meq, sterile water for inj IV soln 15 mL/hr at 01/12/20 1005    sodium chloride 0.9%       Scheduled Meds:   albuterol-ipratropium  3 mL Nebulization Q4H    allopurinol  150 mg Per NG tube Daily    atorvastatin  80 mg Per NG tube Daily    chlorhexidine  15 mL Mouth/Throat BID    famotidine  20 mg Per NG tube Daily    heparin (porcine)  5,000 Units Subcutaneous Q8H    levothyroxine  25 mcg Per NG tube Before breakfast    metoprolol tartrate  100 mg Per NG tube QHS    metoprolol tartrate  50 mg Per NG tube Daily    senna-docusate 8.6-50 mg  1 tablet Per NG tube Daily    sodium chloride 3%  4 mL Nebulization Q4H     PRN Meds:.acetaminophen, Dextrose 10% Bolus, glucagon (human recombinant), insulin aspart U-100, labetalol, magnesium oxide, magnesium oxide, ondansetron, potassium chloride 10%, potassium chloride 10%, potassium chloride 10%, potassium, sodium phosphates, potassium, sodium phosphates, potassium, sodium phosphates, sodium chloride 0.9%    OBJECTIVE:   Vital Signs (Most Recent):   Temp: 98.5 °F (36.9 °C) (01/12/20 0705)  Pulse: 72 (01/12/20 1005)  Resp: (!) 21 (01/12/20 1005)  BP: (!) 119/54 (01/12/20 1005)  SpO2: 100 % (01/12/20 1005)    Vital Signs (24h Range):   Temp:  [97 °F (36.1 °C)-98.9 °F (37.2 °C)] 98.5 °F (36.9 °C)  Pulse:  [68-75] 72  Resp:  [0-39] 21  SpO2:  [100 %] 100 %  BP: (102-145)/(45-73) 119/54  Arterial Line BP: (123-198)/(44-76) 131/46    ICP/CPP (Last 24h):        I & O (Last 24h):     Intake/Output Summary (Last 24  hours) at 1/12/2020 1026  Last data filed at 1/12/2020 1005  Gross per 24 hour   Intake 713.68 ml   Output 1150 ml   Net -436.32 ml     Physical Exam:  GA: Alert, comfortable, no acute distress.   HEENT: No scleral icterus or JVD.   Pulmonary: Clear to auscultation A/P/L. No wheezing, crackles, or rhonchi.  Cardiac: RRR S1 & S2 w/o rubs/murmurs/gallops.   Abdominal: Bowel sounds present x 4. No appreciable hepatosplenomegaly.  Skin: No jaundice, rashes, or visible lesions.  Neuro:  On propofol gtt  Pupils 3 mm reactive  Moves bl le's    Vent Data:   Vent Mode: A/C  Oxygen Concentration (%):  [40] 40  Resp Rate Total:  [18 br/min-25 br/min] 22 br/min  Vt Set:  [360 mL] 360 mL  PEEP/CPAP:  [5 cmH20] 5 cmH20  Mean Airway Pressure:  [8.3 cmH20-9.8 cmH20] 8.5 cmH20    Lines/Drains/Airway: ml-2  a5       Airway - Non-Surgical 01/10/20 1647 Endotracheal Tube (Active)   Secured at 24 cm 1/12/2020  8:09 AM   Measured At Lips 1/12/2020  8:09 AM   Secured Location Right 1/12/2020  8:09 AM   Secured by Commercial tube hunter 1/12/2020  8:09 AM   Bite Block none 1/12/2020  8:09 AM   Site Condition Cool;Dry 1/12/2020  8:09 AM   Status Intact;Secured;Patent 1/12/2020  8:09 AM   Site Assessment Clean;Dry 1/12/2020  8:09 AM   Cuff Pressure 25 cm H2O 1/12/2020  8:09 AM             Arterial Line 01/06/20 1625 Right Radial (Active)   Site Assessment Clean;Dry;Intact;No redness;No swelling 1/12/2020  7:05 AM   Line Status Pulsatile blood flow 1/12/2020  7:05 AM   Art Line Waveform Appropriate;Square wave test performed 1/12/2020  7:05 AM   Arterial Line Interventions Zeroed and calibrated;Leveled 1/12/2020  7:05 AM   Color/Movement/Sensation Capillary refill less than 3 sec 1/12/2020  7:05 AM   Dressing Type Transparent 1/12/2020  7:05 AM   Dressing Status Biopatch in place;Clean;Dry;Intact 1/12/2020  7:05 AM   Dressing Intervention Dressing reinforced 1/12/2020  7:05 AM   Dressing Change Due 01/19/20 1/12/2020  7:05 AM           NG/OG  Tube 01/01/20 1202 Right nostril (Active)   Placement Check placement verified by aspirate characteristics;placement verified by distal tube length measurement;placement verified by x-ray 1/12/2020  7:05 AM   Advancement advanced manually 1/12/2020  3:05 AM   Tolerance no signs/symptoms of discomfort 1/12/2020  7:05 AM   Securement secured to nostril center w/ adhesive device 1/12/2020  7:05 AM   Clamp Status/Tolerance unclamped 1/12/2020  7:05 AM   Suction Setting/Drainage Method dependent drainage 1/7/2020  3:02 PM   Insertion Site Appearance no redness, warmth, tenderness, skin breakdown, drainage 1/12/2020  7:05 AM   Drainage None 1/12/2020  3:05 AM   Flush/Irrigation flushed w/;water;no resistance met 1/12/2020  3:05 AM   Feeding Method continuous 1/12/2020  7:05 AM   Feeding Action feeding continued 1/12/2020  7:05 AM   Current Rate (mL/hr) 10 mL/hr 1/12/2020  7:05 AM   Goal Rate (mL/hr) 10 mL/hr 1/12/2020  7:05 AM   Intake (mL) 120 mL 1/12/2020  8:50 AM   Water Bolus (mL) 30 mL 1/11/2020  1:05 PM   Rate Formula Tube Feeding (mL/hr) 10 mL/hr 1/12/2020  7:05 AM   Formula Name Isosource 1.5 1/12/2020  7:05 AM   Intake (mL) - Formula Tube Feeding 10 1/12/2020 10:05 AM   Residual Amount (ml) 0 ml 1/11/2020  7:05 PM            Rectal Tube 01/09/20 0400 rectal tube w/ balloon (indicate number of mLs) (Active)   Balloon Inflation Volume (mL) 45 1/12/2020  7:05 AM   Reposition drainage bags for BMS & Henderson on opposite sides of bed 1/12/2020  7:05 AM   Outcome gas expelled, stool evacuated 1/12/2020  7:05 AM   Stool Color Brown 1/12/2020  7:05 AM   Insertion Site Appearance no redness, warmth, tenderness, skin breakdown, drainage 1/12/2020  7:05 AM   Flush/Irrigation flushed w/;water;no resistance met 1/12/2020  3:05 AM   Intake (mL) 0 mL 1/12/2020  7:05 AM   Rectal Tube Output 200 mL 1/11/2020  6:05 PM       Female External Urinary Catheter 01/09/20 1710 (Active)   Skin no redness;no breakdown 1/12/2020  7:05 AM    Tolerance no signs/symptoms of discomfort 1/12/2020  7:05 AM   Suction Continuous suction at 70 mmHg 1/12/2020  7:05 AM   Date of last wick change 01/12/20 1/12/2020  7:05 AM   Time of last wick change 0020 1/12/2020  7:05 AM   Output (mL) 0 mL 1/12/2020  8:05 AM     Nutrition/Tube Feeds (if NPO state why):  t feeds    Labs:  ABG:   Recent Labs   Lab 01/12/20  0815   PH 7.507*   PO2 179*   PCO2 39.0   HCO3 30.9*   POCSATURATED 100   BE 8     BMP:  Recent Labs   Lab 01/12/20  0108 01/12/20  0828   *  154*  154* 158*   K 3.7 4.9   *  --    CO2 26  --    BUN 19  --    CREATININE 1.2  --    *  --    MG 2.2  --    PHOS 3.3  --      LFT:   Lab Results   Component Value Date    AST 76 (H) 01/12/2020    ALT 16 01/12/2020    ALKPHOS 59 01/12/2020    BILITOT 2.3 (H) 01/12/2020    ALBUMIN 2.0 (L) 01/12/2020    PROT 7.0 01/12/2020     CBC:   Lab Results   Component Value Date    WBC 13.73 (H) 01/12/2020    HGB 8.8 (L) 01/12/2020    HCT 30.3 (L) 01/12/2020    MCV 93 01/12/2020     01/12/2020     Microbiology x 7d:   Microbiology Results (last 7 days)     Procedure Component Value Units Date/Time    Culture, Respiratory with Gram Stain [[id number]] Collected:  01/10/20 1752    Order Status:  Completed Specimen:  Respiratory from Endotracheal Aspirate Updated:  01/12/20 0852     Respiratory Culture No Growth     Gram Stain (Respiratory) <10 epithelial cells per low power field.     Gram Stain (Respiratory) Rare WBC's     Gram Stain (Respiratory) No organisms seen    Narrative:       Mini-BAL.    Blood culture [[id number]] Collected:  01/06/20 2252    Order Status:  Completed Specimen:  Blood from Peripheral, Ankle, Right Updated:  01/12/20 0612     Blood Culture, Routine No growth after 5 days.    Narrative:       Blood cultures x 2 different sites. 4 bottles total. Please  draw cultures before administering antibiotics.    Blood culture [[id number]] Collected:  01/06/20 7041    Order Status:  Completed  Specimen:  Blood from Peripheral, Ankle, Left Updated:  01/12/20 0612     Blood Culture, Routine No growth after 5 days.    Narrative:       Blood cultures x 2 different sites. 4 bottles total. Please  draw cultures before administering antibiotics.    Blood culture [[id number]] Collected:  01/03/20 0741    Order Status:  Completed Specimen:  Blood Updated:  01/08/20 0812     Blood Culture, Routine No growth after 5 days.    Blood culture [[id number]] Collected:  01/03/20 0741    Order Status:  Completed Specimen:  Blood Updated:  01/08/20 0812     Blood Culture, Routine No growth after 5 days.    Culture, Respiratory with Gram Stain [[id number]] Collected:  01/07/20 0025    Order Status:  Completed Specimen:  Respiratory from Sputum, Induced Updated:  01/07/20 0550     Respiratory Culture Specimen inadequate - culture not performed     Gram Stain (Respiratory) >10epis/lfp and <than many WBC's     Gram Stain (Respiratory) Predominance of oropharyngeal tori. Please recollect.    Blood culture [[id number]] Collected:  01/01/20 1317    Order Status:  Completed Specimen:  Blood Updated:  01/06/20 1612     Blood Culture, Routine No growth after 5 days.    Narrative:       Collection has been rescheduled by TS2 at 01/01/2020 09:58 Reason:   nurses putting tube thru pts nose . will come back   Collection has been rescheduled by TS2 at 01/01/2020 09:58 Reason:   nurses putting tube thru pts nose . will come back     Blood culture [[id number]] Collected:  01/01/20 1317    Order Status:  Completed Specimen:  Blood Updated:  01/06/20 1612     Blood Culture, Routine No growth after 5 days.    Narrative:       Collection has been rescheduled by TS2 at 01/01/2020 09:58 Reason:   nurses putting tube thru pts nose . will come back   Collection has been rescheduled by TS2 at 01/01/2020 09:58 Reason:   nurses putting tube thru pts nose . will come back         Imaging:   cxr left hilar haziness  I personally reviewed the above  image.    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  Her 2 daughters Octavio henson and Britni Kim,  they wish to change her care status to DNR comfort Care.    Ms. Jillian Moody RN  And Ms. Diya Coello were present at the time of the conversation/decision by the family.      13:00 hrs; Was approached by Ms. Martínez's granddaughter; the family now wishes to hold off on CC measures and wish to discuss it further.      Cc time 75 mins

## 2020-01-13 NOTE — PT/OT/SLP DISCHARGE
Occupational Therapy Discharge Summary    Dacia Martínez  MRN: [id number]   Principal Problem: Acute ischemic left MCA stroke      Patient Discharged from acute Occupational Therapy on 1/13.  Please refer to prior OT note for functional status.    Assessment:      Patient appropriate for care in another setting.    Objective:     GOALS:   Multidisciplinary Problems     Occupational Therapy Goals        Problem: Occupational Therapy Goal    Goal Priority Disciplines Outcome Interventions   Occupational Therapy Goal     OT, PT/OT Ongoing, Progressing    Description:  Goals set on 1/6, with expiration date 1/20:  Patient will increase functional independence with ADLs by performing:    Grooming while EOB with Mod Assistance.  UB Dressing with Moderate Assistance.  LB Dressing with Moderate Assistance.  Toileting from bedside commode with Moderate Assistance for hygiene and clothing management.   Rolling to Bilateral with Moderate Assistance.   Supine <> Sit with Moderate Assistance.  Squat pivot transfers with Moderate Assistance.  Patient's family / caregiver will demonstrate independence and safety with assisting patient with self-care skills and functional mobility.      Patient's family / caregiver will demonstrate independence with providing ROM and changes in bed positioning.                        Reasons for Discontinuation of Therapy Services  Transfer to alternate level of care.      Plan:     Patient Discharged to: D/C OT; transition to comfort care    KESHA Campbell  1/13/2020

## 2020-01-13 NOTE — PLAN OF CARE
Death Note  Critical Care      Admit Date: 12/31/2019    Date of Death: 01/13/2020    Attending Physician: Bhupinder Simpson MD    Principal Diagnoses: Acute ischemic left MCA stroke    Preliminary Cause of Death: Acute ischemic left MCA stroke    Consultations were held with the family regarding the patient's expected poor prognosis. At the direction of the family, the patient was terminally extubated and measures to ensure the comfort of the patient including, but not limited to, morphine as needed for pain and air hunger as well as benzodiazepines as needed for agitation. The patient was subsequently declared dead.      Time of Death Note 0604 on 01/13/20    Called to patient bedside for asystole on telemetry    Patient family sitting at bedside  Patient examined for 2 minutes with the following:    No spontaneous breath sounds  No cardiac sounds heard on auscultation  No radial or carotid pulse felt on palpation  No pupillary response to light  No response to pain x4 extremities    Asystole on Tele strip at 06:04am on 01/13/20    Cause of death: Acute Left MCA stroke    Secondary Diagnoses:   Active Hospital Problems    Diagnosis  POA    *Acute ischemic left MCA stroke [I63.512]  Yes    Embolic stroke involving left middle cerebral artery [I63.412]  Unknown    Palliative care encounter [Z51.5]  Not Applicable    Debility [R53.81]  Yes    Brain compression [G93.5]  Yes    Cytotoxic brain edema [G93.6]  Yes    Acute ischemic left JAIME stroke [I63.522]  Yes    Acute ischemic right JAIME stroke [I63.521]  Yes    Acute metabolic encephalopathy [G93.41]  Yes    Fever [R50.9]  Yes    Alteration in skin integrity [R23.9]  Yes    Bilateral carotid artery stenosis [I65.23]  Yes    Right hemiplegia [G81.91]  Yes    Pneumonia [J18.9]  No    Hypothyroidism [E03.9]  Yes    CKD (chronic kidney disease) stage 3, GFR 30-59 ml/min [N18.3]  Yes    Chronic systolic congestive heart failure [I50.22]  Yes     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, without long-term current use of insulin [E11.3293]  Yes    Persistent atrial fibrillation [I48.19]  Yes    Type 2 diabetes mellitus with stage 3 chronic kidney disease, without long-term current use of insulin [E11.22, N18.3]  Yes    Primary osteoarthritis involving multiple joints [M15.0]  Yes     Chronic    Idiopathic chronic gout of multiple sites without tophus [M1A.09X0]  Yes     Chronic    History of breast cancer [Z85.3]  Not Applicable    Essential hypertension [I10]  Yes    Hyperlipidemia [E78.5]  Yes      Resolved Hospital Problems    Diagnosis Date Resolved POA    Myoclonic jerking [G25.3] 01/10/2020 Yes    Troponin level elevated [R79.89] 2020 Yes        Discharged Condition:       Condolences given to Family at bedside   at bedside   services provided  Organ donor contacted    Anthony Connor MD MPH  Neurocritical Care

## 2020-01-13 NOTE — NURSING
Precedex drip started for comfort since patient has become increasingly tachypneic and labored. Noted BP dropping after Precedex drip was started 30 minutes ago. Comfort care measures continued. Granddaughter at bedside, updated on patient's status.

## 2020-01-13 NOTE — PLAN OF CARE
20 0710   Final Note   Assessment Type Final Discharge Note   Anticipated Discharge Disposition    Hospital Follow Up  Appt(s) scheduled? No

## 2020-01-13 NOTE — CHAPLAIN
Spiritual Care Encounter  Please contact the Department of Spiritual Care, your Unit  or the On-call  at any time if any needs are brought to your attention. i.e. Anytime you note spiritual, existential or emotional distress or there is a new diagnosis and/or prognosis.      Purpose:  To provide emotional, spiritual and/or existential support to the patient and/or their loved one's during their time of grief/loss.  To also provide information to the family/staff/supports regarding the decedent care process along with the paperwork required for decedent documentation.   Visit Type: Follow-up  Visit Category: Patient actively dying, Death  Visited With: Health care provider, Patient, Family  Number of Family Visited: 3  Length of Visit: 199 Minutes  Continue Visiting: No  Referral From:   Referral To:      Assessment/Intervention:  Upon attending to the bedside of the patient her breathing was quite labored and agonal. I spent time holding the patients hand, providing words of comfort, reassurance of peace, prayers and scripture readings. Over time the pauses between breaths became more noticeable. The  patient  after a bit of time. The patients great-granddaughter was awoken and informed of the patients death. She became understandably emotional and upset. Emotional and physical support was provided. I encouraged her to sit by her grandmothers side and to speak her goodbyes. The rest of the patients family arrived shortly after and were accepting of her death, taking peace that she was now in a better place. I encouraged the family to ask for a  to return should they need anything further and bid them a blessed day.   Spiritual Resources Requested: Prayer       Patient Spiritual Encounters  Care Provided: Prayer support, Compassionate presence, Readings  Patient Coping: Accepting, Anxiety  Comments - Patient: Patient    Family Spiritual Encounters  Care Provided:  Reflective listening, Prayer support, Compassionate presence, Explored pt/family concerns, Grief care  Family Coping: Accepting, Sadness, Active radhika, Relying on spiritual resources, Family/ friends resources  Comments - Family: Family taking peace in that she is in a better place   Stated Sabianist: Caodaism  Sabianist Given By:Child(jaswant) and Other Family Member  Importance of Radhika Values to Patient: Important  Importance of Specific Restoration Values to Patient: Important   EPIC Charted Sabianist: Caodaism          OF NOTE:  Spiritual, Cultural Beliefs, Restoration Practices, Values that Affect Care: no                   Plan of Care/Goals:  Sadly, the patient has passed away and will therefore require no follow-up. There will be continued thoughts of peace and blessing for the patient's loved one's during this time of loss and grief. Please contact the department of spiritual care with any questions you may have regarding the decedent care process and/or paperwork.   Tube Completed Paperwork to Tube #28      Diya Loaiza    Office: [phone]  On-Call 24/7: [phone]  [place] [facility] [place]

## 2020-01-13 NOTE — PLAN OF CARE
At patients bedside. Family now wants to proceed with extubation without reintubation and comfort care measures. All questions answered. A verbalization of understanding was obtained from Octavio henson and Britni Kim with multiple other family members at bedside. Prior to extubating patient family's decision was discussed with Dr. Drake and Dr. Patel.     Extubation w/o parameters was then ordered with medication to keep the patient comfortable.

## 2020-01-13 NOTE — DISCHARGE SUMMARY
Death Note  Critical Care      Admit Date: 12/31/2019    Date of Death: 01/13/2020 0604    Attending Physician: Bhupinder Simpson MD    Principal Diagnoses: Acute ischemic left MCA stroke    Preliminary Cause of Death: Acute ischemic left MCA stroke    Secondary Diagnoses:   Active Hospital Problems    Diagnosis  POA    *Acute ischemic left MCA stroke [I63.512]  Yes    Embolic stroke involving left middle cerebral artery [I63.412]  Unknown    Palliative care encounter [Z51.5]  Not Applicable    Debility [R53.81]  Yes    Brain compression [G93.5]  Yes    Cytotoxic brain edema [G93.6]  Yes    Acute ischemic left JAIME stroke [I63.522]  Yes    Acute ischemic right JAIME stroke [I63.521]  Yes    Acute metabolic encephalopathy [G93.41]  Yes    Fever [R50.9]  Yes    Alteration in skin integrity [R23.9]  Yes    Bilateral carotid artery stenosis [I65.23]  Yes    Right hemiplegia [G81.91]  Yes    Pneumonia [J18.9]  No    Hypothyroidism [E03.9]  Yes    CKD (chronic kidney disease) stage 3, GFR 30-59 ml/min [N18.3]  Yes    Chronic systolic congestive heart failure [I50.22]  Yes    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, without long-term current use of insulin [E11.3293]  Yes    Persistent atrial fibrillation [I48.19]  Yes    Type 2 diabetes mellitus with stage 3 chronic kidney disease, without long-term current use of insulin [E11.22, N18.3]  Yes    Primary osteoarthritis involving multiple joints [M15.0]  Yes     Chronic    Idiopathic chronic gout of multiple sites without tophus [M1A.09X0]  Yes     Chronic    History of breast cancer [Z85.3]  Not Applicable    Essential hypertension [I10]  Yes    Hyperlipidemia [E78.5]  Yes      Resolved Hospital Problems    Diagnosis Date Resolved POA    Myoclonic jerking [G25.3] 01/10/2020 Yes    Troponin level elevated [R79.89] 01/08/2020 Yes        Discharged Condition:      HPI & Hospital Course:   History of Present Illness: The patient is an 85yo female  with PMH of HTN, DM2, HFrEF (30% in 2019 with pacemaker), HLD, CAD, A fib (on Eliquis), OA, Breast Cancer (s/p mastectomy, chemo, radiation in ), CKD3, Gout, and Hypothyroidism who was originially admitted to vascular neurology after family noticed right sided-weakness, gait instability, and slurred speech yesterday evening () following dental procedure. Report that her Eliquis had been held for 4 days prior to the procedure.  Her daughters state that she had been altered since they picked her up from the surgery around 4:30 pm . 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 Unable to attain MRI due to pacemaker. She was given ASA 325mg and Atorvastatin 40 mg.  Overnight rapid response was called due to tachypnea up to 40 and fevers to 100.3. ABG wnl but CXR with possible left-sided infiltrate. WBC wnl. Pt was started on vanc/zosyn in setting of aspiration vs CAP pneumonia.      On 20 AM rapid response was called for concern for decreased arousal and labored breathing that has been waxing and waning throughout admission. Troponin peaked and down-trended. Pt was also noted to have jerking movements of UE with concern for seizure-like activity on 20. Neurology was consulted by vascular neurology who recommended continuous EEG monitoring that did not show any large epileptiform activty. Additionally, STAT head CT was notable for small area on frontal lobe that may represent acute infarct. Was started on Vanc/zosyn for Aspiration vs CAP but was de-escalated to Levaquin on 20. Overnight 1/3/20, pt with recurrence of fevers and vanc/zosyn was re-started. Lactate WNL. Respiratory viral panel and repeat influenza negative but sputum culture with enterobacter cloacae, sensitive to zosyn. Echo without signs of vegetation. Blood cultures still negative. Pt febrile  evening of 1/3/20 but improved 1/5/20, still on zosyn. Repeat heat CT with redemonstration of hypodensity within the superior left frontal lobe and additional subtle hypodensity within the left frontal lobe periventricular white matter. Also increasingly somnolent on 1/5/20, ABG with respiratory alkalosis, vascular neurology to discuss with neuro critical care. Patient to be admitted to [facility] for higher level of care.     1/5 Neuro Critical Care consulted for Decreased LOC, poor response, respiratory insufficiency. Patient found to be somnolent unable to follow commands, protecting airway. Transferred to [facility] for higher level of care and management.     Hospital Course: 01/05/2020 transfer from floor for decreased LOC, respiratory insufficiency  01/06/2020d/c eliquis, start heparin gtt minimal intensity for afib, cap overnight-nothing epileptiform, place A-line, ABG q6h, place hickman cath, 20 mg Lasix X1, continue enteral water flushed 200 ml q6h, repeat CBC-follow plts  01/07/2020 pt still with poor neurological exam. CTH showed small L frontal hypodensity cocnerning for stroke. Will diurese with 60 lasix IV today. Follow up cultures  01/08/2020 naeon. satting well on room air. Neurologically unchanged from yesterday. CTH reviewed showing large progression of L MCA and bilateral JAIME distribution infarcts with generalized edema, mass effect and associated SAH.   1/9: discussion with daughter at bedside, d/c vick, RICARDO, lore, discussions with palliative care   1/10: Na goal > 145, intubated for airway, family meeting on saturday 1/12- comfort care initiated, patient extubated      Consultations were held with the family regarding the patient's expected poor prognosis. At the direction of the family, the patient was extubated and measures to ensure the comfort of the patient including, but not limited to, morphine as needed for pain and air hunger as well as benzodiazepines as needed for agitation. The patient was  subsequently declared dead.            Renetta Eaton PA-C

## 2020-01-15 LAB
POCT GLUCOSE: 123 MG/DL (ref 70–110)
POCT GLUCOSE: 160 MG/DL (ref 70–110)

## 2020-01-15 NOTE — PHYSICIAN QUERY
"PT Name: Dacia Martínez  MR #: [id number]    Physician Query Form - Pneumonia Clarification     CDS: Prisiclla Farooq RN  Contact information: [email or web address]  This form is a permanent document in the medical record.    Query Date:  January 15, 2020    By submitting this query, we are merely seeking further clarification of documentation. Please utilize your independent clinical judgment when addressing the question(s) below.    The Medical record contains the following:   Indicators   Supporting Clinical Findings Location in Medical Record   x "Pneumonia" documented Suspect possible aspiration pneumonia vs CAP. Family  reports cough for few days prior to admission.      1/3/20: recurrence of fevers to 100.3 overnight. WBC rising from 8-->10.6 Sputum cultures with gram negative rods, likely in setting of aspiration pneumonia.   HM consult 1/1         PN 1/3   x Chest X-Ray: 2048   Chest x-ray, my interpretation:  Patchy infiltrates on the left.  Pneumonia and aspiration are in the differential.       CXR with left sided consolidation.    ED provider notes 12/31        HM consult 1/1   x PaO2    PaCO2     O2 sat ABG wnl but CXR with possible left-sided infiltrate  O2 saturations %.    HM consult 1/1    Cultures      x Treatment  Pt was started on vanc/zosyn in setting of aspiration vs CAP pneumonia.    HM consult 1/1    Supplemental O2     x Other Patient admitted for suspected cardioembolic L MCA stroke      Bedside Swallow Eval:   Consistencies Assessed:  ·Thin liquids teaspoon of water x3 trials   Oral Phase:   ·Decreased closure around utensil  ·Poor oral acceptance/inconsistent holding of bolus  Pharyngeal Phase:   ·coughing/choking on 1/3 trials  Dacia Martínez is a 84 y.o. female with an SLP diagnosis of Aphasia and Dysphagia      Overnight on 12/31, pt became  tachypnic with fever to 100.3 and rapid response was called.  H&P 12/30  [facility]    Speech Therapy eval 1/1                    HM consult 1/1 "       Provider, please clarify the type of pneumonia.    Please also address the present on admission status.    [x   ] Bacterial, Gram Negative organism Pneumonia   [   ] Aspiration Pneumonia   [   ] Other type of pneumonia (please specify):   [  ] Clinically undetermined       Present on admission?     [  x ] Yes   [   ] No   [   ] Documentation insufficient to determine   [  ] Clinically undetermined           Please document in your progress notes daily for the duration of treatment, until resolved, and include in your discharge summary.    .

## 2020-03-24 NOTE — PROCEDURES
EEG REPORT      Dacia Martínez  [id number]  1935    DATE OF SERVICE: 1/2/2020     -1    METHODOLOGY      Extended electroencephalographic recording is made while the patient is ambulatory and continuing normal daily activities.  Electrodes are placed according to the International 10-20 placement system and included T1 and T2 electrode placement.  Twenty four (24) channels of digital signal (sampling rate of 512/sec) was simultaneously recorded from the scalp including EKG and eye monitors.  Recording band pass was 0.1 to 100 hz and all data was stored digitally on the recorder.  The patient is instructed to press an event button when clinical symptoms occur and write the symptoms into a diary. Activation procedures which include photic stimulation, hyperventilation and instructing patients to perform simple task are done in selected patients.        The EEG is displayed on a monitor screen and can be reformatted into different montages for evaluation.  The entire recoding is submitted for computer assisted analysis to detect spike and electrographic seizure activity.  The entire recording is visually reviewed and the times identified by computer analysis as being spikes or seizures are reviewed again.  Compresses spectral analysis (CSA) is also performed on the activity recorded from each individual channel.  This is displayed as a power display of frequencies from 0 to 30 Hz over time.   The CSA analysis is done and displayed continuously.  This is reviewed for asymmetries in power between homologous areas of the scalp and for presence of changes in power which canbe seen when seizures occur.  Sections of suspected abnormalities on the CSA is then compared with the original EEG recording.  .     [employer] software was also utilized in the review of this study.  This software suite analyzes the EEG recording in multiple domains.  Coherence and rhythmicity is computed to identify EEG sections which may contain  organized seizures.  Each channel undergoes analysis to detect presence of spike and sharp waves which have special and morphological characteristic of epileptic activity.  The routine EEG recording is converted from spacial into frequency domain.  This is then displayed comparing homologous areas to identify areas of significant asymmetry.  Algorithm to identify non-cortically generated artifact is used to separate eye movement, EMG and other artifact from the EEG     Recording Times  Start on 1/2/2020 at 14:23:06  Stop on 1/3/2020 at 07:00:03    A total of 16:35:49 hours of EEG was recorded.      EEG FINDINGS:  Background activity:   The background rhythm over the right hemisphere was characterized by theta and alpha frequencies with some preservation of faster activity anteriorly and a well maintained 7-8Hz posterior dominant rhythm    The background rhythm over the left hemisphere was characterized by theta and delta activity with a more poorly maintained 7-8Hz posterior dominant rhythm.     Sleep:   Normal sleep transients including sleep spindles, K complexes, vertex waves and POSTS were seen better formed over the right.    Abnormal activity:   No epileptiform discharges, periodic discharges, lateralized rhythmic delta activity or electrographic seizures were seen.    IMPRESSION:     This is an abnormal EEG due to the finding of a mild generalized cerebral dysfunction which is more pronounced over the left hemisphere.  There were no electrographic seizures or indication of seizure tendency.    Giorgi Ramirez MD.  Neurology-Epilepsy.  [facility].

## 2020-03-24 NOTE — PROCEDURES
EEG REPORT      Dacia Martínez  [id number]  1935    DATE OF SERVICE: 1/5/2020     -1,2    METHODOLOGY      Extended electroencephalographic recording is made while the patient is ambulatory and continuing normal daily activities.  Electrodes are placed according to the International 10-20 placement system and included T1 and T2 electrode placement.  Twenty four (24) channels of digital signal (sampling rate of 512/sec) was simultaneously recorded from the scalp including EKG and eye monitors.  Recording band pass was 0.1 to 100 hz and all data was stored digitally on the recorder.  The patient is instructed to press an event button when clinical symptoms occur and write the symptoms into a diary. Activation procedures which include photic stimulation, hyperventilation and instructing patients to perform simple task are done in selected patients.        The EEG is displayed on a monitor screen and can be reformatted into different montages for evaluation.  The entire recoding is submitted for computer assisted analysis to detect spike and electrographic seizure activity.  The entire recording is visually reviewed and the times identified by computer analysis as being spikes or seizures are reviewed again.  Compresses spectral analysis (CSA) is also performed on the activity recorded from each individual channel.  This is displayed as a power display of frequencies from 0 to 30 Hz over time.   The CSA analysis is done and displayed continuously.  This is reviewed for asymmetries in power between homologous areas of the scalp and for presence of changes in power which canbe seen when seizures occur.  Sections of suspected abnormalities on the CSA is then compared with the original EEG recording.  .     [employer] software was also utilized in the review of this study.  This software suite analyzes the EEG recording in multiple domains.  Coherence and rhythmicity is computed to identify EEG sections which may  contain organized seizures.  Each channel undergoes analysis to detect presence of spike and sharp waves which have special and morphological characteristic of epileptic activity.  The routine EEG recording is converted from spacial into frequency domain.  This is then displayed comparing homologous areas to identify areas of significant asymmetry.  Algorithm to identify non-cortically generated artifact is used to separate eye movement, EMG and other artifact from the EEG     Recording Times  Start on 1/5/2020 at 23:38:15 Stop on 1/6/2020 at 07:00:04  Start on 1/6/2020 at 07:03:05 Stop on 1/6/2020 at 08:28:23    A total of 7:21:47 and 1:25:18 hours of EEG was recorded.      EEG FINDINGS:  Background activity:   The background rhythm was characterized by generalized low to moderate voltage delta activity bilaterally with minimal reactivity.     Sleep:   No sleep transients or evidence of state change.    Abnormal activity:   No epileptiform discharges, periodic discharges, lateralized rhythmic delta activity or electrographic seizures were seen.    IMPRESSION:     This is an abnormal EEG due to the finding of a severe, generalized cerebral dysfunction with little cortical activity.  There were no electrographic seizures or indication of seizure tendency.    Giorgi Ramirez MD.  Neurology-Epilepsy.  [facility].

## 2020-03-24 NOTE — PROCEDURES
EEG REPORT      Dacia Martínez  [id number]  1935    DATE OF SERVICE: 1/7/2020     -1    METHODOLOGY      Extended electroencephalographic recording is made while the patient is ambulatory and continuing normal daily activities.  Electrodes are placed according to the International 10-20 placement system and included T1 and T2 electrode placement.  Twenty four (24) channels of digital signal (sampling rate of 512/sec) was simultaneously recorded from the scalp including EKG and eye monitors.  Recording band pass was 0.1 to 100 hz and all data was stored digitally on the recorder.  The patient is instructed to press an event button when clinical symptoms occur and write the symptoms into a diary. Activation procedures which include photic stimulation, hyperventilation and instructing patients to perform simple task are done in selected patients.        The EEG is displayed on a monitor screen and can be reformatted into different montages for evaluation.  The entire recoding is submitted for computer assisted analysis to detect spike and electrographic seizure activity.  The entire recording is visually reviewed and the times identified by computer analysis as being spikes or seizures are reviewed again.  Compresses spectral analysis (CSA) is also performed on the activity recorded from each individual channel.  This is displayed as a power display of frequencies from 0 to 30 Hz over time.   The CSA analysis is done and displayed continuously.  This is reviewed for asymmetries in power between homologous areas of the scalp and for presence of changes in power which canbe seen when seizures occur.  Sections of suspected abnormalities on the CSA is then compared with the original EEG recording.  .     [employer] software was also utilized in the review of this study.  This software suite analyzes the EEG recording in multiple domains.  Coherence and rhythmicity is computed to identify EEG sections which may contain  organized seizures.  Each channel undergoes analysis to detect presence of spike and sharp waves which have special and morphological characteristic of epileptic activity.  The routine EEG recording is converted from spacial into frequency domain.  This is then displayed comparing homologous areas to identify areas of significant asymmetry.  Algorithm to identify non-cortically generated artifact is used to separate eye movement, EMG and other artifact from the EEG     Recording Times  Start on 1/7/2020 at 23:38:15 Stop on 1/8/2020 at 07:00:08    A total of 20:15:01 hours of EEG was recorded.      EEG FINDINGS:  Background activity:   The background rhythm over the right hemisphere was characterized by moderate voltage theta more than delta.    The background rhythm over the left hemisphere was characterized by low to moderate voltage delta and theta.    Posterior dominant rhythm was absent bilaterally.    Good reactivity noted over the right but not left hemisphere although left hemisphere reactivity did improve somewhat over the course of the study.     Sleep:   There are periods of increased slowing over the right hemisphere consistent with state change but no sleep transients or clear sleep staging.    Abnormal activity:   No epileptiform discharges, periodic discharges, lateralized rhythmic delta activity or electrographic seizures were seen.    IMPRESSION:     This is an abnormal EEG due to the finding of a moderate cerebral dysfunction which is more pronounced over the left hemisphere.  There were no electrographic seizures or indication of seizure tendency.    Giorgi Ramirez MD.  Neurology-Epilepsy.  [facility].

## 2020-03-24 NOTE — PROCEDURES
EEG REPORT      Dacia Martínez  [id number]  1935    DATE OF SERVICE: 1/8/2020     -2    METHODOLOGY      Extended electroencephalographic recording is made while the patient is ambulatory and continuing normal daily activities.  Electrodes are placed according to the International 10-20 placement system and included T1 and T2 electrode placement.  Twenty four (24) channels of digital signal (sampling rate of 512/sec) was simultaneously recorded from the scalp including EKG and eye monitors.  Recording band pass was 0.1 to 100 hz and all data was stored digitally on the recorder.  The patient is instructed to press an event button when clinical symptoms occur and write the symptoms into a diary. Activation procedures which include photic stimulation, hyperventilation and instructing patients to perform simple task are done in selected patients.        The EEG is displayed on a monitor screen and can be reformatted into different montages for evaluation.  The entire recoding is submitted for computer assisted analysis to detect spike and electrographic seizure activity.  The entire recording is visually reviewed and the times identified by computer analysis as being spikes or seizures are reviewed again.  Compresses spectral analysis (CSA) is also performed on the activity recorded from each individual channel.  This is displayed as a power display of frequencies from 0 to 30 Hz over time.   The CSA analysis is done and displayed continuously.  This is reviewed for asymmetries in power between homologous areas of the scalp and for presence of changes in power which canbe seen when seizures occur.  Sections of suspected abnormalities on the CSA is then compared with the original EEG recording.  .     [employer] software was also utilized in the review of this study.  This software suite analyzes the EEG recording in multiple domains.  Coherence and rhythmicity is computed to identify EEG sections which may contain  organized seizures.  Each channel undergoes analysis to detect presence of spike and sharp waves which have special and morphological characteristic of epileptic activity.  The routine EEG recording is converted from spacial into frequency domain.  This is then displayed comparing homologous areas to identify areas of significant asymmetry.  Algorithm to identify non-cortically generated artifact is used to separate eye movement, EMG and other artifact from the EEG     Recording Times  Start on 1/8/2020 at 07:03:25 Stop on 1/8/2020 at 08:17:10    A total of 1:13:45 hours of EEG was recorded.      EEG FINDINGS:  Background activity:   The background rhythm over the right hemisphere was characterized by moderate voltage theta more than delta.    The background rhythm over the left hemisphere was characterized by low to moderate voltage delta and theta.    There is a 7-8Hz posterior dominant rhythm over the right hemisphere but PDR is absent over the left.    Good reactivity noted over the right but not left hemisphere although left hemisphere reactivity did improve somewhat over the course of the study.     Sleep:   There are periods of increased slowing over the right hemisphere consistent with state change but no sleep transients or clear sleep staging.    Abnormal activity:   No epileptiform discharges, periodic discharges, lateralized rhythmic delta activity or electrographic seizures were seen.    IMPRESSION:     This is an abnormal EEG due to the finding of a mild right and moderate left hemisphere cerebral dysfunction.  There were no electrographic seizures or indication of seizure tendency.    Giorgi Ramirez MD.  Neurology-Epilepsy.  [facility].

## 2020-03-24 NOTE — PROCEDURES
EEG REPORT      Dacia Martínez  [id number]  1935    DATE OF SERVICE: 1/3/2020     -2    METHODOLOGY      Extended electroencephalographic recording is made while the patient is ambulatory and continuing normal daily activities.  Electrodes are placed according to the International 10-20 placement system and included T1 and T2 electrode placement.  Twenty four (24) channels of digital signal (sampling rate of 512/sec) was simultaneously recorded from the scalp including EKG and eye monitors.  Recording band pass was 0.1 to 100 hz and all data was stored digitally on the recorder.  The patient is instructed to press an event button when clinical symptoms occur and write the symptoms into a diary. Activation procedures which include photic stimulation, hyperventilation and instructing patients to perform simple task are done in selected patients.        The EEG is displayed on a monitor screen and can be reformatted into different montages for evaluation.  The entire recoding is submitted for computer assisted analysis to detect spike and electrographic seizure activity.  The entire recording is visually reviewed and the times identified by computer analysis as being spikes or seizures are reviewed again.  Compresses spectral analysis (CSA) is also performed on the activity recorded from each individual channel.  This is displayed as a power display of frequencies from 0 to 30 Hz over time.   The CSA analysis is done and displayed continuously.  This is reviewed for asymmetries in power between homologous areas of the scalp and for presence of changes in power which canbe seen when seizures occur.  Sections of suspected abnormalities on the CSA is then compared with the original EEG recording.  .     dentaZOOM software was also utilized in the review of this study.  This software suite analyzes the EEG recording in multiple domains.  Coherence and rhythmicity is computed to identify EEG sections which may contain  organized seizures.  Each channel undergoes analysis to detect presence of spike and sharp waves which have special and morphological characteristic of epileptic activity.  The routine EEG recording is converted from spacial into frequency domain.  This is then displayed comparing homologous areas to identify areas of significant asymmetry.  Algorithm to identify non-cortically generated artifact is used to separate eye movement, EMG and other artifact from the EEG     Recording Times  Start on 1/3/2020 at 07:02:27  Stop on 1/3/2020 at 11:45:10    A total of 4:16:41 hours of EEG was recorded.      EEG FINDINGS:  Background activity:   The background rhythm over the right hemisphere was characterized by theta and alpha frequencies with some preservation of faster activity anteriorly and a well maintained 7-8Hz posterior dominant rhythm    The background rhythm over the left hemisphere was characterized by theta and delta activity with a more poorly maintained 7-8Hz posterior dominant rhythm.     Sleep:   Normal sleep transients including sleep spindles, K complexes, vertex waves and POSTS were seen better formed over the right.    Abnormal activity:   No epileptiform discharges, periodic discharges, lateralized rhythmic delta activity or electrographic seizures were seen.    IMPRESSION:     This is an abnormal EEG due to the finding of a mild generalized cerebral dysfunction which is more pronounced over the left hemisphere.  There were no electrographic seizures or indication of seizure tendency.    Giorgi Ramirez MD.  Neurology-Epilepsy.  [facility].

## 2022-08-06 NOTE — TELEPHONE ENCOUNTER
----- Message from Isabelle Power MA sent at 1/4/2019  1:53 PM CST -----  Contact: Self       ----- Message -----  From: Xavier Maier MD  Sent: 1/4/2019  11:04 AM  To: Isabelle Power MA    This goes to the doctor on call ; [facility] patient  ----- Message -----  From: Isabelle Power MA  Sent: 1/4/2019  10:46 AM  To: Xavier Maier MD        ----- Message -----  From: Ana Garcia  Sent: 1/4/2019  10:40 AM  To: [facility] Staff    Rx Refill/Request     Is this a Refill or New Rx:  refill  Rx Name and Strength:  predniSONE (DELTASONE) 5 MG tablet  Preferred Pharmacy with phone number:   Communication Preference:[phone]   Additional Information:      06-Aug-2022 17:22

## 2023-01-16 NOTE — ASSESSMENT & PLAN NOTE
Catheter inserted with wire simultaneously. Last a1c - 6.4%  Can be low secondary to hypoglycemia and/or AoCD.  Today's POCT 70  (fasting)    Reviewed goals of therapy are to get the best control we can without hypoglycemia.      Recommended discontinuing trulicity and monitoring BG prior to initiating a new therapy.  Patient and daughter requested to transition to a new therapy.     Patient not a candidate for (GFR 32):  SGLT2i  Metformin  Sulfonyurea    Medication changes:   Stop: Trulicity 75mg weekly (Saturday)  Start: Januvia 50mg daily (begin on Thursday)    Goal A1C <7.5%    Advised frequent self blood glucose monitoring.  Patient encouraged to document glucose results and bring them to every clinic visit.  BG logs provided to patient. Plans to mail each log upon completion (envelope provided).    Orders for new glucometer and appropriate supplies placed today.    Hypoglycemia precautions discussed. Instructed on precautions before driving.      Periodic follow ups for eye evaluations, foot care and dental care suggested.    Foot exam completed today.  Eye exam UTD.     Recommended flu vaccine, patient declined.

## 2023-04-14 NOTE — ASSESSMENT & PLAN NOTE
-Pt underwent dental extractions day of presentation. Family reports cough for few days prior to admission.  -Overnight on 12/31, pt became tachypnic with fever to 100.3 and rapid response was called. CXR with left sided consolidation. -Started on vanc/zosyn overnight. ABG wnl. WBC wnl. Procal mildly elevated to .99. O2 saturations %. Suspect possible aspiration pneumonia vs CAP.  Respiratory panel negative    Plan  1) Continue zosyn for now as patient was spiking fevers yesterday  2)  sputum cxs revealed Enterobacter Cloacae      No

## 2025-05-14 NOTE — PROGRESS NOTES
Subjective:       Patient ID: Dacia Martínez is a 83 y.o. Black or  female who presents for new evaluation of Chronic Renal Failure    Presents for evaluation of CKD (?stage III) sCr has been gradually increasing over the past 1-2 years and currently at a 1.6 - 1.8 baseline.  She is a diabetic, but there is only minimal proteinuria (2016) on most recent urine studies, she denies retinopathy.  No NSAID use.  She is on a diuretic and dose has per her own history (and daughter in room) been increased and changed over the past several months.  Echocardiogram from December with EF 60-65, echocardiogram from June reportedly with EF of 30-35%, she denies worsening MCCOLLUM or exercise tolerance, no CP, no worsening edema, she has a cardiology appointment tomorrow.      PMH  HTN  T2DM  PAF  Allergic rhinitis  Constipation  Gout  Hyperuricemia    PSH  Hysterectomy  Carpal tunnel release  Cataract surgery    SH  Non-smoker  No significant EtOH  Good family support    FH  DM and HTN    Review of Systems   Constitutional: Negative for chills, fatigue and fever.   Respiratory: Negative for chest tightness and shortness of breath.    Cardiovascular: Negative for chest pain and leg swelling.   Gastrointestinal: Negative for abdominal distention, abdominal pain, diarrhea and nausea.   Genitourinary: Negative for decreased urine volume, difficulty urinating, flank pain, frequency, hematuria and urgency.   Skin: Negative for rash.   Neurological: Negative for tremors, speech difficulty and weakness.       Objective:      Physical Exam   Constitutional: She is oriented to person, place, and time.   HENT:   Head: Atraumatic.   Neck: No JVD present.   Cardiovascular: Normal rate and regular rhythm.    Pulmonary/Chest: Effort normal and breath sounds normal.   Abdominal: Soft. She exhibits no distension.   Musculoskeletal: She exhibits edema (bilateral and at ankles). She exhibits no tenderness.   Neurological: She is alert and  oriented to person, place, and time.   Skin: Skin is warm.   Psychiatric: She has a normal mood and affect. Her behavior is normal.       Assessment & Plan:       CKD (chronic kidney disease) stage 3, GFR 30-59 ml/min  1. CKD: etiology possibly from diabetes and HTN, although absence of significant proteinuria may make the former less likely, uric acid level of 9.0 raises concern for uric acid nephropathy, but overall probably less likely, recent and gradual rise in sCr over the past year may be from / exacerbated by diuretic use     Lab Results   Component Value Date    CREATININE 1.8 (H) 06/25/2018     Protein Creatinine Ratios: repeating today    Prot/Creat Ratio, Ur   Date Value Ref Range Status   04/08/2016 0.36 (H) 0.00 - 0.20 Final     ·   ·   Acid-Base: stable/not an issue  Lab Results   Component Value Date     (L) 06/25/2018    K 3.9 06/25/2018    CO2 26 06/25/2018     2. HTN: Blood pressures well controlled on the office, cont current management    3. Renal osteodystrophy: we're checking PTH and vitamin D levels  Lab Results   Component Value Date    CALCIUM 9.2 06/25/2018    PHOS 4.0 06/25/2018       4. Anemia: checking iron studies, PCP for colon CA screening  Lab Results   Component Value Date    HGB 11.8 (L) 06/23/2018        5. DM:  Last HbA1C, ok, PCP managing  Lab Results   Component Value Date    HGBA1C 6.0 (H) 05/08/2018       6. Lipid management: PCP managing  Lab Results   Component Value Date    LDLCALC 130.0 05/08/2018       7. ESRD planing: not needed at the current time    Follow up in 1-2 months    Brief outline of plan as outlined above:  1) CBC, iron studies  2) RFP  3) UA and UPC ratio  4) PTH and vitamin D level  5) uric acid level  6) renal ultrasound    7) importance of cardiology follow up to review apparent and what seems to be possibly very significant changed on echocardiogram was emphasized to the patient and she verbalized understanding (she has cardiology appointment  tomorrow)          none

## (undated) DEVICE — SHEATH HEMOSTASIS 8.5FR

## (undated) DEVICE — CATH DS 7F 8MM THERMOCOUPLE

## (undated) DEVICE — ELECTRODE POLYHESIVEPRE-ATTACH

## (undated) DEVICE — PAD DEFIB CADENCE ADULT R2

## (undated) DEVICE — PACK EP DRAPE